# Patient Record
Sex: MALE | Race: WHITE | NOT HISPANIC OR LATINO
[De-identification: names, ages, dates, MRNs, and addresses within clinical notes are randomized per-mention and may not be internally consistent; named-entity substitution may affect disease eponyms.]

---

## 2017-03-27 PROBLEM — Z00.00 ENCOUNTER FOR PREVENTIVE HEALTH EXAMINATION: Noted: 2017-03-27

## 2017-03-28 ENCOUNTER — APPOINTMENT (OUTPATIENT)
Dept: CARDIOLOGY | Facility: CLINIC | Age: 47
End: 2017-03-28

## 2017-05-12 ENCOUNTER — APPOINTMENT (OUTPATIENT)
Dept: CARDIOLOGY | Facility: CLINIC | Age: 47
End: 2017-05-12

## 2017-05-23 ENCOUNTER — EMERGENCY (EMERGENCY)
Facility: HOSPITAL | Age: 47
LOS: 0 days | Discharge: NURSING, REHAB OR LONG-TERM CARE | End: 2017-05-23
Admitting: INTERNAL MEDICINE

## 2017-06-28 DIAGNOSIS — M62.81 MUSCLE WEAKNESS (GENERALIZED): ICD-10-CM

## 2017-06-28 DIAGNOSIS — Z79.82 LONG TERM (CURRENT) USE OF ASPIRIN: ICD-10-CM

## 2017-06-28 DIAGNOSIS — Z88.8 ALLERGY STATUS TO OTHER DRUGS, MEDICAMENTS AND BIOLOGICAL SUBSTANCES STATUS: ICD-10-CM

## 2017-06-28 DIAGNOSIS — I50.9 HEART FAILURE, UNSPECIFIED: ICD-10-CM

## 2017-06-28 DIAGNOSIS — M79.1 MYALGIA: ICD-10-CM

## 2017-06-28 DIAGNOSIS — R32 UNSPECIFIED URINARY INCONTINENCE: ICD-10-CM

## 2017-06-28 DIAGNOSIS — Z79.899 OTHER LONG TERM (CURRENT) DRUG THERAPY: ICD-10-CM

## 2017-06-28 DIAGNOSIS — J44.9 CHRONIC OBSTRUCTIVE PULMONARY DISEASE, UNSPECIFIED: ICD-10-CM

## 2017-06-28 DIAGNOSIS — F32.9 MAJOR DEPRESSIVE DISORDER, SINGLE EPISODE, UNSPECIFIED: ICD-10-CM

## 2017-07-08 ENCOUNTER — INPATIENT (INPATIENT)
Facility: HOSPITAL | Age: 47
LOS: 3 days | Discharge: NURSING, REHAB OR LONG-TERM CARE | End: 2017-07-12
Attending: INTERNAL MEDICINE | Admitting: INTERNAL MEDICINE

## 2017-07-08 DIAGNOSIS — F93.0 SEPARATION ANXIETY DISORDER OF CHILDHOOD: ICD-10-CM

## 2017-07-08 DIAGNOSIS — G40.909 EPILEPSY, UNSPECIFIED, NOT INTRACTABLE, WITHOUT STATUS EPILEPTICUS: ICD-10-CM

## 2017-07-08 DIAGNOSIS — F41.0 PANIC DISORDER [EPISODIC PAROXYSMAL ANXIETY]: ICD-10-CM

## 2017-07-08 DIAGNOSIS — R07.2 PRECORDIAL PAIN: ICD-10-CM

## 2017-07-08 DIAGNOSIS — I49.9 CARDIAC ARRHYTHMIA, UNSPECIFIED: ICD-10-CM

## 2017-07-08 DIAGNOSIS — I25.10 ATHEROSCLEROTIC HEART DISEASE OF NATIVE CORONARY ARTERY WITHOUT ANGINA PECTORIS: ICD-10-CM

## 2017-07-08 DIAGNOSIS — K21.9 GASTRO-ESOPHAGEAL REFLUX DISEASE WITHOUT ESOPHAGITIS: ICD-10-CM

## 2017-07-08 DIAGNOSIS — E78.5 HYPERLIPIDEMIA, UNSPECIFIED: ICD-10-CM

## 2017-07-14 DIAGNOSIS — G62.9 POLYNEUROPATHY, UNSPECIFIED: ICD-10-CM

## 2017-07-14 DIAGNOSIS — B20 HUMAN IMMUNODEFICIENCY VIRUS [HIV] DISEASE: ICD-10-CM

## 2017-07-14 DIAGNOSIS — R07.9 CHEST PAIN, UNSPECIFIED: ICD-10-CM

## 2017-07-14 DIAGNOSIS — I50.9 HEART FAILURE, UNSPECIFIED: ICD-10-CM

## 2017-07-14 DIAGNOSIS — I25.10 ATHEROSCLEROTIC HEART DISEASE OF NATIVE CORONARY ARTERY WITHOUT ANGINA PECTORIS: ICD-10-CM

## 2017-07-14 DIAGNOSIS — F17.200 NICOTINE DEPENDENCE, UNSPECIFIED, UNCOMPLICATED: ICD-10-CM

## 2017-07-14 DIAGNOSIS — Z79.01 LONG TERM (CURRENT) USE OF ANTICOAGULANTS: ICD-10-CM

## 2017-07-14 DIAGNOSIS — E78.5 HYPERLIPIDEMIA, UNSPECIFIED: ICD-10-CM

## 2017-07-14 DIAGNOSIS — G35 MULTIPLE SCLEROSIS: ICD-10-CM

## 2017-07-14 DIAGNOSIS — I11.0 HYPERTENSIVE HEART DISEASE WITH HEART FAILURE: ICD-10-CM

## 2017-07-14 DIAGNOSIS — F31.9 BIPOLAR DISORDER, UNSPECIFIED: ICD-10-CM

## 2017-07-14 DIAGNOSIS — Z85.47 PERSONAL HISTORY OF MALIGNANT NEOPLASM OF TESTIS: ICD-10-CM

## 2017-07-14 DIAGNOSIS — J44.9 CHRONIC OBSTRUCTIVE PULMONARY DISEASE, UNSPECIFIED: ICD-10-CM

## 2017-07-14 DIAGNOSIS — G40.909 EPILEPSY, UNSPECIFIED, NOT INTRACTABLE, WITHOUT STATUS EPILEPTICUS: ICD-10-CM

## 2017-07-14 DIAGNOSIS — I48.91 UNSPECIFIED ATRIAL FIBRILLATION: ICD-10-CM

## 2017-07-14 DIAGNOSIS — Z95.810 PRESENCE OF AUTOMATIC (IMPLANTABLE) CARDIAC DEFIBRILLATOR: ICD-10-CM

## 2017-07-14 DIAGNOSIS — I25.2 OLD MYOCARDIAL INFARCTION: ICD-10-CM

## 2017-07-14 DIAGNOSIS — I48.92 UNSPECIFIED ATRIAL FLUTTER: ICD-10-CM

## 2017-08-26 ENCOUNTER — EMERGENCY (EMERGENCY)
Facility: HOSPITAL | Age: 47
LOS: 0 days | Discharge: HOME | End: 2017-08-26
Admitting: INTERNAL MEDICINE
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
## 2017-08-26 DIAGNOSIS — Z88.8 ALLERGY STATUS TO OTHER DRUGS, MEDICAMENTS AND BIOLOGICAL SUBSTANCES: ICD-10-CM

## 2017-08-26 DIAGNOSIS — R07.2 PRECORDIAL PAIN: ICD-10-CM

## 2017-08-26 DIAGNOSIS — Z23 ENCOUNTER FOR IMMUNIZATION: ICD-10-CM

## 2017-08-26 DIAGNOSIS — I49.9 CARDIAC ARRHYTHMIA, UNSPECIFIED: ICD-10-CM

## 2017-08-26 DIAGNOSIS — I25.10 ATHEROSCLEROTIC HEART DISEASE OF NATIVE CORONARY ARTERY WITHOUT ANGINA PECTORIS: ICD-10-CM

## 2017-08-26 DIAGNOSIS — G40.909 EPILEPSY, UNSPECIFIED, NOT INTRACTABLE, WITHOUT STATUS EPILEPTICUS: ICD-10-CM

## 2017-08-26 DIAGNOSIS — E78.5 HYPERLIPIDEMIA, UNSPECIFIED: ICD-10-CM
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
## 2017-08-26 DIAGNOSIS — Z79.82 LONG TERM (CURRENT) USE OF ASPIRIN: ICD-10-CM

## 2017-08-26 DIAGNOSIS — I21.3 ST ELEVATION (STEMI) MYOCARDIAL INFARCTION OF UNSPECIFIED SITE: ICD-10-CM

## 2017-08-26 DIAGNOSIS — F41.0 PANIC DISORDER [EPISODIC PAROXYSMAL ANXIETY]: ICD-10-CM

## 2017-08-26 DIAGNOSIS — S91.331A PUNCTURE WOUND WITHOUT FOREIGN BODY, RIGHT FOOT, INITIAL ENCOUNTER: ICD-10-CM

## 2017-08-26 DIAGNOSIS — F93.0 SEPARATION ANXIETY DISORDER OF CHILDHOOD: ICD-10-CM

## 2017-08-26 DIAGNOSIS — F31.9 BIPOLAR DISORDER, UNSPECIFIED: ICD-10-CM

## 2017-08-26 DIAGNOSIS — Z79.01 LONG TERM (CURRENT) USE OF ANTICOAGULANTS: ICD-10-CM

## 2017-08-26 DIAGNOSIS — I50.9 HEART FAILURE, UNSPECIFIED: ICD-10-CM

## 2017-08-26 DIAGNOSIS — K21.9 GASTRO-ESOPHAGEAL REFLUX DISEASE WITHOUT ESOPHAGITIS: ICD-10-CM

## 2017-08-26 DIAGNOSIS — Z88.5 ALLERGY STATUS TO NARCOTIC AGENT: ICD-10-CM

## 2017-09-01 ENCOUNTER — INPATIENT (INPATIENT)
Facility: HOSPITAL | Age: 47
LOS: 4 days | Discharge: NURSING, REHAB OR LONG-TERM CARE | End: 2017-09-06
Attending: INTERNAL MEDICINE | Admitting: INTERNAL MEDICINE

## 2017-09-01 DIAGNOSIS — F41.0 PANIC DISORDER [EPISODIC PAROXYSMAL ANXIETY]: ICD-10-CM

## 2017-09-01 DIAGNOSIS — I49.9 CARDIAC ARRHYTHMIA, UNSPECIFIED: ICD-10-CM

## 2017-09-01 DIAGNOSIS — F93.0 SEPARATION ANXIETY DISORDER OF CHILDHOOD: ICD-10-CM
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
## 2017-09-01 DIAGNOSIS — E78.5 HYPERLIPIDEMIA, UNSPECIFIED: ICD-10-CM

## 2017-09-01 DIAGNOSIS — K21.9 GASTRO-ESOPHAGEAL REFLUX DISEASE WITHOUT ESOPHAGITIS: ICD-10-CM

## 2017-09-01 DIAGNOSIS — G40.909 EPILEPSY, UNSPECIFIED, NOT INTRACTABLE, WITHOUT STATUS EPILEPTICUS: ICD-10-CM

## 2017-09-01 DIAGNOSIS — I25.10 ATHEROSCLEROTIC HEART DISEASE OF NATIVE CORONARY ARTERY WITHOUT ANGINA PECTORIS: ICD-10-CM

## 2017-09-01 DIAGNOSIS — R07.2 PRECORDIAL PAIN: ICD-10-CM

## 2017-09-08 DIAGNOSIS — I50.22 CHRONIC SYSTOLIC (CONGESTIVE) HEART FAILURE: ICD-10-CM

## 2017-09-08 DIAGNOSIS — I11.0 HYPERTENSIVE HEART DISEASE WITH HEART FAILURE: ICD-10-CM

## 2017-09-08 DIAGNOSIS — Z92.21 PERSONAL HISTORY OF ANTINEOPLASTIC CHEMOTHERAPY: ICD-10-CM

## 2017-09-08 DIAGNOSIS — Z95.810 PRESENCE OF AUTOMATIC (IMPLANTABLE) CARDIAC DEFIBRILLATOR: ICD-10-CM

## 2017-09-08 DIAGNOSIS — T82.198A OTHER MECHANICAL COMPLICATION OF OTHER CARDIAC ELECTRONIC DEVICE, INITIAL ENCOUNTER: ICD-10-CM

## 2017-09-08 DIAGNOSIS — F41.9 ANXIETY DISORDER, UNSPECIFIED: ICD-10-CM

## 2017-09-08 DIAGNOSIS — F31.9 BIPOLAR DISORDER, UNSPECIFIED: ICD-10-CM

## 2017-09-08 DIAGNOSIS — E78.5 HYPERLIPIDEMIA, UNSPECIFIED: ICD-10-CM

## 2017-09-08 DIAGNOSIS — Z88.2 ALLERGY STATUS TO SULFONAMIDES: ICD-10-CM

## 2017-09-08 DIAGNOSIS — G40.909 EPILEPSY, UNSPECIFIED, NOT INTRACTABLE, WITHOUT STATUS EPILEPTICUS: ICD-10-CM

## 2017-09-08 DIAGNOSIS — Z91.120 PATIENT'S INTENTIONAL UNDERDOSING OF MEDICATION REGIMEN DUE TO FINANCIAL HARDSHIP: ICD-10-CM

## 2017-09-08 DIAGNOSIS — I47.0 RE-ENTRY VENTRICULAR ARRHYTHMIA: ICD-10-CM

## 2017-09-08 DIAGNOSIS — F17.200 NICOTINE DEPENDENCE, UNSPECIFIED, UNCOMPLICATED: ICD-10-CM

## 2017-09-08 DIAGNOSIS — G35 MULTIPLE SCLEROSIS: ICD-10-CM

## 2017-09-08 DIAGNOSIS — Z85.47 PERSONAL HISTORY OF MALIGNANT NEOPLASM OF TESTIS: ICD-10-CM

## 2017-09-08 DIAGNOSIS — Z79.82 LONG TERM (CURRENT) USE OF ASPIRIN: ICD-10-CM

## 2017-09-08 DIAGNOSIS — I48.92 UNSPECIFIED ATRIAL FLUTTER: ICD-10-CM

## 2017-09-08 DIAGNOSIS — I25.10 ATHEROSCLEROTIC HEART DISEASE OF NATIVE CORONARY ARTERY WITHOUT ANGINA PECTORIS: ICD-10-CM

## 2017-09-08 DIAGNOSIS — G62.9 POLYNEUROPATHY, UNSPECIFIED: ICD-10-CM

## 2017-09-08 DIAGNOSIS — J44.9 CHRONIC OBSTRUCTIVE PULMONARY DISEASE, UNSPECIFIED: ICD-10-CM

## 2017-09-08 DIAGNOSIS — Z88.8 ALLERGY STATUS TO OTHER DRUGS, MEDICAMENTS AND BIOLOGICAL SUBSTANCES: ICD-10-CM

## 2017-09-08 DIAGNOSIS — I48.91 UNSPECIFIED ATRIAL FIBRILLATION: ICD-10-CM

## 2017-09-08 DIAGNOSIS — I42.8 OTHER CARDIOMYOPATHIES: ICD-10-CM

## 2017-09-08 DIAGNOSIS — Z88.6 ALLERGY STATUS TO ANALGESIC AGENT: ICD-10-CM

## 2017-10-09 ENCOUNTER — APPOINTMENT (OUTPATIENT)
Dept: CARDIOLOGY | Facility: CLINIC | Age: 47
End: 2017-10-09

## 2017-11-13 ENCOUNTER — APPOINTMENT (OUTPATIENT)
Dept: CARDIOLOGY | Facility: CLINIC | Age: 47
End: 2017-11-13

## 2017-11-13 VITALS
DIASTOLIC BLOOD PRESSURE: 75 MMHG | HEIGHT: 66 IN | WEIGHT: 180 LBS | BODY MASS INDEX: 28.93 KG/M2 | SYSTOLIC BLOOD PRESSURE: 105 MMHG

## 2017-11-14 ENCOUNTER — OUTPATIENT (OUTPATIENT)
Dept: OUTPATIENT SERVICES | Facility: HOSPITAL | Age: 47
LOS: 1 days | Discharge: HOME | End: 2017-11-14

## 2017-11-14 DIAGNOSIS — R07.2 PRECORDIAL PAIN: ICD-10-CM

## 2017-11-14 DIAGNOSIS — K21.9 GASTRO-ESOPHAGEAL REFLUX DISEASE WITHOUT ESOPHAGITIS: ICD-10-CM

## 2017-11-14 DIAGNOSIS — I25.10 ATHEROSCLEROTIC HEART DISEASE OF NATIVE CORONARY ARTERY WITHOUT ANGINA PECTORIS: ICD-10-CM

## 2017-11-14 DIAGNOSIS — E78.5 HYPERLIPIDEMIA, UNSPECIFIED: ICD-10-CM

## 2017-11-14 DIAGNOSIS — I49.9 CARDIAC ARRHYTHMIA, UNSPECIFIED: ICD-10-CM

## 2017-11-14 DIAGNOSIS — G40.909 EPILEPSY, UNSPECIFIED, NOT INTRACTABLE, WITHOUT STATUS EPILEPTICUS: ICD-10-CM

## 2017-11-14 DIAGNOSIS — F93.0 SEPARATION ANXIETY DISORDER OF CHILDHOOD: ICD-10-CM

## 2017-11-14 DIAGNOSIS — F41.0 PANIC DISORDER [EPISODIC PAROXYSMAL ANXIETY]: ICD-10-CM

## 2017-11-16 ENCOUNTER — APPOINTMENT (OUTPATIENT)
Dept: CARDIOLOGY | Facility: CLINIC | Age: 47
End: 2017-11-16

## 2017-11-16 VITALS — SYSTOLIC BLOOD PRESSURE: 98 MMHG | DIASTOLIC BLOOD PRESSURE: 70 MMHG | BODY MASS INDEX: 29.05 KG/M2 | WEIGHT: 180 LBS

## 2017-11-16 DIAGNOSIS — R53.83 OTHER FATIGUE: ICD-10-CM

## 2017-11-16 DIAGNOSIS — Z86.79 PERSONAL HISTORY OF OTHER DISEASES OF THE CIRCULATORY SYSTEM: ICD-10-CM

## 2017-11-16 DIAGNOSIS — Z87.42 PERSONAL HISTORY OF OTHER DISEASES OF THE FEMALE GENITAL TRACT: ICD-10-CM

## 2017-11-16 DIAGNOSIS — F15.90 OTHER STIMULANT USE, UNSPECIFIED, UNCOMPLICATED: ICD-10-CM

## 2017-11-16 DIAGNOSIS — Z95.0 PRESENCE OF CARDIAC PACEMAKER: ICD-10-CM

## 2017-11-16 DIAGNOSIS — F50.82 AVOIDANT/RESTRICTIVE FOOD INTAKE DISORDER: ICD-10-CM

## 2017-11-16 DIAGNOSIS — Z95.810 PRESENCE OF AUTOMATIC (IMPLANTABLE) CARDIAC DEFIBRILLATOR: ICD-10-CM

## 2017-11-16 DIAGNOSIS — I42.8 OTHER CARDIOMYOPATHIES: ICD-10-CM

## 2017-11-16 DIAGNOSIS — Z87.09 PERSONAL HISTORY OF OTHER DISEASES OF THE RESPIRATORY SYSTEM: ICD-10-CM

## 2017-11-16 DIAGNOSIS — I25.2 OLD MYOCARDIAL INFARCTION: ICD-10-CM

## 2017-11-16 DIAGNOSIS — I47.1 SUPRAVENTRICULAR TACHYCARDIA: ICD-10-CM

## 2017-11-16 DIAGNOSIS — F17.200 NICOTINE DEPENDENCE, UNSPECIFIED, UNCOMPLICATED: ICD-10-CM

## 2017-11-21 DIAGNOSIS — K20.9 ESOPHAGITIS, UNSPECIFIED: ICD-10-CM

## 2017-11-21 DIAGNOSIS — E78.00 PURE HYPERCHOLESTEROLEMIA, UNSPECIFIED: ICD-10-CM

## 2017-11-21 DIAGNOSIS — K29.80 DUODENITIS WITHOUT BLEEDING: ICD-10-CM

## 2017-11-21 DIAGNOSIS — J44.9 CHRONIC OBSTRUCTIVE PULMONARY DISEASE, UNSPECIFIED: ICD-10-CM

## 2017-11-21 DIAGNOSIS — R13.10 DYSPHAGIA, UNSPECIFIED: ICD-10-CM

## 2017-11-21 DIAGNOSIS — K29.50 UNSPECIFIED CHRONIC GASTRITIS WITHOUT BLEEDING: ICD-10-CM

## 2017-11-21 DIAGNOSIS — I10 ESSENTIAL (PRIMARY) HYPERTENSION: ICD-10-CM

## 2017-11-27 ENCOUNTER — MOBILE ON CALL (OUTPATIENT)
Age: 47
End: 2017-11-27

## 2017-11-27 ENCOUNTER — INPATIENT (INPATIENT)
Facility: HOSPITAL | Age: 47
LOS: 1 days | Discharge: NURSING, REHAB OR LONG-TERM CARE | End: 2017-11-29
Attending: INTERNAL MEDICINE | Admitting: INTERNAL MEDICINE

## 2017-11-27 DIAGNOSIS — I25.10 ATHEROSCLEROTIC HEART DISEASE OF NATIVE CORONARY ARTERY WITHOUT ANGINA PECTORIS: ICD-10-CM

## 2017-11-27 DIAGNOSIS — K21.9 GASTRO-ESOPHAGEAL REFLUX DISEASE WITHOUT ESOPHAGITIS: ICD-10-CM

## 2017-11-27 DIAGNOSIS — G40.909 EPILEPSY, UNSPECIFIED, NOT INTRACTABLE, WITHOUT STATUS EPILEPTICUS: ICD-10-CM

## 2017-11-27 DIAGNOSIS — F41.0 PANIC DISORDER [EPISODIC PAROXYSMAL ANXIETY]: ICD-10-CM

## 2017-11-27 DIAGNOSIS — F93.0 SEPARATION ANXIETY DISORDER OF CHILDHOOD: ICD-10-CM

## 2017-11-27 DIAGNOSIS — I49.9 CARDIAC ARRHYTHMIA, UNSPECIFIED: ICD-10-CM

## 2017-11-27 DIAGNOSIS — E78.5 HYPERLIPIDEMIA, UNSPECIFIED: ICD-10-CM

## 2017-11-27 DIAGNOSIS — R07.2 PRECORDIAL PAIN: ICD-10-CM

## 2017-12-01 DIAGNOSIS — E83.52 HYPERCALCEMIA: ICD-10-CM

## 2017-12-01 DIAGNOSIS — F41.9 ANXIETY DISORDER, UNSPECIFIED: ICD-10-CM

## 2017-12-01 DIAGNOSIS — I13.0 HYPERTENSIVE HEART AND CHRONIC KIDNEY DISEASE WITH HEART FAILURE AND STAGE 1 THROUGH STAGE 4 CHRONIC KIDNEY DISEASE, OR UNSPECIFIED CHRONIC KIDNEY DISEASE: ICD-10-CM

## 2017-12-01 DIAGNOSIS — Z85.47 PERSONAL HISTORY OF MALIGNANT NEOPLASM OF TESTIS: ICD-10-CM

## 2017-12-01 DIAGNOSIS — Z95.810 PRESENCE OF AUTOMATIC (IMPLANTABLE) CARDIAC DEFIBRILLATOR: ICD-10-CM

## 2017-12-01 DIAGNOSIS — Z90.79 ACQUIRED ABSENCE OF OTHER GENITAL ORGAN(S): ICD-10-CM

## 2017-12-01 DIAGNOSIS — N18.4 CHRONIC KIDNEY DISEASE, STAGE 4 (SEVERE): ICD-10-CM

## 2017-12-01 DIAGNOSIS — R55 SYNCOPE AND COLLAPSE: ICD-10-CM

## 2017-12-01 DIAGNOSIS — Z98.890 OTHER SPECIFIED POSTPROCEDURAL STATES: ICD-10-CM

## 2017-12-01 DIAGNOSIS — I50.22 CHRONIC SYSTOLIC (CONGESTIVE) HEART FAILURE: ICD-10-CM

## 2017-12-01 DIAGNOSIS — G35 MULTIPLE SCLEROSIS: ICD-10-CM

## 2017-12-01 DIAGNOSIS — F31.9 BIPOLAR DISORDER, UNSPECIFIED: ICD-10-CM

## 2017-12-01 DIAGNOSIS — Z79.82 LONG TERM (CURRENT) USE OF ASPIRIN: ICD-10-CM

## 2017-12-01 DIAGNOSIS — F32.9 MAJOR DEPRESSIVE DISORDER, SINGLE EPISODE, UNSPECIFIED: ICD-10-CM

## 2017-12-01 DIAGNOSIS — I48.91 UNSPECIFIED ATRIAL FIBRILLATION: ICD-10-CM

## 2017-12-01 DIAGNOSIS — G40.909 EPILEPSY, UNSPECIFIED, NOT INTRACTABLE, WITHOUT STATUS EPILEPTICUS: ICD-10-CM

## 2017-12-04 DIAGNOSIS — I42.9 CARDIOMYOPATHY, UNSPECIFIED: ICD-10-CM

## 2017-12-04 DIAGNOSIS — I25.2 OLD MYOCARDIAL INFARCTION: ICD-10-CM

## 2017-12-04 DIAGNOSIS — E78.5 HYPERLIPIDEMIA, UNSPECIFIED: ICD-10-CM

## 2017-12-04 DIAGNOSIS — H46.9 UNSPECIFIED OPTIC NEURITIS: ICD-10-CM

## 2017-12-04 DIAGNOSIS — I25.10 ATHEROSCLEROTIC HEART DISEASE OF NATIVE CORONARY ARTERY WITHOUT ANGINA PECTORIS: ICD-10-CM

## 2017-12-04 DIAGNOSIS — J44.9 CHRONIC OBSTRUCTIVE PULMONARY DISEASE, UNSPECIFIED: ICD-10-CM

## 2018-01-05 ENCOUNTER — APPOINTMENT (OUTPATIENT)
Dept: CARDIOLOGY | Facility: CLINIC | Age: 48
End: 2018-01-05

## 2018-02-23 ENCOUNTER — INPATIENT (INPATIENT)
Facility: HOSPITAL | Age: 48
LOS: 4 days | Discharge: NURSING, REHAB OR LONG-TERM CARE | End: 2018-02-28
Attending: INTERNAL MEDICINE | Admitting: INTERNAL MEDICINE

## 2018-02-23 VITALS
HEART RATE: 60 BPM | DIASTOLIC BLOOD PRESSURE: 60 MMHG | OXYGEN SATURATION: 100 % | RESPIRATION RATE: 16 BRPM | TEMPERATURE: 98 F | SYSTOLIC BLOOD PRESSURE: 109 MMHG

## 2018-02-23 RX ORDER — SODIUM CHLORIDE 9 MG/ML
3 INJECTION INTRAMUSCULAR; INTRAVENOUS; SUBCUTANEOUS ONCE
Qty: 0 | Refills: 0 | Status: COMPLETED | OUTPATIENT
Start: 2018-02-23 | End: 2018-02-23

## 2018-02-23 RX ORDER — ASPIRIN/CALCIUM CARB/MAGNESIUM 324 MG
325 TABLET ORAL ONCE
Qty: 0 | Refills: 0 | Status: COMPLETED | OUTPATIENT
Start: 2018-02-23 | End: 2018-02-23

## 2018-02-23 RX ORDER — SODIUM CHLORIDE 9 MG/ML
1000 INJECTION INTRAMUSCULAR; INTRAVENOUS; SUBCUTANEOUS
Qty: 0 | Refills: 0 | Status: DISCONTINUED | OUTPATIENT
Start: 2018-02-23 | End: 2018-02-24

## 2018-02-24 DIAGNOSIS — R07.9 CHEST PAIN, UNSPECIFIED: ICD-10-CM

## 2018-02-24 DIAGNOSIS — Z45.02 ENCOUNTER FOR ADJUSTMENT AND MANAGEMENT OF AUTOMATIC IMPLANTABLE CARDIAC DEFIBRILLATOR: ICD-10-CM

## 2018-02-24 DIAGNOSIS — R00.2 PALPITATIONS: ICD-10-CM

## 2018-02-24 LAB
ALBUMIN SERPL ELPH-MCNC: 3.9 G/DL — SIGNIFICANT CHANGE UP (ref 3–5.5)
ALP SERPL-CCNC: 79 U/L — SIGNIFICANT CHANGE UP (ref 30–115)
ALT FLD-CCNC: 26 U/L — SIGNIFICANT CHANGE UP (ref 0–41)
ANION GAP SERPL CALC-SCNC: 11 MMOL/L — SIGNIFICANT CHANGE UP (ref 7–14)
AST SERPL-CCNC: 24 U/L — SIGNIFICANT CHANGE UP (ref 0–41)
BASOPHILS # BLD AUTO: 0.06 K/UL — SIGNIFICANT CHANGE UP (ref 0–0.2)
BASOPHILS NFR BLD AUTO: 1.1 % — HIGH (ref 0–1)
BILIRUB SERPL-MCNC: 0.7 MG/DL — SIGNIFICANT CHANGE UP (ref 0.2–1.2)
BUN SERPL-MCNC: 14 MG/DL — SIGNIFICANT CHANGE UP (ref 10–20)
CALCIUM SERPL-MCNC: 9.2 MG/DL — SIGNIFICANT CHANGE UP (ref 8.5–10.1)
CHLORIDE SERPL-SCNC: 103 MMOL/L — SIGNIFICANT CHANGE UP (ref 98–110)
CK MB BLD-MCNC: 1 % — SIGNIFICANT CHANGE UP (ref 0–4)
CK MB CFR SERPL CALC: 0.8 NG/ML — SIGNIFICANT CHANGE UP (ref 0.6–6.3)
CK MB CFR SERPL CALC: 1 NG/ML — SIGNIFICANT CHANGE UP (ref 0.6–6.3)
CK MB CFR SERPL CALC: 1.1 NG/ML — SIGNIFICANT CHANGE UP (ref 0.6–6.3)
CK SERPL-CCNC: 70 U/L — SIGNIFICANT CHANGE UP (ref 0–225)
CK SERPL-CCNC: 79 U/L — SIGNIFICANT CHANGE UP (ref 0–225)
CO2 SERPL-SCNC: 25 MMOL/L — SIGNIFICANT CHANGE UP (ref 17–32)
CREAT SERPL-MCNC: 1 MG/DL — SIGNIFICANT CHANGE UP (ref 0.7–1.5)
EOSINOPHIL # BLD AUTO: 0.1 K/UL — SIGNIFICANT CHANGE UP (ref 0–0.7)
EOSINOPHIL NFR BLD AUTO: 1.8 % — SIGNIFICANT CHANGE UP (ref 0–8)
GLUCOSE SERPL-MCNC: 90 MG/DL — SIGNIFICANT CHANGE UP (ref 70–110)
HCT VFR BLD CALC: 37.5 % — LOW (ref 42–52)
HGB BLD-MCNC: 12.4 G/DL — LOW (ref 14–18)
IMM GRANULOCYTES NFR BLD AUTO: 0.4 % — HIGH (ref 0.1–0.3)
LYMPHOCYTES # BLD AUTO: 1.82 K/UL — SIGNIFICANT CHANGE UP (ref 1.2–3.4)
LYMPHOCYTES # BLD AUTO: 32.6 % — SIGNIFICANT CHANGE UP (ref 20.5–51.1)
MCHC RBC-ENTMCNC: 29.8 PG — SIGNIFICANT CHANGE UP (ref 27–31)
MCHC RBC-ENTMCNC: 33.1 G/DL — SIGNIFICANT CHANGE UP (ref 32–37)
MCV RBC AUTO: 90.1 FL — SIGNIFICANT CHANGE UP (ref 80–94)
MONOCYTES # BLD AUTO: 0.43 K/UL — SIGNIFICANT CHANGE UP (ref 0.1–0.6)
MONOCYTES NFR BLD AUTO: 7.7 % — SIGNIFICANT CHANGE UP (ref 1.7–9.3)
NEUTROPHILS # BLD AUTO: 3.15 K/UL — SIGNIFICANT CHANGE UP (ref 1.4–6.5)
NEUTROPHILS NFR BLD AUTO: 56.4 % — SIGNIFICANT CHANGE UP (ref 42.2–75.2)
NRBC # BLD: 0 /100 WBCS — SIGNIFICANT CHANGE UP (ref 0–0)
PLATELET # BLD AUTO: 213 K/UL — SIGNIFICANT CHANGE UP (ref 130–400)
POTASSIUM SERPL-MCNC: 4 MMOL/L — SIGNIFICANT CHANGE UP (ref 3.5–5)
POTASSIUM SERPL-SCNC: 4 MMOL/L — SIGNIFICANT CHANGE UP (ref 3.5–5)
PROT SERPL-MCNC: 5.9 G/DL — LOW (ref 6–8)
RBC # BLD: 4.16 M/UL — LOW (ref 4.7–6.1)
RBC # FLD: 13.2 % — SIGNIFICANT CHANGE UP (ref 11.5–14.5)
SODIUM SERPL-SCNC: 139 MMOL/L — SIGNIFICANT CHANGE UP (ref 135–146)
TROPONIN I SERPL-MCNC: <0.02 NG/ML — SIGNIFICANT CHANGE UP (ref 0–0.05)
WBC # BLD: 5.58 K/UL — SIGNIFICANT CHANGE UP (ref 4.8–10.8)
WBC # FLD AUTO: 5.58 K/UL — SIGNIFICANT CHANGE UP (ref 4.8–10.8)

## 2018-02-24 RX ORDER — DOFETILIDE 0.25 MG/1
250 CAPSULE ORAL
Qty: 0 | Refills: 0 | Status: DISCONTINUED | OUTPATIENT
Start: 2018-02-24 | End: 2018-02-28

## 2018-02-24 RX ORDER — LAMOTRIGINE 25 MG/1
100 TABLET, ORALLY DISINTEGRATING ORAL
Qty: 0 | Refills: 0 | Status: DISCONTINUED | OUTPATIENT
Start: 2018-02-24 | End: 2018-02-28

## 2018-02-24 RX ORDER — IPRATROPIUM BROMIDE 0.2 MG/ML
1 SOLUTION, NON-ORAL INHALATION
Qty: 0 | Refills: 0 | Status: DISCONTINUED | OUTPATIENT
Start: 2018-02-24 | End: 2018-02-28

## 2018-02-24 RX ORDER — METOPROLOL TARTRATE 50 MG
25 TABLET ORAL
Qty: 0 | Refills: 0 | Status: DISCONTINUED | OUTPATIENT
Start: 2018-02-24 | End: 2018-02-28

## 2018-02-24 RX ORDER — APIXABAN 2.5 MG/1
5 TABLET, FILM COATED ORAL EVERY 12 HOURS
Qty: 0 | Refills: 0 | Status: DISCONTINUED | OUTPATIENT
Start: 2018-02-24 | End: 2018-02-28

## 2018-02-24 RX ORDER — QUETIAPINE FUMARATE 200 MG/1
25 TABLET, FILM COATED ORAL
Qty: 0 | Refills: 0 | Status: DISCONTINUED | OUTPATIENT
Start: 2018-02-24 | End: 2018-02-28

## 2018-02-24 RX ORDER — BACLOFEN 100 %
10 POWDER (GRAM) MISCELLANEOUS THREE TIMES A DAY
Qty: 0 | Refills: 0 | Status: DISCONTINUED | OUTPATIENT
Start: 2018-02-24 | End: 2018-02-27

## 2018-02-24 RX ORDER — ACETAMINOPHEN 500 MG
650 TABLET ORAL EVERY 6 HOURS
Qty: 0 | Refills: 0 | Status: DISCONTINUED | OUTPATIENT
Start: 2018-02-24 | End: 2018-02-28

## 2018-02-24 RX ORDER — NITROGLYCERIN 6.5 MG
0.4 CAPSULE, EXTENDED RELEASE ORAL
Qty: 0 | Refills: 0 | Status: DISCONTINUED | OUTPATIENT
Start: 2018-02-24 | End: 2018-02-28

## 2018-02-24 RX ORDER — DIPHENHYDRAMINE HCL 50 MG
25 CAPSULE ORAL EVERY 6 HOURS
Qty: 0 | Refills: 0 | Status: DISCONTINUED | OUTPATIENT
Start: 2018-02-24 | End: 2018-02-24

## 2018-02-24 RX ORDER — FUROSEMIDE 40 MG
40 TABLET ORAL EVERY OTHER DAY
Qty: 0 | Refills: 0 | Status: DISCONTINUED | OUTPATIENT
Start: 2018-02-24 | End: 2018-02-24

## 2018-02-24 RX ORDER — FUROSEMIDE 40 MG
40 TABLET ORAL EVERY OTHER DAY
Qty: 0 | Refills: 0 | Status: DISCONTINUED | OUTPATIENT
Start: 2018-02-24 | End: 2018-02-28

## 2018-02-24 RX ORDER — SENNA PLUS 8.6 MG/1
2 TABLET ORAL AT BEDTIME
Qty: 0 | Refills: 0 | Status: DISCONTINUED | OUTPATIENT
Start: 2018-02-24 | End: 2018-02-28

## 2018-02-24 RX ORDER — DOCUSATE SODIUM 100 MG
100 CAPSULE ORAL THREE TIMES A DAY
Qty: 0 | Refills: 0 | Status: DISCONTINUED | OUTPATIENT
Start: 2018-02-24 | End: 2018-02-28

## 2018-02-24 RX ORDER — MORPHINE SULFATE 50 MG/1
2 CAPSULE, EXTENDED RELEASE ORAL ONCE
Qty: 0 | Refills: 0 | Status: DISCONTINUED | OUTPATIENT
Start: 2018-02-24 | End: 2018-02-24

## 2018-02-24 RX ORDER — FUROSEMIDE 40 MG
20 TABLET ORAL EVERY OTHER DAY
Qty: 0 | Refills: 0 | Status: DISCONTINUED | OUTPATIENT
Start: 2018-02-24 | End: 2018-02-28

## 2018-02-24 RX ORDER — PANTOPRAZOLE SODIUM 20 MG/1
40 TABLET, DELAYED RELEASE ORAL
Qty: 0 | Refills: 0 | Status: DISCONTINUED | OUTPATIENT
Start: 2018-02-24 | End: 2018-02-28

## 2018-02-24 RX ORDER — OXYCODONE AND ACETAMINOPHEN 5; 325 MG/1; MG/1
1 TABLET ORAL EVERY 6 HOURS
Qty: 0 | Refills: 0 | Status: DISCONTINUED | OUTPATIENT
Start: 2018-02-24 | End: 2018-02-28

## 2018-02-24 RX ORDER — DRONABINOL 2.5 MG
5 CAPSULE ORAL DAILY
Qty: 0 | Refills: 0 | Status: DISCONTINUED | OUTPATIENT
Start: 2018-02-24 | End: 2018-02-28

## 2018-02-24 RX ORDER — ASPIRIN/CALCIUM CARB/MAGNESIUM 324 MG
81 TABLET ORAL DAILY
Qty: 0 | Refills: 0 | Status: DISCONTINUED | OUTPATIENT
Start: 2018-02-24 | End: 2018-02-28

## 2018-02-24 RX ORDER — GABAPENTIN 400 MG/1
400 CAPSULE ORAL THREE TIMES A DAY
Qty: 0 | Refills: 0 | Status: DISCONTINUED | OUTPATIENT
Start: 2018-02-24 | End: 2018-02-28

## 2018-02-24 RX ADMIN — Medication 40 MILLIGRAM(S): at 13:59

## 2018-02-24 RX ADMIN — OXYCODONE AND ACETAMINOPHEN 1 TABLET(S): 5; 325 TABLET ORAL at 12:30

## 2018-02-24 RX ADMIN — Medication 325 MILLIGRAM(S): at 00:08

## 2018-02-24 RX ADMIN — OXYCODONE AND ACETAMINOPHEN 1 TABLET(S): 5; 325 TABLET ORAL at 17:16

## 2018-02-24 RX ADMIN — Medication 10 MILLIGRAM(S): at 21:50

## 2018-02-24 RX ADMIN — MORPHINE SULFATE 2 MILLIGRAM(S): 50 CAPSULE, EXTENDED RELEASE ORAL at 06:42

## 2018-02-24 RX ADMIN — Medication 0.4 MILLIGRAM(S): at 05:30

## 2018-02-24 RX ADMIN — Medication 81 MILLIGRAM(S): at 11:27

## 2018-02-24 RX ADMIN — Medication 25 MILLIGRAM(S): at 06:39

## 2018-02-24 RX ADMIN — GABAPENTIN 400 MILLIGRAM(S): 400 CAPSULE ORAL at 14:34

## 2018-02-24 RX ADMIN — Medication 5 MILLIGRAM(S): at 13:11

## 2018-02-24 RX ADMIN — Medication 100 MILLIGRAM(S): at 14:34

## 2018-02-24 RX ADMIN — LAMOTRIGINE 100 MILLIGRAM(S): 25 TABLET, ORALLY DISINTEGRATING ORAL at 17:14

## 2018-02-24 RX ADMIN — MORPHINE SULFATE 2 MILLIGRAM(S): 50 CAPSULE, EXTENDED RELEASE ORAL at 07:32

## 2018-02-24 RX ADMIN — PANTOPRAZOLE SODIUM 40 MILLIGRAM(S): 20 TABLET, DELAYED RELEASE ORAL at 11:27

## 2018-02-24 RX ADMIN — APIXABAN 5 MILLIGRAM(S): 2.5 TABLET, FILM COATED ORAL at 17:14

## 2018-02-24 RX ADMIN — Medication 100 MILLIGRAM(S): at 21:50

## 2018-02-24 RX ADMIN — OXYCODONE AND ACETAMINOPHEN 1 TABLET(S): 5; 325 TABLET ORAL at 11:35

## 2018-02-24 RX ADMIN — OXYCODONE AND ACETAMINOPHEN 1 TABLET(S): 5; 325 TABLET ORAL at 18:27

## 2018-02-24 RX ADMIN — GABAPENTIN 400 MILLIGRAM(S): 400 CAPSULE ORAL at 21:51

## 2018-02-24 RX ADMIN — QUETIAPINE FUMARATE 25 MILLIGRAM(S): 200 TABLET, FILM COATED ORAL at 17:19

## 2018-02-24 RX ADMIN — Medication 25 MILLIGRAM(S): at 17:12

## 2018-02-24 RX ADMIN — SODIUM CHLORIDE 3 MILLILITER(S): 9 INJECTION INTRAMUSCULAR; INTRAVENOUS; SUBCUTANEOUS at 00:08

## 2018-02-24 RX ADMIN — DOFETILIDE 250 MICROGRAM(S): 0.25 CAPSULE ORAL at 17:13

## 2018-02-24 RX ADMIN — Medication 10 MILLIGRAM(S): at 14:34

## 2018-02-24 NOTE — H&P ADULT - ASSESSMENT
48 yo M with multiple sclerosis, CAD, HFpEF, Afib on eliquis, seizure disorder, HTN, DLD, COPD not on home O2, h/o vtach s/p AICD, remote h/o testicular cancer and h/o TIA presented from [facility] with chest pain and palpitations.     # Chest pain -  h/o CAD  r/u unstable angina   tele monitoring   EKG no acute changes  CE x2 negative  nitroglycerin PRN  fu cardiology recommendations  c.w ASA, BB.  add statins     # Palpitations   likely 2/2 afib.  ru recurrent vtach  AICD interrogation  EP consult for possible inpatient ablation as requested by Dr Mcmullen  c/w lopressor and eliquis     # COPD, stable  pt active smoker. nicotine patches. counseling provided  no active wheezing. c/w nebulizers     # MS/ seizure disorder  stable. c/w home meds     # GI/DVT ppx  protonix/ pt on eliquis     # Dispo:  full code   from NH  uses walker at baseline

## 2018-02-24 NOTE — CHART NOTE - NSCHARTNOTEFT_GEN_A_CORE
Notified by RN that patient complaining of chest pain. Patient seen and examined at bedside. Patient states chest pain started around 15 minutes ago originating in left side of chest radiating to left shoulder and left arm. Patient denies any modifying factors and describes it as heaviness rated as 7/10. Patient was given 2mg morphine, 0.4 mg SL nitroglycerin, put on 2L oxygen. 12 lead ekg and stat cardiac enzymes were drawn. Symptoms improved. Will follow-up, will sign out to day team.    Vital Signs Last 24 Hrs  T(C): 36.5 (23 Feb 2018 23:18), Max: 36.7 (23 Feb 2018 22:07)  T(F): 97.7 (23 Feb 2018 23:18), Max: 98.1 (23 Feb 2018 22:07)  HR: 60 (23 Feb 2018 23:18) (60 - 60)  BP: 103/57 (23 Feb 2018 23:18) (103/57 - 109/60)  BP(mean): --  RR: 18 (23 Feb 2018 23:18) (16 - 18)  SpO2: 100% (23 Feb 2018 23:18) (100% - 100%) Notified by RN that patient complaining of chest pain. Patient seen and examined at bedside. Patient states chest pain started around 15 minutes ago originating in left side of chest radiating to left shoulder and left arm. Patient denies any modifying factors and describes it as heaviness rated as 7/10. Patient was given 2mg morphine, 0.4 mg SL nitroglycerin, put on 2L oxygen. 25mg diphenhydramine was given prior to morphine as patient is allergic to morphine, states he gets an itchy rash, no anaphylactic-like symptoms. 12 lead ekg and stat cardiac enzymes were drawn. Symptoms improved. EKG was unchanged from prior. Will follow-up, will sign out to day team.    Vital Signs Last 24 Hrs  T(C): 36.5 (23 Feb 2018 23:18), Max: 36.7 (23 Feb 2018 22:07)  T(F): 97.7 (23 Feb 2018 23:18), Max: 98.1 (23 Feb 2018 22:07)  HR: 60 (23 Feb 2018 23:18) (60 - 60)  BP: 103/57 (23 Feb 2018 23:18) (103/57 - 109/60)  BP(mean): --  RR: 18 (23 Feb 2018 23:18) (16 - 18)  SpO2: 100% (23 Feb 2018 23:18) (100% - 100%)

## 2018-02-24 NOTE — CONSULT NOTE ADULT - SUBJECTIVE AND OBJECTIVE BOX
HPI:  48 yo M with multiple sclerosis, CAD, HFpEF, Afib on eliquis, seizure disorder, HTN, DLD, COPD not on home O2, h/o vtach s/p AICD, remote h/o testicular cancer and h/o TIA presented from [facility] with chest pain and palpitations  pt reports exertional midsternal chest pain, 7/10 in severity, radiating to bilateral shoulders and neck, a/w diaphoresis while walking on day of presentation. pain was relieved by rest. pt denies any nausea or syncope. + palpitations earlier yesterday. pt denies any AICD firing. pt reports persistent palpitations over past yr for which he was supposed to get ablation by Dr Santana but had difficulty scheduling the procedure. he also complains of GAVIRIA unchanged from baseline.   Again this morning pt complained of midsternal chest pain that was relieved by SL nitro.   pt also reports N, V, diarrhea and low grade fever 2 days PTP. these symptoms resolved completely in 24 hrs  he complains of genralized weakness and hand/ feet numbness unchanged from baseline and related to MS.  pt (2018 09:27)      Patient is a 47y old  Male who presents with a chief complaint of chest pain/ palpitations (2018 09:27)        PAST MEDICAL & SURGICAL HISTORY:  Syncope  Peripheral Neuropathy  S/P Implantation of Automatic  Personal History of Multiple S  Personal History of Mitral Nicki  Personal History of MI (Myocar  Personal History of Congestive  Personal History of Cardiomyop  Personal History of Atrial Fib  Personal History of Alcoholism  Clinical Depression  S/P Orchiectomy: L for testicular CA  SVT (Supraventricular Tachycar  GERD (Gastroesophageal Reflux  S/P Implantation of AICD    PREVIOUS DIAGNOSTIC TESTING:      ECHO   FINDINGS:     STRESS  FINDINGS:    CATHETERIZATION  FINDINGS:    ELECTROPHYSIOLOGY STUDY  FINDINGS:    CAROTID ULTRASOUND:  FINDINGS    VENOUS DUPLEX SCAN:  FINDINGS:    CHEST CT PULMONARY ANGIO with IV Contrast:  FINDINGS:  MEDICATIONS  (STANDING):  apixaban 5 milliGRAM(s) Oral every 12 hours  aspirin  chewable 81 milliGRAM(s) Oral daily  baclofen 10 milliGRAM(s) Oral three times a day  docusate sodium 100 milliGRAM(s) Oral three times a day  dofetilide 250 MICROGram(s) Oral two times a day  dronabinol 5 milliGRAM(s) Oral daily  furosemide    Tablet 40 milliGRAM(s) Oral every other day  furosemide    Tablet 20 milliGRAM(s) Oral every other day  gabapentin 400 milliGRAM(s) Oral three times a day  lamoTRIgine 100 milliGRAM(s) Oral two times a day  metoprolol     tartrate 25 milliGRAM(s) Oral two times a day  oxyCODONE    5 mG/acetaminophen 325 mG 1 Tablet(s) Oral every 6 hours  pantoprazole    Tablet 40 milliGRAM(s) Oral before breakfast  QUEtiapine 25 milliGRAM(s) Oral two times a day    MEDICATIONS  (PRN):  acetaminophen   Tablet 650 milliGRAM(s) Oral every 6 hours PRN mild pain  ipratropium 17 MICROgram(s) HFA Inhaler 1 Puff(s) Inhalation four times a day PRN SOB  nitroglycerin     SubLingual 0.4 milliGRAM(s) SubLingual every 5 minutes PRN Chest Pain  senna 2 Tablet(s) Oral at bedtime PRN Constipation   Home Medications:  acetaminophen 650 mg oral tablet: 650 milligram(s) orally 4 times a day, As Needed (:52)  aspirin 81 mg oral tablet, chewable: 1 tab(s) orally once a day (:52)  baclofen 10 mg oral tablet: 1 tab(s) orally 3 times a day (:52)  Eliquis 5 mg oral tablet: 1 tab(s) orally 2 times a day (:52)  gabapentin 400 mg oral capsule: 1 cap(s) orally 3 times a day (:52)  ipratropium 18 mcg/inh inhalation aerosol: 1 application inhaled 4 times a day, As Needed (:52)  lamoTRIgine 100 mg oral tablet: 1 tab(s) orally 2 times a day (:52)  Lasix 20 mg oral tablet: 1 tab(s) orally 3 times a week on monday, wednesday and friday (:52)  Lasix 40 mg oral tablet: 1 tab(s) orally 3 times a week on tuesday, thursday and saturday (:52)  Marinol 5 mg oral capsule: orally once a day (:52)  Metoprolol Tartrate 25 mg oral tablet: 1 tab(s) orally 2 times a day (2018 09:52)  Mi-Acid oral suspension: 30 milliliter(s) orally 4 times a day, As Needed (:52)  omeprazole 20 mg oral delayed release capsule: 1 cap(s) orally 2 times a day (:52)  Percocet 5/325 oral tablet: 1 tab(s) orally every 6 hours (:52)  QUEtiapine 25 mg oral tablet: 1 tab(s) orally 2 times a day (:52)  Tecfidera 240 mg oral delayed release capsule: 1 cap(s) orally once a day (2018 09:52)  Tikosyn 250 mcg oral capsule: 1 cap(s) orally 2 times a day (:52)      FAMILY HISTORY:  CAD    SOCIAL HISTORY:     CIGARETTES: smoker    ALCOHOL: denies    Vital Signs Last 24 Hrs  T(C): 35.7 (2018 13:46), Max: 36.7 (2018 22:07)  T(F): 96.2 (2018 13:46), Max: 98.1 (2018 22:07)  HR: 60 (2018 13:46) (60 - 60)  BP: 103/58 (2018 13:46) (103/57 - 109/66)  BP(mean): --  RR: 18 (2018 13:46) (16 - 18)  SpO2: 100% (2018 23:18) (100% - 100%)    INTERPRETATION OF TELEMETRY:  AV pacing    ECG:  < from: 12 Lead ECG (18 @ 05:30) >  Ventricular Rate 67 BPM    Atrial Rate 46 BPM    P-R Interval 280 ms    QRS Duration 148 ms    Q-T Interval 446 ms    QTC Calculation(Bezet) 471 ms    R Axis -2 degrees    T Axis 10 degrees    Diagnosis Line *** Poor data quality, interpretation may be adversely affected  Atrial-paced rhythm with prolonged AV conduction  Right bundle branch block  Abnormal ECG    Confirmed by Elysia Walters MD ([id number]) on 2018 9:28:34 AM    < end of copied text >      LABS:                        12.4   5.58  )-----------( 213      ( 2018 00:06 )             37.5     02-    139  |  103  |  14  ----------------------------<  90  4.0   |  25  |  1.0    Ca    9.2      2018 00:06    TPro  5.9<L>  /  Alb  3.9  /  TBili  0.7  /  DBili  x   /  AST  24  /  ALT  26  /  AlkPhos  79  02-24    CARDIAC MARKERS ( 2018 05:48 )  <0.02 ng/mL / x     / 70 U/L / x     / 0.8 ng/mL  CARDIAC MARKERS ( 2018 00:06 )  <0.02 ng/mL / x     / 79 U/L / x     / 1.1 ng/mL    LIVER FUNCTIONS - ( 2018 00:06 )  Alb: 3.9 g/dL / Pro: 5.9 g/dL / ALK PHOS: 79 U/L / ALT: 26 U/L / AST: 24 U/L / GGT: x           Daily Height in cm: 167.64 (2018 15:24)    Daily Weight in k.7 (2018 06:17)    [facility] AICD/PPM interrogation done 18

## 2018-02-24 NOTE — CONSULT NOTE ADULT - ASSESSMENT
$7 year old male h/o  cardiomyopathy   AICD    presents with chest pain  and palpitations        plan   serial ECG  ,Enzymes        EPS to check AICD

## 2018-02-24 NOTE — CONSULT NOTE ADULT - SUBJECTIVE AND OBJECTIVE BOX
Patient is a 47y old  Male who presents with a chief complaint of chest pain/ palpitations (24 Feb 2018 09:27)      HPI:  48 yo M with multiple sclerosis, CAD, HFpEF, Afib on eliquis, seizure disorder, HTN, DLD, COPD not on home O2, h/o vtach s/p AICD, remote h/o testicular cancer and h/o TIA presented from [facility] with chest pain and palpitations  pt reports exertional midsternal chest pain, 7/10 in severity, radiating to bilateral shoulders and neck, a/w diaphoresis while walking on day of presentation. pain was relieved by rest. pt denies any nausea or syncope. + palpitations earlier yesterday. pt denies any AICD firing. pt reports persistent palpitations over past yr for which he was supposed to get ablation by Dr Santana but had difficulty scheduling the procedure. he also complains of GAVIRIA unchanged from baseline.   Again this morning pt complained of midsternal chest pain that was relieved by SL nitro.   pt also reports N, V, diarrhea and low grade fever 2 days PTP. these symptoms resolved completely in 24 hrs  he complains of genralized weakness and hand/ feet numbness unchanged from baseline and related to MS.  pt (24 Feb 2018 09:27)      PAST MEDICAL & SURGICAL HISTORY:  Syncope  Peripheral Neuropathy  S/P Implantation of Automatic  Personal History of Multiple S  Personal History of Mitral Nicki  Personal History of MI (Myocar  Personal History of Congestive  Personal History of Cardiomyop  Personal History of Atrial Fib  Personal History of Alcoholism  Clinical Depression  S/P Orchiectomy: L for testicular CA  SVT (Supraventricular Tachycar  GERD (Gastroesophageal Reflux  S/P Implantation of AICD      PREVIOUS DIAGNOSTIC TESTING:      ECHO  FINDINGS:    STRESS  FINDINGS:    CATHETERIZATION  FINDINGS:    MEDICATIONS  (STANDING):  apixaban 5 milliGRAM(s) Oral every 12 hours  aspirin  chewable 81 milliGRAM(s) Oral daily  baclofen 10 milliGRAM(s) Oral three times a day  docusate sodium 100 milliGRAM(s) Oral three times a day  dofetilide 250 MICROGram(s) Oral two times a day  dronabinol 5 milliGRAM(s) Oral daily  furosemide    Tablet 40 milliGRAM(s) Oral every other day  furosemide    Tablet 20 milliGRAM(s) Oral every other day  gabapentin 400 milliGRAM(s) Oral three times a day  lamoTRIgine 100 milliGRAM(s) Oral two times a day  metoprolol     tartrate 25 milliGRAM(s) Oral two times a day  oxyCODONE    5 mG/acetaminophen 325 mG 1 Tablet(s) Oral every 6 hours  pantoprazole    Tablet 40 milliGRAM(s) Oral before breakfast  QUEtiapine 25 milliGRAM(s) Oral two times a day    MEDICATIONS  (PRN):  acetaminophen   Tablet 650 milliGRAM(s) Oral every 6 hours PRN mild pain  ipratropium 17 MICROgram(s) HFA Inhaler 1 Puff(s) Inhalation four times a day PRN SOB  nitroglycerin     SubLingual 0.4 milliGRAM(s) SubLingual every 5 minutes PRN Chest Pain  senna 2 Tablet(s) Oral at bedtime PRN Constipation      FAMILY HISTORY:  No pertinent family history in first degree relatives      SOCIAL HISTORY:  CIGARETTES:    ALCOHOL:    REVIEW OF SYSTEMS:  CONSTITUTIONAL: No fever, weight loss, or fatigue  NECK: No pain or stiffness  RESPIRATORY: No cough, wheezing, chills or hemoptysis; No shortness of breath  CARDIOVASCULAR: No chest pain, palpitations, dizziness, or leg swelling  GASTROINTESTINAL: No abdominal or epigastric pain. No nausea, vomiting, or hematemesis; No diarrhea or constipation. No melena or hematochezia.  GENITOURINARY: No dysuria, frequency, hematuria, or incontinence  NEUROLOGICAL: No headaches, memory loss, loss of strength, numbness, or tremors  SKIN: No itching, burning, rashes, or lesions   ENDOCRINE: No heat or cold intolerance; No hair loss  MUSCULOSKELETAL: No joint pain or swelling; No muscle, back, or extremity pain  HEME/LYMPH: No easy bruising, or bleeding gums          Vital Signs Last 24 Hrs  T(C): 35.7 (24 Feb 2018 13:46), Max: 36.7 (23 Feb 2018 22:07)  T(F): 96.2 (24 Feb 2018 13:46), Max: 98.1 (23 Feb 2018 22:07)  HR: 60 (24 Feb 2018 13:46) (60 - 60)  BP: 103/58 (24 Feb 2018 13:46) (103/57 - 109/66)  BP(mean): --  RR: 18 (24 Feb 2018 13:46) (16 - 18)  SpO2: 100% (23 Feb 2018 23:18) (100% - 100%)        PHYSICAL EXAM:  GENERAL: NAD, well-groomed, well-developed  HEAD:  Atraumatic, Normocephalic  NECK: Supple, No JVD, Normal thyroid  NERVOUS SYSTEM:  Alert & Oriented X3, Good concentration  CHEST/LUNG: Clear to percussion bilaterally; No rales, rhonchi, wheezing, or rubs  HEART: Regular rate and rhythm; No murmurs, rubs, or gallops  ABDOMEN: Soft, Nontender, Nondistended; Bowel sounds present  EXTREMITIES:  2+ Peripheral Pulses, No clubbing, cyanosis, or edema  SKIN: No rashes or lesions    INTERPRETATION OF TELEMETRY:    ECG:    I&O's Detail      LABS:                        12.4   5.58  )-----------( 213      ( 24 Feb 2018 00:06 )             37.5     02-24    139  |  103  |  14  ----------------------------<  90  4.0   |  25  |  1.0    Ca    9.2      24 Feb 2018 00:06    TPro  5.9<L>  /  Alb  3.9  /  TBili  0.7  /  DBili  x   /  AST  24  /  ALT  26  /  AlkPhos  79  02-24    CARDIAC MARKERS ( 24 Feb 2018 05:48 )  <0.02 ng/mL / x     / 70 U/L / x     / 0.8 ng/mL  CARDIAC MARKERS ( 24 Feb 2018 00:06 )  <0.02 ng/mL / x     / 79 U/L / x     / 1.1 ng/mL          I&O's Summary      RADIOLOGY & ADDITIONAL STUDIES:

## 2018-02-24 NOTE — H&P ADULT - PMH
Clinical Depression    Peripheral Neuropathy    Personal History of Alcoholism    Personal History of Atrial Fib    Personal History of Cardiomyop    Personal History of Congestive    Personal History of MI (Myocar    Personal History of Mitral Nicki    Personal History of Multiple S    S/P Implantation of Automatic    Syncope

## 2018-02-24 NOTE — PROCEDURE NOTE - SUPERVISORY STATEMENT
7 EVENTS OF ATRIAL TACHYCARDIA AT RATE /MIN LONGEST 27 SECONDS. LAST EPISODE RECORDED ON 1/27/2018. NO EPISODES RECORDED RECENTLY  NO VENTRICULAR EVENTS  REPROGRAMMED OFF MVP

## 2018-02-24 NOTE — ED PROVIDER NOTE - OBJECTIVE STATEMENT
46 yo m with pmh of cardiomyopathy, ppm, aicd, chf, afib, svt, vtach, mix2, MS, gerd, seizure d/o, presents with c/o central chest pain and b/l shoulder pain.  pt says started today with left shoulder pain and chest pain which resolved, then recurred this evening with right shoulder pain radiating to the right neck.  pain exacerbated by movement.  pt took his baby asa today and was given another 162 mg by ambulance.  +sob, but no n/v

## 2018-02-24 NOTE — CONSULT NOTE ADULT - PROBLEM SELECTOR RECOMMENDATION 2
PACE MAKER INTERROGATION  SHOWED 7 EPISODES OF ATRIAL T PACE MAKER INTERROGATION  SHOWED 7 EPISODES OF ATRIAL TACHYCARDIA AT RATE /MIN LAST EPISODE 1/27/20118 LONGEST EPISODE 27 SECSONDS  ABLATION PENDING

## 2018-02-24 NOTE — ED PROVIDER NOTE - PHYSICAL EXAMINATION
VITAL SIGNS: I have reviewed nursing notes and confirm.  CONSTITUTIONAL: Well-developed; well-nourished; in no acute distress.  SKIN: Skin exam is warm and dry, no acute rash.  HEAD: Normocephalic; atraumatic.  EYES: PERRL, EOM intact; conjunctiva and sclera clear.  ENT: No nasal discharge; airway clear. TMs clear.  NECK: Supple; non tender.  CARD: S1, S2 normal; no murmurs, gallops, or rubs. Regular rate and rhythm. AICD in place  RESP: No wheezes, no ronchi, no rales  ABD: Normal bowel sounds; soft; non-distended; non-tender;   EXT: Normal ROM. No clubbing, cyanosis or edema.  NEURO: Alert, oriented. Grossly unremarkable. No focal deficits.  PSYCH: Cooperative, appropriate.

## 2018-02-24 NOTE — H&P ADULT - NSHPLABSRESULTS_GEN_ALL_CORE
- CBC:                    12.4   5.58  )-----------( 213      ( 24 Feb 2018 00:06 )             37.5        - BMP:    139  |  103  |  14  ----------------------------<  90  4.0   |  25  |  1.0    Ca    9.2      TPro  5.9<L>  /  Alb  3.9  /  TBili  0.7  /  DBili  x   /  AST  24  /  ALT  26  /  AlkPhos  79     - CARDIAC MARKERS ( 24 Feb 2018 05:48 )  <0.02 ng/mL / x     / 70 U/L / x     / 0.8 ng/mL  CARDIAC MARKERS ( 24 Feb 2018 00:06 )  <0.02 ng/mL / x     / 79 U/L / x     / 1.1 ng/mL    - Xray Chest:  Clear lungs.    - 2d echo ( 2017): EF 55-65%, 2+ TR    - ECG:  Atrial-paced rhythm with prolonged AV conduction  Right bundle branch block

## 2018-02-24 NOTE — PROGRESS NOTE ADULT - SUBJECTIVE AND OBJECTIVE BOX
CUATE HORNE 46yo W Male BIBA from [facility] for c/o CP and palpitations, CP are retrosternal radiating to neck and shoulders 7/10 in intensity.  Pt has significant cardiac Hx inc CAD, cardiac aarrhythmia, inc afib and SVT, arrhythmogenic R ventricular dysplasia, sp AICD/PPM.  The pt is on AC with Eliquis.  Cardiac enzymes were negative and pt's AICD was interrogated:  7 event sof atrial tach to 190 lasting 27 sec, last event 1/27, no current events.  The pt also has a Hx of HTN, TIAs, Hyperlipidemia, MS, COPD with continued tobacco use, L orchiectomy for testicular ca, depression and anxiety.  P states he was to have out pt ablation but never had the chance to schedule the procedure.  The pt is admitted to [facility] for further w/up.    INTERVAL HPI/OVERNIGHT EVENTS: c/o gen fatigue    MEDICATIONS  (STANDING):  apixaban 5 milliGRAM(s) Oral every 12 hours  aspirin  chewable 81 milliGRAM(s) Oral daily  baclofen 10 milliGRAM(s) Oral three times a day  docusate sodium 100 milliGRAM(s) Oral three times a day  dofetilide 250 MICROGram(s) Oral two times a day  dronabinol 5 milliGRAM(s) Oral daily  furosemide    Tablet 40 milliGRAM(s) Oral every other day  furosemide    Tablet 20 milliGRAM(s) Oral every other day  gabapentin 400 milliGRAM(s) Oral three times a day  lamoTRIgine 100 milliGRAM(s) Oral two times a day  metoprolol     tartrate 25 milliGRAM(s) Oral two times a day  oxyCODONE    5 mG/acetaminophen 325 mG 1 Tablet(s) Oral every 6 hours  pantoprazole    Tablet 40 milliGRAM(s) Oral before breakfast  QUEtiapine 25 milliGRAM(s) Oral two times a day    MEDICATIONS  (PRN):  acetaminophen   Tablet 650 milliGRAM(s) Oral every 6 hours PRN mild pain  ipratropium 17 MICROgram(s) HFA Inhaler 1 Puff(s) Inhalation four times a day PRN SOB  nitroglycerin     SubLingual 0.4 milliGRAM(s) SubLingual every 5 minutes PRN Chest Pain  senna 2 Tablet(s) Oral at bedtime PRN Constipation      Allergies    dexmethylphenidate (Unknown)  Dilantin, compazine, neurontin, ridaline, phenegrin (Anaphylaxis)  dilantin, compazine, neurontin, ritalin, phenergan (Unknown)  Haldol (Unknown)  hydantoin derivatives (Other)  methylphenidate (Unknown)  Morphine Sulfate (Unknown)  phenytoin (Unknown)  prochlorperazine (Unknown)  thioxanthenes (Unknown)            	    Vital Signs Last 24 Hrs  T(C): 35.7   T(F): 96.2   HR: 60   BP: 103/58     RR: 18   SpO2: 100%     PHYSICAL EXAM:      Constitutional:  alert and oriented, thin and chronically ill looking but NAD    Eyes:  WNL    ENMT:  dry oral mucosa, dental defects    Neck:  supple no JVD or bruits    Respiratory:  good air entry BL, scattered rhonchi    Cardiovascular:  L anterior chest wall unit, S1S2 reg    Gastrointestinal: abd is soft and benign with good bowel sounds and no organomegaly    Extremities:  moves all limbs, no pedal edema    Skin:  no rash    Lymph Nodes: not enlarged      LABS:                        12.4   5.58  )-----------( 213      ( 24 Feb 2018 00:06 )             37.5     02-24    139  |  103  |  14  ----------------------------<  90  4.0   |  25  |  1.0    Ca    9.2      24 Feb 2018 00:06    TPro  5.9<L>  /  Alb  3.9  /  TBili  0.7  /  DBili  x   /  AST  24  /  ALT  26  /  AlkPhos  79  02-24          RADIOLOGY & ADDITIONAL TESTS:    AICD interrogation:  7 events of atrial tach 190/min, lasting 27 swec, last event recorded 1/27, no recent events

## 2018-02-24 NOTE — H&P ADULT - HISTORY OF PRESENT ILLNESS
46 yo M with multiple sclerosis, CAD, HFpEF, Afib on eliquis, seizure disorder, HTN, DLD, COPD not on home O2, h/o vtach s/p AICD, remote h/o testicular cancer and h/o TIA presented from [facility] with chest pain and palpitations  pt reports exertional midsternal chest pain, 7/10 in severity, radiating to bilateral shoulders and neck, a/w diaphoresis while walking on day of presentation. pain was relieved by rest. pt denies any nausea or syncope. + palpitations earlier yesterday. pt denies any AICD firing. pt reports persistent palpitations over past yr for which he was supposed to get ablation by Dr Santana but had difficulty scheduling the procedure. he also complains of GAVIRIA unchanged from baseline.   Again this morning pt complained of midsternal chest pain that was relieved by SL nitro.   pt also reports N, V, diarrhea and low grade fever 2 days PTP. these symptoms resolved completely in 24 hrs  he complains of genralized weakness and hand/ feet numbness unchanged from baseline and related to MS.  pt

## 2018-02-24 NOTE — H&P ADULT - PSH
GERD (Gastroesophageal Reflux    S/P Implantation of AICD    S/P Orchiectomy  L for testicular CA  SVT (Supraventricular Tachycar

## 2018-02-25 LAB
ANION GAP SERPL CALC-SCNC: 7 MMOL/L — SIGNIFICANT CHANGE UP (ref 7–14)
BASOPHILS # BLD AUTO: 0.07 K/UL — SIGNIFICANT CHANGE UP (ref 0–0.2)
BASOPHILS NFR BLD AUTO: 1.3 % — HIGH (ref 0–1)
BUN SERPL-MCNC: 15 MG/DL — SIGNIFICANT CHANGE UP (ref 10–20)
CALCIUM SERPL-MCNC: 9.3 MG/DL — SIGNIFICANT CHANGE UP (ref 8.5–10.1)
CHLORIDE SERPL-SCNC: 104 MMOL/L — SIGNIFICANT CHANGE UP (ref 98–110)
CHOLEST SERPL-MCNC: 214 MG/DL — HIGH (ref 100–200)
CO2 SERPL-SCNC: 28 MMOL/L — SIGNIFICANT CHANGE UP (ref 17–32)
CREAT SERPL-MCNC: 0.9 MG/DL — SIGNIFICANT CHANGE UP (ref 0.7–1.5)
EOSINOPHIL # BLD AUTO: 0.09 K/UL — SIGNIFICANT CHANGE UP (ref 0–0.7)
EOSINOPHIL NFR BLD AUTO: 1.7 % — SIGNIFICANT CHANGE UP (ref 0–8)
ESTIMATED AVERAGE GLUCOSE: 103 MG/DL — SIGNIFICANT CHANGE UP (ref 68–114)
GLUCOSE SERPL-MCNC: 88 MG/DL — SIGNIFICANT CHANGE UP (ref 70–110)
HBA1C BLD-MCNC: 5.2 % — SIGNIFICANT CHANGE UP (ref 4–5.6)
HCT VFR BLD CALC: 38.6 % — LOW (ref 42–52)
HDLC SERPL-MCNC: 33 MG/DL — LOW (ref 40–60)
HGB BLD-MCNC: 12.7 G/DL — LOW (ref 14–18)
IMM GRANULOCYTES NFR BLD AUTO: 0.4 % — HIGH (ref 0.1–0.3)
LIPID PNL WITH DIRECT LDL SERPL: 141 MG/DL — HIGH (ref 50–100)
LYMPHOCYTES # BLD AUTO: 1.86 K/UL — SIGNIFICANT CHANGE UP (ref 1.2–3.4)
LYMPHOCYTES # BLD AUTO: 35.8 % — SIGNIFICANT CHANGE UP (ref 20.5–51.1)
MAGNESIUM SERPL-MCNC: 2.1 MG/DL — SIGNIFICANT CHANGE UP (ref 1.8–2.4)
MCHC RBC-ENTMCNC: 29.7 PG — SIGNIFICANT CHANGE UP (ref 27–31)
MCHC RBC-ENTMCNC: 32.9 G/DL — SIGNIFICANT CHANGE UP (ref 32–37)
MCV RBC AUTO: 90.2 FL — SIGNIFICANT CHANGE UP (ref 80–94)
MONOCYTES # BLD AUTO: 0.42 K/UL — SIGNIFICANT CHANGE UP (ref 0.1–0.6)
MONOCYTES NFR BLD AUTO: 8.1 % — SIGNIFICANT CHANGE UP (ref 1.7–9.3)
NEUTROPHILS # BLD AUTO: 2.74 K/UL — SIGNIFICANT CHANGE UP (ref 1.4–6.5)
NEUTROPHILS NFR BLD AUTO: 52.7 % — SIGNIFICANT CHANGE UP (ref 42.2–75.2)
PLATELET # BLD AUTO: 203 K/UL — SIGNIFICANT CHANGE UP (ref 130–400)
POTASSIUM SERPL-MCNC: 3.8 MMOL/L — SIGNIFICANT CHANGE UP (ref 3.5–5)
POTASSIUM SERPL-SCNC: 3.8 MMOL/L — SIGNIFICANT CHANGE UP (ref 3.5–5)
RBC # BLD: 4.28 M/UL — LOW (ref 4.7–6.1)
RBC # FLD: 13.1 % — SIGNIFICANT CHANGE UP (ref 11.5–14.5)
SODIUM SERPL-SCNC: 139 MMOL/L — SIGNIFICANT CHANGE UP (ref 135–146)
TOTAL CHOLESTEROL/HDL RATIO MEASUREMENT: 6.5 RATIO — HIGH (ref 4–5.5)
TRIGL SERPL-MCNC: 291 MG/DL — HIGH (ref 40–150)
WBC # BLD: 5.2 K/UL — SIGNIFICANT CHANGE UP (ref 4.8–10.8)
WBC # FLD AUTO: 5.2 K/UL — SIGNIFICANT CHANGE UP (ref 4.8–10.8)

## 2018-02-25 RX ADMIN — Medication 10 MILLIGRAM(S): at 06:34

## 2018-02-25 RX ADMIN — OXYCODONE AND ACETAMINOPHEN 1 TABLET(S): 5; 325 TABLET ORAL at 17:51

## 2018-02-25 RX ADMIN — PANTOPRAZOLE SODIUM 40 MILLIGRAM(S): 20 TABLET, DELAYED RELEASE ORAL at 06:34

## 2018-02-25 RX ADMIN — GABAPENTIN 400 MILLIGRAM(S): 400 CAPSULE ORAL at 21:56

## 2018-02-25 RX ADMIN — APIXABAN 5 MILLIGRAM(S): 2.5 TABLET, FILM COATED ORAL at 06:34

## 2018-02-25 RX ADMIN — QUETIAPINE FUMARATE 25 MILLIGRAM(S): 200 TABLET, FILM COATED ORAL at 06:38

## 2018-02-25 RX ADMIN — OXYCODONE AND ACETAMINOPHEN 1 TABLET(S): 5; 325 TABLET ORAL at 06:39

## 2018-02-25 RX ADMIN — Medication 25 MILLIGRAM(S): at 17:43

## 2018-02-25 RX ADMIN — OXYCODONE AND ACETAMINOPHEN 1 TABLET(S): 5; 325 TABLET ORAL at 07:30

## 2018-02-25 RX ADMIN — OXYCODONE AND ACETAMINOPHEN 1 TABLET(S): 5; 325 TABLET ORAL at 00:28

## 2018-02-25 RX ADMIN — DOFETILIDE 250 MICROGRAM(S): 0.25 CAPSULE ORAL at 17:42

## 2018-02-25 RX ADMIN — Medication 10 MILLIGRAM(S): at 14:17

## 2018-02-25 RX ADMIN — Medication 100 MILLIGRAM(S): at 21:54

## 2018-02-25 RX ADMIN — Medication 10 MILLIGRAM(S): at 21:54

## 2018-02-25 RX ADMIN — OXYCODONE AND ACETAMINOPHEN 1 TABLET(S): 5; 325 TABLET ORAL at 01:00

## 2018-02-25 RX ADMIN — Medication 5 MILLIGRAM(S): at 12:11

## 2018-02-25 RX ADMIN — DOFETILIDE 250 MICROGRAM(S): 0.25 CAPSULE ORAL at 06:34

## 2018-02-25 RX ADMIN — LAMOTRIGINE 100 MILLIGRAM(S): 25 TABLET, ORALLY DISINTEGRATING ORAL at 17:43

## 2018-02-25 RX ADMIN — GABAPENTIN 400 MILLIGRAM(S): 400 CAPSULE ORAL at 14:17

## 2018-02-25 RX ADMIN — OXYCODONE AND ACETAMINOPHEN 1 TABLET(S): 5; 325 TABLET ORAL at 12:09

## 2018-02-25 RX ADMIN — Medication 25 MILLIGRAM(S): at 06:37

## 2018-02-25 RX ADMIN — GABAPENTIN 400 MILLIGRAM(S): 400 CAPSULE ORAL at 06:40

## 2018-02-25 RX ADMIN — OXYCODONE AND ACETAMINOPHEN 1 TABLET(S): 5; 325 TABLET ORAL at 18:52

## 2018-02-25 RX ADMIN — OXYCODONE AND ACETAMINOPHEN 1 TABLET(S): 5; 325 TABLET ORAL at 13:00

## 2018-02-25 RX ADMIN — QUETIAPINE FUMARATE 25 MILLIGRAM(S): 200 TABLET, FILM COATED ORAL at 17:43

## 2018-02-25 RX ADMIN — Medication 81 MILLIGRAM(S): at 12:12

## 2018-02-25 RX ADMIN — APIXABAN 5 MILLIGRAM(S): 2.5 TABLET, FILM COATED ORAL at 17:44

## 2018-02-25 RX ADMIN — LAMOTRIGINE 100 MILLIGRAM(S): 25 TABLET, ORALLY DISINTEGRATING ORAL at 06:37

## 2018-02-25 RX ADMIN — Medication 100 MILLIGRAM(S): at 06:36

## 2018-02-25 RX ADMIN — Medication 100 MILLIGRAM(S): at 14:17

## 2018-02-25 NOTE — PROGRESS NOTE ADULT - SUBJECTIVE AND OBJECTIVE BOX
CUATE HORNE 46yo W Male BIBA from [facility] for c/o CP and palpitations, CP are retrosternal radiating to neck and shoulders 7/10 in intensity.  Pt has significant cardiac Hx inc CAD, cardiac aarrhythmia, inc afib and SVT, arrhythmogenic R ventricular dysplasia, sp AICD/PPM.  The pt is on AC with Eliquis.  Cardiac enzymes were negative and pt's AICD was interrogated:  7 event sof atrial tach to 190 lasting 27 sec, last event 1/27, no current events.  The pt also has a Hx of HTN, TIAs, Hyperlipidemia, MS, COPD with continued tobacco use, L orchiectomy for testicular ca, depression and anxiety.  P states he was to have out pt ablation but never had the chance to schedule the procedure.  The pt is admitted to [facility] for observation and further w/up.    INTERVAL HPI/OVERNIGHT EVENTS: c/o gen fatigue    MEDICATIONS  (STANDING):  apixaban 5 milliGRAM(s) Oral every 12 hours  aspirin  chewable 81 milliGRAM(s) Oral daily  baclofen 10 milliGRAM(s) Oral three times a day  docusate sodium 100 milliGRAM(s) Oral three times a day  dofetilide 250 MICROGram(s) Oral two times a day  dronabinol 5 milliGRAM(s) Oral daily  furosemide    Tablet 40 milliGRAM(s) Oral every other day  furosemide    Tablet 20 milliGRAM(s) Oral every other day  gabapentin 400 milliGRAM(s) Oral three times a day  lamoTRIgine 100 milliGRAM(s) Oral two times a day  metoprolol     tartrate 25 milliGRAM(s) Oral two times a day  oxyCODONE    5 mG/acetaminophen 325 mG 1 Tablet(s) Oral every 6 hours  pantoprazole    Tablet 40 milliGRAM(s) Oral before breakfast  QUEtiapine 25 milliGRAM(s) Oral two times a day    MEDICATIONS  (PRN):  acetaminophen   Tablet 650 milliGRAM(s) Oral every 6 hours PRN mild pain  ipratropium 17 MICROgram(s) HFA Inhaler 1 Puff(s) Inhalation four times a day PRN SOB  nitroglycerin     SubLingual 0.4 milliGRAM(s) SubLingual every 5 minutes PRN Chest Pain  senna 2 Tablet(s) Oral at bedtime PRN Constipation      Allergies    dexmethylphenidate (Unknown)  Dilantin, compazine, neurontin, ridaline, phenegrin (Anaphylaxis)  dilantin, compazine, neurontin, ritalin, phenergan (Unknown)  Haldol (Unknown)  hydantoin derivatives (Other)  methylphenidate (Unknown)  Morphine Sulfate (Unknown)  phenytoin (Unknown)  prochlorperazine (Unknown)  thioxanthenes (Unknown)            	    Vital Signs Last 24 Hrs     T(F): 97.4   HR: 60   BP: 104/65     RR: 18   SpO2: 100%     PHYSICAL EXAM:      Constitutional:  alert and oriented, chronically ill looking but NAD    Eyes:  WNL    ENMT:  dry oral mucosa, dental defects    Neck:  supple no JVD or bruits    Respiratory:  good air entry BL, scattered rhonchi    Cardiovascular:  L anterior chest wall unit, S1S2 reg    Gastrointestinal: abd is soft and benign with good bowel sounds and no organomegaly    Extremities:  moves all limbs, no pedal edema    Skin:  no rash    Lymph Nodes: not enlarged      LABS: 2/25                        12.7  5.2  )-----------( 203                  38.6     02-24    139  |  104  |  145--88  3.8   |  28  |  0.9    Mg 2.1    Ca    9.2      24 Feb 2018 00:06    TPro  5.9<L>  /  Alb  3.9  /  TBili  0.7  /  DBili  x   /  AST  24  /  ALT  26  /  AlkPhos  79  02-24    , , , HDL 33      RADIOLOGY & ADDITIONAL TESTS:    AICD interrogation:  7 events of atrial tach 190/min, lasting 27 sec, last event recorded 1/27, no recent events

## 2018-02-26 RX ORDER — ATORVASTATIN CALCIUM 80 MG/1
40 TABLET, FILM COATED ORAL AT BEDTIME
Qty: 0 | Refills: 0 | Status: DISCONTINUED | OUTPATIENT
Start: 2018-02-26 | End: 2018-02-28

## 2018-02-26 RX ADMIN — Medication 20 MILLIGRAM(S): at 13:43

## 2018-02-26 RX ADMIN — OXYCODONE AND ACETAMINOPHEN 1 TABLET(S): 5; 325 TABLET ORAL at 00:41

## 2018-02-26 RX ADMIN — Medication 10 MILLIGRAM(S): at 13:44

## 2018-02-26 RX ADMIN — LAMOTRIGINE 100 MILLIGRAM(S): 25 TABLET, ORALLY DISINTEGRATING ORAL at 06:22

## 2018-02-26 RX ADMIN — ATORVASTATIN CALCIUM 40 MILLIGRAM(S): 80 TABLET, FILM COATED ORAL at 22:18

## 2018-02-26 RX ADMIN — OXYCODONE AND ACETAMINOPHEN 1 TABLET(S): 5; 325 TABLET ORAL at 00:01

## 2018-02-26 RX ADMIN — DOFETILIDE 250 MICROGRAM(S): 0.25 CAPSULE ORAL at 17:36

## 2018-02-26 RX ADMIN — Medication 100 MILLIGRAM(S): at 22:19

## 2018-02-26 RX ADMIN — GABAPENTIN 400 MILLIGRAM(S): 400 CAPSULE ORAL at 06:24

## 2018-02-26 RX ADMIN — OXYCODONE AND ACETAMINOPHEN 1 TABLET(S): 5; 325 TABLET ORAL at 23:09

## 2018-02-26 RX ADMIN — OXYCODONE AND ACETAMINOPHEN 1 TABLET(S): 5; 325 TABLET ORAL at 06:27

## 2018-02-26 RX ADMIN — APIXABAN 5 MILLIGRAM(S): 2.5 TABLET, FILM COATED ORAL at 17:36

## 2018-02-26 RX ADMIN — PANTOPRAZOLE SODIUM 40 MILLIGRAM(S): 20 TABLET, DELAYED RELEASE ORAL at 06:27

## 2018-02-26 RX ADMIN — Medication 10 MILLIGRAM(S): at 06:23

## 2018-02-26 RX ADMIN — DOFETILIDE 250 MICROGRAM(S): 0.25 CAPSULE ORAL at 06:24

## 2018-02-26 RX ADMIN — Medication 25 MILLIGRAM(S): at 17:38

## 2018-02-26 RX ADMIN — Medication 25 MILLIGRAM(S): at 06:22

## 2018-02-26 RX ADMIN — LAMOTRIGINE 100 MILLIGRAM(S): 25 TABLET, ORALLY DISINTEGRATING ORAL at 17:37

## 2018-02-26 RX ADMIN — Medication 81 MILLIGRAM(S): at 13:43

## 2018-02-26 RX ADMIN — Medication 100 MILLIGRAM(S): at 06:23

## 2018-02-26 RX ADMIN — QUETIAPINE FUMARATE 25 MILLIGRAM(S): 200 TABLET, FILM COATED ORAL at 17:40

## 2018-02-26 RX ADMIN — Medication 10 MILLIGRAM(S): at 22:18

## 2018-02-26 RX ADMIN — GABAPENTIN 400 MILLIGRAM(S): 400 CAPSULE ORAL at 22:19

## 2018-02-26 RX ADMIN — GABAPENTIN 400 MILLIGRAM(S): 400 CAPSULE ORAL at 13:44

## 2018-02-26 RX ADMIN — OXYCODONE AND ACETAMINOPHEN 1 TABLET(S): 5; 325 TABLET ORAL at 13:42

## 2018-02-26 RX ADMIN — Medication 100 MILLIGRAM(S): at 13:44

## 2018-02-26 RX ADMIN — OXYCODONE AND ACETAMINOPHEN 1 TABLET(S): 5; 325 TABLET ORAL at 23:20

## 2018-02-26 RX ADMIN — OXYCODONE AND ACETAMINOPHEN 1 TABLET(S): 5; 325 TABLET ORAL at 17:26

## 2018-02-26 RX ADMIN — Medication 5 MILLIGRAM(S): at 15:51

## 2018-02-26 RX ADMIN — OXYCODONE AND ACETAMINOPHEN 1 TABLET(S): 5; 325 TABLET ORAL at 17:36

## 2018-02-26 RX ADMIN — APIXABAN 5 MILLIGRAM(S): 2.5 TABLET, FILM COATED ORAL at 06:22

## 2018-02-26 RX ADMIN — OXYCODONE AND ACETAMINOPHEN 1 TABLET(S): 5; 325 TABLET ORAL at 18:21

## 2018-02-26 RX ADMIN — QUETIAPINE FUMARATE 25 MILLIGRAM(S): 200 TABLET, FILM COATED ORAL at 06:24

## 2018-02-26 NOTE — PROGRESS NOTE ADULT - SUBJECTIVE AND OBJECTIVE BOX
SUBJECTIVE:    Patient is a 47y old Male who presents with a chief complaint of chest pain/ palpitations (24 Feb 2018 09:27)    Currently admitted to medicine with the primary diagnosis of Chest pain     Today is hospital day 2d. This morning he is resting comfortably in bed and reports no new issues or overnight events.     PAST MEDICAL & SURGICAL HISTORY  Syncope  Peripheral Neuropathy  S/P Implantation of Automatic  Personal History of Multiple S  Personal History of Mitral Nicki  Personal History of MI (Myocar  Personal History of Congestive  Personal History of Cardiomyop  Personal History of Atrial Fib  Personal History of Alcoholism  Clinical Depression  S/P Orchiectomy: L for testicular CA  SVT (Supraventricular Tachycar  GERD (Gastroesophageal Reflux  S/P Implantation of AICD    SOCIAL HISTORY:  Negative for smoking/alcohol/drug use.     ALLERGIES:  dexmethylphenidate (Unknown)  Dilantin, compazine, neurontin, ridaline, phenegrin (Anaphylaxis)  dilantin, compazine, neurontin, ritalin, phenergan (Unknown)  Haldol (Unknown)  hydantoin derivatives (Other)  methylphenidate (Unknown)  Morphine Sulfate (Unknown)  phenytoin (Unknown)  prochlorperazine (Unknown)  thioxanthenes (Unknown)    MEDICATIONS:  STANDING MEDICATIONS  apixaban 5 milliGRAM(s) Oral every 12 hours  aspirin  chewable 81 milliGRAM(s) Oral daily  baclofen 10 milliGRAM(s) Oral three times a day  docusate sodium 100 milliGRAM(s) Oral three times a day  dofetilide 250 MICROGram(s) Oral two times a day  dronabinol 5 milliGRAM(s) Oral daily  furosemide    Tablet 40 milliGRAM(s) Oral every other day  furosemide    Tablet 20 milliGRAM(s) Oral every other day  gabapentin 400 milliGRAM(s) Oral three times a day  lamoTRIgine 100 milliGRAM(s) Oral two times a day  metoprolol     tartrate 25 milliGRAM(s) Oral two times a day  oxyCODONE    5 mG/acetaminophen 325 mG 1 Tablet(s) Oral every 6 hours  pantoprazole    Tablet 40 milliGRAM(s) Oral before breakfast  QUEtiapine 25 milliGRAM(s) Oral two times a day    PRN MEDICATIONS  acetaminophen   Tablet 650 milliGRAM(s) Oral every 6 hours PRN  ipratropium 17 MICROgram(s) HFA Inhaler 1 Puff(s) Inhalation four times a day PRN  nitroglycerin     SubLingual 0.4 milliGRAM(s) SubLingual every 5 minutes PRN  senna 2 Tablet(s) Oral at bedtime PRN    VITALS:   T(F): 98  HR: 60  BP: 107/68  RR: 18  SpO2: --    LABS:                        12.7   5.20  )-----------( 203      ( 25 Feb 2018 08:45 )             38.6     02-25    139  |  104  |  15  ----------------------------<  88  3.8   |  28  |  0.9    Ca    9.3      25 Feb 2018 08:45  Mg     2.1     02-25    RADIOLOGY:  	  [facility] Device  7 EVENTS OF ATRIAL TACHYCARDIA AT RATE /MIN LONGEST 27 SECONDS. LAST EPISODE RECORDED ON 1/27/2018. NO EPISODES RECORDED RECENTLY  NO VENTRICULAR EVENTS  REPROGRAMMED OFF MVP.    PHYSICAL EXAM:  GEN: No acute distress  HEENT: NC/AT, L eye exotropia  LUNGS: Clear to auscultation bilaterally, no MRG  HEART: S1/S2 present. RRR.   ABD: Soft, non-tender, non-distended. Bowel sounds present  EXT: No lower extremity edema  NEURO: AAOX3,

## 2018-02-26 NOTE — PROGRESS NOTE ADULT - SUBJECTIVE AND OBJECTIVE BOX
CUATE HORNE 46yo W Male BIBA from [facility] for c/o CP and palpitations, CP are retrosternal radiating to neck and shoulders 7/10 in intensity.  Pt has significant cardiac Hx inc CAD, cardiac aarrhythmia, inc afib and SVT, arrhythmogenic R ventricular dysplasia, sp AICD/PPM.  The pt is on AC with Eliquis.  Cardiac enzymes were negative and pt's AICD was interrogated:  7 event sof atrial tach to 190 lasting 27 sec, last event 1/27, no current events.  The pt also has a Hx of HTN, TIAs, Hyperlipidemia, MS, COPD with continued tobacco use, L orchiectomy for testicular ca, depression and anxiety.  P states he was to have out pt ablation sometime in Jan, but tjis never took place.  The pt is admitted to [facility] for observation and further w/up. We are awaiting decison from EPS/Dr Quiñones.    INTERVAL HPI/OVERNIGHT EVENTS: c/o gen fatigue and constipation, denies CP or palpitations    MEDICATIONS  (STANDING):  apixaban 5 milliGRAM(s) Oral every 12 hours  aspirin  chewable 81 milliGRAM(s) Oral daily  baclofen 10 milliGRAM(s) Oral three times a day  docusate sodium 100 milliGRAM(s) Oral three times a day  dofetilide 250 MICROGram(s) Oral two times a day  dronabinol 5 milliGRAM(s) Oral daily  furosemide    Tablet 40 milliGRAM(s) Oral every other day  furosemide    Tablet 20 milliGRAM(s) Oral every other day  gabapentin 400 milliGRAM(s) Oral three times a day  lamoTRIgine 100 milliGRAM(s) Oral two times a day  metoprolol     tartrate 25 milliGRAM(s) Oral two times a day  oxyCODONE    5 mG/acetaminophen 325 mG 1 Tablet(s) Oral every 6 hours  pantoprazole    Tablet 40 milliGRAM(s) Oral before breakfast  QUEtiapine 25 milliGRAM(s) Oral two times a day    MEDICATIONS  (PRN):  acetaminophen   Tablet 650 milliGRAM(s) Oral every 6 hours PRN mild pain  ipratropium 17 MICROgram(s) HFA Inhaler 1 Puff(s) Inhalation four times a day PRN SOB  nitroglycerin     SubLingual 0.4 milliGRAM(s) SubLingual every 5 minutes PRN Chest Pain  senna 2 Tablet(s) Oral at bedtime PRN Constipation      Allergies    dexmethylphenidate (Unknown)  Dilantin, compazine, neurontin, ridaline, phenegrin (Anaphylaxis)  dilantin, compazine, neurontin, ritalin, phenergan (Unknown)  Haldol (Unknown)  hydantoin derivatives (Other)  methylphenidate (Unknown)  Morphine Sulfate (Unknown)  phenytoin (Unknown)  prochlorperazine (Unknown)  thioxanthenes (Unknown)            	    Vital Signs Last 24 Hrs     T(F): 97  HR: 61   BP: 104/60     RR: 18   SpO2: 100%     PHYSICAL EXAM:      Constitutional:  alert and oriented, chronically ill looking but NAD    Eyes:  WNL    ENMT:  dry oral mucosa, dental defects    Neck:  supple no JVD or bruits    Respiratory:  good air entry BL, scattered rhonchi    Cardiovascular:  L anterior chest wall unit, S1S2 reg    Gastrointestinal: abd is soft and benign with good bowel sounds and no organomegaly    Extremities:  moves all limbs, no pedal edema    Skin:  no rash    Lymph Nodes: not enlarged      LABS: 2/25                        12.7  5.2  )-----------( 203                  38.6     02-24    139  |  104  |  145--88  3.8   |  28  |  0.9    Mg 2.1    Ca    9.2      24 Feb 2018 00:06    TPro  5.9<L>  /  Alb  3.9  /  TBili  0.7  /  DBili  x   /  AST  24  /  ALT  26  /  AlkPhos  79  02-24    , , , HDL 33      RADIOLOGY & ADDITIONAL TESTS:    AICD interrogation:  7 events of atrial tach 190/min, lasting 27 sec, last event recorded 1/27, no recent events

## 2018-02-27 RX ORDER — DOCUSATE SODIUM 100 MG
100 CAPSULE ORAL
Qty: 0 | Refills: 0 | Status: DISCONTINUED | OUTPATIENT
Start: 2018-02-27 | End: 2018-02-28

## 2018-02-27 RX ORDER — BACLOFEN 100 %
10 POWDER (GRAM) MISCELLANEOUS EVERY 6 HOURS
Qty: 0 | Refills: 0 | Status: DISCONTINUED | OUTPATIENT
Start: 2018-02-27 | End: 2018-02-28

## 2018-02-27 RX ORDER — SENNA PLUS 8.6 MG/1
2 TABLET ORAL AT BEDTIME
Qty: 0 | Refills: 0 | Status: DISCONTINUED | OUTPATIENT
Start: 2018-02-27 | End: 2018-02-28

## 2018-02-27 RX ORDER — DIMETHYL FUMARATE 240 MG/1
240 CAPSULE ORAL DAILY
Qty: 0 | Refills: 0 | Status: DISCONTINUED | OUTPATIENT
Start: 2018-02-27 | End: 2018-02-28

## 2018-02-27 RX ADMIN — Medication 100 MILLIGRAM(S): at 13:28

## 2018-02-27 RX ADMIN — OXYCODONE AND ACETAMINOPHEN 1 TABLET(S): 5; 325 TABLET ORAL at 12:30

## 2018-02-27 RX ADMIN — Medication 25 MILLIGRAM(S): at 18:45

## 2018-02-27 RX ADMIN — GABAPENTIN 400 MILLIGRAM(S): 400 CAPSULE ORAL at 13:28

## 2018-02-27 RX ADMIN — ATORVASTATIN CALCIUM 40 MILLIGRAM(S): 80 TABLET, FILM COATED ORAL at 22:03

## 2018-02-27 RX ADMIN — LAMOTRIGINE 100 MILLIGRAM(S): 25 TABLET, ORALLY DISINTEGRATING ORAL at 18:45

## 2018-02-27 RX ADMIN — Medication 81 MILLIGRAM(S): at 12:31

## 2018-02-27 RX ADMIN — GABAPENTIN 400 MILLIGRAM(S): 400 CAPSULE ORAL at 05:31

## 2018-02-27 RX ADMIN — Medication 100 MILLIGRAM(S): at 05:29

## 2018-02-27 RX ADMIN — Medication 25 MILLIGRAM(S): at 05:29

## 2018-02-27 RX ADMIN — APIXABAN 5 MILLIGRAM(S): 2.5 TABLET, FILM COATED ORAL at 05:30

## 2018-02-27 RX ADMIN — Medication 10 MILLIGRAM(S): at 18:44

## 2018-02-27 RX ADMIN — Medication 100 MILLIGRAM(S): at 18:45

## 2018-02-27 RX ADMIN — OXYCODONE AND ACETAMINOPHEN 1 TABLET(S): 5; 325 TABLET ORAL at 23:36

## 2018-02-27 RX ADMIN — OXYCODONE AND ACETAMINOPHEN 1 TABLET(S): 5; 325 TABLET ORAL at 06:06

## 2018-02-27 RX ADMIN — QUETIAPINE FUMARATE 25 MILLIGRAM(S): 200 TABLET, FILM COATED ORAL at 18:45

## 2018-02-27 RX ADMIN — OXYCODONE AND ACETAMINOPHEN 1 TABLET(S): 5; 325 TABLET ORAL at 05:27

## 2018-02-27 RX ADMIN — Medication 40 MILLIGRAM(S): at 12:31

## 2018-02-27 RX ADMIN — QUETIAPINE FUMARATE 25 MILLIGRAM(S): 200 TABLET, FILM COATED ORAL at 05:29

## 2018-02-27 RX ADMIN — DOFETILIDE 250 MICROGRAM(S): 0.25 CAPSULE ORAL at 18:44

## 2018-02-27 RX ADMIN — OXYCODONE AND ACETAMINOPHEN 1 TABLET(S): 5; 325 TABLET ORAL at 17:07

## 2018-02-27 RX ADMIN — SENNA PLUS 2 TABLET(S): 8.6 TABLET ORAL at 22:04

## 2018-02-27 RX ADMIN — LAMOTRIGINE 100 MILLIGRAM(S): 25 TABLET, ORALLY DISINTEGRATING ORAL at 05:31

## 2018-02-27 RX ADMIN — OXYCODONE AND ACETAMINOPHEN 1 TABLET(S): 5; 325 TABLET ORAL at 18:43

## 2018-02-27 RX ADMIN — Medication 100 MILLIGRAM(S): at 22:03

## 2018-02-27 RX ADMIN — Medication 5 MILLIGRAM(S): at 13:27

## 2018-02-27 RX ADMIN — PANTOPRAZOLE SODIUM 40 MILLIGRAM(S): 20 TABLET, DELAYED RELEASE ORAL at 05:27

## 2018-02-27 RX ADMIN — APIXABAN 5 MILLIGRAM(S): 2.5 TABLET, FILM COATED ORAL at 18:44

## 2018-02-27 RX ADMIN — DOFETILIDE 250 MICROGRAM(S): 0.25 CAPSULE ORAL at 05:31

## 2018-02-27 RX ADMIN — Medication 10 MILLIGRAM(S): at 05:30

## 2018-02-27 RX ADMIN — SENNA PLUS 2 TABLET(S): 8.6 TABLET ORAL at 18:47

## 2018-02-27 RX ADMIN — Medication 10 MILLIGRAM(S): at 13:28

## 2018-02-27 RX ADMIN — Medication 10 MILLIGRAM(S): at 23:36

## 2018-02-27 RX ADMIN — GABAPENTIN 400 MILLIGRAM(S): 400 CAPSULE ORAL at 22:03

## 2018-02-27 NOTE — PROGRESS NOTE ADULT - ASSESSMENT
48 yo M with multiple sclerosis, CAD, HFpEF, Afib on eliquis, seizure disorder, HTN, DLD, COPD not on home O2, h/o vtach s/p AICD, remote h/o testicular cancer and h/o TIA presented from [facility], [place], operated by [facility] with chest pain and palpitations.     # Chest pain -  h/o CAD  - R/o unstable angina   - C/p last night. No chest pain today  - EKG no acute changes  - CE x2 negative  - Nitroglycerin PRN  - c/w ASA, BB  - Added atorvastatin  - Cardiology on board     # Palpitations   - likely 2/2 Afib  - No events on tele  - AICD interrogated; 7 EVENTS OF ATRIAL TACHYCARDIA AT RATE /MIN LONGEST 27 SECONDS. NO VENTRICULAR EVENTS  REPROGRAMMED OFF MVP.  - EP following; f/u for possible ablation   - c/w lopressor and eliquis     # COPD, stable  - Pt is active smoker; Counseling provided  - No active wheezing. c/w nebulizers     # MS/ seizure disorder  - stable.  - c/w home meds     # GI/DVT ppx  - protonix/ pt on eliquis     # Dispo:  - full code   - from NH  - uses walker at baseline
recurrent CP of unclear etiology  Hx of HTN, CAD, a fib, SVT, TIA, arrhythmogenic R ventricular dysplasia, sp AICD/PPM, AC with Eliquis  Hyperlipidemia  COPD, continued tobacco use  MS, Sz Disorder  testicular ca, sp L orchiectomy  anxiety, depresssion  ex ETOH        telemetry  sp interrogation of AICD  cardio consult and EPS consult  cont all meds  monitor electrolytes inc Mg
recurrent CP of unclear etiology  Hx of HTN, CAD, a fib, SVT, TIA, arrhythmogenic R ventricular dysplasia, sp AICD/PPM, AC with Eliquis  Hyperlipidemia  COPD, continued tobacco use  MS, Sz Disorder  testicular ca, sp L orchiectomy  anxiety, depresssion  ex ETOH        telemetry  sp interrogation of AICD  cardio consult and EPS consult  cont all meds  monitor electrolytes inc Mg
recurrent CP of unclear etiology  Hx of HTN, CAD, a fib, SVT, TIA, arrhythmogenic R ventricular dysplasia, sp AICD/PPM, AC with Eliquis  Hyperlipidemia  COPD, continued tobacco use  MS, Sz Disorder  testicular ca, sp L orchiectomy  anxiety, depresssion  ex ETOH  constipation        telemetry  sp interrogation of AICD  cardio consult and EPS consult, awaiting  DR Quiñones's decision  cont all meds  monitor electrolytes inc Mg   order colace 100 mg q 8 and senekot 2 tabs q HS  encourage ambulation
recurrent CP of unclear etiology  Hx of HTN, CAD, a fib, SVT, TIA, arrhythmogenic R ventricular dysplasia, sp AICD/PPM, AC with Eliquis  Hyperlipidemia  COPD, continued tobacco use  MS, Sz Disorder  testicular ca, sp L orchiectomy  anxiety, depresssion  ex ETOH  constipation        telemetry for observation and further w/up  sp interrogation of AICD  cardio consult and EPS consult, awaiting  DR Quiñones's decision regarding ablation and the need to change the AICD wires  cont all meds  monitor electrolytes inc Mg  order colace 100 mg q 8 and senekot 2 tabs q HS  encourage ambulation
48 yo M with multiple sclerosis, CAD, HFpEF, Afib on eliquis, seizure disorder, HTN, DLD, COPD not on home O2, h/o vtach s/p AICD, remote h/o testicular cancer and h/o TIA presented from [facility] with chest pain and palpitations.     # Chest pain -  h/o CAD  - R/o unstable angina; CE x2 negative and prior EKG showed no acute changes  - No chest pain today  - Nitroglycerin PRN  - c/w ASA & BB  - On atorvastatin  - Cardiology on board     # Palpitations   - likely 2/2 Afib  - No events on tele  - AICD interrogated 2/25; 7 EVENTS OF ATRIAL TACHYCARDIA AT RATE /MIN LONGEST 27 SECONDS. NO VENTRICULAR EVENTS. REPROGRAMMED OFF MVP.  - EP following; f/u for possible ablation and questionable defective lead per patient.   - c/w lopressor and eliquis     # COPD, stable  - Pt is active smoker; Counseling provided  - No active wheezing. c/w nebulizers     # MS/ seizure disorder  - Symptoms worsening today per patient; has lat taken Tecfidera last Friday.   - Neurology consulted and recs appreciated; Doubt MS flare. Consider increasing Baclofen to 10mg q6h; if sysmptoms persist add Ampyra (4-Aminopyridine).  - Pharmacy unable to provide Tecfidera as  does not carry it. Will call [facility] to attempt to acquire medication  - c/w other home meds     # GI/DVT ppx  - protonix/ pt on eliquis     # Dispo:  - full code   - from [facility]  - uses walker at baseline

## 2018-02-27 NOTE — CONSULT NOTE ADULT - ASSESSMENT
Pt with longstanding history of MS, off Tecfidera for 5 days, now with muscle cramping to bl UE and RLE. Doubt MS flare up.   May increase baclofen to 10 mg q6  Pain control  Please restart Tecfidera, possibly obtain from pt residence   If symptoms persist, may consider adding Ampyra.   D/w primary team

## 2018-02-27 NOTE — PROGRESS NOTE ADULT - SUBJECTIVE AND OBJECTIVE BOX
SUBJECTIVE:    Patient is a 47y old Male who presents with a chief complaint of chest pain/ palpitations (24 Feb 2018 09:27)    Currently admitted to medicine with the primary diagnosis of Chest pain     Today is hospital day 3d. This morning he is resting comfortably in bed and reports no new issues or overnight events.     PAST MEDICAL & SURGICAL HISTORY  Syncope  Peripheral Neuropathy  S/P Implantation of Automatic  Personal History of Multiple S  Personal History of Mitral Nicki  Personal History of MI (Myocar  Personal History of Congestive  Personal History of Cardiomyop  Personal History of Atrial Fib  Personal History of Alcoholism  Clinical Depression  S/P Orchiectomy: L for testicular CA  SVT (Supraventricular Tachycar  GERD (Gastroesophageal Reflux  S/P Implantation of AICD    SOCIAL HISTORY:  Negative for smoking/alcohol/drug use.     ALLERGIES:  dexmethylphenidate (Unknown)  Dilantin, compazine, neurontin, ridaline, phenegrin (Anaphylaxis)  dilantin, compazine, neurontin, ritalin, phenergan (Unknown)  Haldol (Unknown)  hydantoin derivatives (Other)  methylphenidate (Unknown)  Morphine Sulfate (Unknown)  phenytoin (Unknown)  prochlorperazine (Unknown)  thioxanthenes (Unknown)    MEDICATIONS:  STANDING MEDICATIONS  apixaban 5 milliGRAM(s) Oral every 12 hours  aspirin  chewable 81 milliGRAM(s) Oral daily  atorvastatin 40 milliGRAM(s) Oral at bedtime  baclofen 10 milliGRAM(s) Oral three times a day  dimethyl fumarate DR Capsule 240 milliGRAM(s) Oral daily  docusate sodium 100 milliGRAM(s) Oral three times a day  dofetilide 250 MICROGram(s) Oral two times a day  dronabinol 5 milliGRAM(s) Oral daily  furosemide    Tablet 40 milliGRAM(s) Oral every other day  furosemide    Tablet 20 milliGRAM(s) Oral every other day  gabapentin 400 milliGRAM(s) Oral three times a day  lamoTRIgine 100 milliGRAM(s) Oral two times a day  metoprolol     tartrate 25 milliGRAM(s) Oral two times a day  oxyCODONE    5 mG/acetaminophen 325 mG 1 Tablet(s) Oral every 6 hours  pantoprazole    Tablet 40 milliGRAM(s) Oral before breakfast  QUEtiapine 25 milliGRAM(s) Oral two times a day    PRN MEDICATIONS  acetaminophen   Tablet 650 milliGRAM(s) Oral every 6 hours PRN  ipratropium 17 MICROgram(s) HFA Inhaler 1 Puff(s) Inhalation four times a day PRN  nitroglycerin     SubLingual 0.4 milliGRAM(s) SubLingual every 5 minutes PRN  senna 2 Tablet(s) Oral at bedtime PRN    VITALS:   T(F): 98.8  HR: 60  BP: 106/64  RR: 18  SpO2: 97%    LABS:      RADIOLOGY:      PHYSICAL EXAM:  GEN: No acute distress  HEENT: NC/AT  LUNGS: Clear to auscultation bilaterally   HEART: S1/S2 present. RRR.   ABD: Soft, non-tender, non-distended. Bowel sounds present  EXT: NC/NC/NE/2+PP/YARBROUGH  NEURO: AAOX3 SUBJECTIVE:    Patient is a 47y old Male who presents with a chief complaint of chest pain/ palpitations (24 Feb 2018 09:27)    Currently admitted to medicine with the primary diagnosis of Chest pain     Today is hospital day 3d. This morning he mentions he has worsening cramps to arms and legs, urinary incontinence, blurring of vision. Pt notes he has not had his Tecfidera in sometime because his Adult home would not deliver it. He mentions no chest pain or SOB.     PAST MEDICAL & SURGICAL HISTORY  Syncope  Peripheral Neuropathy  S/P Implantation of Automatic  Personal History of Multiple S  Personal History of Mitral Nicki  Personal History of MI (Myocar  Personal History of Congestive  Personal History of Cardiomyop  Personal History of Atrial Fib  Personal History of Alcoholism  Clinical Depression  S/P Orchiectomy: L for testicular CA  SVT (Supraventricular Tachycar  GERD (Gastroesophageal Reflux  S/P Implantation of AICD    SOCIAL HISTORY:  Negative for smoking/alcohol/drug use.     ALLERGIES:  dexmethylphenidate (Unknown)  Dilantin, compazine, neurontin, ridaline, phenegrin (Anaphylaxis)  dilantin, compazine, neurontin, ritalin, phenergan (Unknown)  Haldol (Unknown)  hydantoin derivatives (Other)  methylphenidate (Unknown)  Morphine Sulfate (Unknown)  phenytoin (Unknown)  prochlorperazine (Unknown)  thioxanthenes (Unknown)    MEDICATIONS:  STANDING MEDICATIONS  apixaban 5 milliGRAM(s) Oral every 12 hours  aspirin  chewable 81 milliGRAM(s) Oral daily  atorvastatin 40 milliGRAM(s) Oral at bedtime  baclofen 10 milliGRAM(s) Oral three times a day  dimethyl fumarate DR Capsule 240 milliGRAM(s) Oral daily  docusate sodium 100 milliGRAM(s) Oral three times a day  dofetilide 250 MICROGram(s) Oral two times a day  dronabinol 5 milliGRAM(s) Oral daily  furosemide    Tablet 40 milliGRAM(s) Oral every other day  furosemide    Tablet 20 milliGRAM(s) Oral every other day  gabapentin 400 milliGRAM(s) Oral three times a day  lamoTRIgine 100 milliGRAM(s) Oral two times a day  metoprolol     tartrate 25 milliGRAM(s) Oral two times a day  oxyCODONE    5 mG/acetaminophen 325 mG 1 Tablet(s) Oral every 6 hours  pantoprazole    Tablet 40 milliGRAM(s) Oral before breakfast  QUEtiapine 25 milliGRAM(s) Oral two times a day    PRN MEDICATIONS  acetaminophen   Tablet 650 milliGRAM(s) Oral every 6 hours PRN  ipratropium 17 MICROgram(s) HFA Inhaler 1 Puff(s) Inhalation four times a day PRN  nitroglycerin     SubLingual 0.4 milliGRAM(s) SubLingual every 5 minutes PRN  senna 2 Tablet(s) Oral at bedtime PRN    VITALS:   T(F): 98.8  HR: 60  BP: 106/64  RR: 18  SpO2: 97%    LABS:      RADIOLOGY:      PHYSICAL EXAM:  GEN: No acute distress  HEENT: NC/AT, L eye Exotropia   LUNGS: Clear to auscultation bilaterally   HEART: S1/S2 present. no S3S4 irregular    ABD: Soft, non-tender, non-distended. Bowel sounds present  EXT: No lower extremity edema  NEURO: AAOX3, no rigidity, no spasticity, MSE: 4/5 on RUE & RLE. 5/5 on LUE & LLE. Sensation intact b/l.

## 2018-02-27 NOTE — PROGRESS NOTE ADULT - SUBJECTIVE AND OBJECTIVE BOX
CUATE HORNE 46yo W Male BIBA from [facility] for c/o CP and palpitations, CP are retrosternal radiating to neck and shoulders 7/10 in intensity.  Pt has significant cardiac Hx inc CAD, cardiac aarrhythmia, inc afib and SVT, arrhythmogenic R ventricular dysplasia, sp AICD/PPM.  The pt is on AC with Eliquis.  Cardiac enzymes were negative and pt's AICD was interrogated:  7 event sof atrial tach to 190 lasting 27 sec, last event 1/27, no current events.  The pt also has a Hx of HTN, TIAs, Hyperlipidemia, MS, COPD with continued tobacco use, L orchiectomy for testicular ca, depression and anxiety.  P states he was to have out pt ablation sometime in Jan, but tjis never took place.  The pt is admitted to [facility] for observation and further w/up. We are awaiting decision from EPS/Dr Quiñones for possible ablation and the need to change AICD unit.    INTERVAL HPI/OVERNIGHT EVENTS:  no significant untoward events    MEDICATIONS  (STANDING):  apixaban 5 milliGRAM(s) Oral every 12 hours  aspirin  chewable 81 milliGRAM(s) Oral daily  baclofen 10 milliGRAM(s) Oral three times a day  docusate sodium 100 milliGRAM(s) Oral three times a day  dofetilide 250 MICROGram(s) Oral two times a day  dronabinol 5 milliGRAM(s) Oral daily  furosemide    Tablet 40 milliGRAM(s) Oral every other day  furosemide    Tablet 20 milliGRAM(s) Oral every other day  gabapentin 400 milliGRAM(s) Oral three times a day  lamoTRIgine 100 milliGRAM(s) Oral two times a day  metoprolol     tartrate 25 milliGRAM(s) Oral two times a day  oxyCODONE    5 mG/acetaminophen 325 mG 1 Tablet(s) Oral every 6 hours  pantoprazole    Tablet 40 milliGRAM(s) Oral before breakfast  QUEtiapine 25 milliGRAM(s) Oral two times a day    MEDICATIONS  (PRN):  acetaminophen   Tablet 650 milliGRAM(s) Oral every 6 hours PRN mild pain  ipratropium 17 MICROgram(s) HFA Inhaler 1 Puff(s) Inhalation four times a day PRN SOB  nitroglycerin     SubLingual 0.4 milliGRAM(s) SubLingual every 5 minutes PRN Chest Pain  senna 2 Tablet(s) Oral at bedtime PRN Constipation      Allergies    dexmethylphenidate (Unknown)  Dilantin, compazine, neurontin, ridaline, phenegrin (Anaphylaxis)  dilantin, compazine, neurontin, ritalin, phenergan (Unknown)  Haldol (Unknown)  hydantoin derivatives (Other)  methylphenidate (Unknown)  Morphine Sulfate (Unknown)  phenytoin (Unknown)  prochlorperazine (Unknown)  thioxanthenes (Unknown)            	    Vital Signs Last 24 Hrs     T(F): 97.3  HR: 61   BP: 112/67     RR: 18   SpO2: 97%     PHYSICAL EXAM:      Constitutional:  alert and oriented, resting comfortably in bed    Eyes:  WNL    ENMT:  dry oral mucosa, dental defects    Neck:  supple no JVD or bruits    Respiratory:  good air entry BL, scattered rhonchi    Cardiovascular:  L anterior chest wall unit, S1S2 reg    Gastrointestinal: abd is soft and benign with good bowel sounds and no organomegaly    Extremities:  moves all limbs, no pedal edema    Skin:  no rash    Lymph Nodes: not enlarged      LABS: 2/25                        12.7  5.2  )-----------( 203                  38.6     02-24    139  |  104  |  145--88  3.8   |  28  |  0.9    Mg 2.1    Ca    9.2      24 Feb 2018 00:06    TPro  5.9<L>  /  Alb  3.9  /  TBili  0.7  /  DBili  x   /  AST  24  /  ALT  26  /  AlkPhos  79  02-24    , , , HDL 33      RADIOLOGY & ADDITIONAL TESTS:    AICD interrogation:  7 events of atrial tach 190/min, lasting 27 sec, last event recorded 1/27, no recent events

## 2018-02-27 NOTE — CONSULT NOTE ADULT - SUBJECTIVE AND OBJECTIVE BOX
Neurology Consult    Patient is a 47y old  Male who presents with a chief complaint of chest pain/ palpitations (2018 09:27)      HPI:  48 yo M with multiple sclerosis, CAD, HFpEF, Afib on eliquis, seizure disorder, HTN, DLD, COPD not on home O2, h/o vtach s/p AICD, remote h/o testicular cancer and h/o TIA presented from [facility] with chest pain and palpitations, currently managed by the medical team.     Pt was diagnosed with MS in , currently receives his neurological care by Dr. Lawler at [facility]. Last flare up in 2017. Residual deficits include optic neuritis (bl), RUE/RLE weakness, walking with rolling walker and incontinence. Pt has been on Tecfidera since May 2017, with some GI symptoms.     Currently he hasn't been receiving his tecfidera since admission. And currently states he has general weakness as well as cramping in both arms and right leg. This happened in other instances where he didn't receive his tecfidera. No other new or focal symptoms  suggestive of flare up.       PAST MEDICAL & SURGICAL HISTORY:  Syncope  Peripheral Neuropathy  S/P Implantation of Automatic  Personal History of Multiple S  Personal History of Mitral Nicki  Personal History of MI (Myocar  Personal History of Congestive  Personal History of Cardiomyop  Personal History of Atrial Fib  Personal History of Alcoholism  Clinical Depression  S/P Orchiectomy: L for testicular CA  SVT (Supraventricular Tachycar  GERD (Gastroesophageal Reflux  S/P Implantation of AICD      FAMILY HISTORY:  No pertinent family history in first degree relatives      Social History: (-) x 3    Allergies    dexmethylphenidate (Unknown)  Dilantin, compazine, neurontin, ridaline, phenegrin (Anaphylaxis)  dilantin, compazine, neurontin, ritalin, phenergan (Unknown)  Haldol (Unknown)  hydantoin derivatives (Other)  methylphenidate (Unknown)  Morphine Sulfate (Unknown)  phenytoin (Unknown)  prochlorperazine (Unknown)  thioxanthenes (Unknown)    Intolerances        MEDICATIONS  (STANDING):  apixaban 5 milliGRAM(s) Oral every 12 hours  aspirin  chewable 81 milliGRAM(s) Oral daily  atorvastatin 40 milliGRAM(s) Oral at bedtime  baclofen 10 milliGRAM(s) Oral three times a day  docusate sodium 100 milliGRAM(s) Oral three times a day  dofetilide 250 MICROGram(s) Oral two times a day  dronabinol 5 milliGRAM(s) Oral daily  furosemide    Tablet 40 milliGRAM(s) Oral every other day  furosemide    Tablet 20 milliGRAM(s) Oral every other day  gabapentin 400 milliGRAM(s) Oral three times a day  lamoTRIgine 100 milliGRAM(s) Oral two times a day  metoprolol     tartrate 25 milliGRAM(s) Oral two times a day  oxyCODONE    5 mG/acetaminophen 325 mG 1 Tablet(s) Oral every 6 hours  pantoprazole    Tablet 40 milliGRAM(s) Oral before breakfast  QUEtiapine 25 milliGRAM(s) Oral two times a day    MEDICATIONS  (PRN):  acetaminophen   Tablet 650 milliGRAM(s) Oral every 6 hours PRN mild pain  ipratropium 17 MICROgram(s) HFA Inhaler 1 Puff(s) Inhalation four times a day PRN SOB  nitroglycerin     SubLingual 0.4 milliGRAM(s) SubLingual every 5 minutes PRN Chest Pain  senna 2 Tablet(s) Oral at bedtime PRN Constipation      Review of systems:    as above, otherwise neg    Vital Signs Last 24 Hrs  T(C): 37.1 (2018 14:07), Max: 37.1 (2018 14:07)  T(F): 98.8 (2018 14:07), Max: 98.8 (2018 14:07)  HR: 60 (2018 14:07) (60 - 61)  BP: 106/64 (2018 14:07) (102/62 - 112/67)  BP(mean): --  RR: 18 (2018 14:07) (18 - 18)  SpO2: 97% (2018 06:07) (97% - 97%)    Neurologic Examination:  General:  Appearance is consistent with chronologic age.  No abnormal facies.   General: The patient is oriented to person, place, time and date.  Recent and remote memory intact.  Fund of knowledge is intact and normal.  Language with normal repetition, comprehension and naming.  Nondysarthric.    Cranial nerves:  VFF.  EOMI w/o nystagmus, Left extropia .  PERRL, no APD.  .  Facial sensation is normal with normal bite.  No facial asymmetry.  Hearing grossly intact b/l.  Palate elevates midline.  Tongue midline.  Motor examination:   Normal tone, bulk and range of motion.  No tenderness, twitching, tremors or involuntary movements.  Formal Muscle Strength Testin/5 in LUE/LLE, 4/5 on RUE/RLE   Reflexes:   1+ b/l throughout     Sensory examination:   Intact to light touch and pinprick in all extremities, except decreased in RLE   Cerebellum:   FTN/HKS intact with normal REINALDO in all limbs.  No dysmetria or dysdiadokinesia.    Gait deferred     Labs:

## 2018-02-28 ENCOUNTER — TRANSCRIPTION ENCOUNTER (OUTPATIENT)
Age: 48
End: 2018-02-28

## 2018-02-28 VITALS — WEIGHT: 186.73 LBS

## 2018-02-28 RX ORDER — ATORVASTATIN CALCIUM 80 MG/1
1 TABLET, FILM COATED ORAL
Qty: 0 | Refills: 0 | DISCHARGE
Start: 2018-02-28

## 2018-02-28 RX ORDER — NITROGLYCERIN 6.5 MG
1 CAPSULE, EXTENDED RELEASE ORAL
Qty: 0 | Refills: 0 | COMMUNITY
Start: 2018-02-28

## 2018-02-28 RX ADMIN — Medication 20 MILLIGRAM(S): at 11:53

## 2018-02-28 RX ADMIN — APIXABAN 5 MILLIGRAM(S): 2.5 TABLET, FILM COATED ORAL at 06:06

## 2018-02-28 RX ADMIN — QUETIAPINE FUMARATE 25 MILLIGRAM(S): 200 TABLET, FILM COATED ORAL at 06:05

## 2018-02-28 RX ADMIN — OXYCODONE AND ACETAMINOPHEN 1 TABLET(S): 5; 325 TABLET ORAL at 06:04

## 2018-02-28 RX ADMIN — LAMOTRIGINE 100 MILLIGRAM(S): 25 TABLET, ORALLY DISINTEGRATING ORAL at 18:22

## 2018-02-28 RX ADMIN — OXYCODONE AND ACETAMINOPHEN 1 TABLET(S): 5; 325 TABLET ORAL at 18:22

## 2018-02-28 RX ADMIN — Medication 100 MILLIGRAM(S): at 13:01

## 2018-02-28 RX ADMIN — Medication 10 MILLIGRAM(S): at 06:06

## 2018-02-28 RX ADMIN — LAMOTRIGINE 100 MILLIGRAM(S): 25 TABLET, ORALLY DISINTEGRATING ORAL at 06:05

## 2018-02-28 RX ADMIN — Medication 25 MILLIGRAM(S): at 06:30

## 2018-02-28 RX ADMIN — GABAPENTIN 400 MILLIGRAM(S): 400 CAPSULE ORAL at 13:01

## 2018-02-28 RX ADMIN — Medication 10 MILLIGRAM(S): at 18:22

## 2018-02-28 RX ADMIN — DOFETILIDE 250 MICROGRAM(S): 0.25 CAPSULE ORAL at 18:22

## 2018-02-28 RX ADMIN — Medication 5 MILLIGRAM(S): at 13:01

## 2018-02-28 RX ADMIN — Medication 25 MILLIGRAM(S): at 18:22

## 2018-02-28 RX ADMIN — GABAPENTIN 400 MILLIGRAM(S): 400 CAPSULE ORAL at 06:25

## 2018-02-28 RX ADMIN — PANTOPRAZOLE SODIUM 40 MILLIGRAM(S): 20 TABLET, DELAYED RELEASE ORAL at 06:05

## 2018-02-28 RX ADMIN — OXYCODONE AND ACETAMINOPHEN 1 TABLET(S): 5; 325 TABLET ORAL at 00:05

## 2018-02-28 RX ADMIN — Medication 100 MILLIGRAM(S): at 06:06

## 2018-02-28 RX ADMIN — Medication 81 MILLIGRAM(S): at 11:53

## 2018-02-28 RX ADMIN — DOFETILIDE 250 MICROGRAM(S): 0.25 CAPSULE ORAL at 06:04

## 2018-02-28 RX ADMIN — QUETIAPINE FUMARATE 25 MILLIGRAM(S): 200 TABLET, FILM COATED ORAL at 18:23

## 2018-02-28 RX ADMIN — APIXABAN 5 MILLIGRAM(S): 2.5 TABLET, FILM COATED ORAL at 18:22

## 2018-02-28 RX ADMIN — OXYCODONE AND ACETAMINOPHEN 1 TABLET(S): 5; 325 TABLET ORAL at 06:37

## 2018-02-28 RX ADMIN — OXYCODONE AND ACETAMINOPHEN 1 TABLET(S): 5; 325 TABLET ORAL at 13:01

## 2018-02-28 RX ADMIN — Medication 10 MILLIGRAM(S): at 11:53

## 2018-02-28 RX ADMIN — OXYCODONE AND ACETAMINOPHEN 1 TABLET(S): 5; 325 TABLET ORAL at 11:53

## 2018-02-28 RX ADMIN — Medication 100 MILLIGRAM(S): at 18:22

## 2018-02-28 RX ADMIN — DIMETHYL FUMARATE 240 MILLIGRAM(S): 240 CAPSULE ORAL at 13:00

## 2018-02-28 NOTE — DISCHARGE NOTE ADULT - CARE PLAN
Principal Discharge DX:	Other chest pain  Goal:	Resolution  Assessment and plan of treatment:	EKG showed no acute changes. Cardiac work up showed no heart attack. If any recurrent chest pain please seek medical attention.  Secondary Diagnosis:	SVT (supraventricular tachycardia)  Assessment and plan of treatment:	AICD reprogrammed. Follow up with Dr. Quiñones for evaluation of AICD. If any shocks or acute changes please seek medical attention

## 2018-02-28 NOTE — DISCHARGE NOTE ADULT - CARE PROVIDER_API CALL
Alexi Quiñones; MICHAEL), Cardiac Electrophysiology; Cardiovascular Disease  [address] [place] [address]  Phone: [phone]  Fax: [phone]

## 2018-02-28 NOTE — DISCHARGE NOTE ADULT - PLAN OF CARE
Resolution EKG showed no acute changes. Cardiac work up showed no heart attack. If any recurrent chest pain please seek medical attention. AICD reprogrammed. Follow up with Dr. Quiñones for evaluation of AICD. If any shocks or acute changes please seek medical attention

## 2018-02-28 NOTE — DISCHARGE NOTE ADULT - HOSPITAL COURSE
48 yo M with multiple sclerosis, CAD, HFpEF, Afib on eliquis, seizure disorder, HTN, DLD, COPD not on home O2, h/o vtach s/p AICD, remote h/o testicular cancer and h/o TIA presented from [facility] [place], operated by [facility] with chest pain and palpitations. pt reported exertional midsternal chest pain, 7/10 in severity, radiating to bilateral shoulders and neck, a/w diaphoresis while walking on day of presentation. pain was relieved by rest. pt denies any nausea or syncope. + palpitations earlier yesterday. pt denied any AICD firing. pt reports persistent palpitations over past yr for which he was supposed to get ablation by Dr Santana but had difficulty scheduling the procedure due to lack of insurance. he also complained of GAVIRIA unchanged from baseline. Again this morning pt complained of midsternal chest pain that was relieved by SL nitro. pt also reports N, V, diarrhea and low grade fever 2 days PTP. These symptoms resolved completely in 24 hrs. He complains of generalized weakness and hand/ feet numbness unchanged from baseline and related to MS. Pt admitted to tele, no events on tele. One episode of chest pain which self resolved. Pt started on Atorvastatin for CAD. AICD interrogated showing 7 events of atrial tachycardia at 190bpm longest 27 sec. No ventricular events. Reprogrammed off MVP. Course complicated by cramping on R extremities, neurology consulted. No acute flare, pt had not taken tecfidera since admission as Telephone had not sent meds over, advised to increase baclofen for symptomatic relief and acquire Tecfidera. Telephone advised to bring medications and they did. Per EP pt could be discharged out pt and follow up o/p for evaluation. Pt stable otherwise and discharged to facility.

## 2018-02-28 NOTE — DISCHARGE NOTE ADULT - MEDICATION SUMMARY - MEDICATIONS TO TAKE
I will START or STAY ON the medications listed below when I get home from the hospital:    aspirin 81 mg oral tablet, chewable  -- 1 tab(s) by mouth once a day  -- Indication: For Coronary Artery Disease    acetaminophen 650 mg oral tablet  -- 650 milligram(s) by mouth 4 times a day, As Needed  -- Indication: For Pain    Percocet 5/325 oral tablet  -- 1 tab(s) by mouth every 6 hours  -- Indication: For Pain    Mi-Acid oral suspension  -- 30 milliliter(s) by mouth 4 times a day, As Needed  -- Indication: For GERD    nitroglycerin  -- 1 tab(s) under tongue 3 times a day, As Needed for chest pain  -- Indication: For Chest pain    Tikosyn 250 mcg oral capsule  -- 1 cap(s) by mouth 2 times a day  -- Indication: For Arrythmias    Eliquis 5 mg oral tablet  -- 1 tab(s) by mouth 2 times a day  -- Indication: For Anticoagulation    gabapentin 400 mg oral capsule  -- 1 cap(s) by mouth 3 times a day  -- Indication: For Muscle Spasm    lamoTRIgine 100 mg oral tablet  -- 1 tab(s) by mouth 2 times a day  -- Indication: For Muscle Spasm    Marinol 5 mg oral capsule  -- orally once a day  -- Indication: For Appetite    atorvastatin 40 mg oral tablet  -- 1 tab(s) by mouth once a day (at bedtime)  -- Indication: For Cardiovascular disease    QUEtiapine 25 mg oral tablet  -- 1 tab(s) by mouth 2 times a day  -- Indication: For Mood    Metoprolol Tartrate 25 mg oral tablet  -- 1 tab(s) by mouth 2 times a day  -- Indication: For Arrythmia    ipratropium 18 mcg/inh inhalation aerosol  -- 1 application inhaled 4 times a day, As Needed  -- Indication: For COPD    Lasix 20 mg oral tablet  -- 1 tab(s) by mouth 3 times a week on monday, wednesday and friday  -- Indication: For HFpEF    Lasix 40 mg oral tablet  -- 1 tab(s) by mouth 3 times a week on tuesday, thursday and saturday  -- Indication: For HFpEF    Tecfidera 240 mg oral delayed release capsule  -- 1 cap(s) by mouth once a day  -- Indication: For Multiple Sclerosis    baclofen 10 mg oral tablet  -- 1 tab(s) by mouth 3 times a day  -- Indication: For Muscle Spasm    omeprazole 20 mg oral delayed release capsule  -- 1 cap(s) by mouth 2 times a day  -- Indication: For GERD

## 2018-02-28 NOTE — DISCHARGE NOTE ADULT - PATIENT PORTAL LINK FT
You can access the [email or web address] Patient Portal, offered by [facility], by registering with the following website: [email or web address]

## 2018-03-02 DIAGNOSIS — F41.9 ANXIETY DISORDER, UNSPECIFIED: ICD-10-CM

## 2018-03-02 DIAGNOSIS — E78.5 HYPERLIPIDEMIA, UNSPECIFIED: ICD-10-CM

## 2018-03-02 DIAGNOSIS — R32 UNSPECIFIED URINARY INCONTINENCE: ICD-10-CM

## 2018-03-02 DIAGNOSIS — G35 MULTIPLE SCLEROSIS: ICD-10-CM

## 2018-03-02 DIAGNOSIS — F17.200 NICOTINE DEPENDENCE, UNSPECIFIED, UNCOMPLICATED: ICD-10-CM

## 2018-03-02 DIAGNOSIS — H46.9 UNSPECIFIED OPTIC NEURITIS: ICD-10-CM

## 2018-03-02 DIAGNOSIS — F12.10 CANNABIS ABUSE, UNCOMPLICATED: ICD-10-CM

## 2018-03-02 DIAGNOSIS — R25.2 CRAMP AND SPASM: ICD-10-CM

## 2018-03-02 DIAGNOSIS — F32.9 MAJOR DEPRESSIVE DISORDER, SINGLE EPISODE, UNSPECIFIED: ICD-10-CM

## 2018-03-02 DIAGNOSIS — R07.89 OTHER CHEST PAIN: ICD-10-CM

## 2018-03-02 DIAGNOSIS — I48.91 UNSPECIFIED ATRIAL FIBRILLATION: ICD-10-CM

## 2018-03-02 DIAGNOSIS — G62.9 POLYNEUROPATHY, UNSPECIFIED: ICD-10-CM

## 2018-03-02 DIAGNOSIS — J44.9 CHRONIC OBSTRUCTIVE PULMONARY DISEASE, UNSPECIFIED: ICD-10-CM

## 2018-03-02 DIAGNOSIS — F10.11 ALCOHOL ABUSE, IN REMISSION: ICD-10-CM

## 2018-03-02 DIAGNOSIS — Z85.47 PERSONAL HISTORY OF MALIGNANT NEOPLASM OF TESTIS: ICD-10-CM

## 2018-03-02 DIAGNOSIS — Z95.810 PRESENCE OF AUTOMATIC (IMPLANTABLE) CARDIAC DEFIBRILLATOR: ICD-10-CM

## 2018-03-02 DIAGNOSIS — Z86.73 PERSONAL HISTORY OF TRANSIENT ISCHEMIC ATTACK (TIA), AND CEREBRAL INFARCTION WITHOUT RESIDUAL DEFICITS: ICD-10-CM

## 2018-03-02 DIAGNOSIS — R07.9 CHEST PAIN, UNSPECIFIED: ICD-10-CM

## 2018-03-02 DIAGNOSIS — I50.32 CHRONIC DIASTOLIC (CONGESTIVE) HEART FAILURE: ICD-10-CM

## 2018-03-02 DIAGNOSIS — I11.0 HYPERTENSIVE HEART DISEASE WITH HEART FAILURE: ICD-10-CM

## 2018-03-02 DIAGNOSIS — I47.1 SUPRAVENTRICULAR TACHYCARDIA: ICD-10-CM

## 2018-03-02 DIAGNOSIS — Z90.79 ACQUIRED ABSENCE OF OTHER GENITAL ORGAN(S): ICD-10-CM

## 2018-03-02 DIAGNOSIS — H53.8 OTHER VISUAL DISTURBANCES: ICD-10-CM

## 2018-03-02 DIAGNOSIS — I25.10 ATHEROSCLEROTIC HEART DISEASE OF NATIVE CORONARY ARTERY WITHOUT ANGINA PECTORIS: ICD-10-CM

## 2018-03-02 DIAGNOSIS — Z79.01 LONG TERM (CURRENT) USE OF ANTICOAGULANTS: ICD-10-CM

## 2018-04-20 ENCOUNTER — INPATIENT (INPATIENT)
Facility: HOSPITAL | Age: 48
LOS: 4 days | Discharge: NURSING, REHAB OR LONG-TERM CARE | End: 2018-04-25
Attending: INTERNAL MEDICINE | Admitting: INTERNAL MEDICINE

## 2018-04-20 VITALS
DIASTOLIC BLOOD PRESSURE: 73 MMHG | TEMPERATURE: 99 F | RESPIRATION RATE: 18 BRPM | OXYGEN SATURATION: 97 % | SYSTOLIC BLOOD PRESSURE: 156 MMHG | HEART RATE: 64 BPM

## 2018-04-20 DIAGNOSIS — Z21 ASYMPTOMATIC HUMAN IMMUNODEFICIENCY VIRUS [HIV] INFECTION STATUS: ICD-10-CM

## 2018-04-20 DIAGNOSIS — Z85.47 PERSONAL HISTORY OF MALIGNANT NEOPLASM OF TESTIS: ICD-10-CM

## 2018-04-20 LAB
ALBUMIN SERPL ELPH-MCNC: 4.5 G/DL — SIGNIFICANT CHANGE UP (ref 3.5–5.2)
ALP SERPL-CCNC: 93 U/L — SIGNIFICANT CHANGE UP (ref 30–115)
ALT FLD-CCNC: 28 U/L — SIGNIFICANT CHANGE UP (ref 0–41)
ANION GAP SERPL CALC-SCNC: 14 MMOL/L — SIGNIFICANT CHANGE UP (ref 7–14)
AST SERPL-CCNC: 61 U/L — HIGH (ref 0–41)
BASE EXCESS BLDV CALC-SCNC: 6.8 MMOL/L — HIGH (ref -2–2)
BILIRUB SERPL-MCNC: 0.5 MG/DL — SIGNIFICANT CHANGE UP (ref 0.2–1.2)
BUN SERPL-MCNC: 22 MG/DL — HIGH (ref 10–20)
CA-I SERPL-SCNC: 1.25 MMOL/L — SIGNIFICANT CHANGE UP (ref 1.12–1.3)
CALCIUM SERPL-MCNC: 9.5 MG/DL — SIGNIFICANT CHANGE UP (ref 8.5–10.1)
CHLORIDE SERPL-SCNC: 96 MMOL/L — LOW (ref 98–110)
CK MB CFR SERPL CALC: 2 NG/ML — SIGNIFICANT CHANGE UP (ref 0.6–6.3)
CK SERPL-CCNC: 131 U/L — SIGNIFICANT CHANGE UP (ref 0–225)
CK SERPL-CCNC: 244 U/L — HIGH (ref 0–225)
CO2 SERPL-SCNC: 26 MMOL/L — SIGNIFICANT CHANGE UP (ref 17–32)
CREAT SERPL-MCNC: 1 MG/DL — SIGNIFICANT CHANGE UP (ref 0.7–1.5)
DIGOXIN SERPL-MCNC: <0.3 NG/ML — LOW (ref 0.8–2)
GAS PNL BLDV: 143 MMOL/L — SIGNIFICANT CHANGE UP (ref 136–145)
GAS PNL BLDV: SIGNIFICANT CHANGE UP
GLUCOSE SERPL-MCNC: 96 MG/DL — SIGNIFICANT CHANGE UP (ref 70–99)
HCO3 BLDV-SCNC: 33 MMOL/L — HIGH (ref 22–29)
HCT VFR BLD CALC: 40 % — LOW (ref 42–52)
HCT VFR BLDA CALC: 43.5 % — SIGNIFICANT CHANGE UP (ref 34–44)
HGB BLD CALC-MCNC: 14.2 G/DL — SIGNIFICANT CHANGE UP (ref 14–18)
HGB BLD-MCNC: 13.5 G/DL — LOW (ref 14–18)
LACTATE BLDV-MCNC: 1.3 MMOL/L — SIGNIFICANT CHANGE UP (ref 0.5–1.6)
MCHC RBC-ENTMCNC: 30.1 PG — SIGNIFICANT CHANGE UP (ref 27–31)
MCHC RBC-ENTMCNC: 33.8 G/DL — SIGNIFICANT CHANGE UP (ref 32–37)
MCV RBC AUTO: 89.1 FL — SIGNIFICANT CHANGE UP (ref 80–94)
NRBC # BLD: 0 /100 WBCS — SIGNIFICANT CHANGE UP (ref 0–0)
NT-PROBNP SERPL-SCNC: 11 PG/ML — SIGNIFICANT CHANGE UP (ref 0–300)
PCO2 BLDV: 52 MMHG — HIGH (ref 41–51)
PH BLDV: 7.41 — SIGNIFICANT CHANGE UP (ref 7.26–7.43)
PLATELET # BLD AUTO: 222 K/UL — SIGNIFICANT CHANGE UP (ref 130–400)
PO2 BLDV: 30 MMHG — SIGNIFICANT CHANGE UP (ref 20–40)
POTASSIUM BLDV-SCNC: 3.4 MMOL/L — SIGNIFICANT CHANGE UP (ref 3.3–5.6)
POTASSIUM SERPL-MCNC: 7.6 MMOL/L — CRITICAL HIGH (ref 3.5–5)
POTASSIUM SERPL-SCNC: 7.6 MMOL/L — CRITICAL HIGH (ref 3.5–5)
PROT SERPL-MCNC: 7.5 G/DL — SIGNIFICANT CHANGE UP (ref 6–8)
RBC # BLD: 4.49 M/UL — LOW (ref 4.7–6.1)
RBC # FLD: 13.1 % — SIGNIFICANT CHANGE UP (ref 11.5–14.5)
SAO2 % BLDV: 63 % — SIGNIFICANT CHANGE UP
SODIUM SERPL-SCNC: 136 MMOL/L — SIGNIFICANT CHANGE UP (ref 135–146)
TROPONIN T SERPL-MCNC: <0.01 NG/ML — SIGNIFICANT CHANGE UP
TROPONIN T SERPL-MCNC: <0.01 NG/ML — SIGNIFICANT CHANGE UP
WBC # BLD: 5.81 K/UL — SIGNIFICANT CHANGE UP (ref 4.8–10.8)
WBC # FLD AUTO: 5.81 K/UL — SIGNIFICANT CHANGE UP (ref 4.8–10.8)

## 2018-04-20 RX ORDER — BACLOFEN 100 %
10 POWDER (GRAM) MISCELLANEOUS THREE TIMES A DAY
Qty: 0 | Refills: 0 | Status: DISCONTINUED | OUTPATIENT
Start: 2018-04-20 | End: 2018-04-25

## 2018-04-20 RX ORDER — IPRATROPIUM BROMIDE 0.2 MG/ML
500 SOLUTION, NON-ORAL INHALATION ONCE
Qty: 0 | Refills: 0 | Status: COMPLETED | OUTPATIENT
Start: 2018-04-20 | End: 2018-04-20

## 2018-04-20 RX ORDER — IPRATROPIUM BROMIDE 0.2 MG/ML
500 SOLUTION, NON-ORAL INHALATION EVERY 6 HOURS
Qty: 0 | Refills: 0 | Status: DISCONTINUED | OUTPATIENT
Start: 2018-04-20 | End: 2018-04-25

## 2018-04-20 RX ORDER — MORPHINE SULFATE 50 MG/1
4 CAPSULE, EXTENDED RELEASE ORAL ONCE
Qty: 0 | Refills: 0 | Status: DISCONTINUED | OUTPATIENT
Start: 2018-04-20 | End: 2018-04-20

## 2018-04-20 RX ORDER — APIXABAN 2.5 MG/1
5 TABLET, FILM COATED ORAL EVERY 12 HOURS
Qty: 0 | Refills: 0 | Status: DISCONTINUED | OUTPATIENT
Start: 2018-04-20 | End: 2018-04-25

## 2018-04-20 RX ORDER — METOPROLOL TARTRATE 50 MG
25 TABLET ORAL
Qty: 0 | Refills: 0 | Status: DISCONTINUED | OUTPATIENT
Start: 2018-04-20 | End: 2018-04-25

## 2018-04-20 RX ORDER — ATORVASTATIN CALCIUM 80 MG/1
40 TABLET, FILM COATED ORAL AT BEDTIME
Qty: 0 | Refills: 0 | Status: DISCONTINUED | OUTPATIENT
Start: 2018-04-20 | End: 2018-04-25

## 2018-04-20 RX ORDER — DOFETILIDE 0.25 MG/1
250 CAPSULE ORAL
Qty: 0 | Refills: 0 | Status: DISCONTINUED | OUTPATIENT
Start: 2018-04-20 | End: 2018-04-25

## 2018-04-20 RX ORDER — DIPHENHYDRAMINE HCL 50 MG
25 CAPSULE ORAL ONCE
Qty: 0 | Refills: 0 | Status: COMPLETED | OUTPATIENT
Start: 2018-04-20 | End: 2018-04-20

## 2018-04-20 RX ORDER — FUROSEMIDE 40 MG
20 TABLET ORAL
Qty: 0 | Refills: 0 | Status: DISCONTINUED | OUTPATIENT
Start: 2018-04-20 | End: 2018-04-20

## 2018-04-20 RX ORDER — FUROSEMIDE 40 MG
20 TABLET ORAL
Qty: 0 | Refills: 0 | Status: DISCONTINUED | OUTPATIENT
Start: 2018-04-20 | End: 2018-04-25

## 2018-04-20 RX ORDER — FUROSEMIDE 40 MG
40 TABLET ORAL
Qty: 0 | Refills: 0 | Status: DISCONTINUED | OUTPATIENT
Start: 2018-04-20 | End: 2018-04-20

## 2018-04-20 RX ORDER — PANTOPRAZOLE SODIUM 20 MG/1
40 TABLET, DELAYED RELEASE ORAL
Qty: 0 | Refills: 0 | Status: DISCONTINUED | OUTPATIENT
Start: 2018-04-20 | End: 2018-04-25

## 2018-04-20 RX ORDER — QUETIAPINE FUMARATE 200 MG/1
25 TABLET, FILM COATED ORAL
Qty: 0 | Refills: 0 | Status: DISCONTINUED | OUTPATIENT
Start: 2018-04-20 | End: 2018-04-25

## 2018-04-20 RX ORDER — DRONABINOL 2.5 MG
5 CAPSULE ORAL DAILY
Qty: 0 | Refills: 0 | Status: DISCONTINUED | OUTPATIENT
Start: 2018-04-20 | End: 2018-04-25

## 2018-04-20 RX ORDER — ADENOSINE 3 MG/ML
60 INJECTION INTRAVENOUS ONCE
Qty: 0 | Refills: 0 | Status: DISCONTINUED | OUTPATIENT
Start: 2018-04-20 | End: 2018-04-25

## 2018-04-20 RX ORDER — ASPIRIN/CALCIUM CARB/MAGNESIUM 324 MG
81 TABLET ORAL DAILY
Qty: 0 | Refills: 0 | Status: DISCONTINUED | OUTPATIENT
Start: 2018-04-20 | End: 2018-04-25

## 2018-04-20 RX ORDER — GABAPENTIN 400 MG/1
400 CAPSULE ORAL THREE TIMES A DAY
Qty: 0 | Refills: 0 | Status: DISCONTINUED | OUTPATIENT
Start: 2018-04-20 | End: 2018-04-25

## 2018-04-20 RX ORDER — OXYCODONE AND ACETAMINOPHEN 5; 325 MG/1; MG/1
1 TABLET ORAL EVERY 6 HOURS
Qty: 0 | Refills: 0 | Status: DISCONTINUED | OUTPATIENT
Start: 2018-04-20 | End: 2018-04-25

## 2018-04-20 RX ORDER — ADENOSINE 3 MG/ML
60 INJECTION INTRAVENOUS ONCE
Qty: 0 | Refills: 0 | Status: DISCONTINUED | OUTPATIENT
Start: 2018-04-20 | End: 2018-04-20

## 2018-04-20 RX ORDER — FUROSEMIDE 40 MG
40 TABLET ORAL
Qty: 0 | Refills: 0 | Status: DISCONTINUED | OUTPATIENT
Start: 2018-04-20 | End: 2018-04-25

## 2018-04-20 RX ORDER — REGADENOSON 0.08 MG/ML
0.4 INJECTION, SOLUTION INTRAVENOUS ONCE
Qty: 0 | Refills: 0 | Status: DISCONTINUED | OUTPATIENT
Start: 2018-04-20 | End: 2018-04-25

## 2018-04-20 RX ORDER — NITROGLYCERIN 6.5 MG
0.4 CAPSULE, EXTENDED RELEASE ORAL
Qty: 0 | Refills: 0 | Status: DISCONTINUED | OUTPATIENT
Start: 2018-04-20 | End: 2018-04-25

## 2018-04-20 RX ORDER — LAMOTRIGINE 25 MG/1
100 TABLET, ORALLY DISINTEGRATING ORAL
Qty: 0 | Refills: 0 | Status: DISCONTINUED | OUTPATIENT
Start: 2018-04-20 | End: 2018-04-25

## 2018-04-20 RX ADMIN — Medication 500 MICROGRAM(S): at 10:53

## 2018-04-20 RX ADMIN — QUETIAPINE FUMARATE 25 MILLIGRAM(S): 200 TABLET, FILM COATED ORAL at 19:04

## 2018-04-20 RX ADMIN — Medication 10 MILLIGRAM(S): at 23:14

## 2018-04-20 RX ADMIN — OXYCODONE AND ACETAMINOPHEN 1 TABLET(S): 5; 325 TABLET ORAL at 19:05

## 2018-04-20 RX ADMIN — APIXABAN 5 MILLIGRAM(S): 2.5 TABLET, FILM COATED ORAL at 19:04

## 2018-04-20 RX ADMIN — Medication 25 MILLIGRAM(S): at 13:05

## 2018-04-20 RX ADMIN — MORPHINE SULFATE 4 MILLIGRAM(S): 50 CAPSULE, EXTENDED RELEASE ORAL at 13:06

## 2018-04-20 RX ADMIN — GABAPENTIN 400 MILLIGRAM(S): 400 CAPSULE ORAL at 23:13

## 2018-04-20 RX ADMIN — MORPHINE SULFATE 4 MILLIGRAM(S): 50 CAPSULE, EXTENDED RELEASE ORAL at 13:30

## 2018-04-20 RX ADMIN — OXYCODONE AND ACETAMINOPHEN 1 TABLET(S): 5; 325 TABLET ORAL at 23:13

## 2018-04-20 RX ADMIN — ATORVASTATIN CALCIUM 40 MILLIGRAM(S): 80 TABLET, FILM COATED ORAL at 23:13

## 2018-04-20 RX ADMIN — DOFETILIDE 250 MICROGRAM(S): 0.25 CAPSULE ORAL at 19:04

## 2018-04-20 RX ADMIN — LAMOTRIGINE 100 MILLIGRAM(S): 25 TABLET, ORALLY DISINTEGRATING ORAL at 19:04

## 2018-04-20 NOTE — H&P ADULT - ASSESSMENT
48 yo M with multiple sclerosis, CAD, HFpEF, Afib on eliquis,  seizure disorder, HTN, DLD, arrhythmogenic R ventricular dysplasia, COPD not on home O2, h/o vtach s/p AICD, remote h/o testicular cancer and h/o TIA  , recurrent chest pain, diaphoresis, palpitation, worsening SOB on exertion was referred from dr stevens office for stress test.        #recurrent chest pain, palpitation secondary to recurrent atrial tachycardia as shown in AICD interrogation in last admission  patient was referred to stress test before ablation  ER contacted dr stevens who recommended percentin stress test  f/u cardiology recommendations    #atrial fibrillation: on Eliquis metoprolol    #history of vtach: c/w Tikosyn    #HFpEF : continue with Lasix 40/ 20 every other day, no signs of overload, metoprolol    #CAD: c/w atorvastatin, aspirin, metoprolol    #Multiple sclerosis: continue with baclofen, Tecfidera    #copd : stable , inhalers prn    #seizure on Lamotrigine    #DVT prophylaxis /GERD: on Eliquis /protonix 48 yo M with multiple sclerosis, CAD, HFpEF, Afib on eliquis,  seizure disorder, HTN, DLD, arrhythmogenic R ventricular dysplasia, COPD not on home O2, h/o vtach s/p AICD, remote h/o testicular cancer and h/o TIA  , recurrent chest pain, diaphoresis, palpitation, worsening SOB on exertion was referred from dr stevens office for stress test.        #recurrent chest pain, palpitation secondary to recurrent atrial tachycardia as shown in AICD interrogation in last admission  patient was referred to stress test before ablation  ER contacted dr stevens who recommended persantine stress test  f/u cardiology recommendations    #atrial fibrillation: on Eliquis metoprolol    #history of vtach: c/w Tikosyn    #HFpEF : continue with Lasix 40/ 20 every other day, no signs of overload, metoprolol    #CAD: c/w atorvastatin, aspirin, metoprolol    #Multiple sclerosis: continue with baclofen, Tecfidera    #copd : stable , inhalers prn    #seizure on Lamotrigine    #DVT prophylaxis /GERD: on Eliquis /protonix

## 2018-04-20 NOTE — H&P ADULT - NSHPPHYSICALEXAM_GEN_ALL_CORE
T(C): 37.1 (04-20-18 @ 08:41), Max: 37.1 (04-20-18 @ 08:41)  HR: 64 (04-20-18 @ 08:41) (64 - 64)  BP: 156/73 (04-20-18 @ 08:41) (156/73 - 156/73)  RR: 18 (04-20-18 @ 08:41) (18 - 18)  SpO2: 97% (04-20-18 @ 08:41) (97% - 97%)    PHYSICAL EXAM:  GENERAL: NAD, well-developed  HEAD:  Atraumatic, Normocephalic  EYES: EOMI, PERRLA, conjunctiva and sclera clear  NECK: Supple, No JVD  CHEST/LUNG: rt basal crackles  HEART: Regular rate and rhythm; No murmurs, rubs, or gallops  ABDOMEN: Soft, Nontender, Nondistended; Bowel sounds present  EXTREMITIES:  2+ Peripheral Pulses, No clubbing, cyanosis, or edema  PSYCH: AAOx3  NEUROLOGY: non-focal  SKIN: No rashes or lesions

## 2018-04-20 NOTE — H&P ADULT - NSHPREVIEWOFSYSTEMS_GEN_ALL_CORE
REVIEW OF SYSTEMS:    CONSTITUTIONAL: No weakness, fevers or chills  EYES/ENT: No visual changes;  No vertigo or throat pain   NECK: No pain or stiffness  RESPIRATORY: No cough, wheezing, hemoptysis; worsening shortness of breath on exertion  CARDIOVASCULAR: recurrent chest pain, palpitation since november  GASTROINTESTINAL: No abdominal or epigastric pain. No nausea, vomiting, or hematemesis; No diarrhea or constipation. No melena or hematochezia.  GENITOURINARY: No dysuria, frequency or hematuria  NEUROLOGICAL: No numbness or weakness  SKIN: No itching, burning, rashes, or lesions   All other review of systems is negative unless indicated above.

## 2018-04-20 NOTE — H&P ADULT - HISTORY OF PRESENT ILLNESS
46 yo M with multiple sclerosis, CAD, HFpEF, Afib on eliquis,  seizure disorder, HTN, DLD, arrhythmogenic R ventricular dysplasia, COPD not on home O2, h/o vtach s/p AICD, remote h/o testicular cancer and h/o TIA was referred from dr stevens office for stress test .Patient mentioned that she is having recurrent attacks of palpitation, diaphoresis, and chest pain since last november, for which he was hospitalized this february, interrogation of AICD showed  7 events of atrial tachycardia at 190bpm longest 27 sec. No ventricular events. Reprogrammed off MVP. Patient was planned for ablation after getting stress test as out patient. Patient mentioned that his symptoms are being for frequent in the last moth, mainly his SOB on exertion,he was able to walk 7 blocks before being SOB in november , but now he can walk only 2 blocks, he links his chest pain to the palpitation episodes and shortness of breath.  and that he went today to get the stress test as outpatient but he was told that he is not on the schedule and was sent here. 48 yo M with multiple sclerosis, CAD, HFpEF, Afib on eliquis,  seizure disorder, HTN, DLD, arrhythmogenic R ventricular dysplasia, COPD not on home O2, h/o vtach s/p AICD, remote h/o testicular cancer and h/o TIA was referred from dr stevens office for stress test .Patient mentioned that she is having recurrent attacks of palpitation, diaphoresis, and chest pain since last november, for which he was hospitalized this february, interrogation of AICD showed  7 events of atrial tachycardia at 190bpm longest 27 sec. No ventricular events. Reprogrammed off MVP. Patient was planned for ablation after getting stress test as out patient. Patient mentioned that his symptoms are being for frequent in the last moth, mainly his SOB on exertion,he was able to walk 7 blocks before being SOB in november , but now he can walk only 2 blocks, he links his chest pain to the palpitation episodes and shortness of breath.  and that he went today to get the stress test as outpatient but he was told that he is not on the schedule and was sent here. pt denies any chest pain now .

## 2018-04-20 NOTE — H&P ADULT - NSHPLABSRESULTS_GEN_ALL_CORE
13.5   5.81  )-----------( 222      ( 20 Apr 2018 10:32 )             40.0   20 Apr 2018 10:32    136    |  96     |  22     ----------------------------<  96     7.6     |  26     |  1.0      Ca    9.5        20 Apr 2018 10:32    TPro  7.5    /  Alb  4.5    /  TBili  0.5    /  DBili  x      /  AST  61     /  ALT  28     /  AlkPhos  93     20 Apr 2018 10:32     Blood Gas Venous - Potassium: 3.4 mmoL/L  Troponin I, Serum: <0.02  Blood Gas Profile - Venous (04.20.18 @ 10:35)    pH, Venous: 7.41    pCO2, Venous: 52 mmHg    pO2, Venous: 30 mmHg    HCO3, Venous: 33 mmoL/L    Base Excess, Venous: 6.8 mmoL/L    Oxygen Saturation, Venous: 63 %  Serum Pro-Brain Natriuretic Peptide: 11 pg/mL (04.20.18 @ 10:32)  Comprehensive Metabolic Panel (04.20.18 @ 10:32)    Sodium, Serum: 136 mmol/L    Potassium, Serum: 7.6: Mod Hemolyzed. Interpret with caution  Critical value: mmol/L    Chloride, Serum: 96 mmol/L    Carbon Dioxide, Serum: 26: Hemolyzed. Interpret with caution mmol/L    Anion Gap, Serum: 14 mmol/L    Blood Urea Nitrogen, Serum: 22 mg/dL    Creatinine, Serum: 1.0 mg/dL    Glucose, Serum: 96 mg/dL    Calcium, Total Serum: 9.5 mg/dL    Protein Total, Serum: 7.5: Hemolyzed. Interpret with caution g/dL    Albumin, Serum: 4.5 g/dL    Bilirubin Total, Serum: 0.5 mg/dL    Alkaline Phosphatase, Serum: 93: Hemolyzed. Interpret with caution U/L    Aspartate Aminotransferase (AST/SGOT): 61: Hemolyzed. Interpret with caution U/L    Alanine Aminotransferase (ALT/SGPT): 28: Hemolyzed. Interpret with caution U/L

## 2018-04-20 NOTE — H&P ADULT - ATTENDING COMMENTS
Patient seen and examined independently. Agree with resident note. Case discussed with housestaff, nursing and patient/pt decision maker.   VITAL SIGNS (Last 24 hrs):  T(C): 35.6 (04-21-18 @ 13:46), Max: 36.8 (04-20-18 @ 22:20)  HR: 60 (04-21-18 @ 13:46) (60 - 60)  BP: 110/64 (04-21-18 @ 13:46) (91/56 - 114/58)  RR: 18 (04-21-18 @ 13:46) (18 - 18)  SpO2: 98% (04-21-18 @ 08:39) (97% - 98%)  Wt(kg): --  Daily Height in cm: 172.72 (20 Apr 2018 22:20)    Daily     I&O's Summary                        12.5   5.69  )-----------( 223      ( 21 Apr 2018 06:20 )             37.4   ekg-paced rhythm rate 60/min RBBB  o/e   aaox3  chest-b/l air entry  cvs-s1s2n  abd-soft, bs+  no edema  Assessment and plan  # chest pain and SOB- as per  ER  dr Deleon recommeded stress test.  # afib- on eliquis and metoprolol  # hx of V tach --s/p AICD and tikosyn  # CHF ef was normal in july2017 on po lasix Patient seen and examined independently. Agree with resident note. Case discussed with housestaff, nursing and patient/pt decision maker.   VITAL SIGNS (Last 24 hrs):  T(C): 35.6 (04-21-18 @ 13:46), Max: 36.8 (04-20-18 @ 22:20)  HR: 60 (04-21-18 @ 13:46) (60 - 60)  BP: 110/64 (04-21-18 @ 13:46) (91/56 - 114/58)  RR: 18 (04-21-18 @ 13:46) (18 - 18)  SpO2: 98% (04-21-18 @ 08:39) (97% - 98%)  Wt(kg): --  Daily Height in cm: 172.72 (20 Apr 2018 22:20)    Daily     I&O's Summary                        12.5   5.69  )-----------( 223      ( 21 Apr 2018 06:20 )             37.4   ekg-paced rhythm rate 60/min RBBB  o/e   aaox3  chest-b/l air entry  cvs-s1s2n  abd-soft, bs+  no edema  Assessment and plan  # chest pain and SOB- as per  ER  dr Deleon recommeded stress test.  # afib- on eliquis and metoprolol  # hx of V tach --s/p AICD and tikosyn  # CHF ef was normal in july2017 on po lasix  # hx of multiple sclerosis

## 2018-04-20 NOTE — ED PROVIDER NOTE - NS ED ROS FT
Eyes:  No eye pain No visual changes, or discharge.  ENMT:  No ear pain No hearing changes, discharge or infections. No neck pain or stiffness. No throat pain  Cardiac:  + chest pain, + SOB no edema.   Respiratory:  No cough or respiratory distress. No hemoptysis.   GI:  No nausea, vomiting, diarrhea, constipation or abdominal pain.  :  No dysuria, frequency or burning.  MS:  No myalgia, joint pain or back pain.  Neuro:  No headache, paresthesias or weakness.  No LOC.  Skin:  No skin rash.

## 2018-04-20 NOTE — ED PROVIDER NOTE - OBJECTIVE STATEMENT
47y M with SOB.  SOB, chest pain, palpitations at baseline for several months, was supposed to get pre-op nuclear stress today for ablation: went to appt, told it was cancelled and to come to ER b/c of chest pain.  Hx a fib, cad, chf, copd, defibrillator, v-tach, seen by Dr. Pope and Dr. Quiñones.  No abd pain, no fevers, no leg swelling.   Hx MS, optic neuritis, HIV with good cell count.  Dr. Roche is PMD.

## 2018-04-20 NOTE — ED PROVIDER NOTE - PROGRESS NOTE DETAILS
Dr. Pope wants pt to get persantine stress in OBS today, ok to discharge after if everything nml. 46 Y/O M HTN, DYSLIPIDEMIA, COPD, CAD, CARDIOMYOPATHY, AFIB (ON ELIQUIS), AICD, SEIZURE D/O, HIV (NOT ON HAART, UNKNOWN VL), + SMOKER SENT TO ED FOR NUCLEAR STRESS TEST. PT PRESENTED TO CARDIOLOGIST TODAY FOR STRESS TEST WHICH WAS CANCELLED AND THEN REFERRED TO THE ED TO HAVE TEST COMPLETED. PT WITH INTERMITTENT EPISODE OF CHEST PAIN, SOB, PALPITATIONS. STRESS TEST WAS BEING DONE IN PREPARATION FOR POSSIBLE ABLATION. NO CP CURRENTLY. + SOB AND GAVIRIA TODAY. NO NEAR SYNCOPE, SYNCOPE. NO LEG EDEMA OR PAIN. VITALS NOTED. ALERT OX3 NAD NECK SUPPLE. NO JVD. LUNGS CLEAR B/L. RRR S1S2. + AICD. ABD- SOFT FLAT NONTENDER. NO LEG EDEMA. NEURO EXAM NONFOCAL. Endorsed to Dr. Vivien KAYE Paging and admitting to hospitalist as 3 calls placed to Dr. Roche with no return.

## 2018-04-21 LAB
ALBUMIN SERPL ELPH-MCNC: 4 G/DL — SIGNIFICANT CHANGE UP (ref 3.5–5.2)
ALP SERPL-CCNC: 96 U/L — SIGNIFICANT CHANGE UP (ref 30–115)
ALT FLD-CCNC: 21 U/L — SIGNIFICANT CHANGE UP (ref 0–41)
ANION GAP SERPL CALC-SCNC: 12 MMOL/L — SIGNIFICANT CHANGE UP (ref 7–14)
AST SERPL-CCNC: 18 U/L — SIGNIFICANT CHANGE UP (ref 0–41)
BASOPHILS # BLD AUTO: 0.08 K/UL — SIGNIFICANT CHANGE UP (ref 0–0.2)
BASOPHILS NFR BLD AUTO: 1.4 % — HIGH (ref 0–1)
BILIRUB SERPL-MCNC: 0.4 MG/DL — SIGNIFICANT CHANGE UP (ref 0.2–1.2)
BUN SERPL-MCNC: 23 MG/DL — HIGH (ref 10–20)
CALCIUM SERPL-MCNC: 9.3 MG/DL — SIGNIFICANT CHANGE UP (ref 8.5–10.1)
CHLORIDE SERPL-SCNC: 101 MMOL/L — SIGNIFICANT CHANGE UP (ref 98–110)
CO2 SERPL-SCNC: 27 MMOL/L — SIGNIFICANT CHANGE UP (ref 17–32)
CREAT SERPL-MCNC: 1 MG/DL — SIGNIFICANT CHANGE UP (ref 0.7–1.5)
EOSINOPHIL # BLD AUTO: 0.12 K/UL — SIGNIFICANT CHANGE UP (ref 0–0.7)
EOSINOPHIL NFR BLD AUTO: 2.1 % — SIGNIFICANT CHANGE UP (ref 0–8)
GLUCOSE SERPL-MCNC: 108 MG/DL — HIGH (ref 70–99)
HCT VFR BLD CALC: 37.4 % — LOW (ref 42–52)
HGB BLD-MCNC: 12.5 G/DL — LOW (ref 14–18)
IMM GRANULOCYTES NFR BLD AUTO: 0.2 % — SIGNIFICANT CHANGE UP (ref 0.1–0.3)
LYMPHOCYTES # BLD AUTO: 2.3 K/UL — SIGNIFICANT CHANGE UP (ref 1.2–3.4)
LYMPHOCYTES # BLD AUTO: 40.4 % — SIGNIFICANT CHANGE UP (ref 20.5–51.1)
MCHC RBC-ENTMCNC: 30 PG — SIGNIFICANT CHANGE UP (ref 27–31)
MCHC RBC-ENTMCNC: 33.4 G/DL — SIGNIFICANT CHANGE UP (ref 32–37)
MCV RBC AUTO: 89.9 FL — SIGNIFICANT CHANGE UP (ref 80–94)
MONOCYTES # BLD AUTO: 0.47 K/UL — SIGNIFICANT CHANGE UP (ref 0.1–0.6)
MONOCYTES NFR BLD AUTO: 8.3 % — SIGNIFICANT CHANGE UP (ref 1.7–9.3)
NEUTROPHILS # BLD AUTO: 2.71 K/UL — SIGNIFICANT CHANGE UP (ref 1.4–6.5)
NEUTROPHILS NFR BLD AUTO: 47.6 % — SIGNIFICANT CHANGE UP (ref 42.2–75.2)
NRBC # BLD: 0 /100 WBCS — SIGNIFICANT CHANGE UP (ref 0–0)
PLATELET # BLD AUTO: 223 K/UL — SIGNIFICANT CHANGE UP (ref 130–400)
POTASSIUM SERPL-MCNC: 3.6 MMOL/L — SIGNIFICANT CHANGE UP (ref 3.5–5)
POTASSIUM SERPL-SCNC: 3.6 MMOL/L — SIGNIFICANT CHANGE UP (ref 3.5–5)
PROT SERPL-MCNC: 6.2 G/DL — SIGNIFICANT CHANGE UP (ref 6–8)
RBC # BLD: 4.16 M/UL — LOW (ref 4.7–6.1)
RBC # FLD: 13.1 % — SIGNIFICANT CHANGE UP (ref 11.5–14.5)
SODIUM SERPL-SCNC: 140 MMOL/L — SIGNIFICANT CHANGE UP (ref 135–146)
WBC # BLD: 5.69 K/UL — SIGNIFICANT CHANGE UP (ref 4.8–10.8)
WBC # FLD AUTO: 5.69 K/UL — SIGNIFICANT CHANGE UP (ref 4.8–10.8)

## 2018-04-21 RX ADMIN — Medication 25 MILLIGRAM(S): at 17:49

## 2018-04-21 RX ADMIN — Medication 40 MILLIGRAM(S): at 05:56

## 2018-04-21 RX ADMIN — Medication 25 MILLIGRAM(S): at 05:57

## 2018-04-21 RX ADMIN — Medication 10 MILLIGRAM(S): at 21:06

## 2018-04-21 RX ADMIN — OXYCODONE AND ACETAMINOPHEN 1 TABLET(S): 5; 325 TABLET ORAL at 17:49

## 2018-04-21 RX ADMIN — OXYCODONE AND ACETAMINOPHEN 1 TABLET(S): 5; 325 TABLET ORAL at 23:21

## 2018-04-21 RX ADMIN — Medication 10 MILLIGRAM(S): at 05:55

## 2018-04-21 RX ADMIN — Medication 5 MILLIGRAM(S): at 11:57

## 2018-04-21 RX ADMIN — APIXABAN 5 MILLIGRAM(S): 2.5 TABLET, FILM COATED ORAL at 05:55

## 2018-04-21 RX ADMIN — APIXABAN 5 MILLIGRAM(S): 2.5 TABLET, FILM COATED ORAL at 17:49

## 2018-04-21 RX ADMIN — OXYCODONE AND ACETAMINOPHEN 1 TABLET(S): 5; 325 TABLET ORAL at 23:55

## 2018-04-21 RX ADMIN — OXYCODONE AND ACETAMINOPHEN 1 TABLET(S): 5; 325 TABLET ORAL at 06:29

## 2018-04-21 RX ADMIN — PANTOPRAZOLE SODIUM 40 MILLIGRAM(S): 20 TABLET, DELAYED RELEASE ORAL at 05:56

## 2018-04-21 RX ADMIN — OXYCODONE AND ACETAMINOPHEN 1 TABLET(S): 5; 325 TABLET ORAL at 11:57

## 2018-04-21 RX ADMIN — OXYCODONE AND ACETAMINOPHEN 1 TABLET(S): 5; 325 TABLET ORAL at 16:46

## 2018-04-21 RX ADMIN — GABAPENTIN 400 MILLIGRAM(S): 400 CAPSULE ORAL at 06:19

## 2018-04-21 RX ADMIN — QUETIAPINE FUMARATE 25 MILLIGRAM(S): 200 TABLET, FILM COATED ORAL at 17:49

## 2018-04-21 RX ADMIN — DOFETILIDE 250 MICROGRAM(S): 0.25 CAPSULE ORAL at 17:50

## 2018-04-21 RX ADMIN — DOFETILIDE 250 MICROGRAM(S): 0.25 CAPSULE ORAL at 05:56

## 2018-04-21 RX ADMIN — ATORVASTATIN CALCIUM 40 MILLIGRAM(S): 80 TABLET, FILM COATED ORAL at 21:07

## 2018-04-21 RX ADMIN — GABAPENTIN 400 MILLIGRAM(S): 400 CAPSULE ORAL at 16:43

## 2018-04-21 RX ADMIN — QUETIAPINE FUMARATE 25 MILLIGRAM(S): 200 TABLET, FILM COATED ORAL at 05:56

## 2018-04-21 RX ADMIN — OXYCODONE AND ACETAMINOPHEN 1 TABLET(S): 5; 325 TABLET ORAL at 02:57

## 2018-04-21 RX ADMIN — LAMOTRIGINE 100 MILLIGRAM(S): 25 TABLET, ORALLY DISINTEGRATING ORAL at 05:56

## 2018-04-21 RX ADMIN — GABAPENTIN 400 MILLIGRAM(S): 400 CAPSULE ORAL at 21:07

## 2018-04-21 RX ADMIN — Medication 10 MILLIGRAM(S): at 16:43

## 2018-04-21 RX ADMIN — LAMOTRIGINE 100 MILLIGRAM(S): 25 TABLET, ORALLY DISINTEGRATING ORAL at 17:49

## 2018-04-21 RX ADMIN — Medication 81 MILLIGRAM(S): at 16:43

## 2018-04-21 RX ADMIN — OXYCODONE AND ACETAMINOPHEN 1 TABLET(S): 5; 325 TABLET ORAL at 05:55

## 2018-04-21 RX ADMIN — OXYCODONE AND ACETAMINOPHEN 1 TABLET(S): 5; 325 TABLET ORAL at 17:50

## 2018-04-21 NOTE — PROGRESS NOTE ADULT - ASSESSMENT
46 yo M with multiple sclerosis, CAD, HFpEF, Afib on eliquis,  seizure disorder, HTN, DLD, arrhythmogenic R ventricular dysplasia, COPD not on home O2, h/o vtach s/p AICD, remote h/o testicular cancer and h/o TIA  , recurrent chest pain, diaphoresis, palpitation, worsening SOB on exertion was referred from dr stevens office for stress test.    #recurrent chest pain, palpitation secondary to recurrent atrial tachycardia as shown in AICD interrogation in last admission  patient was referred to stress test before ablation  ER contacted dr stevens who recommended persantine stress test  Stress test done: Normal  Has to follow with cardiology for recommendations    #atrial fibrillation: on Eliquis and metoprolol  Currently rhythm paced at 60bpm    #history of vtach: c/w Tikosyn    #HFpEF : continue with Lasix 40/ 20 every other day, no signs of overload, metoprolol    #CAD: c/w atorvastatin, aspirin, metoprolol    #Multiple sclerosis: continue with baclofen, Tecfidera    #copd : stable , inhalers prn    #seizure on Lamotrigine    #DVT prophylaxis (Already on eliquis) /GERD: on Eliquis /protonix

## 2018-04-21 NOTE — PROGRESS NOTE ADULT - SUBJECTIVE AND OBJECTIVE BOX
SUBJECTIVE:    Patient is a 47y old Male who presents with a chief complaint of sent in for stress test from dr stevens office (20 Apr 2018 14:16)    Currently admitted to medicine with the primary diagnosis of Chest pain r/o cardiac ischemia     Today is hospital day 1d. This morning he is resting comfortably in bed and reports no new issues or overnight events.     PAST MEDICAL & SURGICAL HISTORY  Syncope  Peripheral Neuropathy  S/P Implantation of Automatic  Personal History of Multiple S  Personal History of Mitral Nicki  Personal History of MI (Myocar  Personal History of Congestive  Personal History of Cardiomyop  Personal History of Atrial Fib  Personal History of Alcoholism  Clinical Depression  S/P Orchiectomy: L for testicular CA  SVT (Supraventricular Tachycar  GERD (Gastroesophageal Reflux  S/P Implantation of AICD    SOCIAL HISTORY:  Negative for smoking/alcohol/drug use.     ALLERGIES:  dexmethylphenidate (Unknown)  Dilantin, compazine, neurontin, ridaline, phenegrin (Anaphylaxis)  dilantin, compazine, neurontin, ritalin, phenergan (Unknown)  Haldol (Unknown)  hydantoin derivatives (Other)  methylphenidate (Unknown)  Morphine Sulfate (Unknown)  phenytoin (Unknown)  prochlorperazine (Unknown)  thioxanthenes (Unknown)    MEDICATIONS:  STANDING MEDICATIONS  adenosine Injectable (ADENOSCAN) 60 milliGRAM(s) IV Bolus once  apixaban 5 milliGRAM(s) Oral every 12 hours  aspirin  chewable 81 milliGRAM(s) Oral daily  atorvastatin 40 milliGRAM(s) Oral at bedtime  baclofen 10 milliGRAM(s) Oral three times a day  dofetilide 250 MICROGram(s) Oral two times a day  dronabinol 5 milliGRAM(s) Oral daily  furosemide    Tablet 20 milliGRAM(s) Oral <User Schedule>  furosemide    Tablet 40 milliGRAM(s) Oral <User Schedule>  gabapentin 400 milliGRAM(s) Oral three times a day  lamoTRIgine 100 milliGRAM(s) Oral two times a day  metoprolol tartrate 25 milliGRAM(s) Oral two times a day  oxyCODONE    5 mG/acetaminophen 325 mG 1 Tablet(s) Oral every 6 hours  pantoprazole    Tablet 40 milliGRAM(s) Oral before breakfast  QUEtiapine 25 milliGRAM(s) Oral two times a day  regadenoson Injectable 0.4 milliGRAM(s) IV Push once    PRN MEDICATIONS  ipratropium    for Nebulization 500 MICROGram(s) Nebulizer every 6 hours PRN  nitroglycerin     SubLingual 0.4 milliGRAM(s) SubLingual every 5 minutes PRN    VITALS:   T(F): 98  HR: 60  BP: 107/54  RR: 18  SpO2: 98%    LABS:                        12.5   5.69  )-----------( 223      ( 21 Apr 2018 06:20 )             37.4     04-21    140  |  101  |  23<H>  ----------------------------<  108<H>  3.6   |  27  |  1.0    Ca    9.3      21 Apr 2018 06:30    TPro  6.2  /  Alb  4.0  /  TBili  0.4  /  DBili  x   /  AST  18  /  ALT  21  /  AlkPhos  96  04-21    CARDIAC MARKERS ( 20 Apr 2018 17:10 )  x     / <0.01 ng/mL / 131 U/L / x     / x      CARDIAC MARKERS ( 20 Apr 2018 10:32 )  x     / <0.01 ng/mL / 244 U/L / x     / 2.0 ng/mL      RADIOLOGY:  Nuclear stress test with Lexiscan: Normal    PHYSICAL EXAM:  GEN: No acute distress  LUNGS: Clear to auscultation bilaterally   HEART: S1/S2 present. RRR.   ABD: Soft, non-tender, non-distended. Bowel sounds present  EXT: NC/NC/NE/2+PP/YARBROUGH  NEURO: AAOX3

## 2018-04-22 LAB
ANION GAP SERPL CALC-SCNC: 12 MMOL/L — SIGNIFICANT CHANGE UP (ref 7–14)
BUN SERPL-MCNC: 25 MG/DL — HIGH (ref 10–20)
CALCIUM SERPL-MCNC: 8.7 MG/DL — SIGNIFICANT CHANGE UP (ref 8.5–10.1)
CHLORIDE SERPL-SCNC: 99 MMOL/L — SIGNIFICANT CHANGE UP (ref 98–110)
CO2 SERPL-SCNC: 27 MMOL/L — SIGNIFICANT CHANGE UP (ref 17–32)
CREAT SERPL-MCNC: 1.2 MG/DL — SIGNIFICANT CHANGE UP (ref 0.7–1.5)
GLUCOSE SERPL-MCNC: 95 MG/DL — SIGNIFICANT CHANGE UP (ref 70–99)
HCT VFR BLD CALC: 39.1 % — LOW (ref 42–52)
HGB BLD-MCNC: 12.9 G/DL — LOW (ref 14–18)
MCHC RBC-ENTMCNC: 29.8 PG — SIGNIFICANT CHANGE UP (ref 27–31)
MCHC RBC-ENTMCNC: 33 G/DL — SIGNIFICANT CHANGE UP (ref 32–37)
MCV RBC AUTO: 90.3 FL — SIGNIFICANT CHANGE UP (ref 80–94)
NRBC # BLD: 0 /100 WBCS — SIGNIFICANT CHANGE UP (ref 0–0)
PLATELET # BLD AUTO: 218 K/UL — SIGNIFICANT CHANGE UP (ref 130–400)
POTASSIUM SERPL-MCNC: 3.9 MMOL/L — SIGNIFICANT CHANGE UP (ref 3.5–5)
POTASSIUM SERPL-SCNC: 3.9 MMOL/L — SIGNIFICANT CHANGE UP (ref 3.5–5)
RBC # BLD: 4.33 M/UL — LOW (ref 4.7–6.1)
RBC # FLD: 12.9 % — SIGNIFICANT CHANGE UP (ref 11.5–14.5)
SODIUM SERPL-SCNC: 138 MMOL/L — SIGNIFICANT CHANGE UP (ref 135–146)
WBC # BLD: 5.63 K/UL — SIGNIFICANT CHANGE UP (ref 4.8–10.8)
WBC # FLD AUTO: 5.63 K/UL — SIGNIFICANT CHANGE UP (ref 4.8–10.8)

## 2018-04-22 RX ORDER — METHOCARBAMOL 500 MG/1
750 TABLET, FILM COATED ORAL THREE TIMES A DAY
Qty: 0 | Refills: 0 | Status: DISCONTINUED | OUTPATIENT
Start: 2018-04-22 | End: 2018-04-25

## 2018-04-22 RX ORDER — SENNA PLUS 8.6 MG/1
2 TABLET ORAL AT BEDTIME
Qty: 0 | Refills: 0 | Status: DISCONTINUED | OUTPATIENT
Start: 2018-04-22 | End: 2018-04-25

## 2018-04-22 RX ORDER — OXYCODONE AND ACETAMINOPHEN 5; 325 MG/1; MG/1
1 TABLET ORAL ONCE
Qty: 0 | Refills: 0 | Status: DISCONTINUED | OUTPATIENT
Start: 2018-04-22 | End: 2018-04-22

## 2018-04-22 RX ORDER — DOCUSATE SODIUM 100 MG
100 CAPSULE ORAL THREE TIMES A DAY
Qty: 0 | Refills: 0 | Status: DISCONTINUED | OUTPATIENT
Start: 2018-04-22 | End: 2018-04-25

## 2018-04-22 RX ADMIN — LAMOTRIGINE 100 MILLIGRAM(S): 25 TABLET, ORALLY DISINTEGRATING ORAL at 17:09

## 2018-04-22 RX ADMIN — Medication 100 MILLIGRAM(S): at 21:06

## 2018-04-22 RX ADMIN — METHOCARBAMOL 750 MILLIGRAM(S): 500 TABLET, FILM COATED ORAL at 13:09

## 2018-04-22 RX ADMIN — OXYCODONE AND ACETAMINOPHEN 1 TABLET(S): 5; 325 TABLET ORAL at 12:07

## 2018-04-22 RX ADMIN — Medication 10 MILLIGRAM(S): at 05:55

## 2018-04-22 RX ADMIN — METHOCARBAMOL 750 MILLIGRAM(S): 500 TABLET, FILM COATED ORAL at 21:06

## 2018-04-22 RX ADMIN — GABAPENTIN 400 MILLIGRAM(S): 400 CAPSULE ORAL at 21:06

## 2018-04-22 RX ADMIN — OXYCODONE AND ACETAMINOPHEN 1 TABLET(S): 5; 325 TABLET ORAL at 09:20

## 2018-04-22 RX ADMIN — GABAPENTIN 400 MILLIGRAM(S): 400 CAPSULE ORAL at 13:09

## 2018-04-22 RX ADMIN — OXYCODONE AND ACETAMINOPHEN 1 TABLET(S): 5; 325 TABLET ORAL at 23:10

## 2018-04-22 RX ADMIN — APIXABAN 5 MILLIGRAM(S): 2.5 TABLET, FILM COATED ORAL at 17:09

## 2018-04-22 RX ADMIN — ATORVASTATIN CALCIUM 40 MILLIGRAM(S): 80 TABLET, FILM COATED ORAL at 21:06

## 2018-04-22 RX ADMIN — OXYCODONE AND ACETAMINOPHEN 1 TABLET(S): 5; 325 TABLET ORAL at 05:55

## 2018-04-22 RX ADMIN — Medication 25 MILLIGRAM(S): at 05:55

## 2018-04-22 RX ADMIN — Medication 81 MILLIGRAM(S): at 11:27

## 2018-04-22 RX ADMIN — Medication 10 MILLIGRAM(S): at 21:06

## 2018-04-22 RX ADMIN — Medication 40 MILLIGRAM(S): at 05:55

## 2018-04-22 RX ADMIN — OXYCODONE AND ACETAMINOPHEN 1 TABLET(S): 5; 325 TABLET ORAL at 06:08

## 2018-04-22 RX ADMIN — GABAPENTIN 400 MILLIGRAM(S): 400 CAPSULE ORAL at 05:55

## 2018-04-22 RX ADMIN — OXYCODONE AND ACETAMINOPHEN 1 TABLET(S): 5; 325 TABLET ORAL at 18:13

## 2018-04-22 RX ADMIN — PANTOPRAZOLE SODIUM 40 MILLIGRAM(S): 20 TABLET, DELAYED RELEASE ORAL at 05:56

## 2018-04-22 RX ADMIN — Medication 25 MILLIGRAM(S): at 17:09

## 2018-04-22 RX ADMIN — APIXABAN 5 MILLIGRAM(S): 2.5 TABLET, FILM COATED ORAL at 05:55

## 2018-04-22 RX ADMIN — OXYCODONE AND ACETAMINOPHEN 1 TABLET(S): 5; 325 TABLET ORAL at 12:37

## 2018-04-22 RX ADMIN — DOFETILIDE 250 MICROGRAM(S): 0.25 CAPSULE ORAL at 17:09

## 2018-04-22 RX ADMIN — LAMOTRIGINE 100 MILLIGRAM(S): 25 TABLET, ORALLY DISINTEGRATING ORAL at 05:55

## 2018-04-22 RX ADMIN — OXYCODONE AND ACETAMINOPHEN 1 TABLET(S): 5; 325 TABLET ORAL at 17:08

## 2018-04-22 RX ADMIN — SENNA PLUS 2 TABLET(S): 8.6 TABLET ORAL at 21:05

## 2018-04-22 RX ADMIN — DOFETILIDE 250 MICROGRAM(S): 0.25 CAPSULE ORAL at 05:56

## 2018-04-22 RX ADMIN — Medication 10 MILLIGRAM(S): at 13:08

## 2018-04-22 RX ADMIN — QUETIAPINE FUMARATE 25 MILLIGRAM(S): 200 TABLET, FILM COATED ORAL at 05:55

## 2018-04-22 RX ADMIN — Medication 5 MILLIGRAM(S): at 12:01

## 2018-04-22 RX ADMIN — QUETIAPINE FUMARATE 25 MILLIGRAM(S): 200 TABLET, FILM COATED ORAL at 17:09

## 2018-04-22 NOTE — PROGRESS NOTE ADULT - SUBJECTIVE AND OBJECTIVE BOX
SUBJECTIVE:    Patient is a 47y old Male who presents with a chief complaint of sent in for stress test from dr stevens office (20 Apr 2018 14:16)    Currently admitted to medicine with the primary diagnosis of Chest pain     Today is hospital day 2d. This morning he is resting comfortably in bed and reports no new issues or overnight events.     PAST MEDICAL & SURGICAL HISTORY  Syncope  Peripheral Neuropathy  S/P Implantation of Automatic  Personal History of Multiple S  Personal History of Mitral Nicki  Personal History of MI (Myocar  Personal History of Congestive  Personal History of Cardiomyop  Personal History of Atrial Fib  Personal History of Alcoholism  Clinical Depression  S/P Orchiectomy: L for testicular CA  SVT (Supraventricular Tachycar  GERD (Gastroesophageal Reflux  S/P Implantation of AICD    SOCIAL HISTORY:  Negative for smoking/alcohol/drug use.     ALLERGIES:  dexmethylphenidate (Unknown)  Dilantin, compazine, neurontin, ridaline, phenegrin (Anaphylaxis)  dilantin, compazine, neurontin, ritalin, phenergan (Unknown)  Haldol (Unknown)  hydantoin derivatives (Other)  methylphenidate (Unknown)  Morphine Sulfate (Unknown)  phenytoin (Unknown)  prochlorperazine (Unknown)  thioxanthenes (Unknown)    MEDICATIONS:  STANDING MEDICATIONS  adenosine Injectable (ADENOSCAN) 60 milliGRAM(s) IV Bolus once  apixaban 5 milliGRAM(s) Oral every 12 hours  aspirin  chewable 81 milliGRAM(s) Oral daily  atorvastatin 40 milliGRAM(s) Oral at bedtime  baclofen 10 milliGRAM(s) Oral three times a day  dofetilide 250 MICROGram(s) Oral two times a day  dronabinol 5 milliGRAM(s) Oral daily  furosemide    Tablet 20 milliGRAM(s) Oral <User Schedule>  furosemide    Tablet 40 milliGRAM(s) Oral <User Schedule>  gabapentin 400 milliGRAM(s) Oral three times a day  lamoTRIgine 100 milliGRAM(s) Oral two times a day  metoprolol tartrate 25 milliGRAM(s) Oral two times a day  oxyCODONE    5 mG/acetaminophen 325 mG 1 Tablet(s) Oral every 6 hours  pantoprazole    Tablet 40 milliGRAM(s) Oral before breakfast  QUEtiapine 25 milliGRAM(s) Oral two times a day  regadenoson Injectable 0.4 milliGRAM(s) IV Push once    PRN MEDICATIONS  ipratropium    for Nebulization 500 MICROGram(s) Nebulizer every 6 hours PRN  nitroglycerin     SubLingual 0.4 milliGRAM(s) SubLingual every 5 minutes PRN  oxyCODONE    5 mG/acetaminophen 325 mG 1 Tablet(s) Oral once PRN    VITALS:   T(F): 96  HR: 60  BP: 103/55  RR: 20  SpO2: 96%    LABS:                        12.9   5.63  )-----------( 218      ( 22 Apr 2018 04:37 )             39.1     04-22    138  |  99  |  25<H>  ----------------------------<  95  3.9   |  27  |  1.2    Ca    8.7      22 Apr 2018 04:37    TPro  6.2  /  Alb  4.0  /  TBili  0.4  /  DBili  x   /  AST  18  /  ALT  21  /  AlkPhos  96  04-21              CARDIAC MARKERS ( 20 Apr 2018 17:10 )  x     / <0.01 ng/mL / 131 U/L / x     / x      CARDIAC MARKERS ( 20 Apr 2018 10:32 )  x     / <0.01 ng/mL / 244 U/L / x     / 2.0 ng/mL      RADIOLOGY:    PHYSICAL EXAM:  GEN: No acute distress  LUNGS: Clear to auscultation bilaterally   HEART: S1/S2 present. RRR.   ABD: Soft, non-tender, non-distended. Bowel sounds present  EXT: NC/NC/NE/2+PP/YARBROUGH  NEURO: AAOX3

## 2018-04-23 RX ADMIN — OXYCODONE AND ACETAMINOPHEN 1 TABLET(S): 5; 325 TABLET ORAL at 12:10

## 2018-04-23 RX ADMIN — ATORVASTATIN CALCIUM 40 MILLIGRAM(S): 80 TABLET, FILM COATED ORAL at 21:12

## 2018-04-23 RX ADMIN — Medication 10 MILLIGRAM(S): at 13:35

## 2018-04-23 RX ADMIN — Medication 100 MILLIGRAM(S): at 05:53

## 2018-04-23 RX ADMIN — Medication 10 MILLIGRAM(S): at 05:52

## 2018-04-23 RX ADMIN — OXYCODONE AND ACETAMINOPHEN 1 TABLET(S): 5; 325 TABLET ORAL at 07:04

## 2018-04-23 RX ADMIN — APIXABAN 5 MILLIGRAM(S): 2.5 TABLET, FILM COATED ORAL at 05:52

## 2018-04-23 RX ADMIN — APIXABAN 5 MILLIGRAM(S): 2.5 TABLET, FILM COATED ORAL at 17:42

## 2018-04-23 RX ADMIN — SENNA PLUS 2 TABLET(S): 8.6 TABLET ORAL at 21:12

## 2018-04-23 RX ADMIN — LAMOTRIGINE 100 MILLIGRAM(S): 25 TABLET, ORALLY DISINTEGRATING ORAL at 05:53

## 2018-04-23 RX ADMIN — METHOCARBAMOL 750 MILLIGRAM(S): 500 TABLET, FILM COATED ORAL at 13:35

## 2018-04-23 RX ADMIN — Medication 5 MILLIGRAM(S): at 11:37

## 2018-04-23 RX ADMIN — Medication 100 MILLIGRAM(S): at 21:12

## 2018-04-23 RX ADMIN — DOFETILIDE 250 MICROGRAM(S): 0.25 CAPSULE ORAL at 05:53

## 2018-04-23 RX ADMIN — METHOCARBAMOL 750 MILLIGRAM(S): 500 TABLET, FILM COATED ORAL at 05:54

## 2018-04-23 RX ADMIN — LAMOTRIGINE 100 MILLIGRAM(S): 25 TABLET, ORALLY DISINTEGRATING ORAL at 17:42

## 2018-04-23 RX ADMIN — METHOCARBAMOL 750 MILLIGRAM(S): 500 TABLET, FILM COATED ORAL at 21:13

## 2018-04-23 RX ADMIN — OXYCODONE AND ACETAMINOPHEN 1 TABLET(S): 5; 325 TABLET ORAL at 18:15

## 2018-04-23 RX ADMIN — OXYCODONE AND ACETAMINOPHEN 1 TABLET(S): 5; 325 TABLET ORAL at 17:42

## 2018-04-23 RX ADMIN — Medication 10 MILLIGRAM(S): at 21:12

## 2018-04-23 RX ADMIN — OXYCODONE AND ACETAMINOPHEN 1 TABLET(S): 5; 325 TABLET ORAL at 01:34

## 2018-04-23 RX ADMIN — Medication 20 MILLIGRAM(S): at 05:53

## 2018-04-23 RX ADMIN — Medication 25 MILLIGRAM(S): at 17:42

## 2018-04-23 RX ADMIN — Medication 100 MILLIGRAM(S): at 13:35

## 2018-04-23 RX ADMIN — QUETIAPINE FUMARATE 25 MILLIGRAM(S): 200 TABLET, FILM COATED ORAL at 17:42

## 2018-04-23 RX ADMIN — GABAPENTIN 400 MILLIGRAM(S): 400 CAPSULE ORAL at 05:53

## 2018-04-23 RX ADMIN — PANTOPRAZOLE SODIUM 40 MILLIGRAM(S): 20 TABLET, DELAYED RELEASE ORAL at 05:57

## 2018-04-23 RX ADMIN — QUETIAPINE FUMARATE 25 MILLIGRAM(S): 200 TABLET, FILM COATED ORAL at 05:52

## 2018-04-23 RX ADMIN — GABAPENTIN 400 MILLIGRAM(S): 400 CAPSULE ORAL at 13:36

## 2018-04-23 RX ADMIN — Medication 81 MILLIGRAM(S): at 11:37

## 2018-04-23 RX ADMIN — OXYCODONE AND ACETAMINOPHEN 1 TABLET(S): 5; 325 TABLET ORAL at 05:52

## 2018-04-23 RX ADMIN — OXYCODONE AND ACETAMINOPHEN 1 TABLET(S): 5; 325 TABLET ORAL at 11:37

## 2018-04-23 RX ADMIN — Medication 25 MILLIGRAM(S): at 05:52

## 2018-04-23 RX ADMIN — DOFETILIDE 250 MICROGRAM(S): 0.25 CAPSULE ORAL at 17:42

## 2018-04-23 RX ADMIN — GABAPENTIN 400 MILLIGRAM(S): 400 CAPSULE ORAL at 21:11

## 2018-04-23 NOTE — PROGRESS NOTE ADULT - ASSESSMENT
48 yo M with multiple sclerosis, CAD, HFpEF, Afib on eliquis,  seizure disorder, HTN, DLD, arrhythmogenic R ventricular dysplasia, COPD not on home O2, h/o vtach s/p AICD, remote h/o testicular cancer and h/o TIA  , recurrent chest pain, diaphoresis, palpitation, worsening SOB on exertion was referred from dr stevens office for stress test.    #recurrent chest pain, palpitation secondary to recurrent atrial tachycardia   - stress test neg;  - EP cs- aicd interrogation  - cardio f/u dr conti    #atrial fibrillation:   Eliquis and metoprolol    #history of vtach:   c/w Tikosyn    #HFpEF :   continue with Lasix 40/ 20 every other day,  c/w metoprolol    #CAD:   c/w atorvastatin, aspirin, metoprolol    #Multiple sclerosis:  - f/u neuro op   continue with baclofen, Tecfidera    #copd : stable , inhalers prn    #seizure   on Lamotrigine    #DVT prophylaxis    on eliquis   Dispo  home; 4/24  used wheel chair for ambulation

## 2018-04-23 NOTE — PROGRESS NOTE ADULT - SUBJECTIVE AND OBJECTIVE BOX
SUBJECTIVE:    Patient is a 47y old Male who presents with a chief complaint of sent in for stress test from dr stevens office (20 Apr 2018 14:16)    Currently admitted to medicine with the primary diagnosis of Chest pain     Today is hospital day 3d. This morning he is resting comfortably in bed and reports no new issues or overnight events.     PAST MEDICAL & SURGICAL HISTORY  Syncope  Peripheral Neuropathy  S/P Implantation of Automatic  Personal History of Multiple S  Personal History of Mitral Nicki  Personal History of MI (Myocar  Personal History of Congestive  Personal History of Cardiomyop  Personal History of Atrial Fib  Personal History of Alcoholism  Clinical Depression  S/P Orchiectomy: L for testicular CA  SVT (Supraventricular Tachycar  GERD (Gastroesophageal Reflux  S/P Implantation of AICD    SOCIAL HISTORY:  Negative for smoking/alcohol/drug use.     ALLERGIES:  dexmethylphenidate (Unknown)  Dilantin, compazine, neurontin, ridaline, phenegrin (Anaphylaxis)  dilantin, compazine, neurontin, ritalin, phenergan (Unknown)  Haldol (Unknown)  hydantoin derivatives (Other)  methylphenidate (Unknown)  Morphine Sulfate (Unknown)  phenytoin (Unknown)  prochlorperazine (Unknown)  thioxanthenes (Unknown)    MEDICATIONS:  STANDING MEDICATIONS  adenosine Injectable (ADENOSCAN) 60 milliGRAM(s) IV Bolus once  apixaban 5 milliGRAM(s) Oral every 12 hours  aspirin  chewable 81 milliGRAM(s) Oral daily  atorvastatin 40 milliGRAM(s) Oral at bedtime  baclofen 10 milliGRAM(s) Oral three times a day  docusate sodium 100 milliGRAM(s) Oral three times a day  dofetilide 250 MICROGram(s) Oral two times a day  dronabinol 5 milliGRAM(s) Oral daily  furosemide    Tablet 20 milliGRAM(s) Oral <User Schedule>  furosemide    Tablet 40 milliGRAM(s) Oral <User Schedule>  gabapentin 400 milliGRAM(s) Oral three times a day  lamoTRIgine 100 milliGRAM(s) Oral two times a day  methocarbamol 750 milliGRAM(s) Oral three times a day  metoprolol tartrate 25 milliGRAM(s) Oral two times a day  oxyCODONE    5 mG/acetaminophen 325 mG 1 Tablet(s) Oral every 6 hours  pantoprazole    Tablet 40 milliGRAM(s) Oral before breakfast  QUEtiapine 25 milliGRAM(s) Oral two times a day  regadenoson Injectable 0.4 milliGRAM(s) IV Push once  senna 2 Tablet(s) Oral at bedtime    PRN MEDICATIONS  ipratropium    for Nebulization 500 MICROGram(s) Nebulizer every 6 hours PRN  nitroglycerin     SubLingual 0.4 milliGRAM(s) SubLingual every 5 minutes PRN    VITALS:   T(F): 97.8  HR: 59  BP: 102/54  RR: 20  SpO2: 96%    LABS:                        12.9   5.63  )-----------( 218      ( 22 Apr 2018 04:37 )             39.1     04-22    138  |  99  |  25<H>  ----------------------------<  95  3.9   |  27  |  1.2    Ca    8.7      22 Apr 2018 04:37    RADIOLOGY:  < from: 12 Lead ECG (04.20.18 @ 09:52) >  Atrial-paced rhythm with prolonged AV conduction  Right bundle branch block  T wave abnormality, consider lateral ischemia    < end of copied text >    < from: NM Nuclear Stress Pharmacologic Multiple (04.21.18 @ 10:41) >  1. NORMAL LEXISCAN / REST MYOCARDIAL PERFUSION TOMOGRAPHY, WITH NO   EVIDENCE FOR ISCHEMIA DURING LEXISCAN INFUSION.   2. NORMAL RESTING LEFT VENTRICULAR WALL MOTION AND WALL THICKENING.  3. LEFT VENTRICULAR EJECTION FRACTION OF  61 % WHICH IS WITHIN RANGE OF   NORMAL.     < end of copied text >  < from: Xray Chest 1 View AP/PA (04.20.18 @ 10:48) >  No radiographic evidence of acute cardiopulmonary disease.    < end of copied text >      PHYSICAL EXAM:  GEN: No acute distress  LUNGS: Clear to auscultation bilaterally   HEART: S1/S2 present. RRR.   ABD: Soft, non-tender, non-distended. Bowel sounds present  EXT:no edema  NEURO: AAOX3

## 2018-04-23 NOTE — PROGRESS NOTE ADULT - ASSESSMENT
46 yo M with multiple sclerosis, CAD, HFpEF, Afib on eliquis,  seizure disorder, HTN, DLD, arrhythmogenic R ventricular dysplasia, COPD not on home O2, h/o vtach s/p AICD, remote h/o testicular cancer and h/o TIA  , recurrent chest pain, diaphoresis, palpitation, worsening SOB on exertion was referred from dr stevens office for stress test.    #recurrent chest pain, palpitation secondary to recurrent atrial tachycardia as shown in AICD interrogation in last admission  patient was referred to stress test before ablation  ER contacted dr stevens who recommended persantine stress test  Stress test done: < from: NM Nuclear Stress Pharmacologic Multiple (04.21.18 @ 10:41) >  NORMAL LEXISCAN / REST MYOCARDIAL PERFUSION TOMOGRAPHY, WITH NO   EVIDENCE FOR ISCHEMIA DURING LEXISCAN INFUSION.   2. NORMAL RESTING LEFT VENTRICULAR WALL MOTION AND WALL THICKENING.  3. LEFT VENTRICULAR EJECTION FRACTION OF  61 % WHICH IS WITHIN RANGE OF   NORMAl     c/w atorvastatin, aspirin, metoprolol    #atrial fibrillation: on Eliquis and metoprolol  Currently rhythm paced at 60bpm    #history of vtach: c/w Tikosyn    #HFpEF : continue with Lasix 40/ 20 every other day, no signs of overload, metoprolol      #Multiple sclerosis: continue with baclofen, Tecfidera    #seizure on Lamotrigine    #DVT prophylaxis (Already on eliquis) /GERD: on Eliquis /protonix    Disposition- pending cardiology eval, asking for pain meds- intent in AM

## 2018-04-23 NOTE — PROGRESS NOTE ADULT - SUBJECTIVE AND OBJECTIVE BOX
SUBJECTIVE:    Patient is a 47y old Male who presents with a chief complaint of sent in for stress test from dr stevens office (20 Apr 2018 14:16)    Currently admitted to medicine with the primary diagnosis of Chest pain     Today is hospital day 3d. This morning he is resting comfortably in bed and reports no new issues or overnight events.     PAST MEDICAL & SURGICAL HISTORY  Syncope  Peripheral Neuropathy  S/P Implantation of Automatic  Personal History of Multiple S  Personal History of Mitral Nicki  Personal History of MI (Myocar  Personal History of Congestive  Personal History of Cardiomyop  Personal History of Atrial Fib  Personal History of Alcoholism  Clinical Depression  S/P Orchiectomy: L for testicular CA  SVT (Supraventricular Tachycar  GERD (Gastroesophageal Reflux  S/P Implantation of AICD    SOCIAL HISTORY:  Negative for smoking/alcohol/drug use.     ALLERGIES:  dexmethylphenidate (Unknown)  Dilantin, compazine, neurontin, ridaline, phenegrin (Anaphylaxis)  dilantin, compazine, neurontin, ritalin, phenergan (Unknown)  Haldol (Unknown)  hydantoin derivatives (Other)  methylphenidate (Unknown)  Morphine Sulfate (Unknown)  phenytoin (Unknown)  prochlorperazine (Unknown)  thioxanthenes (Unknown)    MEDICATIONS:  STANDING MEDICATIONS  adenosine Injectable (ADENOSCAN) 60 milliGRAM(s) IV Bolus once  apixaban 5 milliGRAM(s) Oral every 12 hours  aspirin  chewable 81 milliGRAM(s) Oral daily  atorvastatin 40 milliGRAM(s) Oral at bedtime  baclofen 10 milliGRAM(s) Oral three times a day  docusate sodium 100 milliGRAM(s) Oral three times a day  dofetilide 250 MICROGram(s) Oral two times a day  dronabinol 5 milliGRAM(s) Oral daily  furosemide    Tablet 20 milliGRAM(s) Oral <User Schedule>  furosemide    Tablet 40 milliGRAM(s) Oral <User Schedule>  gabapentin 400 milliGRAM(s) Oral three times a day  lamoTRIgine 100 milliGRAM(s) Oral two times a day  methocarbamol 750 milliGRAM(s) Oral three times a day  metoprolol tartrate 25 milliGRAM(s) Oral two times a day  oxyCODONE    5 mG/acetaminophen 325 mG 1 Tablet(s) Oral every 6 hours  pantoprazole    Tablet 40 milliGRAM(s) Oral before breakfast  QUEtiapine 25 milliGRAM(s) Oral two times a day  regadenoson Injectable 0.4 milliGRAM(s) IV Push once  senna 2 Tablet(s) Oral at bedtime    PRN MEDICATIONS  ipratropium    for Nebulization 500 MICROGram(s) Nebulizer every 6 hours PRN  nitroglycerin     SubLingual 0.4 milliGRAM(s) SubLingual every 5 minutes PRN    VITALS:   T(F): 97.8  HR: 59  BP: 102/54  RR: 20  SpO2: 96%    LABS:                        12.9   5.63  )-----------( 218      ( 22 Apr 2018 04:37 )             39.1     04-22    138  |  99  |  25<H>  ----------------------------<  95  3.9   |  27  |  1.2    Ca    8.7      22 Apr 2018 04:37                    RADIOLOGY:    PHYSICAL EXAM:  GEN: No acute distress  LUNGS: Clear to auscultation bilaterally   HEART: S1/S2 present. RRR.   ABD: Soft, non-tender, non-distended. Bowel sounds present  EXT: NC/NC/NE/2+PP/YARBROUGH  NEURO: AAOX3

## 2018-04-24 ENCOUNTER — TRANSCRIPTION ENCOUNTER (OUTPATIENT)
Age: 48
End: 2018-04-24

## 2018-04-24 RX ADMIN — Medication 81 MILLIGRAM(S): at 12:13

## 2018-04-24 RX ADMIN — Medication 10 MILLIGRAM(S): at 06:11

## 2018-04-24 RX ADMIN — APIXABAN 5 MILLIGRAM(S): 2.5 TABLET, FILM COATED ORAL at 06:11

## 2018-04-24 RX ADMIN — Medication 100 MILLIGRAM(S): at 06:12

## 2018-04-24 RX ADMIN — GABAPENTIN 400 MILLIGRAM(S): 400 CAPSULE ORAL at 15:28

## 2018-04-24 RX ADMIN — OXYCODONE AND ACETAMINOPHEN 1 TABLET(S): 5; 325 TABLET ORAL at 00:03

## 2018-04-24 RX ADMIN — METHOCARBAMOL 750 MILLIGRAM(S): 500 TABLET, FILM COATED ORAL at 06:11

## 2018-04-24 RX ADMIN — SENNA PLUS 2 TABLET(S): 8.6 TABLET ORAL at 21:04

## 2018-04-24 RX ADMIN — OXYCODONE AND ACETAMINOPHEN 1 TABLET(S): 5; 325 TABLET ORAL at 15:24

## 2018-04-24 RX ADMIN — Medication 10 MILLIGRAM(S): at 15:25

## 2018-04-24 RX ADMIN — OXYCODONE AND ACETAMINOPHEN 1 TABLET(S): 5; 325 TABLET ORAL at 06:11

## 2018-04-24 RX ADMIN — Medication 40 MILLIGRAM(S): at 06:11

## 2018-04-24 RX ADMIN — DOFETILIDE 250 MICROGRAM(S): 0.25 CAPSULE ORAL at 18:12

## 2018-04-24 RX ADMIN — METHOCARBAMOL 750 MILLIGRAM(S): 500 TABLET, FILM COATED ORAL at 21:03

## 2018-04-24 RX ADMIN — METHOCARBAMOL 750 MILLIGRAM(S): 500 TABLET, FILM COATED ORAL at 15:29

## 2018-04-24 RX ADMIN — PANTOPRAZOLE SODIUM 40 MILLIGRAM(S): 20 TABLET, DELAYED RELEASE ORAL at 06:12

## 2018-04-24 RX ADMIN — Medication 10 MILLIGRAM(S): at 21:03

## 2018-04-24 RX ADMIN — GABAPENTIN 400 MILLIGRAM(S): 400 CAPSULE ORAL at 21:02

## 2018-04-24 RX ADMIN — APIXABAN 5 MILLIGRAM(S): 2.5 TABLET, FILM COATED ORAL at 18:02

## 2018-04-24 RX ADMIN — QUETIAPINE FUMARATE 25 MILLIGRAM(S): 200 TABLET, FILM COATED ORAL at 06:12

## 2018-04-24 RX ADMIN — DOFETILIDE 250 MICROGRAM(S): 0.25 CAPSULE ORAL at 06:11

## 2018-04-24 RX ADMIN — Medication 5 MILLIGRAM(S): at 12:13

## 2018-04-24 RX ADMIN — OXYCODONE AND ACETAMINOPHEN 1 TABLET(S): 5; 325 TABLET ORAL at 18:23

## 2018-04-24 RX ADMIN — Medication 100 MILLIGRAM(S): at 21:03

## 2018-04-24 RX ADMIN — QUETIAPINE FUMARATE 25 MILLIGRAM(S): 200 TABLET, FILM COATED ORAL at 18:04

## 2018-04-24 RX ADMIN — Medication 25 MILLIGRAM(S): at 18:03

## 2018-04-24 RX ADMIN — LAMOTRIGINE 100 MILLIGRAM(S): 25 TABLET, ORALLY DISINTEGRATING ORAL at 06:11

## 2018-04-24 RX ADMIN — OXYCODONE AND ACETAMINOPHEN 1 TABLET(S): 5; 325 TABLET ORAL at 18:04

## 2018-04-24 RX ADMIN — GABAPENTIN 400 MILLIGRAM(S): 400 CAPSULE ORAL at 06:12

## 2018-04-24 RX ADMIN — OXYCODONE AND ACETAMINOPHEN 1 TABLET(S): 5; 325 TABLET ORAL at 12:14

## 2018-04-24 RX ADMIN — OXYCODONE AND ACETAMINOPHEN 1 TABLET(S): 5; 325 TABLET ORAL at 06:55

## 2018-04-24 RX ADMIN — Medication 25 MILLIGRAM(S): at 06:11

## 2018-04-24 RX ADMIN — OXYCODONE AND ACETAMINOPHEN 1 TABLET(S): 5; 325 TABLET ORAL at 23:54

## 2018-04-24 RX ADMIN — ATORVASTATIN CALCIUM 40 MILLIGRAM(S): 80 TABLET, FILM COATED ORAL at 21:03

## 2018-04-24 RX ADMIN — LAMOTRIGINE 100 MILLIGRAM(S): 25 TABLET, ORALLY DISINTEGRATING ORAL at 18:03

## 2018-04-24 RX ADMIN — Medication 100 MILLIGRAM(S): at 15:25

## 2018-04-24 NOTE — DISCHARGE NOTE ADULT - PATIENT PORTAL LINK FT
You can access the [facility] Patient Portal, offered by [facility], by registering with the following website: [email or web address]

## 2018-04-24 NOTE — PROGRESS NOTE ADULT - ASSESSMENT
48 yo M with multiple sclerosis, CAD, HFpEF, Afib on eliquis,  seizure disorder, HTN, DLD, arrhythmogenic R ventricular dysplasia, COPD not on home O2, h/o vtach s/p AICD, remote h/o testicular cancer and h/o TIA  , recurrent chest pain, diaphoresis, palpitation, worsening SOB on exertion was referred from dr stevens office for stress test.    #recurrent chest pain, palpitation secondary to recurrent atrial tachycardia as shown in AICD interrogation in last admission  patient was referred to stress test before ablation  ER contacted dr stevens who recommended persantine stress test  Stress test done: < from: NM Nuclear Stress Pharmacologic Multiple (04.21.18 @ 10:41) >  NORMAL LEXISCAN / REST MYOCARDIAL PERFUSION TOMOGRAPHY, WITH NO   EVIDENCE FOR ISCHEMIA DURING LEXISCAN INFUSION.   2. NORMAL RESTING LEFT VENTRICULAR WALL MOTION AND WALL THICKENING.  3. LEFT VENTRICULAR EJECTION FRACTION OF  61 % WHICH IS WITHIN RANGE OF   NORMAl     c/w atorvastatin, aspirin, metoprolol    #atrial fibrillation: on Eliquis and metoprolol  Currently rhythm paced at 60bpm sp AICD interrogation    #history of vtach: c/w Tikosyn    #HFpEF : continue with Lasix 40/ 20 every other day, no signs of overload, metoprolol      #Multiple sclerosis: continue with baclofen, Tecfidera    #seizure on Lamotrigine    #DVT prophylaxis (Already on eliquis) /GERD: on Eliquis /protonix    Dc home today spent more than 30 mins

## 2018-04-24 NOTE — DISCHARGE NOTE ADULT - CARE PLAN
Principal Discharge DX:	Chest pain  Goal:	s/p stress test  Assessment and plan of treatment:	f/u with pmd & cardio;

## 2018-04-24 NOTE — PROVIDER CONTACT NOTE (OTHER) - SITUATION
Pt refused to sign intent for discharge for today 4/24. pt discharged today but is refusing discharge. MDs notified/aware. Pt is appealing discharge as per pt.
patient reports that he has history of constipation, reports he has not had bm since last wednesday

## 2018-04-24 NOTE — DISCHARGE NOTE ADULT - HOSPITAL COURSE
46 yo M with multiple sclerosis, CAD, HFpEF, Afib on eliquis,  seizure disorder, HTN, DLD, arrhythmogenic R ventricular dysplasia, COPD not on home O2, h/o vtach s/p AICD, remote h/o testicular cancer and h/o TIA  , recurrent chest pain, diaphoresis, palpitation, worsening SOB on exertion was referred from dr stevens office for stress test.;  stress test was done & was neg;  pt advised to f/u with pmd , cardio, EP

## 2018-04-24 NOTE — DISCHARGE NOTE ADULT - MEDICATION SUMMARY - MEDICATIONS TO TAKE
I will START or STAY ON the medications listed below when I get home from the hospital:    aspirin 81 mg oral tablet, chewable  -- 1 tab(s) by mouth once a day  -- Indication: For Cad    acetaminophen 650 mg oral tablet  -- 650 milligram(s) by mouth 4 times a day, As Needed  -- Indication: For pain    Percocet 5/325 oral tablet  -- 1 tab(s) by mouth every 6 hours  -- Indication: For pain    Mi-Acid oral suspension  -- 30 milliliter(s) by mouth 4 times a day, As Needed  -- Indication: For gerd    nitroglycerin  -- 1 tab(s) under tongue 3 times a day, As Needed for chest pain  -- Indication: For Chest pain    Tikosyn 250 mcg oral capsule  -- 1 cap(s) by mouth 2 times a day  -- Indication: For arrythmia    Eliquis 5 mg oral tablet  -- 1 tab(s) by mouth 2 times a day  -- Indication: For afib    gabapentin 400 mg oral capsule  -- 1 cap(s) by mouth 3 times a day  -- Indication: For pain    lamoTRIgine 100 mg oral tablet  -- 1 tab(s) by mouth 2 times a day  -- Indication: For seizure    Marinol 5 mg oral capsule  -- orally once a day  -- Indication: For home med    atorvastatin 40 mg oral tablet  -- 1 tab(s) by mouth once a day (at bedtime)  -- Indication: For dyslipidemia    QUEtiapine 25 mg oral tablet  -- 1 tab(s) by mouth 2 times a day  -- Indication: For anxiety    Metoprolol Tartrate 25 mg oral tablet  -- 1 tab(s) by mouth 2 times a day  -- Indication: For Cad    ipratropium 18 mcg/inh inhalation aerosol  -- 1 application inhaled 4 times a day, As Needed  -- Indication: For asthma    Lasix 20 mg oral tablet  -- 1 tab(s) by mouth 3 times a week on monday, wednesday and friday  -- Indication: For Cad    Lasix 40 mg oral tablet  -- 1 tab(s) by mouth 3 times a week on tuesday, thursday and saturday  -- Indication: For Chf    Tecfidera 240 mg oral delayed release capsule  -- 1 cap(s) by mouth once a day  -- Indication: For ms    baclofen 10 mg oral tablet  -- 1 tab(s) by mouth 3 times a day  -- Indication: For ms    omeprazole 20 mg oral delayed release capsule  -- 1 cap(s) by mouth 2 times a day  -- Indication: For gerd

## 2018-04-24 NOTE — DISCHARGE NOTE ADULT - CARE PROVIDER_API CALL
Jose Ayoub), Cardiovascular Disease; Interventional Cardiology  [address] [place] [address]  Phone: [phone]  Fax: [phone]    Darvin Roche), Internal Medicine  [address] [place] [address]  Phone: [phone]  Fax: [phone]

## 2018-04-25 VITALS
SYSTOLIC BLOOD PRESSURE: 111 MMHG | TEMPERATURE: 98 F | HEART RATE: 61 BPM | RESPIRATION RATE: 18 BRPM | DIASTOLIC BLOOD PRESSURE: 65 MMHG

## 2018-04-25 RX ORDER — OXYCODONE AND ACETAMINOPHEN 5; 325 MG/1; MG/1
1 TABLET ORAL EVERY 6 HOURS
Qty: 0 | Refills: 0 | Status: DISCONTINUED | OUTPATIENT
Start: 2018-04-25 | End: 2018-04-25

## 2018-04-25 RX ADMIN — Medication 20 MILLIGRAM(S): at 06:09

## 2018-04-25 RX ADMIN — Medication 10 MILLIGRAM(S): at 06:08

## 2018-04-25 RX ADMIN — OXYCODONE AND ACETAMINOPHEN 1 TABLET(S): 5; 325 TABLET ORAL at 12:12

## 2018-04-25 RX ADMIN — GABAPENTIN 400 MILLIGRAM(S): 400 CAPSULE ORAL at 06:09

## 2018-04-25 RX ADMIN — OXYCODONE AND ACETAMINOPHEN 1 TABLET(S): 5; 325 TABLET ORAL at 06:56

## 2018-04-25 RX ADMIN — METHOCARBAMOL 750 MILLIGRAM(S): 500 TABLET, FILM COATED ORAL at 06:08

## 2018-04-25 RX ADMIN — Medication 5 MILLIGRAM(S): at 13:04

## 2018-04-25 RX ADMIN — OXYCODONE AND ACETAMINOPHEN 1 TABLET(S): 5; 325 TABLET ORAL at 13:08

## 2018-04-25 RX ADMIN — DOFETILIDE 250 MICROGRAM(S): 0.25 CAPSULE ORAL at 06:08

## 2018-04-25 RX ADMIN — LAMOTRIGINE 100 MILLIGRAM(S): 25 TABLET, ORALLY DISINTEGRATING ORAL at 06:09

## 2018-04-25 RX ADMIN — Medication 100 MILLIGRAM(S): at 06:08

## 2018-04-25 RX ADMIN — OXYCODONE AND ACETAMINOPHEN 1 TABLET(S): 5; 325 TABLET ORAL at 06:11

## 2018-04-25 RX ADMIN — Medication 100 MILLIGRAM(S): at 13:05

## 2018-04-25 RX ADMIN — QUETIAPINE FUMARATE 25 MILLIGRAM(S): 200 TABLET, FILM COATED ORAL at 06:10

## 2018-04-25 RX ADMIN — METHOCARBAMOL 750 MILLIGRAM(S): 500 TABLET, FILM COATED ORAL at 13:06

## 2018-04-25 RX ADMIN — PANTOPRAZOLE SODIUM 40 MILLIGRAM(S): 20 TABLET, DELAYED RELEASE ORAL at 06:10

## 2018-04-25 RX ADMIN — Medication 10 MILLIGRAM(S): at 13:05

## 2018-04-25 RX ADMIN — GABAPENTIN 400 MILLIGRAM(S): 400 CAPSULE ORAL at 13:11

## 2018-04-25 RX ADMIN — Medication 81 MILLIGRAM(S): at 12:12

## 2018-04-25 RX ADMIN — APIXABAN 5 MILLIGRAM(S): 2.5 TABLET, FILM COATED ORAL at 06:08

## 2018-04-25 NOTE — CONSULT NOTE ADULT - ASSESSMENT
48 y/o male with phmx as indicated admitted with palpitations chest pain, sob and diaphoresis s/p negative Lexiscan LVEF 61%.  Ps schedules as out patient for A-Fib ablation    Chest Pain  Lexiscan - no ischemia LVEF 61%, normal LV wall motion and thickness    A-Fib  CHADSVASC = 2  - continue eliquis  - continue  Tikosyn and Metoprolol  - A-Fib ablation scheduled for May 22, 1018    Cardionmyopathy/ HF   ICD interrogation shows episodes of A-Fib  continue HR medica therapies

## 2018-04-25 NOTE — CONSULT NOTE ADULT - SUBJECTIVE AND OBJECTIVE BOX
HPI:  46 yo M with multiple sclerosis, CAD, HFpEF, Afib on eliquis,  seizure disorder, HTN, DLD, arrhythmogenic R ventricular dysplasia, COPD not on home O2, h/o vtach s/p AICD, remote h/o testicular cancer and h/o TIA was referred from dr stevens office for stress test .Patient mentioned that she is having recurrent attacks of palpitation, diaphoresis, and chest pain since last november, for which he was hospitalized this february, interrogation of AICD showed  7 events of atrial tachycardia at 190bpm longest 27 sec. No ventricular events. Reprogrammed off MVP. Patient was planned for ablation after getting stress test as out patient. Patient mentioned that his symptoms are being for frequent in the last moth, mainly his SOB on exertion,he was able to walk 7 blocks before being SOB in november , but now he can walk only 2 blocks, he links his chest pain to the palpitation episodes and shortness of breath.  and that he went today to get the stress test as outpatient but he was told that he is not on the schedule and was sent here. pt denies any chest pain now . (20 Apr 2018 14:16)    Patient is a 47y old  Male who presents with a chief complaint of sent in for stress test from dr stevens office (24 Apr 2018 11:37)    46 y/o male with pmhx as indicated sent in from Cardiologist's office Dr Ayoub for stress test due to pt complaining of palpitations, diaphoresis, SOB and substernal non radiating chest pain Lexiscan stress test negative for ischemia, LVEF 61%.    PAST MEDICAL & SURGICAL HISTORY:  Syncope  Peripheral Neuropathy  S/P Implantation of Automatic  Personal History of Multiple S  Personal History of Mitral Nicki  Personal History of MI (Myocar  Personal History of Congestive  Personal History of Cardiomyop  Personal History of Atrial Fib  Personal History of Alcoholism  Clinical Depression  S/P Orchiectomy: L for testicular CA  SVT (Supraventricular Tachycar  GERD (Gastroesophageal Reflux  S/P Implantation of AICD    DIAGNOSTIC TESTING:      ECHO:  Transthoracic Echocardiogram  Summary  Clinical question: CHEST PAIN  Left ventricle: Systolic function was normal. Ejection fraction was estimated  in the range of 55 % to 65 %. There were no regional wall motion  abnormalities.  Tricuspid valve: Regurgitation grade was 2+ on a scale of 0 to 4+.      < from: Transthoracic Echocardiogram w/ Doppler (01.22.10 @ 14:00) >  Patient name: CUATE HORNE  Study Date: 1/22/2010  Conclusions:  1. Low normal left ventricular function.  Normal LV  internal dimensions and wall thicknesses.  2. Right ventricular enlargement with mildly decreased RV  function.  A pacemaker wire is visualized in the right  ventricle.  3. Minimal mitral regurgitation.  4. Mild-moderate tricuspid regurgitation. Estimated  pulmonary artery systolic pressure equals 33 mm Hg,  assuming right atrial pressure equals 10  mm Hg.  *** Compared with echocardiogram of 4/27/2009, no  significant changes noted.  < end of copied text >    EXAM:  NM NUCLEAR STRESS MULTI PHARM        PROCEDURE DATE:  04/21/2018    Impression:   1. NORMAL LEXISCAN / REST MYOCARDIAL PERFUSION TOMOGRAPHY, WITH NO   EVIDENCE FOR ISCHEMIA DURING LEXISCAN INFUSION.   2. NORMAL RESTING LEFT VENTRICULAR WALL MOTION AND WALL THICKENING.  3. LEFT VENTRICULAR EJECTION FRACTION OF  61 % WHICH IS WITHIN RANGE OF   NORMAL.     MEDICATIONS  (STANDING):  adenosine Injectable (ADENOSCAN) 60 milliGRAM(s) IV Bolus once  apixaban 5 milliGRAM(s) Oral every 12 hours  aspirin  chewable 81 milliGRAM(s) Oral daily  atorvastatin 40 milliGRAM(s) Oral at bedtime  baclofen 10 milliGRAM(s) Oral three times a day  docusate sodium 100 milliGRAM(s) Oral three times a day  dofetilide 250 MICROGram(s) Oral two times a day  dronabinol 5 milliGRAM(s) Oral daily  furosemide    Tablet 20 milliGRAM(s) Oral <User Schedule>  furosemide    Tablet 40 milliGRAM(s) Oral <User Schedule>  gabapentin 400 milliGRAM(s) Oral three times a day  lamoTRIgine 100 milliGRAM(s) Oral two times a day  methocarbamol 750 milliGRAM(s) Oral three times a day  metoprolol tartrate 25 milliGRAM(s) Oral two times a day  pantoprazole    Tablet 40 milliGRAM(s) Oral before breakfast  QUEtiapine 25 milliGRAM(s) Oral two times a day  regadenoson Injectable 0.4 milliGRAM(s) IV Push once  senna 2 Tablet(s) Oral at bedtime    MEDICATIONS  (PRN):  ipratropium    for Nebulization 500 MICROGram(s) Nebulizer every 6 hours PRN Shortness of Breath  nitroglycerin     SubLingual 0.4 milliGRAM(s) SubLingual every 5 minutes PRN Chest Pain  oxyCODONE    5 mG/acetaminophen 325 mG 1 Tablet(s) Oral every 6 hours PRN Moderate Pain (4 - 6)   Home Medications:  acetaminophen 650 mg oral tablet: 650 milligram(s) orally 4 times a day, As Needed (24 Feb 2018 09:52)  aspirin 81 mg oral tablet, chewable: 1 tab(s) orally once a day (24 Feb 2018 09:52)  atorvastatin 40 mg oral tablet: 1 tab(s) orally once a day (at bedtime) (28 Feb 2018 16:45)  baclofen 10 mg oral tablet: 1 tab(s) orally 3 times a day (24 Feb 2018 09:52)  Eliquis 5 mg oral tablet: 1 tab(s) orally 2 times a day (24 Feb 2018 09:52)  gabapentin 400 mg oral capsule: 1 cap(s) orally 3 times a day (24 Feb 2018 09:52)  ipratropium 18 mcg/inh inhalation aerosol: 1 application inhaled 4 times a day, As Needed (24 Feb 2018 09:52)  lamoTRIgine 100 mg oral tablet: 1 tab(s) orally 2 times a day (24 Feb 2018 09:52)  Lasix 20 mg oral tablet: 1 tab(s) orally 3 times a week on monday, wednesday and friday (24 Feb 2018 09:52)  Lasix 40 mg oral tablet: 1 tab(s) orally 3 times a week on tuesday, thursday and saturday (24 Feb 2018 09:52)  Marinol 5 mg oral capsule: orally once a day (24 Feb 2018 09:52)  Metoprolol Tartrate 25 mg oral tablet: 1 tab(s) orally 2 times a day (24 Feb 2018 09:52)  Mi-Acid oral suspension: 30 milliliter(s) orally 4 times a day, As Needed (24 Feb 2018 09:52)  nitroglycerin: 1 tab(s) sublingual 3 times a day, As Needed for chest pain (28 Feb 2018 17:14)  omeprazole 20 mg oral delayed release capsule: 1 cap(s) orally 2 times a day (24 Feb 2018 09:52)  Percocet 5/325 oral tablet: 1 tab(s) orally every 6 hours (24 Feb 2018 09:52)  QUEtiapine 25 mg oral tablet: 1 tab(s) orally 2 times a day (24 Feb 2018 09:52)  Tecfidera 240 mg oral delayed release capsule: 1 cap(s) orally once a day (24 Feb 2018 09:52)  Tikosyn 250 mcg oral capsule: 1 cap(s) orally 2 times a day (24 Feb 2018 09:52)    FAMILY HISTORY:  No pertinent family history in first degree relatives    SOCIAL HISTORY:  CIGARETTES: denies  ALCOHOL: denies    Vital Signs Last 24 Hrs  T(C): 36.8 (25 Apr 2018 05:09), Max: 36.8 (25 Apr 2018 05:09)  T(F): 98.2 (25 Apr 2018 05:09), Max: 98.2 (25 Apr 2018 05:09)  HR: 60 (25 Apr 2018 05:09) (60 - 61)  BP: 99/54 (25 Apr 2018 05:09) (95/52 - 108/59)  RR: 18 (25 Apr 2018 05:09) (18 - 18)    INTERPRETATION OF TELEMETRY: no events     ECG: < from: 12 Lead ECG (04.20.18 @ 09:52) >  Ventricular Rate 60 BPM  Atrial Rate 62 BPM  P-R Interval 230 ms  QRS Duration 152 ms  Q-T Interval 458 ms  QTC Calculation(Bezet) 458 ms  R Axis 3 degrees  T Axis 42 degrees  Diagnosis Line Atrial-paced rhythm with prolonged AV conduction  Right bundle branch block  T wave abnormality, consider lateral ischemia  Abnormal ECG  < end of copied text >      LABS:  Complete Blood Count in AM (04.22.18 @ 04:37)    Nucleated RBC: 0 /100 WBCs    WBC Count: 5.63 K/uL    RBC Count: 4.33 M/uL    Hemoglobin: 12.9 g/dL    Hematocrit: 39.1 %    Mean Cell Volume: 90.3 fL    Mean Cell Hemoglobin: 29.8 pg    Mean Cell Hemoglobin Conc: 33.0 g/dL    Red Cell Distrib Width: 12.9 %    Platelet Count - Automated: 218 K/uL    Comprehensive Metabolic Panel in AM (04.21.18 @ 06:30)    Sodium, Serum: 140 mmol/L    Potassium, Serum: 3.6 mmol/L    Chloride, Serum: 101 mmol/L    Carbon Dioxide, Serum: 27 mmol/L    Anion Gap, Serum: 12 mmol/L    Blood Urea Nitrogen, Serum: 23 mg/dL    Creatinine, Serum: 1.0 mg/dL    Glucose, Serum: 108 mg/dL    Calcium, Total Serum: 9.3 mg/dL    Protein Total, Serum: 6.2 g/dL    Albumin, Serum: 4.0 g/dL    Bilirubin Total, Serum: 0.4 mg/dL    Alkaline Phosphatase, Serum: 96 U/L    Aspartate Aminotransferase (AST/SGOT): 18 U/L    Alanine Aminotransferase (ALT/SGPT): 21 U/L    eGFR if Non : 90: Interpretative comment  The units for eGFR are ml/min/1.73m2 (normalized body surface area). The  eGFR is calculated from a serum creatinine using the CKD-EPI equation.  Other variables required for calculation are race, age and sex. Among  patients with chronic kidney disease (CKD), the eGFR is useful in  determining the stage of disease according to KDOQI CKD classification.  All eGFR results are reported numerically with the following  interpretation.          GFR                    With                 Without     (ml/min/1.73 m2)    Kidney Damage       Kidney Damage        >= 90                    Stage 1                     Normal        60-89                    Stage 2                     Decreased GFR        30-59     Stage 3                     Stage 3        15-29                    Stage 4                     Stage 4        < 15                      Stage 5                     Stage 5  Each stage of CKD assumes that the associated GFR level has been in  effect for at least 3 months. Determination of stages one and two (with  eGFR > 59 ml/min/m2) requires estimation of kidney damage for at least 3  months as defined by structural or functional abnormalities.  Limitations: All estimates of GFR will be less accurate for patients at  extremes of muscle mass (including but not limited to frail elderly,  critically ill, or cancer patients), those with unusual diets, and those  with conditions associated with reduced secretion or extrarenal  elimination of creatinine. The eGFR equation is not recommended for use  in patients with unstable creatinine levels. mL/min/1.73M2    eGFR if African American: 105 mL/min/1.73M2    I&O's Detail  24 Apr 2018 07:01  -  25 Apr 2018 07:00  --------------------------------------------------------  IN:    Oral Fluid: 360 mL  Total IN: 360 mL  OUT:    Voided: 900 mL  Total OUT: 900 mL  Total NET: -540 mL

## 2018-05-09 ENCOUNTER — OUTPATIENT (OUTPATIENT)
Dept: OUTPATIENT SERVICES | Facility: HOSPITAL | Age: 48
LOS: 1 days | Discharge: HOME | End: 2018-05-09

## 2018-05-09 VITALS
OXYGEN SATURATION: 97 % | HEART RATE: 60 BPM | DIASTOLIC BLOOD PRESSURE: 57 MMHG | SYSTOLIC BLOOD PRESSURE: 100 MMHG | TEMPERATURE: 98 F | HEIGHT: 66 IN | RESPIRATION RATE: 15 BRPM | WEIGHT: 186.29 LBS

## 2018-05-09 DIAGNOSIS — R07.9 CHEST PAIN, UNSPECIFIED: ICD-10-CM

## 2018-05-09 DIAGNOSIS — I48.0 PAROXYSMAL ATRIAL FIBRILLATION: ICD-10-CM

## 2018-05-09 DIAGNOSIS — Z86.73 PERSONAL HISTORY OF TRANSIENT ISCHEMIC ATTACK (TIA), AND CEREBRAL INFARCTION WITHOUT RESIDUAL DEFICITS: ICD-10-CM

## 2018-05-09 DIAGNOSIS — Z01.818 ENCOUNTER FOR OTHER PREPROCEDURAL EXAMINATION: ICD-10-CM

## 2018-05-09 DIAGNOSIS — I25.10 ATHEROSCLEROTIC HEART DISEASE OF NATIVE CORONARY ARTERY WITHOUT ANGINA PECTORIS: ICD-10-CM

## 2018-05-09 DIAGNOSIS — I50.30 UNSPECIFIED DIASTOLIC (CONGESTIVE) HEART FAILURE: ICD-10-CM

## 2018-05-09 DIAGNOSIS — G35 MULTIPLE SCLEROSIS: ICD-10-CM

## 2018-05-09 DIAGNOSIS — G90.09 OTHER IDIOPATHIC PERIPHERAL AUTONOMIC NEUROPATHY: ICD-10-CM

## 2018-05-09 DIAGNOSIS — I47.1 SUPRAVENTRICULAR TACHYCARDIA: ICD-10-CM

## 2018-05-09 DIAGNOSIS — Z95.810 PRESENCE OF AUTOMATIC (IMPLANTABLE) CARDIAC DEFIBRILLATOR: ICD-10-CM

## 2018-05-09 DIAGNOSIS — J45.909 UNSPECIFIED ASTHMA, UNCOMPLICATED: ICD-10-CM

## 2018-05-09 DIAGNOSIS — I11.0 HYPERTENSIVE HEART DISEASE WITH HEART FAILURE: ICD-10-CM

## 2018-05-09 DIAGNOSIS — F31.9 BIPOLAR DISORDER, UNSPECIFIED: ICD-10-CM

## 2018-05-09 DIAGNOSIS — G47.30 SLEEP APNEA, UNSPECIFIED: ICD-10-CM

## 2018-05-09 DIAGNOSIS — I63.9 CEREBRAL INFARCTION, UNSPECIFIED: ICD-10-CM

## 2018-05-09 DIAGNOSIS — Z79.01 LONG TERM (CURRENT) USE OF ANTICOAGULANTS: ICD-10-CM

## 2018-05-09 DIAGNOSIS — F10.120 ALCOHOL ABUSE WITH INTOXICATION, UNCOMPLICATED: ICD-10-CM

## 2018-05-09 DIAGNOSIS — I48.91 UNSPECIFIED ATRIAL FIBRILLATION: ICD-10-CM

## 2018-05-09 DIAGNOSIS — G40.909 EPILEPSY, UNSPECIFIED, NOT INTRACTABLE, WITHOUT STATUS EPILEPTICUS: ICD-10-CM

## 2018-05-09 DIAGNOSIS — I10 ESSENTIAL (PRIMARY) HYPERTENSION: ICD-10-CM

## 2018-05-09 DIAGNOSIS — R06.02 SHORTNESS OF BREATH: ICD-10-CM

## 2018-05-09 DIAGNOSIS — J44.9 CHRONIC OBSTRUCTIVE PULMONARY DISEASE, UNSPECIFIED: ICD-10-CM

## 2018-05-09 DIAGNOSIS — Z98.890 OTHER SPECIFIED POSTPROCEDURAL STATES: Chronic | ICD-10-CM

## 2018-05-09 DIAGNOSIS — I49.9 CARDIAC ARRHYTHMIA, UNSPECIFIED: ICD-10-CM

## 2018-05-09 DIAGNOSIS — K21.9 GASTRO-ESOPHAGEAL REFLUX DISEASE WITHOUT ESOPHAGITIS: ICD-10-CM

## 2018-05-09 DIAGNOSIS — Z87.891 PERSONAL HISTORY OF NICOTINE DEPENDENCE: ICD-10-CM

## 2018-05-09 DIAGNOSIS — Z96.649 PRESENCE OF UNSPECIFIED ARTIFICIAL HIP JOINT: Chronic | ICD-10-CM

## 2018-05-09 DIAGNOSIS — E78.00 PURE HYPERCHOLESTEROLEMIA, UNSPECIFIED: ICD-10-CM

## 2018-05-09 LAB
APTT BLD: 34.7 SEC — SIGNIFICANT CHANGE UP (ref 27–39.2)
INR BLD: 1.25 RATIO — SIGNIFICANT CHANGE UP (ref 0.65–1.3)
PROTHROM AB SERPL-ACNC: 13.6 SEC — HIGH (ref 9.95–12.87)

## 2018-05-09 NOTE — H&P PST ADULT - FAMILY HISTORY
Mother  Still living? Unknown  Family history of early CAD, Age at diagnosis: Age Unknown  Family history of cervical cancer, Age at diagnosis: Age Unknown

## 2018-05-09 NOTE — H&P PST ADULT - NSANTHOSAYNRD_GEN_A_CORE
No. DOE screening performed.  STOP BANG Legend: 0-2 = LOW Risk; 3-4 = INTERMEDIATE Risk; 5-8 = HIGH Risk/denies

## 2018-05-09 NOTE — H&P PST ADULT - HISTORY OF PRESENT ILLNESS
47 year old male here for above procedure, patient long history of afib flutter  fos=1/2 fos + chest pain sob palp  denies recent uri or uti  PT DENEIS ANY RASHES, ABRASION, OR OPEN WOUNDS OR CUTS

## 2018-05-09 NOTE — H&P PST ADULT - PMH
Anginal pain    Atrial flutter    Bipolar disorder    CAD (coronary artery disease)    Clinical Depression    COPD (chronic obstructive pulmonary disease)    CVA (cerebral vascular accident)    GERD (gastroesophageal reflux disease)    HTN (hypertension)    Hypercholesteremia    Migraines    MS (multiple sclerosis)    Neuropathy    Peripheral Neuropathy    Personal History of Alcoholism    Personal History of Atrial Fib    Personal History of Cardiomyop    Personal History of Congestive    Personal History of MI (Myocar    Personal History of Mitral Nicki    Personal History of Multiple S    Pneumonia    S/P Implantation of Automatic    Schizo affective schizophrenia    Seasonal allergies    Syncope    TIA (transient ischemic attack)

## 2018-05-09 NOTE — H&P PST ADULT - PSH
GERD (Gastroesophageal Reflux    H/O hernia repair  right 1972,1991  History of hip replacement  x 6 times right hip (kept popping out)  S/P Implantation of AICD    S/P Orchiectomy  L for testicular CA  SVT (Supraventricular Tachycar

## 2018-06-01 ENCOUNTER — APPOINTMENT (OUTPATIENT)
Dept: CARDIOLOGY | Facility: CLINIC | Age: 48
End: 2018-06-01

## 2018-06-01 VITALS
DIASTOLIC BLOOD PRESSURE: 71 MMHG | HEART RATE: 61 BPM | WEIGHT: 188 LBS | SYSTOLIC BLOOD PRESSURE: 104 MMHG | OXYGEN SATURATION: 98 % | HEIGHT: 66 IN | BODY MASS INDEX: 30.22 KG/M2

## 2018-06-21 ENCOUNTER — OUTPATIENT (OUTPATIENT)
Dept: OUTPATIENT SERVICES | Facility: HOSPITAL | Age: 48
LOS: 1 days | Discharge: HOME | End: 2018-06-21

## 2018-06-21 VITALS
SYSTOLIC BLOOD PRESSURE: 109 MMHG | RESPIRATION RATE: 18 BRPM | HEIGHT: 66 IN | WEIGHT: 187.39 LBS | TEMPERATURE: 97 F | DIASTOLIC BLOOD PRESSURE: 60 MMHG | HEART RATE: 60 BPM | OXYGEN SATURATION: 96 %

## 2018-06-21 DIAGNOSIS — Z96.649 PRESENCE OF UNSPECIFIED ARTIFICIAL HIP JOINT: Chronic | ICD-10-CM

## 2018-06-21 DIAGNOSIS — I48.0 PAROXYSMAL ATRIAL FIBRILLATION: ICD-10-CM

## 2018-06-21 DIAGNOSIS — Z01.818 ENCOUNTER FOR OTHER PREPROCEDURAL EXAMINATION: ICD-10-CM

## 2018-06-21 DIAGNOSIS — Z98.890 OTHER SPECIFIED POSTPROCEDURAL STATES: Chronic | ICD-10-CM

## 2018-06-21 LAB
APTT BLD: 44.2 SEC — HIGH (ref 27–39.2)
BASOPHILS # BLD AUTO: 0.07 K/UL — SIGNIFICANT CHANGE UP (ref 0–0.2)
BASOPHILS NFR BLD AUTO: 1.1 % — HIGH (ref 0–1)
EOSINOPHIL # BLD AUTO: 0.12 K/UL — SIGNIFICANT CHANGE UP (ref 0–0.7)
EOSINOPHIL NFR BLD AUTO: 2 % — SIGNIFICANT CHANGE UP (ref 0–8)
HCT VFR BLD CALC: 37.3 % — LOW (ref 42–52)
HGB BLD-MCNC: 12.3 G/DL — LOW (ref 14–18)
IMM GRANULOCYTES NFR BLD AUTO: 0.2 % — SIGNIFICANT CHANGE UP (ref 0.1–0.3)
INR BLD: 1.39 RATIO — HIGH (ref 0.65–1.3)
LYMPHOCYTES # BLD AUTO: 2.01 K/UL — SIGNIFICANT CHANGE UP (ref 1.2–3.4)
LYMPHOCYTES # BLD AUTO: 32.8 % — SIGNIFICANT CHANGE UP (ref 20.5–51.1)
MCHC RBC-ENTMCNC: 30 PG — SIGNIFICANT CHANGE UP (ref 27–31)
MCHC RBC-ENTMCNC: 33 G/DL — SIGNIFICANT CHANGE UP (ref 32–37)
MCV RBC AUTO: 91 FL — SIGNIFICANT CHANGE UP (ref 80–94)
MONOCYTES # BLD AUTO: 0.56 K/UL — SIGNIFICANT CHANGE UP (ref 0.1–0.6)
MONOCYTES NFR BLD AUTO: 9.1 % — SIGNIFICANT CHANGE UP (ref 1.7–9.3)
NEUTROPHILS # BLD AUTO: 3.36 K/UL — SIGNIFICANT CHANGE UP (ref 1.4–6.5)
NEUTROPHILS NFR BLD AUTO: 54.8 % — SIGNIFICANT CHANGE UP (ref 42.2–75.2)
NRBC # BLD: 0 /100 WBCS — SIGNIFICANT CHANGE UP (ref 0–0)
PLATELET # BLD AUTO: 211 K/UL — SIGNIFICANT CHANGE UP (ref 130–400)
PROTHROM AB SERPL-ACNC: 15.1 SEC — HIGH (ref 9.95–12.87)
RBC # BLD: 4.1 M/UL — LOW (ref 4.7–6.1)
RBC # FLD: 13.2 % — SIGNIFICANT CHANGE UP (ref 11.5–14.5)
WBC # BLD: 6.13 K/UL — SIGNIFICANT CHANGE UP (ref 4.8–10.8)
WBC # FLD AUTO: 6.13 K/UL — SIGNIFICANT CHANGE UP (ref 4.8–10.8)

## 2018-06-21 NOTE — H&P PST ADULT - ADDITIONAL PE
UNKEMPT APPEARANCE  POOR DENTITION-TOOTH LOOSE/ BROKEN  PT FALLING ASLEEP WHILE IN PST UNKEMPT APPEARANCE  POOR DENTITION-LOWER TOOTH SLIGHTLY LOOSE-MANY BROKEN/DECAYED-REDDENED GUMS-HARD TO ASSESS  continuously somnolent in past today

## 2018-06-21 NOTE — H&P PST ADULT - REASON FOR ADMISSION
Pt denies cp palp uri cough dysuria . SOB WITH 1 FOS.  PT denies any open wounds, drainage or rashes. CXR ON CHART 5/26/18  SCHEDULED FOR EP STUDY/AFIB/ MAPPING- [facility] ECHO Pt denies cp palp uri cough dysuria . SOB WITH 1 FOS.  PT denies any open wounds, drainage or rashes. CXR ON CHART 5/26/18  SCHEDULED FOR EP STUDY/AFIB/ MAPPING- [facility] ECHO. pt was sent to dental clinic today for lower tooth loose.  pt was advised by tobin too hold eliquis and asa 1 day. Pt denies cp palp uri cough . SOB WITH 1 FOS. uses walker . hHX MS-RESIDES [facility]-DR DUMONT FOLLOWS.  NO OPEN WOUNDS OR RASHES PRESENT-PT DENIES. UNKEMPT APPEARANCE.,. CXR ON CHART 5/26/18   SCHEDULED FOR EP STUDY/AFIB/ MAPPING- ROCIO ECHO.  sent to dental clinic today for lower tooth loose.  pt was advised by tobin soliman hold eliquis and asa 1 day.  ALL INSTRUCTIOND REVIEWED AND WRITTEN ON DISCHARGE SHEET FOR [facility]. PT MASDE AWARE NO MEDS WITH APPLESAUCE DOP

## 2018-06-21 NOTE — H&P PST ADULT - PMH
Anginal pain    Atrial flutter    Bipolar disorder    BPH (benign prostatic hyperplasia)    CAD (coronary artery disease)    Clinical Depression    COPD (chronic obstructive pulmonary disease)    CVA (cerebral vascular accident)    GERD (gastroesophageal reflux disease)    HTN (hypertension)    HTN (hypertension)    Hypercholesteremia    Migraines    MS (multiple sclerosis)    Neuropathy    Peripheral Neuropathy    Personal History of Alcoholism    Personal History of Atrial Fib    Personal History of Cardiomyop    Personal History of Congestive    Personal History of MI (Myocar    Personal History of Mitral Nicki    Personal History of Multiple S    Pneumonia    S/P Implantation of Automatic    Schizo affective schizophrenia    Seasonal allergies    Seizures    Smoker    Syncope    TIA (transient ischemic attack)

## 2018-06-21 NOTE — CONSULT NOTE ADULT - SUBJECTIVE AND OBJECTIVE BOX
Patient is a 47y old  Male who presents with a chief complaint of Pt denies cp palp uri cough . SOB WITH 1 FOS. uses walker . hHX MS-RESIDES [facility]-DR DUMONT FOLLOWS.  NO OPEN WOUNDS OR RASHES PRESENT-PT DENIES. UNKEMPT APPEARANCE.,. CXR ON CHART 5/26/18   SCHEDULED FOR EP STUDY/AFIB/ MAPPING- ROCIO ECHO.  sent to dental clinic today for lower tooth loose.  pt was advised by tobin soliman hold eliquis and asa 1 day.  ALL INSTRUCTIOND REVIEWED AND WRITTEN ON DISCHARGE SHEET FOR [facility]. PT MASDE AWARE NO MEDS WITH APPLESAUCE DOP (21 Jun 2018 12:31)      HPI:      PAST MEDICAL & SURGICAL HISTORY:  Smoker  BPH (benign prostatic hyperplasia)  HTN (hypertension)  Seizures  Migraines  Pneumonia  MS (multiple sclerosis)  CAD (coronary artery disease)  Bipolar disorder  Anginal pain  Schizo affective schizophrenia  GERD (gastroesophageal reflux disease)  Seasonal allergies  HTN (hypertension)  Hypercholesteremia  COPD (chronic obstructive pulmonary disease)  Neuropathy  Atrial flutter  CVA (cerebral vascular accident)  TIA (transient ischemic attack)  Syncope  Peripheral Neuropathy  S/P Implantation of Automatic  Personal History of Multiple S  Personal History of Mitral Nicki  Personal History of MI (Myocar  Personal History of Congestive  Personal History of Cardiomyop  Personal History of Atrial Fib  Personal History of Alcoholism  Clinical Depression  H/O hernia repair: right 1972,1991  History of hip replacement: x 6 times right hip (kept popping out)  S/P Orchiectomy: L for testicular CA  SVT (Supraventricular Tachycar  GERD (Gastroesophageal Reflux  S/P Implantation of AICD    ( N  ) heart valve replacement  ( N  ) joint replacement  (  N ) pregnancy    MEDICATIONS  (STANDING): Patient states he is unsure of his list of medications.    MEDICATIONS  (PRN):      REVIEW OF SYSTEMS      General:	See medical history above    Skin/Breast: WNL  	  Ophthalmologic: WNL  	  ENMT:	WNL    Respiratory and Thorax: COPD  	  Cardiovascular:	See medical history above    Gastrointestinal:	GERD    Genitourinary:	BPH    Musculoskeletal:	See medical history above    Neurological:	Epilepsy	    Psychiatric: See medical history	    Hematology/Lymphatics:	 See medical history    Endocrine:	See medical history    Allergic/Immunologic:	    Allergies    dexmethylphenidate (Unknown)  Dilantin, compazine, neurontin, ridaline, phenegrin (Anaphylaxis)  dilantin, compazine, neurontin, ritalin, phenergan (Unknown)  Haldol (Unknown)  hydantoin derivatives (Other)  methylphenidate (Unknown)  Morphine Sulfate (Unknown)  phenytoin (Unknown)  prochlorperazine (Unknown)  thioxanthenes (Unknown)    Intolerances        FAMILY HISTORY:  Family history of cervical cancer (Mother)  Family history of early CAD (Mother)      *SOCIAL HISTORY: (   ) Tobacco; (   ) ETOH    Vital Signs Last 24 Hrs  T(C): 36.1 (21 Jun 2018 12:31), Max: 36.1 (21 Jun 2018 12:31)  T(F): 97 (21 Jun 2018 12:31), Max: 97 (21 Jun 2018 12:31)  HR: 60 (21 Jun 2018 12:31) (60 - 60)  BP: 109/60 (21 Jun 2018 12:31) (109/60 - 109/60)  BP(mean): 76 (21 Jun 2018 12:31) (76 - 76)  RR: 18 (21 Jun 2018 12:31) (18 - 18)  SpO2: 96% (21 Jun 2018 12:31) (96% - 96%)    LABS:                  Last Dental Visit: <<Many years ago  >>    EOE:  TMJ (  N ) clicks                     ( N  ) pops                     (  N ) crepitus             Mandible <<FROM>>             Facial bones and MOM <<grossly intact>>             (  N ) trismus             (  N ) lymphadenopathy             (  N ) swelling             (  N ) asymmetry             (  N ) palpation             ( N  ) dyspnea             ( N  ) dysphagia             (  N ) loss of consciousness    IOE:  <<permanent>> dentition:            <<multiple carious teeth>>             <<multiple missing teeth>>                     Mobility << N >>                     Caries << Y >>                hard/soft palate:  ( N  ) palatal torus, <<No pathology noted>>            tongue/FOM <<No pathology noted>>            labial/buccal mucosa <<No pathology noted>>           (  N ) percussion           (  N ) palpation           (  N ) swelling            (  N ) abscess           (  N ) sinus tract    *DENTAL RADIOGRAPHS: 7 periapical radiographs taken    RADIOLOGY & ADDITIONAL STUDIES:    *ASSESSMENT: Informed patient that he has multiple grossly decayed teeth, all needing treatment. Several teeth requiring extractions, and some teeth with oeprative needs and/or root canal therapy.     *PLAN: As per PAST nurse practitioner, the anesthesiologist needs loose teeth extracted prior to surgery. Upon clinical examination, no teeth are mobile. Patient understands that multiple teeth are grossly decayed and require treatment.      PROCEDURE: Provided patient with 2,000mg of amoxicillin as premedication after scaling his lower anterior teeth.     RECOMMENDATIONS:  1) <<Patient understands that he has multiple grossly decayed teeth that require treatment.  Provided patient with prescription for amoxicillin 500mg 1 every 8 hours for 7 days.>>  2) Dental F/U with outpatient dentist for comprehensive dental care.   3) If any difficulty swallowing/breathing, fever occur, return to ER.     Resident Name, Calvin Burton DDS Patient is a 47y old  Male who presents with a chief complaint of Pt denies cp palp uri cough . SOB WITH 1 FOS. uses walker . hHX MS-RESIDES [facility], [place]-DR DUMONT FOLLOWS.  NO OPEN WOUNDS OR RASHES PRESENT-PT DENIES. UNKEMPT APPEARANCE.,. CXR ON CHART 5/26/18   SCHEDULED FOR EP STUDY/AFIB/ MAPPING- ROCIO ECHO.  sent to dental clinic today for lower tooth loose.  pt was advised by tobin soliman hold eliquis and asa 1 day.  ALL INSTRUCTIOND REVIEWED AND WRITTEN ON DISCHARGE SHEET FOR [facility] [place]. PT MASDE AWARE NO MEDS WITH APPLESAUCE DOP (21 Jun 2018 12:31)      HPI:      PAST MEDICAL & SURGICAL HISTORY:  Smoker  BPH (benign prostatic hyperplasia)  HTN (hypertension)  Seizures  Migraines  Pneumonia  MS (multiple sclerosis)  CAD (coronary artery disease)  Bipolar disorder  Anginal pain  Schizo affective schizophrenia  GERD (gastroesophageal reflux disease)  Seasonal allergies  HTN (hypertension)  Hypercholesteremia  COPD (chronic obstructive pulmonary disease)  Neuropathy  Atrial flutter  CVA (cerebral vascular accident)  TIA (transient ischemic attack)  Syncope  Peripheral Neuropathy  S/P Implantation of Automatic  Personal History of Multiple S  Personal History of Mitral Nicki  Personal History of MI (Myocar  Personal History of Congestive  Personal History of Cardiomyop  Personal History of Atrial Fib  Personal History of Alcoholism  Clinical Depression  H/O hernia repair: right 1972,1991  History of hip replacement: x 6 times right hip (kept popping out)  S/P Orchiectomy: L for testicular CA  SVT (Supraventricular Tachycar  GERD (Gastroesophageal Reflux  S/P Implantation of AICD    ( N  ) heart valve replacement  ( N  ) joint replacement  (  N ) pregnancy    MEDICATIONS  (STANDING): Patient states he is unsure of his list of medications.    MEDICATIONS  (PRN):      REVIEW OF SYSTEMS      General:	See medical history above    Skin/Breast: WNL  	  Ophthalmologic: WNL  	  ENMT:	WNL    Respiratory and Thorax: COPD  	  Cardiovascular:	See medical history above    Gastrointestinal:	GERD    Genitourinary:	BPH    Musculoskeletal:	See medical history above    Neurological:	Epilepsy	    Psychiatric: See medical history	    Hematology/Lymphatics:	 See medical history    Endocrine:	See medical history    Allergic/Immunologic:	    Allergies    dexmethylphenidate (Unknown)  Dilantin, compazine, neurontin, ridaline, phenegrin (Anaphylaxis)  dilantin, compazine, neurontin, ritalin, phenergan (Unknown)  Haldol (Unknown)  hydantoin derivatives (Other)  methylphenidate (Unknown)  Morphine Sulfate (Unknown)  phenytoin (Unknown)  prochlorperazine (Unknown)  thioxanthenes (Unknown)    Intolerances        FAMILY HISTORY:  Family history of cervical cancer (Mother)  Family history of early CAD (Mother)      *SOCIAL HISTORY: (   ) Tobacco; (   ) ETOH    Vital Signs Last 24 Hrs  T(C): 36.1 (21 Jun 2018 12:31), Max: 36.1 (21 Jun 2018 12:31)  T(F): 97 (21 Jun 2018 12:31), Max: 97 (21 Jun 2018 12:31)  HR: 60 (21 Jun 2018 12:31) (60 - 60)  BP: 109/60 (21 Jun 2018 12:31) (109/60 - 109/60)  BP(mean): 76 (21 Jun 2018 12:31) (76 - 76)  RR: 18 (21 Jun 2018 12:31) (18 - 18)  SpO2: 96% (21 Jun 2018 12:31) (96% - 96%)    LABS:                  Last Dental Visit: <<Many years ago  >>    EOE:  TMJ (  N ) clicks                     ( N  ) pops                     (  N ) crepitus             Mandible <<FROM>>             Facial bones and MOM <<grossly intact>>             (  N ) trismus             (  N ) lymphadenopathy             (  N ) swelling             (  N ) asymmetry             (  N ) palpation             ( N  ) dyspnea             ( N  ) dysphagia             (  N ) loss of consciousness    IOE:  <<permanent>> dentition:            <<multiple carious teeth>>             <<multiple missing teeth>>                     Mobility << N >>                     Caries << Y >>                hard/soft palate:  ( N  ) palatal torus, <<No pathology noted>>            tongue/FOM <<No pathology noted>>            labial/buccal mucosa <<No pathology noted>>           (  N ) percussion           (  N ) palpation           (  N ) swelling            (  N ) abscess           (  N ) sinus tract    *DENTAL RADIOGRAPHS: 7 periapical radiographs taken    RADIOLOGY & ADDITIONAL STUDIES:    *ASSESSMENT: Informed patient that he has multiple grossly decayed teeth, all needing treatment. Several teeth requiring extractions, and some teeth with operative needs and/or root canal therapy.     *PLAN: As per PAST nurse practitioner, the anesthesiologist needs loose teeth extracted prior to surgery. Upon clinical examination, no teeth are mobile. Patient understands that multiple teeth are grossly decayed and require treatment.      PROCEDURE: Provided patient with 2,000mg of amoxicillin as premedication after scaling his lower anterior teeth.     RECOMMENDATIONS:  1) <<Patient understands that he has multiple grossly decayed teeth that require treatment.  Provided patient with prescription for amoxicillin 500mg 1 every 8 hours for 7 days.>>  2) Dental F/U with outpatient dentist for comprehensive dental care.   3) If any difficulty swallowing/breathing, fever occur, return to ER.     Resident Name, Calvin Burton DDS

## 2018-06-22 LAB
ALBUMIN SERPL ELPH-MCNC: 4.6 G/DL — SIGNIFICANT CHANGE UP (ref 3.5–5.2)
ALP SERPL-CCNC: 132 U/L — HIGH (ref 30–115)
ALT FLD-CCNC: 24 U/L — SIGNIFICANT CHANGE UP (ref 0–41)
ANION GAP SERPL CALC-SCNC: 22 MMOL/L — HIGH (ref 7–14)
AST SERPL-CCNC: 24 U/L — SIGNIFICANT CHANGE UP (ref 0–41)
BILIRUB SERPL-MCNC: 0.5 MG/DL — SIGNIFICANT CHANGE UP (ref 0.2–1.2)
BUN SERPL-MCNC: 22 MG/DL — HIGH (ref 10–20)
CALCIUM SERPL-MCNC: 9.4 MG/DL — SIGNIFICANT CHANGE UP (ref 8.5–10.1)
CHLORIDE SERPL-SCNC: 97 MMOL/L — LOW (ref 98–110)
CO2 SERPL-SCNC: 23 MMOL/L — SIGNIFICANT CHANGE UP (ref 17–32)
CREAT SERPL-MCNC: 1 MG/DL — SIGNIFICANT CHANGE UP (ref 0.7–1.5)
GLUCOSE SERPL-MCNC: 84 MG/DL — SIGNIFICANT CHANGE UP (ref 70–99)
POTASSIUM SERPL-MCNC: 3.5 MMOL/L — SIGNIFICANT CHANGE UP (ref 3.5–5)
POTASSIUM SERPL-SCNC: 3.5 MMOL/L — SIGNIFICANT CHANGE UP (ref 3.5–5)
PROT SERPL-MCNC: 6.7 G/DL — SIGNIFICANT CHANGE UP (ref 6–8)
SODIUM SERPL-SCNC: 142 MMOL/L — SIGNIFICANT CHANGE UP (ref 135–146)

## 2018-07-02 ENCOUNTER — INPATIENT (INPATIENT)
Facility: HOSPITAL | Age: 48
LOS: 0 days | Discharge: NURSING, REHAB OR LONG-TERM CARE | End: 2018-07-03
Attending: INTERNAL MEDICINE | Admitting: INTERNAL MEDICINE

## 2018-07-02 VITALS
WEIGHT: 186.07 LBS | SYSTOLIC BLOOD PRESSURE: 117 MMHG | HEIGHT: 66 IN | OXYGEN SATURATION: 94 % | DIASTOLIC BLOOD PRESSURE: 62 MMHG | RESPIRATION RATE: 81 BRPM | HEART RATE: 60 BPM | TEMPERATURE: 99 F

## 2018-07-02 DIAGNOSIS — I48.0 PAROXYSMAL ATRIAL FIBRILLATION: ICD-10-CM

## 2018-07-02 DIAGNOSIS — Z96.649 PRESENCE OF UNSPECIFIED ARTIFICIAL HIP JOINT: Chronic | ICD-10-CM

## 2018-07-02 DIAGNOSIS — Z98.890 OTHER SPECIFIED POSTPROCEDURAL STATES: Chronic | ICD-10-CM

## 2018-07-02 RX ORDER — METOPROLOL TARTRATE 50 MG
25 TABLET ORAL
Qty: 0 | Refills: 0 | Status: DISCONTINUED | OUTPATIENT
Start: 2018-07-02 | End: 2018-07-03

## 2018-07-02 RX ORDER — ATORVASTATIN CALCIUM 80 MG/1
40 TABLET, FILM COATED ORAL AT BEDTIME
Qty: 0 | Refills: 0 | Status: DISCONTINUED | OUTPATIENT
Start: 2018-07-02 | End: 2018-07-03

## 2018-07-02 RX ORDER — QUETIAPINE FUMARATE 200 MG/1
25 TABLET, FILM COATED ORAL AT BEDTIME
Qty: 0 | Refills: 0 | Status: COMPLETED | OUTPATIENT
Start: 2018-07-02 | End: 2018-07-02

## 2018-07-02 RX ORDER — MEPERIDINE HYDROCHLORIDE 50 MG/ML
25 INJECTION INTRAMUSCULAR; INTRAVENOUS; SUBCUTANEOUS
Qty: 0 | Refills: 0 | Status: DISCONTINUED | OUTPATIENT
Start: 2018-07-02 | End: 2018-07-03

## 2018-07-02 RX ORDER — FUROSEMIDE 40 MG
40 TABLET ORAL DAILY
Qty: 0 | Refills: 0 | Status: DISCONTINUED | OUTPATIENT
Start: 2018-07-02 | End: 2018-07-03

## 2018-07-02 RX ORDER — QUETIAPINE FUMARATE 200 MG/1
25 TABLET, FILM COATED ORAL
Qty: 0 | Refills: 0 | Status: DISCONTINUED | OUTPATIENT
Start: 2018-07-02 | End: 2018-07-03

## 2018-07-02 RX ORDER — DOFETILIDE 0.25 MG/1
250 CAPSULE ORAL
Qty: 0 | Refills: 0 | Status: DISCONTINUED | OUTPATIENT
Start: 2018-07-02 | End: 2018-07-03

## 2018-07-02 RX ORDER — GABAPENTIN 400 MG/1
400 CAPSULE ORAL THREE TIMES A DAY
Qty: 0 | Refills: 0 | Status: DISCONTINUED | OUTPATIENT
Start: 2018-07-02 | End: 2018-07-03

## 2018-07-02 RX ORDER — ASPIRIN/CALCIUM CARB/MAGNESIUM 324 MG
81 TABLET ORAL DAILY
Qty: 0 | Refills: 0 | Status: DISCONTINUED | OUTPATIENT
Start: 2018-07-02 | End: 2018-07-03

## 2018-07-02 RX ORDER — LAMOTRIGINE 25 MG/1
100 TABLET, ORALLY DISINTEGRATING ORAL
Qty: 0 | Refills: 0 | Status: DISCONTINUED | OUTPATIENT
Start: 2018-07-02 | End: 2018-07-03

## 2018-07-02 RX ORDER — PANTOPRAZOLE SODIUM 20 MG/1
40 TABLET, DELAYED RELEASE ORAL
Qty: 0 | Refills: 0 | Status: DISCONTINUED | OUTPATIENT
Start: 2018-07-02 | End: 2018-07-03

## 2018-07-02 RX ORDER — OXYCODONE AND ACETAMINOPHEN 5; 325 MG/1; MG/1
1 TABLET ORAL EVERY 6 HOURS
Qty: 0 | Refills: 0 | Status: DISCONTINUED | OUTPATIENT
Start: 2018-07-02 | End: 2018-07-03

## 2018-07-02 RX ORDER — NITROGLYCERIN 6.5 MG
0.4 CAPSULE, EXTENDED RELEASE ORAL
Qty: 0 | Refills: 0 | Status: DISCONTINUED | OUTPATIENT
Start: 2018-07-02 | End: 2018-07-03

## 2018-07-02 RX ORDER — BACLOFEN 100 %
10 POWDER (GRAM) MISCELLANEOUS THREE TIMES A DAY
Qty: 0 | Refills: 0 | Status: DISCONTINUED | OUTPATIENT
Start: 2018-07-02 | End: 2018-07-03

## 2018-07-02 RX ORDER — SODIUM CHLORIDE 9 MG/ML
1000 INJECTION INTRAMUSCULAR; INTRAVENOUS; SUBCUTANEOUS
Qty: 0 | Refills: 0 | Status: DISCONTINUED | OUTPATIENT
Start: 2018-07-02 | End: 2018-07-03

## 2018-07-02 RX ADMIN — GABAPENTIN 400 MILLIGRAM(S): 400 CAPSULE ORAL at 21:58

## 2018-07-02 RX ADMIN — QUETIAPINE FUMARATE 25 MILLIGRAM(S): 200 TABLET, FILM COATED ORAL at 22:18

## 2018-07-02 RX ADMIN — OXYCODONE AND ACETAMINOPHEN 1 TABLET(S): 5; 325 TABLET ORAL at 23:45

## 2018-07-02 RX ADMIN — Medication 10 MILLIGRAM(S): at 21:57

## 2018-07-02 RX ADMIN — QUETIAPINE FUMARATE 25 MILLIGRAM(S): 200 TABLET, FILM COATED ORAL at 18:02

## 2018-07-02 RX ADMIN — ATORVASTATIN CALCIUM 40 MILLIGRAM(S): 80 TABLET, FILM COATED ORAL at 21:57

## 2018-07-02 RX ADMIN — DOFETILIDE 250 MICROGRAM(S): 0.25 CAPSULE ORAL at 18:03

## 2018-07-02 RX ADMIN — OXYCODONE AND ACETAMINOPHEN 1 TABLET(S): 5; 325 TABLET ORAL at 18:31

## 2018-07-02 RX ADMIN — MEPERIDINE HYDROCHLORIDE 25 MILLIGRAM(S): 50 INJECTION INTRAMUSCULAR; INTRAVENOUS; SUBCUTANEOUS at 14:20

## 2018-07-02 RX ADMIN — Medication 25 MILLIGRAM(S): at 18:02

## 2018-07-02 RX ADMIN — OXYCODONE AND ACETAMINOPHEN 1 TABLET(S): 5; 325 TABLET ORAL at 20:13

## 2018-07-02 NOTE — CHART NOTE - NSCHARTNOTEFT_GEN_A_CORE
Cardiac Electrophysiology Procedure Note      PROCEDURE:  Electrophysiological Study  Administration of Medicines  Intracardiac Ultrasound  Right and left atrial catheterization with dual transeptal approach  CryoAblation of pulmonary veins to treat Atrial Fibrillation  CS Cannulation  Computerized 3D mapping  Fluoroscopy      INDICATION: Symptomatic PAF failed AAM      OPERATORS:    Attending	 Alexi Quiñones MD    MATERIALS    Sheath	Insertion Site	Catheter	Location  12 F	RFV	Flex Cath	LA and RA  7 Fr	RFV	Daig Decapolar CS	Coronary Sinus  11 Fr	LFV	ICE	RA      ABLATION CATHETERS    Diameter	Size	Type	System  12 F	28 mm	Arctic Front Cryoballoon	Cryo      BASELINE FINDINGS    Rhythm	CL / Rate	KY	QRS	QT	AH	HV  SR	800	A paced	130	410	A Paced	55      DESCRIPTION OF PROCEDURE    The patient was brought to the Procedure Room in a nonsedated and fasting state. Informed, written consent was obtained prior to the procedure. Anesthesia maintain comfort and analgesia throughout the procedure. Blood pressure, oxygenation and level of comfort were stable throughout. The right and left groin and right neck regions were cleaned and prepped with the applications of Chloraprep. Patient was then covered from head to toe with sterile drapes in the usual manner.     The left right inguinal areas and arterial pulse were defined; 30 cc of lidocaine solution were infiltrated into the skin overlying the area.     Next, using needle and guidewire, the right femoral vein was accessed once using modified Seldinger technique. All access was obtain using ultrasound guidance. The guidewires were followed up the IVC, right of the spine, to confirm venous access. One introducer sheath was placed into the femoral vein. The dilators and guidewires were removed. Then, using needle and guidewire, the left femoral vein was accessed Twice using modified Seldinger technique. The guidewires were followed up the IVC, right of the spine, to confirm venous access. Two introducer sheaths were placed into the femoral vein. The dilators and guidewires were removed. Esophageal temperature was monitored using esoahpgageal temperature probe. If Temperature decreased below 25C ablation was terminated.     Catheters were placed in the coronary sinus, at the high right atrial position, over the septal tricuspid valve area to obtain and confirm a proximal His signal, and at the RV apex. Diastolic thresholds were found to be adequate.   	  Baseline parameters were obtained and intervals were measured at these sites. Pacing at the HRA was performed to evaluate, AVN function and possible arrhythmia induction. Pacing performed at the RV apex to evaluate retrograde AVN function and evaluate for possible accessory pathways by evaluating retrograde atrial activation pattern and VH to A relationship.    A 10F AcuNav ICE ultrasound catheter was placed through the 11F sheath and positioned into the right atrium to allow visualization of the intra-atrial septum. One SL-1 sheaths were exchanged for the three 8F introducer sheath previously inserted in the right femoral vein. Single transeptal puncture was performed with BRK1 needle and one [place]. Pr[place] 8 F degree SL-1 sheaths via the right femoral vein under direct visualization with intracardiac echocardiography and  fluoroscopy guidance. In both instances, a J wire was followed to the left subclavian vein and the sheath and dilator combo inserted to that level. The wire was exchanged for the BRK1 needle and pulled back under fluoroscopic visualization until the interatrial septum was reached. On all occasions, the sheath needle combo was placed over the septum and into the left atrium with needle advancement. Left atrial location was confirmed for each transeptal puncture with pressure monitoring, micro-bubble confirmation on ICE, after withdrawal of bright red blood from the catheter and with advancement of a guide wire into the left pulmonary vein. Left atrial pressure was measured at 12 mmHg. The SL-1 sheaths were exchanged over a J wire located in the left pulmonary vein for the Medtronic FlexCath adjustable catheter. Intravenous heparin was given prior to performing transeptal puncture and ACTs followed during the rest of the procedure to ensure an ACT greater than 300 secs.     Pulmonary veins diameter was assessed using a combination of ICE. A Lasso catheter was placed in the left atria and sequential mapping and ablation performed in the pulmonary veins using ICE and fluoroscopy guidance. KARTHIK 3D mapping was utilized to assess the position of the lasso catheter and visualized the CT scan of the LA to assess PV branches. Esophageal temperature was measure using a esophageal temperature probe and if temperature reached below 25 degrees, freezing was immediately terminated.    The achieve was inserted in to the one of the branches of each PV. The cryoballon (28 mm) was inflated and advanced to the os of each PV. Dye was injected and pressure was recorded to confirm adequate occlusion. Each PV was isolated. During the ablation, the patient did not experience symptoms. Each freeze was no longer than 3 minutes. If the slope of temperature descent was too steep or the temperature was < - 50C the ablation was terminated. All PV potentials were eradicated. Exit block was documented by pacing from the each pole of the lasso catheter and Entrance block was evident when pacing form the distal CS. Refer  to table bellow for details of each freeze.     During the cryoablation of the right superior and inferior veins, CS catheter was inserted into the superior vena cava and the right phrenic nerve was paced at 1000ms cycle length. The movement of the diaphragm was manually monitored during the entire freeze and if the strength of contraction decreased the cryo was terminated.     PV	TTI	Number of freezes	Temperature (-C)	Isolation  LSPV	15	3	                 232,37,33	Yes  LIPV		1	                 37	Yes  RSPV		3	                 46,45,51	Yes  RIPV		1	                 351	Yes    With the. Ventricular pacing showed VA conduction at <300 ms CL. Atrial pacing showed AVN WBCL was 290 msec. The PV velocity by ICE Doppler was 0.4 m/sec for the LSPV and 0.5 m/sec for the LIPV at the end of the procedure. A limited transthorasic echocardiogram was performed and no evidence of significant pericardial effusion was seen.    The catheters were removed and the sheaths pulled after a figure-8 stitch was placed. A dry, sterile dressing was placed over this. The patient was returned to a hospital room in stable condition. Gauze and needle count were performed and found to be consistent at the end of the procedure      COMPLICATIONS:    The patient tolerated the procedure well. There were no immediate complications. No evidence of pulmonary vein stenosis.      CONCLUSIONS:    Successful Cryoablation of the left superior, right superior, left inferior and right inferior pulmonary veins with ablation of PV potentials and entrance / exit block as endpoints.               _______________________________  Alexi Quiñones MD  Cardiac Electrophysiology

## 2018-07-02 NOTE — PROGRESS NOTE ADULT - SUBJECTIVE AND OBJECTIVE BOX
Electrophysiology Brief Post-Op Note      I have personally seen and examined the patient.  I agree with the history and physical which I have reviewed and noted any changes below.  07-02-18 @ 13:55    PRE-OP DIAGNOSIS: PAF    POST-OP DIAGNOSIS: PAF    PROCEDURE: AF ablation    Physician: Ishmael  Assistant: None    ANESTHESIA TYPE:  [ X ]General Anesthesia  [  ] Sedation  [  ] Local/Regional    ESTIMATED BLOOD LOSS:       mL    CONDITION  [  ] Critical  [  ] Serious  [  ]Fair  [X]Good      SPECIMENS REMOVED (IF APPLICABLE):  none    IMPLANTS (IF APPLICABLE)  none    FINDINGS  PLAN OF CARE  -	Start Eliquis 5 mg q12 tonight at 10 pm  -	Start protonix 40 mg daily tonight  -	Bed rest till am  -	Admit to telemetry

## 2018-07-02 NOTE — CHART NOTE - NSCHARTNOTEFT_GEN_A_CORE
PACU ANESTHESIA ADMISSION NOTE      Procedure:   Post op diagnosis:      ____  Intubated  TV:______       Rate: ______      FiO2: ______    _x___  Patent Airway    _x___  Full return of protective reflexes    _x___  Full recovery from anesthesia / back to baseline status    Vitals:  T(C): 37.2 (07-02-18 @ 10:14), Max: 37.2 (07-02-18 @ 10:14)  HR: 60 (07-02-18 @ 10:14) (60 - 60)  BP: 117/62 (07-02-18 @ 10:14) (117/62 - 117/62)  RR: 81 (07-02-18 @ 10:14) (81 - 81)  SpO2: 94% (07-02-18 @ 10:14) (94% - 94%)    Mental Status:  _x___ Awake   _____ Alert   _____ Drowsy   _____ Sedated    Nausea/Vomiting:  _x___  NO       ______Yes,   See Post - Op Orders         Pain Scale (0-10):  __0___    Treatment: _x___ None    ____ See Post - Op/PCA Orders    Post - Operative Fluids:   __x__ Oral   ____ See Post - Op Orders    Plan: Discharge:   _x___Home       _____Floor     _____Critical Care    _____  Other:_________________    Comments:  No anesthesia issues or complications noted.  Discharge when criteria met. PACU ANESTHESIA ADMISSION NOTE  PACU ANESTHESIA ADMISSION NOTE      Procedure:   Post op diagnosis:      ____  Intubated  TV:______       Rate: ______      FiO2: ______    _x___  Patent Airway    _x___  Full return of protective reflexes    _x___  Full recovery from anesthesia / back to baseline status    Vitals:  T(C): 37.2 (07-02-18 @ 10:14), Max: 37.2 (07-02-18 @ 10:14)  HR: 60 (07-02-18 @ 10:14) (60 - 60)  BP: 117/62 (07-02-18 @ 10:14) (117/62 - 117/62)  RR: 81 (07-02-18 @ 10:14) (81 - 81)  SpO2: 94% (07-02-18 @ 10:14) (94% - 94%)    Mental Status:  _x___ Awake   _____ Alert   _____ Drowsy   _____ Sedated    Nausea/Vomiting:  _x___  NO       ______Yes,   See Post - Op Orders         Pain Scale (0-10):  __0___    Treatment: _x___ None    ____ See Post - Op/PCA Orders    Post - Operative Fluids:   __x__ Oral   ____ See Post - Op Orders    Plan: Discharge:   _x___Home       _____Floor     _____Critical Care    ____x_  Other:_________________    Comments:  No anesthesia issues or complications noted.  Discharge when criteria met.  Procedure:   Post op diagnosis:      ____  Intubated  TV:______       Rate: ______      FiO2: ______    _x___  Patent Airway    _x___  Full return of protective reflexes    _x___  Full recovery from anesthesia / back to baseline status    Vitals:  T(C): 37.2 (07-02-18 @ 10:14), Max: 37.2 (07-02-18 @ 10:14)  HR: 60 (07-02-18 @ 10:14) (60 - 60)  BP: 117/62 (07-02-18 @ 10:14) (117/62 - 117/62)  RR: 81 (07-02-18 @ 10:14) (81 - 81)  SpO2: 94% (07-02-18 @ 10:14) (94% - 94%)    Mental Status:  _x___ Awake   _____ Alert   _____ Drowsy   _____ Sedated    Nausea/Vomiting:  _x___  NO       ______Yes,   See Post - Op Orders         Pain Scale (0-10):  __0___    Treatmen__ None    x____ See Post - Op/PCA Orders    Post - Operative Fluids:   __x__ Oral   ____x See Post - Op Orders    Plan: Discharge:   ___Home       _____Floor     _____Critical Care    ____x_  Other:_________________    Comments:  No anesthesia issues or complications noted.  Discharge when criteria met.

## 2018-07-03 ENCOUNTER — TRANSCRIPTION ENCOUNTER (OUTPATIENT)
Age: 48
End: 2018-07-03

## 2018-07-03 VITALS — WEIGHT: 193.79 LBS

## 2018-07-03 RX ORDER — APIXABAN 2.5 MG/1
5 TABLET, FILM COATED ORAL EVERY 12 HOURS
Qty: 0 | Refills: 0 | Status: DISCONTINUED | OUTPATIENT
Start: 2018-07-03 | End: 2018-07-03

## 2018-07-03 RX ORDER — OXYCODONE AND ACETAMINOPHEN 5; 325 MG/1; MG/1
1 TABLET ORAL ONCE
Qty: 0 | Refills: 0 | Status: DISCONTINUED | OUTPATIENT
Start: 2018-07-03 | End: 2018-07-03

## 2018-07-03 RX ORDER — ACETAMINOPHEN 500 MG
650 TABLET ORAL ONCE
Qty: 0 | Refills: 0 | Status: DISCONTINUED | OUTPATIENT
Start: 2018-07-03 | End: 2018-07-03

## 2018-07-03 RX ADMIN — Medication 10 MILLIGRAM(S): at 05:53

## 2018-07-03 RX ADMIN — Medication 10 MILLIGRAM(S): at 14:13

## 2018-07-03 RX ADMIN — QUETIAPINE FUMARATE 25 MILLIGRAM(S): 200 TABLET, FILM COATED ORAL at 17:46

## 2018-07-03 RX ADMIN — LAMOTRIGINE 100 MILLIGRAM(S): 25 TABLET, ORALLY DISINTEGRATING ORAL at 17:46

## 2018-07-03 RX ADMIN — GABAPENTIN 400 MILLIGRAM(S): 400 CAPSULE ORAL at 05:57

## 2018-07-03 RX ADMIN — Medication 81 MILLIGRAM(S): at 12:35

## 2018-07-03 RX ADMIN — OXYCODONE AND ACETAMINOPHEN 1 TABLET(S): 5; 325 TABLET ORAL at 04:13

## 2018-07-03 RX ADMIN — Medication 40 MILLIGRAM(S): at 05:53

## 2018-07-03 RX ADMIN — APIXABAN 5 MILLIGRAM(S): 2.5 TABLET, FILM COATED ORAL at 17:46

## 2018-07-03 RX ADMIN — APIXABAN 5 MILLIGRAM(S): 2.5 TABLET, FILM COATED ORAL at 05:55

## 2018-07-03 RX ADMIN — DOFETILIDE 250 MICROGRAM(S): 0.25 CAPSULE ORAL at 05:53

## 2018-07-03 RX ADMIN — OXYCODONE AND ACETAMINOPHEN 1 TABLET(S): 5; 325 TABLET ORAL at 05:56

## 2018-07-03 RX ADMIN — OXYCODONE AND ACETAMINOPHEN 1 TABLET(S): 5; 325 TABLET ORAL at 02:31

## 2018-07-03 RX ADMIN — OXYCODONE AND ACETAMINOPHEN 1 TABLET(S): 5; 325 TABLET ORAL at 14:09

## 2018-07-03 RX ADMIN — OXYCODONE AND ACETAMINOPHEN 1 TABLET(S): 5; 325 TABLET ORAL at 17:46

## 2018-07-03 RX ADMIN — DOFETILIDE 250 MICROGRAM(S): 0.25 CAPSULE ORAL at 17:46

## 2018-07-03 RX ADMIN — Medication 25 MILLIGRAM(S): at 17:46

## 2018-07-03 RX ADMIN — OXYCODONE AND ACETAMINOPHEN 1 TABLET(S): 5; 325 TABLET ORAL at 12:35

## 2018-07-03 RX ADMIN — QUETIAPINE FUMARATE 25 MILLIGRAM(S): 200 TABLET, FILM COATED ORAL at 05:56

## 2018-07-03 RX ADMIN — PANTOPRAZOLE SODIUM 40 MILLIGRAM(S): 20 TABLET, DELAYED RELEASE ORAL at 05:57

## 2018-07-03 RX ADMIN — Medication 25 MILLIGRAM(S): at 05:53

## 2018-07-03 RX ADMIN — OXYCODONE AND ACETAMINOPHEN 1 TABLET(S): 5; 325 TABLET ORAL at 05:57

## 2018-07-03 RX ADMIN — OXYCODONE AND ACETAMINOPHEN 1 TABLET(S): 5; 325 TABLET ORAL at 02:27

## 2018-07-03 NOTE — DISCHARGE NOTE ADULT - PATIENT PORTAL LINK FT
You can access the [facility] Patient Portal, offered by [facility], by registering with the following website: [email or web address]

## 2018-07-03 NOTE — DISCHARGE NOTE ADULT - ADDITIONAL INSTRUCTIONS
No strenuous exercises for the next two days;  no hot showers or baths for the next two days; may take warm showers, no swimming for the next 10 days

## 2018-07-03 NOTE — DISCHARGE NOTE ADULT - HOSPITAL COURSE
Patient admitted for elective A Fib Ablation, procedure went well no complications; patient now in NSR and is being discharged back to his nursing home.

## 2018-07-03 NOTE — DISCHARGE NOTE ADULT - MEDICATION SUMMARY - MEDICATIONS TO TAKE
I will START or STAY ON the medications listed below when I get home from the hospital:    aspirin 81 mg oral tablet, chewable  -- 1 tab(s) by mouth once a day  -- Indication: For Pain    acetaminophen 650 mg oral tablet  -- 650 milligram(s) by mouth 4 times a day, As Needed  -- Indication: For Pain    Percocet 5/325 oral tablet  -- 1 tab(s) by mouth every 6 hours  -- Indication: For Pain    Mi-Acid oral suspension  -- 30 milliliter(s) by mouth 4 times a day, As Needed  -- Indication: For gerd    nitroglycerin  -- 1 tab(s) under tongue 3 times a day, As Needed for chest pain  -- Indication: For chest pain    Tikosyn 250 mcg oral capsule  -- 1 cap(s) by mouth 2 times a day  -- Indication: For Paroxysmal atrial fibrillation    Eliquis 5 mg oral tablet  -- 1 tab(s) by mouth 2 times a day  -- Indication: For Paroxysmal atrial fibrillation    gabapentin 400 mg oral capsule  -- 1 cap(s) by mouth 3 times a day  -- Indication: For anticonvulsant    lamoTRIgine 100 mg oral tablet  -- 1 tab(s) by mouth 2 times a day  -- Indication: For anticonvulsant    Marinol 5 mg oral capsule  -- orally once a day  -- Indication: For antiemetic    atorvastatin 40 mg oral tablet  -- 1 tab(s) by mouth once a day (at bedtime)  -- Indication: For cholesterol    QUEtiapine 25 mg oral tablet  -- 1 tab(s) by mouth 2 times a day  -- Indication: For antipsychotic    Metoprolol Tartrate 25 mg oral tablet  -- 1 tab(s) by mouth 2 times a day  -- Indication: For Paroxysmal atrial fibrillation    ipratropium 18 mcg/inh inhalation aerosol  -- 1 application inhaled 4 times a day, As Needed  -- Indication: For bronchodilator    Lasix 20 mg oral tablet  -- 1 tab(s) by mouth 3 times a week on monday, wednesday and friday  -- Indication: For diuretic    Lasix 40 mg oral tablet  -- 1 tab(s) by mouth 3 times a week on tuesday, thursday and saturday  -- Indication: For diuretic    Tecfidera 240 mg oral delayed release capsule  -- 1 cap(s) by mouth once a day  -- Indication: For Immunosuppressant    baclofen 10 mg oral tablet  -- 1 tab(s) by mouth 3 times a day  -- Indication: For muscle relaxant    omeprazole 20 mg oral delayed release capsule  -- 1 cap(s) by mouth 2 times a day  -- Indication: For gerd

## 2018-07-03 NOTE — PROGRESS NOTE ADULT - SUBJECTIVE AND OBJECTIVE BOX
INTERVAL HPI/OVERNIGHT EVENTS:    Patient s/p A-Fib Ablation.   No events over night  Patient in NSR    MEDICATIONS  (STANDING):  apixaban 5 milliGRAM(s) Oral every 12 hours  aspirin  chewable 81 milliGRAM(s) Oral daily  atorvastatin 40 milliGRAM(s) Oral at bedtime  baclofen 10 milliGRAM(s) Oral three times a day  dofetilide 250 MICROGram(s) Oral two times a day  furosemide    Tablet 40 milliGRAM(s) Oral daily  gabapentin 400 milliGRAM(s) Oral three times a day  lamoTRIgine 100 milliGRAM(s) Oral two times a day  metoprolol tartrate 25 milliGRAM(s) Oral two times a day  oxyCODONE    5 mG/acetaminophen 325 mG 1 Tablet(s) Oral every 6 hours  pantoprazole    Tablet 40 milliGRAM(s) Oral before breakfast  QUEtiapine Oral Tab/Cap - Peds 25 milliGRAM(s) Oral two times a day    MEDICATIONS  (PRN):  meperidine     Injectable 25 milliGRAM(s) IV Push every 10 minutes PRN Moderate Pain (4 - 6)  nitroglycerin     SubLingual 0.4 milliGRAM(s) SubLingual every 5 minutes PRN Chest Pain    Allergies  dexmethylphenidate (Unknown)  Dilantin (Rash)  dilantin, compazine, neurontin, ritalin, phenergan (Unknown)  Haldol (Unknown)  hydantoin derivatives (Other)  methylphenidate (Unknown)  Morphine Sulfate (Unknown)  phenytoin (Unknown)  prochlorperazine (Unknown)  promethazine (Dystonic RXN)  rash/hives (Anaphylaxis)  thioxanthenes (Unknown)    Vital Signs Last 24 Hrs  T(C): 36.1 (03 Jul 2018 13:37), Max: 36.7 (02 Jul 2018 18:21)  T(F): 97 (03 Jul 2018 13:37), Max: 98 (02 Jul 2018 18:21)  HR: 60 (03 Jul 2018 13:37) (60 - 60)  BP: 90/55 (03 Jul 2018 13:37) (90/55 - 119/66)  RR: 18 (03 Jul 2018 13:37) (18 - 19)    HEENT HERLINDA EOMI  Lung CTAB  Heart RRR normal S1 S2  abd +BS soft  groins No hematoma, no bleeding; stiches removed  Ext no C/C/E    A/P  Patient s/p A-Fib Ablation  Patient is doing well  - continue Eliquis   - continue home medications   - post ablation instructions discussed with patient all questions answered  - follow up in office in 3-4 weeks

## 2018-07-03 NOTE — DISCHARGE NOTE ADULT - CARE PROVIDER_API CALL
Alexi Quiñones; MICHAEL), Cardiac Electrophysiology; Cardiovascular Disease  [address] [place] [address]  Phone: [phone]  Fax: [phone]

## 2018-07-03 NOTE — DISCHARGE NOTE ADULT - CARE PLAN
Principal Discharge DX:	Paroxysmal atrial fibrillation  Goal:	Resolution of symptoms  Assessment and plan of treatment:	Take medications as prescribed  Keep all follow up appointments

## 2018-07-11 DIAGNOSIS — Z88.8 ALLERGY STATUS TO OTHER DRUGS, MEDICAMENTS AND BIOLOGICAL SUBSTANCES: ICD-10-CM

## 2018-07-11 DIAGNOSIS — N40.0 BENIGN PROSTATIC HYPERPLASIA WITHOUT LOWER URINARY TRACT SYMPTOMS: ICD-10-CM

## 2018-07-11 DIAGNOSIS — Z86.73 PERSONAL HISTORY OF TRANSIENT ISCHEMIC ATTACK (TIA), AND CEREBRAL INFARCTION WITHOUT RESIDUAL DEFICITS: ICD-10-CM

## 2018-07-11 DIAGNOSIS — E78.00 PURE HYPERCHOLESTEROLEMIA, UNSPECIFIED: ICD-10-CM

## 2018-07-11 DIAGNOSIS — K21.9 GASTRO-ESOPHAGEAL REFLUX DISEASE WITHOUT ESOPHAGITIS: ICD-10-CM

## 2018-07-11 DIAGNOSIS — J44.9 CHRONIC OBSTRUCTIVE PULMONARY DISEASE, UNSPECIFIED: ICD-10-CM

## 2018-07-11 DIAGNOSIS — F10.11 ALCOHOL ABUSE, IN REMISSION: ICD-10-CM

## 2018-07-11 DIAGNOSIS — G35 MULTIPLE SCLEROSIS: ICD-10-CM

## 2018-07-11 DIAGNOSIS — G62.9 POLYNEUROPATHY, UNSPECIFIED: ICD-10-CM

## 2018-07-11 DIAGNOSIS — I48.91 UNSPECIFIED ATRIAL FIBRILLATION: ICD-10-CM

## 2018-07-11 DIAGNOSIS — I42.9 CARDIOMYOPATHY, UNSPECIFIED: ICD-10-CM

## 2018-07-11 DIAGNOSIS — Z96.649 PRESENCE OF UNSPECIFIED ARTIFICIAL HIP JOINT: ICD-10-CM

## 2018-07-11 DIAGNOSIS — F17.200 NICOTINE DEPENDENCE, UNSPECIFIED, UNCOMPLICATED: ICD-10-CM

## 2018-07-11 DIAGNOSIS — F25.9 SCHIZOAFFECTIVE DISORDER, UNSPECIFIED: ICD-10-CM

## 2018-07-11 DIAGNOSIS — Z95.810 PRESENCE OF AUTOMATIC (IMPLANTABLE) CARDIAC DEFIBRILLATOR: ICD-10-CM

## 2018-07-11 DIAGNOSIS — I48.92 UNSPECIFIED ATRIAL FLUTTER: ICD-10-CM

## 2018-07-11 DIAGNOSIS — I25.10 ATHEROSCLEROTIC HEART DISEASE OF NATIVE CORONARY ARTERY WITHOUT ANGINA PECTORIS: ICD-10-CM

## 2018-08-02 ENCOUNTER — INPATIENT (INPATIENT)
Facility: HOSPITAL | Age: 48
LOS: 3 days | Discharge: NURSING, REHAB OR LONG-TERM CARE | End: 2018-08-06
Attending: INTERNAL MEDICINE | Admitting: INTERNAL MEDICINE

## 2018-08-02 VITALS
TEMPERATURE: 98 F | HEIGHT: 66 IN | HEART RATE: 65 BPM | OXYGEN SATURATION: 98 % | WEIGHT: 179.9 LBS | SYSTOLIC BLOOD PRESSURE: 99 MMHG | RESPIRATION RATE: 18 BRPM | DIASTOLIC BLOOD PRESSURE: 55 MMHG

## 2018-08-02 DIAGNOSIS — Z98.890 OTHER SPECIFIED POSTPROCEDURAL STATES: Chronic | ICD-10-CM

## 2018-08-02 DIAGNOSIS — Z95.0 PRESENCE OF CARDIAC PACEMAKER: Chronic | ICD-10-CM

## 2018-08-02 DIAGNOSIS — Z96.649 PRESENCE OF UNSPECIFIED ARTIFICIAL HIP JOINT: Chronic | ICD-10-CM

## 2018-08-02 PROBLEM — J18.9 PNEUMONIA, UNSPECIFIED ORGANISM: Chronic | Status: ACTIVE | Noted: 2018-05-09

## 2018-08-02 PROBLEM — R56.9 UNSPECIFIED CONVULSIONS: Chronic | Status: ACTIVE | Noted: 2018-06-21

## 2018-08-02 PROBLEM — E78.00 PURE HYPERCHOLESTEROLEMIA, UNSPECIFIED: Chronic | Status: ACTIVE | Noted: 2018-05-09

## 2018-08-02 PROBLEM — F31.9 BIPOLAR DISORDER, UNSPECIFIED: Chronic | Status: ACTIVE | Noted: 2018-05-09

## 2018-08-02 PROBLEM — N40.0 BENIGN PROSTATIC HYPERPLASIA WITHOUT LOWER URINARY TRACT SYMPTOMS: Chronic | Status: ACTIVE | Noted: 2018-06-21

## 2018-08-02 PROBLEM — J44.9 CHRONIC OBSTRUCTIVE PULMONARY DISEASE, UNSPECIFIED: Chronic | Status: ACTIVE | Noted: 2018-05-09

## 2018-08-02 PROBLEM — I20.9 ANGINA PECTORIS, UNSPECIFIED: Chronic | Status: ACTIVE | Noted: 2018-05-09

## 2018-08-02 PROBLEM — I63.9 CEREBRAL INFARCTION, UNSPECIFIED: Chronic | Status: ACTIVE | Noted: 2018-05-09

## 2018-08-02 PROBLEM — F25.0 SCHIZOAFFECTIVE DISORDER, BIPOLAR TYPE: Chronic | Status: ACTIVE | Noted: 2018-05-09

## 2018-08-02 PROBLEM — I25.10 ATHEROSCLEROTIC HEART DISEASE OF NATIVE CORONARY ARTERY WITHOUT ANGINA PECTORIS: Chronic | Status: ACTIVE | Noted: 2018-05-09

## 2018-08-02 PROBLEM — J30.2 OTHER SEASONAL ALLERGIC RHINITIS: Chronic | Status: ACTIVE | Noted: 2018-05-09

## 2018-08-02 PROBLEM — G43.909 MIGRAINE, UNSPECIFIED, NOT INTRACTABLE, WITHOUT STATUS MIGRAINOSUS: Chronic | Status: ACTIVE | Noted: 2018-05-09

## 2018-08-02 PROBLEM — I10 ESSENTIAL (PRIMARY) HYPERTENSION: Chronic | Status: ACTIVE | Noted: 2018-06-21

## 2018-08-02 PROBLEM — K21.9 GASTRO-ESOPHAGEAL REFLUX DISEASE WITHOUT ESOPHAGITIS: Chronic | Status: ACTIVE | Noted: 2018-05-09

## 2018-08-02 PROBLEM — G45.9 TRANSIENT CEREBRAL ISCHEMIC ATTACK, UNSPECIFIED: Chronic | Status: ACTIVE | Noted: 2018-05-09

## 2018-08-02 PROBLEM — G35 MULTIPLE SCLEROSIS: Chronic | Status: ACTIVE | Noted: 2018-05-09

## 2018-08-02 LAB
ALBUMIN SERPL ELPH-MCNC: 4.4 G/DL — SIGNIFICANT CHANGE UP (ref 3.5–5.2)
ALP SERPL-CCNC: 126 U/L — HIGH (ref 30–115)
ALT FLD-CCNC: 22 U/L — SIGNIFICANT CHANGE UP (ref 0–41)
ANION GAP SERPL CALC-SCNC: 14 MMOL/L — SIGNIFICANT CHANGE UP (ref 7–14)
APTT BLD: 46 SEC — HIGH (ref 27–39.2)
AST SERPL-CCNC: 20 U/L — SIGNIFICANT CHANGE UP (ref 0–41)
BASOPHILS # BLD AUTO: 0.05 K/UL — SIGNIFICANT CHANGE UP (ref 0–0.2)
BASOPHILS NFR BLD AUTO: 0.8 % — SIGNIFICANT CHANGE UP (ref 0–1)
BILIRUB SERPL-MCNC: 0.3 MG/DL — SIGNIFICANT CHANGE UP (ref 0.2–1.2)
BUN SERPL-MCNC: 17 MG/DL — SIGNIFICANT CHANGE UP (ref 10–20)
CALCIUM SERPL-MCNC: 9.1 MG/DL — SIGNIFICANT CHANGE UP (ref 8.5–10.1)
CHLORIDE SERPL-SCNC: 103 MMOL/L — SIGNIFICANT CHANGE UP (ref 98–110)
CK MB CFR SERPL CALC: 1.3 NG/ML — SIGNIFICANT CHANGE UP (ref 0.6–6.3)
CK SERPL-CCNC: 108 U/L — SIGNIFICANT CHANGE UP (ref 0–225)
CO2 SERPL-SCNC: 26 MMOL/L — SIGNIFICANT CHANGE UP (ref 17–32)
CREAT SERPL-MCNC: 1.1 MG/DL — SIGNIFICANT CHANGE UP (ref 0.7–1.5)
EOSINOPHIL # BLD AUTO: 0.17 K/UL — SIGNIFICANT CHANGE UP (ref 0–0.7)
EOSINOPHIL NFR BLD AUTO: 2.8 % — SIGNIFICANT CHANGE UP (ref 0–8)
GLUCOSE SERPL-MCNC: 93 MG/DL — SIGNIFICANT CHANGE UP (ref 70–99)
HCT VFR BLD CALC: 35.9 % — LOW (ref 42–52)
HGB BLD-MCNC: 12 G/DL — LOW (ref 14–18)
IMM GRANULOCYTES NFR BLD AUTO: 0.3 % — SIGNIFICANT CHANGE UP (ref 0.1–0.3)
INR BLD: 1.43 RATIO — HIGH (ref 0.65–1.3)
LYMPHOCYTES # BLD AUTO: 2.42 K/UL — SIGNIFICANT CHANGE UP (ref 1.2–3.4)
LYMPHOCYTES # BLD AUTO: 39.7 % — SIGNIFICANT CHANGE UP (ref 20.5–51.1)
MCHC RBC-ENTMCNC: 30.3 PG — SIGNIFICANT CHANGE UP (ref 27–31)
MCHC RBC-ENTMCNC: 33.4 G/DL — SIGNIFICANT CHANGE UP (ref 32–37)
MCV RBC AUTO: 90.7 FL — SIGNIFICANT CHANGE UP (ref 80–94)
MONOCYTES # BLD AUTO: 0.43 K/UL — SIGNIFICANT CHANGE UP (ref 0.1–0.6)
MONOCYTES NFR BLD AUTO: 7.1 % — SIGNIFICANT CHANGE UP (ref 1.7–9.3)
NEUTROPHILS # BLD AUTO: 3 K/UL — SIGNIFICANT CHANGE UP (ref 1.4–6.5)
NEUTROPHILS NFR BLD AUTO: 49.3 % — SIGNIFICANT CHANGE UP (ref 42.2–75.2)
NRBC # BLD: 0 /100 WBCS — SIGNIFICANT CHANGE UP (ref 0–0)
NT-PROBNP SERPL-SCNC: 17 PG/ML — SIGNIFICANT CHANGE UP (ref 0–300)
PLATELET # BLD AUTO: 218 K/UL — SIGNIFICANT CHANGE UP (ref 130–400)
POTASSIUM SERPL-MCNC: 4.1 MMOL/L — SIGNIFICANT CHANGE UP (ref 3.5–5)
POTASSIUM SERPL-SCNC: 4.1 MMOL/L — SIGNIFICANT CHANGE UP (ref 3.5–5)
PROT SERPL-MCNC: 6.5 G/DL — SIGNIFICANT CHANGE UP (ref 6–8)
PROTHROM AB SERPL-ACNC: 15.4 SEC — HIGH (ref 9.95–12.87)
RBC # BLD: 3.96 M/UL — LOW (ref 4.7–6.1)
RBC # FLD: 13.2 % — SIGNIFICANT CHANGE UP (ref 11.5–14.5)
SODIUM SERPL-SCNC: 143 MMOL/L — SIGNIFICANT CHANGE UP (ref 135–146)
TROPONIN T SERPL-MCNC: <0.01 NG/ML — SIGNIFICANT CHANGE UP
TROPONIN T SERPL-MCNC: <0.01 NG/ML — SIGNIFICANT CHANGE UP
WBC # BLD: 6.09 K/UL — SIGNIFICANT CHANGE UP (ref 4.8–10.8)
WBC # FLD AUTO: 6.09 K/UL — SIGNIFICANT CHANGE UP (ref 4.8–10.8)

## 2018-08-02 RX ORDER — PANTOPRAZOLE SODIUM 20 MG/1
40 TABLET, DELAYED RELEASE ORAL
Qty: 0 | Refills: 0 | Status: DISCONTINUED | OUTPATIENT
Start: 2018-08-02 | End: 2018-08-06

## 2018-08-02 RX ORDER — ASPIRIN/CALCIUM CARB/MAGNESIUM 324 MG
81 TABLET ORAL DAILY
Qty: 0 | Refills: 0 | Status: DISCONTINUED | OUTPATIENT
Start: 2018-08-02 | End: 2018-08-06

## 2018-08-02 RX ORDER — GABAPENTIN 400 MG/1
400 CAPSULE ORAL THREE TIMES A DAY
Qty: 0 | Refills: 0 | Status: DISCONTINUED | OUTPATIENT
Start: 2018-08-02 | End: 2018-08-06

## 2018-08-02 RX ORDER — LAMOTRIGINE 25 MG/1
100 TABLET, ORALLY DISINTEGRATING ORAL
Qty: 0 | Refills: 0 | Status: DISCONTINUED | OUTPATIENT
Start: 2018-08-02 | End: 2018-08-06

## 2018-08-02 RX ORDER — IPRATROPIUM BROMIDE 0.2 MG/ML
500 SOLUTION, NON-ORAL INHALATION EVERY 6 HOURS
Qty: 0 | Refills: 0 | Status: DISCONTINUED | OUTPATIENT
Start: 2018-08-02 | End: 2018-08-06

## 2018-08-02 RX ORDER — OXYCODONE AND ACETAMINOPHEN 5; 325 MG/1; MG/1
1 TABLET ORAL EVERY 6 HOURS
Qty: 0 | Refills: 0 | Status: DISCONTINUED | OUTPATIENT
Start: 2018-08-02 | End: 2018-08-06

## 2018-08-02 RX ORDER — APIXABAN 2.5 MG/1
5 TABLET, FILM COATED ORAL EVERY 12 HOURS
Qty: 0 | Refills: 0 | Status: DISCONTINUED | OUTPATIENT
Start: 2018-08-02 | End: 2018-08-06

## 2018-08-02 RX ORDER — OXYBUTYNIN CHLORIDE 5 MG
5 TABLET ORAL DAILY
Qty: 0 | Refills: 0 | Status: DISCONTINUED | OUTPATIENT
Start: 2018-08-02 | End: 2018-08-06

## 2018-08-02 RX ORDER — ATORVASTATIN CALCIUM 80 MG/1
40 TABLET, FILM COATED ORAL AT BEDTIME
Qty: 0 | Refills: 0 | Status: DISCONTINUED | OUTPATIENT
Start: 2018-08-02 | End: 2018-08-06

## 2018-08-02 RX ORDER — NITROGLYCERIN 6.5 MG
0.4 CAPSULE, EXTENDED RELEASE ORAL ONCE
Qty: 0 | Refills: 0 | Status: COMPLETED | OUTPATIENT
Start: 2018-08-02 | End: 2018-08-02

## 2018-08-02 RX ORDER — METOPROLOL TARTRATE 50 MG
25 TABLET ORAL
Qty: 0 | Refills: 0 | Status: DISCONTINUED | OUTPATIENT
Start: 2018-08-02 | End: 2018-08-06

## 2018-08-02 RX ORDER — BACLOFEN 100 %
10 POWDER (GRAM) MISCELLANEOUS THREE TIMES A DAY
Qty: 0 | Refills: 0 | Status: DISCONTINUED | OUTPATIENT
Start: 2018-08-02 | End: 2018-08-06

## 2018-08-02 RX ORDER — NICOTINE POLACRILEX 2 MG
2 GUM BUCCAL
Qty: 0 | Refills: 0 | Status: DISCONTINUED | OUTPATIENT
Start: 2018-08-02 | End: 2018-08-06

## 2018-08-02 RX ORDER — FUROSEMIDE 40 MG
40 TABLET ORAL
Qty: 0 | Refills: 0 | Status: DISCONTINUED | OUTPATIENT
Start: 2018-08-02 | End: 2018-08-06

## 2018-08-02 RX ORDER — QUETIAPINE FUMARATE 200 MG/1
25 TABLET, FILM COATED ORAL DAILY
Qty: 0 | Refills: 0 | Status: DISCONTINUED | OUTPATIENT
Start: 2018-08-02 | End: 2018-08-03

## 2018-08-02 RX ORDER — DOFETILIDE 0.25 MG/1
250 CAPSULE ORAL
Qty: 0 | Refills: 0 | Status: DISCONTINUED | OUTPATIENT
Start: 2018-08-02 | End: 2018-08-06

## 2018-08-02 RX ORDER — QUETIAPINE FUMARATE 200 MG/1
1 TABLET, FILM COATED ORAL
Qty: 0 | Refills: 0 | COMMUNITY

## 2018-08-02 RX ORDER — NITROGLYCERIN 6.5 MG
0.4 CAPSULE, EXTENDED RELEASE ORAL THREE TIMES A DAY
Qty: 0 | Refills: 0 | Status: DISCONTINUED | OUTPATIENT
Start: 2018-08-02 | End: 2018-08-06

## 2018-08-02 RX ORDER — FUROSEMIDE 40 MG
40 TABLET ORAL DAILY
Qty: 0 | Refills: 0 | Status: DISCONTINUED | OUTPATIENT
Start: 2018-08-02 | End: 2018-08-02

## 2018-08-02 RX ORDER — FUROSEMIDE 40 MG
20 TABLET ORAL DAILY
Qty: 0 | Refills: 0 | Status: DISCONTINUED | OUTPATIENT
Start: 2018-08-02 | End: 2018-08-02

## 2018-08-02 RX ORDER — FUROSEMIDE 40 MG
20 TABLET ORAL
Qty: 0 | Refills: 0 | Status: DISCONTINUED | OUTPATIENT
Start: 2018-08-02 | End: 2018-08-06

## 2018-08-02 RX ORDER — DRONABINOL 2.5 MG
5 CAPSULE ORAL DAILY
Qty: 0 | Refills: 0 | Status: DISCONTINUED | OUTPATIENT
Start: 2018-08-02 | End: 2018-08-06

## 2018-08-02 RX ORDER — TAMSULOSIN HYDROCHLORIDE 0.4 MG/1
0.4 CAPSULE ORAL AT BEDTIME
Qty: 0 | Refills: 0 | Status: DISCONTINUED | OUTPATIENT
Start: 2018-08-02 | End: 2018-08-06

## 2018-08-02 RX ADMIN — OXYCODONE AND ACETAMINOPHEN 1 TABLET(S): 5; 325 TABLET ORAL at 12:17

## 2018-08-02 RX ADMIN — QUETIAPINE FUMARATE 25 MILLIGRAM(S): 200 TABLET, FILM COATED ORAL at 14:47

## 2018-08-02 RX ADMIN — Medication 0.4 MILLIGRAM(S): at 06:53

## 2018-08-02 RX ADMIN — TAMSULOSIN HYDROCHLORIDE 0.4 MILLIGRAM(S): 0.4 CAPSULE ORAL at 21:56

## 2018-08-02 RX ADMIN — APIXABAN 5 MILLIGRAM(S): 2.5 TABLET, FILM COATED ORAL at 17:05

## 2018-08-02 RX ADMIN — Medication 2 MILLIGRAM(S): at 20:58

## 2018-08-02 RX ADMIN — OXYCODONE AND ACETAMINOPHEN 1 TABLET(S): 5; 325 TABLET ORAL at 13:43

## 2018-08-02 RX ADMIN — Medication 81 MILLIGRAM(S): at 12:17

## 2018-08-02 RX ADMIN — DOFETILIDE 250 MICROGRAM(S): 0.25 CAPSULE ORAL at 17:05

## 2018-08-02 RX ADMIN — Medication 25 MILLIGRAM(S): at 17:05

## 2018-08-02 RX ADMIN — GABAPENTIN 400 MILLIGRAM(S): 400 CAPSULE ORAL at 14:13

## 2018-08-02 RX ADMIN — OXYCODONE AND ACETAMINOPHEN 1 TABLET(S): 5; 325 TABLET ORAL at 18:36

## 2018-08-02 RX ADMIN — OXYCODONE AND ACETAMINOPHEN 1 TABLET(S): 5; 325 TABLET ORAL at 17:04

## 2018-08-02 RX ADMIN — GABAPENTIN 400 MILLIGRAM(S): 400 CAPSULE ORAL at 22:20

## 2018-08-02 RX ADMIN — Medication 2 MILLIGRAM(S): at 21:56

## 2018-08-02 RX ADMIN — Medication 2 MILLIGRAM(S): at 14:14

## 2018-08-02 RX ADMIN — Medication 5 MILLIGRAM(S): at 14:48

## 2018-08-02 RX ADMIN — LAMOTRIGINE 100 MILLIGRAM(S): 25 TABLET, ORALLY DISINTEGRATING ORAL at 14:15

## 2018-08-02 RX ADMIN — Medication 2 MILLIGRAM(S): at 17:07

## 2018-08-02 RX ADMIN — Medication 10 MILLIGRAM(S): at 14:12

## 2018-08-02 RX ADMIN — Medication 10 MILLIGRAM(S): at 21:55

## 2018-08-02 RX ADMIN — ATORVASTATIN CALCIUM 40 MILLIGRAM(S): 80 TABLET, FILM COATED ORAL at 21:55

## 2018-08-02 RX ADMIN — Medication 40 MILLIGRAM(S): at 14:47

## 2018-08-02 RX ADMIN — PANTOPRAZOLE SODIUM 40 MILLIGRAM(S): 20 TABLET, DELAYED RELEASE ORAL at 14:47

## 2018-08-02 NOTE — H&P ADULT - NSHPOUTPATIENTPROVIDERS_GEN_ALL_CORE
Dr. Ayoub (cardioologist)  Dr. Santana (EP) Dr. Ayoub (cardiologist)  Dr. Santana (EP) Dr. Ayoub (cardiologist)  Dr. Santana (EP)  shelton Brantley (PCP)

## 2018-08-02 NOTE — ED PROVIDER NOTE - OBJECTIVE STATEMENT
48yo M hx HTN DM AFib on Eliquis sp ablation by Dr. Quiñones, MS seizure do SVT AICD PTSD optic neuropathy CHF cardiomyopathy COPD 2L PRN comfort pw substernal chest pain x1.5hrs- was on the computer when it developed suddenly, squeezing, radiating to L neck and LUE- since onset, has been intermittent, slowly improving, no shortness of breath, no headache, vision changes, f/c/n/v/d, abdominal pain, numbness/weakness, back pain- follows w Dr. Ayoub, per pt, last stress was nuclear stress a few mos ago that was non diagnostic. Received [id number] x4 already and 2x sublingual nitro w minimal relief

## 2018-08-02 NOTE — ED ADULT NURSE REASSESSMENT NOTE - NS ED NURSE REASSESS COMMENT FT1
pt resting comfortably, no distress noted, A&Ox3, denies pain or discomfort. Pt remains on continuous cardiac monitoring, VS obtained.

## 2018-08-02 NOTE — H&P ADULT - ASSESSMENT
46 yo male with PMHx of HTN, DM (diet controlled), Afib s/p ablation and on eliquis, COPD, MS, CHF, Cardiomyopathy, PTSD, CAD, Bipolar disease w/ depression, BPH, GERD, TIA, HLD, h/o SVT, s/p AICD implantation, presents from [facility] this morning with complaint of chest pain, radiating to the neck and left arm, increased with exertion and relieved by a combination of nitrostat, oxygen and rest, likely cardiac in origin. 1st set of cardiac enzymes negative. Pt had a non diagnostic stress test (04/21/2018).    #Chest pain, typical anginal origin  -Pt currently asymptomatic  -Pt follows with Dr. Ayoub outpatient.  -Continue aspirin 81mg daily  -Continue nitrostat SL 0.4mg 3 times a day PRN   -Trend cardiac enzymes  -Repeat 12 lead EKG  -Continuous cardiac monitoring  -Cardiology was consulted because of chest pain, kindly follow the recommendations    #HTN  -Pt. currently normotensive, no additional pharmacological measures required  -Continue metoprolol   -Continue lasix  -DASH diet    #Atrial fibrillation s/p ablation   -Continue Eliquis   -Control rate control with metoprolol  -Continue dofetilide    #COPD  -Continue ipratropium 10mcg inhalation  -Continue 2L O2 PRN    #DM  -Diet controlled. Pt reports high glucose was due to steroid therapy for MS  -Check HbA1c  -Check fingerstick glucose AC/HS  -Hold insulin for now. Start lantus insulin if blood glucose is consistenly high (>180mg/dl)  -Carb consistent (no evening snack) diet    #MS  -Continue baclofen for spasticity  -Continue percocet q6hr PRN for pain  -Continue gabapentin  -Continue dimethyl fumarate    #GERD  -Protonix 40mg once a day (in place of omeprazole 20mg twice a day and Mi-Acis suspension 4 times a day)    #BPH  -Continue tamsulosin     #Bipolar disease; PTSD  -Continue Quetiapine    #Seizures  -Continue lamotrigine    #History of SVT  -s/p AICD implantation  -Continue dofetilide    #CHF  -Continue Lasix 20mg (monday, wednesday, friday)  -Continue lasix 40mg (Tues, thursday, sat)  -Continue metoprolol    #HLD  -Continue atorvastatin    #CAD  -Continue aspirin 81mg  -Continue atrovastatin    #Miscellaneous  -DVT ppx (on eliquis)  -GI ppx (on protonix)  -Diet: DASH carb consistent diet  -Activity: ambulate as tolerated  -Full code  -Dispo new [facility] 46 yo male with PMHx of HTN, DM (diet controlled), Afib s/p ablation and on eliquis, COPD, MS, CHF, Cardiomyopathy, PTSD, CAD, Bipolar disease w/ depression, BPH, GERD, TIA, HLD, h/o SVT, s/p AICD implantation, presents from [facility] this morning with complaint of chest pain, radiating to the neck and left arm, increased with exertion and relieved by a combination of nitrostat, oxygen and rest, likely cardiac in origin. 1st set of cardiac enzymes negative. Pt had a non diagnostic stress test (04/21/2018).    #Chest pain, typical anginal origin  -Pt currently asymptomatic  -Pt follows with Dr. Ayoub outpatient.  -Admit to tele for continuous monitoring  -Continue aspirin 81mg daily  -Continue nitrostat SL 0.4mg 3 times a day PRN   -Trend cardiac enzymes  -Repeat 12 lead EKG  -Order TTE   -Cardiology was consulted because of chest pain, kindly follow the recommendations    #HTN  -Pt. currently normotensive, no additional pharmacological measures required  -Continue metoprolol   -Continue lasix  -DASH diet    #Atrial fibrillation s/p ablation   -Continue Eliquis   -Control rate control with metoprolol  -Continue dofetilide    #COPD  -Continue ipratropium 18mcg inhalation q6hrly PRN  -Continue 2L O2 PRN    #DM  -Diet controlled. Pt reports high glucose was due to steroid therapy for MS  -Check HbA1c  -Check fingerstick glucose AC/HS  -Hold insulin for now. Start lantus insulin if blood glucose is consistenly high (>180mg/dl)  -Carb consistent (no evening snack) diet    #MS  -Continue baclofen for spasticity  -Continue percocet q6hr PRN for pain  -Continue gabapentin  -Pt. takes dimethyl fumarate/ticfidera for MS relapses. Kindly place a non-formulary medication order as the medication is not available.     #GERD  -Protonix 40mg once a day (in place of omeprazole 20mg twice a day and Mi-Acis suspension 4 times a day)    #BPH  -Continue tamsulosin     #Bipolar disease; PTSD  -Continue Quetiapine    #Seizures  -Continue lamotrigine    #History of SVT  -s/p AICD implantation  -Continue dofetilide    #CHF  -Continue Lasix 20mg (monday, wednesday, friday)  -Continue lasix 40mg (Tues, thursday, sat)  -Continue metoprolol    #HLD  -Continue atorvastatin    #CAD  -Continue aspirin 81mg  -Continue atrovastatin    #Miscellaneous  -DVT ppx (on eliquis)  -GI ppx (on protonix)  -Diet: DASH carb consistent diet  -Activity: ambulate as tolerated  -Full code  -Dispo tele

## 2018-08-02 NOTE — H&P ADULT - PMH
Anginal pain    Atrial fibrillation  s/p ablation, on eliquis  Bipolar disorder    BPH (benign prostatic hyperplasia)    CAD (coronary artery disease)    Cardiomyopathy    CHF (congestive heart failure)    Clinical Depression    COPD (chronic obstructive pulmonary disease)    CVA (cerebral vascular accident)    Diabetes mellitus  diet controlled  GERD (gastroesophageal reflux disease)    HTN (hypertension)    Hypercholesteremia    Migraines    MS (multiple sclerosis)    Peripheral Neuropathy    Pneumonia    PTSD (post-traumatic stress disorder)    Schizo affective schizophrenia    Seasonal allergies    Seizures    Supraventricular arrhythmia  prior history  Syncope    TIA (transient ischemic attack)

## 2018-08-02 NOTE — ED PROVIDER NOTE - PROGRESS NOTE DETAILS
sign out accepted from Dr. Hui.  Pt with multiple cardiac risk factors including cardiomyopathy, afib presents with chest pain.  no abd pain, no nausea, no vomiting, no back pain, no headache.  no other complaints.  no Leg swelling.  lungs clear.  abd soft, nontender.    pt already got aspirin and nitro.  will admit to tele.

## 2018-08-02 NOTE — H&P ADULT - HISTORY OF PRESENT ILLNESS
46 yo male with PMHx of HTN, DM (diet controlled), Afib s/p ablation and on eliquis, COPD, MS, CHF, Cardiomyopathy, PTSD, CAD, Bipolar disease w/ depression, BPH, GERD, TIA, HLD, h/o SVT, s/p AICD implantation, presents from [facility] this morning with complaint of chest pain. Pt started having pain this morning 46 yo male with PMHx of HTN, DM (diet controlled), Afib s/p ablation and on eliquis, COPD, MS, CHF, Cardiomyopathy, PTSD, CAD, Bipolar disease w/ depression, BPH, GERD, TIA, HLD, h/o SVT, s/p AICD implantation, presents from [facility] this morning with complaint of chest pain. Pt started having pain this morning while he was working on the computer, thought it was due to GERD, took Mylanta, but without any relief, experienced increase in pain while moving and going to the bathroom, so took 2 Nitrostat tabs, which provided a little relief. The pain was located in the mid chest, lasted 2-3 hours, on and off during this time period, about 7 in intensity at rest and increased to about 8/10 during exertion and felt like a twisting and squeezing kind of sensation. Pain radiated to the neck and the left arm and was relieved by a combination of Nitrostat, oxygen and rest. The pain was associated with some shortness of breath, palpitations and flushing of face. No history of chest trauma, dizziness, LOC, fever, chills, cough, recent h/o sore throat or recent travel. Patient has had previous episodes of similar pain which got better with Nitrostat. Patient reports increased shortness of breath with exertion, he could walk long distances before getting sob but in the past 5-6 months, he can walk only about 10-15 ft before getting short of breath (pt attributes it to a combination of COPD and MS, and pulmonologist prescribed him 2L O2 PRN). Pt had a stress test a couple of months ago which was non-diagnostic as per pt. 1st set of cardiac enzymes was negative.   Pt. has right side UE and LE extremity weakness at the baseline due to MS. No new onset numbness, tingling. Pt reports decreased vision (optic neuritis), increased pain and spasticity in the legs, shoulders and back bilaterally due to MS and wants to follow with a neurologist outpatient.

## 2018-08-02 NOTE — ED PROVIDER NOTE - ATTENDING CONTRIBUTION TO CARE
47y M from [facility] h/o CM, CHF, AF & SVT s/p ablation 7/2/18 by Dr. Quiñones on elliquis, s/p PPM/AICD, MS, seizure d/o, copd on home O2 2lnc prn, optic neuropathy p/w CP x 3. Mid-sternal, intermitt, radiating up to neck & LUE. Rec'd asa 325 & SL NTG x 2 by ems w/some relief. Denies diaphoresis, sob, nv, abd pain, edema. Cardio Dr. Ayoub, states he had nuclear stress test in Apr 2018 - was reported as non-diagnostic. PMD Dr. Roche. PE: middle-aged m nad, ncat, neck supple no jvd, rrr nl s1s2 no mrg, ctab no wrr, abd soft ntnd, ext no cce dpi. a/p: CP, extensive cardiac hx, recent non-diagnostic nuclear stress test appx 3 mos ago - ekg, cxr, labs, Cardio c/s for Obs v. admission, reassess

## 2018-08-02 NOTE — H&P ADULT - ATTENDING COMMENTS
pt seen and examined independently, with chest pain, I have read and agree with above exam and poa,    · Assessment    48 yo male with PMHx of HTN, DM (diet controlled), Afib s/p ablation and on eliquis, COPD, MS, CHF, Cardiomyopathy, PTSD, CAD, Bipolar disease w/ depression, BPH, GERD, TIA, HLD, h/o SVT, s/p AICD implantation, presents from [facility] this morning with complaint of chest pain, radiating to the neck and left arm, increased with exertion and relieved by a combination of nitrostat, oxygen and rest, likely cardiac in origin. 1st set of cardiac enzymes negative. Pt had a non diagnostic stress test (04/21/2018).    #Chest pain, typical anginal origin  -Pt currently asymptomatic  -Pt follows with Dr. Ayoub outpatient.  -Admit to tele for continuous monitoring  -Continue aspirin 81mg daily  -Continue nitrostat SL 0.4mg 3 times a day PRN   -Trend cardiac enzymes  -Repeat 12 lead EKG  -Order TTE   -Cardiology was consulted because of chest pain, kindly follow the recommendations    #HTN  -Pt. currently normotensive, no additional pharmacological measures required  -Continue metoprolol   -Continue lasix  -DASH diet    #Atrial fibrillation s/p ablation   -Continue Eliquis   -Control rate control with metoprolol  -Continue dofetilide    #COPD  -Continue ipratropium 18mcg inhalation q6hrly PRN  -Continue 2L O2 PRN    #DM  -Diet controlled. Pt reports high glucose was due to steroid therapy for MS  -Check HbA1c  -Check fingerstick glucose AC/HS  -Hold insulin for now. Start lantus insulin if blood glucose is consistenly high (>180mg/dl)  -Carb consistent (no evening snack) diet    #MS  -Continue baclofen for spasticity  -Continue percocet q6hr PRN for pain  -Continue gabapentin  -Pt. takes dimethyl fumarate/ticfidera for MS relapses. Kindly place a non-formulary medication order as the medication is not available.     #GERD  -Protonix 40mg once a day (in place of omeprazole 20mg twice a day and Mi-Acis suspension 4 times a day)    #BPH  -Continue tamsulosin     #Bipolar disease; PTSD  -Continue Quetiapine    #Seizures  -Continue lamotrigine    #History of SVT  -s/p AICD implantation  -Continue dofetilide    #CHF  -Continue Lasix 20mg (monday, wednesday, friday)  -Continue lasix 40mg (Tues, thursday, sat)  -Continue metoprolol    #HLD  -Continue atorvastatin    #CAD  -Continue aspirin 81mg  -Continue atrovastatin    #Miscellaneous  -DVT ppx (on eliquis)  -GI ppx (on protonix)  -Diet: DASH carb consistent diet  -Activity: ambulate as tolerated  -Full code  -Dispo tele

## 2018-08-02 NOTE — CONSULT NOTE ADULT - ASSESSMENT
48 y/o male with extensive PMHx as below including Afib on Eliquis s/p ablation and Cardiomyopathy s/p AICD p/w chest pain.      #Chest pain  typical anginal pain that is relieved by sublingual nitro  patient currently has no pain  negative first set of CE  EKG shows no acute ischemic changes     Recommendations  trend CE  serial EKG     #Afib   c/w Eliquis and metoprolol      #Cardiomyopathy 48 y/o male with extensive PMHx as below including Afib on Eliquis s/p ablation and Cardiomyopathy s/p AICD p/w chest pain.      #Chest pain  anginal like pain that is relieved by sublingual nitro  patient currently has no pain  negative first set of CE  EKG shows no acute ischemic changes     Recommendations  trend CE  serial EKG   CT coronary for ischemia     #Afib   c/w Eliquis and metoprolol      #hx of tachy induced Cardiomyopathy   c/w Lasix, BB, statin and aspirin

## 2018-08-02 NOTE — ED ADULT NURSE NOTE - NSIMPLEMENTINTERV_GEN_ALL_ED
Implemented All Fall with Harm Risk Interventions:  Accident to call system. Call bell, personal items and telephone within reach. Instruct patient to call for assistance. Room bathroom lighting operational. Non-slip footwear when patient is off stretcher. Physically safe environment: no spills, clutter or unnecessary equipment. Stretcher in lowest position, wheels locked, appropriate side rails in place. Provide visual cue, wrist band, yellow gown, etc. Monitor gait and stability. Monitor for mental status changes and reorient to person, place, and time. Review medications for side effects contributing to fall risk. Reinforce activity limits and safety measures with patient and family. Provide visual clues: red socks.

## 2018-08-02 NOTE — ED PROVIDER NOTE - NS ED ROS FT
Constitutional:  See HPI.   Eyes:  No visual changes, eye pain or discharge.  ENMT:  No hearing changes, pain, discharge or infections. No neck pain or stiffness.  Cardiac:  No SOB or edema.  Respiratory:  No cough or respiratory distress. No hemoptysis.  GI:  No nausea, vomiting, diarrhea, abdominal pain.  :  No dysuria, frequency, hematuria  MS:  No joint pain or back pain.  Neuro:  No LOC. No headache or weakness.    Skin:  No skin rash.

## 2018-08-02 NOTE — ED ADULT TRIAGE NOTE - CHIEF COMPLAINT QUOTE
sydnee [facility] from [facility] [place] c.o. midsternal chest pain radiating to his neck

## 2018-08-02 NOTE — H&P ADULT - PSH
Cardiac pacemaker recipient    H/O hernia repair  right 1972,1991  H/O prior ablation treatment  for Afib  History of hip replacement    S/P Implantation of AICD    S/P Orchiectomy  L for testicular CA

## 2018-08-02 NOTE — ED PROVIDER NOTE - PHYSICAL EXAMINATION
Well appearing NAD non toxic. NCAT EOMI conjunctiva nml. No nasal discharge. MMM. Neck supple, non tender, full ROM. RRR no MRG +S1S2. chest pain non reproducible on exam CTA b/l. Abd s NT ND +BS. Ext WWP x4, moving all extremities, no edema. 2+ equal pulses throughout. Cooperative, appropriate. CN2-12 grossly intact no sensory or motor deficits throughout

## 2018-08-02 NOTE — H&P ADULT - NSHPLABSRESULTS_GEN_ALL_CORE
Vital Signs Last 24 Hrs  T(C): 36.1 (02 Aug 2018 11:30), Max: 36.7 (02 Aug 2018 04:41)  T(F): 96.9 (02 Aug 2018 11:30), Max: 98 (02 Aug 2018 04:41)  HR: 60 (02 Aug 2018 11:30) (60 - 65)  BP: 109/65 (02 Aug 2018 11:30) (99/55 - 109/65)  BP(mean): --  RR: 18 (02 Aug 2018 11:30) (18 - 18)  SpO2: 97% (02 Aug 2018 07:33) (97% - 98%)                          12.0   6.09  )-----------( 218      ( 02 Aug 2018 05:23 )             35.9     08-02    143  |  103  |  17  ----------------------------<  93  4.1   |  26  |  1.1    Ca    9.1      02 Aug 2018 05:23    TPro  6.5  /  Alb  4.4  /  TBili  0.3  /  DBili  x   /  AST  20  /  ALT  22  /  AlkPhos  126<H>  08-02    CARDIAC MARKERS ( 02 Aug 2018 05:23 )  x     / <0.01 ng/mL / 108 U/L / x     / 1.3 ng/mL    < from: Xray Chest 1 View AP/PA (08.02.18 @ 06:33) >    Impression:      No radiographic evidence of acute pulmonary disease.      < end of copied text >    < from: NM Nuclear Stress Pharmacologic Multiple (04.21.18 @ 10:41) >    Impression:   1. NORMAL LEXISCAN / REST MYOCARDIAL PERFUSION TOMOGRAPHY, WITH NO   EVIDENCE FOR ISCHEMIA DURING LEXISCAN INFUSION.   2. NORMAL RESTING LEFT VENTRICULAR WALL MOTION AND WALL THICKENING.  3. LEFT VENTRICULAR EJECTION FRACTION OF  61 % WHICH IS WITHIN RANGE OF   NORMAL.     < end of copied text >

## 2018-08-02 NOTE — ED PROVIDER NOTE - NS ED ATTENDING STATEMENT MOD
I have personally seen and examined this patient.  I have fully participated in the care of this patient. I have reviewed all pertinent clinical information, including history, physical exam, plan and the Resident’s note and agree except as noted. 2-3x/week

## 2018-08-02 NOTE — H&P ADULT - NSHPPHYSICALEXAM_GEN_ALL_CORE
PHYSICAL EXAM:      Constitutional: Alert, awake, no acute distress.     Eyes: PERRL, EOMI, pt cannot converge left eye (optic neuritis due to MS)    ENMT: NCAT    Neck: supple    Respiratory: clear to auscultation bilaterally    Cardiovascular: s1, s2 normal. no murmur, rub    Gastrointestinal: soft, nontender abdomen    Extremities: no pedal edema. no visible skin changes.    Neurological: AAox3, muscle strength 3/5 in right LE and UE; 5/5 in left LE and UE. sensation to light touch intact.

## 2018-08-02 NOTE — ED PROVIDER NOTE - MEDICAL DECISION MAKING DETAILS
sign out accepted from Dr. Godwin.  pt with multiple cardiac risk factors presenting with chest pain.  pt received aspirin and nitro prior to me taking over for Dr. Godwin.  Troponin negative.  pt admitted to tele.  I spoke to cardiologist Dr. Ayoub who agreed with plan.  pt agreeable to admission

## 2018-08-02 NOTE — CONSULT NOTE ADULT - SUBJECTIVE AND OBJECTIVE BOX
HPI:  46 y/o male with extensive PMHx as below including Afib on Eliquis Cardiomyopathy s/p AICD p/w chest pain. Chest pain started this morning at rest, restrosternal, squeezing, 7/10. patient started to ambulate and it got worse. patient took mylanta first cause he thought it was GERD but it didn't improve. patient took sublingual nitro and it got better. patient tried to reach Dr. conti but he couldn't so he came to the ED.   Patient states he has SOB on mininal exertion for a couple of weeks. He feels palpitations sometime. He denies fever, cough, n/v/d, bowel and urinary symptoms. patient recently had Afib ablation by Dr. Quiñones in June 2018. patient had stress test in April which was normal.     ROS:  All other systems reviewed and are negative    PAST MEDICAL & SURGICAL HISTORY  Smoker  BPH (benign prostatic hyperplasia)  HTN (hypertension)  Seizures  Migraines  Pneumonia  MS (multiple sclerosis)  CAD (coronary artery disease)  Bipolar disorder  Anginal pain  Schizo affective schizophrenia  GERD (gastroesophageal reflux disease)  Seasonal allergies  HTN (hypertension)  Hypercholesteremia  COPD (chronic obstructive pulmonary disease)  Neuropathy  Atrial flutter  CVA (cerebral vascular accident)  TIA (transient ischemic attack)  Syncope  Peripheral Neuropathy  S/P Implantation of Automatic  Personal History of Multiple S  Personal History of Mitral Nicki  Personal History of MI (Myocar  Personal History of Congestive  Personal History of Cardiomyop  Personal History of Atrial Fib  Personal History of Alcoholism  Clinical Depression  H/O hernia repair: right 1972,1991  History of hip replacement: x 6 times right hip (kept popping out)  S/P Orchiectomy: L for testicular CA  SVT (Supraventricular Tachycar  GERD (Gastroesophageal Reflux  S/P Implantation of AICD      FAMILY HISTORY:  FAMILY HISTORY:  Family history of cervical cancer (Mother)  Family history of early CAD (Mother)      SOCIAL HISTORY:  patient smokes half pack a day     ALLERGIES:  dexmethylphenidate (Unknown)  Dilantin (Rash)  dilantin, compazine, neurontin, ritalin, phenergan (Unknown)  Haldol (Unknown)  hydantoin derivatives (Other)  methylphenidate (Unknown)  Morphine Sulfate (Unknown)  phenytoin (Unknown)  prochlorperazine (Unknown)  promethazine (Dystonic RXN)  rash/hives (Anaphylaxis)  thioxanthenes (Unknown)      MEDICATIONS:  MEDICATIONS  (STANDING):    MEDICATIONS  (PRN):      HOME MEDICATIONS:  Home Medications:  acetaminophen 650 mg oral tablet: 650 milligram(s) orally 4 times a day, As Needed (09 May 2018 13:04)  aspirin 81 mg oral tablet, chewable: 1 tab(s) orally once a day (09 May 2018 13:04)  atorvastatin 40 mg oral tablet: 1 tab(s) orally once a day (at bedtime) (09 May 2018 13:04)  baclofen 10 mg oral tablet: 1 tab(s) orally 3 times a day (09 May 2018 13:04)  Eliquis 5 mg oral tablet: 1 tab(s) orally 2 times a day (09 May 2018 13:04)  gabapentin 400 mg oral capsule: 1 cap(s) orally 3 times a day (09 May 2018 13:04)  ipratropium 18 mcg/inh inhalation aerosol: 1 application inhaled 4 times a day, As Needed (09 May 2018 13:04)  lamoTRIgine 100 mg oral tablet: 1 tab(s) orally 2 times a day (09 May 2018 13:04)  Lasix 20 mg oral tablet: 1 tab(s) orally 3 times a week on monday, wednesday and friday (09 May 2018 13:04)  Lasix 40 mg oral tablet: 1 tab(s) orally 3 times a week on tuesday, thursday and saturday (09 May 2018 13:04)  Marinol 5 mg oral capsule: orally once a day (09 May 2018 13:04)  Metoprolol Tartrate 25 mg oral tablet: 1 tab(s) orally 2 times a day (09 May 2018 13:04)  Mi-Acid oral suspension: 30 milliliter(s) orally 4 times a day, As Needed (09 May 2018 13:04)  nitroglycerin: 1 tab(s) sublingual 3 times a day, As Needed for chest pain (09 May 2018 13:04)  omeprazole 20 mg oral delayed release capsule: 1 cap(s) orally 2 times a day (09 May 2018 13:04)  Percocet 5/325 oral tablet: 1 tab(s) orally every 6 hours (09 May 2018 13:04)  QUEtiapine 25 mg oral tablet: 1 tab(s) orally 2 times a day (09 May 2018 13:04)  Tecfidera 240 mg oral delayed release capsule: 1 cap(s) orally once a day (09 May 2018 13:04)  Tikosyn 250 mcg oral capsule: 1 cap(s) orally 2 times a day (09 May 2018 13:04)      VITALS:   T(F): 96.9 (08-02 @ 11:30), Max: 98 (08-02 @ 04:41)  HR: 60 (08-02 @ 11:30) (60 - 65)  BP: 109/65 (08-02 @ 11:30) (99/55 - 109/65)  BP(mean): --  RR: 18 (08-02 @ 11:30) (18 - 18)  SpO2: 97% (08-02 @ 07:33) (97% - 98%)    I&O's Summary      PHYSICAL EXAM:  GEN: Alert and oriented X 3, Well nourished, No acute distress  NECK: Supple, no JVD  LUNGS: Clear to auscultation bilaterally  CARDIOVASCULAR: S1/S2 regular , no murmus or rubs  ABD: Soft, non-tender  EXT: No Lower extremity edema/cyanosis  NEURO: Non focal  SKIN: Intact    LABS:                        12.0   6.09  )-----------( 218      ( 02 Aug 2018 05:23 )             35.9     08-02    143  |  103  |  17  ----------------------------<  93  4.1   |  26  |  1.1    Ca    9.1      02 Aug 2018 05:23    TPro  6.5  /  Alb  4.4  /  TBili  0.3  /  DBili  x   /  AST  20  /  ALT  22  /  AlkPhos  126<H>  08-02    PT/INR - ( 02 Aug 2018 05:23 )   PT: 15.40 sec;   INR: 1.43 ratio         PTT - ( 02 Aug 2018 05:23 )  PTT:46.0 sec  Creatine Kinase, Serum: 108 U/L (08-02-18 @ 05:23)  Troponin T, Serum: <0.01 ng/mL (08-02-18 @ 05:23)    CARDIAC MARKERS ( 02 Aug 2018 05:23 )  x     / <0.01 ng/mL / 108 U/L / x     / 1.3 ng/mL        Serum Pro-Brain Natriuretic Peptide: 17 pg/mL (08-02-18 @ 05:23)            RADIOLOGY:  -CXR:  < from: Xray Chest 1 View AP/PA (08.02.18 @ 06:33) >    Impression:      No radiographic evidence of acute pulmonary disease.          -TTE:    < from: Transthoracic Echocardiogram w/ Doppler (01.22.10 @ 14:00) >  Conclusions:  1. Low normal left ventricular function.  Normal LV  internal dimensions and wall thicknesses.  2. Right ventricular enlargement with mildly decreased RV  function.  A pacemaker wire is visualized in the right  ventricle.  3. Minimal mitral regurgitation.  4. Mild-moderate tricuspid regurgitation. Estimated  pulmonary artery systolic pressure equals 33 mm Hg,  assuming right atrial pressure equals 10  mm Hg.    < end of copied text >      -STRESS TEST:  < from: NM Nuclear Stress Pharmacologic Multiple (04.21.18 @ 10:41) >  Impression:   1. NORMAL LEXISCAN / REST MYOCARDIAL PERFUSION TOMOGRAPHY, WITH NO   EVIDENCE FOR ISCHEMIA DURING LEXISCAN INFUSION.   2. NORMAL RESTING LEFT VENTRICULAR WALL MOTION AND WALL THICKENING.  3. LEFT VENTRICULAR EJECTION FRACTION OF  61 % WHICH IS WITHIN RANGE OF   NORMAL.     < end of copied text >        ECG:    < from: 12 Lead ECG (08.02.18 @ 04:48) >  Ventricular Rate 60 BPM    Atrial Rate 59 BPM    P-R Interval 232 ms    QRS Duration 156 ms    Q-T Interval 442 ms    QTC Calculation(Bezet) 442 ms    P Axis -43 degrees    R Axis 4 degrees    T Axis 7 degrees    Diagnosis Line Atrial-paced rhythm with prolonged AV conduction  Right bundle branch block  T wave abnormality, consider lateral ischemia  Abnormal ECG

## 2018-08-03 ENCOUNTER — TRANSCRIPTION ENCOUNTER (OUTPATIENT)
Age: 48
End: 2018-08-03

## 2018-08-03 PROBLEM — F17.200 NICOTINE DEPENDENCE, UNSPECIFIED, UNCOMPLICATED: Chronic | Status: INACTIVE | Noted: 2018-06-21 | Resolved: 2018-08-02

## 2018-08-03 PROBLEM — I10 ESSENTIAL (PRIMARY) HYPERTENSION: Chronic | Status: INACTIVE | Noted: 2018-05-09 | Resolved: 2018-08-02

## 2018-08-03 PROBLEM — G62.9 POLYNEUROPATHY, UNSPECIFIED: Chronic | Status: INACTIVE | Noted: 2018-05-09 | Resolved: 2018-08-02

## 2018-08-03 PROBLEM — I48.92 UNSPECIFIED ATRIAL FLUTTER: Chronic | Status: INACTIVE | Noted: 2018-05-09 | Resolved: 2018-08-02

## 2018-08-03 LAB
ANION GAP SERPL CALC-SCNC: 14 MMOL/L — SIGNIFICANT CHANGE UP (ref 7–14)
BUN SERPL-MCNC: 19 MG/DL — SIGNIFICANT CHANGE UP (ref 10–20)
CALCIUM SERPL-MCNC: 9.2 MG/DL — SIGNIFICANT CHANGE UP (ref 8.5–10.1)
CHLORIDE SERPL-SCNC: 102 MMOL/L — SIGNIFICANT CHANGE UP (ref 98–110)
CO2 SERPL-SCNC: 26 MMOL/L — SIGNIFICANT CHANGE UP (ref 17–32)
CREAT SERPL-MCNC: 1 MG/DL — SIGNIFICANT CHANGE UP (ref 0.7–1.5)
GLUCOSE SERPL-MCNC: 95 MG/DL — SIGNIFICANT CHANGE UP (ref 70–99)
HCT VFR BLD CALC: 40 % — LOW (ref 42–52)
HGB BLD-MCNC: 13.2 G/DL — LOW (ref 14–18)
MCHC RBC-ENTMCNC: 29.9 PG — SIGNIFICANT CHANGE UP (ref 27–31)
MCHC RBC-ENTMCNC: 33 G/DL — SIGNIFICANT CHANGE UP (ref 32–37)
MCV RBC AUTO: 90.5 FL — SIGNIFICANT CHANGE UP (ref 80–94)
NRBC # BLD: 0 /100 WBCS — SIGNIFICANT CHANGE UP (ref 0–0)
PLATELET # BLD AUTO: 211 K/UL — SIGNIFICANT CHANGE UP (ref 130–400)
POTASSIUM SERPL-MCNC: 4.6 MMOL/L — SIGNIFICANT CHANGE UP (ref 3.5–5)
POTASSIUM SERPL-SCNC: 4.6 MMOL/L — SIGNIFICANT CHANGE UP (ref 3.5–5)
RBC # BLD: 4.42 M/UL — LOW (ref 4.7–6.1)
RBC # FLD: 13 % — SIGNIFICANT CHANGE UP (ref 11.5–14.5)
SODIUM SERPL-SCNC: 142 MMOL/L — SIGNIFICANT CHANGE UP (ref 135–146)
WBC # BLD: 5.77 K/UL — SIGNIFICANT CHANGE UP (ref 4.8–10.8)
WBC # FLD AUTO: 5.77 K/UL — SIGNIFICANT CHANGE UP (ref 4.8–10.8)

## 2018-08-03 RX ORDER — QUETIAPINE FUMARATE 200 MG/1
50 TABLET, FILM COATED ORAL AT BEDTIME
Qty: 0 | Refills: 0 | Status: DISCONTINUED | OUTPATIENT
Start: 2018-08-03 | End: 2018-08-06

## 2018-08-03 RX ORDER — QUETIAPINE FUMARATE 200 MG/1
25 TABLET, FILM COATED ORAL DAILY
Qty: 0 | Refills: 0 | Status: DISCONTINUED | OUTPATIENT
Start: 2018-08-03 | End: 2018-08-06

## 2018-08-03 RX ADMIN — Medication 2 MILLIGRAM(S): at 19:49

## 2018-08-03 RX ADMIN — DOFETILIDE 250 MICROGRAM(S): 0.25 CAPSULE ORAL at 18:32

## 2018-08-03 RX ADMIN — OXYCODONE AND ACETAMINOPHEN 1 TABLET(S): 5; 325 TABLET ORAL at 19:43

## 2018-08-03 RX ADMIN — ATORVASTATIN CALCIUM 40 MILLIGRAM(S): 80 TABLET, FILM COATED ORAL at 23:59

## 2018-08-03 RX ADMIN — Medication 5 MILLIGRAM(S): at 12:56

## 2018-08-03 RX ADMIN — Medication 81 MILLIGRAM(S): at 13:01

## 2018-08-03 RX ADMIN — DOFETILIDE 250 MICROGRAM(S): 0.25 CAPSULE ORAL at 05:46

## 2018-08-03 RX ADMIN — Medication 2 MILLIGRAM(S): at 15:14

## 2018-08-03 RX ADMIN — LAMOTRIGINE 100 MILLIGRAM(S): 25 TABLET, ORALLY DISINTEGRATING ORAL at 05:41

## 2018-08-03 RX ADMIN — OXYCODONE AND ACETAMINOPHEN 1 TABLET(S): 5; 325 TABLET ORAL at 13:00

## 2018-08-03 RX ADMIN — GABAPENTIN 400 MILLIGRAM(S): 400 CAPSULE ORAL at 05:42

## 2018-08-03 RX ADMIN — OXYCODONE AND ACETAMINOPHEN 1 TABLET(S): 5; 325 TABLET ORAL at 16:18

## 2018-08-03 RX ADMIN — LAMOTRIGINE 100 MILLIGRAM(S): 25 TABLET, ORALLY DISINTEGRATING ORAL at 18:32

## 2018-08-03 RX ADMIN — Medication 10 MILLIGRAM(S): at 23:59

## 2018-08-03 RX ADMIN — Medication 25 MILLIGRAM(S): at 18:32

## 2018-08-03 RX ADMIN — QUETIAPINE FUMARATE 25 MILLIGRAM(S): 200 TABLET, FILM COATED ORAL at 12:56

## 2018-08-03 RX ADMIN — OXYCODONE AND ACETAMINOPHEN 1 TABLET(S): 5; 325 TABLET ORAL at 20:17

## 2018-08-03 RX ADMIN — Medication 10 MILLIGRAM(S): at 05:41

## 2018-08-03 RX ADMIN — Medication 2 MILLIGRAM(S): at 18:36

## 2018-08-03 RX ADMIN — Medication 10 MILLIGRAM(S): at 15:13

## 2018-08-03 RX ADMIN — Medication 20 MILLIGRAM(S): at 12:55

## 2018-08-03 RX ADMIN — Medication 5 MILLIGRAM(S): at 15:56

## 2018-08-03 RX ADMIN — Medication 2 MILLIGRAM(S): at 05:46

## 2018-08-03 RX ADMIN — TAMSULOSIN HYDROCHLORIDE 0.4 MILLIGRAM(S): 0.4 CAPSULE ORAL at 23:59

## 2018-08-03 RX ADMIN — Medication 2 MILLIGRAM(S): at 12:58

## 2018-08-03 RX ADMIN — APIXABAN 5 MILLIGRAM(S): 2.5 TABLET, FILM COATED ORAL at 05:41

## 2018-08-03 RX ADMIN — Medication 2 MILLIGRAM(S): at 23:59

## 2018-08-03 RX ADMIN — GABAPENTIN 400 MILLIGRAM(S): 400 CAPSULE ORAL at 15:19

## 2018-08-03 RX ADMIN — APIXABAN 5 MILLIGRAM(S): 2.5 TABLET, FILM COATED ORAL at 18:32

## 2018-08-03 NOTE — PROGRESS NOTE ADULT - ASSESSMENT
46 yo male with PMHx of HTN, DM (diet controlled), Afib s/p ablation and on eliquis, COPD, MS, CHF, Cardiomyopathy, PTSD, CAD, Bipolar disease w/ depression, BPH, GERD, TIA, HLD, h/o SVT, s/p AICD implantation, presents from [facility] this morning with complaint of chest pain, radiating to the neck and left arm, increased with exertion and relieved by a combination of nitrostat, oxygen and rest, likely cardiac in origin. 1st set of cardiac enzymes negative. Pt had a non diagnostic stress test (04/21/2018).    #Chest pain, typical anginal origin  - Patient currently asymptomatic- EKG showed no new changes and troponin were within normal range  - Cardiology on board- recommended cardiac CT - ordered today   - ECHO pending   - Admit to [facility] for continuous monitoring  - Continue aspirin 81mg daily  - Continue nitrostat SL 0.4mg 3 times a day PRN       #HTN  -Pt. currently normotensive, no additional pharmacological measures required  -Continue metoprolol   -Continue lasix  -DASH diet    #Atrial fibrillation s/p ablation   -Continue Eliquis   -Control rate control with metoprolol  -Continue dofetilide    #COPD  -Continue ipratropium 18mcg inhalation q6hrly PRN  -Continue 2L O2 PRN    #DM  -Diet controlled. Pt reports high glucose was due to steroid therapy for MS  -Check HbA1c  -Check fingerstick glucose AC/HS  -Hold insulin for now. Start lantus insulin if blood glucose is consistenly high (>180mg/dl)  -Carb consistent (no evening snack) diet    #MS  -Continue baclofen for spasticity  -Continue percocet q6hr PRN for pain  -Continue gabapentin  -Pt. takes dimethyl fumarate/ticfidera for MS relapses. Kindly place a non-formulary medication order as the medication is not available.     #GERD  -Protonix 40mg once a day (in place of omeprazole 20mg twice a day and Mi-Acis suspension 4 times a day)    #BPH  -Continue tamsulosin     #Bipolar disease; PTSD  -Continue Quetiapine    #Seizures  -Continue lamotrigine    #History of SVT  -s/p AICD implantation  -Continue dofetilide    #CHF  -Continue Lasix 20mg (monday, wednesday, friday)  -Continue lasix 40mg (Tues, thursday, sat)  -Continue metoprolol    #HLD  -Continue atorvastatin    #CAD  -Continue aspirin 81mg  -Continue atrovastatin    #Miscellaneous  -DVT ppx (on eliquis)  -GI ppx (on protonix)  -Diet: DASH carb consistent diet  -Activity: ambulate as tolerated  -Full code  -Dispo tele

## 2018-08-03 NOTE — DISCHARGE NOTE ADULT - PATIENT PORTAL LINK FT
You can access the [facility] Patient Portal, offered by [facility], by registering with the following website: [email or web address]

## 2018-08-03 NOTE — DISCHARGE NOTE ADULT - CARE PROVIDER_API CALL
Jose Ayoub), Cardiovascular Disease; Interventional Cardiology  [address] [place] [address]  Phone: [phone]  Fax: [phone]

## 2018-08-03 NOTE — DISCHARGE NOTE ADULT - CARE PLAN
Principal Discharge DX:	Anginal pain  Goal:	Prevention of recurrence  Assessment and plan of treatment:	Take your medications as indicated and follow up with your cardiologist

## 2018-08-03 NOTE — DISCHARGE NOTE ADULT - MEDICATION SUMMARY - MEDICATIONS TO TAKE
I will START or STAY ON the medications listed below when I get home from the hospital:    aspirin 81 mg oral tablet, chewable  -- 1 tab(s) by mouth once a day  -- Indication: For Anginal pain    acetaminophen 650 mg oral tablet  -- 650 milligram(s) by mouth 4 times a day, As Needed  -- Indication: For Pain    Percocet 5/325 oral tablet  -- 1 tab(s) by mouth every 6 hours, As Needed  -- Indication: For Pain    Mi-Acid oral suspension  -- 30 milliliter(s) by mouth 4 times a day, As Needed  -- Indication: For GERD    tamsulosin 0.4 mg oral capsule  -- 1 cap(s) by mouth once a day  -- Indication: For BPH    Nitrostat 0.4 mg sublingual tablet  -- sublingual 3 times a day, As Needed; may repeat after 5 min until relief  -- Indication: For Anginal pain    Tikosyn 250 mcg oral capsule  -- 1 cap(s) by mouth 2 times a day  -- Indication: For Arrhythmia    Eliquis 5 mg oral tablet  -- 1 tab(s) by mouth 2 times a day  -- Indication: For Atrial fibrillation    gabapentin 400 mg oral capsule  -- 1 cap(s) by mouth 3 times a day  -- Indication: For MS    lamoTRIgine 100 mg oral tablet  -- 1 tab(s) by mouth 2 times a day  -- Indication: For MS    Marinol 5 mg oral capsule  -- orally once a day  -- Indication: For Nausea    atorvastatin 40 mg oral tablet  -- 1 tab(s) by mouth once a day (at bedtime)  -- Indication: For Dyslipidemia    QUEtiapine 25 mg oral tablet  -- 1 tab(s) by mouth once a day  -- Indication: For MS    Metoprolol Tartrate 25 mg oral tablet  -- 1 tab(s) by mouth 2 times a day  -- Indication: For Arrhythmia    ipratropium 18 mcg/inh inhalation aerosol  -- 1 application inhaled 4 times a day, As Needed  -- Indication: For Asthma    Lasix 20 mg oral tablet  -- 1 tab(s) by mouth 3 times a week on monday, wednesday and friday  -- Indication: For CHF (congestive heart failure)    Lasix 40 mg oral tablet  -- 1 tab(s) by mouth 3 times a week on tuesday, thursday and saturday  -- Indication: For CHF (congestive heart failure)    Tecfidera 240 mg oral delayed release capsule  -- 1 cap(s) by mouth once a day  -- Indication: For MS    baclofen 10 mg oral tablet  -- 1 tab(s) by mouth 3 times a day  -- Indication: For MS    omeprazole 20 mg oral delayed release capsule  -- 1 cap(s) by mouth 2 times a day  -- Indication: For GERD    nicotine 2 mg oral transmucosal gum  -- 2 gum oral transmucosal every 2 hours for 6 weeks as needed (07/24/2018)  -- Indication: For Smoking cessation    oxybutynin 5 mg/24 hours oral tablet, extended release  -- 1 tab(s) by mouth once a day  -- Indication: For MS I will START or STAY ON the medications listed below when I get home from the hospital:    aspirin 81 mg oral tablet, chewable  -- 1 tab(s) by mouth once a day  -- Indication: For Anginal pain    acetaminophen 650 mg oral tablet  -- 650 milligram(s) by mouth 4 times a day, As Needed  -- Indication: For Pain    Percocet 5/325 oral tablet  -- 1 tab(s) by mouth every 6 hours, As Needed  -- Indication: For Pain    Mi-Acid oral suspension  -- 30 milliliter(s) by mouth 4 times a day, As Needed  -- Indication: For GERD    tamsulosin 0.4 mg oral capsule  -- 1 cap(s) by mouth once a day  -- Indication: For BPH    Nitrostat 0.4 mg sublingual tablet  -- sublingual 3 times a day, As Needed; may repeat after 5 min until relief  -- Indication: For Anginal pain    Tikosyn 250 mcg oral capsule  -- 1 cap(s) by mouth 2 times a day  -- Indication: For Arrhythmia    Eliquis 5 mg oral tablet  -- 1 tab(s) by mouth 2 times a day  -- Indication: For Atrial fibrillation    gabapentin 400 mg oral capsule  -- 1 cap(s) by mouth 3 times a day  -- Indication: For MS    lamoTRIgine 100 mg oral tablet  -- 1 tab(s) by mouth 2 times a day  -- Indication: For MS    Marinol 5 mg oral capsule  -- orally once a day  -- Indication: For nausea    atorvastatin 40 mg oral tablet  -- 1 tab(s) by mouth once a day (at bedtime)  -- Indication: For Dyslipidemia    QUEtiapine 25 mg oral tablet  -- 1 tab(s) by mouth once a day  -- Indication: For MS    Metoprolol Tartrate 25 mg oral tablet  -- 1 tab(s) by mouth 2 times a day  -- Indication: For Atrial fibrillation    ipratropium 18 mcg/inh inhalation aerosol  -- 1 application inhaled 4 times a day, As Needed  -- Indication: For Asthma    Lasix 20 mg oral tablet  -- 1 tab(s) by mouth 3 times a week on monday, wednesday and friday  -- Indication: For CHF (congestive heart failure)    Lasix 40 mg oral tablet  -- 1 tab(s) by mouth 3 times a week on tuesday, thursday and saturday  -- Indication: For CHF (congestive heart failure)    Tecfidera 240 mg oral delayed release capsule  -- 1 cap(s) by mouth once a day  -- Indication: For MS    baclofen 10 mg oral tablet  -- 1 tab(s) by mouth 3 times a day  -- Indication: For MS    omeprazole 20 mg oral delayed release capsule  -- 1 cap(s) by mouth 2 times a day  -- Indication: For GERD    nicotine 2 mg oral transmucosal gum  -- 2 gum oral transmucosal every 2 hours for 6 weeks as needed (07/24/2018)  -- Indication: For Smoking cessation    oxybutynin 5 mg/24 hours oral tablet, extended release  -- 1 tab(s) by mouth once a day  -- Indication: For MS

## 2018-08-03 NOTE — DISCHARGE NOTE ADULT - HOSPITAL COURSE
48 yo male with PMHx of HTN, DM (diet controlled), Afib s/p ablation and on eliquis, COPD, MS, CHF, Cardiomyopathy, PTSD, CAD, Bipolar disease w/ depression, BPH, GERD, TIA, HLD, h/o SVT, s/p AICD implantation, presents from [facility] this morning with complaint of chest pain. Pt started having pain this morning while he was working on the computer, thought it was due to GERD, took Mylanta, but without any relief, experienced increase in pain while moving and going to the bathroom, so took 2 Nitrostat tabs, which provided a little relief. The pain was located in the mid chest, lasted 2-3 hours, on and off during this time period, about 7 in intensity at rest and increased to about 8/10 during exertion and felt like a twisting and squeezing kind of sensation. Pain radiated to the neck and the left arm and was relieved by a combination of Nitrostat, oxygen and rest. The pain was associated with some shortness of breath, palpitations and flushing of face. No history of chest trauma, dizziness, LOC, fever, chills, cough, recent h/o sore throat or recent travel. Patient has had previous episodes of similar pain which got better with Nitrostat. Patient reports increased shortness of breath with exertion, he could walk long distances before getting sob but in the past 5-6 months, he can walk only about 10-15 ft before getting short of breath (pt attributes it to a combination of COPD and MS, and pulmonologist prescribed him 2L O2 PRN). Pt had a stress test a couple of months ago which was non-diagnostic as per pt. 1st set of cardiac enzymes was negative.   Pt. has right side UE and LE extremity weakness at the baseline due to MS. No new onset numbness, tingling. Pt reports decreased vision (optic neuritis), increased pain and spasticity in the legs, shoulders and back bilaterally due to MS and wants to follow with a neurologist outpatient.     Hospital Course  EKG was done which was negative for new changes. Cardiac enzymes were within normal range. Patient was seen by cardiology who recommended Cardiac CT. 48 yo male with PMHx of HTN, DM (diet controlled), Afib s/p ablation and on eliquis, COPD, MS, CHF, Cardiomyopathy, PTSD, CAD, Bipolar disease w/ depression, BPH, GERD, TIA, HLD, h/o SVT, s/p AICD implantation, presents from [facility] this morning with complaint of chest pain. Pt started having pain this morning while he was working on the computer, thought it was due to GERD, took Mylanta, but without any relief, experienced increase in pain while moving and going to the bathroom, so took 2 Nitrostat tabs, which provided a little relief. The pain was located in the mid chest, lasted 2-3 hours, on and off during this time period, about 7 in intensity at rest and increased to about 8/10 during exertion and felt like a twisting and squeezing kind of sensation. Pain radiated to the neck and the left arm and was relieved by a combination of Nitrostat, oxygen and rest. The pain was associated with some shortness of breath, palpitations and flushing of face. No history of chest trauma, dizziness, LOC, fever, chills, cough, recent h/o sore throat or recent travel. Patient has had previous episodes of similar pain which got better with Nitrostat. Patient reports increased shortness of breath with exertion, he could walk long distances before getting sob but in the past 5-6 months, he can walk only about 10-15 ft before getting short of breath (pt attributes it to a combination of COPD and MS, and pulmonologist prescribed him 2L O2 PRN). Pt had a stress test a couple of months ago which was non-diagnostic as per pt. 1st set of cardiac enzymes was negative.   Pt. has right side UE and LE extremity weakness at the baseline due to MS. No new onset numbness, tingling. Pt reports decreased vision (optic neuritis), increased pain and spasticity in the legs, shoulders and back bilaterally due to MS and wants to follow with a neurologist outpatient.   EKG was done which was negative for new changes. Cardiac enzymes were within normal range. Patient was seen by cardiology who recommended Cardiac CT. CT heart showed coronary calcium scores of 0 in L main, LAD, L circumflex, R coronary, Total calcium score was 0. ORTIZ percentile rank was 0. Pulmonary artery was dilated to 3.4, Mid RCA assessment was limited due to breathing. CAD-RAD: 0. Per cardiology, patient is stable for discharge, given low risk. Throughout patient's stay, his chest pain resolved.

## 2018-08-04 LAB
ANION GAP SERPL CALC-SCNC: 16 MMOL/L — HIGH (ref 7–14)
BASOPHILS # BLD AUTO: 0.08 K/UL — SIGNIFICANT CHANGE UP (ref 0–0.2)
BASOPHILS NFR BLD AUTO: 1.2 % — HIGH (ref 0–1)
BUN SERPL-MCNC: 26 MG/DL — HIGH (ref 10–20)
CALCIUM SERPL-MCNC: 9.5 MG/DL — SIGNIFICANT CHANGE UP (ref 8.5–10.1)
CHLORIDE SERPL-SCNC: 101 MMOL/L — SIGNIFICANT CHANGE UP (ref 98–110)
CO2 SERPL-SCNC: 25 MMOL/L — SIGNIFICANT CHANGE UP (ref 17–32)
CREAT SERPL-MCNC: 1.1 MG/DL — SIGNIFICANT CHANGE UP (ref 0.7–1.5)
EOSINOPHIL # BLD AUTO: 0.18 K/UL — SIGNIFICANT CHANGE UP (ref 0–0.7)
EOSINOPHIL NFR BLD AUTO: 2.6 % — SIGNIFICANT CHANGE UP (ref 0–8)
GLUCOSE SERPL-MCNC: 111 MG/DL — HIGH (ref 70–99)
HCT VFR BLD CALC: 41.8 % — LOW (ref 42–52)
HGB BLD-MCNC: 13.8 G/DL — LOW (ref 14–18)
IMM GRANULOCYTES NFR BLD AUTO: 0.4 % — HIGH (ref 0.1–0.3)
LYMPHOCYTES # BLD AUTO: 2.22 K/UL — SIGNIFICANT CHANGE UP (ref 1.2–3.4)
LYMPHOCYTES # BLD AUTO: 32.6 % — SIGNIFICANT CHANGE UP (ref 20.5–51.1)
MAGNESIUM SERPL-MCNC: 1.9 MG/DL — SIGNIFICANT CHANGE UP (ref 1.8–2.4)
MCHC RBC-ENTMCNC: 29.8 PG — SIGNIFICANT CHANGE UP (ref 27–31)
MCHC RBC-ENTMCNC: 33 G/DL — SIGNIFICANT CHANGE UP (ref 32–37)
MCV RBC AUTO: 90.3 FL — SIGNIFICANT CHANGE UP (ref 80–94)
MONOCYTES # BLD AUTO: 0.59 K/UL — SIGNIFICANT CHANGE UP (ref 0.1–0.6)
MONOCYTES NFR BLD AUTO: 8.7 % — SIGNIFICANT CHANGE UP (ref 1.7–9.3)
NEUTROPHILS # BLD AUTO: 3.72 K/UL — SIGNIFICANT CHANGE UP (ref 1.4–6.5)
NEUTROPHILS NFR BLD AUTO: 54.5 % — SIGNIFICANT CHANGE UP (ref 42.2–75.2)
NRBC # BLD: 0 /100 WBCS — SIGNIFICANT CHANGE UP (ref 0–0)
PLATELET # BLD AUTO: 224 K/UL — SIGNIFICANT CHANGE UP (ref 130–400)
POTASSIUM SERPL-MCNC: 4 MMOL/L — SIGNIFICANT CHANGE UP (ref 3.5–5)
POTASSIUM SERPL-SCNC: 4 MMOL/L — SIGNIFICANT CHANGE UP (ref 3.5–5)
RBC # BLD: 4.63 M/UL — LOW (ref 4.7–6.1)
RBC # FLD: 13.1 % — SIGNIFICANT CHANGE UP (ref 11.5–14.5)
SODIUM SERPL-SCNC: 142 MMOL/L — SIGNIFICANT CHANGE UP (ref 135–146)
WBC # BLD: 6.82 K/UL — SIGNIFICANT CHANGE UP (ref 4.8–10.8)
WBC # FLD AUTO: 6.82 K/UL — SIGNIFICANT CHANGE UP (ref 4.8–10.8)

## 2018-08-04 RX ADMIN — LAMOTRIGINE 100 MILLIGRAM(S): 25 TABLET, ORALLY DISINTEGRATING ORAL at 18:06

## 2018-08-04 RX ADMIN — GABAPENTIN 400 MILLIGRAM(S): 400 CAPSULE ORAL at 06:22

## 2018-08-04 RX ADMIN — OXYCODONE AND ACETAMINOPHEN 1 TABLET(S): 5; 325 TABLET ORAL at 18:10

## 2018-08-04 RX ADMIN — Medication 2 MILLIGRAM(S): at 06:23

## 2018-08-04 RX ADMIN — Medication 5 MILLIGRAM(S): at 11:27

## 2018-08-04 RX ADMIN — Medication 10 MILLIGRAM(S): at 14:32

## 2018-08-04 RX ADMIN — TAMSULOSIN HYDROCHLORIDE 0.4 MILLIGRAM(S): 0.4 CAPSULE ORAL at 21:15

## 2018-08-04 RX ADMIN — Medication 10 MILLIGRAM(S): at 21:15

## 2018-08-04 RX ADMIN — QUETIAPINE FUMARATE 50 MILLIGRAM(S): 200 TABLET, FILM COATED ORAL at 21:15

## 2018-08-04 RX ADMIN — QUETIAPINE FUMARATE 25 MILLIGRAM(S): 200 TABLET, FILM COATED ORAL at 11:27

## 2018-08-04 RX ADMIN — GABAPENTIN 400 MILLIGRAM(S): 400 CAPSULE ORAL at 00:44

## 2018-08-04 RX ADMIN — Medication 2 MILLIGRAM(S): at 08:48

## 2018-08-04 RX ADMIN — APIXABAN 5 MILLIGRAM(S): 2.5 TABLET, FILM COATED ORAL at 06:20

## 2018-08-04 RX ADMIN — LAMOTRIGINE 100 MILLIGRAM(S): 25 TABLET, ORALLY DISINTEGRATING ORAL at 06:20

## 2018-08-04 RX ADMIN — ATORVASTATIN CALCIUM 40 MILLIGRAM(S): 80 TABLET, FILM COATED ORAL at 21:15

## 2018-08-04 RX ADMIN — Medication 25 MILLIGRAM(S): at 06:22

## 2018-08-04 RX ADMIN — GABAPENTIN 400 MILLIGRAM(S): 400 CAPSULE ORAL at 21:16

## 2018-08-04 RX ADMIN — DOFETILIDE 250 MICROGRAM(S): 0.25 CAPSULE ORAL at 19:41

## 2018-08-04 RX ADMIN — PANTOPRAZOLE SODIUM 40 MILLIGRAM(S): 20 TABLET, DELAYED RELEASE ORAL at 06:21

## 2018-08-04 RX ADMIN — Medication 2 MILLIGRAM(S): at 03:26

## 2018-08-04 RX ADMIN — GABAPENTIN 400 MILLIGRAM(S): 400 CAPSULE ORAL at 16:01

## 2018-08-04 RX ADMIN — OXYCODONE AND ACETAMINOPHEN 1 TABLET(S): 5; 325 TABLET ORAL at 02:18

## 2018-08-04 RX ADMIN — Medication 81 MILLIGRAM(S): at 11:25

## 2018-08-04 RX ADMIN — Medication 10 MILLIGRAM(S): at 06:20

## 2018-08-04 RX ADMIN — DOFETILIDE 250 MICROGRAM(S): 0.25 CAPSULE ORAL at 06:20

## 2018-08-04 RX ADMIN — Medication 40 MILLIGRAM(S): at 08:47

## 2018-08-04 RX ADMIN — Medication 2 MILLIGRAM(S): at 17:13

## 2018-08-04 RX ADMIN — Medication 5 MILLIGRAM(S): at 11:25

## 2018-08-04 RX ADMIN — Medication 2 MILLIGRAM(S): at 14:22

## 2018-08-04 RX ADMIN — APIXABAN 5 MILLIGRAM(S): 2.5 TABLET, FILM COATED ORAL at 19:42

## 2018-08-04 RX ADMIN — Medication 2 MILLIGRAM(S): at 10:39

## 2018-08-04 NOTE — PROGRESS NOTE ADULT - ASSESSMENT
46 yo male with PMHx of HTN, DM (diet controlled), Afib s/p ablation and on eliquis, COPD, MS, CHF, Cardiomyopathy, PTSD, CAD, Bipolar disease w/ depression, BPH, GERD, TIA, HLD, h/o SVT, s/p AICD implantation, presents from [facility] this morning with complaint of chest pain, radiating to the neck and left arm, increased with exertion and relieved by a combination of nitrostat, oxygen and rest, likely cardiac in origin. 1st set of cardiac enzymes negative. Pt had a non diagnostic stress test (04/21/2018).    #Chest pain, typical anginal origin  - Patient currently asymptomatic- EKG showed no new changes and troponin were within normal range  - Cardiology on board- recommended cardiac CT - ordered today   - ECHO pending   - Admit to [facility] for continuous monitoring  - Continue aspirin 81mg daily  - Continue nitrostat SL 0.4mg 3 times a day PRN       #HTN  -Pt. currently normotensive, no additional pharmacological measures required  -Continue metoprolol   -Continue lasix  -DASH diet    #Atrial fibrillation s/p ablation   -Continue Eliquis   -Control rate control with metoprolol  -Continue dofetilide    #COPD  -Continue ipratropium 18mcg inhalation q6hrly PRN  -Continue 2L O2 PRN    #DM  -Diet controlled. Pt reports high glucose was due to steroid therapy for MS  -Check HbA1c  -Check fingerstick glucose AC/HS  -Hold insulin for now. Start lantus insulin if blood glucose is consistenly high (>180mg/dl)  -Carb consistent (no evening snack) diet    #MS  -Continue baclofen for spasticity  -Continue percocet q6hr PRN for pain  -Continue gabapentin  -Pt. takes dimethyl fumarate/ticfidera for MS relapses. Kindly place a non-formulary medication order as the medication is not available.     #GERD  -Protonix 40mg once a day (in place of omeprazole 20mg twice a day and Mi-Acis suspension 4 times a day)    #BPH  -Continue tamsulosin     #Bipolar disease; PTSD  -Continue Quetiapine    #Seizures  -Continue lamotrigine    #History of SVT  -s/p AICD implantation  -Continue dofetilide    #CHF  -Continue Lasix 20mg (monday, wednesday, friday)  -Continue lasix 40mg (Tues, thursday, sat)  -Continue metoprolol    #HLD  -Continue atorvastatin    #CAD  -Continue aspirin 81mg  -Continue atrovastatin    #Miscellaneous  -DVT ppx (on eliquis)  -GI ppx (on protonix)  -Diet: DASH carb consistent diet  -Activity: ambulate as tolerated  -Full code  -Dispo tele

## 2018-08-05 LAB
ANION GAP SERPL CALC-SCNC: 13 MMOL/L — SIGNIFICANT CHANGE UP (ref 7–14)
BASOPHILS # BLD AUTO: 0.07 K/UL — SIGNIFICANT CHANGE UP (ref 0–0.2)
BASOPHILS NFR BLD AUTO: 0.9 % — SIGNIFICANT CHANGE UP (ref 0–1)
BUN SERPL-MCNC: 24 MG/DL — HIGH (ref 10–20)
CALCIUM SERPL-MCNC: 9.8 MG/DL — SIGNIFICANT CHANGE UP (ref 8.5–10.1)
CHLORIDE SERPL-SCNC: 104 MMOL/L — SIGNIFICANT CHANGE UP (ref 98–110)
CO2 SERPL-SCNC: 26 MMOL/L — SIGNIFICANT CHANGE UP (ref 17–32)
CREAT SERPL-MCNC: 1 MG/DL — SIGNIFICANT CHANGE UP (ref 0.7–1.5)
EOSINOPHIL # BLD AUTO: 0.17 K/UL — SIGNIFICANT CHANGE UP (ref 0–0.7)
EOSINOPHIL NFR BLD AUTO: 2.1 % — SIGNIFICANT CHANGE UP (ref 0–8)
GLUCOSE SERPL-MCNC: 112 MG/DL — HIGH (ref 70–99)
HCT VFR BLD CALC: 42 % — SIGNIFICANT CHANGE UP (ref 42–52)
HGB BLD-MCNC: 14 G/DL — SIGNIFICANT CHANGE UP (ref 14–18)
IMM GRANULOCYTES NFR BLD AUTO: 0.6 % — HIGH (ref 0.1–0.3)
LYMPHOCYTES # BLD AUTO: 2.14 K/UL — SIGNIFICANT CHANGE UP (ref 1.2–3.4)
LYMPHOCYTES # BLD AUTO: 26 % — SIGNIFICANT CHANGE UP (ref 20.5–51.1)
MAGNESIUM SERPL-MCNC: 2 MG/DL — SIGNIFICANT CHANGE UP (ref 1.8–2.4)
MCHC RBC-ENTMCNC: 29.7 PG — SIGNIFICANT CHANGE UP (ref 27–31)
MCHC RBC-ENTMCNC: 33.3 G/DL — SIGNIFICANT CHANGE UP (ref 32–37)
MCV RBC AUTO: 89.2 FL — SIGNIFICANT CHANGE UP (ref 80–94)
MONOCYTES # BLD AUTO: 0.6 K/UL — SIGNIFICANT CHANGE UP (ref 0.1–0.6)
MONOCYTES NFR BLD AUTO: 7.3 % — SIGNIFICANT CHANGE UP (ref 1.7–9.3)
NEUTROPHILS # BLD AUTO: 5.19 K/UL — SIGNIFICANT CHANGE UP (ref 1.4–6.5)
NEUTROPHILS NFR BLD AUTO: 63.1 % — SIGNIFICANT CHANGE UP (ref 42.2–75.2)
NRBC # BLD: 0 /100 WBCS — SIGNIFICANT CHANGE UP (ref 0–0)
PLATELET # BLD AUTO: 213 K/UL — SIGNIFICANT CHANGE UP (ref 130–400)
POTASSIUM SERPL-MCNC: 4.8 MMOL/L — SIGNIFICANT CHANGE UP (ref 3.5–5)
POTASSIUM SERPL-SCNC: 4.8 MMOL/L — SIGNIFICANT CHANGE UP (ref 3.5–5)
RBC # BLD: 4.71 M/UL — SIGNIFICANT CHANGE UP (ref 4.7–6.1)
RBC # FLD: 12.7 % — SIGNIFICANT CHANGE UP (ref 11.5–14.5)
SODIUM SERPL-SCNC: 143 MMOL/L — SIGNIFICANT CHANGE UP (ref 135–146)
WBC # BLD: 8.22 K/UL — SIGNIFICANT CHANGE UP (ref 4.8–10.8)
WBC # FLD AUTO: 8.22 K/UL — SIGNIFICANT CHANGE UP (ref 4.8–10.8)

## 2018-08-05 RX ADMIN — GABAPENTIN 400 MILLIGRAM(S): 400 CAPSULE ORAL at 06:10

## 2018-08-05 RX ADMIN — Medication 81 MILLIGRAM(S): at 11:56

## 2018-08-05 RX ADMIN — LAMOTRIGINE 100 MILLIGRAM(S): 25 TABLET, ORALLY DISINTEGRATING ORAL at 17:30

## 2018-08-05 RX ADMIN — Medication 2 MILLIGRAM(S): at 14:15

## 2018-08-05 RX ADMIN — Medication 2 MILLIGRAM(S): at 21:31

## 2018-08-05 RX ADMIN — DOFETILIDE 250 MICROGRAM(S): 0.25 CAPSULE ORAL at 06:09

## 2018-08-05 RX ADMIN — PANTOPRAZOLE SODIUM 40 MILLIGRAM(S): 20 TABLET, DELAYED RELEASE ORAL at 06:10

## 2018-08-05 RX ADMIN — Medication 5 MILLIGRAM(S): at 11:56

## 2018-08-05 RX ADMIN — Medication 10 MILLIGRAM(S): at 14:37

## 2018-08-05 RX ADMIN — DOFETILIDE 250 MICROGRAM(S): 0.25 CAPSULE ORAL at 17:30

## 2018-08-05 RX ADMIN — Medication 10 MILLIGRAM(S): at 06:08

## 2018-08-05 RX ADMIN — OXYCODONE AND ACETAMINOPHEN 1 TABLET(S): 5; 325 TABLET ORAL at 06:08

## 2018-08-05 RX ADMIN — APIXABAN 5 MILLIGRAM(S): 2.5 TABLET, FILM COATED ORAL at 17:30

## 2018-08-05 RX ADMIN — GABAPENTIN 400 MILLIGRAM(S): 400 CAPSULE ORAL at 14:38

## 2018-08-05 RX ADMIN — Medication 2 MILLIGRAM(S): at 11:54

## 2018-08-05 RX ADMIN — Medication 5 MILLIGRAM(S): at 12:12

## 2018-08-05 RX ADMIN — APIXABAN 5 MILLIGRAM(S): 2.5 TABLET, FILM COATED ORAL at 06:08

## 2018-08-05 RX ADMIN — Medication 10 MILLIGRAM(S): at 21:30

## 2018-08-05 RX ADMIN — Medication 2 MILLIGRAM(S): at 17:29

## 2018-08-05 RX ADMIN — Medication 25 MILLIGRAM(S): at 17:30

## 2018-08-05 RX ADMIN — OXYCODONE AND ACETAMINOPHEN 1 TABLET(S): 5; 325 TABLET ORAL at 21:30

## 2018-08-05 RX ADMIN — QUETIAPINE FUMARATE 25 MILLIGRAM(S): 200 TABLET, FILM COATED ORAL at 11:55

## 2018-08-05 RX ADMIN — Medication 2 MILLIGRAM(S): at 06:10

## 2018-08-05 RX ADMIN — LAMOTRIGINE 100 MILLIGRAM(S): 25 TABLET, ORALLY DISINTEGRATING ORAL at 06:08

## 2018-08-05 RX ADMIN — OXYCODONE AND ACETAMINOPHEN 1 TABLET(S): 5; 325 TABLET ORAL at 12:19

## 2018-08-05 RX ADMIN — GABAPENTIN 400 MILLIGRAM(S): 400 CAPSULE ORAL at 21:31

## 2018-08-05 RX ADMIN — TAMSULOSIN HYDROCHLORIDE 0.4 MILLIGRAM(S): 0.4 CAPSULE ORAL at 21:30

## 2018-08-05 RX ADMIN — QUETIAPINE FUMARATE 50 MILLIGRAM(S): 200 TABLET, FILM COATED ORAL at 21:30

## 2018-08-05 RX ADMIN — Medication 2 MILLIGRAM(S): at 18:44

## 2018-08-05 RX ADMIN — ATORVASTATIN CALCIUM 40 MILLIGRAM(S): 80 TABLET, FILM COATED ORAL at 21:30

## 2018-08-05 RX ADMIN — OXYCODONE AND ACETAMINOPHEN 1 TABLET(S): 5; 325 TABLET ORAL at 22:00

## 2018-08-05 RX ADMIN — Medication 25 MILLIGRAM(S): at 06:08

## 2018-08-06 VITALS
RESPIRATION RATE: 18 BRPM | DIASTOLIC BLOOD PRESSURE: 50 MMHG | SYSTOLIC BLOOD PRESSURE: 96 MMHG | TEMPERATURE: 97 F | HEART RATE: 60 BPM

## 2018-08-06 LAB
ANION GAP SERPL CALC-SCNC: 15 MMOL/L — HIGH (ref 7–14)
BASOPHILS # BLD AUTO: 0.09 K/UL — SIGNIFICANT CHANGE UP (ref 0–0.2)
BASOPHILS NFR BLD AUTO: 1.4 % — HIGH (ref 0–1)
BUN SERPL-MCNC: 27 MG/DL — HIGH (ref 10–20)
CALCIUM SERPL-MCNC: 9.8 MG/DL — SIGNIFICANT CHANGE UP (ref 8.5–10.1)
CHLORIDE SERPL-SCNC: 97 MMOL/L — LOW (ref 98–110)
CO2 SERPL-SCNC: 25 MMOL/L — SIGNIFICANT CHANGE UP (ref 17–32)
CREAT SERPL-MCNC: 1 MG/DL — SIGNIFICANT CHANGE UP (ref 0.7–1.5)
EOSINOPHIL # BLD AUTO: 0.16 K/UL — SIGNIFICANT CHANGE UP (ref 0–0.7)
EOSINOPHIL NFR BLD AUTO: 2.5 % — SIGNIFICANT CHANGE UP (ref 0–8)
GLUCOSE SERPL-MCNC: 98 MG/DL — SIGNIFICANT CHANGE UP (ref 70–99)
HCT VFR BLD CALC: 39.5 % — LOW (ref 42–52)
HGB BLD-MCNC: 13.1 G/DL — LOW (ref 14–18)
IMM GRANULOCYTES NFR BLD AUTO: 0.5 % — HIGH (ref 0.1–0.3)
LYMPHOCYTES # BLD AUTO: 2.19 K/UL — SIGNIFICANT CHANGE UP (ref 1.2–3.4)
LYMPHOCYTES # BLD AUTO: 33.8 % — SIGNIFICANT CHANGE UP (ref 20.5–51.1)
MAGNESIUM SERPL-MCNC: 1.9 MG/DL — SIGNIFICANT CHANGE UP (ref 1.8–2.4)
MCHC RBC-ENTMCNC: 29.9 PG — SIGNIFICANT CHANGE UP (ref 27–31)
MCHC RBC-ENTMCNC: 33.2 G/DL — SIGNIFICANT CHANGE UP (ref 32–37)
MCV RBC AUTO: 90.2 FL — SIGNIFICANT CHANGE UP (ref 80–94)
MONOCYTES # BLD AUTO: 0.54 K/UL — SIGNIFICANT CHANGE UP (ref 0.1–0.6)
MONOCYTES NFR BLD AUTO: 8.3 % — SIGNIFICANT CHANGE UP (ref 1.7–9.3)
NEUTROPHILS # BLD AUTO: 3.47 K/UL — SIGNIFICANT CHANGE UP (ref 1.4–6.5)
NEUTROPHILS NFR BLD AUTO: 53.5 % — SIGNIFICANT CHANGE UP (ref 42.2–75.2)
NRBC # BLD: 0 /100 WBCS — SIGNIFICANT CHANGE UP (ref 0–0)
PLATELET # BLD AUTO: 198 K/UL — SIGNIFICANT CHANGE UP (ref 130–400)
POTASSIUM SERPL-MCNC: 4.1 MMOL/L — SIGNIFICANT CHANGE UP (ref 3.5–5)
POTASSIUM SERPL-SCNC: 4.1 MMOL/L — SIGNIFICANT CHANGE UP (ref 3.5–5)
RBC # BLD: 4.38 M/UL — LOW (ref 4.7–6.1)
RBC # FLD: 12.8 % — SIGNIFICANT CHANGE UP (ref 11.5–14.5)
SODIUM SERPL-SCNC: 137 MMOL/L — SIGNIFICANT CHANGE UP (ref 135–146)
WBC # BLD: 6.48 K/UL — SIGNIFICANT CHANGE UP (ref 4.8–10.8)
WBC # FLD AUTO: 6.48 K/UL — SIGNIFICANT CHANGE UP (ref 4.8–10.8)

## 2018-08-06 RX ORDER — DOCUSATE SODIUM 100 MG
100 CAPSULE ORAL
Qty: 0 | Refills: 0 | Status: DISCONTINUED | OUTPATIENT
Start: 2018-08-06 | End: 2018-08-06

## 2018-08-06 RX ORDER — SENNA PLUS 8.6 MG/1
1 TABLET ORAL AT BEDTIME
Qty: 0 | Refills: 0 | Status: DISCONTINUED | OUTPATIENT
Start: 2018-08-06 | End: 2018-08-06

## 2018-08-06 RX ADMIN — PANTOPRAZOLE SODIUM 40 MILLIGRAM(S): 20 TABLET, DELAYED RELEASE ORAL at 06:07

## 2018-08-06 RX ADMIN — Medication 2 MILLIGRAM(S): at 10:51

## 2018-08-06 RX ADMIN — Medication 2 MILLIGRAM(S): at 06:08

## 2018-08-06 RX ADMIN — Medication 2 MILLIGRAM(S): at 07:55

## 2018-08-06 RX ADMIN — Medication 10 MILLIGRAM(S): at 14:04

## 2018-08-06 RX ADMIN — OXYCODONE AND ACETAMINOPHEN 1 TABLET(S): 5; 325 TABLET ORAL at 07:55

## 2018-08-06 RX ADMIN — Medication 10 MILLIGRAM(S): at 06:07

## 2018-08-06 RX ADMIN — APIXABAN 5 MILLIGRAM(S): 2.5 TABLET, FILM COATED ORAL at 06:07

## 2018-08-06 RX ADMIN — Medication 2 MILLIGRAM(S): at 14:02

## 2018-08-06 RX ADMIN — GABAPENTIN 400 MILLIGRAM(S): 400 CAPSULE ORAL at 15:07

## 2018-08-06 RX ADMIN — Medication 20 MILLIGRAM(S): at 09:17

## 2018-08-06 RX ADMIN — QUETIAPINE FUMARATE 25 MILLIGRAM(S): 200 TABLET, FILM COATED ORAL at 11:47

## 2018-08-06 RX ADMIN — Medication 2 MILLIGRAM(S): at 11:47

## 2018-08-06 RX ADMIN — DOFETILIDE 250 MICROGRAM(S): 0.25 CAPSULE ORAL at 06:07

## 2018-08-06 RX ADMIN — Medication 5 MILLIGRAM(S): at 11:56

## 2018-08-06 RX ADMIN — LAMOTRIGINE 100 MILLIGRAM(S): 25 TABLET, ORALLY DISINTEGRATING ORAL at 06:07

## 2018-08-06 RX ADMIN — Medication 5 MILLIGRAM(S): at 11:47

## 2018-08-06 RX ADMIN — Medication 25 MILLIGRAM(S): at 06:07

## 2018-08-06 RX ADMIN — OXYCODONE AND ACETAMINOPHEN 1 TABLET(S): 5; 325 TABLET ORAL at 15:59

## 2018-08-06 RX ADMIN — GABAPENTIN 400 MILLIGRAM(S): 400 CAPSULE ORAL at 06:07

## 2018-08-06 RX ADMIN — Medication 81 MILLIGRAM(S): at 11:46

## 2018-08-06 NOTE — PROGRESS NOTE ADULT - SUBJECTIVE AND OBJECTIVE BOX
Patient is a 47y old  Male who presents with a chief complaint of Chest pain (02 Aug 2018 11:45)      HPI:  48 yo male with PMHx of HTN, DM (diet controlled), Afib s/p ablation and on eliquis, COPD, MS, CHF, Cardiomyopathy, PTSD, CAD, Bipolar disease w/ depression, BPH, GERD, TIA, HLD, h/o SVT, s/p AICD implantation, presents from [facility] this morning with complaint of chest pain. Pt started having pain this morning while he was working on the computer, thought it was due to GERD, took Mylanta, but without any relief, experienced increase in pain while moving and going to the bathroom, so took 2 Nitrostat tabs, which provided a little relief. The pain was located in the mid chest, lasted 2-3 hours, on and off during this time period, about 7 in intensity at rest and increased to about 8/10 during exertion and felt like a twisting and squeezing kind of sensation. Pain radiated to the neck and the left arm and was relieved by a combination of Nitrostat, oxygen and rest. The pain was associated with some shortness of breath, palpitations and flushing of face. No history of chest trauma, dizziness, LOC, fever, chills, cough, recent h/o sore throat or recent travel. Patient has had previous episodes of similar pain which got better with Nitrostat. Patient reports increased shortness of breath with exertion, he could walk long distances before getting sob but in the past 5-6 months, he can walk only about 10-15 ft before getting short of breath (pt attributes it to a combination of COPD and MS, and pulmonologist prescribed him 2L O2 PRN). Pt had a stress test a couple of months ago which was non-diagnostic as per pt. 1st set of cardiac enzymes was negative.   Pt. has right side UE and LE extremity weakness at the baseline due to MS. No new onset numbness, tingling. Pt reports decreased vision (optic neuritis), increased pain and spasticity in the legs, shoulders and back bilaterally due to MS and wants to follow with a neurologist outpatient. (02 Aug 2018 11:45)      PAST MEDICAL & SURGICAL HISTORY:  PTSD (post-traumatic stress disorder)  Supraventricular arrhythmia: prior history  Cardiomyopathy  CHF (congestive heart failure)  Atrial fibrillation: s/p ablation, on eliquis  Diabetes mellitus: diet controlled  BPH (benign prostatic hyperplasia)  HTN (hypertension)  Seizures  Migraines  Pneumonia  MS (multiple sclerosis)  CAD (coronary artery disease)  Bipolar disorder  Anginal pain  Schizo affective schizophrenia  GERD (gastroesophageal reflux disease)  Seasonal allergies  Hypercholesteremia  COPD (chronic obstructive pulmonary disease)  CVA (cerebral vascular accident)  TIA (transient ischemic attack)  Syncope  Peripheral Neuropathy  Clinical Depression  H/O prior ablation treatment: for Afib  Cardiac pacemaker recipient  H/O hernia repair: right 1972,1991  History of hip replacement  S/P Orchiectomy: L for testicular CA  S/P Implantation of AICD      Home Medications:  acetaminophen 650 mg oral tablet: 650 milligram(s) orally 4 times a day, As Needed (02 Aug 2018 11:55)  aspirin 81 mg oral tablet, chewable: 1 tab(s) orally once a day (02 Aug 2018 11:55)  atorvastatin 40 mg oral tablet: 1 tab(s) orally once a day (at bedtime) (02 Aug 2018 11:55)  baclofen 10 mg oral tablet: 1 tab(s) orally 3 times a day (02 Aug 2018 11:55)  Eliquis 5 mg oral tablet: 1 tab(s) orally 2 times a day (02 Aug 2018 11:55)  gabapentin 400 mg oral capsule: 1 cap(s) orally 3 times a day (02 Aug 2018 11:55)  ipratropium 18 mcg/inh inhalation aerosol: 1 application inhaled 4 times a day, As Needed (02 Aug 2018 11:55)  lamoTRIgine 100 mg oral tablet: 1 tab(s) orally 2 times a day (02 Aug 2018 11:55)  Lasix 20 mg oral tablet: 1 tab(s) orally 3 times a week on monday, wednesday and friday (02 Aug 2018 11:55)  Lasix 40 mg oral tablet: 1 tab(s) orally 3 times a week on tuesday, thursday and saturday (02 Aug 2018 11:55)  Marinol 5 mg oral capsule: orally once a day (02 Aug 2018 11:55)  Metoprolol Tartrate 25 mg oral tablet: 1 tab(s) orally 2 times a day (02 Aug 2018 11:55)  Mi-Acid oral suspension: 30 milliliter(s) orally 4 times a day, As Needed (02 Aug 2018 11:55)  nicotine 2 mg oral transmucosal gum: 2 gum oral transmucosal every 2 hours for 6 weeks as needed (07/24/2018) (02 Aug 2018 11:55)  Nitrostat 0.4 mg sublingual tablet: sublingual 3 times a day, As Needed; may repeat after 5 min until relief (02 Aug 2018 11:55)  omeprazole 20 mg oral delayed release capsule: 1 cap(s) orally 2 times a day (02 Aug 2018 11:55)  oxybutynin 5 mg/24 hours oral tablet, extended release: 1 tab(s) orally once a day (02 Aug 2018 11:55)  Percocet 5/325 oral tablet: 1 tab(s) orally every 6 hours (02 Aug 2018 11:55)  QUEtiapine 25 mg oral tablet: 1 tab(s) orally once a day (02 Aug 2018 11:55)  tamsulosin 0.4 mg oral capsule: 1 cap(s) orally once a day (02 Aug 2018 11:55)  Tecfidera 240 mg oral delayed release capsule: 1 cap(s) orally once a day (02 Aug 2018 11:55)  Tikosyn 250 mcg oral capsule: 1 cap(s) orally 2 times a day (02 Aug 2018 11:55)        ALLERGIES   dexmethylphenidate (Unknown)  Dilantin (Rash)  dilantin, compazine, neurontin, ritalin, phenergan (Unknown)  Haldol (Unknown)  hydantoin derivatives (Other)  methylphenidate (Unknown)  Morphine Sulfate (Unknown)  phenytoin (Unknown)  prochlorperazine (Unknown)  promethazine (Dystonic RXN)  rash/hives (Anaphylaxis)  thioxanthenes (Unknown)      FAMILY HISTORY:  Family history of cervical cancer (Mother)  Family history of early CAD (Mother)    Hospital Medications   apixaban 5 milliGRAM(s) Oral every 12 hours  aspirin  chewable 81 milliGRAM(s) Oral daily  atorvastatin 40 milliGRAM(s) Oral at bedtime  baclofen 10 milliGRAM(s) Oral three times a day  dofetilide 250 MICROGram(s) Oral two times a day  dronabinol 5 milliGRAM(s) Oral daily  furosemide    Tablet 20 milliGRAM(s) Oral <User Schedule>  furosemide    Tablet 40 milliGRAM(s) Oral <User Schedule>  gabapentin 400 milliGRAM(s) Oral three times a day  ipratropium    for Nebulization 500 MICROGram(s) Nebulizer every 6 hours PRN  lamoTRIgine 100 milliGRAM(s) Oral two times a day  metoprolol tartrate 25 milliGRAM(s) Oral two times a day  nicotine  Polacrilex Gum 2 milliGRAM(s) Oral every 2 hours  nitroglycerin     SubLingual 0.4 milliGRAM(s) SubLingual three times a day PRN  oxybutynin 5 milliGRAM(s) Oral daily  oxyCODONE    5 mG/acetaminophen 325 mG 1 Tablet(s) Oral every 6 hours PRN  pantoprazole    Tablet 40 milliGRAM(s) Oral before breakfast  QUEtiapine 25 milliGRAM(s) Oral daily  tamsulosin 0.4 milliGRAM(s) Oral at bedtime      Vital Signs Last 24 Hrs  T(C): 36.6 (03 Aug 2018 07:48), Max: 36.6 (03 Aug 2018 07:48)  T(F): 97.8 (03 Aug 2018 07:48), Max: 97.8 (03 Aug 2018 07:48)  HR: 60 (03 Aug 2018 07:48) (60 - 60)  BP: 121/71 (03 Aug 2018 07:48) (83/50 - 121/71)  BP(mean): --  RR: 18 (03 Aug 2018 07:48) (18 - 20)  SpO2: 98% (03 Aug 2018 07:48) (96% - 98%)    I&O's Summary                            13.2   5.77  )-----------( 211      ( 03 Aug 2018 05:38 )             40.0       08-03    142  |  102  |  19  ----------------------------<  95  4.6   |  26  |  1.0    Ca    9.2      03 Aug 2018 05:38    TPro  6.5  /  Alb  4.4  /  TBili  0.3  /  DBili  x   /  AST  20  /  ALT  22  /  AlkPhos  126<H>  08-02      CARDIAC MARKERS ( 02 Aug 2018 16:58 )  x     / <0.01 ng/mL / x     / x     / x      CARDIAC MARKERS ( 02 Aug 2018 05:23 )  x     / <0.01 ng/mL / 108 U/L / x     / 1.3 ng/mL
CUATE HORNE  47y  Male    Patient is a 47y old  Male who presents with a chief complaint of Chest pain (03 Aug 2018 15:43)    ALLERGIES:  dexmethylphenidate (Unknown)  Dilantin (Rash)  dilantin, compazine, neurontin, ritalin, phenergan (Unknown)  Haldol (Unknown)  hydantoin derivatives (Other)  methylphenidate (Unknown)  Morphine Sulfate (Unknown)  phenytoin (Unknown)  prochlorperazine (Unknown)  promethazine (Dystonic RXN)  rash/hives (Anaphylaxis)  thioxanthenes (Unknown)      INTERVAL HPI/OVERNIGHT EVENTS:    VITALS:  T(F): 97.9 (08-04-18 @ 07:31), Max: 98.2 (08-03-18 @ 20:05)  HR: 59 (08-04-18 @ 07:31) (59 - 60)  BP: 85/50 (08-04-18 @ 07:31) (85/50 - 117/67)  RR: 20 (08-04-18 @ 07:31) (18 - 20)  SpO2: 99% (08-04-18 @ 07:31) (96% - 99%)    LABS:  08-03    142  |  102  |  19  ----------------------------<  95  4.6   |  26  |  1.0    Ca    9.2      03 Aug 2018 05:38      MICROBIOLOGY:    MEDICATION:  apixaban 5 milliGRAM(s) Oral every 12 hours  aspirin  chewable 81 milliGRAM(s) Oral daily  atorvastatin 40 milliGRAM(s) Oral at bedtime  baclofen 10 milliGRAM(s) Oral three times a day  dofetilide 250 MICROGram(s) Oral two times a day  dronabinol 5 milliGRAM(s) Oral daily  furosemide    Tablet 20 milliGRAM(s) Oral <User Schedule>  furosemide    Tablet 40 milliGRAM(s) Oral <User Schedule>  gabapentin 400 milliGRAM(s) Oral three times a day  ipratropium    for Nebulization 500 MICROGram(s) Nebulizer every 6 hours PRN  lamoTRIgine 100 milliGRAM(s) Oral two times a day  metoprolol tartrate 25 milliGRAM(s) Oral two times a day  nicotine  Polacrilex Gum 2 milliGRAM(s) Oral every 2 hours  nitroglycerin     SubLingual 0.4 milliGRAM(s) SubLingual three times a day PRN  oxybutynin 5 milliGRAM(s) Oral daily  oxyCODONE    5 mG/acetaminophen 325 mG 1 Tablet(s) Oral every 6 hours PRN  pantoprazole    Tablet 40 milliGRAM(s) Oral before breakfast  QUEtiapine 50 milliGRAM(s) Oral at bedtime  QUEtiapine 25 milliGRAM(s) Oral daily  tamsulosin 0.4 milliGRAM(s) Oral at bedtime    RADIOLOGY & ADDITIONAL TESTS:
CUATE HORNE  47y  Male    Patient is a 47y old  Male who presents with a chief complaint of Chest pain (03 Aug 2018 15:43)    ALLERGIES:  dexmethylphenidate (Unknown)  Dilantin (Rash)  dilantin, compazine, neurontin, ritalin, phenergan (Unknown)  Haldol (Unknown)  hydantoin derivatives (Other)  methylphenidate (Unknown)  Morphine Sulfate (Unknown)  phenytoin (Unknown)  prochlorperazine (Unknown)  promethazine (Dystonic RXN)  rash/hives (Anaphylaxis)  thioxanthenes (Unknown)      INTERVAL HPI/OVERNIGHT EVENTS:    VITALS:  T(F): 98 (08-06-18 @ 07:21), Max: 98.5 (08-06-18 @ 00:09)  HR: 60 (08-06-18 @ 07:21) (60 - 60)  BP: 95/54 (08-06-18 @ 07:21) (95/54 - 104/61)  RR: 20 (08-06-18 @ 07:21) (20 - 20)  SpO2: 97% (08-05-18 @ 17:23) (97% - 97%)    LABS:  08-06    137  |  97<L>  |  27<H>  ----------------------------<  98  4.1   |  25  |  1.0    Ca    9.8      06 Aug 2018 10:03  Mg     1.9     08-06      MICROBIOLOGY:    MEDICATION:  apixaban 5 milliGRAM(s) Oral every 12 hours  aspirin  chewable 81 milliGRAM(s) Oral daily  atorvastatin 40 milliGRAM(s) Oral at bedtime  baclofen 10 milliGRAM(s) Oral three times a day  docusate sodium 100 milliGRAM(s) Oral two times a day PRN  dofetilide 250 MICROGram(s) Oral two times a day  dronabinol 5 milliGRAM(s) Oral daily  furosemide    Tablet 20 milliGRAM(s) Oral <User Schedule>  furosemide    Tablet 40 milliGRAM(s) Oral <User Schedule>  gabapentin 400 milliGRAM(s) Oral three times a day  ipratropium    for Nebulization 500 MICROGram(s) Nebulizer every 6 hours PRN  lamoTRIgine 100 milliGRAM(s) Oral two times a day  metoprolol tartrate 25 milliGRAM(s) Oral two times a day  nicotine  Polacrilex Gum 2 milliGRAM(s) Oral every 2 hours  nitroglycerin     SubLingual 0.4 milliGRAM(s) SubLingual three times a day PRN  oxybutynin 5 milliGRAM(s) Oral daily  oxyCODONE    5 mG/acetaminophen 325 mG 1 Tablet(s) Oral every 6 hours PRN  pantoprazole    Tablet 40 milliGRAM(s) Oral before breakfast  QUEtiapine 50 milliGRAM(s) Oral at bedtime  QUEtiapine 25 milliGRAM(s) Oral daily  senna 1 Tablet(s) Oral at bedtime  tamsulosin 0.4 milliGRAM(s) Oral at bedtime    RADIOLOGY & ADDITIONAL TESTS:
CUATE HORNE  47y  Male    Patient is a 47y old  Male who presents with a chief complaint of Chest pain (03 Aug 2018 15:43)    ALLERGIES:  dexmethylphenidate (Unknown)  Dilantin (Rash)  dilantin, compazine, neurontin, ritalin, phenergan (Unknown)  Haldol (Unknown)  hydantoin derivatives (Other)  methylphenidate (Unknown)  Morphine Sulfate (Unknown)  phenytoin (Unknown)  prochlorperazine (Unknown)  promethazine (Dystonic RXN)  rash/hives (Anaphylaxis)  thioxanthenes (Unknown)      INTERVAL HPI/OVERNIGHT EVENTS:    VITALS:  T(F): 98.6 (08-05-18 @ 06:13), Max: 98.6 (08-05-18 @ 06:13)  HR: 60 (08-05-18 @ 06:13) (60 - 60)  BP: 111/65 (08-05-18 @ 06:13) (92/54 - 111/65)  RR: 20 (08-05-18 @ 06:13) (20 - 20)  SpO2: --    LABS:  08-04    142  |  101  |  26<H>  ----------------------------<  111<H>  4.0   |  25  |  1.1    Ca    9.5      04 Aug 2018 12:19  Mg     1.9     08-04      MICROBIOLOGY:    MEDICATION:  apixaban 5 milliGRAM(s) Oral every 12 hours  aspirin  chewable 81 milliGRAM(s) Oral daily  atorvastatin 40 milliGRAM(s) Oral at bedtime  baclofen 10 milliGRAM(s) Oral three times a day  dofetilide 250 MICROGram(s) Oral two times a day  dronabinol 5 milliGRAM(s) Oral daily  furosemide    Tablet 20 milliGRAM(s) Oral <User Schedule>  furosemide    Tablet 40 milliGRAM(s) Oral <User Schedule>  gabapentin 400 milliGRAM(s) Oral three times a day  ipratropium    for Nebulization 500 MICROGram(s) Nebulizer every 6 hours PRN  lamoTRIgine 100 milliGRAM(s) Oral two times a day  metoprolol tartrate 25 milliGRAM(s) Oral two times a day  nicotine  Polacrilex Gum 2 milliGRAM(s) Oral every 2 hours  nitroglycerin     SubLingual 0.4 milliGRAM(s) SubLingual three times a day PRN  oxybutynin 5 milliGRAM(s) Oral daily  oxyCODONE    5 mG/acetaminophen 325 mG 1 Tablet(s) Oral every 6 hours PRN  pantoprazole    Tablet 40 milliGRAM(s) Oral before breakfast  QUEtiapine 50 milliGRAM(s) Oral at bedtime  QUEtiapine 25 milliGRAM(s) Oral daily  tamsulosin 0.4 milliGRAM(s) Oral at bedtime    RADIOLOGY & ADDITIONAL TESTS:

## 2018-08-06 NOTE — PROGRESS NOTE ADULT - ASSESSMENT
SUBJECTIVE:    Patient is a 47y old Male who presents with a chief complaint of Chest pain (03 Aug 2018 15:43)    Currently admitted to medicine with the primary diagnosis of Anginal pain     Today is hospital day 4d. This morning he is resting comfortably in bed and reports no new issues or overnight events.     PAST MEDICAL & SURGICAL HISTORY  PTSD (post-traumatic stress disorder)  Supraventricular arrhythmia: prior history  Cardiomyopathy  CHF (congestive heart failure)  Atrial fibrillation: s/p ablation, on eliquis  Diabetes mellitus: diet controlled  BPH (benign prostatic hyperplasia)  HTN (hypertension)  Seizures  Migraines  Pneumonia  MS (multiple sclerosis)  CAD (coronary artery disease)  Bipolar disorder  Anginal pain  Schizo affective schizophrenia  GERD (gastroesophageal reflux disease)  Seasonal allergies  Hypercholesteremia  COPD (chronic obstructive pulmonary disease)  CVA (cerebral vascular accident)  TIA (transient ischemic attack)  Syncope  Peripheral Neuropathy  Clinical Depression  H/O prior ablation treatment: for Afib  Cardiac pacemaker recipient  H/O hernia repair: right 1972,1991  History of hip replacement  S/P Orchiectomy: L for testicular CA  S/P Implantation of AICD    SOCIAL HISTORY:  Negative for smoking/alcohol/drug use.     ALLERGIES:  dexmethylphenidate (Unknown)  Dilantin (Rash)  dilantin, compazine, neurontin, ritalin, phenergan (Unknown)  Haldol (Unknown)  hydantoin derivatives (Other)  methylphenidate (Unknown)  Morphine Sulfate (Unknown)  phenytoin (Unknown)  prochlorperazine (Unknown)  promethazine (Dystonic RXN)  rash/hives (Anaphylaxis)  thioxanthenes (Unknown)    MEDICATIONS:  STANDING MEDICATIONS  apixaban 5 milliGRAM(s) Oral every 12 hours  aspirin  chewable 81 milliGRAM(s) Oral daily  atorvastatin 40 milliGRAM(s) Oral at bedtime  baclofen 10 milliGRAM(s) Oral three times a day  dofetilide 250 MICROGram(s) Oral two times a day  dronabinol 5 milliGRAM(s) Oral daily  furosemide    Tablet 20 milliGRAM(s) Oral <User Schedule>  furosemide    Tablet 40 milliGRAM(s) Oral <User Schedule>  gabapentin 400 milliGRAM(s) Oral three times a day  lamoTRIgine 100 milliGRAM(s) Oral two times a day  metoprolol tartrate 25 milliGRAM(s) Oral two times a day  nicotine  Polacrilex Gum 2 milliGRAM(s) Oral every 2 hours  oxybutynin 5 milliGRAM(s) Oral daily  pantoprazole    Tablet 40 milliGRAM(s) Oral before breakfast  QUEtiapine 50 milliGRAM(s) Oral at bedtime  QUEtiapine 25 milliGRAM(s) Oral daily  senna 1 Tablet(s) Oral at bedtime  tamsulosin 0.4 milliGRAM(s) Oral at bedtime    PRN MEDICATIONS  docusate sodium 100 milliGRAM(s) Oral two times a day PRN  ipratropium    for Nebulization 500 MICROGram(s) Nebulizer every 6 hours PRN  nitroglycerin     SubLingual 0.4 milliGRAM(s) SubLingual three times a day PRN  oxyCODONE    5 mG/acetaminophen 325 mG 1 Tablet(s) Oral every 6 hours PRN    VITALS:   T(F): 97.4  HR: 60  BP: 96/50  RR: 18  SpO2: --    LABS:                        13.1   6.48  )-----------( 198      ( 06 Aug 2018 10:03 )             39.5     08-06    137  |  97<L>  |  27<H>  ----------------------------<  98  4.1   |  25  |  1.0    Ca    9.8      06 Aug 2018 10:03  Mg     1.9     08-06                    RADIOLOGY:    PHYSICAL EXAM:  GEN: No acute distress, sitting   LUNGS: Clear to auscultation bilaterally   HEART: Regular  ABD: Soft, non-tender, non-distended.  EXT: NC/NC/NE/2+PP/YARBROUGH/Skin Intact.   NEURO: AAOX3    46 yo male with PMHx of HTN, DM (diet controlled), Afib s/p ablation and on eliquis, COPD, MS, CHF, Cardiomyopathy, PTSD, CAD, Bipolar disease w/ depression, BPH, GERD, TIA, HLD, h/o SVT, s/p AICD implantation, presents from [facility] this morning with complaint of chest pain, radiating to the neck and left arm, increased with exertion and relieved by a combination of nitrostat, oxygen and rest, likely cardiac in origin. 1st set of cardiac enzymes negative. Pt had a non diagnostic stress test (04/21/2018).    #Chest pain, typical anginal origin  - Patient currently asymptomatic- EKG showed no new changes and troponin were within normal range  - Cardiology on board- recommended cardiac CT - ordered today   - ECHO pending   - Admit to tele for continuous monitoring  - Continue aspirin 81mg daily  - Continue nitrostat SL 0.4mg 3 times a day PRN   -f/u CT agio    #HTN  -Pt. currently normotensive, no additional pharmacological measures required  -Continue metoprolol   -Continue lasix  -DASH diet    #Atrial fibrillation s/p ablation   -Continue Eliquis   -Control rate control with metoprolol  -Continue dofetilide    #COPD  -Continue ipratropium 18mcg inhalation q6hrly PRN  -Continue 2L O2 PRN    #DM  -Diet controlled. Pt reports high glucose was due to steroid therapy for MS  -Check HbA1c  -Check fingerstick glucose AC/HS  -Hold insulin for now. Start lantus insulin if blood glucose is consistenly high (>180mg/dl)  -Carb consistent (no evening snack) diet    #MS  -Continue baclofen for spasticity  -Continue percocet q6hr PRN for pain  -Continue gabapentin  -Pt. takes dimethyl fumarate/ticfidera for MS relapses. Kindly place a non-formulary medication order as the medication is not available.     #GERD  -Protonix 40mg once a day (in place of omeprazole 20mg twice a day and Mi-Acis suspension 4 times a day)    #BPH  -Continue tamsulosin     #Bipolar disease; PTSD  -Continue Quetiapine    #Seizures  -Continue lamotrigine    #History of SVT  -s/p AICD implantation  -Continue dofetilide    #CHF  -Continue Lasix 20mg (monday, wednesday, friday)  -Continue lasix 40mg (Tues, thursday, sat)  -Continue metoprolol    #HLD  -Continue atorvastatin    #CAD  -Continue aspirin 81mg  -Continue atrovastatin    #Miscellaneous  -DVT ppx (on eliquis)  -GI ppx (on protonix)  -Diet: DASH carb consistent diet  -Activity: ambulate as tolerated  -Full code  -Dispo tele SUBJECTIVE:    Patient is a 47y old Male who presents with a chief complaint of Chest pain (03 Aug 2018 15:43)  Currently admitted to medicine with the primary diagnosis of Anginal pain   Today is hospital day 4d. This morning he is resting comfortably in bed and reports no new issues or overnight events.     PAST MEDICAL & SURGICAL HISTORY  PTSD (post-traumatic stress disorder)  Supraventricular arrhythmia: prior history  Cardiomyopathy  CHF (congestive heart failure)  Atrial fibrillation: s/p ablation, on eliquis  Diabetes mellitus: diet controlled  BPH (benign prostatic hyperplasia)  HTN (hypertension)  Seizures  Migraines  Pneumonia  MS (multiple sclerosis)  CAD (coronary artery disease)  Bipolar disorder  Anginal pain  Schizo affective schizophrenia  GERD (gastroesophageal reflux disease)  Seasonal allergies  Hypercholesteremia  COPD (chronic obstructive pulmonary disease)  CVA (cerebral vascular accident)  TIA (transient ischemic attack)  Syncope  Peripheral Neuropathy  Clinical Depression  H/O prior ablation treatment: for Afib  Cardiac pacemaker recipient  H/O hernia repair: right 1972,1991  History of hip replacement  S/P Orchiectomy: L for testicular CA  S/P Implantation of AICD    SOCIAL HISTORY:  Negative for smoking/alcohol/drug use.     ALLERGIES:  dexmethylphenidate (Unknown)  Dilantin (Rash)  dilantin, compazine, neurontin, ritalin, phenergan (Unknown)  Haldol (Unknown)  hydantoin derivatives (Other)  methylphenidate (Unknown)  Morphine Sulfate (Unknown)  phenytoin (Unknown)  prochlorperazine (Unknown)  promethazine (Dystonic RXN)  rash/hives (Anaphylaxis)  thioxanthenes (Unknown)    MEDICATIONS:  STANDING MEDICATIONS  apixaban 5 milliGRAM(s) Oral every 12 hours  aspirin  chewable 81 milliGRAM(s) Oral daily  atorvastatin 40 milliGRAM(s) Oral at bedtime  baclofen 10 milliGRAM(s) Oral three times a day  dofetilide 250 MICROGram(s) Oral two times a day  dronabinol 5 milliGRAM(s) Oral daily  furosemide    Tablet 20 milliGRAM(s) Oral <User Schedule>  furosemide    Tablet 40 milliGRAM(s) Oral <User Schedule>  gabapentin 400 milliGRAM(s) Oral three times a day  lamoTRIgine 100 milliGRAM(s) Oral two times a day  metoprolol tartrate 25 milliGRAM(s) Oral two times a day  nicotine  Polacrilex Gum 2 milliGRAM(s) Oral every 2 hours  oxybutynin 5 milliGRAM(s) Oral daily  pantoprazole    Tablet 40 milliGRAM(s) Oral before breakfast  QUEtiapine 50 milliGRAM(s) Oral at bedtime  QUEtiapine 25 milliGRAM(s) Oral daily  senna 1 Tablet(s) Oral at bedtime  tamsulosin 0.4 milliGRAM(s) Oral at bedtime    PRN MEDICATIONS  docusate sodium 100 milliGRAM(s) Oral two times a day PRN  ipratropium    for Nebulization 500 MICROGram(s) Nebulizer every 6 hours PRN  nitroglycerin     SubLingual 0.4 milliGRAM(s) SubLingual three times a day PRN  oxyCODONE    5 mG/acetaminophen 325 mG 1 Tablet(s) Oral every 6 hours PRN    VITALS:   T(F): 97.4  HR: 60  BP: 96/50  RR: 18  SpO2: --    LABS:                        13.1   6.48  )-----------( 198      ( 06 Aug 2018 10:03 )             39.5     08-06    137  |  97<L>  |  27<H>  ----------------------------<  98  4.1   |  25  |  1.0    Ca    9.8      06 Aug 2018 10:03  Mg     1.9     08-06                    RADIOLOGY:  < from: Transthoracic Echocardiogram (08.03.18 @ 12:00) >  Summary:   1. Left ventricular ejection fraction, by visual estimation, is 55 to   60%.   2. Normal left ventricular size and wall thicknesses, with normal   systolic function.   3. Mild tricuspid regurgitation.    < end of copied text >    PHYSICAL EXAM:  GEN: No acute distress, sitting   LUNGS: Clear to auscultation bilaterally   HEART: Regular  ABD: Soft, non-tender, non-distended.  EXT: DP 2+ bilaterally, no edema  NEURO: AAOX3    48 yo male with PMHx of HTN, DM (diet controlled), Afib s/p ablation and on eliquis, COPD, MS, CHF, Cardiomyopathy, PTSD, CAD, Bipolar disease w/ depression, BPH, GERD, TIA, HLD, h/o SVT, s/p AICD implantation, presents from [facility] this morning with complaint of chest pain, radiating to the neck and left arm, increased with exertion and relieved by a combination of nitrostat, oxygen and rest, likely cardiac in origin. 1st set of cardiac enzymes negative. Pt had a non diagnostic stress test (04/21/2018).    #Chest pain, typical anginal origin  -DC today  - Patient currently asymptomatic- EKG showed no new changes and troponin were within normal range  - cardiac CT negative  - ECHO wnl  - Admit to tele for continuous monitoring  - Continue aspirin 81mg daily  - Continue nitrostat SL 0.4mg 3 times a day PRN   -f/u CT agio    #HTN  -Pt. currently normotensive, no additional pharmacological measures required  -Continue metoprolol   -Continue lasix  -DASH diet    #Atrial fibrillation s/p ablation   -Continue Eliquis   -Control rate control with metoprolol  -Continue dofetilide    #COPD  -Continue ipratropium 18mcg inhalation q6hrly PRN  -Continue 2L O2 PRN    #DM  -Diet controlled. Pt reports high glucose was due to steroid therapy for MS  -Check HbA1c  -Check fingerstick glucose AC/HS  -Hold insulin for now. Start lantus insulin if blood glucose is consistenly high (>180mg/dl)  -Carb consistent (no evening snack) diet    #MS  -Continue baclofen for spasticity  -Continue percocet q6hr PRN for pain  -Continue gabapentin  -Pt. takes dimethyl fumarate/ticfidera for MS relapses. Kindly place a non-formulary medication order as the medication is not available.     #GERD  -Protonix 40mg once a day (in place of omeprazole 20mg twice a day and Mi-Acis suspension 4 times a day)    #BPH  -Continue tamsulosin     #Bipolar disease; PTSD  -Continue Quetiapine    #Seizures  -Continue lamotrigine    #History of SVT  -s/p AICD implantation  -Continue dofetilide    #CHF  -Continue Lasix 20mg (monday, wednesday, friday)  -Continue lasix 40mg (Tues, thursday, sat)  -Continue metoprolol    #HLD  -Continue atorvastatin    #CAD  -Continue aspirin 81mg  -Continue atrovastatin    #Miscellaneous  -DVT ppx (on eliquis)  -GI ppx (on protonix)  -Diet: DASH carb consistent diet  -Activity: ambulate as tolerated  -Full code  -Dispo tele

## 2018-08-13 ENCOUNTER — EMERGENCY (EMERGENCY)
Facility: HOSPITAL | Age: 48
LOS: 0 days | Discharge: NURSING, REHAB OR LONG-TERM CARE | End: 2018-08-13
Attending: EMERGENCY MEDICINE | Admitting: EMERGENCY MEDICINE

## 2018-08-13 VITALS
WEIGHT: 179.9 LBS | HEIGHT: 66 IN | HEART RATE: 60 BPM | DIASTOLIC BLOOD PRESSURE: 59 MMHG | TEMPERATURE: 98 F | SYSTOLIC BLOOD PRESSURE: 105 MMHG | RESPIRATION RATE: 18 BRPM | OXYGEN SATURATION: 98 %

## 2018-08-13 VITALS
DIASTOLIC BLOOD PRESSURE: 56 MMHG | SYSTOLIC BLOOD PRESSURE: 98 MMHG | TEMPERATURE: 97 F | HEART RATE: 60 BPM | RESPIRATION RATE: 18 BRPM | OXYGEN SATURATION: 97 %

## 2018-08-13 DIAGNOSIS — R07.9 CHEST PAIN, UNSPECIFIED: ICD-10-CM

## 2018-08-13 DIAGNOSIS — E78.5 HYPERLIPIDEMIA, UNSPECIFIED: ICD-10-CM

## 2018-08-13 DIAGNOSIS — G89.29 OTHER CHRONIC PAIN: ICD-10-CM

## 2018-08-13 DIAGNOSIS — K21.9 GASTRO-ESOPHAGEAL REFLUX DISEASE WITHOUT ESOPHAGITIS: ICD-10-CM

## 2018-08-13 DIAGNOSIS — I25.10 ATHEROSCLEROTIC HEART DISEASE OF NATIVE CORONARY ARTERY WITHOUT ANGINA PECTORIS: ICD-10-CM

## 2018-08-13 DIAGNOSIS — Z98.890 OTHER SPECIFIED POSTPROCEDURAL STATES: Chronic | ICD-10-CM

## 2018-08-13 DIAGNOSIS — Z87.01 PERSONAL HISTORY OF PNEUMONIA (RECURRENT): ICD-10-CM

## 2018-08-13 DIAGNOSIS — H46.9 UNSPECIFIED OPTIC NEURITIS: ICD-10-CM

## 2018-08-13 DIAGNOSIS — F31.9 BIPOLAR DISORDER, UNSPECIFIED: ICD-10-CM

## 2018-08-13 DIAGNOSIS — N40.0 BENIGN PROSTATIC HYPERPLASIA WITHOUT LOWER URINARY TRACT SYMPTOMS: ICD-10-CM

## 2018-08-13 DIAGNOSIS — M25.559 PAIN IN UNSPECIFIED HIP: ICD-10-CM

## 2018-08-13 DIAGNOSIS — I50.9 HEART FAILURE, UNSPECIFIED: ICD-10-CM

## 2018-08-13 DIAGNOSIS — Z79.01 LONG TERM (CURRENT) USE OF ANTICOAGULANTS: ICD-10-CM

## 2018-08-13 DIAGNOSIS — I48.91 UNSPECIFIED ATRIAL FIBRILLATION: ICD-10-CM

## 2018-08-13 DIAGNOSIS — Z82.49 FAMILY HISTORY OF ISCHEMIC HEART DISEASE AND OTHER DISEASES OF THE CIRCULATORY SYSTEM: ICD-10-CM

## 2018-08-13 DIAGNOSIS — F17.200 NICOTINE DEPENDENCE, UNSPECIFIED, UNCOMPLICATED: ICD-10-CM

## 2018-08-13 DIAGNOSIS — Z79.82 LONG TERM (CURRENT) USE OF ASPIRIN: ICD-10-CM

## 2018-08-13 DIAGNOSIS — Z88.8 ALLERGY STATUS TO OTHER DRUGS, MEDICAMENTS AND BIOLOGICAL SUBSTANCES: ICD-10-CM

## 2018-08-13 DIAGNOSIS — J44.9 CHRONIC OBSTRUCTIVE PULMONARY DISEASE, UNSPECIFIED: ICD-10-CM

## 2018-08-13 DIAGNOSIS — I11.0 HYPERTENSIVE HEART DISEASE WITH HEART FAILURE: ICD-10-CM

## 2018-08-13 DIAGNOSIS — F43.10 POST-TRAUMATIC STRESS DISORDER, UNSPECIFIED: ICD-10-CM

## 2018-08-13 DIAGNOSIS — F25.9 SCHIZOAFFECTIVE DISORDER, UNSPECIFIED: ICD-10-CM

## 2018-08-13 DIAGNOSIS — E78.00 PURE HYPERCHOLESTEROLEMIA, UNSPECIFIED: ICD-10-CM

## 2018-08-13 DIAGNOSIS — Z86.73 PERSONAL HISTORY OF TRANSIENT ISCHEMIC ATTACK (TIA), AND CEREBRAL INFARCTION WITHOUT RESIDUAL DEFICITS: ICD-10-CM

## 2018-08-13 DIAGNOSIS — Z85.47 PERSONAL HISTORY OF MALIGNANT NEOPLASM OF TESTIS: ICD-10-CM

## 2018-08-13 DIAGNOSIS — G35 MULTIPLE SCLEROSIS: ICD-10-CM

## 2018-08-13 DIAGNOSIS — G40.909 EPILEPSY, UNSPECIFIED, NOT INTRACTABLE, WITHOUT STATUS EPILEPTICUS: ICD-10-CM

## 2018-08-13 DIAGNOSIS — I42.8 OTHER CARDIOMYOPATHIES: ICD-10-CM

## 2018-08-13 DIAGNOSIS — E11.9 TYPE 2 DIABETES MELLITUS WITHOUT COMPLICATIONS: ICD-10-CM

## 2018-08-13 DIAGNOSIS — Z95.1 PRESENCE OF AORTOCORONARY BYPASS GRAFT: ICD-10-CM

## 2018-08-13 DIAGNOSIS — M25.551 PAIN IN RIGHT HIP: ICD-10-CM

## 2018-08-13 DIAGNOSIS — Z95.810 PRESENCE OF AUTOMATIC (IMPLANTABLE) CARDIAC DEFIBRILLATOR: ICD-10-CM

## 2018-08-13 DIAGNOSIS — Z95.0 PRESENCE OF CARDIAC PACEMAKER: Chronic | ICD-10-CM

## 2018-08-13 DIAGNOSIS — E11.40 TYPE 2 DIABETES MELLITUS WITH DIABETIC NEUROPATHY, UNSPECIFIED: ICD-10-CM

## 2018-08-13 DIAGNOSIS — I25.118 ATHEROSCLEROTIC HEART DISEASE OF NATIVE CORONARY ARTERY WITH OTHER FORMS OF ANGINA PECTORIS: ICD-10-CM

## 2018-08-13 DIAGNOSIS — Z90.79 ACQUIRED ABSENCE OF OTHER GENITAL ORGAN(S): ICD-10-CM

## 2018-08-13 DIAGNOSIS — I25.2 OLD MYOCARDIAL INFARCTION: ICD-10-CM

## 2018-08-13 DIAGNOSIS — Z96.641 PRESENCE OF RIGHT ARTIFICIAL HIP JOINT: ICD-10-CM

## 2018-08-13 DIAGNOSIS — Z96.649 PRESENCE OF UNSPECIFIED ARTIFICIAL HIP JOINT: Chronic | ICD-10-CM

## 2018-08-13 PROBLEM — I49.9 CARDIAC ARRHYTHMIA, UNSPECIFIED: Chronic | Status: ACTIVE | Noted: 2018-08-02

## 2018-08-13 PROBLEM — I42.9 CARDIOMYOPATHY, UNSPECIFIED: Chronic | Status: ACTIVE | Noted: 2018-08-02

## 2018-08-13 RX ORDER — OXYCODONE AND ACETAMINOPHEN 5; 325 MG/1; MG/1
1 TABLET ORAL EVERY 4 HOURS
Qty: 0 | Refills: 0 | Status: DISCONTINUED | OUTPATIENT
Start: 2018-08-13 | End: 2018-08-13

## 2018-08-13 RX ADMIN — OXYCODONE AND ACETAMINOPHEN 1 TABLET(S): 5; 325 TABLET ORAL at 05:53

## 2018-08-13 NOTE — ED ADULT NURSE NOTE - NSIMPLEMENTINTERV_GEN_ALL_ED
Implemented All Fall Risk Interventions:  [place] to call system. Call bell, personal items and telephone within reach. Instruct patient to call for assistance. Room bathroom lighting operational. Non-slip footwear when patient is off stretcher. Physically safe environment: no spills, clutter or unnecessary equipment. Stretcher in lowest position, wheels locked, appropriate side rails in place. Provide visual cue, wrist band, yellow gown, etc. Monitor gait and stability. Monitor for mental status changes and reorient to person, place, and time. Review medications for side effects contributing to fall risk. Reinforce activity limits and safety measures with patient and family.

## 2018-08-13 NOTE — ED ADULT TRIAGE NOTE - CHIEF COMPLAINT QUOTE
pt states" the cup of his right hip replacement came out and he was walking today and his right leg just turned outwards and no im in pain"

## 2018-08-13 NOTE — ED PROVIDER NOTE - PHYSICAL EXAMINATION
Constitutional: Well developed, well nourished. NAD  Head: Normocephalic, atraumatic.  Eyes: PERRL, EOMI.  ENT: No nasal discharge. Mucous membranes dry.  Neck: Supple. Painless ROM.  Cardiovascular:  Regular rate and rhythm.   Pulmonary:  Lungs clear to auscultation bilaterally. No wheezing, rales, or rhonchi.  Abdominal: Soft. Nondistended. No rebound, guarding, rigidity.  Extremities. Pelvis stable; mild right hip tenderness on palpation; ROM intact - flex/ext/IR/ER with mild pain; knee/ankle nontender;  Skin: No rashes, cyanosis.  Neuro: AAOx3. No focal neurological deficits.  Psych: Normal mood. Normal affect.

## 2018-08-13 NOTE — ED ADULT NURSE NOTE - OBJECTIVE STATEMENT
Pt c/o R leg pain. Pain is intermittent and ortho MD sent pt in. Pt was walking and "rotated outward".
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
## 2018-08-14 ENCOUNTER — APPOINTMENT (OUTPATIENT)
Dept: CARDIOLOGY | Facility: CLINIC | Age: 48
End: 2018-08-14

## 2018-08-23 NOTE — DISCHARGE NOTE ADULT - NS AS DC FOLLOWUP STROKE INST
Progress Note - Vascular Surgery   Nash Guzman [de-identified] y o  female MRN: [id number]  Unit/Bed#: -01 Encounter: [id number]    Assessment:  [de-identified] F with PAD, dry gangrene on b/l 1st toes, L 2nd toe, R 3rd toe with L foot edema and erythema    - Angiogram 8/21 - tibial runoff; DP size decreased from 9/17, luminal defect seen in L CFA  - Wound care vs amputation discussion w/ son who is interested in pursuing a 2nd opinion regarding BKA at this time  - Patient with contrast allergy    Plan:  - Per primary team note, to d/c home and seek 2nd opinion regarding BKA  - Continue Diet as tolerated  - Holding Xarelto until decision to proceed w/ Amp or not  - Pain control  - Appreciate Cards recs  - Appreciate wound care  - ID rec abx through 8/23    Subjective/Objective   Chief Complaint:     Subjective: No acute events overnight  Patient complaining of L foot pain this AM  States she just took her pain medication a few minutes ago  No other complaints this AM  Denies f/c/n/v/cp/sob  Objective:     Blood pressure 146/66, pulse (!) 51, temperature (!) 97 2 °F (36 2 °C), temperature source Oral, resp  rate 18, height 5' 4" (1 626 m), weight 73 1 kg (161 lb 1 6 oz), SpO2 95 %, not currently breastfeeding  ,Body mass index is 27 65 kg/m²  Intake/Output Summary (Last 24 hours) at 08/23/18 0532  Last data filed at 08/22/18 1413   Gross per 24 hour   Intake              240 ml   Output                0 ml   Net              240 ml       Invasive Devices     Peripheral Intravenous Line            Peripheral IV 08/23/18 Left Forearm less than 1 day                Physical Exam: General: AAOx3  Respiratory: BS b/l  Abdomen: Soft, NT, no rebound/guarding  Heart: RRR, S1s2  Ext: LLE with bandage over foot and toes  Left great toe with dry gangrene ulcer on medial plantar surface  Cellulitis improving  Edema no longer present  Lab, Imaging and other studies:    8/21 IR Angiogram LLE - Impression:      1   Severe multivessel disease in the left calf, not amenable to endovascular intervention  Patent left superficial femoral and popliteal arteries with diffuse nonflow limiting disease        2  Focal filling defect in the left common femoral artery possibly related to prior access and closure      Plan: No intervention at this time, return to wound care  Follow-up imaging of the left common femoral artery  I have personally reviewed pertinent lab results    ,   Lab Results   Component Value Date    GLUCOSE 182 (H) 08/22/2018    CALCIUM 8 5 08/22/2018     (L) 08/22/2018    K 3 7 08/22/2018    CO2 29 08/22/2018    CL 98 (L) 08/22/2018    BUN 27 (H) 08/22/2018    CREATININE 0 99 08/22/2018     Lab Results   Component Value Date    WBC 10 93 (H) 08/21/2018    HGB 9 9 (L) 08/21/2018    HCT 31 9 (L) 08/21/2018    MCV 76 (L) 08/21/2018     08/21/2018       VTE Pharmacologic Prophylaxis: Reason for no pharmacologic prophylaxis Xarelto held  VTE Mechanical Prophylaxis: sequential compression device Smoking Cessation

## 2018-08-29 ENCOUNTER — EMERGENCY (EMERGENCY)
Facility: HOSPITAL | Age: 48
LOS: 0 days | Discharge: NURSING, REHAB OR LONG-TERM CARE | End: 2018-08-29
Attending: EMERGENCY MEDICINE | Admitting: EMERGENCY MEDICINE

## 2018-08-29 VITALS
HEART RATE: 60 BPM | TEMPERATURE: 98 F | SYSTOLIC BLOOD PRESSURE: 106 MMHG | OXYGEN SATURATION: 97 % | RESPIRATION RATE: 19 BRPM | DIASTOLIC BLOOD PRESSURE: 60 MMHG

## 2018-08-29 DIAGNOSIS — E78.00 PURE HYPERCHOLESTEROLEMIA, UNSPECIFIED: ICD-10-CM
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
## 2018-08-29 DIAGNOSIS — Z98.890 OTHER SPECIFIED POSTPROCEDURAL STATES: Chronic | ICD-10-CM

## 2018-08-29 DIAGNOSIS — S00.83XA CONTUSION OF OTHER PART OF HEAD, INITIAL ENCOUNTER: ICD-10-CM

## 2018-08-29 DIAGNOSIS — Z96.649 PRESENCE OF UNSPECIFIED ARTIFICIAL HIP JOINT: Chronic | ICD-10-CM

## 2018-08-29 DIAGNOSIS — R51 HEADACHE: ICD-10-CM

## 2018-08-29 DIAGNOSIS — M54.2 CERVICALGIA: ICD-10-CM

## 2018-08-29 DIAGNOSIS — E11.9 TYPE 2 DIABETES MELLITUS WITHOUT COMPLICATIONS: ICD-10-CM
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
## 2018-08-29 DIAGNOSIS — I11.0 HYPERTENSIVE HEART DISEASE WITH HEART FAILURE: ICD-10-CM

## 2018-08-29 DIAGNOSIS — Z95.0 PRESENCE OF CARDIAC PACEMAKER: Chronic | ICD-10-CM

## 2018-08-29 DIAGNOSIS — I25.10 ATHEROSCLEROTIC HEART DISEASE OF NATIVE CORONARY ARTERY WITHOUT ANGINA PECTORIS: ICD-10-CM

## 2018-08-29 DIAGNOSIS — R10.31 RIGHT LOWER QUADRANT PAIN: ICD-10-CM
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
## 2018-08-29 DIAGNOSIS — I50.9 HEART FAILURE, UNSPECIFIED: ICD-10-CM

## 2018-08-29 LAB
ALBUMIN SERPL ELPH-MCNC: 4.5 G/DL — SIGNIFICANT CHANGE UP (ref 3.5–5.2)
ALP SERPL-CCNC: 135 U/L — HIGH (ref 30–115)
ALT FLD-CCNC: 23 U/L — SIGNIFICANT CHANGE UP (ref 0–41)
ANION GAP SERPL CALC-SCNC: 16 MMOL/L — HIGH (ref 7–14)
AST SERPL-CCNC: 21 U/L — SIGNIFICANT CHANGE UP (ref 0–41)
BILIRUB SERPL-MCNC: 0.5 MG/DL — SIGNIFICANT CHANGE UP (ref 0.2–1.2)
BUN SERPL-MCNC: 20 MG/DL — SIGNIFICANT CHANGE UP (ref 10–20)
CALCIUM SERPL-MCNC: 9.7 MG/DL — SIGNIFICANT CHANGE UP (ref 8.5–10.1)
CHLORIDE SERPL-SCNC: 102 MMOL/L — SIGNIFICANT CHANGE UP (ref 98–110)
CO2 SERPL-SCNC: 25 MMOL/L — SIGNIFICANT CHANGE UP (ref 17–32)
CREAT SERPL-MCNC: 1 MG/DL — SIGNIFICANT CHANGE UP (ref 0.7–1.5)
GLUCOSE SERPL-MCNC: 96 MG/DL — SIGNIFICANT CHANGE UP (ref 70–99)
HCT VFR BLD CALC: 39.6 % — LOW (ref 42–52)
HGB BLD-MCNC: 13.3 G/DL — LOW (ref 14–18)
INR BLD: 1.84 RATIO — HIGH (ref 0.65–1.3)
MCHC RBC-ENTMCNC: 30.1 PG — SIGNIFICANT CHANGE UP (ref 27–31)
MCHC RBC-ENTMCNC: 33.6 G/DL — SIGNIFICANT CHANGE UP (ref 32–37)
MCV RBC AUTO: 89.6 FL — SIGNIFICANT CHANGE UP (ref 80–94)
NRBC # BLD: 0 /100 WBCS — SIGNIFICANT CHANGE UP (ref 0–0)
PLATELET # BLD AUTO: 241 K/UL — SIGNIFICANT CHANGE UP (ref 130–400)
POTASSIUM SERPL-MCNC: 4.2 MMOL/L — SIGNIFICANT CHANGE UP (ref 3.5–5)
POTASSIUM SERPL-SCNC: 4.2 MMOL/L — SIGNIFICANT CHANGE UP (ref 3.5–5)
PROT SERPL-MCNC: 6.9 G/DL — SIGNIFICANT CHANGE UP (ref 6–8)
PROTHROM AB SERPL-ACNC: 19.8 SEC — HIGH (ref 9.95–12.87)
RBC # BLD: 4.42 M/UL — LOW (ref 4.7–6.1)
RBC # FLD: 12.7 % — SIGNIFICANT CHANGE UP (ref 11.5–14.5)
SODIUM SERPL-SCNC: 143 MMOL/L — SIGNIFICANT CHANGE UP (ref 135–146)
WBC # BLD: 8.98 K/UL — SIGNIFICANT CHANGE UP (ref 4.8–10.8)
WBC # FLD AUTO: 8.98 K/UL — SIGNIFICANT CHANGE UP (ref 4.8–10.8)

## 2018-08-29 NOTE — ED PROVIDER NOTE - NS ED ROS FT
Review of Systems:  	•	CONSTITUTIONAL - no fever, no diaphoresis, no chills  	•	SKIN - no rash  	•	EYES - no eye pain, no blurry vision  	•	ENT - no change in hearing, no sore throat, no ear pain or tinnitus  	•	RESPIRATORY - no shortness of breath, no cough  	•	CARDIAC - no chest pain, no palpitations  	•	GI - no abd pain, no nausea, no vomiting, no diarrhea, no constipation  	•	GENITO-URINARY - no discharge, no dysuria; no hematuria, no increased urinary frequency  	•	MUSCULOSKELETAL -+ neck pain   	•	NEUROLOGIC - + HA  	•	PSYCH - no anxiety, non suicidal, non homicidal, no hallucination, no depression

## 2018-08-29 NOTE — ED PROVIDER NOTE - PHYSICAL EXAMINATION
CONST: Well appearing in NAD  EYES: PERRL, EOMI, Sclera and conjunctiva clear.   ENT: No nasal discharge. TM's clear B/L without drainage. Oropharynx normal appearing, no erythema or exudates. Uvula midline.  NECK: Non-tender, normal ROM  CARD: Normal S1 S2; Normal rate and rhythm  RESP: Equal BS B/L, No wheezes, rhonchi or rales. No distress  GI: Soft, non-tender, non-distended.  MS: Normal ROM in all extremities. No midline spinal tenderness. pulses 2 +   SKIN: + bruise to left side of face   NEURO: A&Ox3, No focal deficits. Strength 5/5 with no sensory deficits. Steady gait
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
## 2018-08-29 NOTE — ED ADULT NURSE NOTE - FINAL NURSING ELECTRONIC SIGNATURE
PLEASE CLICK THE EDIT BUTTON, ENTER YOUR RESPONSE TO THE QUERY AND SIGN THE NOTE.       Dr Martinez,    I am unable to locate a History and Physical or Update for this patient in the patient's Epic encounter.    To effectively reflect your patient's severity of illness and per JCAHO policy and Medical Executive guidelines, a History and Physical needs to be completed on all patients within 24 hours of admission or prior to any surgical or invasive procedures.      If there is an existing document that was completed prior to admission, it needs to be updated, signed, dated and timed.   For your convenience:                       In Smart chart tips: 1.  Click on \"PRE-OP CONSULT NAVIGATOR\"                                     2. Select \"H&P INTERVAL NOTES\"                                     3. Locate the document you are going to use                                     4. After reviewing, click on \"ADD INTERVAL\"                                     5. Type in \".no change \"  It is important you double click on the dot phrase to auto fill the required verbage                                     6. If changes are needed, type in \".changes\" double click on the dot phrase to fill in verbage  Questions can be answered by Deirdre Ennis phone: [phone], [facility] [phone]                                                           Thank you,  Sherly Longo RN, CCDS    Clinical     Pager [phone]    Please respond here:     29-Aug-2018 15:33
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
## 2018-08-29 NOTE — CHART NOTE - NSCHARTNOTEFT_GEN_A_CORE
EXAM: CT CHEST IC   EXAM: CT ABDOMEN AND PELVIS IC   IMPRESSION:     No CT evidence of acute traumatic injury     New nodule in the trachea, felt to likely represent nodular secretions. On   an outpatient basis a repeat noncontrast chest CT is recommended.     Soft tissue density in left inguinal canal measuring 1.7 x 1.3 cm. Further   evaluation with scrotal ultrasound on outpatient basis is recommended.     Unremarkable appendix.         EXAM: CT BRAIN   PROCEDURE DATE: 08/29/2018  IMPRESSION:   Unremarkable noncontrast head CT. No significant changes from the prior exam.      EXAM: CT CERVICAL SPINE   PROCEDURE DATE: 08/29/2018  Impression:   No evidence of acute cervical spine injury.          No acute traumatic injury, normal appendix.  Please reconsult trauma PRN EXAM: CT CHEST IC   EXAM: CT ABDOMEN AND PELVIS IC   IMPRESSION:     No CT evidence of acute traumatic injury     New nodule in the trachea, felt to likely represent nodular secretions. On   an outpatient basis a repeat noncontrast chest CT is recommended.     Soft tissue density in left inguinal canal measuring 1.7 x 1.3 cm. Further   evaluation with scrotal ultrasound on outpatient basis is recommended.     Unremarkable appendix.         EXAM: CT BRAIN   PROCEDURE DATE: 08/29/2018  IMPRESSION:   Unremarkable noncontrast head CT. No significant changes from the prior exam.      EXAM: CT CERVICAL SPINE   PROCEDURE DATE: 08/29/2018  Impression:   No evidence of acute cervical spine injury.           No acute traumatic injury, normal appendix.  Please reconsult trauma PRN
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
## 2018-08-29 NOTE — ED ADULT NURSE NOTE - NSIMPLEMENTINTERV_GEN_ALL_ED
Implemented All Universal Safety Interventions:  [place] to call system. Call bell, personal items and telephone within reach. Instruct patient to call for assistance. Room bathroom lighting operational. Non-slip footwear when patient is off stretcher. Physically safe environment: no spills, clutter or unnecessary equipment. Stretcher in lowest position, wheels locked, appropriate side rails in place.
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
## 2018-09-10 ENCOUNTER — OUTPATIENT (OUTPATIENT)
Dept: OUTPATIENT SERVICES | Facility: HOSPITAL | Age: 48
LOS: 1 days | Discharge: HOME | End: 2018-09-10

## 2018-09-10 DIAGNOSIS — Z96.649 PRESENCE OF UNSPECIFIED ARTIFICIAL HIP JOINT: Chronic | ICD-10-CM

## 2018-09-10 DIAGNOSIS — R50.9 FEVER, UNSPECIFIED: ICD-10-CM

## 2018-09-10 DIAGNOSIS — Z98.890 OTHER SPECIFIED POSTPROCEDURAL STATES: Chronic | ICD-10-CM

## 2018-09-10 DIAGNOSIS — Z95.0 PRESENCE OF CARDIAC PACEMAKER: Chronic | ICD-10-CM

## 2018-09-28 ENCOUNTER — APPOINTMENT (OUTPATIENT)
Dept: CARDIOLOGY | Facility: CLINIC | Age: 48
End: 2018-09-28

## 2018-10-22 ENCOUNTER — OUTPATIENT (OUTPATIENT)
Dept: OUTPATIENT SERVICES | Facility: HOSPITAL | Age: 48
LOS: 1 days | Discharge: HOME | End: 2018-10-22

## 2018-10-22 DIAGNOSIS — R05 COUGH: ICD-10-CM

## 2018-10-22 DIAGNOSIS — Z95.0 PRESENCE OF CARDIAC PACEMAKER: Chronic | ICD-10-CM

## 2018-10-22 DIAGNOSIS — Z98.890 OTHER SPECIFIED POSTPROCEDURAL STATES: Chronic | ICD-10-CM

## 2018-10-22 DIAGNOSIS — Z96.649 PRESENCE OF UNSPECIFIED ARTIFICIAL HIP JOINT: Chronic | ICD-10-CM

## 2018-10-30 NOTE — ED ADULT TRIAGE NOTE - AS O2 DELIVERY
HPI:   Yessica Redman is a 45year old female who presents for a complete physical exam. Symptoms: IUD mirena. Mom and sister with hyperlipidemia   Had bone densitiy years ago  No FH AMI   Symptoms: IUD placed 3/2015 no periods.  Due 2020 for repeat Romy Disp: 30 tablet Rfl: 2   nystatin-triamcinolone 941101-1.1 UNIT/GM-% External Cream Apply thin film twice daily for 10-14 days Disp: 30 g Rfl: 0   Cholecalciferol (VITAMIN D) 1000 UNITS Oral Cap Take 1 capsule by mouth daily.  Disp:  Rfl:    Ferrous Sulfate stool  : denies urinary problems, vaginal discharge or discomfort,  periods regular   MUSCULOSKELETAL: denies joint pain or stiffness  NEURO: denies headaches, tingling or dizziness  PSYCHE: denies depression or anxiety  HEMATOLOGIC: denies bleeding abno screening  Laboratory examination ordered as part of a routine general medical examination  Screening for malignant neoplasm of cervix    Orders Placed This Encounter      mmm cbc      mmm (T3) Total [E]      mmm cmp      mmm lipid      mmm TSH and Free T4 Future  - LIPID PANEL; Future  - TSH+FREE T4; Future    8. Screening for malignant neoplasm of cervix  - THINPREP PAP SMEAR B; Future  - HPV HIGH RISK , THIN PREP COLLECTION;  Future  - SPECIMEN HANDLING,DR OFF->LAB  - THINPREP PAP SMEAR B  - HPV HIGH RISK room air

## 2019-01-29 ENCOUNTER — OUTPATIENT (OUTPATIENT)
Dept: OUTPATIENT SERVICES | Facility: HOSPITAL | Age: 49
LOS: 1 days | Discharge: HOME | End: 2019-01-29

## 2019-01-29 DIAGNOSIS — R04.2 HEMOPTYSIS: ICD-10-CM

## 2019-01-29 DIAGNOSIS — Z96.649 PRESENCE OF UNSPECIFIED ARTIFICIAL HIP JOINT: Chronic | ICD-10-CM

## 2019-01-29 DIAGNOSIS — I48.3 TYPICAL ATRIAL FLUTTER: ICD-10-CM

## 2019-01-29 DIAGNOSIS — J43.8 OTHER EMPHYSEMA: ICD-10-CM

## 2019-01-29 DIAGNOSIS — Z95.0 PRESENCE OF CARDIAC PACEMAKER: Chronic | ICD-10-CM

## 2019-01-29 DIAGNOSIS — Z98.890 OTHER SPECIFIED POSTPROCEDURAL STATES: Chronic | ICD-10-CM

## 2019-02-06 NOTE — PATIENT PROFILE ADULT. - CAREGIVER
Health Maintenance Due   Topic Date Due   â¢ DTaP/Tdap/Td Vaccine (1 - Tdap) 01/11/1952   â¢ Shingles Vaccine (1 of 2) 01/11/1985   â¢ Pneumococcal Vaccine 65+ Low/Medium Risk (1 of 2 - PCV13) 01/11/2000   â¢ Influenza Vaccine (1) 09/01/2018   â¢ Medicare Wellness 65+  01/31/2019   â¢ Depression Screening  01/31/2019       Patient is due for topics as listed above but is not proceeding with Immunization(s) Influenza at this time. Health Maintenance Due   Topic Date Due   â¢ DTaP/Tdap/Td Vaccine (1 - Tdap) 01/11/1952   â¢ Shingles Vaccine (1 of 2) 01/11/1985   â¢ Pneumococcal Vaccine 65+ Low/Medium Risk (1 of 2 - PCV13) 01/11/2000   â¢ Influenza Vaccine (1) 09/01/2018   â¢ Medicare Wellness 65+  01/31/2019   â¢ Depression Screening  01/31/2019       Patient is due for topics as listed above but is not proceeding with Immunization(s) Pneumococcal at this time. Unaddressed Risk Adjusted [address] Categories and Diagnoses  [address] 108 - Vascular Disease   Unaddressed Dx: Intermittent Claudication (Cms/Hcc)   - Fibrosis of Lung and Other Chronic Lung Disorders   Unaddressed Dx:Pulmonary Asbestosis (Cms/Hcc)          Unaddressed Risk Adjusted [address] Categories and Diagnoses   - Vascular Disease   Unaddressed Dx: Intermittent Claudication (Cms/Hcc)   - Fibrosis of Lung and Other Chronic Lung Disorders   Unaddressed Dx:Pulmonary Asbestosis (Cms/Hcc) No

## 2019-02-21 ENCOUNTER — INPATIENT (INPATIENT)
Facility: HOSPITAL | Age: 49
LOS: 3 days | Discharge: NURSING, REHAB OR LONG-TERM CARE | End: 2019-02-25
Attending: INTERNAL MEDICINE | Admitting: INTERNAL MEDICINE

## 2019-02-21 VITALS
RESPIRATION RATE: 18 BRPM | DIASTOLIC BLOOD PRESSURE: 72 MMHG | OXYGEN SATURATION: 98 % | SYSTOLIC BLOOD PRESSURE: 120 MMHG | HEART RATE: 68 BPM | TEMPERATURE: 97 F

## 2019-02-21 DIAGNOSIS — Z98.890 OTHER SPECIFIED POSTPROCEDURAL STATES: Chronic | ICD-10-CM

## 2019-02-21 DIAGNOSIS — Z96.649 PRESENCE OF UNSPECIFIED ARTIFICIAL HIP JOINT: Chronic | ICD-10-CM

## 2019-02-21 DIAGNOSIS — Z95.0 PRESENCE OF CARDIAC PACEMAKER: Chronic | ICD-10-CM

## 2019-02-21 LAB
ANION GAP SERPL CALC-SCNC: 16 MMOL/L — HIGH (ref 7–14)
APTT BLD: 43.4 SEC — HIGH (ref 27–39.2)
BASE EXCESS BLDV CALC-SCNC: 6.2 MMOL/L — HIGH (ref -2–2)
BASOPHILS # BLD AUTO: 0.07 K/UL — SIGNIFICANT CHANGE UP (ref 0–0.2)
BASOPHILS NFR BLD AUTO: 1.1 % — HIGH (ref 0–1)
BUN SERPL-MCNC: 21 MG/DL — HIGH (ref 10–20)
CA-I SERPL-SCNC: 1.25 MMOL/L — SIGNIFICANT CHANGE UP (ref 1.12–1.3)
CALCIUM SERPL-MCNC: 9.5 MG/DL — SIGNIFICANT CHANGE UP (ref 8.5–10.1)
CHLORIDE SERPL-SCNC: 97 MMOL/L — LOW (ref 98–110)
CO2 SERPL-SCNC: 28 MMOL/L — SIGNIFICANT CHANGE UP (ref 17–32)
CREAT SERPL-MCNC: 1 MG/DL — SIGNIFICANT CHANGE UP (ref 0.7–1.5)
EOSINOPHIL # BLD AUTO: 0.14 K/UL — SIGNIFICANT CHANGE UP (ref 0–0.7)
EOSINOPHIL NFR BLD AUTO: 2.3 % — SIGNIFICANT CHANGE UP (ref 0–8)
FLU A RESULT: NEGATIVE — SIGNIFICANT CHANGE UP
FLU A RESULT: NEGATIVE — SIGNIFICANT CHANGE UP
FLUAV AG NPH QL: NEGATIVE — SIGNIFICANT CHANGE UP
FLUBV AG NPH QL: NEGATIVE — SIGNIFICANT CHANGE UP
GAS PNL BLDV: 140 MMOL/L — SIGNIFICANT CHANGE UP (ref 136–145)
GAS PNL BLDV: SIGNIFICANT CHANGE UP
GLUCOSE SERPL-MCNC: 96 MG/DL — SIGNIFICANT CHANGE UP (ref 70–99)
HCO3 BLDV-SCNC: 32 MMOL/L — HIGH (ref 22–29)
HCT VFR BLD CALC: 37.7 % — LOW (ref 42–52)
HCT VFR BLDA CALC: 42.5 % — SIGNIFICANT CHANGE UP (ref 34–44)
HGB BLD CALC-MCNC: 13.9 G/DL — LOW (ref 14–18)
HGB BLD-MCNC: 12.8 G/DL — LOW (ref 14–18)
IMM GRANULOCYTES NFR BLD AUTO: 0.5 % — HIGH (ref 0.1–0.3)
INR BLD: 1.59 RATIO — HIGH (ref 0.65–1.3)
LACTATE BLDV-MCNC: 0.7 MMOL/L — SIGNIFICANT CHANGE UP (ref 0.5–1.6)
LACTATE SERPL-SCNC: 0.7 MMOL/L — SIGNIFICANT CHANGE UP (ref 0.5–2.2)
LYMPHOCYTES # BLD AUTO: 2.19 K/UL — SIGNIFICANT CHANGE UP (ref 1.2–3.4)
LYMPHOCYTES # BLD AUTO: 35.6 % — SIGNIFICANT CHANGE UP (ref 20.5–51.1)
MCHC RBC-ENTMCNC: 30.2 PG — SIGNIFICANT CHANGE UP (ref 27–31)
MCHC RBC-ENTMCNC: 34 G/DL — SIGNIFICANT CHANGE UP (ref 32–37)
MCV RBC AUTO: 88.9 FL — SIGNIFICANT CHANGE UP (ref 80–94)
MONOCYTES # BLD AUTO: 0.51 K/UL — SIGNIFICANT CHANGE UP (ref 0.1–0.6)
MONOCYTES NFR BLD AUTO: 8.3 % — SIGNIFICANT CHANGE UP (ref 1.7–9.3)
NEUTROPHILS # BLD AUTO: 3.22 K/UL — SIGNIFICANT CHANGE UP (ref 1.4–6.5)
NEUTROPHILS NFR BLD AUTO: 52.2 % — SIGNIFICANT CHANGE UP (ref 42.2–75.2)
NRBC # BLD: 0 /100 WBCS — SIGNIFICANT CHANGE UP (ref 0–0)
NT-PROBNP SERPL-SCNC: 5 PG/ML — SIGNIFICANT CHANGE UP (ref 0–300)
PCO2 BLDV: 52 MMHG — HIGH (ref 41–51)
PH BLDV: 7.4 — SIGNIFICANT CHANGE UP (ref 7.26–7.43)
PLATELET # BLD AUTO: 210 K/UL — SIGNIFICANT CHANGE UP (ref 130–400)
PO2 BLDV: 36 MMHG — SIGNIFICANT CHANGE UP (ref 20–40)
POTASSIUM BLDV-SCNC: 3.6 MMOL/L — SIGNIFICANT CHANGE UP (ref 3.3–5.6)
POTASSIUM SERPL-MCNC: 3.7 MMOL/L — SIGNIFICANT CHANGE UP (ref 3.5–5)
POTASSIUM SERPL-SCNC: 3.7 MMOL/L — SIGNIFICANT CHANGE UP (ref 3.5–5)
PROTHROM AB SERPL-ACNC: 18.2 SEC — HIGH (ref 9.95–12.87)
RBC # BLD: 4.24 M/UL — LOW (ref 4.7–6.1)
RBC # FLD: 13.2 % — SIGNIFICANT CHANGE UP (ref 11.5–14.5)
RSV RESULT: NEGATIVE — SIGNIFICANT CHANGE UP
RSV RNA RESP QL NAA+PROBE: NEGATIVE — SIGNIFICANT CHANGE UP
SAO2 % BLDV: 68 % — SIGNIFICANT CHANGE UP
SODIUM SERPL-SCNC: 141 MMOL/L — SIGNIFICANT CHANGE UP (ref 135–146)
TROPONIN T SERPL-MCNC: <0.01 NG/ML — SIGNIFICANT CHANGE UP
WBC # BLD: 6.16 K/UL — SIGNIFICANT CHANGE UP (ref 4.8–10.8)
WBC # FLD AUTO: 6.16 K/UL — SIGNIFICANT CHANGE UP (ref 4.8–10.8)

## 2019-02-21 RX ORDER — FUROSEMIDE 40 MG
20 TABLET ORAL
Qty: 0 | Refills: 0 | Status: DISCONTINUED | OUTPATIENT
Start: 2019-02-21 | End: 2019-02-25

## 2019-02-21 RX ORDER — QUETIAPINE FUMARATE 200 MG/1
50 TABLET, FILM COATED ORAL AT BEDTIME
Qty: 0 | Refills: 0 | Status: DISCONTINUED | OUTPATIENT
Start: 2019-02-21 | End: 2019-02-25

## 2019-02-21 RX ORDER — VANCOMYCIN HCL 1 G
1000 VIAL (EA) INTRAVENOUS ONCE
Qty: 0 | Refills: 0 | Status: COMPLETED | OUTPATIENT
Start: 2019-02-21 | End: 2019-02-21

## 2019-02-21 RX ORDER — PANTOPRAZOLE SODIUM 20 MG/1
40 TABLET, DELAYED RELEASE ORAL
Qty: 0 | Refills: 0 | Status: DISCONTINUED | OUTPATIENT
Start: 2019-02-21 | End: 2019-02-25

## 2019-02-21 RX ORDER — POLYETHYLENE GLYCOL 3350 17 G/17G
17 POWDER, FOR SOLUTION ORAL DAILY
Qty: 0 | Refills: 0 | Status: DISCONTINUED | OUTPATIENT
Start: 2019-02-21 | End: 2019-02-25

## 2019-02-21 RX ORDER — HYDROMORPHONE HYDROCHLORIDE 2 MG/ML
2 INJECTION INTRAMUSCULAR; INTRAVENOUS; SUBCUTANEOUS EVERY 4 HOURS
Qty: 0 | Refills: 0 | Status: DISCONTINUED | OUTPATIENT
Start: 2019-02-21 | End: 2019-02-25

## 2019-02-21 RX ORDER — APIXABAN 2.5 MG/1
5 TABLET, FILM COATED ORAL EVERY 12 HOURS
Qty: 0 | Refills: 0 | Status: DISCONTINUED | OUTPATIENT
Start: 2019-02-21 | End: 2019-02-25

## 2019-02-21 RX ORDER — OXYBUTYNIN CHLORIDE 5 MG
5 TABLET ORAL DAILY
Qty: 0 | Refills: 0 | Status: DISCONTINUED | OUTPATIENT
Start: 2019-02-21 | End: 2019-02-25

## 2019-02-21 RX ORDER — QUETIAPINE FUMARATE 200 MG/1
25 TABLET, FILM COATED ORAL DAILY
Qty: 0 | Refills: 0 | Status: DISCONTINUED | OUTPATIENT
Start: 2019-02-21 | End: 2019-02-25

## 2019-02-21 RX ORDER — NITROGLYCERIN 6.5 MG
0.4 CAPSULE, EXTENDED RELEASE ORAL
Qty: 0 | Refills: 0 | Status: DISCONTINUED | OUTPATIENT
Start: 2019-02-21 | End: 2019-02-25

## 2019-02-21 RX ORDER — GABAPENTIN 400 MG/1
400 CAPSULE ORAL THREE TIMES A DAY
Qty: 0 | Refills: 0 | Status: DISCONTINUED | OUTPATIENT
Start: 2019-02-21 | End: 2019-02-25

## 2019-02-21 RX ORDER — ATORVASTATIN CALCIUM 80 MG/1
40 TABLET, FILM COATED ORAL AT BEDTIME
Qty: 0 | Refills: 0 | Status: DISCONTINUED | OUTPATIENT
Start: 2019-02-21 | End: 2019-02-25

## 2019-02-21 RX ORDER — NICOTINE POLACRILEX 2 MG
2 GUM BUCCAL
Qty: 0 | Refills: 0 | COMMUNITY

## 2019-02-21 RX ORDER — HYDROMORPHONE HYDROCHLORIDE 2 MG/ML
2 INJECTION INTRAMUSCULAR; INTRAVENOUS; SUBCUTANEOUS ONCE
Qty: 0 | Refills: 0 | Status: DISCONTINUED | OUTPATIENT
Start: 2019-02-21 | End: 2019-02-21

## 2019-02-21 RX ORDER — ASPIRIN/CALCIUM CARB/MAGNESIUM 324 MG
81 TABLET ORAL DAILY
Qty: 0 | Refills: 0 | Status: DISCONTINUED | OUTPATIENT
Start: 2019-02-21 | End: 2019-02-25

## 2019-02-21 RX ORDER — NITROGLYCERIN 6.5 MG
0.4 CAPSULE, EXTENDED RELEASE ORAL ONCE
Qty: 0 | Refills: 0 | Status: COMPLETED | OUTPATIENT
Start: 2019-02-21 | End: 2019-02-21

## 2019-02-21 RX ORDER — FUROSEMIDE 40 MG
40 TABLET ORAL
Qty: 0 | Refills: 0 | Status: DISCONTINUED | OUTPATIENT
Start: 2019-02-21 | End: 2019-02-25

## 2019-02-21 RX ORDER — DRONABINOL 2.5 MG
0 CAPSULE ORAL
Qty: 0 | Refills: 0 | COMMUNITY

## 2019-02-21 RX ORDER — CEFEPIME 1 G/1
1000 INJECTION, POWDER, FOR SOLUTION INTRAMUSCULAR; INTRAVENOUS ONCE
Qty: 0 | Refills: 0 | Status: COMPLETED | OUTPATIENT
Start: 2019-02-21 | End: 2019-02-21

## 2019-02-21 RX ORDER — DOFETILIDE 0.25 MG/1
250 CAPSULE ORAL
Qty: 0 | Refills: 0 | Status: DISCONTINUED | OUTPATIENT
Start: 2019-02-21 | End: 2019-02-25

## 2019-02-21 RX ORDER — TIOTROPIUM BROMIDE 18 UG/1
1 CAPSULE ORAL; RESPIRATORY (INHALATION) DAILY
Qty: 0 | Refills: 0 | Status: DISCONTINUED | OUTPATIENT
Start: 2019-02-21 | End: 2019-02-25

## 2019-02-21 RX ORDER — METOPROLOL TARTRATE 50 MG
25 TABLET ORAL
Qty: 0 | Refills: 0 | Status: DISCONTINUED | OUTPATIENT
Start: 2019-02-21 | End: 2019-02-25

## 2019-02-21 RX ORDER — IPRATROPIUM BROMIDE 0.2 MG/ML
1 SOLUTION, NON-ORAL INHALATION
Qty: 0 | Refills: 0 | COMMUNITY

## 2019-02-21 RX ORDER — TAMSULOSIN HYDROCHLORIDE 0.4 MG/1
0.4 CAPSULE ORAL AT BEDTIME
Qty: 0 | Refills: 0 | Status: DISCONTINUED | OUTPATIENT
Start: 2019-02-21 | End: 2019-02-25

## 2019-02-21 RX ORDER — CHLORHEXIDINE GLUCONATE 213 G/1000ML
1 SOLUTION TOPICAL
Qty: 0 | Refills: 0 | Status: DISCONTINUED | OUTPATIENT
Start: 2019-02-21 | End: 2019-02-25

## 2019-02-21 RX ORDER — LAMOTRIGINE 25 MG/1
100 TABLET, ORALLY DISINTEGRATING ORAL
Qty: 0 | Refills: 0 | Status: DISCONTINUED | OUTPATIENT
Start: 2019-02-21 | End: 2019-02-25

## 2019-02-21 RX ORDER — BUDESONIDE AND FORMOTEROL FUMARATE DIHYDRATE 160; 4.5 UG/1; UG/1
2 AEROSOL RESPIRATORY (INHALATION)
Qty: 0 | Refills: 0 | Status: DISCONTINUED | OUTPATIENT
Start: 2019-02-21 | End: 2019-02-25

## 2019-02-21 RX ORDER — BACLOFEN 100 %
10 POWDER (GRAM) MISCELLANEOUS THREE TIMES A DAY
Qty: 0 | Refills: 0 | Status: DISCONTINUED | OUTPATIENT
Start: 2019-02-21 | End: 2019-02-25

## 2019-02-21 RX ORDER — ACETAMINOPHEN 500 MG
650 TABLET ORAL EVERY 6 HOURS
Qty: 0 | Refills: 0 | Status: DISCONTINUED | OUTPATIENT
Start: 2019-02-21 | End: 2019-02-25

## 2019-02-21 RX ADMIN — Medication 1000 MILLIGRAM(S): at 21:01

## 2019-02-21 RX ADMIN — Medication 0.4 MILLIGRAM(S): at 21:39

## 2019-02-21 RX ADMIN — CEFEPIME 100 MILLIGRAM(S): 1 INJECTION, POWDER, FOR SOLUTION INTRAMUSCULAR; INTRAVENOUS at 16:42

## 2019-02-21 RX ADMIN — HYDROMORPHONE HYDROCHLORIDE 2 MILLIGRAM(S): 2 INJECTION INTRAMUSCULAR; INTRAVENOUS; SUBCUTANEOUS at 23:55

## 2019-02-21 RX ADMIN — HYDROMORPHONE HYDROCHLORIDE 2 MILLIGRAM(S): 2 INJECTION INTRAMUSCULAR; INTRAVENOUS; SUBCUTANEOUS at 21:01

## 2019-02-21 RX ADMIN — HYDROMORPHONE HYDROCHLORIDE 2 MILLIGRAM(S): 2 INJECTION INTRAMUSCULAR; INTRAVENOUS; SUBCUTANEOUS at 16:57

## 2019-02-21 RX ADMIN — Medication 250 MILLIGRAM(S): at 17:20

## 2019-02-21 NOTE — ED PROVIDER NOTE - CLINICAL SUMMARY MEDICAL DECISION MAKING FREE TEXT BOX
Pt with 3 weeks of cough and palpitations intermittently, seen by EP for interrogation in ED, reported "events" but needed to be read by cardiologist, CXR suspicious for pna so started on abx, but read neg after and ct also read neg.  will admit to tele for further eval of palpitations
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
## 2019-02-21 NOTE — H&P ADULT - NSHPSOCIALHISTORY_GEN_ALL_CORE
Active smoker ~0.5 PPD x ~30 years  Denies alcohol or illicit drug use History of smoking - quit 6 months ago. 0.5 PPD x ~30 years before that  Medical marijuana use  Denies alcohol use  Retired  Long term resident at [facility]  Has walker and wheelchair, though reports ability to ambulate short distances without assistance

## 2019-02-21 NOTE — ED PROVIDER NOTE - PHYSICAL EXAMINATION
VITAL SIGNS: I have reviewed nursing notes and confirm.  CONSTITUTIONAL: Well-developed; well-nourished; in no acute distress.  SKIN: Skin exam is warm and dry, no acute rash.  HEAD: Normocephalic; atraumatic.  EYES: PERRL, EOM intact; conjunctiva and sclera clear.  ENT: No nasal discharge; airway clear. TMs clear. dry mm  NECK: Supple; non tender.  CARD: S1, S2 normal; no murmurs, gallops, or rubs. Regular rate and rhythm.  RESP: mild rales right base  ABD: Normal bowel sounds; soft; non-distended; non-tender;  EXT: Normal ROM. No clubbing, cyanosis or edema.  NEURO: aox3,   PSYCH: Cooperative, anxious

## 2019-02-21 NOTE — H&P ADULT - NSHPPHYSICALEXAM_GEN_ALL_CORE
GEN: NAD, comfortable  CARDIO: RRR, no m/r/g  RESP: CTAB, no w/r/r  ABD: soft, NT/ND, +BS  EXT: no edema, pp b/l  NEURO: AAOx3, grossly normal GEN: NAD, comfortable  CARDIO: RRR, no m/r/g  RESP: CTAB, no w/r/r  ABD/GI: soft, NT/ND, +BS  EXT: no edema, pp b/l  NEURO: AAOx3, grossly normal

## 2019-02-21 NOTE — H&P ADULT - NSHPLABSRESULTS_GEN_ALL_CORE
12.8   6.16  )-----------( 210      ( 21 Feb 2019 12:54 )             37.7     02-21    141  |  97<L>  |  21<H>  ----------------------------<  96  3.7   |  28  |  1.0    Ca    9.5      21 Feb 2019 12:54    PT/INR - ( 21 Feb 2019 12:54 )   PT: 18.20 sec;   INR: 1.59 ratio       PTT - ( 21 Feb 2019 12:54 )  PTT:43.4 sec    Lactate Trend  02-21 @ 12:54 Lactate:0.7     CARDIAC MARKERS ( 21 Feb 2019 12:54 )  x     / <0.01 ng/mL / x     / x     / x          < from: CT Chest w/ IV Cont (02.21.19 @ 18:59) >  IMPRESSION:    Right lower lobe atelectasis.    < end of copied text >

## 2019-02-21 NOTE — ED ADULT NURSE NOTE - OBJECTIVE STATEMENT
Patient sent in from [facility] [place] for chest pain and shortness of breath since this morning.

## 2019-02-21 NOTE — H&P ADULT - ATTENDING COMMENTS
Patient seen and examined independently. Resident's H & P reviewed. Agree with the findings and plan of care except,    A 51 years old male presented with on and off hemoptysis for the last one month. Give H/O sob for the last 10 days. Pt also states that since yesterday he has been having on & off cp, and palpitation.    EKG: Pacemaker rhythm @ 60/min. (Interpreted by me)  CE x 2 negative  CT chest with IV contrast: RLL atelectasis.     ASSESSMENT:    1. Hemoptysis  2. CP/Palpitations  3. COPD  4. MS / Bipolar disorder / SZ disorder   5. A. Fib on Eliquis  6. CAD / HTN / DL    PLAN:    . Cont tele  . Pulmonary eval  . Cardiology and EP eval  . Device interrogation  . Pt had negative nuclear stress test on 4/20/18  . Cont his home meds.

## 2019-02-21 NOTE — H&P ADULT - HISTORY OF PRESENT ILLNESS
47 yo M with PMH of MS, schizophrenia, seizure disorder, CAD, A-fib s/p ablation on Eliquis, Hx of Vtach s/p AICD, HFpEF, HTN, DM, DLD, COPD (no O2), BPH, GERD, TIA from [facility] presented to ED complaining of palpitations, SOB, and cough of 3 weeks duration. Patient saw Pulm at NH and had CXR done that was normal. Was told he need CT chest but never had it yet. When he had similar symptoms in past was given Nitro + O2 and symptoms resolved. Today, they did not. NH instructed patient go to ED.    In ED, patient's was device was interrogated and noted to have "events". Still needed to be interpreted by Cardiologist. 47 yo M with extensive PMH including MS, bipolar, seizure disorder, CAD, A-fib s/p ablation on Eliquis, Hx of Vtach s/p AICD, HFpEF, HTN, DLD, COPD (no O2), BPH, GERD, TIA from [facility] presented to ED complaining of chest pain, palpitations, SOB, and cough of 3 weeks duration. When he had similar symptoms in past was given Nitro + O2 and symptoms resolved. Today, they did not. NH instructed patient go to ED. Reports his cough is productive of blood tinged sputum. Reports multiple episodes in the past. States he has stopped smoking cigarettes though does use medical marijuana. Denies any fevers, chills, abdominal pain, NVCD.    In ED, patient's was device was interrogated and noted to have "events". Still needed to be interpreted by Cardiologist.

## 2019-02-21 NOTE — ED PROVIDER NOTE - NS ED ROS FT
Review of Systems:  	•	CONSTITUTIONAL - no fever, no diaphoresis, no weight change  	•	SKIN - no rash  	•	HEMATOLOGIC - no bleeding, no bruising  	•	EYES - no eye pain, no blurred vision  	•	ENT - no change in hearing, no pain  	•	RESPIRATORY - + shortness of breath, + cough  	•	CARDIAC - no chest pain, + palpitations  	•	GI - no abd pain, no nausea, no vomiting, no diarrhea, no constipation, no bleeding  	•	 - no dysuria, no hematuria, no flank pain, no urinary retention  	•	MUSCULOSKELETAL - no joint paint, no swelling, no redness  	•	NEUROLOGIC - no weakness, no headache, no paresthesias, no dizziness  All other systems negative, unless specified in HPI

## 2019-02-21 NOTE — ED PROVIDER NOTE - OBJECTIVE STATEMENT
47 yo m with pmh of ms, schizophrenia, cad, chf, svt, vtach, afib, dm, on eliquis, presents with c/o palpitations, sob, and cough.  pt reports having sx for about 3 weeks.  pt says was seen by pulm at the NH and had outpt cxr that was normal.  pt was told that he would need a CT, but has not scheduled yet.  pt says usually when he has the symptoms, he takes nitro and given O2 and feels better, but today the symptoms did not resolve.  NH told him to go to the ED.  pt denies abd pain, no leg swelling.

## 2019-02-21 NOTE — ED ADULT NURSE NOTE - NSIMPLEMENTINTERV_GEN_ALL_ED
Implemented All Fall Risk Interventions:  [place] to call system. Call bell, personal items and telephone within reach. Instruct patient to call for assistance. Room bathroom lighting operational. Non-slip footwear when patient is off stretcher. Physically safe environment: no spills, clutter or unnecessary equipment. Stretcher in lowest position, wheels locked, appropriate side rails in place. Provide visual cue, wrist band, yellow gown, etc. Monitor gait and stability. Monitor for mental status changes and reorient to person, place, and time. Review medications for side effects contributing to fall risk. Reinforce activity limits and safety measures with patient and family.

## 2019-02-22 ENCOUNTER — APPOINTMENT (OUTPATIENT)
Dept: OTOLARYNGOLOGY | Facility: CLINIC | Age: 49
End: 2019-02-22

## 2019-02-22 LAB
ALBUMIN SERPL ELPH-MCNC: 4.4 G/DL — SIGNIFICANT CHANGE UP (ref 3.5–5.2)
ALP SERPL-CCNC: 147 U/L — HIGH (ref 30–115)
ALT FLD-CCNC: 20 U/L — SIGNIFICANT CHANGE UP (ref 0–41)
ANION GAP SERPL CALC-SCNC: 13 MMOL/L — SIGNIFICANT CHANGE UP (ref 7–14)
AST SERPL-CCNC: 18 U/L — SIGNIFICANT CHANGE UP (ref 0–41)
BASOPHILS # BLD AUTO: 0.1 K/UL — SIGNIFICANT CHANGE UP (ref 0–0.2)
BASOPHILS NFR BLD AUTO: 1.4 % — HIGH (ref 0–1)
BILIRUB SERPL-MCNC: 0.3 MG/DL — SIGNIFICANT CHANGE UP (ref 0.2–1.2)
BUN SERPL-MCNC: 23 MG/DL — HIGH (ref 10–20)
CALCIUM SERPL-MCNC: 10 MG/DL — SIGNIFICANT CHANGE UP (ref 8.5–10.1)
CHLORIDE SERPL-SCNC: 102 MMOL/L — SIGNIFICANT CHANGE UP (ref 98–110)
CK MB CFR SERPL CALC: 1.3 NG/ML — SIGNIFICANT CHANGE UP (ref 0.6–6.3)
CK SERPL-CCNC: 82 U/L — SIGNIFICANT CHANGE UP (ref 0–225)
CO2 SERPL-SCNC: 26 MMOL/L — SIGNIFICANT CHANGE UP (ref 17–32)
CREAT SERPL-MCNC: 0.9 MG/DL — SIGNIFICANT CHANGE UP (ref 0.7–1.5)
EOSINOPHIL # BLD AUTO: 0.14 K/UL — SIGNIFICANT CHANGE UP (ref 0–0.7)
EOSINOPHIL NFR BLD AUTO: 2 % — SIGNIFICANT CHANGE UP (ref 0–8)
GLUCOSE SERPL-MCNC: 90 MG/DL — SIGNIFICANT CHANGE UP (ref 70–99)
HCT VFR BLD CALC: 39.1 % — LOW (ref 42–52)
HGB BLD-MCNC: 13 G/DL — LOW (ref 14–18)
IMM GRANULOCYTES NFR BLD AUTO: 0.3 % — SIGNIFICANT CHANGE UP (ref 0.1–0.3)
LYMPHOCYTES # BLD AUTO: 2.03 K/UL — SIGNIFICANT CHANGE UP (ref 1.2–3.4)
LYMPHOCYTES # BLD AUTO: 28.5 % — SIGNIFICANT CHANGE UP (ref 20.5–51.1)
MAGNESIUM SERPL-MCNC: 2 MG/DL — SIGNIFICANT CHANGE UP (ref 1.8–2.4)
MCHC RBC-ENTMCNC: 29.5 PG — SIGNIFICANT CHANGE UP (ref 27–31)
MCHC RBC-ENTMCNC: 33.2 G/DL — SIGNIFICANT CHANGE UP (ref 32–37)
MCV RBC AUTO: 88.7 FL — SIGNIFICANT CHANGE UP (ref 80–94)
MONOCYTES # BLD AUTO: 0.59 K/UL — SIGNIFICANT CHANGE UP (ref 0.1–0.6)
MONOCYTES NFR BLD AUTO: 8.3 % — SIGNIFICANT CHANGE UP (ref 1.7–9.3)
NEUTROPHILS # BLD AUTO: 4.24 K/UL — SIGNIFICANT CHANGE UP (ref 1.4–6.5)
NEUTROPHILS NFR BLD AUTO: 59.5 % — SIGNIFICANT CHANGE UP (ref 42.2–75.2)
NRBC # BLD: 0 /100 WBCS — SIGNIFICANT CHANGE UP (ref 0–0)
PHOSPHATE SERPL-MCNC: 4.6 MG/DL — SIGNIFICANT CHANGE UP (ref 2.1–4.9)
PLATELET # BLD AUTO: 230 K/UL — SIGNIFICANT CHANGE UP (ref 130–400)
POTASSIUM SERPL-MCNC: 4.2 MMOL/L — SIGNIFICANT CHANGE UP (ref 3.5–5)
POTASSIUM SERPL-SCNC: 4.2 MMOL/L — SIGNIFICANT CHANGE UP (ref 3.5–5)
PROT SERPL-MCNC: 6.7 G/DL — SIGNIFICANT CHANGE UP (ref 6–8)
RBC # BLD: 4.41 M/UL — LOW (ref 4.7–6.1)
RBC # FLD: 13 % — SIGNIFICANT CHANGE UP (ref 11.5–14.5)
SODIUM SERPL-SCNC: 141 MMOL/L — SIGNIFICANT CHANGE UP (ref 135–146)
TROPONIN T SERPL-MCNC: <0.01 NG/ML — SIGNIFICANT CHANGE UP
WBC # BLD: 7.12 K/UL — SIGNIFICANT CHANGE UP (ref 4.8–10.8)
WBC # FLD AUTO: 7.12 K/UL — SIGNIFICANT CHANGE UP (ref 4.8–10.8)

## 2019-02-22 RX ORDER — GABAPENTIN 400 MG/1
400 CAPSULE ORAL ONCE
Qty: 0 | Refills: 0 | Status: COMPLETED | OUTPATIENT
Start: 2019-02-22 | End: 2019-02-22

## 2019-02-22 RX ADMIN — DOFETILIDE 250 MICROGRAM(S): 0.25 CAPSULE ORAL at 17:47

## 2019-02-22 RX ADMIN — GABAPENTIN 400 MILLIGRAM(S): 400 CAPSULE ORAL at 22:26

## 2019-02-22 RX ADMIN — LAMOTRIGINE 100 MILLIGRAM(S): 25 TABLET, ORALLY DISINTEGRATING ORAL at 17:47

## 2019-02-22 RX ADMIN — DOFETILIDE 250 MICROGRAM(S): 0.25 CAPSULE ORAL at 05:53

## 2019-02-22 RX ADMIN — CHLORHEXIDINE GLUCONATE 1 APPLICATION(S): 213 SOLUTION TOPICAL at 05:54

## 2019-02-22 RX ADMIN — LAMOTRIGINE 100 MILLIGRAM(S): 25 TABLET, ORALLY DISINTEGRATING ORAL at 12:32

## 2019-02-22 RX ADMIN — Medication 10 MILLIGRAM(S): at 05:53

## 2019-02-22 RX ADMIN — APIXABAN 5 MILLIGRAM(S): 2.5 TABLET, FILM COATED ORAL at 05:53

## 2019-02-22 RX ADMIN — PANTOPRAZOLE SODIUM 40 MILLIGRAM(S): 20 TABLET, DELAYED RELEASE ORAL at 05:53

## 2019-02-22 RX ADMIN — HYDROMORPHONE HYDROCHLORIDE 2 MILLIGRAM(S): 2 INJECTION INTRAMUSCULAR; INTRAVENOUS; SUBCUTANEOUS at 04:12

## 2019-02-22 RX ADMIN — HYDROMORPHONE HYDROCHLORIDE 2 MILLIGRAM(S): 2 INJECTION INTRAMUSCULAR; INTRAVENOUS; SUBCUTANEOUS at 09:47

## 2019-02-22 RX ADMIN — GABAPENTIN 400 MILLIGRAM(S): 400 CAPSULE ORAL at 13:21

## 2019-02-22 RX ADMIN — Medication 10 MILLIGRAM(S): at 22:26

## 2019-02-22 RX ADMIN — Medication 10 MILLIGRAM(S): at 13:21

## 2019-02-22 RX ADMIN — QUETIAPINE FUMARATE 50 MILLIGRAM(S): 200 TABLET, FILM COATED ORAL at 22:26

## 2019-02-22 RX ADMIN — TAMSULOSIN HYDROCHLORIDE 0.4 MILLIGRAM(S): 0.4 CAPSULE ORAL at 22:26

## 2019-02-22 RX ADMIN — ATORVASTATIN CALCIUM 40 MILLIGRAM(S): 80 TABLET, FILM COATED ORAL at 22:26

## 2019-02-22 RX ADMIN — GABAPENTIN 400 MILLIGRAM(S): 400 CAPSULE ORAL at 02:53

## 2019-02-22 RX ADMIN — GABAPENTIN 400 MILLIGRAM(S): 400 CAPSULE ORAL at 05:53

## 2019-02-22 RX ADMIN — Medication 25 MILLIGRAM(S): at 05:54

## 2019-02-22 RX ADMIN — BUDESONIDE AND FORMOTEROL FUMARATE DIHYDRATE 2 PUFF(S): 160; 4.5 AEROSOL RESPIRATORY (INHALATION) at 20:01

## 2019-02-22 RX ADMIN — APIXABAN 5 MILLIGRAM(S): 2.5 TABLET, FILM COATED ORAL at 17:47

## 2019-02-22 RX ADMIN — Medication 25 MILLIGRAM(S): at 17:47

## 2019-02-22 RX ADMIN — QUETIAPINE FUMARATE 25 MILLIGRAM(S): 200 TABLET, FILM COATED ORAL at 12:32

## 2019-02-22 RX ADMIN — Medication 5 MILLIGRAM(S): at 14:16

## 2019-02-22 RX ADMIN — Medication 81 MILLIGRAM(S): at 12:32

## 2019-02-22 RX ADMIN — HYDROMORPHONE HYDROCHLORIDE 2 MILLIGRAM(S): 2 INJECTION INTRAMUSCULAR; INTRAVENOUS; SUBCUTANEOUS at 22:25

## 2019-02-22 NOTE — CONSULT NOTE ADULT - ASSESSMENT
Impression:  COPD on home O2  Bloody tinged sputum/Mild epistaxis Impression:  Right lower lobe atelectasis  COPD on home O2  Bloody tinged sputum/Mild epistaxis   SOB on exertion  HO Arrhythmia s/p AICD    Recommendations:  Nebs PRN; LABA/ICS ( symbicort)   Humidified O2 to keep So2 > 88%  Incentive spirometry  Cup at bedside to quantify hemoptysis  Optimize cardiac therapy   ENT eval to R/O upper tract bleeding  Cardio eval   Following cardio workup will discuss with patient risks and benefits from bronchoscopy to evaluate RLL atelectasis;   If worsening hemoptysis consider holding AC  DVT Px

## 2019-02-22 NOTE — PROGRESS NOTE ADULT - ASSESSMENT
49 yo M with extensive PMH including MS, bipolar, seizure disorder, CAD, A-fib s/p ablation on Eliquis, Hx of Vtach s/p AICD, HFpEF, HTN, DLD, COPD (no O2), BPH, GERD, TIA from [facility] presented to ED complaining of chest pain, palpitations, SOB, and cough of 3 weeks duration. In ED, patient's was device was interrogated and noted to have "events". Still needed to be interpreted by Cardiologist.    #) Chest pain / palpitations possibly 2/2 "events" on AICD interrogation  - EPS evaluated device.  - c/w tele monitoring  - trend CE -x2  - pain control PRN    #) Hemoptysis  - unclear etiology  - CT chest = R lobe atelectasis  - hemodynamically stable, Hg ~ baseline  - Epistaxis probably     #) COPD - takes Breo + Incruse at home, will use Symbicort + Spiriva while admitted    #) MS / Bipolar / Seizure disorder / CAD / A-fib / HTN / DLD / BPH / GERD - c/w home meds    DVT ppx: on Eliquis  Diet: DASH  Activity: OOBTC  Code status: DNR/DNI - form in [facility] papers, filled out MOLST form, placed in chart  Dispo: [facility]

## 2019-02-22 NOTE — CONSULT NOTE ADULT - SUBJECTIVE AND OBJECTIVE BOX
Patient is a 48y old  Male who presents with a chief complaint of palpitations (21 Feb 2019 21:31)      HPI:  47 yo M with extensive PMH including MS, bipolar, seizure disorder, CAD, A-fib s/p ablation on Eliquis, Hx of Vtach s/p AICD, HFpEF, HTN, DLD, COPD (no O2), BPH, GERD, TIA from [facility] presented to ED complaining of chest pain, palpitations, SOB, and cough of 3 weeks duration. When he had similar symptoms in past was given Nitro + O2 and symptoms resolved. Today, they did not. NH instructed patient go to ED. Reports his cough is productive of blood tinged sputum. Reports multiple episodes in the past. States he has stopped smoking cigarettes though does use medical marijuana. Denies any fevers, chills, abdominal pain, NVCD.    In ED, patient's was device was interrogated and noted to have "events". Still needed to be interpreted by Cardiologist. (21 Feb 2019 21:31)  Patient notes being on Breo at home; Patient notes bloody tinged sputum over the past few months for which he was being evaluated by his pulmonologist. He notes bloody tinged sputum with episodes spread through out the day associated with bloody mucus when he blows his nose. No fever or chills.       PAST MEDICAL & SURGICAL HISTORY:  PTSD (post-traumatic stress disorder)  Supraventricular arrhythmia: prior history  Cardiomyopathy  CHF (congestive heart failure)  Atrial fibrillation: s/p ablation, on eliquis  Diabetes mellitus: diet controlled  BPH (benign prostatic hyperplasia)  HTN (hypertension)  Seizures  Migraines  Pneumonia  MS (multiple sclerosis)  CAD (coronary artery disease)  Bipolar disorder  Anginal pain  Schizo affective schizophrenia  GERD (gastroesophageal reflux disease)  Seasonal allergies  Hypercholesteremia  COPD (chronic obstructive pulmonary disease)  CVA (cerebral vascular accident)  TIA (transient ischemic attack)  Syncope  Peripheral Neuropathy  Clinical Depression  H/O prior ablation treatment: for Afib  Cardiac pacemaker recipient  H/O hernia repair: right 1972,1991  History of hip replacement  S/P Orchiectomy: L for testicular CA  S/P Implantation of AICD      SOCIAL HX:   Exsmoker     FAMILY HISTORY:  Family history of cervical cancer (Mother)  Family history of early CAD (Mother)      Allergies    dexmethylphenidate (Unknown)  Dilantin (Rash)  dilantin, compazine, neurontin, ritalin, phenergan (Unknown)  Haldol (Unknown)  hydantoin derivatives (Other)  methylphenidate (Unknown)  Morphine Sulfate (Unknown)  phenytoin (Unknown)  prochlorperazine (Unknown)  promethazine (Dystonic RXN)  rash/hives (Anaphylaxis)  thioxanthenes (Unknown)    Intolerances      PHYSICAL EXAM  Vital Signs Last 24 Hrs  T(C): 36.7 (22 Feb 2019 13:23), Max: 36.7 (22 Feb 2019 13:23)  T(F): 98.1 (22 Feb 2019 13:23), Max: 98.1 (22 Feb 2019 13:23)  HR: 59 (22 Feb 2019 13:23) (59 - 67)  BP: 99/54 (22 Feb 2019 13:23) (99/54 - 131/65)  RR: 18 (22 Feb 2019 13:23) (18 - 20)  SpO2: 98% (22 Feb 2019 01:06) (98% - 98%)  I&O's Summary      General: Comfortable in bed  HEENT:  On NC  Lymph node: No palpable LN             Lungs: Mild bilateral wheezing  Cardiovascular: RRR, S1S2  Abdomen: BS+ve; soft non tender  Extremities: Trace ankle edema   Skin:  No evident Rash  Neurological:  AAOx3; No focal deficit      LABS:                          13.0   7.12  )-----------( 230      ( 22 Feb 2019 06:43 )             39.1    02-22    141  |  102  |  23<H>  ----------------------------<  90  4.2   |  26  |  0.9    Ca    10.0      22 Feb 2019 06:43  Phos  4.6     02-22  Mg     2.0     02-22    TPro  6.7  /  Alb  4.4  /  TBili  0.3  /  DBili  x   /  AST  18  /  ALT  20  /  AlkPhos  147<H>  02-22    PT/INR - ( 21 Feb 2019 12:54 )   PT: 18.20 sec;   INR: 1.59 ratio      PTT - ( 21 Feb 2019 12:54 )  PTT:43.4 sec                                           CARDIAC MARKERS ( 22 Feb 2019 06:43 )  x     / <0.01 ng/mL / 82 U/L / x     / 1.3 ng/mL  CARDIAC MARKERS ( 21 Feb 2019 12:54 )  x     / <0.01 ng/mL / x     / x     / x        LIVER FUNCTIONS - ( 22 Feb 2019 06:43 )  Alb: 4.4 g/dL / Pro: 6.7 g/dL / ALK PHOS: 147 U/L / ALT: 20 U/L / AST: 18 U/L / GGT: x                                                Lactate (02-21-19 @ 13:57): 0.7  Lactate (02-21-19 @ 12:54): 0.7      MEDICATIONS  (STANDING):  apixaban 5 milliGRAM(s) Oral every 12 hours  aspirin  chewable 81 milliGRAM(s) Oral daily  atorvastatin 40 milliGRAM(s) Oral at bedtime  baclofen 10 milliGRAM(s) Oral three times a day  buDESOnide  80 MICROgram(s)/formoterol 4.5 MICROgram(s) Inhaler 2 Puff(s) Inhalation two times a day  chlorhexidine 4% Liquid 1 Application(s) Topical <User Schedule>  dofetilide 250 MICROGram(s) Oral two times a day  furosemide    Tablet 40 milliGRAM(s) Oral <User Schedule>  furosemide    Tablet 20 milliGRAM(s) Oral <User Schedule>  gabapentin 400 milliGRAM(s) Oral three times a day  lamoTRIgine 100 milliGRAM(s) Oral two times a day  metoprolol tartrate 25 milliGRAM(s) Oral two times a day  oxybutynin 5 milliGRAM(s) Oral daily  pantoprazole    Tablet 40 milliGRAM(s) Oral before breakfast  QUEtiapine 25 milliGRAM(s) Oral daily  QUEtiapine 50 milliGRAM(s) Oral at bedtime  tamsulosin 0.4 milliGRAM(s) Oral at bedtime  tiotropium 18 MICROgram(s) Capsule 1 Capsule(s) Inhalation daily    MEDICATIONS  (PRN):  acetaminophen   Tablet .. 650 milliGRAM(s) Oral every 6 hours PRN Mild Pain (1 - 3)  HYDROmorphone   Tablet 2 milliGRAM(s) Oral every 4 hours PRN Severe Pain (7 - 10)  nitroglycerin     SubLingual 0.4 milliGRAM(s) SubLingual every 5 minutes PRN Chest Pain  polyethylene glycol 3350 17 Gram(s) Oral daily PRN Constipation    Radiology:   < from: CT Chest w/ IV Cont (02.21.19 @ 18:59) >  IMPRESSION:    Right lower lobe atelectasis.    < end of copied text >

## 2019-02-23 LAB
ANION GAP SERPL CALC-SCNC: 13 MMOL/L — SIGNIFICANT CHANGE UP (ref 7–14)
BUN SERPL-MCNC: 24 MG/DL — HIGH (ref 10–20)
CALCIUM SERPL-MCNC: 9.6 MG/DL — SIGNIFICANT CHANGE UP (ref 8.5–10.1)
CHLORIDE SERPL-SCNC: 105 MMOL/L — SIGNIFICANT CHANGE UP (ref 98–110)
CO2 SERPL-SCNC: 25 MMOL/L — SIGNIFICANT CHANGE UP (ref 17–32)
CREAT SERPL-MCNC: 0.9 MG/DL — SIGNIFICANT CHANGE UP (ref 0.7–1.5)
GLUCOSE SERPL-MCNC: 84 MG/DL — SIGNIFICANT CHANGE UP (ref 70–99)
HCT VFR BLD CALC: 40.3 % — LOW (ref 42–52)
HGB BLD-MCNC: 13.2 G/DL — LOW (ref 14–18)
MCHC RBC-ENTMCNC: 29.5 PG — SIGNIFICANT CHANGE UP (ref 27–31)
MCHC RBC-ENTMCNC: 32.8 G/DL — SIGNIFICANT CHANGE UP (ref 32–37)
MCV RBC AUTO: 90.2 FL — SIGNIFICANT CHANGE UP (ref 80–94)
NRBC # BLD: 0 /100 WBCS — SIGNIFICANT CHANGE UP (ref 0–0)
PLATELET # BLD AUTO: 233 K/UL — SIGNIFICANT CHANGE UP (ref 130–400)
POTASSIUM SERPL-MCNC: 4.4 MMOL/L — SIGNIFICANT CHANGE UP (ref 3.5–5)
POTASSIUM SERPL-SCNC: 4.4 MMOL/L — SIGNIFICANT CHANGE UP (ref 3.5–5)
RBC # BLD: 4.47 M/UL — LOW (ref 4.7–6.1)
RBC # FLD: 13.2 % — SIGNIFICANT CHANGE UP (ref 11.5–14.5)
SODIUM SERPL-SCNC: 143 MMOL/L — SIGNIFICANT CHANGE UP (ref 135–146)
WBC # BLD: 6.65 K/UL — SIGNIFICANT CHANGE UP (ref 4.8–10.8)
WBC # FLD AUTO: 6.65 K/UL — SIGNIFICANT CHANGE UP (ref 4.8–10.8)

## 2019-02-23 RX ADMIN — LAMOTRIGINE 100 MILLIGRAM(S): 25 TABLET, ORALLY DISINTEGRATING ORAL at 18:10

## 2019-02-23 RX ADMIN — Medication 10 MILLIGRAM(S): at 14:00

## 2019-02-23 RX ADMIN — Medication 81 MILLIGRAM(S): at 11:36

## 2019-02-23 RX ADMIN — QUETIAPINE FUMARATE 25 MILLIGRAM(S): 200 TABLET, FILM COATED ORAL at 11:36

## 2019-02-23 RX ADMIN — APIXABAN 5 MILLIGRAM(S): 2.5 TABLET, FILM COATED ORAL at 18:10

## 2019-02-23 RX ADMIN — Medication 25 MILLIGRAM(S): at 06:20

## 2019-02-23 RX ADMIN — HYDROMORPHONE HYDROCHLORIDE 2 MILLIGRAM(S): 2 INJECTION INTRAMUSCULAR; INTRAVENOUS; SUBCUTANEOUS at 10:43

## 2019-02-23 RX ADMIN — GABAPENTIN 400 MILLIGRAM(S): 400 CAPSULE ORAL at 06:18

## 2019-02-23 RX ADMIN — TIOTROPIUM BROMIDE 1 CAPSULE(S): 18 CAPSULE ORAL; RESPIRATORY (INHALATION) at 12:38

## 2019-02-23 RX ADMIN — DOFETILIDE 250 MICROGRAM(S): 0.25 CAPSULE ORAL at 18:10

## 2019-02-23 RX ADMIN — APIXABAN 5 MILLIGRAM(S): 2.5 TABLET, FILM COATED ORAL at 06:17

## 2019-02-23 RX ADMIN — PANTOPRAZOLE SODIUM 40 MILLIGRAM(S): 20 TABLET, DELAYED RELEASE ORAL at 06:20

## 2019-02-23 RX ADMIN — Medication 10 MILLIGRAM(S): at 21:55

## 2019-02-23 RX ADMIN — Medication 10 MILLIGRAM(S): at 06:17

## 2019-02-23 RX ADMIN — ATORVASTATIN CALCIUM 40 MILLIGRAM(S): 80 TABLET, FILM COATED ORAL at 21:55

## 2019-02-23 RX ADMIN — Medication 40 MILLIGRAM(S): at 06:16

## 2019-02-23 RX ADMIN — DOFETILIDE 250 MICROGRAM(S): 0.25 CAPSULE ORAL at 06:20

## 2019-02-23 RX ADMIN — LAMOTRIGINE 100 MILLIGRAM(S): 25 TABLET, ORALLY DISINTEGRATING ORAL at 06:19

## 2019-02-23 RX ADMIN — Medication 25 MILLIGRAM(S): at 18:11

## 2019-02-23 RX ADMIN — Medication 5 MILLIGRAM(S): at 11:36

## 2019-02-23 RX ADMIN — GABAPENTIN 400 MILLIGRAM(S): 400 CAPSULE ORAL at 21:54

## 2019-02-23 RX ADMIN — BUDESONIDE AND FORMOTEROL FUMARATE DIHYDRATE 2 PUFF(S): 160; 4.5 AEROSOL RESPIRATORY (INHALATION) at 21:54

## 2019-02-23 RX ADMIN — BUDESONIDE AND FORMOTEROL FUMARATE DIHYDRATE 2 PUFF(S): 160; 4.5 AEROSOL RESPIRATORY (INHALATION) at 07:42

## 2019-02-23 RX ADMIN — HYDROMORPHONE HYDROCHLORIDE 2 MILLIGRAM(S): 2 INJECTION INTRAMUSCULAR; INTRAVENOUS; SUBCUTANEOUS at 06:14

## 2019-02-23 RX ADMIN — CHLORHEXIDINE GLUCONATE 1 APPLICATION(S): 213 SOLUTION TOPICAL at 06:16

## 2019-02-23 RX ADMIN — HYDROMORPHONE HYDROCHLORIDE 2 MILLIGRAM(S): 2 INJECTION INTRAMUSCULAR; INTRAVENOUS; SUBCUTANEOUS at 12:00

## 2019-02-23 RX ADMIN — TAMSULOSIN HYDROCHLORIDE 0.4 MILLIGRAM(S): 0.4 CAPSULE ORAL at 21:54

## 2019-02-23 RX ADMIN — GABAPENTIN 400 MILLIGRAM(S): 400 CAPSULE ORAL at 13:22

## 2019-02-23 RX ADMIN — QUETIAPINE FUMARATE 50 MILLIGRAM(S): 200 TABLET, FILM COATED ORAL at 21:54

## 2019-02-23 NOTE — CONSULT NOTE ADULT - SUBJECTIVE AND OBJECTIVE BOX
Patient is a 48y old  Male who presents with a chief complaint of palpitations (23 Feb 2019 10:37)      HPI: chest pain, initially no response to NTG then in ED responded (cardiac cath, coronary cta and nuclear stress test all normal), sob, palpitation, he was complaining of sudden onset of sob, hypoxia, drop in pulse ox, even during the visit and interview, which took long minutes few times he had tachypnea and obviously was excited and nervous and once even cried talking about the demise of his wife     49 yo M with extensive PMH including MS, bipolar, seizure disorder, A-fib s/p ablation on Eliquis, Hx of ARVD s/p AICD, HTN, DLD, COPD (no O2), BPH, GERD, TIA from [facility] presented to ED complaining of chest pain, palpitations, SOB, and cough of 3 weeks duration. When he had similar symptoms in past was given Nitro + O2 and symptoms resolved. Today, they did not. NH instructed patient go to ED. Reports his cough is productive of blood tinged sputum. Reports multiple episodes in the past. States he has stopped smoking cigarettes though does use medical marijuana. Denies any fevers, chills, abdominal pain, NVCD.    In ED, patient's was device was interrogated and noted to have "events". Still needed to be interpreted by Cardiologist. (21 Feb 2019 21:31)      PAST MEDICAL & SURGICAL HISTORY:  PTSD (post-traumatic stress disorder)  Supraventricular arrhythmia: prior history  Cardiomyopathy  CHF (congestive heart failure)  Atrial fibrillation: s/p ablation, on eliquis  Diabetes mellitus: diet controlled  BPH (benign prostatic hyperplasia)  HTN (hypertension)  Seizures  Migraines  Pneumonia  MS (multiple sclerosis)  CAD (coronary artery disease)  Bipolar disorder  Anginal pain  Schizo affective schizophrenia  GERD (gastroesophageal reflux disease)  Seasonal allergies  Hypercholesteremia  COPD (chronic obstructive pulmonary disease)  CVA (cerebral vascular accident)  TIA (transient ischemic attack)  Syncope  Peripheral Neuropathy  Clinical Depression  H/O prior ablation treatment: for Afib  Cardiac pacemaker recipient  H/O hernia repair: right 1972,1991  History of hip replacement  S/P Orchiectomy: L for testicular CA  S/P Implantation of AICD      PREVIOUS DIAGNOSTIC TESTING:      ECHO  FINDINGS: < from: Transthoracic Echocardiogram (08.03.18 @ 12:00) >   1. Left ventricular ejection fraction, by visual estimation, is 55 to   60%.   2. Normal left ventricular size and wall thicknesses, with normal   systolic function.   3. Mild tricuspid regurgitation.    < end of copied text >      STRESS TEST  FINDINGS: < from: NM Nuclear Stress Pharmacologic Multiple (04.21.18 @ 10:41) >  Impression:   1. NORMAL LEXISCAN / REST MYOCARDIAL PERFUSION TOMOGRAPHY, WITH NO   EVIDENCE FOR ISCHEMIA DURING LEXISCAN INFUSION.   2. NORMAL RESTING LEFT VENTRICULAR WALL MOTION AND WALL THICKENING.  3. LEFT VENTRICULAR EJECTION FRACTION OF  61 % WHICH IS WITHIN RANGE OF   NORMAL.     < end of copied text >   August 6 2018 CCTA normal coronary arteries and calcium score 0, dilated RV    CATHETERIZATION  FINDINGS: as per patient in 2014 in different hospital, no obstructive CAD    MEDICATIONS  (STANDING):  apixaban 5 milliGRAM(s) Oral every 12 hours  aspirin  chewable 81 milliGRAM(s) Oral daily  atorvastatin 40 milliGRAM(s) Oral at bedtime  baclofen 10 milliGRAM(s) Oral three times a day  buDESOnide  80 MICROgram(s)/formoterol 4.5 MICROgram(s) Inhaler 2 Puff(s) Inhalation two times a day  chlorhexidine 4% Liquid 1 Application(s) Topical <User Schedule>  dofetilide 250 MICROGram(s) Oral two times a day  furosemide    Tablet 40 milliGRAM(s) Oral <User Schedule>  furosemide    Tablet 20 milliGRAM(s) Oral <User Schedule>  gabapentin 400 milliGRAM(s) Oral three times a day  lamoTRIgine 100 milliGRAM(s) Oral two times a day  metoprolol tartrate 25 milliGRAM(s) Oral two times a day  oxybutynin 5 milliGRAM(s) Oral daily  pantoprazole    Tablet 40 milliGRAM(s) Oral before breakfast  QUEtiapine 25 milliGRAM(s) Oral daily  QUEtiapine 50 milliGRAM(s) Oral at bedtime  tamsulosin 0.4 milliGRAM(s) Oral at bedtime  tiotropium 18 MICROgram(s) Capsule 1 Capsule(s) Inhalation daily    MEDICATIONS  (PRN):  acetaminophen   Tablet .. 650 milliGRAM(s) Oral every 6 hours PRN Mild Pain (1 - 3)  HYDROmorphone   Tablet 2 milliGRAM(s) Oral every 4 hours PRN Severe Pain (7 - 10)  nitroglycerin     SubLingual 0.4 milliGRAM(s) SubLingual every 5 minutes PRN Chest Pain  polyethylene glycol 3350 17 Gram(s) Oral daily PRN Constipation      FAMILY HISTORY:  Family history of cervical cancer (Mother)  Family history of early CAD (Mother)      SOCIAL HISTORY:  CIGARETTES: no  ALCOHOL: no  DRUGS: no                      REVIEW OF SYSTEMS:  CONSTITUTIONAL: No distress, Looks stable  NECK: No pain or stiffness   RESPIRATORY: No cough, no wheezing, but shortness of breath  CARDIOVASCULAR: occasional chest pain, SOB, palpitations, no leg swelling  GASTROINTESTINAL: No abdominal or epigastric pain. No nausea, vomiting, or hematemesis;  No melena.  NEUROLOGICAL: No dizziness, headaches, memory loss, but loss of strength  SKIN: No itching, burning, rashes, or lesions   ENDOCRINE: No heat or cold intolerance  MUSCULOSKELETAL: No joint pain, No  swelling;   PSYCHIATRIC: depression, anxiety, bipolar, PTSD  ALLERGY: No hives, itching, rash          Vital Signs Last 24 Hrs  T(C): 35.9 (23 Feb 2019 05:29), Max: 36.7 (22 Feb 2019 13:23)  T(F): 96.6 (23 Feb 2019 05:29), Max: 98.1 (22 Feb 2019 13:23)  HR: 60 (23 Feb 2019 05:29) (59 - 62)  BP: 106/63 (23 Feb 2019 05:29) (99/54 - 106/63)  BP(mean): --  RR: 18 (23 Feb 2019 05:29) (18 - 18)  SpO2: 98% (23 Feb 2019 08:05) (98% - 98%)                      PHYSICAL EXAM:  GENERAL: No distress, well developed  HEAD:  Atraumatic, Normocephalic  NECK: Supple, No JVD, No Bruit of either carotid arteries  NERVOUS SYSTEM:  Alert, Awake, Oriented to time, place, person; Normal memory and speech; Normal motor Strength 5/5 B/L upper and lower extremities  CHEST/LUNG: Normal air entry to lung base bilaterally; No wheeze, right lung base crackle, no wet rales, no rhonchi  HEART: Regular heart beat, S1, A2, P2, No S3, No S4, No gallop, No murmur  ABDOMEN: Soft, Non tender, Non distended; Bowel sounds present  EXTREMITIES:  2+ Peripheral Pulses, No clubbing, No edema  SKIN: No rashes or lesions    TELEMETRY: NSR    ECG: < from: 12 Lead ECG (02.21.19 @ 21:27) >  Atrial-paced rhythm with prolonged AV conduction  Right bundle branch block  Minimal voltage criteria for LVH, may be normal variant  T wave abnormality, consider lateral ischemia    < end of copied text >      I&O's Detail    23 Feb 2019 07:01  -  23 Feb 2019 12:43  --------------------------------------------------------  IN:  Total IN: 0 mL    OUT:    Voided: 700 mL  Total OUT: 700 mL    Total NET: -700 mL          LABS:                        13.2   6.65  )-----------( 233      ( 23 Feb 2019 07:41 )             40.3     02-23    143  |  105  |  24<H>  ----------------------------<  84  4.4   |  25  |  0.9    Ca    9.6      23 Feb 2019 07:41  Phos  4.6     02-22  Mg     2.0     02-22    TPro  6.7  /  Alb  4.4  /  TBili  0.3  /  DBili  x   /  AST  18  /  ALT  20  /  AlkPhos  147<H>  02-22    CARDIAC MARKERS ( 22 Feb 2019 06:43 )  x     / <0.01 ng/mL / 82 U/L / x     / 1.3 ng/mL  CARDIAC MARKERS ( 21 Feb 2019 12:54 )  x     / <0.01 ng/mL / x     / x     / x          PT/INR - ( 21 Feb 2019 12:54 )   PT: 18.20 sec;   INR: 1.59 ratio         PTT - ( 21 Feb 2019 12:54 )  PTT:43.4 sec    I&O's Summary    23 Feb 2019 07:01  -  23 Feb 2019 12:43  --------------------------------------------------------  IN: 0 mL / OUT: 700 mL / NET: -700 mL        RADIOLOGY & ADDITIONAL STUDIES: < from: Xray Chest 2 Views PA/Lat (02.21.19 @ 13:10) >  Noradiographic evidence of acute cardiopulmonary disease.    < end of copied text >    < from: CT Chest w/ IV Cont (02.21.19 @ 18:59) >  Heart: Trace pericardial fluid, nonspecific finding. Normal cardiac   silhouette. The right ventricle is prominent.      < end of copied text >  < from: CT Chest w/ IV Cont (02.21.19 @ 18:59) >  Lungs, Pleura, and Airways: The lungs are clear without focal   consolidations. No endobronchial lesions are noted.No pleural effusions   or pneumothorax.    Right lower lobe atelectasis. Calcified granuloma in right lower lobe,   and benign.    < end of copied text >

## 2019-02-23 NOTE — CONSULT NOTE ADULT - ASSESSMENT
chest pain, non cardiac, regardless of possible response to NTG, his coronary w/u has been unremarkable  Palpitation, possible PAT, SVT, p.Afib, PM interrogation showed few brief episodes of SVT, managed by APT, no ventricular arrhythmia, PM parameters are well normal  ARVD, post AICD, stable, no sign or symptom of right heart failure or ventricular arrhythmia  SOB: no pulmonary congestion, mass, consolidation, just a small atelectasis was found?, normal EF, no ischemia, no valvular heart disease, but anxiety, stress (patient states having PTSD) SOB is due to anxiety and panic plus deconditioning plus COPD plus MS and possible muscular weakness and possible occasions of possible SVT?    No evidence of decompensated CHF by today's visit or by chest x-ray or by chest CT scan all done in this admission  Atelectasis, he stated having hemoptysis, but no evidence, he was asked by pulmonary to collect his sputum  Pharmacologic nuclear stress test 4.2018, normal with normal EF, no ischemia  Coronary CTA on 8.6.2018 Normal coronary arteries, calcium score 0, dilated RV  echo: in 2018: normal EF, normal valves,    No further cardiac work up, current therapy, 24 h observation and then he can be d/c in cardiac point

## 2019-02-23 NOTE — PROGRESS NOTE ADULT - ASSESSMENT
47 yo M with extensive PMH including MS, bipolar, seizure disorder, CAD, A-fib s/p ablation on Eliquis, Hx of Vtach s/p AICD, HFpEF, HTN, DLD, COPD (no O2), BPH, GERD, TIA from [facility] presented to ED complaining of chest pain, palpitations, SOB, and cough of 3 weeks duration. In ED, patient's was device was interrogated and noted to have "events". Still needed to be interpreted by Cardiologist.    #) Chest pain / palpitations possibly 2/2 "events" on AICD interrogation  - EPS evaluated device.  - c/w tele monitoring  - trend CE -x2  - pain control PRN    #) Hemoptysis  - unclear etiology  - CT chest = R lobe atelectasis  - hemodynamically stable, Hg ~ baseline  - Nebs PRN; LABA/ICS ( symbicort)   Humidified O2 to keep So2 > 88%  Incentive spirometry  Cup at bedside to quantify hemoptysis  Optimize cardiac therapy   ENT eval to R/O upper tract bleeding  Cardio eval   Following cardio workup will discuss with patient risks and benefits from bronchoscopy to evaluate RLL atelectasis;   If worsening hemoptysis consider holding AC  DVT Px    #) COPD - takes Breo + Incruse at home, will use Symbicort + Spiriva while admitted    #) MS / Bipolar / Seizure disorder / CAD / A-fib / HTN / DLD / BPH / GERD - c/w home meds    DVT ppx: on Eliquis  Diet: DASH  Activity: OOBTC  Code status: DNR/DNI - form in [facility] papers, filled out MOLST form, placed in chart  Dispo: [facility]

## 2019-02-23 NOTE — PROGRESS NOTE ADULT - ASSESSMENT
49 yo M with extensive PMH including MS, bipolar, seizure disorder, CAD, A-fib s/p ablation on Eliquis, Hx of Vtach s/p AICD, HFpEF, HTN, DLD, COPD (no O2), BPH, GERD, TIA from [facility] presented to ED complaining of chest pain, palpitations, SOB, and cough of 3 weeks duration.      1. Hemoptysis  2. CP/Palpitations  3. COPD  4. MS / Bipolar disorder / SZ disorder   5. A. Fib on Eliquis  6. CAD / HTN / DL  7. RLL atelectasis         PLAN:    ·	D/C tele  ·	Care D/W cardiology at length  ·	Care D/W the EP. Out pt F/U  ·	Device interrogation noted  ·	Cont current medical treatment  ·	Pulm eval noted. Possibly need bronchoscopy  ·	Humidified O2  ·	Pt had negative nuclear stress test on 4/20/18

## 2019-02-24 LAB
ANION GAP SERPL CALC-SCNC: 15 MMOL/L — HIGH (ref 7–14)
BUN SERPL-MCNC: 20 MG/DL — SIGNIFICANT CHANGE UP (ref 10–20)
CALCIUM SERPL-MCNC: 9.1 MG/DL — SIGNIFICANT CHANGE UP (ref 8.5–10.1)
CHLORIDE SERPL-SCNC: 103 MMOL/L — SIGNIFICANT CHANGE UP (ref 98–110)
CO2 SERPL-SCNC: 24 MMOL/L — SIGNIFICANT CHANGE UP (ref 17–32)
CREAT SERPL-MCNC: 1.1 MG/DL — SIGNIFICANT CHANGE UP (ref 0.7–1.5)
GLUCOSE SERPL-MCNC: 94 MG/DL — SIGNIFICANT CHANGE UP (ref 70–99)
HCT VFR BLD CALC: 38.2 % — LOW (ref 42–52)
HGB BLD-MCNC: 12.5 G/DL — LOW (ref 14–18)
MCHC RBC-ENTMCNC: 29.2 PG — SIGNIFICANT CHANGE UP (ref 27–31)
MCHC RBC-ENTMCNC: 32.7 G/DL — SIGNIFICANT CHANGE UP (ref 32–37)
MCV RBC AUTO: 89.3 FL — SIGNIFICANT CHANGE UP (ref 80–94)
NRBC # BLD: 0 /100 WBCS — SIGNIFICANT CHANGE UP (ref 0–0)
PLATELET # BLD AUTO: 228 K/UL — SIGNIFICANT CHANGE UP (ref 130–400)
POTASSIUM SERPL-MCNC: 4.3 MMOL/L — SIGNIFICANT CHANGE UP (ref 3.5–5)
POTASSIUM SERPL-SCNC: 4.3 MMOL/L — SIGNIFICANT CHANGE UP (ref 3.5–5)
RBC # BLD: 4.28 M/UL — LOW (ref 4.7–6.1)
RBC # FLD: 13.1 % — SIGNIFICANT CHANGE UP (ref 11.5–14.5)
SODIUM SERPL-SCNC: 142 MMOL/L — SIGNIFICANT CHANGE UP (ref 135–146)
WBC # BLD: 5.89 K/UL — SIGNIFICANT CHANGE UP (ref 4.8–10.8)
WBC # FLD AUTO: 5.89 K/UL — SIGNIFICANT CHANGE UP (ref 4.8–10.8)

## 2019-02-24 RX ADMIN — PANTOPRAZOLE SODIUM 40 MILLIGRAM(S): 20 TABLET, DELAYED RELEASE ORAL at 06:12

## 2019-02-24 RX ADMIN — TAMSULOSIN HYDROCHLORIDE 0.4 MILLIGRAM(S): 0.4 CAPSULE ORAL at 22:34

## 2019-02-24 RX ADMIN — ATORVASTATIN CALCIUM 40 MILLIGRAM(S): 80 TABLET, FILM COATED ORAL at 22:34

## 2019-02-24 RX ADMIN — HYDROMORPHONE HYDROCHLORIDE 2 MILLIGRAM(S): 2 INJECTION INTRAMUSCULAR; INTRAVENOUS; SUBCUTANEOUS at 05:42

## 2019-02-24 RX ADMIN — APIXABAN 5 MILLIGRAM(S): 2.5 TABLET, FILM COATED ORAL at 05:38

## 2019-02-24 RX ADMIN — DOFETILIDE 250 MICROGRAM(S): 0.25 CAPSULE ORAL at 05:38

## 2019-02-24 RX ADMIN — HYDROMORPHONE HYDROCHLORIDE 2 MILLIGRAM(S): 2 INJECTION INTRAMUSCULAR; INTRAVENOUS; SUBCUTANEOUS at 11:23

## 2019-02-24 RX ADMIN — Medication 81 MILLIGRAM(S): at 11:24

## 2019-02-24 RX ADMIN — Medication 10 MILLIGRAM(S): at 22:34

## 2019-02-24 RX ADMIN — Medication 10 MILLIGRAM(S): at 13:02

## 2019-02-24 RX ADMIN — DOFETILIDE 250 MICROGRAM(S): 0.25 CAPSULE ORAL at 17:19

## 2019-02-24 RX ADMIN — APIXABAN 5 MILLIGRAM(S): 2.5 TABLET, FILM COATED ORAL at 17:19

## 2019-02-24 RX ADMIN — BUDESONIDE AND FORMOTEROL FUMARATE DIHYDRATE 2 PUFF(S): 160; 4.5 AEROSOL RESPIRATORY (INHALATION) at 08:29

## 2019-02-24 RX ADMIN — Medication 10 MILLIGRAM(S): at 05:38

## 2019-02-24 RX ADMIN — HYDROMORPHONE HYDROCHLORIDE 2 MILLIGRAM(S): 2 INJECTION INTRAMUSCULAR; INTRAVENOUS; SUBCUTANEOUS at 09:44

## 2019-02-24 RX ADMIN — QUETIAPINE FUMARATE 25 MILLIGRAM(S): 200 TABLET, FILM COATED ORAL at 11:24

## 2019-02-24 RX ADMIN — QUETIAPINE FUMARATE 50 MILLIGRAM(S): 200 TABLET, FILM COATED ORAL at 22:34

## 2019-02-24 RX ADMIN — Medication 5 MILLIGRAM(S): at 11:24

## 2019-02-24 RX ADMIN — Medication 25 MILLIGRAM(S): at 17:19

## 2019-02-24 RX ADMIN — LAMOTRIGINE 100 MILLIGRAM(S): 25 TABLET, ORALLY DISINTEGRATING ORAL at 17:19

## 2019-02-24 RX ADMIN — HYDROMORPHONE HYDROCHLORIDE 2 MILLIGRAM(S): 2 INJECTION INTRAMUSCULAR; INTRAVENOUS; SUBCUTANEOUS at 17:52

## 2019-02-24 RX ADMIN — GABAPENTIN 400 MILLIGRAM(S): 400 CAPSULE ORAL at 13:02

## 2019-02-24 RX ADMIN — GABAPENTIN 400 MILLIGRAM(S): 400 CAPSULE ORAL at 22:34

## 2019-02-24 RX ADMIN — TIOTROPIUM BROMIDE 1 CAPSULE(S): 18 CAPSULE ORAL; RESPIRATORY (INHALATION) at 15:02

## 2019-02-24 RX ADMIN — GABAPENTIN 400 MILLIGRAM(S): 400 CAPSULE ORAL at 05:38

## 2019-02-24 RX ADMIN — Medication 25 MILLIGRAM(S): at 05:38

## 2019-02-24 RX ADMIN — LAMOTRIGINE 100 MILLIGRAM(S): 25 TABLET, ORALLY DISINTEGRATING ORAL at 05:38

## 2019-02-24 NOTE — PROGRESS NOTE ADULT - ASSESSMENT
49 yo M with extensive PMH including MS, bipolar, seizure disorder, CAD, A-fib s/p ablation on Eliquis, Hx of Vtach s/p AICD, HFpEF, HTN, DLD, COPD (no O2), BPH, GERD, TIA from [facility] [place], operated by [facility] presented to ED complaining of chest pain, palpitations, SOB, and cough of 3 weeks duration.      1. Hemoptysis  2. CP/Palpitations  3. COPD  4. MS / Bipolar disorder / SZ disorder   5. A. Fib on Eliquis  6. CAD / HTN / DL  7. RLL atelectasis         PLAN:    ·	D/C tele  ·	Care D/W cardiology at length. No further cardiac W/U  ·	Care D/W the EP. Out pt F/U  ·	Device interrogation noted  ·	Cont current medical treatment  ·	Pulm F/U to decide if pt needs bronchoscopy  ·	Humidified O2  ·	Pt had negative nuclear stress test on 4/20/18 and negative CCTA last year  ·	Anticipate D/C in AM

## 2019-02-25 ENCOUNTER — TRANSCRIPTION ENCOUNTER (OUTPATIENT)
Age: 49
End: 2019-02-25

## 2019-02-25 VITALS
HEART RATE: 60 BPM | SYSTOLIC BLOOD PRESSURE: 104 MMHG | RESPIRATION RATE: 17 BRPM | TEMPERATURE: 96 F | DIASTOLIC BLOOD PRESSURE: 57 MMHG

## 2019-02-25 RX ORDER — UMECLIDINIUM 62.5 UG/1
1 AEROSOL, POWDER ORAL
Qty: 0 | Refills: 0 | COMMUNITY

## 2019-02-25 RX ORDER — BUDESONIDE AND FORMOTEROL FUMARATE DIHYDRATE 160; 4.5 UG/1; UG/1
2 AEROSOL RESPIRATORY (INHALATION)
Qty: 0 | Refills: 0 | Status: DISCONTINUED | OUTPATIENT
Start: 2019-02-25 | End: 2019-02-25

## 2019-02-25 RX ORDER — FLUTICASONE FUROATE AND VILANTEROL TRIFENATATE 100; 25 UG/1; UG/1
1 POWDER RESPIRATORY (INHALATION)
Qty: 0 | Refills: 0 | COMMUNITY

## 2019-02-25 RX ORDER — BUDESONIDE AND FORMOTEROL FUMARATE DIHYDRATE 160; 4.5 UG/1; UG/1
2 AEROSOL RESPIRATORY (INHALATION)
Qty: 0 | Refills: 0 | DISCHARGE
Start: 2019-02-25

## 2019-02-25 RX ORDER — FLUTICASONE PROPIONATE 220 MCG
1 AEROSOL WITH ADAPTER (GRAM) INHALATION
Qty: 0 | Refills: 0 | COMMUNITY

## 2019-02-25 RX ADMIN — DOFETILIDE 250 MICROGRAM(S): 0.25 CAPSULE ORAL at 05:32

## 2019-02-25 RX ADMIN — HYDROMORPHONE HYDROCHLORIDE 2 MILLIGRAM(S): 2 INJECTION INTRAMUSCULAR; INTRAVENOUS; SUBCUTANEOUS at 05:32

## 2019-02-25 RX ADMIN — APIXABAN 5 MILLIGRAM(S): 2.5 TABLET, FILM COATED ORAL at 05:32

## 2019-02-25 RX ADMIN — HYDROMORPHONE HYDROCHLORIDE 2 MILLIGRAM(S): 2 INJECTION INTRAMUSCULAR; INTRAVENOUS; SUBCUTANEOUS at 14:39

## 2019-02-25 RX ADMIN — Medication 25 MILLIGRAM(S): at 05:32

## 2019-02-25 RX ADMIN — Medication 5 MILLIGRAM(S): at 12:05

## 2019-02-25 RX ADMIN — Medication 81 MILLIGRAM(S): at 12:05

## 2019-02-25 RX ADMIN — QUETIAPINE FUMARATE 25 MILLIGRAM(S): 200 TABLET, FILM COATED ORAL at 12:05

## 2019-02-25 RX ADMIN — LAMOTRIGINE 100 MILLIGRAM(S): 25 TABLET, ORALLY DISINTEGRATING ORAL at 05:32

## 2019-02-25 RX ADMIN — TIOTROPIUM BROMIDE 1 CAPSULE(S): 18 CAPSULE ORAL; RESPIRATORY (INHALATION) at 08:29

## 2019-02-25 RX ADMIN — GABAPENTIN 400 MILLIGRAM(S): 400 CAPSULE ORAL at 05:32

## 2019-02-25 RX ADMIN — GABAPENTIN 400 MILLIGRAM(S): 400 CAPSULE ORAL at 14:40

## 2019-02-25 RX ADMIN — Medication 20 MILLIGRAM(S): at 05:32

## 2019-02-25 RX ADMIN — Medication 10 MILLIGRAM(S): at 05:32

## 2019-02-25 RX ADMIN — PANTOPRAZOLE SODIUM 40 MILLIGRAM(S): 20 TABLET, DELAYED RELEASE ORAL at 05:35

## 2019-02-25 RX ADMIN — HYDROMORPHONE HYDROCHLORIDE 2 MILLIGRAM(S): 2 INJECTION INTRAMUSCULAR; INTRAVENOUS; SUBCUTANEOUS at 10:35

## 2019-02-25 RX ADMIN — Medication 10 MILLIGRAM(S): at 14:39

## 2019-02-25 NOTE — DISCHARGE NOTE ADULT - CARE PROVIDER_API CALL
Alexi Quiñones; MICHAEL)  Cardiac Electrophysiology  [address] [address]  Phone: [phone]  Fax: [phone]  Follow Up Time:     Jose Ayoub)  Cardiovascular Disease; Interventional Cardiology  [address] [place] [address] [address]  [place] [address]  Phone: [phone]  Fax: [phone]  Follow Up Time:

## 2019-02-25 NOTE — DISCHARGE NOTE ADULT - SECONDARY DIAGNOSIS.
COPD (chronic obstructive pulmonary disease) CAD (coronary artery disease) MS (multiple sclerosis) Diabetes mellitus

## 2019-02-25 NOTE — PROGRESS NOTE ADULT - ASSESSMENT
49 yo M with extensive PMH including MS, bipolar, seizure disorder, CAD, A-fib s/p ablation on Eliquis, Hx of Vtach s/p AICD, HFpEF, HTN, DLD, COPD (no O2), BPH, GERD, TIA from [facility] presented to ED complaining of chest pain, palpitations, SOB, and cough of 3 weeks duration.      1. Hemoptysis-resolved  2. CP/Palpitations  3. COPD  4. MS / Bipolar disorder / SZ disorder   5. A. Fib on Eliquis  6. CAD / HTN / DL  7. RLL atelectasis         PLAN:    ·	No more hemoptysis. Care D/W the pulmonary. Recommended out pt F/U with his own pulmonary  ·	Care D/W cardiology at length. No further cardiac W/U  ·	Care D/W the EP. Out pt F/U  ·	Device interrogation noted  ·	Cont current medical treatment  ·	Humidified O2  ·	Pt had negative nuclear stress test on 4/20/18 and negative CCTA last year  ·	D/C back no NH

## 2019-02-25 NOTE — PROGRESS NOTE ADULT - SUBJECTIVE AND OBJECTIVE BOX
CUATE HORNE 48y Male  MRN#: [id number]   CODE STATUS:____DNR/DNI____      SUBJECTIVE  Patient is a 48y old Male who presents with a chief complaint of palpitations (25 Feb 2019 10:45)  Currently admitted to medicine with the primary diagnosis of Palpitations    Today is hospital day 4d, and this morning he is feeling well but still has 'palpitations; and reports no overnight events.   Wants symbicort when returning home. Needs to f/u with Dr Hernández   To return to [facility] upon dispo. Off tele.      OBJECTIVE  PAST MEDICAL & SURGICAL HISTORY  PTSD (post-traumatic stress disorder)  Supraventricular arrhythmia: prior history  Cardiomyopathy  CHF (congestive heart failure)  Atrial fibrillation: s/p ablation, on eliquis  Diabetes mellitus: diet controlled  BPH (benign prostatic hyperplasia)  HTN (hypertension)  Seizures  Migraines  Pneumonia  MS (multiple sclerosis)  CAD (coronary artery disease)  Bipolar disorder  Anginal pain  Schizo affective schizophrenia  GERD (gastroesophageal reflux disease)  Seasonal allergies  Hypercholesteremia  COPD (chronic obstructive pulmonary disease)  CVA (cerebral vascular accident)  TIA (transient ischemic attack)  Syncope  Peripheral Neuropathy  Clinical Depression  H/O prior ablation treatment: for Afib  Cardiac pacemaker recipient  H/O hernia repair: right 1972,1991  History of hip replacement  S/P Orchiectomy: L for testicular CA  S/P Implantation of AICD    ALLERGIES:  dexmethylphenidate (Unknown)  Dilantin (Rash)  dilantin, compazine, neurontin, ritalin, phenergan (Unknown)  Haldol (Unknown)  hydantoin derivatives (Other)  methylphenidate (Unknown)  Morphine Sulfate (Unknown)  phenytoin (Unknown)  prochlorperazine (Unknown)  promethazine (Dystonic RXN)  rash/hives (Anaphylaxis)  thioxanthenes (Unknown)    MEDICATIONS:  STANDING MEDICATIONS  apixaban 5 milliGRAM(s) Oral every 12 hours  aspirin  chewable 81 milliGRAM(s) Oral daily  atorvastatin 40 milliGRAM(s) Oral at bedtime  baclofen 10 milliGRAM(s) Oral three times a day  buDESOnide 160 MICROgram(s)/formoterol 4.5 MICROgram(s) Inhaler 2 Puff(s) Inhalation two times a day  chlorhexidine 4% Liquid 1 Application(s) Topical <User Schedule>  dofetilide 250 MICROGram(s) Oral two times a day  furosemide    Tablet 40 milliGRAM(s) Oral <User Schedule>  furosemide    Tablet 20 milliGRAM(s) Oral <User Schedule>  gabapentin 400 milliGRAM(s) Oral three times a day  lamoTRIgine 100 milliGRAM(s) Oral two times a day  metoprolol tartrate 25 milliGRAM(s) Oral two times a day  oxybutynin 5 milliGRAM(s) Oral daily  pantoprazole    Tablet 40 milliGRAM(s) Oral before breakfast  QUEtiapine 25 milliGRAM(s) Oral daily  QUEtiapine 50 milliGRAM(s) Oral at bedtime  tamsulosin 0.4 milliGRAM(s) Oral at bedtime  tiotropium 18 MICROgram(s) Capsule 1 Capsule(s) Inhalation daily    PRN MEDICATIONS  acetaminophen   Tablet .. 650 milliGRAM(s) Oral every 6 hours PRN  HYDROmorphone   Tablet 2 milliGRAM(s) Oral every 4 hours PRN  nitroglycerin     SubLingual 0.4 milliGRAM(s) SubLingual every 5 minutes PRN  polyethylene glycol 3350 17 Gram(s) Oral daily PRN      VITAL SIGNS: Last 24 Hours  T(C): 36.1 (25 Feb 2019 05:54), Max: 36.1 (24 Feb 2019 13:00)  T(F): 96.9 (25 Feb 2019 05:54), Max: 96.9 (24 Feb 2019 13:00)  HR: 60 (25 Feb 2019 05:54) (60 - 60)  BP: 103/63 (25 Feb 2019 05:54) (100/52 - 103/63)  BP(mean): --  RR: 18 (25 Feb 2019 05:54) (18 - 20)  SpO2: --    LABS:                        12.5   5.89  )-----------( 228      ( 24 Feb 2019 08:26 )             38.2     02-24    142  |  103  |  20  ----------------------------<  94  4.3   |  24  |  1.1    Ca    9.1      24 Feb 2019 08:26                    RADIOLOGY:      PHYSICAL EXAM:    GENERAL: NAD, well-developed comfortably in bed eating breakfast, somewhat unusual affect, AAOx3  HEENT:  Atraumatic, Normocephalic. EOMI, PERRLA, conjunctiva clear and sclera white  PULMONARY: Clear to auscultation bilaterally; No wheeze  CARDIOVASCULAR: irregular rhythm, rate controlled; No murmurs (off tele now)  GASTROINTESTINAL: Soft, Nontender, Nondistended; Bowel sounds present  EXT: No edema, warm-well perfused  NEUROLOGY: non-focal  SKIN: No rashes ,       ADMISSION SUMMARY  Patient is a 48y old Male who presents with a chief complaint of palpitations (25 Feb 2019 10:45)  Currently admitted to medicine with the primary diagnosis of Palpitations  47 yo M with extensive PMH including MS, bipolar, seizure disorder, CAD, A-fib s/p ablation on Eliquis, Hx of Vtach s/p AICD, HFpEF, HTN, DLD, COPD (no O2), BPH, GERD, TIA from [facility] presented to ED complaining of chest pain, palpitations, SOB, and cough of 3 weeks duration. When he had similar symptoms in past was given Nitro + O2 and symptoms resolved. Today, they did not. NH instructed patient go to ED. Reports his cough is productive of blood tinged sputum. Reports multiple episodes in the past. States he has stopped smoking cigarettes though does use medical marijuana. Denies any fevers, chills, abdominal pain, NVCD.    In ED, patient's was device was interrogated and noted to have "events". Cardiology assessed pt who had stable cardiac assessment - normal CTA 8/2018, stress test in 04/2018 w/ normal EF no ischemia. No signs of active cardiac disease, could be palpitations from known other issues. PM interrogation showed brief episodes of SVT w/o ventricular arrythmia as per their note. CE negative, no further inpt workup at this pt. Of note pt is to transition to symbicort for COPD (pt didn't lik ebreo/incruse).     ASSESSMENT & PLAN  47 yo M with extensive PMH including MS, bipolar, seizure disorder, CAD, A-fib s/p ablation on Eliquis, Hx of Vtach s/p AICD, HFpEF, HTN, DLD, COPD (no O2), BPH, GERD, TIA from [facility] presented to ED complaining of chest pain, palpitations, SOB, and cough of 3 weeks duration. In ED, patient's was device was interrogated and noted to have "events". Still needed to be interpreted by Cardiologist.    #) Chest pain / palpitations possibly 2/2 "events" on AICD interrogation  - EPS evaluated device, some events noted  - change to lopressor BID 25 (vs toprol)  - no other cardiac work-up inpt, f/u with Ishmael and Tamburrino out-pt    #) Hemoptysis, intermittent, unclear etiology // former smoker quit 5mo ago  - CT chest = R lobe atelectasis  - c/w symbicort & spiriva  - no further episodes while here, off O2  - Pulm (Edgar) dispo with symbicort, out-pt bronchosopy for R atelectasis    #) COPD   - takes Breo + Incruse at home, switch to Symbicort on dispo    #) MS / Bipolar / Seizure disorder / CAD / A-fib / HTN / DLD / BPH / GERD - c/w home meds    #MISC  - DVT ppx: on Eliquis  - Diet: DASH  - Activity: OOBTC  - Code status: DNR/DNI - form in [facility] papers, filled out MOLST form, placed in chart  - Dispo: [facility]    (x) Discussion with patient and/or family regarding goals of care  (x) Discussed Case and Plan with Medical Attending, Name: Macy      # Planned Disposition: ________
1SUBJECTIVE:    Patient is a 48y old Male who presents with a chief complaint of palpitations (22 Feb 2019 16:03)    Currently admitted to medicine with the primary diagnosis of Palpitations     Today is hospital day 2d. This morning he is resting comfortably in bed and reports no new issues or overnight events.     PAST MEDICAL & SURGICAL HISTORY  PTSD (post-traumatic stress disorder)  Supraventricular arrhythmia: prior history  Cardiomyopathy  CHF (congestive heart failure)  Atrial fibrillation: s/p ablation, on eliquis  Diabetes mellitus: diet controlled  BPH (benign prostatic hyperplasia)  HTN (hypertension)  Seizures  Migraines  Pneumonia  MS (multiple sclerosis)  CAD (coronary artery disease)  Bipolar disorder  Anginal pain  Schizo affective schizophrenia  GERD (gastroesophageal reflux disease)  Seasonal allergies  Hypercholesteremia  COPD (chronic obstructive pulmonary disease)  CVA (cerebral vascular accident)  TIA (transient ischemic attack)  Syncope  Peripheral Neuropathy  Clinical Depression  H/O prior ablation treatment: for Afib  Cardiac pacemaker recipient  H/O hernia repair: right 1972,1991  History of hip replacement  S/P Orchiectomy: L for testicular CA  S/P Implantation of AICD    SOCIAL HISTORY:  Negative for smoking/alcohol/drug use.     ALLERGIES:  dexmethylphenidate (Unknown)  Dilantin (Rash)  dilantin, compazine, neurontin, ritalin, phenergan (Unknown)  Haldol (Unknown)  hydantoin derivatives (Other)  methylphenidate (Unknown)  Morphine Sulfate (Unknown)  phenytoin (Unknown)  prochlorperazine (Unknown)  promethazine (Dystonic RXN)  rash/hives (Anaphylaxis)  thioxanthenes (Unknown)    MEDICATIONS:  STANDING MEDICATIONS  apixaban 5 milliGRAM(s) Oral every 12 hours  aspirin  chewable 81 milliGRAM(s) Oral daily  atorvastatin 40 milliGRAM(s) Oral at bedtime  baclofen 10 milliGRAM(s) Oral three times a day  buDESOnide  80 MICROgram(s)/formoterol 4.5 MICROgram(s) Inhaler 2 Puff(s) Inhalation two times a day  chlorhexidine 4% Liquid 1 Application(s) Topical <User Schedule>  dofetilide 250 MICROGram(s) Oral two times a day  furosemide    Tablet 40 milliGRAM(s) Oral <User Schedule>  furosemide    Tablet 20 milliGRAM(s) Oral <User Schedule>  gabapentin 400 milliGRAM(s) Oral three times a day  lamoTRIgine 100 milliGRAM(s) Oral two times a day  metoprolol tartrate 25 milliGRAM(s) Oral two times a day  oxybutynin 5 milliGRAM(s) Oral daily  pantoprazole    Tablet 40 milliGRAM(s) Oral before breakfast  QUEtiapine 25 milliGRAM(s) Oral daily  QUEtiapine 50 milliGRAM(s) Oral at bedtime  tamsulosin 0.4 milliGRAM(s) Oral at bedtime  tiotropium 18 MICROgram(s) Capsule 1 Capsule(s) Inhalation daily    PRN MEDICATIONS  acetaminophen   Tablet .. 650 milliGRAM(s) Oral every 6 hours PRN  HYDROmorphone   Tablet 2 milliGRAM(s) Oral every 4 hours PRN  nitroglycerin     SubLingual 0.4 milliGRAM(s) SubLingual every 5 minutes PRN  polyethylene glycol 3350 17 Gram(s) Oral daily PRN    VITALS:   T(F): 96.6  HR: 60  BP: 106/63  RR: 18  SpO2: 98%    PHYSICAL EXAM:  GEN: No acute distress  LUNGS: Clear to auscultation bilaterally   HEART: S1/S2 present.   ABD: Soft, non-tender, non-distended.   NEURO: AAOX3    LABS:                        13.0   7.12  )-----------( 230      ( 22 Feb 2019 06:43 )             39.1     02-22    141  |  102  |  23<H>  ----------------------------<  90  4.2   |  26  |  0.9    Ca    10.0      22 Feb 2019 06:43  Phos  4.6     02-22  Mg     2.0     02-22    TPro  6.7  /  Alb  4.4  /  TBili  0.3  /  DBili  x   /  AST  18  /  ALT  20  /  AlkPhos  147<H>  02-22    PT/INR - ( 21 Feb 2019 12:54 )   PT: 18.20 sec;   INR: 1.59 ratio         PTT - ( 21 Feb 2019 12:54 )  PTT:43.4 sec            CARDIAC MARKERS ( 22 Feb 2019 06:43 )  x     / <0.01 ng/mL / 82 U/L / x     / 1.3 ng/mL  CARDIAC MARKERS ( 21 Feb 2019 12:54 )  x     / <0.01 ng/mL / x     / x     / x            RADIOLOGY:
CUATE HORNE  48y Male    CHIEF COMPLAINT:    Patient is a 48y old  Male who presents with a chief complaint of palpitations (2019 08:31)      INTERVAL HPI/OVERNIGHT EVENTS:    Patient seen and examined. C/O cough with blood tinged sputum. No cp. No sob.    ROS: All other systems are negative.    Vital Signs:    T(F): 96.6 (19 @ 05:29), Max: 98.1 (19 @ 13:23)  HR: 60 (19 @ 05:29) (59 - 62)  BP: 106/63 (19 @ 05:29) (99/54 - 106/63)  RR: 18 (19 @ 05:29) (18 - 18)  SpO2: 98% (19 @ 08:05) (98% - 98%)  I&O's Summary    Daily     Daily Weight in k.5 (2019 05:29)  CAPILLARY BLOOD GLUCOSE          PHYSICAL EXAM:    GENERAL:  NAD  SKIN: No rashes or lesions  HENT: Atrumatic. Normocephalic. PERRL. Moist membranes.  NECK: Supple, No JVD. No lymphadenopathy.  PULMONARY: +ve rales in the Rt. base.  CVS: Normal S1, S2. Rate and Rhythm are IRIR. No murmurs.  ABDOMEN/GI: Soft, Nontender, Nondistended; BS present  EXTREMITIES: Peripheral pulses intact. No edema B/L LE.  NEUROLOGIC:  No motor or sensory deficit.  PSYCH: Alert & oriented x 3    Consultant(s) Notes Reviewed:  [x ] YES  [ ] NO  Care Discussed with Consultants/Other Providers [ x] YES  [ ] NO    EKG reviewed  Telemetry reviewed    LABS:                        13.2   6.65  )-----------( 233      ( 2019 07:41 )             40.3     -    143  |  105  |  24<H>  ----------------------------<  84  4.4   |  25  |  0.9    Ca    9.6      2019 07:41  Phos  4.6     02-  Mg     2.0     02-    TPro  6.7  /  Alb  4.4  /  TBili  0.3  /  DBili  x   /  AST  18  /  ALT  20  /  AlkPhos  147<H>      PT/INR - ( 2019 12:54 )   PT: 18.20 sec;   INR: 1.59 ratio         PTT - ( 2019 12:54 )  PTT:43.4 sec  Serum Pro-Brain Natriuretic Peptide: 5 pg/mL (19 @ 12:54)    Trop <0.01, CKMB 1.3, CK 82, 19 @ 06:43  Trop <0.01, CKMB --, CK --, 19 @ 12:54        RADIOLOGY & ADDITIONAL TESTS:    < from: CT Chest w/ IV Cont (19 @ 18:59) >  IMPRESSION:    Right lower lobe atelectasis.      < end of copied text >    Imaging or report Personally Reviewed:  [ ] YES  [ ] NO    Medications:  Standing  apixaban 5 milliGRAM(s) Oral every 12 hours  aspirin  chewable 81 milliGRAM(s) Oral daily  atorvastatin 40 milliGRAM(s) Oral at bedtime  baclofen 10 milliGRAM(s) Oral three times a day  buDESOnide  80 MICROgram(s)/formoterol 4.5 MICROgram(s) Inhaler 2 Puff(s) Inhalation two times a day  chlorhexidine 4% Liquid 1 Application(s) Topical <User Schedule>  dofetilide 250 MICROGram(s) Oral two times a day  furosemide    Tablet 40 milliGRAM(s) Oral <User Schedule>  furosemide    Tablet 20 milliGRAM(s) Oral <User Schedule>  gabapentin 400 milliGRAM(s) Oral three times a day  lamoTRIgine 100 milliGRAM(s) Oral two times a day  metoprolol tartrate 25 milliGRAM(s) Oral two times a day  oxybutynin 5 milliGRAM(s) Oral daily  pantoprazole    Tablet 40 milliGRAM(s) Oral before breakfast  QUEtiapine 25 milliGRAM(s) Oral daily  QUEtiapine 50 milliGRAM(s) Oral at bedtime  tamsulosin 0.4 milliGRAM(s) Oral at bedtime  tiotropium 18 MICROgram(s) Capsule 1 Capsule(s) Inhalation daily    PRN Meds  acetaminophen   Tablet .. 650 milliGRAM(s) Oral every 6 hours PRN  HYDROmorphone   Tablet 2 milliGRAM(s) Oral every 4 hours PRN  nitroglycerin     SubLingual 0.4 milliGRAM(s) SubLingual every 5 minutes PRN  polyethylene glycol 3350 17 Gram(s) Oral daily PRN      Case discussed with resident    Care discussed with pt/family
CUATE HORNE  48y Male    CHIEF COMPLAINT:    Patient is a 48y old  Male who presents with a chief complaint of palpitations (2019 12:42)      INTERVAL HPI/OVERNIGHT EVENTS:    Patient seen and examined. No more hemoptysis. C/O sob but saturating well on O2. No more cp    ROS: All other systems are negative.    Vital Signs:    T(F): 97.3 (19 @ 05:48), Max: 97.6 (19 @ 13:00)  HR: 60 (19 @ 05:48) (60 - 60)  BP: 112/66 (19 @ 05:48) (110/58 - 114/55)  RR: 20 (19 @ 05:48) (17 - 20)  SpO2: --  I&O's Summary    2019 07:01  -  2019 07:00  --------------------------------------------------------  IN: 0 mL / OUT: 1500 mL / NET: -1500 mL      Daily     Daily Weight in k.5 (2019 05:48)  CAPILLARY BLOOD GLUCOSE          PHYSICAL EXAM:    GENERAL:  NAD  SKIN: No rashes or lesions  HENT: Atrumatic. Normocephalic. PERRL. Moist membranes.  NECK: Supple, No JVD. No lymphadenopathy.  PULMONARY: BS are decreased B/L. No wheezing. No rales  CVS: Normal S1, S2. Rate and Rhythm are regular. No murmurs.  ABDOMEN/GI: Soft, Nontender, Nondistended; BS present  EXTREMITIES: Peripheral pulses intact. No edema B/L LE.  NEUROLOGIC:  No motor or sensory deficit.  PSYCH: Alert & oriented x 3    Consultant(s) Notes Reviewed:  [x ] YES  [ ] NO  Care Discussed with Consultants/Other Providers [ x] YES  [ ] NO    EKG reviewed  Telemetry reviewed    LABS:                        12.5   5.89  )-----------( 228      ( 2019 08:26 )             38.2         142  |  103  |  20  ----------------------------<  94  4.3   |  24  |  1.1    Ca    9.1      2019 08:26        Serum Pro-Brain Natriuretic Peptide: 5 pg/mL (19 @ 12:54)    Trop <0.01, CKMB 1.3, CK 82, 19 @ 06:43  Trop <0.01, CKMB --, CK --, 19 @ 12:54        RADIOLOGY & ADDITIONAL TESTS:      Imaging or report Personally Reviewed:  [ ] YES  [ ] NO    Medications:  Standing  apixaban 5 milliGRAM(s) Oral every 12 hours  aspirin  chewable 81 milliGRAM(s) Oral daily  atorvastatin 40 milliGRAM(s) Oral at bedtime  baclofen 10 milliGRAM(s) Oral three times a day  buDESOnide  80 MICROgram(s)/formoterol 4.5 MICROgram(s) Inhaler 2 Puff(s) Inhalation two times a day  chlorhexidine 4% Liquid 1 Application(s) Topical <User Schedule>  dofetilide 250 MICROGram(s) Oral two times a day  furosemide    Tablet 40 milliGRAM(s) Oral <User Schedule>  furosemide    Tablet 20 milliGRAM(s) Oral <User Schedule>  gabapentin 400 milliGRAM(s) Oral three times a day  lamoTRIgine 100 milliGRAM(s) Oral two times a day  metoprolol tartrate 25 milliGRAM(s) Oral two times a day  oxybutynin 5 milliGRAM(s) Oral daily  pantoprazole    Tablet 40 milliGRAM(s) Oral before breakfast  QUEtiapine 25 milliGRAM(s) Oral daily  QUEtiapine 50 milliGRAM(s) Oral at bedtime  tamsulosin 0.4 milliGRAM(s) Oral at bedtime  tiotropium 18 MICROgram(s) Capsule 1 Capsule(s) Inhalation daily    PRN Meds  acetaminophen   Tablet .. 650 milliGRAM(s) Oral every 6 hours PRN  HYDROmorphone   Tablet 2 milliGRAM(s) Oral every 4 hours PRN  nitroglycerin     SubLingual 0.4 milliGRAM(s) SubLingual every 5 minutes PRN  polyethylene glycol 3350 17 Gram(s) Oral daily PRN      Case discussed with resident    Care discussed with pt/family
CUATE HORNE  48y Male    CHIEF COMPLAINT:    Patient is a 48y old  Male who presents with a chief complaint of palpitations (24 Feb 2019 10:48)      INTERVAL HPI/OVERNIGHT EVENTS:    Patient seen and examined. No new complaint. No more hemoptysis. No cp    ROS: All other systems are negative.    Vital Signs:    T(F): 96.9 (02-25-19 @ 05:54), Max: 96.9 (02-24-19 @ 13:00)  HR: 60 (02-25-19 @ 05:54) (60 - 60)  BP: 103/63 (02-25-19 @ 05:54) (100/52 - 103/63)  RR: 18 (02-25-19 @ 05:54) (18 - 20)  SpO2: --  I&O's Summary    Daily     Daily   CAPILLARY BLOOD GLUCOSE          PHYSICAL EXAM:    GENERAL:  NAD  SKIN: No rashes or lesions  HENT: Atrumatic. Normocephalic. PERRL. Moist membranes.  NECK: Supple, No JVD. No lymphadenopathy.  PULMONARY: CTA B/L. No wheezing. No rales  CVS: Normal S1, S2. Rate and Rythm are regular. No murmurs.  ABDOMEN/GI: Soft, Nontender, Nondistended; BS present  EXTREMITIES: Peripheral pulses intact. No edema B/L LE.  NEUROLOGIC:  No motor or sensory deficit.  PSYCH: Alert & oriented x 3    Consultant(s) Notes Reviewed:  [x ] YES  [ ] NO  Care Discussed with Consultants/Other Providers [ x] YES  [ ] NO    EKG reviewed  Telemetry reviewed    LABS:                        12.5   5.89  )-----------( 228      ( 24 Feb 2019 08:26 )             38.2     02-24    142  |  103  |  20  ----------------------------<  94  4.3   |  24  |  1.1    Ca    9.1      24 Feb 2019 08:26        Serum Pro-Brain Natriuretic Peptide: 5 pg/mL (02-21-19 @ 12:54)          RADIOLOGY & ADDITIONAL TESTS:      Imaging or report Personally Reviewed:  [ ] YES  [ ] NO    Medications:  Standing  apixaban 5 milliGRAM(s) Oral every 12 hours  aspirin  chewable 81 milliGRAM(s) Oral daily  atorvastatin 40 milliGRAM(s) Oral at bedtime  baclofen 10 milliGRAM(s) Oral three times a day  buDESOnide 160 MICROgram(s)/formoterol 4.5 MICROgram(s) Inhaler 2 Puff(s) Inhalation two times a day  chlorhexidine 4% Liquid 1 Application(s) Topical <User Schedule>  dofetilide 250 MICROGram(s) Oral two times a day  furosemide    Tablet 40 milliGRAM(s) Oral <User Schedule>  furosemide    Tablet 20 milliGRAM(s) Oral <User Schedule>  gabapentin 400 milliGRAM(s) Oral three times a day  lamoTRIgine 100 milliGRAM(s) Oral two times a day  metoprolol tartrate 25 milliGRAM(s) Oral two times a day  oxybutynin 5 milliGRAM(s) Oral daily  pantoprazole    Tablet 40 milliGRAM(s) Oral before breakfast  QUEtiapine 25 milliGRAM(s) Oral daily  QUEtiapine 50 milliGRAM(s) Oral at bedtime  tamsulosin 0.4 milliGRAM(s) Oral at bedtime  tiotropium 18 MICROgram(s) Capsule 1 Capsule(s) Inhalation daily    PRN Meds  acetaminophen   Tablet .. 650 milliGRAM(s) Oral every 6 hours PRN  HYDROmorphone   Tablet 2 milliGRAM(s) Oral every 4 hours PRN  nitroglycerin     SubLingual 0.4 milliGRAM(s) SubLingual every 5 minutes PRN  polyethylene glycol 3350 17 Gram(s) Oral daily PRN      Case discussed with resident    Care discussed with pt/family
SUBJECTIVE:    Patient is a 48y old Male who presents with a chief complaint of palpitations (22 Feb 2019 15:23)    Currently admitted to medicine with the primary diagnosis of Palpitations     Today is hospital day 1d. This morning he is resting comfortably in bed and reports no new issues or overnight events.     PAST MEDICAL & SURGICAL HISTORY  PTSD (post-traumatic stress disorder)  Supraventricular arrhythmia: prior history  Cardiomyopathy  CHF (congestive heart failure)  Atrial fibrillation: s/p ablation, on eliquis  Diabetes mellitus: diet controlled  BPH (benign prostatic hyperplasia)  HTN (hypertension)  Seizures  Migraines  Pneumonia  MS (multiple sclerosis)  CAD (coronary artery disease)  Bipolar disorder  Anginal pain  Schizo affective schizophrenia  GERD (gastroesophageal reflux disease)  Seasonal allergies  Hypercholesteremia  COPD (chronic obstructive pulmonary disease)  CVA (cerebral vascular accident)  TIA (transient ischemic attack)  Syncope  Peripheral Neuropathy  Clinical Depression  H/O prior ablation treatment: for Afib  Cardiac pacemaker recipient  H/O hernia repair: right 1972,1991  History of hip replacement  S/P Orchiectomy: L for testicular CA  S/P Implantation of AICD    SOCIAL HISTORY:  Negative for smoking/alcohol/drug use.     ALLERGIES:  dexmethylphenidate (Unknown)  Dilantin (Rash)  dilantin, compazine, neurontin, ritalin, phenergan (Unknown)  Haldol (Unknown)  hydantoin derivatives (Other)  methylphenidate (Unknown)  Morphine Sulfate (Unknown)  phenytoin (Unknown)  prochlorperazine (Unknown)  promethazine (Dystonic RXN)  rash/hives (Anaphylaxis)  thioxanthenes (Unknown)    MEDICATIONS:  STANDING MEDICATIONS  apixaban 5 milliGRAM(s) Oral every 12 hours  aspirin  chewable 81 milliGRAM(s) Oral daily  atorvastatin 40 milliGRAM(s) Oral at bedtime  baclofen 10 milliGRAM(s) Oral three times a day  buDESOnide  80 MICROgram(s)/formoterol 4.5 MICROgram(s) Inhaler 2 Puff(s) Inhalation two times a day  chlorhexidine 4% Liquid 1 Application(s) Topical <User Schedule>  dofetilide 250 MICROGram(s) Oral two times a day  furosemide    Tablet 40 milliGRAM(s) Oral <User Schedule>  furosemide    Tablet 20 milliGRAM(s) Oral <User Schedule>  gabapentin 400 milliGRAM(s) Oral three times a day  lamoTRIgine 100 milliGRAM(s) Oral two times a day  metoprolol tartrate 25 milliGRAM(s) Oral two times a day  oxybutynin 5 milliGRAM(s) Oral daily  pantoprazole    Tablet 40 milliGRAM(s) Oral before breakfast  QUEtiapine 25 milliGRAM(s) Oral daily  QUEtiapine 50 milliGRAM(s) Oral at bedtime  tamsulosin 0.4 milliGRAM(s) Oral at bedtime  tiotropium 18 MICROgram(s) Capsule 1 Capsule(s) Inhalation daily    PRN MEDICATIONS  acetaminophen   Tablet .. 650 milliGRAM(s) Oral every 6 hours PRN  HYDROmorphone   Tablet 2 milliGRAM(s) Oral every 4 hours PRN  nitroglycerin     SubLingual 0.4 milliGRAM(s) SubLingual every 5 minutes PRN  polyethylene glycol 3350 17 Gram(s) Oral daily PRN    VITALS:   T(F): 98.1  HR: 59  BP: 99/54  RR: 18  SpO2: 98%    PHYSICAL EXAM:  GEN: No acute distress  LUNGS: Clear to auscultation bilaterally   HEART: S1/S2 present.    ABD: Soft, non-tender, non-distended.    NEURO: AAOX2      LABS:                        13.0   7.12  )-----------( 230      ( 22 Feb 2019 06:43 )             39.1     02-22    141  |  102  |  23<H>  ----------------------------<  90  4.2   |  26  |  0.9    Ca    10.0      22 Feb 2019 06:43  Phos  4.6     02-22  Mg     2.0     02-22    TPro  6.7  /  Alb  4.4  /  TBili  0.3  /  DBili  x   /  AST  18  /  ALT  20  /  AlkPhos  147<H>  02-22    PT/INR - ( 21 Feb 2019 12:54 )   PT: 18.20 sec;   INR: 1.59 ratio         PTT - ( 21 Feb 2019 12:54 )  PTT:43.4 sec        Creatine Kinase, Serum: 82 U/L (02-22-19 @ 06:43)  Troponin T, Serum: <0.01 ng/mL (02-22-19 @ 06:43)      CARDIAC MARKERS ( 22 Feb 2019 06:43 )  x     / <0.01 ng/mL / 82 U/L / x     / 1.3 ng/mL  CARDIAC MARKERS ( 21 Feb 2019 12:54 )  x     / <0.01 ng/mL / x     / x     / x            RADIOLOGY:

## 2019-02-25 NOTE — DISCHARGE NOTE ADULT - CARE PLAN
Principal Discharge DX:	Chronic atrial fibrillation  Goal:	Stable medically management  Assessment and plan of treatment:	You have a history of arrhythmia with an AICD in place taking dofetilide.   - You should switch to metoprolol 25 mg twice a day  - Events were noted on AICD but cardiologist notes negative full workup done within last 12 months  - no further work-up at this time  - Follow up with Dr. Quiñones and Dr. Ayoub upon discharge within 1 week  Secondary Diagnosis:	COPD (chronic obstructive pulmonary disease)  Goal:	Stable, medical management  Assessment and plan of treatment:	Please see Dr. Hernández your pulmonologist upon discharge.   - Use Symbicort now, do not continue with Breo-ellipta or Incruse at same time  - continue to no smoke cigarettes  Secondary Diagnosis:	CAD (coronary artery disease)  Goal:	Stable  Assessment and plan of treatment:	Follow up with Dr. Ayoub upon discharge. Take all other medications as prescribed.  Secondary Diagnosis:	MS (multiple sclerosis)  Goal:	Stable  Assessment and plan of treatment:	Continue to take all medications as prescribed. Please see your neurologist or whoever monitors your multiple sclerosis upon discharge.  Secondary Diagnosis:	Diabetes mellitus  Goal:	Stable  Assessment and plan of treatment:	You have hx of hypertension, high cholesterol, bipolar, seizures and BPH.   Continue all medications for above conditions as prescribed, see your primary care provider upon discharge. Principal Discharge DX:	Chronic atrial fibrillation  Goal:	Stable medically management  Assessment and plan of treatment:	You have a history of arrhythmia with an AICD in place taking dofetilide.   - You should switch to metoprolol 25 mg twice a day  - Events were noted on AICD but cardiologist notes negative full workup done within last 12 months  - no further work-up at this time  - Follow up with Dr. Quiñones and Dr. Ayoub upon discharge within 1 week  Secondary Diagnosis:	COPD (chronic obstructive pulmonary disease)  Goal:	Stable, medical management  Assessment and plan of treatment:	Please see Dr. Hernández your pulmonologist upon discharge.   - Use Symbicort now, do not continue with Breo-ellipta or Incruse at same time  - continue to no smoke cigarettes  Secondary Diagnosis:	CAD (coronary artery disease)  Goal:	Stable  Assessment and plan of treatment:	Follow up with Dr. Ayoub upon discharge. Take all other medications as prescribed.  You also have a history of heart failure, please discuss starting a ACE inhibitor in the future. Your blood pressure was on the lower side while here.  Secondary Diagnosis:	MS (multiple sclerosis)  Goal:	Stable  Assessment and plan of treatment:	Continue to take all medications as prescribed. Please see your neurologist or whoever monitors your multiple sclerosis upon discharge.  Secondary Diagnosis:	Diabetes mellitus  Goal:	Stable  Assessment and plan of treatment:	You have hx of hypertension, high cholesterol, bipolar, seizures and BPH.   Continue all medications for above conditions as prescribed, see your primary care provider upon discharge.

## 2019-02-25 NOTE — DISCHARGE NOTE ADULT - PATIENT PORTAL LINK FT
You can access the [facility] Patient Portal, offered by [facility], by registering with the following website: [email or web address]

## 2019-02-25 NOTE — DISCHARGE NOTE ADULT - PLAN OF CARE
Stable medically management You have a history of arrhythmia with an AICD in place taking dofetilide.   - You should switch to metoprolol 25 mg twice a day  - Events were noted on AICD but cardiologist notes negative full workup done within last 12 months  - no further work-up at this time  - Follow up with Dr. Quiñones and Dr. Ayoub upon discharge within 1 week Stable, medical management Please see Dr. Hernández your pulmonologist upon discharge.   - Use Symbicort now, do not continue with Breo-ellipta or Incruse at same time  - continue to no smoke cigarettes Stable Follow up with Dr. Ayoub upon discharge. Take all other medications as prescribed. Continue to take all medications as prescribed. Please see your neurologist or whoever monitors your multiple sclerosis upon discharge. You have hx of hypertension, high cholesterol, bipolar, seizures and BPH.   Continue all medications for above conditions as prescribed, see your primary care provider upon discharge. Follow up with Dr. Ayoub upon discharge. Take all other medications as prescribed.  You also have a history of heart failure, please discuss starting a ACE inhibitor in the future. Your blood pressure was on the lower side while here.

## 2019-02-25 NOTE — DISCHARGE NOTE ADULT - MEDICATION SUMMARY - MEDICATIONS TO TAKE
I will START or STAY ON the medications listed below when I get home from the hospital:    aspirin 81 mg oral tablet, chewable  -- 1 tab(s) by mouth once a day  -- Indication: For CAD    acetaminophen 650 mg oral tablet  -- 650 milligram(s) by mouth 4 times a day, As Needed  -- Indication: For Pain control    HYDROmorphone 2 mg oral tablet  -- 1 tab(s) by mouth every 4 hours, As Needed  -- Indication: For Pain control    Mi-Acid oral suspension  -- 30 milliliter(s) by mouth 4 times a day, As Needed  -- Indication: For GERd    tamsulosin 0.4 mg oral capsule  -- 1 cap(s) by mouth once a day  -- Indication: For Urinary retention    Nitrostat 0.4 mg sublingual tablet  -- sublingual 3 times a day, As Needed; may repeat after 5 min until relief  -- Indication: For Chest pain PRN    Tikosyn 250 mcg oral capsule  -- 1 cap(s) by mouth 2 times a day  -- Indication: For anti-arrhythmia     Eliquis 5 mg oral tablet  -- 1 tab(s) by mouth 2 times a day  -- Indication: For Afib    gabapentin 400 mg oral capsule  -- 1 cap(s) by mouth 3 times a day  -- Indication: For bipolar/neuropathic pain    lamoTRIgine 100 mg oral tablet  -- 1 tab(s) by mouth 2 times a day  -- Indication: For seizure disorder    atorvastatin 40 mg oral tablet  -- 1 tab(s) by mouth once a day (at bedtime)  -- Indication: For HLD    QUEtiapine 25 mg oral tablet  -- 1 tab(s) by mouth once a day  -- Indication: For bipolar    SEROquel 50 mg oral tablet  -- 1 tab(s) by mouth once a day (at bedtime)  -- Indication: For bipolar    Metoprolol Tartrate 25 mg oral tablet  -- 1 tab(s) by mouth 2 times a day  -- Indication: For CAD    budesonide-formoterol 160 mcg-4.5 mcg/inh inhalation aerosol  -- 2 puff(s) inhaled 2 times a day  -- Indication: For CoPD    Cepacol Extra Strength Menthol 10 mg mucous membrane lozenge  -- 1 anderson(s) mucous membrane every 6 hours, As Needed  -- Indication: For throat care    Lasix 20 mg oral tablet  -- 1 tab(s) by mouth 3 times a week on monday, wednesday and friday  -- Indication: For CHF    Lasix 40 mg oral tablet  -- 1 tab(s) by mouth 3 times a week on tuesday, thursday and saturday  -- Indication: For CHF    Tecfidera 240 mg oral delayed release capsule  -- 1 cap(s) by mouth once a day  -- Indication: For MS    polyethylene glycol 3350 oral powder for reconstitution  -- 1 dose(s) by mouth once a day, As Needed  -- Indication: For Constipation    baclofen 10 mg oral tablet  -- 1 tab(s) by mouth 3 times a day  -- Indication: For muscle pain    omeprazole 20 mg oral delayed release capsule  -- 1 cap(s) by mouth 2 times a day  -- Indication: For GERD    oxybutynin 5 mg/24 hours oral tablet, extended release  -- 1 tab(s) by mouth once a day  -- Indication: For bladder spasm

## 2019-02-25 NOTE — DISCHARGE NOTE ADULT - MEDICATION SUMMARY - MEDICATIONS TO STOP TAKING
I will STOP taking the medications listed below when I get home from the hospital:    ipratropium 18 mcg/inh inhalation aerosol  -- 1 application inhaled 4 times a day, As Needed    Incruse Ellipta 62.5 mcg/inh inhalation powder  -- 1 puff(s) inhaled once a day    fluticasone 50 mcg/inh inhalation powder  -- 1 puff(s) inhaled 2 times a day, As Needed    Breo Ellipta 100 mcg-25 mcg/inh inhalation powder  -- 1 puff(s) inhaled once a day

## 2019-02-25 NOTE — DISCHARGE NOTE ADULT - HOSPITAL COURSE
47 yo M with extensive PMH including MS, bipolar, seizure disorder, CAD, A-fib s/p ablation on Eliquis, Hx of Vtach s/p AICD, HFpEF, HTN, DLD, COPD (no O2), BPH, GERD, TIA from [facility] presented to ED complaining of chest pain, palpitations, SOB, and cough of 3 weeks duration. When he had similar symptoms in past was given Nitro + O2 and symptoms resolved. Today, they did not. NH instructed patient go to ED. Reports his cough is productive of blood tinged sputum. Reports multiple episodes in the past. States he has stopped smoking cigarettes though does use medical marijuana. Denies any fevers, chills, abdominal pain, NVCD.    In ED, patient's was device was interrogated and noted to have "events". Cardiology assessed pt who had stable cardiac assessment - normal CTA 8/2018, stress test in 04/2018 w/ normal EF no ischemia. No signs of active cardiac disease, could be palpitations from known other issues. PM interrogation showed brief episodes of SVT w/o ventricular arrythmia as per their note. CE negative, no further inpt workup at this pt. Of note pt is to transition to symbicort for COPD (pt didn't like breo/incruse).

## 2019-03-01 DIAGNOSIS — Z79.01 LONG TERM (CURRENT) USE OF ANTICOAGULANTS: ICD-10-CM

## 2019-03-01 DIAGNOSIS — I11.0 HYPERTENSIVE HEART DISEASE WITH HEART FAILURE: ICD-10-CM

## 2019-03-01 DIAGNOSIS — R00.2 PALPITATIONS: ICD-10-CM

## 2019-03-01 DIAGNOSIS — R06.82 TACHYPNEA, NOT ELSEWHERE CLASSIFIED: ICD-10-CM

## 2019-03-01 DIAGNOSIS — I42.9 CARDIOMYOPATHY, UNSPECIFIED: ICD-10-CM

## 2019-03-01 DIAGNOSIS — F20.9 SCHIZOPHRENIA, UNSPECIFIED: ICD-10-CM

## 2019-03-01 DIAGNOSIS — Z79.84 LONG TERM (CURRENT) USE OF ORAL HYPOGLYCEMIC DRUGS: ICD-10-CM

## 2019-03-01 DIAGNOSIS — N40.0 BENIGN PROSTATIC HYPERPLASIA WITHOUT LOWER URINARY TRACT SYMPTOMS: ICD-10-CM

## 2019-03-01 DIAGNOSIS — K21.9 GASTRO-ESOPHAGEAL REFLUX DISEASE WITHOUT ESOPHAGITIS: ICD-10-CM

## 2019-03-01 DIAGNOSIS — F31.9 BIPOLAR DISORDER, UNSPECIFIED: ICD-10-CM

## 2019-03-01 DIAGNOSIS — E11.40 TYPE 2 DIABETES MELLITUS WITH DIABETIC NEUROPATHY, UNSPECIFIED: ICD-10-CM

## 2019-03-01 DIAGNOSIS — Z86.73 PERSONAL HISTORY OF TRANSIENT ISCHEMIC ATTACK (TIA), AND CEREBRAL INFARCTION WITHOUT RESIDUAL DEFICITS: ICD-10-CM

## 2019-03-01 DIAGNOSIS — Z87.891 PERSONAL HISTORY OF NICOTINE DEPENDENCE: ICD-10-CM

## 2019-03-01 DIAGNOSIS — I25.10 ATHEROSCLEROTIC HEART DISEASE OF NATIVE CORONARY ARTERY WITHOUT ANGINA PECTORIS: ICD-10-CM

## 2019-03-01 DIAGNOSIS — I48.2 CHRONIC ATRIAL FIBRILLATION: ICD-10-CM

## 2019-03-01 DIAGNOSIS — G40.909 EPILEPSY, UNSPECIFIED, NOT INTRACTABLE, WITHOUT STATUS EPILEPTICUS: ICD-10-CM

## 2019-03-01 DIAGNOSIS — G35 MULTIPLE SCLEROSIS: ICD-10-CM

## 2019-03-01 DIAGNOSIS — J44.9 CHRONIC OBSTRUCTIVE PULMONARY DISEASE, UNSPECIFIED: ICD-10-CM

## 2019-03-01 DIAGNOSIS — J98.11 ATELECTASIS: ICD-10-CM

## 2019-03-01 DIAGNOSIS — I50.32 CHRONIC DIASTOLIC (CONGESTIVE) HEART FAILURE: ICD-10-CM

## 2019-03-01 DIAGNOSIS — F12.90 CANNABIS USE, UNSPECIFIED, UNCOMPLICATED: ICD-10-CM

## 2019-03-01 DIAGNOSIS — I47.1 SUPRAVENTRICULAR TACHYCARDIA: ICD-10-CM

## 2019-03-01 DIAGNOSIS — Z66 DO NOT RESUSCITATE: ICD-10-CM

## 2019-03-01 DIAGNOSIS — E78.00 PURE HYPERCHOLESTEROLEMIA, UNSPECIFIED: ICD-10-CM

## 2019-03-01 DIAGNOSIS — R04.2 HEMOPTYSIS: ICD-10-CM

## 2019-03-06 ENCOUNTER — OUTPATIENT (OUTPATIENT)
Dept: OUTPATIENT SERVICES | Facility: HOSPITAL | Age: 49
LOS: 1 days | Discharge: HOME | End: 2019-03-06

## 2019-03-06 DIAGNOSIS — Z98.890 OTHER SPECIFIED POSTPROCEDURAL STATES: Chronic | ICD-10-CM

## 2019-03-06 DIAGNOSIS — I48.2 CHRONIC ATRIAL FIBRILLATION: ICD-10-CM

## 2019-03-06 DIAGNOSIS — Z95.0 PRESENCE OF CARDIAC PACEMAKER: Chronic | ICD-10-CM

## 2019-03-06 DIAGNOSIS — Z96.649 PRESENCE OF UNSPECIFIED ARTIFICIAL HIP JOINT: Chronic | ICD-10-CM

## 2019-04-17 ENCOUNTER — OUTPATIENT (OUTPATIENT)
Dept: OUTPATIENT SERVICES | Facility: HOSPITAL | Age: 49
LOS: 1 days | Discharge: HOME | End: 2019-04-17

## 2019-04-17 DIAGNOSIS — Z95.0 PRESENCE OF CARDIAC PACEMAKER: Chronic | ICD-10-CM

## 2019-04-17 DIAGNOSIS — Z96.649 PRESENCE OF UNSPECIFIED ARTIFICIAL HIP JOINT: Chronic | ICD-10-CM

## 2019-04-17 DIAGNOSIS — E16.4 INCREASED SECRETION OF GASTRIN: ICD-10-CM

## 2019-04-17 DIAGNOSIS — Z98.890 OTHER SPECIFIED POSTPROCEDURAL STATES: Chronic | ICD-10-CM

## 2019-05-24 NOTE — ED ADULT NURSE NOTE - PAIN: BODY LOCATION
chest, midsternal bed mobility training/cognitive, visual perceptual/fine motor coordination training/ADL retraining/IADL retraining/balance training/motor coordination training/parent/caregiver training.../neuromuscular re-education/transfer training/ROM/strengthening

## 2019-06-05 ENCOUNTER — OUTPATIENT (OUTPATIENT)
Dept: OUTPATIENT SERVICES | Facility: HOSPITAL | Age: 49
LOS: 1 days | Discharge: HOME | End: 2019-06-05

## 2019-06-05 DIAGNOSIS — Z98.890 OTHER SPECIFIED POSTPROCEDURAL STATES: Chronic | ICD-10-CM

## 2019-06-05 DIAGNOSIS — Z41.8 ENCOUNTER FOR OTHER PROCEDURES FOR PURPOSES OTHER THAN REMEDYING HEALTH STATE: ICD-10-CM

## 2019-06-05 DIAGNOSIS — Z95.0 PRESENCE OF CARDIAC PACEMAKER: Chronic | ICD-10-CM

## 2019-06-05 DIAGNOSIS — R50.9 FEVER, UNSPECIFIED: ICD-10-CM

## 2019-06-05 DIAGNOSIS — B97.4 RESPIRATORY SYNCYTIAL VIRUS AS THE CAUSE OF DISEASES CLASSIFIED ELSEWHERE: ICD-10-CM

## 2019-06-05 DIAGNOSIS — Z96.649 PRESENCE OF UNSPECIFIED ARTIFICIAL HIP JOINT: Chronic | ICD-10-CM

## 2019-06-07 ENCOUNTER — OUTPATIENT (OUTPATIENT)
Dept: OUTPATIENT SERVICES | Facility: HOSPITAL | Age: 49
LOS: 1 days | Discharge: HOME | End: 2019-06-07

## 2019-06-07 DIAGNOSIS — B97.4 RESPIRATORY SYNCYTIAL VIRUS AS THE CAUSE OF DISEASES CLASSIFIED ELSEWHERE: ICD-10-CM

## 2019-06-07 DIAGNOSIS — Z98.890 OTHER SPECIFIED POSTPROCEDURAL STATES: Chronic | ICD-10-CM

## 2019-06-07 DIAGNOSIS — Z96.649 PRESENCE OF UNSPECIFIED ARTIFICIAL HIP JOINT: Chronic | ICD-10-CM

## 2019-06-07 DIAGNOSIS — Z41.8 ENCOUNTER FOR OTHER PROCEDURES FOR PURPOSES OTHER THAN REMEDYING HEALTH STATE: ICD-10-CM

## 2019-06-07 DIAGNOSIS — A48.1 LEGIONNAIRES' DISEASE: ICD-10-CM

## 2019-06-07 DIAGNOSIS — Z95.0 PRESENCE OF CARDIAC PACEMAKER: Chronic | ICD-10-CM

## 2019-06-19 ENCOUNTER — OUTPATIENT (OUTPATIENT)
Dept: OUTPATIENT SERVICES | Facility: HOSPITAL | Age: 49
LOS: 1 days | Discharge: HOME | End: 2019-06-19

## 2019-06-19 DIAGNOSIS — Z98.890 OTHER SPECIFIED POSTPROCEDURAL STATES: Chronic | ICD-10-CM

## 2019-06-19 DIAGNOSIS — Z95.0 PRESENCE OF CARDIAC PACEMAKER: Chronic | ICD-10-CM

## 2019-06-19 DIAGNOSIS — Z96.649 PRESENCE OF UNSPECIFIED ARTIFICIAL HIP JOINT: Chronic | ICD-10-CM

## 2019-07-16 ENCOUNTER — OUTPATIENT (OUTPATIENT)
Dept: OUTPATIENT SERVICES | Facility: HOSPITAL | Age: 49
LOS: 1 days | Discharge: HOME | End: 2019-07-16

## 2019-07-16 DIAGNOSIS — Z96.649 PRESENCE OF UNSPECIFIED ARTIFICIAL HIP JOINT: Chronic | ICD-10-CM

## 2019-07-16 DIAGNOSIS — J43.9 EMPHYSEMA, UNSPECIFIED: ICD-10-CM

## 2019-07-16 DIAGNOSIS — Z95.0 PRESENCE OF CARDIAC PACEMAKER: Chronic | ICD-10-CM

## 2019-07-16 DIAGNOSIS — Z98.890 OTHER SPECIFIED POSTPROCEDURAL STATES: Chronic | ICD-10-CM

## 2019-08-08 ENCOUNTER — OUTPATIENT (OUTPATIENT)
Dept: OUTPATIENT SERVICES | Facility: HOSPITAL | Age: 49
LOS: 1 days | Discharge: HOME | End: 2019-08-08

## 2019-08-08 DIAGNOSIS — Z98.890 OTHER SPECIFIED POSTPROCEDURAL STATES: Chronic | ICD-10-CM

## 2019-08-08 DIAGNOSIS — I48.2 CHRONIC ATRIAL FIBRILLATION: ICD-10-CM

## 2019-08-08 DIAGNOSIS — Z96.649 PRESENCE OF UNSPECIFIED ARTIFICIAL HIP JOINT: Chronic | ICD-10-CM

## 2019-08-08 DIAGNOSIS — Z95.0 PRESENCE OF CARDIAC PACEMAKER: Chronic | ICD-10-CM

## 2019-08-12 ENCOUNTER — INPATIENT (INPATIENT)
Facility: HOSPITAL | Age: 49
LOS: 6 days | Discharge: NURSING, REHAB OR LONG-TERM CARE | End: 2019-08-19
Attending: INTERNAL MEDICINE | Admitting: INTERNAL MEDICINE
Payer: MEDICAID

## 2019-08-12 DIAGNOSIS — Z95.0 PRESENCE OF CARDIAC PACEMAKER: Chronic | ICD-10-CM

## 2019-08-12 DIAGNOSIS — Z96.649 PRESENCE OF UNSPECIFIED ARTIFICIAL HIP JOINT: Chronic | ICD-10-CM

## 2019-08-12 DIAGNOSIS — Z98.890 OTHER SPECIFIED POSTPROCEDURAL STATES: Chronic | ICD-10-CM

## 2019-08-12 PROCEDURE — 99285 EMERGENCY DEPT VISIT HI MDM: CPT

## 2019-08-13 VITALS
RESPIRATION RATE: 20 BRPM | HEART RATE: 63 BPM | DIASTOLIC BLOOD PRESSURE: 67 MMHG | SYSTOLIC BLOOD PRESSURE: 115 MMHG | TEMPERATURE: 98 F | OXYGEN SATURATION: 95 %

## 2019-08-13 LAB
ALBUMIN SERPL ELPH-MCNC: 4.3 G/DL — SIGNIFICANT CHANGE UP (ref 3.5–5.2)
ALP SERPL-CCNC: 143 U/L — HIGH (ref 30–115)
ALT FLD-CCNC: 19 U/L — SIGNIFICANT CHANGE UP (ref 0–41)
ANION GAP SERPL CALC-SCNC: 10 MMOL/L — SIGNIFICANT CHANGE UP (ref 7–14)
AST SERPL-CCNC: 17 U/L — SIGNIFICANT CHANGE UP (ref 0–41)
BASOPHILS # BLD AUTO: 0.09 K/UL — SIGNIFICANT CHANGE UP (ref 0–0.2)
BASOPHILS NFR BLD AUTO: 1.2 % — HIGH (ref 0–1)
BILIRUB SERPL-MCNC: 0.3 MG/DL — SIGNIFICANT CHANGE UP (ref 0.2–1.2)
BUN SERPL-MCNC: 21 MG/DL — HIGH (ref 10–20)
CALCIUM SERPL-MCNC: 9.4 MG/DL — SIGNIFICANT CHANGE UP (ref 8.5–10.1)
CHLORIDE SERPL-SCNC: 106 MMOL/L — SIGNIFICANT CHANGE UP (ref 98–110)
CO2 SERPL-SCNC: 27 MMOL/L — SIGNIFICANT CHANGE UP (ref 17–32)
CREAT SERPL-MCNC: 1.1 MG/DL — SIGNIFICANT CHANGE UP (ref 0.7–1.5)
EOSINOPHIL # BLD AUTO: 0.12 K/UL — SIGNIFICANT CHANGE UP (ref 0–0.7)
EOSINOPHIL NFR BLD AUTO: 1.7 % — SIGNIFICANT CHANGE UP (ref 0–8)
GLUCOSE SERPL-MCNC: 90 MG/DL — SIGNIFICANT CHANGE UP (ref 70–99)
HCT VFR BLD CALC: 36.2 % — LOW (ref 42–52)
HGB BLD-MCNC: 11.9 G/DL — LOW (ref 14–18)
IMM GRANULOCYTES NFR BLD AUTO: 0.4 % — HIGH (ref 0.1–0.3)
LYMPHOCYTES # BLD AUTO: 2.47 K/UL — SIGNIFICANT CHANGE UP (ref 1.2–3.4)
LYMPHOCYTES # BLD AUTO: 34.1 % — SIGNIFICANT CHANGE UP (ref 20.5–51.1)
MAGNESIUM SERPL-MCNC: 2.1 MG/DL — SIGNIFICANT CHANGE UP (ref 1.8–2.4)
MCHC RBC-ENTMCNC: 29.2 PG — SIGNIFICANT CHANGE UP (ref 27–31)
MCHC RBC-ENTMCNC: 32.9 G/DL — SIGNIFICANT CHANGE UP (ref 32–37)
MCV RBC AUTO: 88.9 FL — SIGNIFICANT CHANGE UP (ref 80–94)
MONOCYTES # BLD AUTO: 0.56 K/UL — SIGNIFICANT CHANGE UP (ref 0.1–0.6)
MONOCYTES NFR BLD AUTO: 7.7 % — SIGNIFICANT CHANGE UP (ref 1.7–9.3)
NEUTROPHILS # BLD AUTO: 3.98 K/UL — SIGNIFICANT CHANGE UP (ref 1.4–6.5)
NEUTROPHILS NFR BLD AUTO: 54.9 % — SIGNIFICANT CHANGE UP (ref 42.2–75.2)
NRBC # BLD: 0 /100 WBCS — SIGNIFICANT CHANGE UP (ref 0–0)
NT-PROBNP SERPL-SCNC: 36 PG/ML — SIGNIFICANT CHANGE UP (ref 0–300)
PLATELET # BLD AUTO: 235 K/UL — SIGNIFICANT CHANGE UP (ref 130–400)
POTASSIUM SERPL-MCNC: 4 MMOL/L — SIGNIFICANT CHANGE UP (ref 3.5–5)
POTASSIUM SERPL-SCNC: 4 MMOL/L — SIGNIFICANT CHANGE UP (ref 3.5–5)
PROT SERPL-MCNC: 6.5 G/DL — SIGNIFICANT CHANGE UP (ref 6–8)
RBC # BLD: 4.07 M/UL — LOW (ref 4.7–6.1)
RBC # FLD: 13.7 % — SIGNIFICANT CHANGE UP (ref 11.5–14.5)
SODIUM SERPL-SCNC: 143 MMOL/L — SIGNIFICANT CHANGE UP (ref 135–146)
TROPONIN T SERPL-MCNC: <0.01 NG/ML — SIGNIFICANT CHANGE UP
WBC # BLD: 7.25 K/UL — SIGNIFICANT CHANGE UP (ref 4.8–10.8)
WBC # FLD AUTO: 7.25 K/UL — SIGNIFICANT CHANGE UP (ref 4.8–10.8)

## 2019-08-13 PROCEDURE — 93010 ELECTROCARDIOGRAM REPORT: CPT

## 2019-08-13 PROCEDURE — 71045 X-RAY EXAM CHEST 1 VIEW: CPT | Mod: 26

## 2019-08-13 PROCEDURE — 93289 INTERROG DEVICE EVAL HEART: CPT | Mod: 26

## 2019-08-13 RX ORDER — BACLOFEN 100 %
10 POWDER (GRAM) MISCELLANEOUS
Refills: 0 | Status: DISCONTINUED | OUTPATIENT
Start: 2019-08-13 | End: 2019-08-19

## 2019-08-13 RX ORDER — APIXABAN 2.5 MG/1
5 TABLET, FILM COATED ORAL EVERY 12 HOURS
Refills: 0 | Status: DISCONTINUED | OUTPATIENT
Start: 2019-08-13 | End: 2019-08-19

## 2019-08-13 RX ORDER — BACLOFEN 100 %
1 POWDER (GRAM) MISCELLANEOUS
Qty: 0 | Refills: 0 | DISCHARGE

## 2019-08-13 RX ORDER — FLUTICASONE PROPIONATE 50 MCG
2 SPRAY, SUSPENSION NASAL DAILY
Refills: 0 | Status: DISCONTINUED | OUTPATIENT
Start: 2019-08-13 | End: 2019-08-19

## 2019-08-13 RX ORDER — BENZOCAINE 10 %
1 GEL (GRAM) MUCOUS MEMBRANE DAILY
Refills: 0 | Status: DISCONTINUED | OUTPATIENT
Start: 2019-08-13 | End: 2019-08-14

## 2019-08-13 RX ORDER — BUDESONIDE AND FORMOTEROL FUMARATE DIHYDRATE 160; 4.5 UG/1; UG/1
2 AEROSOL RESPIRATORY (INHALATION)
Refills: 0 | Status: DISCONTINUED | OUTPATIENT
Start: 2019-08-13 | End: 2019-08-19

## 2019-08-13 RX ORDER — GABAPENTIN 400 MG/1
400 CAPSULE ORAL THREE TIMES A DAY
Refills: 0 | Status: DISCONTINUED | OUTPATIENT
Start: 2019-08-13 | End: 2019-08-19

## 2019-08-13 RX ORDER — QUETIAPINE FUMARATE 200 MG/1
50 TABLET, FILM COATED ORAL AT BEDTIME
Refills: 0 | Status: DISCONTINUED | OUTPATIENT
Start: 2019-08-13 | End: 2019-08-19

## 2019-08-13 RX ORDER — LAMOTRIGINE 25 MG/1
100 TABLET, ORALLY DISINTEGRATING ORAL
Refills: 0 | Status: DISCONTINUED | OUTPATIENT
Start: 2019-08-13 | End: 2019-08-19

## 2019-08-13 RX ORDER — DOFETILIDE 0.25 MG/1
250 CAPSULE ORAL
Refills: 0 | Status: DISCONTINUED | OUTPATIENT
Start: 2019-08-13 | End: 2019-08-19

## 2019-08-13 RX ORDER — POLYETHYLENE GLYCOL 3350 17 G/17G
17 POWDER, FOR SOLUTION ORAL DAILY
Refills: 0 | Status: DISCONTINUED | OUTPATIENT
Start: 2019-08-13 | End: 2019-08-15

## 2019-08-13 RX ORDER — CHLORHEXIDINE GLUCONATE 213 G/1000ML
1 SOLUTION TOPICAL
Refills: 0 | Status: DISCONTINUED | OUTPATIENT
Start: 2019-08-13 | End: 2019-08-19

## 2019-08-13 RX ORDER — QUETIAPINE FUMARATE 200 MG/1
25 TABLET, FILM COATED ORAL DAILY
Refills: 0 | Status: DISCONTINUED | OUTPATIENT
Start: 2019-08-13 | End: 2019-08-19

## 2019-08-13 RX ORDER — LORATADINE 10 MG/1
10 TABLET ORAL DAILY
Refills: 0 | Status: DISCONTINUED | OUTPATIENT
Start: 2019-08-13 | End: 2019-08-19

## 2019-08-13 RX ORDER — KETOCONAZOLE 20 MG/G
1 AEROSOL, FOAM TOPICAL DAILY
Refills: 0 | Status: DISCONTINUED | OUTPATIENT
Start: 2019-08-13 | End: 2019-08-19

## 2019-08-13 RX ORDER — HYDROMORPHONE HYDROCHLORIDE 2 MG/ML
2 INJECTION INTRAMUSCULAR; INTRAVENOUS; SUBCUTANEOUS EVERY 4 HOURS
Refills: 0 | Status: DISCONTINUED | OUTPATIENT
Start: 2019-08-13 | End: 2019-08-16

## 2019-08-13 RX ORDER — PANTOPRAZOLE SODIUM 20 MG/1
40 TABLET, DELAYED RELEASE ORAL
Refills: 0 | Status: DISCONTINUED | OUTPATIENT
Start: 2019-08-13 | End: 2019-08-19

## 2019-08-13 RX ORDER — TAMSULOSIN HYDROCHLORIDE 0.4 MG/1
0.4 CAPSULE ORAL AT BEDTIME
Refills: 0 | Status: DISCONTINUED | OUTPATIENT
Start: 2019-08-13 | End: 2019-08-19

## 2019-08-13 RX ORDER — FUROSEMIDE 40 MG
20 TABLET ORAL
Refills: 0 | Status: DISCONTINUED | OUTPATIENT
Start: 2019-08-13 | End: 2019-08-17

## 2019-08-13 RX ORDER — ATORVASTATIN CALCIUM 80 MG/1
40 TABLET, FILM COATED ORAL AT BEDTIME
Refills: 0 | Status: DISCONTINUED | OUTPATIENT
Start: 2019-08-13 | End: 2019-08-19

## 2019-08-13 RX ORDER — METOPROLOL TARTRATE 50 MG
25 TABLET ORAL
Refills: 0 | Status: DISCONTINUED | OUTPATIENT
Start: 2019-08-13 | End: 2019-08-17

## 2019-08-13 RX ORDER — FUROSEMIDE 40 MG
40 TABLET ORAL
Refills: 0 | Status: DISCONTINUED | OUTPATIENT
Start: 2019-08-13 | End: 2019-08-17

## 2019-08-13 RX ORDER — ASPIRIN/CALCIUM CARB/MAGNESIUM 324 MG
81 TABLET ORAL DAILY
Refills: 0 | Status: DISCONTINUED | OUTPATIENT
Start: 2019-08-13 | End: 2019-08-18

## 2019-08-13 RX ORDER — OXYBUTYNIN CHLORIDE 5 MG
1 TABLET ORAL
Qty: 0 | Refills: 0 | DISCHARGE

## 2019-08-13 RX ADMIN — APIXABAN 5 MILLIGRAM(S): 2.5 TABLET, FILM COATED ORAL at 12:44

## 2019-08-13 RX ADMIN — Medication 10 MILLIGRAM(S): at 18:08

## 2019-08-13 RX ADMIN — Medication 50 MILLIGRAM(S): at 05:47

## 2019-08-13 RX ADMIN — QUETIAPINE FUMARATE 50 MILLIGRAM(S): 200 TABLET, FILM COATED ORAL at 22:19

## 2019-08-13 RX ADMIN — Medication 81 MILLIGRAM(S): at 12:44

## 2019-08-13 RX ADMIN — LAMOTRIGINE 100 MILLIGRAM(S): 25 TABLET, ORALLY DISINTEGRATING ORAL at 18:08

## 2019-08-13 RX ADMIN — DOFETILIDE 250 MICROGRAM(S): 0.25 CAPSULE ORAL at 18:08

## 2019-08-13 RX ADMIN — Medication 2 SPRAY(S): at 12:44

## 2019-08-13 RX ADMIN — ATORVASTATIN CALCIUM 40 MILLIGRAM(S): 80 TABLET, FILM COATED ORAL at 22:18

## 2019-08-13 RX ADMIN — Medication 25 MILLIGRAM(S): at 18:08

## 2019-08-13 RX ADMIN — HYDROMORPHONE HYDROCHLORIDE 2 MILLIGRAM(S): 2 INJECTION INTRAMUSCULAR; INTRAVENOUS; SUBCUTANEOUS at 12:43

## 2019-08-13 RX ADMIN — HYDROMORPHONE HYDROCHLORIDE 2 MILLIGRAM(S): 2 INJECTION INTRAMUSCULAR; INTRAVENOUS; SUBCUTANEOUS at 19:37

## 2019-08-13 RX ADMIN — TAMSULOSIN HYDROCHLORIDE 0.4 MILLIGRAM(S): 0.4 CAPSULE ORAL at 22:19

## 2019-08-13 RX ADMIN — GABAPENTIN 400 MILLIGRAM(S): 400 CAPSULE ORAL at 12:47

## 2019-08-13 RX ADMIN — GABAPENTIN 400 MILLIGRAM(S): 400 CAPSULE ORAL at 22:19

## 2019-08-13 RX ADMIN — QUETIAPINE FUMARATE 25 MILLIGRAM(S): 200 TABLET, FILM COATED ORAL at 12:45

## 2019-08-13 RX ADMIN — Medication 10 MILLIGRAM(S): at 12:44

## 2019-08-13 RX ADMIN — LORATADINE 10 MILLIGRAM(S): 10 TABLET ORAL at 12:45

## 2019-08-13 RX ADMIN — Medication 40 MILLIGRAM(S): at 12:44

## 2019-08-13 NOTE — H&P ADULT - NSHPPHYSICALEXAM_GEN_ALL_CORE
Constitutional: AAOx4, slight distress from generalized muscle pain  Cardiovascular: NS1, S2, slight rub appreciated over sternum  Pulmonary: clear to auscultation bilaterally; no accessory muscle use, no tachypnea  Gastrointestinal: soft, slightly distended, nontender  Neurological: full motor strength in B/L upper extremities; slight decreased strength in right lower extremity; non-dysarthric  Extremities: scar appreciated over right wrist

## 2019-08-13 NOTE — H&P ADULT - ASSESSMENT
The patient is a 47 y/o M w/ a PMH of v-tach s/p AICD, CHF, A fib/flutter, HTN, COPD (on O2 PRN), DLD, multiple sclerosis w/ multiple admissions for flares, seizure disorder (on lamotrigine), PTSD, depression, ? stroke/TIA, esophageal stricture, chronic sore throat/sinusitis, GERD, hemopneumothorax (2006), pericardial effusion (2006), cardiomyopathy, MI (2006, 2008), IBS, presenting for shortness of breath and generalized muscle cramping w/ blurry vision, admitted for multiple sclerosis flare.    # Multiple sclerosis flare, s/p Solumedrol 1 g IV x 1 8/13/19  CXR on admission showed low lung volumes but was otherwise unremarkable, WBC 7.25  C/w baclofen, dofetilide, gabapentin  Patient takes Tecfidera 240 mg PO qD but is apparently non-formulary; please contact [facility]  F/u w/ Neurology  Pain control w/ Tylenol, Dilaudid PRN  Ambulate w/ assistance (patient uses walker at baseline)    # HTN  C/w metoprolol tartrate 25 mg PO BID  C/w Lasix qD (20 mg M/W/F, 40 mg T/R/S)  Vitals per routine    # H/o v-tach, CHF s/p AICD  C/w metoprolol tartrate 25 mg PO BID  C/w Lasix qD (20 mg M/W/F, 40 mg T/R/S)    # H/o MI 2006, 2008  C/w atorvastatin 40 mg PO qHS  C/w aspirin 81 mg PO qD    # H/o seizure disorder  Patient states he is in the process of being weaned down on his seizure medications  C/w lamotrigine 100 mg PO BID    # COPD on home O2  C/w NC; wean as tolerated  C/w Symbicort high dose 2 puffs BID  Vitals per routine, pulse ox q4h    # Chronic sore throat, sinusitis  C/w Cepacol, Claritin, Flonase    # DLD  C/w atorvastatin 40 mg PO qHS    # GERD  C/w pantoprazole 40 mg PO qAM (patient takes omeprazole at home)    # H/o esophageal stricture  Patient reports was supposed to get xray to evaluate; was cutting up regular food w/o difficulty  Start mechanical soft dysphagia 2 w/ thins    # DVT ppx- C/w Eliquis 5 mg PO BID  # GI ppx- c/w pantoprazole 40 mg PO qAM (on omeprazole at home)  # Activity- ambulate w/ assistance  # Diet- mechanical soft dysphagia 2 w/ thins; reassess as indicated  # Code status  # Dispo- patient from [facility]; functional at baseline w/ walker The patient is a 47 y/o M w/ a PMH of v-tach s/p AICD, CHF, A fib/flutter, HTN, COPD (on O2 PRN), DLD, multiple sclerosis w/ multiple admissions for flares, seizure disorder (on lamotrigine), PTSD, depression, ? stroke/TIA, esophageal stricture, chronic sore throat/sinusitis, GERD, hemopneumothorax (2006), pericardial effusion (2006), cardiomyopathy, MI (2006, 2008), IBS, presenting for shortness of breath and generalized muscle cramping w/ blurry vision, admitted for multiple sclerosis flare.    # Possible Multiple sclerosis flare,   Feels better s/p Solumedrol 1 g IV x 1 8/13/19  Has defibrillator , NOT MRI compatible   as per patient.   CXR on admission showed low lung volumes but was otherwise unremarkable, WBC 7.25  C/w baclofen, dofetilide, gabapentin  Patient takes Tecfidera 240 mg PO qD but is apparently non-formulary; please contact [facility]  F/u w/ Neurology  Pain control w/ Tylenol, Dilaudid PRN  Ambulate w/ assistance (patient uses walker at baseline)    # HTN  C/w metoprolol tartrate 25 mg PO BID  C/w Lasix qD (20 mg M/W/F, 40 mg T/R/S)  Vitals per routine    # H/o v-tach, CHF s/p AICD  C/w metoprolol tartrate 25 mg PO BID  C/w Lasix qD (20 mg M/W/F, 40 mg T/R/S)    # H/o MI 2006, 2008  C/w atorvastatin 40 mg PO qHS  C/w aspirin 81 mg PO qD    # H/o seizure disorder  Patient states he is in the process of being weaned down on his seizure medications  C/w lamotrigine 100 mg PO BID    # COPD on home O2  C/w NC; wean as tolerated  C/w Symbicort high dose 2 puffs BID  Vitals per routine, pulse ox q4h    # Chronic sore throat, sinusitis  C/w Cepacol, Claritin, Flonase    # DLD  C/w atorvastatin 40 mg PO qHS    # GERD  C/w pantoprazole 40 mg PO qAM (patient takes omeprazole at home)    # H/o esophageal stricture  Patient reports was supposed to get xray to evaluate; was cutting up regular food w/o difficulty  Start mechanical soft dysphagia 2 w/ thins    # DVT ppx- C/w Eliquis 5 mg PO BID  # GI ppx- c/w pantoprazole 40 mg PO qAM (on omeprazole at home)  # Activity- ambulate w/ assistance  # Diet- mechanical soft dysphagia 2 w/ thins; reassess as indicated  # Code status  # Dispo- patient from [facility]; functional at baseline w/ walker    #Progress Note Handoff  Pending (specify):  Clinical improvement from MS ? flare  Family discussion: n/a, d/w the patient.   Disposition: SNF_

## 2019-08-13 NOTE — ED PROVIDER NOTE - ATTENDING CONTRIBUTION TO CARE
I personally evaluated the patient. I reviewed the Resident’s or Physician Assistant’s note (as assigned above), and agree with the findings and plan except as documented in my note.    49 y/o M with PMH SVT, cardiomyopathy, CHF, afib s/p ablation on eliquis and asa, DM, HTN, HLD, schizophrenia, bipolar d/o, CAD, COPD not on home O2, Vtach s/p AICD (2008), former smoker presents from [facility] w generalized weakness. Patient thinks his MS is 'acting up'. Denies any fevers, chills, n/v. No focal weakness.     Plan- labs, neuro consult, IV solumedrol, admit

## 2019-08-13 NOTE — ED PROVIDER NOTE - PROGRESS NOTE DETAILS
YURIY handy: late entry-- called respiratory on initial eval of pt, NIFs measured at -40, -25 and -20. paged michelle x 3 with no answer. will admit to hospialist. paged hospitalist, discussed with MAR Discussed with Dr. Mcmullen she relates that this pt is NOT a pts of [facility] and has never been seen in [facility] office. admission placed to hospitalist team. Discussed with Dr. Mcmullen she relates that this pt is NOT a pts of [facility] and has never been seen in [facility] office despite pt saying he is a pt of [facility]. admission placed to hospitalist team.

## 2019-08-13 NOTE — H&P ADULT - NSHPLABSRESULTS_GEN_ALL_CORE
Complete Blood Count + Automated Diff (08.13.19 @ 04:30)    WBC Count: 7.25 K/uL    RBC Count: 4.07 M/uL    Hemoglobin: 11.9 g/dL    Hematocrit: 36.2 %    Mean Cell Volume: 88.9 fL    Mean Cell Hemoglobin: 29.2 pg    Mean Cell Hemoglobin Conc: 32.9 g/dL    Red Cell Distrib Width: 13.7 %    Platelet Count - Automated: 235 K/uL    Auto Neutrophil #: 3.98 K/uL    Auto Lymphocyte #: 2.47 K/uL    Auto Monocyte #: 0.56 K/uL    Auto Eosinophil #: 0.12 K/uL    Auto Basophil #: 0.09 K/uL    Auto Neutrophil %: 54.9: Differential percentages must be correlated with absolute numbers for  clinical significance. %    Auto Lymphocyte %: 34.1 %    Auto Monocyte %: 7.7 %    Auto Eosinophil %: 1.7 %    Auto Basophil %: 1.2 %    Auto Immature Granulocyte %: 0.4 %    Nucleated RBC: 0 /100 WBCs    --------------    Comprehensive Metabolic Panel (08.13.19 @ 04:30)    Sodium, Serum: 143 mmol/L    Potassium, Serum: 4.0 mmol/L    Chloride, Serum: 106 mmol/L    Carbon Dioxide, Serum: 27 mmol/L    Anion Gap, Serum: 10 mmol/L    Blood Urea Nitrogen, Serum: 21 mg/dL    Creatinine, Serum: 1.1 mg/dL    Glucose, Serum: 90 mg/dL    Calcium, Total Serum: 9.4 mg/dL    Protein Total, Serum: 6.5 g/dL    Albumin, Serum: 4.3 g/dL    Bilirubin Total, Serum: 0.3 mg/dL    Alkaline Phosphatase, Serum: 143 U/L    Aspartate Aminotransferase (AST/SGOT): 17 U/L    Alanine Aminotransferase (ALT/SGPT): 19 U/L    eGFR if Non : 80: Interpretative comment  The units for eGFR are mL/min/1.73M2 (normalized body surface area). The  eGFR is calculated from a serum creatinine using the CKD-EPI equation.  Other variables required for calculation are race, age and sex. Among  patients with chronic kidney disease (CKD), the eGFR is useful in  determining the stage of disease according to KDOQI CKD classification.  All eGFR results are reported numerically with the following  interpretation.          GFR                    With                 Without     (ml/min/1.73 m2)    Kidney Damage       Kidney Damage        >= 90                    Stage 1                     Normal        60-89                    Stage 2                     Decreased GFR        30-59     Stage 3                     Stage 3        15-29                    Stage 4                     Stage 4        < 15                      Stage 5                     Stage 5  Each stage of CKD assumes that the associated GFR level has been in  effect for at least 3 months. Determination of stages one and two (with  eGFR > 59 ml/min/m2) requires estimation of kidney damage for at least 3  months as defined by structural or functional abnormalities.  Limitations: All estimates of GFR will be less accurate for patients at  extremes of muscle mass (including but not limited to frail elderly,  critically ill, or cancer patients), those with unusual diets, and those  with conditions associated with reduced secretion or extrarenal  elimination of creatinine. The eGFR equation is not recommended for use  in patients with unstable creatinine levels. mL/min/1.73M2    eGFR if African American: 93 mL/min/1.73M2    ------    < from: Xray Chest 1 View-PORTABLE IMMEDIATE (08.13.19 @ 05:52) >    Impression:    Low lung volumes limiting assessment of lung bases    Stable ICD  No radiographic evidence of acute cardiopulmonary disease.

## 2019-08-13 NOTE — ED ADULT NURSE NOTE - OBJECTIVE STATEMENT
patient states that he has been having palpitations with MS exacerbation "my legs and back have been cramping up, I have been dropping things, and blurred vision" denies nausea, vomiting, diarrhea. states he is from [facility] and walks well with the walker. states he has been SOB today

## 2019-08-13 NOTE — ED ADULT NURSE NOTE - NSIMPLEMENTINTERV_GEN_ALL_ED
Implemented All Fall with Harm Risk Interventions:  [place] to call system. Call bell, personal items and telephone within reach. Instruct patient to call for assistance. Room bathroom lighting operational. Non-slip footwear when patient is off stretcher. Physically safe environment: no spills, clutter or unnecessary equipment. Stretcher in lowest position, wheels locked, appropriate side rails in place. Provide visual cue, wrist band, yellow gown, etc. Monitor gait and stability. Monitor for mental status changes and reorient to person, place, and time. Review medications for side effects contributing to fall risk. Reinforce activity limits and safety measures with patient and family. Provide visual clues: red socks.

## 2019-08-13 NOTE — H&P ADULT - HISTORY OF PRESENT ILLNESS
The patient is a 47 y/o M w/ a PMH of v-tach s/p AICD, CHF, A fib/flutter, HTN, COPD (on O2 PRN), DLD, multiple sclerosis w/ multiple admissions for flares, seizure disorder (on lamotrigine), PTSD, depression, ? stroke/TIA, esophageal stricture, chronic sore throat/sinusitis, GERD, hemopneumothorax (2006), pericardial effusion (2006), cardiomyopathy, MI (2006, 2008), IBS, presenting for shortness of breath and generalized muscle cramping w/ blurry vision, admitted for multiple sclerosis flare. The patient states that about 2 months ago he had an MS flare for which he received PO steroids; he then periodically would have cramps in his legs, back, arms, and abdomen. About 2.5 weeks ago, the patient reports he gradually became short of breath and was having visual disturbances w/ central lack of vision, "like an eclipse;" the cramps had not resolved so he presented to the ED. In the ED he received Solumedrol 1 g IV x 1; last night he had an episode of palpitations which resolved. He also reports occasional bowel incontinence. At this time he denies chest pain but reports slight shortness of breath. He lives at [facility] but wants to transfer to a different facility; he reports that his GI (Dr. Perez) had requested an xray to evaluate dysphagia and that the nursing home would not administer the study. He is currently  and has 2 children, a son and daughter, both of whom he reports are healthy.

## 2019-08-13 NOTE — H&P ADULT - NSHPSOCIALHISTORY_GEN_ALL_CORE
Patient denies any alcohol use since 2008; reports he quit smoking cigarettes a year ago; denies any other drug use including cocaine and heroin.

## 2019-08-13 NOTE — ED PROVIDER NOTE - CLINICAL SUMMARY MEDICAL DECISION MAKING FREE TEXT BOX
Pt admitted for MS exacerbation in stable condition. I have full discussed the medical management and delivery of care with the patient. Patient confirms understanding and has been given detailed return precautions. Patient instructed to return to the ED should symptoms persist or worsen. Patient is well appearing upon discharge.

## 2019-08-13 NOTE — ED PROVIDER NOTE - OBJECTIVE STATEMENT
49 y/o M with PMH SVT, cardiomyopathy, CHF, afib s/p ablation on eliquis and asa, DM, HTN, HLD, schizophrenia, bipolar d/o, CAD, COPD not on home O2, Vtach s/p AICD (2008), former smoker presents from [facility] for cc " my MS is acting up" x 2 wks. He describes this ia global weakness, cramping spasms in all extremities, palpitations, SOB x 2 wks. +1 episode of blurred vision 2 nights ago. denies CP.   no cough/congestion/runny nose/fever/n/v/d/ab pain/back pain/current SOB or palpitations/recent illness/palliating or provoking factors. 47 y/o M with PMH SVT, cardiomyopathy, CHF, afib s/p ablation on eliquis and asa, DM, HTN, HLD, schizophrenia, bipolar d/o, CAD, COPD not on home O2, Vtach s/p AICD (2008), former smoker presents from [facility] for cc " my MS is acting up" x 2 wks. He describes this ia global weakness, cramping spasms in all extremities, palpitations, SOB x 2 wks. +1 episode of blurred vision 2 nights ago. denies CP.   no cough/congestion/runny nose/fever/n/v/d/ab pain/back pain/current SOB or palpitations/recent illness/palliating or provoking factors.    YUMIKO conti  neuro aimed  PCP michelle (see later progress note about michelle/rossy denying this)

## 2019-08-13 NOTE — H&P ADULT - NSICDXPASTMEDICALHX_GEN_ALL_CORE_FT
PAST MEDICAL HISTORY:  Anginal pain     Atrial fibrillation s/p ablation, on eliquis    Bipolar disorder     BPH (benign prostatic hyperplasia)     CAD (coronary artery disease)     Cardiomyopathy     CHF (congestive heart failure)     Clinical Depression     COPD (chronic obstructive pulmonary disease)     CVA (cerebral vascular accident)     Diabetes mellitus diet controlled    GERD (gastroesophageal reflux disease)     HTN (hypertension)     Hypercholesteremia     Migraines     MS (multiple sclerosis)     Peripheral Neuropathy     Pneumonia     PTSD (post-traumatic stress disorder)     Schizo affective schizophrenia     Seasonal allergies     Seizures     Supraventricular arrhythmia prior history    Syncope     TIA (transient ischemic attack)

## 2019-08-13 NOTE — ED PROVIDER NOTE - NS ED ROS FT
Review of Systems    Constitutional: (-) fever   Eyes/ENT: (-) vision changes  Cardiovascular: (-) chest pain, (-) syncope (+) palpitations  Respiratory: (-) cough, (+) shortness of breath  Gastrointestinal: (-) vomiting, (-) diarrhea  (-) abdominal pain  Genitourinary:  (-) dysuria   Musculoskeletal: (-) neck pain, (-) back pain, (-) leg pain/swelling  Integumentary: (-) rash, (-) edema  Neurological: (-) headache  Hematologic: (-) easy bruising   Allergic/Immunologic: (-) pruritus

## 2019-08-13 NOTE — H&P ADULT - NSICDXFAMILYHX_GEN_ALL_CORE_FT
FAMILY HISTORY:  Family history of cardiomyopathy, Mother  Family history of colon cancer in mother    Mother  Still living? Unknown  Family history of cervical cancer, Age at diagnosis: Age Unknown  Family history of early CAD, Age at diagnosis: Age Unknown

## 2019-08-14 LAB
ANION GAP SERPL CALC-SCNC: 14 MMOL/L — SIGNIFICANT CHANGE UP (ref 7–14)
BASOPHILS # BLD AUTO: 0.01 K/UL — SIGNIFICANT CHANGE UP (ref 0–0.2)
BASOPHILS NFR BLD AUTO: 0.1 % — SIGNIFICANT CHANGE UP (ref 0–1)
BUN SERPL-MCNC: 19 MG/DL — SIGNIFICANT CHANGE UP (ref 10–20)
CALCIUM SERPL-MCNC: 9.8 MG/DL — SIGNIFICANT CHANGE UP (ref 8.5–10.1)
CHLORIDE SERPL-SCNC: 104 MMOL/L — SIGNIFICANT CHANGE UP (ref 98–110)
CO2 SERPL-SCNC: 26 MMOL/L — SIGNIFICANT CHANGE UP (ref 17–32)
CREAT SERPL-MCNC: 1 MG/DL — SIGNIFICANT CHANGE UP (ref 0.7–1.5)
EOSINOPHIL # BLD AUTO: 0 K/UL — SIGNIFICANT CHANGE UP (ref 0–0.7)
EOSINOPHIL NFR BLD AUTO: 0 % — SIGNIFICANT CHANGE UP (ref 0–8)
GLUCOSE BLDC GLUCOMTR-MCNC: 102 MG/DL — HIGH (ref 70–99)
GLUCOSE BLDC GLUCOMTR-MCNC: 116 MG/DL — HIGH (ref 70–99)
GLUCOSE SERPL-MCNC: 178 MG/DL — HIGH (ref 70–99)
HCT VFR BLD CALC: 37.8 % — LOW (ref 42–52)
HGB BLD-MCNC: 12.4 G/DL — LOW (ref 14–18)
HIV 1+2 AB+HIV1 P24 AG SERPL QL IA: SIGNIFICANT CHANGE UP
IMM GRANULOCYTES NFR BLD AUTO: 0.7 % — HIGH (ref 0.1–0.3)
INR BLD: 1.31 RATIO — HIGH (ref 0.65–1.3)
LYMPHOCYTES # BLD AUTO: 1.04 K/UL — LOW (ref 1.2–3.4)
LYMPHOCYTES # BLD AUTO: 8.2 % — LOW (ref 20.5–51.1)
MAGNESIUM SERPL-MCNC: 2 MG/DL — SIGNIFICANT CHANGE UP (ref 1.8–2.4)
MCHC RBC-ENTMCNC: 29.2 PG — SIGNIFICANT CHANGE UP (ref 27–31)
MCHC RBC-ENTMCNC: 32.8 G/DL — SIGNIFICANT CHANGE UP (ref 32–37)
MCV RBC AUTO: 89.2 FL — SIGNIFICANT CHANGE UP (ref 80–94)
MONOCYTES # BLD AUTO: 0.74 K/UL — HIGH (ref 0.1–0.6)
MONOCYTES NFR BLD AUTO: 5.9 % — SIGNIFICANT CHANGE UP (ref 1.7–9.3)
NEUTROPHILS # BLD AUTO: 10.74 K/UL — HIGH (ref 1.4–6.5)
NEUTROPHILS NFR BLD AUTO: 85.1 % — HIGH (ref 42.2–75.2)
NRBC # BLD: 0 /100 WBCS — SIGNIFICANT CHANGE UP (ref 0–0)
PLATELET # BLD AUTO: 251 K/UL — SIGNIFICANT CHANGE UP (ref 130–400)
POTASSIUM SERPL-MCNC: 4 MMOL/L — SIGNIFICANT CHANGE UP (ref 3.5–5)
POTASSIUM SERPL-SCNC: 4 MMOL/L — SIGNIFICANT CHANGE UP (ref 3.5–5)
PROTHROM AB SERPL-ACNC: 15 SEC — HIGH (ref 9.95–12.87)
RBC # BLD: 4.24 M/UL — LOW (ref 4.7–6.1)
RBC # FLD: 13.6 % — SIGNIFICANT CHANGE UP (ref 11.5–14.5)
SODIUM SERPL-SCNC: 144 MMOL/L — SIGNIFICANT CHANGE UP (ref 135–146)
WBC # BLD: 12.62 K/UL — HIGH (ref 4.8–10.8)
WBC # FLD AUTO: 12.62 K/UL — HIGH (ref 4.8–10.8)

## 2019-08-14 PROCEDURE — 99222 1ST HOSP IP/OBS MODERATE 55: CPT

## 2019-08-14 PROCEDURE — 99223 1ST HOSP IP/OBS HIGH 75: CPT

## 2019-08-14 RX ADMIN — APIXABAN 5 MILLIGRAM(S): 2.5 TABLET, FILM COATED ORAL at 17:04

## 2019-08-14 RX ADMIN — HYDROMORPHONE HYDROCHLORIDE 2 MILLIGRAM(S): 2 INJECTION INTRAMUSCULAR; INTRAVENOUS; SUBCUTANEOUS at 22:05

## 2019-08-14 RX ADMIN — Medication 81 MILLIGRAM(S): at 14:09

## 2019-08-14 RX ADMIN — GABAPENTIN 400 MILLIGRAM(S): 400 CAPSULE ORAL at 05:37

## 2019-08-14 RX ADMIN — Medication 10 MILLIGRAM(S): at 17:04

## 2019-08-14 RX ADMIN — ATORVASTATIN CALCIUM 40 MILLIGRAM(S): 80 TABLET, FILM COATED ORAL at 21:55

## 2019-08-14 RX ADMIN — Medication 10 MILLIGRAM(S): at 14:04

## 2019-08-14 RX ADMIN — Medication 25 MILLIGRAM(S): at 05:37

## 2019-08-14 RX ADMIN — HYDROMORPHONE HYDROCHLORIDE 2 MILLIGRAM(S): 2 INJECTION INTRAMUSCULAR; INTRAVENOUS; SUBCUTANEOUS at 14:01

## 2019-08-14 RX ADMIN — HYDROMORPHONE HYDROCHLORIDE 2 MILLIGRAM(S): 2 INJECTION INTRAMUSCULAR; INTRAVENOUS; SUBCUTANEOUS at 08:05

## 2019-08-14 RX ADMIN — LAMOTRIGINE 100 MILLIGRAM(S): 25 TABLET, ORALLY DISINTEGRATING ORAL at 05:37

## 2019-08-14 RX ADMIN — DOFETILIDE 250 MICROGRAM(S): 0.25 CAPSULE ORAL at 22:04

## 2019-08-14 RX ADMIN — GABAPENTIN 400 MILLIGRAM(S): 400 CAPSULE ORAL at 21:55

## 2019-08-14 RX ADMIN — HYDROMORPHONE HYDROCHLORIDE 2 MILLIGRAM(S): 2 INJECTION INTRAMUSCULAR; INTRAVENOUS; SUBCUTANEOUS at 23:38

## 2019-08-14 RX ADMIN — PANTOPRAZOLE SODIUM 40 MILLIGRAM(S): 20 TABLET, DELAYED RELEASE ORAL at 05:38

## 2019-08-14 RX ADMIN — DOFETILIDE 250 MICROGRAM(S): 0.25 CAPSULE ORAL at 09:32

## 2019-08-14 RX ADMIN — QUETIAPINE FUMARATE 25 MILLIGRAM(S): 200 TABLET, FILM COATED ORAL at 14:02

## 2019-08-14 RX ADMIN — APIXABAN 5 MILLIGRAM(S): 2.5 TABLET, FILM COATED ORAL at 05:33

## 2019-08-14 RX ADMIN — Medication 25 MILLIGRAM(S): at 17:04

## 2019-08-14 RX ADMIN — Medication 10 MILLIGRAM(S): at 00:08

## 2019-08-14 RX ADMIN — Medication 10 MILLIGRAM(S): at 23:37

## 2019-08-14 RX ADMIN — QUETIAPINE FUMARATE 50 MILLIGRAM(S): 200 TABLET, FILM COATED ORAL at 21:55

## 2019-08-14 RX ADMIN — BUDESONIDE AND FORMOTEROL FUMARATE DIHYDRATE 2 PUFF(S): 160; 4.5 AEROSOL RESPIRATORY (INHALATION) at 21:54

## 2019-08-14 RX ADMIN — TAMSULOSIN HYDROCHLORIDE 0.4 MILLIGRAM(S): 0.4 CAPSULE ORAL at 21:55

## 2019-08-14 RX ADMIN — LAMOTRIGINE 100 MILLIGRAM(S): 25 TABLET, ORALLY DISINTEGRATING ORAL at 17:04

## 2019-08-14 RX ADMIN — Medication 10 MILLIGRAM(S): at 05:33

## 2019-08-14 RX ADMIN — LORATADINE 10 MILLIGRAM(S): 10 TABLET ORAL at 14:03

## 2019-08-14 RX ADMIN — GABAPENTIN 400 MILLIGRAM(S): 400 CAPSULE ORAL at 14:05

## 2019-08-14 RX ADMIN — Medication 20 MILLIGRAM(S): at 14:02

## 2019-08-14 NOTE — PROGRESS NOTE ADULT - ASSESSMENT
CUATE HORNE   48y   Male    MRN#: [id number]         SUBJECTIVE  Patient is a 48y old Male who presents with a chief complaint of multiple sclerosis flare (14 Aug 2019 14:50)  Currently admitted to medicine with the primary diagnosis of Multiple sclerosis exacerbation    Today is hospital day 1d, and this morning the patient is complaining of diffuse aches and generalized cramping, as well as palpitations, he states that his blurry vision has resolved     Present Today:           Blum Catheter : Not present           Central Line : Not present           IV Fluids : Not present           Drains : Not present       OBJECTIVE  ------------------------------------    PAST MEDICAL & SURGICAL HISTORY  PTSD (post-traumatic stress disorder)  Supraventricular arrhythmia: prior history  Cardiomyopathy  CHF (congestive heart failure)  Atrial fibrillation: s/p ablation, on eliquis  Diabetes mellitus: diet controlled  BPH (benign prostatic hyperplasia)  HTN (hypertension)  Seizures  Migraines  Pneumonia  MS (multiple sclerosis)  CAD (coronary artery disease)  Bipolar disorder  Anginal pain  Schizo affective schizophrenia  GERD (gastroesophageal reflux disease)  Seasonal allergies  Hypercholesteremia  COPD (chronic obstructive pulmonary disease)  CVA (cerebral vascular accident)  TIA (transient ischemic attack)  Syncope  Peripheral Neuropathy  Clinical Depression  H/O prior ablation treatment: for Afib  Cardiac pacemaker recipient  H/O hernia repair: right 1972,1991  History of hip replacement  S/P Orchiectomy: L for testicular CA  S/P Implantation of AICD      ALLERGIES:  dexmethylphenidate (Unknown)  Dilantin (Rash)  dilantin, compazine, neurontin, ritalin, phenergan (Unknown)  Haldol (Unknown)  hydantoin derivatives (Other)  methylphenidate (Unknown)  Morphine Sulfate (Unknown)  phenytoin (Unknown)  prochlorperazine (Unknown)  promethazine (Dystonic RXN)  rash/hives (Anaphylaxis)  thioxanthenes (Unknown)      MEDICATIONS:  STANDING MEDICATIONS  apixaban 5 milliGRAM(s) Oral every 12 hours  aspirin  chewable 81 milliGRAM(s) Oral daily  atorvastatin 40 milliGRAM(s) Oral at bedtime  baclofen 10 milliGRAM(s) Oral four times a day  buDESOnide 160 MICROgram(s)/formoterol 4.5 MICROgram(s) Inhaler 2 Puff(s) Inhalation two times a day  chlorhexidine 4% Liquid 1 Application(s) Topical <User Schedule>  dofetilide 250 MICROGram(s) Oral two times a day  fluticasone propionate (50 MICROgram(s)/actuation) Nasal Spray - Peds 2 Spray(s) Alternating Nostrils daily  furosemide    Tablet 20 milliGRAM(s) Oral <User Schedule>  furosemide    Tablet 40 milliGRAM(s) Oral <User Schedule>  gabapentin 400 milliGRAM(s) Oral three times a day  ketoconazole 2% Shampoo 1 Application(s) Topical daily  lamoTRIgine 100 milliGRAM(s) Oral two times a day  loratadine 10 milliGRAM(s) Oral daily  metoprolol tartrate 25 milliGRAM(s) Oral two times a day  pantoprazole    Tablet 40 milliGRAM(s) Oral before breakfast  QUEtiapine 25 milliGRAM(s) Oral daily  QUEtiapine 50 milliGRAM(s) Oral at bedtime  tamsulosin 0.4 milliGRAM(s) Oral at bedtime    PRN MEDICATIONS  HYDROmorphone   Tablet 2 milliGRAM(s) Oral every 4 hours PRN  polyethylene glycol 3350 17 Gram(s) Oral daily PRN        LABS:                        12.4   12.62 )-----------( 251      ( 14 Aug 2019 05:37 )             37.8     08-14    144  |  104  |  19  ----------------------------<  178<H>  4.0   |  26  |  1.0    Ca    9.8      14 Aug 2019 05:37  Mg     2.0     08-14    TPro  6.5  /  Alb  4.3  /  TBili  0.3  /  DBili  x   /  AST  17  /  ALT  19  /  AlkPhos  143<H>  08-13    PT/INR - ( 14 Aug 2019 05:37 )   PT: 15.00 sec;   INR: 1.31 ratio                   CARDIAC MARKERS ( 13 Aug 2019 04:30 )  x     / <0.01 ng/mL / x     / x     / x            RADIOLOGY:    < from: Xray Chest 1 View-PORTABLE IMMEDIATE (08.13.19 @ 05:52) >  Low lung volumes limiting assessment of lung bases  Stable ICD  No radiographic evidence of acute cardiopulmonary disease.  < end of copied text >    PHYSICAL EXAM:  VITAL SIGNS: Last 24 Hours  T(C): 36.2 (14 Aug 2019 15:45), Max: 36.3 (13 Aug 2019 17:03)  T(F): 97.2 (14 Aug 2019 15:45), Max: 97.4 (14 Aug 2019 10:32)  HR: 60 (14 Aug 2019 15:45) (60 - 60)  BP: 104/58 (14 Aug 2019 15:45) (95/52 - 115/67)  BP(mean): 77 (14 Aug 2019 15:45) (77 - 77)  RR: 18 (14 Aug 2019 15:45) (16 - 18)  SpO2: 96% (14 Aug 2019 15:45) (94% - 96%)    GENERAL: No distress   HEENT:  Atraumatic, Normocephalic. EOMI, PERRLA, conjunctiva and sclera clear, No JVD  PULMONARY: Clear to auscultation bilaterally; No wheeze  CARDIOVASCULAR: Regular rate and rhythm; No murmurs, rubs, or gallops  GASTROINTESTINAL: Soft, Nontender, Nondistended; Bowel sounds present  MUSCULOSKELETAL:  2+ Peripheral Pulses, No clubbing, cyanosis, or edema  NEUROLOGY: non-focal  SKIN: No rashes or lesions      ASSESSMENT & PLAN      # DVT prophylaxis :  # GI prophylaxis :  # Activity :  # Diet :  # Code :  # Disposition : CUATE HORNE   48y   Male    MRN#: [id number]         SUBJECTIVE  Patient is a 48y old Male who presents with a chief complaint of multiple sclerosis flare (14 Aug 2019 14:50)  Currently admitted to medicine with the primary diagnosis of Multiple sclerosis exacerbation    Today is hospital day 1d, and this morning the patient is complaining of diffuse aches and generalized cramping, as well as palpitations, he states that his blurry vision has resolved     Present Today:           Blum Catheter : Not present           Central Line : Not present           IV Fluids : Not present           Drains : Not present       OBJECTIVE  ------------------------------------    PAST MEDICAL & SURGICAL HISTORY  PTSD (post-traumatic stress disorder)  Supraventricular arrhythmia: prior history  Cardiomyopathy  CHF (congestive heart failure)  Atrial fibrillation: s/p ablation, on eliquis  Diabetes mellitus: diet controlled  BPH (benign prostatic hyperplasia)  HTN (hypertension)  Seizures  Migraines  Pneumonia  MS (multiple sclerosis)  CAD (coronary artery disease)  Bipolar disorder  Anginal pain  Schizo affective schizophrenia  GERD (gastroesophageal reflux disease)  Seasonal allergies  Hypercholesteremia  COPD (chronic obstructive pulmonary disease)  CVA (cerebral vascular accident)  TIA (transient ischemic attack)  Syncope  Peripheral Neuropathy  Clinical Depression  H/O prior ablation treatment: for Afib  Cardiac pacemaker recipient  H/O hernia repair: right 1972,1991  History of hip replacement  S/P Orchiectomy: L for testicular CA  S/P Implantation of AICD      ALLERGIES:  dexmethylphenidate (Unknown)  Dilantin (Rash)  dilantin, compazine, neurontin, ritalin, phenergan (Unknown)  Haldol (Unknown)  hydantoin derivatives (Other)  methylphenidate (Unknown)  Morphine Sulfate (Unknown)  phenytoin (Unknown)  prochlorperazine (Unknown)  promethazine (Dystonic RXN)  rash/hives (Anaphylaxis)  thioxanthenes (Unknown)      MEDICATIONS:  STANDING MEDICATIONS  apixaban 5 milliGRAM(s) Oral every 12 hours  aspirin  chewable 81 milliGRAM(s) Oral daily  atorvastatin 40 milliGRAM(s) Oral at bedtime  baclofen 10 milliGRAM(s) Oral four times a day  buDESOnide 160 MICROgram(s)/formoterol 4.5 MICROgram(s) Inhaler 2 Puff(s) Inhalation two times a day  chlorhexidine 4% Liquid 1 Application(s) Topical <User Schedule>  dofetilide 250 MICROGram(s) Oral two times a day  fluticasone propionate (50 MICROgram(s)/actuation) Nasal Spray - Peds 2 Spray(s) Alternating Nostrils daily  furosemide    Tablet 20 milliGRAM(s) Oral <User Schedule>  furosemide    Tablet 40 milliGRAM(s) Oral <User Schedule>  gabapentin 400 milliGRAM(s) Oral three times a day  ketoconazole 2% Shampoo 1 Application(s) Topical daily  lamoTRIgine 100 milliGRAM(s) Oral two times a day  loratadine 10 milliGRAM(s) Oral daily  metoprolol tartrate 25 milliGRAM(s) Oral two times a day  pantoprazole    Tablet 40 milliGRAM(s) Oral before breakfast  QUEtiapine 25 milliGRAM(s) Oral daily  QUEtiapine 50 milliGRAM(s) Oral at bedtime  tamsulosin 0.4 milliGRAM(s) Oral at bedtime    PRN MEDICATIONS  HYDROmorphone   Tablet 2 milliGRAM(s) Oral every 4 hours PRN  polyethylene glycol 3350 17 Gram(s) Oral daily PRN        LABS:                        12.4   12.62 )-----------( 251      ( 14 Aug 2019 05:37 )             37.8     08-14    144  |  104  |  19  ----------------------------<  178<H>  4.0   |  26  |  1.0    Ca    9.8      14 Aug 2019 05:37  Mg     2.0     08-14    TPro  6.5  /  Alb  4.3  /  TBili  0.3  /  DBili  x   /  AST  17  /  ALT  19  /  AlkPhos  143<H>  08-13    PT/INR - ( 14 Aug 2019 05:37 )   PT: 15.00 sec;   INR: 1.31 ratio                   CARDIAC MARKERS ( 13 Aug 2019 04:30 )  x     / <0.01 ng/mL / x     / x     / x            RADIOLOGY:    < from: Xray Chest 1 View-PORTABLE IMMEDIATE (08.13.19 @ 05:52) >  Low lung volumes limiting assessment of lung bases  Stable ICD  No radiographic evidence of acute cardiopulmonary disease.  < end of copied text >    PHYSICAL EXAM:  VITAL SIGNS: Last 24 Hours  T(C): 36.2 (14 Aug 2019 15:45), Max: 36.3 (13 Aug 2019 17:03)  T(F): 97.2 (14 Aug 2019 15:45), Max: 97.4 (14 Aug 2019 10:32)  HR: 60 (14 Aug 2019 15:45) (60 - 60)  BP: 104/58 (14 Aug 2019 15:45) (95/52 - 115/67)  BP(mean): 77 (14 Aug 2019 15:45) (77 - 77)  RR: 18 (14 Aug 2019 15:45) (16 - 18)  SpO2: 96% (14 Aug 2019 15:45) (94% - 96%)    GENERAL: No distress alert   PULMONARY: Clear to auscultation bilaterally; No wheeze crackles or rhonchi, on room air no distress   CARDIOVASCULAR: Regular rate and rhythm  GASTROINTESTINAL: Soft, Nontender, Nondistended; Bowel sounds present  MUSCULOSKELETAL:  + Peripheral Pulses, No edema  NEUROLOGY: strabismus with deviation of the left pupil to the left, EOMI, CN V, VIII and VII intact, mild RUE weakness       ASSESSMENT & PLAN    The patient is a 47 y/o M w/ a PMH of v-tach s/p AICD, CHF, A fib/flutter, HTN, COPD (on O2 PRN), DLD, multiple sclerosis w/ multiple admissions for flares, seizure disorder (on lamotrigine), PTSD, depression, ? stroke/TIA, esophageal stricture, chronic sore throat/sinusitis, GERD, hemopneumothorax (2006), pericardial effusion (2006), cardiomyopathy, MI (2006, 2008), IBS, presenting for shortness of breath and generalized muscle cramping w/ blurry vision, admitted for suspected multiple sclerosis flare.     # Multiple sclerosis flare, s/p Solumedrol 1 g IV x 1 8/13/19  - CXR on admission showed low lung volumes but was otherwise unremarkable, WBC 7.25  - C/w baclofen, dofetilide, gabapentin - Patient states that the current Baclofen dose is not helping, outpatient doctor stated that next step would be baclofen pump vs Botox injections   - Patient takes Tecfidera 240 mg PO qD but is non formulary   - Neurology recommendations pending   - S/P Solumedrol 1g IV on admission  - per patient AICD is not MRI compatible, cannot order MRI to assess for new plaques     # HTN  - metoprolol tartrate 25 mg PO BID    # H/o v-tach, CHF s/p AICD  - metoprolol tartrate 25 mg PO BID  - Lasix qD (20 mg M/W/F, 40 mg T/R/S)  - EPS for AICD interrogation     # H/o MI 2006, 2008  - atorvastatin 40 mg PO qHS and aspirin 81 mg PO qD    # H/o seizure disorder  Patient states he is in the process of being weaned down on his seizure medications  - Lamotrigine 100 mg PO BID    # COPD on home O2  - O2 PRN to keep SpO2 > 92 %  - Symbicort high dose 2 puffs BID    # Chronic sore throat, sinusitis  - Cepacol, Claritin, Flonase    # DLD  - atorvastatin 40 mg PO qHS    # GERD  - pantoprazole 40 mg PO qAM (patient takes omeprazole at home)    # H/o esophageal stricture  Patient reports was supposed to get xray to evaluate; was cutting up regular food w/o difficulty  on mechanical soft dysphagia 2 w/ thins    # DVT prophylaxis : Eliquis   # GI prophylaxis : Protonix   # Activity : ambulate with assistance  # Diet : mechanical soft dysphagia 2 w/ thins; reassess as indicated  # Disposition :  patient from [facility]; functional at baseline w/ walker

## 2019-08-14 NOTE — CONSULT NOTE ADULT - SUBJECTIVE AND OBJECTIVE BOX
History of Present Illness:   The patient is a 49 y/o M w/ a PMH of v-tach s/p AICD, CHF, A fib/flutter,     HTN, COPD (on O2 PRN), DLD, multiple sclerosis w/ multiple admissions for     flares, seizure disorder (on lamotrigine), PTSD, depression, ? stroke/TIA,     esophageal stricture, chronic sore throat/sinusitis, GERD,     hemopneumothorax (2006), pericardial effusion (2006), cardiomyopathy, MI     (2006, 2008), IBS, presenting for shortness of breath and generalized     muscle cramping w/ blurry vision, admitted for multiple sclerosis flare.     The patient states that about 2 months ago he had an MS flare for which he     received PO steroids; he then periodically would have cramps in his legs,     back, arms, and abdomen. About 2.5 weeks ago, the patient reports he     gradually became short of breath and was having visual disturbances w/     central lack of vision, "like an eclipse;" the cramps had not resolved so     he presented to the ED. In the ED he received Solumedrol 1 g IV x 1; last     night he had an episode of palpitations which resolved. He also reports     occasional bowel incontinence. At this time he denies chest pain but     reports slight shortness of breath. He lives at [facility] but     wants to transfer to a different facility; he reports that his GI (Dr. Perez) had requested an xray to evaluate dysphagia and that the nursing     home would not administer the study. He is currently  and has 2     children, a son and daughter, both of whom he reports are healthy.    Subjective:  Pt reports that he came here on Monday night for shortness of breath,     generalized muscle cramping/spasms and blurry vision. He says he has the     worst spasms in his back that go down his legs. He says that his     neurologist at [facility] had diagnosed him with M.S. in about 2005 or 2006,     without imaging evidence, as he cannot get MRI because of     pacemaker/defibrillator. Pt complains of terrible spasms in both of his     legs, feet, and hands that then go to his arms. When he stands and walks,     his legs jerk. He admits to spasticity and weakness in both of his legs,     but more so on the right. Pt says that his neurologist told him that his     right side was affected more by the M.S. but that this time his left leg     seems to be spasming more.     Pt has been seeing Dr. Kramer at the nursing home. He says 2.5 weeks ago he     had some blurry vision and breathing difficulty and that he has been     trying to see neurolgist Dr. Kramer for the past 3 weeks. He reports that     Dr. Kramer said, that since he is already on high dose of baclofen (10 mg     orally  4 times/day), the next step would be Botox or baclofen pump. Pt is     dissatisfied with nursing home, as he reports not getting his medications     correctly and not being set up with doctor's appointments, and wants to go     to a different nursing home that would take care of him better.Pt admits     to pain in upper back, neck, shoulders, legs. Pt also admits to worsening     unsteadiness and feeling off balance. He says he fell a couple times     recently and almost fall in hallway of hospital.    Pt denies double vision/blurry vision at the moment,but says he was having     blurry vision and that he had two episodes of central lack of vision in     his left eye on Saturday and Sunday that lasted for less than 45 minutes.    Pt admits to fecal and urinary incontinence, especially when he falls     asleep.     Pt thinks that his trouble breathing is due to his MS and not due to his     emphysema as he is taking his medications for COPD. Pt admits to     SOB/heaviness in chest at rest occasionally wehn talking and that this SOB     worsens when walking.       He has been dropping things on and off, but in the past week, he says he     started having trouble opening things. Pt reports worsening memory     problems, and occasionally getting "tongue-tied"- trouble getting words     out.Pt admits to stable mood, despite some occasional irritability and     denies that he has hx of bipolar disorder, as he said it was due to the     MS.     Pt says that 2.5 mos ago, he took oral steroids at nursing home, but that     his last true MS flare was in January, for which he took IV steroids for 5     days.    Pt says his last seizure was in 2015  and he has been tapering lamictal.   Pt says his sister has been diagnosed with M.S. as well and is on IvIG. Pt     also admits to being diagnosed with "hypersensitive parasympathetic vagus     nerve", which is why they put pacemaker in for him.     Pt admits to numbness in feet, for which he takes gabapentin.       Objective   Vitals   Vital Signs Last 24 Hrs  T(C): 36.3 (14 Aug 2019 10:32), Max: 36.3 (13 Aug 2019 17:03)  T(F): 97.4 (14 Aug 2019 10:32), Max: 97.4 (14 Aug 2019 10:32)  HR: 60 (14 Aug 2019 10:32) (60 - 60)  BP: 95/52 (14 Aug 2019 10:32) (95/52 - 115/67)  RR: 16 (14 Aug 2019 10:32) (16 - 18)  SpO2: 95% (14 Aug 2019 10:32) (94% - 95%)  Lab   Magnesium, Serum: 2.0 mg/dL (08.14.19 @ 05:37)  INR: 1.31: NO ANTICOAGULANT,NORMAL            0.65 -  1.30  Complete Blood Count + Automated Diff (08.14.19 @ 05:37)    WBC Count: 12.62 K/uL    RBC Count: 4.24 M/uL    Hemoglobin: 12.4 g/dL    Hematocrit: 37.8 %    Mean Cell Volume: 89.2 fL    Mean Cell Hemoglobin: 29.2 pg    Mean Cell Hemoglobin Conc: 32.8 g/dL    Red Cell Distrib Width: 13.6 %    Platelet Count - Automated: 251 K/uL  Basic Metabolic Panel (08.14.19 @ 05:37)    Sodium, Serum: 144 mmol/L    Potassium, Serum: 4.0 mmol/L    Chloride, Serum: 104 mmol/L    Carbon Dioxide, Serum: 26 mmol/L    Anion Gap, Serum: 14 mmol/L    Blood Urea Nitrogen, Serum: 19 mg/dL    Creatinine, Serum: 1.0 mg/dL    Glucose, Serum: 178 mg/dL    Calcium, Total Serum: 9.8 mg/dL    Radiological   CT Head No Cont (08.29.18 @ 12:59) >    IMPRESSION:     Unremarkable noncontrast head CT. No significant changes from the prior   exam.    Physical Exam   Physical exam:   Mental status:AAOx3;normal affect; normal concentration, attention, and     fund of knowledge. No dysarthria  Pupils equal and reactive to light bilaterally.   Left eye abducted at rest.   Horizontal nystagmus present bilaterally, but more so to the left.   R superior temporal visual field limited.  Decreased sensation in V1, V2, and V3 branches on the right side of face     in comparison to the left side.   Decreased sensation to light touch on right side of body as compared to     the left side, especially in feet.   Palate elevation symmetric. Uvula midline. tongue midline and normal     movements.  Shoulder shrug weakness.   Decreased cervical spine ROM in flexion, extension, and rotation     bilateally; pain with movement of neck.   Decreased  strength on right side. FUll  strength on left side.   4/5 upper extremity muscle strength on right. 5/5 upper extremity muscle     strength on left.   3/5 lower extremity strength in RLE.   4/5 lower extremity strength in LLE.   More weak in dorsiflexion than flexion.   No pronator drift.   2+ reflexes in upper extremity on left side; 1+ reflex in upper extremity     on right side.   3+ patellar reflex on left; 2+ patellar reflex on right.     Heel to shin impaired on right side more than left side.   Past pointing present on finger to nose on both sides.   Pt able to do rapid alternating movements. History of Present Illness:   The patient is a 47 y/o M w/ a PMH of v-tach s/p AICD, CHF, A fib/flutter,     HTN, COPD (on O2 PRN), DLD, multiple sclerosis w/ multiple admissions for     flares, seizure disorder (on lamotrigine), PTSD, depression, ? stroke/TIA,     esophageal stricture, chronic sore throat/sinusitis, GERD,     hemopneumothorax (2006), pericardial effusion (2006), cardiomyopathy, MI     (2006, 2008), IBS, presenting for shortness of breath and generalized     muscle cramping w/ blurry vision, admitted for multiple sclerosis flare.     The patient states that about 2 months ago he had an MS flare for which he     received PO steroids; he then periodically would have cramps in his legs,     back, arms, and abdomen. About 2.5 weeks ago, the patient reports he     gradually became short of breath and was having visual disturbances w/     central lack of vision, "like an eclipse;" the cramps had not resolved so     he presented to the ED. In the ED he received Solumedrol 1 g IV x 1; last     night he had an episode of palpitations which resolved. He also reports     occasional bowel incontinence. At this time he denies chest pain but     reports slight shortness of breath. He lives at [facility] but     wants to transfer to a different facility; he reports that his GI (Dr. Perez) had requested an xray to evaluate dysphagia and that the nursing     home would not administer the study. He is currently  and has 2     children, a son and daughter, both of whom he reports are healthy.    Subjective:  Pt reports that he came here on Monday night for shortness of breath,     generalized muscle cramping/spasms and blurry vision. He says he has the     worst spasms in his back that go down his legs. He says that his     neurologist at [facility] had diagnosed him with M.S. in about 2005 or 2006,     without imaging evidence, as he cannot get MRI because of     pacemaker/defibrillator. Pt complains of terrible spasms in both of his     legs, feet, and hands that then go to his arms. When he stands and walks,     his legs jerk. He admits to spasticity and weakness in both of his legs,     but more so on the right. Pt says that his neurologist told him that his     right side was affected more by the M.S. but that this time his left leg     seems to be spasming more.     Pt has been seeing Dr. Kramer at the nursing home. He says 2.5 weeks ago he     had some blurry vision and breathing difficulty and that he has been     trying to see neurolgist Dr. Kramer for the past 3 weeks. He reports that     Dr. Kramer said, that since he is already on high dose of baclofen (10 mg     orally  4 times/day), the next step would be Botox or baclofen pump. Pt is     dissatisfied with nursing home, as he reports not getting his medications     correctly and not being set up with doctor's appointments, and wants to go     to a different nursing home that would take care of him better.Pt admits     to pain in upper back, neck, shoulders, legs. Pt also admits to worsening     unsteadiness and feeling off balance. He says he fell a couple times     recently and almost fall in hallway of hospital.    Pt denies double vision/blurry vision at the moment,but says he was having     blurry vision and that he had two episodes of central lack of vision in     his left eye on Saturday and Sunday that lasted for less than 45 minutes.    Pt admits to fecal and urinary incontinence, especially when he falls     asleep.     Pt thinks that his trouble breathing is due to his MS and not due to his     emphysema as he is taking his medications for COPD. Pt admits to     SOB/heaviness in chest at rest occasionally wehn talking and that this SOB     worsens when walking.       He has been dropping things on and off, but in the past week, he says he     started having trouble opening things. Pt reports worsening memory     problems, and occasionally getting "tongue-tied"- trouble getting words     out.Pt admits to stable mood, despite some occasional irritability and     denies that he has hx of bipolar disorder, as he said it was due to the     MS.     Pt says that 2.5 mos ago, he took oral steroids at nursing home, but that     his last true MS flare was in January, for which he took IV steroids for 5     days.    Pt says his last seizure was in 2015  and he has been tapering lamictal.   Pt says his sister has been diagnosed with M.S. as well and is on IvIG. Pt     also admits to being diagnosed with "hypersensitive parasympathetic vagus     nerve", which is why they put pacemaker in for him.     Pt admits to numbness in feet, for which he takes gabapentin.       Objective   Vitals   Vital Signs Last 24 Hrs  T(C): 36.3 (14 Aug 2019 10:32), Max: 36.3 (13 Aug 2019 17:03)  T(F): 97.4 (14 Aug 2019 10:32), Max: 97.4 (14 Aug 2019 10:32)  HR: 60 (14 Aug 2019 10:32) (60 - 60)  BP: 95/52 (14 Aug 2019 10:32) (95/52 - 115/67)  RR: 16 (14 Aug 2019 10:32) (16 - 18)  SpO2: 95% (14 Aug 2019 10:32) (94% - 95%)  Lab   Magnesium, Serum: 2.0 mg/dL (08.14.19 @ 05:37)  INR: 1.31: NO ANTICOAGULANT,NORMAL            0.65 -  1.30  Complete Blood Count + Automated Diff (08.14.19 @ 05:37)    WBC Count: 12.62 K/uL    RBC Count: 4.24 M/uL    Hemoglobin: 12.4 g/dL    Hematocrit: 37.8 %    Mean Cell Volume: 89.2 fL    Mean Cell Hemoglobin: 29.2 pg    Mean Cell Hemoglobin Conc: 32.8 g/dL    Red Cell Distrib Width: 13.6 %    Platelet Count - Automated: 251 K/uL  Basic Metabolic Panel (08.14.19 @ 05:37)    Sodium, Serum: 144 mmol/L    Potassium, Serum: 4.0 mmol/L    Chloride, Serum: 104 mmol/L    Carbon Dioxide, Serum: 26 mmol/L    Anion Gap, Serum: 14 mmol/L    Blood Urea Nitrogen, Serum: 19 mg/dL    Creatinine, Serum: 1.0 mg/dL    Glucose, Serum: 178 mg/dL    Calcium, Total Serum: 9.8 mg/dL    Radiological   CT Head No Cont (08.29.18 @ 12:59) >    IMPRESSION:     Unremarkable noncontrast head CT. No significant changes from the prior   exam.    Physical Exam   Physical exam:   Mental status:AAOx3;normal affect; normal concentration, attention, and     fund of knowledge. No dysarthria  Pupils equal and reactive to light bilaterally.   Left eye abducted at rest.   YARELIS L>R  R superior temporal visual field limited.  Decreased sensation in V1, V2, and V3 branches on the right side of face     in comparison to the left side.   Decreased sensation to light touch on right side of body as compared to     the left side, especially in feet.   Palate elevation symmetric. Uvula midline. tongue midline and normal     movements.  Shoulder shrug weakness.   Decreased cervical spine ROM in flexion, extension, and rotation     bilateally; pain with movement of neck.   Decreased  strength on right side. FUll  strength on left side.   4/5 upper extremity muscle strength on right. 5/5 upper extremity muscle     strength on left.   3/5 lower extremity strength in RLE.   4/5 lower extremity strength in LLE.   More weak in dorsiflexion than flexion.   No pronator drift.   2+ reflexes in upper extremity on left side; 1+ reflex in upper extremity     on right side.   3+ patellar reflex on left; 2+ patellar reflex on right.     Heel to shin impaired on right side more than left side.   Past pointing present on finger to nose on both sides.   Pt able to do rapid alternating movements.

## 2019-08-14 NOTE — PATIENT PROFILE ADULT - .
Was able to talk to Nichole.  She is doing well.   Will be going to rehab and will see us when she is discharged.   14-Aug-2019 16:35:58

## 2019-08-14 NOTE — CONSULT NOTE ADULT - ASSESSMENT
The patient is a 49 y/o M w/ a PMH of v-tach s/p AICD, CHF, A fib/flutter,     HTN, COPD (on O2 PRN), DLD, multiple sclerosis w/ multiple admissions for     flares, seizure disorder (on lamotrigine), PTSD, depression, ? stroke/TIA,     esophageal stricture, chronic sore throat/sinusitis, GERD,     hemopneumothorax (2006), pericardial effusion (2006), cardiomyopathy, MI     (2006, 2008), IBS, presenting for shortness of breath and generalized     muscle cramping w/ blurry vision, admitted for presumed multiple sclerosis flare.       Recommendations The patient is a 49 y/o M w/ a PMH of v-tach s/p AICD, CHF, A fib/flutter,     HTN, COPD (on O2 PRN), DLD, multiple sclerosis w/ multiple admissions for     flares, seizure disorder (on lamotrigine), PTSD, depression, ? stroke/TIA,     esophageal stricture, chronic sore throat/sinusitis, GERD,     hemopneumothorax (2006), pericardial effusion (2006), cardiomyopathy, MI     (2006, 2008), IBS, presenting for shortness of breath and generalized     muscle cramping w/ blurry vision, admitted for presumed multiple sclerosis flare.       Recommendations  - Restart nonformulary tecfidera at home dose   - Continue high dose steroids   - obtain vitamin D level, replete as needed

## 2019-08-15 DIAGNOSIS — G35 MULTIPLE SCLEROSIS: ICD-10-CM

## 2019-08-15 LAB
24R-OH-CALCIDIOL SERPL-MCNC: 16 NG/ML — LOW (ref 30–80)
ALBUMIN SERPL ELPH-MCNC: 4.5 G/DL — SIGNIFICANT CHANGE UP (ref 3.5–5.2)
ALP SERPL-CCNC: 148 U/L — HIGH (ref 30–115)
ALT FLD-CCNC: 17 U/L — SIGNIFICANT CHANGE UP (ref 0–41)
ANION GAP SERPL CALC-SCNC: 14 MMOL/L — SIGNIFICANT CHANGE UP (ref 7–14)
AST SERPL-CCNC: 12 U/L — SIGNIFICANT CHANGE UP (ref 0–41)
BASOPHILS # BLD AUTO: 0.02 K/UL — SIGNIFICANT CHANGE UP (ref 0–0.2)
BASOPHILS NFR BLD AUTO: 0.3 % — SIGNIFICANT CHANGE UP (ref 0–1)
BILIRUB SERPL-MCNC: 0.2 MG/DL — SIGNIFICANT CHANGE UP (ref 0.2–1.2)
BUN SERPL-MCNC: 21 MG/DL — HIGH (ref 10–20)
CALCIUM SERPL-MCNC: 9.6 MG/DL — SIGNIFICANT CHANGE UP (ref 8.5–10.1)
CHLORIDE SERPL-SCNC: 102 MMOL/L — SIGNIFICANT CHANGE UP (ref 98–110)
CO2 SERPL-SCNC: 26 MMOL/L — SIGNIFICANT CHANGE UP (ref 17–32)
CREAT SERPL-MCNC: 1.1 MG/DL — SIGNIFICANT CHANGE UP (ref 0.7–1.5)
EOSINOPHIL # BLD AUTO: 0 K/UL — SIGNIFICANT CHANGE UP (ref 0–0.7)
EOSINOPHIL NFR BLD AUTO: 0 % — SIGNIFICANT CHANGE UP (ref 0–8)
GLUCOSE BLDC GLUCOMTR-MCNC: 131 MG/DL — HIGH (ref 70–99)
GLUCOSE BLDC GLUCOMTR-MCNC: 135 MG/DL — HIGH (ref 70–99)
GLUCOSE BLDC GLUCOMTR-MCNC: 201 MG/DL — HIGH (ref 70–99)
GLUCOSE BLDC GLUCOMTR-MCNC: 202 MG/DL — HIGH (ref 70–99)
GLUCOSE SERPL-MCNC: 128 MG/DL — HIGH (ref 70–99)
HCT VFR BLD CALC: 38.2 % — LOW (ref 42–52)
HGB BLD-MCNC: 12.7 G/DL — LOW (ref 14–18)
IMM GRANULOCYTES NFR BLD AUTO: 0.7 % — HIGH (ref 0.1–0.3)
LYMPHOCYTES # BLD AUTO: 0.92 K/UL — LOW (ref 1.2–3.4)
LYMPHOCYTES # BLD AUTO: 12.5 % — LOW (ref 20.5–51.1)
MAGNESIUM SERPL-MCNC: 2 MG/DL — SIGNIFICANT CHANGE UP (ref 1.8–2.4)
MCHC RBC-ENTMCNC: 29.8 PG — SIGNIFICANT CHANGE UP (ref 27–31)
MCHC RBC-ENTMCNC: 33.2 G/DL — SIGNIFICANT CHANGE UP (ref 32–37)
MCV RBC AUTO: 89.7 FL — SIGNIFICANT CHANGE UP (ref 80–94)
MONOCYTES # BLD AUTO: 0.1 K/UL — SIGNIFICANT CHANGE UP (ref 0.1–0.6)
MONOCYTES NFR BLD AUTO: 1.4 % — LOW (ref 1.7–9.3)
NEUTROPHILS # BLD AUTO: 6.26 K/UL — SIGNIFICANT CHANGE UP (ref 1.4–6.5)
NEUTROPHILS NFR BLD AUTO: 85.1 % — HIGH (ref 42.2–75.2)
NRBC # BLD: 0 /100 WBCS — SIGNIFICANT CHANGE UP (ref 0–0)
PLATELET # BLD AUTO: 240 K/UL — SIGNIFICANT CHANGE UP (ref 130–400)
POTASSIUM SERPL-MCNC: 4.3 MMOL/L — SIGNIFICANT CHANGE UP (ref 3.5–5)
POTASSIUM SERPL-SCNC: 4.3 MMOL/L — SIGNIFICANT CHANGE UP (ref 3.5–5)
PROT SERPL-MCNC: 6.8 G/DL — SIGNIFICANT CHANGE UP (ref 6–8)
RBC # BLD: 4.26 M/UL — LOW (ref 4.7–6.1)
RBC # FLD: 13.9 % — SIGNIFICANT CHANGE UP (ref 11.5–14.5)
SODIUM SERPL-SCNC: 142 MMOL/L — SIGNIFICANT CHANGE UP (ref 135–146)
VIT D25+D1,25 OH+D1,25 PNL SERPL-MCNC: 55.9 PG/ML — SIGNIFICANT CHANGE UP (ref 19.9–79.3)
WBC # BLD: 7.35 K/UL — SIGNIFICANT CHANGE UP (ref 4.8–10.8)
WBC # FLD AUTO: 7.35 K/UL — SIGNIFICANT CHANGE UP (ref 4.8–10.8)

## 2019-08-15 PROCEDURE — 99232 SBSQ HOSP IP/OBS MODERATE 35: CPT

## 2019-08-15 RX ORDER — TIOTROPIUM BROMIDE 18 UG/1
1 CAPSULE ORAL; RESPIRATORY (INHALATION) DAILY
Refills: 0 | Status: DISCONTINUED | OUTPATIENT
Start: 2019-08-15 | End: 2019-08-19

## 2019-08-15 RX ORDER — POLYETHYLENE GLYCOL 3350 17 G/17G
17 POWDER, FOR SOLUTION ORAL EVERY 12 HOURS
Refills: 0 | Status: DISCONTINUED | OUTPATIENT
Start: 2019-08-15 | End: 2019-08-19

## 2019-08-15 RX ADMIN — Medication 81 MILLIGRAM(S): at 13:44

## 2019-08-15 RX ADMIN — DOFETILIDE 250 MICROGRAM(S): 0.25 CAPSULE ORAL at 09:16

## 2019-08-15 RX ADMIN — APIXABAN 5 MILLIGRAM(S): 2.5 TABLET, FILM COATED ORAL at 06:27

## 2019-08-15 RX ADMIN — GABAPENTIN 400 MILLIGRAM(S): 400 CAPSULE ORAL at 21:18

## 2019-08-15 RX ADMIN — Medication 25 MILLIGRAM(S): at 17:33

## 2019-08-15 RX ADMIN — Medication 40 MILLIGRAM(S): at 13:44

## 2019-08-15 RX ADMIN — LAMOTRIGINE 100 MILLIGRAM(S): 25 TABLET, ORALLY DISINTEGRATING ORAL at 17:33

## 2019-08-15 RX ADMIN — HYDROMORPHONE HYDROCHLORIDE 2 MILLIGRAM(S): 2 INJECTION INTRAMUSCULAR; INTRAVENOUS; SUBCUTANEOUS at 21:27

## 2019-08-15 RX ADMIN — KETOCONAZOLE 1 APPLICATION(S): 20 AEROSOL, FOAM TOPICAL at 23:43

## 2019-08-15 RX ADMIN — HYDROMORPHONE HYDROCHLORIDE 2 MILLIGRAM(S): 2 INJECTION INTRAMUSCULAR; INTRAVENOUS; SUBCUTANEOUS at 13:43

## 2019-08-15 RX ADMIN — HYDROMORPHONE HYDROCHLORIDE 2 MILLIGRAM(S): 2 INJECTION INTRAMUSCULAR; INTRAVENOUS; SUBCUTANEOUS at 14:57

## 2019-08-15 RX ADMIN — HYDROMORPHONE HYDROCHLORIDE 2 MILLIGRAM(S): 2 INJECTION INTRAMUSCULAR; INTRAVENOUS; SUBCUTANEOUS at 22:00

## 2019-08-15 RX ADMIN — GABAPENTIN 400 MILLIGRAM(S): 400 CAPSULE ORAL at 06:27

## 2019-08-15 RX ADMIN — GABAPENTIN 400 MILLIGRAM(S): 400 CAPSULE ORAL at 13:45

## 2019-08-15 RX ADMIN — QUETIAPINE FUMARATE 50 MILLIGRAM(S): 200 TABLET, FILM COATED ORAL at 21:18

## 2019-08-15 RX ADMIN — LORATADINE 10 MILLIGRAM(S): 10 TABLET ORAL at 13:44

## 2019-08-15 RX ADMIN — CHLORHEXIDINE GLUCONATE 1 APPLICATION(S): 213 SOLUTION TOPICAL at 06:26

## 2019-08-15 RX ADMIN — LAMOTRIGINE 100 MILLIGRAM(S): 25 TABLET, ORALLY DISINTEGRATING ORAL at 06:27

## 2019-08-15 RX ADMIN — POLYETHYLENE GLYCOL 3350 17 GRAM(S): 17 POWDER, FOR SOLUTION ORAL at 17:33

## 2019-08-15 RX ADMIN — DOFETILIDE 250 MICROGRAM(S): 0.25 CAPSULE ORAL at 21:18

## 2019-08-15 RX ADMIN — Medication 10 MILLIGRAM(S): at 13:44

## 2019-08-15 RX ADMIN — HYDROMORPHONE HYDROCHLORIDE 2 MILLIGRAM(S): 2 INJECTION INTRAMUSCULAR; INTRAVENOUS; SUBCUTANEOUS at 06:49

## 2019-08-15 RX ADMIN — APIXABAN 5 MILLIGRAM(S): 2.5 TABLET, FILM COATED ORAL at 17:33

## 2019-08-15 RX ADMIN — QUETIAPINE FUMARATE 25 MILLIGRAM(S): 200 TABLET, FILM COATED ORAL at 13:44

## 2019-08-15 RX ADMIN — BUDESONIDE AND FORMOTEROL FUMARATE DIHYDRATE 2 PUFF(S): 160; 4.5 AEROSOL RESPIRATORY (INHALATION) at 21:22

## 2019-08-15 RX ADMIN — Medication 10 MILLIGRAM(S): at 23:44

## 2019-08-15 RX ADMIN — Medication 25 MILLIGRAM(S): at 06:27

## 2019-08-15 RX ADMIN — PANTOPRAZOLE SODIUM 40 MILLIGRAM(S): 20 TABLET, DELAYED RELEASE ORAL at 06:27

## 2019-08-15 RX ADMIN — Medication 10 MILLIGRAM(S): at 17:33

## 2019-08-15 RX ADMIN — BUDESONIDE AND FORMOTEROL FUMARATE DIHYDRATE 2 PUFF(S): 160; 4.5 AEROSOL RESPIRATORY (INHALATION) at 09:15

## 2019-08-15 RX ADMIN — HYDROMORPHONE HYDROCHLORIDE 2 MILLIGRAM(S): 2 INJECTION INTRAMUSCULAR; INTRAVENOUS; SUBCUTANEOUS at 07:11

## 2019-08-15 RX ADMIN — Medication 50 MILLIGRAM(S): at 00:05

## 2019-08-15 RX ADMIN — Medication 10 MILLIGRAM(S): at 06:27

## 2019-08-15 RX ADMIN — TAMSULOSIN HYDROCHLORIDE 0.4 MILLIGRAM(S): 0.4 CAPSULE ORAL at 21:18

## 2019-08-15 RX ADMIN — Medication 2 SPRAY(S): at 17:33

## 2019-08-15 RX ADMIN — ATORVASTATIN CALCIUM 40 MILLIGRAM(S): 80 TABLET, FILM COATED ORAL at 21:18

## 2019-08-15 NOTE — PROGRESS NOTE ADULT - SUBJECTIVE AND OBJECTIVE BOX
Known to me from [facility]  Exac MS    Ambulated with RW At [facility]    Seen By PT- Can Return To [facility] Once Cleared medically

## 2019-08-15 NOTE — PHYSICAL THERAPY INITIAL EVALUATION ADULT - ADDITIONAL COMMENTS
Pt lives in [facility]. Has a rollator.  Elevator building.  Reports PTA was starting to have trouble ambulating.
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
## 2019-08-15 NOTE — CONSULT NOTE ADULT - SUBJECTIVE AND OBJECTIVE BOX
Chief Complaint:       The patient is a 47 y/o M PMH of v-tach s/p AICD, CHF, A fib/flutter, HTN, COPD (on O2 PRN), DLD, multiple sclerosis w/ multiple admissions. Patient states he has MS and gets execrations on averae of 3x/year. Patient came to hospital as per his request because he did not see the neurologist at the Nursing home. Complains of pain to neck, shoulders for the past 2 months. States to be on Dilaudid, Baclofen.         Allergies    dexmethylphenidate (Unknown)  Dilantin (Rash)  dilantin, compazine, neurontin, ritalin, phenergan (Unknown)  Haldol (Unknown)  hydantoin derivatives (Other)  methylphenidate (Unknown)  Morphine Sulfate (Unknown)  phenytoin (Unknown)  prochlorperazine (Unknown)  promethazine (Dystonic RXN)  rash/hives (Anaphylaxis)  thioxanthenes (Unknown)    Intolerances        PAST MEDICAL & SURGICAL HISTORY:  PTSD (post-traumatic stress disorder)  Supraventricular arrhythmia: prior history  Cardiomyopathy  CHF (congestive heart failure)  Atrial fibrillation: s/p ablation, on eliquis  Diabetes mellitus: diet controlled  BPH (benign prostatic hyperplasia)  HTN (hypertension)  Seizures  Migraines  Pneumonia  MS (multiple sclerosis)  CAD (coronary artery disease)  Bipolar disorder  Anginal pain  Schizo affective schizophrenia  GERD (gastroesophageal reflux disease)  Seasonal allergies  Hypercholesteremia  COPD (chronic obstructive pulmonary disease)  CVA (cerebral vascular accident)  TIA (transient ischemic attack)  Syncope  Peripheral Neuropathy  Clinical Depression  H/O prior ablation treatment: for Afib  Cardiac pacemaker recipient  H/O hernia repair: right 1972,1991  History of hip replacement  S/P Orchiectomy: L for testicular CA  S/P Implantation of AICD        SOCIAL HISTORY:  Denies Smoking, Alcohol, or Drug Use    dexmethylphenidate (Unknown)  Dilantin (Rash)  dilantin, compazine, neurontin, ritalin, phenergan (Unknown)  Haldol (Unknown)  hydantoin derivatives (Other)  methylphenidate (Unknown)  Morphine Sulfate (Unknown)  phenytoin (Unknown)  prochlorperazine (Unknown)  promethazine (Dystonic RXN)  rash/hives (Anaphylaxis)  thioxanthenes (Unknown)      PAIN MEDICATIONS:  baclofen 10 milliGRAM(s) Oral four times a day  gabapentin 400 milliGRAM(s) Oral three times a day  HYDROmorphone   Tablet 2 milliGRAM(s) Oral every 4 hours PRN  lamoTRIgine 100 milliGRAM(s) Oral two times a day  QUEtiapine 25 milliGRAM(s) Oral daily  QUEtiapine 50 milliGRAM(s) Oral at bedtime    Heme:  apixaban 5 milliGRAM(s) Oral every 12 hours  aspirin  chewable 81 milliGRAM(s) Oral daily    Antibiotics:    Cardiovascular:  dofetilide 250 MICROGram(s) Oral two times a day  furosemide    Tablet 20 milliGRAM(s) Oral <User Schedule>  furosemide    Tablet 40 milliGRAM(s) Oral <User Schedule>  metoprolol tartrate 25 milliGRAM(s) Oral two times a day  tamsulosin 0.4 milliGRAM(s) Oral at bedtime    GI:  pantoprazole    Tablet 40 milliGRAM(s) Oral before breakfast  polyethylene glycol 3350 17 Gram(s) Oral every 12 hours    Endocrine:  atorvastatin 40 milliGRAM(s) Oral at bedtime    All Other Medications:  chlorhexidine 4% Liquid 1 Application(s) Topical <User Schedule>  fluticasone propionate (50 MICROgram(s)/actuation) Nasal Spray - Peds 2 Spray(s) Alternating Nostrils daily  ketoconazole 2% Shampoo 1 Application(s) Topical daily      Vital Signs Last 24 Hrs  T(C): 36.6 (15 Aug 2019 13:46), Max: 36.7 (15 Aug 2019 04:30)  T(F): 97.9 (15 Aug 2019 13:46), Max: 98.1 (15 Aug 2019 04:30)  HR: 60 (15 Aug 2019 13:46) (60 - 60)  BP: 125/59 (15 Aug 2019 13:46) (98/53 - 125/59)  BP(mean): --  RR: 20 (15 Aug 2019 13:46) (18 - 20)  SpO2: --        LABS:                          12.7   7.35  )-----------( 240      ( 15 Aug 2019 08:06 )             38.2     08-15    142  |  102  |  21<H>  ----------------------------<  128<H>  4.3   |  26  |  1.1    Ca    9.6      15 Aug 2019 08:06  Mg     2.0     08-15    TPro  6.8  /  Alb  4.5  /  TBili  0.2  /  DBili  x   /  AST  12  /  ALT  17  /  AlkPhos  148<H>  08-15    PT/INR - ( 14 Aug 2019 05:37 )   PT: 15.00 sec;   INR: 1.31 ratio               RADIOLOGY:  EXAM:  XR CHEST PORTABLE IMMED 1V            PROCEDURE DATE:  08/13/2019            INTERPRETATION:  Clinical History / Reason for exam: Shortness of breath    Comparison : Chest radiograph 2/21/2019.    Technique/Positioning: AP portable Findings:    Support devices: Left chest wall ICD in stable position..    Cardiac/mediastinum/hilum: Cardiac magnification.    Lung parenchyma/Pleura: Low lung volumes with no pneumothorax.    Skeleton/soft tissues: Unchanged.    Impression:    Low lung volumes limiting assessment of lung bases    Stable ICD  No radiographic evidence of acute cardiopulmonary disease.                      NERISSA VALDOVINOS M.D., ATTENDING RADIOLOGIST  This document has been electronically signed. Aug 13 2019 11:00AM        Drug Screen:        [ ]  [place]  Reviewed on 8/15/19, 4:16P  Patient Name:	Brendon Miguel	YOB: 1970  Address:	[address] [place] [id number]	Sex:	Male  Rx Written	Rx Dispensed	Drug	Quantity	Days Supply	Prescriber Name  08/05/2019	08/05/2019	hydromorphone 2 mg tablet	30	5	Darvin Roche MD  07/22/2019	07/22/2019	hydromorphone 2 mg tablet	30	5	Darvin Roche MD  07/15/2019	07/15/2019	hydromorphone 2 mg tablet	30	5	Darvin Roche MD  Patient Name:	Brendon Miguel	YOB: 1970  Address:	[address] [place] [id number]	Sex:	Male  Rx Written	Rx Dispensed	Drug	Quantity	Days Supply	Prescriber Name  06/27/2019	06/27/2019	hydromorphone 2 mg tablet	42	7	Darvin Roche MD  06/13/2019	06/13/2019	hydromorphone 2 mg tablet	42	7	Darvin Roche MD  06/03/2019	06/03/2019	hydromorphone 2 mg tablet	30	5	Darvin Roche MD  05/28/2019	05/28/2019	hydromorphone 2 mg tablet	30	5	Roche, Jayson MD  05/15/2019	05/15/2019	hydromorphone 2 mg tablet	30	5	Darvin Roche MD  05/06/2019	05/06/2019	hydromorphone 2 mg tablet	30	5	RocheDarvin aguira MD  04/24/2019	04/24/2019	hydromorphone 2 mg tablet	30	5	Darvin Roche MD  02/19/2019	02/19/2019	hydromorphone 2 mg tablet	180	30	Darvin Roche MD  01/23/2019	01/23/2019	hydromorphone 2 mg tablet	84	14	Darvin Roche MD  11/19/2018	11/19/2018	hydromorphone 2 mg tablet	180	30	Darvin Roche MD  09/28/2018	09/28/2018	hydromorphone 2 mg tablet	180	30	Darvin Roche MD  09/24/2018	09/24/2018	oxycodone-acetaminophen 5-325 mg tablet	60	15	Darvin Roche MD  08/23/2018	08/24/2018	oxycodone-acetaminophen 5-325 mg tablet	120	30	Araseli Yancey NP  08/21/2018	08/21/2018	dronabinol 5 mg capsule	30	30	Darvin Roche MD

## 2019-08-15 NOTE — PROGRESS NOTE ADULT - SUBJECTIVE AND OBJECTIVE BOX
Patient Information:  CUATE HORNE / 48y / Male / MRN#:[id number]    Hospital Day: 2d    HPI:  The patient is a 49 y/o M w/ a PMH of Multiple Sclerosis, V-tach s/p AICD, CHF, A fib/flutter (on Eliquis), HTN, COPD (on O2 PRN), DLD, hx of seizures (on Lamotrigine), PTSD, depression, ? stroke/TIA, esophageal stricture, chronic sore throat/sinusitis, GERD, hemopneumothorax (2006), pericardial effusion (2006), cardiomyopathy, MI (2006, 2008), IBS, presenting for shortness of breath and generalized muscle cramping and spasms assoc w/ blurry vision, admitted for multiple sclerosis flare. The patient states that about 2 months ago he had an MS flare for which he received PO steroids, at [facility] where he resides, with improvement of sxs. He then periodically would have cramps in his legs, back, arms, and abdomen. About 2.5 weeks ago, the patient reports he gradually became short of breath and was having visual disturbances w/ central lack of vision, "like an eclipse" in the L eye and intermittent blurry vision in bilateral eyes. He also endorses intermittent loss of bowel/bladder incontinence. At baseline, pt walks with a walker and is occasionally unsteady. He reports a hx of falling in the [facility] facility.  He reports that he has a hx of dysphagia and occasionally feels that food is stuck in his throat. He eats a chopped diet at [facility]. He states that his GI (Dr. Perez) had requested an xray to evaluate dysphagia and that the nursing home would not administer the study. He is currently  and has 2 children, a son and daughter, both of whom he reports are healthy.  In the ED, pt received Solumedrol 1 G IV x1.    Interval History:  Patient seen and examined at bedside. No acute events overnight. Pt received a second dose of Solumedrol 1 G IV overnight. He reports that his vision is currently normal and he denies blurry or lack of vision. He currently does not experience muscle cramping or spasms, but states that it recurs when the pain medication wears off. He states that a doctor (does not recall name) has previously suggested initiating a Baclofen pump or Botox for better pain control and requests to see pain management. He states that he walked to the bathroom last night with his walker and feels more unsteady than baseline, he requests to see PT as he wishes to get up out of bed and walk around more. He states he had gone to the bathroom last night and had a bowel movement but also had an episode of urinary incontinence last night.    Past Medical History:  PTSD (post-traumatic stress disorder)  Supraventricular arrhythmia  Cardiomyopathy  CHF (congestive heart failure)  Atrial fibrillation  Diabetes mellitus  Smoker  BPH (benign prostatic hyperplasia)  HTN (hypertension)  Seizures  Bipolar 1 disorder  Migraines  Pneumonia  MS (multiple sclerosis)  CAD (coronary artery disease)  Bipolar disorder  Anginal pain  Schizo affective schizophrenia  GERD (gastroesophageal reflux disease)  Seasonal allergies  HTN (hypertension)  Hypercholesteremia  COPD (chronic obstructive pulmonary disease)  Neuropathy  Atrial flutter  CVA (cerebral vascular accident)  TIA (transient ischemic attack)  Syncope  Chronic Hypotension  Hypotension  Peripheral Neuropathy  Human Immunodeficiency Virus (  S/P Implantation of Automatic  Personal History of Multiple S  Personal History of Mitral Nicki  Personal History of MI (Myocar  Personal History of Hypertensi  Personal History of Congestive  Personal History of Cardiomyop  Personal History of Atrial Fib  Personal History of Alcoholism  Clinical Depression    Past Surgical History:  H/O prior ablation treatment  Cardiac pacemaker recipient  H/O hernia repair  History of hip replacement  S/P Orchiectomy  SVT (Supraventricular Tachycar  GERD (Gastroesophageal Reflux  S/P Implantation of AICD    Allergies:  dexmethylphenidate (Unknown)  Dilantin (Rash)  dilantin, compazine, neurontin, ritalin, phenergan (Unknown)  Haldol (Unknown)  hydantoin derivatives (Other)  methylphenidate (Unknown)  Morphine Sulfate (Unknown)  phenytoin (Unknown)  prochlorperazine (Unknown)  promethazine (Dystonic RXN)  rash/hives (Anaphylaxis)  thioxanthenes (Unknown)    Medications:  PRN:  HYDROmorphone   Tablet 2 milliGRAM(s) Oral every 4 hours PRN Severe Pain (7 - 10)  polyethylene glycol 3350 17 Gram(s) Oral daily PRN Constipation    Standing:  apixaban 5 milliGRAM(s) Oral every 12 hours  aspirin  chewable 81 milliGRAM(s) Oral daily  atorvastatin 40 milliGRAM(s) Oral at bedtime  baclofen 10 milliGRAM(s) Oral four times a day  buDESOnide 160 MICROgram(s)/formoterol 4.5 MICROgram(s) Inhaler 2 Puff(s) Inhalation two times a day  chlorhexidine 4% Liquid 1 Application(s) Topical <User Schedule>  dofetilide 250 MICROGram(s) Oral two times a day  fluticasone propionate (50 MICROgram(s)/actuation) Nasal Spray - Peds 2 Spray(s) Alternating Nostrils daily  furosemide    Tablet 20 milliGRAM(s) Oral <User Schedule>  furosemide    Tablet 40 milliGRAM(s) Oral <User Schedule>  gabapentin 400 milliGRAM(s) Oral three times a day  ketoconazole 2% Shampoo 1 Application(s) Topical daily  lamoTRIgine 100 milliGRAM(s) Oral two times a day  loratadine 10 milliGRAM(s) Oral daily  metoprolol tartrate 25 milliGRAM(s) Oral two times a day  pantoprazole    Tablet 40 milliGRAM(s) Oral before breakfast  QUEtiapine 25 milliGRAM(s) Oral daily  QUEtiapine 50 milliGRAM(s) Oral at bedtime  tamsulosin 0.4 milliGRAM(s) Oral at bedtime    Home:  acetaminophen 650 mg oral tablet: 650 milligram(s) orally 4 times a day, As Needed  aspirin 81 mg oral tablet, chewable: 1 tab(s) orally once a day  atorvastatin 40 mg oral tablet: 1 tab(s) orally once a day (at bedtime)  baclofen 10 mg oral tablet: 1 tab(s) orally 4 times a day  budesonide-formoterol 160 mcg-4.5 mcg/inh inhalation aerosol: 2 puff(s) inhaled 2 times a day  Cepacol Extra Strength Menthol 10 mg mucous membrane lozenge: 1 anderson(s) mucous membrane every 6 hours, As Needed  Claritin 10 mg oral tablet: 1 tab(s) orally once a day  Eliquis 5 mg oral tablet: 1 tab(s) orally 2 times a day  Flonase 50 mcg/inh nasal spray: 2 spray(s) nasal once a day  gabapentin 400 mg oral capsule: 1 cap(s) orally 3 times a day  HYDROmorphone 2 mg oral tablet: 1 tab(s) orally every 4 hours, As Needed  ketoconazole 2% topical shampoo: Apply topically to affected area once a day (in the evening)  lamoTRIgine 100 mg oral tablet: 1 tab(s) orally 2 times a day  Lasix 20 mg oral tablet: 1 tab(s) orally 3 times a week on monday, wednesday and friday  Lasix 40 mg oral tablet: 1 tab(s) orally 3 times a week on tuesday, thursday and saturday  Metoprolol Tartrate 25 mg oral tablet: 1 tab(s) orally 2 times a day  Mi-Acid oral suspension: 30 milliliter(s) orally 4 times a day, As Needed  Nitrostat 0.4 mg sublingual tablet: sublingual 3 times a day, As Needed; may repeat after 5 min until relief  omeprazole 20 mg oral delayed release capsule: 1 cap(s) orally 2 times a day  polyethylene glycol 3350 oral powder for reconstitution: 1 dose(s) orally once a day, As Needed  prazosin 2 mg oral capsule: 1 cap(s) orally once a day (at bedtime)  QUEtiapine 25 mg oral tablet: 1 tab(s) orally once a day  SEROquel 50 mg oral tablet: 1 tab(s) orally once a day (at bedtime)  tamsulosin 0.4 mg oral capsule: 1 cap(s) orally once a day  Tecfidera 240 mg oral delayed release capsule: 1 cap(s) orally once a day  Tikosyn 250 mcg oral capsule: 1 cap(s) orally 2 times a day    Vitals:  T(C): 36.7, Max: 36.7 (08-15-19 @ 04:30)  T(F): 98.1, Max: 98.1 (08-15-19 @ 04:30)  HR: 60 (60 - 60)  BP: 99/58 (95/52 - 104/58)  RR: 20 (16 - 20)  SpO2: 96% (95% - 96%)    Physical Exam:  General: Awake, Alert. Not in acute distress. Resting comfortably in bed. On RA.  HEENT: Head NC/AT. YARELIS more severe in the L eye appreciated.  Heart: Regular rate and rhythm. AICD in L upper chest.  Lungs: Clear to auscultation bilaterally. No wheezes, rales or rhonchi.  Abdomen: Soft, nontender, nondistended, + BS.  Extremities: No edema in upper or lower extremities.  Neuro: AAOx3. 4/5 strength in bilateral upper extremities. 5/5 strength in L lower extremity, 3/5 strength in R lower extremity.    Labs:  CBC (08-15 @ 08:06)                        Hgb: 12.7<L>  WBC: 7.35  )---------------------( Plts: 240                              Hct: 38.2<L>    Chem (08-15 @ 08:06)  Na: 142  |     Cl: 102     |  BUN: 21<H>  -----------------------------------------< Gluc: 128<H>    K: 4.3   |  HCO3: 26  |  Cr: 1.1    Ca 9.6 (08-15 @ 08:06)  Mg 2.0 (08-15 @ 08:06)    LFTs (08-15 @ 08:06)  TPro 6.8  /  Alb 4.5  TBili 0.2  /  DBili x   AST 12  /  ALT 17  /  AlkPhos 148<H>        PT/INR (08-14 @ 05:37)  PT: 15.00<H>; INR: 1.31<H>  PTT: x         Radiology:    < from: Xray Chest 1 View-PORTABLE IMMEDIATE (08.13.19 @ 05:52) >  Impression:    Low lung volumes limiting assessment of lung bases    Stable ICD  No radiographic evidence of acute cardiopulmonary disease.

## 2019-08-15 NOTE — PROGRESS NOTE ADULT - ASSESSMENT
The patient is a 49 y/o M w/ a PMH of v-tach s/p AICD, CHF, A fib/flutter, HTN, COPD (on O2 PRN), DLD, multiple sclerosis w/ multiple admissions for flares, seizure disorder (on lamotrigine), PTSD, depression, ? stroke/TIA, esophageal stricture, chronic sore throat/sinusitis, GERD, hemopneumothorax (2006), pericardial effusion (2006), cardiomyopathy, MI (2006, 2008), IBS, presenting for shortness of breath and generalized muscle cramping w/ blurry vision, admitted for multiple sclerosis flare.    # Acute Multiple Sclerosis flare, w/ lt side optic neuritis w/ resolving central vision loss and improving headache  improving s/p Solumedrol 1 g IV - to get 5 doses (8/13, 8/15 and then 8/16-17&18)  Has defibrillator, NOT MRI compatible as per patient.   Patient takes Tecfidera 240 mg PO qD but is apparently non-formulary here; f/u w/ neuro    # Painful leg spasms  C/w baclofen, gabapentin  Pain control w/ Tylenol, Dilaudid po PRN  bowel regimen  Ambulate w/ assistance (patient uses walker at baseline)  Pain Mngmt w/ Dr Reece    # HTN  C/w metoprolol tartrate 25 mg PO BID  C/w Lasix qD (20 mg M/W/F, 40 mg T/Th/Sa)    # H/o v-tach, chr sys CHF 9stable, s/p AICD/PPM; CAD w/ MI (2006 & 2008); AFib  C/w metoprolol tartrate 25 mg PO BID  C/w Lasix qD (20 mg M/W/F, 40 mg T/R/S)  C/w aspirin 81 mg PO qD - cardio should consider d/cing this, since he is on a/c and CAD is stable  on dofetilide and eliquis  EP eval - Dr Quiñones to eval PPM/AICD fxn    # DLD  C/w atorvastatin 40 mg PO qHS    # H/o seizure disorder  Patient states he is in the process of being weaned down on his seizure medications  C/w lamotrigine 100 mg PO BID  f/u w/ neuro    # COPD on home O2 - uses on/off  C/w NC as needed  CXR fine; no need for pulm eval (d/w pt)  C/w Symbicort 2 puffs BID  should also be on spiriva 1 puff po q24    # Chronic sore throat, sinusitis  C/w Cepacol, Claritin, Flonase    # GERD  C/w pantoprazole 40 mg PO qAM (patient takes omeprazole at home)    # H/o esophageal stricture  Start mechanical soft dysphagia 2 w/ thins    # DVT ppx- C/w Eliquis 5 mg PO BID    # GI ppx- c/w pantoprazole 40 mg PO qAM (on omeprazole at home)    # Activity- ambulate w/ assistance; rehab eval    # Code status: see MOLST form - pt would accept all treatments, but on a trial basis; his brother Zana is his HCP (1 of his sisters is the tiny HCP)    # Dispo- complete solumedrol; f/u w/ neuro, pain and EPS; [facility]

## 2019-08-15 NOTE — PHYSICAL THERAPY INITIAL EVALUATION ADULT - PERTINENT HX OF CURRENT PROBLEM, REHAB EVAL
The patient is a 49 y/o M w/ a PMH of Multiple Sclerosis, V-tach s/p AICD, CHF, A fib/flutter (on Eliquis), HTN, COPD (on O2 PRN), DLD, hx of seizures (on Lamotrigine), PTSD, depression, ? stroke/TIA, esophageal stricture, chronic sore throat/sinusitis, GERD, hemopneumothorax (2006), pericardial effusion (2006), cardiomyopathy, MI (2006, 2008), IBS, presenting for shortness of breath and generalized muscle cramping and spasms assoc w/ blurry vision, admitted for multiple sclerosis flare.

## 2019-08-15 NOTE — CONSULT NOTE ADULT - ASSESSMENT
REVIEW OF SYSTEMS    General:	NAD   Skin/Breast:	Neg  Ophthalmologic:	Neg  ENMT: Neg	  Respiratory and Thorax: Neg	  Cardiovascular:	Neg  Gastrointestinal:	Neg  Genitourinary:	Neg  Musculoskeletal:	Neg  Neurological:	Neg  Psychiatric:	Neg  Hematology/Lymphatics:	Neg  Endocrine:	Neg        PHYSICAL EXAM:    GENERAL: NAD, well-groomed, well-developed  HEAD:  Atraumatic, Normocephalic  EYES: EOMI, PERRLA, conjunctiva and sclera clear  ENMT: No tonsillar erythema, exudates, or enlargement; Moist mucous membranes, Good dentition, No lesions  NECK: Supple, No JVD, Normal thyroid  NERVOUS SYSTEM:  Alert & Oriented X3, Good concentration; Motor Strength 5/5 B/L upper and lower extremities  CHEST/LUNG: Clear to percussion bilaterally; No rales, rhonchi, wheezing, or rubs  HEART: Regular rate and rhythm; No murmurs, rubs, or gallops  ABDOMEN: Soft, Nontender, Nondistended; Bowel sounds present  EXTREMITIES: No clubbing, cyanosis, or edema  LYMPH: No lymphadenopathy noted  SKIN: No rashes or lesions      Patient with movement and sensation to all ext's. Patient states his pain/neuro recommended a baclofen pump for the cramps to his legs. Pain is 5-6/10 now. Discussed pain regimen with patient and patient will try new regimen.

## 2019-08-15 NOTE — PROGRESS NOTE ADULT - ASSESSMENT
The patient is a 49 y/o M w/ a PMH of Multiple Sclerosis, V-tach s/p AICD, CHF, A fib/flutter (on Eliquis), HTN, COPD (on O2 PRN), DLD, hx of seizures (on Lamotrigine), PTSD, depression, ? stroke/TIA, esophageal stricture, chronic sore throat/sinusitis, GERD, hemopneumothorax (2006), pericardial effusion (2006), cardiomyopathy, MI (2006, 2008), IBS, presenting for shortness of breath and generalized muscle cramping and spasms assoc w/ blurry vision, admitted for multiple sclerosis flare.    #Muscle cramps/spasms, blurry vision, SOB likely due to MS flare  - CXR on admission showed low lung volumes but was otherwise unremarkable, WBC 7.25  - s/p Solumedrol 1 G x2  - C/w Baclofen, Dofetilide, Gabapentin - pt states pain is not adequately controlled, pain management consult pending  - Per Neuro - c/w Solumedrol 1 G q24 x 5 days, then taper to PO steroids  - Pt takes Tecfidera 240 mg PO qd but is nonformulary, will call [facility] for medication  - Per pt AICD is not MRI compatible, cannot order MRI to assess for new plaques     #Inadequate care at [facility]  - Pt states he does not receive his medications appropriately and does not see his Neurologist when requested  - Would like to be transferred to another facility  - Social Work consult pending  # HTN  - Metoprolol tartrate 25 mg PO BID    # H/o v-tach, CHF s/p AICD  - Metoprolol tartrate 25 mg PO BID  - Lasix qD (20 mg M/W/F, 40 mg T/R/S)  - EPS consulted for AICD interrogation     # H/o MI 2006, 2008  - Atorvastatin 40 mg PO qHS and Aspirin 81 mg PO qD    # H/o seizure disorder  - Lamotrigine 100 mg PO BID  - Pt states he is in the process of being weaned down on his seizure medications    # COPD on home O2  - O2 PRN to keep SpO2 > 92 %  - Symbicort high dose 2 puffs BID    # Chronic sore throat, sinusitis  - Cepacol, Claritin, Flonase    # DLD  - Atorvastatin 40 mg PO qHS    # GERD  - Pantoprazole 40 mg PO qAM (patient takes omeprazole at home)    # H/o esophageal stricture  - Pt states Dr. Preez suggested evaluation w/ Barium swallow; was cutting up regular food w/o difficulty  - On mechanical soft dysphagia 2 w/ thins    # DVT prophylaxis: Eliquis   # GI prophylaxis: Protonix   # Activity: ambulate with assistance  # Diet: mechanical soft dysphagia 2 w/ thins; reassess as indicated  # Disposition:  patient from [facility]; functional at baseline w/ walker

## 2019-08-15 NOTE — CONSULT NOTE ADULT - PROBLEM SELECTOR RECOMMENDATION 9
Con't baclofen 10 milliGRAM(s) Oral four times a day  Con't gabapentin 400 milliGRAM(s) Oral three times a day  DC HYDROmorphone   Tablet 2 milliGRAM(s) Oral every 4 hours PRN  Start MSIR 15mg PO Q4hrs PRN   Start Acetaminophen 650mg PO Q6hrs Darrick

## 2019-08-16 LAB
ALBUMIN SERPL ELPH-MCNC: 4.3 G/DL — SIGNIFICANT CHANGE UP (ref 3.5–5.2)
ALP SERPL-CCNC: 135 U/L — HIGH (ref 30–115)
ALT FLD-CCNC: 15 U/L — SIGNIFICANT CHANGE UP (ref 0–41)
ANION GAP SERPL CALC-SCNC: 13 MMOL/L — SIGNIFICANT CHANGE UP (ref 7–14)
AST SERPL-CCNC: 10 U/L — SIGNIFICANT CHANGE UP (ref 0–41)
BASOPHILS # BLD AUTO: 0.01 K/UL — SIGNIFICANT CHANGE UP (ref 0–0.2)
BASOPHILS NFR BLD AUTO: 0.1 % — SIGNIFICANT CHANGE UP (ref 0–1)
BILIRUB SERPL-MCNC: 0.2 MG/DL — SIGNIFICANT CHANGE UP (ref 0.2–1.2)
BUN SERPL-MCNC: 27 MG/DL — HIGH (ref 10–20)
CALCIUM SERPL-MCNC: 9.4 MG/DL — SIGNIFICANT CHANGE UP (ref 8.5–10.1)
CHLORIDE SERPL-SCNC: 104 MMOL/L — SIGNIFICANT CHANGE UP (ref 98–110)
CO2 SERPL-SCNC: 26 MMOL/L — SIGNIFICANT CHANGE UP (ref 17–32)
CREAT SERPL-MCNC: 1 MG/DL — SIGNIFICANT CHANGE UP (ref 0.7–1.5)
EOSINOPHIL # BLD AUTO: 0.02 K/UL — SIGNIFICANT CHANGE UP (ref 0–0.7)
EOSINOPHIL NFR BLD AUTO: 0.2 % — SIGNIFICANT CHANGE UP (ref 0–8)
GLUCOSE BLDC GLUCOMTR-MCNC: 113 MG/DL — HIGH (ref 70–99)
GLUCOSE BLDC GLUCOMTR-MCNC: 150 MG/DL — HIGH (ref 70–99)
GLUCOSE BLDC GLUCOMTR-MCNC: 176 MG/DL — HIGH (ref 70–99)
GLUCOSE BLDC GLUCOMTR-MCNC: 87 MG/DL — SIGNIFICANT CHANGE UP (ref 70–99)
GLUCOSE SERPL-MCNC: 100 MG/DL — HIGH (ref 70–99)
HCT VFR BLD CALC: 39.4 % — LOW (ref 42–52)
HGB BLD-MCNC: 13 G/DL — LOW (ref 14–18)
IMM GRANULOCYTES NFR BLD AUTO: 0.7 % — HIGH (ref 0.1–0.3)
LYMPHOCYTES # BLD AUTO: 2.09 K/UL — SIGNIFICANT CHANGE UP (ref 1.2–3.4)
LYMPHOCYTES # BLD AUTO: 20.3 % — LOW (ref 20.5–51.1)
MAGNESIUM SERPL-MCNC: 2.2 MG/DL — SIGNIFICANT CHANGE UP (ref 1.8–2.4)
MCHC RBC-ENTMCNC: 29.6 PG — SIGNIFICANT CHANGE UP (ref 27–31)
MCHC RBC-ENTMCNC: 33 G/DL — SIGNIFICANT CHANGE UP (ref 32–37)
MCV RBC AUTO: 89.7 FL — SIGNIFICANT CHANGE UP (ref 80–94)
MONOCYTES # BLD AUTO: 0.5 K/UL — SIGNIFICANT CHANGE UP (ref 0.1–0.6)
MONOCYTES NFR BLD AUTO: 4.9 % — SIGNIFICANT CHANGE UP (ref 1.7–9.3)
NEUTROPHILS # BLD AUTO: 7.61 K/UL — HIGH (ref 1.4–6.5)
NEUTROPHILS NFR BLD AUTO: 73.8 % — SIGNIFICANT CHANGE UP (ref 42.2–75.2)
NRBC # BLD: 0 /100 WBCS — SIGNIFICANT CHANGE UP (ref 0–0)
PLATELET # BLD AUTO: 247 K/UL — SIGNIFICANT CHANGE UP (ref 130–400)
POTASSIUM SERPL-MCNC: 3.9 MMOL/L — SIGNIFICANT CHANGE UP (ref 3.5–5)
POTASSIUM SERPL-SCNC: 3.9 MMOL/L — SIGNIFICANT CHANGE UP (ref 3.5–5)
PROT SERPL-MCNC: 6.4 G/DL — SIGNIFICANT CHANGE UP (ref 6–8)
RBC # BLD: 4.39 M/UL — LOW (ref 4.7–6.1)
RBC # FLD: 13.5 % — SIGNIFICANT CHANGE UP (ref 11.5–14.5)
SODIUM SERPL-SCNC: 143 MMOL/L — SIGNIFICANT CHANGE UP (ref 135–146)
WBC # BLD: 10.3 K/UL — SIGNIFICANT CHANGE UP (ref 4.8–10.8)
WBC # FLD AUTO: 10.3 K/UL — SIGNIFICANT CHANGE UP (ref 4.8–10.8)

## 2019-08-16 PROCEDURE — 99232 SBSQ HOSP IP/OBS MODERATE 35: CPT

## 2019-08-16 RX ORDER — ACETAMINOPHEN 500 MG
650 TABLET ORAL EVERY 6 HOURS
Refills: 0 | Status: DISCONTINUED | OUTPATIENT
Start: 2019-08-16 | End: 2019-08-19

## 2019-08-16 RX ORDER — CHOLECALCIFEROL (VITAMIN D3) 125 MCG
2000 CAPSULE ORAL DAILY
Refills: 0 | Status: DISCONTINUED | OUTPATIENT
Start: 2019-08-16 | End: 2019-08-19

## 2019-08-16 RX ORDER — ACETAMINOPHEN 500 MG
650 TABLET ORAL EVERY 6 HOURS
Refills: 0 | Status: DISCONTINUED | OUTPATIENT
Start: 2019-08-16 | End: 2019-08-16

## 2019-08-16 RX ORDER — MORPHINE SULFATE 50 MG/1
15 CAPSULE, EXTENDED RELEASE ORAL EVERY 4 HOURS
Refills: 0 | Status: DISCONTINUED | OUTPATIENT
Start: 2019-08-16 | End: 2019-08-19

## 2019-08-16 RX ADMIN — LORATADINE 10 MILLIGRAM(S): 10 TABLET ORAL at 12:52

## 2019-08-16 RX ADMIN — BUDESONIDE AND FORMOTEROL FUMARATE DIHYDRATE 2 PUFF(S): 160; 4.5 AEROSOL RESPIRATORY (INHALATION) at 08:47

## 2019-08-16 RX ADMIN — POLYETHYLENE GLYCOL 3350 17 GRAM(S): 17 POWDER, FOR SOLUTION ORAL at 05:49

## 2019-08-16 RX ADMIN — Medication 2000 UNIT(S): at 17:58

## 2019-08-16 RX ADMIN — LAMOTRIGINE 100 MILLIGRAM(S): 25 TABLET, ORALLY DISINTEGRATING ORAL at 16:14

## 2019-08-16 RX ADMIN — MORPHINE SULFATE 15 MILLIGRAM(S): 50 CAPSULE, EXTENDED RELEASE ORAL at 22:15

## 2019-08-16 RX ADMIN — Medication 20 MILLIGRAM(S): at 12:52

## 2019-08-16 RX ADMIN — QUETIAPINE FUMARATE 25 MILLIGRAM(S): 200 TABLET, FILM COATED ORAL at 12:53

## 2019-08-16 RX ADMIN — PANTOPRAZOLE SODIUM 40 MILLIGRAM(S): 20 TABLET, DELAYED RELEASE ORAL at 05:50

## 2019-08-16 RX ADMIN — Medication 650 MILLIGRAM(S): at 23:50

## 2019-08-16 RX ADMIN — Medication 10 MILLIGRAM(S): at 16:14

## 2019-08-16 RX ADMIN — POLYETHYLENE GLYCOL 3350 17 GRAM(S): 17 POWDER, FOR SOLUTION ORAL at 16:15

## 2019-08-16 RX ADMIN — LAMOTRIGINE 100 MILLIGRAM(S): 25 TABLET, ORALLY DISINTEGRATING ORAL at 05:50

## 2019-08-16 RX ADMIN — APIXABAN 5 MILLIGRAM(S): 2.5 TABLET, FILM COATED ORAL at 16:14

## 2019-08-16 RX ADMIN — Medication 50 MILLIGRAM(S): at 12:00

## 2019-08-16 RX ADMIN — Medication 10 MILLIGRAM(S): at 12:52

## 2019-08-16 RX ADMIN — GABAPENTIN 400 MILLIGRAM(S): 400 CAPSULE ORAL at 05:50

## 2019-08-16 RX ADMIN — Medication 25 MILLIGRAM(S): at 16:15

## 2019-08-16 RX ADMIN — Medication 650 MILLIGRAM(S): at 16:57

## 2019-08-16 RX ADMIN — DOFETILIDE 250 MICROGRAM(S): 0.25 CAPSULE ORAL at 08:46

## 2019-08-16 RX ADMIN — MORPHINE SULFATE 15 MILLIGRAM(S): 50 CAPSULE, EXTENDED RELEASE ORAL at 16:13

## 2019-08-16 RX ADMIN — MORPHINE SULFATE 15 MILLIGRAM(S): 50 CAPSULE, EXTENDED RELEASE ORAL at 08:47

## 2019-08-16 RX ADMIN — Medication 10 MILLIGRAM(S): at 23:51

## 2019-08-16 RX ADMIN — Medication 10 MILLIGRAM(S): at 05:50

## 2019-08-16 RX ADMIN — Medication 25 MILLIGRAM(S): at 05:50

## 2019-08-16 RX ADMIN — TAMSULOSIN HYDROCHLORIDE 0.4 MILLIGRAM(S): 0.4 CAPSULE ORAL at 21:32

## 2019-08-16 RX ADMIN — APIXABAN 5 MILLIGRAM(S): 2.5 TABLET, FILM COATED ORAL at 05:50

## 2019-08-16 RX ADMIN — Medication 2 SPRAY(S): at 12:52

## 2019-08-16 RX ADMIN — MORPHINE SULFATE 15 MILLIGRAM(S): 50 CAPSULE, EXTENDED RELEASE ORAL at 16:57

## 2019-08-16 RX ADMIN — Medication 81 MILLIGRAM(S): at 12:52

## 2019-08-16 RX ADMIN — BUDESONIDE AND FORMOTEROL FUMARATE DIHYDRATE 2 PUFF(S): 160; 4.5 AEROSOL RESPIRATORY (INHALATION) at 20:44

## 2019-08-16 RX ADMIN — ATORVASTATIN CALCIUM 40 MILLIGRAM(S): 80 TABLET, FILM COATED ORAL at 21:31

## 2019-08-16 RX ADMIN — MORPHINE SULFATE 15 MILLIGRAM(S): 50 CAPSULE, EXTENDED RELEASE ORAL at 21:29

## 2019-08-16 RX ADMIN — MORPHINE SULFATE 15 MILLIGRAM(S): 50 CAPSULE, EXTENDED RELEASE ORAL at 09:57

## 2019-08-16 RX ADMIN — GABAPENTIN 400 MILLIGRAM(S): 400 CAPSULE ORAL at 12:53

## 2019-08-16 RX ADMIN — QUETIAPINE FUMARATE 50 MILLIGRAM(S): 200 TABLET, FILM COATED ORAL at 21:31

## 2019-08-16 RX ADMIN — Medication 650 MILLIGRAM(S): at 16:19

## 2019-08-16 RX ADMIN — GABAPENTIN 400 MILLIGRAM(S): 400 CAPSULE ORAL at 21:31

## 2019-08-16 RX ADMIN — DOFETILIDE 250 MICROGRAM(S): 0.25 CAPSULE ORAL at 20:44

## 2019-08-16 NOTE — PROGRESS NOTE ADULT - SUBJECTIVE AND OBJECTIVE BOX
, CUATE  48y  Male  ***My note supersedes ALL resident notes that I sign.  My corrections for their notes are in my note.***    I can be reached directly on [employer] 9323. My office number is [phone]. My personal cell number is [phone].    INTERVAL EVENTS: Here for f/u of MS washburn. Pt is stable. No complaints except for on/off leg spasms. Pt can walk w/ walker and asst. Vision is better and stable. No CP or SOB.    T(F): 96.8 (08-16-19 @ 05:05), Max: 98.1 (08-15-19 @ 21:28)  HR: 60 (08-16-19 @ 05:05) (60 - 62)  BP: 114/64 (08-16-19 @ 05:05) (114/64 - 125/59)  RR: 20 (08-16-19 @ 05:05) (20 - 20)  SpO2: 96% (08-16-19 @ 00:33) (96% - 96%)    Gen: very pleasant; NAD; does not seem in pain   HEENT: + NC O2; PERRL; EOMI; scl white; cornea clr, nl; conj are nl; nose clr; mouth clr  Neck: no nodes; no JVD  Chest: lt PPM/AICD  lungs: clr, no wheeze, no crackles  Hrt: s1 s2 rrr no murmur  Abd soft NT/ND   - no chapin  Ext: no edema, no c/c  Neuro: CN intact; moves all 4 ext    LABS:                        13.0    (    89.7   10.30 )-----------( ---------      247      ( 16 Aug 2019 08:40 )             39.4    (    13.5     143   (   104   (   100      08-16-19 @ 08:40  ----------------------               3.9   (   26   (   27                             -----                        1.0  Ca  9.4   Mg  2.2    P   --     LFT  6.4  (  0.2  (  10       08-16-19 @ 08:40  -------------------------  4.3  (  135  (  15    CAPILLARY BLOOD GLUCOSE  POCT Blood Glucose.: 113 (08-16-19 @ 11:04)  POCT Blood Glucose.: 87 (08-16-19 @ 07:41)  POCT Blood Glucose.: 131 (08-15-19 @ 21:11)  POCT Blood Glucose.: 135 (08-15-19 @ 16:55)  POCT Blood Glucose.: 202 (08-15-19 @ 11:33)  POCT Blood Glucose.: 201 (08-15-19 @ 09:33)  POCT Blood Glucose.: 116 (08-14-19 @ 21:50)  POCT Blood Glucose.: 102 (08-14-19 @ 17:03)    RADIOLOGY & ADDITIONAL TESTS:      MEDICATIONS:    acetaminophen   Tablet .. 650 milliGRAM(s) Oral every 6 hours PRN  apixaban 5 milliGRAM(s) Oral every 12 hours  aspirin  chewable 81 milliGRAM(s) Oral daily  atorvastatin 40 milliGRAM(s) Oral at bedtime  baclofen 10 milliGRAM(s) Oral four times a day  buDESOnide 160 MICROgram(s)/formoterol 4.5 MICROgram(s) Inhaler 2 Puff(s) Inhalation two times a day  chlorhexidine 4% Liquid 1 Application(s) Topical <User Schedule>  dofetilide 250 MICROGram(s) Oral two times a day  fluticasone propionate (50 MICROgram(s)/actuation) Nasal Spray - Peds 2 Spray(s) Alternating Nostrils daily  furosemide    Tablet 20 milliGRAM(s) Oral <User Schedule>  furosemide    Tablet 40 milliGRAM(s) Oral <User Schedule>  gabapentin 400 milliGRAM(s) Oral three times a day  ketoconazole 2% Shampoo 1 Application(s) Topical daily  lamoTRIgine 100 milliGRAM(s) Oral two times a day  loratadine 10 milliGRAM(s) Oral daily  methylPREDNISolone sodium succinate IVPB 1000 milliGRAM(s) IV Intermittent daily  metoprolol tartrate 25 milliGRAM(s) Oral two times a day  morphine  IR 15 milliGRAM(s) Oral every 4 hours PRN  pantoprazole    Tablet 40 milliGRAM(s) Oral before breakfast  polyethylene glycol 3350 17 Gram(s) Oral every 12 hours  QUEtiapine 25 milliGRAM(s) Oral daily  QUEtiapine 50 milliGRAM(s) Oral at bedtime  tamsulosin 0.4 milliGRAM(s) Oral at bedtime  tiotropium 18 MICROgram(s) Capsule 1 Capsule(s) Inhalation daily

## 2019-08-16 NOTE — PROGRESS NOTE ADULT - ASSESSMENT
The patient is a 49 y/o M w/ a PMH of Multiple Sclerosis, V-tach s/p AICD, CHF, A fib/flutter (on Eliquis), HTN, COPD (on O2 PRN), DLD, hx of seizures (on Lamotrigine), PTSD, depression, ? stroke/TIA, esophageal stricture, chronic sore throat/sinusitis, GERD, hemopneumothorax (2006), pericardial effusion (2006), cardiomyopathy, MI (2006, 2008), IBS, presenting for shortness of breath and generalized muscle cramping and spasms assoc w/ blurry vision, admitted for multiple sclerosis flare.    #Muscle cramps/spasms, blurry vision, SOB likely due to MS flare  - CXR on admission showed low lung volumes but was otherwise unremarkable, WBC 7.25  - s/p Solumedrol 1 G x2, to receive 3rd dose today, 2 more remain afterwards (8/17, 818)  - Per Pain Management - c/w Gabapentin, initiate Tylenol 650 mg q6 and Morphine 15 mg q4, d/c Hydromorphone  - Per Neuro - c/w Solumedrol 1 G q24 x 5 days, then taper to PO steroids  - Pt takes Tecfidera 240 mg PO qd but is nonformulary, will not be receiving it in the hospital  - Per pt AICD is not MRI compatible, cannot order MRI to assess for new plaques     #Inadequate [facility] at [facility]  - Pt states he does not receive his medications appropriately and does not see his Neurologist when requested  - Would like to be transferred to another facility  - Social Work consult pending    # HTN  - Metoprolol tartrate 25 mg PO BID    # H/o v-tach, CHF s/p AICD  - Metoprolol tartrate 25 mg PO BID  - Lasix qD (20 mg M/W/F, 40 mg T/R/S)  - EPS consulted for AICD interrogation - report pending    # H/o MI 2006, 2008  - Atorvastatin 40 mg PO qHS and Aspirin 81 mg PO qD    # H/o seizure disorder  - Lamotrigine 100 mg PO BID  - Pt states he is in the process of being weaned down on his seizure medications    # COPD on home O2  - O2 PRN to keep SpO2 > 92 %  - Symbicort high dose 2 puffs BID, Spiriva qd    # Chronic sore throat, sinusitis  - Claritin, Flonase    # DLD  - Atorvastatin 40 mg PO qHS    # GERD  - Pantoprazole 40 mg PO qAM (patient takes omeprazole at home)    # H/o esophageal stricture  - Pt states Dr. Perez suggested evaluation w/ Barium swallow; was cutting up regular food w/o difficulty  - On mechanical soft dysphagia 2 w/ thins    # DVT prophylaxis: Eliquis   # GI prophylaxis: Protonix 40 mg PO qd  # Activity: ambulate with assistance  # Diet: mechanical soft dysphagia 2 w/ thins  # Disposition:  patient from [facility], [place], operated by [facility]; functional at baseline w/ walker

## 2019-08-16 NOTE — PROGRESS NOTE ADULT - ASSESSMENT
The patient is a 49 y/o M w/ a PMH of v-tach s/p AICD, CHF, A fib/flutter, HTN, COPD (on O2 PRN), DLD, multiple sclerosis w/ multiple admissions for flares, seizure disorder (on lamotrigine), PTSD, depression, ? stroke/TIA, esophageal stricture, chronic sore throat/sinusitis, GERD, hemopneumothorax (2006), pericardial effusion (2006), cardiomyopathy, MI (2006, 2008), IBS, presenting for shortness of breath and generalized muscle cramping w/ blurry vision, admitted for multiple sclerosis flare.    # Acute Multiple Sclerosis flare, w/ lt side optic neuritis w/ resolved central vision loss and improved headache  improving s/p Solumedrol 1 g IV - to get 5 doses (8/13, 8/15 and then 8/16-17 & 18)  Has defibrillator, NOT MRI compatible as per patient.   Patient takes Tecfidera 240 mg PO qD, but is apparently non-formulary here; f/u w/ neuro    # Painful leg spasms  Pain Mngmt w/ Dr Reece - NP's note reviewed  C/w baclofen, gabapentin  make Tylenol 650mg po q6 RTC  d/c Dilaudid and use MS IR 15mg po q4 PRN pain  bowel regimen  Ambulate w/ assistance (patient uses walker at baseline)    # HTN - well controlled  C/w metoprolol tartrate 25 mg PO BID  C/w Lasix qD (20 mg M/W/F, 40 mg T/Th/Sa)    # H/o v-tach, chr sys CHF (stable, s/p AICD/PPM; CAD w/ MI (2006 & 2008); AFib  C/w metoprolol tartrate 25 mg PO BID  C/w Lasix qD (20 mg M/W/F, 40 mg T/R/S)  C/w aspirin 81 mg PO qD - cardio should consider d/cing this, since he is on a/c and CAD is stable  on dofetilide and eliquis  EP eval - Dr Quiñones to eval PPM/AICD fxn (in progress)    # DLD  C/w atorvastatin 40 mg PO qHS    # H/o seizure disorder  Patient states he is in the process of being weaned down on his seizure medications  C/w lamotrigine 100 mg PO BID  f/u w/ neuro    # COPD on home O2 - uses on/off  C/w NC O2 as needed  CXR fine; no need for pulm eval (d/w pt)  Symbicort 2 puffs BID  spiriva 1 puff po q24    # Chronic sore throat, sinusitis  C/w Cepacol, Claritin, Flonase    # GERD  C/w pantoprazole 40 mg PO qAM (patient takes omeprazole at home)    # H/o esophageal stricture  Start mechanical soft dysphagia 2 w/ thins    # DVT ppx- C/w Eliquis 5 mg PO BID    # GI ppx- c/w pantoprazole 40 mg PO qAM (on omeprazole at home)    # Activity- ambulate w/ assistance; rehab eval    # Code status: see MOLST form - pt would accept all treatments, but on a trial basis; his brother Zana is his HCP (1 of his sisters is the tiny HCP)    # Dispo- complete solumedrol by SUN; f/u w/ neuro, pain and EPS; eventually back to [facility] (prob Mon) - f/u w/ SW (? alternate placement?)

## 2019-08-16 NOTE — PHARMACOTHERAPY INTERVENTION NOTE - COMMENTS
I spoke to md, he is aware of allergy. He said the patient received before , he is ok, and he will monitor the patient.

## 2019-08-16 NOTE — PROGRESS NOTE ADULT - ASSESSMENT
Assessment: The patient is a 49 y/o M w/ a PMH of v-tach s/p AICD, CHF, A fib/flutter,   HTN, COPD (on O2 PRN), DLD, multiple sclerosis w/ multiple admissions for   flares, seizure disorder (on lamotrigine), PTSD, depression, ? stroke/TIA,   esophageal stricture, chronic sore throat/sinusitis, GERD,   hemopneumothorax (2006), pericardial effusion (2006), cardiomyopathy, MI   (2006, 2008), IBS, presenting for shortness of breath and generalized   muscle cramping w/ blurry vision, admitted for presumed multiple sclerosis flare. Pt has had some improvement in symptoms s/p 2 doses of solumedrol.  Recommendations:   -	Continue IV solumedrol 1 gram /day with GI prophylaxis 8/16/-8/18 for total of 5 doses.    This can be followed with short oral prednisone taper.   -	After discharge, resume Tecfidera 240 mg QD for the first week and then can start 240 mg BID or 120mg BID thereafter  -	Vitamin D 25 hydroxy level of 16 (VIt D deficiency)-> Replete  -	Outpatient neurologic f/u in 4-6 weeks Assessment: The patient is a 47 y/o M w/ a PMH of v-tach s/p AICD, CHF, A fib/flutter,   HTN, COPD (on O2 PRN), DLD, multiple sclerosis w/ multiple admissions for   flares, seizure disorder (on lamotrigine), PTSD, depression, ? stroke/TIA,   esophageal stricture, chronic sore throat/sinusitis, GERD,   hemopneumothorax (2006), pericardial effusion (2006), cardiomyopathy, MI   (2006, 2008), IBS, presenting for shortness of breath and generalized   muscle cramping w/ blurry vision, admitted for presumed multiple sclerosis flare. Pt has had some improvement in symptoms s/p 2 doses of solumedrol.  Recommendations:   -	Continue IV solumedrol 1 gram /day with GI prophylaxis 8/16/-8/18 for total of 5 doses.    This can be followed with short oral prednisone taper.   -	After discharge, resume Tecfidera 240 mg QD for the first week and then can start 240 mg BID   -	Vitamin D 25 hydroxy level of 16 (VIt D deficiency)-> Replete  -	Outpatient neurologic f/u in 4-6 weeks

## 2019-08-16 NOTE — PROGRESS NOTE ADULT - SUBJECTIVE AND OBJECTIVE BOX
History of Present Illness: The patient is a 49 y/o M w/ a PMH of v-tach s/p AICD, CHF, A fib/flutter, HTN, COPD (on O2 PRN), DLD, multiple sclerosis w/ multiple admissions for flares, seizure disorder (on lamotrigine), PTSD, depression, ? stroke/TIA, esophageal stricture, chronic sore throat/sinusitis, GERD, hemopneumothorax (2006), pericardial effusion (2006), cardiomyopathy, MI (2006, 2008), IBS, presenting for shortness of breath and generalized muscle cramping w/ blurry vision, admitted for multiple sclerosis flare.   Subjective: Pt reports improvement in his symptoms; he admits to only spasms in his lower back this morning but is presently feeling well. Pt reports that he feels his breathing has improved today. He is now off oxygen. He denies blurry vision now but admits to some blurry vision yesterday morning. He admits to his oxygen saturation dropping yesterday, cramping, and spasms when he walked with PT yesterday. Pt admits his pain is well controlled upon switch from dilaudid to morphine recommended by pain management yesterday and will be evaluated for baclofen pump. Pt admits to urinary incontinence overnight.   Objective   Vitals  Vital Signs Last 24 Hrs  T(C): 36.4 (16 Aug 2019 13:53), Max: 36.7 (15 Aug 2019 21:28)  T(F): 97.5 (16 Aug 2019 13:53), Max: 98.1 (15 Aug 2019 21:28)  HR: 60 (16 Aug 2019 13:53) (60 - 62)  BP: 106/63 (16 Aug 2019 13:53) (106/63 - 115/58)  BP(mean): --  RR: 18 (16 Aug 2019 13:53) (18 - 20)  SpO2: 96% (16 Aug 2019 00:33) (96% - 96%)  Labs   Vitamin D, 25-Hydroxy: 16                                 Rad   No new imaging     PE   Physical Exam:  Neurological exam:  Mental status- alert, oriented x3  YARELIS improved  5/5 UE bilaterally   5/5 LLE  4/5 RLE  Twitches/spasms in feet R>L; Spasticity R>L slightly improved   Slight improvement on finger to nose.

## 2019-08-16 NOTE — PROGRESS NOTE ADULT - SUBJECTIVE AND OBJECTIVE BOX
Patient Information:  CUATE HORNE / 48y / Male / MRN#:[id number]    Hospital Day: 3d    HPI:  The patient is a 49 y/o M w/ a PMH of Multiple Sclerosis, V-tach s/p AICD, CHF, A fib/flutter (on Eliquis), HTN, COPD (on O2 PRN), DLD, hx of seizures (on Lamotrigine), PTSD, depression, ? stroke/TIA, esophageal stricture, chronic sore throat/sinusitis, GERD, hemopneumothorax (2006), pericardial effusion (2006), cardiomyopathy, MI (2006, 2008), IBS, presenting for shortness of breath and generalized muscle cramping and spasms assoc w/ blurry vision, admitted for multiple sclerosis flare. The patient states that about 2 months ago he had an MS flare for which he received PO steroids, at [facility] where he resides, with improvement of sxs. He then periodically would have cramps in his legs, back, arms, and abdomen. About 2.5 weeks ago, the patient reports he gradually became short of breath and was having visual disturbances w/ central lack of vision, "like an eclipse" in the L eye and intermittent blurry vision in bilateral eyes. He also endorses intermittent loss of bowel/bladder incontinence. At baseline, pt walks with a walker and is occasionally unsteady. He reports a hx of falling in the [facility] facility.  He reports that he has a hx of dysphagia and occasionally feels that food is stuck in his throat. He eats a chopped diet at [facility]. He states that his GI (Dr. Perez) had requested an xray to evaluate dysphagia and that the nursing home would not administer the study. He is currently  and has 2 children, a son and daughter, both of whom he reports are healthy.  In the ED, pt received Solumedrol 1 G IV x1.    Interval History:  Patient seen and examined at bedside. Pt states he is feeling his muscle cramps/spasms returning in his back because he did not receive his 3rd dose of IV Solumedrol overnight. Pt is to receive his 3rd dose this morning. He denies blurry vision or lack of vision. He states that he walked with his walker to the bathroom yesterday evening but also had loss of bowel/bladder continence overnight.    Past Medical History:  PTSD (post-traumatic stress disorder)  Supraventricular arrhythmia  Cardiomyopathy  CHF (congestive heart failure)  Atrial fibrillation  Diabetes mellitus  Smoker  BPH (benign prostatic hyperplasia)  HTN (hypertension)  Seizures  Bipolar 1 disorder  Migraines  Pneumonia  MS (multiple sclerosis)  CAD (coronary artery disease)  Bipolar disorder  Anginal pain  Schizo affective schizophrenia  GERD (gastroesophageal reflux disease)  Seasonal allergies  HTN (hypertension)  Hypercholesteremia  COPD (chronic obstructive pulmonary disease)  Neuropathy  Atrial flutter  CVA (cerebral vascular accident)  TIA (transient ischemic attack)  Syncope  Chronic Hypotension  Hypotension  Peripheral Neuropathy  Human Immunodeficiency Virus (  S/P Implantation of Automatic  Personal History of Multiple S  Personal History of Mitral Nicki  Personal History of MI (Myocar  Personal History of Hypertensi  Personal History of Congestive  Personal History of Cardiomyop  Personal History of Atrial Fib  Personal History of Alcoholism  Clinical Depression    Past Surgical History:  H/O prior ablation treatment  Cardiac pacemaker recipient  H/O hernia repair  History of hip replacement  S/P Orchiectomy  SVT (Supraventricular Tachycar  GERD (Gastroesophageal Reflux  S/P Implantation of AICD    Allergies:  dexmethylphenidate (Unknown)  Dilantin (Rash)  dilantin, compazine, neurontin, ritalin, phenergan (Unknown)  Haldol (Unknown)  hydantoin derivatives (Other)  methylphenidate (Unknown)  Morphine Sulfate (Unknown)  phenytoin (Unknown)  prochlorperazine (Unknown)  promethazine (Dystonic RXN)  rash/hives (Anaphylaxis)  thioxanthenes (Unknown)    Medications:  PRN:  acetaminophen   Tablet .. 650 milliGRAM(s) Oral every 6 hours PRN Mild Pain (1 - 3)  morphine  IR 15 milliGRAM(s) Oral every 4 hours PRN Severe Pain (7 - 10)    Standing:  apixaban 5 milliGRAM(s) Oral every 12 hours  aspirin  chewable 81 milliGRAM(s) Oral daily  atorvastatin 40 milliGRAM(s) Oral at bedtime  baclofen 10 milliGRAM(s) Oral four times a day  buDESOnide 160 MICROgram(s)/formoterol 4.5 MICROgram(s) Inhaler 2 Puff(s) Inhalation two times a day  chlorhexidine 4% Liquid 1 Application(s) Topical <User Schedule>  dofetilide 250 MICROGram(s) Oral two times a day  fluticasone propionate (50 MICROgram(s)/actuation) Nasal Spray - Peds 2 Spray(s) Alternating Nostrils daily  furosemide    Tablet 20 milliGRAM(s) Oral <User Schedule>  furosemide    Tablet 40 milliGRAM(s) Oral <User Schedule>  gabapentin 400 milliGRAM(s) Oral three times a day  ketoconazole 2% Shampoo 1 Application(s) Topical daily  lamoTRIgine 100 milliGRAM(s) Oral two times a day  loratadine 10 milliGRAM(s) Oral daily  methylPREDNISolone sodium succinate IVPB 1000 milliGRAM(s) IV Intermittent daily  metoprolol tartrate 25 milliGRAM(s) Oral two times a day  pantoprazole    Tablet 40 milliGRAM(s) Oral before breakfast  polyethylene glycol 3350 17 Gram(s) Oral every 12 hours  QUEtiapine 25 milliGRAM(s) Oral daily  QUEtiapine 50 milliGRAM(s) Oral at bedtime  tamsulosin 0.4 milliGRAM(s) Oral at bedtime  tiotropium 18 MICROgram(s) Capsule 1 Capsule(s) Inhalation daily    Home:  acetaminophen 650 mg oral tablet: 650 milligram(s) orally 4 times a day, As Needed  aspirin 81 mg oral tablet, chewable: 1 tab(s) orally once a day  atorvastatin 40 mg oral tablet: 1 tab(s) orally once a day (at bedtime)  baclofen 10 mg oral tablet: 1 tab(s) orally 4 times a day  budesonide-formoterol 160 mcg-4.5 mcg/inh inhalation aerosol: 2 puff(s) inhaled 2 times a day  Cepacol Extra Strength Menthol 10 mg mucous membrane lozenge: 1 anderson(s) mucous membrane every 6 hours, As Needed  Claritin 10 mg oral tablet: 1 tab(s) orally once a day  Eliquis 5 mg oral tablet: 1 tab(s) orally 2 times a day  Flonase 50 mcg/inh nasal spray: 2 spray(s) nasal once a day  gabapentin 400 mg oral capsule: 1 cap(s) orally 3 times a day  HYDROmorphone 2 mg oral tablet: 1 tab(s) orally every 4 hours, As Needed  ketoconazole 2% topical shampoo: Apply topically to affected area once a day (in the evening)  lamoTRIgine 100 mg oral tablet: 1 tab(s) orally 2 times a day  Lasix 20 mg oral tablet: 1 tab(s) orally 3 times a week on monday, wednesday and friday  Lasix 40 mg oral tablet: 1 tab(s) orally 3 times a week on tuesday, thursday and saturday  Metoprolol Tartrate 25 mg oral tablet: 1 tab(s) orally 2 times a day  Mi-Acid oral suspension: 30 milliliter(s) orally 4 times a day, As Needed  Nitrostat 0.4 mg sublingual tablet: sublingual 3 times a day, As Needed; may repeat after 5 min until relief  omeprazole 20 mg oral delayed release capsule: 1 cap(s) orally 2 times a day  polyethylene glycol 3350 oral powder for reconstitution: 1 dose(s) orally once a day, As Needed  prazosin 2 mg oral capsule: 1 cap(s) orally once a day (at bedtime)  QUEtiapine 25 mg oral tablet: 1 tab(s) orally once a day  SEROquel 50 mg oral tablet: 1 tab(s) orally once a day (at bedtime)  tamsulosin 0.4 mg oral capsule: 1 cap(s) orally once a day  Tecfidera 240 mg oral delayed release capsule: 1 cap(s) orally once a day  Tikosyn 250 mcg oral capsule: 1 cap(s) orally 2 times a day    Vitals:  T(C): 36, Max: 36.7 (08-15-19 @ 21:28)  T(F): 96.8, Max: 98.1 (08-15-19 @ 21:28)  HR: 60 (60 - 62)  BP: 114/64 (114/64 - 125/59)  RR: 20 (20 - 20)  SpO2: 96% (96% - 96%)    Physical Exam:  General: Awake, Alert. Not in acute distress. Sitting up in bed, eating breakfast. On RA.  HEENT: Head NC/AT. YARELIS more severe in the L eye appreciated.  Heart: Regular rate and rhythm. AICD in L upper chest.  Lungs: Clear to auscultation bilaterally. No wheezes, rales or rhonchi.  Abdomen: Soft, nontender, nondistended, + BS.  Extremities: No edema in upper or lower extremities.  Neuro: AAOx3.     Labs:  CBC (08-16 @ 08:40)                        Hgb: 13.0<L>  WBC: 10.30 )---------------------( Plts: 247                              Hct: 39.4<L>    Chem (08-16 @ 08:40)  Na: 143  |     Cl: 104     |  BUN: 27<H>  -----------------------------------------< Gluc: 100<H>    K: 3.9   |  HCO3: 26  |  Cr: 1.0    Ca 9.4 (08-16 @ 08:40)  Mg 2.2 (08-16 @ 08:40)    LFTs (08-16 @ 08:40)  TPro 6.4  /  Alb 4.3  TBili 0.2  /  DBili x   AST 10  /  ALT 15  /  AlkPhos 135<H>

## 2019-08-17 LAB
GLUCOSE BLDC GLUCOMTR-MCNC: 123 MG/DL — HIGH (ref 70–99)
GLUCOSE BLDC GLUCOMTR-MCNC: 135 MG/DL — HIGH (ref 70–99)
GLUCOSE BLDC GLUCOMTR-MCNC: 159 MG/DL — HIGH (ref 70–99)
GLUCOSE BLDC GLUCOMTR-MCNC: 161 MG/DL — HIGH (ref 70–99)

## 2019-08-17 PROCEDURE — 99233 SBSQ HOSP IP/OBS HIGH 50: CPT

## 2019-08-17 PROCEDURE — 99222 1ST HOSP IP/OBS MODERATE 55: CPT

## 2019-08-17 RX ORDER — FUROSEMIDE 40 MG
20 TABLET ORAL DAILY
Refills: 0 | Status: DISCONTINUED | OUTPATIENT
Start: 2019-08-17 | End: 2019-08-19

## 2019-08-17 RX ORDER — METOPROLOL TARTRATE 50 MG
25 TABLET ORAL EVERY 8 HOURS
Refills: 0 | Status: DISCONTINUED | OUTPATIENT
Start: 2019-08-17 | End: 2019-08-19

## 2019-08-17 RX ADMIN — APIXABAN 5 MILLIGRAM(S): 2.5 TABLET, FILM COATED ORAL at 17:25

## 2019-08-17 RX ADMIN — QUETIAPINE FUMARATE 25 MILLIGRAM(S): 200 TABLET, FILM COATED ORAL at 12:10

## 2019-08-17 RX ADMIN — Medication 25 MILLIGRAM(S): at 16:55

## 2019-08-17 RX ADMIN — Medication 81 MILLIGRAM(S): at 12:09

## 2019-08-17 RX ADMIN — GABAPENTIN 400 MILLIGRAM(S): 400 CAPSULE ORAL at 13:23

## 2019-08-17 RX ADMIN — MORPHINE SULFATE 15 MILLIGRAM(S): 50 CAPSULE, EXTENDED RELEASE ORAL at 12:04

## 2019-08-17 RX ADMIN — Medication 650 MILLIGRAM(S): at 06:09

## 2019-08-17 RX ADMIN — Medication 40 MILLIGRAM(S): at 12:09

## 2019-08-17 RX ADMIN — KETOCONAZOLE 1 APPLICATION(S): 20 AEROSOL, FOAM TOPICAL at 13:24

## 2019-08-17 RX ADMIN — Medication 10 MILLIGRAM(S): at 12:09

## 2019-08-17 RX ADMIN — Medication 10 MILLIGRAM(S): at 17:24

## 2019-08-17 RX ADMIN — Medication 25 MILLIGRAM(S): at 05:44

## 2019-08-17 RX ADMIN — MORPHINE SULFATE 15 MILLIGRAM(S): 50 CAPSULE, EXTENDED RELEASE ORAL at 13:24

## 2019-08-17 RX ADMIN — Medication 650 MILLIGRAM(S): at 05:44

## 2019-08-17 RX ADMIN — LAMOTRIGINE 100 MILLIGRAM(S): 25 TABLET, ORALLY DISINTEGRATING ORAL at 17:24

## 2019-08-17 RX ADMIN — Medication 2000 UNIT(S): at 12:09

## 2019-08-17 RX ADMIN — LORATADINE 10 MILLIGRAM(S): 10 TABLET ORAL at 12:10

## 2019-08-17 RX ADMIN — Medication 25 MILLIGRAM(S): at 22:10

## 2019-08-17 RX ADMIN — Medication 650 MILLIGRAM(S): at 13:22

## 2019-08-17 RX ADMIN — APIXABAN 5 MILLIGRAM(S): 2.5 TABLET, FILM COATED ORAL at 05:44

## 2019-08-17 RX ADMIN — POLYETHYLENE GLYCOL 3350 17 GRAM(S): 17 POWDER, FOR SOLUTION ORAL at 17:25

## 2019-08-17 RX ADMIN — POLYETHYLENE GLYCOL 3350 17 GRAM(S): 17 POWDER, FOR SOLUTION ORAL at 05:43

## 2019-08-17 RX ADMIN — BUDESONIDE AND FORMOTEROL FUMARATE DIHYDRATE 2 PUFF(S): 160; 4.5 AEROSOL RESPIRATORY (INHALATION) at 20:48

## 2019-08-17 RX ADMIN — MORPHINE SULFATE 15 MILLIGRAM(S): 50 CAPSULE, EXTENDED RELEASE ORAL at 17:23

## 2019-08-17 RX ADMIN — Medication 650 MILLIGRAM(S): at 17:30

## 2019-08-17 RX ADMIN — DOFETILIDE 250 MICROGRAM(S): 0.25 CAPSULE ORAL at 20:47

## 2019-08-17 RX ADMIN — Medication 650 MILLIGRAM(S): at 12:08

## 2019-08-17 RX ADMIN — Medication 2 SPRAY(S): at 12:03

## 2019-08-17 RX ADMIN — Medication 50 MILLIGRAM(S): at 05:44

## 2019-08-17 RX ADMIN — GABAPENTIN 400 MILLIGRAM(S): 400 CAPSULE ORAL at 05:44

## 2019-08-17 RX ADMIN — GABAPENTIN 400 MILLIGRAM(S): 400 CAPSULE ORAL at 22:10

## 2019-08-17 RX ADMIN — LAMOTRIGINE 100 MILLIGRAM(S): 25 TABLET, ORALLY DISINTEGRATING ORAL at 05:44

## 2019-08-17 RX ADMIN — DOFETILIDE 250 MICROGRAM(S): 0.25 CAPSULE ORAL at 08:44

## 2019-08-17 RX ADMIN — Medication 10 MILLIGRAM(S): at 05:44

## 2019-08-17 RX ADMIN — TAMSULOSIN HYDROCHLORIDE 0.4 MILLIGRAM(S): 0.4 CAPSULE ORAL at 22:09

## 2019-08-17 RX ADMIN — MORPHINE SULFATE 15 MILLIGRAM(S): 50 CAPSULE, EXTENDED RELEASE ORAL at 18:34

## 2019-08-17 RX ADMIN — Medication 650 MILLIGRAM(S): at 17:24

## 2019-08-17 RX ADMIN — Medication 650 MILLIGRAM(S): at 00:42

## 2019-08-17 RX ADMIN — PANTOPRAZOLE SODIUM 40 MILLIGRAM(S): 20 TABLET, DELAYED RELEASE ORAL at 08:44

## 2019-08-17 RX ADMIN — ATORVASTATIN CALCIUM 40 MILLIGRAM(S): 80 TABLET, FILM COATED ORAL at 22:10

## 2019-08-17 RX ADMIN — QUETIAPINE FUMARATE 50 MILLIGRAM(S): 200 TABLET, FILM COATED ORAL at 22:10

## 2019-08-17 NOTE — PROGRESS NOTE ADULT - ASSESSMENT
The patient is a 47 y/o M w/ a PMH of v-tach s/p AICD, CHF, A fib/flutter, HTN, COPD (on O2 PRN), DLD, multiple sclerosis w/ multiple admissions for flares, seizure disorder (on lamotrigine), PTSD, depression, ? stroke/TIA, esophageal stricture, chronic sore throat/sinusitis, GERD, hemopneumothorax (2006), pericardial effusion (2006), cardiomyopathy, MI (2006, 2008), IBS, presenting for shortness of breath and generalized muscle cramping w/ blurry vision, admitted for multiple sclerosis flare.    # Acute Multiple Sclerosis flare, w/ lt side optic neuritis w/ resolved central vision loss and improved headache  improving s/p Solumedrol 1 g IV - to get 5 doses (8/13, 8/15 and then 8/16-17 & 18), followed by short taper: Pred 60mg q24 x2 days, then 40mg q24 x2, then 20mg q24 x2 and stop  Has defibrillator, NOT MRI compatible as per patient.   Patient takes Tecfidera 240 mg PO qD, but is apparently non-formulary here; neuro: resume as outpt    # Painful leg spasms - improving  Pain Mngmt w/ Dr Reece - NP's note reviewed  C/w baclofen, gabapentin  make Tylenol 650mg po q6 RTC  d/c Dilaudid and use MS IR 15mg po q4 PRN pain - using about 2-3x/day  bowel regimen  Ambulate w/ assistance (patient uses walker at baseline)    # H/o v-tach, chr sys CHF (stable, s/p AICD/PPM; CAD w/ MI (2006 & 2008); AFib  EP did inter: mostly SVT and AFib, some short NSVT  EP: cont BB and a/c; PPM battery has 3 more months - so this will be handled as outpt  Cardio: incr BB; decr lasix; check Echo (ordered); no invasive cardiac w/u planned  incr metoprolol tartrate 25 mg PO q8 and eventually to 50 bid (if BP tolerates)  make Lasix 20mg po q24   c/w aspirin 81 mg PO qD - cardio will decide on cont asa or not (d/w Dr Mitchell)  on dofetilide - cardio might also stop this, too - f/u w/ Dr Stephen blackmon    # HTN - well controlled  incr metoprolol tartrate 25 mg PO q8  lasix 20mg q24    # DLD  C/w atorvastatin 40 mg PO qHS    # H/o seizure disorder  Patient states he is in the process of being weaned down on his seizure medications  C/w lamotrigine 100 mg PO BID  f/u w/ neuro    # COPD on home O2 - uses on/off  C/w NC O2 as needed  CXR fine; no need for pulm eval (d/w pt)  Symbicort 2 puffs BID  spiriva 1 puff po q24    # Chronic sore throat, sinusitis  C/w Cepacol, Claritin, Flonase    # GERD  C/w pantoprazole 40 mg PO qAM (patient takes omeprazole at home)    # H/o esophageal stricture  Start mechanical soft dysphagia 2 w/ thins    # DVT ppx- C/w Eliquis 5 mg PO BID    # GI ppx- c/w pantoprazole 40 mg PO qAM (on omeprazole at home)    # Activity- ambulate w/ assistance; rehab eval    # Code status: see MOLST form - pt would accept all treatments, but on a trial basis; his brother Zana is his HCP (1 of his sisters is the alternate HCP)    # Dispo- complete solumedrol by [facility]; eventually back to [facility] (prob Mon) - f/u w/ SW (? alternate placement?) The patient is a 47 y/o M w/ a PMH of v-tach s/p AICD, CHF, A fib/flutter, HTN, COPD (on O2 PRN), DLD, multiple sclerosis w/ multiple admissions for flares, seizure disorder (on lamotrigine), PTSD, depression, ? stroke/TIA, esophageal stricture, chronic sore throat/sinusitis, GERD, hemopneumothorax (2006), pericardial effusion (2006), cardiomyopathy, MI (2006, 2008), IBS, presenting for shortness of breath and generalized muscle cramping w/ blurry vision, admitted for multiple sclerosis flare.    # Acute Multiple Sclerosis flare, w/ lt side optic neuritis w/ resolved central vision loss and improved headache  improving s/p Solumedrol 1 g IV - to get 5 doses (8/13, 8/15 and then 8/16-17 & 18), followed by short taper: Pred 60mg q24 x2 days, then 40mg q24 x2, then 20mg q24 x2 and stop  Has defibrillator, NOT MRI compatible as per patient.   Patient takes Tecfidera 240 mg PO qD, but is apparently non-formulary here; neuro: resume as outpt    # Painful leg spasms - improving  Pain Mngmt w/ Dr Reece - NP's note reviewed  C/w baclofen, gabapentin  make Tylenol 650mg po q6 RTC  d/c Dilaudid and use MS IR 15mg po q4 PRN pain - using about 2-3x/day  bowel regimen  Ambulate w/ assistance (patient uses walker at baseline)    # H/o v-tach, chr sys CHF (stable, s/p AICD/PPM; CAD w/ MI (2006 & 2008); AFib  EP did inter: mostly SVT and AFib, some short NSVT  EP: cont BB and a/c; PPM battery has 3 more months - so this will be handled as outpt  Cardio: incr BB; decr lasix; check Echo (ordered); no invasive cardiac w/u planned  incr metoprolol tartrate 25 mg PO q8 and eventually to 50 bid (if BP tolerates)  make Lasix 20mg po q24   c/w aspirin 81 mg PO qD - cardio will decide on cont asa or not (d/w Dr Mitchell)  on dofetilide - cardio might also stop this, too - f/u w/ Dr Goavami  cont eliquis    # Vit D Deficiency  give Vit D 2000 IU q24  recheck level in 2 mo    # HTN - well controlled  incr metoprolol tartrate 25 mg PO q8  lasix 20mg q24    # DLD  C/w atorvastatin 40 mg PO qHS    # H/o seizure disorder  Patient states he is in the process of being weaned down on his seizure medications  C/w lamotrigine 100 mg PO BID  f/u w/ neuro    # COPD on home O2 - uses on/off  C/w NC O2 as needed  CXR fine; no need for pulm eval (d/w pt)  Symbicort 2 puffs BID  spiriva 1 puff po q24    # Chronic sore throat, sinusitis  C/w Cepacol, Claritin, Flonase    # GERD  C/w pantoprazole 40 mg PO qAM (patient takes omeprazole at home)    # H/o esophageal stricture  Start mechanical soft dysphagia 2 w/ thins    # DVT ppx- C/w Eliquis 5 mg PO BID    # GI ppx- c/w pantoprazole 40 mg PO qAM (on omeprazole at home)    # Activity- ambulate w/ assistance; rehab eval    # Code status: see MOLST form - pt would accept all treatments, but on a trial basis; his brother Zana is his HCP (1 of his sisters is the alternate HCP)    # Dispo- complete solumedrol by [facility]; eventually back to [facility] (prob Mon) - f/u w/ SW (? alternate placement?)

## 2019-08-17 NOTE — CONSULT NOTE ADULT - ASSESSMENT
No clinical or paraclinical evidence of cardiomyopathy or ACS or CAD at this point  Euvolemic  SVT as fib/flutter. Post ablation, mainly in NSR. For NSVT already is on Tykosin  PM dependent on Atrial pacing, new interrogation and changes of the set up    in cardiac point decrease Lasix to 20 mg daily (normal EF)  increase Metoprolol to 25 mg TID, possibly 50 BID in the future  EP consult for the non ischemic, non cardiomyopathic supra ventricular and ventricular arrhythmias, for any possible further action, apparently AICD battery is at the end of life and he is asking to change his AICD to [employer]   repeat the echo to make sure EF is normal  no invasive cardiac w/u

## 2019-08-17 NOTE — CONSULT NOTE ADULT - ASSESSMENT
Impression: Impression:  AICD for ARVD   MS flare    Recommendation  Interrogation of AICD done  Neurology follow up for further management of MS Impression:  AICD placed in past for ARVD   MS flare    Recommendation  Interrogation of AICD done  Neurology follow up for further management of MS Impression:  AICD sp Interrogation. Multiple episodes of SVT and Afib were recorded. Sensor for P wave detection decreased by Dr Cassidy.   MS flare    Recommendation  Interrogation of AICD done  Continue with b blocker and Anticoagulation  Neurology follow up for further management of MS Impression:  AICD sp Interrogation. Multiple episodes of SVT and Afib were recorded. Sensor for P wave detection decreased by Dr Cassidy.   MS flare    Recommendation  Interrogation of AICD done  Continue with b blocker and Anticoagulation  Neurology follow up for further management of MS  3 more months left for the battery change for AICD. Patient to follow dr. Flowers for that.

## 2019-08-17 NOTE — CONSULT NOTE ADULT - ATTENDING COMMENTS
Patient examined this afternoon and has ocular findings, chronic YARELIS L > R, bilateral ataxia.  His spasticity did not seem severe.  Mani is experiencing MS exacerbation.  Agree with 5 days IV solumedrol 1 gram /day with GI prophylaxis.    This can be followed with short oral prednisone taper.  He should resume 240 mg /day of Tecfidera but okay for 120 mg bid which may be better tolerated.  Outpatient neurologic f/u in 4-6 weeks.  Please ensure patient does not have VIt D deficiency.
device interrogation shows multiple epsiodes of sustained SVT and PAF in July 2019  No evidence of sustained VT  Recommend continue medical therapy  continue Eliquis  ICD has 3.5 months to reach [place]  recommend outpatient follow-up with Dr. Quiñones in 2 months in [facility]

## 2019-08-17 NOTE — CONSULT NOTE ADULT - CONSULT REASON
AICD interrogation
Suspected MS Flare
arrhythmia, sob, cardiomyopathy
adv MS bad muscle spasms, requensting eval for baclofen pump

## 2019-08-17 NOTE — CONSULT NOTE ADULT - SUBJECTIVE AND OBJECTIVE BOX
Patient is a 48y old  Male who presents with a chief complaint of multiple sclerosis flare (16 Aug 2019 14:05)      HPI:  The patient is a 47 y/o M w/ a PMH of v-tach s/p AICD, CHF, A fib/flutter, HTN, COPD (on O2 PRN), DLD, multiple sclerosis w/ multiple admissions for flares, seizure disorder (on lamotrigine), PTSD, depression, ? stroke/TIA, esophageal stricture, chronic sore throat/sinusitis, GERD, hemopneumothorax (2006), pericardial effusion (2006), cardiomyopathy, MI (2006, 2008), IBS, presenting for shortness of breath and generalized muscle cramping w/ blurry vision, admitted for multiple sclerosis flare. The patient states that about 2 months ago he had an MS flare for which he received PO steroids; he then periodically would have cramps in his legs, back, arms, and abdomen. About 2.5 weeks ago, the patient reports he gradually became short of breath and was having visual disturbances w/ central lack of vision, "like an eclipse;" the cramps had not resolved so he presented to the ED. In the ED he received Solumedrol 1 g IV x 1; last night he had an episode of palpitations which resolved. He also reports occasional bowel incontinence. At this time he denies chest pain but reports slight shortness of breath. He lives at [facility] but wants to transfer to a different facility; he reports that his GI (Dr. Perez) had requested an xray to evaluate dysphagia and that the nursing home would not administer the study. He is currently  and has 2 children, a son and daughter, both of whom he reports are healthy. (13 Aug 2019 11:22)      PAST MEDICAL & SURGICAL HISTORY:  PTSD (post-traumatic stress disorder)  Supraventricular arrhythmia: prior history  Cardiomyopathy  CHF (congestive heart failure)  Atrial fibrillation: s/p ablation, on eliquis  Diabetes mellitus: diet controlled  BPH (benign prostatic hyperplasia)  HTN (hypertension)  Seizures  Migraines  Pneumonia  MS (multiple sclerosis)  CAD (coronary artery disease)  Bipolar disorder  Anginal pain  Schizo affective schizophrenia  GERD (gastroesophageal reflux disease)  Seasonal allergies  Hypercholesteremia  COPD (chronic obstructive pulmonary disease)  CVA (cerebral vascular accident)  TIA (transient ischemic attack)  Syncope  Peripheral Neuropathy  Clinical Depression  H/O prior ablation treatment: for Afib  Cardiac pacemaker recipient  H/O hernia repair: right 1972,1991  History of hip replacement  S/P Orchiectomy: L for testicular CA  S/P Implantation of AICD    FAMILY HISTORY:  Family history of colon cancer in mother  Family history of cardiomyopathy: Mother  Family history of cervical cancer (Mother)  Family history of early CAD (Mother)  :  No known cardiovacular family hisotry     ROS:  See HPI     Allergies    dexmethylphenidate (Unknown)  Dilantin (Rash)  dilantin, compazine, neurontin, ritalin, phenergan (Unknown)  Haldol (Unknown)  hydantoin derivatives (Other)  methylphenidate (Unknown)  Morphine Sulfate (Unknown)  phenytoin (Unknown)  prochlorperazine (Unknown)  promethazine (Dystonic RXN)  rash/hives (Anaphylaxis)  thioxanthenes (Unknown)        PHYSICAL EXAM    ICU Vital Signs Last 24 Hrs  T(C): 36.2 (17 Aug 2019 05:30), Max: 36.4 (16 Aug 2019 13:53)  T(F): 97.1 (17 Aug 2019 05:30), Max: 97.5 (16 Aug 2019 13:53)  HR: 60 (17 Aug 2019 05:30) (60 - 70)  BP: 121/76 (17 Aug 2019 05:30) (102/56 - 121/76)  RR: 18 (16 Aug 2019 21:15) (18 - 18)  SpO2: 93% (17 Aug 2019 09:46) (93% - 93%)      General: In NAD   HEENT:  HERLINDA              Lymphatic system: No cervical LN   Lungs: Bilateral BS  Cardiovascular: Regular  Gastrointestinal: Soft, Positive BS  Musculoskeletal: No clubbing.  Moves all extremities.  Full range of motion   Skin: Warm.  Intact  Neurological: No motor or sensory deficit       08-16-19 @ 07:01  -  08-17-19 @ 07:00  --------------------------------------------------------  IN:  Total IN: 0 mL    OUT:    Voided: 2450 mL  Total OUT: 2450 mL    Total NET: -2450 mL          LABS:                          13.0   10.30 )-----------( 247      ( 16 Aug 2019 08:40 )             39.4                                               08-16    143  |  104  |  27<H>  ----------------------------<  100<H>  3.9   |  26  |  1.0    Ca    9.4      16 Aug 2019 08:40  Mg     2.2     08-16    TPro  6.4  /  Alb  4.3  /  TBili  0.2  /  DBili  x   /  AST  10  /  ALT  15  /  AlkPhos  135<H>  08-16                                                                                           LIVER FUNCTIONS - ( 16 Aug 2019 08:40 )  Alb: 4.3 g/dL / Pro: 6.4 g/dL / ALK PHOS: 135 U/L / ALT: 15 U/L / AST: 10 U/L / GGT: x                                                                                                                                       X-Rays                                                                                     ECHO    MEDICATIONS  (STANDING):  acetaminophen   Tablet .. 650 milliGRAM(s) Oral every 6 hours  apixaban 5 milliGRAM(s) Oral every 12 hours  aspirin  chewable 81 milliGRAM(s) Oral daily  atorvastatin 40 milliGRAM(s) Oral at bedtime  baclofen 10 milliGRAM(s) Oral four times a day  buDESOnide 160 MICROgram(s)/formoterol 4.5 MICROgram(s) Inhaler 2 Puff(s) Inhalation two times a day  chlorhexidine 4% Liquid 1 Application(s) Topical <User Schedule>  cholecalciferol 2000 Unit(s) Oral daily  dofetilide 250 MICROGram(s) Oral two times a day  fluticasone propionate (50 MICROgram(s)/actuation) Nasal Spray - Peds 2 Spray(s) Alternating Nostrils daily  furosemide    Tablet 20 milliGRAM(s) Oral <User Schedule>  furosemide    Tablet 40 milliGRAM(s) Oral <User Schedule>  gabapentin 400 milliGRAM(s) Oral three times a day  ketoconazole 2% Shampoo 1 Application(s) Topical daily  lamoTRIgine 100 milliGRAM(s) Oral two times a day  loratadine 10 milliGRAM(s) Oral daily  methylPREDNISolone sodium succinate IVPB 1000 milliGRAM(s) IV Intermittent daily  metoprolol tartrate 25 milliGRAM(s) Oral two times a day  pantoprazole    Tablet 40 milliGRAM(s) Oral before breakfast  polyethylene glycol 3350 17 Gram(s) Oral every 12 hours  QUEtiapine 25 milliGRAM(s) Oral daily  QUEtiapine 50 milliGRAM(s) Oral at bedtime  tamsulosin 0.4 milliGRAM(s) Oral at bedtime  tiotropium 18 MICROgram(s) Capsule 1 Capsule(s) Inhalation daily    MEDICATIONS  (PRN):  morphine  IR 15 milliGRAM(s) Oral every 4 hours PRN Severe Pain (7 - 10) Patient is a 48y old  Male who presents with a chief complaint of multiple sclerosis flare (16 Aug 2019 14:05)      HPI:  The patient is a 47 y/o M w/ a PMH of v-tach s/p AICD, CHF, A fib/flutter, HTN, COPD (on O2 PRN), DLD, multiple sclerosis w/ multiple admissions for flares, seizure disorder (on lamotrigine), PTSD, depression, ? stroke/TIA, esophageal stricture, chronic sore throat/sinusitis, GERD, hemopneumothorax (2006), pericardial effusion (2006), cardiomyopathy, MI (2006, 2008), IBS, presenting for shortness of breath and generalized muscle cramping w/ blurry vision, admitted for multiple sclerosis flare. The patient states that about 2 months ago he had an MS flare for which he received PO steroids; he then periodically would have cramps in his legs, back, arms, and abdomen. About 2.5 weeks ago, the patient reports he gradually became short of breath and was having visual disturbances w/ central lack of vision, "like an eclipse;" the cramps had not resolved so he presented to the ED. In the ED he received Solumedrol 1 g IV x 1; last night he had an episode of palpitations which resolved. He also reports occasional bowel incontinence. At this time he denies chest pain but reports slight shortness of breath. He lives at [facility] but wants to transfer to a different facility; he reports that his GI (Dr. Perez) had requested an xray to evaluate dysphagia and that the nursing home would not administer the study. He is currently  and has 2 children, a son and daughter, both of whom he reports are healthy. (13 Aug 2019 11:22)      PAST MEDICAL & SURGICAL HISTORY:  PTSD (post-traumatic stress disorder)  Supraventricular arrhythmia: prior history  Cardiomyopathy  CHF (congestive heart failure)  Atrial fibrillation: s/p ablation, on eliquis  Diabetes mellitus: diet controlled  BPH (benign prostatic hyperplasia)  HTN (hypertension)  Seizures  Migraines  Pneumonia  MS (multiple sclerosis)  CAD (coronary artery disease)  Bipolar disorder  Anginal pain  Schizo affective schizophrenia  GERD (gastroesophageal reflux disease)  Seasonal allergies  Hypercholesteremia  COPD (chronic obstructive pulmonary disease)  CVA (cerebral vascular accident)  TIA (transient ischemic attack)  Syncope  Peripheral Neuropathy  Clinical Depression  H/O prior ablation treatment: for Afib  Cardiac pacemaker recipient  H/O hernia repair: right 1972,1991  History of hip replacement  S/P Orchiectomy: L for testicular CA  S/P Implantation of AICD    FAMILY HISTORY:  Family history of colon cancer in mother  Family history of cardiomyopathy: Mother  Family history of cervical cancer (Mother)  Family history of early CAD (Mother)  :  No known cardiovacular family hisotry     ROS:  See HPI     Allergies    dexmethylphenidate (Unknown)  Dilantin (Rash)  dilantin, compazine, neurontin, ritalin, phenergan (Unknown)  Haldol (Unknown)  hydantoin derivatives (Other)  methylphenidate (Unknown)  Morphine Sulfate (Unknown)  phenytoin (Unknown)  prochlorperazine (Unknown)  promethazine (Dystonic RXN)  rash/hives (Anaphylaxis)  thioxanthenes (Unknown)     Social History:  Social History (marital status, living situation, occupation, tobacco use, alcohol and drug use, and sexual history): Patient denies any alcohol use since 2008; reports he quit smoking cigarettes a year ago; denies any other drug use including cocaine and heroin.	     Tobacco Screening:  · Core Measure Site	No	  · Has the patient used tobacco in the past 30 days?	No	      PHYSICAL EXAM    ICU Vital Signs Last 24 Hrs  T(C): 36.2 (17 Aug 2019 05:30), Max: 36.4 (16 Aug 2019 13:53)  T(F): 97.1 (17 Aug 2019 05:30), Max: 97.5 (16 Aug 2019 13:53)  HR: 60 (17 Aug 2019 05:30) (60 - 70)  BP: 121/76 (17 Aug 2019 05:30) (102/56 - 121/76)  RR: 18 (16 Aug 2019 21:15) (18 - 18)  SpO2: 93% (17 Aug 2019 09:46) (93% - 93%)      General: In NAD   HEENT:  HERLINDA              Lymphatic system: No cervical LN   Lungs: Bilateral BS  Cardiovascular: Regular  Gastrointestinal: Soft, Positive BS  Musculoskeletal: No clubbing.  Moves all extremities.  Full range of motion   Skin: Warm.  Intact  Neurological: No motor or sensory deficit       08-16-19 @ 07:01  -  08-17-19 @ 07:00  --------------------------------------------------------  IN:  Total IN: 0 mL    OUT:    Voided: 2450 mL  Total OUT: 2450 mL    Total NET: -2450 mL          LABS:                          13.0   10.30 )-----------( 247      ( 16 Aug 2019 08:40 )             39.4                                               08-16    143  |  104  |  27<H>  ----------------------------<  100<H>  3.9   |  26  |  1.0    Ca    9.4      16 Aug 2019 08:40  Mg     2.2     08-16    TPro  6.4  /  Alb  4.3  /  TBili  0.2  /  DBili  x   /  AST  10  /  ALT  15  /  AlkPhos  135<H>  08-16                                                                                           LIVER FUNCTIONS - ( 16 Aug 2019 08:40 )  Alb: 4.3 g/dL / Pro: 6.4 g/dL / ALK PHOS: 135 U/L / ALT: 15 U/L / AST: 10 U/L / GGT: x                                                                                                                                       X-Rays                                                                                     ECHO    MEDICATIONS  (STANDING):  acetaminophen   Tablet .. 650 milliGRAM(s) Oral every 6 hours  apixaban 5 milliGRAM(s) Oral every 12 hours  aspirin  chewable 81 milliGRAM(s) Oral daily  atorvastatin 40 milliGRAM(s) Oral at bedtime  baclofen 10 milliGRAM(s) Oral four times a day  buDESOnide 160 MICROgram(s)/formoterol 4.5 MICROgram(s) Inhaler 2 Puff(s) Inhalation two times a day  chlorhexidine 4% Liquid 1 Application(s) Topical <User Schedule>  cholecalciferol 2000 Unit(s) Oral daily  dofetilide 250 MICROGram(s) Oral two times a day  fluticasone propionate (50 MICROgram(s)/actuation) Nasal Spray - Peds 2 Spray(s) Alternating Nostrils daily  furosemide    Tablet 20 milliGRAM(s) Oral <User Schedule>  furosemide    Tablet 40 milliGRAM(s) Oral <User Schedule>  gabapentin 400 milliGRAM(s) Oral three times a day  ketoconazole 2% Shampoo 1 Application(s) Topical daily  lamoTRIgine 100 milliGRAM(s) Oral two times a day  loratadine 10 milliGRAM(s) Oral daily  methylPREDNISolone sodium succinate IVPB 1000 milliGRAM(s) IV Intermittent daily  metoprolol tartrate 25 milliGRAM(s) Oral two times a day  pantoprazole    Tablet 40 milliGRAM(s) Oral before breakfast  polyethylene glycol 3350 17 Gram(s) Oral every 12 hours  QUEtiapine 25 milliGRAM(s) Oral daily  QUEtiapine 50 milliGRAM(s) Oral at bedtime  tamsulosin 0.4 milliGRAM(s) Oral at bedtime  tiotropium 18 MICROgram(s) Capsule 1 Capsule(s) Inhalation daily    MEDICATIONS  (PRN):  morphine  IR 15 milliGRAM(s) Oral every 4 hours PRN Severe Pain (7 - 10)

## 2019-08-17 NOTE — PROGRESS NOTE ADULT - SUBJECTIVE AND OBJECTIVE BOX
, CUATE  48y  Male  ***My note supersedes ALL resident notes that I sign.  My corrections for their notes are in my note.***    I can be reached directly on [employer]. My office number is [phone]. My personal cell number is [phone].    INTERVAL EVENTS: Here for f/u of Ms flare. Pt actually doing better. Spasms are improving (when present, spasms are mostly low back, thighs and neck into shoulders). Vision is back to baseline. Seen by cardio and EP. Pt working w/ SW re LT placement.     T(F): 97.1 (08-17-19 @ 05:30), Max: 97.5 (08-16-19 @ 13:53)  HR: 60 (08-17-19 @ 05:30) (60 - 70)  BP: 121/76 (08-17-19 @ 05:30) (102/56 - 121/76)  RR: 18 (08-16-19 @ 21:15) (18 - 18)  SpO2: 93% (08-17-19 @ 09:46) (93% - 93%)    Gen: very pleasant; NAD; does not seem in pain   HEENT: + NC O2; PERRL; EOMI; scl white; cornea clr, nl; conj are nl; nose clr; mouth clr  Neck: no nodes; no JVD  Chest: lt PPM/AICD  lungs: clr, no wheeze, no crackles  Hrt: s1 s2 rrr no murmur  Abd soft NT/ND   - no chapin  Ext: no edema, no c/c  Neuro: CN intact; moves all 4 ext    LABS:                        13.0    (    89.7   10.30 )-----------( ---------      247      ( 16 Aug 2019 08:40 )             39.4    (    13.5     Vitamin D, 25-Hydroxy (08.15.19 @ 08:06)    Vitamin D, 25-Hydroxy: 16: 30 - 80 ng/mL                      RADIOLOGY & ADDITIONAL TESTS:      MEDICATIONS:    acetaminophen   Tablet .. 650 milliGRAM(s) Oral every 6 hours  apixaban 5 milliGRAM(s) Oral every 12 hours  aspirin  chewable 81 milliGRAM(s) Oral daily  atorvastatin 40 milliGRAM(s) Oral at bedtime  baclofen 10 milliGRAM(s) Oral four times a day  buDESOnide 160 MICROgram(s)/formoterol 4.5 MICROgram(s) Inhaler 2 Puff(s) Inhalation two times a day  chlorhexidine 4% Liquid 1 Application(s) Topical <User Schedule>  cholecalciferol 2000 Unit(s) Oral daily  dofetilide 250 MICROGram(s) Oral two times a day  fluticasone propionate (50 MICROgram(s)/actuation) Nasal Spray - Peds 2 Spray(s) Alternating Nostrils daily  furosemide    Tablet 20 milliGRAM(s) Oral daily  gabapentin 400 milliGRAM(s) Oral three times a day  ketoconazole 2% Shampoo 1 Application(s) Topical daily  lamoTRIgine 100 milliGRAM(s) Oral two times a day  loratadine 10 milliGRAM(s) Oral daily  methylPREDNISolone sodium succinate IVPB 1000 milliGRAM(s) IV Intermittent daily  metoprolol tartrate 25 milliGRAM(s) Oral every 8 hours  morphine  IR 15 milliGRAM(s) Oral every 4 hours PRN  pantoprazole    Tablet 40 milliGRAM(s) Oral before breakfast  polyethylene glycol 3350 17 Gram(s) Oral every 12 hours  QUEtiapine 25 milliGRAM(s) Oral daily  QUEtiapine 50 milliGRAM(s) Oral at bedtime  tamsulosin 0.4 milliGRAM(s) Oral at bedtime  tiotropium 18 MICROgram(s) Capsule 1 Capsule(s) Inhalation daily

## 2019-08-17 NOTE — CONSULT NOTE ADULT - SUBJECTIVE AND OBJECTIVE BOX
Patient is a 48y old  Male who presents with a chief complaint of multiple sclerosis flare (17 Aug 2019 11:14)      HPI:   Patient with MS, NH resident, has not seen his cardiologist for a year, has not had AICD interrogation for a while (not clear to me why has AICD), admitted for the flare up of MS, but complaining of some SOB, palpitation, interrogation of AICD apparently found Afib, Aflutter NSVT, brief episodes, patient already takes Tykosin and Metoprolol, his latest EF was normal at 55-60%, normal stress test and apparently non ischemic cardiac Cath all within the last 2 years.      The patient is a 49 y/o M w/ a PMH of v-tach s/p AICD, CHF, A fib/flutter, HTN, COPD (on O2 PRN), DLD, multiple sclerosis w/ multiple admissions for flares, seizure disorder (on lamotrigine), PTSD, depression, ? CVA / TIA, esophageal stricture, chronic sore throat/sinusitis, GERD, hemopneumothorax (2006), pericardial effusion (2006), cardiomyopathy, MI (2006, 2008), IBS, presenting for shortness of breath and generalized muscle cramping w/ blurry vision, admitted for multiple sclerosis flare. The patient states that about 2 months ago he had an MS flare for which he received PO steroids; he then periodically would have cramps in his legs, back, arms, and abdomen. About 2.5 weeks ago, the patient reports he gradually became short of breath and was having visual disturbances w/ central lack of vision, "like an eclipse;" the cramps had not resolved so he presented to the ED. In the ED he received Solumedrol 1 g IV x 1; last night he had an episode of palpitations which resolved. He also reports occasional bowel incontinence. At this time he denies chest pain but reports slight shortness of breath. He [facility] at [facility] but wants to transfer to a different facility; he reports that his GI (Dr. Perez) had requested an x-ray to evaluate dysphagia and that the nursing home would not administer the study. He is currently  and has 2 children, a son and daughter, both of whom he reports are healthy. (13 Aug 2019 11:22)      PAST MEDICAL & SURGICAL HISTORY:  PTSD (post-traumatic stress disorder)  Supraventricular arrhythmia: prior history  Cardiomyopathy  CHF (congestive heart failure)  Atrial fibrillation: s/p ablation, on Eliquis  Diabetes mellitus: diet controlled  BPH (benign prostatic hyperplasia)  HTN (hypertension)  Seizures  Migraines  Pneumonia  MS (multiple sclerosis)  CAD (coronary artery disease)  Bipolar disorder  Anginal pain  Schizo affective schizophrenia  GERD (gastroesophageal reflux disease)  Seasonal allergies  Hypercholesteremia  COPD (chronic obstructive pulmonary disease)  CVA (cerebral vascular accident)  TIA (transient ischemic attack)  Syncope  Peripheral Neuropathy  Clinical Depression  H/O prior ablation treatment: for Afib  Cardiac pacemaker recipient  H/O hernia repair: right 1972,1991  History of hip replacement  S/P Orchiectomy: L for testicular CA  S/P Implantation of AICD      PREVIOUS DIAGNOSTIC TESTING:      ECHO  FINDINGS: < from: Transthoracic Echocardiogram (08.03.18 @ 12:00) >   1. Left ventricular ejection fraction, by visual estimation, is 55 to   60%.   2. Normal left ventricular size and wall thicknesses, with normal   systolic function.   3. Mild tricuspid regurgitation.    < end of copied text >      STRESS TEST  FINDINGS:    < from: NM Nuclear Stress Pharmacologic Multiple (04.21.18 @ 10:41) >  1. NORMAL LEXISCAN / REST MYOCARDIAL PERFUSION TOMOGRAPHY, WITH NO   EVIDENCE FOR ISCHEMIA DURING LEXISCAN INFUSION.   2. NORMAL RESTING LEFT VENTRICULAR WALL MOTION AND WALL THICKENING.  3. LEFT VENTRICULAR EJECTION FRACTION OF  61 % WHICH IS WITHIN RANGE OF   NORMAL.     < end of copied text >    CT SCAN:      < from: CT Chest w/ IV Cont (02.21.19 @ 18:59) >  Mediastinum: The aorta is normal in caliber. No significant anterior   mediastinal, axillary, supraclavicular, hilar, or subcarinal   lymphadenopathy is seen. The visualized thyroid gland is unremarkable.   The main pulmonary artery measures 3.4 cm, dilated.    Heart: Trace pericardial fluid, nonspecific finding. Normal cardiac   silhouette. The right ventricle is prominent.    < end of copied text >      CATHETERIZATION   FINDINGS: as per patient was done in the [facility] in 2017, no CAD, he was told minor plaque was detected.    MEDICATIONS  (STANDING):  acetaminophen   Tablet .. 650 milliGRAM(s) Oral every 6 hours  apixaban 5 milliGRAM(s) Oral every 12 hours  aspirin  chewable 81 milliGRAM(s) Oral daily  atorvastatin 40 milliGRAM(s) Oral at bedtime  baclofen 10 milliGRAM(s) Oral four times a day  buDESOnide 160 MICROgram(s)/formoterol 4.5 MICROgram(s) Inhaler 2 Puff(s) Inhalation two times a day  chlorhexidine 4% Liquid 1 Application(s) Topical <User Schedule>  cholecalciferol 2000 Unit(s) Oral daily  dofetilide 250 MICROGram(s) Oral two times a day  fluticasone propionate (50 MICROgram(s)/actuation) Nasal Spray - Peds 2 Spray(s) Alternating Nostrils daily  furosemide    Tablet 20 milliGRAM(s) Oral <User Schedule>  furosemide    Tablet 40 milliGRAM(s) Oral <User Schedule>  gabapentin 400 milliGRAM(s) Oral three times a day  ketoconazole 2% Shampoo 1 Application(s) Topical daily  lamoTRIgine 100 milliGRAM(s) Oral two times a day  loratadine 10 milliGRAM(s) Oral daily  methylPREDNISolone sodium succinate IVPB 1000 milliGRAM(s) IV Intermittent daily  metoprolol tartrate 25 milliGRAM(s) Oral two times a day  pantoprazole    Tablet 40 milliGRAM(s) Oral before breakfast  polyethylene glycol 3350 17 Gram(s) Oral every 12 hours  QUEtiapine 25 milliGRAM(s) Oral daily  QUEtiapine 50 milliGRAM(s) Oral at bedtime  tamsulosin 0.4 milliGRAM(s) Oral at bedtime  tiotropium 18 MICROgram(s) Capsule 1 Capsule(s) Inhalation daily    MEDICATIONS  (PRN):  morphine  IR 15 milliGRAM(s) Oral every 4 hours PRN Severe Pain (7 - 10)      FAMILY HISTORY:  Family history of colon cancer in mother  Family history of cardiomyopathy: Mother  Family history of cervical cancer (Mother)  Family history of early CAD (Mother)      SOCIAL HISTORY:  CIGARETTES: no  ALCOHOL: no  DRUGS: no                      REVIEW OF SYSTEMS:  CONSTITUTIONAL: No distress, Looks stable, in supine position, no dyspnea  NECK: No pain  RESPIRATORY: No cough, wheezing, states having shortness of breath  CARDIOVASCULAR: No chest pain, but palpitations, no leg swelling  GASTROINTESTINAL: No abdominal or epigastric pain. No nausea, vomiting, or hematemesis;  No melena.  NEUROLOGICAL: Occasional dizziness, no memory loss, loss of strength  SKIN: No itching, burning, rashes, or lesions   ENDOCRINE: No heat or cold intolerance  PSYCHIATRIC: depression, anxiety        Vital Signs Last 24 Hrs  T(C): 36.2 (17 Aug 2019 05:30), Max: 36.4 (16 Aug 2019 13:53)  T(F): 97.1 (17 Aug 2019 05:30), Max: 97.5 (16 Aug 2019 13:53)  HR: 60 (17 Aug 2019 05:30) (60 - 70)  BP: 121/76 (17 Aug 2019 05:30) (102/56 - 121/76)  BP(mean): --  RR: 18 (16 Aug 2019 21:15) (18 - 18)  SpO2: 93% (17 Aug 2019 09:46) (93% - 93%)                      PHYSICAL EXAM:  GENERAL: No distress, stable, looking the same as prior visits  HEAD:  Atraumatic, Normocephalic  NECK: Supple, No JVD, No Bruit  NERVOUS SYSTEM:  Alert, Awake, Oriented to time, place, person; Normal memory and speech; Normal motor Strength 5/5 B/L upper and lower extremities  CHEST/LUNG: Normal air entry to lung base bilaterally; No wheeze, crackle, rales, rhonchi  HEART: Regular heart beat, S1, A2, P2, No S3, No gallop, No murmur  ABDOMEN: Soft, Non tender, Non distended; Bowel sounds present  EXTREMITIES:  2+ Peripheral Pulses, No clubbing, No edema  SKIN: No rashes or lesions    ECG: < from: 12 Lead ECG (08.13.19 @ 04:47) >   Atrial-paced rhythm with prolonged AV conduction  Right bundle branch block    < end of copied text >      I&O's Detail    16 Aug 2019 07:01  -  17 Aug 2019 07:00  --------------------------------------------------------  IN:  Total IN: 0 mL    OUT:    Voided: 2450 mL  Total OUT: 2450 mL    Total NET: -2450 mL          LABS:                        13.0   10.30 )-----------( 247      ( 16 Aug 2019 08:40 )             39.4     08-16    143  |  104  |  27<H>  ----------------------------<  100<H>  3.9   |  26  |  1.0    Ca    9.4      16 Aug 2019 08:40  Mg     2.2     08-16    TPro  6.4  /  Alb  4.3  /  TBili  0.2  /  DBili  x   /  AST  10  /  ALT  15  /  AlkPhos  135<H>  08-16            I&O's Summary    16 Aug 2019 07:01  -  17 Aug 2019 07:00  --------------------------------------------------------  IN: 0 mL / OUT: 2450 mL / NET: -2450 mL        RADIOLOGY & ADDITIONAL STUDIES: < from: Xray Chest 1 View-PORTABLE IMMEDIATE (08.13.19 @ 05:52) >  No radiographic evidence of acute cardiopulmonary disease.    < end of copied text >

## 2019-08-18 LAB
ALBUMIN SERPL ELPH-MCNC: 4.3 G/DL — SIGNIFICANT CHANGE UP (ref 3.5–5.2)
ALP SERPL-CCNC: 124 U/L — HIGH (ref 30–115)
ALT FLD-CCNC: 14 U/L — SIGNIFICANT CHANGE UP (ref 0–41)
ANION GAP SERPL CALC-SCNC: 12 MMOL/L — SIGNIFICANT CHANGE UP (ref 7–14)
AST SERPL-CCNC: 12 U/L — SIGNIFICANT CHANGE UP (ref 0–41)
BASOPHILS # BLD AUTO: 0.01 K/UL — SIGNIFICANT CHANGE UP (ref 0–0.2)
BASOPHILS NFR BLD AUTO: 0.1 % — SIGNIFICANT CHANGE UP (ref 0–1)
BILIRUB SERPL-MCNC: 0.3 MG/DL — SIGNIFICANT CHANGE UP (ref 0.2–1.2)
BUN SERPL-MCNC: 35 MG/DL — HIGH (ref 10–20)
CALCIUM SERPL-MCNC: 9.4 MG/DL — SIGNIFICANT CHANGE UP (ref 8.5–10.1)
CHLORIDE SERPL-SCNC: 98 MMOL/L — SIGNIFICANT CHANGE UP (ref 98–110)
CO2 SERPL-SCNC: 27 MMOL/L — SIGNIFICANT CHANGE UP (ref 17–32)
CREAT SERPL-MCNC: 1 MG/DL — SIGNIFICANT CHANGE UP (ref 0.7–1.5)
EOSINOPHIL # BLD AUTO: 0 K/UL — SIGNIFICANT CHANGE UP (ref 0–0.7)
EOSINOPHIL NFR BLD AUTO: 0 % — SIGNIFICANT CHANGE UP (ref 0–8)
GLUCOSE BLDC GLUCOMTR-MCNC: 101 MG/DL — HIGH (ref 70–99)
GLUCOSE BLDC GLUCOMTR-MCNC: 144 MG/DL — HIGH (ref 70–99)
GLUCOSE BLDC GLUCOMTR-MCNC: 149 MG/DL — HIGH (ref 70–99)
GLUCOSE BLDC GLUCOMTR-MCNC: 159 MG/DL — HIGH (ref 70–99)
GLUCOSE SERPL-MCNC: 122 MG/DL — HIGH (ref 70–99)
HCT VFR BLD CALC: 39.6 % — LOW (ref 42–52)
HGB BLD-MCNC: 13 G/DL — LOW (ref 14–18)
IMM GRANULOCYTES NFR BLD AUTO: 1.4 % — HIGH (ref 0.1–0.3)
LYMPHOCYTES # BLD AUTO: 1.46 K/UL — SIGNIFICANT CHANGE UP (ref 1.2–3.4)
LYMPHOCYTES # BLD AUTO: 11.6 % — LOW (ref 20.5–51.1)
MAGNESIUM SERPL-MCNC: 2.6 MG/DL — HIGH (ref 1.8–2.4)
MCHC RBC-ENTMCNC: 29.2 PG — SIGNIFICANT CHANGE UP (ref 27–31)
MCHC RBC-ENTMCNC: 32.8 G/DL — SIGNIFICANT CHANGE UP (ref 32–37)
MCV RBC AUTO: 89 FL — SIGNIFICANT CHANGE UP (ref 80–94)
MONOCYTES # BLD AUTO: 0.73 K/UL — HIGH (ref 0.1–0.6)
MONOCYTES NFR BLD AUTO: 5.8 % — SIGNIFICANT CHANGE UP (ref 1.7–9.3)
NEUTROPHILS # BLD AUTO: 10.26 K/UL — HIGH (ref 1.4–6.5)
NEUTROPHILS NFR BLD AUTO: 81.1 % — HIGH (ref 42.2–75.2)
NRBC # BLD: 0 /100 WBCS — SIGNIFICANT CHANGE UP (ref 0–0)
PLATELET # BLD AUTO: 289 K/UL — SIGNIFICANT CHANGE UP (ref 130–400)
POTASSIUM SERPL-MCNC: 4.7 MMOL/L — SIGNIFICANT CHANGE UP (ref 3.5–5)
POTASSIUM SERPL-SCNC: 4.7 MMOL/L — SIGNIFICANT CHANGE UP (ref 3.5–5)
PROT SERPL-MCNC: 6.6 G/DL — SIGNIFICANT CHANGE UP (ref 6–8)
RBC # BLD: 4.45 M/UL — LOW (ref 4.7–6.1)
RBC # FLD: 13.4 % — SIGNIFICANT CHANGE UP (ref 11.5–14.5)
SODIUM SERPL-SCNC: 137 MMOL/L — SIGNIFICANT CHANGE UP (ref 135–146)
WBC # BLD: 12.64 K/UL — HIGH (ref 4.8–10.8)
WBC # FLD AUTO: 12.64 K/UL — HIGH (ref 4.8–10.8)

## 2019-08-18 PROCEDURE — 99233 SBSQ HOSP IP/OBS HIGH 50: CPT

## 2019-08-18 PROCEDURE — 93306 TTE W/DOPPLER COMPLETE: CPT | Mod: 26

## 2019-08-18 RX ADMIN — MORPHINE SULFATE 15 MILLIGRAM(S): 50 CAPSULE, EXTENDED RELEASE ORAL at 23:01

## 2019-08-18 RX ADMIN — Medication 10 MILLIGRAM(S): at 05:48

## 2019-08-18 RX ADMIN — Medication 10 MILLIGRAM(S): at 00:38

## 2019-08-18 RX ADMIN — LORATADINE 10 MILLIGRAM(S): 10 TABLET ORAL at 11:47

## 2019-08-18 RX ADMIN — Medication 650 MILLIGRAM(S): at 11:46

## 2019-08-18 RX ADMIN — POLYETHYLENE GLYCOL 3350 17 GRAM(S): 17 POWDER, FOR SOLUTION ORAL at 17:25

## 2019-08-18 RX ADMIN — DOFETILIDE 250 MICROGRAM(S): 0.25 CAPSULE ORAL at 21:02

## 2019-08-18 RX ADMIN — MORPHINE SULFATE 15 MILLIGRAM(S): 50 CAPSULE, EXTENDED RELEASE ORAL at 00:40

## 2019-08-18 RX ADMIN — Medication 25 MILLIGRAM(S): at 05:48

## 2019-08-18 RX ADMIN — APIXABAN 5 MILLIGRAM(S): 2.5 TABLET, FILM COATED ORAL at 17:25

## 2019-08-18 RX ADMIN — LAMOTRIGINE 100 MILLIGRAM(S): 25 TABLET, ORALLY DISINTEGRATING ORAL at 17:24

## 2019-08-18 RX ADMIN — Medication 20 MILLIGRAM(S): at 05:48

## 2019-08-18 RX ADMIN — KETOCONAZOLE 1 APPLICATION(S): 20 AEROSOL, FOAM TOPICAL at 11:47

## 2019-08-18 RX ADMIN — MORPHINE SULFATE 15 MILLIGRAM(S): 50 CAPSULE, EXTENDED RELEASE ORAL at 17:33

## 2019-08-18 RX ADMIN — MORPHINE SULFATE 15 MILLIGRAM(S): 50 CAPSULE, EXTENDED RELEASE ORAL at 11:43

## 2019-08-18 RX ADMIN — APIXABAN 5 MILLIGRAM(S): 2.5 TABLET, FILM COATED ORAL at 05:48

## 2019-08-18 RX ADMIN — QUETIAPINE FUMARATE 25 MILLIGRAM(S): 200 TABLET, FILM COATED ORAL at 11:45

## 2019-08-18 RX ADMIN — MORPHINE SULFATE 15 MILLIGRAM(S): 50 CAPSULE, EXTENDED RELEASE ORAL at 04:24

## 2019-08-18 RX ADMIN — MORPHINE SULFATE 15 MILLIGRAM(S): 50 CAPSULE, EXTENDED RELEASE ORAL at 23:35

## 2019-08-18 RX ADMIN — Medication 2000 UNIT(S): at 11:46

## 2019-08-18 RX ADMIN — ATORVASTATIN CALCIUM 40 MILLIGRAM(S): 80 TABLET, FILM COATED ORAL at 21:01

## 2019-08-18 RX ADMIN — Medication 650 MILLIGRAM(S): at 23:02

## 2019-08-18 RX ADMIN — GABAPENTIN 400 MILLIGRAM(S): 400 CAPSULE ORAL at 15:30

## 2019-08-18 RX ADMIN — Medication 650 MILLIGRAM(S): at 17:24

## 2019-08-18 RX ADMIN — Medication 50 MILLIGRAM(S): at 05:53

## 2019-08-18 RX ADMIN — Medication 10 MILLIGRAM(S): at 11:46

## 2019-08-18 RX ADMIN — Medication 650 MILLIGRAM(S): at 12:30

## 2019-08-18 RX ADMIN — Medication 650 MILLIGRAM(S): at 18:11

## 2019-08-18 RX ADMIN — Medication 10 MILLIGRAM(S): at 17:24

## 2019-08-18 RX ADMIN — PANTOPRAZOLE SODIUM 40 MILLIGRAM(S): 20 TABLET, DELAYED RELEASE ORAL at 08:32

## 2019-08-18 RX ADMIN — Medication 81 MILLIGRAM(S): at 11:46

## 2019-08-18 RX ADMIN — Medication 25 MILLIGRAM(S): at 21:01

## 2019-08-18 RX ADMIN — BUDESONIDE AND FORMOTEROL FUMARATE DIHYDRATE 2 PUFF(S): 160; 4.5 AEROSOL RESPIRATORY (INHALATION) at 21:02

## 2019-08-18 RX ADMIN — BUDESONIDE AND FORMOTEROL FUMARATE DIHYDRATE 2 PUFF(S): 160; 4.5 AEROSOL RESPIRATORY (INHALATION) at 08:32

## 2019-08-18 RX ADMIN — Medication 650 MILLIGRAM(S): at 05:48

## 2019-08-18 RX ADMIN — DOFETILIDE 250 MICROGRAM(S): 0.25 CAPSULE ORAL at 08:32

## 2019-08-18 RX ADMIN — TAMSULOSIN HYDROCHLORIDE 0.4 MILLIGRAM(S): 0.4 CAPSULE ORAL at 21:02

## 2019-08-18 RX ADMIN — Medication 10 MILLIGRAM(S): at 23:01

## 2019-08-18 RX ADMIN — Medication 650 MILLIGRAM(S): at 00:38

## 2019-08-18 RX ADMIN — QUETIAPINE FUMARATE 50 MILLIGRAM(S): 200 TABLET, FILM COATED ORAL at 21:01

## 2019-08-18 RX ADMIN — Medication 2 SPRAY(S): at 11:44

## 2019-08-18 RX ADMIN — MORPHINE SULFATE 15 MILLIGRAM(S): 50 CAPSULE, EXTENDED RELEASE ORAL at 06:10

## 2019-08-18 RX ADMIN — POLYETHYLENE GLYCOL 3350 17 GRAM(S): 17 POWDER, FOR SOLUTION ORAL at 05:53

## 2019-08-18 RX ADMIN — GABAPENTIN 400 MILLIGRAM(S): 400 CAPSULE ORAL at 21:02

## 2019-08-18 RX ADMIN — MORPHINE SULFATE 15 MILLIGRAM(S): 50 CAPSULE, EXTENDED RELEASE ORAL at 12:30

## 2019-08-18 RX ADMIN — MORPHINE SULFATE 15 MILLIGRAM(S): 50 CAPSULE, EXTENDED RELEASE ORAL at 04:54

## 2019-08-18 RX ADMIN — LAMOTRIGINE 100 MILLIGRAM(S): 25 TABLET, ORALLY DISINTEGRATING ORAL at 05:48

## 2019-08-18 RX ADMIN — GABAPENTIN 400 MILLIGRAM(S): 400 CAPSULE ORAL at 05:48

## 2019-08-18 NOTE — PROGRESS NOTE ADULT - ASSESSMENT
The patient is a 49 y/o M w/ a PMH of v-tach s/p AICD, CHF, A fib/flutter, HTN, COPD (on O2 PRN), DLD, multiple sclerosis w/ multiple admissions for flares, seizure disorder (on lamotrigine), PTSD, depression, ? stroke/TIA, esophageal stricture, chronic sore throat/sinusitis, GERD, hemopneumothorax (2006), pericardial effusion (2006), cardiomyopathy, MI (2006, 2008), IBS, presenting for shortness of breath and generalized muscle cramping w/ blurry vision, admitted for multiple sclerosis flare.    # Acute Multiple Sclerosis flare, w/ lt side optic neuritis w/ resolved central vision loss and improved headache  improving s/p Solumedrol 1 g IV - x 5 doses, tomorrow start short taper: Pred 60mg q24 x2 days, then 40mg q24 x2, then 20mg q24 x2 and stop  Has defibrillator, NOT MRI compatible as per patient.   Patient takes Tecfidera 240 mg PO qD, but is apparently non-formulary here; neuro: resume as outpt back at SNF    # Painful leg spasms - improving  Pain Mngmt w/ Dr Reece - NP's note reviewed  C/w baclofen, gabapentin  make Tylenol 650mg po q6 RTC  use MS IR 15mg po q4 PRN pain - using about 2-3x/day  bowel regimen  Ambulate w/ assistance (patient uses walker at baseline)    # H/o v-tach, chr sys CHF (stable, s/p AICD/PPM; CAD w/ MI (2006 & 2008); AFib  EP did inter: mostly SVT and AFib, some short NSVT  EP: cont BB and a/c; PPM battery has 3 more months - so this will be handled as outpt  Cardio: incr BB; decr lasix; check Echo (ordered); no invasive cardiac w/u planned  incr metoprolol tartrate 25 mg PO q8 and eventually to 50 bid (if BP tolerates)  make Lasix 20mg po q24   c/w aspirin 81 mg PO qD - cardio will decide on cont asa or not    on dofetilide - cardio might also stop this  cont eliqugato  f/u w/ Dr Haley after echo    # Vit D Deficiency  give Vit D 2000 IU q24  recheck level in 2 mo    # HTN - well controlled  incr metoprolol tartrate 25 mg PO q8  lasix 20mg q24    # DLD  C/w atorvastatin 40 mg PO qHS    # H/o seizure disorder  Patient states he is in the process of being weaned down on his seizure medications  C/w lamotrigine 100 mg PO BID  f/u w/ neuro    # COPD on home O2 - uses on/off  C/w NC O2 as needed  CXR fine; no need for pulm eval (d/w pt)  Symbicort 2 puffs BID  spiriva 1 puff po q24    # Chronic sore throat, sinusitis  C/w Cepacol, Claritin, Flonase    # GERD  C/w pantoprazole 40 mg PO qAM (patient takes omeprazole at home)    # h/o esophageal stricture  Start mechanical soft dysphagia 2 w/ thins    # DVT ppx- c/w Eliquis 5 mg PO BID    # GI ppx- c/w pantoprazole 40 mg PO qAM (on omeprazole at home)    # Activity- ambulate w/ assistance; rehab eval    # Code status: see MOLST form - pt would accept all treatments, but on a trial basis; his brother Zana is his HCP (1 of his sisters is the alternate HCP)    # Dispo- back to [facility] (prob Mon) - f/u w/ SW (? alternate placement?) - anticipate d/c bobbi The patient is a 47 y/o M w/ a PMH of v-tach s/p AICD, CHF, A fib/flutter, HTN, COPD (on O2 PRN), DLD, multiple sclerosis w/ multiple admissions for flares, seizure disorder (on lamotrigine), PTSD, depression, ? stroke/TIA, esophageal stricture, chronic sore throat/sinusitis, GERD, hemopneumothorax (2006), pericardial effusion (2006), cardiomyopathy, MI (2006, 2008), IBS, presenting for shortness of breath and generalized muscle cramping w/ blurry vision, admitted for multiple sclerosis flare.    # Acute Multiple Sclerosis flare, w/ lt side optic neuritis w/ resolved central vision loss and improved headache  improving s/p Solumedrol 1 g IV - x 5 doses, tomorrow start short taper: Pred 60mg q24 x2 days, then 40mg q24 x2, then 20mg q24 x2 and stop  Has defibrillator, NOT MRI compatible as per patient.   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)/PPM; NSTEMI based on enz (2002 & 2003); AFib  there has NEVER been CAD on caths or CTAs done in past - most recent look was 8/2018  EP did inter: mostly SVT and AFib, some short NSVT  EP: cont BB and a/c; PPM battery has 3 more months - so this will be handled as outpt  Cardio: incr BB; decr lasix; check Echo (ordered)  incr metoprolol tartrate 25 mg PO q8 and eventually to 50 bid (if BP tolerates)  make Lasix 20mg po q24   d/w cardio - d/c asa  cont dofetilide - per cardio, for AFib  cont eliquis  f/u w/ Dr Haley after echo    # Vit D Deficiency  give Vit D 2000 IU q24  recheck level in 2 mo    # HTN - well controlled  incr metoprolol tartrate 25 mg PO q8  lasix 20mg q24    # DLD  C/w atorvastatin 40 mg PO qHS    # H/o seizure disorder  Patient states he is in the process of being weaned down on his seizure medications  C/w lamotrigine 100 mg PO BID  f/u w/ neuro    # COPD on home O2 - uses on/off  C/w NC O2 as needed  CXR fine; no need for pulm eval (d/w pt)  Symbicort 2 puffs BID  spiriva 1 puff po q24    # Chronic sore throat, sinusitis  C/w Cepacol, Claritin, Flonase    # GERD  C/w pantoprazole 40 mg PO qAM (patient takes omeprazole at home)    # h/o esophageal stricture  Start mechanical soft dysphagia 2 w/ thins    # DVT ppx- c/w Eliquis 5 mg PO BID    # GI ppx- c/w pantoprazole 40 mg PO qAM (on omeprazole at home)    # Activity- ambulate w/ assistance; rehab eval    # Code status: see MOLST form - pt would accept all treatments, but on a trial basis; his brother Zana is his HCP (1 of his sisters is the alternate HCP)    # Dispo- back to [facility] (prob Mon) - f/u w/ SW (? alternate placement?) - anticipate d/c bobbi

## 2019-08-18 NOTE — PROGRESS NOTE ADULT - ASSESSMENT
The patient is a 47 y/o M w/ a PMH of Multiple Sclerosis, V-tach s/p AICD, CHF, A fib/flutter (on Eliquis), HTN, COPD (on O2 PRN), DLD, hx of seizures (on Lamotrigine), PTSD, depression, ? stroke/TIA, esophageal stricture, chronic sore throat/sinusitis, GERD, hemopneumothorax (2006), pericardial effusion (2006), cardiomyopathy, MI (2006, 2008), IBS, presenting for shortness of breath and generalized muscle cramping and spasms assoc w/ blurry vision, admitted for multiple sclerosis flare.    #Muscle cramps/spasms, blurry vision, SOB likely due to MS flare  - CXR on admission showed low lung volumes but was otherwise unremarkable, WBC 12.64  -  Solumedrol 1 G (dose #5), tomorrow start short taper: Pred 60mg q24 x2 days, then 40mg q24 x2, then 20mg q24 x2 and stop   - Per Pain Management Dr Reece - NP's note reviewed; C/w baclofen, gabapentin, make Tylenol 650mg po q6 RTC, use MS IR 15mg po q4 PRN pain - using about 2-3x/day  - Pt takes Tecfidera 240 mg PO qd but is nonformulary, will not be receiving it in the hospital  - Per pt AICD is not MRI compatible, cannot order MRI to assess for new plaques     # HTN  - Lasix 20 qd, Metoprolol 25 q8    # H/o v-tach, CHF s/p AICD  -EP did inter: mostly SVT and AFib, some short NSVT  -Per EP, continue BB and AC  -PPM battery has 3 more months, will follow up as outpatient for battery change  -Per cardio, continue BB and dec Lasix; d/c ASA  -Continue dofetilide    # H/o MI 2006, 2008  - Atorvastatin 40 mg PO qHS    # H/o seizure disorder  - Lamotrigine 100 mg PO BID  - Pt states he is in the process of being weaned down on his seizure medications    # COPD on home O2  - O2 PRN to keep SpO2 > 92 %  - Symbicort high dose 2 puffs BID, Spiriva qd    # Chronic sore throat, sinusitis  - Claritin, Flonase    # DLD  - Atorvastatin 40 mg PO qHS    # GERD  - Pantoprazole 40 mg PO qAM (patient takes omeprazole at home)    # H/o esophageal stricture  - Pt states Dr. Perez suggested evaluation w/ Barium swallow; was cutting up regular food w/o difficulty  - On mechanical soft dysphagia 2 w/ thins    # DVT prophylaxis: Eliquis   # GI prophylaxis: Protonix 40 mg PO qd  # Activity: ambulate with assistance  # Diet: mechanical soft dysphagia 2 w/ thins  # Disposition:  patient from [facility]; functional at baseline w/ walker

## 2019-08-18 NOTE — PROGRESS NOTE ADULT - SUBJECTIVE AND OBJECTIVE BOX
, CUATE  48y  Male  ***My note supersedes ALL resident notes that I sign.  My corrections for their notes are in my note.***    I can be reached directly on [id number]. My office number is [phone]. My personal cell number is [phone].    INTERVAL EVENTS: Here for f/u of MS flare. PT doing well. No complaints.    T(F): 96.9 (08-18-19 @ 05:26), Max: 97.2 (08-17-19 @ 13:35)  HR: 60 (08-18-19 @ 05:26) (60 - 61)  BP: 105/55 (08-18-19 @ 05:26) (105/55 - 121/75)  RR: 18 (08-17-19 @ 20:30) (18 - 18)  SpO2: 96% (08-18-19 @ 08:28) (93% - 96%)    Gen: NAD; does not seem in pain   HEENT: + NC O2; PERRL; EOMI; scl white; cornea clr, nl; conj are nl; nose clr; mouth clr  Neck: no nodes; no JVD  Chest: lt PPM/AICD  lungs: clr, no wheeze, no crackles  Hrt: s1 s2 rrr no murmur  Abd soft NT/ND   - no chapin  Ext: no edema, no c/c  Neuro: CN intact; moves all 4 ext    LABS:                        13.0    (    89.0   12.64 )-----------( ---------      289      ( 18 Aug 2019 06:48 )             39.6    (    13.4     WBC Count: 12.64 K/uL (08-18-19 @ 06:48) - on steroids  WBC Count: 10.30 K/uL (08-16-19 @ 08:40)  WBC Count: 7.35 K/uL (08-15-19 @ 08:06)  WBC Count: 12.62 K/uL (08-14-19 @ 05:37)    137   (   98   (   122      08-18-19 @ 06:48  ----------------------               4.7   (   27   (   35                             -----                        1.0  Ca  9.4   Mg  2.6    P   --     LFT  6.6  (  0.3  (  12       08-18-19 @ 06:48  -------------------------  4.3  (  124  (  14    RADIOLOGY & ADDITIONAL TESTS:      MEDICATIONS:    acetaminophen   Tablet .. 650 milliGRAM(s) Oral every 6 hours  apixaban 5 milliGRAM(s) Oral every 12 hours  aspirin  chewable 81 milliGRAM(s) Oral daily  atorvastatin 40 milliGRAM(s) Oral at bedtime  baclofen 10 milliGRAM(s) Oral four times a day  buDESOnide 160 MICROgram(s)/formoterol 4.5 MICROgram(s) Inhaler 2 Puff(s) Inhalation two times a day  chlorhexidine 4% Liquid 1 Application(s) Topical <User Schedule>  cholecalciferol 2000 Unit(s) Oral daily  dofetilide 250 MICROGram(s) Oral two times a day  fluticasone propionate (50 MICROgram(s)/actuation) Nasal Spray - Peds 2 Spray(s) Alternating Nostrils daily  furosemide    Tablet 20 milliGRAM(s) Oral daily  gabapentin 400 milliGRAM(s) Oral three times a day  ketoconazole 2% Shampoo 1 Application(s) Topical daily  lamoTRIgine 100 milliGRAM(s) Oral two times a day  loratadine 10 milliGRAM(s) Oral daily  metoprolol tartrate 25 milliGRAM(s) Oral every 8 hours  morphine  IR 15 milliGRAM(s) Oral every 4 hours PRN  pantoprazole    Tablet 40 milliGRAM(s) Oral before breakfast  polyethylene glycol 3350 17 Gram(s) Oral every 12 hours  QUEtiapine 25 milliGRAM(s) Oral daily  QUEtiapine 50 milliGRAM(s) Oral at bedtime  tamsulosin 0.4 milliGRAM(s) Oral at bedtime  tiotropium 18 MICROgram(s) Capsule 1 Capsule(s) Inhalation daily

## 2019-08-18 NOTE — PROGRESS NOTE ADULT - SUBJECTIVE AND OBJECTIVE BOX
Subjective:  no new event since yesterday  no distress, no orthopnea, no evidence or complaint of volume over load or chest discomfort, mainly was admitted for the progression of the MS  I reviewed his out patient chart and prior tests and records: hx of ARVD, s/p AICD for that, Paroxysmal A.fib with tachycardia, just detected in [facility] and prior to that PPM interrogation, takes Metoprolol and Tykosin, initially ASA was given but later on switched to DOAC, 2 times Hx of elevated cardiac enzymes, suggested MI, but prior cardiac cath and Coronary CTA (on August 2018) resulted normal calcium score 0 Agatston and normal coronaries, in multiple echoes (latest in 2018) EF reported 50-55%, ECG for years chronic A pacing with the RBBB pattern.  At one point he was advised A.fib ablation and even was prepared for it.  One of the (.11 responders, PTSD, Depression and anxiety was written in his record,   ,     MEDICATIONS  (STANDING):  acetaminophen   Tablet .. 650 milliGRAM(s) Oral every 6 hours  apixaban 5 milliGRAM(s) Oral every 12 hours  atorvastatin 40 milliGRAM(s) Oral at bedtime  baclofen 10 milliGRAM(s) Oral four times a day  buDESOnide 160 MICROgram(s)/formoterol 4.5 MICROgram(s) Inhaler 2 Puff(s) Inhalation two times a day  chlorhexidine 4% Liquid 1 Application(s) Topical <User Schedule>  cholecalciferol 2000 Unit(s) Oral daily  dofetilide 250 MICROGram(s) Oral two times a day  fluticasone propionate (50 MICROgram(s)/actuation) Nasal Spray - Peds 2 Spray(s) Alternating Nostrils daily  furosemide    Tablet 20 milliGRAM(s) Oral daily  gabapentin 400 milliGRAM(s) Oral three times a day  ketoconazole 2% Shampoo 1 Application(s) Topical daily  lamoTRIgine 100 milliGRAM(s) Oral two times a day  loratadine 10 milliGRAM(s) Oral daily  metoprolol tartrate 25 milliGRAM(s) Oral every 8 hours  pantoprazole    Tablet 40 milliGRAM(s) Oral before breakfast  polyethylene glycol 3350 17 Gram(s) Oral every 12 hours  QUEtiapine 25 milliGRAM(s) Oral daily  QUEtiapine 50 milliGRAM(s) Oral at bedtime  tamsulosin 0.4 milliGRAM(s) Oral at bedtime  tiotropium 18 MICROgram(s) Capsule 1 Capsule(s) Inhalation daily    MEDICATIONS  (PRN):  morphine  IR 15 milliGRAM(s) Oral every 4 hours PRN Severe Pain (7 - 10)            Vital Signs Last 24 Hrs  T(C): 36.1 (18 Aug 2019 05:26), Max: 36.2 (17 Aug 2019 13:35)  T(F): 96.9 (18 Aug 2019 05:26), Max: 97.2 (17 Aug 2019 13:35)  HR: 60 (18 Aug 2019 05:26) (60 - 61)  BP: 105/55 (18 Aug 2019 05:26) (105/55 - 121/75)  BP(mean): --  RR: 18 (17 Aug 2019 20:30) (18 - 18)  SpO2: 96% (18 Aug 2019 08:28) (96% - 96%)             REVIEW OF SYSTEMS:  CONSTITUTIONAL: no fever, no chills  CARDIOLOGY: no chest pain, occasional SOB, occasional palpitation, no syncope  RESPIRATORY: no wheeze, no orthopnea, no PND   NEUROLOGICAL: occasional dizziness- headache,no  focal deficits to report.  GI: no abdominal pain, no dyspepsia, no nausea, no vomiting, no diarrhea.    HEENT: no congestion, no nasal bleeding  SKIN: no ecchymosis, no petechia             PHYSICAL EXAM:  · CONSTITUTIONAL: Looks stable, in no respiratory distress  . NECK: Supple, no JVD   · RESPIRATORY: Normal air entry to lung base, no wheeze, no crackle  · CARDIOVASCULAR: Normal S1, A2, P2, no murmur,   · EXTREMITIES: No cyanosis, no clubbing, no edema  · VASCULAR: Pulses are regular, equal, bilateral in upper and lower extremities  	    ECG: A pacing, V sensing, RBBB    TTE: awaiting new echo, EF 5055% by record and hx    LABS:                        13.0   12.64 )-----------( 289      ( 18 Aug 2019 06:48 )             39.6     08-18    137  |  98  |  35<H>  ----------------------------<  122<H>  4.7   |  27  |  1.0    Ca    9.4      18 Aug 2019 06:48  Mg     2.6     08-18    TPro  6.6  /  Alb  4.3  /  TBili  0.3  /  DBili  x   /  AST  12  /  ALT  14  /  AlkPhos  124<H>  08-18            I&O's Summary    17 Aug 2019 07:01  -  18 Aug 2019 07:00  --------------------------------------------------------  IN: 0 mL / OUT: 2525 mL / NET: -2525 mL      BNP  RADIOLOGY & ADDITIONAL STUDIES:    IMPRESSION AND PLAN:

## 2019-08-18 NOTE — PROGRESS NOTE ADULT - SUBJECTIVE AND OBJECTIVE BOX
CUATE HORNE  [facility]      Patient is a 48y old  Male who presents with a chief complaint of multiple sclerosis flare (18 Aug 2019 12:44)      HPI:  The patient is a 49 y/o M w/ a PMH of v-tach s/p AICD, CHF, A fib/flutter, HTN, COPD (on O2 PRN), DLD, multiple sclerosis w/ multiple admissions for flares, seizure disorder (on lamotrigine), PTSD, depression, ? stroke/TIA, esophageal stricture, chronic sore throat/sinusitis, GERD, hemopneumothorax (2006), pericardial effusion (2006), cardiomyopathy, MI (2006, 2008), IBS, presenting for shortness of breath and generalized muscle cramping w/ blurry vision, admitted for multiple sclerosis flare. The patient states that about 2 months ago he had an MS flare for which he received PO steroids; he then periodically would have cramps in his legs, back, arms, and abdomen. About 2.5 weeks ago, the patient reports he gradually became short of breath and was having visual disturbances w/ central lack of vision, "like an eclipse;" the cramps had not resolved so he presented to the ED. In the ED he received Solumedrol 1 g IV x 1; last night he had an episode of palpitations which resolved. He also reports occasional bowel incontinence. At this time he denies chest pain but reports slight shortness of breath. He lives at [facility] but wants to transfer to a different facility; he reports that his GI (Dr. Perez) had requested an xray to evaluate dysphagia and that the nursing home would not administer the study. He is currently  and has 2 children, a son and daughter, both of whom he reports are healthy. (13 Aug 2019 11:22)    Interval Events:  Patient seen and examined at bedside. Says he is feeling much better today. He currently has no complaints. Patient had EPS and cardiology consults over last 24 hours after multiple runs of SVT and Afib on interrogation. Denies any palpitations/CP/sob/lightheadedness/double vision.     -PMHx: PTSD (post-traumatic stress disorder) [Active]  Supraventricular arrhythmia [Active]  Cardiomyopathy [Active]  CHF (congestive heart failure) [Active]  Atrial fibrillation [Active]  Diabetes mellitus [Active]  Smoker [Inactive]  BPH (benign prostatic hyperplasia) [Active]  HTN (hypertension) [Active]  Seizures [Active]  Bipolar 1 disorder [Inactive]  Migraines [Active]  Pneumonia [Active]  MS (multiple sclerosis) [Active]  CAD (coronary artery disease) [Active]  Bipolar disorder [Active]  Anginal pain [Active]  Schizo affective schizophrenia [Active]  GERD (gastroesophageal reflux disease) [Active]  Seasonal allergies [Active]  HTN (hypertension) [Inactive]  Hypercholesteremia [Active]  COPD (chronic obstructive pulmonary disease) [Active]  Neuropathy [Inactive]  Atrial flutter [Inactive]  CVA (cerebral vascular accident) [Active]  TIA (transient ischemic attack) [Active]  Syncope [Active]  Chronic Hypotension [Inactive]  Hypotension [Inactive]  Peripheral Neuropathy [Active]  Human Immunodeficiency Virus ( [Inactive]  S/P Implantation of Automatic [Inactive]  Personal History of Multiple S [Inactive]  Personal History of Mitral Nicki [Inactive]  Personal History of MI (Myocar [Inactive]  Personal History of Hypertensi [Inactive]  Personal History of Congestive [Inactive]  Personal History of Cardiomyop [Inactive]  Personal History of Atrial Fib [Inactive]  Personal History of Alcoholism [Inactive]  Clinical Depression [Active]    -PSHx:H/O prior ablation treatment  Cardiac pacemaker recipient  H/O hernia repair  History of hip replacement  S/P Orchiectomy  SVT (Supraventricular Tachycar  GERD (Gastroesophageal Reflux  S/P Implantation of AICD    -FAMILY HISTORY:  Family history of colon cancer in mother  Family history of cardiomyopathy: Mother  Family history of cervical cancer (Mother)  Family history of early CAD (Mother)      REVIEW OF SYSTEMS:  CONSTITUTIONAL: No fever, weight loss, or fatigue  RESPIRATORY: No cough, wheezing, chills or hemoptysis; No shortness of breath  CARDIOVASCULAR: No chest pain, palpitations, dizziness, or leg swelling  GASTROINTESTINAL: No abdominal or epigastric pain. No nausea, vomiting, or hematemesis; No diarrhea or constipation. No melena or hematochezia.  NEUROLOGICAL: No headaches  LYMPH NODES: No enlarged glands  MUSCULOSKELETAL: Weakness in lower extremities much improved      T(C): , Max: 36.2 (08-17-19 @ 13:35)  HR: 60 (08-18-19 @ 05:26)  BP: 105/55 (08-18-19 @ 05:26)  RR: 18 (08-17-19 @ 20:30)  SpO2: 96% (08-18-19 @ 08:28)  CAPILLARY BLOOD GLUCOSE      POCT Blood Glucose.: 159 mg/dL (18 Aug 2019 11:16)  POCT Blood Glucose.: 101 mg/dL (18 Aug 2019 08:14)  POCT Blood Glucose.: 159 mg/dL (17 Aug 2019 20:57)  POCT Blood Glucose.: 161 mg/dL (17 Aug 2019 16:41)      PHYSICAL EXAM:  General: Awake, Alert. Not in acute distress. Sitting up in bed, eating breakfast. On RA.  HEENT: Head NC/AT. YARELIS more severe in the L eye appreciated.  Heart: Regular rate and rhythm. AICD in L upper chest.  Lungs: Clear to auscultation bilaterally. No wheezes, rales or rhonchi.  Abdomen: Soft, nontender, nondistended, + BS.  Extremities: No edema in upper or lower extremities.  Neuro: AAOx3.       LABS:          13.0  12.64  )-------(289          39.6  N=81.1  L=11.6  MCV=89.0    137|98|35<H>  ------------------<122<H>  4.7|27|1.0  eGFR:90  Ca:9.4            Microbiology:      RADIOLOGY & ADDITIONAL TESTS:        Medications:  acetaminophen   Tablet .. 650 milliGRAM(s) Oral every 6 hours  apixaban 5 milliGRAM(s) Oral every 12 hours  atorvastatin 40 milliGRAM(s) Oral at bedtime  baclofen 10 milliGRAM(s) Oral four times a day  buDESOnide 160 MICROgram(s)/formoterol 4.5 MICROgram(s) Inhaler 2 Puff(s) Inhalation two times a day  chlorhexidine 4% Liquid 1 Application(s) Topical <User Schedule>  cholecalciferol 2000 Unit(s) Oral daily  dofetilide 250 MICROGram(s) Oral two times a day  fluticasone propionate (50 MICROgram(s)/actuation) Nasal Spray - Peds 2 Spray(s) Alternating Nostrils daily  furosemide    Tablet 20 milliGRAM(s) Oral daily  gabapentin 400 milliGRAM(s) Oral three times a day  ketoconazole 2% Shampoo 1 Application(s) Topical daily  lamoTRIgine 100 milliGRAM(s) Oral two times a day  loratadine 10 milliGRAM(s) Oral daily  metoprolol tartrate 25 milliGRAM(s) Oral every 8 hours  morphine  IR 15 milliGRAM(s) Oral every 4 hours PRN  pantoprazole    Tablet 40 milliGRAM(s) Oral before breakfast  polyethylene glycol 3350 17 Gram(s) Oral every 12 hours  QUEtiapine 25 milliGRAM(s) Oral daily  QUEtiapine 50 milliGRAM(s) Oral at bedtime  tamsulosin 0.4 milliGRAM(s) Oral at bedtime  tiotropium 18 MICROgram(s) Capsule 1 Capsule(s) Inhalation daily

## 2019-08-18 NOTE — PROGRESS NOTE ADULT - ASSESSMENT
ARVD, no VT, VF, on AICD since 2006, patient states he was told the battery is at the end of life, needs interrogation  P.A.fib, in regular heart beat, atrial pacing  No evidence of CAD, on 8.6.2018 CCTA reported normal with 0 calcium score  hyperlipidemia: on statins, generally needs out patient Lipid pane to make sure Lipitor dose is not too high    D/C aspirin, no indication  AICD interrogation for the battery age  continue with the Metoprolol TID and Tikosyn, QTc duration is proper   echo to asses current LVEF and assess the RV size and function  Keep the Lasix in the lowest dose, 20 mg daily, patient gets leg edema and dyspnea by holding it.

## 2019-08-19 ENCOUNTER — TRANSCRIPTION ENCOUNTER (OUTPATIENT)
Age: 49
End: 2019-08-19

## 2019-08-19 VITALS
TEMPERATURE: 98 F | DIASTOLIC BLOOD PRESSURE: 56 MMHG | SYSTOLIC BLOOD PRESSURE: 110 MMHG | RESPIRATION RATE: 20 BRPM | HEART RATE: 60 BPM

## 2019-08-19 DIAGNOSIS — G35 MULTIPLE SCLEROSIS: ICD-10-CM

## 2019-08-19 LAB
GLUCOSE BLDC GLUCOMTR-MCNC: 102 MG/DL — HIGH (ref 70–99)
GLUCOSE BLDC GLUCOMTR-MCNC: 115 MG/DL — HIGH (ref 70–99)
GLUCOSE BLDC GLUCOMTR-MCNC: 93 MG/DL — SIGNIFICANT CHANGE UP (ref 70–99)

## 2019-08-19 PROCEDURE — 99239 HOSP IP/OBS DSCHRG MGMT >30: CPT

## 2019-08-19 RX ORDER — METOPROLOL TARTRATE 50 MG
1 TABLET ORAL
Qty: 0 | Refills: 0 | DISCHARGE
Start: 2019-08-19

## 2019-08-19 RX ORDER — ASPIRIN/CALCIUM CARB/MAGNESIUM 324 MG
1 TABLET ORAL
Qty: 0 | Refills: 0 | DISCHARGE

## 2019-08-19 RX ORDER — PRAZOSIN HCL 2 MG
1 CAPSULE ORAL
Qty: 0 | Refills: 0 | DISCHARGE

## 2019-08-19 RX ORDER — TAMSULOSIN HYDROCHLORIDE 0.4 MG/1
1 CAPSULE ORAL
Qty: 0 | Refills: 0 | DISCHARGE

## 2019-08-19 RX ORDER — HYDROMORPHONE HYDROCHLORIDE 2 MG/ML
1 INJECTION INTRAMUSCULAR; INTRAVENOUS; SUBCUTANEOUS
Qty: 0 | Refills: 0 | DISCHARGE

## 2019-08-19 RX ORDER — MORPHINE SULFATE 50 MG/1
1 CAPSULE, EXTENDED RELEASE ORAL
Qty: 0 | Refills: 0 | DISCHARGE
Start: 2019-08-19

## 2019-08-19 RX ORDER — POLYETHYLENE GLYCOL 3350 17 G/17G
17 POWDER, FOR SOLUTION ORAL
Qty: 0 | Refills: 0 | DISCHARGE
Start: 2019-08-19

## 2019-08-19 RX ORDER — FUROSEMIDE 40 MG
1 TABLET ORAL
Qty: 0 | Refills: 0 | DISCHARGE
Start: 2019-08-19

## 2019-08-19 RX ORDER — METOPROLOL TARTRATE 50 MG
0.5 TABLET ORAL
Qty: 0 | Refills: 0 | DISCHARGE
Start: 2019-08-19

## 2019-08-19 RX ORDER — TAMSULOSIN HYDROCHLORIDE 0.4 MG/1
1 CAPSULE ORAL
Qty: 0 | Refills: 0 | DISCHARGE
Start: 2019-08-19

## 2019-08-19 RX ORDER — TIOTROPIUM BROMIDE 18 UG/1
1 CAPSULE ORAL; RESPIRATORY (INHALATION)
Qty: 0 | Refills: 0 | DISCHARGE
Start: 2019-08-19

## 2019-08-19 RX ORDER — FUROSEMIDE 40 MG
1 TABLET ORAL
Qty: 0 | Refills: 0 | DISCHARGE

## 2019-08-19 RX ORDER — POLYETHYLENE GLYCOL 3350 17 G/17G
1 POWDER, FOR SOLUTION ORAL
Qty: 0 | Refills: 0 | DISCHARGE

## 2019-08-19 RX ORDER — METOPROLOL TARTRATE 50 MG
1 TABLET ORAL
Qty: 0 | Refills: 0 | DISCHARGE

## 2019-08-19 RX ORDER — CHOLECALCIFEROL (VITAMIN D3) 125 MCG
2000 CAPSULE ORAL
Qty: 0 | Refills: 0 | DISCHARGE
Start: 2019-08-19

## 2019-08-19 RX ADMIN — PANTOPRAZOLE SODIUM 40 MILLIGRAM(S): 20 TABLET, DELAYED RELEASE ORAL at 06:05

## 2019-08-19 RX ADMIN — APIXABAN 5 MILLIGRAM(S): 2.5 TABLET, FILM COATED ORAL at 06:04

## 2019-08-19 RX ADMIN — Medication 10 MILLIGRAM(S): at 11:42

## 2019-08-19 RX ADMIN — MORPHINE SULFATE 15 MILLIGRAM(S): 50 CAPSULE, EXTENDED RELEASE ORAL at 11:38

## 2019-08-19 RX ADMIN — Medication 650 MILLIGRAM(S): at 12:10

## 2019-08-19 RX ADMIN — Medication 10 MILLIGRAM(S): at 17:13

## 2019-08-19 RX ADMIN — MORPHINE SULFATE 15 MILLIGRAM(S): 50 CAPSULE, EXTENDED RELEASE ORAL at 06:35

## 2019-08-19 RX ADMIN — Medication 20 MILLIGRAM(S): at 06:04

## 2019-08-19 RX ADMIN — LAMOTRIGINE 100 MILLIGRAM(S): 25 TABLET, ORALLY DISINTEGRATING ORAL at 17:14

## 2019-08-19 RX ADMIN — Medication 650 MILLIGRAM(S): at 11:39

## 2019-08-19 RX ADMIN — Medication 25 MILLIGRAM(S): at 06:04

## 2019-08-19 RX ADMIN — Medication 60 MILLIGRAM(S): at 06:04

## 2019-08-19 RX ADMIN — KETOCONAZOLE 1 APPLICATION(S): 20 AEROSOL, FOAM TOPICAL at 11:41

## 2019-08-19 RX ADMIN — Medication 650 MILLIGRAM(S): at 17:12

## 2019-08-19 RX ADMIN — LAMOTRIGINE 100 MILLIGRAM(S): 25 TABLET, ORALLY DISINTEGRATING ORAL at 06:04

## 2019-08-19 RX ADMIN — APIXABAN 5 MILLIGRAM(S): 2.5 TABLET, FILM COATED ORAL at 17:12

## 2019-08-19 RX ADMIN — Medication 650 MILLIGRAM(S): at 06:04

## 2019-08-19 RX ADMIN — GABAPENTIN 400 MILLIGRAM(S): 400 CAPSULE ORAL at 17:13

## 2019-08-19 RX ADMIN — POLYETHYLENE GLYCOL 3350 17 GRAM(S): 17 POWDER, FOR SOLUTION ORAL at 06:05

## 2019-08-19 RX ADMIN — MORPHINE SULFATE 15 MILLIGRAM(S): 50 CAPSULE, EXTENDED RELEASE ORAL at 06:03

## 2019-08-19 RX ADMIN — BUDESONIDE AND FORMOTEROL FUMARATE DIHYDRATE 2 PUFF(S): 160; 4.5 AEROSOL RESPIRATORY (INHALATION) at 08:45

## 2019-08-19 RX ADMIN — LORATADINE 10 MILLIGRAM(S): 10 TABLET ORAL at 11:40

## 2019-08-19 RX ADMIN — Medication 10 MILLIGRAM(S): at 11:39

## 2019-08-19 RX ADMIN — POLYETHYLENE GLYCOL 3350 17 GRAM(S): 17 POWDER, FOR SOLUTION ORAL at 17:14

## 2019-08-19 RX ADMIN — QUETIAPINE FUMARATE 25 MILLIGRAM(S): 200 TABLET, FILM COATED ORAL at 11:40

## 2019-08-19 RX ADMIN — DOFETILIDE 250 MICROGRAM(S): 0.25 CAPSULE ORAL at 08:44

## 2019-08-19 RX ADMIN — Medication 2 SPRAY(S): at 11:40

## 2019-08-19 RX ADMIN — Medication 2000 UNIT(S): at 11:41

## 2019-08-19 RX ADMIN — Medication 650 MILLIGRAM(S): at 17:42

## 2019-08-19 RX ADMIN — MORPHINE SULFATE 15 MILLIGRAM(S): 50 CAPSULE, EXTENDED RELEASE ORAL at 12:08

## 2019-08-19 RX ADMIN — Medication 25 MILLIGRAM(S): at 17:13

## 2019-08-19 RX ADMIN — Medication 10 MILLIGRAM(S): at 06:04

## 2019-08-19 RX ADMIN — GABAPENTIN 400 MILLIGRAM(S): 400 CAPSULE ORAL at 06:05

## 2019-08-19 NOTE — PROGRESS NOTE ADULT - SUBJECTIVE AND OBJECTIVE BOX
Patient lying in bed supine. Patient states his back pain is 0.5/10 now and the current regimen is relieving his pain to his back and legs. Patient's pain is controlled at this time. Will sign off. Recall for any uncontrolled pain.

## 2019-08-19 NOTE — PROGRESS NOTE ADULT - SUBJECTIVE AND OBJECTIVE BOX
, CUATE  48y  Male  ***My note supersedes ALL resident notes that I sign.  My corrections for their notes are in my note.***    I can be reached directly on [employer]. My office number is [phone]. My personal cell number is [phone].    INTERVAL EVENTS: Here for f/u of MS flare. Pt doing well and ready for d/c. He has agreed to go back to [facility] provided meds are given when they are supposed too and he can be taken to his necessary doctor f/u appts. he had no complaints.    T(F): 97.8 (08-19-19 @ 14:50), Max: 97.8 (08-18-19 @ 22:41)  HR: 60 (08-19-19 @ 14:50) (60 - 62)  BP: 110/56 (08-19-19 @ 14:50) (105/59 - 113/66)  RR: 20 (08-19-19 @ 14:50) (18 - 20)  SpO2: --    Gen: NAD; does not seem in pain   HEENT: + NC O2; PERRL; EOMI; scl white; cornea clr, nl; conj are nl; nose clr; mouth clr  Neck: no nodes; no JVD  Chest: lt PPM/AICD  lungs: clr, no wheeze, no crackles  Hrt: s1 s2 rrr no murmur  Abd soft NT/ND   - no chapin  Ext: no edema, no c/c  Neuro: CN intact; moves all 4 ext    LABS:                        13.0    (    89.0   12.64 )-----------( ---------      289      ( 18 Aug 2019 06:48 )             39.6    (    13.4     on steroids    RADIOLOGY & ADDITIONAL TESTS:  < from: Transthoracic Echocardiogram (08.18.19 @ 13:59) >  Summary:   1. Normal global left ventricular systolic function.   2. LV Ejection Fraction by Vargas's Method with a biplane EF of 69 %.   3. Normal left ventricular internal cavity size.   4. Normal right atrial size.   5. There is no evidence of pericardial effusion.   6. Mild thickening and calcification of the anterior and posterior   mitral valve leaflets.   7. No evidence of mitral valve regurgitation.    < end of copied text >      MEDICATIONS:    acetaminophen   Tablet .. 650 milliGRAM(s) Oral every 6 hours  apixaban 5 milliGRAM(s) Oral every 12 hours  atorvastatin 40 milliGRAM(s) Oral at bedtime  baclofen 10 milliGRAM(s) Oral four times a day  buDESOnide 160 MICROgram(s)/formoterol 4.5 MICROgram(s) Inhaler 2 Puff(s) Inhalation two times a day  chlorhexidine 4% Liquid 1 Application(s) Topical <User Schedule>  cholecalciferol 2000 Unit(s) Oral daily  dofetilide 250 MICROGram(s) Oral two times a day  fluticasone propionate (50 MICROgram(s)/actuation) Nasal Spray - Peds 2 Spray(s) Alternating Nostrils daily  furosemide    Tablet 20 milliGRAM(s) Oral daily  gabapentin 400 milliGRAM(s) Oral three times a day  ketoconazole 2% Shampoo 1 Application(s) Topical daily  lamoTRIgine 100 milliGRAM(s) Oral two times a day  loratadine 10 milliGRAM(s) Oral daily  metoprolol tartrate 25 milliGRAM(s) Oral every 8 hours  morphine  IR 15 milliGRAM(s) Oral every 4 hours PRN  pantoprazole    Tablet 40 milliGRAM(s) Oral before breakfast  polyethylene glycol 3350 17 Gram(s) Oral every 12 hours  predniSONE   Tablet 60 milliGRAM(s) Oral daily  QUEtiapine 25 milliGRAM(s) Oral daily  QUEtiapine 50 milliGRAM(s) Oral at bedtime  tamsulosin 0.4 milliGRAM(s) Oral at bedtime  tiotropium 18 MICROgram(s) Capsule 1 Capsule(s) Inhalation daily

## 2019-08-19 NOTE — DISCHARGE NOTE PROVIDER - NSDCCPCAREPLAN_GEN_ALL_CORE_FT
PRINCIPAL DISCHARGE DIAGNOSIS  Diagnosis: Multiple sclerosis exacerbation  Assessment and Plan of Treatment: You had an MS exacerbation. You were started on IV solumedrol for 5 days, and will now be tapered down with oral Prednisone. You will take 60mg of Prednisone for 1 day, then 40mg for 2 days, then 20mg for 2 days, and then stop. You also need to follow up with your neurologist within 4-6 weeks for follow up of management of multiple sclerosis. We also need you to restart your Tecfidera that you take at the nursing home, 240mg by mouth once daily.      SECONDARY DISCHARGE DIAGNOSES  Diagnosis: Vitamin D deficiency  Assessment and Plan of Treatment: You were found to have a deficiency of vitamin D. We placed you on vitamin D supplement that you will take, 2000 units by mouth daily. Please follow up with your primary medical doctor to re-assess vitamin D levels as an outpatient.    Diagnosis: Encounter for interrogation of cardiac pacemaker  Assessment and Plan of Treatment: You had an interrogation of your cardiac pacemaker. There were multiple events of supraventricular tachycardia and paroxysmal atrial fibrillation that were noticed on the interrogation. Your Lopressor was increased to 25mg by mouth every 8 hours, and you will continue your dofetilide and eliquis. The interrogation also showed that there are 3.5 months left of battery on the device, so you need to follow up with Dr. Quiñones in 2 months in order to have the battery changed.

## 2019-08-19 NOTE — CHART NOTE - NSCHARTNOTEFT_GEN_A_CORE
<<<RESIDENT DISCHARGE NOTE>>>     CUATE HORNE  MRN-[id number]    VITAL SIGNS:  T(F): 97.8 (08-19-19 @ 14:50), Max: 97.8 (08-18-19 @ 22:41)  HR: 60 (08-19-19 @ 14:50)  BP: 110/56 (08-19-19 @ 14:50)  SpO2: --    PHYSICAL EXAM:  General: Awake, Alert. Not in acute distress. Sitting up in bed, eating breakfast. On RA.  HEENT: Head NC/AT. YARELIS more severe in the L eye appreciated.  Heart: Regular rate and rhythm. AICD in L upper chest.  Lungs: Clear to auscultation bilaterally. No wheezes, rales or rhonchi.  Abdomen: Soft, nontender, nondistended, + BS.  Extremities: No edema in upper or lower extremities.  Neuro: AAOx3.     TEST RESULTS:                        13.0   12.64 )-----------( 289      ( 18 Aug 2019 06:48 )             39.6       08-18    137  |  98  |  35<H>  ----------------------------<  122<H>  4.7   |  27  |  1.0    Ca    9.4      18 Aug 2019 06:48  Mg     2.6     08-18    TPro  6.6  /  Alb  4.3  /  TBili  0.3  /  DBili  x   /  AST  12  /  ALT  14  /  AlkPhos  124<H>  08-18      FINAL DISCHARGE INTERVIEW:  Resident(s) Present: (Name:Dr. Cunningham_), RN Present: (Name:  ___________)    DISCHARGE MEDICATION RECONCILIATION  reviewed with Attending (Name:_Dr. Nicolas__)    DISPOSITION:   [  ] Home,    [  ] Home with Visiting Nursing Services,   [   x ]  SNF/ NH,    [   ] Acute Rehab (4A),   [   ] Other (Specify:_________)

## 2019-08-19 NOTE — DISCHARGE NOTE NURSING/CASE MANAGEMENT/SOCIAL WORK - NSDCDPATPORTLINK_GEN_ALL_CORE
You can access the [facility] Portal, offered by [facility], by registering with the following website: [email or web address]

## 2019-08-19 NOTE — DISCHARGE NOTE PROVIDER - HOSPITAL COURSE
The patient is a 47 y/o M w/ a PMH of v-tach s/p AICD, CHF, A fib/flutter, HTN, COPD (on O2 PRN), DLD, multiple sclerosis w/ multiple admissions for flares, seizure disorder (on lamotrigine), PTSD, depression, ? stroke/TIA, esophageal stricture, chronic sore throat/sinusitis, GERD, hemopneumothorax (2006), pericardial effusion (2006), cardiomyopathy, MI (2006, 2008), IBS, presenting for shortness of breath and generalized muscle cramping w/ blurry vision, admitted for multiple sclerosis flare. The patient states that about 2 months ago he had an MS flare for which he received PO steroids; he then periodically would have cramps in his legs, back, arms, and abdomen. About 2.5 weeks prior to presentation, patient reports he gradually became short of breath and was having visual disturbances w/ central lack of vision, "like an eclipse;" the cramps had not resolved so he presented to the ED. In the ED he received Solumedrol 1 g IV x 1; he also c/o an episode of palpitations which resolved.    Patient treated with IV solumedrol for 5 days, and MS flare subsided. Will taper oral Prednisone for 5 more days. EPS consulted for pacemaker interrogation, patient found to have multiple episodes of SVT and PAF over past few months. Medications optimized, and patient needs to return to see Dr. Quiñones in 2 months for battery change. Patient will also follow up with neurologist The patient is a 47 y/o M w/ a PMH of v-tach s/p AICD, CHF, A fib/flutter, HTN, COPD (on O2 PRN), DLD, multiple sclerosis w/ multiple admissions for flares, seizure disorder (on lamotrigine), PTSD, depression, ? stroke/TIA, esophageal stricture, chronic sore throat/sinusitis, GERD, hemopneumothorax (2006), pericardial effusion (2006), cardiomyopathy, MI (2006, 2008), IBS, presenting for shortness of breath and generalized muscle cramping w/ blurry vision, admitted for multiple sclerosis flare. The patient states that about 2 months ago he had an MS flare for which he received PO steroids; he then periodically would have cramps in his legs, back, arms, and abdomen. About 2.5 weeks prior to presentation, patient reports he gradually became short of breath and was having visual disturbances w/ central lack of vision, "like an eclipse;" the cramps had not resolved so he presented to the ED. In the ED he received Solumedrol 1 g IV x 1; he also c/o an episode of palpitations which resolved.    Patient treated with IV solumedrol for 5 days, and MS flare subsided. Will taper oral Prednisone for 5 more days. EPS consulted for pacemaker interrogation, patient found to have multiple episodes of SVT and PAF over past few months. Medications optimized, and patient needs to return to see Dr. Quiñones in 2 months for battery change. Patient will also follow up with neurologist in 4-6 weeks for f/u of MS.

## 2019-08-19 NOTE — DISCHARGE NOTE PROVIDER - PROVIDER TOKENS
PROVIDER:[TOKEN:[[id number]],FOLLOWUP:[2 weeks]],PROVIDER:[TOKEN:[[id number]],FOLLOWUP:[1 month]],PROVIDER:[TOKEN:[[id number]]]

## 2019-08-19 NOTE — PROGRESS NOTE ADULT - ASSESSMENT
The patient is a 47 y/o M w/ a PMH of v-tach s/p AICD, CHF, A fib/flutter, HTN, COPD (on O2 PRN), DLD, multiple sclerosis w/ multiple admissions for flares, seizure disorder (on lamotrigine), PTSD, depression, ? stroke/TIA, esophageal stricture, chronic sore throat/sinusitis, GERD, hemopneumothorax (2006), pericardial effusion (2006), cardiomyopathy, MI (2006, 2008), IBS, presenting for shortness of breath and generalized muscle cramping w/ blurry vision, admitted for multiple sclerosis flare.    # Acute Multiple Sclerosis flare, w/ lt side optic neuritis w/ resolved central vision loss and improved headache  improving s/p Solumedrol 1 g IV - x 5 doses, finish up short taper: Pred 60mg q24 x2 days, then 40mg q24 x2, then 20mg q24 x2 and stop  Has defibrillator, NOT MRI compatible as per patient.   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)/PPM; NSTEMI based on enz (2002 & 2003); AFib  there has NEVER been CAD on caths or CTAs done in past - most recent look was 8/2018  EP did inter: mostly SVT and AFib, some short NSVT  EP: cont BB and a/c; PPM battery has 3 more months - so this will be handled as outpt  Cardio: incr BB; decr lasix;   metoprolol tartrate 25 mg PO q8 and eventually to 50 bid (if BP tolerates)  make Lasix 20mg po q24   d/w cardio - d/c asa  cont dofetilide - per cardio, for AFib  cont eliquis  Echo nl  f/u w/ Dr Ayoub as outpt  f/u w/ Dr Quiñones as outpt    # Vit D Deficiency  give Vit D 2000 IU q24  recheck level in 2 mo    # HTN - well controlled  metoprolol tartrate 25 mg PO q8  lasix 20mg q24    # DLD  C/w atorvastatin 40 mg PO qHS    # H/o seizure disorder  Patient states he is in the process of being weaned down on his seizure medications  C/w lamotrigine 100 mg PO BID  f/u w/ neuro    # COPD on home O2 - uses on/off  C/w NC O2 as needed  CXR fine; no need for pulm eval (d/w pt)  Symbicort 2 puffs BID  spiriva 1 puff po q24    # Chronic sore throat, sinusitis  C/w Cepacol, Claritin, Flonase    # GERD  C/w pantoprazole 40 mg PO qAM (patient takes omeprazole at home)    # h/o esophageal stricture  Start mechanical soft dysphagia 2 w/ thins    # DVT ppx- c/w Eliquis 5 mg PO BID    # GI ppx- c/w pantoprazole 40 mg PO qAM (on omeprazole at home)    # Activity- ambulate w/ assistance; rehab eval    # Code status: see MOLST form - pt would accept all treatments, but on a trial basis; his brother Zana is his HCP (1 of his sisters is the alternate HCP)    # [facility]

## 2019-08-19 NOTE — DISCHARGE NOTE PROVIDER - CARE PROVIDER_API CALL
Darvin Roche)  Internal Medicine  [address] [place] [address], [address]  [place] [address]  Phone: [phone]  Fax: [phone]  Follow Up Time: 2 weeks    Goyo Brody)  Neurology  [address] [place] [address], [address]  [place] [address]  Phone: [phone]  Fax: [phone]  Follow Up Time: 1 month    Alexi Quiñones; MICHAEL)  Cardiac Electrophysiology  [address] [place] [address], [place] [address] [place] [address]  Phone: [phone]  Fax: [phone]  Follow Up Time:

## 2019-08-19 NOTE — PROGRESS NOTE ADULT - PROBLEM SELECTOR PLAN 1
acetaminophen   Tablet .. 650 milliGRAM(s) Oral every 6 hours  baclofen 10 milliGRAM(s) Oral four times a day  gabapentin 400 milliGRAM(s) Oral three times a day  morphine  IR 15 milliGRAM(s) Oral every 4 hours PRN

## 2019-08-19 NOTE — DISCHARGE NOTE PROVIDER - NSDCFUADDINST_GEN_ALL_CORE_FT
1) Follow up with your PMD, Dr. Roche, routinely, and re-assess vitamin D levels.  2) Follow up with Dr. Quiñones in 2 months for pacemaker battery change.  3) Follow up with Dr. Brody (neurologist) for your MS. 1) Follow up with your PMD, Dr. Roche, routinely, and re-assess vitamin D levels, as well as a lipid panel to assess if Lipitor dosage is not too high.  2) Follow up with Dr. Quiñones in 2 months for pacemaker battery change.  3) Follow up with Dr. Brody (neurologist) for your MS.

## 2019-08-19 NOTE — DISCHARGE NOTE PROVIDER - CARE PROVIDERS DIRECT ADDRESSES
[email or web address]

## 2019-08-19 NOTE — PROGRESS NOTE ADULT - ASSESSMENT
The patient is a 49 y/o M w/ a PMH of Multiple Sclerosis, V-tach s/p AICD, CHF, A fib/flutter (on Eliquis), HTN, COPD (on O2 PRN), DLD, hx of seizures (on Lamotrigine), PTSD, depression, ? stroke/TIA, esophageal stricture, chronic sore throat/sinusitis, GERD, hemopneumothorax (2006), pericardial effusion (2006), cardiomyopathy, MI (2006, 2008), IBS, presenting for shortness of breath and generalized muscle cramping and spasms assoc w/ blurry vision, admitted for multiple sclerosis flare.    #Muscle cramps/spasms, blurry vision, SOB likely due to MS flare  - CXR on admission showed low lung volumes but was otherwise unremarkable, WBC 7.25  - S/p IV Solumedrol 1 G x5; today 8/19 will begin Prednisone 60 mg x 2 days, 40 mg x 2 days, 20 mg x 2 days then stop  - Per Pain Management - c/w Gabapentin, initiate Tylenol 650 mg q6 and Morphine 15 mg q4, d/c Hydromorphone  - Per Neuro - c/w Solumedrol 1 G q24 x 5 days, then taper to PO steroids  - Pt takes Tecfidera 240 mg PO qd but is nonformulary, will not be receiving it in the hospital  - Per pt AICD is not MRI compatible, cannot order MRI to assess for new plaques     #Painful muscle spasms in legs and lower back  - Reports improvement  - Pain Management Recs - c/w Baclofen, Gabapentin  - Add Tylenol 650 mg PO q6 and MS IR 15 mg PO q4 PRN  - Bowel regimen - on Miralax 17 G q12    # HTN  - Per Cardio - Metoprolol tartrate 25 mg PO q8, Lasix 20 mg PO q24  - D/C Aspirin     # H/o v-tach, CHF s/p AICD  - Metoprolol tartrate 25 mg PO q8  - Echo - EF 69%  - EPS - findings include episodes of SVT and AFib  - Per EPS - c/w beta blocker and anticoagulation, PPM battery has 3 more months; f/u w/ Dr. Quiñones in 2 wks  - Per Cardio - c/w Dofetilide and Eliquis    # H/o MI 2006, 2008  - Atorvastatin 40 mg PO qHS   - Aspirin d/c'd    # H/o seizure disorder  - Lamotrigine 100 mg PO BID  - Pt states he is in the process of being weaned down on his seizure medications  - To f/u w/ Neuro     # COPD on home O2  - O2 PRN to keep SpO2 > 92 %  - Symbicort high dose 2 puffs BID, Spiriva qd    # Chronic sore throat, sinusitis  - Claritin, Flonase, Cepacol    # DLD  - Atorvastatin 40 mg PO qHS    # GERD  - Pantoprazole 40 mg PO qAM (patient takes omeprazole at home)    # H/o esophageal stricture  - Pt states Dr. Perez suggested evaluation w/ Barium swallow; was cutting up regular food w/o difficulty  - On mechanical soft dysphagia 2 w/ thins    #Vit D Deficiency  - Vit D 2000 IU q24  - Recheck levels in 2 months    # DVT prophylaxis: Eliquis 5 mg PO BID  # GI prophylaxis: Protonix 40 mg PO qd  # Activity: ambulate with rolling walker  # Diet: mechanical soft dysphagia 2 w/ thins  #Code status: pt would accept all treatments on a trial basis; brother Zana is healthcare proxy  # Disposition: patient from [facility]; functional at baseline w/ walker; plan is d/c today 8/19, pt currently refuses to return to facility

## 2019-08-19 NOTE — PROGRESS NOTE ADULT - SUBJECTIVE AND OBJECTIVE BOX
Patient Information:  CUATE HORNE / 48y / Male / MRN#:[id number]    Hospital Day: 6d    HPI:  The patient is a 47 y/o M w/ a PMH of Multiple Sclerosis, V-tach s/p AICD, CHF, A fib/flutter (on Eliquis), HTN, COPD (on O2 PRN), DLD, hx of seizures (on Lamotrigine), PTSD, depression, ? stroke/TIA, esophageal stricture, chronic sore throat/sinusitis, GERD, hemopneumothorax (2006), pericardial effusion (2006), cardiomyopathy, MI (2006, 2008), IBS, presenting for shortness of breath and generalized muscle cramping and spasms assoc w/ blurry vision, admitted for multiple sclerosis flare. The patient states that about 2 months ago he had an MS flare for which he received PO steroids, at [facility] where he resides, with improvement of sxs. He then periodically would have cramps in his legs, back, arms, and abdomen. About 2.5 weeks ago, the patient reports he gradually became short of breath and was having visual disturbances w/ central lack of vision, "like an eclipse" in the L eye and intermittent blurry vision in bilateral eyes. He also endorses intermittent loss of bowel/bladder incontinence. At baseline, pt walks with a walker and is occasionally unsteady. He reports a hx of falling in the [facility] facility.  He reports that he has a hx of dysphagia and occasionally feels that food is stuck in his throat. He eats a chopped diet at [facility]. He states that his GI (Dr. Perez) had requested an xray to evaluate dysphagia and that the nursing home would not administer the study. He is currently  and has 2 children, a son and daughter, both of whom he reports are healthy.  In the ED, pt received Solumedrol 1 G IV x1.    Interval History:  Patient seen and examined at bedside. Pt completed 5th and final dose of IV Solumedrol 8/18. Pt is to be initiated on PO Prednisone today, 8/19. This morning, pt states he is feeling well and his pain is well controlled. He denies any issues with hiss vision. He reports minor loss of urinary continence overnight. He is walking with his rolling walker without any issues. He is to be discharged today and states that he will appeal the discharge. He refuses to return to [facility] due to the poor care he receives there.     Past Medical History:  PTSD (post-traumatic stress disorder)  Supraventricular arrhythmia  Cardiomyopathy  CHF (congestive heart failure)  Atrial fibrillation  Diabetes mellitus  Smoker  BPH (benign prostatic hyperplasia)  HTN (hypertension)  Seizures  Bipolar 1 disorder  Migraines  Pneumonia  MS (multiple sclerosis)  CAD (coronary artery disease)  Bipolar disorder  Anginal pain  Schizo affective schizophrenia  GERD (gastroesophageal reflux disease)  Seasonal allergies  HTN (hypertension)  Hypercholesteremia  COPD (chronic obstructive pulmonary disease)  Neuropathy  Atrial flutter  CVA (cerebral vascular accident)  TIA (transient ischemic attack)  Syncope  Chronic Hypotension  Hypotension  Peripheral Neuropathy  Human Immunodeficiency Virus (  S/P Implantation of Automatic  Personal History of Multiple S  Personal History of Mitral Nicki  Personal History of MI (Myocar  Personal History of Hypertensi  Personal History of Congestive  Personal History of Cardiomyop  Personal History of Atrial Fib  Personal History of Alcoholism  Clinical Depression    Past Surgical History:  H/O prior ablation treatment  Cardiac pacemaker recipient  H/O hernia repair  History of hip replacement  S/P Orchiectomy  SVT (Supraventricular Tachycar  GERD (Gastroesophageal Reflux  S/P Implantation of AICD    Allergies:  dexmethylphenidate (Unknown)  Dilantin (Rash)  dilantin, compazine, neurontin, ritalin, phenergan (Unknown)  Haldol (Unknown)  hydantoin derivatives (Other)  methylphenidate (Unknown)  Morphine Sulfate (Unknown)  phenytoin (Unknown)  prochlorperazine (Unknown)  promethazine (Dystonic RXN)  rash/hives (Anaphylaxis)  thioxanthenes (Unknown)    Medications:  PRN:  morphine  IR 15 milliGRAM(s) Oral every 4 hours PRN Severe Pain (7 - 10)    Standing:  acetaminophen   Tablet .. 650 milliGRAM(s) Oral every 6 hours  apixaban 5 milliGRAM(s) Oral every 12 hours  atorvastatin 40 milliGRAM(s) Oral at bedtime  baclofen 10 milliGRAM(s) Oral four times a day  buDESOnide 160 MICROgram(s)/formoterol 4.5 MICROgram(s) Inhaler 2 Puff(s) Inhalation two times a day  chlorhexidine 4% Liquid 1 Application(s) Topical <User Schedule>  cholecalciferol 2000 Unit(s) Oral daily  dofetilide 250 MICROGram(s) Oral two times a day  fluticasone propionate (50 MICROgram(s)/actuation) Nasal Spray - Peds 2 Spray(s) Alternating Nostrils daily  furosemide    Tablet 20 milliGRAM(s) Oral daily  gabapentin 400 milliGRAM(s) Oral three times a day  ketoconazole 2% Shampoo 1 Application(s) Topical daily  lamoTRIgine 100 milliGRAM(s) Oral two times a day  loratadine 10 milliGRAM(s) Oral daily  metoprolol tartrate 25 milliGRAM(s) Oral every 8 hours  pantoprazole    Tablet 40 milliGRAM(s) Oral before breakfast  polyethylene glycol 3350 17 Gram(s) Oral every 12 hours  predniSONE   Tablet 60 milliGRAM(s) Oral daily  QUEtiapine 25 milliGRAM(s) Oral daily  QUEtiapine 50 milliGRAM(s) Oral at bedtime  tamsulosin 0.4 milliGRAM(s) Oral at bedtime  tiotropium 18 MICROgram(s) Capsule 1 Capsule(s) Inhalation daily    Home:  acetaminophen 650 mg oral tablet: 650 milligram(s) orally 4 times a day, As Needed  aspirin 81 mg oral tablet, chewable: 1 tab(s) orally once a day  atorvastatin 40 mg oral tablet: 1 tab(s) orally once a day (at bedtime)  baclofen 10 mg oral tablet: 1 tab(s) orally 4 times a day  budesonide-formoterol 160 mcg-4.5 mcg/inh inhalation aerosol: 2 puff(s) inhaled 2 times a day  Cepacol Extra Strength Menthol 10 mg mucous membrane lozenge: 1 anderson(s) mucous membrane every 6 hours, As Needed  Claritin 10 mg oral tablet: 1 tab(s) orally once a day  Eliquis 5 mg oral tablet: 1 tab(s) orally 2 times a day  Flonase 50 mcg/inh nasal spray: 2 spray(s) nasal once a day  gabapentin 400 mg oral capsule: 1 cap(s) orally 3 times a day  HYDROmorphone 2 mg oral tablet: 1 tab(s) orally every 4 hours, As Needed  ketoconazole 2% topical shampoo: Apply topically to affected area once a day (in the evening)  lamoTRIgine 100 mg oral tablet: 1 tab(s) orally 2 times a day  Lasix 20 mg oral tablet: 1 tab(s) orally 3 times a week on monday, wednesday and friday  Lasix 40 mg oral tablet: 1 tab(s) orally 3 times a week on tuesday, thursday and saturday  Metoprolol Tartrate 25 mg oral tablet: 1 tab(s) orally 2 times a day  Mi-Acid oral suspension: 30 milliliter(s) orally 4 times a day, As Needed  Nitrostat 0.4 mg sublingual tablet: sublingual 3 times a day, As Needed; may repeat after 5 min until relief  omeprazole 20 mg oral delayed release capsule: 1 cap(s) orally 2 times a day  polyethylene glycol 3350 oral powder for reconstitution: 1 dose(s) orally once a day, As Needed  prazosin 2 mg oral capsule: 1 cap(s) orally once a day (at bedtime)  QUEtiapine 25 mg oral tablet: 1 tab(s) orally once a day  SEROquel 50 mg oral tablet: 1 tab(s) orally once a day (at bedtime)  tamsulosin 0.4 mg oral capsule: 1 cap(s) orally once a day  Tecfidera 240 mg oral delayed release capsule: 1 cap(s) orally once a day  Tikosyn 250 mcg oral capsule: 1 cap(s) orally 2 times a day    Vitals:  T(C): 36.4, Max: 36.6 (08-18-19 @ 22:41)  T(F): 97.5, Max: 97.8 (08-18-19 @ 22:41)  HR: 62 (60 - 62)  BP: 113/66 (96/51 - 113/66)  RR: 18 (18 - 18)  SpO2: --    Physical Exam:  General: Awake, Alert. Not in acute distress.  HEENT: Head NC/AT.  Heart: RRR; S1/S2; No murmurs.  Lungs: Clear to auscultation bilaterally.  Abdomen: Soft, nontender, nondistended.  Extremities: No edema in upper or lower extremities.  Neuro: AAOx3, NFD.    Labs:  CBC (08-18 @ 06:48)                        Hgb: 13.0<L>  WBC: 12.64<H> )---------------------( Plts: 289                              Hct: 39.6<L>    Chem (08-18 @ 06:48)  Na: 137  |     Cl: 98     |  BUN: 35<H>  -----------------------------------------< Gluc: 122<H>    K: 4.7   |  HCO3: 27  |  Cr: 1.0    Ca 9.4 (08-18 @ 06:48)  Mg 2.6<H> (08-18 @ 06:48)    LFTs (08-18 @ 06:48)  TPro 6.6  /  Alb 4.3  TBili 0.3  /  DBili x   AST 12  /  ALT 14  /  AlkPhos 124<H> Patient Information:  CUATE HORNE / 48y / Male / MRN#:[id number]    Hospital Day: 6d    HPI:  The patient is a 49 y/o M w/ a PMH of Multiple Sclerosis, V-tach s/p AICD, CHF, A fib/flutter (on Eliquis), HTN, COPD (on O2 PRN), DLD, hx of seizures (on Lamotrigine), PTSD, depression, ? stroke/TIA, esophageal stricture, chronic sore throat/sinusitis, GERD, hemopneumothorax (2006), pericardial effusion (2006), cardiomyopathy, MI (2006, 2008), IBS, presenting for shortness of breath and generalized muscle cramping and spasms assoc w/ blurry vision, admitted for multiple sclerosis flare. The patient states that about 2 months ago he had an MS flare for which he received PO steroids, at [facility] where he resides, with improvement of sxs. He then periodically would have cramps in his legs, back, arms, and abdomen. About 2.5 weeks ago, the patient reports he gradually became short of breath and was having visual disturbances w/ central lack of vision, "like an eclipse" in the L eye and intermittent blurry vision in bilateral eyes. He also endorses intermittent loss of bowel/bladder incontinence. At baseline, pt walks with a walker and is occasionally unsteady. He reports a hx of falling in the [facility] facility.  He reports that he has a hx of dysphagia and occasionally feels that food is stuck in his throat. He eats a chopped diet at [facility]. He states that his GI (Dr. Perez) had requested an xray to evaluate dysphagia and that the nursing home would not administer the study. He is currently  and has 2 children, a son and daughter, both of whom he reports are healthy.  In the ED, pt received Solumedrol 1 G IV x1.    Interval History:  Patient seen and examined at bedside. Pt completed 5th and final dose of IV Solumedrol 8/18. Pt is to be initiated on PO Prednisone today, 8/19. This morning, pt states he is feeling well and his pain is well controlled. He denies any issues with hiss vision. He reports minor loss of urinary continence overnight. He is walking with his rolling walker without any issues. He is to be discharged today and states that he will appeal the discharge. He refuses to return to [facility] due to the poor care he receives there.     Past Medical History:  PTSD (post-traumatic stress disorder)  Supraventricular arrhythmia  Cardiomyopathy  CHF (congestive heart failure)  Atrial fibrillation  Diabetes mellitus  Smoker  BPH (benign prostatic hyperplasia)  HTN (hypertension)  Seizures  Bipolar 1 disorder  Migraines  Pneumonia  MS (multiple sclerosis)  CAD (coronary artery disease)  Bipolar disorder  Anginal pain  Schizo affective schizophrenia  GERD (gastroesophageal reflux disease)  Seasonal allergies  HTN (hypertension)  Hypercholesteremia  COPD (chronic obstructive pulmonary disease)  Neuropathy  Atrial flutter  CVA (cerebral vascular accident)  TIA (transient ischemic attack)  Syncope  Chronic Hypotension  Hypotension  Peripheral Neuropathy  Human Immunodeficiency Virus (  S/P Implantation of Automatic  Personal History of Multiple S  Personal History of Mitral Nicki  Personal History of MI (Myocar  Personal History of Hypertensi  Personal History of Congestive  Personal History of Cardiomyop  Personal History of Atrial Fib  Personal History of Alcoholism  Clinical Depression    Past Surgical History:  H/O prior ablation treatment  Cardiac pacemaker recipient  H/O hernia repair  History of hip replacement  S/P Orchiectomy  SVT (Supraventricular Tachycar  GERD (Gastroesophageal Reflux  S/P Implantation of AICD    Allergies:  dexmethylphenidate (Unknown)  Dilantin (Rash)  dilantin, compazine, neurontin, ritalin, phenergan (Unknown)  Haldol (Unknown)  hydantoin derivatives (Other)  methylphenidate (Unknown)  Morphine Sulfate (Unknown)  phenytoin (Unknown)  prochlorperazine (Unknown)  promethazine (Dystonic RXN)  rash/hives (Anaphylaxis)  thioxanthenes (Unknown)    Medications:  PRN:  morphine  IR 15 milliGRAM(s) Oral every 4 hours PRN Severe Pain (7 - 10)    Standing:  acetaminophen   Tablet .. 650 milliGRAM(s) Oral every 6 hours  apixaban 5 milliGRAM(s) Oral every 12 hours  atorvastatin 40 milliGRAM(s) Oral at bedtime  baclofen 10 milliGRAM(s) Oral four times a day  buDESOnide 160 MICROgram(s)/formoterol 4.5 MICROgram(s) Inhaler 2 Puff(s) Inhalation two times a day  chlorhexidine 4% Liquid 1 Application(s) Topical <User Schedule>  cholecalciferol 2000 Unit(s) Oral daily  dofetilide 250 MICROGram(s) Oral two times a day  fluticasone propionate (50 MICROgram(s)/actuation) Nasal Spray - Peds 2 Spray(s) Alternating Nostrils daily  furosemide    Tablet 20 milliGRAM(s) Oral daily  gabapentin 400 milliGRAM(s) Oral three times a day  ketoconazole 2% Shampoo 1 Application(s) Topical daily  lamoTRIgine 100 milliGRAM(s) Oral two times a day  loratadine 10 milliGRAM(s) Oral daily  metoprolol tartrate 25 milliGRAM(s) Oral every 8 hours  pantoprazole    Tablet 40 milliGRAM(s) Oral before breakfast  polyethylene glycol 3350 17 Gram(s) Oral every 12 hours  predniSONE   Tablet 60 milliGRAM(s) Oral daily  QUEtiapine 25 milliGRAM(s) Oral daily  QUEtiapine 50 milliGRAM(s) Oral at bedtime  tamsulosin 0.4 milliGRAM(s) Oral at bedtime  tiotropium 18 MICROgram(s) Capsule 1 Capsule(s) Inhalation daily    Home:  acetaminophen 650 mg oral tablet: 650 milligram(s) orally 4 times a day, As Needed  aspirin 81 mg oral tablet, chewable: 1 tab(s) orally once a day  atorvastatin 40 mg oral tablet: 1 tab(s) orally once a day (at bedtime)  baclofen 10 mg oral tablet: 1 tab(s) orally 4 times a day  budesonide-formoterol 160 mcg-4.5 mcg/inh inhalation aerosol: 2 puff(s) inhaled 2 times a day  Cepacol Extra Strength Menthol 10 mg mucous membrane lozenge: 1 anderson(s) mucous membrane every 6 hours, As Needed  Claritin 10 mg oral tablet: 1 tab(s) orally once a day  Eliquis 5 mg oral tablet: 1 tab(s) orally 2 times a day  Flonase 50 mcg/inh nasal spray: 2 spray(s) nasal once a day  gabapentin 400 mg oral capsule: 1 cap(s) orally 3 times a day  HYDROmorphone 2 mg oral tablet: 1 tab(s) orally every 4 hours, As Needed  ketoconazole 2% topical shampoo: Apply topically to affected area once a day (in the evening)  lamoTRIgine 100 mg oral tablet: 1 tab(s) orally 2 times a day  Lasix 20 mg oral tablet: 1 tab(s) orally 3 times a week on monday, wednesday and friday  Lasix 40 mg oral tablet: 1 tab(s) orally 3 times a week on tuesday, thursday and saturday  Metoprolol Tartrate 25 mg oral tablet: 1 tab(s) orally 2 times a day  Mi-Acid oral suspension: 30 milliliter(s) orally 4 times a day, As Needed  Nitrostat 0.4 mg sublingual tablet: sublingual 3 times a day, As Needed; may repeat after 5 min until relief  omeprazole 20 mg oral delayed release capsule: 1 cap(s) orally 2 times a day  polyethylene glycol 3350 oral powder for reconstitution: 1 dose(s) orally once a day, As Needed  prazosin 2 mg oral capsule: 1 cap(s) orally once a day (at bedtime)  QUEtiapine 25 mg oral tablet: 1 tab(s) orally once a day  SEROquel 50 mg oral tablet: 1 tab(s) orally once a day (at bedtime)  tamsulosin 0.4 mg oral capsule: 1 cap(s) orally once a day  Tecfidera 240 mg oral delayed release capsule: 1 cap(s) orally once a day  Tikosyn 250 mcg oral capsule: 1 cap(s) orally 2 times a day    Vitals:  T(C): 36.4, Max: 36.6 (08-18-19 @ 22:41)  T(F): 97.5, Max: 97.8 (08-18-19 @ 22:41)  HR: 62 (60 - 62)  BP: 113/66 (96/51 - 113/66)  RR: 18 (18 - 18)  SpO2: --    Physical Exam:  General: Awake, Alert. Not in acute distress. Sitting up in bed, eating breakfast. On RA.  HEENT: Head NC/AT. YARELIS more severe in the L eye appreciated.  Heart: Regular rate and rhythm. AICD in L upper chest.  Lungs: Clear to auscultation bilaterally. No wheezes, rales or rhonchi.  Abdomen: Soft, nontender, nondistended, + BS.  Extremities: No edema in upper or lower extremities.  Neuro: AAOx3.     Labs:  CBC (08-18 @ 06:48)                        Hgb: 13.0<L>  WBC: 12.64<H> )---------------------( Plts: 289                              Hct: 39.6<L>    Chem (08-18 @ 06:48)  Na: 137  |     Cl: 98     |  BUN: 35<H>  -----------------------------------------< Gluc: 122<H>    K: 4.7   |  HCO3: 27  |  Cr: 1.0    Ca 9.4 (08-18 @ 06:48)  Mg 2.6<H> (08-18 @ 06:48)    LFTs (08-18 @ 06:48)  TPro 6.6  /  Alb 4.3  TBili 0.3  /  DBili x   AST 12  /  ALT 14  /  AlkPhos 124<H>

## 2019-08-19 NOTE — PROGRESS NOTE ADULT - PROVIDER SPECIALTY LIST ADULT
Cardiology
Internal Medicine
Neurology
Pain Medicine
Physiatry
Internal Medicine
Internal Medicine

## 2019-08-23 DIAGNOSIS — Z79.01 LONG TERM (CURRENT) USE OF ANTICOAGULANTS: ICD-10-CM

## 2019-08-23 DIAGNOSIS — I47.1 SUPRAVENTRICULAR TACHYCARDIA: ICD-10-CM

## 2019-08-23 DIAGNOSIS — G43.909 MIGRAINE, UNSPECIFIED, NOT INTRACTABLE, WITHOUT STATUS MIGRAINOSUS: ICD-10-CM

## 2019-08-23 DIAGNOSIS — Z79.82 LONG TERM (CURRENT) USE OF ASPIRIN: ICD-10-CM

## 2019-08-23 DIAGNOSIS — I42.9 CARDIOMYOPATHY, UNSPECIFIED: ICD-10-CM

## 2019-08-23 DIAGNOSIS — B02.23 POSTHERPETIC POLYNEUROPATHY: ICD-10-CM

## 2019-08-23 DIAGNOSIS — I95.89 OTHER HYPOTENSION: ICD-10-CM

## 2019-08-23 DIAGNOSIS — E11.42 TYPE 2 DIABETES MELLITUS WITH DIABETIC POLYNEUROPATHY: ICD-10-CM

## 2019-08-23 DIAGNOSIS — G40.909 EPILEPSY, UNSPECIFIED, NOT INTRACTABLE, WITHOUT STATUS EPILEPTICUS: ICD-10-CM

## 2019-08-23 DIAGNOSIS — I11.0 HYPERTENSIVE HEART DISEASE WITH HEART FAILURE: ICD-10-CM

## 2019-08-23 DIAGNOSIS — Z87.01 PERSONAL HISTORY OF PNEUMONIA (RECURRENT): ICD-10-CM

## 2019-08-23 DIAGNOSIS — J44.9 CHRONIC OBSTRUCTIVE PULMONARY DISEASE, UNSPECIFIED: ICD-10-CM

## 2019-08-23 DIAGNOSIS — I50.9 HEART FAILURE, UNSPECIFIED: ICD-10-CM

## 2019-08-23 DIAGNOSIS — Z86.73 PERSONAL HISTORY OF TRANSIENT ISCHEMIC ATTACK (TIA), AND CEREBRAL INFARCTION WITHOUT RESIDUAL DEFICITS: ICD-10-CM

## 2019-08-23 DIAGNOSIS — E55.9 VITAMIN D DEFICIENCY, UNSPECIFIED: ICD-10-CM

## 2019-08-23 DIAGNOSIS — Z96.641 PRESENCE OF RIGHT ARTIFICIAL HIP JOINT: ICD-10-CM

## 2019-08-23 DIAGNOSIS — I25.119 ATHEROSCLEROTIC HEART DISEASE OF NATIVE CORONARY ARTERY WITH UNSPECIFIED ANGINA PECTORIS: ICD-10-CM

## 2019-08-23 DIAGNOSIS — K21.9 GASTRO-ESOPHAGEAL REFLUX DISEASE WITHOUT ESOPHAGITIS: ICD-10-CM

## 2019-08-23 DIAGNOSIS — G35 MULTIPLE SCLEROSIS: ICD-10-CM

## 2019-08-23 DIAGNOSIS — Z88.5 ALLERGY STATUS TO NARCOTIC AGENT: ICD-10-CM

## 2019-08-23 DIAGNOSIS — N40.0 BENIGN PROSTATIC HYPERPLASIA WITHOUT LOWER URINARY TRACT SYMPTOMS: ICD-10-CM

## 2019-08-23 DIAGNOSIS — F25.9 SCHIZOAFFECTIVE DISORDER, UNSPECIFIED: ICD-10-CM

## 2019-08-23 DIAGNOSIS — E78.00 PURE HYPERCHOLESTEROLEMIA, UNSPECIFIED: ICD-10-CM

## 2019-08-23 DIAGNOSIS — I48.92 UNSPECIFIED ATRIAL FLUTTER: ICD-10-CM

## 2019-08-23 DIAGNOSIS — I48.0 PAROXYSMAL ATRIAL FIBRILLATION: ICD-10-CM

## 2019-08-23 DIAGNOSIS — R13.19 OTHER DYSPHAGIA: ICD-10-CM

## 2019-08-23 DIAGNOSIS — Z88.8 ALLERGY STATUS TO OTHER DRUGS, MEDICAMENTS AND BIOLOGICAL SUBSTANCES STATUS: ICD-10-CM

## 2019-08-23 DIAGNOSIS — F43.10 POST-TRAUMATIC STRESS DISORDER, UNSPECIFIED: ICD-10-CM

## 2019-08-23 DIAGNOSIS — K22.2 ESOPHAGEAL OBSTRUCTION: ICD-10-CM

## 2019-08-23 DIAGNOSIS — F31.31 BIPOLAR DISORDER, CURRENT EPISODE DEPRESSED, MILD: ICD-10-CM

## 2019-08-29 ENCOUNTER — INPATIENT (INPATIENT)
Facility: HOSPITAL | Age: 49
LOS: 12 days | Discharge: NURSING, REHAB OR LONG-TERM CARE | End: 2019-09-11
Attending: INTERNAL MEDICINE | Admitting: INTERNAL MEDICINE
Payer: MEDICAID

## 2019-08-29 VITALS
SYSTOLIC BLOOD PRESSURE: 119 MMHG | TEMPERATURE: 97 F | RESPIRATION RATE: 18 BRPM | WEIGHT: 205.03 LBS | DIASTOLIC BLOOD PRESSURE: 66 MMHG | HEART RATE: 60 BPM | OXYGEN SATURATION: 97 %

## 2019-08-29 DIAGNOSIS — Z96.649 PRESENCE OF UNSPECIFIED ARTIFICIAL HIP JOINT: Chronic | ICD-10-CM

## 2019-08-29 DIAGNOSIS — Z98.890 OTHER SPECIFIED POSTPROCEDURAL STATES: Chronic | ICD-10-CM

## 2019-08-29 DIAGNOSIS — Z95.0 PRESENCE OF CARDIAC PACEMAKER: Chronic | ICD-10-CM

## 2019-08-29 LAB
ALBUMIN SERPL ELPH-MCNC: 4.2 G/DL — SIGNIFICANT CHANGE UP (ref 3.5–5.2)
ALP SERPL-CCNC: 137 U/L — HIGH (ref 30–115)
ALT FLD-CCNC: 39 U/L — SIGNIFICANT CHANGE UP (ref 0–41)
ANION GAP SERPL CALC-SCNC: 10 MMOL/L — SIGNIFICANT CHANGE UP (ref 7–14)
ANION GAP SERPL CALC-SCNC: 14 MMOL/L — SIGNIFICANT CHANGE UP (ref 7–14)
APTT BLD: 41.7 SEC — HIGH (ref 27–39.2)
AST SERPL-CCNC: 20 U/L — SIGNIFICANT CHANGE UP (ref 0–41)
BILIRUB SERPL-MCNC: 0.3 MG/DL — SIGNIFICANT CHANGE UP (ref 0.2–1.2)
BLD GP AB SCN SERPL QL: SIGNIFICANT CHANGE UP
BUN SERPL-MCNC: 19 MG/DL — SIGNIFICANT CHANGE UP (ref 10–20)
BUN SERPL-MCNC: 19 MG/DL — SIGNIFICANT CHANGE UP (ref 10–20)
CALCIUM SERPL-MCNC: 9.1 MG/DL — SIGNIFICANT CHANGE UP (ref 8.5–10.1)
CALCIUM SERPL-MCNC: 9.4 MG/DL — SIGNIFICANT CHANGE UP (ref 8.5–10.1)
CHLORIDE SERPL-SCNC: 103 MMOL/L — SIGNIFICANT CHANGE UP (ref 98–110)
CHLORIDE SERPL-SCNC: 97 MMOL/L — LOW (ref 98–110)
CO2 SERPL-SCNC: 28 MMOL/L — SIGNIFICANT CHANGE UP (ref 17–32)
CO2 SERPL-SCNC: 28 MMOL/L — SIGNIFICANT CHANGE UP (ref 17–32)
CREAT SERPL-MCNC: 1 MG/DL — SIGNIFICANT CHANGE UP (ref 0.7–1.5)
CREAT SERPL-MCNC: 1.3 MG/DL — SIGNIFICANT CHANGE UP (ref 0.7–1.5)
GLUCOSE BLDC GLUCOMTR-MCNC: 84 MG/DL — SIGNIFICANT CHANGE UP (ref 70–99)
GLUCOSE BLDC GLUCOMTR-MCNC: 99 MG/DL — SIGNIFICANT CHANGE UP (ref 70–99)
GLUCOSE SERPL-MCNC: 117 MG/DL — HIGH (ref 70–99)
GLUCOSE SERPL-MCNC: 122 MG/DL — HIGH (ref 70–99)
HCT VFR BLD CALC: 37.7 % — LOW (ref 42–52)
HGB BLD-MCNC: 12.4 G/DL — LOW (ref 14–18)
INR BLD: 1.11 RATIO — SIGNIFICANT CHANGE UP (ref 0.65–1.3)
MAGNESIUM SERPL-MCNC: 1.9 MG/DL — SIGNIFICANT CHANGE UP (ref 1.8–2.4)
MAGNESIUM SERPL-MCNC: 2.3 MG/DL — SIGNIFICANT CHANGE UP (ref 1.8–2.4)
MCHC RBC-ENTMCNC: 29.7 PG — SIGNIFICANT CHANGE UP (ref 27–31)
MCHC RBC-ENTMCNC: 32.9 G/DL — SIGNIFICANT CHANGE UP (ref 32–37)
MCV RBC AUTO: 90.2 FL — SIGNIFICANT CHANGE UP (ref 80–94)
NRBC # BLD: 0 /100 WBCS — SIGNIFICANT CHANGE UP (ref 0–0)
PLATELET # BLD AUTO: 241 K/UL — SIGNIFICANT CHANGE UP (ref 130–400)
POTASSIUM SERPL-MCNC: 3.3 MMOL/L — LOW (ref 3.5–5)
POTASSIUM SERPL-MCNC: 3.9 MMOL/L — SIGNIFICANT CHANGE UP (ref 3.5–5)
POTASSIUM SERPL-SCNC: 3.3 MMOL/L — LOW (ref 3.5–5)
POTASSIUM SERPL-SCNC: 3.9 MMOL/L — SIGNIFICANT CHANGE UP (ref 3.5–5)
PROT SERPL-MCNC: 6.5 G/DL — SIGNIFICANT CHANGE UP (ref 6–8)
PROTHROM AB SERPL-ACNC: 12.7 SEC — SIGNIFICANT CHANGE UP (ref 9.95–12.87)
RBC # BLD: 4.18 M/UL — LOW (ref 4.7–6.1)
RBC # FLD: 14 % — SIGNIFICANT CHANGE UP (ref 11.5–14.5)
SODIUM SERPL-SCNC: 139 MMOL/L — SIGNIFICANT CHANGE UP (ref 135–146)
SODIUM SERPL-SCNC: 141 MMOL/L — SIGNIFICANT CHANGE UP (ref 135–146)
TROPONIN T SERPL-MCNC: <0.01 NG/ML — SIGNIFICANT CHANGE UP
WBC # BLD: 7.16 K/UL — SIGNIFICANT CHANGE UP (ref 4.8–10.8)
WBC # FLD AUTO: 7.16 K/UL — SIGNIFICANT CHANGE UP (ref 4.8–10.8)

## 2019-08-29 PROCEDURE — 71045 X-RAY EXAM CHEST 1 VIEW: CPT | Mod: 26

## 2019-08-29 PROCEDURE — 93010 ELECTROCARDIOGRAM REPORT: CPT

## 2019-08-29 PROCEDURE — 76000 FLUOROSCOPY <1 HR PHYS/QHP: CPT | Mod: 26

## 2019-08-29 PROCEDURE — 99223 1ST HOSP IP/OBS HIGH 75: CPT | Mod: 57

## 2019-08-29 PROCEDURE — 99285 EMERGENCY DEPT VISIT HI MDM: CPT

## 2019-08-29 RX ORDER — FLUTICASONE PROPIONATE 50 MCG
2 SPRAY, SUSPENSION NASAL DAILY
Refills: 0 | Status: DISCONTINUED | OUTPATIENT
Start: 2019-08-29 | End: 2019-09-05

## 2019-08-29 RX ORDER — LAMOTRIGINE 25 MG/1
100 TABLET, ORALLY DISINTEGRATING ORAL
Refills: 0 | Status: DISCONTINUED | OUTPATIENT
Start: 2019-08-29 | End: 2019-09-05

## 2019-08-29 RX ORDER — QUETIAPINE FUMARATE 200 MG/1
25 TABLET, FILM COATED ORAL DAILY
Refills: 0 | Status: DISCONTINUED | OUTPATIENT
Start: 2019-08-29 | End: 2019-09-05

## 2019-08-29 RX ORDER — POTASSIUM CHLORIDE 20 MEQ
40 PACKET (EA) ORAL ONCE
Refills: 0 | Status: COMPLETED | OUTPATIENT
Start: 2019-08-29 | End: 2019-08-29

## 2019-08-29 RX ORDER — ATORVASTATIN CALCIUM 80 MG/1
40 TABLET, FILM COATED ORAL AT BEDTIME
Refills: 0 | Status: DISCONTINUED | OUTPATIENT
Start: 2019-08-29 | End: 2019-09-05

## 2019-08-29 RX ORDER — FUROSEMIDE 40 MG
20 TABLET ORAL DAILY
Refills: 0 | Status: DISCONTINUED | OUTPATIENT
Start: 2019-08-29 | End: 2019-09-05

## 2019-08-29 RX ORDER — BACLOFEN 100 %
10 POWDER (GRAM) MISCELLANEOUS
Refills: 0 | Status: DISCONTINUED | OUTPATIENT
Start: 2019-08-29 | End: 2019-09-05

## 2019-08-29 RX ORDER — MORPHINE SULFATE 50 MG/1
30 CAPSULE, EXTENDED RELEASE ORAL EVERY 12 HOURS
Refills: 0 | Status: DISCONTINUED | OUTPATIENT
Start: 2019-08-29 | End: 2019-09-05

## 2019-08-29 RX ORDER — ACETAMINOPHEN 500 MG
650 TABLET ORAL EVERY 6 HOURS
Refills: 0 | Status: DISCONTINUED | OUTPATIENT
Start: 2019-08-29 | End: 2019-09-04

## 2019-08-29 RX ORDER — MORPHINE SULFATE 50 MG/1
15 CAPSULE, EXTENDED RELEASE ORAL EVERY 4 HOURS
Refills: 0 | Status: DISCONTINUED | OUTPATIENT
Start: 2019-08-29 | End: 2019-08-29

## 2019-08-29 RX ORDER — DOFETILIDE 0.25 MG/1
250 CAPSULE ORAL
Refills: 0 | Status: DISCONTINUED | OUTPATIENT
Start: 2019-08-29 | End: 2019-09-05

## 2019-08-29 RX ORDER — GABAPENTIN 400 MG/1
400 CAPSULE ORAL THREE TIMES A DAY
Refills: 0 | Status: DISCONTINUED | OUTPATIENT
Start: 2019-08-29 | End: 2019-09-05

## 2019-08-29 RX ORDER — MAGNESIUM SULFATE 500 MG/ML
2 VIAL (ML) INJECTION ONCE
Refills: 0 | Status: COMPLETED | OUTPATIENT
Start: 2019-08-29 | End: 2019-08-29

## 2019-08-29 RX ORDER — POLYETHYLENE GLYCOL 3350 17 G/17G
17 POWDER, FOR SOLUTION ORAL EVERY 12 HOURS
Refills: 0 | Status: DISCONTINUED | OUTPATIENT
Start: 2019-08-29 | End: 2019-09-05

## 2019-08-29 RX ORDER — METOPROLOL TARTRATE 50 MG
25 TABLET ORAL EVERY 8 HOURS
Refills: 0 | Status: DISCONTINUED | OUTPATIENT
Start: 2019-08-29 | End: 2019-09-05

## 2019-08-29 RX ORDER — APIXABAN 2.5 MG/1
5 TABLET, FILM COATED ORAL EVERY 12 HOURS
Refills: 0 | Status: DISCONTINUED | OUTPATIENT
Start: 2019-08-29 | End: 2019-08-31

## 2019-08-29 RX ORDER — BUDESONIDE AND FORMOTEROL FUMARATE DIHYDRATE 160; 4.5 UG/1; UG/1
2 AEROSOL RESPIRATORY (INHALATION)
Refills: 0 | Status: DISCONTINUED | OUTPATIENT
Start: 2019-08-29 | End: 2019-09-05

## 2019-08-29 RX ORDER — CHOLECALCIFEROL (VITAMIN D3) 125 MCG
2000 CAPSULE ORAL DAILY
Refills: 0 | Status: DISCONTINUED | OUTPATIENT
Start: 2019-08-29 | End: 2019-09-05

## 2019-08-29 RX ORDER — TIOTROPIUM BROMIDE 18 UG/1
1 CAPSULE ORAL; RESPIRATORY (INHALATION) DAILY
Refills: 0 | Status: DISCONTINUED | OUTPATIENT
Start: 2019-08-29 | End: 2019-09-05

## 2019-08-29 RX ORDER — MORPHINE SULFATE 50 MG/1
6 CAPSULE, EXTENDED RELEASE ORAL ONCE
Refills: 0 | Status: DISCONTINUED | OUTPATIENT
Start: 2019-08-29 | End: 2019-08-29

## 2019-08-29 RX ORDER — LORATADINE 10 MG/1
10 TABLET ORAL DAILY
Refills: 0 | Status: DISCONTINUED | OUTPATIENT
Start: 2019-08-29 | End: 2019-09-05

## 2019-08-29 RX ORDER — MORPHINE SULFATE 50 MG/1
30 CAPSULE, EXTENDED RELEASE ORAL
Refills: 0 | Status: DISCONTINUED | OUTPATIENT
Start: 2019-08-29 | End: 2019-08-29

## 2019-08-29 RX ORDER — QUETIAPINE FUMARATE 200 MG/1
50 TABLET, FILM COATED ORAL AT BEDTIME
Refills: 0 | Status: DISCONTINUED | OUTPATIENT
Start: 2019-08-29 | End: 2019-09-05

## 2019-08-29 RX ORDER — PANTOPRAZOLE SODIUM 20 MG/1
40 TABLET, DELAYED RELEASE ORAL
Refills: 0 | Status: DISCONTINUED | OUTPATIENT
Start: 2019-08-29 | End: 2019-09-05

## 2019-08-29 RX ORDER — TAMSULOSIN HYDROCHLORIDE 0.4 MG/1
0.4 CAPSULE ORAL AT BEDTIME
Refills: 0 | Status: DISCONTINUED | OUTPATIENT
Start: 2019-08-29 | End: 2019-09-05

## 2019-08-29 RX ORDER — DIPHENHYDRAMINE HCL 50 MG
25 CAPSULE ORAL ONCE
Refills: 0 | Status: COMPLETED | OUTPATIENT
Start: 2019-08-29 | End: 2019-08-29

## 2019-08-29 RX ORDER — NITROGLYCERIN 6.5 MG
0.4 CAPSULE, EXTENDED RELEASE ORAL
Refills: 0 | Status: DISCONTINUED | OUTPATIENT
Start: 2019-08-29 | End: 2019-09-05

## 2019-08-29 RX ADMIN — Medication 10 MILLIGRAM(S): at 12:25

## 2019-08-29 RX ADMIN — MORPHINE SULFATE 6 MILLIGRAM(S): 50 CAPSULE, EXTENDED RELEASE ORAL at 05:56

## 2019-08-29 RX ADMIN — QUETIAPINE FUMARATE 50 MILLIGRAM(S): 200 TABLET, FILM COATED ORAL at 22:14

## 2019-08-29 RX ADMIN — LORATADINE 10 MILLIGRAM(S): 10 TABLET ORAL at 12:25

## 2019-08-29 RX ADMIN — Medication 2000 UNIT(S): at 12:26

## 2019-08-29 RX ADMIN — Medication 2 SPRAY(S): at 12:26

## 2019-08-29 RX ADMIN — Medication 50 GRAM(S): at 04:02

## 2019-08-29 RX ADMIN — TAMSULOSIN HYDROCHLORIDE 0.4 MILLIGRAM(S): 0.4 CAPSULE ORAL at 22:14

## 2019-08-29 RX ADMIN — MORPHINE SULFATE 30 MILLIGRAM(S): 50 CAPSULE, EXTENDED RELEASE ORAL at 18:07

## 2019-08-29 RX ADMIN — QUETIAPINE FUMARATE 25 MILLIGRAM(S): 200 TABLET, FILM COATED ORAL at 12:25

## 2019-08-29 RX ADMIN — GABAPENTIN 400 MILLIGRAM(S): 400 CAPSULE ORAL at 22:14

## 2019-08-29 RX ADMIN — Medication 650 MILLIGRAM(S): at 18:47

## 2019-08-29 RX ADMIN — Medication 25 MILLIGRAM(S): at 22:14

## 2019-08-29 RX ADMIN — APIXABAN 5 MILLIGRAM(S): 2.5 TABLET, FILM COATED ORAL at 20:59

## 2019-08-29 RX ADMIN — DOFETILIDE 250 MICROGRAM(S): 0.25 CAPSULE ORAL at 18:47

## 2019-08-29 RX ADMIN — Medication 10 MILLIGRAM(S): at 23:26

## 2019-08-29 RX ADMIN — BUDESONIDE AND FORMOTEROL FUMARATE DIHYDRATE 2 PUFF(S): 160; 4.5 AEROSOL RESPIRATORY (INHALATION) at 22:13

## 2019-08-29 RX ADMIN — Medication 650 MILLIGRAM(S): at 23:28

## 2019-08-29 RX ADMIN — Medication 40 MILLIEQUIVALENT(S): at 04:02

## 2019-08-29 RX ADMIN — POLYETHYLENE GLYCOL 3350 17 GRAM(S): 17 POWDER, FOR SOLUTION ORAL at 18:48

## 2019-08-29 RX ADMIN — Medication 10 MILLIGRAM(S): at 18:48

## 2019-08-29 RX ADMIN — Medication 25 MILLIGRAM(S): at 05:24

## 2019-08-29 RX ADMIN — GABAPENTIN 400 MILLIGRAM(S): 400 CAPSULE ORAL at 15:09

## 2019-08-29 RX ADMIN — LAMOTRIGINE 100 MILLIGRAM(S): 25 TABLET, ORALLY DISINTEGRATING ORAL at 18:47

## 2019-08-29 RX ADMIN — Medication 650 MILLIGRAM(S): at 12:25

## 2019-08-29 RX ADMIN — ATORVASTATIN CALCIUM 40 MILLIGRAM(S): 80 TABLET, FILM COATED ORAL at 22:14

## 2019-08-29 NOTE — CHART NOTE - NSCHARTNOTEFT_GEN_A_CORE
Cardiac Electrophysiology Procedure Report    Date of procedure	8/29/19  EP Attending	Didi Vance MD  Procedure	Fluoroscopy of RV Lead    Indication: Riata Lead  47 yo M with history of VT s/p ICD for secondary prevention who presents for device alarming found to have device at [facility]. He has a Riata lead with CXR suggestive of externalization and is now referred for fluoroscopy of the lead.     Procedure  The patient was brought to the procedure room in a nonsedated state. Informed, written consent was obtained prior to the procedure. The patient was placed on the table and fluoroscopy was performed of the RV lead to assess for externalization.     Fluoroscopy images were obtained in several different views including ABAD, VALENCIA and AP. Fluoroscopy revealed RV ICD lead externalization.    The patient was returned to a hospital room in stable condition.     COMPLICATIONS:  The patient tolerated the procedure well. There were no immediate complications.    CONCLUSIONS:  Fluoroscopy of RV lead    FLUOROSCOPY: 1min 24 sec, 35 mGy      _______________________________  Didi Vance MD  Cardiac Electrophysiology

## 2019-08-29 NOTE — ED ADULT TRIAGE NOTE - CHIEF COMPLAINT QUOTE
sydnee coronel from [facility] for defribilator going off once. Pt denies any pain or discomfort at this time.

## 2019-08-29 NOTE — ED PROVIDER NOTE - PHYSICAL EXAMINATION
CONSTITUTIONAL: Well-developed; well-nourished; in no acute distress.   SKIN: warm, dry  HEAD: Normocephalic; atraumatic.  EYES: PERRL, EOMI, normal sclera and conjunctiva   ENT: No nasal discharge; airway clear.  NECK: Supple; non tender.  CARD: Regular rate, irregular rhythm , no murmurs, gallops, or rubs.   RESP: No inc WOB   ABD: soft ntnd  EXT: Normal ROM.    NEURO: Alert, oriented, grossly unremarkable  PSYCH: Cooperative, appropriate.

## 2019-08-29 NOTE — ED PROVIDER NOTE - OBJECTIVE STATEMENT
The patient is a 47 y/o M w/ a PMH of v-tach s/p AICD, CHF, A fib/flutter, HTN, COPD (on O2 PRN), DLD, multiple sclerosis w/ multiple admissions for flares, seizure disorder (on lamotrigine), pericardial effusion (2006), cardiomyopathy, MI (2006, 2008) and a complicated past medical history presenting with 1 day history of CP and firing of his AICD. The patient is a 49 y/o M w/ a PMH of v-tach s/p AICD, CHF, A fib/flutter, HTN, COPD (on O2 PRN), DLD, multiple sclerosis w/ multiple admissions for flares, seizure disorder (on lamotrigine), pericardial effusion (2006), cardiomyopathy, MI (2006, 2008) and a complicated past medical history presenting with 1 day history of CP since last night and firing of his AICD. Pt reported episode of CP since last nigh, Intermittent palpation, No SOB. No nausea, vomiting , diarrhea.   Of note recently told AICD has only 3 % battery and he would only have a few weeks left.  AICD has been buzzing recently and today he received a shock.  He  was noted to have intermittent PVCs on The patient is a 47 y/o M w/ a PMH of v-tach s/p AICD, CHF, A fib/flutter, HTN, COPD (on O2 PRN), DLD, cardiomyopathy, MI (2006, 2008) with additional PMH listed in chart  presenting w/ CP since last night and firing of his AICD today. Associated with intermittent palpations, No SOB. No N/V . Recently told his AICD has only 3 % battery .  AICD has been buzzing recently and today he received a shock.  He  was noted to have intermittent PVCs on EKG on bedside monitoring . Followed by Dr Reyes

## 2019-08-29 NOTE — H&P ADULT - NSICDXPASTSURGICALHX_GEN_ALL_CORE_FT
PAST SURGICAL HISTORY:  Cardiac pacemaker recipient     H/O hernia repair right 1972,1991    H/O prior ablation treatment for Afib    History of hip replacement     S/P Implantation of AICD     S/P Orchiectomy L for testicular CA

## 2019-08-29 NOTE — CONSULT NOTE ADULT - ATTENDING COMMENTS
Covering for Dr. Quiñones    49 yo M with history of VT s/p DC ICD for secondary prevention, AFib on Eliquis, HTN, COPD, DL, multiple sclerosis presenting with alarm and buzzing from his ICD. Patient states he initially had a PPM and was upgraded to ICD in 2005. We have been consulted for alarming device.     Recommend  - Device interrogation performed and shows short AFib. RV lead working appropriately with no noise noted.   - Continue anticoagulation for AFib  - Fluoroscopy today to evaluate RV lead to see if Riata lead (implanted in 2005) has externalized. Patient may require generator change vs. extraction as CXR is suggestive of externalization, pt is young, and he has ICD for secondary prevention.

## 2019-08-29 NOTE — ED ADULT NURSE NOTE - OBJECTIVE STATEMENT
pt is a 49 yo male biba from [facility] for defribilator going off once. denies SOB, nauea, vomiting, diarrhea, chest pain, diaphoresis, headache, dizziness, loc, cough, fever, trauma numbness, tingling.

## 2019-08-29 NOTE — PATIENT PROFILE ADULT - NSPRESCRUSEDDRG_GEN_A_NUR
CHIEF COMPLAINT  Follow-up for back pain. Mr. Goodwin states his back pain is unchanged.    Subjective   Chevy Goodwin is a 78 y.o. male  who presents to the office for follow-up.He has a history of chronic back pain. REports this is unchanged. He also has a history of chronic right shoulder pain s/p surgery 1-10-19.    Complains of pain in his low back and right shoulder. Today his pain is 7/10VAs. Reports his pain is worse today because he tilled his garden. He reports his low back pain has been relatively stable since last office visit. However, he admits there have been more times he has been having to take 5/day and not 4/day. His last refill was for #135. Has 24 pills left today in office.  Continues with Hydrocodone 10/325 4-5/day(more recently averaging 5/day). Denies any side effects from the regimen. The regimen helps decrease his pain by 60%. ADL's by self. Denies any bowel or bladder incontinence. Was able to garden yesterday.     Having issues with his pharmacy at [facility] not reporting to his refill to [facility]. He did bring his bottle today. He gets his medication filled for 2 months in a row at [facility] pharmacy. He then goes every third month to a retail pharmacy.     Is pending evaluation with nephrology.   He completed a Bilateral SI joint injection   on  2-1-18 performed by Dr. Brambila for management of back pain. Patient reports 25% relief from the procedure for 8 hours. He completed a Thoracic Epidural   on  12/5/17 performed by Dr. Brambila for management of back pain. Patient reports no relief from the procedure.   Back Pain   This is a chronic (history of lumbar fusion with Dr. Yuan at [facility], was done in 2005) problem. The current episode started more than 1 year ago. The problem occurs constantly. The problem has been waxing and waning (unchanged since last office visit) since onset. The pain is present in the lumbar spine and thoracic spine. The quality of the pain is described  as aching and burning. The pain radiates to the right foot and left foot. The pain is at a severity of 7/10. The pain is moderate. The pain is worse during the day. The symptoms are aggravated by bending and twisting (walking, any physical activity). Stiffness is present in the morning. Pertinent negatives include no bladder incontinence, bowel incontinence, chest pain, dysuria, fever, headaches, numbness, tingling or weakness. Risk factors include lack of exercise. He has tried NSAIDs (previous TESI.  Hydrocodone) for the symptoms. The treatment provided moderate relief.   Pain   This is a chronic problem. The current episode started more than 1 year ago. Progression since onset: RIGHT SHOULDER--- improved--surgery 1-10-19. Associated symptoms include arthralgias, fatigue and joint swelling (bilateral ankles). Pertinent negatives include no chest pain, chills, congestion, coughing, fever, headaches, nausea, numbness, vomiting or weakness.      PEG Assessment   What number best describes your pain on average in the past week?7  What number best describes how, during the past week, pain has interfered with your enjoyment of life?6  What number best describes how, during the past week, pain has interfered with your general activity?  6    The following portions of the patient's history were reviewed and updated as appropriate: allergies, current medications, past family history, past medical history, past social history, past surgical history and problem list.    Review of Systems   Constitutional: Positive for fatigue. Negative for chills and fever.   HENT: Negative for congestion.    Eyes: Negative for visual disturbance.   Respiratory: Negative for cough, shortness of breath and wheezing.    Cardiovascular: Negative.  Negative for chest pain.   Gastrointestinal: Negative for bowel incontinence, constipation, diarrhea, nausea and vomiting.   Genitourinary: Negative for bladder incontinence, difficulty urinating and  "dysuria.   Musculoskeletal: Positive for arthralgias, back pain and joint swelling (bilateral ankles).   Neurological: Negative for tingling, weakness, numbness and headaches.   Psychiatric/Behavioral: Positive for sleep disturbance. Negative for suicidal ideas. The patient is not nervous/anxious.        Vitals:    04/29/19 1115   BP: 156/69   Pulse: 55   Resp: 18   Temp: 98.8 °F (37.1 °C)   SpO2: 93%   Weight: 96.1 kg (211 lb 12.8 oz)   Height: 172.5 cm (67.91\")   PainSc:   7   PainLoc: Back     Objective   Physical Exam   Constitutional: He is oriented to person, place, and time. He appears well-developed and well-nourished. He is cooperative.   HENT:   Head: Normocephalic and atraumatic.   Nose: Nose normal.   Eyes: Conjunctivae and lids are normal.   Neck: Trachea normal. Neck supple.   Cardiovascular: Normal rate.   Pulmonary/Chest: Effort normal.   Musculoskeletal:        Right shoulder: He exhibits tenderness.        Thoracic back: He exhibits tenderness and pain.        Lumbar back: He exhibits tenderness and pain.   Minimal lumbar facet tenderness     Neurological: He is alert and oriented to person, place, and time. Gait (ambulates with cane) abnormal.   Reflex Scores:       Patellar reflexes are 0 on the right side and 0 on the left side.  Skin: Skin is intact.   Psychiatric: He has a normal mood and affect. His speech is normal and behavior is normal. Cognition and memory are normal.   Nursing note and vitals reviewed.      Assessment/Plan   Chevy was seen today for back pain.    Diagnoses and all orders for this visit:    Other chronic pain    Degeneration of intervertebral disc of lumbar region    Thoracic disc herniation T9-T10    Chronic right shoulder pain    Postlaminectomy syndrome of lumbar region    Long term (current) use of opiate analgesic    Other orders  -     HYDROcodone-acetaminophen (NORCO)  MG per tablet; Take 1 tablet by mouth Every 4 (Four) Hours As Needed for Severe Pain . " NTE-5/day      --- The urine drug screen confirmation from 12-13-18 has been reviewed and the result is APPROPRIATE based on patient history and MARIOLA report  --- Refill Hydrocodone. Patient appears stable with current regimen. No adverse effects. Regarding continuation of opioids, there is no evidence of aberrant behavior or any red flags.  The patient continues with appropriate response to opioid therapy. ADL's remain intact by self.   --- Follow-up 1 month or sooner if needed.     MARIOLA REPORT    As part of the patient's treatment plan, I am prescribing controlled substances. The patient has been made aware of appropriate use of such medications, including potential risk of somnolence, limited ability to drive and/or work safely, and the potential for dependence or overdose. It has also bee made clear that these medications are for use by this patient only, without concomitant use of alcohol or other substances unless prescribed.     Patient has completed prescribing agreement detailing terms of continued prescribing of controlled substances, including monitoring MARIOLA reports, urine drug screening, and pill counts if necessary. The patient is aware that inappropriate use will results in cessation of prescribing such medications.    MARIOLA report has been reviewed and scanned into the patient's chart.    As the clinician, I personally reviewed the MARIOLA from 4-25-19 while the patient was in the office today.    History and physical exam exhibit continued safe and appropriate use of controlled substances.      EMR Dragon/Transcription disclaimer:   Much of this encounter note is an electronic transcription/translation of spoken language to printed text. The electronic translation of spoken language may permit erroneous, or at times, nonsensical words or phrases to be inadvertently transcribed; Although I have reviewed the note for such errors, some may still exist.          No

## 2019-08-29 NOTE — H&P ADULT - NSHPLABSRESULTS_GEN_ALL_CORE
Labs:  08-29    139  |  97<L>  |  19  ----------------------------<  122<H>  3.3<L>   |  28  |  1.3    Ca    9.4      29 Aug 2019 02:00  Mg     1.9     08-29    TPro  6.5  /  Alb  4.2  /  TBili  0.3  /  DBili  x   /  AST  20  /  ALT  39  /  AlkPhos  137<H>  08-29                          12.4   7.16  )-----------( 241      ( 29 Aug 2019 02:00 )             37.7         LIVER FUNCTIONS - ( 29 Aug 2019 02:00 )  Alb: 4.2 g/dL / Pro: 6.5 g/dL / ALK PHOS: 137 U/L / ALT: 39 U/L / AST: 20 U/L / GGT: x           CARDIAC MARKERS ( 29 Aug 2019 02:00 )  x     / <0.01 ng/mL / x     / x     / x          POCT Blood Glucose.: 132 mg/dL (29 Aug 2019 02:11)    EKG:     Diagnosis Line Atrial-paced rhythm  Right bundle branch block  Minimal voltage criteria for LVH, may be normal variant  Abnormal ECG    Confirmed by Austin Cheng ([id number]) on 8/29/2019 6:07:21 AM    2-D ECHO (TTE) COMPLETE        PROCEDURE DATE:  08/18/2019      INTERPRETATION:  REPORT:    TRANSTHORACIC ECHOCARDIOGRAM REPORT    Summary:   1. Normal global left ventricular systolic function.   2. LV Ejection Fraction by Vargas's Method with a biplane EF of 69 %.   3. Normal left ventricular internal cavity size.   4. Normal right atrial size.   5. There is no evidence of pericardial effusion.   6. Mild thickening and calcification of the anterior and posterior   mitral valve leaflets.   7. No evidence of mitral valve regurgitation.

## 2019-08-29 NOTE — H&P ADULT - HISTORY OF PRESENT ILLNESS
47 y/o M with PMHx of v-tach s/p AICD, CHF, A fib/flutter, HTN, COPD (on O2 PRN), DLD, multiple sclerosis w/ multiple admissions for flares, seizure disorder (on lamotrigine), PTSD, depression, ?stroke/TIA, esophageal stricture, chronic sore throat/sinusitis, GERD, hemopneumothorax (2006), pericardial effusion (2006), cardiomyopathy, MI (2006, 2008), IBS, presenting with chest pain, palpitations and AICD firing. He had recent admission to [facility] on 08/13/19with discharge on 08/19/19 for multiple sclerosis flare treated with IV solumedrol for 5 days and discharged on oral Prednisone for 5 more days. During his admission EP interrogated his pacemaker and multiple episodes of SVT and PAF were noted over past few months. His metoprolol was increased to 25 mg TID, and to return to see Dr. Quiñones in 2 months for battery change. Since his discharge 47 y/o M with PMHx of v-tach s/p AICD, CHF, A fib/flutter, HTN, COPD (on O2 PRN), DLD, multiple sclerosis w/ multiple admissions for flares, seizure disorder (on lamotrigine), PTSD, depression, ?stroke/TIA, esophageal stricture, chronic sore throat/sinusitis, GERD, hemopneumothorax (2006), pericardial effusion (2006), cardiomyopathy, MI (2006, 2008), IBS, presenting with chest pain, palpitations and AICD firing. He had recent admission to [facility] on 08/13/19with discharge on 08/19/19 for multiple sclerosis flare treated with IV solumedrol for 5 days and discharged on oral Prednisone for 5 more days. During his admission EP interrogated his pacemaker and multiple episodes of SVT and PAF were noted over past few months. His metoprolol was increased to 25 mg TID, and to return to see Dr. Quiñones in 2 months for battery change. He states that two days ago he felt mid sternal chest pain, 8/10, that radiated to the right side of his neck and face that resolved with sublingual nitroglycerin.  The following day he is "80% sure" he felt his pacemaker fire so he informed the staff at [facility] and was brought to the ED.   He also states that his pacemaker has been "buzzing and vibrating," and that he was told to come to the ED if he felt that.  The buzzing  is not  currently present on exam. He has no other complaints today.     In the ED BP: 119/66 HR: 60 RR: 18 T: 97.4

## 2019-08-29 NOTE — H&P ADULT - ASSESSMENT
47 y/o M w/ a PMH of v-tach s/p AICD, CHF, A fib/flutter, HTN, COPD (on O2 PRN), DLD, multiple sclerosis w/ multiple admissions for flares, seizure disorder (on lamotrigine), PTSD, depression, ? stroke/TIA, esophageal stricture, chronic sore throat/sinusitis, GERD, hemopneumothorax (2006), pericardial effusion (2006), cardiomyopathy, MI (2006, 2008), IBS, presenting post AICD firing.,     #) ARVD (Arrhythmogenic right ventricular dysplasia) s/p AICD, possible AICD dysfunction  - now with possible pacemaker firing, buzzing sensation in chest   - Keep telemetry, pacing pads on chest   - H/o v-tach (no VT, VF, on AICD since 2006)  - C/w metoprolol tartrate 25 mg PO TID, Tikosyn 250 mcg BID   - EP consulted, awaiting interrogation     #) Anginal symptoms, without prior evidence of CAD, ?H/o MI 2006, 2008  - With episode of anginal symptoms, pressure like, radiating to jaw, resolved with nitro  - More likely related to pacemaker firing   - Troponin negative, no acute EKG changes, follow up troponin, if elevated consult cardiology  - Per cardiology on 08/18, No evidence of CAD, on 8/6/2018 CCTA reported normal with 0 calcium score  - C/w metoprolol tartrate 25 mg PO TID  - C/w atorvastatin 40 mg PO qHS  - C/w aspirin 81 mg PO qD    #) Hypokalemia  -replete prn  -f/u BMP and Mg    #)  HTN  -BP x 24 hours: BP:  (99/62 - 119/66)  -C/w metoprolol tartrate 25 mg PO TID  -C/w Lasix 20 mg qD, patient refusing, but per documentation takes this medication, previous cardiology note that it is indicated as patient gets leg edema and dyspnea by holding it.    #) Multiple sclerosis   -with recent flare, discharged on 08/19/19 with prednisone for 5 days (completed)  -Patient takes Tecfidera 240 mg PO qD but is apparently non-formulary; please contact [facility]  -Pain control with acetaminophen   Tablet .. 650 milliGRAM(s) Oral every 6 hoursm baclofen 10 milliGRAM(s) Oral four times a day, gabapentin 400 milliGRAM(s) Oral three times a day, morphine  IR 15 milliGRAM(s) Oral every 4 hours PRN per pain management on last admission.  Allergy in chart is local rash to IV opioids, may give PO. Also got IV morphine in hospital without reaction,   -Ambulate w/ assistance (patient uses walker at baseline)    # H/o seizure disorder  -Patient states he is in the process of being weaned down on his seizure medications  C/w lamotrigine 100 mg PO BID    #) COPD on PRN home O2  -Currently on RA  -C/w Symbicort high dose 2 puffs BID  -Vitals per routine, pulse ox q4h    # Chronic pharyngitis , sinusitis  -C/w Cepacol, Claritin, Flonase    # DLD  C/w atorvastatin 40 mg PO qHS    # GERD  C/w pantoprazole 40 mg PO qAM (patient takes omeprazole at home)    # H/o esophageal stricture   -cont mechanical soft dysphagia 2 w/ thins    DVT ppx- C/w Eliquis 5 mg PO BID    GI ppx- c/w pantoprazole 40 mg PO qAM (on omeprazole at home)    Activity- ambulate w/ assistance    Diet- mechanical soft dysphagia 2 w/ thins    Code status: Full    Dispo- patient from [facility]; functional at baseline w/ walker 47 y/o M w/ a PMH of v-tach s/p AICD, CHF, A fib/flutter, HTN, COPD (on O2 PRN), DLD, multiple sclerosis w/ multiple admissions for flares, seizure disorder (on lamotrigine), PTSD, depression, ? stroke/TIA, esophageal stricture, chronic sore throat/sinusitis, GERD, hemopneumothorax (2006), pericardial effusion (2006), cardiomyopathy, MI (2006, 2008), IBS, presenting post AICD firing.,     #) ARVD (Arrhythmogenic right ventricular dysplasia) s/p AICD, possible AICD dysfunction  - now with possible pacemaker firing, buzzing sensation in chest   - Keep telemetry, pacing pads on chest   - H/o v-tach (no VT, VF, on AICD since 2006)  - C/w metoprolol tartrate 25 mg PO TID, Tikosyn 250 mcg BID   - EP consulted, awaiting interrogation     #) Anginal symptoms, without prior evidence of CAD, ?H/o MI 2006, 2008  - With episode of anginal symptoms, pressure like, radiating to jaw, resolved with nitro  - More likely related to pacemaker firing   - Troponin negative, no acute EKG changes, follow up troponin, if elevated consult cardiology  - Per cardiology on 08/18, No evidence of CAD, on 8/6/2018 CCTA reported normal with 0 calcium score  - C/w metoprolol tartrate 25 mg PO TID  - C/w atorvastatin 40 mg PO qHS  - C/w aspirin 81 mg PO qD    #) Hypokalemia  -replete prn  -f/u BMP and Mg    #)  HTN  -BP x 24 hours: BP:  (99/62 - 119/66)  -C/w metoprolol tartrate 25 mg PO TID  -C/w Lasix 20 mg qD, patient refusing, but per documentation takes this medication, previous cardiology note that it is indicated as patient gets leg edema and dyspnea by holding it.    #) Multiple sclerosis   -with recent flare, discharged on 08/19/19 with prednisone for 5 days (completed)  -Patient takes Tecfidera 240 mg PO qD but is apparently non-formulary; please contact [facility] [place]  -Pain control with acetaminophen   Tablet .. 650 milliGRAM(s) Oral every 6 hoursm baclofen 10 milliGRAM(s) Oral four times a day, gabapentin 400 milliGRAM(s) Oral three times a day, morphine  IR 15 milliGRAM(s) Oral every 4 hours PRN per pain management on last admission.  Allergy in chart is local rash to IV opioids, may give PO. Also got IV morphine in hospital without reaction,   -Ambulate w/ assistance (patient uses walker at baseline)    # H/o seizure disorder  C/w lamotrigine 100 mg PO BID    #) COPD on PRN home O2  -Currently on RA  -C/w Symbicort high dose 2 puffs BID  -Vitals per routine, pulse ox q4h    # Chronic pharyngitis , sinusitis  -C/w Cepacol, Claritin, Flonase    # DLD  C/w atorvastatin 40 mg PO qHS    # GERD  C/w pantoprazole 40 mg PO qAM (patient takes omeprazole at home)    # H/o esophageal stricture   -cont mechanical soft dysphagia 2 w/ thins    DVT ppx- C/w Eliquis 5 mg PO BID    GI ppx- c/w pantoprazole 40 mg PO qAM (on omeprazole at home)    Activity- ambulate w/ assistance    Diet- mechanical soft dysphagia 2 w/ thins    Code status: Full    Dispo- patient from [facility]; functional at baseline w/ walker 47 y/o M w/ a PMH of v-tach s/p AICD, CHF, A fib/flutter, HTN, COPD (on O2 PRN), DLD, multiple sclerosis w/ multiple admissions for flares, seizure disorder (on lamotrigine), PTSD, depression, ? stroke/TIA, esophageal stricture, chronic sore throat/sinusitis, GERD, hemopneumothorax (2006), pericardial effusion (2006), cardiomyopathy, MI (2006, 2008), IBS, presenting post AICD firing.,     #) ARVD (Arrhythmogenic right ventricular dysplasia) s/p AICD, possible AICD dysfunction  - now with possible pacemaker firing, buzzing sensation in chest   - Keep telemetry, pacing pads on chest   - H/o v-tach (no VT, VF, on AICD since 2006)  - C/w metoprolol tartrate 25 mg PO TID, Tikosyn 250 mcg BID   - EP consulted  - CT surgery eval with Dr Pastrana for battery exchange/lead replacement    #) Anginal symptoms, without prior evidence of CAD, ?H/o MI 2006, 2008  - With episode of anginal symptoms, pressure like, radiating to jaw, resolved with nitro  - More likely related to pacemaker firing   - Troponin negative, no acute EKG changes, follow up troponin, if elevated consult cardiology  - Per cardiology on 08/18, No evidence of CAD, on 8/6/2018 CCTA reported normal with 0 calcium score  - C/w metoprolol tartrate 25 mg PO TID  - C/w atorvastatin 40 mg PO qHS  - C/w aspirin 81 mg PO qD    #) Hypokalemia  -replete prn  -f/u BMP and Mg    #)  HTN  -BP x 24 hours: BP:  (99/62 - 119/66)  -C/w metoprolol tartrate 25 mg PO TID  -C/w Lasix 20 mg qD, patient refusing, but per documentation takes this medication, previous cardiology note that it is indicated as patient gets leg edema and dyspnea by holding it.    #) Multiple sclerosis   -with recent flare, discharged on 08/19/19 with prednisone for 5 days (completed)  -Patient takes Tecfidera 240 mg PO qD but is apparently non-formulary; please contact [facility]  -Pain control with acetaminophen   Tablet .. 650 milliGRAM(s) Oral every 6 hoursm baclofen 10 milliGRAM(s) Oral four times a day, gabapentin 400 milliGRAM(s) Oral three times a day, morphine  IR 15 milliGRAM(s) Oral every 4 hours PRN per pain management on last admission.  Allergy in chart is local rash to IV opioids, may give PO. Also got IV morphine in hospital without reaction,   -Ambulate w/ assistance (patient uses walker at baseline)    # H/o seizure disorder  C/w lamotrigine 100 mg PO BID    #) COPD on PRN home O2  -Currently on RA  -C/w Symbicort high dose 2 puffs BID  -Vitals per routine, pulse ox q4h    # Chronic pharyngitis , sinusitis  -C/w Cepacol, Claritin, Flonase    # DLD  C/w atorvastatin 40 mg PO qHS    # GERD  C/w pantoprazole 40 mg PO qAM (patient takes omeprazole at home)    # H/o esophageal stricture   -cont mechanical soft dysphagia 2 w/ thins    DVT ppx- C/w Eliquis 5 mg PO BID    GI ppx- c/w pantoprazole 40 mg PO qAM (on omeprazole at home)    Activity- ambulate w/ assistance    Diet- mechanical soft dysphagia 2 w/ thins    Code status: Full    Dispo- patient from [facility]; functional at baseline w/ walker

## 2019-08-29 NOTE — H&P ADULT - ATTENDING COMMENTS
Patient is seen and examined independently at bedside. I agree with the resident's note, history and plan as above. Corrections made where appropriate    All labs, radiologic studies, vitals, orders and medications list reviewed.     #Progress Note Handoff  Pending (specify):   CT surgery eval  Family discussion: Plan of care discussed with patient, aware and agreeable   Disposition:  [facility] when medically stable

## 2019-08-29 NOTE — ED PROVIDER NOTE - ATTENDING CONTRIBUTION TO CARE
I personally evaluated the patient. I reviewed the Resident’s or Physician Assistant’s note (as assigned above), and agree with the findings and plan except as documented in my note.    47 y/o M w/ a PMH of v-tach s/p AICD, CHF, A fib/flutter, HTN, COPD (on O2 PRN), DLD, cardiomyopathy, MI (2006, 2008) with additional PMH listed in chart  presenting w/ CP since last night and firing of his AICD once today. No fevers, chills. No cough. No back pain. No events in the ED. Patient denies any ongoing symptoms. Plan- ECG, cardiac monitor, labs, admit

## 2019-08-29 NOTE — CONSULT NOTE ADULT - ASSESSMENT
48y Male, with VTach s/p AICD, AF/AFL on Tikosyn, Metoprolol and Eliquis,  AICD at [facility]  known [employer] lead with possibility of failure. 48y Male, with VTach s/p AICD, AF/AFL on Tikosyn, Metoprolol and Eliquis,  AICD at [facility]  known Riata lead with possibility of failure.    admit to tele  will do fluroscopy to evaluate lead for externalization   if intact will proceed with generator change  is lead requires explantation will c/w CTS  Maintain electrolytes K>4.0 Mg >2.0    D/w Dr Vance 48y Male, with VTach s/p DC AICD, AF/AFL on Tikosyn, Metoprolol and Eliquis,  AICD at [facility]  known [employer] lead with possibility of failure.    admit to tele  will do fluroscopy to evaluate lead for externalization   if intact will proceed with generator change  is lead requires explantation will c/w CTS  Maintain electrolytes K>4.0 Mg >2.0    D/w Dr Vance

## 2019-08-29 NOTE — H&P ADULT - NSHPPHYSICALEXAM_GEN_ALL_CORE
GENERAL: NAD, well-developed, AAOx3  HEENT:  Atraumatic, Normocephalic. EOMI, PERRLA, conjunctiva clear, No JVD  PULMONARY: Clear to auscultation bilaterally; No wheeze  CARDIOVASCULAR: Regular rate and rhythm; No murmurs, rubs, or gallops  GASTROINTESTINAL: Soft, Nontender, Nondistended; Bowel sounds present  MUSCULOSKELETAL:  2+ Peripheral Pulses, No clubbing, cyanosis, or edema  NEUROLOGY:  4/5 strength to RLE (baseline)  SKIN: AICD present in left chest wall ICU Vital Signs Last 24 Hrs  T(C): 36.3 (30 Aug 2019 12:40), Max: 36.8 (29 Aug 2019 17:28)  T(F): 97.4 (30 Aug 2019 12:40), Max: 98.2 (29 Aug 2019 17:28)  HR: 60 (30 Aug 2019 12:40) (60 - 64)  BP: 113/64 (30 Aug 2019 12:40) (105/59 - 117/58)  RR: 18 (30 Aug 2019 12:40) (18 - 19)  SpO2: 98% (29 Aug 2019 22:00) (98% - 99%)    PHYSICAL EXAM:  GENERAL: NAD, speaks in full sentences  HEAD:  Atraumatic, Normocephalic  EYES: conjunctiva and sclera clear  NECK: Supple, No JVD  CHEST/LUNG: Clear to auscultation bilaterally; No wheeze; No crackles  HEART: Regular rate and rhythm; No murmurs;   ABDOMEN: Soft, Nontender, Nondistended; Bowel sounds present; No guarding  EXTREMITIES:  2+ Peripheral Pulses, No cyanosis or edema  PSYCH: AAOx3  NEUROLOGY: LE weakness at baseline  SKIN: No rashes or lesions

## 2019-08-29 NOTE — H&P ADULT - NSHPREVIEWOFSYSTEMS_GEN_ALL_CORE
CONSTITUTIONAL: No weakness, fevers or chills  RESPIRATORY: No cough, wheezing, hemoptysis; No shortness of breath  CARDIOVASCULAR: +'ve chest pain , palpitations  GASTROINTESTINAL: No abdominal or epigastric pain. No nausea, vomiting, or hematemesis; No diarrhea or constipation. No melena or hematochezia.  GENITOURINARY: No dysuria, frequency or hematuria  NEUROLOGICAL: No numbness or weakness  SKIN: No itching, rashes CONSTITUTIONAL: No weakness, fevers or chills  RESPIRATORY: No cough, wheezing, hemoptysis; No shortness of breath  CARDIOVASCULAR: No chest pain , palpitations  GASTROINTESTINAL: No abdominal or epigastric pain. No nausea, vomiting, or hematemesis; No diarrhea or constipation. No melena or hematochezia.  GENITOURINARY: No dysuria, frequency or hematuria  NEUROLOGICAL: No numbness or weakness  SKIN: No itching, rashes

## 2019-08-29 NOTE — H&P ADULT - NSHPSOCIALHISTORY_GEN_ALL_CORE
Social History (marital status, living situation, occupation, tobacco use, alcohol and drug use, and sexual history): Patient denies any alcohol use since 2008; reports he quit smoking cigarettes a year ago; denies any other drug use including cocaine and heroin. Social History : Patient denies any alcohol use since 2008; reports he quit smoking cigarettes a year ago; denies any other drug use including cocaine and heroin.

## 2019-08-29 NOTE — ED ADULT NURSE NOTE - NSIMPLEMENTINTERV_GEN_ALL_ED
Implemented All Universal Safety Interventions:  [place] to call system. Call bell, personal items and telephone within reach. Instruct patient to call for assistance. Room bathroom lighting operational. Non-slip footwear when patient is off stretcher. Physically safe environment: no spills, clutter or unnecessary equipment. Stretcher in lowest position, wheels locked, appropriate side rails in place.

## 2019-08-29 NOTE — PROGRESS NOTE ADULT - SUBJECTIVE AND OBJECTIVE BOX
Electrophysiology Brief Post-Op Note    I have personally seen and examined the patient.  I agree with the history and physical which I have reviewed and noted any changes below.      PRE-OP DIAGNOSIS: Encounter for ICD lead breakdown    POST-OP DIAGNOSIS: Riata Lead Externalization    PROCEDURE: Fluoroscopy of Riata Lead    Physician: Didi Vance MD  Assistant: None    ESTIMATED BLOOD LOSS:     0  mL    ANESTHESIA TYPE:  [  ] General Anesthesia  [ ] Sedation  [ ] Local/Regional    CONDITION  [  ] Critical  [  ] Serious  [  ] Fair  [ ] Good      SPECIMENS REMOVED (IF APPLICABLE): None    IMPLANTS (IF APPLICABLE): None      PLAN OF CARE  - Obtain CXR PA/Lateral  - Will discuss with patient next plan of action (generator change vs extraction of RV ICD and pace/sense lead, which was abandoned)    Didi Vance MD   Electrophysiology Attending

## 2019-08-29 NOTE — ED ADULT NURSE NOTE - CHIEF COMPLAINT QUOTE
sydnee coronel from [facility] for defribilator going off once. Pt denies any pain or discomfort at this time.

## 2019-08-29 NOTE — ED PROVIDER NOTE - CLINICAL SUMMARY MEDICAL DECISION MAKING FREE TEXT BOX
Will admit the patient for further management and care. MAR will consult Dr. Quiñones and cardiology for further eval.

## 2019-08-29 NOTE — ED PROVIDER NOTE - NS ED ROS FT
CONSTITUTIONAL - No fever,  No weight change  SKIN - No rash  HEMATOLOGIC - No abnormal bleeding or bruising  EYES - No eye pain, No blurred vision  ENT -  No sore throat, No neck pain,  No ear pain  RESPIRATORY - No SOB, No cough  CARDIAC -see HPI  GI - No abdominal pain, No nausea, No vomiting, No diarrhea  ENDO - No heat/cold intolerance  MUSCULOSKELETAL - No joint paint, No swelling, No back pain  NEUROLOGIC - No numbness, No focal weakness, No HA, No dizziness  All other systems negative, unless specified in HPI

## 2019-08-29 NOTE — CONSULT NOTE ADULT - SUBJECTIVE AND OBJECTIVE BOX
Patient is a 48y old  Male who presents with a chief complaint of Chest pain, AICD firing (29 Aug 2019 10:00)        HPI:  49 y/o M with PMHx of v-tach s/p AICD, CHF, A fib/flutter, HTN, COPD (on O2 PRN), DLD, multiple sclerosis w/ multiple admissions for flares, seizure disorder (on lamotrigine), PTSD, depression, ?stroke/TIA, esophageal stricture, chronic sore throat/sinusitis, GERD, hemopneumothorax (2006), pericardial effusion (2006), cardiomyopathy, MI (2006, 2008), IBS, presenting with chest pain, palpitations and AICD firing. He had recent admission to [facility] on 08/13/19with discharge on 08/19/19 for multiple sclerosis flare treated with IV solumedrol for 5 days and discharged on oral Prednisone for 5 more days. During his admission EP interrogated his pacemaker and multiple episodes of SVT and PAF were noted over past few months. His metoprolol was increased to 25 mg TID, and to return to see Dr. Quiñones in 2 months for battery change. He states that two days ago he felt mid sternal chest pain, 8/10, that radiated to the right side of his neck and face that resolved with sublingual nitroglycerin.  The following day he is "80% sure" he felt his pacemaker fire so he informed the staff at [facility] and was brought to the ED.   He also states that his pacemaker has been "buzzing and vibrating," and that he was told to come to the ED if he felt that.  The buzzing  is not  currently present on exam. He has no other complaints today.     In the ED BP: 119/66 HR: 60 RR: 18 T: 97.4 (29 Aug 2019 10:00)      Electrophysiology:  48y Male    REVIEW OF SYSTEMS    [ ] A ten-point review of systems was otherwise negative except as noted.  [ ] Due to altered mental status/intubation, subjective information were not able to be obtained from the patient. History was obtained, to the extent possible, from review of the chart and collateral sources of information.      PAST MEDICAL & SURGICAL HISTORY:  PTSD (post-traumatic stress disorder)  Supraventricular arrhythmia: prior history  Cardiomyopathy  CHF (congestive heart failure)  Atrial fibrillation: s/p ablation, on eliquis  Diabetes mellitus: diet controlled  BPH (benign prostatic hyperplasia)  HTN (hypertension)  Seizures  Migraines  Pneumonia  MS (multiple sclerosis)  CAD (coronary artery disease)  Bipolar disorder  Anginal pain  Schizo affective schizophrenia  GERD (gastroesophageal reflux disease)  Seasonal allergies  Hypercholesteremia  COPD (chronic obstructive pulmonary disease)  CVA (cerebral vascular accident)  TIA (transient ischemic attack)  Syncope  Peripheral Neuropathy  Clinical Depression  H/O prior ablation treatment: for Afib  Cardiac pacemaker recipient  H/O hernia repair: right 1972,1991  History of hip replacement  S/P Orchiectomy: L for testicular CA  S/P Implantation of AICD      Home Medications:  acetaminophen 650 mg oral tablet: 650 milligram(s) orally 4 times a day, As Needed (02 Aug 2018 11:55)  atorvastatin 40 mg oral tablet: 1 tab(s) orally once a day (at bedtime) (02 Aug 2018 11:55)  baclofen 10 mg oral tablet: 1 tab(s) orally 4 times a day (13 Aug 2019 11:07)  budesonide-formoterol 160 mcg-4.5 mcg/inh inhalation aerosol: 2 puff(s) inhaled 2 times a day (25 Feb 2019 11:41)  Cepacol Extra Strength Menthol 10 mg mucous membrane lozenge: 1 anderson(s) mucous membrane every 6 hours, As Needed (21 Feb 2019 22:06)  cholecalciferol oral tablet: 2000 unit(s) orally once a day (19 Aug 2019 17:19)  Claritin 10 mg oral tablet: 1 tab(s) orally once a day (13 Aug 2019 11:11)  Eliquis 5 mg oral tablet: 1 tab(s) orally 2 times a day (02 Aug 2018 11:55)  Flonase 50 mcg/inh nasal spray: 2 spray(s) nasal once a day (13 Aug 2019 11:09)  furosemide 20 mg oral tablet: 1 tab(s) orally once a day (19 Aug 2019 17:19)  gabapentin 400 mg oral capsule: 1 cap(s) orally 3 times a day (02 Aug 2018 11:55)  ketoconazole 2% topical shampoo: Apply topically to affected area once a day (in the evening) (13 Aug 2019 11:11)  lamoTRIgine 100 mg oral tablet: 1 tab(s) orally 2 times a day (02 Aug 2018 11:55)  metoprolol tartrate 25 mg oral tablet: 1 tab(s) orally every 8 hours (19 Aug 2019 17:19)  Mi-Acid oral suspension: 30 milliliter(s) orally 4 times a day, As Needed (02 Aug 2018 11:55)  morphine 15 mg oral tablet: 1 tab(s) orally every 4 hours, As needed, Severe Pain (7 - 10) (19 Aug 2019 17:09)  Nitrostat 0.4 mg sublingual tablet: sublingual 3 times a day, As Needed; may repeat after 5 min until relief (02 Aug 2018 11:55)  omeprazole 20 mg oral delayed release capsule: 1 cap(s) orally 2 times a day (02 Aug 2018 11:55)  polyethylene glycol 3350 oral powder for reconstitution: 17 gram(s) orally every 12 hours (19 Aug 2019 17:19)  QUEtiapine 25 mg oral tablet: 1 tab(s) orally once a day (02 Aug 2018 11:55)  SEROquel 50 mg oral tablet: 1 tab(s) orally once a day (at bedtime) (21 Feb 2019 22:09)  tamsulosin 0.4 mg oral capsule: 1 cap(s) orally once a day (at bedtime) (19 Aug 2019 17:19)  Tecfidera 240 mg oral delayed release capsule: 1 cap(s) orally once a day (02 Aug 2018 11:55)  Tikosyn 250 mcg oral capsule: 1 cap(s) orally 2 times a day (02 Aug 2018 11:55)  tiotropium 18 mcg inhalation capsule: 1 cap(s) inhaled once a day (19 Aug 2019 17:19)      Allergies:    dexmethylphenidate (Unknown)  Dilantin (Rash)  dilantin, compazine, neurontin, ritalin, phenergan (Unknown)  Haldol (Unknown)  hydantoin derivatives (Other)  methylphenidate (Unknown)  Morphine Sulfate (Unknown)  phenytoin (Unknown)  prochlorperazine (Unknown)  promethazine (Dystonic RXN)  rash/hives (Anaphylaxis)  thioxanthenes (Unknown)      PREVIOUS DIAGNOSTIC TESTING:      ECHO  FINDINGS:    STRESS  FINDINGS:    CATHETERIZATION  FINDINGS:    ELECTROPHYSIOLOGY STUDY  FINDINGS:    CAROTID ULTRASOUND:  FINDINGS    VENOUS DUPLEX SCAN:  FINDINGS:    CHEST CT PULMONARY ANGIO with IV Contrast:  FINDINGS:    FAMILY HISTORY:  Family history of colon cancer in mother  Family history of cardiomyopathy: Mother  Family history of cervical cancer (Mother)  Family history of early CAD (Mother)      SOCIAL HISTORY:    CIGARETTES:    ALCOHOL:      MEDICATIONS  (STANDING):  acetaminophen   Tablet .. 650 milliGRAM(s) Oral every 6 hours  apixaban 5 milliGRAM(s) Oral every 12 hours  atorvastatin 40 milliGRAM(s) Oral at bedtime  baclofen 10 milliGRAM(s) Oral four times a day  buDESOnide 160 MICROgram(s)/formoterol 4.5 MICROgram(s) Inhaler - Peds 2 Puff(s) Inhalation two times a day  cholecalciferol 2000 Unit(s) Oral daily  dofetilide 250 MICROGram(s) Oral two times a day  fluticasone propionate (50 MICROgram(s)/actuation) Nasal Spray - Peds 2 Spray(s) Alternating Nostrils daily  furosemide    Tablet 20 milliGRAM(s) Oral daily  gabapentin 400 milliGRAM(s) Oral three times a day  lamoTRIgine 100 milliGRAM(s) Oral two times a day  loratadine 10 milliGRAM(s) Oral daily  metoprolol tartrate 25 milliGRAM(s) Oral every 8 hours  morphine ER Tablet 30 milliGRAM(s) Oral two times a day  pantoprazole    Tablet 40 milliGRAM(s) Oral before breakfast  polyethylene glycol 3350 17 Gram(s) Oral every 12 hours  QUEtiapine 25 milliGRAM(s) Oral daily  QUEtiapine 50 milliGRAM(s) Oral at bedtime  tamsulosin Oral Tab/Cap - Peds 0.4 milliGRAM(s) Oral at bedtime  tiotropium 18 MICROgram(s) Capsule 1 Capsule(s) Inhalation daily    MEDICATIONS  (PRN):  nitroglycerin     SubLingual 0.4 milliGRAM(s) SubLingual every 5 minutes PRN Chest Pain      Vital Signs Last 24 Hrs  T(C): 36.1 (29 Aug 2019 07:23), Max: 37.2 (29 Aug 2019 02:14)  T(F): 96.9 (29 Aug 2019 07:23), Max: 98.9 (29 Aug 2019 02:14)  HR: 60 (29 Aug 2019 09:52) (60 - 60)  BP: 115/56 (29 Aug 2019 09:52) (99/62 - 119/66)  BP(mean): --  RR: 17 (29 Aug 2019 07:23) (17 - 18)  SpO2: 97% (29 Aug 2019 02:39) (97% - 97%)    PHYSICAL EXAM:    GENERAL: In no apparent distress, well nourished, and hydrated.  HEAD:  Atraumatic, Normocephalic  EYES: EOMI, PERRLA, conjunctiva and sclera clear  NECK: Supple and normal thyroid.  No JVD or carotid bruit.  Carotid pulse is 2+ bilaterally.  HEART: Regular rate and rhythm; No murmurs, rubs, or gallops.  PULMONARY: Clear to auscultation and perfusion.  No rales, wheezing, or rhonchi bilaterally.  ABDOMEN: Soft, Nontender, Nondistended; Bowel sounds present  EXTREMITIES:  2+ Peripheral Pulses, No clubbing, cyanosis, or edema  NEUROLOGICAL: Grossly nonfocal      INTERPRETATION OF TELEMETRY:    ECG:    I&O's Detail      LABS:                        12.4   7.16  )-----------( 241      ( 29 Aug 2019 02:00 )             37.7     08-29    141  |  103  |  19  ----------------------------<  117<H>  3.9   |  28  |  1.0    Ca    9.1      29 Aug 2019 11:33  Mg     2.3     08-29    TPro  6.5  /  Alb  4.2  /  TBili  0.3  /  DBili  x   /  AST  20  /  ALT  39  /  AlkPhos  137<H>  08-29    CARDIAC MARKERS ( 29 Aug 2019 02:00 )  x     / <0.01 ng/mL / x     / x     / x          PT/INR - ( 29 Aug 2019 11:33 )   PT: 12.70 sec;   INR: 1.11 ratio         PTT - ( 29 Aug 2019 11:33 )  PTT:41.7 sec    BNP      I&O's Detail    Daily     Daily     RADIOLOGY & ADDITIONAL STUDIES: Patient is a 48y old  Male who presents with a chief complaint of Chest pain, AICD firing (29 Aug 2019 10:00)        HPI:  47 y/o M with PMHx of v-tach s/p AICD, CHF, A fib/flutter, HTN, COPD (on O2 PRN), DLD, multiple sclerosis w/ multiple admissions for flares, seizure disorder (on lamotrigine), PTSD, depression, ?stroke/TIA, esophageal stricture, chronic sore throat/sinusitis, GERD, hemopneumothorax (2006), pericardial effusion (2006), cardiomyopathy, MI (2006, 2008), IBS, presenting with chest pain, palpitations and AICD firing. He had recent admission to [facility] on 08/13/19with discharge on 08/19/19 for multiple sclerosis flare treated with IV solumedrol for 5 days and discharged on oral Prednisone for 5 more days. During his admission EP interrogated his pacemaker and multiple episodes of SVT and PAF were noted over past few months. His metoprolol was increased to 25 mg TID, and to return to see Dr. Quiñones in 2 months for battery change. He states that two days ago he felt mid sternal chest pain, 8/10, that radiated to the right side of his neck and face that resolved with sublingual nitroglycerin.  The following day he is "80% sure" he felt his pacemaker fire so he informed the staff at [facility] and was brought to the ED.   He also states that his pacemaker has been "buzzing and vibrating," and that he was told to come to the ED if he felt that.  The buzzing  is not  currently present on exam. He has no other complaints today.     In the ED BP: 119/66 HR: 60 RR: 18 T: 97.4 (29 Aug 2019 10:00)      Electrophysiology:  48y Male, with VTach s/p AICD, AF/AFL on Tikosyn, Metoprolol and Eliquis, HTN, COPD, multiple sclerosis, multiple admissions, most recently discharged 8/19. During that admission AICD was interrogated, revealed battery is 3-4mons until WYATT. Patient noted his device alarming since yesterday and was presented to ED.  In addition, patient has Riata lead, that is a subject of recall due to possibility of failure.  Patient denies palpitations, chest discomfort dyspnea.  Device was interrogated, working properly, battery reached WYATT, AP >99%, PPM dependent, no ventricular arrhythmias, several short episodes of AT/AF.      REVIEW OF SYSTEMS    [x] A ten-point review of systems was otherwise negative except as noted.      PAST MEDICAL & SURGICAL HISTORY:  PTSD (post-traumatic stress disorder)  Supraventricular arrhythmia: prior history  Cardiomyopathy  CHF (congestive heart failure)  Atrial fibrillation: s/p ablation, on eliquis  Diabetes mellitus: diet controlled  BPH (benign prostatic hyperplasia)  HTN (hypertension)  Seizures  Migraines  Pneumonia  MS (multiple sclerosis)  CAD (coronary artery disease)  Bipolar disorder  Anginal pain  Schizo affective schizophrenia  GERD (gastroesophageal reflux disease)  Seasonal allergies  Hypercholesteremia  COPD (chronic obstructive pulmonary disease)  CVA (cerebral vascular accident)  TIA (transient ischemic attack)  Syncope  Peripheral Neuropathy  Clinical Depression  H/O prior ablation treatment: for Afib  Cardiac pacemaker recipient  H/O hernia repair: right 1972,1991  History of hip replacement  S/P Orchiectomy: L for testicular CA  S/P Implantation of AICD      Home Medications:  acetaminophen 650 mg oral tablet: 650 milligram(s) orally 4 times a day, As Needed (02 Aug 2018 11:55)  atorvastatin 40 mg oral tablet: 1 tab(s) orally once a day (at bedtime) (02 Aug 2018 11:55)  baclofen 10 mg oral tablet: 1 tab(s) orally 4 times a day (13 Aug 2019 11:07)  budesonide-formoterol 160 mcg-4.5 mcg/inh inhalation aerosol: 2 puff(s) inhaled 2 times a day (25 Feb 2019 11:41)  Cepacol Extra Strength Menthol 10 mg mucous membrane lozenge: 1 anderson(s) mucous membrane every 6 hours, As Needed (21 Feb 2019 22:06)  cholecalciferol oral tablet: 2000 unit(s) orally once a day (19 Aug 2019 17:19)  Claritin 10 mg oral tablet: 1 tab(s) orally once a day (13 Aug 2019 11:11)  Eliquis 5 mg oral tablet: 1 tab(s) orally 2 times a day (02 Aug 2018 11:55)  Flonase 50 mcg/inh nasal spray: 2 spray(s) nasal once a day (13 Aug 2019 11:09)  furosemide 20 mg oral tablet: 1 tab(s) orally once a day (19 Aug 2019 17:19)  gabapentin 400 mg oral capsule: 1 cap(s) orally 3 times a day (02 Aug 2018 11:55)  ketoconazole 2% topical shampoo: Apply topically to affected area once a day (in the evening) (13 Aug 2019 11:11)  lamoTRIgine 100 mg oral tablet: 1 tab(s) orally 2 times a day (02 Aug 2018 11:55)  metoprolol tartrate 25 mg oral tablet: 1 tab(s) orally every 8 hours (19 Aug 2019 17:19)  Mi-Acid oral suspension: 30 milliliter(s) orally 4 times a day, As Needed (02 Aug 2018 11:55)  morphine 15 mg oral tablet: 1 tab(s) orally every 4 hours, As needed, Severe Pain (7 - 10) (19 Aug 2019 17:09)  Nitrostat 0.4 mg sublingual tablet: sublingual 3 times a day, As Needed; may repeat after 5 min until relief (02 Aug 2018 11:55)  omeprazole 20 mg oral delayed release capsule: 1 cap(s) orally 2 times a day (02 Aug 2018 11:55)  polyethylene glycol 3350 oral powder for reconstitution: 17 gram(s) orally every 12 hours (19 Aug 2019 17:19)  QUEtiapine 25 mg oral tablet: 1 tab(s) orally once a day (02 Aug 2018 11:55)  SEROquel 50 mg oral tablet: 1 tab(s) orally once a day (at bedtime) (21 Feb 2019 22:09)  tamsulosin 0.4 mg oral capsule: 1 cap(s) orally once a day (at bedtime) (19 Aug 2019 17:19)  Tecfidera 240 mg oral delayed release capsule: 1 cap(s) orally once a day (02 Aug 2018 11:55)  Tikosyn 250 mcg oral capsule: 1 cap(s) orally 2 times a day (02 Aug 2018 11:55)  tiotropium 18 mcg inhalation capsule: 1 cap(s) inhaled once a day (19 Aug 2019 17:19)      Allergies:    dexmethylphenidate (Unknown)  Dilantin (Rash)  dilantin, compazine, neurontin, ritalin, phenergan (Unknown)  Haldol (Unknown)  hydantoin derivatives (Other)  methylphenidate (Unknown)  Morphine Sulfate (Unknown)  phenytoin (Unknown)  prochlorperazine (Unknown)  promethazine (Dystonic RXN)  rash/hives (Anaphylaxis)  thioxanthenes (Unknown)      PREVIOUS DIAGNOSTIC TESTING:      ECHO  FINDINGS:  < from: Transthoracic Echocardiogram (08.18.19 @ 13:59) >  Summary:   1. Normal global left ventricular systolic function.   2. LV Ejection Fraction by Vargas's Method with a biplane EF of 69 %.   3. Normal left ventricular internal cavity size.   4. Normal right atrial size.   5. There is no evidence of pericardial effusion.   6. Mild thickening and calcification of the anterior and posterior   mitral valve leaflets.   7. No evidence of mitral valve regurgitation.    < end of copied text >    STRESS  FINDINGS:  < from: NM Nuclear Stress Pharmacologic Multiple (04.21.18 @ 10:41) >  Impression:   1. NORMAL LEXISCAN / REST MYOCARDIAL PERFUSION TOMOGRAPHY, WITH NO   EVIDENCE FOR ISCHEMIA DURING LEXISCAN INFUSION.   2. NORMAL RESTING LEFT VENTRICULAR WALL MOTION AND WALL THICKENING.  3. LEFT VENTRICULAR EJECTION FRACTION OF  61 % WHICH IS WITHIN RANGE OF   NORMAL.     < end of copied text >    CATHETERIZATION  FINDINGS:    ELECTROPHYSIOLOGY STUDY  FINDINGS:    CAROTID ULTRASOUND:  FINDINGS    VENOUS DUPLEX SCAN:  FINDINGS:    CHEST CT PULMONARY ANGIO with IV Contrast:  FINDINGS:    FAMILY HISTORY:  Family history of colon cancer in mother  Family history of cardiomyopathy: Mother  Family history of cervical cancer (Mother)  Family history of early CAD (Mother)      SOCIAL HISTORY: d/c tobacco 1yr ago, no ETOH/IVDU      MEDICATIONS  (STANDING):  acetaminophen   Tablet .. 650 milliGRAM(s) Oral every 6 hours  apixaban 5 milliGRAM(s) Oral every 12 hours  atorvastatin 40 milliGRAM(s) Oral at bedtime  baclofen 10 milliGRAM(s) Oral four times a day  buDESOnide 160 MICROgram(s)/formoterol 4.5 MICROgram(s) Inhaler - Peds 2 Puff(s) Inhalation two times a day  cholecalciferol 2000 Unit(s) Oral daily  dofetilide 250 MICROGram(s) Oral two times a day  fluticasone propionate (50 MICROgram(s)/actuation) Nasal Spray - Peds 2 Spray(s) Alternating Nostrils daily  furosemide    Tablet 20 milliGRAM(s) Oral daily  gabapentin 400 milliGRAM(s) Oral three times a day  lamoTRIgine 100 milliGRAM(s) Oral two times a day  loratadine 10 milliGRAM(s) Oral daily  metoprolol tartrate 25 milliGRAM(s) Oral every 8 hours  morphine ER Tablet 30 milliGRAM(s) Oral two times a day  pantoprazole    Tablet 40 milliGRAM(s) Oral before breakfast  polyethylene glycol 3350 17 Gram(s) Oral every 12 hours  QUEtiapine 25 milliGRAM(s) Oral daily  QUEtiapine 50 milliGRAM(s) Oral at bedtime  tamsulosin Oral Tab/Cap - Peds 0.4 milliGRAM(s) Oral at bedtime  tiotropium 18 MICROgram(s) Capsule 1 Capsule(s) Inhalation daily    MEDICATIONS  (PRN):  nitroglycerin     SubLingual 0.4 milliGRAM(s) SubLingual every 5 minutes PRN Chest Pain      Vital Signs Last 24 Hrs  T(C): 36.1 (29 Aug 2019 07:23), Max: 37.2 (29 Aug 2019 02:14)  T(F): 96.9 (29 Aug 2019 07:23), Max: 98.9 (29 Aug 2019 02:14)  HR: 60 (29 Aug 2019 09:52) (60 - 60)  BP: 115/56 (29 Aug 2019 09:52) (99/62 - 119/66)  BP(mean): --  RR: 17 (29 Aug 2019 07:23) (17 - 18)  SpO2: 97% (29 Aug 2019 02:39) (97% - 97%)    PHYSICAL EXAM:    GENERAL: In no apparent distress, well nourished, and hydrated.  HEAD:  Atraumatic, Normocephalic  EYES: EOMI, PERRLA, conjunctiva and sclera clear  NECK: Supple and normal thyroid.  No JVD or carotid bruit.  Carotid pulse is 2+ bilaterally.  HEART: Regular rate and rhythm; No murmurs, rubs, or gallops.  PULMONARY: Clear to auscultation and perfusion.  No rales, wheezing, or rhonchi bilaterally.  ABDOMEN: Soft, Nontender, Nondistended; Bowel sounds present  EXTREMITIES:  2+ Peripheral Pulses, No clubbing, cyanosis, or edema  NEUROLOGICAL: Grossly nonfocal      INTERPRETATION OF TELEMETRY:    ECG:  < from: 12 Lead ECG (08.29.19 @ 02:35) >  Ventricular Rate 60 BPM    Atrial Rate 33 BPM    P-R Interval 188 ms    QRS Duration 152 ms    Q-T Interval 448 ms    QTC Calculation(Bezet) 448 ms    R Axis -12 degrees    T Axis -20 degrees    Diagnosis Line Atrial-paced rhythm  Right bundle branch block  Minimal voltage criteria for LVH, may be normal variant  Abnormal ECG    < end of copied text >      I&O's Detail      LABS:                        12.4   7.16  )-----------( 241      ( 29 Aug 2019 02:00 )             37.7     08-29    141  |  103  |  19  ----------------------------<  117<H>  3.9   |  28  |  1.0    Ca    9.1      29 Aug 2019 11:33  Mg     2.3     08-29    TPro  6.5  /  Alb  4.2  /  TBili  0.3  /  DBili  x   /  AST  20  /  ALT  39  /  AlkPhos  137<H>  08-29    CARDIAC MARKERS ( 29 Aug 2019 02:00 )  x     / <0.01 ng/mL / x     / x     / x          PT/INR - ( 29 Aug 2019 11:33 )   PT: 12.70 sec;   INR: 1.11 ratio         PTT - ( 29 Aug 2019 11:33 )  PTT:41.7 sec    BNP      I&O's Detail    Daily     Daily     RADIOLOGY & ADDITIONAL STUDIES:  < from: Xray Chest 1 View-PORTABLE IMMEDIATE (08.29.19 @ 02:56) >  INTERPRETATION:  Clinical History / Reason for exam: Cardiac dysrhythmia.    Comparison : Chest radiograph August 13, 2019.    Technique/Positioning: Frontal portable.    Findings:    Support devices: Left-sided dual-chamber ICD    Cardiac/mediastinum/hilum: Unremarkable.    Lung parenchyma/Pleura: Within normal limits.    Skeleton/soft tissues: Unremarkable.    Impression:      No radiographic evidence of acutecardiopulmonary disease.    < end of copied text > Patient is a 48y old  Male who presents with a chief complaint of Chest pain, AICD alarming (29 Aug 2019 10:00)        HPI:  49 y/o M with PMHx of v-tach s/p AICD, CHF, A fib/flutter, HTN, COPD (on O2 PRN), DLD, multiple sclerosis w/ multiple admissions for flares, seizure disorder (on lamotrigine), PTSD, depression, ?stroke/TIA, esophageal stricture, chronic sore throat/sinusitis, GERD, hemopneumothorax (2006), pericardial effusion (2006), cardiomyopathy, MI (2006, 2008), IBS, presenting with chest pain, palpitations and AICD firing. He had recent admission to [facility] on 08/13/19with discharge on 08/19/19 for multiple sclerosis flare treated with IV solumedrol for 5 days and discharged on oral Prednisone for 5 more days. During his admission EP interrogated his pacemaker and multiple episodes of SVT and PAF were noted over past few months. His metoprolol was increased to 25 mg TID, and to return to see Dr. Quiñones in 2 months for battery change. He states that two days ago he felt mid sternal chest pain, 8/10, that radiated to the right side of his neck and face that resolved with sublingual nitroglycerin.  The following day he is "80% sure" he felt his pacemaker alarm/buzz so he informed the staff at [facility] and was brought to the ED.   He also states that his pacemaker has been "buzzing and vibrating," and that he was told to come to the ED if he felt that.  The buzzing  is not  currently present on exam. He has no other complaints today.     In the ED BP: 119/66 HR: 60 RR: 18 T: 97.4 (29 Aug 2019 10:00)      Electrophysiology:  48y Male, with VTach s/p AICD, AF/AFL on Tikosyn, Metoprolol and Eliquis, HTN, COPD, multiple sclerosis, multiple admissions, most recently discharged 8/19. During that admission AICD was interrogated, revealed battery is 3-4mons until WYATT. Patient noted his device alarming since yesterday and was presented to ED.  In addition, patient has Riata lead, that is a subject of recall due to possibility of failure.  Patient denies palpitations, chest discomfort dyspnea.  Device was interrogated, working properly, battery reached WYATT, AP >99%, PPM dependent, no ventricular arrhythmias, several short episodes of AT/AF.      REVIEW OF SYSTEMS  [x] A ten-point review of systems was otherwise negative except as noted.      PAST MEDICAL & SURGICAL HISTORY:  PTSD (post-traumatic stress disorder)  Supraventricular arrhythmia: prior history  Cardiomyopathy  CHF (congestive heart failure)  Atrial fibrillation: s/p ablation, on eliquis  Diabetes mellitus: diet controlled  BPH (benign prostatic hyperplasia)  HTN (hypertension)  Seizures  Migraines  Pneumonia  MS (multiple sclerosis)  CAD (coronary artery disease)  Bipolar disorder  Anginal pain  Schizo affective schizophrenia  GERD (gastroesophageal reflux disease)  Seasonal allergies  Hypercholesteremia  COPD (chronic obstructive pulmonary disease)  CVA (cerebral vascular accident)  TIA (transient ischemic attack)  Syncope  Peripheral Neuropathy  Clinical Depression  H/O prior ablation treatment: for Afib  Cardiac pacemaker recipient  H/O hernia repair: right 1972,1991  History of hip replacement  S/P Orchiectomy: L for testicular CA  S/P Implantation of AICD      Home Medications:  acetaminophen 650 mg oral tablet: 650 milligram(s) orally 4 times a day, As Needed (02 Aug 2018 11:55)  atorvastatin 40 mg oral tablet: 1 tab(s) orally once a day (at bedtime) (02 Aug 2018 11:55)  baclofen 10 mg oral tablet: 1 tab(s) orally 4 times a day (13 Aug 2019 11:07)  budesonide-formoterol 160 mcg-4.5 mcg/inh inhalation aerosol: 2 puff(s) inhaled 2 times a day (25 Feb 2019 11:41)  Cepacol Extra Strength Menthol 10 mg mucous membrane lozenge: 1 anderson(s) mucous membrane every 6 hours, As Needed (21 Feb 2019 22:06)  cholecalciferol oral tablet: 2000 unit(s) orally once a day (19 Aug 2019 17:19)  Claritin 10 mg oral tablet: 1 tab(s) orally once a day (13 Aug 2019 11:11)  Eliquis 5 mg oral tablet: 1 tab(s) orally 2 times a day (02 Aug 2018 11:55)  Flonase 50 mcg/inh nasal spray: 2 spray(s) nasal once a day (13 Aug 2019 11:09)  furosemide 20 mg oral tablet: 1 tab(s) orally once a day (19 Aug 2019 17:19)  gabapentin 400 mg oral capsule: 1 cap(s) orally 3 times a day (02 Aug 2018 11:55)  ketoconazole 2% topical shampoo: Apply topically to affected area once a day (in the evening) (13 Aug 2019 11:11)  lamoTRIgine 100 mg oral tablet: 1 tab(s) orally 2 times a day (02 Aug 2018 11:55)  metoprolol tartrate 25 mg oral tablet: 1 tab(s) orally every 8 hours (19 Aug 2019 17:19)  Mi-Acid oral suspension: 30 milliliter(s) orally 4 times a day, As Needed (02 Aug 2018 11:55)  morphine 15 mg oral tablet: 1 tab(s) orally every 4 hours, As needed, Severe Pain (7 - 10) (19 Aug 2019 17:09)  Nitrostat 0.4 mg sublingual tablet: sublingual 3 times a day, As Needed; may repeat after 5 min until relief (02 Aug 2018 11:55)  omeprazole 20 mg oral delayed release capsule: 1 cap(s) orally 2 times a day (02 Aug 2018 11:55)  polyethylene glycol 3350 oral powder for reconstitution: 17 gram(s) orally every 12 hours (19 Aug 2019 17:19)  QUEtiapine 25 mg oral tablet: 1 tab(s) orally once a day (02 Aug 2018 11:55)  SEROquel 50 mg oral tablet: 1 tab(s) orally once a day (at bedtime) (21 Feb 2019 22:09)  tamsulosin 0.4 mg oral capsule: 1 cap(s) orally once a day (at bedtime) (19 Aug 2019 17:19)  Tecfidera 240 mg oral delayed release capsule: 1 cap(s) orally once a day (02 Aug 2018 11:55)  Tikosyn 250 mcg oral capsule: 1 cap(s) orally 2 times a day (02 Aug 2018 11:55)  tiotropium 18 mcg inhalation capsule: 1 cap(s) inhaled once a day (19 Aug 2019 17:19)      Allergies:  dexmethylphenidate (Unknown)  Dilantin (Rash)  dilantin, compazine, neurontin, ritalin, phenergan (Unknown)  Haldol (Unknown)  hydantoin derivatives (Other)  methylphenidate (Unknown)  Morphine Sulfate (Unknown)  phenytoin (Unknown)  prochlorperazine (Unknown)  promethazine (Dystonic RXN)  rash/hives (Anaphylaxis)  thioxanthenes (Unknown)      PREVIOUS DIAGNOSTIC TESTING:      ECHO FINDINGS:  < from: Transthoracic Echocardiogram (08.18.19 @ 13:59) >  Summary:   1. Normal global left ventricular systolic function.   2. LV Ejection Fraction by Vargas's Method with a biplane EF of 69 %.   3. Normal left ventricular internal cavity size.   4. Normal right atrial size.   5. There is no evidence of pericardial effusion.   6. Mild thickening and calcification of the anterior and posterior mitral valve leaflets.   7. No evidence of mitral valve regurgitation.    < end of copied text >    STRESS FINDINGS:  < from: NM Nuclear Stress Pharmacologic Multiple (04.21.18 @ 10:41) >  Impression:   1. NORMAL LEXISCAN / REST MYOCARDIAL PERFUSION TOMOGRAPHY, WITH NO EVIDENCE FOR ISCHEMIA DURING LEXISCAN INFUSION.   2. NORMAL RESTING LEFT VENTRICULAR WALL MOTION AND WALL THICKENING.  3. LEFT VENTRICULAR EJECTION FRACTION OF  61 % WHICH IS WITHIN RANGE OF   NORMAL.     < end of copied text >    FAMILY HISTORY:  Family history of colon cancer in mother  Family history of cardiomyopathy: Mother  Family history of cervical cancer (Mother)  Family history of early CAD (Mother)      SOCIAL HISTORY: d/c tobacco 1yr ago, no ETOH/IVDU      MEDICATIONS  (STANDING):  acetaminophen   Tablet .. 650 milliGRAM(s) Oral every 6 hours  apixaban 5 milliGRAM(s) Oral every 12 hours  atorvastatin 40 milliGRAM(s) Oral at bedtime  baclofen 10 milliGRAM(s) Oral four times a day  buDESOnide 160 MICROgram(s)/formoterol 4.5 MICROgram(s) Inhaler - Peds 2 Puff(s) Inhalation two times a day  cholecalciferol 2000 Unit(s) Oral daily  dofetilide 250 MICROGram(s) Oral two times a day  fluticasone propionate (50 MICROgram(s)/actuation) Nasal Spray - Peds 2 Spray(s) Alternating Nostrils daily  furosemide    Tablet 20 milliGRAM(s) Oral daily  gabapentin 400 milliGRAM(s) Oral three times a day  lamoTRIgine 100 milliGRAM(s) Oral two times a day  loratadine 10 milliGRAM(s) Oral daily  metoprolol tartrate 25 milliGRAM(s) Oral every 8 hours  morphine ER Tablet 30 milliGRAM(s) Oral two times a day  pantoprazole    Tablet 40 milliGRAM(s) Oral before breakfast  polyethylene glycol 3350 17 Gram(s) Oral every 12 hours  QUEtiapine 25 milliGRAM(s) Oral daily  QUEtiapine 50 milliGRAM(s) Oral at bedtime  tamsulosin Oral Tab/Cap - Peds 0.4 milliGRAM(s) Oral at bedtime  tiotropium 18 MICROgram(s) Capsule 1 Capsule(s) Inhalation daily    MEDICATIONS  (PRN):  nitroglycerin     SubLingual 0.4 milliGRAM(s) SubLingual every 5 minutes PRN Chest Pain      Vital Signs Last 24 Hrs  T(C): 36.1 (29 Aug 2019 07:23), Max: 37.2 (29 Aug 2019 02:14)  T(F): 96.9 (29 Aug 2019 07:23), Max: 98.9 (29 Aug 2019 02:14)  HR: 60 (29 Aug 2019 09:52) (60 - 60)  BP: 115/56 (29 Aug 2019 09:52) (99/62 - 119/66)  BP(mean): --  RR: 17 (29 Aug 2019 07:23) (17 - 18)  SpO2: 97% (29 Aug 2019 02:39) (97% - 97%)    PHYSICAL EXAM:  GENERAL: In no apparent distress, well nourished, and hydrated.  HEAD:  Atraumatic, Normocephalic  EYES: EOMI, PERRLA, conjunctiva and sclera clear  NECK: Supple and normal thyroid.    HEART: Regular rate and rhythm  PULMONARY: Clear to auscultation and perfusion.  No rales, wheezing, or rhonchi bilaterally.  ABDOMEN: Soft, Nontender, Nondistended; Bowel sounds present  EXTREMITIES:  2+ Peripheral Pulses, No clubbing, cyanosis, or edema  NEUROLOGICAL: Grossly nonfocal      INTERPRETATION OF TELEMETRY:  ECG:  < from: 12 Lead ECG (08.29.19 @ 02:35) >  Ventricular Rate 60 BPM    Atrial Rate 33 BPM    P-R Interval 188 ms    QRS Duration 152 ms    Q-T Interval 448 ms    QTC Calculation(Bezet) 448 ms    R Axis -12 degrees    T Axis -20 degrees    Diagnosis Line Atrial-paced rhythm  Right bundle branch block  Minimal voltage criteria for LVH, may be normal variant  Abnormal ECG    < end of copied text >      I&O's Detail      LABS:                        12.4   7.16  )-----------( 241      ( 29 Aug 2019 02:00 )             37.7     08-29    141  |  103  |  19  ----------------------------<  117<H>  3.9   |  28  |  1.0    Ca    9.1      29 Aug 2019 11:33  Mg     2.3     08-29    TPro  6.5  /  Alb  4.2  /  TBili  0.3  /  DBili  x   /  AST  20  /  ALT  39  /  AlkPhos  137<H>  08-29    CARDIAC MARKERS ( 29 Aug 2019 02:00 )  x     / <0.01 ng/mL / x     / x     / x          PT/INR - ( 29 Aug 2019 11:33 )   PT: 12.70 sec;   INR: 1.11 ratio         PTT - ( 29 Aug 2019 11:33 )  PTT:41.7 sec    BNP      I&O's Detail    Daily     Daily     RADIOLOGY & ADDITIONAL STUDIES:  < from: Xray Chest 1 View-PORTABLE IMMEDIATE (08.29.19 @ 02:56) >  INTERPRETATION:  Clinical History / Reason for exam: Cardiac dysrhythmia.    Comparison : Chest radiograph August 13, 2019.    Technique/Positioning: Frontal portable.    Findings:  Support devices: Left-sided dual-chamber ICD    Cardiac/mediastinum/hilum: Unremarkable.    Lung parenchyma/Pleura: Within normal limits.    Skeleton/soft tissues: Unremarkable.    Impression:    No radiographic evidence of acute cardiopulmonary disease.    < end of copied text >

## 2019-08-30 LAB
ALBUMIN SERPL ELPH-MCNC: 3.8 G/DL — SIGNIFICANT CHANGE UP (ref 3.5–5.2)
ALP SERPL-CCNC: 120 U/L — HIGH (ref 30–115)
ALT FLD-CCNC: 31 U/L — SIGNIFICANT CHANGE UP (ref 0–41)
ANION GAP SERPL CALC-SCNC: 12 MMOL/L — SIGNIFICANT CHANGE UP (ref 7–14)
AST SERPL-CCNC: 18 U/L — SIGNIFICANT CHANGE UP (ref 0–41)
BASOPHILS # BLD AUTO: 0.05 K/UL — SIGNIFICANT CHANGE UP (ref 0–0.2)
BASOPHILS NFR BLD AUTO: 0.9 % — SIGNIFICANT CHANGE UP (ref 0–1)
BILIRUB SERPL-MCNC: 0.3 MG/DL — SIGNIFICANT CHANGE UP (ref 0.2–1.2)
BUN SERPL-MCNC: 21 MG/DL — HIGH (ref 10–20)
CALCIUM SERPL-MCNC: 9.4 MG/DL — SIGNIFICANT CHANGE UP (ref 8.5–10.1)
CHLORIDE SERPL-SCNC: 104 MMOL/L — SIGNIFICANT CHANGE UP (ref 98–110)
CHOLEST SERPL-MCNC: 142 MG/DL — SIGNIFICANT CHANGE UP (ref 100–200)
CO2 SERPL-SCNC: 28 MMOL/L — SIGNIFICANT CHANGE UP (ref 17–32)
CREAT SERPL-MCNC: 0.9 MG/DL — SIGNIFICANT CHANGE UP (ref 0.7–1.5)
EOSINOPHIL # BLD AUTO: 0.11 K/UL — SIGNIFICANT CHANGE UP (ref 0–0.7)
EOSINOPHIL NFR BLD AUTO: 1.9 % — SIGNIFICANT CHANGE UP (ref 0–8)
ESTIMATED AVERAGE GLUCOSE: 108 MG/DL — SIGNIFICANT CHANGE UP (ref 68–114)
GLUCOSE BLDC GLUCOMTR-MCNC: 112 MG/DL — HIGH (ref 70–99)
GLUCOSE BLDC GLUCOMTR-MCNC: 138 MG/DL — HIGH (ref 70–99)
GLUCOSE BLDC GLUCOMTR-MCNC: 177 MG/DL — HIGH (ref 70–99)
GLUCOSE BLDC GLUCOMTR-MCNC: 91 MG/DL — SIGNIFICANT CHANGE UP (ref 70–99)
GLUCOSE SERPL-MCNC: 95 MG/DL — SIGNIFICANT CHANGE UP (ref 70–99)
HBA1C BLD-MCNC: 5.4 % — SIGNIFICANT CHANGE UP (ref 4–5.6)
HCT VFR BLD CALC: 35.4 % — LOW (ref 42–52)
HDLC SERPL-MCNC: 39 MG/DL — LOW
HGB BLD-MCNC: 11.4 G/DL — LOW (ref 14–18)
IMM GRANULOCYTES NFR BLD AUTO: 0.5 % — HIGH (ref 0.1–0.3)
INR BLD: 1.19 RATIO — SIGNIFICANT CHANGE UP (ref 0.65–1.3)
LIPID PNL WITH DIRECT LDL SERPL: 81 MG/DL — SIGNIFICANT CHANGE UP (ref 4–129)
LYMPHOCYTES # BLD AUTO: 1.45 K/UL — SIGNIFICANT CHANGE UP (ref 1.2–3.4)
LYMPHOCYTES # BLD AUTO: 25.6 % — SIGNIFICANT CHANGE UP (ref 20.5–51.1)
MAGNESIUM SERPL-MCNC: 2.1 MG/DL — SIGNIFICANT CHANGE UP (ref 1.8–2.4)
MCHC RBC-ENTMCNC: 29.7 PG — SIGNIFICANT CHANGE UP (ref 27–31)
MCHC RBC-ENTMCNC: 32.2 G/DL — SIGNIFICANT CHANGE UP (ref 32–37)
MCV RBC AUTO: 92.2 FL — SIGNIFICANT CHANGE UP (ref 80–94)
MONOCYTES # BLD AUTO: 0.58 K/UL — SIGNIFICANT CHANGE UP (ref 0.1–0.6)
MONOCYTES NFR BLD AUTO: 10.2 % — HIGH (ref 1.7–9.3)
NEUTROPHILS # BLD AUTO: 3.45 K/UL — SIGNIFICANT CHANGE UP (ref 1.4–6.5)
NEUTROPHILS NFR BLD AUTO: 60.9 % — SIGNIFICANT CHANGE UP (ref 42.2–75.2)
NRBC # BLD: 0 /100 WBCS — SIGNIFICANT CHANGE UP (ref 0–0)
PHOSPHATE SERPL-MCNC: 4.1 MG/DL — SIGNIFICANT CHANGE UP (ref 2.1–4.9)
PLATELET # BLD AUTO: 204 K/UL — SIGNIFICANT CHANGE UP (ref 130–400)
POTASSIUM SERPL-MCNC: 4.5 MMOL/L — SIGNIFICANT CHANGE UP (ref 3.5–5)
POTASSIUM SERPL-SCNC: 4.5 MMOL/L — SIGNIFICANT CHANGE UP (ref 3.5–5)
PROT SERPL-MCNC: 5.8 G/DL — LOW (ref 6–8)
PROTHROM AB SERPL-ACNC: 13.7 SEC — HIGH (ref 9.95–12.87)
RBC # BLD: 3.84 M/UL — LOW (ref 4.7–6.1)
RBC # FLD: 14.5 % — SIGNIFICANT CHANGE UP (ref 11.5–14.5)
SODIUM SERPL-SCNC: 144 MMOL/L — SIGNIFICANT CHANGE UP (ref 135–146)
TOTAL CHOLESTEROL/HDL RATIO MEASUREMENT: 3.6 RATIO — LOW (ref 4–5.5)
TRIGL SERPL-MCNC: 208 MG/DL — HIGH (ref 10–149)
TROPONIN T SERPL-MCNC: <0.01 NG/ML — SIGNIFICANT CHANGE UP
WBC # BLD: 5.67 K/UL — SIGNIFICANT CHANGE UP (ref 4.8–10.8)
WBC # FLD AUTO: 5.67 K/UL — SIGNIFICANT CHANGE UP (ref 4.8–10.8)

## 2019-08-30 PROCEDURE — ZZZZZ: CPT

## 2019-08-30 PROCEDURE — 93289 INTERROG DEVICE EVAL HEART: CPT | Mod: 26

## 2019-08-30 PROCEDURE — 99223 1ST HOSP IP/OBS HIGH 75: CPT

## 2019-08-30 PROCEDURE — 99233 SBSQ HOSP IP/OBS HIGH 50: CPT

## 2019-08-30 PROCEDURE — 99253 IP/OBS CNSLTJ NEW/EST LOW 45: CPT

## 2019-08-30 RX ADMIN — Medication 650 MILLIGRAM(S): at 02:07

## 2019-08-30 RX ADMIN — PANTOPRAZOLE SODIUM 40 MILLIGRAM(S): 20 TABLET, DELAYED RELEASE ORAL at 10:43

## 2019-08-30 RX ADMIN — Medication 10 MILLIGRAM(S): at 11:01

## 2019-08-30 RX ADMIN — LORATADINE 10 MILLIGRAM(S): 10 TABLET ORAL at 11:00

## 2019-08-30 RX ADMIN — TAMSULOSIN HYDROCHLORIDE 0.4 MILLIGRAM(S): 0.4 CAPSULE ORAL at 21:21

## 2019-08-30 RX ADMIN — Medication 650 MILLIGRAM(S): at 11:01

## 2019-08-30 RX ADMIN — GABAPENTIN 400 MILLIGRAM(S): 400 CAPSULE ORAL at 05:39

## 2019-08-30 RX ADMIN — GABAPENTIN 400 MILLIGRAM(S): 400 CAPSULE ORAL at 21:21

## 2019-08-30 RX ADMIN — APIXABAN 5 MILLIGRAM(S): 2.5 TABLET, FILM COATED ORAL at 17:05

## 2019-08-30 RX ADMIN — Medication 10 MILLIGRAM(S): at 05:39

## 2019-08-30 RX ADMIN — DOFETILIDE 250 MICROGRAM(S): 0.25 CAPSULE ORAL at 05:39

## 2019-08-30 RX ADMIN — QUETIAPINE FUMARATE 50 MILLIGRAM(S): 200 TABLET, FILM COATED ORAL at 21:21

## 2019-08-30 RX ADMIN — LAMOTRIGINE 100 MILLIGRAM(S): 25 TABLET, ORALLY DISINTEGRATING ORAL at 05:39

## 2019-08-30 RX ADMIN — Medication 20 MILLIGRAM(S): at 05:39

## 2019-08-30 RX ADMIN — Medication 25 MILLIGRAM(S): at 13:35

## 2019-08-30 RX ADMIN — POLYETHYLENE GLYCOL 3350 17 GRAM(S): 17 POWDER, FOR SOLUTION ORAL at 17:07

## 2019-08-30 RX ADMIN — MORPHINE SULFATE 30 MILLIGRAM(S): 50 CAPSULE, EXTENDED RELEASE ORAL at 17:05

## 2019-08-30 RX ADMIN — Medication 25 MILLIGRAM(S): at 21:21

## 2019-08-30 RX ADMIN — Medication 25 MILLIGRAM(S): at 05:39

## 2019-08-30 RX ADMIN — MORPHINE SULFATE 30 MILLIGRAM(S): 50 CAPSULE, EXTENDED RELEASE ORAL at 05:40

## 2019-08-30 RX ADMIN — BUDESONIDE AND FORMOTEROL FUMARATE DIHYDRATE 2 PUFF(S): 160; 4.5 AEROSOL RESPIRATORY (INHALATION) at 21:21

## 2019-08-30 RX ADMIN — Medication 2 SPRAY(S): at 11:45

## 2019-08-30 RX ADMIN — Medication 650 MILLIGRAM(S): at 00:05

## 2019-08-30 RX ADMIN — Medication 10 MILLIGRAM(S): at 17:06

## 2019-08-30 RX ADMIN — Medication 650 MILLIGRAM(S): at 05:39

## 2019-08-30 RX ADMIN — Medication 2000 UNIT(S): at 11:00

## 2019-08-30 RX ADMIN — GABAPENTIN 400 MILLIGRAM(S): 400 CAPSULE ORAL at 13:35

## 2019-08-30 RX ADMIN — Medication 650 MILLIGRAM(S): at 07:07

## 2019-08-30 RX ADMIN — Medication 650 MILLIGRAM(S): at 11:30

## 2019-08-30 RX ADMIN — Medication 650 MILLIGRAM(S): at 23:25

## 2019-08-30 RX ADMIN — ATORVASTATIN CALCIUM 40 MILLIGRAM(S): 80 TABLET, FILM COATED ORAL at 21:21

## 2019-08-30 RX ADMIN — QUETIAPINE FUMARATE 25 MILLIGRAM(S): 200 TABLET, FILM COATED ORAL at 11:00

## 2019-08-30 RX ADMIN — Medication 10 MILLIGRAM(S): at 23:25

## 2019-08-30 RX ADMIN — LAMOTRIGINE 100 MILLIGRAM(S): 25 TABLET, ORALLY DISINTEGRATING ORAL at 17:07

## 2019-08-30 RX ADMIN — Medication 650 MILLIGRAM(S): at 17:06

## 2019-08-30 RX ADMIN — DOFETILIDE 250 MICROGRAM(S): 0.25 CAPSULE ORAL at 17:05

## 2019-08-30 RX ADMIN — BUDESONIDE AND FORMOTEROL FUMARATE DIHYDRATE 2 PUFF(S): 160; 4.5 AEROSOL RESPIRATORY (INHALATION) at 07:36

## 2019-08-30 RX ADMIN — APIXABAN 5 MILLIGRAM(S): 2.5 TABLET, FILM COATED ORAL at 05:39

## 2019-08-30 NOTE — PROGRESS NOTE ADULT - SUBJECTIVE AND OBJECTIVE BOX
Hospital Day:  1d    Subjective:    Pt was interviewed and examined at the bedside in the AM. No acute events overnight.     Denies headaches, changes in vision, chest pains, SOB, n/v/d, abd pain, constipation, changes in urination, pain on urination, weakness, fatigue, joint pain or muscle pain.       Past Medical Hx:   PTSD (post-traumatic stress disorder)  Supraventricular arrhythmia  Cardiomyopathy  CHF (congestive heart failure)  Atrial fibrillation  Diabetes mellitus  Smoker  BPH (benign prostatic hyperplasia)  HTN (hypertension)  Seizures  Bipolar 1 disorder  Migraines  Pneumonia  MS (multiple sclerosis)  CAD (coronary artery disease)  Bipolar disorder  Anginal pain  Schizo affective schizophrenia  GERD (gastroesophageal reflux disease)  Seasonal allergies  HTN (hypertension)  Hypercholesteremia  COPD (chronic obstructive pulmonary disease)  Neuropathy  Atrial flutter  CVA (cerebral vascular accident)  TIA (transient ischemic attack)  Syncope  Chronic Hypotension  Hypotension  Peripheral Neuropathy  Human Immunodeficiency Virus (  S/P Implantation of Automatic  Personal History of Multiple S  Personal History of Mitral Nicki  Personal History of MI (Myocar  Personal History of Hypertensi  Personal History of Congestive  Personal History of Cardiomyop  Personal History of Atrial Fib  Personal History of Alcoholism  Clinical Depression    Past Sx:  H/O prior ablation treatment  Cardiac pacemaker recipient  H/O hernia repair  History of hip replacement  S/P Orchiectomy  SVT (Supraventricular Tachycar  GERD (Gastroesophageal Reflux  S/P Implantation of AICD    Allergies:  dexmethylphenidate (Unknown)  Dilantin (Rash)  dilantin, compazine, neurontin, ritalin, phenergan (Unknown)  Haldol (Unknown)  hydantoin derivatives (Other)  methylphenidate (Unknown)  Morphine Sulfate (Unknown)  phenytoin (Unknown)  prochlorperazine (Unknown)  promethazine (Dystonic RXN)  rash/hives (Anaphylaxis)  thioxanthenes (Unknown)    Current Meds:   Standng Meds:  acetaminophen   Tablet .. 650 milliGRAM(s) Oral every 6 hours  apixaban 5 milliGRAM(s) Oral every 12 hours  atorvastatin 40 milliGRAM(s) Oral at bedtime  baclofen 10 milliGRAM(s) Oral four times a day  buDESOnide 160 MICROgram(s)/formoterol 4.5 MICROgram(s) Inhaler - Peds 2 Puff(s) Inhalation two times a day  cholecalciferol 2000 Unit(s) Oral daily  dofetilide 250 MICROGram(s) Oral two times a day  fluticasone propionate (50 MICROgram(s)/actuation) Nasal Spray - Peds 2 Spray(s) Alternating Nostrils daily  furosemide    Tablet 20 milliGRAM(s) Oral daily  gabapentin 400 milliGRAM(s) Oral three times a day  lamoTRIgine 100 milliGRAM(s) Oral two times a day  loratadine 10 milliGRAM(s) Oral daily  metoprolol tartrate 25 milliGRAM(s) Oral every 8 hours  morphine ER Tablet 30 milliGRAM(s) Oral every 12 hours  pantoprazole    Tablet 40 milliGRAM(s) Oral before breakfast  polyethylene glycol 3350 17 Gram(s) Oral every 12 hours  QUEtiapine 25 milliGRAM(s) Oral daily  QUEtiapine 50 milliGRAM(s) Oral at bedtime  tamsulosin Oral Tab/Cap - Peds 0.4 milliGRAM(s) Oral at bedtime  tiotropium 18 MICROgram(s) Capsule 1 Capsule(s) Inhalation daily    PRN Meds:  nitroglycerin     SubLingual 0.4 milliGRAM(s) SubLingual every 5 minutes PRN Chest Pain    HOME MEDICATIONS:  acetaminophen 650 mg oral tablet: 650 milligram(s) orally 4 times a day, As Needed  atorvastatin 40 mg oral tablet: 1 tab(s) orally once a day (at bedtime)  baclofen 10 mg oral tablet: 1 tab(s) orally 4 times a day  budesonide-formoterol 160 mcg-4.5 mcg/inh inhalation aerosol: 2 puff(s) inhaled 2 times a day  Cepacol Extra Strength Menthol 10 mg mucous membrane lozenge: 1 anderson(s) mucous membrane every 6 hours, As Needed  cholecalciferol oral tablet: 2000 unit(s) orally once a day  Claritin 10 mg oral tablet: 1 tab(s) orally once a day  Eliquis 5 mg oral tablet: 1 tab(s) orally 2 times a day  Flonase 50 mcg/inh nasal spray: 2 spray(s) nasal once a day  furosemide 20 mg oral tablet: 1 tab(s) orally once a day  gabapentin 400 mg oral capsule: 1 cap(s) orally 3 times a day  ketoconazole 2% topical shampoo: Apply topically to affected area once a day (in the evening)  lamoTRIgine 100 mg oral tablet: 1 tab(s) orally 2 times a day  metoprolol tartrate 25 mg oral tablet: 1 tab(s) orally every 8 hours  Mi-Acid oral suspension: 30 milliliter(s) orally 4 times a day, As Needed  morphine 15 mg oral tablet: 1 tab(s) orally every 4 hours, As needed, Severe Pain (7 - 10)  Nitrostat 0.4 mg sublingual tablet: sublingual 3 times a day, As Needed; may repeat after 5 min until relief  omeprazole 20 mg oral delayed release capsule: 1 cap(s) orally 2 times a day  polyethylene glycol 3350 oral powder for reconstitution: 17 gram(s) orally every 12 hours  QUEtiapine 25 mg oral tablet: 1 tab(s) orally once a day  SEROquel 50 mg oral tablet: 1 tab(s) orally once a day (at bedtime)  tamsulosin 0.4 mg oral capsule: 1 cap(s) orally once a day (at bedtime)  Tecfidera 240 mg oral delayed release capsule: 1 cap(s) orally once a day  Tikosyn 250 mcg oral capsule: 1 cap(s) orally 2 times a day  tiotropium 18 mcg inhalation capsule: 1 cap(s) inhaled once a day      Vital Signs:   T(F): 97.9 (08-30-19 @ 05:56), Max: 98.5 (08-29-19 @ 16:15)  HR: 64 (08-30-19 @ 05:56) (60 - 64)  BP: 105/59 (08-30-19 @ 05:56) (105/59 - 117/58)  RR: 18 (08-30-19 @ 05:56) (18 - 19)  SpO2: 98% (08-29-19 @ 22:00) (98% - 99%)        Physical Exam:   GENERAL: NAD  HEENT: NCAT  CHEST/LUNG: CTAB, AICD in chest  HEART: Regular rate and rhythm; s1 s2 appreciated, No murmurs, rubs, or gallops  ABDOMEN: Soft, Nontender, Nondistended; Bowel sounds present  EXTREMITIES: No LE edema b/l   NERVOUS SYSTEM:  Alert & Oriented X3 4/5 strength in RLE(baseline) no other focal deficits        Labs:                         11.4   5.67  )-----------( 204      ( 30 Aug 2019 07:40 )             35.4     Neutophil% 60.9, Lymphocyte% 25.6, Monocyte% 10.2, Bands% 0.5 08-30-19 @ 07:40    30 Aug 2019 07:40    144    |  104    |  21     ----------------------------<  95     4.5     |  28     |  0.9      Ca    9.4        30 Aug 2019 07:40  Phos  4.1       30 Aug 2019 07:40  Mg     2.1       30 Aug 2019 07:40    TPro  5.8    /  Alb  3.8    /  TBili  0.3    /  DBili  x      /  AST  18     /  ALT  31     /  AlkPhos  120    30 Aug 2019 07:40       pTT    --             ----< 1.19 INR  (08-30-19 @ 07:40)    13.70        PT  Chol 142, LDL 81, HDL 39, VLDL --,  08-30-19 @ 07:40      Hemoglobin A1C, Whole Blood: 5.4 % (08-30-19 @ 07:40)      Troponin <0.01, CKMB --, CK -- 08-29-19 @ 02:00

## 2019-08-30 NOTE — PROGRESS NOTE ADULT - ASSESSMENT
49 y/o M w/ a PMH of v-tach s/p AICD, CHF, A fib/flutter, HTN, COPD (on O2 PRN), DLD, multiple sclerosis w/ multiple admissions for flares, seizure disorder (on lamotrigine), PTSD, depression, ? stroke/TIA, esophageal stricture, chronic sore throat/sinusitis, GERD, hemopneumothorax (2006), pericardial effusion (2006), cardiomyopathy, MI (2006, 2008), IBS, presenting post AICD firing.,     #) ARVD (Arrhythmogenic right ventricular dysplasia) s/p AICD, possible AICD dysfunction  - now with possible pacemaker firing, buzzing sensation in chest   - Keep telemetry, pacing pads on chest   - H/o v-tach (no VT, VF, on AICD since 2006)  - C/w metoprolol tartrate 25 mg PO TID, Tikosyn 250 mcg BID   - EP following   - Underwent fluoro to assess RV lead placement possibly dislodged   - generator change vs extraction of RV ICD and pace/sense lead    #) Anginal symptoms, without prior evidence of CAD, ?H/o MI 2006, 2008  - With episode of anginal symptoms, pressure like, radiating to jaw, resolved with nitro  - More likely related to pacemaker firing   - Troponin negative, no acute EKG changes  - Per cardiology on 08/18, No evidence of CAD, on 8/6/2018 [facility] reported normal with 0 calcium score  - C/w metoprolol tartrate 25 mg PO TID  - C/w atorvastatin 40 mg PO qHS  - C/w aspirin 81 mg PO qD    #)  HTN  -C/w metoprolol tartrate 25 mg PO TID  -C/w Lasix 20 mg     #) Multiple sclerosis   -with recent flare, discharged on 08/19/19 with prednisone for 5 days (completed)  -Patient takes Tecfidera 240 mg PO qD but is apparently non-formulary; please contact [facility]  -Pain control with acetaminophen and baclofen and gabapentin and morphine  IR 15 as per pain management on last admission.  Allergy in chart is local rash to IV opioids, may give PO. Also got IV morphine in hospital without reaction,   -Ambulate w/ assistance (patient uses walker at baseline)    # H/o seizure disorder  C/w lamotrigine 100 mg PO BID    #) COPD on PRN home O2  -Currently on RA  -C/w Symbicort high dose 2 puffs BID  -Vitals per routine, pulse ox q4h    # Chronic pharyngitis , sinusitis  -C/w Cepacol, Claritin, Flonase    # DLD  C/w atorvastatin 40 mg PO qHS    # GERD  C/w pantoprazole 40 mg PO qAM (patient takes omeprazole at home)    # H/o esophageal stricture   -cont mechanical soft dysphagia 2 w/ thins    DVT ppx- C/w Eliquis 5 mg PO BID    GI ppx- c/w pantoprazole 40 mg PO qAM (on omeprazole at home)    Activity- ambulate w/ assistance    Diet- mechanical soft dysphagia 2 w/ thins    Code status: Full    Dispo- patient from [facility]; functional at baseline w/ walker

## 2019-08-30 NOTE — PROGRESS NOTE ADULT - SUBJECTIVE AND OBJECTIVE BOX
Surgeon: /Barbi/ Victoriano    Consult requesting by: Dr. Vance    HISTORY OF PRESENT ILLNESS:  47 y/o M with PMHx of v-tach s/p AICD, CHF, A fib/flutter, HTN, COPD (on O2 PRN), DLD, multiple sclerosis w/ multiple admissions for flares, seizure disorder (on lamotrigine), PTSD, depression, ?stroke/TIA, esophageal stricture, chronic sore throat/sinusitis, GERD, hemopneumothorax (2006), pericardial effusion (2006), cardiomyopathy, MI (2006, 2008), IBS, presenting with chest pain, palpitations and AICD firing. He had recent admission to [facility] on 08/13/19with discharge on 08/19/19 for multiple sclerosis flare treated with IV solumedrol for 5 days and discharged on oral Prednisone for 5 more days. During his admission EP interrogated his pacemaker and multiple episodes of SVT and PAF were noted over past few months. His metoprolol was increased to 25 mg TID, and to return to see Dr. Quiñones in 2 months for battery change. He states that two days ago he felt mid sternal chest pain, 8/10, that radiated to the right side of his neck and face that resolved with sublingual nitroglycerin.  The following day he is "80% sure" he felt his pacemaker fire so he informed the staff at [facility] and was brought to the ED.   He also states that his pacemaker has been "buzzing and vibrating," and that he was told to come to the ED if he felt that.  The buzzing  is not  currently present on exam. He has no other complaints today. EP evaluated pt and found abandoned ppm lead in RV externalized. CTS called for lead extraction and revision as well as battery change      PAST MEDICAL & SURGICAL HISTORY:  PTSD (post-traumatic stress disorder)  Supraventricular arrhythmia: prior history  Cardiomyopathy  CHF (congestive heart failure)  Atrial fibrillation: s/p ablation, on eliquis  Diabetes mellitus: diet controlled  BPH (benign prostatic hyperplasia)  HTN (hypertension)  Seizures  Migraines  Pneumonia  MS (multiple sclerosis)  CAD (coronary artery disease)  Bipolar disorder  Anginal pain  Schizo affective schizophrenia  GERD (gastroesophageal reflux disease)  Seasonal allergies  Hypercholesteremia  COPD (chronic obstructive pulmonary disease)  CVA (cerebral vascular accident)  TIA (transient ischemic attack)  Syncope  Peripheral Neuropathy  Clinical Depression  H/O prior ablation treatment: for Afib  Cardiac pacemaker recipient  H/O hernia repair: right 1972,1991  History of hip replacement  S/P Orchiectomy: L for testicular CA  S/P Implantation of AICD      MEDICATIONS  (STANDING):  acetaminophen   Tablet .. 650 milliGRAM(s) Oral every 6 hours  apixaban 5 milliGRAM(s) Oral every 12 hours  atorvastatin 40 milliGRAM(s) Oral at bedtime  baclofen 10 milliGRAM(s) Oral four times a day  buDESOnide 160 MICROgram(s)/formoterol 4.5 MICROgram(s) Inhaler - Peds 2 Puff(s) Inhalation two times a day  cholecalciferol 2000 Unit(s) Oral daily  dofetilide 250 MICROGram(s) Oral two times a day  fluticasone propionate (50 MICROgram(s)/actuation) Nasal Spray - Peds 2 Spray(s) Alternating Nostrils daily  furosemide    Tablet 20 milliGRAM(s) Oral daily  gabapentin 400 milliGRAM(s) Oral three times a day  lamoTRIgine 100 milliGRAM(s) Oral two times a day  loratadine 10 milliGRAM(s) Oral daily  metoprolol tartrate 25 milliGRAM(s) Oral every 8 hours  morphine ER Tablet 30 milliGRAM(s) Oral every 12 hours  pantoprazole    Tablet 40 milliGRAM(s) Oral before breakfast  polyethylene glycol 3350 17 Gram(s) Oral every 12 hours  QUEtiapine 25 milliGRAM(s) Oral daily  QUEtiapine 50 milliGRAM(s) Oral at bedtime  tamsulosin Oral Tab/Cap - Peds 0.4 milliGRAM(s) Oral at bedtime  tiotropium 18 MICROgram(s) Capsule 1 Capsule(s) Inhalation daily    MEDICATIONS  (PRN):  nitroglycerin     SubLingual 0.4 milliGRAM(s) SubLingual every 5 minutes PRN Chest Pain    Antiplatelet therapy:                           Last dose/amt:    Allergies    dexmethylphenidate (Unknown)  Dilantin (Rash)  dilantin, compazine, neurontin, ritalin, phenergan (Unknown)  Haldol (Unknown)  hydantoin derivatives (Other)  methylphenidate (Unknown)  Morphine Sulfate (Unknown)  phenytoin (Unknown)  prochlorperazine (Unknown)  promethazine (Dystonic RXN)  rash/hives (Anaphylaxis)  thioxanthenes (Unknown)    Intolerances        SOCIAL HISTORY:  Smoker: [ x] Yes  [ ] No        PACK YEARS:                         WHEN QUIT? 1 year ago  ETOH use: [ ] Yes  [ x] No              FREQUENCY / QUANTITY:  Ilicit Drug use:  [ ] Yes  [x ] No  Occupation:  Lives with:  FAMILY HISTORY:  Family history of colon cancer in mother  Family history of cardiomyopathy: Mother  Family history of cervical cancer (Mother)  Family history of early CAD (Mother)      Review of Systems  CONSTITUTIONAL:  Fevers[ ] chills[ ] sweats[ ] fatigue[ ] weight loss[ ] weight gain [ ]                                     NEGATIVE [X ]   NEURO:  parathesias[ ] seizures [ ]  syncope [ ]  confusion [ ]                                                                                NEGATIVE[ X]   EYES: glasses[ ]  blurry vision[ ]  discharge[ ] pain[ ] glaucoma [ ]                                                                          NEGATIVE[X ]   ENMT:  difficulty hearing [ ]  vertigo[ ]  dysphagia[ ] epistaxis[ ] recent dental work [ ]                                    NEGATIVE[ X]   CV:  chest pain[ ] palpitations[ ] GAVIRIA [ ] diaphoresis [ ]                                                                                           NEGATIVE[ X]   RESPIRATORY:  wheezing[ ] SOB[ ] cough [ ] sputum[ ] hemoptysis[ ]                                                                  NEGATIVE[ ]   GI:  nausea[ ]  vommiting [ ]  diarrhea[ ] constipation [ ] melena [ ]                                                                      NEGATIVE[ X]   : hematuria[ ]  dysuria[ ] urgency[ ] incontinence[ ]                                                                                            NEGATIVE[ X]   MUSKULOSKELETAL:  arthritis[ ]  joint swelling [ ] muscle weakness [ ] Hx vein stripping [ ]                             NEGATIVE[X ]   SKIN/BREAST:  rash[ ] itching [ ]  hair loss[ ] masses[ ]                                                                                              NEGATIVE[ X]   PSYCH:  dementia [ ] depresion [ ] anxiety[ ]                                                                                                               NEGATIVE[X ]   HEME/LYMPH:  bruises easily[ ] enlarged lymph nodes[ ] tender lymph nodes[ ]                                               NEGATIVE[ X]   ENDOCRINE:  cold intolerance[ ] heat intolerance[ ] polydipsia[ ]                                                                          NEGATIVE[ X]     PHYSICAL EXAM  Vital Signs Last 24 Hrs  T(C): 36.3 (30 Aug 2019 12:40), Max: 36.9 (29 Aug 2019 16:15)  T(F): 97.4 (30 Aug 2019 12:40), Max: 98.5 (29 Aug 2019 16:15)  HR: 60 (30 Aug 2019 12:40) (60 - 64)  BP: 113/64 (30 Aug 2019 12:40) (105/59 - 117/58)  RR: 18 (30 Aug 2019 12:40) (18 - 19)  SpO2: 98% (29 Aug 2019 22:00) (98% - 99%)  Right arm bp: Left arm bp;    CONSTITUTIONAL:                                                                          WNL[x ]   Neuro: WNL[x ] Normal exam oriented to person/place & time with no focal motor or sensory  deficits. Other                     Eyes: WNL[ x]   Normal exam of conjunctiva & lids, pupils equally reactive. Other     ENT: WNL[x ]    Normal exam of nasal/oral mucosa with absence of cyanosis. Other  Neck: WNL[ x]  Normal exam of jugular veins, trachea & thyroid. Other  Chest: WNL[x ] Normal lung exam with good air movement absence of wheezes, rales, or rhonchi: Other                                                                                CV:  Auscultation: normal [ x] S3[ ] S4[ ] Irregular [ ] Rub[ ] Clicks[ ]    Murmurs none:[ x]systolic [ ]  diastolic [ ] holosystolic [ ]  Carotids: No Bruits[ x] Other                 Abdominal Aorta: normal [ ] nonpalpable[ ]Other                                                                                      GI:           WNL[ x] Normal exam of abdomen, liver & spleen with no noted masses or tenderness. Other                                                                                                        Extremities: WNL[ x] Normal no evidence of cyanosis or deformity Edema: none[ ]trace[ ]1+[ ]2+[ ]3+[ ]4+[ ]  Lower Extremity Pulses: Right[ ] Left[ ]Varicosities[ ]  SKIN :WNL[ x] Normal exam to inspection & palation. Other:                                                          LABS:                        11.4   5.67  )-----------( 204      ( 30 Aug 2019 07:40 )             35.4     08-30    144  |  104  |  21<H>  ----------------------------<  95  4.5   |  28  |  0.9    Ca    9.4      30 Aug 2019 07:40  Phos  4.1     08-30  Mg     2.1     08-30    TPro  5.8<L>  /  Alb  3.8  /  TBili  0.3  /  DBili  x   /  AST  18  /  ALT  31  /  AlkPhos  120<H>  08-30    PT/INR - ( 30 Aug 2019 07:40 )   PT: 13.70 sec;   INR: 1.19 ratio         PTT - ( 29 Aug 2019 11:33 )  PTT:41.7 sec    CARDIAC MARKERS ( 29 Aug 2019 02:00 )  x     / <0.01 ng/mL / x     / x     / x          TTE / ROCIO: < from: Transthoracic Echocardiogram (08.18.19 @ 13:59) >  Summary:   1. Normal global left ventricular systolic function.   2. LV Ejection Fraction by Vargas's Method with a biplane EF of 69 %.   3. Normal left ventricular internal cavity size.   4. Normal right atrial size.   5. There is no evidence of pericardial effusion.   6. Mild thickening and calcification of the anterior and posterior   mitral valve leaflets.   7. No evidence of mitral valve regurgitation.    PHYSICIAN INTERPRETATION:  Left Ventricle: The left ventricular internal cavity size is normal. Left   ventricular wall thickness is normal. Global LV systolic function was   normal.  Right Ventricle: The right ventricular size is normal. RV systolic   function is normal.  Left Atrium: Normal left atrial size.  Right Atrium: Normal right atrial size.  Pericardium: There is no evidence of pericardial effusion.  Mitral Valve: Mild thickening and calcification of the anterior and   posterior mitral valve leaflets. No evidence of mitral valve   regurgitation is seen.  Tricuspid Valve: The tricuspid valve is normal in structure. No tricuspid   regurgitation is visualized.  Aortic Valve: The aortic valve is trileaflet. No evidence of aortic   stenosis. No evidence of aortic valve regurgitation is seen.  Pulmonic Valve: The pulmonic valve is thickened with good excursion.   Trace pulmonic valve regurgitation.  Aorta: The aortic root is normal in size and structure.       < end of copied text >          Recommendation: (Procedures/Evaluations)  CT HEAD Nonn-Contrast:[  ]  CT Chest with /without contrast [ ]  Carotid Duplex :[ ]  MARELY/PVR: [ ]  PFT : Simple PFT [ ]  Full [ ]  Renal Consult [ ]  Pulmonary Consult: [ ]   Vascular Consult [ ]    Dental Consult [ ]   Hem-Onc Consult [ ]   GI Consult [ ]   Other Consultations :      Impression:    CAD [ ]  Valvular  disease [ ]   Aortic Disease [ ]   KADE: Yes[ ] No [ ]   CKD Stage I [ ] , Stage II [ ] , Stage III [ ], Stage IV [ ]   Anemia: Yes [ ], No [ ]  Diabetes :Yes [ ], No [ ]  Acute MI: Yes [ ], No [ ]   Heart Failure: Yes [ ] , No [ ] HFpEF [ ], HFrEF [ ]        Assessment/ Plan:  44 year-old male with PMH as listed above, including v tach s/p AICD found to have abandoned ppm lead externalization, pre-op for lead excision, lead revision and battery change next week. AICD is [place]  -will discuss with Dr. Pastrana  _pre-op for next week.

## 2019-08-30 NOTE — PROGRESS NOTE ADULT - ASSESSMENT
48y Male, with VTach s/p DC AICD, AF/AFL on Tikosyn, Metoprolol and Eliquis,  AICD at [facility]  known Riata RV lead , fluoroscopy shows externalization of the lead    con't to tele  CTS Dr Pastrana consult for lead revision and gen change  Maintain electrolytes K>4.0 Mg >2.0    D/w Dr Vance 48y Male, with VTach s/p DC AICD, AF/AFL on Tikosyn, Metoprolol and Eliquis,  AICD at [facility]  known Riata RV lead , fluoroscopy shows externalization of the lead    con't to tele  CTS Dr Pastrana consult for lead revision and gen change  Maintain electrolytes K>4.0 Mg >2.0  Discussed case with patient's friend/health care proxy, Zana    D/sirena Vance

## 2019-08-30 NOTE — PROGRESS NOTE ADULT - SUBJECTIVE AND OBJECTIVE BOX
INTERVAL HPI/OVERNIGHT EVENTS:  no event on tele  pt is astmptomatic    MEDICATIONS  (STANDING):  acetaminophen   Tablet .. 650 milliGRAM(s) Oral every 6 hours  apixaban 5 milliGRAM(s) Oral every 12 hours  atorvastatin 40 milliGRAM(s) Oral at bedtime  baclofen 10 milliGRAM(s) Oral four times a day  buDESOnide 160 MICROgram(s)/formoterol 4.5 MICROgram(s) Inhaler - Peds 2 Puff(s) Inhalation two times a day  cholecalciferol 2000 Unit(s) Oral daily  dofetilide 250 MICROGram(s) Oral two times a day  fluticasone propionate (50 MICROgram(s)/actuation) Nasal Spray - Peds 2 Spray(s) Alternating Nostrils daily  furosemide    Tablet 20 milliGRAM(s) Oral daily  gabapentin 400 milliGRAM(s) Oral three times a day  lamoTRIgine 100 milliGRAM(s) Oral two times a day  loratadine 10 milliGRAM(s) Oral daily  metoprolol tartrate 25 milliGRAM(s) Oral every 8 hours  morphine ER Tablet 30 milliGRAM(s) Oral every 12 hours  pantoprazole    Tablet 40 milliGRAM(s) Oral before breakfast  polyethylene glycol 3350 17 Gram(s) Oral every 12 hours  QUEtiapine 25 milliGRAM(s) Oral daily  QUEtiapine 50 milliGRAM(s) Oral at bedtime  tamsulosin Oral Tab/Cap - Peds 0.4 milliGRAM(s) Oral at bedtime  tiotropium 18 MICROgram(s) Capsule 1 Capsule(s) Inhalation daily    MEDICATIONS  (PRN):  nitroglycerin     SubLingual 0.4 milliGRAM(s) SubLingual every 5 minutes PRN Chest Pain      Allergies    dexmethylphenidate (Unknown)  Dilantin (Rash)  dilantin, compazine, neurontin, ritalin, phenergan (Unknown)  Haldol (Unknown)  hydantoin derivatives (Other)  methylphenidate (Unknown)  Morphine Sulfate (Unknown)  phenytoin (Unknown)  prochlorperazine (Unknown)  promethazine (Dystonic RXN)  rash/hives (Anaphylaxis)  thioxanthenes (Unknown)    Intolerances        REVIEW OF SYSTEMS    [x] A ten-point review of systems was otherwise negative except as noted.        Vital Signs Last 24 Hrs  T(C): 36.3 (30 Aug 2019 12:40), Max: 36.9 (29 Aug 2019 16:15)  T(F): 97.4 (30 Aug 2019 12:40), Max: 98.5 (29 Aug 2019 16:15)  HR: 60 (30 Aug 2019 12:40) (60 - 64)  BP: 113/64 (30 Aug 2019 12:40) (105/59 - 117/58)  BP(mean): --  RR: 18 (30 Aug 2019 12:40) (18 - 19)  SpO2: 98% (29 Aug 2019 22:00) (98% - 99%)    08-30-19 @ 07:01  -  08-30-19 @ 15:49  --------------------------------------------------------  IN: 525 mL / OUT: 0 mL / NET: 525 mL        PHYSICAL EXAM:    GENERAL: In no apparent distress, well nourished, and hydrated.  HEART: Regular rate and rhythm; No murmur; NO rubs, or gallops.  PULMONARY: Clear to auscultation and percussion.  Normal expansion/effort. No rales, wheezing, or rhonchi bilaterally.  ABDOMEN: Soft, Nontender, Nondistended; Bowel sounds present  EXTREMITIES:  Extremities warm, pink, well-perfused, 2+ Peripheral Pulses, No clubbing, cyanosis, or edema  NEUROLOGICAL: alert & oriented x 3, no focal deficits, PERRLA, EOMI    LABS:                        11.4   5.67  )-----------( 204      ( 30 Aug 2019 07:40 )             35.4     08-30    144  |  104  |  21<H>  ----------------------------<  95  4.5   |  28  |  0.9    Ca    9.4      30 Aug 2019 07:40  Phos  4.1     08-30  Mg     2.1     08-30    TPro  5.8<L>  /  Alb  3.8  /  TBili  0.3  /  DBili  x   /  AST  18  /  ALT  31  /  AlkPhos  120<H>  08-30    PT/INR - ( 30 Aug 2019 07:40 )   PT: 13.70 sec;   INR: 1.19 ratio         PTT - ( 29 Aug 2019 11:33 )  PTT:41.7 sec      RADIOLOGY & ADDITIONAL TESTS: INTERVAL HPI/OVERNIGHT EVENTS:  no events on tele  pt is astmptomatic    MEDICATIONS  (STANDING):  acetaminophen   Tablet .. 650 milliGRAM(s) Oral every 6 hours  apixaban 5 milliGRAM(s) Oral every 12 hours  atorvastatin 40 milliGRAM(s) Oral at bedtime  baclofen 10 milliGRAM(s) Oral four times a day  buDESOnide 160 MICROgram(s)/formoterol 4.5 MICROgram(s) Inhaler - Peds 2 Puff(s) Inhalation two times a day  cholecalciferol 2000 Unit(s) Oral daily  dofetilide 250 MICROGram(s) Oral two times a day  fluticasone propionate (50 MICROgram(s)/actuation) Nasal Spray - Peds 2 Spray(s) Alternating Nostrils daily  furosemide    Tablet 20 milliGRAM(s) Oral daily  gabapentin 400 milliGRAM(s) Oral three times a day  lamoTRIgine 100 milliGRAM(s) Oral two times a day  loratadine 10 milliGRAM(s) Oral daily  metoprolol tartrate 25 milliGRAM(s) Oral every 8 hours  morphine ER Tablet 30 milliGRAM(s) Oral every 12 hours  pantoprazole    Tablet 40 milliGRAM(s) Oral before breakfast  polyethylene glycol 3350 17 Gram(s) Oral every 12 hours  QUEtiapine 25 milliGRAM(s) Oral daily  QUEtiapine 50 milliGRAM(s) Oral at bedtime  tamsulosin Oral Tab/Cap - Peds 0.4 milliGRAM(s) Oral at bedtime  tiotropium 18 MICROgram(s) Capsule 1 Capsule(s) Inhalation daily    MEDICATIONS  (PRN):  nitroglycerin     SubLingual 0.4 milliGRAM(s) SubLingual every 5 minutes PRN Chest Pain      Allergies    dexmethylphenidate (Unknown)  Dilantin (Rash)  dilantin, compazine, neurontin, ritalin, phenergan (Unknown)  Haldol (Unknown)  hydantoin derivatives (Other)  methylphenidate (Unknown)  Morphine Sulfate (Unknown)  phenytoin (Unknown)  prochlorperazine (Unknown)  promethazine (Dystonic RXN)  rash/hives (Anaphylaxis)  thioxanthenes (Unknown)    Intolerances        REVIEW OF SYSTEMS    [x] A ten-point review of systems was otherwise negative except as noted.        Vital Signs Last 24 Hrs  T(C): 36.3 (30 Aug 2019 12:40), Max: 36.9 (29 Aug 2019 16:15)  T(F): 97.4 (30 Aug 2019 12:40), Max: 98.5 (29 Aug 2019 16:15)  HR: 60 (30 Aug 2019 12:40) (60 - 64)  BP: 113/64 (30 Aug 2019 12:40) (105/59 - 117/58)  BP(mean): --  RR: 18 (30 Aug 2019 12:40) (18 - 19)  SpO2: 98% (29 Aug 2019 22:00) (98% - 99%)    08-30-19 @ 07:01  -  08-30-19 @ 15:49  --------------------------------------------------------  IN: 525 mL / OUT: 0 mL / NET: 525 mL        PHYSICAL EXAM:  GENERAL: In no apparent distress, well nourished, and hydrated.  HEART: Regular rate and rhythm; No murmur; NO rubs, or gallops.  PULMONARY: Clear to auscultation and percussion.  Normal expansion/effort. No rales, wheezing, or rhonchi bilaterally.  ABDOMEN: Soft, Nontender, Nondistended; Bowel sounds present  EXTREMITIES:  Extremities warm, pink, well-perfused, 2+ Peripheral Pulses, No clubbing, cyanosis, or edema  NEUROLOGICAL: alert & oriented x 3, no focal deficits, PERRLA, EOMI    LABS:                        11.4   5.67  )-----------( 204      ( 30 Aug 2019 07:40 )             35.4     08-30    144  |  104  |  21<H>  ----------------------------<  95  4.5   |  28  |  0.9    Ca    9.4      30 Aug 2019 07:40  Phos  4.1     08-30  Mg     2.1     08-30    TPro  5.8<L>  /  Alb  3.8  /  TBili  0.3  /  DBili  x   /  AST  18  /  ALT  31  /  AlkPhos  120<H>  08-30    PT/INR - ( 30 Aug 2019 07:40 )   PT: 13.70 sec;   INR: 1.19 ratio         PTT - ( 29 Aug 2019 11:33 )  PTT:41.7 sec      RADIOLOGY & ADDITIONAL TESTS:  < from: Xray Chest 1 View-PORTABLE IMMEDIATE (08.29.19 @ 02:56) >  Impression:      No radiographic evidence of acutecardiopulmonary disease.    < end of copied text >

## 2019-08-31 LAB
ALBUMIN SERPL ELPH-MCNC: 4.2 G/DL — SIGNIFICANT CHANGE UP (ref 3.5–5.2)
ALP SERPL-CCNC: 133 U/L — HIGH (ref 30–115)
ALT FLD-CCNC: 32 U/L — SIGNIFICANT CHANGE UP (ref 0–41)
ANION GAP SERPL CALC-SCNC: 13 MMOL/L — SIGNIFICANT CHANGE UP (ref 7–14)
APTT BLD: 44 SEC — HIGH (ref 27–39.2)
AST SERPL-CCNC: 18 U/L — SIGNIFICANT CHANGE UP (ref 0–41)
BILIRUB SERPL-MCNC: 0.5 MG/DL — SIGNIFICANT CHANGE UP (ref 0.2–1.2)
BUN SERPL-MCNC: 20 MG/DL — SIGNIFICANT CHANGE UP (ref 10–20)
CALCIUM SERPL-MCNC: 9.2 MG/DL — SIGNIFICANT CHANGE UP (ref 8.5–10.1)
CHLORIDE SERPL-SCNC: 101 MMOL/L — SIGNIFICANT CHANGE UP (ref 98–110)
CO2 SERPL-SCNC: 28 MMOL/L — SIGNIFICANT CHANGE UP (ref 17–32)
CREAT SERPL-MCNC: 1 MG/DL — SIGNIFICANT CHANGE UP (ref 0.7–1.5)
GLUCOSE BLDC GLUCOMTR-MCNC: 122 MG/DL — HIGH (ref 70–99)
GLUCOSE BLDC GLUCOMTR-MCNC: 127 MG/DL — HIGH (ref 70–99)
GLUCOSE BLDC GLUCOMTR-MCNC: 89 MG/DL — SIGNIFICANT CHANGE UP (ref 70–99)
GLUCOSE SERPL-MCNC: 155 MG/DL — HIGH (ref 70–99)
HCT VFR BLD CALC: 38.3 % — LOW (ref 42–52)
HGB BLD-MCNC: 12 G/DL — LOW (ref 14–18)
MAGNESIUM SERPL-MCNC: 1.8 MG/DL — SIGNIFICANT CHANGE UP (ref 1.8–2.4)
MCHC RBC-ENTMCNC: 28.8 PG — SIGNIFICANT CHANGE UP (ref 27–31)
MCHC RBC-ENTMCNC: 31.3 G/DL — LOW (ref 32–37)
MCV RBC AUTO: 92.1 FL — SIGNIFICANT CHANGE UP (ref 80–94)
NRBC # BLD: 0 /100 WBCS — SIGNIFICANT CHANGE UP (ref 0–0)
PLATELET # BLD AUTO: 199 K/UL — SIGNIFICANT CHANGE UP (ref 130–400)
POTASSIUM SERPL-MCNC: 4 MMOL/L — SIGNIFICANT CHANGE UP (ref 3.5–5)
POTASSIUM SERPL-SCNC: 4 MMOL/L — SIGNIFICANT CHANGE UP (ref 3.5–5)
PROT SERPL-MCNC: 6.3 G/DL — SIGNIFICANT CHANGE UP (ref 6–8)
RBC # BLD: 4.16 M/UL — LOW (ref 4.7–6.1)
RBC # FLD: 14.3 % — SIGNIFICANT CHANGE UP (ref 11.5–14.5)
SODIUM SERPL-SCNC: 142 MMOL/L — SIGNIFICANT CHANGE UP (ref 135–146)
WBC # BLD: 5.43 K/UL — SIGNIFICANT CHANGE UP (ref 4.8–10.8)
WBC # FLD AUTO: 5.43 K/UL — SIGNIFICANT CHANGE UP (ref 4.8–10.8)

## 2019-08-31 PROCEDURE — 99232 SBSQ HOSP IP/OBS MODERATE 35: CPT

## 2019-08-31 RX ORDER — IPRATROPIUM BROMIDE 0.2 MG/ML
500 SOLUTION, NON-ORAL INHALATION ONCE
Refills: 0 | Status: COMPLETED | OUTPATIENT
Start: 2019-08-31 | End: 2019-08-31

## 2019-08-31 RX ORDER — ENOXAPARIN SODIUM 100 MG/ML
90 INJECTION SUBCUTANEOUS EVERY 12 HOURS
Refills: 0 | Status: DISCONTINUED | OUTPATIENT
Start: 2019-08-31 | End: 2019-09-04

## 2019-08-31 RX ADMIN — LAMOTRIGINE 100 MILLIGRAM(S): 25 TABLET, ORALLY DISINTEGRATING ORAL at 17:05

## 2019-08-31 RX ADMIN — PANTOPRAZOLE SODIUM 40 MILLIGRAM(S): 20 TABLET, DELAYED RELEASE ORAL at 09:14

## 2019-08-31 RX ADMIN — GABAPENTIN 400 MILLIGRAM(S): 400 CAPSULE ORAL at 21:32

## 2019-08-31 RX ADMIN — Medication 650 MILLIGRAM(S): at 23:01

## 2019-08-31 RX ADMIN — Medication 650 MILLIGRAM(S): at 17:05

## 2019-08-31 RX ADMIN — Medication 10 MILLIGRAM(S): at 23:01

## 2019-08-31 RX ADMIN — Medication 650 MILLIGRAM(S): at 05:33

## 2019-08-31 RX ADMIN — MORPHINE SULFATE 30 MILLIGRAM(S): 50 CAPSULE, EXTENDED RELEASE ORAL at 17:03

## 2019-08-31 RX ADMIN — TAMSULOSIN HYDROCHLORIDE 0.4 MILLIGRAM(S): 0.4 CAPSULE ORAL at 21:32

## 2019-08-31 RX ADMIN — Medication 500 MICROGRAM(S): at 14:48

## 2019-08-31 RX ADMIN — ENOXAPARIN SODIUM 90 MILLIGRAM(S): 100 INJECTION SUBCUTANEOUS at 17:04

## 2019-08-31 RX ADMIN — ATORVASTATIN CALCIUM 40 MILLIGRAM(S): 80 TABLET, FILM COATED ORAL at 21:32

## 2019-08-31 RX ADMIN — Medication 25 MILLIGRAM(S): at 21:32

## 2019-08-31 RX ADMIN — MORPHINE SULFATE 30 MILLIGRAM(S): 50 CAPSULE, EXTENDED RELEASE ORAL at 19:19

## 2019-08-31 RX ADMIN — LORATADINE 10 MILLIGRAM(S): 10 TABLET ORAL at 11:28

## 2019-08-31 RX ADMIN — TIOTROPIUM BROMIDE 1 CAPSULE(S): 18 CAPSULE ORAL; RESPIRATORY (INHALATION) at 10:21

## 2019-08-31 RX ADMIN — Medication 650 MILLIGRAM(S): at 05:30

## 2019-08-31 RX ADMIN — QUETIAPINE FUMARATE 25 MILLIGRAM(S): 200 TABLET, FILM COATED ORAL at 11:28

## 2019-08-31 RX ADMIN — MORPHINE SULFATE 30 MILLIGRAM(S): 50 CAPSULE, EXTENDED RELEASE ORAL at 05:33

## 2019-08-31 RX ADMIN — Medication 25 MILLIGRAM(S): at 13:26

## 2019-08-31 RX ADMIN — BUDESONIDE AND FORMOTEROL FUMARATE DIHYDRATE 2 PUFF(S): 160; 4.5 AEROSOL RESPIRATORY (INHALATION) at 21:33

## 2019-08-31 RX ADMIN — DOFETILIDE 250 MICROGRAM(S): 0.25 CAPSULE ORAL at 17:05

## 2019-08-31 RX ADMIN — POLYETHYLENE GLYCOL 3350 17 GRAM(S): 17 POWDER, FOR SOLUTION ORAL at 17:05

## 2019-08-31 RX ADMIN — Medication 650 MILLIGRAM(S): at 19:18

## 2019-08-31 RX ADMIN — Medication 2 SPRAY(S): at 11:27

## 2019-08-31 RX ADMIN — BUDESONIDE AND FORMOTEROL FUMARATE DIHYDRATE 2 PUFF(S): 160; 4.5 AEROSOL RESPIRATORY (INHALATION) at 09:14

## 2019-08-31 RX ADMIN — GABAPENTIN 400 MILLIGRAM(S): 400 CAPSULE ORAL at 13:26

## 2019-08-31 RX ADMIN — LAMOTRIGINE 100 MILLIGRAM(S): 25 TABLET, ORALLY DISINTEGRATING ORAL at 05:30

## 2019-08-31 RX ADMIN — Medication 10 MILLIGRAM(S): at 05:30

## 2019-08-31 RX ADMIN — APIXABAN 5 MILLIGRAM(S): 2.5 TABLET, FILM COATED ORAL at 05:30

## 2019-08-31 RX ADMIN — Medication 650 MILLIGRAM(S): at 11:28

## 2019-08-31 RX ADMIN — GABAPENTIN 400 MILLIGRAM(S): 400 CAPSULE ORAL at 05:30

## 2019-08-31 RX ADMIN — QUETIAPINE FUMARATE 50 MILLIGRAM(S): 200 TABLET, FILM COATED ORAL at 21:32

## 2019-08-31 RX ADMIN — Medication 25 MILLIGRAM(S): at 05:30

## 2019-08-31 RX ADMIN — Medication 10 MILLIGRAM(S): at 17:06

## 2019-08-31 RX ADMIN — Medication 2000 UNIT(S): at 11:28

## 2019-08-31 RX ADMIN — DOFETILIDE 250 MICROGRAM(S): 0.25 CAPSULE ORAL at 05:30

## 2019-08-31 RX ADMIN — Medication 20 MILLIGRAM(S): at 05:30

## 2019-08-31 RX ADMIN — Medication 10 MILLIGRAM(S): at 11:28

## 2019-08-31 NOTE — PROGRESS NOTE ADULT - SUBJECTIVE AND OBJECTIVE BOX
Hospital Day:  2d    Subjective:    Pt was interviewed and examined at the bedside in the AM. No acute events overnight.       Denies headaches, changes in vision, chest pains, SOB, n/v/d, abd pain, constipation, changes in urination, pain on urination, weakness, fatigue, joint pain or muscle pain.       Past Medical Hx:   PTSD (post-traumatic stress disorder)  Supraventricular arrhythmia  Cardiomyopathy  CHF (congestive heart failure)  Atrial fibrillation  Diabetes mellitus  Smoker  BPH (benign prostatic hyperplasia)  HTN (hypertension)  Seizures  Bipolar 1 disorder  Migraines  Pneumonia  MS (multiple sclerosis)  CAD (coronary artery disease)  Bipolar disorder  Anginal pain  Schizo affective schizophrenia  GERD (gastroesophageal reflux disease)  Seasonal allergies  HTN (hypertension)  Hypercholesteremia  COPD (chronic obstructive pulmonary disease)  Neuropathy  Atrial flutter  CVA (cerebral vascular accident)  TIA (transient ischemic attack)  Syncope  Chronic Hypotension  Hypotension  Peripheral Neuropathy  Human Immunodeficiency Virus (  S/P Implantation of Automatic  Personal History of Multiple S  Personal History of Mitral Nicki  Personal History of MI (Myocar  Personal History of Hypertensi  Personal History of Congestive  Personal History of Cardiomyop  Personal History of Atrial Fib  Personal History of Alcoholism  Clinical Depression    Past Sx:  H/O prior ablation treatment  Cardiac pacemaker recipient  H/O hernia repair  History of hip replacement  S/P Orchiectomy  SVT (Supraventricular Tachycar  GERD (Gastroesophageal Reflux  S/P Implantation of AICD    Allergies:  dexmethylphenidate (Unknown)  Dilantin (Rash)  dilantin, compazine, neurontin, ritalin, phenergan (Unknown)  Haldol (Unknown)  hydantoin derivatives (Other)  methylphenidate (Unknown)  Morphine Sulfate (Unknown)  phenytoin (Unknown)  prochlorperazine (Unknown)  promethazine (Dystonic RXN)  rash/hives (Anaphylaxis)  thioxanthenes (Unknown)    Current Meds:   Standng Meds:  acetaminophen   Tablet .. 650 milliGRAM(s) Oral every 6 hours  atorvastatin 40 milliGRAM(s) Oral at bedtime  baclofen 10 milliGRAM(s) Oral four times a day  buDESOnide 160 MICROgram(s)/formoterol 4.5 MICROgram(s) Inhaler - Peds 2 Puff(s) Inhalation two times a day  cholecalciferol 2000 Unit(s) Oral daily  dofetilide 250 MICROGram(s) Oral two times a day  enoxaparin Injectable 90 milliGRAM(s) SubCutaneous every 12 hours  fluticasone propionate (50 MICROgram(s)/actuation) Nasal Spray - Peds 2 Spray(s) Alternating Nostrils daily  furosemide    Tablet 20 milliGRAM(s) Oral daily  gabapentin 400 milliGRAM(s) Oral three times a day  lamoTRIgine 100 milliGRAM(s) Oral two times a day  loratadine 10 milliGRAM(s) Oral daily  metoprolol tartrate 25 milliGRAM(s) Oral every 8 hours  morphine ER Tablet 30 milliGRAM(s) Oral every 12 hours  pantoprazole    Tablet 40 milliGRAM(s) Oral before breakfast  polyethylene glycol 3350 17 Gram(s) Oral every 12 hours  QUEtiapine 25 milliGRAM(s) Oral daily  QUEtiapine 50 milliGRAM(s) Oral at bedtime  tamsulosin Oral Tab/Cap - Peds 0.4 milliGRAM(s) Oral at bedtime  tiotropium 18 MICROgram(s) Capsule 1 Capsule(s) Inhalation daily    PRN Meds:  nitroglycerin     SubLingual 0.4 milliGRAM(s) SubLingual every 5 minutes PRN Chest Pain    HOME MEDICATIONS:  acetaminophen 650 mg oral tablet: 650 milligram(s) orally 4 times a day, As Needed  atorvastatin 40 mg oral tablet: 1 tab(s) orally once a day (at bedtime)  baclofen 10 mg oral tablet: 1 tab(s) orally 4 times a day  budesonide-formoterol 160 mcg-4.5 mcg/inh inhalation aerosol: 2 puff(s) inhaled 2 times a day  Cepacol Extra Strength Menthol 10 mg mucous membrane lozenge: 1 anderson(s) mucous membrane every 6 hours, As Needed  cholecalciferol oral tablet: 2000 unit(s) orally once a day  Claritin 10 mg oral tablet: 1 tab(s) orally once a day  Eliquis 5 mg oral tablet: 1 tab(s) orally 2 times a day  Flonase 50 mcg/inh nasal spray: 2 spray(s) nasal once a day  furosemide 20 mg oral tablet: 1 tab(s) orally once a day  gabapentin 400 mg oral capsule: 1 cap(s) orally 3 times a day  ketoconazole 2% topical shampoo: Apply topically to affected area once a day (in the evening)  lamoTRIgine 100 mg oral tablet: 1 tab(s) orally 2 times a day  metoprolol tartrate 25 mg oral tablet: 1 tab(s) orally every 8 hours  Mi-Acid oral suspension: 30 milliliter(s) orally 4 times a day, As Needed  morphine 15 mg oral tablet: 1 tab(s) orally every 4 hours, As needed, Severe Pain (7 - 10)  Nitrostat 0.4 mg sublingual tablet: sublingual 3 times a day, As Needed; may repeat after 5 min until relief  omeprazole 20 mg oral delayed release capsule: 1 cap(s) orally 2 times a day  polyethylene glycol 3350 oral powder for reconstitution: 17 gram(s) orally every 12 hours  QUEtiapine 25 mg oral tablet: 1 tab(s) orally once a day  SEROquel 50 mg oral tablet: 1 tab(s) orally once a day (at bedtime)  tamsulosin 0.4 mg oral capsule: 1 cap(s) orally once a day (at bedtime)  Tecfidera 240 mg oral delayed release capsule: 1 cap(s) orally once a day  Tikosyn 250 mcg oral capsule: 1 cap(s) orally 2 times a day  tiotropium 18 mcg inhalation capsule: 1 cap(s) inhaled once a day      Vital Signs:   T(F): 98.3 (08-31-19 @ 05:42), Max: 98.3 (08-31-19 @ 05:42)  HR: 60 (08-31-19 @ 05:42) (60 - 60)  BP: 126/60 (08-31-19 @ 05:42) (113/64 - 127/66)  RR: 18 (08-31-19 @ 05:42) (18 - 18)  SpO2: 97% (08-31-19 @ 05:42) (97% - 97%)      08-30-19 @ 07:01  -  08-31-19 @ 07:00  --------------------------------------------------------  IN: 525 mL / OUT: 0 mL / NET: 525 mL        Physical Exam:   GENERAL: NAD  HEENT: NCAT  CHEST/LUNG: CTAB, AICD in chest  HEART: Regular rate and rhythm; s1 s2 appreciated, No murmurs, rubs, or gallops  ABDOMEN: Soft, Nontender, Nondistended; Bowel sounds present  EXTREMITIES: No LE edema b/l   NERVOUS SYSTEM:  Alert & Oriented X3 4/5 strength in RLE(baseline) no other focal deficits      Labs:                         12.0   5.43  )-----------( 199      ( 31 Aug 2019 08:45 )             38.3       30 Aug 2019 07:40    144    |  104    |  21     ----------------------------<  95     4.5     |  28     |  0.9      Ca    9.4        30 Aug 2019 07:40  Phos  4.1       30 Aug 2019 07:40  Mg     2.1       30 Aug 2019 07:40    TPro  5.8    /  Alb  3.8    /  TBili  0.3    /  DBili  x      /  AST  18     /  ALT  31     /  AlkPhos  120    30 Aug 2019 07:40       pTT    44.0             ----< -- INR  (08-31-19 @ 08:45)    --        PT      Hemoglobin A1C, Whole Blood: 5.4 % (08-30-19 @ 07:40)      Troponin <0.01, CKMB --, CK -- 08-30-19 @ 17:36  Troponin <0.01, CKMB --, CK -- 08-29-19 @ 02:00                Radiology:

## 2019-08-31 NOTE — PROGRESS NOTE ADULT - SUBJECTIVE AND OBJECTIVE BOX
CUATE HORNE  48y Male    CHIEF COMPLAINT:    Patient is a 48y old  Male who presents with a chief complaint of Chest pain, AICD firing (31 Aug 2019 10:38)      INTERVAL HPI/OVERNIGHT EVENTS:    Patient seen and examined. No acute events overnight. Ambulating around unit, no active complaints     ROS: All other systems are negative.    Vital Signs:    T(F): 98.3 (08-31-19 @ 05:42), Max: 98.3 (08-31-19 @ 05:42)  HR: 60 (08-31-19 @ 05:42) (60 - 60)  BP: 126/60 (08-31-19 @ 05:42) (113/64 - 127/66)  RR: 18 (08-31-19 @ 05:42) (18 - 18)  SpO2: 97% (08-31-19 @ 05:42) (97% - 97%)    30 Aug 2019 07:01  -  31 Aug 2019 07:00  --------------------------------------------------------  IN: 525 mL / OUT: 0 mL / NET: 525 mL    POCT Blood Glucose.: 122 mg/dL (31 Aug 2019 11:14)  POCT Blood Glucose.: 89 mg/dL (31 Aug 2019 07:38)  POCT Blood Glucose.: 177 mg/dL (30 Aug 2019 22:22)  POCT Blood Glucose.: 138 mg/dL (30 Aug 2019 17:00)  POCT Blood Glucose.: 112 mg/dL (30 Aug 2019 11:19)      PHYSICAL EXAM:    GENERAL:  NAD  SKIN: No rashes or lesions  HEENT: Atraumatic. Normocephalic.  NECK: Supple, No JVD.   PULMONARY: CTA B/L. No wheezing.   CVS: Normal S1, S2. Rate and Rhythm are regular.   ABDOMEN/GI: Soft, Nontender, Nondistended; BS present  MSK:  No edema B/L LE. No clubbing or cyanosis  NEUROLOGIC: LE weakness but ambulating around unit  PSYCH: Alert & oriented x 3, normal affect    Consultant(s) Notes Reviewed:  [x ] YES  [ ] NO  Care Discussed with Consultants/Other Providers [ x] YES  [ ] NO    LABS:                        12.0   5.43  )-----------( 199      ( 31 Aug 2019 08:45 )             38.3     08-31    142  |  101  |  20  ----------------------------<  155<H>  4.0   |  28  |  1.0    Ca    9.2      31 Aug 2019 08:45  Phos  4.1     08-30  Mg     1.8     08-31    TPro  6.3  /  Alb  4.2  /  TBili  0.5  /  DBili  x   /  AST  18  /  ALT  32  /  AlkPhos  133<H>  08-31    PT/INR - ( 30 Aug 2019 07:40 )   PT: 13.70 sec;   INR: 1.19 ratio    PTT - ( 31 Aug 2019 08:45 )  PTT:44.0 sec    Trop <0.01, CKMB --, CK --, 08-30-19 @ 17:36  Trop <0.01, CKMB --, CK --, 08-29-19 @ 02:00  RADIOLOGY & ADDITIONAL TESTS:  Imaging or report Personally Reviewed:  [x] YES  [ ] NO  EKG reviewed: [x] YES  [ ] NO    Medications:  Standing  acetaminophen   Tablet .. 650 milliGRAM(s) Oral every 6 hours  atorvastatin 40 milliGRAM(s) Oral at bedtime  baclofen 10 milliGRAM(s) Oral four times a day  buDESOnide 160 MICROgram(s)/formoterol 4.5 MICROgram(s) Inhaler - Peds 2 Puff(s) Inhalation two times a day  cholecalciferol 2000 Unit(s) Oral daily  dofetilide 250 MICROGram(s) Oral two times a day  enoxaparin Injectable 90 milliGRAM(s) SubCutaneous every 12 hours  fluticasone propionate (50 MICROgram(s)/actuation) Nasal Spray - Peds 2 Spray(s) Alternating Nostrils daily  furosemide    Tablet 20 milliGRAM(s) Oral daily  gabapentin 400 milliGRAM(s) Oral three times a day  lamoTRIgine 100 milliGRAM(s) Oral two times a day  loratadine 10 milliGRAM(s) Oral daily  metoprolol tartrate 25 milliGRAM(s) Oral every 8 hours  morphine ER Tablet 30 milliGRAM(s) Oral every 12 hours  pantoprazole    Tablet 40 milliGRAM(s) Oral before breakfast  polyethylene glycol 3350 17 Gram(s) Oral every 12 hours  QUEtiapine 25 milliGRAM(s) Oral daily  QUEtiapine 50 milliGRAM(s) Oral at bedtime  tamsulosin Oral Tab/Cap - Peds 0.4 milliGRAM(s) Oral at bedtime  tiotropium 18 MICROgram(s) Capsule 1 Capsule(s) Inhalation daily    PRN Meds  nitroglycerin     SubLingual 0.4 milliGRAM(s) SubLingual every 5 minutes PRN      Chelo Jimenez MD  s. [id number]

## 2019-08-31 NOTE — PROGRESS NOTE ADULT - ASSESSMENT
47 y/o M w/ a PMH of v-tach s/p AICD, CHF, A fib/flutter, HTN, COPD (on O2 PRN), DLD, multiple sclerosis w/ multiple admissions for flares, seizure disorder (on lamotrigine), PTSD, depression, ? stroke/TIA, esophageal stricture, chronic sore throat/sinusitis, GERD, hemopneumothorax (2006), pericardial effusion (2006), cardiomyopathy, MI (2006, 2008), IBS, presenting post AICD firing.,     #) ARVD (Arrhythmogenic right ventricular dysplasia) s/p AICD, possible AICD dysfunction  - now with possible pacemaker firing, buzzing sensation in chest   - Keep telemetry, pacing pads on chest   - H/o v-tach (no VT, VF, on AICD since 2006)  - C/w metoprolol tartrate 25 mg PO TID, Tikosyn 250 mcg BID   - EP following   - Underwent fluoro to assess RV lead placement possibly dislodged   - CTsx for RV lead extraction and revision and battery change  - switched to full dose lovenox     #) Anginal symptoms, without prior evidence of CAD, ?H/o MI 2006, 2008  - With episode of anginal symptoms, pressure like, radiating to jaw, resolved with nitro  - More likely related to pacemaker firing   - Troponin negative, no acute EKG changes  - Per cardiology on 08/18, No evidence of CAD, on 8/6/2018 CCTA reported normal with 0 calcium score  - C/w metoprolol tartrate 25 mg PO TID  - C/w atorvastatin 40 mg PO qHS  - C/w aspirin 81 mg PO qD    #)  HTN  -C/w metoprolol tartrate 25 mg PO TID  -C/w Lasix 20 mg     #) Multiple sclerosis   -with recent flare, discharged on 08/19/19 with prednisone for 5 days (completed)  -Patient takes Tecfidera 240 mg PO qD but is apparently non-formulary; please contact [place]  -Pain control with acetaminophen and baclofen and gabapentin and morphine  IR 15 as per pain management on last admission.  Allergy in chart is local rash to IV opioids, may give PO. Also got IV morphine in hospital without reaction,   -Ambulate w/ assistance (patient uses walker at baseline)    # H/o seizure disorder  C/w lamotrigine 100 mg PO BID    #) COPD on PRN home O2  -Currently on RA  -C/w Symbicort high dose 2 puffs BID  -Vitals per routine, pulse ox q4h    # Chronic pharyngitis , sinusitis  -C/w Cepacol, Claritin, Flonase    # DLD  C/w atorvastatin 40 mg PO qHS    # GERD  C/w pantoprazole 40 mg PO qAM (patient takes omeprazole at home)    # H/o esophageal stricture   -cont mechanical soft dysphagia 2 w/ thins    DVT ppx- full dose lovenox    GI ppx- c/w pantoprazole 40 mg PO qAM (on omeprazole at home)    Activity- as tolerated     Diet- mechanical soft dysphagia 2 w/ thins    Code status: Full    Dispo- patient from [facility]; functional at baseline w/ walker

## 2019-08-31 NOTE — PROGRESS NOTE ADULT - ASSESSMENT
47 y/o M w/ a PMH of v-tach s/p AICD, CHF, A fib/flutter, HTN, COPD (on O2 PRN), DLD, multiple sclerosis w/ multiple admissions for flares, seizure disorder (on lamotrigine), PTSD, depression, ? stroke/TIA, esophageal stricture, chronic sore throat/sinusitis, GERD, hemopneumothorax (2006), pericardial effusion (2006), cardiomyopathy, MI (2006, 2008), IBS, presenting post AICD firing.,     #) VT s/p ICD, now with externalized lead  - Keep on telemetry  - CTsx eval for RV lead extraction and revision and battery change  - Off eliquis, now on lovenox, to be held morning of procedure  - patient requesting to have procedure late next week when his surrogate arrives from [facility]    #)  HTN  -C/w metoprolol tartrate 25 mg PO TID and C/w Lasix 20 mg     #) Multiple sclerosis   s/p recent hospitalization for flare, finished course of steroids  -Patient takes Tecfidera 240 mg PO qD but is apparently non-formulary  -Pain control with acetaminophen and baclofen and gabapentin and morphine  IR 15 as per pain management on last admission    # H/o seizure disorder  C/w lamotrigine 100 mg PO BID    #) COPD on PRN home O2  -Currently on RA, no wheezing  -C/w Symbicort    #) H/o esophageal stricture   -cont mechanical soft dysphagia 2 w/ thins    #Progress Note Handoff  Pending (specify):   CT surgery for ICD extraction/lead revision  Family discussion: Plan of care discussed with patient, aware and agreeable   [facility]

## 2019-09-01 LAB
ALBUMIN SERPL ELPH-MCNC: 3.8 G/DL — SIGNIFICANT CHANGE UP (ref 3.5–5.2)
ALP SERPL-CCNC: 129 U/L — HIGH (ref 30–115)
ALT FLD-CCNC: 32 U/L — SIGNIFICANT CHANGE UP (ref 0–41)
ANION GAP SERPL CALC-SCNC: 11 MMOL/L — SIGNIFICANT CHANGE UP (ref 7–14)
AST SERPL-CCNC: 20 U/L — SIGNIFICANT CHANGE UP (ref 0–41)
BILIRUB SERPL-MCNC: 0.3 MG/DL — SIGNIFICANT CHANGE UP (ref 0.2–1.2)
BUN SERPL-MCNC: 22 MG/DL — HIGH (ref 10–20)
CALCIUM SERPL-MCNC: 9.4 MG/DL — SIGNIFICANT CHANGE UP (ref 8.5–10.1)
CHLORIDE SERPL-SCNC: 102 MMOL/L — SIGNIFICANT CHANGE UP (ref 98–110)
CO2 SERPL-SCNC: 30 MMOL/L — SIGNIFICANT CHANGE UP (ref 17–32)
CREAT SERPL-MCNC: 1 MG/DL — SIGNIFICANT CHANGE UP (ref 0.7–1.5)
GLUCOSE SERPL-MCNC: 104 MG/DL — HIGH (ref 70–99)
HCT VFR BLD CALC: 36.2 % — LOW (ref 42–52)
HGB BLD-MCNC: 11.7 G/DL — LOW (ref 14–18)
MAGNESIUM SERPL-MCNC: 2.1 MG/DL — SIGNIFICANT CHANGE UP (ref 1.8–2.4)
MCHC RBC-ENTMCNC: 29.8 PG — SIGNIFICANT CHANGE UP (ref 27–31)
MCHC RBC-ENTMCNC: 32.3 G/DL — SIGNIFICANT CHANGE UP (ref 32–37)
MCV RBC AUTO: 92.1 FL — SIGNIFICANT CHANGE UP (ref 80–94)
NRBC # BLD: 0 /100 WBCS — SIGNIFICANT CHANGE UP (ref 0–0)
PHOSPHATE SERPL-MCNC: 4.6 MG/DL — SIGNIFICANT CHANGE UP (ref 2.1–4.9)
PLATELET # BLD AUTO: 184 K/UL — SIGNIFICANT CHANGE UP (ref 130–400)
POTASSIUM SERPL-MCNC: 4.5 MMOL/L — SIGNIFICANT CHANGE UP (ref 3.5–5)
POTASSIUM SERPL-SCNC: 4.5 MMOL/L — SIGNIFICANT CHANGE UP (ref 3.5–5)
PROT SERPL-MCNC: 6 G/DL — SIGNIFICANT CHANGE UP (ref 6–8)
RBC # BLD: 3.93 M/UL — LOW (ref 4.7–6.1)
RBC # FLD: 14 % — SIGNIFICANT CHANGE UP (ref 11.5–14.5)
SODIUM SERPL-SCNC: 143 MMOL/L — SIGNIFICANT CHANGE UP (ref 135–146)
WBC # BLD: 4.92 K/UL — SIGNIFICANT CHANGE UP (ref 4.8–10.8)
WBC # FLD AUTO: 4.92 K/UL — SIGNIFICANT CHANGE UP (ref 4.8–10.8)

## 2019-09-01 PROCEDURE — 99232 SBSQ HOSP IP/OBS MODERATE 35: CPT

## 2019-09-01 RX ADMIN — Medication 10 MILLIGRAM(S): at 11:16

## 2019-09-01 RX ADMIN — TAMSULOSIN HYDROCHLORIDE 0.4 MILLIGRAM(S): 0.4 CAPSULE ORAL at 21:30

## 2019-09-01 RX ADMIN — Medication 10 MILLIGRAM(S): at 17:24

## 2019-09-01 RX ADMIN — Medication 25 MILLIGRAM(S): at 05:48

## 2019-09-01 RX ADMIN — MORPHINE SULFATE 30 MILLIGRAM(S): 50 CAPSULE, EXTENDED RELEASE ORAL at 06:40

## 2019-09-01 RX ADMIN — Medication 10 MILLIGRAM(S): at 23:04

## 2019-09-01 RX ADMIN — MORPHINE SULFATE 30 MILLIGRAM(S): 50 CAPSULE, EXTENDED RELEASE ORAL at 05:48

## 2019-09-01 RX ADMIN — Medication 2000 UNIT(S): at 11:16

## 2019-09-01 RX ADMIN — Medication 20 MILLIGRAM(S): at 05:48

## 2019-09-01 RX ADMIN — Medication 650 MILLIGRAM(S): at 05:48

## 2019-09-01 RX ADMIN — BUDESONIDE AND FORMOTEROL FUMARATE DIHYDRATE 2 PUFF(S): 160; 4.5 AEROSOL RESPIRATORY (INHALATION) at 07:50

## 2019-09-01 RX ADMIN — LAMOTRIGINE 100 MILLIGRAM(S): 25 TABLET, ORALLY DISINTEGRATING ORAL at 05:48

## 2019-09-01 RX ADMIN — GABAPENTIN 400 MILLIGRAM(S): 400 CAPSULE ORAL at 05:48

## 2019-09-01 RX ADMIN — Medication 650 MILLIGRAM(S): at 17:24

## 2019-09-01 RX ADMIN — ENOXAPARIN SODIUM 90 MILLIGRAM(S): 100 INJECTION SUBCUTANEOUS at 05:48

## 2019-09-01 RX ADMIN — ATORVASTATIN CALCIUM 40 MILLIGRAM(S): 80 TABLET, FILM COATED ORAL at 21:30

## 2019-09-01 RX ADMIN — GABAPENTIN 400 MILLIGRAM(S): 400 CAPSULE ORAL at 21:30

## 2019-09-01 RX ADMIN — Medication 650 MILLIGRAM(S): at 23:03

## 2019-09-01 RX ADMIN — BUDESONIDE AND FORMOTEROL FUMARATE DIHYDRATE 2 PUFF(S): 160; 4.5 AEROSOL RESPIRATORY (INHALATION) at 21:30

## 2019-09-01 RX ADMIN — Medication 25 MILLIGRAM(S): at 13:12

## 2019-09-01 RX ADMIN — Medication 650 MILLIGRAM(S): at 10:25

## 2019-09-01 RX ADMIN — MORPHINE SULFATE 30 MILLIGRAM(S): 50 CAPSULE, EXTENDED RELEASE ORAL at 17:24

## 2019-09-01 RX ADMIN — QUETIAPINE FUMARATE 25 MILLIGRAM(S): 200 TABLET, FILM COATED ORAL at 11:16

## 2019-09-01 RX ADMIN — PANTOPRAZOLE SODIUM 40 MILLIGRAM(S): 20 TABLET, DELAYED RELEASE ORAL at 06:24

## 2019-09-01 RX ADMIN — Medication 2 SPRAY(S): at 11:17

## 2019-09-01 RX ADMIN — MORPHINE SULFATE 30 MILLIGRAM(S): 50 CAPSULE, EXTENDED RELEASE ORAL at 18:00

## 2019-09-01 RX ADMIN — Medication 10 MILLIGRAM(S): at 05:48

## 2019-09-01 RX ADMIN — Medication 650 MILLIGRAM(S): at 23:55

## 2019-09-01 RX ADMIN — Medication 650 MILLIGRAM(S): at 11:16

## 2019-09-01 RX ADMIN — POLYETHYLENE GLYCOL 3350 17 GRAM(S): 17 POWDER, FOR SOLUTION ORAL at 17:27

## 2019-09-01 RX ADMIN — GABAPENTIN 400 MILLIGRAM(S): 400 CAPSULE ORAL at 13:12

## 2019-09-01 RX ADMIN — TIOTROPIUM BROMIDE 1 CAPSULE(S): 18 CAPSULE ORAL; RESPIRATORY (INHALATION) at 07:50

## 2019-09-01 RX ADMIN — Medication 25 MILLIGRAM(S): at 21:30

## 2019-09-01 RX ADMIN — DOFETILIDE 250 MICROGRAM(S): 0.25 CAPSULE ORAL at 17:24

## 2019-09-01 RX ADMIN — QUETIAPINE FUMARATE 50 MILLIGRAM(S): 200 TABLET, FILM COATED ORAL at 21:30

## 2019-09-01 RX ADMIN — DOFETILIDE 250 MICROGRAM(S): 0.25 CAPSULE ORAL at 05:48

## 2019-09-01 RX ADMIN — ENOXAPARIN SODIUM 90 MILLIGRAM(S): 100 INJECTION SUBCUTANEOUS at 17:25

## 2019-09-01 RX ADMIN — Medication 650 MILLIGRAM(S): at 18:00

## 2019-09-01 RX ADMIN — LAMOTRIGINE 100 MILLIGRAM(S): 25 TABLET, ORALLY DISINTEGRATING ORAL at 17:24

## 2019-09-01 RX ADMIN — LORATADINE 10 MILLIGRAM(S): 10 TABLET ORAL at 11:16

## 2019-09-01 RX ADMIN — Medication 650 MILLIGRAM(S): at 06:40

## 2019-09-01 NOTE — PROGRESS NOTE ADULT - SUBJECTIVE AND OBJECTIVE BOX
CUATE HORNE  48y Male    CHIEF COMPLAINT:    Patient is a 48y old  Male who presents with a chief complaint of Chest pain, AICD firing (31 Aug 2019 11:15)      INTERVAL HPI/OVERNIGHT EVENTS:    Patient seen and examined. No acute events overnight. Ambulating around unit, no active complaints    ROS: All other systems are negative.    Vital Signs:    T(F): 98.1 (19 @ 12:10), Max: 98.1 (19 @ 12:10)  HR: 60 (19 @ 12:10) (59 - 60)  BP: 103/55 (19 @ 12:10) (103/55 - 112/61)  RR: 20 (19 @ 12:10) (18 - 20)  31 Aug 2019 07:  -  01 Sep 2019 07:00  --------------------------------------------------------  IN: 0 mL / OUT: 1000 mL / NET: -1000 mL    01 Sep 2019 07:01  -  01 Sep 2019 14:00  --------------------------------------------------------  IN: 625 mL / OUT: 650 mL / NET: -25 mL    Daily Weight in k.2 (01 Sep 2019 05:31)    POCT Blood Glucose.: 127 mg/dL (31 Aug 2019 16:35)      PHYSICAL EXAM:    GENERAL:  NAD  SKIN: No rashes or lesions  HEENT: Atraumatic. Normocephalic.  NECK: Supple, No JVD.  PULMONARY: CTA B/L. No wheezing.   CVS: Normal S1, S2. Rate and Rhythm are regular.  ABDOMEN/GI: Soft, Nontender, Nondistended; BS present  MSK:  No edema B/L LE. No clubbing or cyanosis  NEUROLOGIC:  Chronic LE weakness  PSYCH: Alert & oriented x 3, normal affect    Consultant(s) Notes Reviewed:  [x ] YES  [ ] NO  Care Discussed with Consultants/Other Providers [ x] YES  [ ] NO    LABS:                        11.7   4.92  )-----------( 184      ( 01 Sep 2019 06:23 )             36.2    143  |  102  |  22<H>  ----------------------------<  104<H>  4.5   |  30  |  1.0    Ca    9.4      01 Sep 2019 06:23  Phos  4.6       Mg     2.1         TPro  6.0  /  Alb  3.8  /  TBili  0.3  /  DBili  x   /  AST  20  /  ALT  32  /  AlkPhos  129<H>      PTT - ( 31 Aug 2019 08:45 )  PTT:44.0 sec    Trop <0.01, CKMB --, CK --, 19 @ 17:36  RADIOLOGY & ADDITIONAL TESTS:    Imaging or report Personally Reviewed:  [x] YES  [ ] NO  EKG reviewed: [x] YES  [ ] NO    Medications:  Standing  acetaminophen   Tablet .. 650 milliGRAM(s) Oral every 6 hours  atorvastatin 40 milliGRAM(s) Oral at bedtime  baclofen 10 milliGRAM(s) Oral four times a day  buDESOnide 160 MICROgram(s)/formoterol 4.5 MICROgram(s) Inhaler - Peds 2 Puff(s) Inhalation two times a day  cholecalciferol 2000 Unit(s) Oral daily  dofetilide 250 MICROGram(s) Oral two times a day  enoxaparin Injectable 90 milliGRAM(s) SubCutaneous every 12 hours  fluticasone propionate (50 MICROgram(s)/actuation) Nasal Spray - Peds 2 Spray(s) Alternating Nostrils daily  furosemide    Tablet 20 milliGRAM(s) Oral daily  gabapentin 400 milliGRAM(s) Oral three times a day  lamoTRIgine 100 milliGRAM(s) Oral two times a day  loratadine 10 milliGRAM(s) Oral daily  metoprolol tartrate 25 milliGRAM(s) Oral every 8 hours  morphine ER Tablet 30 milliGRAM(s) Oral every 12 hours  pantoprazole    Tablet 40 milliGRAM(s) Oral before breakfast  polyethylene glycol 3350 17 Gram(s) Oral every 12 hours  QUEtiapine 25 milliGRAM(s) Oral daily  QUEtiapine 50 milliGRAM(s) Oral at bedtime  tamsulosin Oral Tab/Cap - Peds 0.4 milliGRAM(s) Oral at bedtime  tiotropium 18 MICROgram(s) Capsule 1 Capsule(s) Inhalation daily    PRN Meds  nitroglycerin     SubLingual 0.4 milliGRAM(s) SubLingual every 5 minutes PRN      Chelo Jimenez MD  s. [id number]

## 2019-09-01 NOTE — PROGRESS NOTE ADULT - ASSESSMENT
49 y/o M w/ a PMH of v-tach s/p AICD, CHF, A fib/flutter, HTN, COPD (on O2 PRN), DLD, multiple sclerosis w/ multiple admissions for flares, seizure disorder (on lamotrigine), PTSD, depression, ? stroke/TIA, esophageal stricture, chronic sore throat/sinusitis, GERD, hemopneumothorax (2006), pericardial effusion (2006), cardiomyopathy, MI (2006, 2008), IBS, presenting post AICD firing.,     #) VT s/p ICD, now with externalized lead  - Keep on telemetry - no events  - CTsx eval for RV lead extraction and revision and battery change  - Off eliquis, now on lovenox, to be held morning of procedure  - patient requesting to have procedure late next week when his surrogate arrives from [facility]    #)  HTN  -C/w metoprolol tartrate 25 mg PO TID and C/w Lasix 20 mg     #) Multiple sclerosis   s/p recent hospitalization for flare, finished course of steroids  -Patient takes Tecfidera 240 mg PO qD but is apparently non-formulary  -Pain control with acetaminophen and baclofen and gabapentin and morphine  IR 15 as per pain management on last admission    # H/o seizure disorder  C/w lamotrigine 100 mg PO BID    #) COPD on PRN home O2  -Currently on RA, no wheezing  -C/w Symbicort    #) H/o esophageal stricture   -cont mechanical soft dysphagia 2 w/ thins    #Progress Note Handoff  Pending (specify):   CT surgery for ICD extraction/lead revision  Family discussion: Plan of care discussed with patient, aware and agreeable   [facility]

## 2019-09-02 PROCEDURE — 99232 SBSQ HOSP IP/OBS MODERATE 35: CPT

## 2019-09-02 RX ADMIN — TAMSULOSIN HYDROCHLORIDE 0.4 MILLIGRAM(S): 0.4 CAPSULE ORAL at 21:25

## 2019-09-02 RX ADMIN — LAMOTRIGINE 100 MILLIGRAM(S): 25 TABLET, ORALLY DISINTEGRATING ORAL at 05:32

## 2019-09-02 RX ADMIN — Medication 25 MILLIGRAM(S): at 13:02

## 2019-09-02 RX ADMIN — Medication 10 MILLIGRAM(S): at 05:32

## 2019-09-02 RX ADMIN — Medication 25 MILLIGRAM(S): at 05:32

## 2019-09-02 RX ADMIN — MORPHINE SULFATE 30 MILLIGRAM(S): 50 CAPSULE, EXTENDED RELEASE ORAL at 17:40

## 2019-09-02 RX ADMIN — DOFETILIDE 250 MICROGRAM(S): 0.25 CAPSULE ORAL at 05:32

## 2019-09-02 RX ADMIN — BUDESONIDE AND FORMOTEROL FUMARATE DIHYDRATE 2 PUFF(S): 160; 4.5 AEROSOL RESPIRATORY (INHALATION) at 21:25

## 2019-09-02 RX ADMIN — MORPHINE SULFATE 30 MILLIGRAM(S): 50 CAPSULE, EXTENDED RELEASE ORAL at 17:19

## 2019-09-02 RX ADMIN — Medication 25 MILLIGRAM(S): at 21:25

## 2019-09-02 RX ADMIN — Medication 10 MILLIGRAM(S): at 17:11

## 2019-09-02 RX ADMIN — MORPHINE SULFATE 30 MILLIGRAM(S): 50 CAPSULE, EXTENDED RELEASE ORAL at 06:35

## 2019-09-02 RX ADMIN — GABAPENTIN 400 MILLIGRAM(S): 400 CAPSULE ORAL at 05:33

## 2019-09-02 RX ADMIN — Medication 2000 UNIT(S): at 11:13

## 2019-09-02 RX ADMIN — Medication 650 MILLIGRAM(S): at 11:13

## 2019-09-02 RX ADMIN — TIOTROPIUM BROMIDE 1 CAPSULE(S): 18 CAPSULE ORAL; RESPIRATORY (INHALATION) at 07:56

## 2019-09-02 RX ADMIN — LORATADINE 10 MILLIGRAM(S): 10 TABLET ORAL at 11:13

## 2019-09-02 RX ADMIN — Medication 650 MILLIGRAM(S): at 17:50

## 2019-09-02 RX ADMIN — PANTOPRAZOLE SODIUM 40 MILLIGRAM(S): 20 TABLET, DELAYED RELEASE ORAL at 06:35

## 2019-09-02 RX ADMIN — ENOXAPARIN SODIUM 90 MILLIGRAM(S): 100 INJECTION SUBCUTANEOUS at 17:11

## 2019-09-02 RX ADMIN — Medication 20 MILLIGRAM(S): at 06:34

## 2019-09-02 RX ADMIN — Medication 650 MILLIGRAM(S): at 23:38

## 2019-09-02 RX ADMIN — Medication 2 SPRAY(S): at 11:11

## 2019-09-02 RX ADMIN — POLYETHYLENE GLYCOL 3350 17 GRAM(S): 17 POWDER, FOR SOLUTION ORAL at 17:12

## 2019-09-02 RX ADMIN — Medication 650 MILLIGRAM(S): at 17:18

## 2019-09-02 RX ADMIN — QUETIAPINE FUMARATE 25 MILLIGRAM(S): 200 TABLET, FILM COATED ORAL at 11:13

## 2019-09-02 RX ADMIN — ENOXAPARIN SODIUM 90 MILLIGRAM(S): 100 INJECTION SUBCUTANEOUS at 05:32

## 2019-09-02 RX ADMIN — Medication 650 MILLIGRAM(S): at 06:35

## 2019-09-02 RX ADMIN — BUDESONIDE AND FORMOTEROL FUMARATE DIHYDRATE 2 PUFF(S): 160; 4.5 AEROSOL RESPIRATORY (INHALATION) at 08:02

## 2019-09-02 RX ADMIN — GABAPENTIN 400 MILLIGRAM(S): 400 CAPSULE ORAL at 21:26

## 2019-09-02 RX ADMIN — Medication 10 MILLIGRAM(S): at 23:38

## 2019-09-02 RX ADMIN — DOFETILIDE 250 MICROGRAM(S): 0.25 CAPSULE ORAL at 17:11

## 2019-09-02 RX ADMIN — Medication 10 MILLIGRAM(S): at 11:13

## 2019-09-02 RX ADMIN — Medication 650 MILLIGRAM(S): at 11:42

## 2019-09-02 RX ADMIN — LAMOTRIGINE 100 MILLIGRAM(S): 25 TABLET, ORALLY DISINTEGRATING ORAL at 17:11

## 2019-09-02 RX ADMIN — Medication 650 MILLIGRAM(S): at 05:32

## 2019-09-02 RX ADMIN — ATORVASTATIN CALCIUM 40 MILLIGRAM(S): 80 TABLET, FILM COATED ORAL at 21:25

## 2019-09-02 RX ADMIN — MORPHINE SULFATE 30 MILLIGRAM(S): 50 CAPSULE, EXTENDED RELEASE ORAL at 05:32

## 2019-09-02 RX ADMIN — GABAPENTIN 400 MILLIGRAM(S): 400 CAPSULE ORAL at 13:02

## 2019-09-02 RX ADMIN — QUETIAPINE FUMARATE 50 MILLIGRAM(S): 200 TABLET, FILM COATED ORAL at 21:25

## 2019-09-02 NOTE — PROGRESS NOTE ADULT - SUBJECTIVE AND OBJECTIVE BOX
CUATE HORNE  48y Male    CHIEF COMPLAINT:    Patient is a 48y old  Male who presents with a chief complaint of Chest pain, AICD firing (02 Sep 2019 09:11)      INTERVAL HPI/OVERNIGHT EVENTS:    Patient seen and examined. No acute events overnight. No active complaints    ROS: All other systems are negative.    Vital Signs:    T(F): 98.3 (09-02-19 @ 12:16), Max: 98.3 (09-01-19 @ 21:13)  HR: 61 (09-02-19 @ 12:16) (60 - 61)  BP: 108/64 (09-02-19 @ 12:16) (101/64 - 108/64)  RR: 20 (09-02-19 @ 12:16) (16 - 20)  SpO2: 99% (09-01-19 @ 22:11) (99% - 99%)    01 Sep 2019 07:01  -  02 Sep 2019 07:00  --------------------------------------------------------  IN: 625 mL / OUT: 1575 mL / NET: -950 mL    PHYSICAL EXAM:    GENERAL:  NAD  SKIN: No rashes or lesions  HEENT: Atraumatic. Normocephalic.   NECK: Supple, No JVD.    PULMONARY: CTA B/L. No wheezing.    CVS: Normal S1, S2. Rate and Rhythm are regular.   ABDOMEN/GI: Soft, Nontender, Nondistended; BS present  MSK:  No edema B/L LE. No clubbing or cyanosis  NEUROLOGIC: stable mild LE weakness  PSYCH: Alert & oriented x 3, normal affect    LABS:                        11.7   4.92  )-----------( 184      ( 01 Sep 2019 06:23 )             36.2     09-01    143  |  102  |  22<H>  ----------------------------<  104<H>  4.5   |  30  |  1.0    Ca    9.4      01 Sep 2019 06:23  Phos  4.6     09-01  Mg     2.1     09-01    TPro  6.0  /  Alb  3.8  /  TBili  0.3  /  DBili  x   /  AST  20  /  ALT  32  /  AlkPhos  129<H>  09-01  Trop <0.01, CKMB --, CK --, 08-30-19 @ 17:36  RADIOLOGY & ADDITIONAL TESTS:    Medications:  Standing  acetaminophen   Tablet .. 650 milliGRAM(s) Oral every 6 hours  atorvastatin 40 milliGRAM(s) Oral at bedtime  baclofen 10 milliGRAM(s) Oral four times a day  buDESOnide 160 MICROgram(s)/formoterol 4.5 MICROgram(s) Inhaler - Peds 2 Puff(s) Inhalation two times a day  cholecalciferol 2000 Unit(s) Oral daily  dofetilide 250 MICROGram(s) Oral two times a day  enoxaparin Injectable 90 milliGRAM(s) SubCutaneous every 12 hours  fluticasone propionate (50 MICROgram(s)/actuation) Nasal Spray - Peds 2 Spray(s) Alternating Nostrils daily  furosemide    Tablet 20 milliGRAM(s) Oral daily  gabapentin 400 milliGRAM(s) Oral three times a day  lamoTRIgine 100 milliGRAM(s) Oral two times a day  loratadine 10 milliGRAM(s) Oral daily  metoprolol tartrate 25 milliGRAM(s) Oral every 8 hours  morphine ER Tablet 30 milliGRAM(s) Oral every 12 hours  pantoprazole    Tablet 40 milliGRAM(s) Oral before breakfast  polyethylene glycol 3350 17 Gram(s) Oral every 12 hours  QUEtiapine 25 milliGRAM(s) Oral daily  QUEtiapine 50 milliGRAM(s) Oral at bedtime  tamsulosin Oral Tab/Cap - Peds 0.4 milliGRAM(s) Oral at bedtime  tiotropium 18 MICROgram(s) Capsule 1 Capsule(s) Inhalation daily    PRN Meds  nitroglycerin     SubLingual 0.4 milliGRAM(s) SubLingual every 5 minutes PRN      Chelo Jimenez MD  s. [id number]

## 2019-09-02 NOTE — PROGRESS NOTE ADULT - ASSESSMENT
49 y/o M w/ a PMH of v-tach s/p AICD, CHF, A fib/flutter, HTN, COPD (on O2 PRN), DLD, multiple sclerosis w/ multiple admissions for flares, seizure disorder (on lamotrigine), PTSD, depression, ? stroke/TIA, esophageal stricture, chronic sore throat/sinusitis, GERD, hemopneumothorax (2006), pericardial effusion (2006), cardiomyopathy, MI (2006, 2008), IBS, presenting post AICD firing.     #) ARVD (Arrhythmogenic right ventricular dysplasia) s/p AICD, possible AICD dysfunction  - now with possible pacemaker firing, buzzing sensation in chest   - Keep telemetry, pacing pads on chest   - H/o v-tach (no VT, VF, on AICD since 2006)  - C/w metoprolol tartrate 25 mg PO TID, Tikosyn 250 mcg BID   - EP following   - Underwent fluoro to assess RV lead placement possibly dislodged   - CTsx for RV lead extraction and revision and battery change  - switched to full dose lovenox     #) Anginal symptoms, without prior evidence of CAD, ?H/o MI 2006, 2008  - With episode of anginal symptoms, pressure like, radiating to jaw, resolved with nitro  - More likely related to pacemaker firing   - Troponin negative, no acute EKG changes  - Per cardiology on 08/18, No evidence of CAD, on 8/6/2018 CCTA reported normal with 0 calcium score  - C/w metoprolol tartrate 25 mg PO TID  - C/w atorvastatin 40 mg PO qHS  - C/w aspirin 81 mg PO qD    #)  HTN  -C/w metoprolol tartrate 25 mg PO TID  -C/w Lasix 20 mg     #) Multiple sclerosis   -with recent flare, discharged on 08/19/19 with prednisone for 5 days (completed)  -Patient takes Tecfidera 240 mg PO qD but is apparently non-formulary; please contact [place]  -Pain control with acetaminophen and baclofen and gabapentin and morphine  IR 15 as per pain management on last admission.  Allergy in chart is local rash to IV opioids, may give PO. Also got IV morphine in hospital without reaction,   -Ambulate w/ assistance (patient uses walker at baseline)    # H/o seizure disorder  C/w lamotrigine 100 mg PO BID    #) COPD on PRN home O2  -Currently on RA  -C/w Symbicort high dose 2 puffs BID  -Vitals per routine, pulse ox q4h    # Chronic pharyngitis , sinusitis  -C/w Cepacol, Claritin, Flonase    # DLD  C/w atorvastatin 40 mg PO qHS    # GERD  C/w pantoprazole 40 mg PO qAM (patient takes omeprazole at home)    # H/o esophageal stricture   -cont mechanical soft dysphagia 2 w/ thins    DVT ppx- full dose lovenox  GI ppx- c/w pantoprazole 40 mg PO qAM (on omeprazole at home)  Activity- as tolerated   Diet- mechanical soft dysphagia 2 w/ thins  Code status: Full  Dispo- patient from [facility]; functional at baseline w/ walker

## 2019-09-02 NOTE — PROGRESS NOTE ADULT - ASSESSMENT
47 y/o M w/ a PMH of v-tach s/p AICD, CHF, A fib/flutter, HTN, COPD (on O2 PRN), DLD, multiple sclerosis w/ multiple admissions for flares, seizure disorder (on lamotrigine), PTSD, depression, ? stroke/TIA, esophageal stricture, chronic sore throat/sinusitis, GERD, hemopneumothorax (2006), pericardial effusion (2006), cardiomyopathy, MI (2006, 2008), IBS, presenting post AICD firing.,     #) VT s/p ICD, now with externalized lead  - Keep on telemetry - no events  - CTsx eval for RV lead extraction and revision and battery change  - Off eliquis, now on lovenox, to be held morning of procedure  - patient requesting to have procedure late next week when his surrogate arrives from [facility]    #)  HTN  -C/w metoprolol tartrate 25 mg PO TID and C/w Lasix 20 mg     #) Multiple sclerosis   s/p recent hospitalization for flare, finished course of steroids  -Patient takes Tecfidera 240 mg PO qD but is apparently non-formulary  -Pain control with acetaminophen and baclofen and gabapentin and morphine  IR 15 as per pain management on last admission    # H/o seizure disorder  C/w lamotrigine 100 mg PO BID    #) COPD on PRN home O2  -Currently on RA, no wheezing  -C/w Symbicort    #) H/o esophageal stricture   -cont mechanical soft dysphagia 2 w/ thins    #Progress Note Handoff  Pending (specify):   CT surgery for ICD extraction/lead revision  Family discussion: Plan of care discussed with patient, aware and agreeable   [facility]

## 2019-09-02 NOTE — PROGRESS NOTE ADULT - SUBJECTIVE AND OBJECTIVE BOX
Hospital Day:  4d    Subjective:    Pt was interviewed and examined at the bedside in the AM. No acute events overnight. Pt states he has some muscle spasms which is chronic and related to his MS.     Denies headaches, changes in vision, chest pains, SOB, n/v/d, abd pain, constipation, changes in urination, pain on urination, weakness, fatigue, joint pain or muscle pain.       Past Medical Hx:   PTSD (post-traumatic stress disorder)  Supraventricular arrhythmia  Cardiomyopathy  CHF (congestive heart failure)  Atrial fibrillation  Diabetes mellitus  Smoker  BPH (benign prostatic hyperplasia)  HTN (hypertension)  Seizures  Bipolar 1 disorder  Migraines  Pneumonia  MS (multiple sclerosis)  CAD (coronary artery disease)  Bipolar disorder  Anginal pain  Schizo affective schizophrenia  GERD (gastroesophageal reflux disease)  Seasonal allergies  HTN (hypertension)  Hypercholesteremia  COPD (chronic obstructive pulmonary disease)  Neuropathy  Atrial flutter  CVA (cerebral vascular accident)  TIA (transient ischemic attack)  Syncope  Chronic Hypotension  Hypotension  Peripheral Neuropathy  Human Immunodeficiency Virus (  S/P Implantation of Automatic  Personal History of Multiple S  Personal History of Mitral Nicki  Personal History of MI (Myocar  Personal History of Hypertensi  Personal History of Congestive  Personal History of Cardiomyop  Personal History of Atrial Fib  Personal History of Alcoholism  Clinical Depression    Past Sx:  H/O prior ablation treatment  Cardiac pacemaker recipient  H/O hernia repair  History of hip replacement  S/P Orchiectomy  SVT (Supraventricular Tachycar  GERD (Gastroesophageal Reflux  S/P Implantation of AICD    Allergies:  dexmethylphenidate (Unknown)  Dilantin (Rash)  dilantin, compazine, neurontin, ritalin, phenergan (Unknown)  Haldol (Unknown)  hydantoin derivatives (Other)  methylphenidate (Unknown)  Morphine Sulfate (Unknown)  phenytoin (Unknown)  prochlorperazine (Unknown)  promethazine (Dystonic RXN)  rash/hives (Anaphylaxis)  thioxanthenes (Unknown)    Current Meds:   Standng Meds:  acetaminophen   Tablet .. 650 milliGRAM(s) Oral every 6 hours  atorvastatin 40 milliGRAM(s) Oral at bedtime  baclofen 10 milliGRAM(s) Oral four times a day  buDESOnide 160 MICROgram(s)/formoterol 4.5 MICROgram(s) Inhaler - Peds 2 Puff(s) Inhalation two times a day  cholecalciferol 2000 Unit(s) Oral daily  dofetilide 250 MICROGram(s) Oral two times a day  enoxaparin Injectable 90 milliGRAM(s) SubCutaneous every 12 hours  fluticasone propionate (50 MICROgram(s)/actuation) Nasal Spray - Peds 2 Spray(s) Alternating Nostrils daily  furosemide    Tablet 20 milliGRAM(s) Oral daily  gabapentin 400 milliGRAM(s) Oral three times a day  lamoTRIgine 100 milliGRAM(s) Oral two times a day  loratadine 10 milliGRAM(s) Oral daily  metoprolol tartrate 25 milliGRAM(s) Oral every 8 hours  morphine ER Tablet 30 milliGRAM(s) Oral every 12 hours  pantoprazole    Tablet 40 milliGRAM(s) Oral before breakfast  polyethylene glycol 3350 17 Gram(s) Oral every 12 hours  QUEtiapine 25 milliGRAM(s) Oral daily  QUEtiapine 50 milliGRAM(s) Oral at bedtime  tamsulosin Oral Tab/Cap - Peds 0.4 milliGRAM(s) Oral at bedtime  tiotropium 18 MICROgram(s) Capsule 1 Capsule(s) Inhalation daily    PRN Meds:  nitroglycerin     SubLingual 0.4 milliGRAM(s) SubLingual every 5 minutes PRN Chest Pain    HOME MEDICATIONS:  acetaminophen 650 mg oral tablet: 650 milligram(s) orally 4 times a day, As Needed  atorvastatin 40 mg oral tablet: 1 tab(s) orally once a day (at bedtime)  baclofen 10 mg oral tablet: 1 tab(s) orally 4 times a day  budesonide-formoterol 160 mcg-4.5 mcg/inh inhalation aerosol: 2 puff(s) inhaled 2 times a day  Cepacol Extra Strength Menthol 10 mg mucous membrane lozenge: 1 anderson(s) mucous membrane every 6 hours, As Needed  cholecalciferol oral tablet: 2000 unit(s) orally once a day  Claritin 10 mg oral tablet: 1 tab(s) orally once a day  Eliquis 5 mg oral tablet: 1 tab(s) orally 2 times a day  Flonase 50 mcg/inh nasal spray: 2 spray(s) nasal once a day  furosemide 20 mg oral tablet: 1 tab(s) orally once a day  gabapentin 400 mg oral capsule: 1 cap(s) orally 3 times a day  ketoconazole 2% topical shampoo: Apply topically to affected area once a day (in the evening)  lamoTRIgine 100 mg oral tablet: 1 tab(s) orally 2 times a day  metoprolol tartrate 25 mg oral tablet: 1 tab(s) orally every 8 hours  Mi-Acid oral suspension: 30 milliliter(s) orally 4 times a day, As Needed  morphine 15 mg oral tablet: 1 tab(s) orally every 4 hours, As needed, Severe Pain (7 - 10)  Nitrostat 0.4 mg sublingual tablet: sublingual 3 times a day, As Needed; may repeat after 5 min until relief  omeprazole 20 mg oral delayed release capsule: 1 cap(s) orally 2 times a day  polyethylene glycol 3350 oral powder for reconstitution: 17 gram(s) orally every 12 hours  QUEtiapine 25 mg oral tablet: 1 tab(s) orally once a day  SEROquel 50 mg oral tablet: 1 tab(s) orally once a day (at bedtime)  tamsulosin 0.4 mg oral capsule: 1 cap(s) orally once a day (at bedtime)  Tecfidera 240 mg oral delayed release capsule: 1 cap(s) orally once a day  Tikosyn 250 mcg oral capsule: 1 cap(s) orally 2 times a day  tiotropium 18 mcg inhalation capsule: 1 cap(s) inhaled once a day      Vital Signs:   T(F): 97.9 (09-02-19 @ 06:09), Max: 98.3 (09-01-19 @ 21:13)  HR: 60 (09-02-19 @ 06:09) (60 - 60)  BP: 101/64 (09-02-19 @ 06:09) (101/64 - 108/57)  RR: 18 (09-02-19 @ 06:09) (16 - 20)  SpO2: 99% (09-01-19 @ 22:11) (99% - 99%)      09-01-19 @ 07:01  -  09-02-19 @ 07:00  --------------------------------------------------------  IN: 625 mL / OUT: 1575 mL / NET: -950 mL        Physical Exam:   GENERAL: NAD  HEENT: NCAT  CHEST/LUNG: CTAB, AICD in chest  HEART: Regular rate and rhythm; s1 s2 appreciated, No murmurs, rubs, or gallops  ABDOMEN: Soft, Nontender, Nondistended; Bowel sounds present  EXTREMITIES: No LE edema b/l   NERVOUS SYSTEM:  Alert & Oriented X3 chronic weakness in LLE      Labs:                         11.7   4.92  )-----------( 184      ( 01 Sep 2019 06:23 )             36.2       01 Sep 2019 06:23    143    |  102    |  22     ----------------------------<  104    4.5     |  30     |  1.0      Ca    9.4        01 Sep 2019 06:23  Phos  4.6       01 Sep 2019 06:23  Mg     2.1       01 Sep 2019 06:23    TPro  6.0    /  Alb  3.8    /  TBili  0.3    /  DBili  x      /  AST  20     /  ALT  32     /  AlkPhos  129    01 Sep 2019 06:23          Hemoglobin A1C, Whole Blood: 5.4 % (08-30-19 @ 07:40)      Troponin <0.01, CKMB --, CK -- 08-30-19 @ 17:36

## 2019-09-03 PROCEDURE — 99232 SBSQ HOSP IP/OBS MODERATE 35: CPT

## 2019-09-03 RX ORDER — IPRATROPIUM/ALBUTEROL SULFATE 18-103MCG
3 AEROSOL WITH ADAPTER (GRAM) INHALATION EVERY 6 HOURS
Refills: 0 | Status: DISCONTINUED | OUTPATIENT
Start: 2019-09-03 | End: 2019-09-05

## 2019-09-03 RX ADMIN — GABAPENTIN 400 MILLIGRAM(S): 400 CAPSULE ORAL at 06:33

## 2019-09-03 RX ADMIN — Medication 650 MILLIGRAM(S): at 23:37

## 2019-09-03 RX ADMIN — TAMSULOSIN HYDROCHLORIDE 0.4 MILLIGRAM(S): 0.4 CAPSULE ORAL at 21:31

## 2019-09-03 RX ADMIN — Medication 650 MILLIGRAM(S): at 07:00

## 2019-09-03 RX ADMIN — Medication 20 MILLIGRAM(S): at 06:33

## 2019-09-03 RX ADMIN — MORPHINE SULFATE 30 MILLIGRAM(S): 50 CAPSULE, EXTENDED RELEASE ORAL at 17:08

## 2019-09-03 RX ADMIN — LAMOTRIGINE 100 MILLIGRAM(S): 25 TABLET, ORALLY DISINTEGRATING ORAL at 17:09

## 2019-09-03 RX ADMIN — TIOTROPIUM BROMIDE 1 CAPSULE(S): 18 CAPSULE ORAL; RESPIRATORY (INHALATION) at 07:35

## 2019-09-03 RX ADMIN — DOFETILIDE 250 MICROGRAM(S): 0.25 CAPSULE ORAL at 06:33

## 2019-09-03 RX ADMIN — POLYETHYLENE GLYCOL 3350 17 GRAM(S): 17 POWDER, FOR SOLUTION ORAL at 17:09

## 2019-09-03 RX ADMIN — Medication 10 MILLIGRAM(S): at 23:37

## 2019-09-03 RX ADMIN — BUDESONIDE AND FORMOTEROL FUMARATE DIHYDRATE 2 PUFF(S): 160; 4.5 AEROSOL RESPIRATORY (INHALATION) at 21:30

## 2019-09-03 RX ADMIN — BUDESONIDE AND FORMOTEROL FUMARATE DIHYDRATE 2 PUFF(S): 160; 4.5 AEROSOL RESPIRATORY (INHALATION) at 07:35

## 2019-09-03 RX ADMIN — Medication 650 MILLIGRAM(S): at 11:22

## 2019-09-03 RX ADMIN — Medication 650 MILLIGRAM(S): at 17:28

## 2019-09-03 RX ADMIN — MORPHINE SULFATE 30 MILLIGRAM(S): 50 CAPSULE, EXTENDED RELEASE ORAL at 17:28

## 2019-09-03 RX ADMIN — GABAPENTIN 400 MILLIGRAM(S): 400 CAPSULE ORAL at 13:11

## 2019-09-03 RX ADMIN — Medication 10 MILLIGRAM(S): at 06:33

## 2019-09-03 RX ADMIN — DOFETILIDE 250 MICROGRAM(S): 0.25 CAPSULE ORAL at 17:09

## 2019-09-03 RX ADMIN — LORATADINE 10 MILLIGRAM(S): 10 TABLET ORAL at 11:22

## 2019-09-03 RX ADMIN — Medication 650 MILLIGRAM(S): at 12:00

## 2019-09-03 RX ADMIN — POLYETHYLENE GLYCOL 3350 17 GRAM(S): 17 POWDER, FOR SOLUTION ORAL at 06:35

## 2019-09-03 RX ADMIN — Medication 25 MILLIGRAM(S): at 06:33

## 2019-09-03 RX ADMIN — Medication 10 MILLIGRAM(S): at 17:09

## 2019-09-03 RX ADMIN — PANTOPRAZOLE SODIUM 40 MILLIGRAM(S): 20 TABLET, DELAYED RELEASE ORAL at 06:35

## 2019-09-03 RX ADMIN — ATORVASTATIN CALCIUM 40 MILLIGRAM(S): 80 TABLET, FILM COATED ORAL at 21:31

## 2019-09-03 RX ADMIN — Medication 650 MILLIGRAM(S): at 17:09

## 2019-09-03 RX ADMIN — QUETIAPINE FUMARATE 50 MILLIGRAM(S): 200 TABLET, FILM COATED ORAL at 21:31

## 2019-09-03 RX ADMIN — MORPHINE SULFATE 30 MILLIGRAM(S): 50 CAPSULE, EXTENDED RELEASE ORAL at 06:32

## 2019-09-03 RX ADMIN — ENOXAPARIN SODIUM 90 MILLIGRAM(S): 100 INJECTION SUBCUTANEOUS at 17:10

## 2019-09-03 RX ADMIN — Medication 650 MILLIGRAM(S): at 06:33

## 2019-09-03 RX ADMIN — Medication 3 MILLILITER(S): at 17:21

## 2019-09-03 RX ADMIN — Medication 2000 UNIT(S): at 11:22

## 2019-09-03 RX ADMIN — Medication 10 MILLIGRAM(S): at 11:22

## 2019-09-03 RX ADMIN — ENOXAPARIN SODIUM 90 MILLIGRAM(S): 100 INJECTION SUBCUTANEOUS at 06:34

## 2019-09-03 RX ADMIN — LAMOTRIGINE 100 MILLIGRAM(S): 25 TABLET, ORALLY DISINTEGRATING ORAL at 06:33

## 2019-09-03 RX ADMIN — GABAPENTIN 400 MILLIGRAM(S): 400 CAPSULE ORAL at 21:31

## 2019-09-03 RX ADMIN — Medication 25 MILLIGRAM(S): at 21:31

## 2019-09-03 RX ADMIN — Medication 25 MILLIGRAM(S): at 13:11

## 2019-09-03 RX ADMIN — MORPHINE SULFATE 30 MILLIGRAM(S): 50 CAPSULE, EXTENDED RELEASE ORAL at 07:00

## 2019-09-03 RX ADMIN — Medication 2 SPRAY(S): at 12:54

## 2019-09-03 RX ADMIN — QUETIAPINE FUMARATE 25 MILLIGRAM(S): 200 TABLET, FILM COATED ORAL at 11:22

## 2019-09-03 NOTE — PROGRESS NOTE ADULT - ASSESSMENT
47 y/o M w/ a PMH of v-tach s/p AICD, CHF, A fib/flutter, HTN, COPD (on O2 PRN), DLD, multiple sclerosis w/ multiple admissions for flares, seizure disorder (on lamotrigine), PTSD, depression, ? stroke/TIA, esophageal stricture, chronic sore throat/sinusitis, GERD, hemopneumothorax (2006), pericardial effusion (2006), cardiomyopathy, MI (2006, 2008), IBS, presenting post AICD firing.     #) ARVD (Arrhythmogenic right ventricular dysplasia) s/p AICD, possible AICD dysfunction  - now with possible pacemaker firing, buzzing sensation in chest   - Keep telemetry, pacing pads on chest   - H/o v-tach (no VT, VF, on AICD since 2006)  - C/w metoprolol tartrate 25 mg PO TID, Tikosyn 250 mcg BID   - EP following   - Underwent fluoro to assess RV lead placement possibly dislodged   - CTsx for RV lead extraction and revision and battery change  - switched to full dose lovenox from [employer]     #) Anginal symptoms, without prior evidence of CAD, ?H/o MI 2006, 2008  - With episode of anginal symptoms, pressure like, radiating to jaw, resolved with nitro  - More likely related to pacemaker firing   - Troponin negative, no acute EKG changes  - Per cardiology on 08/18, No evidence of CAD, on 8/6/2018 CCTA reported normal with 0 calcium score  - C/w metoprolol tartrate 25 mg PO TID  - C/w atorvastatin 40 mg PO qHS  - C/w aspirin 81 mg PO qD    #)  HTN  -C/w metoprolol tartrate 25 mg PO TID  -C/w Lasix 20 mg     #) Multiple sclerosis   -with recent flare, discharged on 08/19/19 with prednisone for 5 days (completed)  -Patient takes Tecfidera 240 mg PO qD but is apparently non-formulary; please contact [place]  -Pain control with acetaminophen and baclofen and gabapentin and morphine  IR 15 as per pain management on last admission.  Allergy in chart is local rash to IV opioids, may give PO. Also got IV morphine in hospital without reaction,   -Ambulate w/ assistance (patient uses walker at baseline)    # H/o seizure disorder  C/w lamotrigine 100 mg PO BID    #) COPD on PRN home O2  -Currently on RA  -C/w Symbicort high dose 2 puffs BID  -Vitals per routine, pulse ox q4h    # Chronic pharyngitis , sinusitis  -C/w Cepacol, Claritin, Flonase    # DLD  C/w atorvastatin 40 mg PO qHS    # GERD  C/w pantoprazole 40 mg PO qAM (patient takes omeprazole at home)    # H/o esophageal stricture   -cont mechanical soft dysphagia 2 w/ thins    DVT ppx- full dose lovenox  GI ppx- c/w pantoprazole 40 mg PO qAM (on omeprazole at home)  Activity- as tolerated   Diet- mechanical soft dysphagia 2 w/ thins  Code status: Full  Dispo- patient from [facility]; functional at baseline w/ walker 49 y/o M w/ a PMH of v-tach s/p AICD, CHF, A fib/flutter, HTN, COPD (on O2 PRN), DLD, multiple sclerosis w/ multiple admissions for flares, seizure disorder (on lamotrigine), PTSD, depression, ? stroke/TIA, esophageal stricture, chronic sore throat/sinusitis, GERD, hemopneumothorax (2006), pericardial effusion (2006), cardiomyopathy, MI (2006, 2008), IBS, presenting post AICD firing.     #) ARVD (Arrhythmogenic right ventricular dysplasia) s/p AICD, possible AICD dysfunction  - now with possible pacemaker firing, buzzing sensation in chest   - Keep telemetry, pacing pads on chest   - H/o v-tach (no VT, VF, on AICD since 2006)  - C/w metoprolol tartrate 25 mg PO TID, Tikosyn 250 mcg BID   - EP following   - Underwent fluoro to assess RV lead placement possibly dislodged   - CTsx for RV lead extraction and revision and battery change  - switched to full dose lovenox from [employer]   - hold lovenox on day of procedure   - restart elaquis post procedure once ok by CTsx    #) Anginal symptoms, without prior evidence of CAD, ?H/o MI 2006, 2008  - With episode of anginal symptoms, pressure like, radiating to jaw, resolved with nitro  - More likely related to pacemaker firing   - Troponin negative, no acute EKG changes  - Per cardiology on 08/18, No evidence of CAD, on 8/6/2018 CCTA reported normal with 0 calcium score  - C/w metoprolol tartrate 25 mg PO TID  - C/w atorvastatin 40 mg PO qHS  - C/w aspirin 81 mg PO qD    #)  HTN  -C/w metoprolol tartrate 25 mg PO TID  -C/w Lasix 20 mg     #) Multiple sclerosis   -with recent flare, discharged on 08/19/19 with prednisone for 5 days (completed)  -Patient takes Tecfidera 240 mg PO qD but is apparently non-formulary; please contact [place]  -Pain control with acetaminophen and baclofen and gabapentin and morphine  IR 15 as per pain management on last admission.  Allergy in chart is local rash to IV opioids, may give PO. Also got IV morphine in hospital without reaction,   -Ambulate w/ assistance (patient uses walker at baseline)    # H/o seizure disorder  C/w lamotrigine 100 mg PO BID    #) COPD on PRN home O2  -Currently on RA  -C/w Symbicort high dose 2 puffs BID  -Vitals per routine, pulse ox q4h    # Chronic pharyngitis , sinusitis  -C/w Cepacol, Claritin, Flonase    # DLD  C/w atorvastatin 40 mg PO qHS    # GERD  C/w pantoprazole 40 mg PO qAM (patient takes omeprazole at home)    # H/o esophageal stricture   -cont mechanical soft dysphagia 2 w/ thins    DVT ppx- full dose lovenox  GI ppx- c/w pantoprazole 40 mg PO qAM (on omeprazole at home)  Activity- as tolerated   Diet- mechanical soft dysphagia 2 w/ thins  Code status: Full  Dispo- patient from [facility]; functional at baseline w/ walker

## 2019-09-03 NOTE — PROGRESS NOTE ADULT - SUBJECTIVE AND OBJECTIVE BOX
Hospital Day:  5d    Subjective:    Pt was interviewed and examined at the bedside in the AM. No acute events overnight.       Denies headaches, changes in vision, chest pains, SOB, n/v/d, abd pain, constipation, changes in urination, pain on urination, weakness, fatigue, joint pain or muscle pain.       Past Medical Hx:   PTSD (post-traumatic stress disorder)  Supraventricular arrhythmia  Cardiomyopathy  CHF (congestive heart failure)  Atrial fibrillation  Diabetes mellitus  Smoker  BPH (benign prostatic hyperplasia)  HTN (hypertension)  Seizures  Bipolar 1 disorder  Migraines  Pneumonia  MS (multiple sclerosis)  CAD (coronary artery disease)  Bipolar disorder  Anginal pain  Schizo affective schizophrenia  GERD (gastroesophageal reflux disease)  Seasonal allergies  HTN (hypertension)  Hypercholesteremia  COPD (chronic obstructive pulmonary disease)  Neuropathy  Atrial flutter  CVA (cerebral vascular accident)  TIA (transient ischemic attack)  Syncope  Chronic Hypotension  Hypotension  Peripheral Neuropathy  Human Immunodeficiency Virus (  S/P Implantation of Automatic  Personal History of Multiple S  Personal History of Mitral Nicki  Personal History of MI (Myocar  Personal History of Hypertensi  Personal History of Congestive  Personal History of Cardiomyop  Personal History of Atrial Fib  Personal History of Alcoholism  Clinical Depression    Past Sx:  H/O prior ablation treatment  Cardiac pacemaker recipient  H/O hernia repair  History of hip replacement  S/P Orchiectomy  SVT (Supraventricular Tachycar  GERD (Gastroesophageal Reflux  S/P Implantation of AICD    Allergies:  dexmethylphenidate (Unknown)  Dilantin (Rash)  dilantin, compazine, neurontin, ritalin, phenergan (Unknown)  Haldol (Unknown)  hydantoin derivatives (Other)  methylphenidate (Unknown)  Morphine Sulfate (Unknown)  phenytoin (Unknown)  prochlorperazine (Unknown)  promethazine (Dystonic RXN)  rash/hives (Anaphylaxis)  thioxanthenes (Unknown)    Current Meds:   Standng Meds:  acetaminophen   Tablet .. 650 milliGRAM(s) Oral every 6 hours  atorvastatin 40 milliGRAM(s) Oral at bedtime  baclofen 10 milliGRAM(s) Oral four times a day  buDESOnide 160 MICROgram(s)/formoterol 4.5 MICROgram(s) Inhaler - Peds 2 Puff(s) Inhalation two times a day  cholecalciferol 2000 Unit(s) Oral daily  dofetilide 250 MICROGram(s) Oral two times a day  enoxaparin Injectable 90 milliGRAM(s) SubCutaneous every 12 hours  fluticasone propionate (50 MICROgram(s)/actuation) Nasal Spray - Peds 2 Spray(s) Alternating Nostrils daily  furosemide    Tablet 20 milliGRAM(s) Oral daily  gabapentin 400 milliGRAM(s) Oral three times a day  lamoTRIgine 100 milliGRAM(s) Oral two times a day  loratadine 10 milliGRAM(s) Oral daily  metoprolol tartrate 25 milliGRAM(s) Oral every 8 hours  morphine ER Tablet 30 milliGRAM(s) Oral every 12 hours  pantoprazole    Tablet 40 milliGRAM(s) Oral before breakfast  polyethylene glycol 3350 17 Gram(s) Oral every 12 hours  QUEtiapine 25 milliGRAM(s) Oral daily  QUEtiapine 50 milliGRAM(s) Oral at bedtime  tamsulosin Oral Tab/Cap - Peds 0.4 milliGRAM(s) Oral at bedtime  tiotropium 18 MICROgram(s) Capsule 1 Capsule(s) Inhalation daily    PRN Meds:  nitroglycerin     SubLingual 0.4 milliGRAM(s) SubLingual every 5 minutes PRN Chest Pain    HOME MEDICATIONS:  acetaminophen 650 mg oral tablet: 650 milligram(s) orally 4 times a day, As Needed  atorvastatin 40 mg oral tablet: 1 tab(s) orally once a day (at bedtime)  baclofen 10 mg oral tablet: 1 tab(s) orally 4 times a day  budesonide-formoterol 160 mcg-4.5 mcg/inh inhalation aerosol: 2 puff(s) inhaled 2 times a day  Cepacol Extra Strength Menthol 10 mg mucous membrane lozenge: 1 anderson(s) mucous membrane every 6 hours, As Needed  cholecalciferol oral tablet: 2000 unit(s) orally once a day  Claritin 10 mg oral tablet: 1 tab(s) orally once a day  Eliquis 5 mg oral tablet: 1 tab(s) orally 2 times a day  Flonase 50 mcg/inh nasal spray: 2 spray(s) nasal once a day  furosemide 20 mg oral tablet: 1 tab(s) orally once a day  gabapentin 400 mg oral capsule: 1 cap(s) orally 3 times a day  ketoconazole 2% topical shampoo: Apply topically to affected area once a day (in the evening)  lamoTRIgine 100 mg oral tablet: 1 tab(s) orally 2 times a day  metoprolol tartrate 25 mg oral tablet: 1 tab(s) orally every 8 hours  Mi-Acid oral suspension: 30 milliliter(s) orally 4 times a day, As Needed  morphine 15 mg oral tablet: 1 tab(s) orally every 4 hours, As needed, Severe Pain (7 - 10)  Nitrostat 0.4 mg sublingual tablet: sublingual 3 times a day, As Needed; may repeat after 5 min until relief  omeprazole 20 mg oral delayed release capsule: 1 cap(s) orally 2 times a day  polyethylene glycol 3350 oral powder for reconstitution: 17 gram(s) orally every 12 hours  QUEtiapine 25 mg oral tablet: 1 tab(s) orally once a day  SEROquel 50 mg oral tablet: 1 tab(s) orally once a day (at bedtime)  tamsulosin 0.4 mg oral capsule: 1 cap(s) orally once a day (at bedtime)  Tecfidera 240 mg oral delayed release capsule: 1 cap(s) orally once a day  Tikosyn 250 mcg oral capsule: 1 cap(s) orally 2 times a day  tiotropium 18 mcg inhalation capsule: 1 cap(s) inhaled once a day      Vital Signs:   T(F): 97.5 (09-03-19 @ 06:15), Max: 98.3 (09-02-19 @ 12:16)  HR: 60 (09-03-19 @ 06:15) (60 - 61)  BP: 109/61 (09-03-19 @ 06:15) (104/52 - 109/61)  RR: 20 (09-03-19 @ 06:15) (18 - 20)  SpO2: 96% (09-03-19 @ 06:15) (96% - 96%)        Physical Exam:   GENERAL: NAD  HEENT: NCAT  CHEST/LUNG: CTAB, AICD in chest  HEART: Regular rate and rhythm; s1 s2 appreciated, No murmurs, rubs, or gallops  ABDOMEN: Soft, Nontender, Nondistended; Bowel sounds present  EXTREMITIES: No LE edema b/l   NERVOUS SYSTEM:  Alert & Oriented X3 chronic weakness in LLE      Labs:                   Hemoglobin A1C, Whole Blood: 5.4 % (08-30-19 @ 07:40)      Troponin <0.01, CKMB --, CK -- 08-30-19 @ 17:36                Radiology: Hospital Day:  5d    Subjective:    Pt was interviewed and examined at the bedside in the AM. No acute events overnight. Pt refusing labs.       Denies headaches, changes in vision, chest pains, SOB, n/v/d, abd pain, constipation, changes in urination, pain on urination, weakness, fatigue, joint pain or muscle pain.       Past Medical Hx:   PTSD (post-traumatic stress disorder)  Supraventricular arrhythmia  Cardiomyopathy  CHF (congestive heart failure)  Atrial fibrillation  Diabetes mellitus  Smoker  BPH (benign prostatic hyperplasia)  HTN (hypertension)  Seizures  Bipolar 1 disorder  Migraines  Pneumonia  MS (multiple sclerosis)  CAD (coronary artery disease)  Bipolar disorder  Anginal pain  Schizo affective schizophrenia  GERD (gastroesophageal reflux disease)  Seasonal allergies  HTN (hypertension)  Hypercholesteremia  COPD (chronic obstructive pulmonary disease)  Neuropathy  Atrial flutter  CVA (cerebral vascular accident)  TIA (transient ischemic attack)  Syncope  Chronic Hypotension  Hypotension  Peripheral Neuropathy  Human Immunodeficiency Virus (  S/P Implantation of Automatic  Personal History of Multiple S  Personal History of Mitral Nicki  Personal History of MI (Myocar  Personal History of Hypertensi  Personal History of Congestive  Personal History of Cardiomyop  Personal History of Atrial Fib  Personal History of Alcoholism  Clinical Depression    Past Sx:  H/O prior ablation treatment  Cardiac pacemaker recipient  H/O hernia repair  History of hip replacement  S/P Orchiectomy  SVT (Supraventricular Tachycar  GERD (Gastroesophageal Reflux  S/P Implantation of AICD    Allergies:  dexmethylphenidate (Unknown)  Dilantin (Rash)  dilantin, compazine, neurontin, ritalin, phenergan (Unknown)  Haldol (Unknown)  hydantoin derivatives (Other)  methylphenidate (Unknown)  Morphine Sulfate (Unknown)  phenytoin (Unknown)  prochlorperazine (Unknown)  promethazine (Dystonic RXN)  rash/hives (Anaphylaxis)  thioxanthenes (Unknown)    Current Meds:   Standng Meds:  acetaminophen   Tablet .. 650 milliGRAM(s) Oral every 6 hours  atorvastatin 40 milliGRAM(s) Oral at bedtime  baclofen 10 milliGRAM(s) Oral four times a day  buDESOnide 160 MICROgram(s)/formoterol 4.5 MICROgram(s) Inhaler - Peds 2 Puff(s) Inhalation two times a day  cholecalciferol 2000 Unit(s) Oral daily  dofetilide 250 MICROGram(s) Oral two times a day  enoxaparin Injectable 90 milliGRAM(s) SubCutaneous every 12 hours  fluticasone propionate (50 MICROgram(s)/actuation) Nasal Spray - Peds 2 Spray(s) Alternating Nostrils daily  furosemide    Tablet 20 milliGRAM(s) Oral daily  gabapentin 400 milliGRAM(s) Oral three times a day  lamoTRIgine 100 milliGRAM(s) Oral two times a day  loratadine 10 milliGRAM(s) Oral daily  metoprolol tartrate 25 milliGRAM(s) Oral every 8 hours  morphine ER Tablet 30 milliGRAM(s) Oral every 12 hours  pantoprazole    Tablet 40 milliGRAM(s) Oral before breakfast  polyethylene glycol 3350 17 Gram(s) Oral every 12 hours  QUEtiapine 25 milliGRAM(s) Oral daily  QUEtiapine 50 milliGRAM(s) Oral at bedtime  tamsulosin Oral Tab/Cap - Peds 0.4 milliGRAM(s) Oral at bedtime  tiotropium 18 MICROgram(s) Capsule 1 Capsule(s) Inhalation daily    PRN Meds:  nitroglycerin     SubLingual 0.4 milliGRAM(s) SubLingual every 5 minutes PRN Chest Pain    HOME MEDICATIONS:  acetaminophen 650 mg oral tablet: 650 milligram(s) orally 4 times a day, As Needed  atorvastatin 40 mg oral tablet: 1 tab(s) orally once a day (at bedtime)  baclofen 10 mg oral tablet: 1 tab(s) orally 4 times a day  budesonide-formoterol 160 mcg-4.5 mcg/inh inhalation aerosol: 2 puff(s) inhaled 2 times a day  Cepacol Extra Strength Menthol 10 mg mucous membrane lozenge: 1 anderson(s) mucous membrane every 6 hours, As Needed  cholecalciferol oral tablet: 2000 unit(s) orally once a day  Claritin 10 mg oral tablet: 1 tab(s) orally once a day  Eliquis 5 mg oral tablet: 1 tab(s) orally 2 times a day  Flonase 50 mcg/inh nasal spray: 2 spray(s) nasal once a day  furosemide 20 mg oral tablet: 1 tab(s) orally once a day  gabapentin 400 mg oral capsule: 1 cap(s) orally 3 times a day  ketoconazole 2% topical shampoo: Apply topically to affected area once a day (in the evening)  lamoTRIgine 100 mg oral tablet: 1 tab(s) orally 2 times a day  metoprolol tartrate 25 mg oral tablet: 1 tab(s) orally every 8 hours  Mi-Acid oral suspension: 30 milliliter(s) orally 4 times a day, As Needed  morphine 15 mg oral tablet: 1 tab(s) orally every 4 hours, As needed, Severe Pain (7 - 10)  Nitrostat 0.4 mg sublingual tablet: sublingual 3 times a day, As Needed; may repeat after 5 min until relief  omeprazole 20 mg oral delayed release capsule: 1 cap(s) orally 2 times a day  polyethylene glycol 3350 oral powder for reconstitution: 17 gram(s) orally every 12 hours  QUEtiapine 25 mg oral tablet: 1 tab(s) orally once a day  SEROquel 50 mg oral tablet: 1 tab(s) orally once a day (at bedtime)  tamsulosin 0.4 mg oral capsule: 1 cap(s) orally once a day (at bedtime)  Tecfidera 240 mg oral delayed release capsule: 1 cap(s) orally once a day  Tikosyn 250 mcg oral capsule: 1 cap(s) orally 2 times a day  tiotropium 18 mcg inhalation capsule: 1 cap(s) inhaled once a day      Vital Signs:   T(F): 97.5 (09-03-19 @ 06:15), Max: 98.3 (09-02-19 @ 12:16)  HR: 60 (09-03-19 @ 06:15) (60 - 61)  BP: 109/61 (09-03-19 @ 06:15) (104/52 - 109/61)  RR: 20 (09-03-19 @ 06:15) (18 - 20)  SpO2: 96% (09-03-19 @ 06:15) (96% - 96%)        Physical Exam:   GENERAL: NAD  HEENT: NCAT  CHEST/LUNG: CTAB, AICD in chest  HEART: Regular rate and rhythm; s1 s2 appreciated, No murmurs, rubs, or gallops  ABDOMEN: Soft, Nontender, Nondistended; Bowel sounds present  EXTREMITIES: No LE edema b/l   NERVOUS SYSTEM:  Alert & Oriented X3 chronic weakness in LLE      Labs:                   Hemoglobin A1C, Whole Blood: 5.4 % (08-30-19 @ 07:40)      Troponin <0.01, CKMB --, CK -- 08-30-19 @ 17:36                Radiology:

## 2019-09-03 NOTE — PROGRESS NOTE ADULT - ASSESSMENT
49 y/o M w/ a PMH of v-tach s/p AICD, CHF, A fib/flutter, HTN, COPD (on O2 PRN), DLD, multiple sclerosis w/ multiple admissions for flares, seizure disorder (on lamotrigine), PTSD, depression, ? stroke/TIA, esophageal stricture, chronic sore throat/sinusitis, GERD, hemopneumothorax (2006), pericardial effusion (2006), cardiomyopathy, MI (2006, 2008), IBS, presenting post AICD firing.,     #) VT s/p ICD, now with externalized lead  - Keep on telemetry - no events  - CTsx eval for RV lead extraction and revision and battery change  - Off eliquis, now on lovenox, to be held morning of procedure  - resume eliquis post procedure once ok with CT sx  - patient requesting to have procedure late next week when his surrogate arrives from [facility]    #)  HTN  -C/w metoprolol tartrate 25 mg PO TID and C/w Lasix 20 mg     #) Multiple sclerosis   s/p recent hospitalization for flare, finished course of steroids  -Patient takes Tecfidera 240 mg PO qD but is apparently non-formulary  -Pain control with acetaminophen and baclofen and gabapentin and morphine  IR 15 as per pain management on last admission    # H/o seizure disorder  C/w lamotrigine 100 mg PO BID    #) COPD on PRN home O2  -Currently on RA, no wheezing  -C/w Symbicort    #) H/o esophageal stricture   -cont mechanical soft dysphagia 2 w/ thins    #Progress Note Handoff  Pending (specify):   CT surgery for ICD extraction/lead revision  Family discussion: Plan of care discussed with patient, aware and agreeable   Disposition:  [facility]

## 2019-09-03 NOTE — PROGRESS NOTE ADULT - SUBJECTIVE AND OBJECTIVE BOX
CUATE HORNE  48y Male    CHIEF COMPLAINT:    Patient is a 48y old  Male who presents with a chief complaint of Chest pain, AICD firing (03 Sep 2019 09:16)      INTERVAL HPI/OVERNIGHT EVENTS:    Patient seen and examined. No acute events overnight. No active complaints, ambulating around unit    ROS: All other systems are negative.    Vital Signs:    T(F): 97.5 (09-03-19 @ 06:15), Max: 98.1 (09-02-19 @ 21:49)  HR: 60 (09-03-19 @ 06:15) (60 - 60)  BP: 109/61 (09-03-19 @ 06:15) (104/52 - 109/61)  RR: 20 (09-03-19 @ 06:15) (18 - 20)  SpO2: 96% (09-03-19 @ 06:15) (96% - 96%)    PHYSICAL EXAM:    GENERAL:  NAD  SKIN: No rashes or lesions  HEENT: Atraumatic. Normocephalic.    NECK: Supple, No JVD.    PULMONARY: CTA B/L. No wheezing.    CVS: Normal S1, S2. Rate and Rhythm are regular.    ABDOMEN/GI: Soft, Nontender, Nondistended; BS present  MSK:  No edema B/L LE. no clubbing or cyanosis  NEUROLOGIC: stable mild LE weakness, gait intact  PSYCH: Alert & oriented x 3, normal affect    Consultant(s) Notes Reviewed:  [x ] YES  [ ] NO  Care Discussed with Consultants/Other Providers [ x] YES  [ ] NO    LABS:  RADIOLOGY & ADDITIONAL TESTS:  Imaging or report Personally Reviewed:  [x] YES  [ ] NO  EKG reviewed: [x] YES  [ ] NO    Medications:  Standing  acetaminophen   Tablet .. 650 milliGRAM(s) Oral every 6 hours  atorvastatin 40 milliGRAM(s) Oral at bedtime  baclofen 10 milliGRAM(s) Oral four times a day  buDESOnide 160 MICROgram(s)/formoterol 4.5 MICROgram(s) Inhaler - Peds 2 Puff(s) Inhalation two times a day  cholecalciferol 2000 Unit(s) Oral daily  dofetilide 250 MICROGram(s) Oral two times a day  enoxaparin Injectable 90 milliGRAM(s) SubCutaneous every 12 hours  fluticasone propionate (50 MICROgram(s)/actuation) Nasal Spray - Peds 2 Spray(s) Alternating Nostrils daily  furosemide    Tablet 20 milliGRAM(s) Oral daily  gabapentin 400 milliGRAM(s) Oral three times a day  lamoTRIgine 100 milliGRAM(s) Oral two times a day  loratadine 10 milliGRAM(s) Oral daily  metoprolol tartrate 25 milliGRAM(s) Oral every 8 hours  morphine ER Tablet 30 milliGRAM(s) Oral every 12 hours  pantoprazole    Tablet 40 milliGRAM(s) Oral before breakfast  polyethylene glycol 3350 17 Gram(s) Oral every 12 hours  QUEtiapine 25 milliGRAM(s) Oral daily  QUEtiapine 50 milliGRAM(s) Oral at bedtime  tamsulosin Oral Tab/Cap - Peds 0.4 milliGRAM(s) Oral at bedtime  tiotropium 18 MICROgram(s) Capsule 1 Capsule(s) Inhalation daily    PRN Meds  nitroglycerin     SubLingual 0.4 milliGRAM(s) SubLingual every 5 minutes PRN      Chelo Jimenez MD  s. [phone]

## 2019-09-04 LAB
ALBUMIN SERPL ELPH-MCNC: 4 G/DL — SIGNIFICANT CHANGE UP (ref 3.5–5.2)
ALP SERPL-CCNC: 152 U/L — HIGH (ref 30–115)
ALT FLD-CCNC: 101 U/L — HIGH (ref 0–41)
ANION GAP SERPL CALC-SCNC: 10 MMOL/L — SIGNIFICANT CHANGE UP (ref 7–14)
APPEARANCE UR: ABNORMAL
AST SERPL-CCNC: 73 U/L — HIGH (ref 0–41)
BACTERIA # UR AUTO: ABNORMAL
BASOPHILS # BLD AUTO: 0.04 K/UL — SIGNIFICANT CHANGE UP (ref 0–0.2)
BASOPHILS NFR BLD AUTO: 0.6 % — SIGNIFICANT CHANGE UP (ref 0–1)
BILIRUB SERPL-MCNC: 0.6 MG/DL — SIGNIFICANT CHANGE UP (ref 0.2–1.2)
BILIRUB UR-MCNC: NEGATIVE — SIGNIFICANT CHANGE UP
BUN SERPL-MCNC: 21 MG/DL — HIGH (ref 10–20)
CALCIUM SERPL-MCNC: 9 MG/DL — SIGNIFICANT CHANGE UP (ref 8.5–10.1)
CHLORIDE SERPL-SCNC: 104 MMOL/L — SIGNIFICANT CHANGE UP (ref 98–110)
CO2 SERPL-SCNC: 29 MMOL/L — SIGNIFICANT CHANGE UP (ref 17–32)
COD CRY URNS QL: ABNORMAL
COLOR SPEC: YELLOW — SIGNIFICANT CHANGE UP
CREAT SERPL-MCNC: 0.9 MG/DL — SIGNIFICANT CHANGE UP (ref 0.7–1.5)
DIFF PNL FLD: ABNORMAL
EOSINOPHIL # BLD AUTO: 0.06 K/UL — SIGNIFICANT CHANGE UP (ref 0–0.7)
EOSINOPHIL NFR BLD AUTO: 0.8 % — SIGNIFICANT CHANGE UP (ref 0–8)
EPI CELLS # UR: 1 /HPF — SIGNIFICANT CHANGE UP (ref 0–5)
GLUCOSE SERPL-MCNC: 98 MG/DL — SIGNIFICANT CHANGE UP (ref 70–99)
GLUCOSE UR QL: NEGATIVE — SIGNIFICANT CHANGE UP
HCT VFR BLD CALC: 34.4 % — LOW (ref 42–52)
HCT VFR BLD CALC: 34.7 % — LOW (ref 42–52)
HGB BLD-MCNC: 10.9 G/DL — LOW (ref 14–18)
HGB BLD-MCNC: 11.2 G/DL — LOW (ref 14–18)
HYALINE CASTS # UR AUTO: 35 /LPF — HIGH (ref 0–7)
IMM GRANULOCYTES NFR BLD AUTO: 0.4 % — HIGH (ref 0.1–0.3)
KETONES UR-MCNC: NEGATIVE — SIGNIFICANT CHANGE UP
LEUKOCYTE ESTERASE UR-ACNC: ABNORMAL
LYMPHOCYTES # BLD AUTO: 1.38 K/UL — SIGNIFICANT CHANGE UP (ref 1.2–3.4)
LYMPHOCYTES # BLD AUTO: 19.5 % — LOW (ref 20.5–51.1)
MAGNESIUM SERPL-MCNC: 1.9 MG/DL — SIGNIFICANT CHANGE UP (ref 1.8–2.4)
MCHC RBC-ENTMCNC: 29.7 PG — SIGNIFICANT CHANGE UP (ref 27–31)
MCHC RBC-ENTMCNC: 29.9 PG — SIGNIFICANT CHANGE UP (ref 27–31)
MCHC RBC-ENTMCNC: 31.7 G/DL — LOW (ref 32–37)
MCHC RBC-ENTMCNC: 32.3 G/DL — SIGNIFICANT CHANGE UP (ref 32–37)
MCV RBC AUTO: 92.5 FL — SIGNIFICANT CHANGE UP (ref 80–94)
MCV RBC AUTO: 93.7 FL — SIGNIFICANT CHANGE UP (ref 80–94)
MONOCYTES # BLD AUTO: 0.56 K/UL — SIGNIFICANT CHANGE UP (ref 0.1–0.6)
MONOCYTES NFR BLD AUTO: 7.9 % — SIGNIFICANT CHANGE UP (ref 1.7–9.3)
NEUTROPHILS # BLD AUTO: 5.01 K/UL — SIGNIFICANT CHANGE UP (ref 1.4–6.5)
NEUTROPHILS NFR BLD AUTO: 70.8 % — SIGNIFICANT CHANGE UP (ref 42.2–75.2)
NITRITE UR-MCNC: NEGATIVE — SIGNIFICANT CHANGE UP
NRBC # BLD: 0 /100 WBCS — SIGNIFICANT CHANGE UP (ref 0–0)
NRBC # BLD: 0 /100 WBCS — SIGNIFICANT CHANGE UP (ref 0–0)
PH UR: 5.5 — SIGNIFICANT CHANGE UP (ref 5–8)
PLATELET # BLD AUTO: 145 K/UL — SIGNIFICANT CHANGE UP (ref 130–400)
PLATELET # BLD AUTO: 145 K/UL — SIGNIFICANT CHANGE UP (ref 130–400)
POTASSIUM SERPL-MCNC: 4.1 MMOL/L — SIGNIFICANT CHANGE UP (ref 3.5–5)
POTASSIUM SERPL-SCNC: 4.1 MMOL/L — SIGNIFICANT CHANGE UP (ref 3.5–5)
PROT SERPL-MCNC: 5.9 G/DL — LOW (ref 6–8)
PROT UR-MCNC: SIGNIFICANT CHANGE UP
RBC # BLD: 3.67 M/UL — LOW (ref 4.7–6.1)
RBC # BLD: 3.75 M/UL — LOW (ref 4.7–6.1)
RBC # FLD: 14.6 % — HIGH (ref 11.5–14.5)
RBC # FLD: 14.7 % — HIGH (ref 11.5–14.5)
RBC CASTS # UR COMP ASSIST: 3 /HPF — SIGNIFICANT CHANGE UP (ref 0–4)
SODIUM SERPL-SCNC: 143 MMOL/L — SIGNIFICANT CHANGE UP (ref 135–146)
SP GR SPEC: 1.02 — SIGNIFICANT CHANGE UP (ref 1.01–1.02)
UROBILINOGEN FLD QL: ABNORMAL
WBC # BLD: 6.28 K/UL — SIGNIFICANT CHANGE UP (ref 4.8–10.8)
WBC # BLD: 7.08 K/UL — SIGNIFICANT CHANGE UP (ref 4.8–10.8)
WBC # FLD AUTO: 6.28 K/UL — SIGNIFICANT CHANGE UP (ref 4.8–10.8)
WBC # FLD AUTO: 7.08 K/UL — SIGNIFICANT CHANGE UP (ref 4.8–10.8)
WBC UR QL: 116 /HPF — HIGH (ref 0–5)

## 2019-09-04 PROCEDURE — 99232 SBSQ HOSP IP/OBS MODERATE 35: CPT

## 2019-09-04 PROCEDURE — 99254 IP/OBS CNSLTJ NEW/EST MOD 60: CPT

## 2019-09-04 PROCEDURE — 71045 X-RAY EXAM CHEST 1 VIEW: CPT | Mod: 26

## 2019-09-04 PROCEDURE — 99231 SBSQ HOSP IP/OBS SF/LOW 25: CPT | Mod: 57

## 2019-09-04 RX ORDER — SODIUM CHLORIDE 9 MG/ML
1000 INJECTION INTRAMUSCULAR; INTRAVENOUS; SUBCUTANEOUS
Refills: 0 | Status: DISCONTINUED | OUTPATIENT
Start: 2019-09-04 | End: 2019-09-05

## 2019-09-04 RX ORDER — ENOXAPARIN SODIUM 100 MG/ML
90 INJECTION SUBCUTANEOUS ONCE
Refills: 0 | Status: COMPLETED | OUTPATIENT
Start: 2019-09-04 | End: 2019-09-04

## 2019-09-04 RX ORDER — CLONAZEPAM 1 MG
0.5 TABLET ORAL AT BEDTIME
Refills: 0 | Status: DISCONTINUED | OUTPATIENT
Start: 2019-09-04 | End: 2019-09-05

## 2019-09-04 RX ORDER — ONDANSETRON 8 MG/1
4 TABLET, FILM COATED ORAL
Refills: 0 | Status: DISCONTINUED | OUTPATIENT
Start: 2019-09-04 | End: 2019-09-05

## 2019-09-04 RX ADMIN — BUDESONIDE AND FORMOTEROL FUMARATE DIHYDRATE 2 PUFF(S): 160; 4.5 AEROSOL RESPIRATORY (INHALATION) at 10:27

## 2019-09-04 RX ADMIN — ONDANSETRON 4 MILLIGRAM(S): 8 TABLET, FILM COATED ORAL at 07:52

## 2019-09-04 RX ADMIN — MORPHINE SULFATE 30 MILLIGRAM(S): 50 CAPSULE, EXTENDED RELEASE ORAL at 17:17

## 2019-09-04 RX ADMIN — Medication 650 MILLIGRAM(S): at 07:22

## 2019-09-04 RX ADMIN — Medication 2000 UNIT(S): at 11:36

## 2019-09-04 RX ADMIN — QUETIAPINE FUMARATE 50 MILLIGRAM(S): 200 TABLET, FILM COATED ORAL at 21:17

## 2019-09-04 RX ADMIN — ENOXAPARIN SODIUM 90 MILLIGRAM(S): 100 INJECTION SUBCUTANEOUS at 05:22

## 2019-09-04 RX ADMIN — TAMSULOSIN HYDROCHLORIDE 0.4 MILLIGRAM(S): 0.4 CAPSULE ORAL at 21:17

## 2019-09-04 RX ADMIN — Medication 10 MILLIGRAM(S): at 11:36

## 2019-09-04 RX ADMIN — Medication 25 MILLIGRAM(S): at 16:03

## 2019-09-04 RX ADMIN — Medication 10 MILLIGRAM(S): at 17:17

## 2019-09-04 RX ADMIN — DOFETILIDE 250 MICROGRAM(S): 0.25 CAPSULE ORAL at 17:17

## 2019-09-04 RX ADMIN — ENOXAPARIN SODIUM 90 MILLIGRAM(S): 100 INJECTION SUBCUTANEOUS at 17:16

## 2019-09-04 RX ADMIN — LORATADINE 10 MILLIGRAM(S): 10 TABLET ORAL at 11:36

## 2019-09-04 RX ADMIN — Medication 10 MILLIGRAM(S): at 05:23

## 2019-09-04 RX ADMIN — Medication 3 MILLILITER(S): at 09:36

## 2019-09-04 RX ADMIN — BUDESONIDE AND FORMOTEROL FUMARATE DIHYDRATE 2 PUFF(S): 160; 4.5 AEROSOL RESPIRATORY (INHALATION) at 21:00

## 2019-09-04 RX ADMIN — Medication 650 MILLIGRAM(S): at 05:21

## 2019-09-04 RX ADMIN — POLYETHYLENE GLYCOL 3350 17 GRAM(S): 17 POWDER, FOR SOLUTION ORAL at 05:23

## 2019-09-04 RX ADMIN — GABAPENTIN 400 MILLIGRAM(S): 400 CAPSULE ORAL at 21:17

## 2019-09-04 RX ADMIN — TIOTROPIUM BROMIDE 1 CAPSULE(S): 18 CAPSULE ORAL; RESPIRATORY (INHALATION) at 09:33

## 2019-09-04 RX ADMIN — SODIUM CHLORIDE 75 MILLILITER(S): 9 INJECTION INTRAMUSCULAR; INTRAVENOUS; SUBCUTANEOUS at 16:17

## 2019-09-04 RX ADMIN — ATORVASTATIN CALCIUM 40 MILLIGRAM(S): 80 TABLET, FILM COATED ORAL at 21:17

## 2019-09-04 RX ADMIN — LAMOTRIGINE 100 MILLIGRAM(S): 25 TABLET, ORALLY DISINTEGRATING ORAL at 05:23

## 2019-09-04 RX ADMIN — Medication 3 MILLILITER(S): at 13:20

## 2019-09-04 RX ADMIN — GABAPENTIN 400 MILLIGRAM(S): 400 CAPSULE ORAL at 16:02

## 2019-09-04 RX ADMIN — Medication 2 SPRAY(S): at 11:36

## 2019-09-04 RX ADMIN — QUETIAPINE FUMARATE 25 MILLIGRAM(S): 200 TABLET, FILM COATED ORAL at 11:36

## 2019-09-04 RX ADMIN — Medication 3 MILLILITER(S): at 19:25

## 2019-09-04 RX ADMIN — LAMOTRIGINE 100 MILLIGRAM(S): 25 TABLET, ORALLY DISINTEGRATING ORAL at 17:17

## 2019-09-04 RX ADMIN — Medication 650 MILLIGRAM(S): at 05:23

## 2019-09-04 RX ADMIN — MORPHINE SULFATE 30 MILLIGRAM(S): 50 CAPSULE, EXTENDED RELEASE ORAL at 05:23

## 2019-09-04 RX ADMIN — Medication 20 MILLIGRAM(S): at 05:23

## 2019-09-04 RX ADMIN — PANTOPRAZOLE SODIUM 40 MILLIGRAM(S): 20 TABLET, DELAYED RELEASE ORAL at 07:52

## 2019-09-04 RX ADMIN — Medication 25 MILLIGRAM(S): at 05:23

## 2019-09-04 RX ADMIN — DOFETILIDE 250 MICROGRAM(S): 0.25 CAPSULE ORAL at 05:23

## 2019-09-04 RX ADMIN — GABAPENTIN 400 MILLIGRAM(S): 400 CAPSULE ORAL at 05:23

## 2019-09-04 RX ADMIN — POLYETHYLENE GLYCOL 3350 17 GRAM(S): 17 POWDER, FOR SOLUTION ORAL at 17:18

## 2019-09-04 RX ADMIN — Medication 25 MILLIGRAM(S): at 21:17

## 2019-09-04 NOTE — CONSULT NOTE ADULT - ASSESSMENT
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  Suspect toxic/metabolic cause for asterixis.  Has isolated transaminitis currently r/o hepatic injury.      Plan   - Continue with Baclofen 10mg four times daily for now  - Repeat LFTs with NH4 level  - 1mg Klonopin qhs before bedtime  - Pt has no contraindications for surgery from neurological standpoint for AICD battery replacement  - PT evaluation   - if hyperammonemia found, recommend rifaximin (pt had reported dystonic reaction to lactulose in the past)  - f/u with Dr. Brody once discharged

## 2019-09-04 NOTE — PROGRESS NOTE ADULT - SUBJECTIVE AND OBJECTIVE BOX
CTS Attending    Patient interviewed and examined  Device interrogations, fluoroscopy and CxR reviewed  Patient also reported at least two episodes of staph aurueus bacteremia in the past (2010 and 2016)  He now has ICD alarm indicating recommended replacement time has arrived.  Fluoroscopy and xRay show separation of distal ICD lead conductors proximal to the distal coil, indicating exteriorization of conductors through the insulation.  EP team (Rajiv and Ishmael) recommended and referred patient for battery change and lead replacement, which may require laser lead extraction.  The presence of an abandoned RV lead makes the indication for laser extraction even stronger, particularly in this relatively young patient with prior VT and now dependent on atrial pacing.  In the risk/benefit analysis, and considering the long-term consequences of retained, abandoned and defective leads, it seems reasonable to offer lead replacement and laser extraction, even at aomewhat elevated level of risk.  In light of available mechanical and laser technology and the use of a BRIDGE balloon catheter, a relatively safe procedure can be planned, with all available cardiac surgery backup systems in place.     This was discussed with the patient and HCP, and patient agreed to proceed with ICD battery change with lead replacmeent, laser lead extraction, possible sternotomy or thoracotomy.  The particular risk of bleeding, transfusion and potential need for thoracotomy was emphasized.    Surgery will be tentatively scheduled for tomorrow, pending availability of the operating room and laser system.

## 2019-09-04 NOTE — PRE-ANESTHESIA EVALUATION ADULT - NSANTHPMHFT_GEN_ALL_CORE
49yo man with multiple comorbidities as noted above including episode of VTach requiring AICD has had s. aureus bacteremia, now presents with alarming battery and found to have an abandoned RV lead.

## 2019-09-04 NOTE — PROGRESS NOTE ADULT - SUBJECTIVE AND OBJECTIVE BOX
Hospital Day:  6d    Subjective:    Pt was interviewed and examined at the bedside in the AM. No acute events overnight.   Pt endorses some nausea in the morning relieved by medication.   Denies any other complaints.   Pt is walking around and getting out of bed.       Past Medical Hx:   PTSD (post-traumatic stress disorder)  Supraventricular arrhythmia  Cardiomyopathy  CHF (congestive heart failure)  Atrial fibrillation  Diabetes mellitus  Smoker  BPH (benign prostatic hyperplasia)  HTN (hypertension)  Seizures  Bipolar 1 disorder  Migraines  Pneumonia  MS (multiple sclerosis)  CAD (coronary artery disease)  Bipolar disorder  Anginal pain  Schizo affective schizophrenia  GERD (gastroesophageal reflux disease)  Seasonal allergies  HTN (hypertension)  Hypercholesteremia  COPD (chronic obstructive pulmonary disease)  Neuropathy  Atrial flutter  CVA (cerebral vascular accident)  TIA (transient ischemic attack)  Syncope  Chronic Hypotension  Hypotension  Peripheral Neuropathy  Human Immunodeficiency Virus (  S/P Implantation of Automatic  Personal History of Multiple S  Personal History of Mitral Nicik  Personal History of MI (Myocar  Personal History of Hypertensi  Personal History of Congestive  Personal History of Cardiomyop  Personal History of Atrial Fib  Personal History of Alcoholism  Clinical Depression    Past Sx:  H/O prior ablation treatment  Cardiac pacemaker recipient  H/O hernia repair  History of hip replacement  S/P Orchiectomy  SVT (Supraventricular Tachycar  GERD (Gastroesophageal Reflux  S/P Implantation of AICD    Allergies:  dexmethylphenidate (Unknown)  Dilantin (Rash)  dilantin, compazine, neurontin, ritalin, phenergan (Unknown)  Haldol (Unknown)  hydantoin derivatives (Other)  methylphenidate (Unknown)  Morphine Sulfate (Unknown)  phenytoin (Unknown)  prochlorperazine (Unknown)  promethazine (Dystonic RXN)  rash/hives (Anaphylaxis)  thioxanthenes (Unknown)    Current Meds:   Standng Meds:  ALBUTerol/ipratropium for Nebulization. 3 milliLiter(s) Nebulizer every 6 hours  atorvastatin 40 milliGRAM(s) Oral at bedtime  baclofen 10 milliGRAM(s) Oral four times a day  buDESOnide 160 MICROgram(s)/formoterol 4.5 MICROgram(s) Inhaler - Peds 2 Puff(s) Inhalation two times a day  cholecalciferol 2000 Unit(s) Oral daily  dofetilide 250 MICROGram(s) Oral two times a day  enoxaparin Injectable 90 milliGRAM(s) SubCutaneous every 12 hours  fluticasone propionate (50 MICROgram(s)/actuation) Nasal Spray - Peds 2 Spray(s) Alternating Nostrils daily  furosemide    Tablet 20 milliGRAM(s) Oral daily  gabapentin 400 milliGRAM(s) Oral three times a day  lamoTRIgine 100 milliGRAM(s) Oral two times a day  loratadine 10 milliGRAM(s) Oral daily  metoprolol tartrate 25 milliGRAM(s) Oral every 8 hours  morphine ER Tablet 30 milliGRAM(s) Oral every 12 hours  pantoprazole    Tablet 40 milliGRAM(s) Oral before breakfast  polyethylene glycol 3350 17 Gram(s) Oral every 12 hours  QUEtiapine 25 milliGRAM(s) Oral daily  QUEtiapine 50 milliGRAM(s) Oral at bedtime  tamsulosin Oral Tab/Cap - Peds 0.4 milliGRAM(s) Oral at bedtime  tiotropium 18 MICROgram(s) Capsule 1 Capsule(s) Inhalation daily    PRN Meds:  nitroglycerin     SubLingual 0.4 milliGRAM(s) SubLingual every 5 minutes PRN Chest Pain  ondansetron    Tablet 4 milliGRAM(s) Oral two times a day PRN Nausea and/or Vomiting    HOME MEDICATIONS:  acetaminophen 650 mg oral tablet: 650 milligram(s) orally 4 times a day, As Needed  atorvastatin 40 mg oral tablet: 1 tab(s) orally once a day (at bedtime)  baclofen 10 mg oral tablet: 1 tab(s) orally 4 times a day  budesonide-formoterol 160 mcg-4.5 mcg/inh inhalation aerosol: 2 puff(s) inhaled 2 times a day  Cepacol Extra Strength Menthol 10 mg mucous membrane lozenge: 1 anderson(s) mucous membrane every 6 hours, As Needed  cholecalciferol oral tablet: 2000 unit(s) orally once a day  Claritin 10 mg oral tablet: 1 tab(s) orally once a day  Eliquis 5 mg oral tablet: 1 tab(s) orally 2 times a day  Flonase 50 mcg/inh nasal spray: 2 spray(s) nasal once a day  furosemide 20 mg oral tablet: 1 tab(s) orally once a day  gabapentin 400 mg oral capsule: 1 cap(s) orally 3 times a day  ketoconazole 2% topical shampoo: Apply topically to affected area once a day (in the evening)  lamoTRIgine 100 mg oral tablet: 1 tab(s) orally 2 times a day  metoprolol tartrate 25 mg oral tablet: 1 tab(s) orally every 8 hours  Mi-Acid oral suspension: 30 milliliter(s) orally 4 times a day, As Needed  morphine 15 mg oral tablet: 1 tab(s) orally every 4 hours, As needed, Severe Pain (7 - 10)  Nitrostat 0.4 mg sublingual tablet: sublingual 3 times a day, As Needed; may repeat after 5 min until relief  omeprazole 20 mg oral delayed release capsule: 1 cap(s) orally 2 times a day  polyethylene glycol 3350 oral powder for reconstitution: 17 gram(s) orally every 12 hours  QUEtiapine 25 mg oral tablet: 1 tab(s) orally once a day  SEROquel 50 mg oral tablet: 1 tab(s) orally once a day (at bedtime)  tamsulosin 0.4 mg oral capsule: 1 cap(s) orally once a day (at bedtime)  Tecfidera 240 mg oral delayed release capsule: 1 cap(s) orally once a day  Tikosyn 250 mcg oral capsule: 1 cap(s) orally 2 times a day  tiotropium 18 mcg inhalation capsule: 1 cap(s) inhaled once a day      Vital Signs:   T(F): 100.2 (09-04-19 @ 07:53), Max: 100.2 (09-04-19 @ 07:53)  HR: 60 (09-04-19 @ 05:40) (60 - 60)  BP: 114/54 (09-04-19 @ 05:40) (106/58 - 114/54)  RR: 18 (09-04-19 @ 05:40) (18 - 19)  SpO2: --        Physical Exam:   GENERAL: NAD  HEENT: NCAT  CHEST/LUNG: CTAB, AICD in chest  HEART: Regular rate and rhythm; s1 s2 appreciated, No murmurs, rubs, or gallops  ABDOMEN: Soft, Nontender, Nondistended; Bowel sounds present  EXTREMITIES: No LE edema b/l   NERVOUS SYSTEM:  Alert & Oriented X3 chronic weakness in LLE          Labs:                         10.9   6.28  )-----------( 145      ( 04 Sep 2019 07:12 )             34.4       04 Sep 2019 07:12    143    |  104    |  21     ----------------------------<  98     4.1     |  29     |  0.9      Ca    9.0        04 Sep 2019 07:12  Mg     1.9       04 Sep 2019 07:12    TPro  5.9    /  Alb  4.0    /  TBili  0.6    /  DBili  x      /  AST  73     /  ALT  101    /  AlkPhos  152    04 Sep 2019 07:12          Hemoglobin A1C, Whole Blood: 5.4 % (08-30-19 @ 07:40)                    Radiology: Hospital Day:  6d    Subjective:    Pt was interviewed and examined at the bedside in the AM. No acute events overnight.   Pt endorses some nausea in the morning relieved by medication.   Denies any other complaints.   Pt is walking around and getting out of bed.   no cp, abd pain, fever  sob, no cough, orthopnea, pnd      Past Medical Hx:   PTSD (post-traumatic stress disorder)  Supraventricular arrhythmia  Cardiomyopathy  CHF (congestive heart failure)  Atrial fibrillation  Diabetes mellitus  Smoker  BPH (benign prostatic hyperplasia)  HTN (hypertension)  Seizures  Bipolar 1 disorder  Migraines  Pneumonia  MS (multiple sclerosis)  CAD (coronary artery disease)  Bipolar disorder  Anginal pain  Schizo affective schizophrenia  GERD (gastroesophageal reflux disease)  Seasonal allergies  HTN (hypertension)  Hypercholesteremia  COPD (chronic obstructive pulmonary disease)  Neuropathy  Atrial flutter  CVA (cerebral vascular accident)  TIA (transient ischemic attack)  Syncope  Chronic Hypotension  Hypotension  Peripheral Neuropathy  Human Immunodeficiency Virus (  S/P Implantation of Automatic  Personal History of Multiple S  Personal History of Mitral Nicki  Personal History of MI (Myocar  Personal History of Hypertensi  Personal History of Congestive  Personal History of Cardiomyop  Personal History of Atrial Fib  Personal History of Alcoholism  Clinical Depression    Past Sx:  H/O prior ablation treatment  Cardiac pacemaker recipient  H/O hernia repair  History of hip replacement  S/P Orchiectomy  SVT (Supraventricular Tachycar  GERD (Gastroesophageal Reflux  S/P Implantation of AICD    Allergies:  dexmethylphenidate (Unknown)  Dilantin (Rash)  dilantin, compazine, neurontin, ritalin, phenergan (Unknown)  Haldol (Unknown)  hydantoin derivatives (Other)  methylphenidate (Unknown)  Morphine Sulfate (Unknown)  phenytoin (Unknown)  prochlorperazine (Unknown)  promethazine (Dystonic RXN)  rash/hives (Anaphylaxis)  thioxanthenes (Unknown)    Current Meds:   Standng Meds:  ALBUTerol/ipratropium for Nebulization. 3 milliLiter(s) Nebulizer every 6 hours  atorvastatin 40 milliGRAM(s) Oral at bedtime  baclofen 10 milliGRAM(s) Oral four times a day  buDESOnide 160 MICROgram(s)/formoterol 4.5 MICROgram(s) Inhaler - Peds 2 Puff(s) Inhalation two times a day  cholecalciferol 2000 Unit(s) Oral daily  dofetilide 250 MICROGram(s) Oral two times a day  enoxaparin Injectable 90 milliGRAM(s) SubCutaneous every 12 hours  fluticasone propionate (50 MICROgram(s)/actuation) Nasal Spray - Peds 2 Spray(s) Alternating Nostrils daily  furosemide    Tablet 20 milliGRAM(s) Oral daily  gabapentin 400 milliGRAM(s) Oral three times a day  lamoTRIgine 100 milliGRAM(s) Oral two times a day  loratadine 10 milliGRAM(s) Oral daily  metoprolol tartrate 25 milliGRAM(s) Oral every 8 hours  morphine ER Tablet 30 milliGRAM(s) Oral every 12 hours  pantoprazole    Tablet 40 milliGRAM(s) Oral before breakfast  polyethylene glycol 3350 17 Gram(s) Oral every 12 hours  QUEtiapine 25 milliGRAM(s) Oral daily  QUEtiapine 50 milliGRAM(s) Oral at bedtime  tamsulosin Oral Tab/Cap - Peds 0.4 milliGRAM(s) Oral at bedtime  tiotropium 18 MICROgram(s) Capsule 1 Capsule(s) Inhalation daily    PRN Meds:  nitroglycerin     SubLingual 0.4 milliGRAM(s) SubLingual every 5 minutes PRN Chest Pain  ondansetron    Tablet 4 milliGRAM(s) Oral two times a day PRN Nausea and/or Vomiting    HOME MEDICATIONS:  acetaminophen 650 mg oral tablet: 650 milligram(s) orally 4 times a day, As Needed  atorvastatin 40 mg oral tablet: 1 tab(s) orally once a day (at bedtime)  baclofen 10 mg oral tablet: 1 tab(s) orally 4 times a day  budesonide-formoterol 160 mcg-4.5 mcg/inh inhalation aerosol: 2 puff(s) inhaled 2 times a day  Cepacol Extra Strength Menthol 10 mg mucous membrane lozenge: 1 anderson(s) mucous membrane every 6 hours, As Needed  cholecalciferol oral tablet: 2000 unit(s) orally once a day  Claritin 10 mg oral tablet: 1 tab(s) orally once a day  Eliquis 5 mg oral tablet: 1 tab(s) orally 2 times a day  Flonase 50 mcg/inh nasal spray: 2 spray(s) nasal once a day  furosemide 20 mg oral tablet: 1 tab(s) orally once a day  gabapentin 400 mg oral capsule: 1 cap(s) orally 3 times a day  ketoconazole 2% topical shampoo: Apply topically to affected area once a day (in the evening)  lamoTRIgine 100 mg oral tablet: 1 tab(s) orally 2 times a day  metoprolol tartrate 25 mg oral tablet: 1 tab(s) orally every 8 hours  Mi-Acid oral suspension: 30 milliliter(s) orally 4 times a day, As Needed  morphine 15 mg oral tablet: 1 tab(s) orally every 4 hours, As needed, Severe Pain (7 - 10)  Nitrostat 0.4 mg sublingual tablet: sublingual 3 times a day, As Needed; may repeat after 5 min until relief  omeprazole 20 mg oral delayed release capsule: 1 cap(s) orally 2 times a day  polyethylene glycol 3350 oral powder for reconstitution: 17 gram(s) orally every 12 hours  QUEtiapine 25 mg oral tablet: 1 tab(s) orally once a day  SEROquel 50 mg oral tablet: 1 tab(s) orally once a day (at bedtime)  tamsulosin 0.4 mg oral capsule: 1 cap(s) orally once a day (at bedtime)  Tecfidera 240 mg oral delayed release capsule: 1 cap(s) orally once a day  Tikosyn 250 mcg oral capsule: 1 cap(s) orally 2 times a day  tiotropium 18 mcg inhalation capsule: 1 cap(s) inhaled once a day      Vital Signs:   T(F): 100.2 (09-04-19 @ 07:53), Max: 100.2 (09-04-19 @ 07:53)  HR: 60 (09-04-19 @ 05:40) (60 - 60)  BP: 114/54 (09-04-19 @ 05:40) (106/58 - 114/54)  RR: 18 (09-04-19 @ 05:40) (18 - 19)  SpO2: --        Physical Exam:   GENERAL: NAD  HEENT: NCAT  CHEST/LUNG: CTAB, AICD in chest  HEART: Regular rate and rhythm; s1 s2 appreciated, No murmurs, rubs, or gallops  ABDOMEN: Soft, Nontender, Nondistended; Bowel sounds present  EXTREMITIES: No LE edema b/l   NERVOUS SYSTEM:  Alert & Oriented X3 chronic weakness in LLE          Labs:                         10.9   6.28  )-----------( 145      ( 04 Sep 2019 07:12 )             34.4       04 Sep 2019 07:12    143    |  104    |  21     ----------------------------<  98     4.1     |  29     |  0.9      Ca    9.0        04 Sep 2019 07:12  Mg     1.9       04 Sep 2019 07:12    TPro  5.9    /  Alb  4.0    /  TBili  0.6    /  DBili  x      /  AST  73     /  ALT  101    /  AlkPhos  152    04 Sep 2019 07:12          Hemoglobin A1C, Whole Blood: 5.4 % (08-30-19 @ 07:40)                    Radiology:

## 2019-09-04 NOTE — CONSULT NOTE ADULT - SUBJECTIVE AND OBJECTIVE BOX
Neurology Consult - [facility] KAMERON MADDENU 009    HPI:  47 y/o M who uses walker at baseline with PMHx of relapsing remitting multiple sclerosis (dx in 2015, on Tecfidera 250mg) w/ multiple admissions for flares, seizure disorder (on lamotrigine 100mg bid),  v-tach s/p AICD, CHF, A fib/flutter, HTN, COPD (on O2 PRN at home), PTSD, depression, ?stroke/TIA, DLD, esophageal stricture, chronic sore throat/sinusitis, GERD, hemopneumothorax (2006), pericardial effusion (2006), cardiomyopathy, MI (2006, 2008), IBS, presenting to the ED on 8/29 with chest pain, palpitations and AICD firing. He had a recent admission to [facility] on 08/13/19 with discharge on 08/19/19 for multiple sclerosis flare treated with IV solumedrol for 5 days in patient and discharged on oral Prednisone for 5 more days. During his admission EP interrogated his pacemaker and multiple episodes of SVT and PAF were noted over past few months. His metoprolol was increased to 25 mg TID, and to return to see Dr. Quiñones in 2 months for battery change due to low battery in pacemaker. He states that two days ago he felt mid sternal chest pain, 8/10, that radiated to the right side of his neck and face that resolved with sublingual nitroglycerin.      Patient was seen at bedside this morning. Neurology was consulted because patient is c/o diffuse neck, LE, and UE muscle spasm. Pt states he had these spasms before due to his MS. Pt is currently on max dose Baclofen 10mg four times daily. Pt states he usually followed neurology at the nursing home is currently residing, but is now following Dr. Brody. He endorses that his neurologists have been talking about placing him on a baclofen pump due to spasm recurrence. Pt is also c/o mild headache, dizziness, nausea, and non-focal numbness on the R side of the body. Denies shock like sensation, visual changes, eye pain, chest pain, hearing loss, vomiting, confusion.       PAST MEDICAL & SURGICAL HISTORY:  PTSD (post-traumatic stress disorder)  Supraventricular arrhythmia: prior history  Cardiomyopathy  CHF (congestive heart failure)  Atrial fibrillation: s/p ablation, on eliquis  Diabetes mellitus: diet controlled  BPH (benign prostatic hyperplasia)  HTN (hypertension)  Seizures  Migraines  Pneumonia  MS (multiple sclerosis)  CAD (coronary artery disease)  Bipolar disorder  Anginal pain  Schizo affective schizophrenia  GERD (gastroesophageal reflux disease)  Seasonal allergies  Hypercholesteremia  COPD (chronic obstructive pulmonary disease)  CVA (cerebral vascular accident)  TIA (transient ischemic attack)  Syncope  Peripheral Neuropathy  Clinical Depression  H/O prior ablation treatment: for Afib  Cardiac pacemaker recipient  H/O hernia repair: right 1972,1991  History of hip replacement  S/P Orchiectomy: L for testicular CA  S/P Implantation of AICD      FAMILY HISTORY:  Family history of colon cancer in mother  Family history of cardiomyopathy: Mother  Family history of cervical cancer (Mother)  Family history of early CAD (Mother)      Social History: (-) x 3  Denies recent alcohol (last drink in 2005), tobacco and drug use    Allergies  dexmethylphenidate (Unknown)  Dilantin (Rash)  dilantin, compazine, neurontin, ritalin, phenergan (Unknown)  Haldol (Unknown)  hydantoin derivatives (Other)  methylphenidate (Unknown)  Morphine Sulfate (Unknown)  phenytoin (Unknown)  prochlorperazine (Unknown)  promethazine (Dystonic RXN)  rash/hives (Anaphylaxis)  thioxanthenes (Unknown)    Intolerances        MEDICATIONS  (STANDING):  ALBUTerol/ipratropium for Nebulization. 3 milliLiter(s) Nebulizer every 6 hours  atorvastatin 40 milliGRAM(s) Oral at bedtime  baclofen 10 milliGRAM(s) Oral four times a day  buDESOnide 160 MICROgram(s)/formoterol 4.5 MICROgram(s) Inhaler - Peds 2 Puff(s) Inhalation two times a day  cholecalciferol 2000 Unit(s) Oral daily  dofetilide 250 MICROGram(s) Oral two times a day  enoxaparin Injectable 90 milliGRAM(s) SubCutaneous every 12 hours  fluticasone propionate (50 MICROgram(s)/actuation) Nasal Spray - Peds 2 Spray(s) Alternating Nostrils daily  furosemide    Tablet 20 milliGRAM(s) Oral daily  gabapentin 400 milliGRAM(s) Oral three times a day  lamoTRIgine 100 milliGRAM(s) Oral two times a day  loratadine 10 milliGRAM(s) Oral daily  metoprolol tartrate 25 milliGRAM(s) Oral every 8 hours  morphine ER Tablet 30 milliGRAM(s) Oral every 12 hours  pantoprazole    Tablet 40 milliGRAM(s) Oral before breakfast  polyethylene glycol 3350 17 Gram(s) Oral every 12 hours  QUEtiapine 25 milliGRAM(s) Oral daily  QUEtiapine 50 milliGRAM(s) Oral at bedtime  tamsulosin Oral Tab/Cap - Peds 0.4 milliGRAM(s) Oral at bedtime  tiotropium 18 MICROgram(s) Capsule 1 Capsule(s) Inhalation daily    MEDICATIONS  (PRN):  nitroglycerin     SubLingual 0.4 milliGRAM(s) SubLingual every 5 minutes PRN Chest Pain  ondansetron    Tablet 4 milliGRAM(s) Oral two times a day PRN Nausea and/or Vomiting      Review of systems:    Constitutional: as per HPI  Eyes: No eye pain or discharge  ENMT:  No difficulty hearing; No sinus or throat pain  Neck: No pain or stiffness  Respiratory: No cough, wheezing, chills or hemoptysis  Cardiovascular: No chest pain, palpitations, shortness of breath, dyspnea on exertion  Gastrointestinal: nausea  Genitourinary: No dysuria, frequency, hematuria or incontinence  Neurological: As per HPI  Skin: No rashes or lesions   Endocrine: No heat or cold intolerance; No hair loss  Musculoskeletal: No joint pain or swelling  Psychiatric: c/o anxiety at night  Heme/Lymph: No easy bruising or bleeding gums    Vital Signs Last 24 Hrs  T(C): 37.9 (04 Sep 2019 07:53), Max: 37.9 (04 Sep 2019 07:53)  T(F): 100.2 (04 Sep 2019 07:53), Max: 100.2 (04 Sep 2019 07:53)  HR: 60 (04 Sep 2019 05:40) (60 - 60)  BP: 114/54 (04 Sep 2019 05:40) (106/58 - 114/54)  RR: 18 (04 Sep 2019 05:40) (18 - 19)      Examination:  General:  Appearance is consistent with chronologic age.  No abnormal facies.  Gross skin survey within normal limits. On O2 mask.     Cognitive/Language:  The patient is oriented to person, place, time and date.  Recent and remote memory intact.  Fund of knowledge is intact and normal.  Language with normal repetition, comprehension and naming.  Nondysarthric. Able to follow simple and complex commands. Praxis intact.   Eyes: intact VA, VFF. EOMI w/o nystagmus, skew or reported double vision.  lateral eye gaze of L eye present. PERRL b/l. Loss of accommodation in L eye.  No ptosis/weakness of eyelid closure.    Face:  Facial sensation normal V1 - 3 on the L side, Diminished sensation in V1-V3 on the R side, no facial asymmetry.    Ears/Nose/Throat:  Hearing grossly intact b/l.  Palate elevates midline and symmetrically.  Tongue and uvula midline. Normal ROM of tongue  Motor examination:   Normal tone, bulk and range of motion.  Diffuse muscle spasms (myoclonus) present on examination. No tenderness  Formal Muscle Strength Testing: (MRC grade R/L) 4/5 shoulder shrug and head turning, UE 5-/5; 3/5 hip flexion in the RLE, 5/5 hip extension in RLE, 5/5 knee flexion on the R side, 5/5 knee extension on the  RLE, 5/5 in the LLE .  No observable pronator drift.  Reflexes:  R side: 2+, biceps, triceps, brachioradialis, 1+ patella and 0 Achilles. L side 3+ biceps, triceps, 2+ brachioradialis, 1+ patellar, 0 Achilles.  Plantar response downgoing b/l.  Jaw jerk, Daniel, clonus absent.  Sensory examination:   Intact to light touch, diminished with pinprick and vibration in the proximal UE and LE (R>L), proprioception intact with toe positioning  Cerebellum:   FTN/HKS intact with normal REINALDO in all limbs.  No dysmetria or dysdiadokinesia.  Gait deferred.   Respiratory:  no audible wheezing or inspiratory stridor.  no use of accessory muscles.   Cardiac: pulse palpable, no audible bruits  Abdomen: supple, no guarding, no TTP    Labs:   CBC Full  -  ( 04 Sep 2019 07:12 )  WBC Count : 6.28 K/uL  RBC Count : 3.67 M/uL  Hemoglobin : 10.9 g/dL  Hematocrit : 34.4 %  Platelet Count - Automated : 145 K/uL  Mean Cell Volume : 93.7 fL  Mean Cell Hemoglobin : 29.7 pg  Mean Cell Hemoglobin Concentration : 31.7 g/dL  Auto Neutrophil # : x  Auto Lymphocyte # : x  Auto Monocyte # : x  Auto Eosinophil # : x  Auto Basophil # : x  Auto Neutrophil % : x  Auto Lymphocyte % : x  Auto Monocyte % : x  Auto Eosinophil % : x  Auto Basophil % : x    09-04    143  |  104  |  21<H>  ----------------------------<  98  4.1   |  29  |  0.9    Ca    9.0      04 Sep 2019 07:12  Mg     1.9     09-04    TPro  5.9<L>  /  Alb  4.0  /  TBili  0.6  /  DBili  x   /  AST  73<H>  /  ALT  101<H>  /  AlkPhos  152<H>  09-04    LIVER FUNCTIONS - ( 04 Sep 2019 07:12 )  Alb: 4.0 g/dL / Pro: 5.9 g/dL / ALK PHOS: 152 U/L / ALT: 101 U/L / AST: 73 U/L / GGT: x       [facility]     [facility] 10:34  ‘  Assessment:   Patient is a 47 y/o male who uses a walker at baseline with PMHx of relapsing remitting multiple sclerosis (dx in 2015, on Tecfidera 250mg) w/ multiple admissions for flares (last admission 8/13-8/19), seizure disorder (on lamotrigine 100mg bid), who is c/o diffuse myoclonus s/p RRMS. Pt is currently on Baclofen 10mg four times daily. Pt found to have elevated LFTs on lab 9/4.    Plan   Spasms vs Asterixis   -h/o  muscle spasms from MS; currently not exhibiting excessive motor tone   -Continue with Baclofen 10mg four times daily  -Pt has elevated LFTS - , , AST 73. Repeat LFTs with NH4 level  -1mg Klonopin qhs before bedtime  -Pt has no contraindications for surgery from neurological standpoint.  -PT evaluation Neurology Consult - [facility] KAMERON MADDENU 009    HPI:  49 y/o M who uses walker at baseline with PMHx of relapsing remitting multiple sclerosis (dx in 2015, on Tecfidera 250mg) w/ multiple admissions for flares, seizure disorder (on lamotrigine 100mg bid),  v-tach s/p AICD, CHF, A fib/flutter, HTN, COPD (on O2 PRN at home), PTSD, depression, ?stroke/TIA, DLD, esophageal stricture, chronic sore throat/sinusitis, GERD, hemopneumothorax (2006), pericardial effusion (2006), cardiomyopathy, MI (2006, 2008), IBS, presenting to the ED on 8/29 with chest pain, palpitations and AICD firing. He had a recent admission to [facility] on 08/13/19 with discharge on 08/19/19 for multiple sclerosis flare treated with IV solumedrol for 5 days in patient and discharged on oral Prednisone for 5 more days. During his admission EP interrogated his pacemaker and multiple episodes of SVT and PAF were noted over past few months. His metoprolol was increased to 25 mg TID, and to return to see Dr. Quiñones in 2 months for battery change due to low battery in pacemaker. He states that two days ago he felt mid sternal chest pain, 8/10, that radiated to the right side of his neck and face that resolved with sublingual nitroglycerin.      Patient was seen at bedside this morning. Neurology was consulted because patient is c/o diffuse neck, LE, and UE muscle spasm. Pt states he had these spasms before due to his MS. Pt is currently on max dose Baclofen 10mg four times daily. Pt states he usually followed neurology at the nursing home is currently residing, but is now following Dr. Brody w/ last visit 3 weeks ago.  He endorses that his neurologists have been talking about placing him on a baclofen pump due to spasm recurrence. Pt is also c/o mild headache, dizziness, nausea, and non-focal numbness on the R side of the body. Denies shock like sensation, visual changes, eye pain, chest pain, hearing loss, vomiting, confusion.     PAST MEDICAL & SURGICAL HISTORY:  PTSD (post-traumatic stress disorder)  Supraventricular arrhythmia: prior history  Cardiomyopathy  CHF (congestive heart failure)  Atrial fibrillation: s/p ablation, on eliquis  Diabetes mellitus: diet controlled  BPH (benign prostatic hyperplasia)  HTN (hypertension)  Seizures  Migraines  Pneumonia  MS (multiple sclerosis)  CAD (coronary artery disease)  Bipolar disorder  Anginal pain  Schizo affective schizophrenia  GERD (gastroesophageal reflux disease)  Seasonal allergies  Hypercholesteremia  COPD (chronic obstructive pulmonary disease)  CVA (cerebral vascular accident)  TIA (transient ischemic attack)  Syncope  Peripheral Neuropathy  Clinical Depression  H/O prior ablation treatment: for Afib  Cardiac pacemaker recipient  H/O hernia repair: right 1972,1991  History of hip replacement  S/P Orchiectomy: L for testicular CA  S/P Implantation of AICD      FAMILY HISTORY:  Family history of colon cancer in mother  Family history of cardiomyopathy: Mother  Family history of cervical cancer (Mother)  Family history of early CAD (Mother)      Social History: (-) x 3  Denies recent alcohol (last drink in 2005), tobacco and drug use    Allergies  dexmethylphenidate (Unknown)  Dilantin (Rash)  dilantin, compazine, neurontin, ritalin, phenergan (Unknown)  Haldol (Unknown)  hydantoin derivatives (Other)  methylphenidate (Unknown)  Morphine Sulfate (Unknown)  phenytoin (Unknown)  prochlorperazine (Unknown)  promethazine (Dystonic RXN)  rash/hives (Anaphylaxis)  thioxanthenes (Unknown)  Intolerances    MEDICATIONS  (STANDING):  ALBUTerol/ipratropium for Nebulization. 3 milliLiter(s) Nebulizer every 6 hours  atorvastatin 40 milliGRAM(s) Oral at bedtime  baclofen 10 milliGRAM(s) Oral four times a day  buDESOnide 160 MICROgram(s)/formoterol 4.5 MICROgram(s) Inhaler - Peds 2 Puff(s) Inhalation two times a day  cholecalciferol 2000 Unit(s) Oral daily  dofetilide 250 MICROGram(s) Oral two times a day  enoxaparin Injectable 90 milliGRAM(s) SubCutaneous every 12 hours  fluticasone propionate (50 MICROgram(s)/actuation) Nasal Spray - Peds 2 Spray(s) Alternating Nostrils daily  furosemide    Tablet 20 milliGRAM(s) Oral daily  gabapentin 400 milliGRAM(s) Oral three times a day  lamoTRIgine 100 milliGRAM(s) Oral two times a day  loratadine 10 milliGRAM(s) Oral daily  metoprolol tartrate 25 milliGRAM(s) Oral every 8 hours  morphine ER Tablet 30 milliGRAM(s) Oral every 12 hours  pantoprazole    Tablet 40 milliGRAM(s) Oral before breakfast  polyethylene glycol 3350 17 Gram(s) Oral every 12 hours  QUEtiapine 25 milliGRAM(s) Oral daily  QUEtiapine 50 milliGRAM(s) Oral at bedtime  tamsulosin Oral Tab/Cap - Peds 0.4 milliGRAM(s) Oral at bedtime  tiotropium 18 MICROgram(s) Capsule 1 Capsule(s) Inhalation daily    MEDICATIONS  (PRN):  nitroglycerin     SubLingual 0.4 milliGRAM(s) SubLingual every 5 minutes PRN Chest Pain  ondansetron    Tablet 4 milliGRAM(s) Oral two times a day PRN Nausea and/or Vomiting    Review of systems:    Constitutional: as per HPI  Eyes: No eye pain or discharge  ENMT:  No difficulty hearing; No sinus or throat pain  Neck: No pain or stiffness  Respiratory: No cough, wheezing, chills or hemoptysis  Cardiovascular: No chest pain, palpitations, shortness of breath, dyspnea on exertion  Gastrointestinal: nausea  Genitourinary: No dysuria, frequency, hematuria or incontinence  Neurological: As per HPI  Skin: No rashes or lesions   Endocrine: No heat or cold intolerance; No hair loss  Musculoskeletal: No joint pain or swelling  Psychiatric: c/o anxiety at night  Heme/Lymph: No easy bruising or bleeding gums    Vital Signs Last 24 Hrs  T(C): 37.9 (04 Sep 2019 07:53), Max: 37.9 (04 Sep 2019 07:53)  T(F): 100.2 (04 Sep 2019 07:53), Max: 100.2 (04 Sep 2019 07:53)  HR: 60 (04 Sep 2019 05:40) (60 - 60)  BP: 114/54 (04 Sep 2019 05:40) (106/58 - 114/54)  RR: 18 (04 Sep 2019 05:40) (18 - 19)    Examination:  General:  Appearance is consistent with chronologic age.  No abnormal facies.  Gross skin survey within normal limits. On O2 mask.     Cognitive/Language:  The patient is oriented to person, place, time and date.  Recent and remote memory intact.  Fund of knowledge is intact and normal.  Language with normal repetition, comprehension and naming.  Nondysarthric. Able to follow simple and complex commands. Praxis intact.   Eyes: intact VA, VFF. EOMI w/o nystagmus, skew or reported double vision.  ? chronic L YARELIS.  PERRL b/l. Loss of accommodation in L eye.  No ptosis/weakness of eyelid closure.    Face:  Facial sensation normal V1 - 3 on the L side, Diminished sensation in V1-V3 on the R side, no facial asymmetry.    Ears/Nose/Throat:  Hearing grossly intact b/l.  Palate elevates midline and symmetrically.  Tongue and uvula midline. Normal ROM of tongue  Motor examination:   Normal tone, bulk and range of motion.  Diffuse muscle spasms (myoclonus) present on examination. No tenderness  Formal Muscle Strength Testing: (MRC grade R/L) 5-/5 shoulder shrug and head turning, UE 5-/5; 4/5 hip flexion in the RLE, 5/5 hip extension in RLE, 5/5 knee flexion on the R side, 5/5 knee extension on the  RLE, 5/5 in the LLE .  No observable pronator drift.  tone normal all 4 extremities without noticeable rigidity or spasticity; (+) asterixis b/l UE > LE mild to moderate  Reflexes:  R side: 2+, biceps, triceps, brachioradialis, 1+ patella and 0 Achilles. L side 3+ biceps, triceps, 2+ brachioradialis, 1+ patellar, 0 Achilles.  Plantar response downgoing b/l.  Jaw jerk, Daniel, clonus absent.  Sensory examination:   Intact to light touch, diminished with pinprick and vibration in the proximal UE and LE (R>L), proprioception intact with toe positioning  Cerebellum:   FTN/HKS intact with normal REINALDO in all limbs.  No dysmetria or dysdiadokinesia.  Gait narrow based slightly antalgic independently ambulating.    Respiratory:  no audible wheezing or inspiratory stridor.  no use of accessory muscles.   Cardiac: pulse palpable, no audible bruits  Abdomen: supple, no guarding, no TTP    Labs:   CBC Full  -  ( 04 Sep 2019 07:12 )  WBC Count : 6.28 K/uL  RBC Count : 3.67 M/uL  Hemoglobin : 10.9 g/dL  Hematocrit : 34.4 %  Platelet Count - Automated : 145 K/uL  Mean Cell Volume : 93.7 fL  Mean Cell Hemoglobin : 29.7 pg  Mean Cell Hemoglobin Concentration : 31.7 g/dL    09-04    143  |  104  |  21<H>  ----------------------------<  98  4.1   |  29  |  0.9    Ca    9.0      04 Sep 2019 07:12  Mg     1.9     09-04    TPro  5.9<L>  /  Alb  4.0  /  TBili  0.6  /  DBili  x   /  AST  73<H>  /  ALT  101<H>  /  AlkPhos  152<H>  09-04    LIVER FUNCTIONS - ( 04 Sep 2019 07:12 )  Alb: 4.0 g/dL / Pro: 5.9 g/dL / ALK PHOS: 152 U/L / ALT: 101 U/L / AST: 73 U/L / GGT: x         09-04-19 @ 10:34  ‘

## 2019-09-04 NOTE — PROGRESS NOTE ADULT - ASSESSMENT
47 y/o M w/ a PMH of v-tach s/p AICD, CHF, A fib/flutter, HTN, COPD (on O2 PRN), DLD, multiple sclerosis w/ multiple admissions for flares, seizure disorder (on lamotrigine), PTSD, depression, ? stroke/TIA, esophageal stricture, chronic sore throat/sinusitis, GERD, hemopneumothorax (2006), pericardial effusion (2006), cardiomyopathy, MI (2006, 2008), IBS, presenting post AICD firing.     #) ARVD (Arrhythmogenic right ventricular dysplasia) s/p AICD, possible AICD dysfunction  - now with possible pacemaker firing, buzzing sensation in chest   - Keep telemetry, pacing pads on chest   - H/o v-tach (no VT, VF, on AICD since 2006)  - C/w metoprolol tartrate 25 mg PO TID, Tikosyn 250 mcg BID   - EP following   - Underwent fluoro to assess RV lead placement possibly dislodged   - CT sx for RV lead extraction and revision and battery change  - switched to full dose Lovenox from Eliquis   - hold Lovenox on day of procedure   - restart Eliquis post procedure once ok by CT sx  - CT sx to see pt today    #) Anginal symptoms, without prior evidence of CAD, ?H/o MI 2006, 2008  - With episode of anginal symptoms, pressure like, radiating to jaw, resolved with nitro  - More likely related to pacemaker firing   - Troponin negative, no acute EKG changes  - Per cardiology on 08/18, No evidence of CAD, on 8/6/2018 CCTA reported normal with 0 calcium score  - C/w metoprolol tartrate 25 mg PO TID  - C/w atorvastatin 40 mg PO qHS  - C/w aspirin 81 mg PO qD    #)  HTN  -C/w metoprolol tartrate 25 mg PO TID  -C/w Lasix 20 mg     #) Multiple sclerosis   -with recent flare, discharged on 08/19/19 with prednisone for 5 days (completed)  -Patient takes Tecfidera 240 mg PO qD but is apparently non-formulary; please contact [place]  -Pain control with acetaminophen and baclofen and gabapentin and morphine  IR 15 as per pain management on last admission.  Allergy in chart is local rash to IV opioids, may give PO. Also got IV morphine in hospital without reaction,   -Ambulate w/ assistance (patient uses walker at baseline)  - Pt states he is starting to feel another flare  - Neurology was consulted    # H/o seizure disorder  C/w lamotrigine 100 mg PO BID    #) COPD on PRN home O2  -Currently on RA  -C/w Symbicort high dose 2 puffs BID  -Vitals per routine, pulse ox q4h    # Chronic pharyngitis , sinusitis  -C/w Cepacol, Claritin, Flonase    # DLD  C/w atorvastatin 40 mg PO qHS    # GERD  C/w pantoprazole 40 mg PO qAM (patient takes omeprazole at home)    # H/o esophageal stricture   -cont mechanical soft dysphagia 2 w/ thins    DVT ppx- full dose lovenox  GI ppx- c/w pantoprazole 40 mg PO qAM (on omeprazole at home)  Activity- as tolerated   Diet- mechanical soft dysphagia 2 w/ thins  Code status: Full  Dispo- patient from [facility]; functional at baseline w/ walker 47 y/o M w/ a PMH of v-tach s/p AICD, CHF, A fib/flutter, HTN, COPD (on O2 PRN), DLD, multiple sclerosis w/ multiple admissions for flares, seizure disorder (on lamotrigine), PTSD, depression, ? stroke/TIA, esophageal stricture, chronic sore throat/sinusitis, GERD, hemopneumothorax (2006), pericardial effusion (2006), cardiomyopathy, MI (2006, 2008), IBS, presenting post AICD firing.     Elevated LFTs  - Elevated LFTs today  - No new recent hepatotoxic medication   - check RUQ US     #) ARVD (Arrhythmogenic right ventricular dysplasia) s/p AICD, possible AICD dysfunction  - now with possible pacemaker firing, buzzing sensation in chest   - Keep telemetry, pacing pads on chest   - H/o v-tach (no VT, VF, on AICD since 2006)  - C/w metoprolol tartrate 25 mg PO TID, Tikosyn 250 mcg BID   - EP following   - Underwent fluoro to assess RV lead placement possibly dislodged   - CT sx for RV lead extraction and revision and battery change  - switched to full dose Lovenox from Eliquis   - hold Lovenox on day of procedure   - restart Eliquis post procedure once ok by CT sx  - CT sx to see pt today    #) Anginal symptoms, without prior evidence of CAD, ?H/o MI 2006, 2008  - With episode of anginal symptoms, pressure like, radiating to jaw, resolved with nitro  - More likely related to pacemaker firing   - Troponin negative, no acute EKG changes  - Per cardiology on 08/18, No evidence of CAD, on 8/6/2018 CCTA reported normal with 0 calcium score  - C/w metoprolol tartrate 25 mg PO TID  - C/w atorvastatin 40 mg PO qHS  - C/w aspirin 81 mg PO qD    #)  HTN  -C/w metoprolol tartrate 25 mg PO TID  -C/w Lasix 20 mg     #) Multiple sclerosis   -with recent flare, discharged on 08/19/19 with prednisone for 5 days (completed)  -Patient takes Tecfidera 240 mg PO qD but is apparently non-formulary; please contact [facility]  -Pain control with acetaminophen and baclofen and gabapentin and morphine  IR 15 as per pain management on last admission.  Allergy in chart is local rash to IV opioids, may give PO. Also got IV morphine in hospital without reaction,   -Ambulate w/ assistance (patient uses walker at baseline)  - Pt states he is starting to feel another flare  - Neurology was consulted    # H/o seizure disorder  C/w lamotrigine 100 mg PO BID    #) COPD on PRN home O2  -Currently on RA  -C/w Symbicort high dose 2 puffs BID  -Vitals per routine, pulse ox q4h    # Chronic pharyngitis , sinusitis  -C/w Cepacol, Claritin, Flonase    # DLD  C/w atorvastatin 40 mg PO qHS    # GERD  C/w pantoprazole 40 mg PO qAM (patient takes omeprazole at home)    # H/o esophageal stricture   -cont mechanical soft dysphagia 2 w/ thins    DVT ppx- full dose lovenox  GI ppx- c/w pantoprazole 40 mg PO qAM (on omeprazole at home)  Activity- as tolerated   Diet- mechanical soft dysphagia 2 w/ thins  Code status: Full  Dispo- patient from [facility]; functional at baseline w/ walker

## 2019-09-05 LAB
ALBUMIN SERPL ELPH-MCNC: 3.7 G/DL — SIGNIFICANT CHANGE UP (ref 3.5–5.2)
ALBUMIN SERPL ELPH-MCNC: 4.3 G/DL — SIGNIFICANT CHANGE UP (ref 3.5–5.2)
ALP SERPL-CCNC: 151 U/L — HIGH (ref 30–115)
ALP SERPL-CCNC: 165 U/L — HIGH (ref 30–115)
ALT FLD-CCNC: 141 U/L — HIGH (ref 0–41)
ALT FLD-CCNC: 141 U/L — HIGH (ref 0–41)
AMMONIA BLD-MCNC: 40 UMOL/L — SIGNIFICANT CHANGE UP (ref 11–55)
ANION GAP SERPL CALC-SCNC: 13 MMOL/L — SIGNIFICANT CHANGE UP (ref 7–14)
ANION GAP SERPL CALC-SCNC: 15 MMOL/L — HIGH (ref 7–14)
APTT BLD: 41.6 SEC — HIGH (ref 27–39.2)
AST SERPL-CCNC: 92 U/L — HIGH (ref 0–41)
AST SERPL-CCNC: 98 U/L — HIGH (ref 0–41)
BASOPHILS # BLD AUTO: 0.03 K/UL — SIGNIFICANT CHANGE UP (ref 0–0.2)
BASOPHILS NFR BLD AUTO: 0.6 % — SIGNIFICANT CHANGE UP (ref 0–1)
BILIRUB SERPL-MCNC: 0.8 MG/DL — SIGNIFICANT CHANGE UP (ref 0.2–1.2)
BILIRUB SERPL-MCNC: 1 MG/DL — SIGNIFICANT CHANGE UP (ref 0.2–1.2)
BLD GP AB SCN SERPL QL: SIGNIFICANT CHANGE UP
BUN SERPL-MCNC: 19 MG/DL — SIGNIFICANT CHANGE UP (ref 10–20)
BUN SERPL-MCNC: 21 MG/DL — HIGH (ref 10–20)
CALCIUM SERPL-MCNC: 8.3 MG/DL — LOW (ref 8.5–10.1)
CALCIUM SERPL-MCNC: 9.1 MG/DL — SIGNIFICANT CHANGE UP (ref 8.5–10.1)
CHLORIDE SERPL-SCNC: 100 MMOL/L — SIGNIFICANT CHANGE UP (ref 98–110)
CHLORIDE SERPL-SCNC: 99 MMOL/L — SIGNIFICANT CHANGE UP (ref 98–110)
CO2 SERPL-SCNC: 25 MMOL/L — SIGNIFICANT CHANGE UP (ref 17–32)
CO2 SERPL-SCNC: 26 MMOL/L — SIGNIFICANT CHANGE UP (ref 17–32)
CREAT SERPL-MCNC: 1.1 MG/DL — SIGNIFICANT CHANGE UP (ref 0.7–1.5)
CREAT SERPL-MCNC: 1.2 MG/DL — SIGNIFICANT CHANGE UP (ref 0.7–1.5)
EOSINOPHIL # BLD AUTO: 0.03 K/UL — SIGNIFICANT CHANGE UP (ref 0–0.7)
EOSINOPHIL NFR BLD AUTO: 0.6 % — SIGNIFICANT CHANGE UP (ref 0–8)
GAS PNL BLDA: SIGNIFICANT CHANGE UP
GLUCOSE BLDC GLUCOMTR-MCNC: 207 MG/DL — HIGH (ref 70–99)
GLUCOSE SERPL-MCNC: 162 MG/DL — HIGH (ref 70–99)
GLUCOSE SERPL-MCNC: 97 MG/DL — SIGNIFICANT CHANGE UP (ref 70–99)
HCT VFR BLD CALC: 32.8 % — LOW (ref 42–52)
HCT VFR BLD CALC: 35.7 % — LOW (ref 42–52)
HGB BLD-MCNC: 10.5 G/DL — LOW (ref 14–18)
HGB BLD-MCNC: 11.3 G/DL — LOW (ref 14–18)
IMM GRANULOCYTES NFR BLD AUTO: 0.6 % — HIGH (ref 0.1–0.3)
INR BLD: 1.08 RATIO — SIGNIFICANT CHANGE UP (ref 0.65–1.3)
LYMPHOCYTES # BLD AUTO: 0.81 K/UL — LOW (ref 1.2–3.4)
LYMPHOCYTES # BLD AUTO: 15.1 % — LOW (ref 20.5–51.1)
MAGNESIUM SERPL-MCNC: 2 MG/DL — SIGNIFICANT CHANGE UP (ref 1.8–2.4)
MCHC RBC-ENTMCNC: 29.4 PG — SIGNIFICANT CHANGE UP (ref 27–31)
MCHC RBC-ENTMCNC: 29.7 PG — SIGNIFICANT CHANGE UP (ref 27–31)
MCHC RBC-ENTMCNC: 31.7 G/DL — LOW (ref 32–37)
MCHC RBC-ENTMCNC: 32 G/DL — SIGNIFICANT CHANGE UP (ref 32–37)
MCV RBC AUTO: 92.7 FL — SIGNIFICANT CHANGE UP (ref 80–94)
MCV RBC AUTO: 93 FL — SIGNIFICANT CHANGE UP (ref 80–94)
MONOCYTES # BLD AUTO: 0.17 K/UL — SIGNIFICANT CHANGE UP (ref 0.1–0.6)
MONOCYTES NFR BLD AUTO: 3.2 % — SIGNIFICANT CHANGE UP (ref 1.7–9.3)
NEUTROPHILS # BLD AUTO: 4.3 K/UL — SIGNIFICANT CHANGE UP (ref 1.4–6.5)
NEUTROPHILS NFR BLD AUTO: 79.9 % — HIGH (ref 42.2–75.2)
NRBC # BLD: 0 /100 WBCS — SIGNIFICANT CHANGE UP (ref 0–0)
NRBC # BLD: 0 /100 WBCS — SIGNIFICANT CHANGE UP (ref 0–0)
PHOSPHATE SERPL-MCNC: 3.6 MG/DL — SIGNIFICANT CHANGE UP (ref 2.1–4.9)
PLATELET # BLD AUTO: 134 K/UL — SIGNIFICANT CHANGE UP (ref 130–400)
PLATELET # BLD AUTO: 165 K/UL — SIGNIFICANT CHANGE UP (ref 130–400)
POTASSIUM SERPL-MCNC: 3.7 MMOL/L — SIGNIFICANT CHANGE UP (ref 3.5–5)
POTASSIUM SERPL-MCNC: 4 MMOL/L — SIGNIFICANT CHANGE UP (ref 3.5–5)
POTASSIUM SERPL-SCNC: 3.7 MMOL/L — SIGNIFICANT CHANGE UP (ref 3.5–5)
POTASSIUM SERPL-SCNC: 4 MMOL/L — SIGNIFICANT CHANGE UP (ref 3.5–5)
PROT SERPL-MCNC: 6 G/DL — SIGNIFICANT CHANGE UP (ref 6–8)
PROT SERPL-MCNC: 6.5 G/DL — SIGNIFICANT CHANGE UP (ref 6–8)
PROTHROM AB SERPL-ACNC: 12.4 SEC — SIGNIFICANT CHANGE UP (ref 9.95–12.87)
RBC # BLD: 3.54 M/UL — LOW (ref 4.7–6.1)
RBC # BLD: 3.84 M/UL — LOW (ref 4.7–6.1)
RBC # FLD: 14.6 % — HIGH (ref 11.5–14.5)
RBC # FLD: 14.7 % — HIGH (ref 11.5–14.5)
SODIUM SERPL-SCNC: 139 MMOL/L — SIGNIFICANT CHANGE UP (ref 135–146)
SODIUM SERPL-SCNC: 139 MMOL/L — SIGNIFICANT CHANGE UP (ref 135–146)
WBC # BLD: 4.51 K/UL — LOW (ref 4.8–10.8)
WBC # BLD: 5.37 K/UL — SIGNIFICANT CHANGE UP (ref 4.8–10.8)
WBC # FLD AUTO: 4.51 K/UL — LOW (ref 4.8–10.8)
WBC # FLD AUTO: 5.37 K/UL — SIGNIFICANT CHANGE UP (ref 4.8–10.8)

## 2019-09-05 PROCEDURE — 71045 X-RAY EXAM CHEST 1 VIEW: CPT | Mod: 26,77

## 2019-09-05 PROCEDURE — 71045 X-RAY EXAM CHEST 1 VIEW: CPT | Mod: 26

## 2019-09-05 PROCEDURE — 33244 REMOVE ELCTRD TRANSVENOUSLY: CPT

## 2019-09-05 PROCEDURE — 33249 INSJ/RPLCMT DEFIB W/LEAD(S): CPT

## 2019-09-05 RX ORDER — FUROSEMIDE 40 MG
20 TABLET ORAL DAILY
Refills: 0 | Status: DISCONTINUED | OUTPATIENT
Start: 2019-09-05 | End: 2019-09-11

## 2019-09-05 RX ORDER — CEPHALEXIN 500 MG
500 CAPSULE ORAL EVERY 12 HOURS
Refills: 0 | Status: DISCONTINUED | OUTPATIENT
Start: 2019-09-06 | End: 2019-09-07

## 2019-09-05 RX ORDER — SODIUM CHLORIDE 9 MG/ML
1000 INJECTION INTRAMUSCULAR; INTRAVENOUS; SUBCUTANEOUS
Refills: 0 | Status: DISCONTINUED | OUTPATIENT
Start: 2019-09-05 | End: 2019-09-05

## 2019-09-05 RX ORDER — FUROSEMIDE 40 MG
20 TABLET ORAL DAILY
Refills: 0 | Status: DISCONTINUED | OUTPATIENT
Start: 2019-09-05 | End: 2019-09-05

## 2019-09-05 RX ORDER — BENZOCAINE AND MENTHOL 5; 1 G/100ML; G/100ML
1 LIQUID ORAL THREE TIMES A DAY
Refills: 0 | Status: DISCONTINUED | OUTPATIENT
Start: 2019-09-05 | End: 2019-09-11

## 2019-09-05 RX ORDER — METOPROLOL TARTRATE 50 MG
25 TABLET ORAL EVERY 8 HOURS
Refills: 0 | Status: DISCONTINUED | OUTPATIENT
Start: 2019-09-05 | End: 2019-09-06

## 2019-09-05 RX ORDER — GLUCAGON INJECTION, SOLUTION 0.5 MG/.1ML
1 INJECTION, SOLUTION SUBCUTANEOUS ONCE
Refills: 0 | Status: DISCONTINUED | OUTPATIENT
Start: 2019-09-05 | End: 2019-09-11

## 2019-09-05 RX ORDER — ONDANSETRON 8 MG/1
4 TABLET, FILM COATED ORAL
Refills: 0 | Status: DISCONTINUED | OUTPATIENT
Start: 2019-09-05 | End: 2019-09-05

## 2019-09-05 RX ORDER — LAMOTRIGINE 25 MG/1
100 TABLET, ORALLY DISINTEGRATING ORAL
Refills: 0 | Status: DISCONTINUED | OUTPATIENT
Start: 2019-09-05 | End: 2019-09-05

## 2019-09-05 RX ORDER — QUETIAPINE FUMARATE 200 MG/1
25 TABLET, FILM COATED ORAL DAILY
Refills: 0 | Status: DISCONTINUED | OUTPATIENT
Start: 2019-09-05 | End: 2019-09-11

## 2019-09-05 RX ORDER — SODIUM CHLORIDE 9 MG/ML
1000 INJECTION INTRAMUSCULAR; INTRAVENOUS; SUBCUTANEOUS
Refills: 0 | Status: DISCONTINUED | OUTPATIENT
Start: 2019-09-05 | End: 2019-09-11

## 2019-09-05 RX ORDER — DEXTROSE 50 % IN WATER 50 %
12.5 SYRINGE (ML) INTRAVENOUS ONCE
Refills: 0 | Status: DISCONTINUED | OUTPATIENT
Start: 2019-09-05 | End: 2019-09-09

## 2019-09-05 RX ORDER — CLONAZEPAM 1 MG
0.5 TABLET ORAL AT BEDTIME
Refills: 0 | Status: DISCONTINUED | OUTPATIENT
Start: 2019-09-05 | End: 2019-09-06

## 2019-09-05 RX ORDER — QUETIAPINE FUMARATE 200 MG/1
50 TABLET, FILM COATED ORAL AT BEDTIME
Refills: 0 | Status: DISCONTINUED | OUTPATIENT
Start: 2019-09-05 | End: 2019-09-11

## 2019-09-05 RX ORDER — BUDESONIDE AND FORMOTEROL FUMARATE DIHYDRATE 160; 4.5 UG/1; UG/1
2 AEROSOL RESPIRATORY (INHALATION)
Refills: 0 | Status: DISCONTINUED | OUTPATIENT
Start: 2019-09-05 | End: 2019-09-05

## 2019-09-05 RX ORDER — POLYETHYLENE GLYCOL 3350 17 G/17G
17 POWDER, FOR SOLUTION ORAL EVERY 12 HOURS
Refills: 0 | Status: DISCONTINUED | OUTPATIENT
Start: 2019-09-05 | End: 2019-09-05

## 2019-09-05 RX ORDER — LORATADINE 10 MG/1
10 TABLET ORAL DAILY
Refills: 0 | Status: DISCONTINUED | OUTPATIENT
Start: 2019-09-05 | End: 2019-09-11

## 2019-09-05 RX ORDER — LAMOTRIGINE 25 MG/1
100 TABLET, ORALLY DISINTEGRATING ORAL
Refills: 0 | Status: DISCONTINUED | OUTPATIENT
Start: 2019-09-05 | End: 2019-09-11

## 2019-09-05 RX ORDER — BUDESONIDE AND FORMOTEROL FUMARATE DIHYDRATE 160; 4.5 UG/1; UG/1
2 AEROSOL RESPIRATORY (INHALATION)
Refills: 0 | Status: DISCONTINUED | OUTPATIENT
Start: 2019-09-05 | End: 2019-09-11

## 2019-09-05 RX ORDER — BACLOFEN 100 %
10 POWDER (GRAM) MISCELLANEOUS
Refills: 0 | Status: DISCONTINUED | OUTPATIENT
Start: 2019-09-05 | End: 2019-09-05

## 2019-09-05 RX ORDER — FENTANYL CITRATE 50 UG/ML
25 INJECTION INTRAVENOUS ONCE
Refills: 0 | Status: DISCONTINUED | OUTPATIENT
Start: 2019-09-05 | End: 2019-09-05

## 2019-09-05 RX ORDER — SODIUM CHLORIDE 9 MG/ML
1000 INJECTION, SOLUTION INTRAVENOUS
Refills: 0 | Status: DISCONTINUED | OUTPATIENT
Start: 2019-09-05 | End: 2019-09-09

## 2019-09-05 RX ORDER — CHOLECALCIFEROL (VITAMIN D3) 125 MCG
2000 CAPSULE ORAL DAILY
Refills: 0 | Status: DISCONTINUED | OUTPATIENT
Start: 2019-09-05 | End: 2019-09-11

## 2019-09-05 RX ORDER — ONDANSETRON 8 MG/1
4 TABLET, FILM COATED ORAL
Refills: 0 | Status: DISCONTINUED | OUTPATIENT
Start: 2019-09-05 | End: 2019-09-11

## 2019-09-05 RX ORDER — DEXTROSE 50 % IN WATER 50 %
25 SYRINGE (ML) INTRAVENOUS ONCE
Refills: 0 | Status: DISCONTINUED | OUTPATIENT
Start: 2019-09-05 | End: 2019-09-09

## 2019-09-05 RX ORDER — TAMSULOSIN HYDROCHLORIDE 0.4 MG/1
0.4 CAPSULE ORAL AT BEDTIME
Refills: 0 | Status: DISCONTINUED | OUTPATIENT
Start: 2019-09-05 | End: 2019-09-11

## 2019-09-05 RX ORDER — DOFETILIDE 0.25 MG/1
250 CAPSULE ORAL
Refills: 0 | Status: DISCONTINUED | OUTPATIENT
Start: 2019-09-05 | End: 2019-09-05

## 2019-09-05 RX ORDER — FLUTICASONE PROPIONATE 50 MCG
2 SPRAY, SUSPENSION NASAL DAILY
Refills: 0 | Status: DISCONTINUED | OUTPATIENT
Start: 2019-09-05 | End: 2019-09-11

## 2019-09-05 RX ORDER — IPRATROPIUM/ALBUTEROL SULFATE 18-103MCG
3 AEROSOL WITH ADAPTER (GRAM) INHALATION EVERY 6 HOURS
Refills: 0 | Status: DISCONTINUED | OUTPATIENT
Start: 2019-09-05 | End: 2019-09-06

## 2019-09-05 RX ORDER — QUETIAPINE FUMARATE 200 MG/1
50 TABLET, FILM COATED ORAL AT BEDTIME
Refills: 0 | Status: DISCONTINUED | OUTPATIENT
Start: 2019-09-05 | End: 2019-09-05

## 2019-09-05 RX ORDER — METOPROLOL TARTRATE 50 MG
25 TABLET ORAL EVERY 8 HOURS
Refills: 0 | Status: DISCONTINUED | OUTPATIENT
Start: 2019-09-05 | End: 2019-09-05

## 2019-09-05 RX ORDER — CLONAZEPAM 1 MG
0.5 TABLET ORAL AT BEDTIME
Refills: 0 | Status: DISCONTINUED | OUTPATIENT
Start: 2019-09-05 | End: 2019-09-11

## 2019-09-05 RX ORDER — CEFAZOLIN SODIUM 1 G
1000 VIAL (EA) INJECTION EVERY 8 HOURS
Refills: 0 | Status: DISCONTINUED | OUTPATIENT
Start: 2019-09-05 | End: 2019-09-05

## 2019-09-05 RX ORDER — NITROGLYCERIN 6.5 MG
0.4 CAPSULE, EXTENDED RELEASE ORAL
Refills: 0 | Status: DISCONTINUED | OUTPATIENT
Start: 2019-09-05 | End: 2019-09-11

## 2019-09-05 RX ORDER — TIOTROPIUM BROMIDE 18 UG/1
1 CAPSULE ORAL; RESPIRATORY (INHALATION) DAILY
Refills: 0 | Status: DISCONTINUED | OUTPATIENT
Start: 2019-09-05 | End: 2019-09-11

## 2019-09-05 RX ORDER — CEFAZOLIN SODIUM 1 G
1000 VIAL (EA) INJECTION EVERY 8 HOURS
Refills: 0 | Status: COMPLETED | OUTPATIENT
Start: 2019-09-05 | End: 2019-09-06

## 2019-09-05 RX ORDER — PANTOPRAZOLE SODIUM 20 MG/1
40 TABLET, DELAYED RELEASE ORAL
Refills: 0 | Status: DISCONTINUED | OUTPATIENT
Start: 2019-09-05 | End: 2019-09-11

## 2019-09-05 RX ORDER — CHOLECALCIFEROL (VITAMIN D3) 125 MCG
2000 CAPSULE ORAL DAILY
Refills: 0 | Status: DISCONTINUED | OUTPATIENT
Start: 2019-09-05 | End: 2019-09-05

## 2019-09-05 RX ORDER — BACLOFEN 100 %
10 POWDER (GRAM) MISCELLANEOUS
Refills: 0 | Status: DISCONTINUED | OUTPATIENT
Start: 2019-09-05 | End: 2019-09-11

## 2019-09-05 RX ORDER — TIOTROPIUM BROMIDE 18 UG/1
1 CAPSULE ORAL; RESPIRATORY (INHALATION) DAILY
Refills: 0 | Status: DISCONTINUED | OUTPATIENT
Start: 2019-09-05 | End: 2019-09-05

## 2019-09-05 RX ORDER — DEXTROSE 50 % IN WATER 50 %
15 SYRINGE (ML) INTRAVENOUS ONCE
Refills: 0 | Status: DISCONTINUED | OUTPATIENT
Start: 2019-09-05 | End: 2019-09-09

## 2019-09-05 RX ORDER — DOFETILIDE 0.25 MG/1
250 CAPSULE ORAL
Refills: 0 | Status: DISCONTINUED | OUTPATIENT
Start: 2019-09-05 | End: 2019-09-11

## 2019-09-05 RX ORDER — LORATADINE 10 MG/1
10 TABLET ORAL DAILY
Refills: 0 | Status: DISCONTINUED | OUTPATIENT
Start: 2019-09-05 | End: 2019-09-05

## 2019-09-05 RX ORDER — TAMSULOSIN HYDROCHLORIDE 0.4 MG/1
0.4 CAPSULE ORAL AT BEDTIME
Refills: 0 | Status: DISCONTINUED | OUTPATIENT
Start: 2019-09-05 | End: 2019-09-05

## 2019-09-05 RX ORDER — POLYETHYLENE GLYCOL 3350 17 G/17G
17 POWDER, FOR SOLUTION ORAL EVERY 12 HOURS
Refills: 0 | Status: DISCONTINUED | OUTPATIENT
Start: 2019-09-05 | End: 2019-09-11

## 2019-09-05 RX ORDER — NITROGLYCERIN 6.5 MG
0.4 CAPSULE, EXTENDED RELEASE ORAL
Refills: 0 | Status: DISCONTINUED | OUTPATIENT
Start: 2019-09-05 | End: 2019-09-05

## 2019-09-05 RX ORDER — CLONAZEPAM 1 MG
0.5 TABLET ORAL AT BEDTIME
Refills: 0 | Status: DISCONTINUED | OUTPATIENT
Start: 2019-09-05 | End: 2019-09-05

## 2019-09-05 RX ORDER — ATORVASTATIN CALCIUM 80 MG/1
40 TABLET, FILM COATED ORAL AT BEDTIME
Refills: 0 | Status: DISCONTINUED | OUTPATIENT
Start: 2019-09-05 | End: 2019-09-11

## 2019-09-05 RX ORDER — QUETIAPINE FUMARATE 200 MG/1
25 TABLET, FILM COATED ORAL DAILY
Refills: 0 | Status: DISCONTINUED | OUTPATIENT
Start: 2019-09-05 | End: 2019-09-05

## 2019-09-05 RX ORDER — GABAPENTIN 400 MG/1
400 CAPSULE ORAL THREE TIMES A DAY
Refills: 0 | Status: DISCONTINUED | OUTPATIENT
Start: 2019-09-05 | End: 2019-09-11

## 2019-09-05 RX ORDER — INSULIN LISPRO 100/ML
VIAL (ML) SUBCUTANEOUS
Refills: 0 | Status: DISCONTINUED | OUTPATIENT
Start: 2019-09-05 | End: 2019-09-09

## 2019-09-05 RX ORDER — FLUTICASONE PROPIONATE 50 MCG
2 SPRAY, SUSPENSION NASAL DAILY
Refills: 0 | Status: DISCONTINUED | OUTPATIENT
Start: 2019-09-05 | End: 2019-09-05

## 2019-09-05 RX ORDER — GABAPENTIN 400 MG/1
400 CAPSULE ORAL THREE TIMES A DAY
Refills: 0 | Status: DISCONTINUED | OUTPATIENT
Start: 2019-09-05 | End: 2019-09-05

## 2019-09-05 RX ORDER — IPRATROPIUM/ALBUTEROL SULFATE 18-103MCG
3 AEROSOL WITH ADAPTER (GRAM) INHALATION EVERY 6 HOURS
Refills: 0 | Status: DISCONTINUED | OUTPATIENT
Start: 2019-09-05 | End: 2019-09-05

## 2019-09-05 RX ORDER — PANTOPRAZOLE SODIUM 20 MG/1
40 TABLET, DELAYED RELEASE ORAL
Refills: 0 | Status: DISCONTINUED | OUTPATIENT
Start: 2019-09-05 | End: 2019-09-05

## 2019-09-05 RX ORDER — CHLORHEXIDINE GLUCONATE 213 G/1000ML
5 SOLUTION TOPICAL EVERY 4 HOURS
Refills: 0 | Status: DISCONTINUED | OUTPATIENT
Start: 2019-09-05 | End: 2019-09-05

## 2019-09-05 RX ORDER — ATORVASTATIN CALCIUM 80 MG/1
40 TABLET, FILM COATED ORAL AT BEDTIME
Refills: 0 | Status: DISCONTINUED | OUTPATIENT
Start: 2019-09-05 | End: 2019-09-05

## 2019-09-05 RX ORDER — ONDANSETRON 8 MG/1
4 TABLET, FILM COATED ORAL
Refills: 0 | Status: DISCONTINUED | OUTPATIENT
Start: 2019-09-05 | End: 2019-09-06

## 2019-09-05 RX ADMIN — MORPHINE SULFATE 30 MILLIGRAM(S): 50 CAPSULE, EXTENDED RELEASE ORAL at 05:55

## 2019-09-05 RX ADMIN — Medication 25 MILLIGRAM(S): at 06:07

## 2019-09-05 RX ADMIN — QUETIAPINE FUMARATE 50 MILLIGRAM(S): 200 TABLET, FILM COATED ORAL at 22:42

## 2019-09-05 RX ADMIN — Medication 10 MILLIGRAM(S): at 05:49

## 2019-09-05 RX ADMIN — FENTANYL CITRATE 25 MICROGRAM(S): 50 INJECTION INTRAVENOUS at 20:35

## 2019-09-05 RX ADMIN — DOFETILIDE 250 MICROGRAM(S): 0.25 CAPSULE ORAL at 17:01

## 2019-09-05 RX ADMIN — BUDESONIDE AND FORMOTEROL FUMARATE DIHYDRATE 2 PUFF(S): 160; 4.5 AEROSOL RESPIRATORY (INHALATION) at 20:00

## 2019-09-05 RX ADMIN — TAMSULOSIN HYDROCHLORIDE 0.4 MILLIGRAM(S): 0.4 CAPSULE ORAL at 22:41

## 2019-09-05 RX ADMIN — POLYETHYLENE GLYCOL 3350 17 GRAM(S): 17 POWDER, FOR SOLUTION ORAL at 17:00

## 2019-09-05 RX ADMIN — DOFETILIDE 250 MICROGRAM(S): 0.25 CAPSULE ORAL at 05:50

## 2019-09-05 RX ADMIN — Medication 100 MILLIGRAM(S): at 17:00

## 2019-09-05 RX ADMIN — QUETIAPINE FUMARATE 25 MILLIGRAM(S): 200 TABLET, FILM COATED ORAL at 17:01

## 2019-09-05 RX ADMIN — Medication 10 MILLIGRAM(S): at 23:00

## 2019-09-05 RX ADMIN — GABAPENTIN 400 MILLIGRAM(S): 400 CAPSULE ORAL at 22:42

## 2019-09-05 RX ADMIN — MORPHINE SULFATE 30 MILLIGRAM(S): 50 CAPSULE, EXTENDED RELEASE ORAL at 07:30

## 2019-09-05 RX ADMIN — FENTANYL CITRATE 25 MICROGRAM(S): 50 INJECTION INTRAVENOUS at 14:00

## 2019-09-05 RX ADMIN — FENTANYL CITRATE 25 MICROGRAM(S): 50 INJECTION INTRAVENOUS at 12:25

## 2019-09-05 RX ADMIN — Medication 10 MILLIGRAM(S): at 17:01

## 2019-09-05 RX ADMIN — LAMOTRIGINE 100 MILLIGRAM(S): 25 TABLET, ORALLY DISINTEGRATING ORAL at 05:49

## 2019-09-05 RX ADMIN — TIOTROPIUM BROMIDE 1 CAPSULE(S): 18 CAPSULE ORAL; RESPIRATORY (INHALATION) at 17:20

## 2019-09-05 RX ADMIN — ATORVASTATIN CALCIUM 40 MILLIGRAM(S): 80 TABLET, FILM COATED ORAL at 22:41

## 2019-09-05 RX ADMIN — Medication 10 MILLIGRAM(S): at 00:36

## 2019-09-05 RX ADMIN — FENTANYL CITRATE 25 MICROGRAM(S): 50 INJECTION INTRAVENOUS at 20:20

## 2019-09-05 RX ADMIN — LAMOTRIGINE 100 MILLIGRAM(S): 25 TABLET, ORALLY DISINTEGRATING ORAL at 17:01

## 2019-09-05 RX ADMIN — Medication 100 MILLIGRAM(S): at 23:00

## 2019-09-05 RX ADMIN — Medication 20 MILLIGRAM(S): at 05:49

## 2019-09-05 RX ADMIN — GABAPENTIN 400 MILLIGRAM(S): 400 CAPSULE ORAL at 06:08

## 2019-09-05 NOTE — BRIEF OPERATIVE NOTE - NSICDXBRIEFPROCEDURE_GEN_ALL_CORE_FT
PROCEDURES:  Replacement of dual chamber implantable cardioverter defibrillator 05-Sep-2019 12:21:42  Jose Pastrana  Replacement of transvenous electrode lead for pacing implantable cardioverter-defibrillator (ICD) 05-Sep-2019 12:21:26  Jose Pastrana  Complex removal of electrode lead of implantable cardioverter-defibrillator (ICD) using laser 05-Sep-2019 12:20:53  Jose Pastrana

## 2019-09-05 NOTE — BRIEF OPERATIVE NOTE - NSICDXBRIEFPOSTOP_GEN_ALL_CORE_FT
POST-OP DIAGNOSIS:  Breakage of implantable cardioverter-defibrillator (ICD) lead insulation 05-Sep-2019 12:23:23  Jose Pastrana  ICD (implantable cardioverter-defibrillator) battery depletion 05-Sep-2019 12:23:15  Jose Pastrana

## 2019-09-05 NOTE — BRIEF OPERATIVE NOTE - OPERATION/FINDINGS
SUCCESSFUL REMOVAL OF DEBRILLATOR LEAD AND ATRIAL LEAD WITH LASER LEAD EXTRACTION.  OLD RIGHT VENTRICULAR PACING LEAD WAS USED FOR TEMPORARY PACING, AND WAS RETAINED, ISOLATED AND ABANDONED IN THE POCKET

## 2019-09-05 NOTE — BRIEF OPERATIVE NOTE - NSICDXBRIEFPREOP_GEN_ALL_CORE_FT
PRE-OP DIAGNOSIS:  ICD (implantable cardioverter-defibrillator) battery depletion 05-Sep-2019 12:23:01  Jose Pastrana  Breakage of implantable cardioverter-defibrillator (ICD) lead insulation 05-Sep-2019 12:22:41  Jose Pastrana

## 2019-09-05 NOTE — PACU DISCHARGE NOTE - COMMENTS
Pt s/p uncomplicated AICD battery change with removal of externalized wire lead.   Condition stable.  VS: BP: 136/56; HR: 60; RR: 16; O2Sat: 98; T: 36

## 2019-09-05 NOTE — PROGRESS NOTE ADULT - SUBJECTIVE AND OBJECTIVE BOX
Hospital Day:  7d    Subjective:    Pt was interviewed and examined at the bedside in the AM. No acute events overnight. Pt endorses some swelling in his right hand. Pt received fluids in his right hand overnight.       Denies headaches, changes in vision, chest pains, SOB, n/v/d, abd pain, constipation, changes in urination, pain on urination, weakness, fatigue, joint pain or muscle pain.       Past Medical Hx:   PTSD (post-traumatic stress disorder)  Supraventricular arrhythmia  Cardiomyopathy  CHF (congestive heart failure)  Atrial fibrillation  Diabetes mellitus  Smoker  BPH (benign prostatic hyperplasia)  HTN (hypertension)  Seizures  Bipolar 1 disorder  Migraines  Pneumonia  MS (multiple sclerosis)  CAD (coronary artery disease)  Bipolar disorder  Anginal pain  Schizo affective schizophrenia  GERD (gastroesophageal reflux disease)  Seasonal allergies  HTN (hypertension)  Hypercholesteremia  COPD (chronic obstructive pulmonary disease)  Neuropathy  Atrial flutter  CVA (cerebral vascular accident)  TIA (transient ischemic attack)  Syncope  Chronic Hypotension  Hypotension  Peripheral Neuropathy  Human Immunodeficiency Virus (  S/P Implantation of Automatic  Personal History of Multiple S  Personal History of Mitral Nicki  Personal History of MI (Myocar  Personal History of Hypertensi  Personal History of Congestive  Personal History of Cardiomyop  Personal History of Atrial Fib  Personal History of Alcoholism  Clinical Depression    Past Sx:  H/O prior ablation treatment  Cardiac pacemaker recipient  H/O hernia repair  History of hip replacement  S/P Orchiectomy  SVT (Supraventricular Tachycar  GERD (Gastroesophageal Reflux  S/P Implantation of AICD    Allergies:  dexmethylphenidate (Unknown)  Dilantin (Rash)  dilantin, compazine, neurontin, ritalin, phenergan (Unknown)  Haldol (Unknown)  hydantoin derivatives (Other)  methylphenidate (Unknown)  Morphine Sulfate (Unknown)  phenytoin (Unknown)  prochlorperazine (Unknown)  promethazine (Dystonic RXN)  rash/hives (Anaphylaxis)  thioxanthenes (Unknown)    Current Meds:   Standng Meds:  ALBUTerol/ipratropium for Nebulization. 3 milliLiter(s) Nebulizer every 6 hours  atorvastatin 40 milliGRAM(s) Oral at bedtime  baclofen 10 milliGRAM(s) Oral four times a day  buDESOnide 160 MICROgram(s)/formoterol 4.5 MICROgram(s) Inhaler - Peds 2 Puff(s) Inhalation two times a day  cholecalciferol 2000 Unit(s) Oral daily  dofetilide 250 MICROGram(s) Oral two times a day  fluticasone propionate (50 MICROgram(s)/actuation) Nasal Spray - Peds 2 Spray(s) Alternating Nostrils daily  furosemide    Tablet 20 milliGRAM(s) Oral daily  gabapentin 400 milliGRAM(s) Oral three times a day  lamoTRIgine 100 milliGRAM(s) Oral two times a day  loratadine 10 milliGRAM(s) Oral daily  metoprolol tartrate 25 milliGRAM(s) Oral every 8 hours  morphine ER Tablet 30 milliGRAM(s) Oral every 12 hours  pantoprazole    Tablet 40 milliGRAM(s) Oral before breakfast  polyethylene glycol 3350 17 Gram(s) Oral every 12 hours  QUEtiapine 25 milliGRAM(s) Oral daily  QUEtiapine 50 milliGRAM(s) Oral at bedtime  sodium chloride 0.9%. 1000 milliLiter(s) (75 mL/Hr) IV Continuous <Continuous>  tamsulosin Oral Tab/Cap - Peds 0.4 milliGRAM(s) Oral at bedtime  tiotropium 18 MICROgram(s) Capsule 1 Capsule(s) Inhalation daily    PRN Meds:  clonazePAM  Tablet 0.5 milliGRAM(s) Oral at bedtime PRN agitation  nitroglycerin     SubLingual 0.4 milliGRAM(s) SubLingual every 5 minutes PRN Chest Pain  ondansetron    Tablet 4 milliGRAM(s) Oral two times a day PRN Nausea and/or Vomiting    HOME MEDICATIONS:  acetaminophen 650 mg oral tablet: 650 milligram(s) orally 4 times a day, As Needed  atorvastatin 40 mg oral tablet: 1 tab(s) orally once a day (at bedtime)  baclofen 10 mg oral tablet: 1 tab(s) orally 4 times a day  budesonide-formoterol 160 mcg-4.5 mcg/inh inhalation aerosol: 2 puff(s) inhaled 2 times a day  Cepacol Extra Strength Menthol 10 mg mucous membrane lozenge: 1 anderson(s) mucous membrane every 6 hours, As Needed  cholecalciferol oral tablet: 2000 unit(s) orally once a day  Claritin 10 mg oral tablet: 1 tab(s) orally once a day  Eliquis 5 mg oral tablet: 1 tab(s) orally 2 times a day  Flonase 50 mcg/inh nasal spray: 2 spray(s) nasal once a day  furosemide 20 mg oral tablet: 1 tab(s) orally once a day  gabapentin 400 mg oral capsule: 1 cap(s) orally 3 times a day  ketoconazole 2% topical shampoo: Apply topically to affected area once a day (in the evening)  lamoTRIgine 100 mg oral tablet: 1 tab(s) orally 2 times a day  metoprolol tartrate 25 mg oral tablet: 1 tab(s) orally every 8 hours  Mi-Acid oral suspension: 30 milliliter(s) orally 4 times a day, As Needed  morphine 15 mg oral tablet: 1 tab(s) orally every 4 hours, As needed, Severe Pain (7 - 10)  Nitrostat 0.4 mg sublingual tablet: sublingual 3 times a day, As Needed; may repeat after 5 min until relief  omeprazole 20 mg oral delayed release capsule: 1 cap(s) orally 2 times a day  polyethylene glycol 3350 oral powder for reconstitution: 17 gram(s) orally every 12 hours  QUEtiapine 25 mg oral tablet: 1 tab(s) orally once a day  SEROquel 50 mg oral tablet: 1 tab(s) orally once a day (at bedtime)  tamsulosin 0.4 mg oral capsule: 1 cap(s) orally once a day (at bedtime)  Tecfidera 240 mg oral delayed release capsule: 1 cap(s) orally once a day  Tikosyn 250 mcg oral capsule: 1 cap(s) orally 2 times a day  tiotropium 18 mcg inhalation capsule: 1 cap(s) inhaled once a day      Vital Signs:   T(F): 97.7 (19 @ 07:07), Max: 99 (19 @ 21:35)  HR: 59 (19 @ 07:07) (59 - 66)  BP: 114/57 (19 @ 07:07) (110/64 - 115/56)  RR: 20 (19 @ 07:07) (20 - 20)  SpO2: --      19 @ 07:01  -  19 @ 07:00  --------------------------------------------------------  IN: 1020 mL / OUT: 800 mL / NET: 220 mL        Physical Exam:   GENERAL: NAD  HEENT: NCAT  CHEST/LUNG: CTAB, AICD in chest  HEART: Regular rate and rhythm; s1 s2 appreciated, No murmurs, rubs, or gallops  ABDOMEN: Soft, Nontender, Nondistended; Bowel sounds present  EXTREMITIES: No LE edema b/l, swelling in R UE   NERVOUS SYSTEM:  Alert & Oriented X3 chronic weakness in LLE          Labs:                         11.3   4.51  )-----------( 165      ( 05 Sep 2019 07:38 )             35.7     Neutophil% 70.8, Lymphocyte% 19.5, Monocyte% 7.9, Bands% 0.4 19 @ 18:00    04 Sep 2019 07:12    143    |  104    |  21     ----------------------------<  98     4.1     |  29     |  0.9      Ca    9.0        04 Sep 2019 07:12  Mg     1.9       04 Sep 2019 07:12    TPro  5.9    /  Alb  4.0    /  TBili  0.6    /  DBili  x      /  AST  73     /  ALT  101    /  AlkPhos  152    04 Sep 2019 07:12          Hemoglobin A1C, Whole Blood: 5.4 % (19 @ 07:40)            Urinalysis Basic - ( 04 Sep 2019 18:15 )    Color: Yellow / Appearance: Slightly Turbid / S.019 / pH: x  Gluc: x / Ketone: Negative  / Bili: Negative / Urobili: 3 mg/dL   Blood: x / Protein: Trace / Nitrite: Negative   Leuk Esterase: Large / RBC: 3 /HPF /  /HPF   Sq Epi: x / Non Sq Epi: 1 /HPF / Bacteria: Few

## 2019-09-05 NOTE — PROGRESS NOTE ADULT - ASSESSMENT
47 y/o M w/ a PMH of v-tach s/p AICD, CHF, A fib/flutter, HTN, COPD (on O2 PRN), DLD, multiple sclerosis w/ multiple admissions for flares, seizure disorder (on lamotrigine), PTSD, depression, ? stroke/TIA, esophageal stricture, chronic sore throat/sinusitis, GERD, hemopneumothorax (2006), pericardial effusion (2006), cardiomyopathy, MI (2006, 2008), IBS, presenting post AICD firing.     Elevated LFTs  - Elevated LFTs today  - No new recent hepatotoxic medication   - f/u Ammonia levels     #) ARVD (Arrhythmogenic right ventricular dysplasia) s/p AICD, possible AICD dysfunction  - now with possible pacemaker firing, buzzing sensation in chest   - Keep telemetry, pacing pads on chest   - H/o v-tach (no VT, VF, on AICD since 2006)  - C/w metoprolol tartrate 25 mg PO TID, Tikosyn 250 mcg BID   - EP following   - Underwent fluoro to assess RV lead placement possibly dislodged   - CT sx for RV lead extraction and revision and battery change  - switched to full dose Lovenox from Eliquis   - hold Lovenox on day of procedure   - restart Eliquis post procedure once ok by CT sx  - CT sx doing procedure today, f/u post op care    #) Anginal symptoms, without prior evidence of CAD, ?H/o MI 2006, 2008  - With episode of anginal symptoms, pressure like, radiating to jaw, resolved with nitro  - More likely related to pacemaker firing   - Troponin negative, no acute EKG changes  - Per cardiology on 08/18, No evidence of CAD, on 8/6/2018 CCTA reported normal with 0 calcium score  - C/w metoprolol tartrate 25 mg PO TID  - C/w atorvastatin 40 mg PO qHS  - C/w aspirin 81 mg PO qD    #)  HTN  -C/w metoprolol tartrate 25 mg PO TID  -C/w Lasix 20 mg     #) Multiple sclerosis   -with recent flare, discharged on 08/19/19 with prednisone for 5 days (completed)  -Patient takes Tecfidera 240 mg PO qD but is apparently non-formulary; please contact [facility]  -Pain control with acetaminophen and baclofen and gabapentin and morphine  IR 15 as per pain management on last admission.  Allergy in chart is local rash to IV opioids, may give PO. Also got IV morphine in hospital without reaction,   -Ambulate w/ assistance (patient uses walker at baseline)  - Pt states he is starting to feel another flare  - Neurology was consulted    # H/o seizure disorder  C/w lamotrigine 100 mg PO BID    #) COPD on PRN home O2  -Currently on RA  -C/w Symbicort high dose 2 puffs BID  -Vitals per routine, pulse ox q4h    # Chronic pharyngitis , sinusitis  -C/w Cepacol, Claritin, Flonase    # DLD  C/w atorvastatin 40 mg PO qHS    # GERD  C/w pantoprazole 40 mg PO qAM (patient takes omeprazole at home)    # H/o esophageal stricture   -cont mechanical soft dysphagia 2 w/ thins    DVT ppx- full dose lovenox  GI ppx- c/w pantoprazole 40 mg PO qAM (on omeprazole at home)  Activity- as tolerated   Diet- mechanical soft dysphagia 2 w/ thins  Code status: Full  Dispo- patient from [facility]; functional at baseline w/ walker 47 y/o M w/ a PMH of v-tach s/p AICD, CHF, A fib/flutter, HTN, COPD (on O2 PRN), DLD, multiple sclerosis w/ multiple admissions for flares, seizure disorder (on lamotrigine), PTSD, depression, ? stroke/TIA, esophageal stricture, chronic sore throat/sinusitis, GERD, hemopneumothorax (2006), pericardial effusion (2006), cardiomyopathy, MI (2006, 2008), IBS, presenting post AICD firing.     Elevated LFTs  - Elevated LFTs today  - No new recent hepatotoxic medication   - f/u Ammonia levels     #) ARVD (Arrhythmogenic right ventricular dysplasia) s/p AICD, possible AICD dysfunction  - now with possible pacemaker firing, buzzing sensation in chest   - Keep telemetry, pacing pads on chest   - H/o v-tach (no VT, VF, on AICD since 2006)  - C/w metoprolol tartrate 25 mg PO TID, Tikosyn 250 mcg BID   - EP following   - Underwent fluoro to assess RV lead placement possibly dislodged   - CT sx for RV lead extraction and revision and battery change  - switched to full dose Lovenox from Eliquis   - hold Lovenox on day of procedure   - restart Eliquis post procedure once ok by CT sx  - CT sx doing procedure today, f/u post op care    #) Anginal symptoms, without prior evidence of CAD, ?H/o MI 2006, 2008  - With episode of anginal symptoms, pressure like, radiating to jaw, resolved with nitro  - More likely related to pacemaker firing   - Troponin negative, no acute EKG changes  - Per cardiology on 08/18, No evidence of CAD, on 8/6/2018 CCTA reported normal with 0 calcium score  - C/w metoprolol tartrate 25 mg PO TID  - C/w atorvastatin 40 mg PO qHS  - C/w aspirin 81 mg PO qD    #)  HTN  -C/w metoprolol tartrate 25 mg PO TID  -C/w Lasix 20 mg     #) Multiple sclerosis   -with recent flare, discharged on 08/19/19 with prednisone for 5 days (completed)  -Patient takes Tecfidera 240 mg PO qD but is apparently non-formulary; please contact [facility]  -Pain control with acetaminophen and baclofen and gabapentin and morphine  IR 15 as per pain management on last admission.  Allergy in chart is local rash to IV opioids, may give PO. Also got IV morphine in hospital without reaction,   -Ambulate w/ assistance (patient uses walker at baseline)  - Pt states he is starting to feel another flare  - Neurology seen patient     # H/o seizure disorder  C/w lamotrigine 100 mg PO BID    #) COPD on PRN home O2  -Currently on RA  -C/w Symbicort high dose 2 puffs BID  -Vitals per routine, pulse ox q4h    # Chronic pharyngitis , sinusitis  -C/w Cepacol, Claritin, Flonase    # DLD  C/w atorvastatin 40 mg PO qHS    # GERD  C/w pantoprazole 40 mg PO qAM (patient takes omeprazole at home)    # H/o esophageal stricture   -cont mechanical soft dysphagia 2 w/ thins    DVT ppx- full dose lovenox  GI ppx- c/w pantoprazole 40 mg PO qAM (on omeprazole at home)  Activity- as tolerated   Diet- mechanical soft dysphagia 2 w/ thins  Code status: Full  Dispo- patient from [facility]; functional at baseline w/ walker

## 2019-09-06 LAB
ALBUMIN SERPL ELPH-MCNC: 3.4 G/DL — LOW (ref 3.5–5.2)
ALP SERPL-CCNC: 130 U/L — HIGH (ref 30–115)
ALT FLD-CCNC: 116 U/L — HIGH (ref 0–41)
ANION GAP SERPL CALC-SCNC: 12 MMOL/L — SIGNIFICANT CHANGE UP (ref 7–14)
AST SERPL-CCNC: 75 U/L — HIGH (ref 0–41)
BASE EXCESS BLDA CALC-SCNC: 6.3 MMOL/L — HIGH (ref -2–2)
BILIRUB SERPL-MCNC: 0.3 MG/DL — SIGNIFICANT CHANGE UP (ref 0.2–1.2)
BUN SERPL-MCNC: 21 MG/DL — HIGH (ref 10–20)
CALCIUM SERPL-MCNC: 8.5 MG/DL — SIGNIFICANT CHANGE UP (ref 8.5–10.1)
CHLORIDE SERPL-SCNC: 98 MMOL/L — SIGNIFICANT CHANGE UP (ref 98–110)
CO2 SERPL-SCNC: 27 MMOL/L — SIGNIFICANT CHANGE UP (ref 17–32)
CREAT SERPL-MCNC: 1 MG/DL — SIGNIFICANT CHANGE UP (ref 0.7–1.5)
CULTURE RESULTS: SIGNIFICANT CHANGE UP
GLUCOSE BLDC GLUCOMTR-MCNC: 116 MG/DL — HIGH (ref 70–99)
GLUCOSE BLDC GLUCOMTR-MCNC: 117 MG/DL — HIGH (ref 70–99)
GLUCOSE BLDC GLUCOMTR-MCNC: 118 MG/DL — HIGH (ref 70–99)
GLUCOSE BLDC GLUCOMTR-MCNC: 125 MG/DL — HIGH (ref 70–99)
GLUCOSE SERPL-MCNC: 152 MG/DL — HIGH (ref 70–99)
GRAM STN FLD: SIGNIFICANT CHANGE UP
HCO3 BLDA-SCNC: 31 MMOL/L — HIGH (ref 23–27)
HCT VFR BLD CALC: 28.8 % — LOW (ref 42–52)
HGB BLD-MCNC: 9.3 G/DL — LOW (ref 14–18)
MAGNESIUM SERPL-MCNC: 2.1 MG/DL — SIGNIFICANT CHANGE UP (ref 1.8–2.4)
MCHC RBC-ENTMCNC: 29.6 PG — SIGNIFICANT CHANGE UP (ref 27–31)
MCHC RBC-ENTMCNC: 32.3 G/DL — SIGNIFICANT CHANGE UP (ref 32–37)
MCV RBC AUTO: 91.7 FL — SIGNIFICANT CHANGE UP (ref 80–94)
NIGHT BLUE STAIN TISS: SIGNIFICANT CHANGE UP
NRBC # BLD: 0 /100 WBCS — SIGNIFICANT CHANGE UP (ref 0–0)
PCO2 BLDA: 43 MMHG — HIGH (ref 38–42)
PH BLDA: 7.46 — HIGH (ref 7.38–7.42)
PLATELET # BLD AUTO: 158 K/UL — SIGNIFICANT CHANGE UP (ref 130–400)
PO2 BLDA: 80 MMHG — SIGNIFICANT CHANGE UP (ref 78–95)
POTASSIUM SERPL-MCNC: 3.7 MMOL/L — SIGNIFICANT CHANGE UP (ref 3.5–5)
POTASSIUM SERPL-SCNC: 3.7 MMOL/L — SIGNIFICANT CHANGE UP (ref 3.5–5)
PROT SERPL-MCNC: 5.5 G/DL — LOW (ref 6–8)
RBC # BLD: 3.14 M/UL — LOW (ref 4.7–6.1)
RBC # FLD: 14.2 % — SIGNIFICANT CHANGE UP (ref 11.5–14.5)
SAO2 % BLDA: 97 % — SIGNIFICANT CHANGE UP (ref 94–98)
SODIUM SERPL-SCNC: 137 MMOL/L — SIGNIFICANT CHANGE UP (ref 135–146)
SPECIMEN SOURCE: SIGNIFICANT CHANGE UP
WBC # BLD: 6.45 K/UL — SIGNIFICANT CHANGE UP (ref 4.8–10.8)
WBC # FLD AUTO: 6.45 K/UL — SIGNIFICANT CHANGE UP (ref 4.8–10.8)

## 2019-09-06 PROCEDURE — 71045 X-RAY EXAM CHEST 1 VIEW: CPT | Mod: 26

## 2019-09-06 PROCEDURE — 10180 I&D COMPLEX PO WOUND INFCTJ: CPT | Mod: 79

## 2019-09-06 PROCEDURE — 93010 ELECTROCARDIOGRAM REPORT: CPT

## 2019-09-06 PROCEDURE — 99222 1ST HOSP IP/OBS MODERATE 55: CPT

## 2019-09-06 PROCEDURE — 93284 PRGRMG EVAL IMPLANTABLE DFB: CPT | Mod: 26

## 2019-09-06 PROCEDURE — 99024 POSTOP FOLLOW-UP VISIT: CPT

## 2019-09-06 RX ORDER — METOPROLOL TARTRATE 50 MG
12.5 TABLET ORAL THREE TIMES A DAY
Refills: 0 | Status: DISCONTINUED | OUTPATIENT
Start: 2019-09-06 | End: 2019-09-11

## 2019-09-06 RX ORDER — MORPHINE SULFATE 50 MG/1
30 CAPSULE, EXTENDED RELEASE ORAL EVERY 12 HOURS
Refills: 0 | Status: DISCONTINUED | OUTPATIENT
Start: 2019-09-06 | End: 2019-09-09

## 2019-09-06 RX ORDER — FENTANYL CITRATE 50 UG/ML
25 INJECTION INTRAVENOUS ONCE
Refills: 0 | Status: DISCONTINUED | OUTPATIENT
Start: 2019-09-06 | End: 2019-09-06

## 2019-09-06 RX ORDER — FENTANYL CITRATE 50 UG/ML
25 INJECTION INTRAVENOUS EVERY 4 HOURS
Refills: 0 | Status: DISCONTINUED | OUTPATIENT
Start: 2019-09-06 | End: 2019-09-06

## 2019-09-06 RX ORDER — ALBUTEROL 90 UG/1
2.5 AEROSOL, METERED ORAL EVERY 6 HOURS
Refills: 0 | Status: DISCONTINUED | OUTPATIENT
Start: 2019-09-06 | End: 2019-09-08

## 2019-09-06 RX ORDER — POTASSIUM CHLORIDE 20 MEQ
20 PACKET (EA) ORAL ONCE
Refills: 0 | Status: COMPLETED | OUTPATIENT
Start: 2019-09-06 | End: 2019-09-06

## 2019-09-06 RX ADMIN — QUETIAPINE FUMARATE 25 MILLIGRAM(S): 200 TABLET, FILM COATED ORAL at 11:04

## 2019-09-06 RX ADMIN — GABAPENTIN 400 MILLIGRAM(S): 400 CAPSULE ORAL at 22:26

## 2019-09-06 RX ADMIN — ALBUTEROL 2.5 MILLIGRAM(S): 90 AEROSOL, METERED ORAL at 13:51

## 2019-09-06 RX ADMIN — Medication 100 MILLIGRAM(S): at 06:29

## 2019-09-06 RX ADMIN — LAMOTRIGINE 100 MILLIGRAM(S): 25 TABLET, ORALLY DISINTEGRATING ORAL at 20:27

## 2019-09-06 RX ADMIN — FENTANYL CITRATE 25 MICROGRAM(S): 50 INJECTION INTRAVENOUS at 20:45

## 2019-09-06 RX ADMIN — BUDESONIDE AND FORMOTEROL FUMARATE DIHYDRATE 2 PUFF(S): 160; 4.5 AEROSOL RESPIRATORY (INHALATION) at 20:28

## 2019-09-06 RX ADMIN — Medication 3 MILLILITER(S): at 08:15

## 2019-09-06 RX ADMIN — TAMSULOSIN HYDROCHLORIDE 0.4 MILLIGRAM(S): 0.4 CAPSULE ORAL at 22:25

## 2019-09-06 RX ADMIN — FENTANYL CITRATE 25 MICROGRAM(S): 50 INJECTION INTRAVENOUS at 15:02

## 2019-09-06 RX ADMIN — FENTANYL CITRATE 25 MICROGRAM(S): 50 INJECTION INTRAVENOUS at 20:30

## 2019-09-06 RX ADMIN — Medication 500 MILLIGRAM(S): at 20:27

## 2019-09-06 RX ADMIN — FENTANYL CITRATE 25 MICROGRAM(S): 50 INJECTION INTRAVENOUS at 06:00

## 2019-09-06 RX ADMIN — FENTANYL CITRATE 25 MICROGRAM(S): 50 INJECTION INTRAVENOUS at 10:37

## 2019-09-06 RX ADMIN — QUETIAPINE FUMARATE 50 MILLIGRAM(S): 200 TABLET, FILM COATED ORAL at 22:25

## 2019-09-06 RX ADMIN — BUDESONIDE AND FORMOTEROL FUMARATE DIHYDRATE 2 PUFF(S): 160; 4.5 AEROSOL RESPIRATORY (INHALATION) at 09:00

## 2019-09-06 RX ADMIN — Medication 2 SPRAY(S): at 11:03

## 2019-09-06 RX ADMIN — Medication 10 MILLIGRAM(S): at 11:04

## 2019-09-06 RX ADMIN — TIOTROPIUM BROMIDE 1 CAPSULE(S): 18 CAPSULE ORAL; RESPIRATORY (INHALATION) at 08:15

## 2019-09-06 RX ADMIN — Medication 10 MILLIGRAM(S): at 23:26

## 2019-09-06 RX ADMIN — LAMOTRIGINE 100 MILLIGRAM(S): 25 TABLET, ORALLY DISINTEGRATING ORAL at 06:27

## 2019-09-06 RX ADMIN — Medication 20 MILLIEQUIVALENT(S): at 06:29

## 2019-09-06 RX ADMIN — DOFETILIDE 250 MICROGRAM(S): 0.25 CAPSULE ORAL at 06:27

## 2019-09-06 RX ADMIN — GABAPENTIN 400 MILLIGRAM(S): 400 CAPSULE ORAL at 06:27

## 2019-09-06 RX ADMIN — POLYETHYLENE GLYCOL 3350 17 GRAM(S): 17 POWDER, FOR SOLUTION ORAL at 06:28

## 2019-09-06 RX ADMIN — FENTANYL CITRATE 25 MICROGRAM(S): 50 INJECTION INTRAVENOUS at 10:52

## 2019-09-06 RX ADMIN — FENTANYL CITRATE 25 MICROGRAM(S): 50 INJECTION INTRAVENOUS at 06:15

## 2019-09-06 RX ADMIN — Medication 2000 UNIT(S): at 11:03

## 2019-09-06 RX ADMIN — Medication 25 MILLIGRAM(S): at 06:26

## 2019-09-06 RX ADMIN — DOFETILIDE 250 MICROGRAM(S): 0.25 CAPSULE ORAL at 20:28

## 2019-09-06 RX ADMIN — ATORVASTATIN CALCIUM 40 MILLIGRAM(S): 80 TABLET, FILM COATED ORAL at 22:25

## 2019-09-06 RX ADMIN — Medication 20 MILLIGRAM(S): at 06:26

## 2019-09-06 RX ADMIN — FENTANYL CITRATE 25 MICROGRAM(S): 50 INJECTION INTRAVENOUS at 14:47

## 2019-09-06 RX ADMIN — Medication 10 MILLIGRAM(S): at 06:26

## 2019-09-06 RX ADMIN — LORATADINE 10 MILLIGRAM(S): 10 TABLET ORAL at 11:04

## 2019-09-06 RX ADMIN — PANTOPRAZOLE SODIUM 40 MILLIGRAM(S): 20 TABLET, DELAYED RELEASE ORAL at 06:28

## 2019-09-06 NOTE — PRE-ANESTHESIA EVALUATION ADULT - NS MD HP INPLANTS MED DEV
Automatic Implantable Cardioverter Defibrillator
[Follow-Up: _____] : a [unfilled] follow-up visit
Automatic Implantable Cardioverter Defibrillator

## 2019-09-06 NOTE — PHYSICAL THERAPY INITIAL EVALUATION ADULT - SPECIFY REASON(S)
Chart reviewed. D/w team during rounds. PT on hold today pending return to OR for possible re-exploration of chest hematoma per d/w Dr. Ortega. PT to follow as appropriate.

## 2019-09-06 NOTE — BRIEF OPERATIVE NOTE - NSICDXBRIEFPREOP_GEN_ALL_CORE_FT
PRE-OP DIAGNOSIS:  Hematoma of implantable cardioverter-defibrillator (ICD) pocket 06-Sep-2019 18:48:35  Jose Pastrana  ICD (implantable cardioverter-defibrillator) battery depletion 05-Sep-2019 12:23:01  Jose Pastrana  Breakage of implantable cardioverter-defibrillator (ICD) lead insulation 05-Sep-2019 12:22:41  Jose Pastrana

## 2019-09-06 NOTE — DIETITIAN INITIAL EVALUATION ADULT. - FACTORS AFF FOOD INTAKE
no symptoms noted, last BM 9/3 no symptoms noted, denies chew/swallow difficulty on current diet order,  last BM 9/3

## 2019-09-06 NOTE — ANESTHESIA FOLLOW-UP NOTE - NSEVALATIONFT_GEN_ALL_CORE
Pt s/p uncomplicated removal of externalized wire lead and AICD battery change, POD#1. Complains of incisional pain which remits with current pain management. There's concern for hematoma, requiring operative evacuation. Otherwise no complaints. A-paced.

## 2019-09-06 NOTE — BRIEF OPERATIVE NOTE - NSICDXBRIEFPOSTOP_GEN_ALL_CORE_FT
POST-OP DIAGNOSIS:  Hematoma of implantable cardioverter-defibrillator (ICD) pocket 06-Sep-2019 18:48:49  Jose Pastrana  Breakage of implantable cardioverter-defibrillator (ICD) lead insulation 05-Sep-2019 12:23:23  Jose Pastrana  ICD (implantable cardioverter-defibrillator) battery depletion 05-Sep-2019 12:23:15  Jose Pastrana

## 2019-09-06 NOTE — PRE-ANESTHESIA EVALUATION ADULT - NSANTHOSAYNRD_GEN_A_CORE
No. DOE screening performed.  STOP BANG Legend: 0-2 = LOW Risk; 3-4 = INTERMEDIATE Risk; 5-8 = HIGH Risk
No. DOE screening performed.  STOP BANG Legend: 0-2 = LOW Risk; 3-4 = INTERMEDIATE Risk; 5-8 = HIGH Risk

## 2019-09-06 NOTE — PRE-ANESTHESIA EVALUATION ADULT - NSANTHPEFT_GEN_ALL_CORE
Gen: Pleasant male lying in bed in NAD  Resp: Speaks in full sentences with facemask, unlabored; good air entry; diminished bases  Chest: Indurated incision, TTP  Card: RRR (a-paced); [id number]  GI: Obese; NT/ND
A&OX3 NAD  S1,S2   CTA

## 2019-09-06 NOTE — PROGRESS NOTE ADULT - ASSESSMENT
CTS ATTENDING    Patient developed a hematoma of the ICD pocket, causing pain and swelling of the area.  I believe patient will be well-reved with evacuation of the hematoma today, and will be give additional antibiotics  Case discusssed with Dr. Quiñones and the CTU team.    Procedure, risks, benefits and alternatives were explained and patient agreed to proceed by givin his informed consent.

## 2019-09-06 NOTE — PROGRESS NOTE ADULT - SUBJECTIVE AND OBJECTIVE BOX
OPERATIVE PROCEDURE(s):    laser lead extraction          POD # 1    SURGEON(s): janel    SUBJECTIVE ASSESSMENT: pt is complaining of pain overlying aicd site that repsponds to pain meds and developed a hematoma at the site    Vital Signs Last 24 Hrs  T(C): 36.8 (06 Sep 2019 11:36), Max: 36.8 (06 Sep 2019 07:45)  T(F): 98.2 (06 Sep 2019 11:36), Max: 98.2 (06 Sep 2019 07:45)  HR: 60 (06 Sep 2019 11:36) (59 - 60)  BP: 105/67 (06 Sep 2019 11:36) (94/54 - 111/59)  BP(mean): 76 (06 Sep 2019 07:45) (60 - 80)  RR: 18 (06 Sep 2019 11:36) (7 - 29)  SpO2: 99% (06 Sep 2019 11:36) (77% - 100%)  19 @ 07:01  -  19 @ 07:00  --------------------------------------------------------  IN: 1410 mL / OUT: 700 mL / NET: 710 mL    19 @ 07:01  -  19 @ 12:08  --------------------------------------------------------  IN: 0 mL / OUT: 800 mL / NET: -800 mL        Physical Exam  General: alert and oriented x 3  Chest: equal bs bl  CVS: s1s2  Abd: pos bs soft nt  GI/ :  Ext: no edema  aicd incision- moderate tense hematoma overlying device that is tender to touch    Central Venous Catheter: Yes[x ]  No[ ] , If Yes indication:   going to the OR today        Day #    Blum Catheter: Yes  [ ] , No [ x : If yes indication:                         Day #    NGT: Yes [ ] No [ x ]     If Yes Placement:                                          Day #    Post-Op Beta-Blockers: Yes [ ], No[x ], If No, then contraindication:      LABS:                        9.3    6.45  )-----------( 158      ( 06 Sep 2019 04:00 )             28.8     COUMADIN:   [ ] YES [x ] NO    PT/INR - ( 05 Sep 2019 13:20 )   PT: 12.40 sec;   INR: 1.08 ratio         PTT - ( 05 Sep 2019 13:20 )  PTT:41.6 sec      137  |  98  |  21<H>  ----------------------------<  152<H>  3.7   |  27  |  1.0    Ca    8.5      06 Sep 2019 04:00  Phos  3.6       Mg     2.1         TPro  5.5<L>  /  Alb  3.4<L>  /  TBili  0.3  /  DBili  x   /  AST  75<H>  /  ALT  116<H>  /  AlkPhos  130<H>      Urinalysis Basic - ( 04 Sep 2019 18:15 )    Color: Yellow / Appearance: Slightly Turbid / S.019 / pH: x  Gluc: x / Ketone: Negative  / Bili: Negative / Urobili: 3 mg/dL   Blood: x / Protein: Trace / Nitrite: Negative   Leuk Esterase: Large / RBC: 3 /HPF /  /HPF   Sq Epi: x / Non Sq Epi: 1 /HPF / Bacteria: Few        MEDICATIONS  (STANDING):  ALBUTerol    0.083% 2.5 milliGRAM(s) Nebulizer every 6 hours  atorvastatin 40 milliGRAM(s) Oral at bedtime  baclofen 10 milliGRAM(s) Oral four times a day  buDESOnide 160 MICROgram(s)/formoterol 4.5 MICROgram(s) Inhaler - Peds 2 Puff(s) Inhalation two times a day  cephalexin 500 milliGRAM(s) Oral every 12 hours  cholecalciferol 2000 Unit(s) Oral daily  dextrose 5%. 1000 milliLiter(s) (50 mL/Hr) IV Continuous <Continuous>  dextrose 50% Injectable 12.5 Gram(s) IV Push once  dextrose 50% Injectable 25 Gram(s) IV Push once  dextrose 50% Injectable 25 Gram(s) IV Push once  dofetilide 250 MICROGram(s) Oral two times a day  fluticasone propionate (50 MICROgram(s)/actuation) Nasal Spray - Peds 2 Spray(s) Alternating Nostrils daily  furosemide    Tablet 20 milliGRAM(s) Oral daily  gabapentin 400 milliGRAM(s) Oral three times a day  insulin lispro (HumaLOG) corrective regimen sliding scale   SubCutaneous three times a day before meals  lamoTRIgine 100 milliGRAM(s) Oral two times a day  loratadine 10 milliGRAM(s) Oral daily  metoprolol tartrate 25 milliGRAM(s) Oral every 8 hours  pantoprazole    Tablet 40 milliGRAM(s) Oral before breakfast  polyethylene glycol 3350 17 Gram(s) Oral every 12 hours  QUEtiapine 25 milliGRAM(s) Oral daily  QUEtiapine 50 milliGRAM(s) Oral at bedtime  sodium chloride 0.9%. 1000 milliLiter(s) (20 mL/Hr) IV Continuous <Continuous>  tamsulosin Oral Tab/Cap - Peds 0.4 milliGRAM(s) Oral at bedtime  tiotropium 18 MICROgram(s) Capsule 1 Capsule(s) Inhalation daily    MEDICATIONS  (PRN):  benzocaine 15 mG/menthol 3.6 mG Lozenge 1 Lozenge Oral three times a day PRN Sore Throat  clonazePAM  Tablet 0.5 milliGRAM(s) Oral at bedtime PRN agitation  dextrose 40% Gel 15 Gram(s) Oral once PRN Blood Glucose LESS THAN 70 milliGRAM(s)/deciliter  fentaNYL    Injectable 25 MICROGram(s) IV Push every 4 hours PRN Moderate Pain (4 - 6)  glucagon  Injectable 1 milliGRAM(s) IntraMuscular once PRN Glucose LESS THAN 70 milligrams/deciliter  morphine ER Tablet 30 milliGRAM(s) Oral every 12 hours PRN moderate pain  nitroglycerin     SubLingual 0.4 milliGRAM(s) SubLingual every 5 minutes PRN Chest Pain  ondansetron    Tablet 4 milliGRAM(s) Oral two times a day PRN Nausea and/or Vomiting      Allergies    dexmethylphenidate (Unknown)  Dilantin (Rash)  dilantin, compazine, neurontin, ritalin, phenergan (Unknown)  Haldol (Unknown)  hydantoin derivatives (Other)  methylphenidate (Unknown)  Morphine Sulfate (Unknown)  phenytoin (Unknown)  prochlorperazine (Unknown)  promethazine (Dystonic RXN)  rash/hives (Anaphylaxis)  thioxanthenes (Unknown)    Intolerances        Ambulation/Activity Status: pt refuses to ambulate       RADIOLOGY & ADDITIONAL TESTS:  Diet, NPO:   NPO for Procedure/Test     NPO Start Date: 06-Sep-2019,   NPO Start Time: 07:50  Except Medications (19 @ 07:47)  EXAM:  XR CHEST PORTABLE IMMED 1V            PROCEDURE DATE:  2019        INTERPRETATION:  Clinical History / Reason for exam: Status post AICD    Comparison : Chest radiograph 2019.    Technique/Positioning: Single frontal view of the chest.    Findings:    Support devices: Stable left AICD and right IJ catheter.    Cardiac/mediastinum/hilum: Stable cardiomegaly.    Lung parenchyma/Pleura: There are low lung volumes with questionable   retrocardiac opacity. No pneumothorax is seen.    Skeleton/soft tissues: Unremarkable.    Impression:      Low lung volumes with questionable retrocardiac opacity.    Assessment/Plan: Patient is a 49 yo male with extensive medical history including MS and cardiomyopathy now s/p AICD laser lead extraction with battery change POD # 1 who now has developed a post op hematoma  cont present tx as per CT surgeon  NPO for evacuation of hematoma  cont present medical psych therapy  cont to hold off on any blood thinners  cont empiric abx  pain managment      Social Service Disposition:

## 2019-09-06 NOTE — PROGRESS NOTE ADULT - SUBJECTIVE AND OBJECTIVE BOX
INTERVAL HPI/OVERNIGHT EVENTS:  pt is s/p OR laser RV lead extraction and RA/RV lead placement, generator change  This morning with large pocket hematoma  AV paced on tele  Device interrogation not done at this time    MEDICATIONS  (STANDING):  ALBUTerol    0.083% 2.5 milliGRAM(s) Nebulizer every 6 hours  atorvastatin 40 milliGRAM(s) Oral at bedtime  baclofen 10 milliGRAM(s) Oral four times a day  buDESOnide 160 MICROgram(s)/formoterol 4.5 MICROgram(s) Inhaler - Peds 2 Puff(s) Inhalation two times a day  cephalexin 500 milliGRAM(s) Oral every 12 hours  cholecalciferol 2000 Unit(s) Oral daily  dextrose 5%. 1000 milliLiter(s) (50 mL/Hr) IV Continuous <Continuous>  dextrose 50% Injectable 12.5 Gram(s) IV Push once  dextrose 50% Injectable 25 Gram(s) IV Push once  dextrose 50% Injectable 25 Gram(s) IV Push once  dofetilide 250 MICROGram(s) Oral two times a day  fluticasone propionate (50 MICROgram(s)/actuation) Nasal Spray - Peds 2 Spray(s) Alternating Nostrils daily  furosemide    Tablet 20 milliGRAM(s) Oral daily  gabapentin 400 milliGRAM(s) Oral three times a day  insulin lispro (HumaLOG) corrective regimen sliding scale   SubCutaneous three times a day before meals  lamoTRIgine 100 milliGRAM(s) Oral two times a day  loratadine 10 milliGRAM(s) Oral daily  metoprolol tartrate 25 milliGRAM(s) Oral every 8 hours  pantoprazole    Tablet 40 milliGRAM(s) Oral before breakfast  polyethylene glycol 3350 17 Gram(s) Oral every 12 hours  QUEtiapine 25 milliGRAM(s) Oral daily  QUEtiapine 50 milliGRAM(s) Oral at bedtime  sodium chloride 0.9%. 1000 milliLiter(s) (20 mL/Hr) IV Continuous <Continuous>  tamsulosin Oral Tab/Cap - Peds 0.4 milliGRAM(s) Oral at bedtime  tiotropium 18 MICROgram(s) Capsule 1 Capsule(s) Inhalation daily    MEDICATIONS  (PRN):  benzocaine 15 mG/menthol 3.6 mG Lozenge 1 Lozenge Oral three times a day PRN Sore Throat  clonazePAM  Tablet 0.5 milliGRAM(s) Oral at bedtime PRN agitation  dextrose 40% Gel 15 Gram(s) Oral once PRN Blood Glucose LESS THAN 70 milliGRAM(s)/deciliter  fentaNYL    Injectable 25 MICROGram(s) IV Push every 4 hours PRN Moderate Pain (4 - 6)  glucagon  Injectable 1 milliGRAM(s) IntraMuscular once PRN Glucose LESS THAN 70 milligrams/deciliter  nitroglycerin     SubLingual 0.4 milliGRAM(s) SubLingual every 5 minutes PRN Chest Pain  ondansetron    Tablet 4 milliGRAM(s) Oral two times a day PRN Nausea and/or Vomiting      Allergies    dexmethylphenidate (Unknown)  Dilantin (Rash)  dilantin, compazine, neurontin, ritalin, phenergan (Unknown)  Haldol (Unknown)  hydantoin derivatives (Other)  methylphenidate (Unknown)  Morphine Sulfate (Unknown)  phenytoin (Unknown)  prochlorperazine (Unknown)  promethazine (Dystonic RXN)  rash/hives (Anaphylaxis)  thioxanthenes (Unknown)    Intolerances        REVIEW OF SYSTEMS    [x] A ten-point review of systems was otherwise negative except as noted.      Vital Signs Last 24 Hrs  T(C): 36.8 (06 Sep 2019 07:45), Max: 36.8 (06 Sep 2019 07:45)  T(F): 98.2 (06 Sep 2019 07:45), Max: 98.2 (06 Sep 2019 07:45)  HR: 59 (06 Sep 2019 09:00) (59 - 60)  BP: 105/54 (06 Sep 2019 07:45) (94/54 - 111/59)  BP(mean): 76 (06 Sep 2019 07:45) (60 - 80)  RR: 21 (06 Sep 2019 09:00) (7 - 29)  SpO2: 99% (06 Sep 2019 09:00) (77% - 100%)    19 @ 07:  -  19 @ 07:00  --------------------------------------------------------  IN: 1410 mL / OUT: 700 mL / NET: 710 mL    19 @ 07:01  -  09-06-19 @ 10:09  --------------------------------------------------------  IN: 0 mL / OUT: 800 mL / NET: -800 mL        PHYSICAL EXAM:    GENERAL: In no apparent distress, well nourished, and hydrated.  HEART: Regular rate and rhythm; No murmur; NO rubs, or gallops.  LCW large hematoma, tender to palpitation  PULMONARY: Clear to auscultation and percussion.  Normal expansion/effort. No rales, wheezing, or rhonchi bilaterally.  ABDOMEN: Soft, Nontender, Nondistended; Bowel sounds present  EXTREMITIES:  Extremities warm, pink, well-perfused, 2+ Peripheral Pulses, No clubbing, cyanosis, or edema  NEUROLOGICAL: alert & oriented x 3, no focal deficits, PERRLA, EOMI    LABS:                        9.3    6.45  )-----------( 158      ( 06 Sep 2019 04:00 )             28.8         137  |  98  |  21<H>  ----------------------------<  152<H>  3.7   |  27  |  1.0    Ca    8.5      06 Sep 2019 04:00  Phos  3.6       Mg     2.1         TPro  5.5<L>  /  Alb  3.4<L>  /  TBili  0.3  /  DBili  x   /  AST  75<H>  /  ALT  116<H>  /  AlkPhos  130<H>      PT/INR - ( 05 Sep 2019 13:20 )   PT: 12.40 sec;   INR: 1.08 ratio         PTT - ( 05 Sep 2019 13:20 )  PTT:41.6 sec  Urinalysis Basic - ( 04 Sep 2019 18:15 )    Color: Yellow / Appearance: Slightly Turbid / S.019 / pH: x  Gluc: x / Ketone: Negative  / Bili: Negative / Urobili: 3 mg/dL   Blood: x / Protein: Trace / Nitrite: Negative   Leuk Esterase: Large / RBC: 3 /HPF /  /HPF   Sq Epi: x / Non Sq Epi: 1 /HPF / Bacteria: Few        RADIOLOGY & ADDITIONAL TESTS:

## 2019-09-06 NOTE — DIETITIAN INITIAL EVALUATION ADULT. - ENERGY NEEDS
calorie 1545-1854kcal (25-30kcal/kg IBW) for obesity   protein 62-74g (1-1.2g/kg IBW) as above  fluid per CTU team

## 2019-09-06 NOTE — PRE-OP CHECKLIST - AS BP NONINV METHOD
03/05/18 1537   Quick Adds   Type of Visit Initial Occupational Therapy Evaluation   Living Environment   Lives With spouse   Living Arrangements condominium   Home Accessibility no concerns  (has elevator)   Transportation Available family or friend will provide   Living Environment Comment Pt with baseline dementia; some information gathered from chart to increase accuracy   Self-Care   Dominant Hand right   Usual Activity Tolerance good   Current Activity Tolerance fair   Regular Exercise yes   Activity/Exercise Type walking;strength training   Exercise Amount/Frequency 3-5 times/wk   Equipment Currently Used at Home grab bar;raised toilet  (owns cane but does not use)   Activity/Exercise/Self-Care Comment Pt reports going to gym in her building 3x/week to see  - reports doing strengthening exercises and walking   Functional Level Prior   Ambulation 0-->independent   Transferring 0-->independent   Toileting 1-->assistive equipment   Bathing 1-->assistive equipment   Dressing 0-->independent   Eating 0-->independent   Communication 0-->understands/communicates without difficulty   Swallowing 0-->swallows foods/liquids without difficulty   Cognition 1 - attention or memory deficits   Fall history within last six months no   Which of the above functional risks had a recent onset or change? ambulation;transferring;toileting;bathing;dressing;cognition   Prior Functional Level Comment Pt was (I) with toileting, bathing, dressing, and light meal prep; pt's spouse assists with medication, finances, and driving; they have some meals provided at cafeteria in their building and also have someone who comes in to clean the condo   General Information   Onset of Illness/Injury or Date of Surgery - Date 03/03/18   Referring Physician Angella Patrick MD   Additional Occupational Profile Info/Pertinent History of Current Problem Pt is a 89 year old female with an intra-abdominal mass of unknown etiology 
  Precautions/Limitations fall precautions   Weight-Bearing Status - LUE full weight-bearing   Weight-Bearing Status - RUE full weight-bearing   Weight-Bearing Status - LLE full weight-bearing   Weight-Bearing Status - RLE full weight-bearing   Cognitive Status Examination   Orientation person;place   Level of Consciousness alert;confused   Able to Follow Commands success, 1-step commands  (75% of the time with repetition)   Personal Safety (Cognitive) at risk behaviors demonstrated;moderate impairment;decreased insight to deficits   Memory impaired   Attention Distractible during evaluation;Sustained attention impaired   Organization/Problem Solving Problem solving impaired   Executive Function Working memory impaired, decreased storage of information for performing tasks;Self awareness/monitoring impaired   Cognitive Comment Pt with hx of baseline dementia; pt initially unable to come up with date but after writer provided breakdown she was able to state correct year and month   Visual Perception   Visual Perception Wears glasses  (for reading; no new concerns)   Sensory Examination   Sensory Quick Adds No deficits were identified   Pain Assessment   Patient Currently in Pain No   Range of Motion (ROM)   ROM Comment BUEs WFL   Strength   Strength Comments Not formally tested however pt appears very frail and deconditioned   Mobility   Bed Mobility Bed mobility skill: Supine to sit;Bed mobility skill: Sit to supine;Bed mobility skill: Scooting/Bridging;Bed mobility skill: Rolling/Turning   Bed Mobility Skill: Rolling/Turning   Level of [place] - Bed Mobility Skill Rolling Turning moderate assist (50% patients effort)   Physical Assist/Nonphysical Assist verbal cues;1 person assist   Bed Mobility Skill: Scooting/Bridging   Level of [place]: Scooting/Bridging moderate assist (50% patients effort)   Physical Assist/Nonphysical Assist: Scooting/Bridging verbal cues;1 person assist   Bed Mobility Skill: Sit to 
Supine   Level of [place]: Sit/Supine minimum assist (75% patients effort)   Physical Assist/Nonphysical Assist: Sit/Supine verbal cues;1 person assist   Bed Mobility Skill: Supine to Sit   Level of [place]: Supine/Sit moderate assist (50% patients effort)   Physical Assist/Nonphysical Assist: Supine/Sit verbal cues;1 person assist   Transfer Skill: Sit to Stand   Level of [place]: Sit/Stand minimum assist (75% patients effort)   Physical Assist/Nonphysical Assist: Sit/Stand verbal cues;1 person assist   Transfer Skill: Toilet Transfer   Level of [place]: Toilet minimum assist (75% patients effort)   Physical Assist/Nonphysical Assist: Toilet verbal cues;1 person assist   Lower Body Dressing   Level of [place]: Dress Lower Body moderate assist (50% patients effort)   Physical Assist/Nonphysical Assist: Dress Lower Body verbal cues;1 person assist   Toileting   Level of [place]: Toilet moderate assist (50% patients effort)   Physical Assist/Nonphysical Assist: Toilet verbal cues;1 person assist   Activities of Daily Living Analysis   Impairments Contributing to Impaired Activities of Daily Living balance impaired;cognition impaired;strength decreased   General Therapy Interventions   Planned Therapy Interventions ADL retraining;bed mobility training;cognition;ROM;strengthening;stretching;transfer training;home program guidelines;progressive activity/exercise   Clinical Impression   Criteria for Skilled Therapeutic Interventions Met yes, treatment indicated   OT Diagnosis decreased ADL (I)   Influenced by the following impairments weakness, impaired balance, cognitive deficits   Assessment of Occupational Performance 3-5 Performance Deficits   Identified Performance Deficits functional transfers, toileting, bathing, dressing   Clinical Decision Making (Complexity) Low complexity   Therapy Frequency other (see comments)  (6x/week)   Predicted Duration of Therapy Intervention (days/wks) 7 
"days   Anticipated Equipment Needs at Discharge shower chair   Anticipated Discharge Disposition Home with Home Therapy;Home with Outpatient Therapy;Transitional Care Facility   Risks and Benefits of Treatment have been explained. Yes   Patient, Family & other staff in agreement with plan of care Yes   [facility] TM \"6 Clicks\"   2016, Trustees of [facility], under license to [employer].  All rights reserved.   6 Clicks Short Forms Daily Activity Inpatient Short Form   [facility]  \"6 Clicks\" Daily Activity Inpatient Short Form   1. Putting on and taking off regular lower body clothing? 2 - A Lot   2. Bathing (including washing, rinsing, drying)? 2 - A Lot   3. Toileting, which includes using toilet, bedpan or urinal? 2 - A Lot   4. Putting on and taking off regular upper body clothing? 2 - A Lot   5. Taking care of personal grooming such as brushing teeth? 3 - A Little   6. Eating meals? 3 - A Little   Daily Activity Raw Score (Score out of 24.Lower scores equate to lower levels of function) 14   Total Evaluation Time   Total Evaluation Time (Minutes) 7     "
electronic
electronic

## 2019-09-06 NOTE — PACU DISCHARGE NOTE - COMMENTS
Pt s/p evacuation of pocket hematoma, left chest. uncomplicated; patient condition stable.  a-paced and hemodynamically stable.   VS: BP: 98/50; HR: 60; RR: 17; O2Sat 99%

## 2019-09-06 NOTE — BRIEF OPERATIVE NOTE - NSICDXBRIEFPROCEDURE_GEN_ALL_CORE_FT
PROCEDURES:  Drainage of skin pocket for implantable cardioverter-defibrillator (ICD) 06-Sep-2019 18:49:12  Jose Pastrana  Replacement of dual chamber implantable cardioverter defibrillator 05-Sep-2019 12:21:42  Jose Pastrana  Replacement of transvenous electrode lead for pacing implantable cardioverter-defibrillator (ICD) 05-Sep-2019 12:21:26  Jose Pastrana  Complex removal of electrode lead of implantable cardioverter-defibrillator (ICD) using laser 05-Sep-2019 12:20:53  Jose Pastrana

## 2019-09-06 NOTE — DIETITIAN INITIAL EVALUATION ADULT. - DIET TYPE
Per [facility] doc pt. on low Na, low fat, low cholesterol green healthy diet with chopped texture. Vit D supplement. no mention of nutritional shakes use. 201.8lbs NH doc weight. When medically feasible provide dysphagia 3 mech soft thin liq, DASH/TLC, consistent carb NS diet order Pt. reports [facility] soft diet PTP, good appetite. Pt. endorses weight gain lately as he's been eating a lot. Per [facility] doc pt. on low Na, low fat, low cholesterol green healthy diet with chopped texture. Vit D supplement. no mention of nutritional shakes use. 201.8lbs [facility] doc weight. provide dysphagia 3 mech soft thin liq, DASH/TLC diet order

## 2019-09-07 LAB
ALBUMIN SERPL ELPH-MCNC: 3.6 G/DL — SIGNIFICANT CHANGE UP (ref 3.5–5.2)
ALP SERPL-CCNC: 145 U/L — HIGH (ref 30–115)
ALT FLD-CCNC: 63 U/L — HIGH (ref 0–41)
ANION GAP SERPL CALC-SCNC: 9 MMOL/L — SIGNIFICANT CHANGE UP (ref 7–14)
AST SERPL-CCNC: 61 U/L — HIGH (ref 0–41)
BILIRUB SERPL-MCNC: 0.3 MG/DL — SIGNIFICANT CHANGE UP (ref 0.2–1.2)
BUN SERPL-MCNC: 20 MG/DL — SIGNIFICANT CHANGE UP (ref 10–20)
CALCIUM SERPL-MCNC: 9 MG/DL — SIGNIFICANT CHANGE UP (ref 8.5–10.1)
CHLORIDE SERPL-SCNC: 102 MMOL/L — SIGNIFICANT CHANGE UP (ref 98–110)
CO2 SERPL-SCNC: 29 MMOL/L — SIGNIFICANT CHANGE UP (ref 17–32)
CREAT SERPL-MCNC: 0.9 MG/DL — SIGNIFICANT CHANGE UP (ref 0.7–1.5)
GLUCOSE BLDC GLUCOMTR-MCNC: 117 MG/DL — HIGH (ref 70–99)
GLUCOSE BLDC GLUCOMTR-MCNC: 152 MG/DL — HIGH (ref 70–99)
GLUCOSE BLDC GLUCOMTR-MCNC: 97 MG/DL — SIGNIFICANT CHANGE UP (ref 70–99)
GLUCOSE BLDC GLUCOMTR-MCNC: 98 MG/DL — SIGNIFICANT CHANGE UP (ref 70–99)
GLUCOSE SERPL-MCNC: 99 MG/DL — SIGNIFICANT CHANGE UP (ref 70–99)
HCT VFR BLD CALC: 29.5 % — LOW (ref 42–52)
HGB BLD-MCNC: 9.4 G/DL — LOW (ref 14–18)
MAGNESIUM SERPL-MCNC: 2.2 MG/DL — SIGNIFICANT CHANGE UP (ref 1.8–2.4)
MCHC RBC-ENTMCNC: 29.8 PG — SIGNIFICANT CHANGE UP (ref 27–31)
MCHC RBC-ENTMCNC: 31.9 G/DL — LOW (ref 32–37)
MCV RBC AUTO: 93.7 FL — SIGNIFICANT CHANGE UP (ref 80–94)
NRBC # BLD: 0 /100 WBCS — SIGNIFICANT CHANGE UP (ref 0–0)
PHOSPHATE SERPL-MCNC: 3.3 MG/DL — SIGNIFICANT CHANGE UP (ref 2.1–4.9)
PLATELET # BLD AUTO: 184 K/UL — SIGNIFICANT CHANGE UP (ref 130–400)
POTASSIUM SERPL-MCNC: 4.6 MMOL/L — SIGNIFICANT CHANGE UP (ref 3.5–5)
POTASSIUM SERPL-SCNC: 4.6 MMOL/L — SIGNIFICANT CHANGE UP (ref 3.5–5)
PROT SERPL-MCNC: 5.8 G/DL — LOW (ref 6–8)
RBC # BLD: 3.15 M/UL — LOW (ref 4.7–6.1)
RBC # FLD: 15 % — HIGH (ref 11.5–14.5)
SODIUM SERPL-SCNC: 140 MMOL/L — SIGNIFICANT CHANGE UP (ref 135–146)
WBC # BLD: 6.57 K/UL — SIGNIFICANT CHANGE UP (ref 4.8–10.8)
WBC # FLD AUTO: 6.57 K/UL — SIGNIFICANT CHANGE UP (ref 4.8–10.8)

## 2019-09-07 PROCEDURE — 71045 X-RAY EXAM CHEST 1 VIEW: CPT | Mod: 26

## 2019-09-07 PROCEDURE — 99233 SBSQ HOSP IP/OBS HIGH 50: CPT

## 2019-09-07 PROCEDURE — 99232 SBSQ HOSP IP/OBS MODERATE 35: CPT

## 2019-09-07 PROCEDURE — 93010 ELECTROCARDIOGRAM REPORT: CPT

## 2019-09-07 RX ORDER — ASPIRIN/CALCIUM CARB/MAGNESIUM 324 MG
81 TABLET ORAL DAILY
Refills: 0 | Status: DISCONTINUED | OUTPATIENT
Start: 2019-09-07 | End: 2019-09-11

## 2019-09-07 RX ORDER — VANCOMYCIN HCL 1 G
1000 VIAL (EA) INTRAVENOUS ONCE
Refills: 0 | Status: COMPLETED | OUTPATIENT
Start: 2019-09-07 | End: 2019-09-07

## 2019-09-07 RX ORDER — VANCOMYCIN HCL 1 G
VIAL (EA) INTRAVENOUS
Refills: 0 | Status: DISCONTINUED | OUTPATIENT
Start: 2019-09-07 | End: 2019-09-11

## 2019-09-07 RX ORDER — VANCOMYCIN HCL 1 G
1000 VIAL (EA) INTRAVENOUS EVERY 12 HOURS
Refills: 0 | Status: DISCONTINUED | OUTPATIENT
Start: 2019-09-07 | End: 2019-09-11

## 2019-09-07 RX ORDER — FENTANYL CITRATE 50 UG/ML
25 INJECTION INTRAVENOUS EVERY 4 HOURS
Refills: 0 | Status: DISCONTINUED | OUTPATIENT
Start: 2019-09-07 | End: 2019-09-07

## 2019-09-07 RX ORDER — OXYCODONE HYDROCHLORIDE 5 MG/1
5 TABLET ORAL EVERY 4 HOURS
Refills: 0 | Status: DISCONTINUED | OUTPATIENT
Start: 2019-09-07 | End: 2019-09-09

## 2019-09-07 RX ADMIN — DOFETILIDE 250 MICROGRAM(S): 0.25 CAPSULE ORAL at 13:34

## 2019-09-07 RX ADMIN — LORATADINE 10 MILLIGRAM(S): 10 TABLET ORAL at 13:37

## 2019-09-07 RX ADMIN — FENTANYL CITRATE 25 MICROGRAM(S): 50 INJECTION INTRAVENOUS at 14:00

## 2019-09-07 RX ADMIN — BUDESONIDE AND FORMOTEROL FUMARATE DIHYDRATE 2 PUFF(S): 160; 4.5 AEROSOL RESPIRATORY (INHALATION) at 21:45

## 2019-09-07 RX ADMIN — MORPHINE SULFATE 30 MILLIGRAM(S): 50 CAPSULE, EXTENDED RELEASE ORAL at 18:49

## 2019-09-07 RX ADMIN — ALBUTEROL 2.5 MILLIGRAM(S): 90 AEROSOL, METERED ORAL at 06:22

## 2019-09-07 RX ADMIN — OXYCODONE HYDROCHLORIDE 5 MILLIGRAM(S): 5 TABLET ORAL at 22:15

## 2019-09-07 RX ADMIN — GABAPENTIN 400 MILLIGRAM(S): 400 CAPSULE ORAL at 21:45

## 2019-09-07 RX ADMIN — Medication 10 MILLIGRAM(S): at 13:36

## 2019-09-07 RX ADMIN — FENTANYL CITRATE 25 MICROGRAM(S): 50 INJECTION INTRAVENOUS at 04:20

## 2019-09-07 RX ADMIN — Medication 12.5 MILLIGRAM(S): at 13:36

## 2019-09-07 RX ADMIN — FENTANYL CITRATE 25 MICROGRAM(S): 50 INJECTION INTRAVENOUS at 08:24

## 2019-09-07 RX ADMIN — QUETIAPINE FUMARATE 25 MILLIGRAM(S): 200 TABLET, FILM COATED ORAL at 13:35

## 2019-09-07 RX ADMIN — FENTANYL CITRATE 25 MICROGRAM(S): 50 INJECTION INTRAVENOUS at 00:30

## 2019-09-07 RX ADMIN — Medication 12.5 MILLIGRAM(S): at 06:34

## 2019-09-07 RX ADMIN — Medication 10 MILLIGRAM(S): at 17:17

## 2019-09-07 RX ADMIN — Medication 500 MILLIGRAM(S): at 06:34

## 2019-09-07 RX ADMIN — Medication 250 MILLIGRAM(S): at 13:41

## 2019-09-07 RX ADMIN — MORPHINE SULFATE 30 MILLIGRAM(S): 50 CAPSULE, EXTENDED RELEASE ORAL at 01:30

## 2019-09-07 RX ADMIN — Medication 10 MILLIGRAM(S): at 23:27

## 2019-09-07 RX ADMIN — Medication 20 MILLIGRAM(S): at 06:35

## 2019-09-07 RX ADMIN — Medication 2: at 12:00

## 2019-09-07 RX ADMIN — TAMSULOSIN HYDROCHLORIDE 0.4 MILLIGRAM(S): 0.4 CAPSULE ORAL at 21:45

## 2019-09-07 RX ADMIN — Medication 2 SPRAY(S): at 13:37

## 2019-09-07 RX ADMIN — MORPHINE SULFATE 30 MILLIGRAM(S): 50 CAPSULE, EXTENDED RELEASE ORAL at 18:09

## 2019-09-07 RX ADMIN — FENTANYL CITRATE 25 MICROGRAM(S): 50 INJECTION INTRAVENOUS at 00:15

## 2019-09-07 RX ADMIN — ATORVASTATIN CALCIUM 40 MILLIGRAM(S): 80 TABLET, FILM COATED ORAL at 21:47

## 2019-09-07 RX ADMIN — GABAPENTIN 400 MILLIGRAM(S): 400 CAPSULE ORAL at 13:36

## 2019-09-07 RX ADMIN — OXYCODONE HYDROCHLORIDE 5 MILLIGRAM(S): 5 TABLET ORAL at 21:43

## 2019-09-07 RX ADMIN — Medication 81 MILLIGRAM(S): at 18:10

## 2019-09-07 RX ADMIN — MORPHINE SULFATE 30 MILLIGRAM(S): 50 CAPSULE, EXTENDED RELEASE ORAL at 00:59

## 2019-09-07 RX ADMIN — ALBUTEROL 2.5 MILLIGRAM(S): 90 AEROSOL, METERED ORAL at 14:41

## 2019-09-07 RX ADMIN — Medication 250 MILLIGRAM(S): at 18:10

## 2019-09-07 RX ADMIN — ALBUTEROL 2.5 MILLIGRAM(S): 90 AEROSOL, METERED ORAL at 20:53

## 2019-09-07 RX ADMIN — LAMOTRIGINE 100 MILLIGRAM(S): 25 TABLET, ORALLY DISINTEGRATING ORAL at 06:35

## 2019-09-07 RX ADMIN — FENTANYL CITRATE 25 MICROGRAM(S): 50 INJECTION INTRAVENOUS at 08:45

## 2019-09-07 RX ADMIN — Medication 10 MILLIGRAM(S): at 06:34

## 2019-09-07 RX ADMIN — Medication 2000 UNIT(S): at 13:37

## 2019-09-07 RX ADMIN — LAMOTRIGINE 100 MILLIGRAM(S): 25 TABLET, ORALLY DISINTEGRATING ORAL at 17:16

## 2019-09-07 RX ADMIN — DOFETILIDE 250 MICROGRAM(S): 0.25 CAPSULE ORAL at 17:17

## 2019-09-07 RX ADMIN — GABAPENTIN 400 MILLIGRAM(S): 400 CAPSULE ORAL at 06:33

## 2019-09-07 RX ADMIN — Medication 0.5 MILLIGRAM(S): at 02:43

## 2019-09-07 RX ADMIN — QUETIAPINE FUMARATE 50 MILLIGRAM(S): 200 TABLET, FILM COATED ORAL at 21:46

## 2019-09-07 RX ADMIN — FENTANYL CITRATE 25 MICROGRAM(S): 50 INJECTION INTRAVENOUS at 13:34

## 2019-09-07 RX ADMIN — PANTOPRAZOLE SODIUM 40 MILLIGRAM(S): 20 TABLET, DELAYED RELEASE ORAL at 06:36

## 2019-09-07 NOTE — PROGRESS NOTE ADULT - SUBJECTIVE AND OBJECTIVE BOX
OPERATIVE PROCEDURE(s):                POD #   2 lead extraction and AICD battery change, #1 evacuation of hematoma                    48yMale  SURGEON(s): MICHELLE Pastrana  SUBJECTIVE ASSESSMENT: has pain at site  Vital Signs Last 24 Hrs  T(F): 100 (07 Sep 2019 05:00), Max: 100 (07 Sep 2019 05:00)  HR: 60 (07 Sep 2019 05:00) (59 - 60)  BP: 104/58 (07 Sep 2019 05:00) (81/49 - 118/58)  BP(mean): 75 (07 Sep 2019 05:00) (61 - 82)  ABP: 116/57 (06 Sep 2019 20:00) (88/47 - 124/61)  ABP(mean): 79 (06 Sep 2019 20:00)  RR: 19 (07 Sep 2019 05:00) (10 - 29)  SpO2: 94% (07 Sep 2019 05:00) (93% - 100%)  CVP(mm Hg): --  CVP(cm H2O): --  CO: --  CI: --  PA: --  SVR: --    I&O's Detail    06 Sep 2019 07:01  -  07 Sep 2019 07:00  --------------------------------------------------------  IN:    Oral Fluid: 240 mL  Total IN: 240 mL    OUT:    Voided: 2050 mL  Total OUT: 2050 mL        Net:   I&O's Detail    05 Sep 2019 07:  -  06 Sep 2019 07:00  --------------------------------------------------------  Total NET: 710 mL      06 Sep 2019 07:01  -  07 Sep 2019 07:00  --------------------------------------------------------  Total NET: -1810 mL        CAPILLARY BLOOD GLUCOSE      POCT Blood Glucose.: 98 mg/dL (07 Sep 2019 07:11)  POCT Blood Glucose.: 118 mg/dL (06 Sep 2019 20:55)  POCT Blood Glucose.: 117 mg/dL (06 Sep 2019 15:52)  POCT Blood Glucose.: 125 mg/dL (06 Sep 2019 10:52)    Physical Exam:  General: NAD; A&Ox3  Cardiac: S1/S2, RRR, no murmur, no rubs  Lungs: unlabored respirations, CTA b/l, no wheeze, no rales, no crackles  Abdomen: Soft/NT/ND; positive bowel sounds x 4  Sternum: Intact, no click, incision healing well with no drainage  Incisions: Incisions clean/dry/intact  Extremities: No edema b/l lower extremities; good capillary refill; no cyanosis; palpable 1+ pedal pulses b/l    Central Venous Catheter: Yes[]  No[] , If Yes indication:           Day #  Blum Catheter: Yes  [] , No  [] , If yes indication:                      Day #  NGT: Yes [] No [] ,    If Yes Placement:                                     Day #  EPICARDIAL WIRES:  [] YES [] NO                                              Day #  BOWEL MOVEMENT:  [] YES [] NO, If No, Timing since last BM:      Day #  CHEST TUBE(Left/Right):  [] YES [] NO, If yes -  AIR LEAKS:  [] YES [] NO        LABS:                        9.4<L>  6.57  )-----------( 184      ( 07 Sep 2019 05:00 )             29.5<L>                        9.3<L>  6.45  )-----------( 158      ( 06 Sep 2019 04:00 )             28.8<L>    09-07    140  |  102  |  20  ----------------------------<  99  4.6   |  29  |  0.9      137  |  98  |  21<H>  ----------------------------<  152<H>  3.7   |  27  |  1.0    Ca    9.0      07 Sep 2019 05:00  Phos  3.3       Mg     2.2         TPro  5.8<L> [6.0 - 8.0]  /  Alb  3.6 [3.5 - 5.2]  /  TBili  0.3 [0.2 - 1.2]  /  DBili  x   /  AST  61<H> [0 - 41]  /  ALT  63<H> [0 - 41]  /  AlkPhos  145<H> [30 - 115]  09-07    PT/INR - ( 05 Sep 2019 13:20 )   PT: ;   INR: 1.08 ratio         PTT - ( 05 Sep 2019 13:20 )  PTT:41.6 sec  Urinalysis Basic - ( 04 Sep 2019 18:15 )    Color: Yellow / Appearance: Slightly Turbid / S.019 / pH: x  Gluc: x / Ketone: Negative  / Bili: Negative / Urobili: 3 mg/dL   Blood: x / Protein: Trace / Nitrite: Negative   Leuk Esterase: Large / RBC: 3 /HPF /  /HPF   Sq Epi: x / Non Sq Epi: 1 /HPF / Bacteria: Few      ABG - ( 06 Sep 2019 03:54 )  pH: 7.46  /  pCO2: 43    /  pO2: 80    / HCO3: 31    / Base Excess: 6.3   /  SaO2: 97    /  LA: 1.0              RADIOLOGY & ADDITIONAL TESTS:  CXR:  EKG:  MEDICATIONS  (STANDING):  ALBUTerol    0.083% 2.5 milliGRAM(s) Nebulizer every 6 hours  atorvastatin 40 milliGRAM(s) Oral at bedtime  baclofen 10 milliGRAM(s) Oral four times a day  buDESOnide 160 MICROgram(s)/formoterol 4.5 MICROgram(s) Inhaler - Peds 2 Puff(s) Inhalation two times a day  cephalexin 500 milliGRAM(s) Oral every 12 hours  cholecalciferol 2000 Unit(s) Oral daily  dextrose 5%. 1000 milliLiter(s) (50 mL/Hr) IV Continuous <Continuous>  dextrose 50% Injectable 12.5 Gram(s) IV Push once  dextrose 50% Injectable 25 Gram(s) IV Push once  dextrose 50% Injectable 25 Gram(s) IV Push once  dofetilide 250 MICROGram(s) Oral two times a day  fluticasone propionate (50 MICROgram(s)/actuation) Nasal Spray - Peds 2 Spray(s) Alternating Nostrils daily  furosemide    Tablet 20 milliGRAM(s) Oral daily  gabapentin 400 milliGRAM(s) Oral three times a day  insulin lispro (HumaLOG) corrective regimen sliding scale   SubCutaneous three times a day before meals  lamoTRIgine 100 milliGRAM(s) Oral two times a day  loratadine 10 milliGRAM(s) Oral daily  metoprolol tartrate 12.5 milliGRAM(s) Oral three times a day  pantoprazole    Tablet 40 milliGRAM(s) Oral before breakfast  polyethylene glycol 3350 17 Gram(s) Oral every 12 hours  QUEtiapine 25 milliGRAM(s) Oral daily  QUEtiapine 50 milliGRAM(s) Oral at bedtime  sodium chloride 0.9%. 1000 milliLiter(s) (20 mL/Hr) IV Continuous <Continuous>  tamsulosin Oral Tab/Cap - Peds 0.4 milliGRAM(s) Oral at bedtime  tiotropium 18 MICROgram(s) Capsule 1 Capsule(s) Inhalation daily    MEDICATIONS  (PRN):  benzocaine 15 mG/menthol 3.6 mG Lozenge 1 Lozenge Oral three times a day PRN Sore Throat  clonazePAM  Tablet 0.5 milliGRAM(s) Oral at bedtime PRN agitation  dextrose 40% Gel 15 Gram(s) Oral once PRN Blood Glucose LESS THAN 70 milliGRAM(s)/deciliter  fentaNYL    Injectable 25 MICROGram(s) IV Push every 4 hours PRN Moderate Pain (4 - 6)  glucagon  Injectable 1 milliGRAM(s) IntraMuscular once PRN Glucose LESS THAN 70 milligrams/deciliter  morphine ER Tablet 30 milliGRAM(s) Oral every 12 hours PRN moderate pain  nitroglycerin     SubLingual 0.4 milliGRAM(s) SubLingual every 5 minutes PRN Chest Pain  ondansetron    Tablet 4 milliGRAM(s) Oral two times a day PRN Nausea and/or Vomiting    HEPARIN:  [] YES [] NO  Dose: XX UNITS/HR UNITS Q8H  LOVENOX:[] YES [] NO  Dose: XX mg Q24H  COUMADIN: []  YES [] NO  Dose: XX mg  Q24H  SCD's: YES b/l  GI Prophylaxis: Protonix [], Pepcid [], None [], (Contra-indication:.....)    Post-Op Beta-Blockers: Yes [], No[], If No, then contraindication:  Post-Op Aspirin: Yes [],  No [], If No, then contraindication:  Post-Op Statin: Yes [], No[], If No, then contraindication:  Allergies    dexmethylphenidate (Unknown)  Dilantin (Rash)  dilantin, compazine, neurontin, ritalin, phenergan (Unknown)  Haldol (Unknown)  hydantoin derivatives (Other)  methylphenidate (Unknown)  Morphine Sulfate (Unknown)  phenytoin (Unknown)  prochlorperazine (Unknown)  promethazine (Dystonic RXN)  rash/hives (Anaphylaxis)  thioxanthenes (Unknown)    Intolerances      Ambulation/Activity Status:    Assessment/Plan:  48y Male status-post .....  - Case and plan discussed with CTU Intensivist and CT Surgeon - Dr. Munoz/Victoriano/Barbi   - Continue CTU supportive care    - Continue DVT/GI prophylaxis  - Incentive Spirometry 10 times an hour  - Continue to advance physical activity as tolerated and continue PT/OT as directed  1. CAD: Continue ASA, statin, BB  2. HTN:   3. A. Fib:   4. COPD/Hypoxia:   5. DM/Glucose Control:     Social Service Disposition: OPERATIVE PROCEDURE(s):                POD #   2 lead extraction and AICD battery change, #1 evacuation of hematoma                    48yMale  SURGEON(s): MICHELLE Pastrana  SUBJECTIVE ASSESSMENT: has pain at site, does not want to go back to facility where he came from  Vital Signs Last 24 Hrs  T(F): 100 (07 Sep 2019 05:00), Max: 100 (07 Sep 2019 05:00)  HR: 60 (07 Sep 2019 05:00) (59 - 60)  BP: 104/58 (07 Sep 2019 05:00) (81/49 - 118/58)  BP(mean): 75 (07 Sep 2019 05:00) (61 - 82)  ABP: 116/57 (06 Sep 2019 20:00) (88/47 - 124/61)  ABP(mean): 79 (06 Sep 2019 20:00)  RR: 19 (07 Sep 2019 05:00) (10 - 29)  SpO2: 94% (07 Sep 2019 05:00) (93% - 100%)    I&O's Detail    06 Sep 2019 07:  -  07 Sep 2019 07:00  --------------------------------------------------------  IN:    Oral Fluid: 240 mL  Total IN: 240 mL    OUT:    Voided: 2050 mL  Total OUT: 2050 mL      Net:   I&O's Detail    05 Sep 2019 07:01  -  06 Sep 2019 07:00  --------------------------------------------------------  Total NET: 710 mL    06 Sep 2019 07:  -  07 Sep 2019 07:00  --------------------------------------------------------  Total NET: -1810 mL        CAPILLARY BLOOD GLUCOSE      POCT Blood Glucose.: 98 mg/dL (07 Sep 2019 07:11)  POCT Blood Glucose.: 118 mg/dL (06 Sep 2019 20:55)  POCT Blood Glucose.: 117 mg/dL (06 Sep 2019 15:52)  POCT Blood Glucose.: 125 mg/dL (06 Sep 2019 10:52)    Physical Exam:  General: NAD; A&Ox3  Lungs: unlabored respirations, decreased bs at bases, less swelling left chest wound, tender, no significant hematoma  Incisions: Incisions dressing intacr      LABS:                        9.4<L>  6.57  )-----------( 184      ( 07 Sep 2019 05:00 )             29.5<L>                        9.3<L>  6.45  )-----------( 158      ( 06 Sep 2019 04:00 )             28.8<L>        140  |  102  |  20  ----------------------------<  99  4.6   |  29  |  0.9      137  |  98  |  21<H>  ----------------------------<  152<H>  3.7   |  27  |  1.0    Ca    9.0      07 Sep 2019 05:00  Phos  3.3     -  Mg     2.2     -    TPro  5.8<L> [6.0 - 8.0]  /  Alb  3.6 [3.5 - 5.2]  /  TBili  0.3 [0.2 - 1.2]  /  DBili  x   /  AST  61<H> [0 - 41]  /  ALT  63<H> [0 - 41]  /  AlkPhos  145<H> [30 - 115]      PT/INR - ( 05 Sep 2019 13:20 )   PT: ;   INR: 1.08 ratio       PTT - ( 05 Sep 2019 13:20 )  PTT:41.6 sec  Urinalysis Basic - ( 04 Sep 2019 18:15 )    Color: Yellow / Appearance: Slightly Turbid / S.019 / pH: x  Gluc: x / Ketone: Negative  / Bili: Negative / Urobili: 3 mg/dL   Blood: x / Protein: Trace / Nitrite: Negative   Leuk Esterase: Large / RBC: 3 /HPF /  /HPF   Sq Epi: x / Non Sq Epi: 1 /HPF / Bacteria: Few      ABG - ( 06 Sep 2019 03:54 )  pH: 7.46  /  pCO2: 43    /  pO2: 80    / HCO3: 31    / Base Excess: 6.3   /  SaO2: 97    /  LA: 1.0        RADIOLOGY & ADDITIONAL TESTS:  CXR: < from: Xray Chest 1 View- PORTABLE-Routine (19 @ 04:29) >  Impression:      No radiographic evidence of acute cardiopulmonary disease.    < end of copied text >    EKG: < from: 12 Lead ECG (19 @ 07:28) >  Ventricular Rate 60 BPM    Atrial Rate 98 BPM    P-R Interval 168 ms    QRS Duration 134 ms    Q-T Interval 414 ms    QTC Calculation(Bezet) 414 ms    P Axis 43 degrees    R Axis -9 degrees    T Axis 14 degrees    Diagnosis Line Atrial-paced rhythm  Right bundle branch block  Inferior infarct , age undetermined  Abnormal ECG    < end of copied text >    MEDICATIONS  (STANDING):  ALBUTerol    0.083% 2.5 milliGRAM(s) Nebulizer every 6 hours  atorvastatin 40 milliGRAM(s) Oral at bedtime  baclofen 10 milliGRAM(s) Oral four times a day  buDESOnide 160 MICROgram(s)/formoterol 4.5 MICROgram(s) Inhaler - Peds 2 Puff(s) Inhalation two times a day  cephalexin 500 milliGRAM(s) Oral every 12 hours  cholecalciferol 2000 Unit(s) Oral daily  dextrose 5%. 1000 milliLiter(s) (50 mL/Hr) IV Continuous <Continuous>  dextrose 50% Injectable 12.5 Gram(s) IV Push once  dextrose 50% Injectable 25 Gram(s) IV Push once  dextrose 50% Injectable 25 Gram(s) IV Push once  dofetilide 250 MICROGram(s) Oral two times a day  fluticasone propionate (50 MICROgram(s)/actuation) Nasal Spray - Peds 2 Spray(s) Alternating Nostrils daily  furosemide    Tablet 20 milliGRAM(s) Oral daily  gabapentin 400 milliGRAM(s) Oral three times a day  insulin lispro (HumaLOG) corrective regimen sliding scale   SubCutaneous three times a day before meals  lamoTRIgine 100 milliGRAM(s) Oral two times a day  loratadine 10 milliGRAM(s) Oral daily  metoprolol tartrate 12.5 milliGRAM(s) Oral three times a day  pantoprazole    Tablet 40 milliGRAM(s) Oral before breakfast  polyethylene glycol 3350 17 Gram(s) Oral every 12 hours  QUEtiapine 25 milliGRAM(s) Oral daily  QUEtiapine 50 milliGRAM(s) Oral at bedtime  sodium chloride 0.9%. 1000 milliLiter(s) (20 mL/Hr) IV Continuous <Continuous>  tamsulosin Oral Tab/Cap - Peds 0.4 milliGRAM(s) Oral at bedtime  tiotropium 18 MICROgram(s) Capsule 1 Capsule(s) Inhalation daily    MEDICATIONS  (PRN):  benzocaine 15 mG/menthol 3.6 mG Lozenge 1 Lozenge Oral three times a day PRN Sore Throat  clonazePAM  Tablet 0.5 milliGRAM(s) Oral at bedtime PRN agitation  dextrose 40% Gel 15 Gram(s) Oral once PRN Blood Glucose LESS THAN 70 milliGRAM(s)/deciliter  fentaNYL    Injectable 25 MICROGram(s) IV Push every 4 hours PRN Moderate Pain (4 - 6)  glucagon  Injectable 1 milliGRAM(s) IntraMuscular once PRN Glucose LESS THAN 70 milligrams/deciliter  morphine ER Tablet 30 milliGRAM(s) Oral every 12 hours PRN moderate pain  nitroglycerin     SubLingual 0.4 milliGRAM(s) SubLingual every 5 minutes PRN Chest Pain  ondansetron    Tablet 4 milliGRAM(s) Oral two times a day PRN Nausea and/or Vomiting    No anticoagulation for 72 hours PO due to bleed  SCD's: YES b/l  GI Prophylaxis: Protonix [],     Post-Op Beta-Blockers: Yes [],   Post-Op Aspirin: Yes [],  will start  Post-Op Statin: Yes [],     dexmethylphenidate (Unknown)  Dilantin (Rash)  dilantin, compazine, neurontin, ritalin, phenergan (Unknown)  Haldol (Unknown)  hydantoin derivatives (Other)  methylphenidate (Unknown)  Morphine Sulfate (Unknown)  phenytoin (Unknown)  prochlorperazine (Unknown)  promethazine (Dystonic RXN)  rash/hives (Anaphylaxis)  thioxanthenes (Unknown)    Intolerances      Ambulation/Activity Status:    Assessment/Plan:  48y Male status-post AICd lead extraction and battery change, with PO evacuation of hematoma  - Case and plan discussed with CTU Intensivist and CT Surgeon - Dr. Munoz/Victoriano/Barbi   - Continue CTU supportive care    - Continue DVT/GI prophylaxis  - Incentive Spirometry 10 times an hour  - Continue to advance physical activity as tolerated and continue PT/OT as directed  - anticipate d/c tomorrow    Social Service Disposition:  social work to see because patient does not want to go back [facility]

## 2019-09-07 NOTE — PROGRESS NOTE ADULT - SUBJECTIVE AND OBJECTIVE BOX
CTU Attending Progress Daily Note     07 Sep 2019 09:59  Admited 08-29-19, Hospital Day 9d  POD# -     HPI:  49 y/o M with PMHx of v-tach s/p AICD, CHF, A fib/flutter, HTN, COPD (on O2 PRN), DLD, multiple sclerosis w/ multiple admissions for flares, seizure disorder (on lamotrigine), PTSD, depression, ?stroke/TIA, esophageal stricture, chronic sore throat/sinusitis, GERD, hemopneumothorax (2006), pericardial effusion (2006), cardiomyopathy, MI (2006, 2008), IBS, presenting with chest pain, palpitations and AICD firing. He had recent admission to [facility] on 08/13/19with discharge on 08/19/19 for multiple sclerosis flare treated with IV solumedrol for 5 days and discharged on oral Prednisone for 5 more days. During his admission EP interrogated his pacemaker and multiple episodes of SVT and PAF were noted over past few months. His metoprolol was increased to 25 mg TID, and to return to see Dr. Quiñones in 2 months for battery change. He states that two days ago he felt mid sternal chest pain, 8/10, that radiated to the right side of his neck and face that resolved with sublingual nitroglycerin.  The following day he is "80% sure" he felt his pacemaker fire so he informed the staff at [facility] and was brought to the ED.   He also states that his pacemaker has been "buzzing and vibrating," and that he was told to come to the ED if he felt that.  The buzzing  is not  currently present on exam. He has no other complaints today.     In the ED BP: 119/66 HR: 60 RR: 18 T: 97.4 (29 Aug 2019 10:00)    Home Medications:  acetaminophen 650 mg oral tablet: 650 milligram(s) orally 4 times a day, As Needed (02 Aug 2018 11:55)  atorvastatin 40 mg oral tablet: 1 tab(s) orally once a day (at bedtime) (02 Aug 2018 11:55)  baclofen 10 mg oral tablet: 1 tab(s) orally 4 times a day (13 Aug 2019 11:07)  budesonide-formoterol 160 mcg-4.5 mcg/inh inhalation aerosol: 2 puff(s) inhaled 2 times a day (25 Feb 2019 11:41)  Cepacol Extra Strength Menthol 10 mg mucous membrane lozenge: 1 anderson(s) mucous membrane every 6 hours, As Needed (21 Feb 2019 22:06)  cholecalciferol oral tablet: 2000 unit(s) orally once a day (19 Aug 2019 17:19)  Claritin 10 mg oral tablet: 1 tab(s) orally once a day (13 Aug 2019 11:11)  Eliquis 5 mg oral tablet: 1 tab(s) orally 2 times a day (02 Aug 2018 11:55)  Flonase 50 mcg/inh nasal spray: 2 spray(s) nasal once a day (13 Aug 2019 11:09)  furosemide 20 mg oral tablet: 1 tab(s) orally once a day (19 Aug 2019 17:19)  gabapentin 400 mg oral capsule: 1 cap(s) orally 3 times a day (02 Aug 2018 11:55)  ketoconazole 2% topical shampoo: Apply topically to affected area once a day (in the evening) (13 Aug 2019 11:11)  lamoTRIgine 100 mg oral tablet: 1 tab(s) orally 2 times a day (02 Aug 2018 11:55)  metoprolol tartrate 25 mg oral tablet: 1 tab(s) orally every 8 hours (19 Aug 2019 17:19)  Mi-Acid oral suspension: 30 milliliter(s) orally 4 times a day, As Needed (02 Aug 2018 11:55)  morphine 15 mg oral tablet: 1 tab(s) orally every 4 hours, As needed, Severe Pain (7 - 10) (19 Aug 2019 17:09)  Nitrostat 0.4 mg sublingual tablet: sublingual 3 times a day, As Needed; may repeat after 5 min until relief (02 Aug 2018 11:55)  omeprazole 20 mg oral delayed release capsule: 1 cap(s) orally 2 times a day (02 Aug 2018 11:55)  polyethylene glycol 3350 oral powder for reconstitution: 17 gram(s) orally every 12 hours (19 Aug 2019 17:19)  QUEtiapine 25 mg oral tablet: 1 tab(s) orally once a day (02 Aug 2018 11:55)  SEROquel 50 mg oral tablet: 1 tab(s) orally once a day (at bedtime) (21 Feb 2019 22:09)  tamsulosin 0.4 mg oral capsule: 1 cap(s) orally once a day (at bedtime) (19 Aug 2019 17:19)  Tecfidera 240 mg oral delayed release capsule: 1 cap(s) orally once a day (02 Aug 2018 11:55)  Tikosyn 250 mcg oral capsule: 1 cap(s) orally 2 times a day (02 Aug 2018 11:55)  tiotropium 18 mcg inhalation capsule: 1 cap(s) inhaled once a day (19 Aug 2019 17:19)    FAMILY HISTORY:  Family history of colon cancer in mother  Family history of cardiomyopathy: Mother  Family history of cervical cancer (Mother)  Family history of early CAD (Mother)    PAST MEDICAL & SURGICAL HISTORY:  PTSD (post-traumatic stress disorder)  Supraventricular arrhythmia: prior history  Cardiomyopathy  CHF (congestive heart failure)  Atrial fibrillation: s/p ablation, on eliquis  Diabetes mellitus: diet controlled  BPH (benign prostatic hyperplasia)  HTN (hypertension)  Seizures  Migraines  Pneumonia  MS (multiple sclerosis)  CAD (coronary artery disease)  Bipolar disorder  Anginal pain  Schizo affective schizophrenia  GERD (gastroesophageal reflux disease)  Seasonal allergies  Hypercholesteremia  COPD (chronic obstructive pulmonary disease)  CVA (cerebral vascular accident)  TIA (transient ischemic attack)  Syncope  Peripheral Neuropathy  Clinical Depression  H/O prior ablation treatment: for Afib  Cardiac pacemaker recipient  H/O hernia repair: right 1972,1991  History of hip replacement  S/P Orchiectomy: L for testicular CA  S/P Implantation of AICD    Interval event for past 24 hr:  CUATE HORNE  48y had no event.   Current Complains:  CUATE HORNE has no new complains  Allergies    dexmethylphenidate (Unknown)  Dilantin (Rash)  dilantin, compazine, neurontin, ritalin, phenergan (Unknown)  Haldol (Unknown)  hydantoin derivatives (Other)  methylphenidate (Unknown)  Morphine Sulfate (Unknown)  phenytoin (Unknown)  prochlorperazine (Unknown)  promethazine (Dystonic RXN)  rash/hives (Anaphylaxis)  thioxanthenes (Unknown)    Intolerances      OBJECTIVE:  Vitals last 24 hrs  T(C): 37.8 (09-07-19 @ 05:00), Max: 37.8 (09-07-19 @ 05:00)  T(F): 100 (09-07-19 @ 05:00), Max: 100 (09-07-19 @ 05:00)  HR: 60 (09-07-19 @ 07:00) (59 - 60)  BP: 123/61 (09-07-19 @ 07:00) (81/49 - 123/61)  ABP: 116/57 (09-06-19 @ 20:00) (88/47 - 124/52)  ABP(mean): 79 (09-06-19 @ 20:00) (63 - 79)  RR: 19 (09-07-19 @ 07:00) (10 - 29)  SpO2: 96% (09-07-19 @ 07:00) (93% - 100%)  CVP(mm Hg): --  CI: --  PA: --  PA(direct): --  PCWP: --  SVR: --  PVR: --    09-06-19 @ 07:01  -  09-07-19 @ 07:00  --------------------------------------------------------  IN:    Oral Fluid: 240 mL  Total IN: 240 mL    OUT:    Voided: 2300 mL  Total OUT: 2300 mL    Total NET: -2060 mL          CAPILLARY BLOOD GLUCOSE      POCT Blood Glucose.: 98 mg/dL (07 Sep 2019 07:11)  POCT Blood Glucose.: 118 mg/dL (06 Sep 2019 20:55)  POCT Blood Glucose.: 117 mg/dL (06 Sep 2019 15:52)  POCT Blood Glucose.: 125 mg/dL (06 Sep 2019 10:52)    LABS:  ABG - ( 06 Sep 2019 03:54 )  pH, Arterial: 7.46  pH, Blood: x     /  pCO2: 43    /  pO2: 80    / HCO3: 31    / Base Excess: 6.3   /  SaO2: 97                                      9.4    6.57  )-----------( 184      ( 07 Sep 2019 05:00 )             29.5     Hemoglobin: 9.4 g/dL (09-07 @ 05:00)  Hemoglobin: 9.3 g/dL (09-06 @ 04:00)  Hemoglobin: 10.5 g/dL (09-05 @ 13:20)  Hemoglobin: 11.3 g/dL (09-05 @ 07:38)  Hemoglobin: 11.2 g/dL (09-04 @ 18:00)    09-07    140  |  102  |  20  ----------------------------<  99  4.6   |  29  |  0.9    Ca    9.0      07 Sep 2019 05:00  Phos  3.3     09-07  Mg     2.2     09-07    TPro  5.8<L>  /  Alb  3.6  /  TBili  0.3  /  DBili  x   /  AST  61<H>  /  ALT  63<H>  /  AlkPhos  145<H>  09-07    Creatinine, Serum: 0.9 mg/dL (09-07 @ 05:00)  Creatinine, Serum: 1.0 mg/dL (09-06 @ 04:00)  Creatinine, Serum: 1.1 mg/dL (09-05 @ 13:20)  Creatinine, Serum: 1.2 mg/dL (09-05 @ 07:38)  Creatinine, Serum: 0.9 mg/dL (09-04 @ 07:12)    PT/INR - ( 05 Sep 2019 13:20 )   PT: 12.40 sec;   INR: 1.08 ratio         PTT - ( 05 Sep 2019 13:20 )  PTT:41.6 sec      Culture - Fungal, Tissue (collected 05 Sep 2019 12:50)  Source: .Tissue None  Preliminary Report (06 Sep 2019 07:26):    Testing in progress    Culture - Tissue with Gram Stain (collected 05 Sep 2019 12:50)  Source: .Tissue None  Gram Stain (06 Sep 2019 01:55):    Rare polymorphonuclear leukocytes seen per low power field    No organisms seen per oil power field  Preliminary Report (06 Sep 2019 21:34):    No growth    Culture - Fungal, Other (collected 05 Sep 2019 10:15)  Source: .Other None  Preliminary Report (06 Sep 2019 07:26):    Testing in progress    Culture - Acid Fast - Other w/Smear (collected 05 Sep 2019 10:15)  Source: .Other None    Culture - Surgical Swab (collected 05 Sep 2019 10:15)  Source: .Surgical Swab None  Preliminary Report (06 Sep 2019 21:10):    No growth    Culture - Urine (collected 04 Sep 2019 18:15)  Source: .Urine Clean Catch (Midstream)  Final Report (06 Sep 2019 12:13):    >100,000 CFU/ml Coag Negative Staphylococcus "Susceptibilities not    performed"    Culture - Blood (collected 04 Sep 2019 18:00)  Source: .Blood None  Preliminary Report (06 Sep 2019 02:01):    No growth to date.    Culture - Blood (collected 04 Sep 2019 18:00)  Source: .Blood None  Preliminary Report (06 Sep 2019 02:01):    No growth to date.      HOSPITAL MEDICATIONS:  MEDICATIONS  (STANDING):  ALBUTerol    0.083% 2.5 milliGRAM(s) Nebulizer every 6 hours  atorvastatin 40 milliGRAM(s) Oral at bedtime  baclofen 10 milliGRAM(s) Oral four times a day  buDESOnide 160 MICROgram(s)/formoterol 4.5 MICROgram(s) Inhaler - Peds 2 Puff(s) Inhalation two times a day  cephalexin 500 milliGRAM(s) Oral every 12 hours  cholecalciferol 2000 Unit(s) Oral daily  dextrose 5%. 1000 milliLiter(s) (50 mL/Hr) IV Continuous <Continuous>  dextrose 50% Injectable 12.5 Gram(s) IV Push once  dextrose 50% Injectable 25 Gram(s) IV Push once  dextrose 50% Injectable 25 Gram(s) IV Push once  dofetilide 250 MICROGram(s) Oral two times a day  fluticasone propionate (50 MICROgram(s)/actuation) Nasal Spray - Peds 2 Spray(s) Alternating Nostrils daily  furosemide    Tablet 20 milliGRAM(s) Oral daily  gabapentin 400 milliGRAM(s) Oral three times a day  insulin lispro (HumaLOG) corrective regimen sliding scale   SubCutaneous three times a day before meals  lamoTRIgine 100 milliGRAM(s) Oral two times a day  loratadine 10 milliGRAM(s) Oral daily  metoprolol tartrate 12.5 milliGRAM(s) Oral three times a day  pantoprazole    Tablet 40 milliGRAM(s) Oral before breakfast  polyethylene glycol 3350 17 Gram(s) Oral every 12 hours  QUEtiapine 25 milliGRAM(s) Oral daily  QUEtiapine 50 milliGRAM(s) Oral at bedtime  sodium chloride 0.9%. 1000 milliLiter(s) (20 mL/Hr) IV Continuous <Continuous>  tamsulosin Oral Tab/Cap - Peds 0.4 milliGRAM(s) Oral at bedtime  tiotropium 18 MICROgram(s) Capsule 1 Capsule(s) Inhalation daily    MEDICATIONS  (PRN):  benzocaine 15 mG/menthol 3.6 mG Lozenge 1 Lozenge Oral three times a day PRN Sore Throat  clonazePAM  Tablet 0.5 milliGRAM(s) Oral at bedtime PRN agitation  dextrose 40% Gel 15 Gram(s) Oral once PRN Blood Glucose LESS THAN 70 milliGRAM(s)/deciliter  fentaNYL    Injectable 25 MICROGram(s) IV Push every 4 hours PRN Moderate Pain (4 - 6)  glucagon  Injectable 1 milliGRAM(s) IntraMuscular once PRN Glucose LESS THAN 70 milligrams/deciliter  morphine ER Tablet 30 milliGRAM(s) Oral every 12 hours PRN moderate pain  nitroglycerin     SubLingual 0.4 milliGRAM(s) SubLingual every 5 minutes PRN Chest Pain  ondansetron    Tablet 4 milliGRAM(s) Oral two times a day PRN Nausea and/or Vomiting      REVIEW OF SYSTEMS:  CONSTITUTIONAL: [X] all negative; [ ] weakness, [ ] fevers, [ ] chills  EYES/ENT: [X] all negative; [ ] visual changes, [ ] vertigo, [ ] throat pain, [ ] eye pain  NECK: [X] all negative; [ ] pain, [ ] stiffness  RESPIRATORY: [ ] all negative, [ ] cough, [ ] wheezing, [ ] hemoptysis, [ ] shortness of breath  CARDIOVASCULAR: [ ] all negative; [ ] chest pain, [ ] palpitations, [ ] orthopnea  GASTROINTESTINAL: [X] all negative; [ ]abdominal pain, [ ] nausea, [ ] vomiting, [ ] hematemesis, [ ] diarrhea, [ ] constipation, [ ] melena, [ ] hematochezia.  GENITOURINARY: [X] all negative; [ ] dysuria, [ ] frequency, [ ] hematuria  NEUROLOGICAL: [X] all negative; [ ] numbness, [ ] weakness, [ ] paresthesias  MUSCULOSKELETAL: [X] all negative, [ ] joint pain, [ ] joint swelling, [ ] joint redness, [ ] bone pain  SKIN: [X] all negative; [ ] itching, [ ] burning, [ ] rashes, [ ] lesions   All other review of systems is negative unless indicated above.    [  ] Unable to assess ROS because     PHYSICAL EXAM:          CONSTITUTIONAL: Well-developed; well-nourished; in no acute distress.   	SKIN: warm, dry, no rashes or lesions  	HEENT: Atraumatic. Normocephalic. PERRL. Moist membranes, no conj injection, sclera clear  	NECK: Supple; non tender.  No JVD. No lymphadenopathy.  	CARD: Normal S1, S2. Rate and Rhythm are regular. No murmurs.  	RESP: Good air entry bilaterally, no wheezes, no rales no rhonchi.  	ABD: Soft, not tender, not distended, no CVA ttp no rebound no guarding, bowel sounds present  	EXT: Normal ROM.  No clubbing, no cyanosis, no pedal edema, no calf pain b/l, Peripheral pulses intact.  	LYMPH: No acute cervical adenopathy.  	NEURO: Alert, awake, motor 5/5 R, 5/5 L, sensation intact bilat, CN 2-12 intact,          PSYCH: Cooperative, appropriate. Alert & oriented x 3    RADIOLOGY:  X Reviewed and interpreted by me  CxR from 09-07-19 shows mild congestion, no pneumothorax, no effusion, no cardiomegaly,   ETT above yas in good position, NGT in place, TLC in place, S-G Catheter in place, Chest Tubes in place,     ECG:  X Reviewed and interpreted by me CTU Attending Progress Daily Note     07 Sep 2019 09:59  Admited 08-29-19, Hospital Day 9d  POD# - 2    HPI:  47 y/o M with PMHx of v-tach s/p AICD, CHF, A fib/flutter, HTN, COPD (on O2 PRN), DLD, multiple sclerosis w/ multiple admissions for flares, seizure disorder (on lamotrigine), PTSD, depression, ?stroke/TIA, esophageal stricture, chronic sore throat/sinusitis, GERD, hemopneumothorax (2006), pericardial effusion (2006), cardiomyopathy, MI (2006, 2008), IBS, presenting with chest pain, palpitations and AICD firing. He had recent admission to [facility] on 08/13/19with discharge on 08/19/19 for multiple sclerosis flare treated with IV solumedrol for 5 days and discharged on oral Prednisone for 5 more days. During his admission EP interrogated his pacemaker and multiple episodes of SVT and PAF were noted over past few months. His metoprolol was increased to 25 mg TID, and to return to see Dr. Quiñones in 2 months for battery change. He states that two days ago he felt mid sternal chest pain, 8/10, that radiated to the right side of his neck and face that resolved with sublingual nitroglycerin.  The following day he is "80% sure" he felt his pacemaker fire so he informed the staff at [facility] and was brought to the ED.   He also states that his pacemaker has been "buzzing and vibrating," and that he was told to come to the ED if he felt that.  The buzzing  is not  currently present on exam. He has no other complaints today.     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  Home Medications:  acetaminophen 650 mg oral tablet: 650 milligram(s) orally 4 times a day, As Needed (02 Aug 2018 11:55)  atorvastatin 40 mg oral tablet: 1 tab(s) orally once a day (at bedtime) (02 Aug 2018 11:55)  baclofen 10 mg oral tablet: 1 tab(s) orally 4 times a day (13 Aug 2019 11:07)  budesonide-formoterol 160 mcg-4.5 mcg/inh inhalation aerosol: 2 puff(s) inhaled 2 times a day (25 Feb 2019 11:41)  Cepacol Extra Strength Menthol 10 mg mucous membrane lozenge: 1 anderson(s) mucous membrane every 6 hours, As Needed (21 Feb 2019 22:06)  cholecalciferol oral tablet: 2000 unit(s) orally once a day (19 Aug 2019 17:19)  Claritin 10 mg oral tablet: 1 tab(s) orally once a day (13 Aug 2019 11:11)  Eliquis 5 mg oral tablet: 1 tab(s) orally 2 times a day (02 Aug 2018 11:55)  Flonase 50 mcg/inh nasal spray: 2 spray(s) nasal once a day (13 Aug 2019 11:09)  furosemide 20 mg oral tablet: 1 tab(s) orally once a day (19 Aug 2019 17:19)  gabapentin 400 mg oral capsule: 1 cap(s) orally 3 times a day (02 Aug 2018 11:55)  ketoconazole 2% topical shampoo: Apply topically to affected area once a day (in the evening) (13 Aug 2019 11:11)  lamoTRIgine 100 mg oral tablet: 1 tab(s) orally 2 times a day (02 Aug 2018 11:55)  metoprolol tartrate 25 mg oral tablet: 1 tab(s) orally every 8 hours (19 Aug 2019 17:19)  Mi-Acid oral suspension: 30 milliliter(s) orally 4 times a day, As Needed (02 Aug 2018 11:55)  morphine 15 mg oral tablet: 1 tab(s) orally every 4 hours, As needed, Severe Pain (7 - 10) (19 Aug 2019 17:09)  Nitrostat 0.4 mg sublingual tablet: sublingual 3 times a day, As Needed; may repeat after 5 min until relief (02 Aug 2018 11:55)  omeprazole 20 mg oral delayed release capsule: 1 cap(s) orally 2 times a day (02 Aug 2018 11:55)  polyethylene glycol 3350 oral powder for reconstitution: 17 gram(s) orally every 12 hours (19 Aug 2019 17:19)  QUEtiapine 25 mg oral tablet: 1 tab(s) orally once a day (02 Aug 2018 11:55)  SEROquel 50 mg oral tablet: 1 tab(s) orally once a day (at bedtime) (21 Feb 2019 22:09)  tamsulosin 0.4 mg oral capsule: 1 cap(s) orally once a day (at bedtime) (19 Aug 2019 17:19)  Tecfidera 240 mg oral delayed release capsule: 1 cap(s) orally once a day (02 Aug 2018 11:55)  Tikosyn 250 mcg oral capsule: 1 cap(s) orally 2 times a day (02 Aug 2018 11:55)  tiotropium 18 mcg inhalation capsule: 1 cap(s) inhaled once a day (19 Aug 2019 17:19)    FAMILY HISTORY:  Family history of colon cancer in mother  Family history of cardiomyopathy: Mother  Family history of cervical cancer (Mother)  Family history of early CAD (Mother)    PAST MEDICAL & SURGICAL HISTORY:  PTSD (post-traumatic stress disorder)  Supraventricular arrhythmia: prior history  Cardiomyopathy  CHF (congestive heart failure)  Atrial fibrillation: s/p ablation, on eliquis  Diabetes mellitus: diet controlled  BPH (benign prostatic hyperplasia)  HTN (hypertension)  Seizures  Migraines  Pneumonia  MS (multiple sclerosis)  CAD (coronary artery disease)  Bipolar disorder  Anginal pain  Schizo affective schizophrenia  GERD (gastroesophageal reflux disease)  Seasonal allergies  Hypercholesteremia  COPD (chronic obstructive pulmonary disease)  CVA (cerebral vascular accident)  TIA (transient ischemic attack)  Syncope  Peripheral Neuropathy  Clinical Depression  H/O prior ablation treatment: for Afib  Cardiac pacemaker recipient  H/O hernia repair: right 1972,1991  History of hip replacement  S/P Orchiectomy: L for testicular CA  S/P Implantation of AICD    Interval event for past 24 hr:  CUATE HORNE  48y had no event.   Current Complains:  CUATE HORNE has no new complains  Allergies    dexmethylphenidate (Unknown)  Dilantin (Rash)  dilantin, compazine, neurontin, ritalin, phenergan (Unknown)  Haldol (Unknown)  hydantoin derivatives (Other)  methylphenidate (Unknown)  Morphine Sulfate (Unknown) - ONLY redness by IV site ?  phenytoin (Unknown)  prochlorperazine (Unknown)  promethazine (Dystonic RXN)  rash/hives (Anaphylaxis)  thioxanthenes (Unknown)    Intolerances      OBJECTIVE:  Vitals last 24 hrs  T(C): 37.8 (09-07-19 @ 05:00), Max: 37.8 (09-07-19 @ 05:00)  T(F): 100 (09-07-19 @ 05:00), Max: 100 (09-07-19 @ 05:00)  HR: 60 (09-07-19 @ 07:00) (59 - 60)  BP: 123/61 (09-07-19 @ 07:00) (81/49 - 123/61)  ABP: 116/57 (09-06-19 @ 20:00) (88/47 - 124/52)  ABP(mean): 79 (09-06-19 @ 20:00) (63 - 79)  RR: 19 (09-07-19 @ 07:00) (10 - 29)  SpO2: 96% (09-07-19 @ 07:00) (93% - 100%)      09-06-19 @ 07:01  -  09-07-19 @ 07:00  --------------------------------------------------------  IN:    Oral Fluid: 240 mL  Total IN: 240 mL    OUT:    Voided: 2300 mL  Total OUT: 2300 mL    Total NET: -2060 mL          CAPILLARY BLOOD GLUCOSE      POCT Blood Glucose.: 98 mg/dL (07 Sep 2019 07:11)  POCT Blood Glucose.: 118 mg/dL (06 Sep 2019 20:55)  POCT Blood Glucose.: 117 mg/dL (06 Sep 2019 15:52)  POCT Blood Glucose.: 125 mg/dL (06 Sep 2019 10:52)    LABS:  ABG - ( 06 Sep 2019 03:54 )  pH, Arterial: 7.46  pH, Blood: x     /  pCO2: 43    /  pO2: 80    / HCO3: 31    / Base Excess: 6.3   /  SaO2: 97                            9.4    6.57  )-----------( 184      ( 07 Sep 2019 05:00 )             29.5     Hemoglobin: 9.4 g/dL (09-07 @ 05:00)  Hemoglobin: 9.3 g/dL (09-06 @ 04:00)  Hemoglobin: 10.5 g/dL (09-05 @ 13:20)  Hemoglobin: 11.3 g/dL (09-05 @ 07:38)  Hemoglobin: 11.2 g/dL (09-04 @ 18:00)    09-07    140  |  102  |  20  ----------------------------<  99  4.6   |  29  |  0.9    Ca    9.0      07 Sep 2019 05:00  Phos  3.3     09-07  Mg     2.2     09-07    TPro  5.8<L>  /  Alb  3.6  /  TBili  0.3  /  DBili  x   /  AST  61<H>  /  ALT  63<H>  /  AlkPhos  145<H>  09-07    Creatinine, Serum: 0.9 mg/dL (09-07 @ 05:00)  Creatinine, Serum: 1.0 mg/dL (09-06 @ 04:00)  Creatinine, Serum: 1.1 mg/dL (09-05 @ 13:20)  Creatinine, Serum: 1.2 mg/dL (09-05 @ 07:38)  Creatinine, Serum: 0.9 mg/dL (09-04 @ 07:12)    PT/INR - ( 05 Sep 2019 13:20 )   PT: 12.40 sec;   INR: 1.08 ratio     PTT - ( 05 Sep 2019 13:20 )  PTT:41.6 sec      Culture - Fungal, Tissue (collected 05 Sep 2019 12:50)  Source: .Tissue None  Preliminary Report (06 Sep 2019 07:26):    Testing in progress    Culture - Tissue with Gram Stain (collected 05 Sep 2019 12:50)  Source: .Tissue None  Gram Stain (06 Sep 2019 01:55):    Rare polymorphonuclear leukocytes seen per low power field    No organisms seen per oil power field  Preliminary Report (06 Sep 2019 21:34):    No growth    Culture - Fungal, Other (collected 05 Sep 2019 10:15)  Source: .Other None  Preliminary Report (06 Sep 2019 07:26):    Testing in progress    Culture - Acid Fast - Other w/Smear (collected 05 Sep 2019 10:15)  Source: .Other None    Culture - Surgical Swab (collected 05 Sep 2019 10:15)  Source: .Surgical Swab None  Preliminary Report (06 Sep 2019 21:10):    No growth    Culture - Urine (collected 04 Sep 2019 18:15)  Source: .Urine Clean Catch (Midstream)  Final Report (06 Sep 2019 12:13):    >100,000 CFU/ml Coag Negative Staphylococcus "Susceptibilities not    performed"    Culture - Blood (collected 04 Sep 2019 18:00)  Source: .Blood None  Preliminary Report (06 Sep 2019 02:01):    No growth to date.    Culture - Blood (collected 04 Sep 2019 18:00)  Source: .Blood None  Preliminary Report (06 Sep 2019 02:01):    No growth to date.      HOSPITAL MEDICATIONS:  MEDICATIONS  (STANDING):  ALBUTerol    0.083% 2.5 milliGRAM(s) Nebulizer every 6 hours  atorvastatin 40 milliGRAM(s) Oral at bedtime  baclofen 10 milliGRAM(s) Oral four times a day  buDESOnide 160 MICROgram(s)/formoterol 4.5 MICROgram(s) Inhaler - Peds 2 Puff(s) Inhalation two times a day  cephalexin 500 milliGRAM(s) Oral every 12 hours  cholecalciferol 2000 Unit(s) Oral daily  dextrose 5%. 1000 milliLiter(s) (50 mL/Hr) IV Continuous <Continuous>  dextrose 50% Injectable 12.5 Gram(s) IV Push once  dextrose 50% Injectable 25 Gram(s) IV Push once  dextrose 50% Injectable 25 Gram(s) IV Push once  dofetilide 250 MICROGram(s) Oral two times a day  fluticasone propionate (50 MICROgram(s)/actuation) Nasal Spray - Peds 2 Spray(s) Alternating Nostrils daily  furosemide    Tablet 20 milliGRAM(s) Oral daily  gabapentin 400 milliGRAM(s) Oral three times a day  insulin lispro (HumaLOG) corrective regimen sliding scale   SubCutaneous three times a day before meals  lamoTRIgine 100 milliGRAM(s) Oral two times a day  loratadine 10 milliGRAM(s) Oral daily  metoprolol tartrate 12.5 milliGRAM(s) Oral three times a day  pantoprazole    Tablet 40 milliGRAM(s) Oral before breakfast  polyethylene glycol 3350 17 Gram(s) Oral every 12 hours  QUEtiapine 25 milliGRAM(s) Oral daily  QUEtiapine 50 milliGRAM(s) Oral at bedtime  sodium chloride 0.9%. 1000 milliLiter(s) (20 mL/Hr) IV Continuous <Continuous>  tamsulosin Oral Tab/Cap - Peds 0.4 milliGRAM(s) Oral at bedtime  tiotropium 18 MICROgram(s) Capsule 1 Capsule(s) Inhalation daily    MEDICATIONS  (PRN):  benzocaine 15 mG/menthol 3.6 mG Lozenge 1 Lozenge Oral three times a day PRN Sore Throat  clonazePAM  Tablet 0.5 milliGRAM(s) Oral at bedtime PRN agitation  dextrose 40% Gel 15 Gram(s) Oral once PRN Blood Glucose LESS THAN 70 milliGRAM(s)/deciliter  fentaNYL    Injectable 25 MICROGram(s) IV Push every 4 hours PRN Moderate Pain (4 - 6)  glucagon  Injectable 1 milliGRAM(s) IntraMuscular once PRN Glucose LESS THAN 70 milligrams/deciliter  morphine ER Tablet 30 milliGRAM(s) Oral every 12 hours PRN moderate pain  nitroglycerin     SubLingual 0.4 milliGRAM(s) SubLingual every 5 minutes PRN Chest Pain  ondansetron    Tablet 4 milliGRAM(s) Oral two times a day PRN Nausea and/or Vomiting      REVIEW OF SYSTEMS:  CONSTITUTIONAL: [X] all negative; [ ] weakness, [ ] fevers, [ ] chills  EYES/ENT: [X] all negative; [ ] visual changes, [ ] vertigo, [ ] throat pain, [ ] eye pain  NECK: [X] all negative; [ ] pain, [ ] stiffness  RESPIRATORY: [x ] all negative, [ ] cough, [ ] wheezing, [ ] hemoptysis, [ ] shortness of breath, X chest wall pain - left  CARDIOVASCULAR: [ ] all negative; [ ] chest pain, [ ] palpitations, [ ] orthopnea  GASTROINTESTINAL: [X] all negative; [ ]abdominal pain, [ ] nausea, [ ] vomiting, [ ] hematemesis, [ ] diarrhea, [ ] constipation, [ ] melena, [ ] hematochezia.  GENITOURINARY: [X] all negative; [ ] dysuria, [ ] frequency, [ ] hematuria  NEUROLOGICAL: [X] all negative; [ ] numbness, [ ] weakness, [ ] paresthesias  MUSCULOSKELETAL: [X] all negative, [ ] joint pain, [ ] joint swelling, [ ] joint redness, [ ] bone pain  SKIN: [X] all negative; [ ] itching, [ ] burning, [ ] rashes, [ ] lesions   All other review of systems is negative unless indicated above.    [  ] Unable to assess ROS because     PHYSICAL EXAM:          CONSTITUTIONAL: Well-developed; well-nourished; in no acute distress.   	SKIN: warm, dry, no rashes or lesions  	HEENT: Atraumatic. Normocephalic. PERRL. Moist membranes, no conj injection, sclera clear  	NECK: Supple; non tender.  No JVD. No lymphadenopathy.  	CARD: Normal S1, S2. Rate and Rhythm are regular. No murmurs.  	RESP: Good air entry bilaterally, no wheezes, no rales no rhonchi. AICD pocket - tender to touch  	ABD: Soft, not tender, not distended, no CVA ttp no rebound no guarding, bowel sounds present  	EXT: Normal ROM.  No clubbing, no cyanosis, no pedal edema, no calf pain b/l, Peripheral pulses intact.  	LYMPH: No acute cervical adenopathy.  	NEURO: Alert, awake, motor 5/5 R, 5/5 L, sensation intact bilat, CN 2-12 intact,          PSYCH: Cooperative, appropriate. Alert & oriented x 3    RADIOLOGY:  X Reviewed and interpreted by me  CxR from 09-07-19 shows mild congestion, no pneumothorax, no effusion, no cardiomegaly,   ETT above yas in good position, NGT in place, TLC in place, S-G Catheter in place, Chest Tubes in place,     ECG:  X Reviewed and interpreted by eleanor YOONpaced - QTc - 414

## 2019-09-07 NOTE — PROGRESS NOTE ADULT - ASSESSMENT
PROBLEMS:      PLAN  Neuro: move all 4 extremities. no sensory or motor deficits  Pain management.   baclofen 10 milliGRAM(s) Oral four times a day  clonazePAM  Tablet 0.5 milliGRAM(s) Oral at bedtime PRN  fentaNYL    Injectable 25 MICROGram(s) IV Push every 4 hours PRN  gabapentin 400 milliGRAM(s) Oral three times a day  lamoTRIgine 100 milliGRAM(s) Oral two times a day  morphine ER Tablet 30 milliGRAM(s) Oral every 12 hours PRN  ondansetron    Tablet 4 milliGRAM(s) Oral two times a day PRN  QUEtiapine 25 milliGRAM(s) Oral daily  QUEtiapine 50 milliGRAM(s) Oral at bedtime    Pulm: Wean off supplemental oxygen as able. Daily CXR. Encourage coughing, deep breathing and use of incentive spirometry.   ALBUTerol    0.083% 2.5 milliGRAM(s) Nebulizer every 6 hours  buDESOnide 160 MICROgram(s)/formoterol 4.5 MICROgram(s) Inhaler - Peds 2 Puff(s) Inhalation two times a day  loratadine 10 milliGRAM(s) Oral daily  tiotropium 18 MICROgram(s) Capsule 1 Capsule(s) Inhalation daily    Cardio: Monitor telemetry/alarms. Continue supportive care   dofetilide 250 MICROGram(s) Oral two times a day  furosemide    Tablet 20 milliGRAM(s) Oral daily  metoprolol tartrate 12.5 milliGRAM(s) Oral three times a day  nitroglycerin     SubLingual 0.4 milliGRAM(s) SubLingual every 5 minutes PRN  tamsulosin Oral Tab/Cap - Peds 0.4 milliGRAM(s) Oral at bedtime    GI: Continue stool softeners.    pantoprazole    Tablet 40 milliGRAM(s) Oral before breakfast  polyethylene glycol 3350 17 Gram(s) Oral every 12 hours    Nutrition: Continue present diet  Endocrine and glucose control:   atorvastatin 40 milliGRAM(s) Oral at bedtime  dextrose 40% Gel 15 Gram(s) Oral once PRN  dextrose 50% Injectable 12.5 Gram(s) IV Push once  dextrose 50% Injectable 25 Gram(s) IV Push once  dextrose 50% Injectable 25 Gram(s) IV Push once  glucagon  Injectable 1 milliGRAM(s) IntraMuscular once PRN  insulin lispro (HumaLOG) corrective regimen sliding scale   SubCutaneous three times a day before meals    Renal: monitor urine output, supplement electrolytes as needed,     Vasc: Heparin SC and/or SCDs for DVT prophylaxis    ID: Stable, no fever , no chills. Off antibiotics.  cephalexin 500 milliGRAM(s) Oral every 12 hours    Tubes: Monitor Chest tube output  Therapy: OOB/ambulate  Disposition: start planing discharge home or placement    Pertinent clinical, laboratory, radiographic, hemodynamic, echocardiographic, respiratory data, microbiologic data and chart were reviewed and analyzed frequently throughout the course of the day and night. GI and DVT prophylaxis, glycemic control, head of bed elevation and skin care issues were addressed.  Patient seen, examined and plan discussed with CT Surgery / CTICU team during rounds. PROBLEMS:  1.	AICD replacement  2.	MS  3.	bipolar  4.	hx of A.Fib SVT, cardiomyopathy    PLAN  Neuro: move all 4 extremities. no sensory or motor deficits  Pain management.   baclofen 10 milliGRAM(s) Oral four times a day  clonazePAM  Tablet 0.5 milliGRAM(s) Oral at bedtime PRN  fentaNYL    Injectable 25 MICROGram(s) IV Push every 4 hours PRN  gabapentin 400 milliGRAM(s) Oral three times a day  lamoTRIgine 100 milliGRAM(s) Oral two times a day  morphine ER Tablet 30 milliGRAM(s) Oral every 12 hours PRN  ondansetron    Tablet 4 milliGRAM(s) Oral two times a day PRN  QUEtiapine 25 milliGRAM(s) Oral daily  QUEtiapine 50 milliGRAM(s) Oral at bedtime    Pulm: Wean off supplemental oxygen as able. Daily CXR. Encourage coughing, deep breathing and use of incentive spirometry.   ALBUTerol    0.083% 2.5 milliGRAM(s) Nebulizer every 6 hours  buDESOnide 160 MICROgram(s)/formoterol 4.5 MICROgram(s) Inhaler - Peds 2 Puff(s) Inhalation two times a day  loratadine 10 milliGRAM(s) Oral daily  tiotropium 18 MICROgram(s) Capsule 1 Capsule(s) Inhalation daily    Cardio: Monitor telemetry/alarms. Continue supportive care   dofetilide 250 MICROGram(s) Oral two times a day  furosemide    Tablet 20 milliGRAM(s) Oral daily  metoprolol tartrate 12.5 milliGRAM(s) Oral three times a day  nitroglycerin     SubLingual 0.4 milliGRAM(s) SubLingual every 5 minutes PRN  tamsulosin Oral Tab/Cap - Peds 0.4 milliGRAM(s) Oral at bedtime    GI: Continue stool softeners.    pantoprazole    Tablet 40 milliGRAM(s) Oral before breakfast  polyethylene glycol 3350 17 Gram(s) Oral every 12 hours    Nutrition: Continue present diet  Endocrine and glucose control:   atorvastatin 40 milliGRAM(s) Oral at bedtime  dextrose 40% Gel 15 Gram(s) Oral once PRN  dextrose 50% Injectable 12.5 Gram(s) IV Push once  dextrose 50% Injectable 25 Gram(s) IV Push once  dextrose 50% Injectable 25 Gram(s) IV Push once  glucagon  Injectable 1 milliGRAM(s) IntraMuscular once PRN  insulin lispro (HumaLOG) corrective regimen sliding scale   SubCutaneous three times a day before meals    Renal: monitor urine output, supplement electrolytes as needed,     Vasc: Heparin SC and/or SCDs for DVT prophylaxis    ID: Stable, no fever , no chills. Off antibiotics.  cephalexin 500 milliGRAM(s) Oral every 12 hours    Tubes: Monitor Chest tube output  Therapy: OOB/ambulate  Disposition: finalizing planing discharge home or placement    Pertinent clinical, laboratory, radiographic, hemodynamic, echocardiographic, respiratory data, microbiologic data and chart were reviewed and analyzed frequently throughout the course of the day and night. GI and DVT prophylaxis, glycemic control, head of bed elevation and skin care issues were addressed.  Patient seen, examined and plan discussed with CT Surgery / CTICU team during rounds.

## 2019-09-07 NOTE — PROGRESS NOTE ADULT - SUBJECTIVE AND OBJECTIVE BOX
INTERVAL HPI/OVERNIGHT EVENTS:    Patietn s/p laser lead extraction, pocket evacuation and re-implant of ICD  No event over night. Pt without complains    MEDICATIONS  (STANDING):  ALBUTerol    0.083% 2.5 milliGRAM(s) Nebulizer every 6 hours  atorvastatin 40 milliGRAM(s) Oral at bedtime  baclofen 10 milliGRAM(s) Oral four times a day  buDESOnide 160 MICROgram(s)/formoterol 4.5 MICROgram(s) Inhaler - Peds 2 Puff(s) Inhalation two times a day  cephalexin 500 milliGRAM(s) Oral every 12 hours  cholecalciferol 2000 Unit(s) Oral daily  dextrose 5%. 1000 milliLiter(s) (50 mL/Hr) IV Continuous <Continuous>  dextrose 50% Injectable 12.5 Gram(s) IV Push once  dextrose 50% Injectable 25 Gram(s) IV Push once  dextrose 50% Injectable 25 Gram(s) IV Push once  dofetilide 250 MICROGram(s) Oral two times a day  fluticasone propionate (50 MICROgram(s)/actuation) Nasal Spray - Peds 2 Spray(s) Alternating Nostrils daily  furosemide    Tablet 20 milliGRAM(s) Oral daily  gabapentin 400 milliGRAM(s) Oral three times a day  insulin lispro (HumaLOG) corrective regimen sliding scale   SubCutaneous three times a day before meals  lamoTRIgine 100 milliGRAM(s) Oral two times a day  loratadine 10 milliGRAM(s) Oral daily  metoprolol tartrate 12.5 milliGRAM(s) Oral three times a day  pantoprazole    Tablet 40 milliGRAM(s) Oral before breakfast  polyethylene glycol 3350 17 Gram(s) Oral every 12 hours  QUEtiapine 25 milliGRAM(s) Oral daily  QUEtiapine 50 milliGRAM(s) Oral at bedtime  sodium chloride 0.9%. 1000 milliLiter(s) (20 mL/Hr) IV Continuous <Continuous>  tamsulosin Oral Tab/Cap - Peds 0.4 milliGRAM(s) Oral at bedtime  tiotropium 18 MICROgram(s) Capsule 1 Capsule(s) Inhalation daily    MEDICATIONS  (PRN):  benzocaine 15 mG/menthol 3.6 mG Lozenge 1 Lozenge Oral three times a day PRN Sore Throat  clonazePAM  Tablet 0.5 milliGRAM(s) Oral at bedtime PRN agitation  dextrose 40% Gel 15 Gram(s) Oral once PRN Blood Glucose LESS THAN 70 milliGRAM(s)/deciliter  fentaNYL    Injectable 25 MICROGram(s) IV Push every 4 hours PRN Moderate Pain (4 - 6)  glucagon  Injectable 1 milliGRAM(s) IntraMuscular once PRN Glucose LESS THAN 70 milligrams/deciliter  morphine ER Tablet 30 milliGRAM(s) Oral every 12 hours PRN moderate pain  nitroglycerin     SubLingual 0.4 milliGRAM(s) SubLingual every 5 minutes PRN Chest Pain  ondansetron    Tablet 4 milliGRAM(s) Oral two times a day PRN Nausea and/or Vomiting      Allergies    dexmethylphenidate (Unknown)  Dilantin (Rash)  dilantin, compazine, neurontin, ritalin, phenergan (Unknown)  Haldol (Unknown)  hydantoin derivatives (Other)  methylphenidate (Unknown)  Morphine Sulfate (Unknown)  phenytoin (Unknown)  prochlorperazine (Unknown)  promethazine (Dystonic RXN)  rash/hives (Anaphylaxis)  thioxanthenes (Unknown)    Intolerances          Vital Signs Last 24 Hrs  T(C): 37.8 (07 Sep 2019 05:00), Max: 37.8 (07 Sep 2019 05:00)  T(F): 100 (07 Sep 2019 05:00), Max: 100 (07 Sep 2019 05:00)  HR: 60 (07 Sep 2019 07:00) (59 - 60)  BP: 123/61 (07 Sep 2019 07:00) (81/49 - 123/61)  BP(mean): 85 (07 Sep 2019 07:00) (61 - 88)  RR: 19 (07 Sep 2019 07:00) (10 - 29)  SpO2: 96% (07 Sep 2019 07:00) (93% - 100%)    Wound healing well; No hematoma; no bleeding      RADIOLOGY & ADDITIONAL TESTS:      A/P   Patietn s/p ICD/ PPM/ BiV- ICD implant    - Device interrogation showed normal function  - please cont vanco today and tomorow  - Keflex 500 mg PO q12 h x 5 days once discarge  - FU in 3-4 weeks with me    AF - cont tikosyn  - hold AC till patient return to the office  given significant hematoma

## 2019-09-08 LAB
ALBUMIN SERPL ELPH-MCNC: 3.3 G/DL — LOW (ref 3.5–5.2)
ALP SERPL-CCNC: 141 U/L — HIGH (ref 30–115)
ALT FLD-CCNC: 56 U/L — HIGH (ref 0–41)
ANION GAP SERPL CALC-SCNC: 11 MMOL/L — SIGNIFICANT CHANGE UP (ref 7–14)
AST SERPL-CCNC: 30 U/L — SIGNIFICANT CHANGE UP (ref 0–41)
BILIRUB SERPL-MCNC: 0.4 MG/DL — SIGNIFICANT CHANGE UP (ref 0.2–1.2)
BUN SERPL-MCNC: 21 MG/DL — HIGH (ref 10–20)
CALCIUM SERPL-MCNC: 8.7 MG/DL — SIGNIFICANT CHANGE UP (ref 8.5–10.1)
CHLORIDE SERPL-SCNC: 99 MMOL/L — SIGNIFICANT CHANGE UP (ref 98–110)
CO2 SERPL-SCNC: 27 MMOL/L — SIGNIFICANT CHANGE UP (ref 17–32)
CREAT SERPL-MCNC: 1 MG/DL — SIGNIFICANT CHANGE UP (ref 0.7–1.5)
GLUCOSE BLDC GLUCOMTR-MCNC: 113 MG/DL — HIGH (ref 70–99)
GLUCOSE BLDC GLUCOMTR-MCNC: 114 MG/DL — HIGH (ref 70–99)
GLUCOSE BLDC GLUCOMTR-MCNC: 129 MG/DL — HIGH (ref 70–99)
GLUCOSE BLDC GLUCOMTR-MCNC: 141 MG/DL — HIGH (ref 70–99)
GLUCOSE SERPL-MCNC: 100 MG/DL — HIGH (ref 70–99)
HCT VFR BLD CALC: 25.7 % — LOW (ref 42–52)
HGB BLD-MCNC: 8.1 G/DL — LOW (ref 14–18)
MAGNESIUM SERPL-MCNC: 2 MG/DL — SIGNIFICANT CHANGE UP (ref 1.8–2.4)
MCHC RBC-ENTMCNC: 29.8 PG — SIGNIFICANT CHANGE UP (ref 27–31)
MCHC RBC-ENTMCNC: 31.5 G/DL — LOW (ref 32–37)
MCV RBC AUTO: 94.5 FL — HIGH (ref 80–94)
NRBC # BLD: 0 /100 WBCS — SIGNIFICANT CHANGE UP (ref 0–0)
PLATELET # BLD AUTO: 184 K/UL — SIGNIFICANT CHANGE UP (ref 130–400)
POTASSIUM SERPL-MCNC: 4.3 MMOL/L — SIGNIFICANT CHANGE UP (ref 3.5–5)
POTASSIUM SERPL-SCNC: 4.3 MMOL/L — SIGNIFICANT CHANGE UP (ref 3.5–5)
PROT SERPL-MCNC: 5.3 G/DL — LOW (ref 6–8)
RBC # BLD: 2.72 M/UL — LOW (ref 4.7–6.1)
RBC # FLD: 15.1 % — HIGH (ref 11.5–14.5)
SODIUM SERPL-SCNC: 137 MMOL/L — SIGNIFICANT CHANGE UP (ref 135–146)
WBC # BLD: 5.51 K/UL — SIGNIFICANT CHANGE UP (ref 4.8–10.8)
WBC # FLD AUTO: 5.51 K/UL — SIGNIFICANT CHANGE UP (ref 4.8–10.8)

## 2019-09-08 PROCEDURE — 99232 SBSQ HOSP IP/OBS MODERATE 35: CPT

## 2019-09-08 PROCEDURE — 71045 X-RAY EXAM CHEST 1 VIEW: CPT | Mod: 26

## 2019-09-08 RX ADMIN — BUDESONIDE AND FORMOTEROL FUMARATE DIHYDRATE 2 PUFF(S): 160; 4.5 AEROSOL RESPIRATORY (INHALATION) at 21:09

## 2019-09-08 RX ADMIN — Medication 20 MILLIGRAM(S): at 06:30

## 2019-09-08 RX ADMIN — OXYCODONE HYDROCHLORIDE 5 MILLIGRAM(S): 5 TABLET ORAL at 14:15

## 2019-09-08 RX ADMIN — OXYCODONE HYDROCHLORIDE 5 MILLIGRAM(S): 5 TABLET ORAL at 09:33

## 2019-09-08 RX ADMIN — ATORVASTATIN CALCIUM 40 MILLIGRAM(S): 80 TABLET, FILM COATED ORAL at 21:26

## 2019-09-08 RX ADMIN — TIOTROPIUM BROMIDE 1 CAPSULE(S): 18 CAPSULE ORAL; RESPIRATORY (INHALATION) at 08:37

## 2019-09-08 RX ADMIN — MORPHINE SULFATE 30 MILLIGRAM(S): 50 CAPSULE, EXTENDED RELEASE ORAL at 11:55

## 2019-09-08 RX ADMIN — OXYCODONE HYDROCHLORIDE 5 MILLIGRAM(S): 5 TABLET ORAL at 03:40

## 2019-09-08 RX ADMIN — MORPHINE SULFATE 30 MILLIGRAM(S): 50 CAPSULE, EXTENDED RELEASE ORAL at 12:35

## 2019-09-08 RX ADMIN — Medication 10 MILLIGRAM(S): at 11:48

## 2019-09-08 RX ADMIN — Medication 250 MILLIGRAM(S): at 17:46

## 2019-09-08 RX ADMIN — POLYETHYLENE GLYCOL 3350 17 GRAM(S): 17 POWDER, FOR SOLUTION ORAL at 06:30

## 2019-09-08 RX ADMIN — GABAPENTIN 400 MILLIGRAM(S): 400 CAPSULE ORAL at 13:35

## 2019-09-08 RX ADMIN — Medication 2000 UNIT(S): at 11:48

## 2019-09-08 RX ADMIN — Medication 10 MILLIGRAM(S): at 23:12

## 2019-09-08 RX ADMIN — Medication 10 MILLIGRAM(S): at 17:46

## 2019-09-08 RX ADMIN — GABAPENTIN 400 MILLIGRAM(S): 400 CAPSULE ORAL at 21:26

## 2019-09-08 RX ADMIN — OXYCODONE HYDROCHLORIDE 5 MILLIGRAM(S): 5 TABLET ORAL at 04:15

## 2019-09-08 RX ADMIN — Medication 81 MILLIGRAM(S): at 11:54

## 2019-09-08 RX ADMIN — GABAPENTIN 400 MILLIGRAM(S): 400 CAPSULE ORAL at 06:28

## 2019-09-08 RX ADMIN — DOFETILIDE 250 MICROGRAM(S): 0.25 CAPSULE ORAL at 06:29

## 2019-09-08 RX ADMIN — Medication 12.5 MILLIGRAM(S): at 13:35

## 2019-09-08 RX ADMIN — OXYCODONE HYDROCHLORIDE 5 MILLIGRAM(S): 5 TABLET ORAL at 22:00

## 2019-09-08 RX ADMIN — Medication 12.5 MILLIGRAM(S): at 21:26

## 2019-09-08 RX ADMIN — QUETIAPINE FUMARATE 50 MILLIGRAM(S): 200 TABLET, FILM COATED ORAL at 21:26

## 2019-09-08 RX ADMIN — LORATADINE 10 MILLIGRAM(S): 10 TABLET ORAL at 11:48

## 2019-09-08 RX ADMIN — DOFETILIDE 250 MICROGRAM(S): 0.25 CAPSULE ORAL at 17:46

## 2019-09-08 RX ADMIN — LAMOTRIGINE 100 MILLIGRAM(S): 25 TABLET, ORALLY DISINTEGRATING ORAL at 06:30

## 2019-09-08 RX ADMIN — QUETIAPINE FUMARATE 25 MILLIGRAM(S): 200 TABLET, FILM COATED ORAL at 11:49

## 2019-09-08 RX ADMIN — TAMSULOSIN HYDROCHLORIDE 0.4 MILLIGRAM(S): 0.4 CAPSULE ORAL at 21:26

## 2019-09-08 RX ADMIN — Medication 10 MILLIGRAM(S): at 06:28

## 2019-09-08 RX ADMIN — OXYCODONE HYDROCHLORIDE 5 MILLIGRAM(S): 5 TABLET ORAL at 13:39

## 2019-09-08 RX ADMIN — Medication 250 MILLIGRAM(S): at 06:30

## 2019-09-08 RX ADMIN — Medication 2 SPRAY(S): at 11:49

## 2019-09-08 RX ADMIN — OXYCODONE HYDROCHLORIDE 5 MILLIGRAM(S): 5 TABLET ORAL at 17:54

## 2019-09-08 RX ADMIN — PANTOPRAZOLE SODIUM 40 MILLIGRAM(S): 20 TABLET, DELAYED RELEASE ORAL at 06:31

## 2019-09-08 RX ADMIN — OXYCODONE HYDROCHLORIDE 5 MILLIGRAM(S): 5 TABLET ORAL at 17:30

## 2019-09-08 RX ADMIN — OXYCODONE HYDROCHLORIDE 5 MILLIGRAM(S): 5 TABLET ORAL at 09:03

## 2019-09-08 RX ADMIN — Medication 12.5 MILLIGRAM(S): at 06:30

## 2019-09-08 RX ADMIN — LAMOTRIGINE 100 MILLIGRAM(S): 25 TABLET, ORALLY DISINTEGRATING ORAL at 17:45

## 2019-09-08 RX ADMIN — OXYCODONE HYDROCHLORIDE 5 MILLIGRAM(S): 5 TABLET ORAL at 21:29

## 2019-09-08 NOTE — PROGRESS NOTE ADULT - SUBJECTIVE AND OBJECTIVE BOX
INTERVAL HPI/OVERNIGHT EVENTS:    Patietn s/p ICD implant aand laser lead extraction  No event over night. Pt without complains    MEDICATIONS  (STANDING):  ALBUTerol    0.083% 2.5 milliGRAM(s) Nebulizer every 6 hours  aspirin enteric coated 81 milliGRAM(s) Oral daily  atorvastatin 40 milliGRAM(s) Oral at bedtime  baclofen 10 milliGRAM(s) Oral four times a day  buDESOnide 160 MICROgram(s)/formoterol 4.5 MICROgram(s) Inhaler - Peds 2 Puff(s) Inhalation two times a day  cholecalciferol 2000 Unit(s) Oral daily  dextrose 5%. 1000 milliLiter(s) (50 mL/Hr) IV Continuous <Continuous>  dextrose 50% Injectable 12.5 Gram(s) IV Push once  dextrose 50% Injectable 25 Gram(s) IV Push once  dextrose 50% Injectable 25 Gram(s) IV Push once  dofetilide 250 MICROGram(s) Oral two times a day  fluticasone propionate (50 MICROgram(s)/actuation) Nasal Spray - Peds 2 Spray(s) Alternating Nostrils daily  furosemide    Tablet 20 milliGRAM(s) Oral daily  gabapentin 400 milliGRAM(s) Oral three times a day  insulin lispro (HumaLOG) corrective regimen sliding scale   SubCutaneous three times a day before meals  lamoTRIgine 100 milliGRAM(s) Oral two times a day  loratadine 10 milliGRAM(s) Oral daily  metoprolol tartrate 12.5 milliGRAM(s) Oral three times a day  pantoprazole    Tablet 40 milliGRAM(s) Oral before breakfast  polyethylene glycol 3350 17 Gram(s) Oral every 12 hours  QUEtiapine 25 milliGRAM(s) Oral daily  QUEtiapine 50 milliGRAM(s) Oral at bedtime  sodium chloride 0.9%. 1000 milliLiter(s) (20 mL/Hr) IV Continuous <Continuous>  tamsulosin Oral Tab/Cap - Peds 0.4 milliGRAM(s) Oral at bedtime  tiotropium 18 MICROgram(s) Capsule 1 Capsule(s) Inhalation daily  vancomycin  IVPB      vancomycin  IVPB 1000 milliGRAM(s) IV Intermittent every 12 hours    MEDICATIONS  (PRN):  benzocaine 15 mG/menthol 3.6 mG Lozenge 1 Lozenge Oral three times a day PRN Sore Throat  clonazePAM  Tablet 0.5 milliGRAM(s) Oral at bedtime PRN agitation  dextrose 40% Gel 15 Gram(s) Oral once PRN Blood Glucose LESS THAN 70 milliGRAM(s)/deciliter  glucagon  Injectable 1 milliGRAM(s) IntraMuscular once PRN Glucose LESS THAN 70 milligrams/deciliter  morphine ER Tablet 30 milliGRAM(s) Oral every 12 hours PRN moderate pain  nitroglycerin     SubLingual 0.4 milliGRAM(s) SubLingual every 5 minutes PRN Chest Pain  ondansetron    Tablet 4 milliGRAM(s) Oral two times a day PRN Nausea and/or Vomiting  oxyCODONE    IR 5 milliGRAM(s) Oral every 4 hours PRN Severe Pain (7 - 10)      Allergies    dexmethylphenidate (Unknown)  Dilantin (Rash)  dilantin, compazine, neurontin, ritalin, phenergan (Unknown)  Haldol (Unknown)  hydantoin derivatives (Other)  methylphenidate (Unknown)  Morphine Sulfate (Unknown)  phenytoin (Unknown)  prochlorperazine (Unknown)  promethazine (Dystonic RXN)  rash/hives (Anaphylaxis)  thioxanthenes (Unknown)    Intolerances          Vital Signs Last 24 Hrs  T(C): 37.1 (08 Sep 2019 08:00), Max: 37.6 (07 Sep 2019 22:00)  T(F): 98.7 (08 Sep 2019 08:00), Max: 99.7 (07 Sep 2019 22:00)  HR: 60 (08 Sep 2019 08:00) (59 - 68)  BP: 105/56 (08 Sep 2019 08:00) (93/50 - 113/56)  BP(mean): 75 (08 Sep 2019 08:00) (66 - 76)  RR: 22 (08 Sep 2019 08:00) (10 - 36)  SpO2: 95% (08 Sep 2019 08:00) (91% - 100%)    Wound healing well; No hematoma; no bleeding      RADIOLOGY & ADDITIONAL TESTS:      A/P   Patient s/p ICD/ PPM/ BiV- ICD implant      - Keflex 500 mg PO q12 h x 5 days  -- FU in 3-4 weeks with me  - restart tikosyn  - restart AC after a week

## 2019-09-08 NOTE — PHYSICAL THERAPY INITIAL EVALUATION ADULT - PERTINENT HX OF CURRENT PROBLEM, REHAB EVAL
admitted to [facility] d/t Chest pain + AICD firing. h/o affective schizophrenia.

## 2019-09-08 NOTE — PHYSICAL THERAPY INITIAL EVALUATION ADULT - CRITERIA FOR SKILLED THERAPEUTIC INTERVENTIONS
therapy frequency/anticipated discharge recommendation/rehab potential/anticipated equipment needs at discharge/impairments found/functional limitations in following categories

## 2019-09-08 NOTE — PROGRESS NOTE ADULT - SUBJECTIVE AND OBJECTIVE BOX
OPERATIVE PROCEDURE(s):                POD #                       48yMale  SURGEON(s): MICHELLE Pastrana  SUBJECTIVE ASSESSMENT:   Vital Signs Last 24 Hrs  T(F): 98.1 (08 Sep 2019 04:00), Max: 99.7 (07 Sep 2019 22:00)  HR: 60 (08 Sep 2019 06:00) (59 - 68)  BP: 101/58 (08 Sep 2019 06:00) (93/50 - 113/56)  BP(mean): 75 (08 Sep 2019 06:00) (66 - 77)  ABP: --  ABP(mean): --  RR: 21 (08 Sep 2019 06:00) (10 - 36)  SpO2: 96% (08 Sep 2019 06:00) (91% - 100%)  CVP(mm Hg): --  CVP(cm H2O): --  CO: --  CI: --  PA: --  SVR: --    I&O's Detail    07 Sep 2019 07:01  -  08 Sep 2019 07:00  --------------------------------------------------------  IN:    Oral Fluid: 120 mL  Total IN: 120 mL    OUT:  Total OUT: 0 mL        Net:   I&O's Detail    06 Sep 2019 07:01  -  07 Sep 2019 07:00  --------------------------------------------------------  Total NET: -2060 mL      07 Sep 2019 07:01  -  08 Sep 2019 07:00  --------------------------------------------------------  Total NET: 120 mL        CAPILLARY BLOOD GLUCOSE      POCT Blood Glucose.: 129 mg/dL (08 Sep 2019 06:50)  POCT Blood Glucose.: 117 mg/dL (07 Sep 2019 21:51)  POCT Blood Glucose.: 97 mg/dL (07 Sep 2019 18:46)  POCT Blood Glucose.: 152 mg/dL (07 Sep 2019 11:50)  POCT Blood Glucose.: 98 mg/dL (07 Sep 2019 07:11)    Physical Exam:  General: NAD; A&Ox3  Cardiac: S1/S2, RRR, no murmur, no rubs  Lungs: unlabored respirations, CTA b/l, no wheeze, no rales, no crackles  Abdomen: Soft/NT/ND; positive bowel sounds x 4  Sternum: Intact, no click, incision healing well with no drainage  Incisions: Incisions clean/dry/intact  Extremities: No edema b/l lower extremities; good capillary refill; no cyanosis; palpable 1+ pedal pulses b/l    Central Venous Catheter: Yes[]  No[] , If Yes indication:           Day #  Blum Catheter: Yes  [] , No  [] , If yes indication:                      Day #  NGT: Yes [] No [] ,    If Yes Placement:                                     Day #  EPICARDIAL WIRES:  [] YES [] NO                                              Day #  BOWEL MOVEMENT:  [] YES [] NO, If No, Timing since last BM:      Day #  CHEST TUBE(Left/Right):  [] YES [] NO, If yes -  AIR LEAKS:  [] YES [] NO        LABS:                        8.1<L>  5.51  )-----------( 184      ( 08 Sep 2019 04:00 )             25.7<L>                        9.4<L>  6.57  )-----------( 184      ( 07 Sep 2019 05:00 )             29.5<L>    09-08    137  |  99  |  21<H>  ----------------------------<  100<H>  4.3   |  27  |  1.0  09-07    140  |  102  |  20  ----------------------------<  99  4.6   |  29  |  0.9    Ca    8.7      08 Sep 2019 04:00  Phos  3.3     09-07  Mg     2.0     09-08    TPro  5.3<L> [6.0 - 8.0]  /  Alb  3.3<L> [3.5 - 5.2]  /  TBili  0.4 [0.2 - 1.2]  /  DBili  x   /  AST  30 [0 - 41]  /  ALT  56<H> [0 - 41]  /  AlkPhos  141<H> [30 - 115]  09-08          RADIOLOGY & ADDITIONAL TESTS:  CXR:  EKG:  MEDICATIONS  (STANDING):  ALBUTerol    0.083% 2.5 milliGRAM(s) Nebulizer every 6 hours  aspirin enteric coated 81 milliGRAM(s) Oral daily  atorvastatin 40 milliGRAM(s) Oral at bedtime  baclofen 10 milliGRAM(s) Oral four times a day  buDESOnide 160 MICROgram(s)/formoterol 4.5 MICROgram(s) Inhaler - Peds 2 Puff(s) Inhalation two times a day  cholecalciferol 2000 Unit(s) Oral daily  dextrose 5%. 1000 milliLiter(s) (50 mL/Hr) IV Continuous <Continuous>  dextrose 50% Injectable 12.5 Gram(s) IV Push once  dextrose 50% Injectable 25 Gram(s) IV Push once  dextrose 50% Injectable 25 Gram(s) IV Push once  dofetilide 250 MICROGram(s) Oral two times a day  fluticasone propionate (50 MICROgram(s)/actuation) Nasal Spray - Peds 2 Spray(s) Alternating Nostrils daily  furosemide    Tablet 20 milliGRAM(s) Oral daily  gabapentin 400 milliGRAM(s) Oral three times a day  insulin lispro (HumaLOG) corrective regimen sliding scale   SubCutaneous three times a day before meals  lamoTRIgine 100 milliGRAM(s) Oral two times a day  loratadine 10 milliGRAM(s) Oral daily  metoprolol tartrate 12.5 milliGRAM(s) Oral three times a day  pantoprazole    Tablet 40 milliGRAM(s) Oral before breakfast  polyethylene glycol 3350 17 Gram(s) Oral every 12 hours  QUEtiapine 25 milliGRAM(s) Oral daily  QUEtiapine 50 milliGRAM(s) Oral at bedtime  sodium chloride 0.9%. 1000 milliLiter(s) (20 mL/Hr) IV Continuous <Continuous>  tamsulosin Oral Tab/Cap - Peds 0.4 milliGRAM(s) Oral at bedtime  tiotropium 18 MICROgram(s) Capsule 1 Capsule(s) Inhalation daily  vancomycin  IVPB      vancomycin  IVPB 1000 milliGRAM(s) IV Intermittent every 12 hours    MEDICATIONS  (PRN):  benzocaine 15 mG/menthol 3.6 mG Lozenge 1 Lozenge Oral three times a day PRN Sore Throat  clonazePAM  Tablet 0.5 milliGRAM(s) Oral at bedtime PRN agitation  dextrose 40% Gel 15 Gram(s) Oral once PRN Blood Glucose LESS THAN 70 milliGRAM(s)/deciliter  glucagon  Injectable 1 milliGRAM(s) IntraMuscular once PRN Glucose LESS THAN 70 milligrams/deciliter  morphine ER Tablet 30 milliGRAM(s) Oral every 12 hours PRN moderate pain  nitroglycerin     SubLingual 0.4 milliGRAM(s) SubLingual every 5 minutes PRN Chest Pain  ondansetron    Tablet 4 milliGRAM(s) Oral two times a day PRN Nausea and/or Vomiting  oxyCODONE    IR 5 milliGRAM(s) Oral every 4 hours PRN Severe Pain (7 - 10)    HEPARIN:  [] YES [] NO  Dose: XX UNITS/HR UNITS Q8H  LOVENOX:[] YES [] NO  Dose: XX mg Q24H  COUMADIN: []  YES [] NO  Dose: XX mg  Q24H  SCD's: YES b/l  GI Prophylaxis: Protonix [], Pepcid [], None [], (Contra-indication:.....)    Post-Op Beta-Blockers: Yes [], No[], If No, then contraindication:  Post-Op Aspirin: Yes [],  No [], If No, then contraindication:  Post-Op Statin: Yes [], No[], If No, then contraindication:  Allergies    dexmethylphenidate (Unknown)  Dilantin (Rash)  dilantin, compazine, neurontin, ritalin, phenergan (Unknown)  Haldol (Unknown)  hydantoin derivatives (Other)  methylphenidate (Unknown)  Morphine Sulfate (Unknown)  phenytoin (Unknown)  prochlorperazine (Unknown)  promethazine (Dystonic RXN)  rash/hives (Anaphylaxis)  thioxanthenes (Unknown)    Intolerances      Ambulation/Activity Status:    Assessment/Plan:  48y Male status-post .....  - Case and plan discussed with CTU Intensivist and CT Surgeon - Dr. Munoz/Victoriano/Barbi   - Continue CTU supportive care    - Continue DVT/GI prophylaxis  - Incentive Spirometry 10 times an hour  - Continue to advance physical activity as tolerated and continue PT/OT as directed  1. CAD: Continue ASA, statin, BB  2. HTN:   3. A. Fib:   4. COPD/Hypoxia:   5. DM/Glucose Control:     Social Service Disposition: OPERATIVE PROCEDURE(s):                POD #  2 AICD battery change and  lead revision  POD # 1 evacuation of hematoma                   48yMale  SURGEON(s): MICHELLE Pastrana  SUBJECTIVE ASSESSMENT: doing better. from surgical stand point patient can be transferred back to medicine    HPI:  48y Male, with VTach s/p AICD, AF/AFL on Tikosyn, Metoprolol and Eliquis, HTN, COPD, multiple sclerosis, multiple admissions, most recently discharged 8/19. During that admission AICD was interrogated, revealed battery is 3-4mons until WYATT. Patient noted his device alarming since day prior to admission and presented to ED. In addition, patient has Riata lead, that is a subject of recall due to possibility of failure. Patient denies palpitations, chest discomfort dyspnea. Device was interrogated, working properly, battery reached WYATT, AP >99%, PPM dependent, no ventricular arrhythmias, several short episodes of AT/AF.    PAST MEDICAL & SURGICAL HISTORY:  PTSD (post-traumatic stress disorder)  Supraventricular arrhythmia: prior history  Cardiomyopathy  CHF (congestive heart failure)  Atrial fibrillation: s/p ablation, on eliquis  Diabetes mellitus: diet controlled  BPH (benign prostatic hyperplasia)  HTN (hypertension)  Seizures  Migraines  Pneumonia  MS (multiple sclerosis)  CAD (coronary artery disease)  Bipolar disorder  Anginal pain  Schizo affective schizophrenia  GERD (gastroesophageal reflux disease)  Seasonal allergies  Hypercholesteremia  COPD (chronic obstructive pulmonary disease)  CVA (cerebral vascular accident)  TIA (transient ischemic attack)  Syncope  Peripheral Neuropathy  Clinical Depression  H/O prior ablation treatment: for Afib  Cardiac pacemaker recipient  H/O hernia repair: right 1972,1991  History of hip replacement  S/P Orchiectomy: L for testicular CA  S/P Implantation of AICD      Vital Signs Last 24 Hrs  T(F): 98.1 (08 Sep 2019 04:00), Max: 99.7 (07 Sep 2019 22:00)  HR: 60 (08 Sep 2019 06:00) (59 - 68)  BP: 101/58 (08 Sep 2019 06:00) (93/50 - 113/56)  BP(mean): 75 (08 Sep 2019 06:00) (66 - 77)  RR: 21 (08 Sep 2019 06:00) (10 - 36)  SpO2: 96% (08 Sep 2019 06:00) (91% - 100%)      Net:   I&O's Detail    06 Sep 2019 07:01  -  07 Sep 2019 07:00  --------------------------------------------------------  Total NET: -2060 mL      07 Sep 2019 07:01  -  08 Sep 2019 07:00  --------------------------------------------------------  Total NET: 120 mL    CAPILLARY BLOOD GLUCOSE    POCT Blood Glucose.: 129 mg/dL (08 Sep 2019 06:50)  POCT Blood Glucose.: 117 mg/dL (07 Sep 2019 21:51)  POCT Blood Glucose.: 97 mg/dL (07 Sep 2019 18:46)  POCT Blood Glucose.: 152 mg/dL (07 Sep 2019 11:50)  POCT Blood Glucose.: 98 mg/dL (07 Sep 2019 07:11)    Physical Exam:  General: NAD; A&Ox3  Incisions: Incisions tegaderm in place and no hematoma, area still mildly tender    LABS:                        8.1<L>  5.51  )-----------( 184      ( 08 Sep 2019 04:00 )             25.7<L>                        9.4<L>  6.57  )-----------( 184      ( 07 Sep 2019 05:00 )             29.5<L>    09-08    137  |  99  |  21<H>  ----------------------------<  100<H>  4.3   |  27  |  1.0  09-07    140  |  102  |  20  ----------------------------<  99  4.6   |  29  |  0.9    Ca    8.7      08 Sep 2019 04:00  Phos  3.3     09-07  Mg     2.0     09-08    TPro  5.3<L> [6.0 - 8.0]  /  Alb  3.3<L> [3.5 - 5.2]  /  TBili  0.4 [0.2 - 1.2]  /  DBili  x   /  AST  30 [0 - 41]  /  ALT  56<H> [0 - 41]  /  AlkPhos  141<H> [30 - 115]  09-08    RADIOLOGY & ADDITIONAL TESTS:  CXR: < from: Xray Chest 1 View- PORTABLE-Routine (09.08.19 @ 03:58) >  mpression:    No radiographic evidence of acute cardiopulmonary disease.    < end of copied text >    MEDICATIONS  (STANDING):  aspirin enteric coated 81 milliGRAM(s) Oral daily  atorvastatin 40 milliGRAM(s) Oral at bedtime  baclofen 10 milliGRAM(s) Oral four times a day  buDESOnide 160 MICROgram(s)/formoterol 4.5 MICROgram(s) Inhaler - Peds 2 Puff(s) Inhalation two times a day  cholecalciferol 2000 Unit(s) Oral daily  dextrose 5%. 1000 milliLiter(s) (50 mL/Hr) IV Continuous <Continuous>  dextrose 50% Injectable 12.5 Gram(s) IV Push once  dextrose 50% Injectable 25 Gram(s) IV Push once  dextrose 50% Injectable 25 Gram(s) IV Push once  dofetilide 250 MICROGram(s) Oral two times a day  fluticasone propionate (50 MICROgram(s)/actuation) Nasal Spray - Peds 2 Spray(s) Alternating Nostrils daily  furosemide    Tablet 20 milliGRAM(s) Oral daily  gabapentin 400 milliGRAM(s) Oral three times a day  insulin lispro (HumaLOG) corrective regimen sliding scale   SubCutaneous three times a day before meals  lamoTRIgine 100 milliGRAM(s) Oral two times a day  loratadine 10 milliGRAM(s) Oral daily  metoprolol tartrate 12.5 milliGRAM(s) Oral three times a day  pantoprazole    Tablet 40 milliGRAM(s) Oral before breakfast  polyethylene glycol 3350 17 Gram(s) Oral every 12 hours  QUEtiapine 25 milliGRAM(s) Oral daily  QUEtiapine 50 milliGRAM(s) Oral at bedtime  sodium chloride 0.9%. 1000 milliLiter(s) (20 mL/Hr) IV Continuous <Continuous>  tamsulosin Oral Tab/Cap - Peds 0.4 milliGRAM(s) Oral at bedtime  tiotropium 18 MICROgram(s) Capsule 1 Capsule(s) Inhalation daily  vancomycin  IVPB      vancomycin  IVPB 1000 milliGRAM(s) IV Intermittent every 12 hours    MEDICATIONS  (PRN):  benzocaine 15 mG/menthol 3.6 mG Lozenge 1 Lozenge Oral three times a day PRN Sore Throat  clonazePAM  Tablet 0.5 milliGRAM(s) Oral at bedtime PRN agitation  dextrose 40% Gel 15 Gram(s) Oral once PRN Blood Glucose LESS THAN 70 milliGRAM(s)/deciliter  glucagon  Injectable 1 milliGRAM(s) IntraMuscular once PRN Glucose LESS THAN 70 milligrams/deciliter  morphine ER Tablet 30 milliGRAM(s) Oral every 12 hours PRN moderate pain  nitroglycerin     SubLingual 0.4 milliGRAM(s) SubLingual every 5 minutes PRN Chest Pain  ondansetron    Tablet 4 milliGRAM(s) Oral two times a day PRN Nausea and/or Vomiting  oxyCODONE    IR 5 milliGRAM(s) Oral every 4 hours PRN Severe Pain (7 - 10)    Allergies    dexmethylphenidate (Unknown)  Dilantin (Rash)  dilantin, compazine, neurontin, ritalin, phenergan (Unknown)  Haldol (Unknown)  hydantoin derivatives (Other)  methylphenidate (Unknown)  Morphine Sulfate (Unknown)  phenytoin (Unknown)  prochlorperazine (Unknown)  promethazine (Dystonic RXN)  rash/hives (Anaphylaxis)  thioxanthenes (Unknown)    Intolerances      Ambulation/Activity Status: ambulate with assistance    Assessment/Plan:  48y Male status-post AICd lead extraction and battery change, with PO evacuation of hematoma  - Case and plan discussed with CTU Intensivist and CT Surgeon - Dr. Munoz  - Incentive Spirometry 10 times an hour  - Continue to advance physical activity as tolerated and continue PT/OT as directed  - NO ANTICOAGULATION FOR 6 MORE DAYS  - Patient surgically stable, can be discharged back to medicine. Dr. Gerard aware and accepts patient.  - f/u with Dr. Quiñones in 3-4 weeks, and Dr. Pastrana 1 week after discharge  - AICD has been checked and working correctly    Social Service Disposition:  social work to see because patient does not want to go back to [facility]

## 2019-09-09 DIAGNOSIS — G35 MULTIPLE SCLEROSIS: ICD-10-CM

## 2019-09-09 LAB
ALBUMIN SERPL ELPH-MCNC: 3.5 G/DL — SIGNIFICANT CHANGE UP (ref 3.5–5.2)
ALP SERPL-CCNC: 156 U/L — HIGH (ref 30–115)
ALT FLD-CCNC: 44 U/L — HIGH (ref 0–41)
ANION GAP SERPL CALC-SCNC: 12 MMOL/L — SIGNIFICANT CHANGE UP (ref 7–14)
AST SERPL-CCNC: 24 U/L — SIGNIFICANT CHANGE UP (ref 0–41)
BILIRUB SERPL-MCNC: 0.4 MG/DL — SIGNIFICANT CHANGE UP (ref 0.2–1.2)
BUN SERPL-MCNC: 20 MG/DL — SIGNIFICANT CHANGE UP (ref 10–20)
CALCIUM SERPL-MCNC: 9.5 MG/DL — SIGNIFICANT CHANGE UP (ref 8.5–10.1)
CHLORIDE SERPL-SCNC: 99 MMOL/L — SIGNIFICANT CHANGE UP (ref 98–110)
CO2 SERPL-SCNC: 28 MMOL/L — SIGNIFICANT CHANGE UP (ref 17–32)
CREAT SERPL-MCNC: 1 MG/DL — SIGNIFICANT CHANGE UP (ref 0.7–1.5)
GLUCOSE SERPL-MCNC: 110 MG/DL — HIGH (ref 70–99)
HCT VFR BLD CALC: 28.4 % — LOW (ref 42–52)
HGB BLD-MCNC: 9 G/DL — LOW (ref 14–18)
MAGNESIUM SERPL-MCNC: 2.1 MG/DL — SIGNIFICANT CHANGE UP (ref 1.8–2.4)
MCHC RBC-ENTMCNC: 29.8 PG — SIGNIFICANT CHANGE UP (ref 27–31)
MCHC RBC-ENTMCNC: 31.7 G/DL — LOW (ref 32–37)
MCV RBC AUTO: 94 FL — SIGNIFICANT CHANGE UP (ref 80–94)
NRBC # BLD: 0 /100 WBCS — SIGNIFICANT CHANGE UP (ref 0–0)
PHOSPHATE SERPL-MCNC: 5 MG/DL — HIGH (ref 2.1–4.9)
PLATELET # BLD AUTO: 209 K/UL — SIGNIFICANT CHANGE UP (ref 130–400)
POTASSIUM SERPL-MCNC: 4.3 MMOL/L — SIGNIFICANT CHANGE UP (ref 3.5–5)
POTASSIUM SERPL-SCNC: 4.3 MMOL/L — SIGNIFICANT CHANGE UP (ref 3.5–5)
PROT SERPL-MCNC: 5.7 G/DL — LOW (ref 6–8)
RBC # BLD: 3.02 M/UL — LOW (ref 4.7–6.1)
RBC # FLD: 14.8 % — HIGH (ref 11.5–14.5)
SODIUM SERPL-SCNC: 139 MMOL/L — SIGNIFICANT CHANGE UP (ref 135–146)
VANCOMYCIN TROUGH SERPL-MCNC: 10.3 UG/ML — HIGH (ref 5–10)
WBC # BLD: 4.82 K/UL — SIGNIFICANT CHANGE UP (ref 4.8–10.8)
WBC # FLD AUTO: 4.82 K/UL — SIGNIFICANT CHANGE UP (ref 4.8–10.8)

## 2019-09-09 PROCEDURE — 99232 SBSQ HOSP IP/OBS MODERATE 35: CPT

## 2019-09-09 RX ORDER — ACETAMINOPHEN 500 MG
650 TABLET ORAL EVERY 6 HOURS
Refills: 0 | Status: DISCONTINUED | OUTPATIENT
Start: 2019-09-09 | End: 2019-09-11

## 2019-09-09 RX ORDER — MORPHINE SULFATE 50 MG/1
30 CAPSULE, EXTENDED RELEASE ORAL EVERY 6 HOURS
Refills: 0 | Status: DISCONTINUED | OUTPATIENT
Start: 2019-09-09 | End: 2019-09-11

## 2019-09-09 RX ORDER — MORPHINE SULFATE 50 MG/1
15 CAPSULE, EXTENDED RELEASE ORAL EVERY 6 HOURS
Refills: 0 | Status: DISCONTINUED | OUTPATIENT
Start: 2019-09-09 | End: 2019-09-09

## 2019-09-09 RX ADMIN — MORPHINE SULFATE 30 MILLIGRAM(S): 50 CAPSULE, EXTENDED RELEASE ORAL at 01:32

## 2019-09-09 RX ADMIN — Medication 81 MILLIGRAM(S): at 13:42

## 2019-09-09 RX ADMIN — QUETIAPINE FUMARATE 25 MILLIGRAM(S): 200 TABLET, FILM COATED ORAL at 13:43

## 2019-09-09 RX ADMIN — Medication 250 MILLIGRAM(S): at 21:14

## 2019-09-09 RX ADMIN — GABAPENTIN 400 MILLIGRAM(S): 400 CAPSULE ORAL at 13:43

## 2019-09-09 RX ADMIN — POLYETHYLENE GLYCOL 3350 17 GRAM(S): 17 POWDER, FOR SOLUTION ORAL at 05:48

## 2019-09-09 RX ADMIN — ATORVASTATIN CALCIUM 40 MILLIGRAM(S): 80 TABLET, FILM COATED ORAL at 22:16

## 2019-09-09 RX ADMIN — MORPHINE SULFATE 30 MILLIGRAM(S): 50 CAPSULE, EXTENDED RELEASE ORAL at 02:00

## 2019-09-09 RX ADMIN — Medication 2000 UNIT(S): at 13:42

## 2019-09-09 RX ADMIN — Medication 10 MILLIGRAM(S): at 17:33

## 2019-09-09 RX ADMIN — OXYCODONE HYDROCHLORIDE 5 MILLIGRAM(S): 5 TABLET ORAL at 17:04

## 2019-09-09 RX ADMIN — TAMSULOSIN HYDROCHLORIDE 0.4 MILLIGRAM(S): 0.4 CAPSULE ORAL at 22:16

## 2019-09-09 RX ADMIN — TIOTROPIUM BROMIDE 1 CAPSULE(S): 18 CAPSULE ORAL; RESPIRATORY (INHALATION) at 08:07

## 2019-09-09 RX ADMIN — DOFETILIDE 250 MICROGRAM(S): 0.25 CAPSULE ORAL at 05:49

## 2019-09-09 RX ADMIN — Medication 0.5 MILLIGRAM(S): at 22:15

## 2019-09-09 RX ADMIN — Medication 12.5 MILLIGRAM(S): at 22:16

## 2019-09-09 RX ADMIN — Medication 12.5 MILLIGRAM(S): at 05:50

## 2019-09-09 RX ADMIN — LAMOTRIGINE 100 MILLIGRAM(S): 25 TABLET, ORALLY DISINTEGRATING ORAL at 17:33

## 2019-09-09 RX ADMIN — Medication 250 MILLIGRAM(S): at 05:21

## 2019-09-09 RX ADMIN — GABAPENTIN 400 MILLIGRAM(S): 400 CAPSULE ORAL at 22:16

## 2019-09-09 RX ADMIN — BUDESONIDE AND FORMOTEROL FUMARATE DIHYDRATE 2 PUFF(S): 160; 4.5 AEROSOL RESPIRATORY (INHALATION) at 20:52

## 2019-09-09 RX ADMIN — OXYCODONE HYDROCHLORIDE 5 MILLIGRAM(S): 5 TABLET ORAL at 16:34

## 2019-09-09 RX ADMIN — Medication 2 SPRAY(S): at 13:43

## 2019-09-09 RX ADMIN — MORPHINE SULFATE 30 MILLIGRAM(S): 50 CAPSULE, EXTENDED RELEASE ORAL at 20:50

## 2019-09-09 RX ADMIN — MORPHINE SULFATE 15 MILLIGRAM(S): 50 CAPSULE, EXTENDED RELEASE ORAL at 14:58

## 2019-09-09 RX ADMIN — Medication 20 MILLIGRAM(S): at 05:50

## 2019-09-09 RX ADMIN — OXYCODONE HYDROCHLORIDE 5 MILLIGRAM(S): 5 TABLET ORAL at 05:46

## 2019-09-09 RX ADMIN — DOFETILIDE 250 MICROGRAM(S): 0.25 CAPSULE ORAL at 17:34

## 2019-09-09 RX ADMIN — Medication 12.5 MILLIGRAM(S): at 13:43

## 2019-09-09 RX ADMIN — LORATADINE 10 MILLIGRAM(S): 10 TABLET ORAL at 13:43

## 2019-09-09 RX ADMIN — GABAPENTIN 400 MILLIGRAM(S): 400 CAPSULE ORAL at 05:49

## 2019-09-09 RX ADMIN — MORPHINE SULFATE 15 MILLIGRAM(S): 50 CAPSULE, EXTENDED RELEASE ORAL at 14:28

## 2019-09-09 RX ADMIN — Medication 10 MILLIGRAM(S): at 05:50

## 2019-09-09 RX ADMIN — PANTOPRAZOLE SODIUM 40 MILLIGRAM(S): 20 TABLET, DELAYED RELEASE ORAL at 06:19

## 2019-09-09 RX ADMIN — QUETIAPINE FUMARATE 50 MILLIGRAM(S): 200 TABLET, FILM COATED ORAL at 22:16

## 2019-09-09 RX ADMIN — LAMOTRIGINE 100 MILLIGRAM(S): 25 TABLET, ORALLY DISINTEGRATING ORAL at 05:50

## 2019-09-09 RX ADMIN — Medication 10 MILLIGRAM(S): at 13:42

## 2019-09-09 NOTE — CONSULT NOTE ADULT - PROBLEM SELECTOR RECOMMENDATION 9
Con't baclofen 10 milliGRAM(s) Oral four times a day  Con't clonazePAM  Tablet 0.5 milliGRAM(s) Oral at bedtime PRN  Con't gabapentin 400 milliGRAM(s) Oral three times a day  DC Morphine  IR 15 milliGRAM(s) Oral every 6 hours PRN  Start Morphine IR 30mg PO Q6hrs PRN   DC oxyCODONE    IR 5 milliGRAM(s) Oral every 4 hours PRN

## 2019-09-09 NOTE — PROGRESS NOTE ADULT - ASSESSMENT
49 y/o M w/ a PMH of v-tach s/p AICD, CHF, A fib/flutter, HTN, COPD (on O2 PRN), DLD, multiple sclerosis w/ multiple admissions for flares, seizure disorder (on lamotrigine), PTSD, depression, ? stroke/TIA, esophageal stricture, chronic sore throat/sinusitis, GERD, hemopneumothorax (2006), pericardial effusion (2006), cardiomyopathy, MI (2006, 2008), IBS, presenting post AICD firing.     #) ARVD (Arrhythmogenic right ventricular dysplasia) s/p AICD, possible AICD dysfunction  - now with possible pacemaker firing, buzzing sensation in chest   - Keep telemetry, pacing pads on chest   - H/o v-tach (no VT, VF, on AICD since 2006)  - C/w metoprolol tartrate 12.5 mg PO TID, Tikosyn 250 mcg BID   - EP following   - Underwent fluoro to assess RV lead placement possibly dislodged   - S/p RV lead extraction and revision and battery change by CT sx  - No AC for 5 more days as per CT sx; f/u CT sx Dr Pastrana within 1w; AICD checked  - F/u w/ Dr Quiñones within 3-4w; c/w vancomycin today then tomorrow start Keflex 500mg BID x5d on discharge    #) Anginal symptoms, without prior evidence of CAD, ?H/o MI 2006, 2008  - With episode of anginal symptoms, pressure like, radiating to jaw, resolved with nitro  - More likely related to pacemaker firing   - Troponin negative, no acute EKG changes  - Per cardiology on 08/18, No evidence of CAD, on 8/6/2018 CCTA reported normal with 0 calcium score  - C/w metoprolol tartrate 12.5 mg PO TID  - C/w atorvastatin 40 mg PO qHS  - C/w aspirin 81 mg PO qD    #)  HTN  -C/w metoprolol tartrate 12.5 mg PO TID  -C/w Lasix 20 mg     #) Multiple sclerosis   -with recent flare, discharged on 08/19/19 with prednisone for 5 days (completed)  -Patient takes Tecfidera 240 mg PO qD but is apparently non-formulary; please contact [facility]  -Pain control with acetaminophen and baclofen and gabapentin and morphine  IR 15 as per pain management on last admission.  Allergy in chart is local rash to IV opioids, may give PO. Also got IV morphine in hospital without reaction,   -Ambulate w/ assistance (patient uses walker at baseline)  - Neuro: f/u with Dr. Brody on discharge; c/w Klonopin     # H/o seizure disorder  C/w lamotrigine 100 mg PO BID    #) COPD on PRN home O2  -Currently on RA  -C/w Symbicort high dose 2 puffs BID  -Vitals per routine, pulse ox q4h    # Chronic pharyngitis , sinusitis  -C/w Cepacol, Claritin, Flonase    # DLD  C/w atorvastatin 40 mg PO qHS    # GERD  C/w pantoprazole 40 mg PO qAM (patient takes omeprazole at home)    # H/o esophageal stricture   -cont mechanical soft dysphagia 2 w/ thins    DVT ppx- full dose lovenox  GI ppx- c/w pantoprazole 40 mg PO qAM (on omeprazole at home)  Activity- as tolerated   Diet- mechanical soft dysphagia 2 w/ thins  Code status: Full  Dispo- patient from [facility]; functional at baseline w/ walker 49 y/o M w/ a PMH of v-tach s/p AICD, CHF, A fib/flutter, HTN, COPD (on O2 PRN), DLD, multiple sclerosis w/ multiple admissions for flares, seizure disorder (on lamotrigine), PTSD, depression, ? stroke/TIA, esophageal stricture, chronic sore throat/sinusitis, GERD, hemopneumothorax (2006), pericardial effusion (2006), cardiomyopathy, MI (2006, 2008), IBS, presenting post AICD firing.     #) ARVD (Arrhythmogenic right ventricular dysplasia) s/p AICD, possible AICD dysfunction  - now with possible pacemaker firing, buzzing sensation in chest   - Keep telemetry, pacing pads on chest   - H/o v-tach (no VT, VF, on AICD since 2006)  - C/w metoprolol tartrate 12.5 mg PO TID, Tikosyn 250 mcg BID   - EP following   - Underwent fluoro to assess RV lead placement possibly dislodged   - S/p RV lead extraction and revision and battery change by CT sx  - No AC for 5 more days as per CT sx; f/u CT sx Dr Pastrana within 1w; AICD checked  - F/u w/ Dr Quiñones within 3-4w; c/w vancomycin today (f/u vanc trough 4pm) then tomorrow start Keflex 500mg BID x5d on discharge    #) Anginal symptoms, without prior evidence of CAD, ?H/o MI 2006, 2008  - With episode of anginal symptoms, pressure like, radiating to jaw, resolved with nitro  - More likely related to pacemaker firing   - Troponin negative, no acute EKG changes  - Per cardiology on 08/18, No evidence of CAD, on 8/6/2018 CCTA reported normal with 0 calcium score  - C/w metoprolol tartrate 12.5 mg PO TID  - C/w atorvastatin 40 mg PO qHS  - C/w aspirin 81 mg PO qD    #)  HTN  -C/w metoprolol tartrate 12.5 mg PO TID  -C/w Lasix 20 mg     #) Multiple sclerosis   -with recent flare, discharged on 08/19/19 with prednisone for 5 days (completed)  -Patient takes Tecfidera 240 mg PO qD but is apparently non-formulary; please contact [facility]  -Pain control with acetaminophen and baclofen and gabapentin and morphine  IR 15 as per pain management on last admission.  Allergy in chart is local rash to IV opioids, may give PO. Also got IV morphine in hospital without reaction,   -Ambulate w/ assistance (patient uses walker at baseline)  - Neuro: f/u with Dr. Brody on discharge; c/w Klonopin; c/w Baclofen 10mg q6h    # H/o seizure disorder  C/w lamotrigine 100 mg PO BID    #) COPD on PRN home O2  -Currently on RA  -C/w Symbicort high dose 2 puffs BID  -Vitals per routine, pulse ox q4h    # Chronic pharyngitis , sinusitis  -C/w Cepacol, Claritin, Flonase    # DLD  C/w atorvastatin 40 mg PO qHS    # GERD  C/w pantoprazole 40 mg PO qAM (patient takes omeprazole at home)    # H/o esophageal stricture   -cont mechanical soft dysphagia 2 w/ thins    DVT ppx- full dose lovenox  GI ppx- c/w pantoprazole 40 mg PO qAM (on omeprazole at home)  Activity- as tolerated   Diet- mechanical soft dysphagia 2 w/ thins  Code status: Full  Dispo- patient from [facility]; functional at baseline w/ walker 47 y/o M w/ a PMH of v-tach s/p AICD, CHF, A fib/flutter, HTN, COPD (on O2 PRN), DLD, multiple sclerosis w/ multiple admissions for flares, seizure disorder (on lamotrigine), PTSD, depression, ? stroke/TIA, esophageal stricture, chronic sore throat/sinusitis, GERD, hemopneumothorax (2006), pericardial effusion (2006), cardiomyopathy, MI (2006, 2008), IBS, presenting post AICD firing.     #) ARVD (Arrhythmogenic right ventricular dysplasia) s/p AICD, possible AICD dysfunction  - now with possible pacemaker firing, buzzing sensation in chest   - Keep telemetry, pacing pads on chest   - H/o v-tach (no VT, VF, on AICD since 2006)  - C/w metoprolol tartrate 12.5 mg PO TID, Tikosyn 250 mcg BID   - EP following   - Underwent fluoro to assess RV lead placement possibly dislodged   - S/p RV lead extraction and revision and battery change by CT sx  - No AC for 5 more days as per CT sx; f/u CT sx Dr Pastrana within 1w; AICD checked  - F/u w/ Dr Quiñones within 3-4w; c/w vancomycin today (f/u vanc trough 4pm) then tomorrow start Keflex 500mg BID x5d on discharge    #) Anginal symptoms, without prior evidence of CAD, ?H/o MI 2006, 2008  - With episode of anginal symptoms, pressure like, radiating to jaw, resolved with nitro  - More likely related to pacemaker firing   - Troponin negative, no acute EKG changes  - Per cardiology on 08/18, No evidence of CAD, on 8/6/2018 CCTA reported normal with 0 calcium score  - C/w metoprolol tartrate 12.5 mg PO TID  - C/w atorvastatin 40 mg PO qHS  - C/w aspirin 81 mg PO qD    #)  HTN  -C/w metoprolol tartrate 12.5 mg PO TID  -C/w Lasix 20 mg     #) Multiple sclerosis   -with recent flare, discharged on 08/19/19 with prednisone for 5 days (completed)  -Patient takes Tecfidera 240 mg PO qD but is apparently non-formulary; please contact [facility]  -Pain control with acetaminophen and baclofen and gabapentin and morphine  IR 15 as per pain management on last admission.  Allergy in chart is local rash to IV opioids, may give PO. Also got IV morphine in hospital without reaction,   -Ambulate w/ assistance (patient uses walker at baseline)  - Neuro: f/u with Dr. Brody on discharge; c/w Klonopin; c/w Baclofen 10mg q6h  - F/u pain management consult    # H/o seizure disorder  C/w lamotrigine 100 mg PO BID    #) COPD on PRN home O2  -Currently on RA  -C/w Symbicort high dose 2 puffs BID  -Vitals per routine, pulse ox q4h    # Chronic pharyngitis , sinusitis  -C/w Cepacol, Claritin, Flonase    # DLD  C/w atorvastatin 40 mg PO qHS    # GERD  C/w pantoprazole 40 mg PO qAM (patient takes omeprazole at home)    # H/o esophageal stricture   -cont mechanical soft dysphagia 2 w/ thins    DVT ppx- full dose lovenox  GI ppx- c/w pantoprazole 40 mg PO qAM (on omeprazole at home)  Activity- as tolerated   Diet- mechanical soft dysphagia 2 w/ thins  Code status: Full  Dispo- patient from [facility] [place], operated by [facility]; functional at baseline w/ walker 49 y/o M w/ a PMH of v-tach s/p AICD, CHF, A fib/flutter, HTN, COPD (on O2 PRN), DLD, multiple sclerosis w/ multiple admissions for flares, seizure disorder (on lamotrigine), PTSD, depression, ? stroke/TIA, esophageal stricture, chronic sore throat/sinusitis, GERD, hemopneumothorax (2006), pericardial effusion (2006), cardiomyopathy, MI (2006, 2008), IBS, presenting post AICD firing.     #) ARVD (Arrhythmogenic right ventricular dysplasia) s/p AICD, possible AICD dysfunction  - now with possible pacemaker firing, buzzing sensation in chest   - Keep telemetry, pacing pads on chest   - H/o v-tach (no VT, VF, on AICD since 2006)  - C/w metoprolol tartrate 12.5 mg PO TID, Tikosyn 250 mcg BID   - EP following   - Underwent fluoro to assess RV lead placement possibly dislodged   - S/p RV lead extraction and revision and battery change by CT sx  - No AC for 5 more days as per CT sx; f/u CT sx Dr Psatrana within 1w; AICD checked  - F/u w/ Dr Quiñones within 3-4w; c/w vancomycin today (f/u vanc trough 4pm) then tomorrow start Keflex 500mg BID x5d on discharge    #) Anginal symptoms, without prior evidence of CAD, ?H/o MI 2006, 2008  - With episode of anginal symptoms, pressure like, radiating to jaw, resolved with nitro  - More likely related to pacemaker firing   - Troponin negative, no acute EKG changes  - Per cardiology on 08/18, No evidence of CAD, on 8/6/2018 CCTA reported normal with 0 calcium score  - C/w metoprolol tartrate 12.5 mg PO TID  - C/w atorvastatin 40 mg PO qHS  - C/w aspirin 81 mg PO qD    #)  HTN  -C/w metoprolol tartrate 12.5 mg PO TID  -C/w Lasix 20 mg     #) Multiple sclerosis   -with recent flare, discharged on 08/19/19 with prednisone for 5 days (completed)  -Patient takes Tecfidera 240 mg PO qD but is apparently non-formulary; please contact [facility]  -Pain control with acetaminophen and baclofen and gabapentin and morphine  IR 15 as per pain management on last admission.  Allergy in chart is local rash to IV opioids, may give PO. Also got IV morphine in hospital without reaction,   -Ambulate w/ assistance (patient uses walker at baseline)  - Neuro: f/u with Dr. Brody on discharge; c/w Klonopin; c/w Baclofen 10mg q6h  - F/u pain management consult    # H/o seizure disorder  C/w lamotrigine 100 mg PO BID    #) COPD on PRN home O2  -Currently on RA  -C/w Symbicort high dose 2 puffs BID  -Vitals per routine, pulse ox q4h    # Chronic pharyngitis , sinusitis  -C/w Cepacol, Claritin, Flonase    # DLD  C/w atorvastatin 40 mg PO qHS    # GERD  C/w pantoprazole 40 mg PO qAM (patient takes omeprazole at home)    # H/o esophageal stricture   -cont mechanical soft dysphagia 2 w/ thins    DVT ppx- full dose lovenox  GI ppx- c/w pantoprazole 40 mg PO qAM (on omeprazole at home)  Activity- as tolerated   Diet- mechanical soft dysphagia 2 w/ thins  Code status: Full  Dispo- patient from [facility]; functional at baseline w/ walker    Patient seen and examined independently and care D/W the intern. PGY1 progress note reviewed. Agree with the findings and plan of care. D/C planning.

## 2019-09-09 NOTE — PROGRESS NOTE ADULT - SUBJECTIVE AND OBJECTIVE BOX
LTC AT [facility]  Hx MS  Ambulates independently when seen in CEU- Can use Walker    No need For Acute Or ST Rehab- Soc service To arrange Long term placement As PTA

## 2019-09-09 NOTE — CHART NOTE - NSCHARTNOTEFT_GEN_A_CORE
Spoke with Pain management. Stopped Oxycodone and changed Morphine from 15 to 30 q6 PRN. Added Acetaminophen 650 q6 standing as per pain management. Pain management will see the patient tomorrow

## 2019-09-09 NOTE — PROGRESS NOTE ADULT - SUBJECTIVE AND OBJECTIVE BOX
SUBJECTIVE:    Patient is a 48y old Male who presents with a chief complaint of Chest pain, AICD firing (08 Sep 2019 09:45)    Currently admitted to medicine with the primary diagnosis of Chest pain     Today is hospital day 11d. This morning he is resting comfortably in bed and reports no new issues or overnight events. Patient denies fevers, chills, SOB, and chest pain. States his hematoma is resolving. Denies other complaints. Patient only complains that he does not want to go back to [facility].    PAST MEDICAL & SURGICAL HISTORY  PTSD (post-traumatic stress disorder)  Supraventricular arrhythmia: prior history  Cardiomyopathy  CHF (congestive heart failure)  Atrial fibrillation: s/p ablation, on eliquis  Diabetes mellitus: diet controlled  BPH (benign prostatic hyperplasia)  HTN (hypertension)  Seizures  Migraines  Pneumonia  MS (multiple sclerosis)  CAD (coronary artery disease)  Bipolar disorder  Anginal pain  Schizo affective schizophrenia  GERD (gastroesophageal reflux disease)  Seasonal allergies  Hypercholesteremia  COPD (chronic obstructive pulmonary disease)  CVA (cerebral vascular accident)  TIA (transient ischemic attack)  Syncope  Peripheral Neuropathy  Clinical Depression  H/O prior ablation treatment: for Afib  Cardiac pacemaker recipient  H/O hernia repair: right 1972,1991  History of hip replacement  S/P Orchiectomy: L for testicular CA  S/P Implantation of AICD      ALLERGIES:  dexmethylphenidate (Unknown)  Dilantin (Rash)  dilantin, compazine, neurontin, ritalin, phenergan (Unknown)  Haldol (Unknown)  hydantoin derivatives (Other)  methylphenidate (Unknown)  phenytoin (Unknown)  prochlorperazine (Unknown)  promethazine (Dystonic RXN)  rash/hives (Anaphylaxis)  thioxanthenes (Unknown)    MEDICATIONS:  STANDING MEDICATIONS  aspirin enteric coated 81 milliGRAM(s) Oral daily  atorvastatin 40 milliGRAM(s) Oral at bedtime  baclofen 10 milliGRAM(s) Oral four times a day  buDESOnide 160 MICROgram(s)/formoterol 4.5 MICROgram(s) Inhaler - Peds 2 Puff(s) Inhalation two times a day  cholecalciferol 2000 Unit(s) Oral daily  dofetilide 250 MICROGram(s) Oral two times a day  fluticasone propionate (50 MICROgram(s)/actuation) Nasal Spray - Peds 2 Spray(s) Alternating Nostrils daily  furosemide    Tablet 20 milliGRAM(s) Oral daily  gabapentin 400 milliGRAM(s) Oral three times a day  lamoTRIgine 100 milliGRAM(s) Oral two times a day  loratadine 10 milliGRAM(s) Oral daily  metoprolol tartrate 12.5 milliGRAM(s) Oral three times a day  pantoprazole    Tablet 40 milliGRAM(s) Oral before breakfast  polyethylene glycol 3350 17 Gram(s) Oral every 12 hours  QUEtiapine 25 milliGRAM(s) Oral daily  QUEtiapine 50 milliGRAM(s) Oral at bedtime  sodium chloride 0.9%. 1000 milliLiter(s) IV Continuous <Continuous>  tamsulosin Oral Tab/Cap - Peds 0.4 milliGRAM(s) Oral at bedtime  tiotropium 18 MICROgram(s) Capsule 1 Capsule(s) Inhalation daily  vancomycin  IVPB      vancomycin  IVPB 1000 milliGRAM(s) IV Intermittent every 12 hours    PRN MEDICATIONS  benzocaine 15 mG/menthol 3.6 mG Lozenge 1 Lozenge Oral three times a day PRN  clonazePAM  Tablet 0.5 milliGRAM(s) Oral at bedtime PRN  glucagon  Injectable 1 milliGRAM(s) IntraMuscular once PRN  morphine  IR 15 milliGRAM(s) Oral every 6 hours PRN  nitroglycerin     SubLingual 0.4 milliGRAM(s) SubLingual every 5 minutes PRN  ondansetron    Tablet 4 milliGRAM(s) Oral two times a day PRN  oxyCODONE    IR 5 milliGRAM(s) Oral every 4 hours PRN    VITALS:   T(F): 98.5  HR: 60  BP: 115/56  RR: 18  SpO2: 97%    LABS:                        9.0    4.82  )-----------( 209      ( 09 Sep 2019 05:48 )             28.4     09-09    139  |  99  |  20  ----------------------------<  110<H>  4.3   |  28  |  1.0    Ca    9.5      09 Sep 2019 05:48  Phos  5.0     09-09  Mg     2.1     09-09    TPro  5.7<L>  /  Alb  3.5  /  TBili  0.4  /  DBili  x   /  AST  24  /  ALT  44<H>  /  AlkPhos  156<H>  09-09      PHYSICAL EXAM:  GEN: No acute distress. Lying comfortably on the bed.  PULM/CHEST: Clear to auscultation bilaterally, no rales, rhonchi or wheezes; hematoma on LUQ of chest resolving  CVS: Regular rate and rhythm, S1-S2, no murmurs  ABD: Soft, non-tender, non-distended, normoactive BS  EXT: No edema  NEURO: AAOx3

## 2019-09-09 NOTE — CONSULT NOTE ADULT - SUBJECTIVE AND OBJECTIVE BOX
9/9/19, 5:27P  Unable to assess patient at this time. Discussed with resident. Recommendations made below.

## 2019-09-09 NOTE — CHART NOTE - NSCHARTNOTEFT_GEN_A_CORE
Addendum to 9/8/19 progress note related events of patient and transfer to medicine  Antibiotics discussed with Dr. Quiñones: current recommendation is 3 days of IV vancomycin secondary to re-operation for evacuation of post-op hematoma after AICD lead change and battery replacement. At end of Vancomycin course patient requires Keflex 500 mg Q12H for more 5 days.

## 2019-09-10 LAB
ALBUMIN SERPL ELPH-MCNC: 3.7 G/DL — SIGNIFICANT CHANGE UP (ref 3.5–5.2)
ALP SERPL-CCNC: 176 U/L — HIGH (ref 30–115)
ALT FLD-CCNC: 35 U/L — SIGNIFICANT CHANGE UP (ref 0–41)
ANION GAP SERPL CALC-SCNC: 17 MMOL/L — HIGH (ref 7–14)
AST SERPL-CCNC: 22 U/L — SIGNIFICANT CHANGE UP (ref 0–41)
BILIRUB SERPL-MCNC: 0.6 MG/DL — SIGNIFICANT CHANGE UP (ref 0.2–1.2)
BUN SERPL-MCNC: 25 MG/DL — HIGH (ref 10–20)
CALCIUM SERPL-MCNC: 9.6 MG/DL — SIGNIFICANT CHANGE UP (ref 8.5–10.1)
CHLORIDE SERPL-SCNC: 97 MMOL/L — LOW (ref 98–110)
CO2 SERPL-SCNC: 25 MMOL/L — SIGNIFICANT CHANGE UP (ref 17–32)
CREAT SERPL-MCNC: 1.2 MG/DL — SIGNIFICANT CHANGE UP (ref 0.7–1.5)
CULTURE RESULTS: NO GROWTH — SIGNIFICANT CHANGE UP
CULTURE RESULTS: NO GROWTH — SIGNIFICANT CHANGE UP
CULTURE RESULTS: SIGNIFICANT CHANGE UP
CULTURE RESULTS: SIGNIFICANT CHANGE UP
GLUCOSE SERPL-MCNC: 117 MG/DL — HIGH (ref 70–99)
HCT VFR BLD CALC: 29.1 % — LOW (ref 42–52)
HGB BLD-MCNC: 9.2 G/DL — LOW (ref 14–18)
MAGNESIUM SERPL-MCNC: 2 MG/DL — SIGNIFICANT CHANGE UP (ref 1.8–2.4)
MCHC RBC-ENTMCNC: 29.3 PG — SIGNIFICANT CHANGE UP (ref 27–31)
MCHC RBC-ENTMCNC: 31.6 G/DL — LOW (ref 32–37)
MCV RBC AUTO: 92.7 FL — SIGNIFICANT CHANGE UP (ref 80–94)
NRBC # BLD: 0 /100 WBCS — SIGNIFICANT CHANGE UP (ref 0–0)
PLATELET # BLD AUTO: 233 K/UL — SIGNIFICANT CHANGE UP (ref 130–400)
POTASSIUM SERPL-MCNC: 4.1 MMOL/L — SIGNIFICANT CHANGE UP (ref 3.5–5)
POTASSIUM SERPL-SCNC: 4.1 MMOL/L — SIGNIFICANT CHANGE UP (ref 3.5–5)
PROT SERPL-MCNC: 6 G/DL — SIGNIFICANT CHANGE UP (ref 6–8)
RBC # BLD: 3.14 M/UL — LOW (ref 4.7–6.1)
RBC # FLD: 14.6 % — HIGH (ref 11.5–14.5)
SODIUM SERPL-SCNC: 139 MMOL/L — SIGNIFICANT CHANGE UP (ref 135–146)
SPECIMEN SOURCE: SIGNIFICANT CHANGE UP
WBC # BLD: 5.36 K/UL — SIGNIFICANT CHANGE UP (ref 4.8–10.8)
WBC # FLD AUTO: 5.36 K/UL — SIGNIFICANT CHANGE UP (ref 4.8–10.8)

## 2019-09-10 PROCEDURE — 99232 SBSQ HOSP IP/OBS MODERATE 35: CPT

## 2019-09-10 RX ADMIN — Medication 10 MILLIGRAM(S): at 12:15

## 2019-09-10 RX ADMIN — Medication 650 MILLIGRAM(S): at 06:19

## 2019-09-10 RX ADMIN — Medication 12.5 MILLIGRAM(S): at 06:19

## 2019-09-10 RX ADMIN — LORATADINE 10 MILLIGRAM(S): 10 TABLET ORAL at 12:16

## 2019-09-10 RX ADMIN — TAMSULOSIN HYDROCHLORIDE 0.4 MILLIGRAM(S): 0.4 CAPSULE ORAL at 22:30

## 2019-09-10 RX ADMIN — TIOTROPIUM BROMIDE 1 CAPSULE(S): 18 CAPSULE ORAL; RESPIRATORY (INHALATION) at 08:07

## 2019-09-10 RX ADMIN — Medication 250 MILLIGRAM(S): at 06:20

## 2019-09-10 RX ADMIN — Medication 2 SPRAY(S): at 12:15

## 2019-09-10 RX ADMIN — Medication 10 MILLIGRAM(S): at 06:20

## 2019-09-10 RX ADMIN — Medication 81 MILLIGRAM(S): at 12:16

## 2019-09-10 RX ADMIN — Medication 12.5 MILLIGRAM(S): at 14:17

## 2019-09-10 RX ADMIN — Medication 250 MILLIGRAM(S): at 18:03

## 2019-09-10 RX ADMIN — Medication 20 MILLIGRAM(S): at 06:19

## 2019-09-10 RX ADMIN — BUDESONIDE AND FORMOTEROL FUMARATE DIHYDRATE 2 PUFF(S): 160; 4.5 AEROSOL RESPIRATORY (INHALATION) at 22:37

## 2019-09-10 RX ADMIN — QUETIAPINE FUMARATE 25 MILLIGRAM(S): 200 TABLET, FILM COATED ORAL at 12:15

## 2019-09-10 RX ADMIN — LAMOTRIGINE 100 MILLIGRAM(S): 25 TABLET, ORALLY DISINTEGRATING ORAL at 06:19

## 2019-09-10 RX ADMIN — MORPHINE SULFATE 30 MILLIGRAM(S): 50 CAPSULE, EXTENDED RELEASE ORAL at 12:12

## 2019-09-10 RX ADMIN — LAMOTRIGINE 100 MILLIGRAM(S): 25 TABLET, ORALLY DISINTEGRATING ORAL at 17:34

## 2019-09-10 RX ADMIN — MORPHINE SULFATE 30 MILLIGRAM(S): 50 CAPSULE, EXTENDED RELEASE ORAL at 12:57

## 2019-09-10 RX ADMIN — GABAPENTIN 400 MILLIGRAM(S): 400 CAPSULE ORAL at 14:17

## 2019-09-10 RX ADMIN — Medication 10 MILLIGRAM(S): at 17:41

## 2019-09-10 RX ADMIN — Medication 2000 UNIT(S): at 12:16

## 2019-09-10 RX ADMIN — QUETIAPINE FUMARATE 50 MILLIGRAM(S): 200 TABLET, FILM COATED ORAL at 22:30

## 2019-09-10 RX ADMIN — Medication 650 MILLIGRAM(S): at 22:31

## 2019-09-10 RX ADMIN — PANTOPRAZOLE SODIUM 40 MILLIGRAM(S): 20 TABLET, DELAYED RELEASE ORAL at 06:19

## 2019-09-10 RX ADMIN — POLYETHYLENE GLYCOL 3350 17 GRAM(S): 17 POWDER, FOR SOLUTION ORAL at 06:19

## 2019-09-10 RX ADMIN — DOFETILIDE 250 MICROGRAM(S): 0.25 CAPSULE ORAL at 17:41

## 2019-09-10 RX ADMIN — Medication 10 MILLIGRAM(S): at 22:31

## 2019-09-10 RX ADMIN — Medication 12.5 MILLIGRAM(S): at 22:30

## 2019-09-10 RX ADMIN — ATORVASTATIN CALCIUM 40 MILLIGRAM(S): 80 TABLET, FILM COATED ORAL at 22:30

## 2019-09-10 RX ADMIN — GABAPENTIN 400 MILLIGRAM(S): 400 CAPSULE ORAL at 06:19

## 2019-09-10 RX ADMIN — DOFETILIDE 250 MICROGRAM(S): 0.25 CAPSULE ORAL at 06:19

## 2019-09-10 RX ADMIN — MORPHINE SULFATE 30 MILLIGRAM(S): 50 CAPSULE, EXTENDED RELEASE ORAL at 18:17

## 2019-09-10 RX ADMIN — GABAPENTIN 400 MILLIGRAM(S): 400 CAPSULE ORAL at 22:31

## 2019-09-10 NOTE — PROGRESS NOTE ADULT - ASSESSMENT
47 y/o M w/ a PMH of v-tach s/p AICD, CHF, A fib/flutter, HTN, COPD (on O2 PRN), DLD, multiple sclerosis w/ multiple admissions for flares, seizure disorder (on lamotrigine), PTSD, depression, ? stroke/TIA, esophageal stricture, chronic sore throat/sinusitis, GERD, hemopneumothorax (2006), pericardial effusion (2006), cardiomyopathy, MI (2006, 2008), IBS, presenting post AICD firing.     #) ARVD (Arrhythmogenic right ventricular dysplasia) s/p AICD, possible AICD dysfunction  - now with possible pacemaker firing, buzzing sensation in chest   - Keep telemetry, pacing pads on chest   - H/o v-tach (no VT, VF, on AICD since 2006)  - C/w metoprolol tartrate 12.5 mg PO TID, Tikosyn 250 mcg BID   - EP following   - Underwent fluoro to assess RV lead placement possibly dislodged   - S/p RV lead extraction and revision and battery change by CT sx  - No AC for 5 more days as per CT sx; f/u CT sx Dr Pastrana within 1w; AICD checked  - Vanco trough 10.3  - F/u w/ Dr Quiñones within 3-4w;  - Keflex 500mg BID x5d on discharge    #) Anginal symptoms, without prior evidence of CAD, ?H/o MI 2006, 2008  - With episode of anginal symptoms, pressure like, radiating to jaw, resolved with nitro  - More likely related to pacemaker firing   - Troponin negative, no acute EKG changes  - Per cardiology on 08/18, No evidence of CAD, on 8/6/2018 CCTA reported normal with 0 calcium score  - C/w metoprolol tartrate 12.5 mg PO TID  - C/w atorvastatin 40 mg PO qHS  - C/w aspirin 81 mg PO qD    #)  HTN  -C/w metoprolol tartrate 12.5 mg PO TID  -C/w Lasix 20 mg     #) Multiple sclerosis   -with recent flare, discharged on 08/19/19 with prednisone for 5 days (completed)  -Patient takes Tecfidera 240 mg PO qD but is apparently non-formulary; please contact [facility]  -Pain control with acetaminophen and baclofen and gabapentin and morphine  IR 15 as per pain management on last admission.  Allergy in chart is local rash to IV opioids, may give PO. Also got IV morphine in hospital without reaction,   -Ambulate w/ assistance (patient uses walker at baseline)  - Neuro: f/u with Dr. Brody on discharge; c/w Klonopin; c/w Baclofen 10mg q6h  - F/u pain management consult    # H/o seizure disorder  C/w lamotrigine 100 mg PO BID    #) COPD on PRN home O2  -Currently on RA  -C/w Symbicort high dose 2 puffs BID  -Vitals per routine, pulse ox q4h    # Chronic pharyngitis , sinusitis  -C/w Cepacol, Claritin, Flonase    # DLD  C/w atorvastatin 40 mg PO qHS    # GERD  C/w pantoprazole 40 mg PO qAM (patient takes omeprazole at home)    # H/o esophageal stricture   -cont mechanical soft dysphagia 2 w/ thins    DVT ppx- full dose lovenox  GI ppx- c/w pantoprazole 40 mg PO qAM (on omeprazole at home)  Activity- as tolerated   Diet- mechanical soft dysphagia 2 w/ thins  Code status: Full  Dispo- patient from [facility]; functional at baseline w/ walker    Patient seen and examined independently and care D/W the intern. PGY1 progress note reviewed. Agree with the findings and plan of care. D/C planning. 47 y/o M w/ a PMH of v-tach s/p AICD, CHF, A fib/flutter, HTN, COPD (on O2 PRN), DLD, multiple sclerosis w/ multiple admissions for flares, seizure disorder (on lamotrigine), PTSD, depression, ? stroke/TIA, esophageal stricture, chronic sore throat/sinusitis, GERD, hemopneumothorax (2006), pericardial effusion (2006), cardiomyopathy, MI (2006, 2008), IBS, presenting post AICD firing.     #) ARVD (Arrhythmogenic right ventricular dysplasia) s/p AICD, possible AICD dysfunction  - now with possible pacemaker firing, buzzing sensation in chest   - Keep telemetry, pacing pads on chest   - H/o v-tach (no VT, VF, on AICD since 2006)  - C/w metoprolol tartrate 12.5 mg PO TID, Tikosyn 250 mcg BID   - EP following   - Underwent fluoro to assess RV lead placement possibly dislodged   - S/p RV lead extraction and revision and battery change by CT sx  - No AC for 5 more days as per CT sx; f/u CT sx Dr Pastrana within 1w; AICD checked  - Vanco trough 10.3  - F/u w/ Dr Quiñones within 3-4w;  - 3 days of IV vancomycin secondary to re-operation for evacuation of post-op hematoma after AICD lead change and battery replacement. At end of Vancomycin course patient requires Keflex 500 mg Q12H for more 5 days.    #) Anginal symptoms, without prior evidence of CAD, ?H/o MI 2006, 2008  - With episode of anginal symptoms, pressure like, radiating to jaw, resolved with nitro  - More likely related to pacemaker firing   - Troponin negative, no acute EKG changes  - Per cardiology on 08/18, No evidence of CAD, on 8/6/2018 CCTA reported normal with 0 calcium score  - C/w metoprolol tartrate 12.5 mg PO TID  - C/w atorvastatin 40 mg PO qHS  - C/w aspirin 81 mg PO qD    #)  HTN  -C/w metoprolol tartrate 12.5 mg PO TID  -C/w Lasix 20 mg     #) Multiple sclerosis   -with recent flare, discharged on 08/19/19 with prednisone for 5 days (completed)  -Patient takes Tecfidera 240 mg PO qD but is apparently non-formulary; please contact [facility]  -Pain control with acetaminophen and baclofen and gabapentin and morphine  IR 15 as per pain management on last admission.  Allergy in chart is local rash to IV opioids, may give PO. Also got IV morphine in hospital without reaction,   -Ambulate w/ assistance (patient uses walker at baseline)  - Neuro: f/u with Dr. Brody on discharge; c/w Klonopin; c/w Baclofen 10mg q6h  - F/u pain management consult: Morphine 30 IR [id number] PRN    # H/o seizure disorder  C/w lamotrigine 100 mg PO BID    #) COPD on PRN home O2  -Currently on RA  -C/w Symbicort high dose 2 puffs BID  -Vitals per routine, pulse ox q4h    # Chronic pharyngitis , sinusitis  -C/w Cepacol, Claritin, Flonase    # DLD  C/w atorvastatin 40 mg PO qHS    # GERD  C/w pantoprazole 40 mg PO qAM (patient takes omeprazole at home)    # H/o esophageal stricture   -cont mechanical soft dysphagia 2 w/ thins    DVT ppx- full dose lovenox  GI ppx- c/w pantoprazole 40 mg PO qAM (on omeprazole at home)  Activity- as tolerated   Diet- mechanical soft dysphagia 2 w/ thins  Code status: Full  Dispo- patient from [facility]; functional at baseline w/ walker    Patient seen and examined independently and care D/W the intern. PGY1 progress note reviewed. Agree with the findings and plan of care. D/C planning. 47 y/o M w/ a PMH of v-tach s/p AICD, CHF, A fib/flutter, HTN, COPD (on O2 PRN), DLD, multiple sclerosis w/ multiple admissions for flares, seizure disorder (on lamotrigine), PTSD, depression, ? stroke/TIA, esophageal stricture, chronic sore throat/sinusitis, GERD, hemopneumothorax (2006), pericardial effusion (2006), cardiomyopathy, MI (2006, 2008), IBS, presenting post AICD firing.     #) ARVD (Arrhythmogenic right ventricular dysplasia) s/p AICD, possible AICD dysfunction  - H/o v-tach (no VT, VF, on AICD since 2006)  - C/w metoprolol tartrate 12.5 mg PO TID, Tikosyn 250 mcg BID   - Underwent fluoro to assess RV lead placement possibly dislodged   - S/p RV lead extraction and revision and battery change by CT sx  - No AC for 5 more days as per CT sx; f/u CT sx Dr Pastrana within 1w; AICD checked  - Vanco trough 10.3  - F/u w/ Dr Quiñones within 3-4w;  - 3 days of IV vancomycin secondary to re-operation for evacuation of post-op hematoma after AICD lead change and battery replacement. At end of Vancomycin course patient requires Keflex 500 mg Q12H for more 5 days.    #) Anginal symptoms, without prior evidence of CAD, ?H/o MI 2006, 2008  - With episode of anginal symptoms, pressure like, radiating to jaw, resolved with nitro  - More likely related to pacemaker firing   - Troponin negative, no acute EKG changes  - Per cardiology on 08/18, No evidence of CAD, on 8/6/2018 CCTA reported normal with 0 calcium score  - C/w metoprolol tartrate 12.5 mg PO TID  - C/w atorvastatin 40 mg PO qHS  - C/w aspirin 81 mg PO qD    #)  HTN  -C/w metoprolol tartrate 12.5 mg PO TID  -C/w Lasix 20 mg     #) Multiple sclerosis   -with recent flare, discharged on 08/19/19 with prednisone for 5 days (completed)  -Patient takes Tecfidera 240 mg PO qD but is apparently non-formulary; please contact [facility]  -Pain control with acetaminophen and baclofen and gabapentin and morphine  IR 15 as per pain management on last admission.  Allergy in chart is local rash to IV opioids, may give PO. Also got IV morphine in hospital without reaction,   -Ambulate w/ assistance (patient uses walker at baseline)  - Neuro: f/u with Dr. Brody on discharge; c/w Klonopin; c/w Baclofen 10mg q6h  - F/u pain management consult: Morphine 30 IR [id number] PRN    # H/o seizure disorder  C/w lamotrigine 100 mg PO BID    #) COPD on PRN home O2  -Currently on RA  -C/w Symbicort high dose 2 puffs BID  -Vitals per routine, pulse ox q4h    # Chronic pharyngitis , sinusitis  -C/w Cepacol, Claritin, Flonase    # DLD  C/w atorvastatin 40 mg PO qHS    # GERD  C/w pantoprazole 40 mg PO qAM (patient takes omeprazole at home)    # H/o esophageal stricture   -cont mechanical soft dysphagia 2 w/ thins    DVT ppx- full dose lovenox  GI ppx- c/w pantoprazole 40 mg PO qAM (on omeprazole at home)  Activity- as tolerated   Diet- mechanical soft dysphagia 2 w/ thins  Code status: Full  Dispo- patient from [facility]; functional at baseline w/ walker    Patient seen and examined independently and care D/W the intern. PGY1 progress note reviewed. Agree with the findings and plan of care. Refusing to go back to same long term facility. Will anticipate D/C in AM

## 2019-09-10 NOTE — CHART NOTE - NSCHARTNOTEFT_GEN_A_CORE
Registered dietitian limited note    Pt. continues to eat with good appetite, no acute GI distress noted, last BM 9/7. Weight trends relatively stable. No edema noted. Skin: incision (BS 19). Labs/meds reviewed. RD met with pt. yesterday to discuss diet order and clarify menu options. Consistent carb diet modifier was removed form pt's diet order. No RD intervention at this time. Will continue to monitor pt.

## 2019-09-10 NOTE — PROGRESS NOTE ADULT - SUBJECTIVE AND OBJECTIVE BOX
HPI  Patient is a 48y old Male who presents with a chief complaint of Chest pain, AICD firing (09 Sep 2019 17:22)    Currently admitted to medicine with the primary diagnosis of Chest pain     Today is hospital day 12d.     INTERVAL HPI / OVERNIGHT EVENTS:  Patient was examined and seen at bedside. This morning he refused a physical exam and stated "...as per my  I request to be evaluated by the other medicine team". The patient pointed to his chest to show me that he is no longer on a monitor.     ROS: Unable to elicit    PAST MEDICAL & SURGICAL HISTORY  PTSD (post-traumatic stress disorder)  Supraventricular arrhythmia: prior history  Cardiomyopathy  CHF (congestive heart failure)  Atrial fibrillation: s/p ablation, on eliquis  Diabetes mellitus: diet controlled  BPH (benign prostatic hyperplasia)  HTN (hypertension)  Seizures  Migraines  Pneumonia  MS (multiple sclerosis)  CAD (coronary artery disease)  Bipolar disorder  Anginal pain  Schizo affective schizophrenia  GERD (gastroesophageal reflux disease)  Seasonal allergies  Hypercholesteremia  COPD (chronic obstructive pulmonary disease)  CVA (cerebral vascular accident)  TIA (transient ischemic attack)  Syncope  Peripheral Neuropathy  Clinical Depression  H/O prior ablation treatment: for Afib  Cardiac pacemaker recipient  H/O hernia repair: right 1972,1991  History of hip replacement  S/P Orchiectomy: L for testicular CA  S/P Implantation of AICD    ALLERGIES  dexmethylphenidate (Unknown)  Dilantin (Rash)  dilantin, compazine, neurontin, ritalin, phenergan (Unknown)  Haldol (Unknown)  hydantoin derivatives (Other)  methylphenidate (Unknown)  phenytoin (Unknown)  prochlorperazine (Unknown)  promethazine (Dystonic RXN)  rash/hives (Anaphylaxis)  thioxanthenes (Unknown)    MEDICATIONS  STANDING MEDICATIONS  acetaminophen   Tablet .. 650 milliGRAM(s) Oral every 6 hours  aspirin enteric coated 81 milliGRAM(s) Oral daily  atorvastatin 40 milliGRAM(s) Oral at bedtime  baclofen 10 milliGRAM(s) Oral four times a day  buDESOnide 160 MICROgram(s)/formoterol 4.5 MICROgram(s) Inhaler - Peds 2 Puff(s) Inhalation two times a day  cholecalciferol 2000 Unit(s) Oral daily  dofetilide 250 MICROGram(s) Oral two times a day  fluticasone propionate (50 MICROgram(s)/actuation) Nasal Spray - Peds 2 Spray(s) Alternating Nostrils daily  furosemide    Tablet 20 milliGRAM(s) Oral daily  gabapentin 400 milliGRAM(s) Oral three times a day  lamoTRIgine 100 milliGRAM(s) Oral two times a day  loratadine 10 milliGRAM(s) Oral daily  metoprolol tartrate 12.5 milliGRAM(s) Oral three times a day  pantoprazole    Tablet 40 milliGRAM(s) Oral before breakfast  polyethylene glycol 3350 17 Gram(s) Oral every 12 hours  QUEtiapine 25 milliGRAM(s) Oral daily  QUEtiapine 50 milliGRAM(s) Oral at bedtime  sodium chloride 0.9%. 1000 milliLiter(s) IV Continuous <Continuous>  tamsulosin Oral Tab/Cap - Peds 0.4 milliGRAM(s) Oral at bedtime  tiotropium 18 MICROgram(s) Capsule 1 Capsule(s) Inhalation daily  vancomycin  IVPB      vancomycin  IVPB 1000 milliGRAM(s) IV Intermittent every 12 hours    PRN MEDICATIONS  benzocaine 15 mG/menthol 3.6 mG Lozenge 1 Lozenge Oral three times a day PRN  clonazePAM  Tablet 0.5 milliGRAM(s) Oral at bedtime PRN  glucagon  Injectable 1 milliGRAM(s) IntraMuscular once PRN  morphine  IR 30 milliGRAM(s) Oral every 6 hours PRN  nitroglycerin     SubLingual 0.4 milliGRAM(s) SubLingual every 5 minutes PRN  ondansetron    Tablet 4 milliGRAM(s) Oral two times a day PRN    VITALS:  T(F): 98.1  HR: 60  BP: 122/60  RR: 18  SpO2: 94%    PHYSICAL EXAM  Patient refused exam    LABS                        9.2    5.36  )-----------( 233      ( 10 Sep 2019 07:00 )             29.1     09-10    139  |  97<L>  |  25<H>  ----------------------------<  117<H>  4.1   |  25  |  1.2    Ca    9.6      10 Sep 2019 07:00  Phos  5.0     09-09  Mg     2.0     09-10    TPro  6.0  /  Alb  3.7  /  TBili  0.6  /  DBili  x   /  AST  22  /  ALT  35  /  AlkPhos  176<H>  09-10    Vancomycin Level, Trough (09.09.19 @ 18:05)    Vancomycin Level, Trough: 10.3: Vancomycin trough levels should be rapidly reached and maintained at  15-20 ug/ml for life threatening MRSA  infections such as sepsis, endocarditis, osteomyelitis and pneumonia. A  first trough level should be drawn  before the 3rd or 4th dose.  Risk of renal toxicity is increased for levels >15 ug/ml, in patients on  other nephrotoxic drugs, who are  hemodynamically unstable, have unstable renal function, or are on  Vancomycin therapy for >14 days. Renal function with  creatinine levels should be monitored for those patients. ug/mL HPI  Patient is a 48y old Male who presents with a chief complaint of Chest pain, AICD firing (09 Sep 2019 17:22)    Currently admitted to medicine with the primary diagnosis of Chest pain     Today is hospital day 12d.     INTERVAL HPI / OVERNIGHT EVENTS:  Patient was examined and seen at bedside. This morning he refused a physical exam and stated "...as per my  I request to be evaluated by the other medicine team". The patient pointed to his chest to show me that he is no longer on a monitor. I attempted to address the patients concerns however he refused to continue any conversation. The patient proceeded to walk around the unit and used the phone for an extended period of time stating "I'm on the phone with my " asking surrounding employees for their names.     ROS: Unable to elicit as patient refused any questioning.    PAST MEDICAL & SURGICAL HISTORY  PTSD (post-traumatic stress disorder)  Supraventricular arrhythmia: prior history  Cardiomyopathy  CHF (congestive heart failure)  Atrial fibrillation: s/p ablation, on eliquis  Diabetes mellitus: diet controlled  BPH (benign prostatic hyperplasia)  HTN (hypertension)  Seizures  Migraines  Pneumonia  MS (multiple sclerosis)  CAD (coronary artery disease)  Bipolar disorder  Anginal pain  Schizo affective schizophrenia  GERD (gastroesophageal reflux disease)  Seasonal allergies  Hypercholesteremia  COPD (chronic obstructive pulmonary disease)  CVA (cerebral vascular accident)  TIA (transient ischemic attack)  Syncope  Peripheral Neuropathy  Clinical Depression  H/O prior ablation treatment: for Afib  Cardiac pacemaker recipient  H/O hernia repair: right 1972,1991  History of hip replacement  S/P Orchiectomy: L for testicular CA  S/P Implantation of AICD    ALLERGIES  dexmethylphenidate (Unknown)  Dilantin (Rash)  dilantin, compazine, neurontin, ritalin, phenergan (Unknown)  Haldol (Unknown)  hydantoin derivatives (Other)  methylphenidate (Unknown)  phenytoin (Unknown)  prochlorperazine (Unknown)  promethazine (Dystonic RXN)  rash/hives (Anaphylaxis)  thioxanthenes (Unknown)    MEDICATIONS  STANDING MEDICATIONS  acetaminophen   Tablet .. 650 milliGRAM(s) Oral every 6 hours  aspirin enteric coated 81 milliGRAM(s) Oral daily  atorvastatin 40 milliGRAM(s) Oral at bedtime  baclofen 10 milliGRAM(s) Oral four times a day  buDESOnide 160 MICROgram(s)/formoterol 4.5 MICROgram(s) Inhaler - Peds 2 Puff(s) Inhalation two times a day  cholecalciferol 2000 Unit(s) Oral daily  dofetilide 250 MICROGram(s) Oral two times a day  fluticasone propionate (50 MICROgram(s)/actuation) Nasal Spray - Peds 2 Spray(s) Alternating Nostrils daily  furosemide    Tablet 20 milliGRAM(s) Oral daily  gabapentin 400 milliGRAM(s) Oral three times a day  lamoTRIgine 100 milliGRAM(s) Oral two times a day  loratadine 10 milliGRAM(s) Oral daily  metoprolol tartrate 12.5 milliGRAM(s) Oral three times a day  pantoprazole    Tablet 40 milliGRAM(s) Oral before breakfast  polyethylene glycol 3350 17 Gram(s) Oral every 12 hours  QUEtiapine 25 milliGRAM(s) Oral daily  QUEtiapine 50 milliGRAM(s) Oral at bedtime  sodium chloride 0.9%. 1000 milliLiter(s) IV Continuous <Continuous>  tamsulosin Oral Tab/Cap - Peds 0.4 milliGRAM(s) Oral at bedtime  tiotropium 18 MICROgram(s) Capsule 1 Capsule(s) Inhalation daily  vancomycin  IVPB      vancomycin  IVPB 1000 milliGRAM(s) IV Intermittent every 12 hours    PRN MEDICATIONS  benzocaine 15 mG/menthol 3.6 mG Lozenge 1 Lozenge Oral three times a day PRN  clonazePAM  Tablet 0.5 milliGRAM(s) Oral at bedtime PRN  glucagon  Injectable 1 milliGRAM(s) IntraMuscular once PRN  morphine  IR 30 milliGRAM(s) Oral every 6 hours PRN  nitroglycerin     SubLingual 0.4 milliGRAM(s) SubLingual every 5 minutes PRN  ondansetron    Tablet 4 milliGRAM(s) Oral two times a day PRN    VITALS:  T(F): 98.1  HR: 60  BP: 122/60  RR: 18  SpO2: 94%    PHYSICAL EXAM  Patient refused exam    LABS                        9.2    5.36  )-----------( 233      ( 10 Sep 2019 07:00 )             29.1     09-10    139  |  97<L>  |  25<H>  ----------------------------<  117<H>  4.1   |  25  |  1.2    Ca    9.6      10 Sep 2019 07:00  Phos  5.0     09-09  Mg     2.0     09-10    TPro  6.0  /  Alb  3.7  /  TBili  0.6  /  DBili  x   /  AST  22  /  ALT  35  /  AlkPhos  176<H>  09-10    Vancomycin Level, Trough (09.09.19 @ 18:05)    Vancomycin Level, Trough: 10.3: Vancomycin trough levels should be rapidly reached and maintained at  15-20 ug/ml for life threatening MRSA  infections such as sepsis, endocarditis, osteomyelitis and pneumonia. A  first trough level should be drawn  before the 3rd or 4th dose.  Risk of renal toxicity is increased for levels >15 ug/ml, in patients on  other nephrotoxic drugs, who are  hemodynamically unstable, have unstable renal function, or are on  Vancomycin therapy for >14 days. Renal function with  creatinine levels should be monitored for those patients. ug/mL

## 2019-09-11 ENCOUNTER — TRANSCRIPTION ENCOUNTER (OUTPATIENT)
Age: 49
End: 2019-09-11

## 2019-09-11 VITALS
SYSTOLIC BLOOD PRESSURE: 140 MMHG | RESPIRATION RATE: 20 BRPM | HEART RATE: 64 BPM | TEMPERATURE: 98 F | DIASTOLIC BLOOD PRESSURE: 75 MMHG

## 2019-09-11 LAB
ALBUMIN SERPL ELPH-MCNC: 3.7 G/DL — SIGNIFICANT CHANGE UP (ref 3.5–5.2)
ALP SERPL-CCNC: 173 U/L — HIGH (ref 30–115)
ALT FLD-CCNC: 31 U/L — SIGNIFICANT CHANGE UP (ref 0–41)
ANION GAP SERPL CALC-SCNC: 15 MMOL/L — HIGH (ref 7–14)
AST SERPL-CCNC: 22 U/L — SIGNIFICANT CHANGE UP (ref 0–41)
BILIRUB SERPL-MCNC: 0.6 MG/DL — SIGNIFICANT CHANGE UP (ref 0.2–1.2)
BUN SERPL-MCNC: 22 MG/DL — HIGH (ref 10–20)
CALCIUM SERPL-MCNC: 9.5 MG/DL — SIGNIFICANT CHANGE UP (ref 8.5–10.1)
CHLORIDE SERPL-SCNC: 99 MMOL/L — SIGNIFICANT CHANGE UP (ref 98–110)
CO2 SERPL-SCNC: 26 MMOL/L — SIGNIFICANT CHANGE UP (ref 17–32)
CREAT SERPL-MCNC: 1.4 MG/DL — SIGNIFICANT CHANGE UP (ref 0.7–1.5)
GLUCOSE SERPL-MCNC: 108 MG/DL — HIGH (ref 70–99)
HCT VFR BLD CALC: 26.5 % — LOW (ref 42–52)
HGB BLD-MCNC: 8.6 G/DL — LOW (ref 14–18)
MAGNESIUM SERPL-MCNC: 2.2 MG/DL — SIGNIFICANT CHANGE UP (ref 1.8–2.4)
MCHC RBC-ENTMCNC: 29.7 PG — SIGNIFICANT CHANGE UP (ref 27–31)
MCHC RBC-ENTMCNC: 32.5 G/DL — SIGNIFICANT CHANGE UP (ref 32–37)
MCV RBC AUTO: 91.4 FL — SIGNIFICANT CHANGE UP (ref 80–94)
NRBC # BLD: 0 /100 WBCS — SIGNIFICANT CHANGE UP (ref 0–0)
PHOSPHATE SERPL-MCNC: 4.5 MG/DL — SIGNIFICANT CHANGE UP (ref 2.1–4.9)
PLATELET # BLD AUTO: 265 K/UL — SIGNIFICANT CHANGE UP (ref 130–400)
POTASSIUM SERPL-MCNC: 3.7 MMOL/L — SIGNIFICANT CHANGE UP (ref 3.5–5)
POTASSIUM SERPL-SCNC: 3.7 MMOL/L — SIGNIFICANT CHANGE UP (ref 3.5–5)
PROT SERPL-MCNC: 6.1 G/DL — SIGNIFICANT CHANGE UP (ref 6–8)
RBC # BLD: 2.9 M/UL — LOW (ref 4.7–6.1)
RBC # FLD: 14.6 % — HIGH (ref 11.5–14.5)
SODIUM SERPL-SCNC: 140 MMOL/L — SIGNIFICANT CHANGE UP (ref 135–146)
WBC # BLD: 3.78 K/UL — LOW (ref 4.8–10.8)
WBC # FLD AUTO: 3.78 K/UL — LOW (ref 4.8–10.8)

## 2019-09-11 PROCEDURE — 99222 1ST HOSP IP/OBS MODERATE 55: CPT | Mod: GC

## 2019-09-11 PROCEDURE — 99239 HOSP IP/OBS DSCHRG MGMT >30: CPT

## 2019-09-11 RX ORDER — VANCOMYCIN HCL 1 G
1000 VIAL (EA) INTRAVENOUS
Refills: 0 | Status: DISCONTINUED | OUTPATIENT
Start: 2019-09-11 | End: 2019-09-11

## 2019-09-11 RX ORDER — KETOCONAZOLE 20 MG/G
1 AEROSOL, FOAM TOPICAL
Qty: 0 | Refills: 0 | DISCHARGE

## 2019-09-11 RX ORDER — APIXABAN 2.5 MG/1
1 TABLET, FILM COATED ORAL
Qty: 0 | Refills: 0 | DISCHARGE

## 2019-09-11 RX ORDER — CLONAZEPAM 1 MG
1 TABLET ORAL
Qty: 0 | Refills: 0 | DISCHARGE
Start: 2019-09-11

## 2019-09-11 RX ORDER — VANCOMYCIN HCL 1 G
1000 VIAL (EA) INTRAVENOUS ONCE
Refills: 0 | Status: DISCONTINUED | OUTPATIENT
Start: 2019-09-11 | End: 2019-09-11

## 2019-09-11 RX ORDER — ASPIRIN/CALCIUM CARB/MAGNESIUM 324 MG
1 TABLET ORAL
Qty: 0 | Refills: 0 | DISCHARGE
Start: 2019-09-11

## 2019-09-11 RX ORDER — CEPHALEXIN 500 MG
500 CAPSULE ORAL EVERY 12 HOURS
Refills: 0 | Status: DISCONTINUED | OUTPATIENT
Start: 2019-09-12 | End: 2019-09-11

## 2019-09-11 RX ORDER — CEPHALEXIN 500 MG
1 CAPSULE ORAL
Qty: 0 | Refills: 0 | DISCHARGE
Start: 2019-09-11 | End: 2019-09-16

## 2019-09-11 RX ADMIN — Medication 81 MILLIGRAM(S): at 11:01

## 2019-09-11 RX ADMIN — Medication 650 MILLIGRAM(S): at 05:42

## 2019-09-11 RX ADMIN — DOFETILIDE 250 MICROGRAM(S): 0.25 CAPSULE ORAL at 05:41

## 2019-09-11 RX ADMIN — POLYETHYLENE GLYCOL 3350 17 GRAM(S): 17 POWDER, FOR SOLUTION ORAL at 05:43

## 2019-09-11 RX ADMIN — DOFETILIDE 250 MICROGRAM(S): 0.25 CAPSULE ORAL at 17:06

## 2019-09-11 RX ADMIN — Medication 2000 UNIT(S): at 11:01

## 2019-09-11 RX ADMIN — Medication 650 MILLIGRAM(S): at 17:06

## 2019-09-11 RX ADMIN — GABAPENTIN 400 MILLIGRAM(S): 400 CAPSULE ORAL at 14:25

## 2019-09-11 RX ADMIN — Medication 10 MILLIGRAM(S): at 05:42

## 2019-09-11 RX ADMIN — Medication 12.5 MILLIGRAM(S): at 05:41

## 2019-09-11 RX ADMIN — LORATADINE 10 MILLIGRAM(S): 10 TABLET ORAL at 11:01

## 2019-09-11 RX ADMIN — Medication 10 MILLIGRAM(S): at 17:06

## 2019-09-11 RX ADMIN — QUETIAPINE FUMARATE 25 MILLIGRAM(S): 200 TABLET, FILM COATED ORAL at 11:01

## 2019-09-11 RX ADMIN — LAMOTRIGINE 100 MILLIGRAM(S): 25 TABLET, ORALLY DISINTEGRATING ORAL at 17:05

## 2019-09-11 RX ADMIN — MORPHINE SULFATE 30 MILLIGRAM(S): 50 CAPSULE, EXTENDED RELEASE ORAL at 05:57

## 2019-09-11 RX ADMIN — GABAPENTIN 400 MILLIGRAM(S): 400 CAPSULE ORAL at 05:42

## 2019-09-11 RX ADMIN — BUDESONIDE AND FORMOTEROL FUMARATE DIHYDRATE 2 PUFF(S): 160; 4.5 AEROSOL RESPIRATORY (INHALATION) at 11:46

## 2019-09-11 RX ADMIN — Medication 10 MILLIGRAM(S): at 11:01

## 2019-09-11 RX ADMIN — Medication 12.5 MILLIGRAM(S): at 14:25

## 2019-09-11 RX ADMIN — Medication 650 MILLIGRAM(S): at 11:01

## 2019-09-11 RX ADMIN — Medication 250 MILLIGRAM(S): at 05:43

## 2019-09-11 RX ADMIN — LAMOTRIGINE 100 MILLIGRAM(S): 25 TABLET, ORALLY DISINTEGRATING ORAL at 05:41

## 2019-09-11 RX ADMIN — MORPHINE SULFATE 30 MILLIGRAM(S): 50 CAPSULE, EXTENDED RELEASE ORAL at 11:57

## 2019-09-11 RX ADMIN — Medication 20 MILLIGRAM(S): at 05:41

## 2019-09-11 RX ADMIN — Medication 2 SPRAY(S): at 11:01

## 2019-09-11 RX ADMIN — PANTOPRAZOLE SODIUM 40 MILLIGRAM(S): 20 TABLET, DELAYED RELEASE ORAL at 08:12

## 2019-09-11 NOTE — CONSULT NOTE ADULT - ASSESSMENT
R/O PTSD R/O Schizoaffective disorder by hx.    maintain seroquel 25 mg am 50 mg po hs.  no need or indiction for ipp.  case management for aftercare   pt will benefit from residence i.e. adult home with supervision for medication administration   pt is capable making informed treatment decision.

## 2019-09-11 NOTE — DISCHARGE NOTE PROVIDER - CARE PROVIDERS DIRECT ADDRESSES
,[email or web address]

## 2019-09-11 NOTE — DISCHARGE NOTE NURSING/CASE MANAGEMENT/SOCIAL WORK - NSDCPEEMAIL_GEN_ALL_CORE
[facility] email [email or web address]

## 2019-09-11 NOTE — DISCHARGE NOTE PROVIDER - NSDCCPCAREPLAN_GEN_ALL_CORE_FT
PRINCIPAL DISCHARGE DIAGNOSIS  Diagnosis: Chest pain  Assessment and Plan of Treatment: You were found to have chest pain secondary to suspected AICD firing. On examination you were found to have a hematoma with suspected externalization of a lead. CT surgery replaced the lead with hematoma evacuation. You will need to follow up with Dr. Pastrana in 1 week and Dr. Quiñones in 3-4 weeks after discharge. For antibiotics you will need to complete 5 more days of Keflex (last dose morning of 9/16/2019). You will resume your ELIQUIS ON 9/14/2019      SECONDARY DISCHARGE DIAGNOSES  Diagnosis: Multiple sclerosis  Assessment and Plan of Treatment: You were evaluated by neurology who advised to continue on your current medication regimen: For pain Tyleonol, Gabapentin with Morphine PRN. Klonopin PRN and Baclofen are to be continued as well. You will continue taking Tecfidera at [facility].

## 2019-09-11 NOTE — PROGRESS NOTE ADULT - PROVIDER SPECIALTY LIST ADULT
CT Surgery
Electrophysiology
Internal Medicine
Physiatry
Critical Care
Internal Medicine

## 2019-09-11 NOTE — DISCHARGE NOTE PROVIDER - HOSPITAL COURSE
47 y/o M with PMHx of v-tach s/p AICD, CHF, A fib/flutter, HTN, COPD (on O2 PRN), DLD, multiple sclerosis w/ multiple admissions for flares, seizure disorder (on lamotrigine), PTSD, depression, ?stroke/TIA, esophageal stricture, chronic sore throat/sinusitis, GERD, hemopneumothorax (2006), pericardial effusion (2006), cardiomyopathy, MI (2006, 2008), IBS, presenting with chest pain, palpitations and AICD firing. He had recent admission to [facility] on 08/13/19with discharge on 08/19/19 for multiple sclerosis flare treated with IV solumedrol for 5 days and discharged on oral Prednisone for 5 more days. During his admission EP interrogated his pacemaker and multiple episodes of SVT and PAF were noted over past few months. His metoprolol was increased to 25 mg TID, and to return to see Dr. Quiñones in 2 months for battery change. He states that two days ago he felt mid sternal chest pain, 8/10, that radiated to the right side of his neck and face that resolved with sublingual nitroglycerin.  The following day he is "80% sure" he felt his pacemaker fire so he informed the staff at [facility] and was brought to the ED.   He also states that his pacemaker has been "buzzing and vibrating," and that he was told to come to the ED if he felt that.     His device was interrogated and showed the leads working with A.Fib documented. Fluoroscopy showed RV lead externalization. He was found to have a hematoma of ICD pocket and ICD battery depletion. On hospital day 6 he underwent a removal of the defibrillator lead and atrial lead with laser extraction and the old RV lead was used for temporary pacing. On hospital day 7 he had evacuation of the hematoma. He was discharged back to medicine. He was medically evaluated and monitored. His underlying MS was addressed by Neurology who advised to continue with     Klonopin and Baclofen on discharge as well as Tylenol and Gabapentin for pain control along with morphine. For antibiotics he will complete five days of of Keflex         For follow up he will see Dr. Pastrana in 1 week and Dr. Davis in 3-4 weeks.

## 2019-09-11 NOTE — CONSULT NOTE ADULT - SUBJECTIVE AND OBJECTIVE BOX
reviewed and referred to chart notes.     History of Present Illness: 	   47 y/o M with PMHx of v-tach s/p AICD, CHF, A fib/flutter, HTN, COPD (on O2 PRN), DLD, multiple sclerosis w/ multiple admissions for flares, seizure disorder (on lamotrigine), PTSD, depression, ?stroke/TIA, esophageal stricture, chronic sore throat/sinusitis, GERD, hemopneumothorax (2006), pericardial effusion (2006), cardiomyopathy, MI (2006, 2008), IBS, presenting with chest pain, palpitations and AICD firing. He had recent admission to [facility] on 08/13/19with discharge on 08/19/19 for multiple sclerosis flare reviewed and referred to chart notes.     History of Present Illness: 	   47 y/o M with PMHx of v-tach s/p AICD, CHF, A fib/flutter, HTN, COPD (on O2 PRN), DLD, multiple sclerosis w/ multiple admissions for flares, seizure disorder (on lamotrigine), PTSD, depression, ?stroke/TIA, esophageal stricture, chronic sore throat/sinusitis, GERD, hemopneumothorax (2006), pericardial effusion (2006), cardiomyopathy, MI (2006, 2008), IBS, presenting with chest pain, palpitations and AICD firing. He had recent admission to [facility] on 08/13/19with discharge on 08/19/19 for multiple sclerosis flare         INTERVAL HPI / OVERNIGHT EVENTS: for today.  Patient was examined and seen at bedside. This morning he refused a physical exam and stated "...as per my  I request to be evaluated by the other medicine team". The patient pointed to his chest to show me that he is no longer on a monitor. I attempted to address the patients concerns however he refused to continue any conversation. The patient proceeded to walk around the unit and used the phone for an extended period of time stating "I'm on the phone with my " asking surrounding employees for their names.     upon my approach pt reported that he has psych hx and is taking his medications given to him voluntarily. he does not want to participate for full MSE. He denies any active sx of depression or anxiety denies any hallucinations or delusional thinking. he denies any suicidal homicidal ideations. he admits his memory is not sharp as used to be due to flare up in multiple sclerosis.     past  hx of PTSD and schizoaffective disorder in the chart. no evidence available and pt is unwilling to share the information.     he is observed to be calm, eating well, is alert oriented to surroundings and situation. as per staff he has been compliant with medications, follows directions. no agitation or aggression reported. no observed psychotic behavior. no threat to self or others. i/j fair as observed by his knowledge of his illness, impact and need for treatment and compliance with it.  he is not cooperative for structured cognitive assessment . based on observation and interaction his cognitive function is globally intact.

## 2019-09-11 NOTE — PROGRESS NOTE ADULT - REASON FOR ADMISSION
Chest pain, AICD firing

## 2019-09-11 NOTE — PROGRESS NOTE ADULT - ATTENDING COMMENTS
Patient is seen and examined independently at bedside. I agree with the resident's note, history and plan as above. Corrections made where appropriate    All labs, radiologic studies, vitals, orders and medications list reviewed.     Patient is medically stable for discharge to resume anticoagulation 9/14 per CT surgery   c/w Keflex to finish antibiotic course  patient aware and agreeable with discharge plan
#Fever, +sob  check cxr  ua, ucx, bcx  LFTs noted, no new medications, check ruq us, cbc with diff  #AICD lead externalization; ppm upgraded to aicd 2005 for secondary prevention VT  f/u CTS for lead extraction, revision, and battery change  f/u eps  eliquis on hold, on lovenox    #Progress Note Handoff:  Pending (specify):  Consults_________, Tests________, Test Results_______, Other_____lead extraction____  Family discussion:d/w pt at bedside  Disposition: Home___/SNF___/Other________/Unknown at this time___x_____
Covering for Dr. Quiñones    49 yo M with history of VT s/p DC ICD for secondary prevention, AFib on Eliquis, HTN, COPD, DL, multiple sclerosis presenting with alarm and buzzing from his ICD. Patient states he initially had a PPM and was upgraded to ICD in 2005. We have been consulted for alarming device, which correlates to device at [facility]. Fluoroscopy performed yesterday confirmed externalized Riata lead. Patient would like to proceed with device extraction. He has an abandoned pace/sense RV lead that also needs to be removed.    Recommend  - Discontinue Eliquis and start heparin for AFib.   - Consult CTS for extraction planning, likely next week. Risks/benefits/alternatives of extraction discussed in detail with patient. All questions answered. Patient would like to proceed with extraction.

## 2019-09-11 NOTE — PHARMACOTHERAPY INTERVENTION NOTE - COMMENTS
Prescriber was contacted to clarify change from Vancomycin 1000mg iv q12h to Vancomycin 1000mg iv q24h if Vanco trough=10.3.  A per provider wasted only 1 time dose at 6pm and to start Keflex tomorrow.  Will correct order

## 2019-09-11 NOTE — DISCHARGE NOTE PROVIDER - CARE PROVIDER_API CALL
Jose Pastrana)  Surgery; Thoracic and Cardiac Surgery  [address], [address]  [place] [address]  Phone: [phone]  Fax: [phone]  Follow Up Time: 1 week    Alexi Quiñones; MICHAEL)  Cardiac Electrophysiology  [address]  [place] [address]  Phone: [phone]  Fax: [phone]  Follow Up Time: 1 month

## 2019-09-11 NOTE — DISCHARGE NOTE NURSING/CASE MANAGEMENT/SOCIAL WORK - NSDCPEWEB_GEN_ALL_CORE
[facility] for Tobacco Control website --- [email or web address] website --- [email or web address]

## 2019-09-11 NOTE — DISCHARGE NOTE NURSING/CASE MANAGEMENT/SOCIAL WORK - PATIENT PORTAL LINK FT
You can access the FollowMyHealth Patient Portal offered by [facility] by registering at the following website: [email or web address]. By joining [facility]’s FollowMyHealth portal, you will also be able to view your health information using other applications (apps) compatible with our system.

## 2019-09-11 NOTE — DISCHARGE NOTE PROVIDER - PROVIDER TOKENS
PROVIDER:[TOKEN:[[id number]],FOLLOWUP:[1 week]],PROVIDER:[TOKEN:[[id number]],FOLLOWUP:[1 month]]

## 2019-09-11 NOTE — PROGRESS NOTE ADULT - SUBJECTIVE AND OBJECTIVE BOX
Hospital Day:  13d    Subjective:    Patient is a 48y old  Male who presents with a chief complaint of Chest pain, AICD firing (11 Sep 2019 09:58)      Past Medical Hx:   PTSD (post-traumatic stress disorder)  Supraventricular arrhythmia  Cardiomyopathy  CHF (congestive heart failure)  Atrial fibrillation  Diabetes mellitus  Smoker  BPH (benign prostatic hyperplasia)  HTN (hypertension)  Seizures  Bipolar 1 disorder  Migraines  Pneumonia  MS (multiple sclerosis)  CAD (coronary artery disease)  Bipolar disorder  Anginal pain  Schizo affective schizophrenia  GERD (gastroesophageal reflux disease)  Seasonal allergies  HTN (hypertension)  Hypercholesteremia  COPD (chronic obstructive pulmonary disease)  Neuropathy  Atrial flutter  CVA (cerebral vascular accident)  TIA (transient ischemic attack)  Syncope  Chronic Hypotension  Hypotension  Peripheral Neuropathy  Human Immunodeficiency Virus (  S/P Implantation of Automatic  Personal History of Multiple S  Personal History of Mitral Nicki  Personal History of MI (Myocar  Personal History of Hypertensi  Personal History of Congestive  Personal History of Cardiomyop  Personal History of Atrial Fib  Personal History of Alcoholism  Clinical Depression    Past Sx:  H/O prior ablation treatment  Cardiac pacemaker recipient  H/O hernia repair  History of hip replacement  S/P Orchiectomy  SVT (Supraventricular Tachycar  GERD (Gastroesophageal Reflux  S/P Implantation of AICD    Allergies:  dexmethylphenidate (Unknown)  Dilantin (Rash)  dilantin, compazine, neurontin, ritalin, phenergan (Unknown)  Haldol (Unknown)  hydantoin derivatives (Other)  methylphenidate (Unknown)  phenytoin (Unknown)  prochlorperazine (Unknown)  promethazine (Dystonic RXN)  rash/hives (Anaphylaxis)  thioxanthenes (Unknown)    Current Meds:   Standng Meds:  acetaminophen   Tablet .. 650 milliGRAM(s) Oral every 6 hours  aspirin enteric coated 81 milliGRAM(s) Oral daily  atorvastatin 40 milliGRAM(s) Oral at bedtime  baclofen 10 milliGRAM(s) Oral four times a day  buDESOnide 160 MICROgram(s)/formoterol 4.5 MICROgram(s) Inhaler - Peds 2 Puff(s) Inhalation two times a day  cholecalciferol 2000 Unit(s) Oral daily  dofetilide 250 MICROGram(s) Oral two times a day  fluticasone propionate (50 MICROgram(s)/actuation) Nasal Spray - Peds 2 Spray(s) Alternating Nostrils daily  furosemide    Tablet 20 milliGRAM(s) Oral daily  gabapentin 400 milliGRAM(s) Oral three times a day  lamoTRIgine 100 milliGRAM(s) Oral two times a day  loratadine 10 milliGRAM(s) Oral daily  metoprolol tartrate 12.5 milliGRAM(s) Oral three times a day  pantoprazole    Tablet 40 milliGRAM(s) Oral before breakfast  polyethylene glycol 3350 17 Gram(s) Oral every 12 hours  QUEtiapine 25 milliGRAM(s) Oral daily  QUEtiapine 50 milliGRAM(s) Oral at bedtime  sodium chloride 0.9%. 1000 milliLiter(s) (20 mL/Hr) IV Continuous <Continuous>  tamsulosin Oral Tab/Cap - Peds 0.4 milliGRAM(s) Oral at bedtime  tiotropium 18 MICROgram(s) Capsule 1 Capsule(s) Inhalation daily  vancomycin  IVPB      vancomycin  IVPB 1000 milliGRAM(s) IV Intermittent every 12 hours    PRN Meds:  benzocaine 15 mG/menthol 3.6 mG Lozenge 1 Lozenge Oral three times a day PRN Sore Throat  clonazePAM  Tablet 0.5 milliGRAM(s) Oral at bedtime PRN agitation  glucagon  Injectable 1 milliGRAM(s) IntraMuscular once PRN Glucose LESS THAN 70 milligrams/deciliter  morphine  IR 30 milliGRAM(s) Oral every 6 hours PRN Moderate Pain (4 - 6)  nitroglycerin     SubLingual 0.4 milliGRAM(s) SubLingual every 5 minutes PRN Chest Pain  ondansetron    Tablet 4 milliGRAM(s) Oral two times a day PRN Nausea and/or Vomiting    HOME MEDICATIONS:  acetaminophen 650 mg oral tablet: 650 milligram(s) orally 4 times a day, As Needed  atorvastatin 40 mg oral tablet: 1 tab(s) orally once a day (at bedtime)  baclofen 10 mg oral tablet: 1 tab(s) orally 4 times a day  budesonide-formoterol 160 mcg-4.5 mcg/inh inhalation aerosol: 2 puff(s) inhaled 2 times a day  Cepacol Extra Strength Menthol 10 mg mucous membrane lozenge: 1 anderson(s) mucous membrane every 6 hours, As Needed  cholecalciferol oral tablet: 2000 unit(s) orally once a day  Claritin 10 mg oral tablet: 1 tab(s) orally once a day  Eliquis 5 mg oral tablet: 1 tab(s) orally 2 times a day  Flonase 50 mcg/inh nasal spray: 2 spray(s) nasal once a day  furosemide 20 mg oral tablet: 1 tab(s) orally once a day  gabapentin 400 mg oral capsule: 1 cap(s) orally 3 times a day  ketoconazole 2% topical shampoo: Apply topically to affected area once a day (in the evening)  lamoTRIgine 100 mg oral tablet: 1 tab(s) orally 2 times a day  metoprolol tartrate 25 mg oral tablet: 1 tab(s) orally every 8 hours  Mi-Acid oral suspension: 30 milliliter(s) orally 4 times a day, As Needed  morphine 15 mg oral tablet: 1 tab(s) orally every 4 hours, As needed, Severe Pain (7 - 10)  Nitrostat 0.4 mg sublingual tablet: sublingual 3 times a day, As Needed; may repeat after 5 min until relief  omeprazole 20 mg oral delayed release capsule: 1 cap(s) orally 2 times a day  polyethylene glycol 3350 oral powder for reconstitution: 17 gram(s) orally every 12 hours  QUEtiapine 25 mg oral tablet: 1 tab(s) orally once a day  SEROquel 50 mg oral tablet: 1 tab(s) orally once a day (at bedtime)  tamsulosin 0.4 mg oral capsule: 1 cap(s) orally once a day (at bedtime)  Tecfidera 240 mg oral delayed release capsule: 1 cap(s) orally once a day  Tikosyn 250 mcg oral capsule: 1 cap(s) orally 2 times a day  tiotropium 18 mcg inhalation capsule: 1 cap(s) inhaled once a day      Vital Signs:   T(F): 97.6 (09-11-19 @ 05:31), Max: 98.1 (09-10-19 @ 20:51)  HR: 68 (09-11-19 @ 05:31) (60 - 68)  BP: 106/54 (09-11-19 @ 05:31) (106/54 - 125/60)  RR: 20 (09-11-19 @ 05:31) (20 - 20)  SpO2: 97% (09-11-19 @ 05:31) (95% - 97%)      09-10-19 @ 07:01  -  09-11-19 @ 07:00  --------------------------------------------------------  IN: 1400 mL / OUT: 0 mL / NET: 1400 mL        Physical Exam:   GENERAL: NAD  HEENT: NCAT  CHEST/LUNG: CTAB  HEART: Regular rate and rhythm; s1 s2 appreciated, No murmurs, rubs, or gallops  ABDOMEN: Soft, Nontender, Nondistended; Bowel sounds present  EXTREMITIES: No LE edema b/l  NERVOUS SYSTEM:  Alert & Oriented X3        Labs:                         8.6    3.78  )-----------( 265      ( 11 Sep 2019 05:32 )             26.5       11 Sep 2019 05:32    140    |  99     |  22     ----------------------------<  108    3.7     |  26     |  1.4      Ca    9.5        11 Sep 2019 05:32  Phos  4.5       11 Sep 2019 05:32  Mg     2.2       11 Sep 2019 05:32    TPro  6.1    /  Alb  3.7    /  TBili  0.6    /  DBili  x      /  AST  22     /  ALT  31     /  AlkPhos  173    11 Sep 2019 05:32    Hemoglobin A1C, Whole Blood: 5.4 % (08-30-19 @ 07:40)    Culture - Blood (collected 09-04-19 @ 18:00)  Source: .Blood None  Final Report (09-10-19 @ 02:01):    No growth at 5 days.    Culture - Blood (collected 09-04-19 @ 18:00)  Source: .Blood None  Final Report (09-10-19 @ 02:01):    No growth at 5 days.    Radiology:   None Today    Assessment and Plan:   This is a 48 year old male with PMHx of V-tach s/p AICD, CHFpEF (EF 69% 8/2019), A-fib/Flutter Hospital Day:  13d    Subjective:    Patient is a 48y old  Male who presents with a chief complaint of Chest pain, AICD firing (11 Sep 2019 09:58)    Overnight patient had no major complaints. Seen and examined this morning at bedside. The patient this morning had no acute complaints. During the day he was constantly out of the room taking down everyone's names and writing down things at the nursing station. Spoke to the patient regarding his IJ central line. The patient prefers to keep it to avoid getting stuck but will be okay with an investigative look for IV placement under ultrasound. Will attempt to place a peripheral IV today with unit management at bedside. The patient also requested to review his medical chart. Will attempt to review with patient today.     Past Medical Hx:   PTSD (post-traumatic stress disorder)  Supraventricular arrhythmia  Cardiomyopathy  CHF (congestive heart failure)  Atrial fibrillation  Diabetes mellitus  Smoker  BPH (benign prostatic hyperplasia)  HTN (hypertension)  Seizures  Bipolar 1 disorder  Migraines  Pneumonia  MS (multiple sclerosis)  CAD (coronary artery disease)  Bipolar disorder  Anginal pain  Schizo affective schizophrenia  GERD (gastroesophageal reflux disease)  Seasonal allergies  HTN (hypertension)  Hypercholesteremia  COPD (chronic obstructive pulmonary disease)  Neuropathy  Atrial flutter  CVA (cerebral vascular accident)  TIA (transient ischemic attack)  Syncope  Chronic Hypotension  Hypotension  Peripheral Neuropathy  Human Immunodeficiency Virus (  S/P Implantation of Automatic  Personal History of Multiple S  Personal History of Mitral Nicki  Personal History of MI (Myocar  Personal History of Hypertensi  Personal History of Congestive  Personal History of Cardiomyop  Personal History of Atrial Fib  Personal History of Alcoholism  Clinical Depression    Past Sx:  H/O prior ablation treatment  Cardiac pacemaker recipient  H/O hernia repair  History of hip replacement  S/P Orchiectomy  SVT (Supraventricular Tachycar  GERD (Gastroesophageal Reflux  S/P Implantation of AICD    Allergies:  dexmethylphenidate (Unknown)  Dilantin (Rash)  dilantin, compazine, neurontin, ritalin, phenergan (Unknown)  Haldol (Unknown)  hydantoin derivatives (Other)  methylphenidate (Unknown)  phenytoin (Unknown)  prochlorperazine (Unknown)  promethazine (Dystonic RXN)  rash/hives (Anaphylaxis)  thioxanthenes (Unknown)    Current Meds:   Standng Meds:  acetaminophen   Tablet .. 650 milliGRAM(s) Oral every 6 hours  aspirin enteric coated 81 milliGRAM(s) Oral daily  atorvastatin 40 milliGRAM(s) Oral at bedtime  baclofen 10 milliGRAM(s) Oral four times a day  buDESOnide 160 MICROgram(s)/formoterol 4.5 MICROgram(s) Inhaler - Peds 2 Puff(s) Inhalation two times a day  cholecalciferol 2000 Unit(s) Oral daily  dofetilide 250 MICROGram(s) Oral two times a day  fluticasone propionate (50 MICROgram(s)/actuation) Nasal Spray - Peds 2 Spray(s) Alternating Nostrils daily  furosemide    Tablet 20 milliGRAM(s) Oral daily  gabapentin 400 milliGRAM(s) Oral three times a day  lamoTRIgine 100 milliGRAM(s) Oral two times a day  loratadine 10 milliGRAM(s) Oral daily  metoprolol tartrate 12.5 milliGRAM(s) Oral three times a day  pantoprazole    Tablet 40 milliGRAM(s) Oral before breakfast  polyethylene glycol 3350 17 Gram(s) Oral every 12 hours  QUEtiapine 25 milliGRAM(s) Oral daily  QUEtiapine 50 milliGRAM(s) Oral at bedtime  sodium chloride 0.9%. 1000 milliLiter(s) (20 mL/Hr) IV Continuous <Continuous>  tamsulosin Oral Tab/Cap - Peds 0.4 milliGRAM(s) Oral at bedtime  tiotropium 18 MICROgram(s) Capsule 1 Capsule(s) Inhalation daily  vancomycin  IVPB      vancomycin  IVPB 1000 milliGRAM(s) IV Intermittent every 12 hours    PRN Meds:  benzocaine 15 mG/menthol 3.6 mG Lozenge 1 Lozenge Oral three times a day PRN Sore Throat  clonazePAM  Tablet 0.5 milliGRAM(s) Oral at bedtime PRN agitation  glucagon  Injectable 1 milliGRAM(s) IntraMuscular once PRN Glucose LESS THAN 70 milligrams/deciliter  morphine  IR 30 milliGRAM(s) Oral every 6 hours PRN Moderate Pain (4 - 6)  nitroglycerin     SubLingual 0.4 milliGRAM(s) SubLingual every 5 minutes PRN Chest Pain  ondansetron    Tablet 4 milliGRAM(s) Oral two times a day PRN Nausea and/or Vomiting    HOME MEDICATIONS:  acetaminophen 650 mg oral tablet: 650 milligram(s) orally 4 times a day, As Needed  atorvastatin 40 mg oral tablet: 1 tab(s) orally once a day (at bedtime)  baclofen 10 mg oral tablet: 1 tab(s) orally 4 times a day  budesonide-formoterol 160 mcg-4.5 mcg/inh inhalation aerosol: 2 puff(s) inhaled 2 times a day  Cepacol Extra Strength Menthol 10 mg mucous membrane lozenge: 1 anderson(s) mucous membrane every 6 hours, As Needed  cholecalciferol oral tablet: 2000 unit(s) orally once a day  Claritin 10 mg oral tablet: 1 tab(s) orally once a day  Eliquis 5 mg oral tablet: 1 tab(s) orally 2 times a day  Flonase 50 mcg/inh nasal spray: 2 spray(s) nasal once a day  furosemide 20 mg oral tablet: 1 tab(s) orally once a day  gabapentin 400 mg oral capsule: 1 cap(s) orally 3 times a day  ketoconazole 2% topical shampoo: Apply topically to affected area once a day (in the evening)  lamoTRIgine 100 mg oral tablet: 1 tab(s) orally 2 times a day  metoprolol tartrate 25 mg oral tablet: 1 tab(s) orally every 8 hours  Mi-Acid oral suspension: 30 milliliter(s) orally 4 times a day, As Needed  morphine 15 mg oral tablet: 1 tab(s) orally every 4 hours, As needed, Severe Pain (7 - 10)  Nitrostat 0.4 mg sublingual tablet: sublingual 3 times a day, As Needed; may repeat after 5 min until relief  omeprazole 20 mg oral delayed release capsule: 1 cap(s) orally 2 times a day  polyethylene glycol 3350 oral powder for reconstitution: 17 gram(s) orally every 12 hours  QUEtiapine 25 mg oral tablet: 1 tab(s) orally once a day  SEROquel 50 mg oral tablet: 1 tab(s) orally once a day (at bedtime)  tamsulosin 0.4 mg oral capsule: 1 cap(s) orally once a day (at bedtime)  Tecfidera 240 mg oral delayed release capsule: 1 cap(s) orally once a day  Tikosyn 250 mcg oral capsule: 1 cap(s) orally 2 times a day  tiotropium 18 mcg inhalation capsule: 1 cap(s) inhaled once a day      Vital Signs:   T(F): 97.6 (09-11-19 @ 05:31), Max: 98.1 (09-10-19 @ 20:51)  HR: 68 (09-11-19 @ 05:31) (60 - 68)  BP: 106/54 (09-11-19 @ 05:31) (106/54 - 125/60)  RR: 20 (09-11-19 @ 05:31) (20 - 20)  SpO2: 97% (09-11-19 @ 05:31) (95% - 97%)      09-10-19 @ 07:01  -  09-11-19 @ 07:00  --------------------------------------------------------  IN: 1400 mL / OUT: 0 mL / NET: 1400 mL        Physical Exam:   GENERAL: NAD, ambulating around the unit  HEENT: NCAT, PERRLA, EOMI  CHEST/LUNG: CTA, No wheezing, rhonchi, rales  HEART: Regular rate and rhythm; s1 s2 appreciated, No murmurs, rubs, or gallops  ABDOMEN: Soft, Nontender, Nondistended; Bowel sounds present  EXTREMITIES: No LE edema b/l, Peripheral Pulses 2+, No cyanosis, No clubbing  NERVOUS SYSTEM:  Alert & Oriented X3, Non focal    Right sided IJ    Labs:                         8.6    3.78  )-----------( 265      ( 11 Sep 2019 05:32 )             26.5       11 Sep 2019 05:32    140    |  99     |  22     ----------------------------<  108    3.7     |  26     |  1.4      Ca    9.5        11 Sep 2019 05:32  Phos  4.5       11 Sep 2019 05:32  Mg     2.2       11 Sep 2019 05:32    TPro  6.1    /  Alb  3.7    /  TBili  0.6    /  DBili  x      /  AST  22     /  ALT  31     /  AlkPhos  173    11 Sep 2019 05:32    Hemoglobin A1C, Whole Blood: 5.4 % (08-30-19 @ 07:40)    Culture - Blood (collected 09-04-19 @ 18:00)  Source: .Blood None  Final Report (09-10-19 @ 02:01):    No growth at 5 days.    Culture - Blood (collected 09-04-19 @ 18:00)  Source: .Blood None  Final Report (09-10-19 @ 02:01):    No growth at 5 days.    Radiology:   None Today    Assessment and Plan:   This is a 48 year old male with PMHx of V-tach s/p AICD, CHFpEF (EF 69% 8/2019), A-fib/Flutter, HTN, COPD on O2 PRN, DLD, MS with numerous admissions for flares, Seizure disorder on Lamotrigine, PTSD, Depression, h/o TIA, Esophageal Stricture, GERD, Pericardial Effusion, Cardiomyopathy, H.o MI, IBS  who presented from NH with chest pain, palpitations, and AICD firing.     #Arrhythmogenic Right Ventricular dysplasia s/p AICD dysfunction  - Had RV lead extraction and revision as well as battery change  - No anticoagulation until 9/14/2019 per CT surgery  - Antibiotics: Vancomycin last dose this evening then 5 days of Keflex 500mg q12  - Follow up: 1 week after discharge with Dr. Pastrana; 3-4 weeks after discharge with Dr. Quiñones  - Continue on Dofetilide 250mg BID     #Angina  - No history of prior CAD but does have history of MI in 2006  - Pain likely secondary to firing of AICD  - Workup done in Aug with cardiology; Calcium score of 0  - Continue with Metoprolol 12.5 TID  - Continue with Atorvastatin 40mg qHS  - Continue with Aspirin 81mg     #Hypertension  - Continue on Metoprolol 12.5mg TID   - Continue with Lasix 20mg Daily    #MS  - Noted flare in August.   - On Tecfidera at [facility]   - Pain regimen: Morphine IR 30mg q6; okay with PO opioids but has local skin rash to IV opioids   - Continue with Baclofen 10mg q6 hours and   - Continue with Clonazepam 0.5mg PRN at bedtime   - Continue of Gabapentin 400mg TID    #H/o Seizure disorder  - Continue on lamotrigene 100mg BID     #H/o PTSD and Schizoaffective disorder  - Continue on Seroquel 25mg in the AM and 50mg HS   - Seen by psych: Can benefit from adult home and capable of making informed treatment decision     #Dyslipidemia:  - Continue on Atorvastatin    #GERD  - Continue with Protonix    #COPD on Home O2 PRN  - Not in any sign of acute exacerbation   - Continue on Symbicort 160mg 2 puffs BID   - Continue on Spiriva    #History of Sinusitis  - Continue on Cepacol Loratadine and Flonase    #suspected vitamin D deficiency  - Continue on Vitamin D supplementation     Activity: As tolerated  Diet: Dysphagia 2 with thins due to stricture history  DVT ppx: On hold until 9/14  GI ppx: Protonix  Code Status: Full Code  DISPO: From [place]; medically stable for discharge. Hospital Day:  13d    Subjective:    Patient is a 48y old  Male who presents with a chief complaint of Chest pain, AICD firing (11 Sep 2019 09:58)    Overnight patient had no major complaints. Seen and examined this morning at bedside. The patient this morning had no acute complaints. During the day he was constantly out of the room taking down everyone's names and writing down things at the nursing station. Spoke to the patient regarding his IJ central line. The patient prefers to keep it to avoid getting stuck but will be okay with an investigative look for IV placement under ultrasound. Will attempt to place a peripheral IV today with unit management at bedside. The patient also requested to review his medical chart. Will attempt to review with patient today.     Past Medical Hx:   PTSD (post-traumatic stress disorder)  Supraventricular arrhythmia  Cardiomyopathy  CHF (congestive heart failure)  Atrial fibrillation  Diabetes mellitus  Smoker  BPH (benign prostatic hyperplasia)  HTN (hypertension)  Seizures  Bipolar 1 disorder  Migraines  Pneumonia  MS (multiple sclerosis)  CAD (coronary artery disease)  Bipolar disorder  Anginal pain  Schizo affective schizophrenia  GERD (gastroesophageal reflux disease)  Seasonal allergies  HTN (hypertension)  Hypercholesteremia  COPD (chronic obstructive pulmonary disease)  Neuropathy  Atrial flutter  CVA (cerebral vascular accident)  TIA (transient ischemic attack)  Syncope  Chronic Hypotension  Hypotension  Peripheral Neuropathy  Human Immunodeficiency Virus (  S/P Implantation of Automatic  Personal History of Multiple S  Personal History of Mitral Nicki  Personal History of MI (Myocar  Personal History of Hypertensi  Personal History of Congestive  Personal History of Cardiomyop  Personal History of Atrial Fib  Personal History of Alcoholism  Clinical Depression    Past Sx:  H/O prior ablation treatment  Cardiac pacemaker recipient  H/O hernia repair  History of hip replacement  S/P Orchiectomy  SVT (Supraventricular Tachycar  GERD (Gastroesophageal Reflux  S/P Implantation of AICD    Allergies:  dexmethylphenidate (Unknown)  Dilantin (Rash)  dilantin, compazine, neurontin, ritalin, phenergan (Unknown)  Haldol (Unknown)  hydantoin derivatives (Other)  methylphenidate (Unknown)  phenytoin (Unknown)  prochlorperazine (Unknown)  promethazine (Dystonic RXN)  rash/hives (Anaphylaxis)  thioxanthenes (Unknown)    Current Meds:   Standng Meds:  acetaminophen   Tablet .. 650 milliGRAM(s) Oral every 6 hours  aspirin enteric coated 81 milliGRAM(s) Oral daily  atorvastatin 40 milliGRAM(s) Oral at bedtime  baclofen 10 milliGRAM(s) Oral four times a day  buDESOnide 160 MICROgram(s)/formoterol 4.5 MICROgram(s) Inhaler - Peds 2 Puff(s) Inhalation two times a day  cholecalciferol 2000 Unit(s) Oral daily  dofetilide 250 MICROGram(s) Oral two times a day  fluticasone propionate (50 MICROgram(s)/actuation) Nasal Spray - Peds 2 Spray(s) Alternating Nostrils daily  furosemide    Tablet 20 milliGRAM(s) Oral daily  gabapentin 400 milliGRAM(s) Oral three times a day  lamoTRIgine 100 milliGRAM(s) Oral two times a day  loratadine 10 milliGRAM(s) Oral daily  metoprolol tartrate 12.5 milliGRAM(s) Oral three times a day  pantoprazole    Tablet 40 milliGRAM(s) Oral before breakfast  polyethylene glycol 3350 17 Gram(s) Oral every 12 hours  QUEtiapine 25 milliGRAM(s) Oral daily  QUEtiapine 50 milliGRAM(s) Oral at bedtime  sodium chloride 0.9%. 1000 milliLiter(s) (20 mL/Hr) IV Continuous <Continuous>  tamsulosin Oral Tab/Cap - Peds 0.4 milliGRAM(s) Oral at bedtime  tiotropium 18 MICROgram(s) Capsule 1 Capsule(s) Inhalation daily  vancomycin  IVPB      vancomycin  IVPB 1000 milliGRAM(s) IV Intermittent every 12 hours    PRN Meds:  benzocaine 15 mG/menthol 3.6 mG Lozenge 1 Lozenge Oral three times a day PRN Sore Throat  clonazePAM  Tablet 0.5 milliGRAM(s) Oral at bedtime PRN agitation  glucagon  Injectable 1 milliGRAM(s) IntraMuscular once PRN Glucose LESS THAN 70 milligrams/deciliter  morphine  IR 30 milliGRAM(s) Oral every 6 hours PRN Moderate Pain (4 - 6)  nitroglycerin     SubLingual 0.4 milliGRAM(s) SubLingual every 5 minutes PRN Chest Pain  ondansetron    Tablet 4 milliGRAM(s) Oral two times a day PRN Nausea and/or Vomiting    HOME MEDICATIONS:  acetaminophen 650 mg oral tablet: 650 milligram(s) orally 4 times a day, As Needed  atorvastatin 40 mg oral tablet: 1 tab(s) orally once a day (at bedtime)  baclofen 10 mg oral tablet: 1 tab(s) orally 4 times a day  budesonide-formoterol 160 mcg-4.5 mcg/inh inhalation aerosol: 2 puff(s) inhaled 2 times a day  Cepacol Extra Strength Menthol 10 mg mucous membrane lozenge: 1 anderson(s) mucous membrane every 6 hours, As Needed  cholecalciferol oral tablet: 2000 unit(s) orally once a day  Claritin 10 mg oral tablet: 1 tab(s) orally once a day  Eliquis 5 mg oral tablet: 1 tab(s) orally 2 times a day  Flonase 50 mcg/inh nasal spray: 2 spray(s) nasal once a day  furosemide 20 mg oral tablet: 1 tab(s) orally once a day  gabapentin 400 mg oral capsule: 1 cap(s) orally 3 times a day  ketoconazole 2% topical shampoo: Apply topically to affected area once a day (in the evening)  lamoTRIgine 100 mg oral tablet: 1 tab(s) orally 2 times a day  metoprolol tartrate 25 mg oral tablet: 1 tab(s) orally every 8 hours  Mi-Acid oral suspension: 30 milliliter(s) orally 4 times a day, As Needed  morphine 15 mg oral tablet: 1 tab(s) orally every 4 hours, As needed, Severe Pain (7 - 10)  Nitrostat 0.4 mg sublingual tablet: sublingual 3 times a day, As Needed; may repeat after 5 min until relief  omeprazole 20 mg oral delayed release capsule: 1 cap(s) orally 2 times a day  polyethylene glycol 3350 oral powder for reconstitution: 17 gram(s) orally every 12 hours  QUEtiapine 25 mg oral tablet: 1 tab(s) orally once a day  SEROquel 50 mg oral tablet: 1 tab(s) orally once a day (at bedtime)  tamsulosin 0.4 mg oral capsule: 1 cap(s) orally once a day (at bedtime)  Tecfidera 240 mg oral delayed release capsule: 1 cap(s) orally once a day  Tikosyn 250 mcg oral capsule: 1 cap(s) orally 2 times a day  tiotropium 18 mcg inhalation capsule: 1 cap(s) inhaled once a day      Vital Signs:   T(F): 97.6 (09-11-19 @ 05:31), Max: 98.1 (09-10-19 @ 20:51)  HR: 68 (09-11-19 @ 05:31) (60 - 68)  BP: 106/54 (09-11-19 @ 05:31) (106/54 - 125/60)  RR: 20 (09-11-19 @ 05:31) (20 - 20)  SpO2: 97% (09-11-19 @ 05:31) (95% - 97%)    09-10-19 @ 07:01  -  09-11-19 @ 07:00  --------------------------------------------------------  IN: 1400 mL / OUT: 0 mL / NET: 1400 mL      Physical Exam:   GENERAL: NAD, ambulating around the unit  HEENT: EOMI  CHEST/LUNG: CTA, No wheezing, rhonchi, rales  HEART: Regular rate and rhythm; s1 s2 appreciated   ABDOMEN: Soft, Nontender, Nondistended; Bowel sounds present  EXTREMITIES: No LE edema b/l, No cyanosis, No clubbing  NERVOUS SYSTEM:  Alert & Oriented X3, Non focal    Right sided IJ    Labs:                         8.6    3.78  )-----------( 265      ( 11 Sep 2019 05:32 )             26.5       11 Sep 2019 05:32    140    |  99     |  22     ----------------------------<  108    3.7     |  26     |  1.4      Ca    9.5        11 Sep 2019 05:32  Phos  4.5       11 Sep 2019 05:32  Mg     2.2       11 Sep 2019 05:32    TPro  6.1    /  Alb  3.7    /  TBili  0.6    /  DBili  x      /  AST  22     /  ALT  31     /  AlkPhos  173    11 Sep 2019 05:32    Hemoglobin A1C, Whole Blood: 5.4 % (08-30-19 @ 07:40)    Culture - Blood (collected 09-04-19 @ 18:00)  Source: .Blood None  Final Report (09-10-19 @ 02:01):    No growth at 5 days.    Culture - Blood (collected 09-04-19 @ 18:00)  Source: .Blood None  Final Report (09-10-19 @ 02:01):    No growth at 5 days.    Radiology:   None Today    Assessment and Plan:   This is a 48 year old male with PMHx of V-tach s/p AICD, CHFpEF (EF 69% 8/2019), A-fib/Flutter, HTN, COPD on O2 PRN, DLD, MS with numerous admissions for flares, Seizure disorder on Lamotrigine, PTSD, Depression, h/o TIA, Esophageal Stricture, GERD, Pericardial Effusion, Cardiomyopathy, H.o MI, IBS  who presented from NH with chest pain, palpitations, and AICD firing.     #Arrhythmogenic Right Ventricular dysplasia s/p AICD dysfunction  - Had RV lead extraction and revision as well as battery change  - No anticoagulation until 9/14/2019 per CT surgery  - Antibiotics: Vancomycin last dose this evening then 5 days of Keflex 500mg q12  - Follow up: 1 week after discharge with Dr. Pastrana; 3-4 weeks after discharge with Dr. Quiñones  - Continue on Dofetilide 250mg BID     #Angina  - No history of prior CAD but does have history of MI in 2006  - Pain likely secondary to firing of AICD  - Workup done in Aug with cardiology; Calcium score of 0  - Continue with Metoprolol 12.5 TID  - Continue with Atorvastatin 40mg qHS  - Continue with Aspirin 81mg     #Hypertension  - Continue on Metoprolol 12.5mg TID   - Continue with Lasix 20mg Daily    #MS  - Noted flare in August.   - On Tecfidera at [facility]   - Pain regimen: Morphine IR 30mg q6; okay with PO opioids but has local skin rash to IV opioids   - Continue with Baclofen 10mg q6 hours and   - Continue with Clonazepam 0.5mg PRN at bedtime   - Continue of Gabapentin 400mg TID    #H/o Seizure disorder  - Continue on lamotrigene 100mg BID     #H/o PTSD and Schizoaffective disorder  - Continue on Seroquel 25mg in the AM and 50mg HS   - Seen by psych: Can benefit from adult home and capable of making informed treatment decision     #Dyslipidemia:  - Continue on Atorvastatin    #GERD  - Continue with Protonix    #COPD on Home O2 PRN  - Not in any sign of acute exacerbation   - Continue on Symbicort 160mg 2 puffs BID   - Continue on Spiriva    #History of Sinusitis  - Continue on Cepacol Loratadine and Flonase    #suspected vitamin D deficiency  - Continue on Vitamin D supplementation     Activity: As tolerated  Diet: Dysphagia 2 with thins due to stricture history  DVT ppx: On hold until 9/14  GI ppx: Protonix  Code Status: Full Code  DISPO: From [place]; medically stable for discharge.

## 2019-09-12 ENCOUNTER — OUTPATIENT (OUTPATIENT)
Dept: OUTPATIENT SERVICES | Facility: HOSPITAL | Age: 49
LOS: 1 days | Discharge: HOME | End: 2019-09-12

## 2019-09-12 DIAGNOSIS — I48.2 CHRONIC ATRIAL FIBRILLATION: ICD-10-CM

## 2019-09-12 DIAGNOSIS — G35 MULTIPLE SCLEROSIS: ICD-10-CM

## 2019-09-12 DIAGNOSIS — Z96.649 PRESENCE OF UNSPECIFIED ARTIFICIAL HIP JOINT: Chronic | ICD-10-CM

## 2019-09-12 DIAGNOSIS — Z98.890 OTHER SPECIFIED POSTPROCEDURAL STATES: Chronic | ICD-10-CM

## 2019-09-12 DIAGNOSIS — Z95.0 PRESENCE OF CARDIAC PACEMAKER: Chronic | ICD-10-CM

## 2019-09-14 RX ORDER — APIXABAN 2.5 MG/1
1 TABLET, FILM COATED ORAL
Qty: 0 | Refills: 0 | DISCHARGE
Start: 2019-09-14

## 2019-09-15 ENCOUNTER — INPATIENT (INPATIENT)
Facility: HOSPITAL | Age: 49
LOS: 7 days | Discharge: NURSING, REHAB OR LONG-TERM CARE | End: 2019-09-23
Attending: THORACIC SURGERY (CARDIOTHORACIC VASCULAR SURGERY) | Admitting: THORACIC SURGERY (CARDIOTHORACIC VASCULAR SURGERY)
Payer: MEDICAID

## 2019-09-15 VITALS
TEMPERATURE: 99 F | RESPIRATION RATE: 18 BRPM | HEART RATE: 95 BPM | DIASTOLIC BLOOD PRESSURE: 73 MMHG | SYSTOLIC BLOOD PRESSURE: 126 MMHG | OXYGEN SATURATION: 95 %

## 2019-09-15 DIAGNOSIS — Z96.649 PRESENCE OF UNSPECIFIED ARTIFICIAL HIP JOINT: Chronic | ICD-10-CM

## 2019-09-15 DIAGNOSIS — Z98.890 OTHER SPECIFIED POSTPROCEDURAL STATES: Chronic | ICD-10-CM

## 2019-09-15 DIAGNOSIS — Z95.0 PRESENCE OF CARDIAC PACEMAKER: Chronic | ICD-10-CM

## 2019-09-15 LAB
ALBUMIN SERPL ELPH-MCNC: 3.9 G/DL — SIGNIFICANT CHANGE UP (ref 3.5–5.2)
ALP SERPL-CCNC: 160 U/L — HIGH (ref 30–115)
ALT FLD-CCNC: 20 U/L — SIGNIFICANT CHANGE UP (ref 0–41)
ANION GAP SERPL CALC-SCNC: 14 MMOL/L — SIGNIFICANT CHANGE UP (ref 7–14)
APTT BLD: 31.5 SEC — SIGNIFICANT CHANGE UP (ref 27–39.2)
AST SERPL-CCNC: 21 U/L — SIGNIFICANT CHANGE UP (ref 0–41)
BASE EXCESS BLDV CALC-SCNC: 2.6 MMOL/L — HIGH (ref -2–2)
BILIRUB SERPL-MCNC: 0.2 MG/DL — SIGNIFICANT CHANGE UP (ref 0.2–1.2)
BUN SERPL-MCNC: 23 MG/DL — HIGH (ref 10–20)
CA-I SERPL-SCNC: 1.21 MMOL/L — SIGNIFICANT CHANGE UP (ref 1.12–1.3)
CALCIUM SERPL-MCNC: 9.4 MG/DL — SIGNIFICANT CHANGE UP (ref 8.5–10.1)
CHLORIDE SERPL-SCNC: 101 MMOL/L — SIGNIFICANT CHANGE UP (ref 98–110)
CO2 SERPL-SCNC: 26 MMOL/L — SIGNIFICANT CHANGE UP (ref 17–32)
CREAT SERPL-MCNC: 1.1 MG/DL — SIGNIFICANT CHANGE UP (ref 0.7–1.5)
GAS PNL BLDV: 143 MMOL/L — SIGNIFICANT CHANGE UP (ref 136–145)
GAS PNL BLDV: SIGNIFICANT CHANGE UP
GLUCOSE SERPL-MCNC: 99 MG/DL — SIGNIFICANT CHANGE UP (ref 70–99)
HCO3 BLDV-SCNC: 28 MMOL/L — SIGNIFICANT CHANGE UP (ref 22–29)
HCT VFR BLD CALC: 28.2 % — LOW (ref 42–52)
HCT VFR BLDA CALC: 29 % — LOW (ref 34–44)
HGB BLD CALC-MCNC: 9.4 G/DL — LOW (ref 14–18)
HGB BLD-MCNC: 8.8 G/DL — LOW (ref 14–18)
INR BLD: 1.44 RATIO — HIGH (ref 0.65–1.3)
LACTATE BLDV-MCNC: 0.9 MMOL/L — SIGNIFICANT CHANGE UP (ref 0.5–1.6)
MAGNESIUM SERPL-MCNC: 2 MG/DL — SIGNIFICANT CHANGE UP (ref 1.8–2.4)
MCHC RBC-ENTMCNC: 28.9 PG — SIGNIFICANT CHANGE UP (ref 27–31)
MCHC RBC-ENTMCNC: 31.2 G/DL — LOW (ref 32–37)
MCV RBC AUTO: 92.5 FL — SIGNIFICANT CHANGE UP (ref 80–94)
NRBC # BLD: 0 /100 WBCS — SIGNIFICANT CHANGE UP (ref 0–0)
NT-PROBNP SERPL-SCNC: 166 PG/ML — SIGNIFICANT CHANGE UP (ref 0–300)
PCO2 BLDV: 48 MMHG — SIGNIFICANT CHANGE UP (ref 41–51)
PH BLDV: 7.38 — SIGNIFICANT CHANGE UP (ref 7.26–7.43)
PLATELET # BLD AUTO: 380 K/UL — SIGNIFICANT CHANGE UP (ref 130–400)
PO2 BLDV: 45 MMHG — HIGH (ref 20–40)
POTASSIUM BLDV-SCNC: 3.8 MMOL/L — SIGNIFICANT CHANGE UP (ref 3.3–5.6)
POTASSIUM SERPL-MCNC: 4.2 MMOL/L — SIGNIFICANT CHANGE UP (ref 3.5–5)
POTASSIUM SERPL-SCNC: 4.2 MMOL/L — SIGNIFICANT CHANGE UP (ref 3.5–5)
PROT SERPL-MCNC: 6.3 G/DL — SIGNIFICANT CHANGE UP (ref 6–8)
PROTHROM AB SERPL-ACNC: 16.5 SEC — HIGH (ref 9.95–12.87)
RBC # BLD: 3.05 M/UL — LOW (ref 4.7–6.1)
RBC # FLD: 15.6 % — HIGH (ref 11.5–14.5)
SAO2 % BLDV: 78 % — SIGNIFICANT CHANGE UP
SODIUM SERPL-SCNC: 141 MMOL/L — SIGNIFICANT CHANGE UP (ref 135–146)
TROPONIN T SERPL-MCNC: <0.01 NG/ML — SIGNIFICANT CHANGE UP
WBC # BLD: 7.79 K/UL — SIGNIFICANT CHANGE UP (ref 4.8–10.8)
WBC # FLD AUTO: 7.79 K/UL — SIGNIFICANT CHANGE UP (ref 4.8–10.8)

## 2019-09-15 PROCEDURE — 99292 CRITICAL CARE ADDL 30 MIN: CPT

## 2019-09-15 PROCEDURE — 99291 CRITICAL CARE FIRST HOUR: CPT

## 2019-09-15 PROCEDURE — 93010 ELECTROCARDIOGRAM REPORT: CPT

## 2019-09-15 PROCEDURE — 71045 X-RAY EXAM CHEST 1 VIEW: CPT | Mod: 26

## 2019-09-15 RX ORDER — MORPHINE SULFATE 50 MG/1
4 CAPSULE, EXTENDED RELEASE ORAL ONCE
Refills: 0 | Status: DISCONTINUED | OUTPATIENT
Start: 2019-09-15 | End: 2019-09-15

## 2019-09-15 RX ADMIN — MORPHINE SULFATE 4 MILLIGRAM(S): 50 CAPSULE, EXTENDED RELEASE ORAL at 22:00

## 2019-09-15 RX ADMIN — MORPHINE SULFATE 4 MILLIGRAM(S): 50 CAPSULE, EXTENDED RELEASE ORAL at 20:42

## 2019-09-15 NOTE — ED ADULT NURSE NOTE - CHPI ED NUR SYMPTOMS NEG
no vomiting/no nausea/no fever/no diaphoresis/no dizziness/no syncope/no chills/no congestion/no back pain

## 2019-09-15 NOTE — ED PROVIDER NOTE - NS ED ROS FT
Constitutional:  No fevers or chills.  Eyes:  No visual changes, eye pain, or discharge.  ENT:  No hearing changes. No sore throat.  Neck:  No neck pain.  Cardiac:  +CP, no edema.  Resp:  No cough, +SOB. No hemoptysis.   GI:  No nausea, vomiting, diarrhea, or abdominal pain.  :  No dysuria, frequency, or hematuria.  MSK:  No myalgias or joint pain/swelling.  Neuro:  No headache, dizziness, or weakness.  Skin:  No skin rash.

## 2019-09-15 NOTE — ED PROVIDER NOTE - CARE PLAN
Assessment and plan of treatment:	Plan: [place], EKG, CXR, labs, cta chest, bedside ultrasound, reassess. Principal Discharge DX:	Pericardial effusion  Assessment and plan of treatment:	Plan: [place], EKG, CXR, labs, cta chest, bedside ultrasound, reassess.  Secondary Diagnosis:	Chronic anemia  Secondary Diagnosis:	Pulmonary hypertension  Secondary Diagnosis:	Hypoxia  Secondary Diagnosis:	Pneumonia

## 2019-09-15 NOTE — ED ADULT NURSE NOTE - OBJECTIVE STATEMENT
Pt is a 47 y/o male BIBA from NH complaining of midsternal chest pain, radiating to left chest since 3am. Pt also c/o SOB. Pt states pain is worse with movement and exertion, better with rest.

## 2019-09-15 NOTE — ED ADULT NURSE NOTE - NSIMPLEMENTINTERV_GEN_ALL_ED
Implemented All Fall with Harm Risk Interventions:  [place] to call system. Call bell, personal items and telephone within reach. Instruct patient to call for assistance. Room bathroom lighting operational. Non-slip footwear when patient is off stretcher. Physically safe environment: no spills, clutter or unnecessary equipment. Stretcher in lowest position, wheels locked, appropriate side rails in place. Provide visual cue, wrist band, yellow gown, etc. Monitor gait and stability. Monitor for mental status changes and reorient to person, place, and time. Review medications for side effects contributing to fall risk. Reinforce activity limits and safety measures with patient and family. Provide visual clues: red socks.

## 2019-09-15 NOTE — ED PROVIDER NOTE - OBJECTIVE STATEMENT
49yo M with PMH of vtach s/p AICD, MI, CHF, afib/flutter on Eliquis/ASA (full dose), HTN, COPD (on O2 PRN), DLD, multiple sclerosis, sz disorder (lamotrigine), PTSD, depression, TIA, esophageal stricture, sinusitis, GERD, hemopneumothorax (2006), pericardial effusion (2006), cardiomyopathy, IBS, admitted on 08/29/19 for aicd firing/lead misplacement and discharged on 09/11/19 (found to have hematoma of AICD pocket and ICD battery depletion s/p removal of defib lead and atrial lead with laser extraction (old RV lead was used for temporary pacing). CT surgeon is Dr. Pastrana, EP Dr. Quiñones. Patient is presenting to ED with chest pain with associated SOB that is worse with exertion. Describes pain as substernal, sharp, consant, non-radiating, and feels like previous MI. Former smoker. Denies any f/c, cough, abd pain, n/v/d, diaphoresis, HA/dizziness, numbness/tingling, neck pain/back pain, abd pain, diarrhea, melena/brbpr, or urinary sxs. Did took full dose ASA PTA.

## 2019-09-15 NOTE — ED PROVIDER NOTE - ATTENDING CONTRIBUTION TO CARE
47 y/o m w/ pmhx of v-tach s/p AICD, MI, CHF, A fib/flutter on eliquis and ASA, HTN, COPD (on O2 PRN), DLD, multiple sclerosis w/ multiple admissions for flares, seizure disorder (on lamotrigine), PTSD, depression, TIA, esophageal stricture, chronic sore throat/sinusitis, GERD, hemopneumothorax (2006), pericardial effusion (2006), cardiomyopathy, MI (2006, 2008), IBS admitted on 8/29/19 for aicd firing and discharged on 9/11(pt was found to have a hematoma of ICD pocket and ICD battery depletion s/p removal of the defibrillator lead and atrial lead with laser extraction and the old RV lead was used for temporary pacing. and evacuation of the hematoma, CT surgery Dr. Pastrana, EP Dr. Daivs) presents for chest pain associated with sob, worse with exertion, chest pain mid-sternal, sharp in nature, non-radiating, felt like when she had MI in the past. ex-smoker. No fever, chills, n/v, palpitations, diaphoresis, cough, ha/lh/dizziness, numbness/tingling, neck pain/ stiffness, abd pain, diarrhea, constipation, melena/brbpr, urinary symptoms, trauma, weakness, edema, calf pain/swelling/erythema, sick contacts, recent travel or rash.  Vital Signs: I have reviewed the initial vital signs. Constitutional: pt sitting on stretcher speaking full sentences. Integumentary: No rash. ENT: MMM NECK: Supple, non-tender, no meningeal signs. Cardiovascular: RRR, radial pulses 2/4 b/l. Respiratory: BS present b/l, poor resp effort and excursion, poor air exchange,  no accessory muscle use, no stridor. Gastrointestinal: BS present throughout all 4 quadrants, soft, nd, nt, no rebound tenderness or guarding, no cvat. Musculoskeletal: FROM, no edema, no calf pain/swelling/erythema. Neurologic: AAOx3, motor 5/5 and sensation intact throughout upper and lowe ext, CN II-XII intact, No facial droop or slurring of speech. No focal deficits.

## 2019-09-15 NOTE — ED PROVIDER NOTE - PROGRESS NOTE DETAILS
bedside ultrasound done with pericardial effusion noted, negaitve mcconells, no scalloping, pt in no distress, cta ordered, will continue to monitor and reassess. bedside ultrasound done with pericardial effusion noted, negative mcconells, no scalloping, pt in no distress, cta ordered, will continue to monitor and reassess. Patient re-evaluated. Patient is resting/sleeping. Pain improved. Awaiting CTA. Placed multiple 18g IVs for CTA. Unfortunately the contrast infiltrated the IV line and the study was run as a CT non-contrast. Consulted CT surgery. ct surgery aware of pt, Dr. Scales, will follow up in the morning , agree with plan for admisson, covered for PNA, admission to TELE> Signed out to MAR. ct surgery aware of pt, Dr. Scales, will follow up in the morning , agree with plan for admission, covered for PNA, admission to TELE>

## 2019-09-15 NOTE — ED PROVIDER NOTE - CLINICAL SUMMARY MEDICAL DECISION MAKING FREE TEXT BOX
pt aware of all labs and imaging, discussion had with ct surgery, report pt is k nown to desaturate at times, trace pericardial effusion noted, will follow, pt also treated for PNA, abx given, on monitor, admission to tele, medical admitting team aware of pt and admisson.

## 2019-09-15 NOTE — ED PROVIDER NOTE - PHYSICAL EXAMINATION
PHYSICAL EXAM: I have reviewed current vital signs.  GENERAL: NAD, appears uncomfortable 2/2 pain, O2 sat 100% and HR in 60s-70s (paced).  HEAD:  Normocephalic, atraumatic.  EYES: Conjunctiva and sclera clear.  ENT: MMM, no erythema/exudates.  NECK: Supple, full ROM.  CHEST/LUNG: Clear to auscultation bilaterally; no wheezes, rales, or rhonchi.  HEART: Regular rate and rhythm, normal S1 and S2; no murmurs, rubs, or gallops.  ABDOMEN: Soft, nontender, nondistended.  EXTREMITIES:  2+ peripheral pulses; no edema.  PSYCH: Cooperative, appropriate, normal mood and affect.  NEUROLOGY: A&O x 3. Motor 5/5. No focal neurological deficits.   SKIN: Warm and dry.

## 2019-09-16 LAB
ALBUMIN SERPL ELPH-MCNC: 3.8 G/DL — SIGNIFICANT CHANGE UP (ref 3.5–5.2)
ALP SERPL-CCNC: 170 U/L — HIGH (ref 30–115)
ALT FLD-CCNC: 19 U/L — SIGNIFICANT CHANGE UP (ref 0–41)
ANION GAP SERPL CALC-SCNC: 12 MMOL/L — SIGNIFICANT CHANGE UP (ref 7–14)
AST SERPL-CCNC: 18 U/L — SIGNIFICANT CHANGE UP (ref 0–41)
BASOPHILS # BLD AUTO: 0.05 K/UL — SIGNIFICANT CHANGE UP (ref 0–0.2)
BASOPHILS NFR BLD AUTO: 0.6 % — SIGNIFICANT CHANGE UP (ref 0–1)
BILIRUB SERPL-MCNC: 0.4 MG/DL — SIGNIFICANT CHANGE UP (ref 0.2–1.2)
BUN SERPL-MCNC: 19 MG/DL — SIGNIFICANT CHANGE UP (ref 10–20)
CALCIUM SERPL-MCNC: 8.9 MG/DL — SIGNIFICANT CHANGE UP (ref 8.5–10.1)
CHLORIDE SERPL-SCNC: 104 MMOL/L — SIGNIFICANT CHANGE UP (ref 98–110)
CO2 SERPL-SCNC: 26 MMOL/L — SIGNIFICANT CHANGE UP (ref 17–32)
CREAT SERPL-MCNC: 1.1 MG/DL — SIGNIFICANT CHANGE UP (ref 0.7–1.5)
EOSINOPHIL # BLD AUTO: 0.15 K/UL — SIGNIFICANT CHANGE UP (ref 0–0.7)
EOSINOPHIL NFR BLD AUTO: 1.8 % — SIGNIFICANT CHANGE UP (ref 0–8)
GLUCOSE BLDC GLUCOMTR-MCNC: 115 MG/DL — HIGH (ref 70–99)
GLUCOSE SERPL-MCNC: 107 MG/DL — HIGH (ref 70–99)
HCT VFR BLD CALC: 28.6 % — LOW (ref 42–52)
HGB BLD-MCNC: 8.9 G/DL — LOW (ref 14–18)
IMM GRANULOCYTES NFR BLD AUTO: 1.3 % — HIGH (ref 0.1–0.3)
LYMPHOCYTES # BLD AUTO: 1.18 K/UL — LOW (ref 1.2–3.4)
LYMPHOCYTES # BLD AUTO: 14 % — LOW (ref 20.5–51.1)
MAGNESIUM SERPL-MCNC: 2.1 MG/DL — SIGNIFICANT CHANGE UP (ref 1.8–2.4)
MCHC RBC-ENTMCNC: 29.3 PG — SIGNIFICANT CHANGE UP (ref 27–31)
MCHC RBC-ENTMCNC: 31.1 G/DL — LOW (ref 32–37)
MCV RBC AUTO: 94.1 FL — HIGH (ref 80–94)
MONOCYTES # BLD AUTO: 0.79 K/UL — HIGH (ref 0.1–0.6)
MONOCYTES NFR BLD AUTO: 9.4 % — HIGH (ref 1.7–9.3)
NEUTROPHILS # BLD AUTO: 6.15 K/UL — SIGNIFICANT CHANGE UP (ref 1.4–6.5)
NEUTROPHILS NFR BLD AUTO: 72.9 % — SIGNIFICANT CHANGE UP (ref 42.2–75.2)
NRBC # BLD: 0 /100 WBCS — SIGNIFICANT CHANGE UP (ref 0–0)
PLATELET # BLD AUTO: 350 K/UL — SIGNIFICANT CHANGE UP (ref 130–400)
POTASSIUM SERPL-MCNC: 3.8 MMOL/L — SIGNIFICANT CHANGE UP (ref 3.5–5)
POTASSIUM SERPL-SCNC: 3.8 MMOL/L — SIGNIFICANT CHANGE UP (ref 3.5–5)
PROT SERPL-MCNC: 6.3 G/DL — SIGNIFICANT CHANGE UP (ref 6–8)
RBC # BLD: 3.04 M/UL — LOW (ref 4.7–6.1)
RBC # FLD: 15.6 % — HIGH (ref 11.5–14.5)
SODIUM SERPL-SCNC: 142 MMOL/L — SIGNIFICANT CHANGE UP (ref 135–146)
TROPONIN T SERPL-MCNC: <0.01 NG/ML — SIGNIFICANT CHANGE UP
WBC # BLD: 8.43 K/UL — SIGNIFICANT CHANGE UP (ref 4.8–10.8)
WBC # FLD AUTO: 8.43 K/UL — SIGNIFICANT CHANGE UP (ref 4.8–10.8)

## 2019-09-16 PROCEDURE — 71250 CT THORAX DX C-: CPT | Mod: 26

## 2019-09-16 PROCEDURE — 99223 1ST HOSP IP/OBS HIGH 75: CPT

## 2019-09-16 PROCEDURE — ZZZZZ: CPT

## 2019-09-16 PROCEDURE — 93283 PRGRMG EVAL IMPLANTABLE DFB: CPT | Mod: 26

## 2019-09-16 PROCEDURE — 93306 TTE W/DOPPLER COMPLETE: CPT | Mod: 26

## 2019-09-16 RX ORDER — BUDESONIDE AND FORMOTEROL FUMARATE DIHYDRATE 160; 4.5 UG/1; UG/1
2 AEROSOL RESPIRATORY (INHALATION)
Refills: 0 | Status: DISCONTINUED | OUTPATIENT
Start: 2019-09-16 | End: 2019-09-20

## 2019-09-16 RX ORDER — KETOROLAC TROMETHAMINE 30 MG/ML
30 SYRINGE (ML) INJECTION ONCE
Refills: 0 | Status: DISCONTINUED | OUTPATIENT
Start: 2019-09-16 | End: 2019-09-16

## 2019-09-16 RX ORDER — QUETIAPINE FUMARATE 200 MG/1
50 TABLET, FILM COATED ORAL AT BEDTIME
Refills: 0 | Status: DISCONTINUED | OUTPATIENT
Start: 2019-09-16 | End: 2019-09-20

## 2019-09-16 RX ORDER — MORPHINE SULFATE 50 MG/1
15 CAPSULE, EXTENDED RELEASE ORAL EVERY 4 HOURS
Refills: 0 | Status: DISCONTINUED | OUTPATIENT
Start: 2019-09-16 | End: 2019-09-20

## 2019-09-16 RX ORDER — FLUTICASONE PROPIONATE 50 MCG
2 SPRAY, SUSPENSION NASAL
Refills: 0 | Status: DISCONTINUED | OUTPATIENT
Start: 2019-09-16 | End: 2019-09-20

## 2019-09-16 RX ORDER — QUETIAPINE FUMARATE 200 MG/1
25 TABLET, FILM COATED ORAL DAILY
Refills: 0 | Status: DISCONTINUED | OUTPATIENT
Start: 2019-09-16 | End: 2019-09-20

## 2019-09-16 RX ORDER — FUROSEMIDE 40 MG
20 TABLET ORAL DAILY
Refills: 0 | Status: DISCONTINUED | OUTPATIENT
Start: 2019-09-16 | End: 2019-09-20

## 2019-09-16 RX ORDER — METOPROLOL TARTRATE 50 MG
12.5 TABLET ORAL EVERY 8 HOURS
Refills: 0 | Status: DISCONTINUED | OUTPATIENT
Start: 2019-09-16 | End: 2019-09-20

## 2019-09-16 RX ORDER — CHLORHEXIDINE GLUCONATE 213 G/1000ML
1 SOLUTION TOPICAL
Refills: 0 | Status: DISCONTINUED | OUTPATIENT
Start: 2019-09-16 | End: 2019-09-20

## 2019-09-16 RX ORDER — CEFEPIME 1 G/1
2000 INJECTION, POWDER, FOR SOLUTION INTRAMUSCULAR; INTRAVENOUS ONCE
Refills: 0 | Status: COMPLETED | OUTPATIENT
Start: 2019-09-16 | End: 2019-09-16

## 2019-09-16 RX ORDER — PANTOPRAZOLE SODIUM 20 MG/1
40 TABLET, DELAYED RELEASE ORAL
Refills: 0 | Status: DISCONTINUED | OUTPATIENT
Start: 2019-09-16 | End: 2019-09-20

## 2019-09-16 RX ORDER — APIXABAN 2.5 MG/1
5 TABLET, FILM COATED ORAL EVERY 12 HOURS
Refills: 0 | Status: DISCONTINUED | OUTPATIENT
Start: 2019-09-16 | End: 2019-09-17

## 2019-09-16 RX ORDER — LORATADINE 10 MG/1
10 TABLET ORAL DAILY
Refills: 0 | Status: DISCONTINUED | OUTPATIENT
Start: 2019-09-16 | End: 2019-09-20

## 2019-09-16 RX ORDER — INFLUENZA VIRUS VACCINE 15; 15; 15; 15 UG/.5ML; UG/.5ML; UG/.5ML; UG/.5ML
0.5 SUSPENSION INTRAMUSCULAR ONCE
Refills: 0 | Status: COMPLETED | OUTPATIENT
Start: 2019-09-16 | End: 2019-09-16

## 2019-09-16 RX ORDER — VANCOMYCIN HCL 1 G
1000 VIAL (EA) INTRAVENOUS EVERY 12 HOURS
Refills: 0 | Status: DISCONTINUED | OUTPATIENT
Start: 2019-09-16 | End: 2019-09-17

## 2019-09-16 RX ORDER — DOFETILIDE 0.25 MG/1
250 CAPSULE ORAL
Refills: 0 | Status: DISCONTINUED | OUTPATIENT
Start: 2019-09-16 | End: 2019-09-20

## 2019-09-16 RX ORDER — BACLOFEN 100 %
10 POWDER (GRAM) MISCELLANEOUS
Refills: 0 | Status: DISCONTINUED | OUTPATIENT
Start: 2019-09-16 | End: 2019-09-20

## 2019-09-16 RX ORDER — TAMSULOSIN HYDROCHLORIDE 0.4 MG/1
0.4 CAPSULE ORAL AT BEDTIME
Refills: 0 | Status: DISCONTINUED | OUTPATIENT
Start: 2019-09-16 | End: 2019-09-20

## 2019-09-16 RX ORDER — ASPIRIN/CALCIUM CARB/MAGNESIUM 324 MG
81 TABLET ORAL DAILY
Refills: 0 | Status: DISCONTINUED | OUTPATIENT
Start: 2019-09-16 | End: 2019-09-20

## 2019-09-16 RX ORDER — CEFEPIME 1 G/1
2000 INJECTION, POWDER, FOR SOLUTION INTRAMUSCULAR; INTRAVENOUS EVERY 12 HOURS
Refills: 0 | Status: DISCONTINUED | OUTPATIENT
Start: 2019-09-17 | End: 2019-09-18

## 2019-09-16 RX ORDER — TIOTROPIUM BROMIDE 18 UG/1
1 CAPSULE ORAL; RESPIRATORY (INHALATION) DAILY
Refills: 0 | Status: DISCONTINUED | OUTPATIENT
Start: 2019-09-16 | End: 2019-09-20

## 2019-09-16 RX ORDER — CEFEPIME 1 G/1
INJECTION, POWDER, FOR SOLUTION INTRAMUSCULAR; INTRAVENOUS
Refills: 0 | Status: DISCONTINUED | OUTPATIENT
Start: 2019-09-16 | End: 2019-09-18

## 2019-09-16 RX ORDER — GABAPENTIN 400 MG/1
400 CAPSULE ORAL THREE TIMES A DAY
Refills: 0 | Status: DISCONTINUED | OUTPATIENT
Start: 2019-09-16 | End: 2019-09-20

## 2019-09-16 RX ORDER — LAMOTRIGINE 25 MG/1
100 TABLET, ORALLY DISINTEGRATING ORAL
Refills: 0 | Status: DISCONTINUED | OUTPATIENT
Start: 2019-09-16 | End: 2019-09-20

## 2019-09-16 RX ORDER — HYDROMORPHONE HYDROCHLORIDE 2 MG/ML
2 INJECTION INTRAMUSCULAR; INTRAVENOUS; SUBCUTANEOUS EVERY 4 HOURS
Refills: 0 | Status: DISCONTINUED | OUTPATIENT
Start: 2019-09-16 | End: 2019-09-20

## 2019-09-16 RX ORDER — MORPHINE SULFATE 50 MG/1
4 CAPSULE, EXTENDED RELEASE ORAL ONCE
Refills: 0 | Status: DISCONTINUED | OUTPATIENT
Start: 2019-09-16 | End: 2019-09-16

## 2019-09-16 RX ORDER — ATORVASTATIN CALCIUM 80 MG/1
40 TABLET, FILM COATED ORAL AT BEDTIME
Refills: 0 | Status: DISCONTINUED | OUTPATIENT
Start: 2019-09-16 | End: 2019-09-20

## 2019-09-16 RX ORDER — ACETAMINOPHEN 500 MG
650 TABLET ORAL EVERY 6 HOURS
Refills: 0 | Status: DISCONTINUED | OUTPATIENT
Start: 2019-09-16 | End: 2019-09-20

## 2019-09-16 RX ORDER — CLONAZEPAM 1 MG
0.5 TABLET ORAL AT BEDTIME
Refills: 0 | Status: DISCONTINUED | OUTPATIENT
Start: 2019-09-16 | End: 2019-09-20

## 2019-09-16 RX ORDER — VANCOMYCIN HCL 1 G
1000 VIAL (EA) INTRAVENOUS ONCE
Refills: 0 | Status: COMPLETED | OUTPATIENT
Start: 2019-09-16 | End: 2019-09-16

## 2019-09-16 RX ORDER — POLYETHYLENE GLYCOL 3350 17 G/17G
17 POWDER, FOR SOLUTION ORAL EVERY 12 HOURS
Refills: 0 | Status: DISCONTINUED | OUTPATIENT
Start: 2019-09-16 | End: 2019-09-20

## 2019-09-16 RX ADMIN — Medication 81 MILLIGRAM(S): at 11:34

## 2019-09-16 RX ADMIN — Medication 12.5 MILLIGRAM(S): at 13:39

## 2019-09-16 RX ADMIN — BUDESONIDE AND FORMOTEROL FUMARATE DIHYDRATE 2 PUFF(S): 160; 4.5 AEROSOL RESPIRATORY (INHALATION) at 08:54

## 2019-09-16 RX ADMIN — APIXABAN 5 MILLIGRAM(S): 2.5 TABLET, FILM COATED ORAL at 06:27

## 2019-09-16 RX ADMIN — DOFETILIDE 250 MICROGRAM(S): 0.25 CAPSULE ORAL at 17:57

## 2019-09-16 RX ADMIN — MORPHINE SULFATE 15 MILLIGRAM(S): 50 CAPSULE, EXTENDED RELEASE ORAL at 23:16

## 2019-09-16 RX ADMIN — Medication 30 MILLIGRAM(S): at 07:17

## 2019-09-16 RX ADMIN — APIXABAN 5 MILLIGRAM(S): 2.5 TABLET, FILM COATED ORAL at 17:57

## 2019-09-16 RX ADMIN — GABAPENTIN 400 MILLIGRAM(S): 400 CAPSULE ORAL at 06:33

## 2019-09-16 RX ADMIN — CEFEPIME 100 MILLIGRAM(S): 1 INJECTION, POWDER, FOR SOLUTION INTRAMUSCULAR; INTRAVENOUS at 18:00

## 2019-09-16 RX ADMIN — Medication 10 MILLIGRAM(S): at 17:57

## 2019-09-16 RX ADMIN — Medication 10 MILLIGRAM(S): at 11:34

## 2019-09-16 RX ADMIN — Medication 250 MILLIGRAM(S): at 18:00

## 2019-09-16 RX ADMIN — Medication 10 MILLIGRAM(S): at 23:10

## 2019-09-16 RX ADMIN — Medication 20 MILLIGRAM(S): at 06:26

## 2019-09-16 RX ADMIN — PANTOPRAZOLE SODIUM 40 MILLIGRAM(S): 20 TABLET, DELAYED RELEASE ORAL at 06:27

## 2019-09-16 RX ADMIN — LAMOTRIGINE 100 MILLIGRAM(S): 25 TABLET, ORALLY DISINTEGRATING ORAL at 11:52

## 2019-09-16 RX ADMIN — TAMSULOSIN HYDROCHLORIDE 0.4 MILLIGRAM(S): 0.4 CAPSULE ORAL at 23:10

## 2019-09-16 RX ADMIN — CEFEPIME 100 MILLIGRAM(S): 1 INJECTION, POWDER, FOR SOLUTION INTRAMUSCULAR; INTRAVENOUS at 02:10

## 2019-09-16 RX ADMIN — MORPHINE SULFATE 15 MILLIGRAM(S): 50 CAPSULE, EXTENDED RELEASE ORAL at 20:51

## 2019-09-16 RX ADMIN — MORPHINE SULFATE 15 MILLIGRAM(S): 50 CAPSULE, EXTENDED RELEASE ORAL at 16:30

## 2019-09-16 RX ADMIN — ATORVASTATIN CALCIUM 40 MILLIGRAM(S): 80 TABLET, FILM COATED ORAL at 23:10

## 2019-09-16 RX ADMIN — Medication 2 SPRAY(S): at 06:26

## 2019-09-16 RX ADMIN — LAMOTRIGINE 100 MILLIGRAM(S): 25 TABLET, ORALLY DISINTEGRATING ORAL at 17:57

## 2019-09-16 RX ADMIN — Medication 12.5 MILLIGRAM(S): at 23:10

## 2019-09-16 RX ADMIN — QUETIAPINE FUMARATE 50 MILLIGRAM(S): 200 TABLET, FILM COATED ORAL at 23:10

## 2019-09-16 RX ADMIN — Medication 650 MILLIGRAM(S): at 23:16

## 2019-09-16 RX ADMIN — Medication 0.5 MILLIGRAM(S): at 23:08

## 2019-09-16 RX ADMIN — Medication 250 MILLIGRAM(S): at 03:10

## 2019-09-16 RX ADMIN — MORPHINE SULFATE 15 MILLIGRAM(S): 50 CAPSULE, EXTENDED RELEASE ORAL at 16:01

## 2019-09-16 RX ADMIN — GABAPENTIN 400 MILLIGRAM(S): 400 CAPSULE ORAL at 23:10

## 2019-09-16 RX ADMIN — MORPHINE SULFATE 4 MILLIGRAM(S): 50 CAPSULE, EXTENDED RELEASE ORAL at 01:13

## 2019-09-16 RX ADMIN — MORPHINE SULFATE 4 MILLIGRAM(S): 50 CAPSULE, EXTENDED RELEASE ORAL at 01:50

## 2019-09-16 RX ADMIN — QUETIAPINE FUMARATE 25 MILLIGRAM(S): 200 TABLET, FILM COATED ORAL at 11:34

## 2019-09-16 RX ADMIN — LORATADINE 10 MILLIGRAM(S): 10 TABLET ORAL at 11:34

## 2019-09-16 RX ADMIN — GABAPENTIN 400 MILLIGRAM(S): 400 CAPSULE ORAL at 13:38

## 2019-09-16 RX ADMIN — Medication 30 MILLIGRAM(S): at 06:00

## 2019-09-16 RX ADMIN — TIOTROPIUM BROMIDE 1 CAPSULE(S): 18 CAPSULE ORAL; RESPIRATORY (INHALATION) at 08:54

## 2019-09-16 RX ADMIN — BUDESONIDE AND FORMOTEROL FUMARATE DIHYDRATE 2 PUFF(S): 160; 4.5 AEROSOL RESPIRATORY (INHALATION) at 21:19

## 2019-09-16 RX ADMIN — Medication 2 SPRAY(S): at 17:56

## 2019-09-16 NOTE — CONSULT NOTE ADULT - SUBJECTIVE AND OBJECTIVE BOX
Patient is a 48y old  Male who presents with a chief complaint of chest pain and shortness of breath secondary to pericardial effusion (16 Sep 2019 03:48)    HPI:  48 years old male with PMHx of arrhythmogenic ventricular tachycardia s/p AICD in 2005, HFpEF (EF 69% in August, 2019), afib/flutter on Eliquis/ASA 81mg, HTN, COPD (on 2L O2 PRN), DLD, multiple sclerosis, epilepsy, PTSD, depression, TIA, presented to the ED with complaint of chest pain and shortness of breath for 2 days. Patient was in his usual health 2 days ago until yesterday at 3AM, he woke up with substernal chest pain/pressure, 9/10, constant, radiates to left sided chest, and shortness of breath, with associated nonproductive cough. Patient states that his pain felt like the pain he had when his lead was dislodged years ago. He called CTS PA and was instructed to come to ED for evaluation if pain is getting worse. In the ED patient was unable to tolerate NC because of desaturation of pulse ox to 87% and recurrent chest pain. Patient was placed on NRB.  Patient denies fever/chills, abdominal pain, nausea/vomiting, diarrhea/constipation, or urinary symptoms. Denies recent travels or sick contact. Of note patient was recently admitted on 08/29/19 for AICD firing/lead misplacement and discharged on 09/11/19 (found to have hematoma of AICD pocket and ICD battery depletion s/p removal of defib lead and atrial lead with laser extraction (old RV lead was used for temporary pacing).     PAST MEDICAL & SURGICAL HISTORY:  PTSD (post-traumatic stress disorder)  Supraventricular arrhythmia: prior history  Cardiomyopathy  CHF (congestive heart failure)  Atrial fibrillation: s/p ablation, on eliquis  Diabetes mellitus: diet controlled  BPH (benign prostatic hyperplasia)  HTN (hypertension)  Seizures  Migraines  Pneumonia  MS (multiple sclerosis)  CAD (coronary artery disease)  Bipolar disorder  Anginal pain  Schizo affective schizophrenia  GERD (gastroesophageal reflux disease)  Seasonal allergies  Hypercholesteremia  COPD (chronic obstructive pulmonary disease)  CVA (cerebral vascular accident)  TIA (transient ischemic attack)  Syncope  Peripheral Neuropathy  Clinical Depression  H/O prior ablation treatment: for Afib  Cardiac pacemaker recipient  H/O hernia repair: right 1972,1991  History of hip replacement  S/P Orchiectomy: L for testicular CA  S/P Implantation of AICD      PREVIOUS DIAGNOSTIC TESTING:      ECHO  FINDINGS:    CHEST CT non contrast  FINDINGS:   CT Chest No Cont (09.16.19 @ 00:55) >  IMPRESSION:    No definite correlate for chest pain.    Bibasilar subsegmental atelectasis with more focal areas of   consolidation/atelectasis at the lung bases; correlate for possible   superimposed pneumonia in the appropriate clinical setting.    New small pericardial effusion measuring up to 0.7 cm.    Foci of air surrounding the AICD pocket, may be attributed to   postoperative changes        MEDICATIONS  (STANDING):  apixaban 5 milliGRAM(s) Oral every 12 hours  aspirin enteric coated 81 milliGRAM(s) Oral daily  atorvastatin 40 milliGRAM(s) Oral at bedtime  baclofen 10 milliGRAM(s) Oral four times a day  buDESOnide 160 MICROgram(s)/formoterol 4.5 MICROgram(s) Inhaler 2 Puff(s) Inhalation two times a day    chlorhexidine 4% Liquid 1 Application(s) Topical <User Schedule>  dofetilide 250 MICROGram(s) Oral two times a day  fluticasone propionate 50 MICROgram(s)/spray Nasal Spray 2 Spray(s) Both Nostrils two times a day  furosemide    Tablet 20 milliGRAM(s) Oral daily  gabapentin 400 milliGRAM(s) Oral three times a day  lamoTRIgine 100 milliGRAM(s) Oral two times a day  loratadine 10 milliGRAM(s) Oral daily  metoprolol tartrate 12.5 milliGRAM(s) Oral every 8 hours  pantoprazole    Tablet 40 milliGRAM(s) Oral before breakfast  polyethylene glycol 3350 17 Gram(s) Oral every 12 hours  QUEtiapine 25 milliGRAM(s) Oral daily  QUEtiapine 50 milliGRAM(s) Oral at bedtime  tamsulosin 0.4 milliGRAM(s) Oral at bedtime  tiotropium 18 MICROgram(s) Capsule 1 Capsule(s) Inhalation daily    MEDICATIONS  (PRN):  acetaminophen   Tablet .. 650 milliGRAM(s) Oral every 6 hours PRN Mild Pain (1 - 3)  clonazePAM  Tablet 0.5 milliGRAM(s) Oral at bedtime PRN agitation  HYDROmorphone   Tablet 2 milliGRAM(s) Oral every 4 hours PRN Moderate Pain (4 - 6)  morphine  IR 15 milliGRAM(s) Oral every 4 hours PRN Severe Pain (7 - 10)      FAMILY HISTORY:  Family history of colon cancer in mother  Family history of cardiomyopathy: Mother  Family history of cervical cancer (Mother)  Family history of early CAD (Mother)      SOCIAL HISTORY:    CIGARETTES: denies    ALCOHOL:denies    Past Surgical History:    Allergies:    dexmethylphenidate (Unknown)  Dilantin (Rash)  dilantin, compazine, neurontin, ritalin, phenergan (Unknown)  Haldol (Unknown)  hydantoin derivatives (Other)  methylphenidate (Unknown)  phenytoin (Unknown)  prochlorperazine (Unknown)  promethazine (Dystonic RXN)  rash/hives (Anaphylaxis)  thioxanthenes (Unknown)      REVIEW OF SYSTEMS:    Pt has SOB and left sided CP.  Otherwise 10 point ROS is negative      Vital Signs Last 24 Hrs  T(C): 36.7 (16 Sep 2019 08:18), Max: 37.1 (15 Sep 2019 18:53)  T(F): 98 (16 Sep 2019 08:18), Max: 98.8 (15 Sep 2019 18:53)  HR: 60 (16 Sep 2019 08:18) (60 - 95)  BP: 86/51 (16 Sep 2019 08:18) (86/51 - 128/70)  BP(mean): --  RR: 20 (16 Sep 2019 08:18) (18 - 22)  SpO2: 99% (16 Sep 2019 08:18) (87% - 99%)    PHYSICAL EXAM:    GENERAL: In no apparent distress, well nourished, and hydrated.  NECK: Supple and normal thyroid.  No JVD or carotid bruit.  Carotid pulse is 2+ bilaterally.  HEART: Regular rate and rhythm; No murmurs, rubs, or gallops.  PULMONARY: Clear to auscultation and perfusion.  No rales, wheezing, or rhonchi bilaterally.  ABDOMEN: Soft, Nontender, Nondistended; Bowel sounds present  EXTREMITIES:  2+ Peripheral Pulses, No edema  NEUROLOGICAL: Grossly nonfocal      INTERPRETATION OF TELEMETRY:    ECG:    I&O's Detail      LABS:                        8.9    8.43  )-----------( 350      ( 16 Sep 2019 11:56 )             28.6     09-16    142  |  104  |  19  ----------------------------<  107<H>  3.8   |  26  |  1.1    Ca    8.9      16 Sep 2019 11:56  Mg     2.1     09-16    TPro  6.3  /  Alb  3.8  /  TBili  0.4  /  DBili  x   /  AST  18  /  ALT  19  /  AlkPhos  170<H>  09-16    CARDIAC MARKERS ( 16 Sep 2019 11:56 )  x     / <0.01 ng/mL / x     / x     / x      CARDIAC MARKERS ( 15 Sep 2019 19:55 )  x     / <0.01 ng/mL / x     / x     / x          PT/INR - ( 15 Sep 2019 19:55 )   PT: 16.50 sec;   INR: 1.44 ratio         PTT - ( 15 Sep 2019 19:55 )  PTT:31.5 sec    BNPSerum Pro-Brain Natriuretic Peptide: 166 pg/mL (09-15 @ 19:55)    I&O's Detail    Daily Height in cm: 167.64 (15 Sep 2019 20:28)    Daily     RADIOLOGY & ADDITIONAL STUDIES: Patient is a 48y old  Male who presents with a chief complaint of chest pain and shortness of breath secondary to pericardial effusion (16 Sep 2019 03:48)    HPI:  48 years old male with PMHx of arrhythmogenic ventricular tachycardia s/p AICD in 2005, HFpEF (EF 69% in August, 2019), afib/flutter on Eliquis/ASA 81mg, HTN, COPD (on 2L O2 PRN), DLD, multiple sclerosis, epilepsy, PTSD, depression, TIA, presented to the ED with complaint of chest pain and shortness of breath for 2 days. Patient was in his usual health 2 days ago until yesterday at 3AM, he woke up with substernal chest pain/pressure, 9/10, constant, radiates to left sided chest, and shortness of breath, with associated nonproductive cough. Patient states that his pain felt like the pain he had when his lead was dislodged years ago. He called CTS PA and was instructed to come to ED for evaluation if pain is getting worse. In the ED patient was unable to tolerate NC because of desaturation of pulse ox to 87% and recurrent chest pain. Patient was placed on NRB.  Patient denies fever/chills, abdominal pain, nausea/vomiting, diarrhea/constipation, or urinary symptoms. Denies recent travels or sick contact. Of note patient was recently admitted on 08/29/19 for AICD firing/lead misplacement and discharged on 09/11/19 (found to have hematoma of AICD pocket and ICD battery depletion s/p removal of defib lead and atrial lead with laser extraction (old RV lead was used for temporary pacing).     PAST MEDICAL & SURGICAL HISTORY:  PTSD (post-traumatic stress disorder)  Supraventricular arrhythmia: prior history  Cardiomyopathy  CHF (congestive heart failure)  Atrial fibrillation: s/p ablation, on eliquis  Diabetes mellitus: diet controlled  BPH (benign prostatic hyperplasia)  HTN (hypertension)  Seizures  Migraines  Pneumonia  MS (multiple sclerosis)  CAD (coronary artery disease)  Bipolar disorder  Anginal pain  Schizo affective schizophrenia  GERD (gastroesophageal reflux disease)  Seasonal allergies  Hypercholesteremia  COPD (chronic obstructive pulmonary disease)  CVA (cerebral vascular accident)  TIA (transient ischemic attack)  Syncope  Peripheral Neuropathy  Clinical Depression  H/O prior ablation treatment: for Afib  Cardiac pacemaker recipient  H/O hernia repair: right 1972,1991  History of hip replacement  S/P Orchiectomy: L for testicular CA  S/P Implantation of AICD      PREVIOUS DIAGNOSTIC TESTING:      ECHO  FINDINGS:    CHEST CT non contrast  FINDINGS:   CT Chest No Cont (09.16.19 @ 00:55) >  IMPRESSION:    No definite correlate for chest pain.    Bibasilar subsegmental atelectasis with more focal areas of   consolidation/atelectasis at the lung bases; correlate for possible   superimposed pneumonia in the appropriate clinical setting.    New small pericardial effusion measuring up to 0.7 cm.    Foci of air surrounding the AICD pocket, may be attributed to   postoperative changes        MEDICATIONS  (STANDING):  apixaban 5 milliGRAM(s) Oral every 12 hours  aspirin enteric coated 81 milliGRAM(s) Oral daily  atorvastatin 40 milliGRAM(s) Oral at bedtime  baclofen 10 milliGRAM(s) Oral four times a day  buDESOnide 160 MICROgram(s)/formoterol 4.5 MICROgram(s) Inhaler 2 Puff(s) Inhalation two times a day    chlorhexidine 4% Liquid 1 Application(s) Topical <User Schedule>  dofetilide 250 MICROGram(s) Oral two times a day  fluticasone propionate 50 MICROgram(s)/spray Nasal Spray 2 Spray(s) Both Nostrils two times a day  furosemide    Tablet 20 milliGRAM(s) Oral daily  gabapentin 400 milliGRAM(s) Oral three times a day  lamoTRIgine 100 milliGRAM(s) Oral two times a day  loratadine 10 milliGRAM(s) Oral daily  metoprolol tartrate 12.5 milliGRAM(s) Oral every 8 hours  pantoprazole    Tablet 40 milliGRAM(s) Oral before breakfast  polyethylene glycol 3350 17 Gram(s) Oral every 12 hours  QUEtiapine 25 milliGRAM(s) Oral daily  QUEtiapine 50 milliGRAM(s) Oral at bedtime  tamsulosin 0.4 milliGRAM(s) Oral at bedtime  tiotropium 18 MICROgram(s) Capsule 1 Capsule(s) Inhalation daily    MEDICATIONS  (PRN):  acetaminophen   Tablet .. 650 milliGRAM(s) Oral every 6 hours PRN Mild Pain (1 - 3)  clonazePAM  Tablet 0.5 milliGRAM(s) Oral at bedtime PRN agitation  HYDROmorphone   Tablet 2 milliGRAM(s) Oral every 4 hours PRN Moderate Pain (4 - 6)  morphine  IR 15 milliGRAM(s) Oral every 4 hours PRN Severe Pain (7 - 10)      FAMILY HISTORY:  Family history of colon cancer in mother  Family history of cardiomyopathy: Mother  Family history of cervical cancer (Mother)  Family history of early CAD (Mother)      SOCIAL HISTORY:    CIGARETTES: denies    ALCOHOL:denies    Past Surgical History:    Allergies:    dexmethylphenidate (Unknown)  Dilantin (Rash)  dilantin, compazine, neurontin, ritalin, phenergan (Unknown)  Haldol (Unknown)  hydantoin derivatives (Other)  methylphenidate (Unknown)  phenytoin (Unknown)  prochlorperazine (Unknown)  promethazine (Dystonic RXN)  rash/hives (Anaphylaxis)  thioxanthenes (Unknown)      REVIEW OF SYSTEMS:    Pt has SOB and left sided CP.  Otherwise 10 point ROS is negative      Vital Signs Last 24 Hrs  T(C): 36.7 (16 Sep 2019 08:18), Max: 37.1 (15 Sep 2019 18:53)  T(F): 98 (16 Sep 2019 08:18), Max: 98.8 (15 Sep 2019 18:53)  HR: 60 (16 Sep 2019 08:18) (60 - 95)  BP: 86/51 (16 Sep 2019 08:18) (86/51 - 128/70)  BP(mean): --  RR: 20 (16 Sep 2019 08:18) (18 - 22)  SpO2: 99% (16 Sep 2019 08:18) (87% - 99%)    PHYSICAL EXAM:    GENERAL: In no apparent distress, well nourished, and hydrated.  NECK: Supple and normal thyroid.  No JVD or carotid bruit.  Carotid pulse is 2+ bilaterally.  HEART: Regular rate and rhythm; No murmurs, rubs, or gallops.  PULMONARY: Clear to auscultation and perfusion.  No rales, wheezing, or rhonchi bilaterally.  ABDOMEN: Soft, Nontender, Nondistended; Bowel sounds present  EXTREMITIES:  2+ Peripheral Pulses, No edema  NEUROLOGICAL: Grossly nonfocal      INTERPRETATION OF TELEMETRY:    ECG:  < from: 12 Lead ECG (09.15.19 @ 18:58) >    Ventricular Rate 60 BPM    Atrial Rate 60 BPM    P-R Interval 160 ms    QRS Duration 132 ms    Q-T Interval 418 ms    QTC Calculation(Bezet) 418 ms    P Axis 85 degrees    R Axis 25 degrees    T Axis 36 degrees    Diagnosis Line Atrial-paced rhythm  Right bundle branch block  Abnormal ECG    < end of copied text >      I&O's Detail      LABS:                        8.9    8.43  )-----------( 350      ( 16 Sep 2019 11:56 )             28.6     09-16    142  |  104  |  19  ----------------------------<  107<H>  3.8   |  26  |  1.1    Ca    8.9      16 Sep 2019 11:56  Mg     2.1     09-16    TPro  6.3  /  Alb  3.8  /  TBili  0.4  /  DBili  x   /  AST  18  /  ALT  19  /  AlkPhos  170<H>  09-16    CARDIAC MARKERS ( 16 Sep 2019 11:56 )  x     / <0.01 ng/mL / x     / x     / x      CARDIAC MARKERS ( 15 Sep 2019 19:55 )  x     / <0.01 ng/mL / x     / x     / x          PT/INR - ( 15 Sep 2019 19:55 )   PT: 16.50 sec;   INR: 1.44 ratio         PTT - ( 15 Sep 2019 19:55 )  PTT:31.5 sec    BNPSerum Pro-Brain Natriuretic Peptide: 166 pg/mL (09-15 @ 19:55)    I&O's Detail    Daily Height in cm: 167.64 (15 Sep 2019 20:28)    Daily     RADIOLOGY & ADDITIONAL STUDIES: Patient is a 48y old  Male who presents with a chief complaint of chest pain and shortness of breath secondary to pericardial effusion (16 Sep 2019 03:48)    HPI:  48 years old male with PMHx of arrhythmogenic ventricular tachycardia s/p AICD in 2005, HFpEF (EF 69% in August, 2019), afib/flutter on Eliquis/ASA 81mg, HTN, COPD (on 2L O2 PRN), DLD, multiple sclerosis, epilepsy, PTSD, depression, TIA, presented to the ED with complaint of chest pain and shortness of breath for 2 days. Patient was in his usual health 2 days ago until yesterday at 3AM, he woke up with substernal chest pain/pressure, 9/10, constant, radiates to left sided chest, and shortness of breath, with associated nonproductive cough. Patient states that his pain felt like the pain he had when his lead was dislodged years ago. He called CTS PA and was instructed to come to ED for evaluation if pain is getting worse. In the ED patient was unable to tolerate NC because of desaturation of pulse ox to 87% and recurrent chest pain. Patient was placed on NRB.  Patient denies fever/chills, abdominal pain, nausea/vomiting, diarrhea/constipation, or urinary symptoms. Denies recent travels or sick contact. Of note patient was recently admitted on 08/29/19 for AICD firing/lead misplacement and discharged on 09/11/19 (found to have hematoma of AICD pocket and ICD battery depletion s/p removal of defib lead and atrial lead with laser extraction (old RV lead was used for temporary pacing).     PAST MEDICAL & SURGICAL HISTORY:  PTSD (post-traumatic stress disorder)  Supraventricular arrhythmia: prior history  Cardiomyopathy  CHF (congestive heart failure)  Atrial fibrillation: s/p ablation, on eliquis  Diabetes mellitus: diet controlled  BPH (benign prostatic hyperplasia)  HTN (hypertension)  Seizures  Migraines  Pneumonia  MS (multiple sclerosis)  CAD (coronary artery disease)  Bipolar disorder  Anginal pain  Schizo affective schizophrenia  GERD (gastroesophageal reflux disease)  Seasonal allergies  Hypercholesteremia  COPD (chronic obstructive pulmonary disease)  CVA (cerebral vascular accident)  TIA (transient ischemic attack)  Syncope  Peripheral Neuropathy  Clinical Depression  H/O prior ablation treatment: for Afib  Cardiac pacemaker recipient  H/O hernia repair: right 1972,1991  History of hip replacement  S/P Orchiectomy: L for testicular CA  S/P Implantation of AICD      PREVIOUS DIAGNOSTIC TESTING:      ECHO  FINDINGS:    CHEST CT non contrast  FINDINGS:   CT Chest No Cont (09.16.19 @ 00:55) >  IMPRESSION:    No definite correlate for chest pain.    Bibasilar subsegmental atelectasis with more focal areas of   consolidation/atelectasis at the lung bases; correlate for possible   superimposed pneumonia in the appropriate clinical setting.    New small pericardial effusion measuring up to 0.7 cm.    Foci of air surrounding the AICD pocket, may be attributed to   postoperative changes        MEDICATIONS  (STANDING):  apixaban 5 milliGRAM(s) Oral every 12 hours  aspirin enteric coated 81 milliGRAM(s) Oral daily  atorvastatin 40 milliGRAM(s) Oral at bedtime  baclofen 10 milliGRAM(s) Oral four times a day  buDESOnide 160 MICROgram(s)/formoterol 4.5 MICROgram(s) Inhaler 2 Puff(s) Inhalation two times a day    chlorhexidine 4% Liquid 1 Application(s) Topical <User Schedule>  dofetilide 250 MICROGram(s) Oral two times a day  fluticasone propionate 50 MICROgram(s)/spray Nasal Spray 2 Spray(s) Both Nostrils two times a day  furosemide    Tablet 20 milliGRAM(s) Oral daily  gabapentin 400 milliGRAM(s) Oral three times a day  lamoTRIgine 100 milliGRAM(s) Oral two times a day  loratadine 10 milliGRAM(s) Oral daily  metoprolol tartrate 12.5 milliGRAM(s) Oral every 8 hours  pantoprazole    Tablet 40 milliGRAM(s) Oral before breakfast  polyethylene glycol 3350 17 Gram(s) Oral every 12 hours  QUEtiapine 25 milliGRAM(s) Oral daily  QUEtiapine 50 milliGRAM(s) Oral at bedtime  tamsulosin 0.4 milliGRAM(s) Oral at bedtime  tiotropium 18 MICROgram(s) Capsule 1 Capsule(s) Inhalation daily    MEDICATIONS  (PRN):  acetaminophen   Tablet .. 650 milliGRAM(s) Oral every 6 hours PRN Mild Pain (1 - 3)  clonazePAM  Tablet 0.5 milliGRAM(s) Oral at bedtime PRN agitation  HYDROmorphone   Tablet 2 milliGRAM(s) Oral every 4 hours PRN Moderate Pain (4 - 6)  morphine  IR 15 milliGRAM(s) Oral every 4 hours PRN Severe Pain (7 - 10)      FAMILY HISTORY:  Family history of colon cancer in mother  Family history of cardiomyopathy: Mother  Family history of cervical cancer (Mother)  Family history of early CAD (Mother)      SOCIAL HISTORY:    CIGARETTES: denies    ALCOHOL:denies    Past Surgical History:    Allergies:    dexmethylphenidate (Unknown)  Dilantin (Rash)  dilantin, compazine, neurontin, ritalin, phenergan (Unknown)  Haldol (Unknown)  hydantoin derivatives (Other)  methylphenidate (Unknown)  phenytoin (Unknown)  prochlorperazine (Unknown)  promethazine (Dystonic RXN)  rash/hives (Anaphylaxis)  thioxanthenes (Unknown)      REVIEW OF SYSTEMS:    Pt has SOB and left sided CP.  Otherwise 10 point ROS is negative      Vital Signs Last 24 Hrs  T(C): 36.7 (16 Sep 2019 08:18), Max: 37.1 (15 Sep 2019 18:53)  T(F): 98 (16 Sep 2019 08:18), Max: 98.8 (15 Sep 2019 18:53)  HR: 60 (16 Sep 2019 08:18) (60 - 95)  BP: 86/51 (16 Sep 2019 08:18) (86/51 - 128/70)  BP(mean): --  RR: 20 (16 Sep 2019 08:18) (18 - 22)  SpO2: 99% (16 Sep 2019 08:18) (87% - 99%)    PHYSICAL EXAM:    GENERAL: Currently wearing a non rebreather  NECK: Supple and normal thyroid.  No JVD or carotid bruit.  Carotid pulse is 2+ bilaterally.  HEART: Regular rate and rhythm; No murmurs, rubs, or gallops.  PULMONARY: Clear to auscultation and perfusion.  No rales, wheezing, or rhonchi bilaterally.  ABDOMEN: Soft, Nontender, Nondistended; Bowel sounds present  EXTREMITIES:  2+ Peripheral Pulses, No edema  NEUROLOGICAL: Grossly nonfocal      INTERPRETATION OF TELEMETRY:    ECG:  < from: 12 Lead ECG (09.15.19 @ 18:58) >    Ventricular Rate 60 BPM    Atrial Rate 60 BPM    P-R Interval 160 ms    QRS Duration 132 ms    Q-T Interval 418 ms    QTC Calculation(Bezet) 418 ms    P Axis 85 degrees    R Axis 25 degrees    T Axis 36 degrees    Diagnosis Line Atrial-paced rhythm  Right bundle branch block  Abnormal ECG    < end of copied text >      I&O's Detail      LABS:                        8.9    8.43  )-----------( 350      ( 16 Sep 2019 11:56 )             28.6     09-16    142  |  104  |  19  ----------------------------<  107<H>  3.8   |  26  |  1.1    Ca    8.9      16 Sep 2019 11:56  Mg     2.1     09-16    TPro  6.3  /  Alb  3.8  /  TBili  0.4  /  DBili  x   /  AST  18  /  ALT  19  /  AlkPhos  170<H>  09-16    CARDIAC MARKERS ( 16 Sep 2019 11:56 )  x     / <0.01 ng/mL / x     / x     / x      CARDIAC MARKERS ( 15 Sep 2019 19:55 )  x     / <0.01 ng/mL / x     / x     / x          PT/INR - ( 15 Sep 2019 19:55 )   PT: 16.50 sec;   INR: 1.44 ratio         PTT - ( 15 Sep 2019 19:55 )  PTT:31.5 sec    BNPSerum Pro-Brain Natriuretic Peptide: 166 pg/mL (09-15 @ 19:55)    I&O's Detail    Daily Height in cm: 167.64 (15 Sep 2019 20:28)    Daily     RADIOLOGY & ADDITIONAL STUDIES:  < from: Xray Chest 1 View- PORTABLE-Urgent (09.15.19 @ 19:35) >  Impression:      No radiographic evidence ofacute pulmonary disease.    < end of copied text >

## 2019-09-16 NOTE — H&P ADULT - HISTORY OF PRESENT ILLNESS
49yo M with PMH of vtach s/p AICD, MI, HFpEF (EF 69% in August, 2019), afib/flutter on Eliquis/ASA (full dose), HTN, COPD (on O2 PRN), DLD, multiple sclerosis, sz disorder (lamotrigine), PTSD, depression, TIA, esophageal stricture, sinusitis, GERD, hemopneumothorax (2006), pericardial effusion (2006), cardiomyopathy, IBS, admitted on 08/29/19 for aicd firing/lead misplacement and discharged on 09/11/19 (found to have hematoma of AICD pocket and ICD battery depletion s/p removal of defib lead and atrial lead with laser extraction (old RV lead was used for temporary pacing). CT surgeon is Dr. Pastrana, EP Dr. Quiñones. Patient is presenting to ED with chest pain with associated SOB that is worse with exertion. Describes pain as substernal, sharp, consant, non-radiating, and feels like previous MI. Former smoker. Denies any f/c, cough, abd pain, n/v/d, diaphoresis, HA/dizziness, numbness/tingling, neck pain/back pain, abd pain, diarrhea, melena/brbpr, or urinary sxs. Did took full dose ASA PTA      T(C): 36.9 (15 Sep 2019 23:00), Max: 37.1 (15 Sep 2019 18:53)  T(F): 98.5 (15 Sep 2019 23:00), Max: 98.8 (15 Sep 2019 18:53)  HR: 60 (16 Sep 2019 01:20) (60 - 95)  BP: 128/70 (15 Sep 2019 23:00) (122/65 - 128/70)  RR: 20 (16 Sep 2019 01:20) (18 - 20)  SpO2: 95% (16 Sep 2019 01:20) (94% - 98%) 48 years old male with PMHx of arrhythmogenic ventricular tachycardia s/p AICD in 2005, HFpEF (EF 69% in August, 2019), afib/flutter on Eliquis/ASA (full dose), HTN, COPD (on 2L O2 PRN), DLD, multiple sclerosis, epilepsy, PTSD, depression, TIA, presented to the ED with complaint of chest pain and shortness of breath for 2 days. Patient was in his usual health 2 days ago until yesterday at 3AM, he woke up with substernal chest pain/pressure, 9/10, constant, radiates to left sided chest, and shortness of breath, with associated nonproductive cough. Patient states that his pain felt like the pain he had when his lead was dislodged years ago. He called CTS PA and was instructed to come to ED for evaluation if pain is getting worse. In the ED patient was unable to tolerate NC because of desaturation of pulse ox and recurrent chest pain. Patient denies fever/chills, abdominal pain, nausea/vomiting, diarrhea/constipation, or urinary symptoms. Denies recent travels or sick contact. Of note patient was recently admitted on 08/29/19 for AICD firing/lead misplacement and discharged on 09/11/19 (found to have hematoma of AICD pocket and ICD battery depletion s/p removal of defib lead and atrial lead with laser extraction (old RV lead was used for temporary pacing).       T(C): 36.9 (15 Sep 2019 23:00), Max: 37.1 (15 Sep 2019 18:53)  T(F): 98.5 (15 Sep 2019 23:00), Max: 98.8 (15 Sep 2019 18:53)  HR: 60 (16 Sep 2019 01:20) (60 - 95)  BP: 128/70 (15 Sep 2019 23:00) (122/65 - 128/70)  RR: 20 (16 Sep 2019 01:20) (18 - 20)  SpO2: 95% (16 Sep 2019 01:20) (94% - 98%) 48 years old male with PMHx of arrhythmogenic ventricular tachycardia s/p AICD in 2005, HFpEF (EF 69% in August, 2019), afib/flutter on Eliquis/ASA 81mg, HTN, COPD (on 2L O2 PRN), DLD, multiple sclerosis, epilepsy, PTSD, depression, TIA, presented to the ED with complaint of chest pain and shortness of breath for 2 days. Patient was in his usual health 2 days ago until yesterday at 3AM, he woke up with substernal chest pain/pressure, 9/10, constant, radiates to left sided chest, and shortness of breath, with associated nonproductive cough. Patient states that his pain felt like the pain he had when his lead was dislodged years ago. He called [facility] PA and was instructed to come to ED for evaluation if pain is getting worse. In the ED patient was unable to tolerate NC because of desaturation of pulse ox and recurrent chest pain. Patient denies fever/chills, abdominal pain, nausea/vomiting, diarrhea/constipation, or urinary symptoms. Denies recent travels or sick contact. Of note patient was recently admitted on 08/29/19 for AICD firing/lead misplacement and discharged on 09/11/19 (found to have hematoma of AICD pocket and ICD battery depletion s/p removal of defib lead and atrial lead with laser extraction (old RV lead was used for temporary pacing).       T(C): 36.9 (15 Sep 2019 23:00), Max: 37.1 (15 Sep 2019 18:53)  T(F): 98.5 (15 Sep 2019 23:00), Max: 98.8 (15 Sep 2019 18:53)  HR: 60 (16 Sep 2019 01:20) (60 - 95)  BP: 128/70 (15 Sep 2019 23:00) (122/65 - 128/70)  RR: 20 (16 Sep 2019 01:20) (18 - 20)  SpO2: 95% (16 Sep 2019 01:20) (94% - 98%)

## 2019-09-16 NOTE — H&P ADULT - NSHPOUTPATIENTPROVIDERS_GEN_ALL_CORE
PCP Dr. Roche PCP Dr. Roche  Cardiology Dr. Anitra Quiñones  CTS Dr. Hess  Pulmonary Dr. Hernández @ [facility]

## 2019-09-16 NOTE — CONSULT NOTE ADULT - ASSESSMENT
Pericardial effusion  Arrhythmogenic ventricular tachycardia s/p AICD in 2005  HFpEF (EF 69% in August, 2019)  Afib/flutter  HTN  COPD (on 2L O2 PRN)  DLD  TIA    Plan/recommendations:  -tele monitoring  -f/u TTE to assess degree of pericardial effusion.  -broad spectrum antibiotics to treat underlying infection/PNA  -f/u CTA to rule out PE in light of concurrent hypoxia Pericardial effusion  Arrhythmogenic ventricular tachycardia s/p AICD in 2005  HFpEF (EF 69% in August, 2019)  Afib/flutter  HTN  COPD (on 2L O2 PRN)  DLD  TIA    Plan/recommendations:  -tele monitoring  -f/u TTE to assess degree of pericardial effusion.  -broad spectrum antibiotics to treat underlying infection/PNA  -f/u CTA to rule out PE in light of concurrent hypoxia    EPS fu as well.

## 2019-09-16 NOTE — CONSULT NOTE ADULT - ASSESSMENT
48 years old male with PMHx of arrhythmogenic ventricular tachycardia s/p AICD in 2005, HFpEF (EF 69% in August, 2019), afib/flutter on Eliquis/ASA 81mg, HTN, COPD (on 2L O2 PRN), DLD, multiple sclerosis, epilepsy, PTSD, depression, TIA, admitted 2 weeks ago for ICD firing - had lead revision with pocket hematoma post op.  presented to the ED with complaint of chest pain and shortness of breath for 2 days.      Acute hypoxic respiratory failure  VT s/p AICD  trace pericardial effusion  Afib/flutter on Eliquis    Plan:  - recommend repeat Chest CT with contrast to eval for PE (IV infiltrated during first CT so study was done as noncontrast  - CT surgery evaluation  - Pulmonary consult

## 2019-09-16 NOTE — H&P ADULT - NSHPSOCIALHISTORY_GEN_ALL_CORE
Former smoker of 27 years, smoked 1.5 PPD, quit 1 year ago  Former daily drinker, now only drinks on social gathering  Denies illicit drug      Lives at [facility] for long term stay

## 2019-09-16 NOTE — PATIENT PROFILE ADULT - NSPROHMSYMPCOND_GEN_A_NUR
PTSD (post-traumatic stress disorder)  Supraventricular arrhythmia  Cardiomyopathy  CHF (congestive heart failure)  Atrial fibrillation  Diabetes mellitus  BPH (benign prostatic hyperplasia)  HTN (hypertension)  Seizures  Migraines  Pneumonia  MS (multiple sclerosis)  CAD (coronary artery disease)  Bipolar disorder  Anginal pain  Schizo affective schizophrenia  GERD (gastroesophageal reflux disease)  Seasonal allergies  Hypercholesteremia  COPD (chronic obstructive pulmonary disease)  CVA (cerebral vascular accident)  TIA (transient ischemic attack)  Syncope  Peripheral Neuropathy  Clinical Depression

## 2019-09-16 NOTE — H&P ADULT - NSHPREVIEWOFSYSTEMS_GEN_ALL_CORE
CONSTITUTIONAL: No unintentional weight loss; no weakness; no fevers or chills  RESPIRATORY: No cough, wheezing, hemoptysis; no shortness of breath/shortness of breath on exertion; no orthopnea  CARDIOVASCULAR: No chest pain or palpitations  GASTROINTESTINAL: No abdominal or epigastric pain; no change in appetite; no early satiety; no nausea, vomiting, or hematemesis; no diarrhea or constipation; no melena or hematochezia; no change of stool caliber  GENITOURINARY: No dysuria, increased in urinary frequency or hematuria; no urethral discharge  NEUROLOGICAL: No headache/dizziness; no focal numbness or motor weakness  SKIN: No itching, rashes; no recent insect bites CONSTITUTIONAL: No unintentional weight loss; no weakness; no fevers or chills  RESPIRATORY: Reports shortness of breath and nonproductive cough, no wheezing, hemoptysis; no orthopnea  CARDIOVASCULAR: Reports chest pressure  GASTROINTESTINAL: No abdominal or epigastric pain; no change in appetite; no early satiety; no nausea, vomiting, or hematemesis; no diarrhea or constipation; no melena or hematochezia; no change of stool caliber  GENITOURINARY: No dysuria, increased in urinary frequency or hematuria; no urethral discharge  NEUROLOGICAL: No headache/dizziness; no focal numbness or motor weakness  SKIN: No itching, rashes; no recent insect bites

## 2019-09-16 NOTE — CONSULT NOTE ADULT - ATTENDING COMMENTS
Covering for Dr. Quiñones    47 yo M with history of VT s/p ICD for secondary prevention (2005) with recent extraction of externalized Riata lead now presenting with dyspnea and chest pain. Currently pt is on non-rebreather due to hypoxia. Work up thus far revealed small pericardial effusion.    Interrogation of device shows RV threshold of 4 at 0.4, otherwise stable parameters.    Recommend  - Work up of hypoxia. Pt will need a repeat CTA to eval for PE (as IV infiltrated on initial attempt). Initial CT also reads: possible superimposed pneumonia. Check blood and urine cultures. Clinical eval and possibly treat for pneumonia.   - Follow up official 2D echo results

## 2019-09-16 NOTE — CONSULT NOTE ADULT - SUBJECTIVE AND OBJECTIVE BOX
Date of Admission: 9/16/19    CHIEF COMPLAINT: chest pain and SOB    HISTORY OF PRESENT ILLNESS: 48yMale with PMH below presented to the hospital for chest pain and SOB. He has a history of arrhythmogenic ventricular tachycardia s/p AICD in 2005, HFpEF (EF 69% in August, 2019), afib/flutter on Eliquis/ASA 81mg, HTN, COPD (on 2L O2 PRN), DLD, multiple sclerosis, epilepsy, PTSD, depression, TIA, presented to the ED with complaint of chest pain and shortness of breath for 2 days. Patient was in his usual health 2 days ago until yesterday at 3AM, he woke up with substernal chest pain/pressure, 9/10, constant, radiates to left sided chest, and shortness of breath, with associated nonproductive cough. Patient states that his pain felt like the pain he had when his lead was dislodged years ago. He called CTS PA and was instructed to come to ED for evaluation if pain is getting worse. In the ED patient was unable to tolerate NC because of desaturation of pulse ox to 87% and recurrent chest pain. Patient was placed on NRB.  Patient denies fever, chills, abdominal pain, nausea, vomiting, diarrhea, constipation, or urinary symptoms. Denies recent travels or sick contact. Of note patient was recently admitted on 08/29/19 for AICD firing/lead misplacement and discharged on 09/11/19 (found to have hematoma of AICD pocket and ICD battery depletion s/p removal of defibrillator lead and atrial lead with laser extraction (old RV lead was used for temporary pacing).    PAST MEDICAL & SURGICAL HISTORY:  PTSD (post-traumatic stress disorder)  Supraventricular arrhythmia: prior history  Cardiomyopathy  CHF (congestive heart failure)  Atrial fibrillation: s/p ablation, on eliquis  Diabetes mellitus: diet controlled  BPH (benign prostatic hyperplasia)  HTN (hypertension)  Seizures  Migraines  Pneumonia  MS (multiple sclerosis)  CAD (coronary artery disease)  Bipolar disorder  Anginal pain  Schizo affective schizophrenia  GERD (gastroesophageal reflux disease)  Seasonal allergies  Hypercholesteremia  COPD (chronic obstructive pulmonary disease)  CVA (cerebral vascular accident)  TIA (transient ischemic attack)  Syncope  Peripheral Neuropathy  Clinical Depression  H/O prior ablation treatment: for Afib  Cardiac pacemaker recipient  H/O hernia repair: right 1972,1991  History of hip replacement  S/P Orchiectomy: L for testicular CA  S/P Implantation of AICD    FAMILY HISTORY:  Family history of colon cancer in mother  Family history of cardiomyopathy: Mother  Family history of cervical cancer (Mother)  Family history of early CAD (Mother)    Allergies:  dexmethylphenidate (Unknown)  Dilantin (Rash)  dilantin, compazine, neurontin, ritalin, phenergan (Unknown)  Haldol (Unknown)  hydantoin derivatives (Other)  methylphenidate (Unknown)  phenytoin (Unknown)  prochlorperazine (Unknown)  promethazine (Dystonic RXN)  rash/hives (Anaphylaxis)  thioxanthenes (Unknown)  	  Home Medications:  acetaminophen 650 mg oral tablet: 650 milligram(s) orally 4 times a day, As Needed (16 Sep 2019 04:40)  aspirin 81 mg oral delayed release tablet: 1 tab(s) orally once a day (16 Sep 2019 04:40)  atorvastatin 40 mg oral tablet: 1 tab(s) orally once a day (at bedtime) (16 Sep 2019 04:40)  baclofen 10 mg oral tablet: 1 tab(s) orally 4 times a day (16 Sep 2019 04:40)  budesonide-formoterol 160 mcg-4.5 mcg/inh inhalation aerosol: 2 puff(s) inhaled 2 times a day (16 Sep 2019 04:40)  Cepacol Extra Strength Menthol 10 mg mucous membrane lozenge: 1 anderson(s) mucous membrane every 6 hours, As Needed (16 Sep 2019 04:40)  cholecalciferol oral tablet: 2000 unit(s) orally once a day (16 Sep 2019 04:40)  Claritin 10 mg oral tablet: 1 tab(s) orally once a day (16 Sep 2019 04:40)  clonazePAM 0.5 mg oral tablet: 1 tab(s) orally once a day (at bedtime), As needed, agitation (16 Sep 2019 04:40)  Eliquis 5 mg oral tablet: 1 tab(s) orally 2 times a day. RESUME TAKING ON 9/14/2019 (16 Sep 2019 04:40)  Flonase 50 mcg/inh nasal spray: 2 spray(s) nasal once a day (16 Sep 2019 04:40)  furosemide 20 mg oral tablet: 1 tab(s) orally once a day (16 Sep 2019 04:40)  gabapentin 400 mg oral capsule: 1 cap(s) orally 3 times a day (16 Sep 2019 04:40)  HYDROmorphone 2 mg oral tablet: 1 tab(s) orally every 4 hours, As Needed (16 Sep 2019 04:40)  lamoTRIgine 100 mg oral tablet: 1 tab(s) orally 2 times a day (16 Sep 2019 04:40)  metoprolol tartrate 25 mg oral tablet: 0.5 tab(s) orally every 8 hours (16 Sep 2019 04:40)  Mi-Acid oral suspension: 30 milliliter(s) orally 4 times a day, As Needed (16 Sep 2019 04:40)  morphine 15 mg oral tablet: 1 tab(s) orally every 4 hours, As needed, Severe Pain (7 - 10) (16 Sep 2019 04:40)  Nitrostat 0.4 mg sublingual tablet: sublingual 3 times a day, As Needed; may repeat after 5 min until relief (16 Sep 2019 04:40)  omeprazole 20 mg oral delayed release capsule: 1 cap(s) orally 2 times a day (16 Sep 2019 04:40)  polyethylene glycol 3350 oral powder for reconstitution: 17 gram(s) orally every 12 hours (16 Sep 2019 04:40)  QUEtiapine 25 mg oral tablet: 1 tab(s) orally once a day (16 Sep 2019 04:40)  SEROquel 50 mg oral tablet: 1 tab(s) orally once a day (at bedtime) (16 Sep 2019 04:40)  tamsulosin 0.4 mg oral capsule: 1 cap(s) orally once a day (at bedtime) (16 Sep 2019 04:40)  Tecfidera 240 mg oral delayed release capsule: 1 cap(s) orally once a day (16 Sep 2019 04:40)  Tikosyn 250 mcg oral capsule: 1 cap(s) orally 2 times a day (16 Sep 2019 04:40)  tiotropium 18 mcg inhalation capsule: 1 cap(s) inhaled once a day (16 Sep 2019 04:40)    MEDICATIONS  (STANDING):  apixaban 5 milliGRAM(s) Oral every 12 hours  aspirin enteric coated 81 milliGRAM(s) Oral daily  atorvastatin 40 milliGRAM(s) Oral at bedtime  baclofen 10 milliGRAM(s) Oral four times a day  buDESOnide 160 MICROgram(s)/formoterol 4.5 MICROgram(s) Inhaler 2 Puff(s) Inhalation two times a day  cefepime   IVPB      cefepime   IVPB 2000 milliGRAM(s) IV Intermittent once  chlorhexidine 4% Liquid 1 Application(s) Topical <User Schedule>  dofetilide 250 MICROGram(s) Oral two times a day  fluticasone propionate 50 MICROgram(s)/spray Nasal Spray 2 Spray(s) Both Nostrils two times a day  furosemide    Tablet 20 milliGRAM(s) Oral daily  gabapentin 400 milliGRAM(s) Oral three times a day  lamoTRIgine 100 milliGRAM(s) Oral two times a day  loratadine 10 milliGRAM(s) Oral daily  metoprolol tartrate 12.5 milliGRAM(s) Oral every 8 hours  pantoprazole    Tablet 40 milliGRAM(s) Oral before breakfast  polyethylene glycol 3350 17 Gram(s) Oral every 12 hours  QUEtiapine 25 milliGRAM(s) Oral daily  QUEtiapine 50 milliGRAM(s) Oral at bedtime  tamsulosin 0.4 milliGRAM(s) Oral at bedtime  tiotropium 18 MICROgram(s) Capsule 1 Capsule(s) Inhalation daily  vancomycin  IVPB 1000 milliGRAM(s) IV Intermittent every 12 hours    MEDICATIONS  (PRN):  acetaminophen   Tablet .. 650 milliGRAM(s) Oral every 6 hours PRN Mild Pain (1 - 3)  clonazePAM  Tablet 0.5 milliGRAM(s) Oral at bedtime PRN agitation  HYDROmorphone   Tablet 2 milliGRAM(s) Oral every 4 hours PRN Moderate Pain (4 - 6)  morphine  IR 15 milliGRAM(s) Oral every 4 hours PRN Severe Pain (7 - 10)      SOCIAL HISTORY:    [X ] Non-smoker  [ ] Smoker  [ ] Alcohol    REVIEW OF SYSTEMS:  CONSTITUTIONAL: No fever, weight loss, or fatigue  CARDIOLOGY: (+) chest pain, shortness of breath. No syncopal episodes.   RESPIRATORY: (+)shortness of breath. No wheezeing.   NEUROLOGICAL: No weakness, no focal deficits to report.  ENDOCRINOLOGICAL: no recent change in diabetic medications.   GI: no BRBPR, no N,V,diarrhea.    PSYCHIATRY: normal mood and affect  HEENT: no nasal discharge, no ecchymosis  SKIN: no ecchymosis, no breakdown  MUSCULOSKELETAL: Full range of motion x4.      PHYSICAL EXAM:  T(C): 37.1 (09-16-19 @ 15:52), Max: 37.1 (09-15-19 @ 18:53)  HR: 60 (09-16-19 @ 15:52) (60 - 95)  BP: 118/62 (09-16-19 @ 15:52) (86/51 - 128/70)  RR: 18 (09-16-19 @ 15:52) (18 - 22)  SpO2: 96% (09-16-19 @ 15:52) (87% - 99%)  Wt(kg): --  I&O's Summary    Daily Height in cm: 167.64 (15 Sep 2019 20:28)    Daily     General Appearance: Normal	  Cardiovascular: Normal S1 S2, No JVD, No murmurs, No edema  Respiratory: Lungs clear to auscultation	  Psychiatry: A & O x 3, Mood & affect appropriate  Gastrointestinal:  Soft, Non-tender  Skin: No rashes, No ecchymoses, No cyanosis	  Neurologic: Non-focal  Extremities: Normal range of motion, No clubbing, cyanosis or edema  Vascular: Peripheral pulses palpable 2+ bilaterally    LABS:	 	                 8.9    8.43  )-----------( 350      ( 16 Sep 2019 11:56 )             28.6     09-16    142  |  104  |  19  ----------------------------<  107<H>  3.8   |  26  |  1.1    Ca    8.9      16 Sep 2019 11:56  Mg     2.1     09-16    TPro  6.3  /  Alb  3.8  /  TBili  0.4  /  DBili  x   /  AST  18  /  ALT  19  /  AlkPhos  170<H>  09-16    CARDIAC MARKERS ( 16 Sep 2019 11:56 )  x     / <0.01 ng/mL / x     / x     / x      CARDIAC MARKERS ( 15 Sep 2019 19:55 )  x     / <0.01 ng/mL / x     / x     / x          PT/INR - ( 15 Sep 2019 19:55 )   PT: 16.50 sec;   INR: 1.44 ratio         PTT - ( 15 Sep 2019 19:55 )  PTT:31.5 sec    proBNP: Serum Pro-Brain Natriuretic Peptide: 166 pg/mL (09-15 @ 19:55)    Lipid Profile:   HgA1c:   TSH:     CARDIAC MARKERS:  Troponin <0.01, CKMB --, CK --/ 09-16-19 @ 11:56  Troponin <0.01, CKMB --, CK --/ 09-15-19 @ 19:55    TELEMETRY EVENTS: none	    ECG:    < from: 12 Lead ECG (09.15.19 @ 18:58) >  Ventricular Rate 60 BPM  Atrial Rate 60 BPM  P-R Interval 160 ms  QRS Duration 132 ms  Q-T Interval 418 ms  QTC Calculation(Bezet) 418 ms  P Axis 85 degrees  R Axis 25 degrees  T Axis 36 degrees    Diagnosis Line Atrial-paced rhythm  Right bundle branch block  Abnormal ECG    Confirmed by JUSTINE TOBIN MD ([id number]) on 9/15/2019 11:16:42 PM    < end of copied text >  	  RADIOLOGY:  < from: Xray Chest 1 View- PORTABLE-Urgent (09.15.19 @ 19:35) >  Impression:      No radiographic evidence ofacute pulmonary disease.    < end of copied text>    < from: CT Chest No Cont (09.16.19 @ 00:55) >  IMPRESSION:    No definite correlate for chest pain.    Bibasilar subsegmental atelectasis with more focal areas of   consolidation/atelectasis at the lung bases; correlate for possible   superimposed pneumonia in the appropriate clinical setting.    New small pericardial effusion measuring up to 0.7 cm.    Foci of air surrounding the AICD pocket, may be attributed to   postoperative changes      MELISSA STAUFFER M.D., RESIDENT RADIOLOGIST  This document has been electronically signed.  ANAND CHAUDHARY M.D., ATTENDING RADIOLOGIST    < end of copied text >    OTHER: 	    PREVIOUS DIAGNOSTIC TESTING:    [ ] Echocardiogram: pending  [ ]  Catheterization:   [ ] Stress Test: < from: NM Nuclear Stress Pharmacologic Multiple (04.21.18 @ 10:41) >  Impression:   1. NORMAL LEXISCAN / REST MYOCARDIAL PERFUSION TOMOGRAPHY, WITH NO   EVIDENCE FOR ISCHEMIA DURING LEXISCAN INFUSION.   2. NORMAL RESTING LEFT VENTRICULAR WALL MOTION AND WALL THICKENING.  3. LEFT VENTRICULAR EJECTION FRACTION OF  61 % WHICH IS WITHIN RANGE OF   NORMAL.     ANAND CHAUDHARY M.D., ATTENDING RADIOLOGIST  This document has been electronically signed. Apr 21 2018 11:07AM    < end of copied text >      ASSESSMENT/PLAN:

## 2019-09-16 NOTE — H&P ADULT - ATTENDING COMMENTS
HPI as above.  Interval history: Pt seen and examined at bedside. Pt was on NRB at bedside but able to speak in full sentences. No cp during the encounter.   Vital Signs (24 Hrs):  T(C): 37.1 (09-16-19 @ 15:52), Max: 37.1 (09-15-19 @ 18:53)  HR: 60 (09-16-19 @ 15:52) (60 - 95)  BP: 118/62 (09-16-19 @ 15:52) (86/51 - 128/70)  RR: 18 (09-16-19 @ 15:52) (18 - 22)  SpO2: 96% (09-16-19 @ 15:52) (87% - 99%)  Wt(kg): --  Daily Height in cm: 167.64 (15 Sep 2019 20:28)    Daily     I&O's Summary    PHYSICAL EXAM:  GENERAL: NAD, obese  HEAD:  Atraumatic, Normocephalic  EYES: EOMI, PERRLA, conjunctiva and sclera clear  NECK: Supple, No JVD  CHEST/LUNG: distant breath sounds given body habitus  HEART: Regular rate and rhythm; No murmurs, rubs, or gallops  ABDOMEN: Soft, Nontender, Nondistended; Bowel sounds present  EXTREMITIES:  2+ Peripheral Pulses, No clubbing, cyanosis, or edema  PSYCH: AAOx3  NEUROLOGY: non-focal  SKIN: No rashes or lesions    Labs reviewed  Imaging reviewed: < from: CT Chest No Cont (09.16.19 @ 00:55) >    IMPRESSION:    No definite correlate for chest pain.    Bibasilar subsegmental atelectasis with more focal areas of   consolidation/atelectasis at the lung bases; correlate for possible   superimposed pneumonia in the appropriate clinical setting.    New small pericardial effusion measuring up to 0.7 cm.    Foci of air surrounding the AICD pocket, may be attributed to   postoperative changes    < end of copied text >    EKG reviewed: < from: 12 Lead ECG (09.15.19 @ 18:58) >    Diagnosis Line Atrial-paced rhythm  Right bundle branch block  Abnormal ECG    < end of copied text >    Plan  #chest pain and sob- possibly 2/2 pericardial effusion, s/p ppm, vs gram neg pna vs PE  -CTS on board follow up reccs  -initial CTA done unable to get contrast as IV blew, will repeat although pt is on eliquis  -CT showed possible focal areas of consolidation, pt desated and still on NRB- started on vanc  and cefipeme, checking mrsa, ID consult  -follow up cardio consult   -follow up EP consult  -NOT chf as pt pbnp wnl  -trop neg x2, ekg  a paced    #pericardial effusion  -new seen on CT, appears small  -check echo  -pt had low BP 80s repeat in 100s, will monitor for tamponade features.     #Progress Note Handoff  Pending (specify):  Consults__CTS, cardio, ID, follow up echo  Family discussion: sherry pt at bedside and agreed to plan  Disposition: Home___/SNF___/Other________/Unknown at this time_____x___

## 2019-09-16 NOTE — H&P ADULT - NSHPPHYSICALEXAM_GEN_ALL_CORE
GENERAL: NAD, lying in bed comfortably  HEAD: Atraumatic, Normocephalic  EYES: EOMI, PERRLA, conjunctiva pink and cornea white  ENT: Normal external ears and nose, no discharges; moist mucous membranes, no erythema on posterior oropharynx  NECK: Supple, nontender to palpation; no JVD  CHEST/LUNG: Clear to auscultation bilaterally; No rales, rhonchi, wheezing, or rubs. Unlabored respirations  HEART: Regular rate and rhythm; No murmurs, rubs, or gallops  ABDOMEN: Soft, nontender, nondistended; no rebound tenderness, no guarding; no hepatomegaly; normoactive/hyperactive/hypoactive bowel sounds  EXTREMITIES:  2+ Peripheral Pulses, brisk capillary refill. No clubbing, cyanosis, or petal edema  NERVOUS SYSTEM: Alert and oriented to person, time, place and situation, speech clear. No focal deficits   MSK: FROM all 4 extremities, full and equal strength  SKIN: No rashes or lesions GENERAL: in mild respiratory distress, NAD, lying in bed comfortably  HEAD: Atraumatic, Normocephalic  EYES: EOMI, PERRLA, conjunctiva pink and cornea white  ENT: Normal external ears and nose, no discharges; moist mucous membranes, no erythema on posterior oropharynx  NECK: Supple, nontender to palpation; no JVD  CHEST/LUNG: Bilateral basilar crackles  HEART: Regular rate and rhythm; No murmurs, rubs, or gallops  ABDOMEN: Soft, nontender, nondistended; no rebound tenderness, no guarding; no hepatomegaly; normoactive bowel sounds; left sided hematoma  EXTREMITIES:  2+ Peripheral Pulses, brisk capillary refill. No clubbing, cyanosis, or petal edema  NERVOUS SYSTEM: Alert and oriented to person, time, place and situation, speech clear. No focal deficits   MSK: FROM all 4 extremities, full and equal strength  SKIN: Multiple bruising on abdomen

## 2019-09-16 NOTE — H&P ADULT - ASSESSMENT
48 years old male with PMHx of arrhythmogenic ventricular tachycardia s/p AICD in 2005, HFpEF (EF 69% in August, 2019), afib/flutter on Eliquis/ASA (full dose), HTN, COPD (on 2L O2 PRN), DLD, multiple sclerosis, epilepsy, PTSD, depression, TIA, presented to the ED with complaint of chest pain and shortness of breath for 2 days.       # Unstable angina secondary to pericardial effusion  - Unlikely from PNA as there is no constitutional symptoms  - Currently stable, hemodynamically stable  - CT chest shows bibasilar subsegmental atelectasis, no PE, new small pericardial effusion of 0.7, bedside echo confirms this  - S/p vancomycin and cefepime, will hold off abx for now  - Check LE duplex, check RVP, check Trop x 2 more  - Wean off O2 to target O2 88-92%  - Consult Cardiology (Dr. Ayoub) and CTS (Marybel)  - Interrogate AICD ([facility])    # ARVT s/p AICD placement  - Currently rhythm at paced with HR 60  - Trop neg x 1, will tread one more set.   - EP to interrogate AICD    # Afib  - CHADsVASc 4 (TIA, HTN, CHF)  - Paced rhythm at 60, continue Lopressor 12.5mg q8hrs  - Continue Eliquis 5mg q12hrs for now (consider stopping it if hgb drops significantly)    # HTN  - Stable  - Continue Lopressor 12.5mg q8hrs      # HFpEF  - ECHO 8/2019 shows EF 69%, no pericardial effusion  - Not in acute heart failure, euvolemic  - Repeat ECHO  - Continue Lasix 20mg q24hrs, Lopressors 12.5mg q6hrs    # COPD  - On home O2 2L PRN  - Stable, continue Symbicort and  Duoneb    # Multiple sclerosis  - Stable  - Continue Tecfidera 240mg q24hrs  - Continue home dose pain medication  - PT/Rehab when stable    # Epilepsy  - No recent history of seizure  - Continue Lamotrigine 100mg q12hrs    # PTSD  - Continue Seroquel 25mg and 50mg, Klonopin 0.5cm      DVT ppx: Eliquis  GI ppx: Protonix  Diet: DOSH  Activity: ambulate s tolerated  Lines: Peripheral IVs  Code status: full code  Dispo: acute 48 years old male with PMHx of arrhythmogenic ventricular tachycardia s/p AICD in 2005, HFpEF (EF 69% in August, 2019), afib/flutter on Eliquis/ASA (full dose), HTN, COPD (on 2L O2 PRN), DLD, multiple sclerosis, epilepsy, PTSD, depression, TIA, presented to the ED with complaint of chest pain and shortness of breath for 2 days.       # Unstable angina secondary to pericardial effusion  - Unlikely from PNA as there is no constitutional symptoms  - Currently stable, hemodynamically stable  - CT chest shows bibasilar subsegmental atelectasis, no PE, new small pericardial effusion of 0.7, bedside echo confirms this  - S/p vancomycin and cefepime, will hold off abx for now  - Check LE duplex, check RVP, check Trop x 2 more  - Wean off O2 to target O2 88-92%  - Consult Cardiology (Dr. Ayoub) and CTS (Marybel)  - Interrogate AICD ([facility])    # ARVT s/p AICD placement  - Currently rhythm at paced with HR 60  - Trop neg x 1, will tread one more set.   - EP to interrogate AICD    # Afib  - CHADsVASc 4 (TIA, HTN, CHF)  - Paced rhythm at 60, continue Lopressor 12.5mg q8hrs  - Continue Eliquis 5mg q12hrs for now (consider stopping it if hgb drops significantly)    # HTN  - Stable  - Continue Lopressor 12.5mg q8hrs    # Normocytic anemia  - Hgb 8.8 on admission, baseline 13 in 8/2019  - No signs of active bleeding  - Continue to monitor, keep Hgb > 8  - Check iron study, B12, folate    # HFpEF  - ECHO 8/2019 shows EF 69%, no pericardial effusion  - Not in acute heart failure, euvolemic  - Repeat ECHO  - Continue Lasix 20mg q24hrs, Lopressors 12.5mg q6hrs    # COPD  - On home O2 2L PRN  - Stable, continue Symbicort and  Duoneb    # Multiple sclerosis  - Stable  - Continue Tecfidera 240mg q24hrs  - Continue home dose pain medication  - PT/Rehab when stable    # Epilepsy  - No recent history of seizure  - Continue Lamotrigine 100mg q12hrs    # PTSD  - Continue Seroquel 25mg and 50mg, Klonopin 0.5cm      DVT ppx: Eliquis  GI ppx: Protonix  Diet: DOSH  Activity: ambulate s tolerated  Lines: Peripheral IVs  Code status: full code  Dispo: acute 48 years old male with PMHx of arrhythmogenic ventricular tachycardia s/p AICD in 2005, HFpEF (EF 69% in August, 2019), afib/flutter on Eliquis/ASA 81mg, HTN, COPD (on 2L O2 PRN), DLD, multiple sclerosis, epilepsy, PTSD, depression, TIA, presented to the ED with complaint of chest pain and shortness of breath for 2 days.       # Unstable angina secondary to pericardial effusion  - Unlikely from PNA as there is no constitutional symptoms  - Currently stable, hemodynamically stable  - CT chest shows bibasilar subsegmental atelectasis, no PE, new small pericardial effusion of 0.7, bedside echo confirms this  - S/p vancomycin and cefepime, will hold off abx for now  - Check LE duplex, check RVP, check Trop x 2 more  - Wean off O2 to target O2 88-92%  - Consult Cardiology (Dr. Ayoub) and CTS (Marybel)  - Interrogate AICD ([facility])    # ARVT s/p AICD placement  - Currently rhythm at paced with HR 60  - Trop neg x 1, will tread one more set.   - EP to interrogate AICD    # Afib  - CHADsVASc 4 (TIA, HTN, CHF)  - Paced rhythm at 60, continue Lopressor 12.5mg q8hrs  - Continue Eliquis 5mg q12hrs for now (consider stopping it if hgb drops significantly)    # HTN  - Stable  - Continue Lopressor 12.5mg q8hrs    # Normocytic anemia  - Hgb 8.8 on admission, baseline 13 in 8/2019  - No signs of active bleeding  - Continue to monitor, keep Hgb > 8  - Check iron study, B12, folate    # HFpEF  - ECHO 8/2019 shows EF 69%, no pericardial effusion  - Not in acute heart failure, euvolemic  - Repeat ECHO  - Continue Lasix 20mg q24hrs, Lopressors 12.5mg q6hrs    # COPD  - On home O2 2L PRN  - Stable, continue Symbicort and  Duoneb    # Multiple sclerosis  - Stable  - Continue Tecfidera 240mg q24hrs  - Continue home dose pain medication  - PT/Rehab when stable    # Epilepsy  - No recent history of seizure  - Continue Lamotrigine 100mg q12hrs    # PTSD  - Continue Seroquel 25mg and 50mg, Klonopin 0.5cm      DVT ppx: Eliquis  GI ppx: Protonix  Diet: DOSH  Activity: ambulate s tolerated  Lines: Peripheral IVs  Code status: full code  Dispo: acute 48 years old male with PMHx of arrhythmogenic ventricular tachycardia s/p AICD in 2005, HFpEF (EF 69% in August, 2019), afib/flutter on Eliquis/ASA 81mg, HTN, COPD (on 2L O2 PRN), DLD, multiple sclerosis, epilepsy, PTSD, depression, TIA, presented to the ED with complaint of chest pain and shortness of breath for 2 days.       # Unstable angina secondary to pericardial effusion  - Unlikely from PNA as there is no constitutional symptoms  - Currently stable, hemodynamically stable  - CT chest shows bibasilar subsegmental atelectasis, no PE, new small pericardial effusion of 0.7, bedside echo confirms this  - S/p vancomycin and cefepime, will hold off abx for now  - Check LE duplex, check RVP, check Trop x 2 more  - Wean off O2 to target O2 88-92%  - Consult Cardiology (Dr. Ayoub) and CTS (Marybel)  - Interrogate AICD ([facility])    # ARVT s/p AICD placement  - Currently rhythm at paced with HR 60  - Trop neg x 1, will tread one more set.   - EP to interrogate AICD    # Afib  - CHADsVASc 4 (TIA, HTN, CHF)  - Paced rhythm at 60, continue Lopressor 12.5mg q8hrs  - Continue Eliquis 5mg q12hrs for now (consider stopping it if hgb drops significantly)    # HTN  - Stable  - Continue Lopressor 12.5mg q8hrs    # Normocytic anemia  - Hgb 8.8 on admission, baseline 13 in 8/2019  - No signs of active bleeding  - Continue to monitor, keep Hgb > 8  - Check iron study, B12, folate    # HFpEF  - ECHO 8/2019 shows EF 69%, no pericardial effusion  - Not in acute heart failure, euvolemic  - Repeat ECHO  - Continue Lasix 20mg q24hrs, Lopressors 12.5mg q6hrs    # COPD  - On home O2 2L PRN  - Stable, continue Symbicort and  Duoneb    # Multiple sclerosis  - Stable  - Continue Tecfidera 240mg q24hrs  - Continue home dose pain medication  - PT/Rehab when stable    # Epilepsy  - No recent episode of seizure  - Continue Lamotrigine 100mg q12hrs and gabapentin 400mg q8hrs    # PTSD  - Continue Seroquel 25mg and 50mg, Klonopin 0.5cm      DVT ppx: Eliquis  GI ppx: Protonix  Diet: DOSH  Activity: ambulate s tolerated  Lines: Peripheral IVs  Code status: full code  Dispo: acute

## 2019-09-16 NOTE — ED ADULT NURSE REASSESSMENT NOTE - NS ED NURSE REASSESS COMMENT FT1
Pt insisting to use non-rebreather mask despite O2 Sat within normal limits (92-94%) with nasal cannula, O2 at 3l/min. Pt states he feels more comfortable with non-rebreather at this time and will switch it off later
Received pt from primary nurse Ofelia for break relief .
endorsed pt to new primary admitting nurse LI Edmondson
Pt sleeping comfortably, denies chest pain/SOB. NSR on cardiac monitor, HR 60. O2 Sat 98%, pt denies difficulty breathing.

## 2019-09-16 NOTE — H&P ADULT - REASON FOR ADMISSION
shortness of breath, pericardial effusion chest pain and shortness of breath secondary to pericardial effusion

## 2019-09-16 NOTE — CHART NOTE - NSCHARTNOTEFT_GEN_A_CORE
consult received, CT scan of chest reviewed with all CT surgeons present this AM. Agree small pericardial effusion noted but no intervention required. Full consult to follow.

## 2019-09-16 NOTE — H&P ADULT - NSHPLABSRESULTS_GEN_ALL_CORE
8.8    7.79  )-----------( 380      ( 15 Sep 2019 19:55 )             28.2   09-15    141  |  101  |  23<H>  ----------------------------<  99  4.2   |  26  |  1.1    Ca    9.4      15 Sep 2019 19:55  Mg     2.0     09-15    TPro  6.3  /  Alb  3.9  /  TBili  0.2  /  DBili  x   /  AST  21  /  ALT  20  /  AlkPhos  160<H>  09-15    PT/INR - ( 15 Sep 2019 19:55 )   PT: 16.50 sec;   INR: 1.44 ratio         PTT - ( 15 Sep 2019 19:55 )  PTT:31.5 sec    CARDIAC MARKERS ( 15 Sep 2019 19:55 )  x     / <0.01 ng/mL / x     / x     / x          < from: CT Chest No Cont (09.16.19 @ 00:55) >    No definite correlate for chest pain.    Bibasilar subsegmental atelectasis with more focal areas of   consolidation/atelectasis at the lung bases; correlate for possible   superimposed pneumonia in the appropriate clinical setting.    New small pericardial effusion measuring up to 0.7 cm.    Foci of air surrounding the AICD pocket, may be attributed to   postoperative changes

## 2019-09-16 NOTE — PHARMACOTHERAPY INTERVENTION NOTE - COMMENTS
Dr. Umanzor was contacted to clarify major DDI of Lamictal and Tikosyn.   Provider confirmed patient's home meds and will monitor pt.

## 2019-09-16 NOTE — CONSULT NOTE ADULT - SUBJECTIVE AND OBJECTIVE BOX
Surgeon: /Barbi/ Victoriano    Consult requesting by: ED    HISTORY OF PRESENT ILLNESS:  48 years old male well know to CTS with PMHx of arrhythmogenic ventricular tachycardia s/p AICD in 2005, HFpEF (EF 69% in August, 2019), afib/flutter on Eliquis/ASA 81mg, HTN, COPD (on 2L O2 PRN), DLD, multiple sclerosis, epilepsy, PTSD, depression, TIA, presented to the ED with complaint of chest pain and shortness of breath for 2 days. Patient was in his usual health 2 days ago until yesterday at 3AM, he woke up with substernal chest pain/pressure, 9/10, constant, radiates to left sided chest, and shortness of breath, with associated nonproductive cough. Patient states that his pain felt like the pain he had when his lead was dislodged years ago. I spoke to patient earlier in the day and instructed him to come to ED for evaluation if pain is getting worse. In the ED patient was unable to tolerate NC because of desaturation of pulse ox to 87% and recurrent chest pain. Patient was placed on NRB.  Patient denies fever/chills, abdominal pain, nausea/vomiting, diarrhea/constipation, or urinary symptoms. He was admitted on 08/29/19 for AICD firing/lead misplacement via laser lead extraction and insertion of new ICD battery. PO he developed a hematoma at the surgical site which required evacuation in the OR.  The remainder of his stay was essentially uneventful. CT of chest noted small pericardial effusion for which CTS was consulted.      NYHA functional class    [ ] Class I (no limitation) [X ] Class II (slight limitation) [ ] Class III (marked limitation) [ ] Class IV (symptoms at rest)    PAST MEDICAL & SURGICAL HISTORY:  PTSD (post-traumatic stress disorder)  Supraventricular arrhythmia: prior history  Cardiomyopathy  CHF (congestive heart failure)  Atrial fibrillation: s/p ablation, on eliquis  Diabetes mellitus: diet controlled  BPH (benign prostatic hyperplasia)  HTN (hypertension)  Seizures  Migraines  Pneumonia  MS (multiple sclerosis)  CAD (coronary artery disease)  Bipolar disorder  Anginal pain  Schizo affective schizophrenia  GERD (gastroesophageal reflux disease)  Seasonal allergies  Hypercholesteremia  COPD (chronic obstructive pulmonary disease)  CVA (cerebral vascular accident)  TIA (transient ischemic attack)  Syncope  Peripheral Neuropathy  Clinical Depression  H/O prior ablation treatment: for Afib  Cardiac pacemaker recipient  H/O hernia repair: right 1972,1991  History of hip replacement  S/P Orchiectomy: L for testicular CA  S/P Implantation of AICD      MEDICATIONS  (STANDING):  apixaban 5 milliGRAM(s) Oral every 12 hours  aspirin enteric coated 81 milliGRAM(s) Oral daily  atorvastatin 40 milliGRAM(s) Oral at bedtime  baclofen 10 milliGRAM(s) Oral four times a day  buDESOnide 160 MICROgram(s)/formoterol 4.5 MICROgram(s) Inhaler 2 Puff(s) Inhalation two times a day  chlorhexidine 4% Liquid 1 Application(s) Topical <User Schedule>  dofetilide 250 MICROGram(s) Oral two times a day  fluticasone propionate 50 MICROgram(s)/spray Nasal Spray 2 Spray(s) Both Nostrils two times a day  furosemide    Tablet 20 milliGRAM(s) Oral daily  gabapentin 400 milliGRAM(s) Oral three times a day  lamoTRIgine 100 milliGRAM(s) Oral two times a day  loratadine 10 milliGRAM(s) Oral daily  metoprolol tartrate 12.5 milliGRAM(s) Oral every 8 hours  pantoprazole    Tablet 40 milliGRAM(s) Oral before breakfast  polyethylene glycol 3350 17 Gram(s) Oral every 12 hours  QUEtiapine 25 milliGRAM(s) Oral daily  QUEtiapine 50 milliGRAM(s) Oral at bedtime  tamsulosin 0.4 milliGRAM(s) Oral at bedtime  tiotropium 18 MICROgram(s) Capsule 1 Capsule(s) Inhalation daily    MEDICATIONS  (PRN):  acetaminophen   Tablet .. 650 milliGRAM(s) Oral every 6 hours PRN Mild Pain (1 - 3)  clonazePAM  Tablet 0.5 milliGRAM(s) Oral at bedtime PRN agitation  HYDROmorphone   Tablet 2 milliGRAM(s) Oral every 4 hours PRN Moderate Pain (4 - 6)  morphine  IR 15 milliGRAM(s) Oral every 4 hours PRN Severe Pain (7 - 10)    Antiplatelet therapy:                           Last dose/amt:    Allergies    dexmethylphenidate (Unknown)  Dilantin (Rash)  dilantin, compazine, neurontin, ritalin, phenergan (Unknown)  Haldol (Unknown)  hydantoin derivatives (Other)  methylphenidate (Unknown)  phenytoin (Unknown)  prochlorperazine (Unknown)  promethazine (Dystonic RXN)  rash/hives (Anaphylaxis)  thioxanthenes (Unknown)    Intolerances        SOCIAL HISTORY:  no current IVm, tobacco or ETOh abuse  Occupation: disability  Lives with: in a facility  FAMILY HISTORY:  Family history of colon cancer in mother  Family history of cardiomyopathy: Mother  Family history of cervical cancer (Mother)  Family history of early CAD (Mother)      Review of Systems  reviewed as per HPI and PMH                                                                                                                                                                                               PHYSICAL EXAM  Vital Signs Last 24 Hrs  T(C): 36.7 (16 Sep 2019 08:18), Max: 37.1 (15 Sep 2019 18:53)  T(F): 98 (16 Sep 2019 08:18), Max: 98.8 (15 Sep 2019 18:53)  HR: 60 (16 Sep 2019 08:18) (60 - 95)  BP: 86/51 (16 Sep 2019 08:18) (86/51 - 128/70)  BP(mean): --  RR: 20 (16 Sep 2019 08:18) (18 - 22)  SpO2: 99% (16 Sep 2019 08:18) (87% - 99%)  Right arm bp: Left arm bp;    CONSTITUTIONAL:                                                            LABS:                        8.9    8.43  )-----------( 350      ( 16 Sep 2019 11:56 )             28.6     09-16    142  |  104  |  19  ----------------------------<  107<H>  3.8   |  26  |  1.1    Ca    8.9      16 Sep 2019 11:56  Mg     2.1     09-16    TPro  6.3  /  Alb  3.8  /  TBili  0.4  /  DBili  x   /  AST  18  /  ALT  19  /  AlkPhos  170<H>  09-16    PT/INR - ( 15 Sep 2019 19:55 )   PT: 16.50 sec;   INR: 1.44 ratio         PTT - ( 15 Sep 2019 19:55 )  PTT:31.5 sec    CARDIAC MARKERS ( 16 Sep 2019 11:56 )  x     / <0.01 ng/mL / x     / x     / x      CARDIAC MARKERS ( 15 Sep 2019 19:55 )  x     / <0.01 ng/mL / x     / x     / x        < from: CT Chest No Cont (09.16.19 @ 00:55) >  IMPRESSION:    No definite correlate for chest pain.    Bibasilar subsegmental atelectasis with more focal areas of   consolidation/atelectasis at the lung bases; correlate for possible   superimposed pneumonia in the appropriate clinical setting.    New small pericardial effusion measuring up to 0.7 cm.    Foci of air surrounding the AICD pocket, may be attributed to   postoperative changes    < end of copied text >    < from: Xray Chest 1 View- PORTABLE-Urgent (09.15.19 @ 19:35) >  Impression:      No radiographic evidence ofacute pulmonary disease.        TTE / ROCIO:    Recommendation: (Procedures/Evaluations)  Continue with medical care      Impression:    49 yo male with extensive medical problems, on home O2, came to ED with CP and dyspnea.  Cardiac work-up thus far negative except for small pericardial effusion.      Assessment/ Plan:    CT of chest reviewed with CT attendings, pericardial effusion appreciated but no intervention needed at this time.  Suggest getting official TTE if not done yet, no results noted.  Will follow as needed.  Attending attestation to follow Surgeon: /Barbi/ Victoriano    Consult requesting by: ED    HISTORY OF PRESENT ILLNESS:  48 years old male well know to CTS with PMHx of arrhythmogenic ventricular tachycardia s/p AICD in 2005, HFpEF (EF 69% in August, 2019), afib/flutter on Eliquis/ASA 81mg, HTN, COPD (on 2L O2 PRN), DLD, multiple sclerosis, epilepsy, PTSD, depression, TIA, presented to the ED with complaint of chest pain and shortness of breath for 2 days. Patient was in his usual health 2 days ago until yesterday at 3AM, he woke up with substernal chest pain/pressure, 9/10, constant, radiates to left sided chest, and shortness of breath, with associated nonproductive cough. Patient states that his pain felt like the pain he had when his lead was dislodged years ago. I spoke to patient earlier in the day and instructed him to come to ED for evaluation if pain is getting worse. In the ED patient was unable to tolerate NC because of desaturation of pulse ox to 87% and recurrent chest pain. Patient was placed on NRB.  Patient denies fever/chills, abdominal pain, nausea/vomiting, diarrhea/constipation, or urinary symptoms. He was admitted on 08/29/19 for AICD firing/lead misplacement via laser lead extraction and insertion of new ICD battery. PO he developed a hematoma at the surgical site which required evacuation in the OR.  The remainder of his stay was essentially uneventful. CT of chest noted small pericardial effusion for which CTS was consulted.      NYHA functional class    [ ] Class I (no limitation) [X ] Class II (slight limitation) [ ] Class III (marked limitation) [ ] Class IV (symptoms at rest)    PAST MEDICAL & SURGICAL HISTORY:  PTSD (post-traumatic stress disorder)  Supraventricular arrhythmia: prior history  Cardiomyopathy  CHF (congestive heart failure)  Atrial fibrillation: s/p ablation, on eliquis  Diabetes mellitus: diet controlled  BPH (benign prostatic hyperplasia)  HTN (hypertension)  Seizures  Migraines  Pneumonia  MS (multiple sclerosis)  CAD (coronary artery disease)  Bipolar disorder  Anginal pain  Schizo affective schizophrenia  GERD (gastroesophageal reflux disease)  Seasonal allergies  Hypercholesteremia  COPD (chronic obstructive pulmonary disease)  CVA (cerebral vascular accident)  TIA (transient ischemic attack)  Syncope  Peripheral Neuropathy  Clinical Depression  H/O prior ablation treatment: for Afib  Cardiac pacemaker recipient  H/O hernia repair: right 1972,1991  History of hip replacement  S/P Orchiectomy: L for testicular CA  S/P Implantation of AICD      MEDICATIONS  (STANDING):  apixaban 5 milliGRAM(s) Oral every 12 hours  aspirin enteric coated 81 milliGRAM(s) Oral daily  atorvastatin 40 milliGRAM(s) Oral at bedtime  baclofen 10 milliGRAM(s) Oral four times a day  buDESOnide 160 MICROgram(s)/formoterol 4.5 MICROgram(s) Inhaler 2 Puff(s) Inhalation two times a day  chlorhexidine 4% Liquid 1 Application(s) Topical <User Schedule>  dofetilide 250 MICROGram(s) Oral two times a day  fluticasone propionate 50 MICROgram(s)/spray Nasal Spray 2 Spray(s) Both Nostrils two times a day  furosemide    Tablet 20 milliGRAM(s) Oral daily  gabapentin 400 milliGRAM(s) Oral three times a day  lamoTRIgine 100 milliGRAM(s) Oral two times a day  loratadine 10 milliGRAM(s) Oral daily  metoprolol tartrate 12.5 milliGRAM(s) Oral every 8 hours  pantoprazole    Tablet 40 milliGRAM(s) Oral before breakfast  polyethylene glycol 3350 17 Gram(s) Oral every 12 hours  QUEtiapine 25 milliGRAM(s) Oral daily  QUEtiapine 50 milliGRAM(s) Oral at bedtime  tamsulosin 0.4 milliGRAM(s) Oral at bedtime  tiotropium 18 MICROgram(s) Capsule 1 Capsule(s) Inhalation daily    MEDICATIONS  (PRN):  acetaminophen   Tablet .. 650 milliGRAM(s) Oral every 6 hours PRN Mild Pain (1 - 3)  clonazePAM  Tablet 0.5 milliGRAM(s) Oral at bedtime PRN agitation  HYDROmorphone   Tablet 2 milliGRAM(s) Oral every 4 hours PRN Moderate Pain (4 - 6)  morphine  IR 15 milliGRAM(s) Oral every 4 hours PRN Severe Pain (7 - 10)    Antiplatelet therapy:                           Last dose/amt:    Allergies    dexmethylphenidate (Unknown)  Dilantin (Rash)  dilantin, compazine, neurontin, ritalin, phenergan (Unknown)  Haldol (Unknown)  hydantoin derivatives (Other)  methylphenidate (Unknown)  phenytoin (Unknown)  prochlorperazine (Unknown)  promethazine (Dystonic RXN)  rash/hives (Anaphylaxis)  thioxanthenes (Unknown)    Intolerances        SOCIAL HISTORY:  no current IVm, tobacco or ETOh abuse  Occupation: disability  Lives with: in a facility  FAMILY HISTORY:  Family history of colon cancer in mother  Family history of cardiomyopathy: Mother  Family history of cervical cancer (Mother)  Family history of early CAD (Mother)      Review of Systems  reviewed as per HPI and PMH                                                                                                                                                                                               PHYSICAL EXAM  Vital Signs Last 24 Hrs  T(C): 36.7 (16 Sep 2019 08:18), Max: 37.1 (15 Sep 2019 18:53)  T(F): 98 (16 Sep 2019 08:18), Max: 98.8 (15 Sep 2019 18:53)  HR: 60 (16 Sep 2019 08:18) (60 - 95)  BP: 86/51 (16 Sep 2019 08:18) (86/51 - 128/70)  BP(mean): --  RR: 20 (16 Sep 2019 08:18) (18 - 22)  SpO2: 99% (16 Sep 2019 08:18) (87% - 99%)  Right arm bp: Left arm bp;    CONSTITUTIONAL:    patient sitting on stretcher, with non-rebreather  alert and oriented, little dyspneic  EOMI  neck supple, trachea midline  coronary; RRR, / murmur  Chest bilateral bs, shallow inspiration bilateral, right lower rales vs rhonchi  abdomen non tender  ext; no gross edema  neuro: moves all extremities                                                          LABS:                        8.9    8.43  )-----------( 350      ( 16 Sep 2019 11:56 )             28.6     09-16    142  |  104  |  19  ----------------------------<  107<H>  3.8   |  26  |  1.1    Ca    8.9      16 Sep 2019 11:56  Mg     2.1     09-16    TPro  6.3  /  Alb  3.8  /  TBili  0.4  /  DBili  x   /  AST  18  /  ALT  19  /  AlkPhos  170<H>  09-16    PT/INR - ( 15 Sep 2019 19:55 )   PT: 16.50 sec;   INR: 1.44 ratio         PTT - ( 15 Sep 2019 19:55 )  PTT:31.5 sec    CARDIAC MARKERS ( 16 Sep 2019 11:56 )  x     / <0.01 ng/mL / x     / x     / x      CARDIAC MARKERS ( 15 Sep 2019 19:55 )  x     / <0.01 ng/mL / x     / x     / x        < from: CT Chest No Cont (09.16.19 @ 00:55) >  IMPRESSION:    No definite correlate for chest pain.    Bibasilar subsegmental atelectasis with more focal areas of   consolidation/atelectasis at the lung bases; correlate for possible   superimposed pneumonia in the appropriate clinical setting.    New small pericardial effusion measuring up to 0.7 cm.    Foci of air surrounding the AICD pocket, may be attributed to   postoperative changes    < end of copied text >    < from: Xray Chest 1 View- PORTABLE-Urgent (09.15.19 @ 19:35) >  Impression:      No radiographic evidence ofacute pulmonary disease.        TTE / ROCIO:    Recommendation: (Procedures/Evaluations)  Continue with medical care  Work up for possible PE  get official TTE      Impression:    47 yo male with extensive medical problems, on home O2, came to ED with CP and dyspnea.  Cardiac work-up thus far negative except for small pericardial effusion.      Assessment/ Plan:    CT of chest reviewed with CT attendings, pericardial effusion appreciated but no intervention needed at this time.  Suggest getting official TTE and CT to r/o pE if not done yet, no results noted.  Will follow as needed.  Attending attestation to follow Surgeon: /Barbi/ Victoriano    Consult requesting by: ED    HISTORY OF PRESENT ILLNESS:  48 years old male well know to CTS with PMHx of arrhythmogenic ventricular tachycardia s/p AICD in 2005, HFpEF (EF 69% in August, 2019), afib/flutter on Eliquis/ASA 81mg, HTN, COPD (on 2L O2 PRN), DLD, multiple sclerosis, epilepsy, PTSD, depression, TIA, presented to the ED with complaint of chest pain and shortness of breath for 2 days. Patient was in his usual health 2 days ago until yesterday at 3AM, he woke up with substernal chest pain/pressure, 9/10, constant, radiates to left sided chest, and shortness of breath, with associated nonproductive cough. Patient states that his pain felt like the pain he had when his lead was dislodged years ago. I spoke to patient earlier in the day and instructed him to come to ED for evaluation if pain is getting worse. In the ED patient was unable to tolerate NC because of desaturation of pulse ox to 87% and recurrent chest pain. Patient was placed on NRB.  Patient denies fever/chills, abdominal pain, nausea/vomiting, diarrhea/constipation, or urinary symptoms. He was admitted on 08/29/19 for AICD firing/lead misplacement via laser lead extraction and insertion of new ICD battery. PO he developed a hematoma at the surgical site which required evacuation in the OR.  The remainder of his stay was essentially uneventful. CT of chest noted small pericardial effusion for which CTS was consulted.      NYHA functional class    [ ] Class I (no limitation) [X ] Class II (slight limitation) [ ] Class III (marked limitation) [ ] Class IV (symptoms at rest)    PAST MEDICAL & SURGICAL HISTORY:  PTSD (post-traumatic stress disorder)  Supraventricular arrhythmia: prior history  Cardiomyopathy  CHF (congestive heart failure)  Atrial fibrillation: s/p ablation, on eliquis  Diabetes mellitus: diet controlled  BPH (benign prostatic hyperplasia)  HTN (hypertension)  Seizures  Migraines  Pneumonia  MS (multiple sclerosis)  CAD (coronary artery disease)  Bipolar disorder  Anginal pain  Schizo affective schizophrenia  GERD (gastroesophageal reflux disease)  Seasonal allergies  Hypercholesteremia  COPD (chronic obstructive pulmonary disease)  CVA (cerebral vascular accident)  TIA (transient ischemic attack)  Syncope  Peripheral Neuropathy  Clinical Depression  H/O prior ablation treatment: for Afib  Cardiac pacemaker recipient  H/O hernia repair: right 1972,1991  History of hip replacement  S/P Orchiectomy: L for testicular CA  S/P Implantation of AICD      MEDICATIONS  (STANDING):  apixaban 5 milliGRAM(s) Oral every 12 hours  aspirin enteric coated 81 milliGRAM(s) Oral daily  atorvastatin 40 milliGRAM(s) Oral at bedtime  baclofen 10 milliGRAM(s) Oral four times a day  buDESOnide 160 MICROgram(s)/formoterol 4.5 MICROgram(s) Inhaler 2 Puff(s) Inhalation two times a day  chlorhexidine 4% Liquid 1 Application(s) Topical <User Schedule>  dofetilide 250 MICROGram(s) Oral two times a day  fluticasone propionate 50 MICROgram(s)/spray Nasal Spray 2 Spray(s) Both Nostrils two times a day  furosemide    Tablet 20 milliGRAM(s) Oral daily  gabapentin 400 milliGRAM(s) Oral three times a day  lamoTRIgine 100 milliGRAM(s) Oral two times a day  loratadine 10 milliGRAM(s) Oral daily  metoprolol tartrate 12.5 milliGRAM(s) Oral every 8 hours  pantoprazole    Tablet 40 milliGRAM(s) Oral before breakfast  polyethylene glycol 3350 17 Gram(s) Oral every 12 hours  QUEtiapine 25 milliGRAM(s) Oral daily  QUEtiapine 50 milliGRAM(s) Oral at bedtime  tamsulosin 0.4 milliGRAM(s) Oral at bedtime  tiotropium 18 MICROgram(s) Capsule 1 Capsule(s) Inhalation daily    MEDICATIONS  (PRN):  acetaminophen   Tablet .. 650 milliGRAM(s) Oral every 6 hours PRN Mild Pain (1 - 3)  clonazePAM  Tablet 0.5 milliGRAM(s) Oral at bedtime PRN agitation  HYDROmorphone   Tablet 2 milliGRAM(s) Oral every 4 hours PRN Moderate Pain (4 - 6)  morphine  IR 15 milliGRAM(s) Oral every 4 hours PRN Severe Pain (7 - 10)    Antiplatelet therapy:                           Last dose/amt:    Allergies    dexmethylphenidate (Unknown)  Dilantin (Rash)  dilantin, compazine, neurontin, ritalin, phenergan (Unknown)  Haldol (Unknown)  hydantoin derivatives (Other)  methylphenidate (Unknown)  phenytoin (Unknown)  prochlorperazine (Unknown)  promethazine (Dystonic RXN)  rash/hives (Anaphylaxis)  thioxanthenes (Unknown)    Intolerances        SOCIAL HISTORY:  no current IVm, tobacco or ETOh abuse  Occupation: disability  Lives with: in a facility  FAMILY HISTORY:  Family history of colon cancer in mother  Family history of cardiomyopathy: Mother  Family history of cervical cancer (Mother)  Family history of early CAD (Mother)      Review of Systems  reviewed as per HPI and PMH                                                                                                                                                                                               PHYSICAL EXAM  Vital Signs Last 24 Hrs  T(C): 36.7 (16 Sep 2019 08:18), Max: 37.1 (15 Sep 2019 18:53)  T(F): 98 (16 Sep 2019 08:18), Max: 98.8 (15 Sep 2019 18:53)  HR: 60 (16 Sep 2019 08:18) (60 - 95)  BP: 86/51 (16 Sep 2019 08:18) (86/51 - 128/70)  BP(mean): --  RR: 20 (16 Sep 2019 08:18) (18 - 22)  SpO2: 99% (16 Sep 2019 08:18) (87% - 99%)  Right arm bp: Left arm bp;    CONSTITUTIONAL:    patient sitting on stretcher, with non-rebreather  alert and oriented, little dyspneic  EOMI  neck supple, trachea midline  coronary; RRR, / murmur  Chest bilateral bs, shallow inspiration bilateral, right lower rales vs rhonchi, tegaderm remove left chest, steri's in place, wound clean and dry, no signs of infection  abdomen non tender  ext; no gross edema  neuro: moves all extremities                                                          LABS:                        8.9    8.43  )-----------( 350      ( 16 Sep 2019 11:56 )             28.6     09-16    142  |  104  |  19  ----------------------------<  107<H>  3.8   |  26  |  1.1    Ca    8.9      16 Sep 2019 11:56  Mg     2.1     09-16    TPro  6.3  /  Alb  3.8  /  TBili  0.4  /  DBili  x   /  AST  18  /  ALT  19  /  AlkPhos  170<H>  09-16    PT/INR - ( 15 Sep 2019 19:55 )   PT: 16.50 sec;   INR: 1.44 ratio         PTT - ( 15 Sep 2019 19:55 )  PTT:31.5 sec    CARDIAC MARKERS ( 16 Sep 2019 11:56 )  x     / <0.01 ng/mL / x     / x     / x      CARDIAC MARKERS ( 15 Sep 2019 19:55 )  x     / <0.01 ng/mL / x     / x     / x        < from: CT Chest No Cont (09.16.19 @ 00:55) >  IMPRESSION:    No definite correlate for chest pain.    Bibasilar subsegmental atelectasis with more focal areas of   consolidation/atelectasis at the lung bases; correlate for possible   superimposed pneumonia in the appropriate clinical setting.    New small pericardial effusion measuring up to 0.7 cm.    Foci of air surrounding the AICD pocket, may be attributed to   postoperative changes    < end of copied text >    < from: Xray Chest 1 View- PORTABLE-Urgent (09.15.19 @ 19:35) >  Impression:      No radiographic evidence ofacute pulmonary disease.        TTE / ROCIO:    Recommendation: (Procedures/Evaluations)  Continue with medical care  Work up for possible PE  get official TTE      Impression:    47 yo male with extensive medical problems, on home O2, came to ED with CP and dyspnea.  Cardiac work-up thus far negative except for small pericardial effusion.      Assessment/ Plan:    CT of chest reviewed with CT attendings, pericardial effusion appreciated but no intervention needed at this time.  Suggest getting official TTE and CT to r/o pE if not done yet, no results noted.  Will follow as needed.  Attending attestation to follow

## 2019-09-17 LAB
ALBUMIN SERPL ELPH-MCNC: 3.5 G/DL — SIGNIFICANT CHANGE UP (ref 3.5–5.2)
ALP SERPL-CCNC: 149 U/L — HIGH (ref 30–115)
ALT FLD-CCNC: 15 U/L — SIGNIFICANT CHANGE UP (ref 0–41)
ANION GAP SERPL CALC-SCNC: 11 MMOL/L — SIGNIFICANT CHANGE UP (ref 7–14)
ANION GAP SERPL CALC-SCNC: 12 MMOL/L — SIGNIFICANT CHANGE UP (ref 7–14)
AST SERPL-CCNC: 13 U/L — SIGNIFICANT CHANGE UP (ref 0–41)
BASOPHILS # BLD AUTO: 0.05 K/UL — SIGNIFICANT CHANGE UP (ref 0–0.2)
BASOPHILS # BLD AUTO: 0.06 K/UL — SIGNIFICANT CHANGE UP (ref 0–0.2)
BASOPHILS NFR BLD AUTO: 0.8 % — SIGNIFICANT CHANGE UP (ref 0–1)
BASOPHILS NFR BLD AUTO: 0.8 % — SIGNIFICANT CHANGE UP (ref 0–1)
BILIRUB SERPL-MCNC: 0.4 MG/DL — SIGNIFICANT CHANGE UP (ref 0.2–1.2)
BUN SERPL-MCNC: 18 MG/DL — SIGNIFICANT CHANGE UP (ref 10–20)
BUN SERPL-MCNC: 18 MG/DL — SIGNIFICANT CHANGE UP (ref 10–20)
CALCIUM SERPL-MCNC: 8.8 MG/DL — SIGNIFICANT CHANGE UP (ref 8.5–10.1)
CALCIUM SERPL-MCNC: 8.9 MG/DL — SIGNIFICANT CHANGE UP (ref 8.5–10.1)
CHLORIDE SERPL-SCNC: 105 MMOL/L — SIGNIFICANT CHANGE UP (ref 98–110)
CHLORIDE SERPL-SCNC: 106 MMOL/L — SIGNIFICANT CHANGE UP (ref 98–110)
CO2 SERPL-SCNC: 25 MMOL/L — SIGNIFICANT CHANGE UP (ref 17–32)
CO2 SERPL-SCNC: 25 MMOL/L — SIGNIFICANT CHANGE UP (ref 17–32)
CREAT SERPL-MCNC: 1.1 MG/DL — SIGNIFICANT CHANGE UP (ref 0.7–1.5)
CREAT SERPL-MCNC: 1.1 MG/DL — SIGNIFICANT CHANGE UP (ref 0.7–1.5)
EOSINOPHIL # BLD AUTO: 0.14 K/UL — SIGNIFICANT CHANGE UP (ref 0–0.7)
EOSINOPHIL # BLD AUTO: 0.16 K/UL — SIGNIFICANT CHANGE UP (ref 0–0.7)
EOSINOPHIL NFR BLD AUTO: 2 % — SIGNIFICANT CHANGE UP (ref 0–8)
EOSINOPHIL NFR BLD AUTO: 2.3 % — SIGNIFICANT CHANGE UP (ref 0–8)
FERRITIN SERPL-MCNC: 191 NG/ML — SIGNIFICANT CHANGE UP (ref 30–400)
FOLATE SERPL-MCNC: 3.4 NG/ML — LOW
GLUCOSE BLDC GLUCOMTR-MCNC: 126 MG/DL — HIGH (ref 70–99)
GLUCOSE SERPL-MCNC: 121 MG/DL — HIGH (ref 70–99)
GLUCOSE SERPL-MCNC: 123 MG/DL — HIGH (ref 70–99)
HCT VFR BLD CALC: 24.9 % — LOW (ref 42–52)
HCT VFR BLD CALC: 27.4 % — LOW (ref 42–52)
HGB BLD-MCNC: 7.9 G/DL — LOW (ref 14–18)
HGB BLD-MCNC: 8.5 G/DL — LOW (ref 14–18)
IMM GRANULOCYTES NFR BLD AUTO: 0.6 % — HIGH (ref 0.1–0.3)
IMM GRANULOCYTES NFR BLD AUTO: 0.8 % — HIGH (ref 0.1–0.3)
IRON SATN MFR SERPL: 22 UG/DL — LOW (ref 35–150)
IRON SATN MFR SERPL: 6 % — LOW (ref 15–50)
LYMPHOCYTES # BLD AUTO: 1.34 K/UL — SIGNIFICANT CHANGE UP (ref 1.2–3.4)
LYMPHOCYTES # BLD AUTO: 1.82 K/UL — SIGNIFICANT CHANGE UP (ref 1.2–3.4)
LYMPHOCYTES # BLD AUTO: 22.2 % — SIGNIFICANT CHANGE UP (ref 20.5–51.1)
LYMPHOCYTES # BLD AUTO: 23 % — SIGNIFICANT CHANGE UP (ref 20.5–51.1)
MAGNESIUM SERPL-MCNC: 2.3 MG/DL — SIGNIFICANT CHANGE UP (ref 1.8–2.4)
MCHC RBC-ENTMCNC: 28.8 PG — SIGNIFICANT CHANGE UP (ref 27–31)
MCHC RBC-ENTMCNC: 29.8 PG — SIGNIFICANT CHANGE UP (ref 27–31)
MCHC RBC-ENTMCNC: 31 G/DL — LOW (ref 32–37)
MCHC RBC-ENTMCNC: 31.7 G/DL — LOW (ref 32–37)
MCV RBC AUTO: 92.9 FL — SIGNIFICANT CHANGE UP (ref 80–94)
MCV RBC AUTO: 94 FL — SIGNIFICANT CHANGE UP (ref 80–94)
MONOCYTES # BLD AUTO: 0.58 K/UL — SIGNIFICANT CHANGE UP (ref 0.1–0.6)
MONOCYTES # BLD AUTO: 0.94 K/UL — HIGH (ref 0.1–0.6)
MONOCYTES NFR BLD AUTO: 11.9 % — HIGH (ref 1.7–9.3)
MONOCYTES NFR BLD AUTO: 9.6 % — HIGH (ref 1.7–9.3)
MRSA PCR RESULT.: NEGATIVE — SIGNIFICANT CHANGE UP
NEUTROPHILS # BLD AUTO: 3.88 K/UL — SIGNIFICANT CHANGE UP (ref 1.4–6.5)
NEUTROPHILS # BLD AUTO: 4.88 K/UL — SIGNIFICANT CHANGE UP (ref 1.4–6.5)
NEUTROPHILS NFR BLD AUTO: 61.7 % — SIGNIFICANT CHANGE UP (ref 42.2–75.2)
NEUTROPHILS NFR BLD AUTO: 64.3 % — SIGNIFICANT CHANGE UP (ref 42.2–75.2)
NRBC # BLD: 0 /100 WBCS — SIGNIFICANT CHANGE UP (ref 0–0)
NRBC # BLD: 0 /100 WBCS — SIGNIFICANT CHANGE UP (ref 0–0)
PHOSPHATE SERPL-MCNC: 4 MG/DL — SIGNIFICANT CHANGE UP (ref 2.1–4.9)
PLATELET # BLD AUTO: 318 K/UL — SIGNIFICANT CHANGE UP (ref 130–400)
PLATELET # BLD AUTO: 355 K/UL — SIGNIFICANT CHANGE UP (ref 130–400)
POTASSIUM SERPL-MCNC: 3.7 MMOL/L — SIGNIFICANT CHANGE UP (ref 3.5–5)
POTASSIUM SERPL-MCNC: 3.9 MMOL/L — SIGNIFICANT CHANGE UP (ref 3.5–5)
POTASSIUM SERPL-SCNC: 3.7 MMOL/L — SIGNIFICANT CHANGE UP (ref 3.5–5)
POTASSIUM SERPL-SCNC: 3.9 MMOL/L — SIGNIFICANT CHANGE UP (ref 3.5–5)
PROT SERPL-MCNC: 5.6 G/DL — LOW (ref 6–8)
RAPID RVP RESULT: SIGNIFICANT CHANGE UP
RBC # BLD: 2.65 M/UL — LOW (ref 4.7–6.1)
RBC # BLD: 2.95 M/UL — LOW (ref 4.7–6.1)
RBC # FLD: 15.2 % — HIGH (ref 11.5–14.5)
RBC # FLD: 15.5 % — HIGH (ref 11.5–14.5)
SODIUM SERPL-SCNC: 142 MMOL/L — SIGNIFICANT CHANGE UP (ref 135–146)
SODIUM SERPL-SCNC: 142 MMOL/L — SIGNIFICANT CHANGE UP (ref 135–146)
TIBC SERPL-MCNC: 346 UG/DL — SIGNIFICANT CHANGE UP (ref 220–430)
UIBC SERPL-MCNC: 324 UG/DL — SIGNIFICANT CHANGE UP (ref 110–370)
VIT B12 SERPL-MCNC: 377 PG/ML — SIGNIFICANT CHANGE UP (ref 232–1245)
WBC # BLD: 6.04 K/UL — SIGNIFICANT CHANGE UP (ref 4.8–10.8)
WBC # BLD: 7.91 K/UL — SIGNIFICANT CHANGE UP (ref 4.8–10.8)
WBC # FLD AUTO: 6.04 K/UL — SIGNIFICANT CHANGE UP (ref 4.8–10.8)
WBC # FLD AUTO: 7.91 K/UL — SIGNIFICANT CHANGE UP (ref 4.8–10.8)

## 2019-09-17 PROCEDURE — 71275 CT ANGIOGRAPHY CHEST: CPT | Mod: 26

## 2019-09-17 PROCEDURE — 99233 SBSQ HOSP IP/OBS HIGH 50: CPT

## 2019-09-17 RX ORDER — IRON SUCROSE 20 MG/ML
200 INJECTION, SOLUTION INTRAVENOUS EVERY 24 HOURS
Refills: 0 | Status: COMPLETED | OUTPATIENT
Start: 2019-09-17 | End: 2019-09-19

## 2019-09-17 RX ORDER — IPRATROPIUM BROMIDE 0.2 MG/ML
500 SOLUTION, NON-ORAL INHALATION ONCE
Refills: 0 | Status: COMPLETED | OUTPATIENT
Start: 2019-09-17 | End: 2019-09-17

## 2019-09-17 RX ADMIN — MORPHINE SULFATE 15 MILLIGRAM(S): 50 CAPSULE, EXTENDED RELEASE ORAL at 01:27

## 2019-09-17 RX ADMIN — QUETIAPINE FUMARATE 25 MILLIGRAM(S): 200 TABLET, FILM COATED ORAL at 12:25

## 2019-09-17 RX ADMIN — Medication 10 MILLIGRAM(S): at 17:28

## 2019-09-17 RX ADMIN — DOFETILIDE 250 MICROGRAM(S): 0.25 CAPSULE ORAL at 17:28

## 2019-09-17 RX ADMIN — Medication 10 MILLIGRAM(S): at 06:49

## 2019-09-17 RX ADMIN — Medication 0.5 MILLIGRAM(S): at 23:11

## 2019-09-17 RX ADMIN — Medication 12.5 MILLIGRAM(S): at 13:45

## 2019-09-17 RX ADMIN — LAMOTRIGINE 100 MILLIGRAM(S): 25 TABLET, ORALLY DISINTEGRATING ORAL at 06:49

## 2019-09-17 RX ADMIN — MORPHINE SULFATE 15 MILLIGRAM(S): 50 CAPSULE, EXTENDED RELEASE ORAL at 21:55

## 2019-09-17 RX ADMIN — GABAPENTIN 400 MILLIGRAM(S): 400 CAPSULE ORAL at 21:55

## 2019-09-17 RX ADMIN — Medication 2 SPRAY(S): at 17:33

## 2019-09-17 RX ADMIN — APIXABAN 5 MILLIGRAM(S): 2.5 TABLET, FILM COATED ORAL at 06:48

## 2019-09-17 RX ADMIN — Medication 10 MILLIGRAM(S): at 23:11

## 2019-09-17 RX ADMIN — Medication 500 MICROGRAM(S): at 21:23

## 2019-09-17 RX ADMIN — GABAPENTIN 400 MILLIGRAM(S): 400 CAPSULE ORAL at 06:48

## 2019-09-17 RX ADMIN — BUDESONIDE AND FORMOTEROL FUMARATE DIHYDRATE 2 PUFF(S): 160; 4.5 AEROSOL RESPIRATORY (INHALATION) at 10:20

## 2019-09-17 RX ADMIN — Medication 81 MILLIGRAM(S): at 12:26

## 2019-09-17 RX ADMIN — Medication 20 MILLIGRAM(S): at 06:48

## 2019-09-17 RX ADMIN — MORPHINE SULFATE 15 MILLIGRAM(S): 50 CAPSULE, EXTENDED RELEASE ORAL at 02:41

## 2019-09-17 RX ADMIN — LAMOTRIGINE 100 MILLIGRAM(S): 25 TABLET, ORALLY DISINTEGRATING ORAL at 17:28

## 2019-09-17 RX ADMIN — QUETIAPINE FUMARATE 50 MILLIGRAM(S): 200 TABLET, FILM COATED ORAL at 21:55

## 2019-09-17 RX ADMIN — MORPHINE SULFATE 15 MILLIGRAM(S): 50 CAPSULE, EXTENDED RELEASE ORAL at 10:51

## 2019-09-17 RX ADMIN — ATORVASTATIN CALCIUM 40 MILLIGRAM(S): 80 TABLET, FILM COATED ORAL at 21:55

## 2019-09-17 RX ADMIN — LORATADINE 10 MILLIGRAM(S): 10 TABLET ORAL at 12:24

## 2019-09-17 RX ADMIN — Medication 12.5 MILLIGRAM(S): at 06:52

## 2019-09-17 RX ADMIN — Medication 650 MILLIGRAM(S): at 07:41

## 2019-09-17 RX ADMIN — TIOTROPIUM BROMIDE 1 CAPSULE(S): 18 CAPSULE ORAL; RESPIRATORY (INHALATION) at 10:21

## 2019-09-17 RX ADMIN — Medication 12.5 MILLIGRAM(S): at 21:55

## 2019-09-17 RX ADMIN — DOFETILIDE 250 MICROGRAM(S): 0.25 CAPSULE ORAL at 06:49

## 2019-09-17 RX ADMIN — MORPHINE SULFATE 15 MILLIGRAM(S): 50 CAPSULE, EXTENDED RELEASE ORAL at 22:30

## 2019-09-17 RX ADMIN — CEFEPIME 100 MILLIGRAM(S): 1 INJECTION, POWDER, FOR SOLUTION INTRAMUSCULAR; INTRAVENOUS at 17:34

## 2019-09-17 RX ADMIN — CEFEPIME 100 MILLIGRAM(S): 1 INJECTION, POWDER, FOR SOLUTION INTRAMUSCULAR; INTRAVENOUS at 06:49

## 2019-09-17 RX ADMIN — TAMSULOSIN HYDROCHLORIDE 0.4 MILLIGRAM(S): 0.4 CAPSULE ORAL at 21:55

## 2019-09-17 RX ADMIN — PANTOPRAZOLE SODIUM 40 MILLIGRAM(S): 20 TABLET, DELAYED RELEASE ORAL at 06:48

## 2019-09-17 RX ADMIN — GABAPENTIN 400 MILLIGRAM(S): 400 CAPSULE ORAL at 13:45

## 2019-09-17 RX ADMIN — IRON SUCROSE 110 MILLIGRAM(S): 20 INJECTION, SOLUTION INTRAVENOUS at 16:16

## 2019-09-17 RX ADMIN — BUDESONIDE AND FORMOTEROL FUMARATE DIHYDRATE 2 PUFF(S): 160; 4.5 AEROSOL RESPIRATORY (INHALATION) at 20:55

## 2019-09-17 RX ADMIN — MORPHINE SULFATE 15 MILLIGRAM(S): 50 CAPSULE, EXTENDED RELEASE ORAL at 17:28

## 2019-09-17 RX ADMIN — MORPHINE SULFATE 15 MILLIGRAM(S): 50 CAPSULE, EXTENDED RELEASE ORAL at 18:00

## 2019-09-17 RX ADMIN — Medication 2 SPRAY(S): at 06:53

## 2019-09-17 RX ADMIN — Medication 10 MILLIGRAM(S): at 12:24

## 2019-09-17 RX ADMIN — Medication 250 MILLIGRAM(S): at 06:50

## 2019-09-17 NOTE — PROGRESS NOTE ADULT - SUBJECTIVE AND OBJECTIVE BOX
DAILY PROGRESS NOTE  ===========================================================  Patient Information:   Hospital Day:</CUATE HIGH  /  48y  /  Male  /b> 1d /  MRN#: [id number]  Working / Admitting Diagnosis:  1. Shortness of breath    |:::::::::::::::::::::::::::::| SUBJECTIVE |:::::::::::::::::::::::::::::::|  OVERNIGHT EVENTS:   No acute events noted. Pt was weaned off Venturi mask to Nasal Canula, feeling less SOB and better.   Refused CTA overnight, counseled this AM and agreeable.   Denies chest pain / SOB / palpitations / Nausea / Vomiting / constipation / diarrhea or abdominal pain.   ROS otherwise negative.    Past Medical History:   Anginal pain   Atrial fibrillation s/p ablation, on eliquis  Bipolar disorder   BPH (benign prostatic hyperplasia)   CAD (coronary artery disease)   Cardiomyopathy   CHF (congestive heart failure)   Clinical Depression   COPD (chronic obstructive pulmonary disease)   CVA (cerebral vascular accident)   Diabetes mellitus diet controlled  GERD (gastroesophageal reflux disease)   HTN (hypertension)   Hypercholesteremia   Migraines   MS (multiple sclerosis)   Peripheral Neuropathy   Pneumonia   PTSD (post-traumatic stress disorder)   Schizo affective schizophrenia   Seasonal allergies   Seizures   Supraventricular arrhythmia prior history  Syncope   TIA (transient ischemic attack).     PAST SURGICAL HISTORY:  Cardiac pacemaker recipient   H/O hernia repair right 1972,1991  H/O prior ablation treatment for Afib  History of hip replacement   S/P Implantation of AICD   S/P Orchiectomy L for testicular CA.     FAMILY HISTORY:  Family history of cardiomyopathy, Mother  Family history of colon cancer in mother    ALLERGIES:  dexmethylphenidate (Unknown)  Dilantin (Rash)  dilantin, compazine, neurontin, ritalin, phenergan (Unknown)  Haldol (Unknown)  hydantoin derivatives (Other)  methylphenidate (Unknown)  phenytoin (Unknown)  prochlorperazine (Unknown)  promethazine (Dystonic RXN)  rash/hives (Anaphylaxis)  thioxanthenes (Unknown)    HOME MEDICATIONS:  acetaminophen 650 mg oral tablet: 650 milligram(s) orally 4 times a day, As Needed (16 Sep 2019 04:40)  aspirin 81 mg oral delayed release tablet: 1 tab(s) orally once a day (16 Sep 2019 04:40)  atorvastatin 40 mg oral tablet: 1 tab(s) orally once a day (at bedtime) (16 Sep 2019 04:40)  baclofen 10 mg oral tablet: 1 tab(s) orally 4 times a day (16 Sep 2019 04:40)  budesonide-formoterol 160 mcg-4.5 mcg/inh inhalation aerosol: 2 puff(s) inhaled 2 times a day (16 Sep 2019 04:40)  Cepacol Extra Strength Menthol 10 mg mucous membrane lozenge: 1 anderson(s) mucous membrane every 6 hours, As Needed (16 Sep 2019 04:40)  cholecalciferol oral tablet: 2000 unit(s) orally once a day (16 Sep 2019 04:40)  Claritin 10 mg oral tablet: 1 tab(s) orally once a day (16 Sep 2019 04:40)  clonazePAM 0.5 mg oral tablet: 1 tab(s) orally once a day (at bedtime), As needed, agitation (16 Sep 2019 04:40)  Eliquis 5 mg oral tablet: 1 tab(s) orally 2 times a day. RESUME TAKING ON 9/14/2019 (16 Sep 2019 04:40)  Flonase 50 mcg/inh nasal spray: 2 spray(s) nasal once a day (16 Sep 2019 04:40)  furosemide 20 mg oral tablet: 1 tab(s) orally once a day (16 Sep 2019 04:40)  gabapentin 400 mg oral capsule: 1 cap(s) orally 3 times a day (16 Sep 2019 04:40)  HYDROmorphone 2 mg oral tablet: 1 tab(s) orally every 4 hours, As Needed (16 Sep 2019 04:40)  lamoTRIgine 100 mg oral tablet: 1 tab(s) orally 2 times a day (16 Sep 2019 04:40)  metoprolol tartrate 25 mg oral tablet: 0.5 tab(s) orally every 8 hours (16 Sep 2019 04:40)  Mi-Acid oral suspension: 30 milliliter(s) orally 4 times a day, As Needed (16 Sep 2019 04:40)  morphine 15 mg oral tablet: 1 tab(s) orally every 4 hours, As needed, Severe Pain (7 - 10) (16 Sep 2019 04:40)  Nitrostat 0.4 mg sublingual tablet: sublingual 3 times a day, As Needed; may repeat after 5 min until relief (16 Sep 2019 04:40)  omeprazole 20 mg oral delayed release capsule: 1 cap(s) orally 2 times a day (16 Sep 2019 04:40)  polyethylene glycol 3350 oral powder for reconstitution: 17 gram(s) orally every 12 hours (16 Sep 2019 04:40)  QUEtiapine 25 mg oral tablet: 1 tab(s) orally once a day (16 Sep 2019 04:40)  SEROquel 50 mg oral tablet: 1 tab(s) orally once a day (at bedtime) (16 Sep 2019 04:40)  tamsulosin 0.4 mg oral capsule: 1 cap(s) orally once a day (at bedtime) (16 Sep 2019 04:40)  Tecfidera 240 mg oral delayed release capsule: 1 cap(s) orally once a day (16 Sep 2019 04:40)  Tikosyn 250 mcg oral capsule: 1 cap(s) orally 2 times a day (16 Sep 2019 04:40)  tiotropium 18 mcg inhalation capsule: 1 cap(s) inhaled once a day (16 Sep 2019 04:40)      |:::::::::::::::::::::::::::| OBJECTIVE |:::::::::::::::::::::::::::|    VITAL SIGNS: Last 24 Hours  T(F): 97.3 (17 Sep 2019 07:54), Max: 98.8 (16 Sep 2019 15:52)  HR: 60 (17 Sep 2019 07:54) (60 - 60)  BP: 94/55 (17 Sep 2019 07:54) (94/55 - 118/62)  RR: 18 (17 Sep 2019 07:54) (18 - 18)  SpO2: 96% (17 Sep 2019 07:54) (96% - 98%)    PHYSICAL EXAM:  GENERAL:   Awake, alert; NAD.  HEENT:  Head NC/AT; Conjunctivae pink, Sclera anicteric & non-injected; Oral mucosa moist.   NECK:   Supple. NO JVD  CARDIO:   RRR; S1 & S2 audible  RESP:  No respiratory distress or accessory muscle use. CTAB  GI:   Soft/ NT/ND / No guarding; No rebound tenderness.  EXT:   Without any cyanosis, clubbing, rash, lesions or edema.     LAB RESULTS:                        7.9    6.04  )-----------( 318      ( 17 Sep 2019 07:24 )             24.9     09-17    142  |  105  |  18  ----------------------------<  121<H>  3.9   |  25  |  1.1    Ca    8.9      17 Sep 2019 07:24  Phos  4.0     09-17  Mg     2.3     09-17    TPro  5.6<L>  /  Alb  3.5  /  TBili  0.4  /  DBili  x   /  AST  13  /  ALT  15  /  AlkPhos  149<H>  09-17    PT/INR - ( 15 Sep 2019 19:55 )   PT: 16.50 sec;   INR: 1.44 ratio    PTT - ( 15 Sep 2019 19:55 )  PTT:31.5 sec    Troponin T, Serum: <0.01 ng/mL (09-16-19 @ 11:56)    CARDIAC MARKERS ( 16 Sep 2019 11:56 )  x     / <0.01 ng/mL / x     / x     / x      CARDIAC MARKERS ( 15 Sep 2019 19:55 )  x     / <0.01 ng/mL / x     / x     / x        RADIOLOGY:  < from: CT Chest No Cont (09.16.19 @ 00:55) >  IMPRESSION:    No definite correlate for chest pain.    Bibasilar subsegmental atelectasis with more focal areas of   consolidation/atelectasis at the lung bases; correlate for possible   superimposed pneumonia in the appropriate clinical setting.    New small pericardial effusion measuring up to 0.7 cm.    Foci of air surrounding the AICD pocket, may be attributed to   postoperative changes      < end of copied text >    INPATIENT MEDICATIONS:  apixaban 5 milliGRAM(s) Oral every 12 hours  aspirin enteric coated 81 milliGRAM(s) Oral daily  atorvastatin 40 milliGRAM(s) Oral at bedtime  baclofen 10 milliGRAM(s) Oral four times a day  buDESOnide 160 MICROgram(s)/formoterol 4.5 MICROgram(s) Inhaler 2 Puff(s) Inhalation two times a day  cefepime   IVPB      cefepime   IVPB 2000 milliGRAM(s) IV Intermittent every 12 hours  chlorhexidine 4% Liquid 1 Application(s) Topical <User Schedule>  dofetilide 250 MICROGram(s) Oral two times a day  fluticasone propionate 50 MICROgram(s)/spray Nasal Spray 2 Spray(s) Both Nostrils two times a day  furosemide    Tablet 20 milliGRAM(s) Oral daily  gabapentin 400 milliGRAM(s) Oral three times a day  influenza   Vaccine 0.5 milliLiter(s) IntraMuscular once  lamoTRIgine 100 milliGRAM(s) Oral two times a day  loratadine 10 milliGRAM(s) Oral daily  metoprolol tartrate 12.5 milliGRAM(s) Oral every 8 hours  pantoprazole    Tablet 40 milliGRAM(s) Oral before breakfast  polyethylene glycol 3350 17 Gram(s) Oral every 12 hours  QUEtiapine 25 milliGRAM(s) Oral daily  QUEtiapine 50 milliGRAM(s) Oral at bedtime  tamsulosin 0.4 milliGRAM(s) Oral at bedtime  tiotropium 18 MICROgram(s) Capsule 1 Capsule(s) Inhalation daily  vancomycin  IVPB 1000 milliGRAM(s) IV Intermittent every 12 hours    PRN MEDICATIONS  acetaminophen   Tablet .. 650 milliGRAM(s) Oral every 6 hours PRN  clonazePAM  Tablet 0.5 milliGRAM(s) Oral at bedtime PRN  HYDROmorphone   Tablet 2 milliGRAM(s) Oral every 4 hours PRN  morphine  IR 15 milliGRAM(s) Oral every 4 hours PRN    ------------------------------------------------------------------------------------------------------------

## 2019-09-17 NOTE — PROGRESS NOTE ADULT - ASSESSMENT
48M PMHx of arrhythmogenic ventricular tachycardia s/p AICD in 2005, HFpEF (EF 69% in August, 2019), afib/flutter on Eliquis/ASA 81mg, HTN, COPD (on 2L O2 PRN), DLD, multiple sclerosis, epilepsy, PTSD, depression, TIA, presented to the ED with complaint of chest pain and shortness of breath for 2 days.     # Chest pain and Sob possibly pericardial effusion vs gram neg pna vs PE  - CTS / Cardio / EPS all following. Will repeat CTA to r/o PE (low suspicion given compliant with Eliquis)  - continue Empiric treatment given improved with ABX and CT possible focal areas of consolidation. DC Vanc given MRSA negative. ID c/s pending. Wean o2 as tolerated.   - Echo with no effusion, No tamponade clinically. Low BP only during sleep. doubt CHF given not clinically overloaded.     # ARVT s/p AICD placement - Currently rhythm at paced with HR 60   # Afib - Paced rhythm at 60, continue Lopressor 12.5mg q8hrs and Eliquis 5 q12  # HTN - Stable. Lopressor 12.5mg q8hrs  # Normocytic anemia - Hgb 8.8 on admission, baseline 13 in 8/2019 No signs of active bleeding  # HFpEF - Not in acute heart failure, euvolemic. Continue Lasix 20mg q24hrs, Lopressors 12.5mg q6hrs  # COPD - On home O2 2L PRN. continue Symbicort and  Duoneb  # Multiple sclerosis - Stable Tecfidera 240mg q24hrs.  PT/Rehab when stable  # Epilepsy - Continue Lamotrigine 100mg q12hrs and gabapentin 400mg q8hrs  # PTSD - Continue Seroquel 25mg and 50mg, Klonopin 0.5cm  # DVT ppx - Eliquis  # GI ppx - Protonix  # Diet - DASH  # Activity - ambulate s tolerated  # Code status - Full code  # Dispo: acute 48M PMHx of arrhythmogenic ventricular tachycardia s/p AICD in 2005, HFpEF (EF 69% in August, 2019), afib/flutter on Eliquis/ASA 81mg, HTN, COPD (on 2L O2 PRN), DLD, multiple sclerosis, epilepsy, PTSD, depression, TIA, presented to the ED with complaint of chest pain and shortness of breath for 2 days.     # Chest pain and Sob possibly pericardial effusion vs gram neg pna vs PE  - CTS / Cardio / EPS all following. Will repeat CTA to r/o PE (low suspicion given compliant with Eliquis)  - continue Empiric treatment given improved with ABX and CT possible focal areas of consolidation. DC Vanc given MRSA negative. ID c/s pending. Wean o2 as tolerated.   - Echo with no effusion, No tamponade clinically. Low BP only during sleep. doubt CHF given not clinically overloaded.     # ARVT s/p AICD placement - Currently rhythm at paced with HR 60   # Afib - Paced rhythm at 60, continue Lopressor 12.5mg q8hrs and Eliquis 5 q12  # HTN - Stable. Lopressor 12.5mg q8hrs  # Normocytic anemia - Hgb 8.8 on admission, baseline 13 8/2019, start Ferrous sulfate  # HFpEF - Not in acute heart failure, euvolemic. Continue Lasix 20mg q24hrs, Lopressors 12.5mg q6hrs  # COPD - On home O2 2L PRN. continue Symbicort and  Duoneb  # Multiple sclerosis - Stable Tecfidera 240mg q24hrs.  PT/Rehab when stable  # Epilepsy - Continue Lamotrigine 100mg q12hrs and gabapentin 400mg q8hrs  # PTSD - Continue Seroquel 25mg and 50mg, Klonopin 0.5cm  # DVT ppx - Eliquis  # GI ppx - Protonix  # Diet - DASH  # Activity - ambulate s tolerated  # Code status - Full code  # Dispo: acute 48M PMHx of arrhythmogenic ventricular tachycardia s/p AICD in 2005, HFpEF (EF 69% in August, 2019), afib/flutter on Eliquis/ASA 81mg, HTN, COPD (on 2L O2 PRN), DLD, multiple sclerosis, epilepsy, PTSD, depression, TIA, presented to the ED with complaint of chest pain and shortness of breath for 2 days.     # Chest pain and Sob possibly pericardial effusion vs gram neg pna vs PE  - CTS / Cardio / EPS all following. Will repeat CTA to r/o PE (low suspicion given compliant with Eliquis) follow up CTA  - continue Empiric treatment given improved with ABX and CT possible focal areas of consolidation. DC Vanc given MRSA negative. ID c/s pending. Wean o2 as tolerated.   - Echo with no effusion, No tamponade clinically. Low BP only during sleep. doubt CHF given not clinically overloaded.     # Normocytic anemia - Hgb 7.9 on admission, baseline 13 8/2019, start venofer 200 mg x3 days, GI consult for possible intervention, repeat cbc in evening, holding eliquis today    # ARVT s/p AICD placement - Currently rhythm at paced with HR 60   # Afib - Paced rhythm at 60, continue Lopressor 12.5mg q8hrs and Eliquis 5 q12  # HTN - Stable. Lopressor 12.5mg q8hrs  # Chronic HFpEF - Not in acute heart failure, euvolemic. Continue Lasix 20mg q24hrs, Lopressors 12.5mg q6hrs  # COPD - On home O2 2L PRN. continue Symbicort and  Duoneb  # Multiple sclerosis - Stable Tecfidera 240mg q24hrs.  PT/Rehab when stable  # Epilepsy - Continue Lamotrigine 100mg q12hrs and gabapentin 400mg q8hrs  # PTSD - Continue Seroquel 25mg and 50mg, Klonopin 0.5cm  # DVT ppx - Eliquis  # GI ppx - Protonix  # Diet - DASH  # Activity - ambulate s tolerated  # Code status - Full code  # Dispo: acute

## 2019-09-18 LAB
ANION GAP SERPL CALC-SCNC: 13 MMOL/L — SIGNIFICANT CHANGE UP (ref 7–14)
BUN SERPL-MCNC: 12 MG/DL — SIGNIFICANT CHANGE UP (ref 10–20)
CALCIUM SERPL-MCNC: 9.2 MG/DL — SIGNIFICANT CHANGE UP (ref 8.5–10.1)
CHLORIDE SERPL-SCNC: 103 MMOL/L — SIGNIFICANT CHANGE UP (ref 98–110)
CO2 SERPL-SCNC: 25 MMOL/L — SIGNIFICANT CHANGE UP (ref 17–32)
CREAT SERPL-MCNC: 0.9 MG/DL — SIGNIFICANT CHANGE UP (ref 0.7–1.5)
ERYTHROCYTE [SEDIMENTATION RATE] IN BLOOD: 79 MM/HR — HIGH (ref 0–10)
GLUCOSE SERPL-MCNC: 106 MG/DL — HIGH (ref 70–99)
HCT VFR BLD CALC: 28.2 % — LOW (ref 42–52)
HGB BLD-MCNC: 8.7 G/DL — LOW (ref 14–18)
MAGNESIUM SERPL-MCNC: 2.1 MG/DL — SIGNIFICANT CHANGE UP (ref 1.8–2.4)
MCHC RBC-ENTMCNC: 28.7 PG — SIGNIFICANT CHANGE UP (ref 27–31)
MCHC RBC-ENTMCNC: 30.9 G/DL — LOW (ref 32–37)
MCV RBC AUTO: 93.1 FL — SIGNIFICANT CHANGE UP (ref 80–94)
NRBC # BLD: 0 /100 WBCS — SIGNIFICANT CHANGE UP (ref 0–0)
PHOSPHATE SERPL-MCNC: 3.4 MG/DL — SIGNIFICANT CHANGE UP (ref 2.1–4.9)
PLATELET # BLD AUTO: 345 K/UL — SIGNIFICANT CHANGE UP (ref 130–400)
POTASSIUM SERPL-MCNC: 4 MMOL/L — SIGNIFICANT CHANGE UP (ref 3.5–5)
POTASSIUM SERPL-SCNC: 4 MMOL/L — SIGNIFICANT CHANGE UP (ref 3.5–5)
RBC # BLD: 3.03 M/UL — LOW (ref 4.7–6.1)
RBC # FLD: 15.2 % — HIGH (ref 11.5–14.5)
SODIUM SERPL-SCNC: 141 MMOL/L — SIGNIFICANT CHANGE UP (ref 135–146)
WBC # BLD: 6.57 K/UL — SIGNIFICANT CHANGE UP (ref 4.8–10.8)
WBC # FLD AUTO: 6.57 K/UL — SIGNIFICANT CHANGE UP (ref 4.8–10.8)

## 2019-09-18 PROCEDURE — 99233 SBSQ HOSP IP/OBS HIGH 50: CPT

## 2019-09-18 RX ORDER — KETOROLAC TROMETHAMINE 30 MG/ML
10 SYRINGE (ML) INJECTION EVERY 6 HOURS
Refills: 0 | Status: DISCONTINUED | OUTPATIENT
Start: 2019-09-18 | End: 2019-09-18

## 2019-09-18 RX ORDER — FOLIC ACID 0.8 MG
1 TABLET ORAL DAILY
Refills: 0 | Status: DISCONTINUED | OUTPATIENT
Start: 2019-09-18 | End: 2019-09-20

## 2019-09-18 RX ORDER — ENOXAPARIN SODIUM 100 MG/ML
40 INJECTION SUBCUTANEOUS DAILY
Refills: 0 | Status: DISCONTINUED | OUTPATIENT
Start: 2019-09-18 | End: 2019-09-18

## 2019-09-18 RX ADMIN — MORPHINE SULFATE 15 MILLIGRAM(S): 50 CAPSULE, EXTENDED RELEASE ORAL at 22:45

## 2019-09-18 RX ADMIN — DOFETILIDE 250 MICROGRAM(S): 0.25 CAPSULE ORAL at 17:55

## 2019-09-18 RX ADMIN — GABAPENTIN 400 MILLIGRAM(S): 400 CAPSULE ORAL at 05:53

## 2019-09-18 RX ADMIN — Medication 12.5 MILLIGRAM(S): at 13:03

## 2019-09-18 RX ADMIN — BUDESONIDE AND FORMOTEROL FUMARATE DIHYDRATE 2 PUFF(S): 160; 4.5 AEROSOL RESPIRATORY (INHALATION) at 21:41

## 2019-09-18 RX ADMIN — Medication 40 MILLIGRAM(S): at 17:55

## 2019-09-18 RX ADMIN — PANTOPRAZOLE SODIUM 40 MILLIGRAM(S): 20 TABLET, DELAYED RELEASE ORAL at 07:23

## 2019-09-18 RX ADMIN — MORPHINE SULFATE 15 MILLIGRAM(S): 50 CAPSULE, EXTENDED RELEASE ORAL at 17:54

## 2019-09-18 RX ADMIN — Medication 2 SPRAY(S): at 18:00

## 2019-09-18 RX ADMIN — Medication 2 SPRAY(S): at 05:53

## 2019-09-18 RX ADMIN — BUDESONIDE AND FORMOTEROL FUMARATE DIHYDRATE 2 PUFF(S): 160; 4.5 AEROSOL RESPIRATORY (INHALATION) at 07:23

## 2019-09-18 RX ADMIN — LORATADINE 10 MILLIGRAM(S): 10 TABLET ORAL at 11:12

## 2019-09-18 RX ADMIN — DOFETILIDE 250 MICROGRAM(S): 0.25 CAPSULE ORAL at 05:52

## 2019-09-18 RX ADMIN — Medication 100 MILLIGRAM(S): at 17:54

## 2019-09-18 RX ADMIN — Medication 10 MILLIGRAM(S): at 17:55

## 2019-09-18 RX ADMIN — Medication 10 MILLIGRAM(S): at 11:12

## 2019-09-18 RX ADMIN — QUETIAPINE FUMARATE 50 MILLIGRAM(S): 200 TABLET, FILM COATED ORAL at 21:25

## 2019-09-18 RX ADMIN — Medication 10 MILLIGRAM(S): at 23:20

## 2019-09-18 RX ADMIN — MORPHINE SULFATE 15 MILLIGRAM(S): 50 CAPSULE, EXTENDED RELEASE ORAL at 05:52

## 2019-09-18 RX ADMIN — CEFEPIME 100 MILLIGRAM(S): 1 INJECTION, POWDER, FOR SOLUTION INTRAMUSCULAR; INTRAVENOUS at 05:52

## 2019-09-18 RX ADMIN — Medication 0.5 MILLIGRAM(S): at 23:20

## 2019-09-18 RX ADMIN — Medication 20 MILLIGRAM(S): at 05:53

## 2019-09-18 RX ADMIN — LAMOTRIGINE 100 MILLIGRAM(S): 25 TABLET, ORALLY DISINTEGRATING ORAL at 17:55

## 2019-09-18 RX ADMIN — TIOTROPIUM BROMIDE 1 CAPSULE(S): 18 CAPSULE ORAL; RESPIRATORY (INHALATION) at 13:36

## 2019-09-18 RX ADMIN — Medication 12.5 MILLIGRAM(S): at 21:25

## 2019-09-18 RX ADMIN — Medication 81 MILLIGRAM(S): at 11:12

## 2019-09-18 RX ADMIN — GABAPENTIN 400 MILLIGRAM(S): 400 CAPSULE ORAL at 21:25

## 2019-09-18 RX ADMIN — Medication 650 MILLIGRAM(S): at 11:16

## 2019-09-18 RX ADMIN — MORPHINE SULFATE 15 MILLIGRAM(S): 50 CAPSULE, EXTENDED RELEASE ORAL at 10:37

## 2019-09-18 RX ADMIN — Medication 10 MILLIGRAM(S): at 05:53

## 2019-09-18 RX ADMIN — GABAPENTIN 400 MILLIGRAM(S): 400 CAPSULE ORAL at 13:03

## 2019-09-18 RX ADMIN — IRON SUCROSE 110 MILLIGRAM(S): 20 INJECTION, SOLUTION INTRAVENOUS at 16:20

## 2019-09-18 RX ADMIN — LAMOTRIGINE 100 MILLIGRAM(S): 25 TABLET, ORALLY DISINTEGRATING ORAL at 05:53

## 2019-09-18 RX ADMIN — TAMSULOSIN HYDROCHLORIDE 0.4 MILLIGRAM(S): 0.4 CAPSULE ORAL at 21:25

## 2019-09-18 RX ADMIN — ATORVASTATIN CALCIUM 40 MILLIGRAM(S): 80 TABLET, FILM COATED ORAL at 21:25

## 2019-09-18 RX ADMIN — Medication 12.5 MILLIGRAM(S): at 05:53

## 2019-09-18 RX ADMIN — POLYETHYLENE GLYCOL 3350 17 GRAM(S): 17 POWDER, FOR SOLUTION ORAL at 05:52

## 2019-09-18 RX ADMIN — QUETIAPINE FUMARATE 25 MILLIGRAM(S): 200 TABLET, FILM COATED ORAL at 11:12

## 2019-09-18 RX ADMIN — MORPHINE SULFATE 15 MILLIGRAM(S): 50 CAPSULE, EXTENDED RELEASE ORAL at 22:01

## 2019-09-18 RX ADMIN — MORPHINE SULFATE 15 MILLIGRAM(S): 50 CAPSULE, EXTENDED RELEASE ORAL at 07:00

## 2019-09-18 NOTE — PROGRESS NOTE ADULT - ASSESSMENT
48 years old male with PMHx of arrhythmogenic ventricular tachycardia s/p AICD in 2005, HFpEF (EF 69% in August, 2019), afib/flutter on Eliquis/ASA 81mg, HTN, COPD (on 2L O2 PRN), DLD, multiple sclerosis, epilepsy, PTSD, depression, TIA, presented to the ED with complaint of chest pain and shortness of breath for 2 days. Patient was in his usual health 2 days ago until yesterday at 3AM, he woke up with substernal chest pain/pressure, 9/10, constant, radiates to left sided chest, and shortness of breath, with associated nonproductive cough. Patient states that his pain felt like the pain he had when his lead was dislodged years ago. He called [facility] and was instructed to come to ED for evaluation if pain is getting worse. Of note patient was recently admitted on 08/29/19 for AICD firing/lead misplacement and discharged on 09/11/19 (found to have hematoma of AICD pocket and ICD battery depletion s/p removal of defib lead and atrial lead with laser extraction (old RV lead was used for temporary pacing).     # Atypical chest pain-resolved  -Troponin T, Serum: <0.01 ng/mL (09.16.19 @ 11:56), Troponin T, Serum: <0.01 ng/mL (09.15.19 @ 19:55)  - 12 Lead ECG (09.15.19 @ 18:58) >Line Atrial-paced rhythm, Right bundle branch block  - no events on tele  - evaluated by cardiology    # Acute on chronic respiratory failure on home oxygen  -  CT Angio Chest w/ IV Cont (09.17.19 @ 15:50) >No central pulmonary embolism. Trace left pleural effusion with bibasilar atelectasis. Small pericardial effusion, increased since prior.   -  Transthoracic Echocardiogram (09.16.19 @ 17:05) >Left ventricular ejection fraction, by visual estimation, is 55 to 60%. Normal left ventricular size and wall thicknesses, with normal systolic and diastolic function.A small pericardial effusion is present. There is no evidence of cardiac tamponade.  - Started prednisone 40mg qd  - c/w budesonide, spiriva  - check RVP    # H/o VT s/p AICD, H/o Afib/aflutter, cardiomyopathy  - evaluated by EP  - c/w dofetilide, lasix, metoprolol  - restart eliquis once cleared by GI    # Acute on chronic normocytic anemia with folate deficiency  - Folate, Serum: 3.4 ng/mL (09.17.19 @ 07:24)  - Vitamin B12, Serum: 377 pg/mL (09.17.19 @ 07:24)  - % Saturation, Iron: 6 % (09.17.19 @ 07:24),Iron Total, Serum: 22 ug/dL (09.17.19 @ 07:24), Ferritin, Serum: 191 ng/mL (09.17.19 @ 07:24)  - Start folic acid  - start ferrous sulphate  - GI eval    # H/o CAD  - C/w ASA, statin, metoprolol    # H/o Seizures  - seizure precautions  - c/w lamotrigine    #H/o MS  - not in exacerbation  - c/w baclofen    # H/o Bipolar disoder/ PTSD/ schizo affective disorder  - c/w home meds    # H/o BPH  - c/w flomax    # H/o DM type 2  - diet controlled  - monitor  FS    # H/o peripheral neuropathy  - c/w neurontin    # GERD  - c/w protonix    # DVT prophylaxis    #Full code    # Pending: F/u RVP, GI eval  # Discussed with patient  # Disposition: home when stable 48 years old male with PMHx of arrhythmogenic ventricular tachycardia s/p AICD in 2005, HFpEF (EF 69% in August, 2019), afib/flutter on Eliquis/ASA 81mg, HTN, COPD (on 2L O2 PRN), DLD, multiple sclerosis, epilepsy, PTSD, depression, TIA, presented to the ED with complaint of chest pain and shortness of breath for 2 days. Patient was in his usual health 2 days ago until yesterday at 3AM, he woke up with substernal chest pain/pressure, 9/10, constant, radiates to left sided chest, and shortness of breath, with associated nonproductive cough. Patient states that his pain felt like the pain he had when his lead was dislodged years ago. He called [facility] and was instructed to come to ED for evaluation if pain is getting worse. Of note patient was recently admitted on 08/29/19 for AICD firing/lead misplacement and discharged on 09/11/19 (found to have hematoma of AICD pocket and ICD battery depletion s/p removal of defib lead and atrial lead with laser extraction (old RV lead was used for temporary pacing).     #Possible pericarditis and small pericardial effusion (acute)  - repeating TTE  - Per Id: started on po Doxycycline 100 mg q12h for 5 days    # Atypical chest pain- (resolved)  -Troponin T, Serum: <0.01 ng/mL (09.16.19 @ 11:56), Troponin T, Serum: <0.01 ng/mL (09.15.19 @ 19:55)  - 12 Lead ECG (09.15.19 @ 18:58) >Line Atrial-paced rhythm, Right bundle branch block  - no events on tele  - evaluated by cardiology    # Acute on chronic respiratory failure on home oxygen - 2L NC at home, on 3L NC here satting at 97%  -  CT Angio Chest w/ IV Cont (09.17.19 @ 15:50) >No central pulmonary embolism. Trace left pleural effusion with bibasilar atelectasis. Small pericardial effusion, increased since prior.   -  Transthoracic Echocardiogram (09.16.19 @ 17:05) >Left ventricular ejection fraction, by visual estimation, is 55 to 60%. Normal left ventricular size and wall thicknesses, with normal systolic and diastolic function. A small pericardial effusion is present. There is no evidence of cardiac tamponade.  - Started prednisone 40mg qd  - c/w budesonide, spiriva  - RVP negative    # H/o VT s/p AICD, H/o Afib/aflutter, cardiomyopathy  - evaluated by EP  - c/w dofetilide, lasix, metoprolol  - restart eliquis once cleared by GI    # Acute on chronic normocytic anemia with folate deficiency  - Folate, Serum: 3.4 ng/mL (09.17.19 @ 07:24)  - Vitamin B12, Serum: 377 pg/mL (09.17.19 @ 07:24)  - % Saturation, Iron: 6 % (09.17.19 @ 07:24),Iron Total, Serum: 22 ug/dL (09.17.19 @ 07:24), Ferritin, Serum: 191 ng/mL (09.17.19 @ 07:24)  - Started folic acid  - started ferrous sulphate  - GI eval    # H/o CAD  - C/w ASA, statin, metoprolol    # H/o Seizures  - seizure precautions  - c/w lamotrigine    #H/o MS  - not in exacerbation  - c/w baclofen    # H/o Bipolar disoder/ PTSD/ schizo affective disorder  - c/w home meds    # H/o BPH  - c/w flomax    # H/o DM type 2  - diet controlled  - monitor  FS    # H/o peripheral neuropathy  - c/w neurontin    # GERD  - c/w protonix    # DVT prophylaxis    #Full code    # Pending: GI eval    # Disposition: home when stable

## 2019-09-18 NOTE — PROGRESS NOTE ADULT - SUBJECTIVE AND OBJECTIVE BOX
Patient is a 48y old  Male who presents with a chief complaint of chest pain and shortness of breath (18 Sep 2019 10:12)    Patient was seen and examined.  Pt complains of sob  but looks comfortable  Denies chest pain, palpitation today  Denies fever, chills  Denies any other complaints.  All systems reviewed positive history as mentioned above.    PAST MEDICAL & SURGICAL HISTORY:  PTSD (post-traumatic stress disorder)  Supraventricular arrhythmia: prior history  Cardiomyopathy  CHF (congestive heart failure)  Atrial fibrillation: s/p ablation, on eliquis  Diabetes mellitus: diet controlled  BPH (benign prostatic hyperplasia)  HTN (hypertension)  Seizures  Migraines  MS (multiple sclerosis)  CAD (coronary artery disease)  Bipolar disorder  Anginal pain  Schizo affective schizophrenia  GERD (gastroesophageal reflux disease)  Seasonal allergies  Hypercholesteremia  COPD (chronic obstructive pulmonary disease)  CVA (cerebral vascular accident)  TIA (transient ischemic attack)  Syncope  Peripheral Neuropathy  Clinical Depression  Cardiac pacemaker recipient  H/O hernia repair: right 1972,1991  History of hip replacement  S/P Orchiectomy: L for testicular CA  S/P Implantation of AICD    Allergies  dexmethylphenidate (Unknown)  Dilantin (Rash)  dilantin, compazine, neurontin, ritalin, phenergan (Unknown)  Haldol (Unknown)  hydantoin derivatives (Other)  methylphenidate (Unknown)  phenytoin (Unknown)  prochlorperazine (Unknown)  promethazine (Dystonic RXN)  rash/hives (Anaphylaxis)  thioxanthenes (Unknown)    MEDICATIONS  (STANDING):  aspirin enteric coated 81 milliGRAM(s) Oral daily  atorvastatin 40 milliGRAM(s) Oral at bedtime  baclofen 10 milliGRAM(s) Oral four times a day  buDESOnide 160 MICROgram(s)/formoterol 4.5 MICROgram(s) Inhaler 2 Puff(s) Inhalation two times a day  chlorhexidine 4% Liquid 1 Application(s) Topical <User Schedule>  dofetilide 250 MICROGram(s) Oral two times a day  fluticasone propionate 50 MICROgram(s)/spray Nasal Spray 2 Spray(s) Both Nostrils two times a day  furosemide    Tablet 20 milliGRAM(s) Oral daily  gabapentin 400 milliGRAM(s) Oral three times a day  influenza   Vaccine 0.5 milliLiter(s) IntraMuscular once  iron sucrose IVPB 200 milliGRAM(s) IV Intermittent every 24 hours  lamoTRIgine 100 milliGRAM(s) Oral two times a day  loratadine 10 milliGRAM(s) Oral daily  metoprolol tartrate 12.5 milliGRAM(s) Oral every 8 hours  pantoprazole    Tablet 40 milliGRAM(s) Oral before breakfast  polyethylene glycol 3350 17 Gram(s) Oral every 12 hours  QUEtiapine 25 milliGRAM(s) Oral daily  QUEtiapine 50 milliGRAM(s) Oral at bedtime  tamsulosin 0.4 milliGRAM(s) Oral at bedtime  tiotropium 18 MICROgram(s) Capsule 1 Capsule(s) Inhalation daily    MEDICATIONS  (PRN):  acetaminophen   Tablet .. 650 milliGRAM(s) Oral every 6 hours PRN Mild Pain (1 - 3)  clonazePAM  Tablet 0.5 milliGRAM(s) Oral at bedtime PRN agitation  HYDROmorphone   Tablet 2 milliGRAM(s) Oral every 4 hours PRN Moderate Pain (4 - 6)  morphine  IR 15 milliGRAM(s) Oral every 4 hours PRN Severe Pain (7 - 10)    Vital Signs Last 24 Hrs  T(C): 36.8  T(F): 98.3  HR: 60  BP: 104/58  BP(mean): --  RR: 20  SpO2: 97%    O/E:  Awake, alert, not in distress.  HEENT: atraumatic, EOMI.  Chest: clear  CVS: SIS2 +, no murmur.  P/A: obese , BS+  CNS: non focal.  Ext: no edema feet.  Skin: no rash, no ulcers.  All systems reviewed positive findings as above.    POCT Blood Glucose.: 126 mg/dL (17 Sep 2019 08:04)                          8.7<L>  6.57  )-----------( 345      ( 18 Sep 2019 08:04 )             28.2<L>                        8.5<L>  7.91  )-----------( 355      ( 17 Sep 2019 20:43 )             27.4<L>    09-18    141  |  103  |  12  ----------------------------<  106<H>  4.0   |  25  |  0.9  09-17    142  |  105  |  18  ----------------------------<  121<H>  3.9   |  25  |  1.1    Ca    9.2      18 Sep 2019 08:04  Ca    8.9      17 Sep 2019 07:24  Phos  3.4     09-18  Mg     2.1     09-18    TPro  5.6<L>  /  Alb  3.5  /  TBili  0.4  /  DBili  x   /  AST  13  /  ALT  15  /  AlkPhos  149<H>  09-17

## 2019-09-18 NOTE — CONSULT NOTE ADULT - REASON FOR ADMISSION
chest pain and shortness of breath secondary to pericardial effusion

## 2019-09-18 NOTE — PROGRESS NOTE ADULT - ASSESSMENT
48 years old male with PMHx of arrhythmogenic ventricular tachycardia s/p AICD in 2005, HFpEF (EF 69% in August, 2019), afib/flutter on Eliquis/ASA 81mg, HTN, COPD (on 2L O2 PRN), DLD, multiple sclerosis, epilepsy, PTSD, depression, TIA, admitted 2 weeks ago for ICD firing - had lead revision/extraction with pocket hematoma post op.  presented to the ED with complaint of chest pain and shortness of breath for 2 days.    Acute hypoxic respiratory failure  VT s/p AICD  trace pericardial effusion  Afib/flutter on Eliquis  Anemia    Plan   - recommend resume tele while pt is in the hospital  - Hgb today is 8.7 (which is unchanged from admission).  Hgb of 7.9 was likely lab error.  Resume eliquis if no invasive procedures planned and cleared by GI. 48 years old male with PMHx of arrhythmogenic ventricular tachycardia s/p AICD in 2005, HFpEF (EF 69% in August, 2019), afib/flutter on Eliquis/ASA 81mg, HTN, COPD (on 2L O2 PRN), DLD, multiple sclerosis, epilepsy, PTSD, depression, TIA, admitted 2 weeks ago for ICD firing - had lead revision/extraction with pocket hematoma post op.  presented to the ED with complaint of chest pain and shortness of breath for 2 days.    Acute hypoxic respiratory failure  VT s/p AICD  trace pericardial effusion  Afib/flutter on Eliquis  Anemia    Plan   - recommend resume tele while pt is in the hospital  - repeat echocardiogram tomorrow morning  - hold eliquis  - stop lovenox

## 2019-09-18 NOTE — PROGRESS NOTE ADULT - SUBJECTIVE AND OBJECTIVE BOX
HPI  Patient is a 48y old Male who presents with a chief complaint of chest pain and shortness of breath secondary to pericardial effusion (18 Sep 2019 13:41)    Currently admitted to medicine with the primary diagnosis of Pericardial effusion     Today is hospital day 2d.     INTERVAL HPI / OVERNIGHT EVENTS:  Patient was examined and seen at bedside. This morning he is resting comfortably in bed and reports no new issues or overnight events.     ROS: Otherwise unremarkable     PAST MEDICAL & SURGICAL HISTORY  PTSD (post-traumatic stress disorder)  Supraventricular arrhythmia: prior history  Cardiomyopathy  CHF (congestive heart failure)  Atrial fibrillation: s/p ablation, on eliquis  Diabetes mellitus: diet controlled  BPH (benign prostatic hyperplasia)  HTN (hypertension)  Seizures  Migraines  Pneumonia  MS (multiple sclerosis)  CAD (coronary artery disease)  Bipolar disorder  Anginal pain  Schizo affective schizophrenia  GERD (gastroesophageal reflux disease)  Seasonal allergies  Hypercholesteremia  COPD (chronic obstructive pulmonary disease)  CVA (cerebral vascular accident)  TIA (transient ischemic attack)  Syncope  Peripheral Neuropathy  Clinical Depression  H/O prior ablation treatment: for Afib  Cardiac pacemaker recipient  H/O hernia repair: right 1972,1991  History of hip replacement  S/P Orchiectomy: L for testicular CA  S/P Implantation of AICD    ALLERGIES  dexmethylphenidate (Unknown)  Dilantin (Rash)  dilantin, compazine, neurontin, ritalin, phenergan (Unknown)  Haldol (Unknown)  hydantoin derivatives (Other)  methylphenidate (Unknown)  phenytoin (Unknown)  prochlorperazine (Unknown)  promethazine (Dystonic RXN)  rash/hives (Anaphylaxis)  thioxanthenes (Unknown)    MEDICATIONS  STANDING MEDICATIONS  aspirin enteric coated 81 milliGRAM(s) Oral daily  atorvastatin 40 milliGRAM(s) Oral at bedtime  baclofen 10 milliGRAM(s) Oral four times a day  buDESOnide 160 MICROgram(s)/formoterol 4.5 MICROgram(s) Inhaler 2 Puff(s) Inhalation two times a day  chlorhexidine 4% Liquid 1 Application(s) Topical <User Schedule>  dofetilide 250 MICROGram(s) Oral two times a day  doxycycline hyclate Capsule 100 milliGRAM(s) Oral every 12 hours  fluticasone propionate 50 MICROgram(s)/spray Nasal Spray 2 Spray(s) Both Nostrils two times a day  folic acid 1 milliGRAM(s) Oral daily  furosemide    Tablet 20 milliGRAM(s) Oral daily  gabapentin 400 milliGRAM(s) Oral three times a day  influenza   Vaccine 0.5 milliLiter(s) IntraMuscular once  iron sucrose IVPB 200 milliGRAM(s) IV Intermittent every 24 hours  lamoTRIgine 100 milliGRAM(s) Oral two times a day  loratadine 10 milliGRAM(s) Oral daily  metoprolol tartrate 12.5 milliGRAM(s) Oral every 8 hours  pantoprazole    Tablet 40 milliGRAM(s) Oral before breakfast  polyethylene glycol 3350 17 Gram(s) Oral every 12 hours  predniSONE   Tablet 40 milliGRAM(s) Oral daily  QUEtiapine 25 milliGRAM(s) Oral daily  QUEtiapine 50 milliGRAM(s) Oral at bedtime  tamsulosin 0.4 milliGRAM(s) Oral at bedtime  tiotropium 18 MICROgram(s) Capsule 1 Capsule(s) Inhalation daily    PRN MEDICATIONS  acetaminophen   Tablet .. 650 milliGRAM(s) Oral every 6 hours PRN  clonazePAM  Tablet 0.5 milliGRAM(s) Oral at bedtime PRN  HYDROmorphone   Tablet 2 milliGRAM(s) Oral every 4 hours PRN  morphine  IR 15 milliGRAM(s) Oral every 4 hours PRN    VITALS:  T(F): 100.8  HR: 60  BP: 114/54  RR: 18  SpO2: 96%    PHYSICAL EXAM  GEN: NAD, Resting comfortably in bed  PULM: Clear to auscultation bilaterally, No wheezes  CVS: Regular rate and rhythm, S1-S2, no murmurs  ABD: Soft, non-tender, non-distended, no guarding  EXT: No edema  NEURO: AAOx3, no focal deficits    LABS                        8.7    6.57  )-----------( 345      ( 18 Sep 2019 08:04 )             28.2     09-18    141  |  103  |  12  ----------------------------<  106<H>  4.0   |  25  |  0.9    Ca    9.2      18 Sep 2019 08:04  Phos  3.4     09-18  Mg     2.1     09-18    TPro  5.6<L>  /  Alb  3.5  /  TBili  0.4  /  DBili  x   /  AST  13  /  ALT  15  /  AlkPhos  149<H>  09-17          Sedimentation Rate, Erythrocyte: 79 mm/Hr <H> (09-18-19 @ 09:19)          RADIOLOGY

## 2019-09-18 NOTE — PROGRESS NOTE ADULT - SUBJECTIVE AND OBJECTIVE BOX
INTERVAL HPI/OVERNIGHT EVENTS:  Pt was taken off tele yesterday.  He says last night he had palpitations on and off for 30 minutes.  His breathing is improved but he is still requiring NC.    Eliquis was stopped due to low hemoglobin.  Pt denies melena, BRBPR, hematemesis    Chest CT negative for PE    MEDICATIONS  (STANDING):  aspirin enteric coated 81 milliGRAM(s) Oral daily  atorvastatin 40 milliGRAM(s) Oral at bedtime  baclofen 10 milliGRAM(s) Oral four times a day  buDESOnide 160 MICROgram(s)/formoterol 4.5 MICROgram(s) Inhaler 2 Puff(s) Inhalation two times a day  chlorhexidine 4% Liquid 1 Application(s) Topical <User Schedule>  dofetilide 250 MICROGram(s) Oral two times a day  fluticasone propionate 50 MICROgram(s)/spray Nasal Spray 2 Spray(s) Both Nostrils two times a day  furosemide    Tablet 20 milliGRAM(s) Oral daily  gabapentin 400 milliGRAM(s) Oral three times a day  influenza   Vaccine 0.5 milliLiter(s) IntraMuscular once  iron sucrose IVPB 200 milliGRAM(s) IV Intermittent every 24 hours  lamoTRIgine 100 milliGRAM(s) Oral two times a day  loratadine 10 milliGRAM(s) Oral daily  metoprolol tartrate 12.5 milliGRAM(s) Oral every 8 hours  pantoprazole    Tablet 40 milliGRAM(s) Oral before breakfast  polyethylene glycol 3350 17 Gram(s) Oral every 12 hours  QUEtiapine 25 milliGRAM(s) Oral daily  QUEtiapine 50 milliGRAM(s) Oral at bedtime  tamsulosin 0.4 milliGRAM(s) Oral at bedtime  tiotropium 18 MICROgram(s) Capsule 1 Capsule(s) Inhalation daily    MEDICATIONS  (PRN):  acetaminophen   Tablet .. 650 milliGRAM(s) Oral every 6 hours PRN Mild Pain (1 - 3)  clonazePAM  Tablet 0.5 milliGRAM(s) Oral at bedtime PRN agitation  HYDROmorphone   Tablet 2 milliGRAM(s) Oral every 4 hours PRN Moderate Pain (4 - 6)  ketorolac 10 milliGRAM(s) Oral every 6 hours PRN Mild Pain (1 - 3)  morphine  IR 15 milliGRAM(s) Oral every 4 hours PRN Severe Pain (7 - 10)      Allergies    dexmethylphenidate (Unknown)  Dilantin (Rash)  dilantin, compazine, neurontin, ritalin, phenergan (Unknown)  Haldol (Unknown)  hydantoin derivatives (Other)  methylphenidate (Unknown)  phenytoin (Unknown)  prochlorperazine (Unknown)  promethazine (Dystonic RXN)  rash/hives (Anaphylaxis)  thioxanthenes (Unknown)    Intolerances        REVIEW OF SYSTEMS  +SOB.  Remainder 10 point ROS is negative    Vital Signs Last 24 Hrs  T(C): 36.8 (18 Sep 2019 05:41), Max: 37.3 (17 Sep 2019 15:56)  T(F): 98.3 (18 Sep 2019 05:41), Max: 99.1 (17 Sep 2019 15:56)  HR: 60 (18 Sep 2019 05:41) (60 - 61)  BP: 104/58 (18 Sep 2019 05:41) (96/55 - 104/58)  BP(mean): --  RR: 20 (18 Sep 2019 05:41) (18 - 20)  SpO2: 97% (18 Sep 2019 05:41) (96% - 98%)      Physical Exam    GENERAL: Currently wearing a non rebreather  NECK: Supple and normal thyroid.  No JVD or carotid bruit.  Carotid pulse is 2+ bilaterally.  HEART: Regular rate and rhythm; No murmurs, rubs, or gallops.  PULMONARY: Clear to auscultation and perfusion.  No rales, wheezing, or rhonchi bilaterally.  ABDOMEN: Soft, Nontender, Nondistended; Bowel sounds present  EXTREMITIES:  2+ Peripheral Pulses, No edema  NEUROLOGICAL: Grossly nonfocal    LABS:                        8.7    6.57  )-----------( 345      ( 18 Sep 2019 08:04 )             28.2     09-18    141  |  103  |  12  ----------------------------<  106<H>  4.0   |  25  |  0.9    Ca    9.2      18 Sep 2019 08:04  Phos  3.4     09-18  Mg     2.1     09-18    TPro  5.6<L>  /  Alb  3.5  /  TBili  0.4  /  DBili  x   /  AST  13  /  ALT  15  /  AlkPhos  149<H>  09-17          RADIOLOGY & ADDITIONAL TESTS:  < from: CT Angio Chest w/ IV Cont (09.17.19 @ 15:50) >  IMPRESSION:      1. No central pulmonary embolism.     2. Trace left pleural effusion with bibasilar atelectasis.    3. Small pericardial effusion, increased since prior.     2D echo  < from: Transthoracic Echocardiogram (09.16.19 @ 17:05) >  Summary:   1. Left ventricular ejection fraction, by visual estimation, is 55 to   60%.   2. Normal left ventricular size and wall thicknesses, with normal   systolic and diastolic function.   3. The left ventricular diastolic function could not be assessed in this   study.    PHYSICIAN INTERPRETATION:  Left Ventricle: Normal left ventricular size and wall thicknesses, with   normal systolic and diastolic function. Left ventricular ejection   fraction, by visual estimation, is 55 to 60%. The left ventricular   diastolic function could not be assessed in this study.       LV Wall Scoring:  All segments are normal.    Right Ventricle: Normal right ventricular size and function.  Left Atrium: Normal left atrial size.  Right Atrium: Normal right atrial size.  Pericardium: A small pericardial effusion is present. There is no   evidence of cardiac tamponade.  MitralValve: Structurally normal mitral valve, with normal leaflet   excursion. The mitral valve is normal in structure. No evidence of mitral   valve regurgitation is seen.    < end of copied text >

## 2019-09-18 NOTE — PROGRESS NOTE ADULT - ASSESSMENT
48 years old male with PMHx of arrhythmogenic ventricular tachycardia s/p AICD in 2005, HFpEF (EF 69% in August, 2019), afib/flutter on Eliquis/ASA 81mg, HTN, COPD (on 2L O2 PRN), DLD, multiple sclerosis, epilepsy, PTSD, depression, TIA, presented to the ED with complaint of chest pain and shortness of breath for 2 days. Patient was in his usual health 2 days ago until yesterday at 3AM, he woke up with substernal chest pain/pressure, 9/10, constant, radiates to left sided chest, and shortness of breath, with associated nonproductive cough. Patient states that his pain felt like the pain he had when his lead was dislodged years ago. He called [facility] and was instructed to come to ED for evaluation if pain is getting worse. Of note patient was recently admitted on 08/29/19 for AICD firing/lead misplacement and discharged on 09/11/19 (found to have hematoma of AICD pocket and ICD battery depletion s/p removal of defib lead and atrial lead with laser extraction (old RV lead was used for temporary pacing).     # Atypical chest pain-resolved  -Troponin T, Serum: <0.01 ng/mL (09.16.19 @ 11:56), Troponin T, Serum: <0.01 ng/mL (09.15.19 @ 19:55)  - 12 Lead ECG (09.15.19 @ 18:58) >Line Atrial-paced rhythmRight bundle branch block  - no events on tele  - evaluated by cardiology    # Acute on chronic respiratory failure on home oxygen  -  CT Angio Chest w/ IV Cont (09.17.19 @ 15:50) >No central pulmonary embolism. Trace left pleural effusion with bibasilar atelectasis. Small pericardial effusion, increased since prior.   -  Transthoracic Echocardiogram (09.16.19 @ 17:05) >Left ventricular ejection fraction, by visual estimation, is 55 to 60%. Normal left ventricular size and wall thicknesses, with normal systolic and diastolic function.A small pericardial effusion is present. There is no evidence of cardiac tamponade.  - Start prednisone  - c/w budesonide, spiriva  - check RVP    # H/o VT s/p AICD, H/o Afib/aflutter, cardiomyopathy  - evaluated by EP  - c/w dofetilide, lasix, metoprolol  - restart eliquis once cleared by GI    # Acute on chronic normocytic anemia with folate deficiency  - Folate, Serum: 3.4 ng/mL (09.17.19 @ 07:24)  - Vitamin B12, Serum: 377 pg/mL (09.17.19 @ 07:24)  - % Saturation, Iron: 6 % (09.17.19 @ 07:24),Iron Total, Serum: 22 ug/dL (09.17.19 @ 07:24), Ferritin, Serum: 191 ng/mL (09.17.19 @ 07:24)  - Start folic acid  - start ferrous sulphate  - GI eval    # H/o CAD  - C/w ASA, statin, metoprolol    # H/o Seizures  - seizure precautions  - c/w lamotrigine    #H/o MS  - not in exacerbation  - c/w baclofen    # H/o Bipolar disoder/ PTSD/ schizo affective disorder  - c/w home meds    # H/o BPH  - c/w flomax    # H/o DM type 2  - diet controlled  - monitor  FS    # H/o peripheral neuropathy  - c/w neurontin    # GERD  - c/w protonix    # DVT prophylaxis    #Full code    # Pending: F/u RVP, GI eval  # Discussed with patient  # Disposition: home when stable

## 2019-09-18 NOTE — PROGRESS NOTE ADULT - SUBJECTIVE AND OBJECTIVE BOX
HPI  Patient is a 48y old Male who presents with a chief complaint of chest pain and shortness of breath secondary to pericardial effusion (18 Sep 2019 16:03)    Currently admitted to medicine with the primary diagnosis of Pericardial effusion     Today is hospital day 2d.     INTERVAL HPI / OVERNIGHT EVENTS:  Patient was examined and seen at bedside. This morning he is resting comfortably in bed and reports no new issues or overnight events.   Pt still with     ROS: Otherwise unremarkable     PAST MEDICAL & SURGICAL HISTORY  PTSD (post-traumatic stress disorder)  Supraventricular arrhythmia: prior history  Cardiomyopathy  CHF (congestive heart failure)  Atrial fibrillation: s/p ablation, on eliquis  Diabetes mellitus: diet controlled  BPH (benign prostatic hyperplasia)  HTN (hypertension)  Seizures  Migraines  Pneumonia  MS (multiple sclerosis)  CAD (coronary artery disease)  Bipolar disorder  Anginal pain  Schizo affective schizophrenia  GERD (gastroesophageal reflux disease)  Seasonal allergies  Hypercholesteremia  COPD (chronic obstructive pulmonary disease)  CVA (cerebral vascular accident)  TIA (transient ischemic attack)  Syncope  Peripheral Neuropathy  Clinical Depression  H/O prior ablation treatment: for Afib  Cardiac pacemaker recipient  H/O hernia repair: right 1972,1991  History of hip replacement  S/P Orchiectomy: L for testicular CA  S/P Implantation of AICD    ALLERGIES  dexmethylphenidate (Unknown)  Dilantin (Rash)  dilantin, compazine, neurontin, ritalin, phenergan (Unknown)  Haldol (Unknown)  hydantoin derivatives (Other)  methylphenidate (Unknown)  phenytoin (Unknown)  prochlorperazine (Unknown)  promethazine (Dystonic RXN)  rash/hives (Anaphylaxis)  thioxanthenes (Unknown)    MEDICATIONS  STANDING MEDICATIONS  aspirin enteric coated 81 milliGRAM(s) Oral daily  atorvastatin 40 milliGRAM(s) Oral at bedtime  baclofen 10 milliGRAM(s) Oral four times a day  buDESOnide 160 MICROgram(s)/formoterol 4.5 MICROgram(s) Inhaler 2 Puff(s) Inhalation two times a day  chlorhexidine 4% Liquid 1 Application(s) Topical <User Schedule>  dofetilide 250 MICROGram(s) Oral two times a day  doxycycline hyclate Capsule 100 milliGRAM(s) Oral every 12 hours  fluticasone propionate 50 MICROgram(s)/spray Nasal Spray 2 Spray(s) Both Nostrils two times a day  folic acid 1 milliGRAM(s) Oral daily  furosemide    Tablet 20 milliGRAM(s) Oral daily  gabapentin 400 milliGRAM(s) Oral three times a day  influenza   Vaccine 0.5 milliLiter(s) IntraMuscular once  iron sucrose IVPB 200 milliGRAM(s) IV Intermittent every 24 hours  lamoTRIgine 100 milliGRAM(s) Oral two times a day  loratadine 10 milliGRAM(s) Oral daily  metoprolol tartrate 12.5 milliGRAM(s) Oral every 8 hours  pantoprazole    Tablet 40 milliGRAM(s) Oral before breakfast  polyethylene glycol 3350 17 Gram(s) Oral every 12 hours  predniSONE   Tablet 40 milliGRAM(s) Oral daily  QUEtiapine 25 milliGRAM(s) Oral daily  QUEtiapine 50 milliGRAM(s) Oral at bedtime  tamsulosin 0.4 milliGRAM(s) Oral at bedtime  tiotropium 18 MICROgram(s) Capsule 1 Capsule(s) Inhalation daily    PRN MEDICATIONS  acetaminophen   Tablet .. 650 milliGRAM(s) Oral every 6 hours PRN  clonazePAM  Tablet 0.5 milliGRAM(s) Oral at bedtime PRN  HYDROmorphone   Tablet 2 milliGRAM(s) Oral every 4 hours PRN  morphine  IR 15 milliGRAM(s) Oral every 4 hours PRN    VITALS:  T(F): 100.8  HR: 60  BP: 114/54  RR: 18  SpO2: 96%    PHYSICAL EXAM  GEN: NAD, Resting comfortably in bed  PULM: Clear to auscultation bilaterally, No wheezes  CVS: Regular rate and rhythm, S1-S2, no murmurs  ABD: Soft, non-tender, non-distended, no guarding  EXT: No edema  NEURO: AAOx3, no focal deficits    LABS                        8.7    6.57  )-----------( 345      ( 18 Sep 2019 08:04 )             28.2     09-18    141  |  103  |  12  ----------------------------<  106<H>  4.0   |  25  |  0.9    Ca    9.2      18 Sep 2019 08:04  Phos  3.4     09-18  Mg     2.1     09-18    TPro  5.6<L>  /  Alb  3.5  /  TBili  0.4  /  DBili  x   /  AST  13  /  ALT  15  /  AlkPhos  149<H>  09-17          Sedimentation Rate, Erythrocyte: 79 mm/Hr <H> (09-18-19 @ 09:19)          RADIOLOGY HPI  Patient is a 48y old Male who presents with a chief complaint of chest pain and shortness of breath secondary to pericardial effusion (18 Sep 2019 16:03)    Currently admitted to medicine with the primary diagnosis of Pericardial effusion     Today is hospital day 2d.     INTERVAL HPI / OVERNIGHT EVENTS:  Patient was examined and seen at bedside. This morning he is resting comfortably in bed and reports no new issues or overnight events.   Pt still with sob and chest pain  currently working with the diagnosis of pericarditis    ROS: Otherwise unremarkable     PAST MEDICAL & SURGICAL HISTORY  PTSD (post-traumatic stress disorder)  Supraventricular arrhythmia: prior history  Cardiomyopathy  CHF (congestive heart failure)  Atrial fibrillation: s/p ablation, on eliquis  Diabetes mellitus: diet controlled  BPH (benign prostatic hyperplasia)  HTN (hypertension)  Seizures  Migraines  Pneumonia  MS (multiple sclerosis)  CAD (coronary artery disease)  Bipolar disorder  Anginal pain  Schizo affective schizophrenia  GERD (gastroesophageal reflux disease)  Seasonal allergies  Hypercholesteremia  COPD (chronic obstructive pulmonary disease)  CVA (cerebral vascular accident)  TIA (transient ischemic attack)  Syncope  Peripheral Neuropathy  Clinical Depression  H/O prior ablation treatment: for Afib  Cardiac pacemaker recipient  H/O hernia repair: right 1972,1991  History of hip replacement  S/P Orchiectomy: L for testicular CA  S/P Implantation of AICD    ALLERGIES  dexmethylphenidate (Unknown)  Dilantin (Rash)  dilantin, compazine, neurontin, ritalin, phenergan (Unknown)  Haldol (Unknown)  hydantoin derivatives (Other)  methylphenidate (Unknown)  phenytoin (Unknown)  prochlorperazine (Unknown)  promethazine (Dystonic RXN)  rash/hives (Anaphylaxis)  thioxanthenes (Unknown)    MEDICATIONS  STANDING MEDICATIONS  aspirin enteric coated 81 milliGRAM(s) Oral daily  atorvastatin 40 milliGRAM(s) Oral at bedtime  baclofen 10 milliGRAM(s) Oral four times a day  buDESOnide 160 MICROgram(s)/formoterol 4.5 MICROgram(s) Inhaler 2 Puff(s) Inhalation two times a day  chlorhexidine 4% Liquid 1 Application(s) Topical <User Schedule>  dofetilide 250 MICROGram(s) Oral two times a day  doxycycline hyclate Capsule 100 milliGRAM(s) Oral every 12 hours  fluticasone propionate 50 MICROgram(s)/spray Nasal Spray 2 Spray(s) Both Nostrils two times a day  folic acid 1 milliGRAM(s) Oral daily  furosemide    Tablet 20 milliGRAM(s) Oral daily  gabapentin 400 milliGRAM(s) Oral three times a day  influenza   Vaccine 0.5 milliLiter(s) IntraMuscular once  iron sucrose IVPB 200 milliGRAM(s) IV Intermittent every 24 hours  lamoTRIgine 100 milliGRAM(s) Oral two times a day  loratadine 10 milliGRAM(s) Oral daily  metoprolol tartrate 12.5 milliGRAM(s) Oral every 8 hours  pantoprazole    Tablet 40 milliGRAM(s) Oral before breakfast  polyethylene glycol 3350 17 Gram(s) Oral every 12 hours  predniSONE   Tablet 40 milliGRAM(s) Oral daily  QUEtiapine 25 milliGRAM(s) Oral daily  QUEtiapine 50 milliGRAM(s) Oral at bedtime  tamsulosin 0.4 milliGRAM(s) Oral at bedtime  tiotropium 18 MICROgram(s) Capsule 1 Capsule(s) Inhalation daily    PRN MEDICATIONS  acetaminophen   Tablet .. 650 milliGRAM(s) Oral every 6 hours PRN  clonazePAM  Tablet 0.5 milliGRAM(s) Oral at bedtime PRN  HYDROmorphone   Tablet 2 milliGRAM(s) Oral every 4 hours PRN  morphine  IR 15 milliGRAM(s) Oral every 4 hours PRN    VITALS:  T(F): 100.8  HR: 60  BP: 114/54  RR: 18  SpO2: 96%    PHYSICAL EXAM  GEN: NAD, Resting comfortably in bed  PULM: Clear to auscultation bilaterally, No wheezes  CVS: Regular rate and rhythm, S1-S2, no murmurs  ABD: Soft, non-tender, non-distended, no guarding  EXT: No edema  NEURO: AAOx3, no focal deficits    LABS                        8.7    6.57  )-----------( 345      ( 18 Sep 2019 08:04 )             28.2     09-18    141  |  103  |  12  ----------------------------<  106<H>  4.0   |  25  |  0.9    Ca    9.2      18 Sep 2019 08:04  Phos  3.4     09-18  Mg     2.1     09-18    TPro  5.6<L>  /  Alb  3.5  /  TBili  0.4  /  DBili  x   /  AST  13  /  ALT  15  /  AlkPhos  149<H>  09-17          Sedimentation Rate, Erythrocyte: 79 mm/Hr <H> (09-18-19 @ 09:19)          RADIOLOGY

## 2019-09-18 NOTE — CONSULT NOTE ADULT - SUBJECTIVE AND OBJECTIVE BOX
CUATE HORNE  48y, Male  Allergy: dexmethylphenidate (Unknown)  Dilantin (Rash)  dilantin, compazine, neurontin, ritalin, phenergan (Unknown)  Haldol (Unknown)  hydantoin derivatives (Other)  methylphenidate (Unknown)  phenytoin (Unknown)  prochlorperazine (Unknown)  promethazine (Dystonic RXN)  rash/hives (Anaphylaxis)  thioxanthenes (Unknown)      HPI:  48 years old male with PMHx of arrhythmogenic ventricular tachycardia s/p AICD in 2005, HFpEF (EF 69% in August, 2019), afib/flutter on Eliquis/ASA 81mg, HTN, COPD (on 2L O2 PRN), DLD, multiple sclerosis, epilepsy, PTSD, depression, TIA, presented to the ED with complaint of chest pain and shortness of breath for 2 days. Patient was in his usual health 2 days ago until yesterday at 3AM, he woke up with substernal chest pain/pressure, 9/10, constant, radiates to left sided chest, and shortness of breath, with associated nonproductive cough. Patient states that his pain felt like the pain he had when his lead was dislodged years ago. He called [facility] and was instructed to come to ED for evaluation if pain is getting worse. In the ED patient was unable to tolerate NC because of desaturation of pulse ox and recurrent chest pain. Patient denies fever/chills, abdominal pain, nausea/vomiting, diarrhea/constipation, or urinary symptoms. Denies recent travels or sick contact. Of note patient was recently admitted on 08/29/19 for AICD firing/lead misplacement and discharged on 09/11/19 (found to have hematoma of AICD pocket and ICD battery depletion s/p removal of defib lead and atrial lead with laser extraction (old RV lead was used for temporary pacing).       T(C): 36.9 (15 Sep 2019 23:00), Max: 37.1 (15 Sep 2019 18:53)  T(F): 98.5 (15 Sep 2019 23:00), Max: 98.8 (15 Sep 2019 18:53)  HR: 60 (16 Sep 2019 01:20) (60 - 95)  BP: 128/70 (15 Sep 2019 23:00) (122/65 - 128/70)  RR: 20 (16 Sep 2019 01:20) (18 - 20)  SpO2: 95% (16 Sep 2019 01:20) (94% - 98%) (16 Sep 2019 03:48)    FAMILY HISTORY:  Family history of colon cancer in mother  Family history of cardiomyopathy: Mother  Family history of cervical cancer (Mother)  Family history of early CAD (Mother)    PAST MEDICAL & SURGICAL HISTORY:  PTSD (post-traumatic stress disorder)  Supraventricular arrhythmia: prior history  Cardiomyopathy  CHF (congestive heart failure)  Atrial fibrillation: s/p ablation, on eliquis  Diabetes mellitus: diet controlled  BPH (benign prostatic hyperplasia)  HTN (hypertension)  Seizures  Migraines  Pneumonia  MS (multiple sclerosis)  CAD (coronary artery disease)  Bipolar disorder  Anginal pain  Schizo affective schizophrenia  GERD (gastroesophageal reflux disease)  Seasonal allergies  Hypercholesteremia  COPD (chronic obstructive pulmonary disease)  CVA (cerebral vascular accident)  TIA (transient ischemic attack)  Syncope  Peripheral Neuropathy  Clinical Depression  H/O prior ablation treatment: for Afib  Cardiac pacemaker recipient  H/O hernia repair: right 1972,1991  History of hip replacement  S/P Orchiectomy: L for testicular CA  S/P Implantation of AICD        VITALS:  T(F): 100.8, Max: 100.8 (09-18-19 @ 13:06)  HR: 60  BP: 114/54  RR: 18Vital Signs Last 24 Hrs  T(C): 38.2 (18 Sep 2019 13:06), Max: 38.2 (18 Sep 2019 13:06)  T(F): 100.8 (18 Sep 2019 13:06), Max: 100.8 (18 Sep 2019 13:06)  HR: 60 (18 Sep 2019 13:06) (60 - 61)  BP: 114/54 (18 Sep 2019 13:06) (96/55 - 114/54)  BP(mean): --  RR: 18 (18 Sep 2019 13:06) (18 - 20)  SpO2: 96% (18 Sep 2019 13:06) (96% - 98%)    TESTS & MEASUREMENTS:                        8.7    6.57  )-----------( 345      ( 18 Sep 2019 08:04 )             28.2     09-18    141  |  103  |  12  ----------------------------<  106<H>  4.0   |  25  |  0.9    Ca    9.2      18 Sep 2019 08:04  Phos  3.4     09-18  Mg     2.1     09-18    TPro  5.6<L>  /  Alb  3.5  /  TBili  0.4  /  DBili  x   /  AST  13  /  ALT  15  /  AlkPhos  149<H>  09-17    LIVER FUNCTIONS - ( 17 Sep 2019 07:24 )  Alb: 3.5 g/dL / Pro: 5.6 g/dL / ALK PHOS: 149 U/L / ALT: 15 U/L / AST: 13 U/L / GGT: x                   RADIOLOGY & ADDITIONAL TESTS:    ANTIBIOTICS:

## 2019-09-18 NOTE — CONSULT NOTE ADULT - ASSESSMENT
48 years old male with PMHx of arrhythmogenic ventricular tachycardia s/p AICD in 2005, HFpEF (EF 69% in August, 2019), afib/flutter on Eliquis/ASA 81mg, HTN, COPD (on 2L O2 PRN), DLD, multiple sclerosis, epilepsy, PTSD, depression, TIA, presented to the ED with complaint of chest pain and shortness of breath for 2 days. Patient was in his usual health 2 days ago until 9/15 at 3AM, he woke up with substernal chest pain/pressure, 9/10, constant, radiates to left sided chest, and shortness of breath, with associated nonproductive cough.     IMPRESSION:  No PNA  Cough secondary to atelectasis bacterial bronchitis  WBC 6.5  CT angio 9/17 left pleural effusion, bibasilar atalectsasis    RECOMMENDATIONS:  5 days po Doxycycline 100 mg q12h  recall prn please

## 2019-09-19 LAB
ANION GAP SERPL CALC-SCNC: 11 MMOL/L — SIGNIFICANT CHANGE UP (ref 7–14)
BASOPHILS # BLD AUTO: 0.04 K/UL — SIGNIFICANT CHANGE UP (ref 0–0.2)
BASOPHILS NFR BLD AUTO: 0.4 % — SIGNIFICANT CHANGE UP (ref 0–1)
BUN SERPL-MCNC: 16 MG/DL — SIGNIFICANT CHANGE UP (ref 10–20)
CALCIUM SERPL-MCNC: 10.1 MG/DL — SIGNIFICANT CHANGE UP (ref 8.5–10.1)
CHLORIDE SERPL-SCNC: 103 MMOL/L — SIGNIFICANT CHANGE UP (ref 98–110)
CO2 SERPL-SCNC: 27 MMOL/L — SIGNIFICANT CHANGE UP (ref 17–32)
CREAT SERPL-MCNC: 1 MG/DL — SIGNIFICANT CHANGE UP (ref 0.7–1.5)
CRP SERPL-MCNC: 6.51 MG/DL — HIGH (ref 0–0.4)
EOSINOPHIL # BLD AUTO: 0.01 K/UL — SIGNIFICANT CHANGE UP (ref 0–0.7)
EOSINOPHIL NFR BLD AUTO: 0.1 % — SIGNIFICANT CHANGE UP (ref 0–8)
GLUCOSE SERPL-MCNC: 152 MG/DL — HIGH (ref 70–99)
HCT VFR BLD CALC: 29.4 % — LOW (ref 42–52)
HGB BLD-MCNC: 9.3 G/DL — LOW (ref 14–18)
IMM GRANULOCYTES NFR BLD AUTO: 1 % — HIGH (ref 0.1–0.3)
LYMPHOCYTES # BLD AUTO: 0.8 K/UL — LOW (ref 1.2–3.4)
LYMPHOCYTES # BLD AUTO: 8.9 % — LOW (ref 20.5–51.1)
MAGNESIUM SERPL-MCNC: 2.1 MG/DL — SIGNIFICANT CHANGE UP (ref 1.8–2.4)
MCHC RBC-ENTMCNC: 29.2 PG — SIGNIFICANT CHANGE UP (ref 27–31)
MCHC RBC-ENTMCNC: 31.6 G/DL — LOW (ref 32–37)
MCV RBC AUTO: 92.2 FL — SIGNIFICANT CHANGE UP (ref 80–94)
MONOCYTES # BLD AUTO: 0.51 K/UL — SIGNIFICANT CHANGE UP (ref 0.1–0.6)
MONOCYTES NFR BLD AUTO: 5.7 % — SIGNIFICANT CHANGE UP (ref 1.7–9.3)
NEUTROPHILS # BLD AUTO: 7.57 K/UL — HIGH (ref 1.4–6.5)
NEUTROPHILS NFR BLD AUTO: 83.9 % — HIGH (ref 42.2–75.2)
NRBC # BLD: 0 /100 WBCS — SIGNIFICANT CHANGE UP (ref 0–0)
PHOSPHATE SERPL-MCNC: 3.3 MG/DL — SIGNIFICANT CHANGE UP (ref 2.1–4.9)
PLATELET # BLD AUTO: 407 K/UL — HIGH (ref 130–400)
POTASSIUM SERPL-MCNC: 5 MMOL/L — SIGNIFICANT CHANGE UP (ref 3.5–5)
POTASSIUM SERPL-SCNC: 5 MMOL/L — SIGNIFICANT CHANGE UP (ref 3.5–5)
RBC # BLD: 3.19 M/UL — LOW (ref 4.7–6.1)
RBC # FLD: 14.6 % — HIGH (ref 11.5–14.5)
SODIUM SERPL-SCNC: 141 MMOL/L — SIGNIFICANT CHANGE UP (ref 135–146)
WBC # BLD: 9.02 K/UL — SIGNIFICANT CHANGE UP (ref 4.8–10.8)
WBC # FLD AUTO: 9.02 K/UL — SIGNIFICANT CHANGE UP (ref 4.8–10.8)

## 2019-09-19 PROCEDURE — 93306 TTE W/DOPPLER COMPLETE: CPT | Mod: 26

## 2019-09-19 PROCEDURE — 99231 SBSQ HOSP IP/OBS SF/LOW 25: CPT | Mod: 57

## 2019-09-19 PROCEDURE — 99233 SBSQ HOSP IP/OBS HIGH 50: CPT

## 2019-09-19 RX ADMIN — Medication 10 MILLIGRAM(S): at 17:02

## 2019-09-19 RX ADMIN — Medication 20 MILLIGRAM(S): at 06:13

## 2019-09-19 RX ADMIN — POLYETHYLENE GLYCOL 3350 17 GRAM(S): 17 POWDER, FOR SOLUTION ORAL at 17:48

## 2019-09-19 RX ADMIN — Medication 10 MILLIGRAM(S): at 12:20

## 2019-09-19 RX ADMIN — GABAPENTIN 400 MILLIGRAM(S): 400 CAPSULE ORAL at 13:19

## 2019-09-19 RX ADMIN — MORPHINE SULFATE 15 MILLIGRAM(S): 50 CAPSULE, EXTENDED RELEASE ORAL at 17:48

## 2019-09-19 RX ADMIN — MORPHINE SULFATE 15 MILLIGRAM(S): 50 CAPSULE, EXTENDED RELEASE ORAL at 21:01

## 2019-09-19 RX ADMIN — Medication 2 SPRAY(S): at 17:03

## 2019-09-19 RX ADMIN — Medication 100 MILLIGRAM(S): at 17:02

## 2019-09-19 RX ADMIN — QUETIAPINE FUMARATE 25 MILLIGRAM(S): 200 TABLET, FILM COATED ORAL at 12:20

## 2019-09-19 RX ADMIN — LORATADINE 10 MILLIGRAM(S): 10 TABLET ORAL at 12:20

## 2019-09-19 RX ADMIN — QUETIAPINE FUMARATE 50 MILLIGRAM(S): 200 TABLET, FILM COATED ORAL at 21:02

## 2019-09-19 RX ADMIN — Medication 40 MILLIGRAM(S): at 06:14

## 2019-09-19 RX ADMIN — IRON SUCROSE 110 MILLIGRAM(S): 20 INJECTION, SOLUTION INTRAVENOUS at 16:58

## 2019-09-19 RX ADMIN — GABAPENTIN 400 MILLIGRAM(S): 400 CAPSULE ORAL at 06:13

## 2019-09-19 RX ADMIN — Medication 2 SPRAY(S): at 06:19

## 2019-09-19 RX ADMIN — TAMSULOSIN HYDROCHLORIDE 0.4 MILLIGRAM(S): 0.4 CAPSULE ORAL at 21:01

## 2019-09-19 RX ADMIN — TIOTROPIUM BROMIDE 1 CAPSULE(S): 18 CAPSULE ORAL; RESPIRATORY (INHALATION) at 07:58

## 2019-09-19 RX ADMIN — Medication 12.5 MILLIGRAM(S): at 21:01

## 2019-09-19 RX ADMIN — BUDESONIDE AND FORMOTEROL FUMARATE DIHYDRATE 2 PUFF(S): 160; 4.5 AEROSOL RESPIRATORY (INHALATION) at 21:02

## 2019-09-19 RX ADMIN — Medication 12.5 MILLIGRAM(S): at 13:19

## 2019-09-19 RX ADMIN — Medication 10 MILLIGRAM(S): at 06:13

## 2019-09-19 RX ADMIN — BUDESONIDE AND FORMOTEROL FUMARATE DIHYDRATE 2 PUFF(S): 160; 4.5 AEROSOL RESPIRATORY (INHALATION) at 07:59

## 2019-09-19 RX ADMIN — Medication 100 MILLIGRAM(S): at 06:13

## 2019-09-19 RX ADMIN — MORPHINE SULFATE 15 MILLIGRAM(S): 50 CAPSULE, EXTENDED RELEASE ORAL at 16:54

## 2019-09-19 RX ADMIN — DOFETILIDE 250 MICROGRAM(S): 0.25 CAPSULE ORAL at 06:13

## 2019-09-19 RX ADMIN — LAMOTRIGINE 100 MILLIGRAM(S): 25 TABLET, ORALLY DISINTEGRATING ORAL at 06:13

## 2019-09-19 RX ADMIN — POLYETHYLENE GLYCOL 3350 17 GRAM(S): 17 POWDER, FOR SOLUTION ORAL at 06:13

## 2019-09-19 RX ADMIN — MORPHINE SULFATE 15 MILLIGRAM(S): 50 CAPSULE, EXTENDED RELEASE ORAL at 22:24

## 2019-09-19 RX ADMIN — Medication 1 MILLIGRAM(S): at 13:18

## 2019-09-19 RX ADMIN — DOFETILIDE 250 MICROGRAM(S): 0.25 CAPSULE ORAL at 17:02

## 2019-09-19 RX ADMIN — Medication 81 MILLIGRAM(S): at 12:20

## 2019-09-19 RX ADMIN — MORPHINE SULFATE 15 MILLIGRAM(S): 50 CAPSULE, EXTENDED RELEASE ORAL at 06:14

## 2019-09-19 RX ADMIN — MORPHINE SULFATE 15 MILLIGRAM(S): 50 CAPSULE, EXTENDED RELEASE ORAL at 02:08

## 2019-09-19 RX ADMIN — MORPHINE SULFATE 15 MILLIGRAM(S): 50 CAPSULE, EXTENDED RELEASE ORAL at 12:55

## 2019-09-19 RX ADMIN — GABAPENTIN 400 MILLIGRAM(S): 400 CAPSULE ORAL at 21:02

## 2019-09-19 RX ADMIN — PANTOPRAZOLE SODIUM 40 MILLIGRAM(S): 20 TABLET, DELAYED RELEASE ORAL at 06:23

## 2019-09-19 RX ADMIN — MORPHINE SULFATE 15 MILLIGRAM(S): 50 CAPSULE, EXTENDED RELEASE ORAL at 12:20

## 2019-09-19 RX ADMIN — LAMOTRIGINE 100 MILLIGRAM(S): 25 TABLET, ORALLY DISINTEGRATING ORAL at 17:02

## 2019-09-19 RX ADMIN — ATORVASTATIN CALCIUM 40 MILLIGRAM(S): 80 TABLET, FILM COATED ORAL at 21:01

## 2019-09-19 RX ADMIN — Medication 12.5 MILLIGRAM(S): at 06:13

## 2019-09-19 NOTE — PRE-ANESTHESIA EVALUATION ADULT - NSRADCARDRESULTSFT_GEN_ALL_CORE
Summary:   1. Left ventricular ejection fraction, by visual estimation, is 55 to   60%.   2. Normal left ventricular size and wall thicknesses, with normal   systolic and diastolic function.   3. The left ventricular diastolic function could not be assessed in this   study.    PHYSICIAN INTERPRETATION:  Left Ventricle: Normal left ventricular size and wall thicknesses, with   normal systolic and diastolic function. Left ventricular ejection   fraction, by visual estimation, is 55 to 60%. The left ventricular   diastolic function could not be assessed in this study.       LV Wall Scoring:  All segments are normal.    Right Ventricle: Normal right ventricular size and function.  Left Atrium: Normal left atrial size.  Right Atrium: Normal right atrial size.  Pericardium: A small pericardial effusion is present. There is no   evidence of cardiac tamponade.  Mitral Valve: Structurally normal mitral valve, with normal leaflet   excursion. The mitral valve is normal in structure. No evidence of mitral   valve regurgitation is seen.  Tricuspid Valve: Structurally normal tricuspid valve, with normal leaflet   excursion. The tricuspid valve is normal in structure. Mild tricuspid   regurgitation is visualized.  Aortic Valve: Normal trileaflet aortic valve with normal opening. The   aortic valve is normal. No evidence of aortic valve regurgitation is seen.  Pulmonic Valve: Structurally normal pulmonic valve, with normal leaflet   excursion. The pulmonic valve is normal. No indication of pulmonic valve   regurgitation.  Aorta: The aortic root and ascending aorta are structurally normal, with   no evidence of dilitation.  Pulmonary Artery: The main pulmonary artery is normal in size.  Venous: The inferior vena cava was normal sized, with respiratory size   variation greater than 50%.

## 2019-09-19 NOTE — PROGRESS NOTE ADULT - SUBJECTIVE AND OBJECTIVE BOX
INTERVAL HPI/OVERNIGHT EVENTS:  No events overnight. Pt still with occasional chest pain.  Breathing is improving.  Pt had a repeat echo this morning    MEDICATIONS  (STANDING):  aspirin enteric coated 81 milliGRAM(s) Oral daily  atorvastatin 40 milliGRAM(s) Oral at bedtime  baclofen 10 milliGRAM(s) Oral four times a day  buDESOnide 160 MICROgram(s)/formoterol 4.5 MICROgram(s) Inhaler 2 Puff(s) Inhalation two times a day  chlorhexidine 4% Liquid 1 Application(s) Topical <User Schedule>  dofetilide 250 MICROGram(s) Oral two times a day  doxycycline hyclate Capsule 100 milliGRAM(s) Oral every 12 hours  fluticasone propionate 50 MICROgram(s)/spray Nasal Spray 2 Spray(s) Both Nostrils two times a day  folic acid 1 milliGRAM(s) Oral daily  furosemide    Tablet 20 milliGRAM(s) Oral daily  gabapentin 400 milliGRAM(s) Oral three times a day  influenza   Vaccine 0.5 milliLiter(s) IntraMuscular once  iron sucrose IVPB 200 milliGRAM(s) IV Intermittent every 24 hours  lamoTRIgine 100 milliGRAM(s) Oral two times a day  loratadine 10 milliGRAM(s) Oral daily  metoprolol tartrate 12.5 milliGRAM(s) Oral every 8 hours  pantoprazole    Tablet 40 milliGRAM(s) Oral before breakfast  polyethylene glycol 3350 17 Gram(s) Oral every 12 hours  predniSONE   Tablet 40 milliGRAM(s) Oral daily  QUEtiapine 25 milliGRAM(s) Oral daily  QUEtiapine 50 milliGRAM(s) Oral at bedtime  tamsulosin 0.4 milliGRAM(s) Oral at bedtime  tiotropium 18 MICROgram(s) Capsule 1 Capsule(s) Inhalation daily    MEDICATIONS  (PRN):  acetaminophen   Tablet .. 650 milliGRAM(s) Oral every 6 hours PRN Mild Pain (1 - 3)  clonazePAM  Tablet 0.5 milliGRAM(s) Oral at bedtime PRN agitation  HYDROmorphone   Tablet 2 milliGRAM(s) Oral every 4 hours PRN Moderate Pain (4 - 6)  morphine  IR 15 milliGRAM(s) Oral every 4 hours PRN Severe Pain (7 - 10)      Allergies    dexmethylphenidate (Unknown)  Dilantin (Rash)  dilantin, compazine, neurontin, ritalin, phenergan (Unknown)  Haldol (Unknown)  hydantoin derivatives (Other)  methylphenidate (Unknown)  phenytoin (Unknown)  prochlorperazine (Unknown)  promethazine (Dystonic RXN)  rash/hives (Anaphylaxis)  thioxanthenes (Unknown)    Intolerances        REVIEW OF SYSTEMS    + occasional CP.  remainder 10 point ROS is negative    Vital Signs Last 24 Hrs  T(C): 36.8 (19 Sep 2019 12:12), Max: 36.8 (19 Sep 2019 12:12)  T(F): 98.3 (19 Sep 2019 12:12), Max: 98.3 (19 Sep 2019 12:12)  HR: 60 (19 Sep 2019 12:12) (59 - 60)  BP: 105/51 (19 Sep 2019 12:12) (105/51 - 110/58)  BP(mean): --  RR: 20 (19 Sep 2019 12:12) (18 - 20)  SpO2: 98% (19 Sep 2019 12:12) (98% - 98%)    09-19-19 @ 07:01  -  09-19-19 @ 13:56  --------------------------------------------------------  IN: 0 mL / OUT: 300 mL / NET: -300 mL      Physical Exam    GENERAL: Currently wearing a non rebreather  NECK: Supple and normal thyroid.  No JVD or carotid bruit.  Carotid pulse is 2+ bilaterally.  HEART: Regular rate and rhythm; No murmurs, rubs, or gallops.  PULMONARY: Clear to auscultation and perfusion.  No rales, wheezing, or rhonchi bilaterally.  ABDOMEN: Soft, Nontender, Nondistended; Bowel sounds present  EXTREMITIES:  2+ Peripheral Pulses, No edema  NEUROLOGICAL: Grossly nonfocal    LABS:                        9.3    9.02  )-----------( 407      ( 19 Sep 2019 07:09 )             29.4     09-19    141  |  103  |  16  ----------------------------<  152<H>  5.0   |  27  |  1.0    Ca    10.1      19 Sep 2019 07:09  Phos  3.3     09-19  Mg     2.1     09-19    RADIOLOGY & ADDITIONAL TESTS:    Echocardiogram - pending

## 2019-09-19 NOTE — PRE-ANESTHESIA EVALUATION ADULT - NSANTHPMHFT_GEN_ALL_CORE
Pt w/ PMHx as above with symptomatic pericardial effusion after having lead extraction and evacuation of hematoma two weeks ago.

## 2019-09-19 NOTE — PROGRESS NOTE ADULT - SUBJECTIVE AND OBJECTIVE BOX
CTS ATTENDING      Patient known to my service, after ICD lead replacement with laser lead extraction, followed by evacuation of hematoma 9/5-9/6.  Readmitted with sudden onset of sharp pain in his chest, shortness of breath, has negative Sspiral CTA of the lungs, but has a new small pericardial effusion and RV lead voltage threshold has risen.    I agree with Dr. Quiñones, that this is most likely a lead migration and possible micro-perforation.      I have spoken with the patient at length, and explained that the lead will need to be repositioned or revised, and possibly, pericardial drainage may be needed if the effusion increases in size.    Patient will be scheduled for ICD lead revision, possible pericardial window, tomorrow afternoon.

## 2019-09-19 NOTE — PROGRESS NOTE ADULT - SUBJECTIVE AND OBJECTIVE BOX
Patient is a 48y old  Male who presents with a chief complaint of chest pain and shortness of breath (18 Sep 2019 10:12)    Patient was seen and examined.  Sob improving  Denies chest pain, palpitation today  Denies fever, chills  Denies any other complaints.  All systems reviewed positive history as mentioned above.    PAST MEDICAL & SURGICAL HISTORY:  PTSD (post-traumatic stress disorder)  Supraventricular arrhythmia: prior history  Cardiomyopathy  CHF (congestive heart failure)  Atrial fibrillation: s/p ablation, on eliquis  Diabetes mellitus: diet controlled  BPH (benign prostatic hyperplasia)  HTN (hypertension)  Seizures  Migraines  MS (multiple sclerosis)  CAD (coronary artery disease)  Bipolar disorder  Anginal pain  Schizo affective schizophrenia  GERD (gastroesophageal reflux disease)  Seasonal allergies  Hypercholesteremia  COPD (chronic obstructive pulmonary disease)  CVA (cerebral vascular accident)  TIA (transient ischemic attack)  Syncope  Peripheral Neuropathy  Clinical Depression  Cardiac pacemaker recipient  H/O hernia repair: right 1972,1991  History of hip replacement  S/P Orchiectomy: L for testicular CA  S/P Implantation of AICD    Allergies  dexmethylphenidate (Unknown)  Dilantin (Rash)  dilantin, compazine, neurontin, ritalin, phenergan (Unknown)  Haldol (Unknown)  hydantoin derivatives (Other)  methylphenidate (Unknown)  phenytoin (Unknown)  prochlorperazine (Unknown)  promethazine (Dystonic RXN)  rash/hives (Anaphylaxis)  thioxanthenes (Unknown)    MEDICATIONS  (STANDING):  aspirin enteric coated 81 milliGRAM(s) Oral daily  atorvastatin 40 milliGRAM(s) Oral at bedtime  baclofen 10 milliGRAM(s) Oral four times a day  buDESOnide 160 MICROgram(s)/formoterol 4.5 MICROgram(s) Inhaler 2 Puff(s) Inhalation two times a day  chlorhexidine 4% Liquid 1 Application(s) Topical <User Schedule>  dofetilide 250 MICROGram(s) Oral two times a day  doxycycline hyclate Capsule 100 milliGRAM(s) Oral every 12 hours  fluticasone propionate 50 MICROgram(s)/spray Nasal Spray 2 Spray(s) Both Nostrils two times a day  folic acid 1 milliGRAM(s) Oral daily  furosemide    Tablet 20 milliGRAM(s) Oral daily  gabapentin 400 milliGRAM(s) Oral three times a day  influenza   Vaccine 0.5 milliLiter(s) IntraMuscular once  iron sucrose IVPB 200 milliGRAM(s) IV Intermittent every 24 hours  lamoTRIgine 100 milliGRAM(s) Oral two times a day  loratadine 10 milliGRAM(s) Oral daily  metoprolol tartrate 12.5 milliGRAM(s) Oral every 8 hours  pantoprazole    Tablet 40 milliGRAM(s) Oral before breakfast  polyethylene glycol 3350 17 Gram(s) Oral every 12 hours  predniSONE   Tablet 40 milliGRAM(s) Oral daily  QUEtiapine 25 milliGRAM(s) Oral daily  QUEtiapine 50 milliGRAM(s) Oral at bedtime  tamsulosin 0.4 milliGRAM(s) Oral at bedtime  tiotropium 18 MICROgram(s) Capsule 1 Capsule(s) Inhalation daily    MEDICATIONS  (PRN):  acetaminophen   Tablet .. 650 milliGRAM(s) Oral every 6 hours PRN Mild Pain (1 - 3)  clonazePAM  Tablet 0.5 milliGRAM(s) Oral at bedtime PRN agitation  HYDROmorphone   Tablet 2 milliGRAM(s) Oral every 4 hours PRN Moderate Pain (4 - 6)  morphine  IR 15 milliGRAM(s) Oral every 4 hours PRN Severe Pain (7 - 10)    T(C): 36.6 (09-19-19 @ 05:54), Max: 38.2 (09-18-19 @ 13:06)  HR: 59 (09-19-19 @ 05:54) (59 - 60)  BP: 110/58 (09-19-19 @ 05:54) (105/57 - 114/54)  RR: 20 (09-19-19 @ 05:54) (18 - 20)  SpO2: 98% (09-18-19 @ 21:46) (96% - 98%)    O/E:  Awake, alert, not in distress.  HEENT: atraumatic, EOMI.  Chest: clear  CVS: SIS2 +, no murmur.  P/A: obese , BS+  CNS: non focal.  Ext: no edema feet.  Skin: no rash, no ulcers.  All systems reviewed positive findings as above.                          9.3<L>  9.02  )-----------( 407<H>    ( 19 Sep 2019 07:09 )             29.4<L>                        8.7<L>  6.57  )-----------( 345      ( 18 Sep 2019 08:04 )             28.2<L>  09-19    141  |  103  |  16  ----------------------------<  152<H>  5.0   |  27  |  1.0  09-18    141  |  103  |  12  ----------------------------<  106<H>  4.0   |  25  |  0.9    Ca    10.1      19 Sep 2019 07:09  Ca    9.2      18 Sep 2019 08:04  Phos  3.3     09-19  Mg     2.1     09-19

## 2019-09-19 NOTE — PROGRESS NOTE ADULT - ASSESSMENT
48 years old male with PMHx of arrhythmogenic ventricular tachycardia s/p AICD in 2005, HFpEF (EF 69% in August, 2019), afib/flutter on Eliquis/ASA 81mg, HTN, COPD (on 2L O2 PRN), DLD, multiple sclerosis, epilepsy, PTSD, depression, TIA, presented to the ED with complaint of chest pain and shortness of breath for 2 days. Patient was in his usual health 2 days ago until yesterday at 3AM, he woke up with substernal chest pain/pressure, 9/10, constant, radiates to left sided chest, and shortness of breath, with associated nonproductive cough. Patient states that his pain felt like the pain he had when his lead was dislodged years ago. He called [facility] and was instructed to come to ED for evaluation if pain is getting worse. Of note patient was recently admitted on 08/29/19 for AICD firing/lead misplacement and discharged on 09/11/19 (found to have hematoma of AICD pocket and ICD battery depletion s/p removal of defib lead and atrial lead with laser extraction (old RV lead was used for temporary pacing).     # Atypical chest pain-resolved  -Troponin T, Serum: <0.01 ng/mL (09.16.19 @ 11:56), Troponin T, Serum: <0.01 ng/mL (09.15.19 @ 19:55)  - 12 Lead ECG (09.15.19 @ 18:58) >Line Atrial-paced rhythmRight bundle branch block  - no events on tele  - evaluated by cardiology    # Acute Bronchitis , H/o  chronic respiratory failure on home oxygen  -  CT Angio Chest w/ IV Cont (09.17.19 @ 15:50) >No central pulmonary embolism. Trace left pleural effusion with bibasilar atelectasis. Small pericardial effusion, increased since prior.   -  Transthoracic Echocardiogram (09.16.19 @ 17:05) >Left ventricular ejection fraction, by visual estimation, is 55 to 60%. Normal left ventricular size and wall thicknesses, with normal systolic and diastolic function.A small pericardial effusion is present. There is no evidence of cardiac tamponade.  - C/w prednisone, Doxycycline  - c/w budesonide, spiriva  -  RVP neg    # H/o VT s/p AICD, H/o Afib/aflutter, cardiomyopathy  - evaluated by EP/ CTS- hold eliquis. Planned for lead change in AM  - c/w dofetilide, lasix, metoprolol  - repeat echo to f/u on pericardial effussion    # Acute on chronic normocytic anemia with folate deficiency  - Folate, Serum: 3.4 ng/mL (09.17.19 @ 07:24)  - Vitamin B12, Serum: 377 pg/mL (09.17.19 @ 07:24)  - % Saturation, Iron: 6 % (09.17.19 @ 07:24),Iron Total, Serum: 22 ug/dL (09.17.19 @ 07:24), Ferritin, Serum: 191 ng/mL (09.17.19 @ 07:24)  - c/w  folic acid  - on IV iron  - GI eval    # H/o CAD  - C/w ASA, statin, metoprolol    # H/o Seizures  - seizure precautions  - c/w lamotrigine    #H/o MS  - not in exacerbation  - c/w baclofen    # H/o Bipolar disoder/ PTSD/ schizo affective disorder  - c/w home meds    # H/o BPH  - c/w flomax    # H/o DM type 2  - diet controlled  - monitor  FS    # H/o peripheral neuropathy  - c/w neurontin    # GERD  - c/w protonix    # DVT prophylaxis    #Full code    # Pending:  GI eval. lead change in AM  # Discussed with patient  # Disposition: home when stable

## 2019-09-19 NOTE — PROGRESS NOTE ADULT - ASSESSMENT
48 years old male with PMHx of arrhythmogenic ventricular tachycardia s/p AICD in 2005, HFpEF (EF 69% in August, 2019), afib/flutter on Eliquis/ASA 81mg, HTN, COPD (on 2L O2 PRN), DLD, multiple sclerosis, epilepsy, PTSD, depression, TIA, admitted 2 weeks ago for ICD firing - had lead revision/extraction with pocket hematoma post op.  Presented to the ED with complaint of chest pain and shortness of breath for 2 days.    Possible microperforation  Acute hypoxic respiratory failure  VT s/p AICD  trace pericardial effusion  Afib/flutter    Plan   - Cont tele monitoring  - will follow up echo results to re-assess size of pericardial effusion  - Cont to hold eliquis  - possible OR with CT surgery to adjust lead

## 2019-09-19 NOTE — PROGRESS NOTE ADULT - ATTENDING COMMENTS
49 yo M with history of arrhythmogenic VT s/p AICD in 2005, HFpEF (EF 69% in August, 2019), afib/flutter on Eliquis/ASA 81mg, HTN, COPD (on 2L O2 PRN), DLD, multiple sclerosis, epilepsy, PTSD, depression, TIA, admitted 2 weeks ago for Riata lead externalization s/p lead extraction and new ICD lead implant. He presented to the ED with complaints of chest pain and shortness of breath for 2 days and has a new pericardial effusion.     Impression  Possible microperforation  Acute hypoxic respiratory failure  VT s/p AICD  trace pericardial effusion  Afib/flutter      Plan   - Cont tele monitoring  - Follow up on repeat echo to assess progression of pericardial effusion  - Cont to hold eliquis  - possible OR with CT surgery for lead revision
Pt seen and examined at bedside independently. pt doing well today. Given the drop in hgb, iron studies show iron deff, will give Iron, Gi consult, hold eliquis for now. CTA pending r/o pe. Pt now on NC and improving.  I agree with the above plan   #Progress Note Handoff  Pending (specify):  Consults__CTS, Cardio, ID, GI_______, Tests________, Test Results____CTA, CBC in pm___, Other_________  Family discussion: sherry pt and agreed to plan   Disposition: Home___/SNF___/Other________/Unknown at this time________

## 2019-09-19 NOTE — PRE-ANESTHESIA EVALUATION ADULT - NSANTHOSAYNRD_GEN_A_CORE
No. DOE screening performed.  STOP BANG Legend: 0-2 = LOW Risk; 3-4 = INTERMEDIATE Risk; 5-8 = HIGH Risk

## 2019-09-19 NOTE — PROGRESS NOTE ADULT - SUBJECTIVE AND OBJECTIVE BOX
48 years old male with PMHx of arrhythmogenic ventricular tachycardia s/p AICD in 2005, HFpEF (EF 69% in August, 2019), afib/flutter on Eliquis/ASA 81mg, HTN, COPD (on 2L O2 PRN), DLD, multiple sclerosis, epilepsy, PTSD, depression, TIA, presented to the ED with complaint of chest pain and shortness of breath for 2 days. Patient was in his usual health 2 days ago until yesterday at 3AM, he woke up with substernal chest pain/pressure, 9/10, constant, radiates to left sided chest, and shortness of breath, with associated nonproductive cough. Patient states that his pain felt like the pain he had when his lead was dislodged years ago. He called [facility] and was instructed to come to ED for evaluation if pain is getting worse. Of note patient was recently admitted on 08/29/19 for AICD firing/lead misplacement and discharged on 09/11/19 (found to have hematoma of AICD pocket and ICD battery depletion s/p removal of defib lead and atrial lead with laser extraction (old RV lead was used for temporary pacing).     #Possible pericarditis and small pericardial effusion (acute)  - repeating TTE, to be reviewed  - Per Id: started on po Doxycycline 100 mg q12h for 5 days  - Per CT surg - Patient will be scheduled for ICD lead revision, possible pericardial window, tomorrow afternoon.    # Atypical chest pain- (resolved)  -Troponin T, Serum: <0.01 ng/mL (09.16.19 @ 11:56), Troponin T, Serum: <0.01 ng/mL (09.15.19 @ 19:55)  - 12 Lead ECG (09.15.19 @ 18:58) >Line Atrial-paced rhythm, Right bundle branch block  - no events on tele  - evaluated by cardiology    # Acute on chronic respiratory failure on home oxygen - 2L NC at home, on 3L NC here satting at 97%  -  CT Angio Chest w/ IV Cont (09.17.19 @ 15:50) >No central pulmonary embolism. Trace left pleural effusion with bibasilar atelectasis. Small pericardial effusion, increased since prior.   -  Transthoracic Echocardiogram (09.16.19 @ 17:05) >Left ventricular ejection fraction, by visual estimation, is 55 to 60%. Normal left ventricular size and wall thicknesses, with normal systolic and diastolic function. A small pericardial effusion is present. There is no evidence of cardiac tamponade.  - Started prednisone 40mg qd  - c/w budesonide, spiriva  - RVP negative    # H/o VT s/p AICD, H/o Afib/aflutter, cardiomyopathy  - evaluated by EP  - c/w dofetilide, lasix, metoprolol  - restart eliquis once cleared by GI    # Acute on chronic normocytic anemia with folate deficiency  - Folate, Serum: 3.4 ng/mL (09.17.19 @ 07:24)  - Vitamin B12, Serum: 377 pg/mL (09.17.19 @ 07:24)  - % Saturation, Iron: 6 % (09.17.19 @ 07:24),Iron Total, Serum: 22 ug/dL (09.17.19 @ 07:24), Ferritin, Serum: 191 ng/mL (09.17.19 @ 07:24)  - Started folic acid  - started ferrous sulphate  - GI eval    # H/o CAD  - C/w ASA, statin, metoprolol    # H/o Seizures  - seizure precautions  - c/w lamotrigine    #H/o MS  - not in exacerbation  - c/w baclofen    # H/o Bipolar disoder/ PTSD/ schizo affective disorder  - c/w home meds    # H/o BPH  - c/w flomax    # H/o DM type 2  - diet controlled  - monitor  FS    # H/o peripheral neuropathy  - c/w neurontin    # GERD  - c/w protonix    # DVT prophylaxis    #Full code    # Pending: GI eval    # Disposition: home when stable

## 2019-09-20 LAB
ANION GAP SERPL CALC-SCNC: 10 MMOL/L — SIGNIFICANT CHANGE UP (ref 7–14)
BASOPHILS # BLD AUTO: 0.04 K/UL — SIGNIFICANT CHANGE UP (ref 0–0.2)
BASOPHILS NFR BLD AUTO: 0.3 % — SIGNIFICANT CHANGE UP (ref 0–1)
BUN SERPL-MCNC: 19 MG/DL — SIGNIFICANT CHANGE UP (ref 10–20)
CALCIUM SERPL-MCNC: 9.3 MG/DL — SIGNIFICANT CHANGE UP (ref 8.5–10.1)
CHLORIDE SERPL-SCNC: 103 MMOL/L — SIGNIFICANT CHANGE UP (ref 98–110)
CO2 SERPL-SCNC: 28 MMOL/L — SIGNIFICANT CHANGE UP (ref 17–32)
CREAT SERPL-MCNC: 0.9 MG/DL — SIGNIFICANT CHANGE UP (ref 0.7–1.5)
EOSINOPHIL # BLD AUTO: 0.02 K/UL — SIGNIFICANT CHANGE UP (ref 0–0.7)
EOSINOPHIL NFR BLD AUTO: 0.2 % — SIGNIFICANT CHANGE UP (ref 0–8)
GLUCOSE SERPL-MCNC: 153 MG/DL — HIGH (ref 70–99)
HCT VFR BLD CALC: 29.2 % — LOW (ref 42–52)
HGB BLD-MCNC: 9.1 G/DL — LOW (ref 14–18)
IMM GRANULOCYTES NFR BLD AUTO: 2.1 % — HIGH (ref 0.1–0.3)
LYMPHOCYTES # BLD AUTO: 1.47 K/UL — SIGNIFICANT CHANGE UP (ref 1.2–3.4)
LYMPHOCYTES # BLD AUTO: 12 % — LOW (ref 20.5–51.1)
MCHC RBC-ENTMCNC: 29.2 PG — SIGNIFICANT CHANGE UP (ref 27–31)
MCHC RBC-ENTMCNC: 31.2 G/DL — LOW (ref 32–37)
MCV RBC AUTO: 93.6 FL — SIGNIFICANT CHANGE UP (ref 80–94)
MONOCYTES # BLD AUTO: 0.72 K/UL — HIGH (ref 0.1–0.6)
MONOCYTES NFR BLD AUTO: 5.9 % — SIGNIFICANT CHANGE UP (ref 1.7–9.3)
NEUTROPHILS # BLD AUTO: 9.79 K/UL — HIGH (ref 1.4–6.5)
NEUTROPHILS NFR BLD AUTO: 79.5 % — HIGH (ref 42.2–75.2)
NRBC # BLD: 0 /100 WBCS — SIGNIFICANT CHANGE UP (ref 0–0)
PLATELET # BLD AUTO: 416 K/UL — HIGH (ref 130–400)
POTASSIUM SERPL-MCNC: 4.3 MMOL/L — SIGNIFICANT CHANGE UP (ref 3.5–5)
POTASSIUM SERPL-SCNC: 4.3 MMOL/L — SIGNIFICANT CHANGE UP (ref 3.5–5)
RBC # BLD: 3.12 M/UL — LOW (ref 4.7–6.1)
RBC # FLD: 14.8 % — HIGH (ref 11.5–14.5)
SODIUM SERPL-SCNC: 141 MMOL/L — SIGNIFICANT CHANGE UP (ref 135–146)
WBC # BLD: 12.3 K/UL — HIGH (ref 4.8–10.8)
WBC # FLD AUTO: 12.3 K/UL — HIGH (ref 4.8–10.8)

## 2019-09-20 PROCEDURE — 33216 INSERT 1 ELECTRODE PM-DEFIB: CPT

## 2019-09-20 PROCEDURE — 71045 X-RAY EXAM CHEST 1 VIEW: CPT | Mod: 26

## 2019-09-20 PROCEDURE — 33240 INSRT PULSE GEN W/SINGL LEAD: CPT | Mod: 79

## 2019-09-20 PROCEDURE — 99233 SBSQ HOSP IP/OBS HIGH 50: CPT

## 2019-09-20 PROCEDURE — 33244 REMOVE ELCTRD TRANSVENOUSLY: CPT | Mod: 79

## 2019-09-20 RX ORDER — HYDROMORPHONE HYDROCHLORIDE 2 MG/ML
2 INJECTION INTRAMUSCULAR; INTRAVENOUS; SUBCUTANEOUS EVERY 4 HOURS
Refills: 0 | Status: DISCONTINUED | OUTPATIENT
Start: 2019-09-20 | End: 2019-09-23

## 2019-09-20 RX ORDER — MORPHINE SULFATE 50 MG/1
15 CAPSULE, EXTENDED RELEASE ORAL EVERY 4 HOURS
Refills: 0 | Status: DISCONTINUED | OUTPATIENT
Start: 2019-09-20 | End: 2019-09-23

## 2019-09-20 RX ORDER — ASPIRIN/CALCIUM CARB/MAGNESIUM 324 MG
81 TABLET ORAL DAILY
Refills: 0 | Status: DISCONTINUED | OUTPATIENT
Start: 2019-09-20 | End: 2019-09-23

## 2019-09-20 RX ORDER — FLUTICASONE PROPIONATE 50 MCG
2 SPRAY, SUSPENSION NASAL
Refills: 0 | Status: DISCONTINUED | OUTPATIENT
Start: 2019-09-20 | End: 2019-09-23

## 2019-09-20 RX ORDER — LORATADINE 10 MG/1
10 TABLET ORAL DAILY
Refills: 0 | Status: DISCONTINUED | OUTPATIENT
Start: 2019-09-20 | End: 2019-09-23

## 2019-09-20 RX ORDER — FUROSEMIDE 40 MG
20 TABLET ORAL DAILY
Refills: 0 | Status: DISCONTINUED | OUTPATIENT
Start: 2019-09-20 | End: 2019-09-23

## 2019-09-20 RX ORDER — QUETIAPINE FUMARATE 200 MG/1
50 TABLET, FILM COATED ORAL AT BEDTIME
Refills: 0 | Status: DISCONTINUED | OUTPATIENT
Start: 2019-09-20 | End: 2019-09-23

## 2019-09-20 RX ORDER — ACETAMINOPHEN 500 MG
650 TABLET ORAL EVERY 6 HOURS
Refills: 0 | Status: DISCONTINUED | OUTPATIENT
Start: 2019-09-20 | End: 2019-09-23

## 2019-09-20 RX ORDER — GABAPENTIN 400 MG/1
400 CAPSULE ORAL THREE TIMES A DAY
Refills: 0 | Status: DISCONTINUED | OUTPATIENT
Start: 2019-09-20 | End: 2019-09-23

## 2019-09-20 RX ORDER — BACLOFEN 100 %
10 POWDER (GRAM) MISCELLANEOUS
Refills: 0 | Status: DISCONTINUED | OUTPATIENT
Start: 2019-09-20 | End: 2019-09-23

## 2019-09-20 RX ORDER — BUDESONIDE AND FORMOTEROL FUMARATE DIHYDRATE 160; 4.5 UG/1; UG/1
2 AEROSOL RESPIRATORY (INHALATION)
Refills: 0 | Status: DISCONTINUED | OUTPATIENT
Start: 2019-09-20 | End: 2019-09-23

## 2019-09-20 RX ORDER — POLYETHYLENE GLYCOL 3350 17 G/17G
17 POWDER, FOR SOLUTION ORAL EVERY 12 HOURS
Refills: 0 | Status: DISCONTINUED | OUTPATIENT
Start: 2019-09-20 | End: 2019-09-23

## 2019-09-20 RX ORDER — QUETIAPINE FUMARATE 200 MG/1
25 TABLET, FILM COATED ORAL DAILY
Refills: 0 | Status: DISCONTINUED | OUTPATIENT
Start: 2019-09-20 | End: 2019-09-23

## 2019-09-20 RX ORDER — CHLORHEXIDINE GLUCONATE 213 G/1000ML
1 SOLUTION TOPICAL
Refills: 0 | Status: DISCONTINUED | OUTPATIENT
Start: 2019-09-20 | End: 2019-09-23

## 2019-09-20 RX ORDER — CLONAZEPAM 1 MG
0.5 TABLET ORAL AT BEDTIME
Refills: 0 | Status: DISCONTINUED | OUTPATIENT
Start: 2019-09-20 | End: 2019-09-23

## 2019-09-20 RX ORDER — PANTOPRAZOLE SODIUM 20 MG/1
40 TABLET, DELAYED RELEASE ORAL
Refills: 0 | Status: DISCONTINUED | OUTPATIENT
Start: 2019-09-20 | End: 2019-09-23

## 2019-09-20 RX ORDER — LAMOTRIGINE 25 MG/1
100 TABLET, ORALLY DISINTEGRATING ORAL
Refills: 0 | Status: DISCONTINUED | OUTPATIENT
Start: 2019-09-20 | End: 2019-09-23

## 2019-09-20 RX ORDER — FOLIC ACID 0.8 MG
1 TABLET ORAL DAILY
Refills: 0 | Status: DISCONTINUED | OUTPATIENT
Start: 2019-09-20 | End: 2019-09-23

## 2019-09-20 RX ORDER — METOPROLOL TARTRATE 50 MG
12.5 TABLET ORAL EVERY 8 HOURS
Refills: 0 | Status: DISCONTINUED | OUTPATIENT
Start: 2019-09-20 | End: 2019-09-23

## 2019-09-20 RX ORDER — DOFETILIDE 0.25 MG/1
250 CAPSULE ORAL
Refills: 0 | Status: DISCONTINUED | OUTPATIENT
Start: 2019-09-20 | End: 2019-09-23

## 2019-09-20 RX ORDER — TAMSULOSIN HYDROCHLORIDE 0.4 MG/1
0.4 CAPSULE ORAL AT BEDTIME
Refills: 0 | Status: DISCONTINUED | OUTPATIENT
Start: 2019-09-20 | End: 2019-09-23

## 2019-09-20 RX ORDER — TIOTROPIUM BROMIDE 18 UG/1
1 CAPSULE ORAL; RESPIRATORY (INHALATION) DAILY
Refills: 0 | Status: DISCONTINUED | OUTPATIENT
Start: 2019-09-20 | End: 2019-09-23

## 2019-09-20 RX ORDER — ATORVASTATIN CALCIUM 80 MG/1
40 TABLET, FILM COATED ORAL AT BEDTIME
Refills: 0 | Status: DISCONTINUED | OUTPATIENT
Start: 2019-09-20 | End: 2019-09-23

## 2019-09-20 RX ADMIN — BUDESONIDE AND FORMOTEROL FUMARATE DIHYDRATE 2 PUFF(S): 160; 4.5 AEROSOL RESPIRATORY (INHALATION) at 09:47

## 2019-09-20 RX ADMIN — Medication 10 MILLIGRAM(S): at 11:13

## 2019-09-20 RX ADMIN — TAMSULOSIN HYDROCHLORIDE 0.4 MILLIGRAM(S): 0.4 CAPSULE ORAL at 21:22

## 2019-09-20 RX ADMIN — Medication 40 MILLIGRAM(S): at 05:24

## 2019-09-20 RX ADMIN — Medication 12.5 MILLIGRAM(S): at 21:22

## 2019-09-20 RX ADMIN — Medication 10 MILLIGRAM(S): at 05:24

## 2019-09-20 RX ADMIN — MORPHINE SULFATE 15 MILLIGRAM(S): 50 CAPSULE, EXTENDED RELEASE ORAL at 21:05

## 2019-09-20 RX ADMIN — Medication 100 MILLIGRAM(S): at 20:35

## 2019-09-20 RX ADMIN — ATORVASTATIN CALCIUM 40 MILLIGRAM(S): 80 TABLET, FILM COATED ORAL at 21:22

## 2019-09-20 RX ADMIN — HYDROMORPHONE HYDROCHLORIDE 2 MILLIGRAM(S): 2 INJECTION INTRAMUSCULAR; INTRAVENOUS; SUBCUTANEOUS at 19:10

## 2019-09-20 RX ADMIN — DOFETILIDE 250 MICROGRAM(S): 0.25 CAPSULE ORAL at 05:23

## 2019-09-20 RX ADMIN — Medication 10 MILLIGRAM(S): at 18:59

## 2019-09-20 RX ADMIN — MORPHINE SULFATE 15 MILLIGRAM(S): 50 CAPSULE, EXTENDED RELEASE ORAL at 10:13

## 2019-09-20 RX ADMIN — Medication 0.5 MILLIGRAM(S): at 23:03

## 2019-09-20 RX ADMIN — MORPHINE SULFATE 15 MILLIGRAM(S): 50 CAPSULE, EXTENDED RELEASE ORAL at 09:44

## 2019-09-20 RX ADMIN — MORPHINE SULFATE 15 MILLIGRAM(S): 50 CAPSULE, EXTENDED RELEASE ORAL at 20:35

## 2019-09-20 RX ADMIN — Medication 10 MILLIGRAM(S): at 01:07

## 2019-09-20 RX ADMIN — QUETIAPINE FUMARATE 50 MILLIGRAM(S): 200 TABLET, FILM COATED ORAL at 21:22

## 2019-09-20 RX ADMIN — Medication 20 MILLIGRAM(S): at 05:24

## 2019-09-20 RX ADMIN — PANTOPRAZOLE SODIUM 40 MILLIGRAM(S): 20 TABLET, DELAYED RELEASE ORAL at 06:06

## 2019-09-20 RX ADMIN — Medication 10 MILLIGRAM(S): at 23:03

## 2019-09-20 RX ADMIN — DOFETILIDE 250 MICROGRAM(S): 0.25 CAPSULE ORAL at 19:12

## 2019-09-20 RX ADMIN — LAMOTRIGINE 100 MILLIGRAM(S): 25 TABLET, ORALLY DISINTEGRATING ORAL at 05:24

## 2019-09-20 RX ADMIN — Medication 12.5 MILLIGRAM(S): at 05:24

## 2019-09-20 RX ADMIN — MORPHINE SULFATE 15 MILLIGRAM(S): 50 CAPSULE, EXTENDED RELEASE ORAL at 05:23

## 2019-09-20 RX ADMIN — Medication 2 SPRAY(S): at 05:24

## 2019-09-20 RX ADMIN — QUETIAPINE FUMARATE 25 MILLIGRAM(S): 200 TABLET, FILM COATED ORAL at 11:41

## 2019-09-20 RX ADMIN — GABAPENTIN 400 MILLIGRAM(S): 400 CAPSULE ORAL at 21:22

## 2019-09-20 RX ADMIN — LORATADINE 10 MILLIGRAM(S): 10 TABLET ORAL at 11:41

## 2019-09-20 RX ADMIN — Medication 100 MILLIGRAM(S): at 05:24

## 2019-09-20 RX ADMIN — TIOTROPIUM BROMIDE 1 CAPSULE(S): 18 CAPSULE ORAL; RESPIRATORY (INHALATION) at 07:33

## 2019-09-20 RX ADMIN — BUDESONIDE AND FORMOTEROL FUMARATE DIHYDRATE 2 PUFF(S): 160; 4.5 AEROSOL RESPIRATORY (INHALATION) at 19:11

## 2019-09-20 RX ADMIN — Medication 2 SPRAY(S): at 21:21

## 2019-09-20 RX ADMIN — QUETIAPINE FUMARATE 25 MILLIGRAM(S): 200 TABLET, FILM COATED ORAL at 19:00

## 2019-09-20 RX ADMIN — GABAPENTIN 400 MILLIGRAM(S): 400 CAPSULE ORAL at 05:23

## 2019-09-20 RX ADMIN — GABAPENTIN 400 MILLIGRAM(S): 400 CAPSULE ORAL at 11:59

## 2019-09-20 RX ADMIN — HYDROMORPHONE HYDROCHLORIDE 2 MILLIGRAM(S): 2 INJECTION INTRAMUSCULAR; INTRAVENOUS; SUBCUTANEOUS at 19:40

## 2019-09-20 RX ADMIN — LAMOTRIGINE 100 MILLIGRAM(S): 25 TABLET, ORALLY DISINTEGRATING ORAL at 19:04

## 2019-09-20 NOTE — PACU DISCHARGE NOTE - COMMENTS
Pt s/p uncomplicated replacement of defibrillator lead. Patient condition stable.   VS: 124/64; 60; 17; 97F; 94% O2Sat

## 2019-09-20 NOTE — BRIEF OPERATIVE NOTE - OPERATION/FINDINGS
UNSUCCESSFUL REPOSITIONING OF DISPLACED ICD LEAD, REQUIRED REPLACEMENT; ECHO SHOWED VERY SMALL PERICERDIAL EFFUSION UNSUITABLE FOR DRAINAGE

## 2019-09-20 NOTE — PROGRESS NOTE ADULT - SUBJECTIVE AND OBJECTIVE BOX
HPI  Patient is a 48y old Male who presents with a chief complaint of chest pain and shortness of breath secondary to pericardial effusion (20 Sep 2019 11:35)    Currently admitted to medicine with the primary diagnosis of Pericardial effusion     Today is hospital day 4d.     INTERVAL HPI / OVERNIGHT EVENTS:  Patient was examined and seen at bedside. This morning he is resting comfortably in bed and reports no new issues or overnight events.   Pt getting Lead revision and possible pericardial window today    ROS: Otherwise unremarkable     PAST MEDICAL & SURGICAL HISTORY  PTSD (post-traumatic stress disorder)  Supraventricular arrhythmia: prior history  Cardiomyopathy  CHF (congestive heart failure)  Atrial fibrillation: s/p ablation, on eliquis  Diabetes mellitus: diet controlled  BPH (benign prostatic hyperplasia)  HTN (hypertension)  Seizures  Migraines  Pneumonia  MS (multiple sclerosis)  CAD (coronary artery disease)  Bipolar disorder  Anginal pain  Schizo affective schizophrenia  GERD (gastroesophageal reflux disease)  Seasonal allergies  Hypercholesteremia  COPD (chronic obstructive pulmonary disease)  CVA (cerebral vascular accident)  TIA (transient ischemic attack)  Syncope  Peripheral Neuropathy  Clinical Depression  H/O prior ablation treatment: for Afib  Cardiac pacemaker recipient  H/O hernia repair: right 1972,1991  History of hip replacement  S/P Orchiectomy: L for testicular CA  S/P Implantation of AICD    ALLERGIES  dexmethylphenidate (Unknown)  Dilantin (Rash)  dilantin, compazine, neurontin, ritalin, phenergan (Unknown)  Haldol (Unknown)  hydantoin derivatives (Other)  methylphenidate (Unknown)  phenytoin (Unknown)  prochlorperazine (Unknown)  promethazine (Dystonic RXN)  rash/hives (Anaphylaxis)  thioxanthenes (Unknown)    MEDICATIONS  STANDING MEDICATIONS  influenza   Vaccine 0.5 milliLiter(s) IntraMuscular once    PRN MEDICATIONS    VITALS:  T(F): 98  HR: 64  BP: 108/55  RR: 18  SpO2: 96%    PHYSICAL EXAM  GEN: NAD, Resting comfortably in bed  PULM: Clear to auscultation bilaterally, No wheezes  CVS: S1-S2, no murmurs  ABD: Soft, non-tender, non-distended, no guarding  EXT: No edema  NEURO: AAOx3, no focal deficits    LABS                        9.1    12.30 )-----------( 416      ( 20 Sep 2019 06:15 )             29.2     09-20    141  |  103  |  19  ----------------------------<  153<H>  4.3   |  28  |  0.9    Ca    9.3      20 Sep 2019 06:15  Phos  3.3     09-19  Mg     2.1     09-19                    RADIOLOGY

## 2019-09-20 NOTE — PROGRESS NOTE ADULT - ASSESSMENT
48 years old male with PMHx of arrhythmogenic ventricular tachycardia s/p AICD in 2005, HFpEF (EF 69% in August, 2019), afib/flutter on Eliquis/ASA 81mg, HTN, COPD (on 2L O2 PRN), DLD, multiple sclerosis, epilepsy, PTSD, depression, TIA, presented to the ED with complaint of chest pain and shortness of breath for 2 days. Patient was in his usual health 2 days ago until yesterday at 3AM, he woke up with substernal chest pain/pressure, 9/10, constant, radiates to left sided chest, and shortness of breath, with associated nonproductive cough. Patient states that his pain felt like the pain he had when his lead was dislodged years ago. He called [facility] PA and was instructed to come to ED for evaluation if pain is getting worse. Of note patient was recently admitted on 08/29/19 for AICD firing/lead misplacement and discharged on 09/11/19 (found to have hematoma of AICD pocket and ICD battery depletion s/p removal of defib lead and atrial lead with laser extraction (old RV lead was used for temporary pacing).     #Possible pericarditis and small pericardial effusion (acute)  - repeating TTE, shows pericardial effusion  - Per Id: started on po Doxycycline 100 mg q12h for 5 days  - Per CT surg - Patient will be scheduled for ICD lead revision, possible pericardial window, today    # Atypical chest pain- (resolved)  -Troponin T, Serum: <0.01 ng/mL (09.16.19 @ 11:56), Troponin T, Serum: <0.01 ng/mL (09.15.19 @ 19:55)  - 12 Lead ECG (09.15.19 @ 18:58) >Line Atrial-paced rhythm, Right bundle branch block  - no events on tele  - evaluated by cardiology    # Acute on chronic respiratory failure on home oxygen - 2L NC at home, on 3L NC here satting at 97%  -  CT Angio Chest w/ IV Cont (09.17.19 @ 15:50) >No central pulmonary embolism. Trace left pleural effusion with bibasilar atelectasis. Small pericardial effusion, increased since prior.   -  Transthoracic Echocardiogram (09.16.19 @ 17:05) >Left ventricular ejection fraction, by visual estimation, is 55 to 60%. Normal left ventricular size and wall thicknesses, with normal systolic and diastolic function. A small pericardial effusion is present. There is no evidence of cardiac tamponade.  - Started prednisone 40mg qd  - c/w budesonide, spiriva  - RVP negative    # H/o VT s/p AICD, H/o Afib/aflutter, cardiomyopathy  - evaluated by EP  - c/w dofetilide, lasix, metoprolol  - restart eliquis once cleared by GI    # Acute on chronic normocytic anemia with folate deficiency  - Folate, Serum: 3.4 ng/mL (09.17.19 @ 07:24)  - Vitamin B12, Serum: 377 pg/mL (09.17.19 @ 07:24)  - % Saturation, Iron: 6 % (09.17.19 @ 07:24),Iron Total, Serum: 22 ug/dL (09.17.19 @ 07:24), Ferritin, Serum: 191 ng/mL (09.17.19 @ 07:24)  - Started folic acid  - started ferrous sulphate  - GI eval    # H/o CAD  - C/w ASA, statin, metoprolol    # H/o Seizures  - seizure precautions  - c/w lamotrigine    #H/o MS  - not in exacerbation  - c/w baclofen    # H/o Bipolar disoder/ PTSD/ schizo affective disorder  - c/w home meds    # H/o BPH  - c/w flomax    # H/o DM type 2  - diet controlled  - monitor  FS    # H/o peripheral neuropathy  - c/w neurontin    # GERD  - c/w protonix    # DVT prophylaxis    #Full code    # Pending: GI eval    # Disposition: home when stable

## 2019-09-20 NOTE — PROGRESS NOTE ADULT - ASSESSMENT
48 years old male with PMHx of arrhythmogenic ventricular tachycardia s/p AICD in 2005, HFpEF (EF 69% in August, 2019), afib/flutter on Eliquis/ASA 81mg, HTN, COPD (on 2L O2 PRN), DLD, multiple sclerosis, epilepsy, PTSD, depression, TIA, presented to the ED with complaint of chest pain and shortness of breath for 2 days. Patient was in his usual health 2 days ago until yesterday at 3AM, he woke up with substernal chest pain/pressure, 9/10, constant, radiates to left sided chest, and shortness of breath, with associated nonproductive cough. Patient states that his pain felt like the pain he had when his lead was dislodged years ago. He called [facility] PA and was instructed to come to ED for evaluation if pain is getting worse. Of note patient was recently admitted on 08/29/19 for AICD firing/lead misplacement and discharged on 09/11/19 (found to have hematoma of AICD pocket and ICD battery depletion s/p removal of defib lead and atrial lead with laser extraction (old RV lead was used for temporary pacing).     # Atypical chest pain-resolved  -Troponin T, Serum: <0.01 ng/mL (09.16.19 @ 11:56), Troponin T, Serum: <0.01 ng/mL (09.15.19 @ 19:55)  - 12 Lead ECG (09.15.19 @ 18:58) >Line Atrial-paced rhythmRight bundle branch block  - no events on tele  - evaluated by cardiology    # Acute Bronchitis , H/o  chronic respiratory failure on home oxygen  -  CT Angio Chest w/ IV Cont (09.17.19 @ 15:50) >No central pulmonary embolism. Trace left pleural effusion with bibasilar atelectasis. Small pericardial effusion, increased since prior.   -  Transthoracic Echocardiogram (09.16.19 @ 17:05) >Left ventricular ejection fraction, by visual estimation, is 55 to 60%. Normal left ventricular size and wall thicknesses, with normal systolic and diastolic function.A small pericardial effusion is present. There is no evidence of cardiac tamponade.  - C/w prednisone, Doxycycline  - c/w budesonide, spiriva  -  RVP neg    # Pericardial effusion, H/o VT s/p AICD, H/o Afib/aflutter, cardiomyopathy  - c/w dofetilide, lasix, metoprolol  -Transthoracic Echocardiogram (09.19.19 @ 10:56) > Left ventricular ejection fraction, by visual estimation, is 55 to 60%. Small pericardial effusion.  - EP F/u : Possible microperforation, OR with CT surgery for lead revision .   - CT surgery f/u:  most likely a lead migration and possible micro-perforation.  scheduled for ICD lead revision, possible pericardial window today    # Acute on chronic normocytic anemia with folate deficiency  - Folate, Serum: 3.4 ng/mL (09.17.19 @ 07:24)  - Vitamin B12, Serum: 377 pg/mL (09.17.19 @ 07:24)  - % Saturation, Iron: 6 % (09.17.19 @ 07:24),Iron Total, Serum: 22 ug/dL (09.17.19 @ 07:24), Ferritin, Serum: 191 ng/mL (09.17.19 @ 07:24)  - c/w  folic acid  - on IV iron  - GI eval    # H/o CAD  - C/w ASA, statin, metoprolol    # H/o Seizures  - seizure precautions  - c/w lamotrigine    #H/o MS  - not in exacerbation  - c/w baclofen    # H/o Bipolar disoder/ PTSD/ schizo affective disorder  - c/w home meds    # H/o BPH  - c/w flomax    # H/o DM type 2  - diet controlled  - monitor  FS    # H/o peripheral neuropathy  - c/w neurontin    # GERD  - c/w protonix    # DVT prophylaxis    #Full code    # Pending:  GI eval. lead revision and possible pericardial window today  # Discussed with patient  # Disposition: home when stable

## 2019-09-20 NOTE — BRIEF OPERATIVE NOTE - NSICDXBRIEFPROCEDURE_GEN_ALL_CORE_FT
PROCEDURES:  Replacement, transvenous electrode lead, pacing ICD 20-Sep-2019 16:29:35  Jose Pastrana

## 2019-09-20 NOTE — PROGRESS NOTE ADULT - SUBJECTIVE AND OBJECTIVE BOX
Patient is a 48y old  Male who presents with a chief complaint of chest pain and shortness of breath (18 Sep 2019 10:12)    Patient was seen and examined.  Planned for  for ICD lead revision, possible pericardial window today  Sob improving  Denies chest pain, palpitation today  Denies fever, chills  Denies any other complaints.  All systems reviewed positive history as mentioned above.    PAST MEDICAL & SURGICAL HISTORY:  PTSD (post-traumatic stress disorder)  Supraventricular arrhythmia: prior history  Cardiomyopathy  CHF (congestive heart failure)  Atrial fibrillation: s/p ablation, on eliquis  Diabetes mellitus: diet controlled  BPH (benign prostatic hyperplasia)  HTN (hypertension)  Seizures  Migraines  MS (multiple sclerosis)  CAD (coronary artery disease)  Bipolar disorder  Anginal pain  Schizo affective schizophrenia  GERD (gastroesophageal reflux disease)  Seasonal allergies  Hypercholesteremia  COPD (chronic obstructive pulmonary disease)  CVA (cerebral vascular accident)  TIA (transient ischemic attack)  Syncope  Peripheral Neuropathy  Clinical Depression  Cardiac pacemaker recipient  H/O hernia repair: right 1972,1991  History of hip replacement  S/P Orchiectomy: L for testicular CA  S/P Implantation of AICD    Allergies  dexmethylphenidate (Unknown)  Dilantin (Rash)  dilantin, compazine, neurontin, ritalin, phenergan (Unknown)  Haldol (Unknown)  hydantoin derivatives (Other)  methylphenidate (Unknown)  phenytoin (Unknown)  prochlorperazine (Unknown)  promethazine (Dystonic RXN)  rash/hives (Anaphylaxis)  thioxanthenes (Unknown)    MEDICATIONS  (STANDING):  aspirin enteric coated 81 milliGRAM(s) Oral daily  atorvastatin 40 milliGRAM(s) Oral at bedtime  baclofen 10 milliGRAM(s) Oral four times a day  buDESOnide 160 MICROgram(s)/formoterol 4.5 MICROgram(s) Inhaler 2 Puff(s) Inhalation two times a day  chlorhexidine 4% Liquid 1 Application(s) Topical <User Schedule>  dofetilide 250 MICROGram(s) Oral two times a day  doxycycline hyclate Capsule 100 milliGRAM(s) Oral every 12 hours  fluticasone propionate 50 MICROgram(s)/spray Nasal Spray 2 Spray(s) Both Nostrils two times a day  folic acid 1 milliGRAM(s) Oral daily  furosemide    Tablet 20 milliGRAM(s) Oral daily  gabapentin 400 milliGRAM(s) Oral three times a day  influenza   Vaccine 0.5 milliLiter(s) IntraMuscular once  lamoTRIgine 100 milliGRAM(s) Oral two times a day  loratadine 10 milliGRAM(s) Oral daily  metoprolol tartrate 12.5 milliGRAM(s) Oral every 8 hours  pantoprazole    Tablet 40 milliGRAM(s) Oral before breakfast  polyethylene glycol 3350 17 Gram(s) Oral every 12 hours  predniSONE   Tablet 40 milliGRAM(s) Oral daily  QUEtiapine 25 milliGRAM(s) Oral daily  QUEtiapine 50 milliGRAM(s) Oral at bedtime  tamsulosin 0.4 milliGRAM(s) Oral at bedtime  tiotropium 18 MICROgram(s) Capsule 1 Capsule(s) Inhalation daily    MEDICATIONS  (PRN):  acetaminophen   Tablet .. 650 milliGRAM(s) Oral every 6 hours PRN Mild Pain (1 - 3)  clonazePAM  Tablet 0.5 milliGRAM(s) Oral at bedtime PRN agitation  HYDROmorphone   Tablet 2 milliGRAM(s) Oral every 4 hours PRN Moderate Pain (4 - 6)  morphine  IR 15 milliGRAM(s) Oral every 4 hours PRN Severe Pain (7 - 10)    Vital Signs Last 24 Hrs  T(C): 36.2 (20 Sep 2019 05:20), Max: 36.8 (19 Sep 2019 12:12)  T(F): 97.2 (20 Sep 2019 05:20), Max: 98.3 (19 Sep 2019 12:12)  HR: 60 (20 Sep 2019 05:20) (60 - 60)  BP: 107/58 (20 Sep 2019 05:20) (105/51 - 117/59)  BP(mean): --  RR: 18 (20 Sep 2019 05:20) (18 - 20)  SpO2: 97% (20 Sep 2019 09:58) (97% - 98%)    O/E:  Awake, alert, not in distress.  HEENT: atraumatic, EOMI.  Chest: clear  CVS: SIS2 +, no murmur.  P/A: obese , BS+  CNS: non focal.  Ext: no edema feet.  Skin: no rash, no ulcers.  All systems reviewed positive findings as above.                                  9.1<L>  12.30<H> )-----------( 416<H>    ( 20 Sep 2019 06:15 )             29.2<L>                        9.3<L>  9.02  )-----------( 407<H>    ( 19 Sep 2019 07:09 )             29.4<L>    09-20    141  |  103  |  19  ----------------------------<  153<H>  4.3   |  28  |  0.9  09-19    141  |  103  |  16  ----------------------------<  152<H>  5.0   |  27  |  1.0    Ca    9.3      20 Sep 2019 06:15  Ca    10.1      19 Sep 2019 07:09  Phos  3.3     09-19  Mg     2.1     09-19

## 2019-09-21 LAB
ANION GAP SERPL CALC-SCNC: 13 MMOL/L — SIGNIFICANT CHANGE UP (ref 7–14)
BUN SERPL-MCNC: 23 MG/DL — HIGH (ref 10–20)
CALCIUM SERPL-MCNC: 9.7 MG/DL — SIGNIFICANT CHANGE UP (ref 8.5–10.1)
CHLORIDE SERPL-SCNC: 99 MMOL/L — SIGNIFICANT CHANGE UP (ref 98–110)
CO2 SERPL-SCNC: 27 MMOL/L — SIGNIFICANT CHANGE UP (ref 17–32)
CREAT SERPL-MCNC: 0.9 MG/DL — SIGNIFICANT CHANGE UP (ref 0.7–1.5)
GLUCOSE SERPL-MCNC: 120 MG/DL — HIGH (ref 70–99)
HCT VFR BLD CALC: 31.1 % — LOW (ref 42–52)
HGB BLD-MCNC: 9.6 G/DL — LOW (ref 14–18)
MCHC RBC-ENTMCNC: 28.7 PG — SIGNIFICANT CHANGE UP (ref 27–31)
MCHC RBC-ENTMCNC: 30.9 G/DL — LOW (ref 32–37)
MCV RBC AUTO: 93.1 FL — SIGNIFICANT CHANGE UP (ref 80–94)
NIGHT BLUE STAIN TISS: SIGNIFICANT CHANGE UP
NRBC # BLD: 0 /100 WBCS — SIGNIFICANT CHANGE UP (ref 0–0)
PLATELET # BLD AUTO: 416 K/UL — HIGH (ref 130–400)
POTASSIUM SERPL-MCNC: 4.2 MMOL/L — SIGNIFICANT CHANGE UP (ref 3.5–5)
POTASSIUM SERPL-SCNC: 4.2 MMOL/L — SIGNIFICANT CHANGE UP (ref 3.5–5)
RBC # BLD: 3.34 M/UL — LOW (ref 4.7–6.1)
RBC # FLD: 15.3 % — HIGH (ref 11.5–14.5)
SODIUM SERPL-SCNC: 139 MMOL/L — SIGNIFICANT CHANGE UP (ref 135–146)
SPECIMEN SOURCE: SIGNIFICANT CHANGE UP
WBC # BLD: 12.02 K/UL — HIGH (ref 4.8–10.8)
WBC # FLD AUTO: 12.02 K/UL — HIGH (ref 4.8–10.8)

## 2019-09-21 PROCEDURE — 71045 X-RAY EXAM CHEST 1 VIEW: CPT | Mod: 26

## 2019-09-21 PROCEDURE — 93010 ELECTROCARDIOGRAM REPORT: CPT

## 2019-09-21 RX ORDER — HEPARIN SODIUM 5000 [USP'U]/ML
5000 INJECTION INTRAVENOUS; SUBCUTANEOUS EVERY 8 HOURS
Refills: 0 | Status: DISCONTINUED | OUTPATIENT
Start: 2019-09-21 | End: 2019-09-23

## 2019-09-21 RX ORDER — CEFAZOLIN SODIUM 1 G
2000 VIAL (EA) INJECTION EVERY 8 HOURS
Refills: 0 | Status: COMPLETED | OUTPATIENT
Start: 2019-09-21 | End: 2019-09-21

## 2019-09-21 RX ADMIN — Medication 12.5 MILLIGRAM(S): at 21:44

## 2019-09-21 RX ADMIN — Medication 81 MILLIGRAM(S): at 11:27

## 2019-09-21 RX ADMIN — HYDROMORPHONE HYDROCHLORIDE 2 MILLIGRAM(S): 2 INJECTION INTRAMUSCULAR; INTRAVENOUS; SUBCUTANEOUS at 06:16

## 2019-09-21 RX ADMIN — DOFETILIDE 250 MICROGRAM(S): 0.25 CAPSULE ORAL at 05:44

## 2019-09-21 RX ADMIN — DOFETILIDE 250 MICROGRAM(S): 0.25 CAPSULE ORAL at 18:19

## 2019-09-21 RX ADMIN — Medication 100 MILLIGRAM(S): at 18:20

## 2019-09-21 RX ADMIN — HEPARIN SODIUM 5000 UNIT(S): 5000 INJECTION INTRAVENOUS; SUBCUTANEOUS at 21:44

## 2019-09-21 RX ADMIN — Medication 100 MILLIGRAM(S): at 15:00

## 2019-09-21 RX ADMIN — HYDROMORPHONE HYDROCHLORIDE 2 MILLIGRAM(S): 2 INJECTION INTRAMUSCULAR; INTRAVENOUS; SUBCUTANEOUS at 13:37

## 2019-09-21 RX ADMIN — HYDROMORPHONE HYDROCHLORIDE 2 MILLIGRAM(S): 2 INJECTION INTRAMUSCULAR; INTRAVENOUS; SUBCUTANEOUS at 05:46

## 2019-09-21 RX ADMIN — Medication 20 MILLIGRAM(S): at 05:45

## 2019-09-21 RX ADMIN — Medication 10 MILLIGRAM(S): at 11:27

## 2019-09-21 RX ADMIN — BUDESONIDE AND FORMOTEROL FUMARATE DIHYDRATE 2 PUFF(S): 160; 4.5 AEROSOL RESPIRATORY (INHALATION) at 20:39

## 2019-09-21 RX ADMIN — Medication 12.5 MILLIGRAM(S): at 13:10

## 2019-09-21 RX ADMIN — CHLORHEXIDINE GLUCONATE 1 APPLICATION(S): 213 SOLUTION TOPICAL at 05:44

## 2019-09-21 RX ADMIN — GABAPENTIN 400 MILLIGRAM(S): 400 CAPSULE ORAL at 05:45

## 2019-09-21 RX ADMIN — QUETIAPINE FUMARATE 50 MILLIGRAM(S): 200 TABLET, FILM COATED ORAL at 21:44

## 2019-09-21 RX ADMIN — HYDROMORPHONE HYDROCHLORIDE 2 MILLIGRAM(S): 2 INJECTION INTRAMUSCULAR; INTRAVENOUS; SUBCUTANEOUS at 13:07

## 2019-09-21 RX ADMIN — Medication 100 MILLIGRAM(S): at 05:44

## 2019-09-21 RX ADMIN — ATORVASTATIN CALCIUM 40 MILLIGRAM(S): 80 TABLET, FILM COATED ORAL at 21:43

## 2019-09-21 RX ADMIN — Medication 100 MILLIGRAM(S): at 09:13

## 2019-09-21 RX ADMIN — MORPHINE SULFATE 15 MILLIGRAM(S): 50 CAPSULE, EXTENDED RELEASE ORAL at 09:13

## 2019-09-21 RX ADMIN — PANTOPRAZOLE SODIUM 40 MILLIGRAM(S): 20 TABLET, DELAYED RELEASE ORAL at 06:01

## 2019-09-21 RX ADMIN — MORPHINE SULFATE 15 MILLIGRAM(S): 50 CAPSULE, EXTENDED RELEASE ORAL at 19:18

## 2019-09-21 RX ADMIN — Medication 2 SPRAY(S): at 18:19

## 2019-09-21 RX ADMIN — GABAPENTIN 400 MILLIGRAM(S): 400 CAPSULE ORAL at 13:09

## 2019-09-21 RX ADMIN — GABAPENTIN 400 MILLIGRAM(S): 400 CAPSULE ORAL at 21:44

## 2019-09-21 RX ADMIN — LORATADINE 10 MILLIGRAM(S): 10 TABLET ORAL at 11:27

## 2019-09-21 RX ADMIN — MORPHINE SULFATE 15 MILLIGRAM(S): 50 CAPSULE, EXTENDED RELEASE ORAL at 04:00

## 2019-09-21 RX ADMIN — Medication 12.5 MILLIGRAM(S): at 06:01

## 2019-09-21 RX ADMIN — MORPHINE SULFATE 15 MILLIGRAM(S): 50 CAPSULE, EXTENDED RELEASE ORAL at 03:36

## 2019-09-21 RX ADMIN — MORPHINE SULFATE 15 MILLIGRAM(S): 50 CAPSULE, EXTENDED RELEASE ORAL at 09:43

## 2019-09-21 RX ADMIN — BUDESONIDE AND FORMOTEROL FUMARATE DIHYDRATE 2 PUFF(S): 160; 4.5 AEROSOL RESPIRATORY (INHALATION) at 09:13

## 2019-09-21 RX ADMIN — Medication 2 SPRAY(S): at 05:45

## 2019-09-21 RX ADMIN — Medication 10 MILLIGRAM(S): at 18:19

## 2019-09-21 RX ADMIN — Medication 100 MILLIGRAM(S): at 22:39

## 2019-09-21 RX ADMIN — TAMSULOSIN HYDROCHLORIDE 0.4 MILLIGRAM(S): 0.4 CAPSULE ORAL at 21:44

## 2019-09-21 RX ADMIN — Medication 10 MILLIGRAM(S): at 05:44

## 2019-09-21 RX ADMIN — QUETIAPINE FUMARATE 25 MILLIGRAM(S): 200 TABLET, FILM COATED ORAL at 11:27

## 2019-09-21 RX ADMIN — LAMOTRIGINE 100 MILLIGRAM(S): 25 TABLET, ORALLY DISINTEGRATING ORAL at 18:20

## 2019-09-21 RX ADMIN — MORPHINE SULFATE 15 MILLIGRAM(S): 50 CAPSULE, EXTENDED RELEASE ORAL at 20:00

## 2019-09-21 RX ADMIN — Medication 1 MILLIGRAM(S): at 11:27

## 2019-09-21 RX ADMIN — LAMOTRIGINE 100 MILLIGRAM(S): 25 TABLET, ORALLY DISINTEGRATING ORAL at 05:44

## 2019-09-21 RX ADMIN — Medication 40 MILLIGRAM(S): at 05:45

## 2019-09-21 NOTE — PROGRESS NOTE ADULT - SUBJECTIVE AND OBJECTIVE BOX
OPERATIVE PROCEDURE(s):   S/P AICD lead revision             POD #     1                  SURGEON(s): MILY Ríos    HPI: 48 year old male known to service with PMHx of arrhythmogenic ventricular tachycardia s/p AICD in 2005, HFpEF (EF 69% in August, 2019), afib/flutter on Eliquis/ASA 81mg, HTN, COPD (on 2L O2 PRN), DLD, multiple sclerosis, epilepsy, PTSD, depression, TIA, presented to the ED with complaint of chest pain and shortness of breath for 2 days s/p AICD generator change and lead revision 09/11/19 complicated by hematoma required exploration. Patient was discharged and presents for above complaint. Patient required lead revision.     SUBJECTIVE ASSESSMENT: 48y Male patient seen and examined at bedside. Patient c/o post-operative pain.    Vital Signs Last 24 Hrs  T(F): 98.1 (21 Sep 2019 08:00), Max: 98.1 (21 Sep 2019 08:00)  HR: 60 (21 Sep 2019 10:00) (60 - 64)  BP: 103/58 (21 Sep 2019 10:00) (91/50 - 134/65)  BP(mean): 76 (21 Sep 2019 10:00) (65 - 94)  RR: 19 (21 Sep 2019 10:00) (14 - 93)  SpO2: 97% (21 Sep 2019 10:00) (90% - 100%)    I&O's Detail    20 Sep 2019 07:01  -  21 Sep 2019 07:00  --------------------------------------------------------  IN:    Oral Fluid: 600 mL  Total IN: 600 mL    OUT:    Voided: 600 mL  Total OUT: 600 mL    Net: I&O's Detail    19 Sep 2019 07:01  -  20 Sep 2019 07:00  --------------------------------------------------------  Total NET: -300 mL    20 Sep 2019 07:01  -  21 Sep 2019 07:00  --------------------------------------------------------  Total NET: 0 mL      Physical Exam:  General: NAD; A&Ox3  Cardiac: S1/S2, RRR, no murmur, no rubs  Lungs: unlabored respirations, CTA b/l, no wheeze, no rales, no crackles  Abdomen: Soft/NT/ND; positive bowel sounds x 4  Incisions: Incisions clean/dry/intact  Extremities: No edema b/l lower extremities; good capillary refill; no cyanosis; palpable 1+ pedal pulses b/l      LABS:                        9.6<L>  12.02<H> )-----------( 416<H>    ( 21 Sep 2019 04:00 )             31.1<L>                        9.1<L>  12.30<H> )-----------( 416<H>    ( 20 Sep 2019 06:15 )             29.2<L>    09-21    139  |  99  |  23<H>  ----------------------------<  120<H>  4.2   |  27  |  0.9  09-20    141  |  103  |  19  ----------------------------<  153<H>  4.3   |  28  |  0.9    Ca    9.7      21 Sep 2019 04:00  Phos  3.3     09-19  Mg     2.1     09-19      RADIOLOGY & ADDITIONAL TESTS:  CXR: < from: Xray Chest 1 View- PORTABLE-Urgent (09.20.19 @ 17:26) >  Impression:  Left basilar opacity.  < end of copied text >    EKG: < from: 12 Lead ECG (09.21.19 @ 07:31) >  Ventricular Rate 60 BPM  Atrial Rate 60 BPM  P-R Interval 214 ms  QRS Duration 142 ms  Q-T Interval 446 ms  QTC Calculation(Bezet) 446 ms  P Axis 32 degrees  R Axis 3 degrees  T Axis -6 degrees  Diagnosis Line Atrial-paced rhythm with prolonged AV conduction  Right bundle branch block  T wave abnormality, consider lateral ischemia  Abnormal ECG  Confirmed by Elysia Walters MD ([id number]) on 9/21/2019 8:39:04 AM  < end of copied text >      Allergies    dexmethylphenidate (Unknown)  Dilantin (Rash)  dilantin, compazine, neurontin, ritalin, phenergan (Unknown)  Haldol (Unknown)  hydantoin derivatives (Other)  methylphenidate (Unknown)  phenytoin (Unknown)  prochlorperazine (Unknown)  promethazine (Dystonic RXN)  rash/hives (Anaphylaxis)  thioxanthenes (Unknown)    Intolerances      MEDICATIONS  (STANDING):  aspirin enteric coated 81 milliGRAM(s) Oral daily  atorvastatin 40 milliGRAM(s) Oral at bedtime  baclofen 10 milliGRAM(s) Oral four times a day  buDESOnide 160 MICROgram(s)/formoterol 4.5 MICROgram(s) Inhaler 2 Puff(s) Inhalation two times a day  ceFAZolin   IVPB 2000 milliGRAM(s) IV Intermittent every 8 hours  chlorhexidine 4% Liquid 1 Application(s) Topical <User Schedule>  dofetilide 250 MICROGram(s) Oral two times a day  doxycycline hyclate Capsule 100 milliGRAM(s) Oral every 12 hours  fluticasone propionate 50 MICROgram(s)/spray Nasal Spray 2 Spray(s) Both Nostrils two times a day  folic acid 1 milliGRAM(s) Oral daily  furosemide    Tablet 20 milliGRAM(s) Oral daily  gabapentin 400 milliGRAM(s) Oral three times a day  heparin  Injectable 5000 Unit(s) SubCutaneous every 8 hours  influenza   Vaccine 0.5 milliLiter(s) IntraMuscular once  lamoTRIgine 100 milliGRAM(s) Oral two times a day  loratadine 10 milliGRAM(s) Oral daily  metoprolol tartrate 12.5 milliGRAM(s) Oral every 8 hours  pantoprazole    Tablet 40 milliGRAM(s) Oral before breakfast  polyethylene glycol 3350 17 Gram(s) Oral every 12 hours  predniSONE   Tablet 40 milliGRAM(s) Oral daily  QUEtiapine 25 milliGRAM(s) Oral daily  QUEtiapine 50 milliGRAM(s) Oral at bedtime  tamsulosin 0.4 milliGRAM(s) Oral at bedtime  tiotropium 18 MICROgram(s) Capsule 1 Capsule(s) Inhalation daily    MEDICATIONS  (PRN):  acetaminophen   Tablet .. 650 milliGRAM(s) Oral every 6 hours PRN Mild Pain (1 - 3)  clonazePAM  Tablet 0.5 milliGRAM(s) Oral at bedtime PRN agitation  HYDROmorphone   Tablet 2 milliGRAM(s) Oral every 4 hours PRN Moderate Pain (4 - 6)  morphine  IR 15 milliGRAM(s) Oral every 4 hours PRN Severe Pain (7 - 10)        Assessment/Plan:  48y Male status-post S/P AICD lead revision             POD #     1   - Case and plan discussed with CTU Intensivist and CT Surgeon - Dr. Pastrana/Barbi   - Continue CTU supportive care and ongoing plan of care as per continuing CTU rounds.    - Continue DVT/GI prophylaxis  - Incentive Spirometry 10 times an hour  - Continue to advance physical activity as tolerated and continue PT/OT as directed  1. device interrogated doing well

## 2019-09-22 LAB
ANION GAP SERPL CALC-SCNC: 11 MMOL/L — SIGNIFICANT CHANGE UP (ref 7–14)
BUN SERPL-MCNC: 27 MG/DL — HIGH (ref 10–20)
CALCIUM SERPL-MCNC: 8.8 MG/DL — SIGNIFICANT CHANGE UP (ref 8.5–10.1)
CHLORIDE SERPL-SCNC: 105 MMOL/L — SIGNIFICANT CHANGE UP (ref 98–110)
CO2 SERPL-SCNC: 26 MMOL/L — SIGNIFICANT CHANGE UP (ref 17–32)
CREAT SERPL-MCNC: 1.2 MG/DL — SIGNIFICANT CHANGE UP (ref 0.7–1.5)
GLUCOSE SERPL-MCNC: 120 MG/DL — HIGH (ref 70–99)
HCT VFR BLD CALC: 29.6 % — LOW (ref 42–52)
HGB BLD-MCNC: 9.1 G/DL — LOW (ref 14–18)
MCHC RBC-ENTMCNC: 29.3 PG — SIGNIFICANT CHANGE UP (ref 27–31)
MCHC RBC-ENTMCNC: 30.7 G/DL — LOW (ref 32–37)
MCV RBC AUTO: 95.2 FL — HIGH (ref 80–94)
NRBC # BLD: 0 /100 WBCS — SIGNIFICANT CHANGE UP (ref 0–0)
PLATELET # BLD AUTO: 366 K/UL — SIGNIFICANT CHANGE UP (ref 130–400)
POTASSIUM SERPL-MCNC: 3.8 MMOL/L — SIGNIFICANT CHANGE UP (ref 3.5–5)
POTASSIUM SERPL-SCNC: 3.8 MMOL/L — SIGNIFICANT CHANGE UP (ref 3.5–5)
RBC # BLD: 3.11 M/UL — LOW (ref 4.7–6.1)
RBC # FLD: 15.8 % — HIGH (ref 11.5–14.5)
SODIUM SERPL-SCNC: 142 MMOL/L — SIGNIFICANT CHANGE UP (ref 135–146)
WBC # BLD: 9.42 K/UL — SIGNIFICANT CHANGE UP (ref 4.8–10.8)
WBC # FLD AUTO: 9.42 K/UL — SIGNIFICANT CHANGE UP (ref 4.8–10.8)

## 2019-09-22 PROCEDURE — 71045 X-RAY EXAM CHEST 1 VIEW: CPT | Mod: 26

## 2019-09-22 RX ADMIN — GABAPENTIN 400 MILLIGRAM(S): 400 CAPSULE ORAL at 21:11

## 2019-09-22 RX ADMIN — Medication 12.5 MILLIGRAM(S): at 21:10

## 2019-09-22 RX ADMIN — Medication 1 MILLIGRAM(S): at 11:20

## 2019-09-22 RX ADMIN — MORPHINE SULFATE 15 MILLIGRAM(S): 50 CAPSULE, EXTENDED RELEASE ORAL at 02:00

## 2019-09-22 RX ADMIN — DOFETILIDE 250 MICROGRAM(S): 0.25 CAPSULE ORAL at 06:09

## 2019-09-22 RX ADMIN — HYDROMORPHONE HYDROCHLORIDE 2 MILLIGRAM(S): 2 INJECTION INTRAMUSCULAR; INTRAVENOUS; SUBCUTANEOUS at 11:45

## 2019-09-22 RX ADMIN — MORPHINE SULFATE 15 MILLIGRAM(S): 50 CAPSULE, EXTENDED RELEASE ORAL at 01:27

## 2019-09-22 RX ADMIN — GABAPENTIN 400 MILLIGRAM(S): 400 CAPSULE ORAL at 06:08

## 2019-09-22 RX ADMIN — MORPHINE SULFATE 15 MILLIGRAM(S): 50 CAPSULE, EXTENDED RELEASE ORAL at 17:10

## 2019-09-22 RX ADMIN — Medication 40 MILLIGRAM(S): at 06:09

## 2019-09-22 RX ADMIN — MORPHINE SULFATE 15 MILLIGRAM(S): 50 CAPSULE, EXTENDED RELEASE ORAL at 08:22

## 2019-09-22 RX ADMIN — HYDROMORPHONE HYDROCHLORIDE 2 MILLIGRAM(S): 2 INJECTION INTRAMUSCULAR; INTRAVENOUS; SUBCUTANEOUS at 11:19

## 2019-09-22 RX ADMIN — TIOTROPIUM BROMIDE 1 CAPSULE(S): 18 CAPSULE ORAL; RESPIRATORY (INHALATION) at 08:49

## 2019-09-22 RX ADMIN — BUDESONIDE AND FORMOTEROL FUMARATE DIHYDRATE 2 PUFF(S): 160; 4.5 AEROSOL RESPIRATORY (INHALATION) at 21:11

## 2019-09-22 RX ADMIN — Medication 10 MILLIGRAM(S): at 11:19

## 2019-09-22 RX ADMIN — MORPHINE SULFATE 15 MILLIGRAM(S): 50 CAPSULE, EXTENDED RELEASE ORAL at 17:45

## 2019-09-22 RX ADMIN — MORPHINE SULFATE 15 MILLIGRAM(S): 50 CAPSULE, EXTENDED RELEASE ORAL at 22:10

## 2019-09-22 RX ADMIN — Medication 2 SPRAY(S): at 17:11

## 2019-09-22 RX ADMIN — LORATADINE 10 MILLIGRAM(S): 10 TABLET ORAL at 11:20

## 2019-09-22 RX ADMIN — GABAPENTIN 400 MILLIGRAM(S): 400 CAPSULE ORAL at 15:35

## 2019-09-22 RX ADMIN — Medication 12.5 MILLIGRAM(S): at 14:29

## 2019-09-22 RX ADMIN — LAMOTRIGINE 100 MILLIGRAM(S): 25 TABLET, ORALLY DISINTEGRATING ORAL at 06:09

## 2019-09-22 RX ADMIN — HEPARIN SODIUM 5000 UNIT(S): 5000 INJECTION INTRAVENOUS; SUBCUTANEOUS at 14:30

## 2019-09-22 RX ADMIN — LAMOTRIGINE 100 MILLIGRAM(S): 25 TABLET, ORALLY DISINTEGRATING ORAL at 17:10

## 2019-09-22 RX ADMIN — Medication 10 MILLIGRAM(S): at 06:09

## 2019-09-22 RX ADMIN — Medication 81 MILLIGRAM(S): at 11:20

## 2019-09-22 RX ADMIN — Medication 100 MILLIGRAM(S): at 17:44

## 2019-09-22 RX ADMIN — MORPHINE SULFATE 15 MILLIGRAM(S): 50 CAPSULE, EXTENDED RELEASE ORAL at 09:00

## 2019-09-22 RX ADMIN — DOFETILIDE 250 MICROGRAM(S): 0.25 CAPSULE ORAL at 17:44

## 2019-09-22 RX ADMIN — TAMSULOSIN HYDROCHLORIDE 0.4 MILLIGRAM(S): 0.4 CAPSULE ORAL at 21:11

## 2019-09-22 RX ADMIN — PANTOPRAZOLE SODIUM 40 MILLIGRAM(S): 20 TABLET, DELAYED RELEASE ORAL at 06:09

## 2019-09-22 RX ADMIN — QUETIAPINE FUMARATE 25 MILLIGRAM(S): 200 TABLET, FILM COATED ORAL at 11:20

## 2019-09-22 RX ADMIN — HYDROMORPHONE HYDROCHLORIDE 2 MILLIGRAM(S): 2 INJECTION INTRAMUSCULAR; INTRAVENOUS; SUBCUTANEOUS at 19:51

## 2019-09-22 RX ADMIN — Medication 20 MILLIGRAM(S): at 06:09

## 2019-09-22 RX ADMIN — Medication 10 MILLIGRAM(S): at 17:10

## 2019-09-22 RX ADMIN — HEPARIN SODIUM 5000 UNIT(S): 5000 INJECTION INTRAVENOUS; SUBCUTANEOUS at 21:11

## 2019-09-22 RX ADMIN — ATORVASTATIN CALCIUM 40 MILLIGRAM(S): 80 TABLET, FILM COATED ORAL at 21:11

## 2019-09-22 RX ADMIN — QUETIAPINE FUMARATE 50 MILLIGRAM(S): 200 TABLET, FILM COATED ORAL at 21:11

## 2019-09-22 RX ADMIN — Medication 10 MILLIGRAM(S): at 00:06

## 2019-09-22 RX ADMIN — Medication 100 MILLIGRAM(S): at 06:08

## 2019-09-22 NOTE — PROGRESS NOTE ADULT - SUBJECTIVE AND OBJECTIVE BOX
CTU Attending Progress Daily Note     22 Sep 2019 14:31  POD# - 2    He has history of PTSD (post-traumatic stress disorder)  Supraventricular arrhythmia  Cardiomyopathy  CHF (congestive heart failure)  Atrial fibrillation  Diabetes mellitus  BPH (benign prostatic hyperplasia)  HTN (hypertension)  Seizures  Migraines  Pneumonia  MS (multiple sclerosis)  CAD (coronary artery disease)  Bipolar disorder  Anginal pain  Schizo affective schizophrenia  GERD (gastroesophageal reflux disease)  Seasonal allergies  Hypercholesteremia  COPD (chronic obstructive pulmonary disease)  CVA (cerebral vascular accident)  TIA (transient ischemic attack)  Syncope  Peripheral Neuropathy  Clinical Depression    Interval event for past 24 hr:  CUATE HORNE  48y had no event.   Current Complains:  post-op pain is improved     OBJECTIVE:  Vitals last 24 hrs  T(C): 36.1 (09-22-19 @ 10:00), Max: 37.3 (09-22-19 @ 00:00)  HR: 60 (09-22-19 @ 10:00) (60 - 62)  BP: 99/54 (09-22-19 @ 10:00) (94/52 - 113/61)  RR: 22 (09-22-19 @ 10:00) (18 - 48)  SpO2: 97% (09-22-19 @ 10:00) (97% - 98%)    IN:    IV PiggyBack: 150 mL    Oral Fluid: 720 mL  Total IN: 870 mL    OUT:    Voided: 1150 mL  Total OUT: 1150 mL    Total NET: -280 mL      LABS:                          9.1    9.42  )-----------( 366      ( 22 Sep 2019 04:30 )             29.6     Hemoglobin: 9.1 g/dL (09-22 @ 04:30)  Hemoglobin: 9.6 g/dL (09-21 @ 04:00)  Hemoglobin: 9.1 g/dL (09-20 @ 06:15)    09-22    142  |  105  |  27<H>  ----------------------------<  120<H>  3.8   |  26  |  1.2    Ca    8.8      22 Sep 2019 04:30      Creatinine, Serum: 1.2 mg/dL (09-22 @ 04:30)  Creatinine, Serum: 0.9 mg/dL (09-21 @ 04:00)  Creatinine, Serum: 0.9 mg/dL (09-20 @ 06:15)  Creatinine, Serum: 1.0 mg/dL (09-19 @ 07:09)          Culture - Acid Fast - Other w/Smear (collected 20 Sep 2019 15:22)  Source: .Other None    Culture - Surgical Swab (collected 20 Sep 2019 15:22)  Source: .Surgical Swab None  Preliminary Report (22 Sep 2019 07:03):    No growth      HOSPITAL MEDICATIONS:  MEDICATIONS  (STANDING):  aspirin enteric coated 81 milliGRAM(s) Oral daily  atorvastatin 40 milliGRAM(s) Oral at bedtime  baclofen 10 milliGRAM(s) Oral four times a day  buDESOnide 160 MICROgram(s)/formoterol 4.5 MICROgram(s) Inhaler 2 Puff(s) Inhalation two times a day  chlorhexidine 4% Liquid 1 Application(s) Topical <User Schedule>  dofetilide 250 MICROGram(s) Oral two times a day  doxycycline hyclate Capsule 100 milliGRAM(s) Oral every 12 hours  fluticasone propionate 50 MICROgram(s)/spray Nasal Spray 2 Spray(s) Both Nostrils two times a day  folic acid 1 milliGRAM(s) Oral daily  furosemide    Tablet 20 milliGRAM(s) Oral daily  gabapentin 400 milliGRAM(s) Oral three times a day  heparin  Injectable 5000 Unit(s) SubCutaneous every 8 hours  influenza   Vaccine 0.5 milliLiter(s) IntraMuscular once  lamoTRIgine 100 milliGRAM(s) Oral two times a day  loratadine 10 milliGRAM(s) Oral daily  metoprolol tartrate 12.5 milliGRAM(s) Oral every 8 hours  pantoprazole    Tablet 40 milliGRAM(s) Oral before breakfast  polyethylene glycol 3350 17 Gram(s) Oral every 12 hours  predniSONE   Tablet 40 milliGRAM(s) Oral daily  QUEtiapine 25 milliGRAM(s) Oral daily  QUEtiapine 50 milliGRAM(s) Oral at bedtime  tamsulosin 0.4 milliGRAM(s) Oral at bedtime  tiotropium 18 MICROgram(s) Capsule 1 Capsule(s) Inhalation daily    MEDICATIONS  (PRN):  acetaminophen   Tablet .. 650 milliGRAM(s) Oral every 6 hours PRN Mild Pain (1 - 3)  clonazePAM  Tablet 0.5 milliGRAM(s) Oral at bedtime PRN agitation  HYDROmorphone   Tablet 2 milliGRAM(s) Oral every 4 hours PRN Moderate Pain (4 - 6)  morphine  IR 15 milliGRAM(s) Oral every 4 hours PRN Severe Pain (7 - 10)    PHYSICAL EXAM:          CONSTITUTIONAL: Well-developed; well-nourished; in no acute distress.   	SKIN: warm, dry  	NECK: Supple; non tender.   	CARD: S1, S2 normal; no murmurs, Regular rate and rhythm.  	RESP: CTAB good air movement No wheezes, no rales no rhonchi.  	ABD: Soft, not tender, not distended,   	EXT: Normal ROM.    	LYMPH: No acute cervical adenopathy.  	NEURO: Alert, awake, motor 5/5 R, 5/5 L, sensation intact bilat,               PSYCH: Cooperative, appropriate.

## 2019-09-22 NOTE — PROGRESS NOTE ADULT - SUBJECTIVE AND OBJECTIVE BOX
OPERATIVE PROCEDURE(s):   S/P AICD lead revision             POD #     2                SURGEON(s): MILY Ríos    HPI: 48 year old male known to service with PMHx of arrhythmogenic ventricular tachycardia s/p AICD in 2005, HFpEF (EF 69% in August, 2019), afib/flutter on Eliquis/ASA 81mg, HTN, COPD (on 2L O2 PRN), DLD, multiple sclerosis, epilepsy, PTSD, depression, TIA, presented to the ED with complaint of chest pain and shortness of breath for 2 days s/p AICD generator change and lead revision 09/11/19 complicated by hematoma required exploration. Patient was discharged and presents for above complaint. Patient required lead revision.     SUBJECTIVE ASSESSMENT: 48y Male patient seen and examined at bedside. Patient c/o post-operative pain.    Vital Signs Last 24 Hrs  T(F): 97 (22 Sep 2019 10:00), Max: 99.1 (22 Sep 2019 00:00)  HR: 60 (22 Sep 2019 10:00) (60 - 62)  BP: 99/54 (22 Sep 2019 10:00) (94/52 - 133/55)  BP(mean): 71 (22 Sep 2019 10:00) (67 - 82)  RR: 22 (22 Sep 2019 10:00) (17 - 48)  SpO2: 97% (22 Sep 2019 10:00) (97% - 98%)    I&O's Detail    21 Sep 2019 07:01  -  22 Sep 2019 07:00  --------------------------------------------------------  IN:    IV PiggyBack: 150 mL    Oral Fluid: 720 mL  Total IN: 870 mL    OUT:    Voided: 1150 mL  Total OUT: 1150 mL    Net: I&O's Detail    20 Sep 2019 07:01  -  21 Sep 2019 07:00  --------------------------------------------------------  Total NET: 0 mL    21 Sep 2019 07:01  -  22 Sep 2019 07:00  --------------------------------------------------------  Total NET: -280 mL      Physical Exam:  General: NAD; A&Ox3  Cardiac: S1/S2, RRR, no murmur, no rubs  Lungs: unlabored respirations, CTA b/l, no wheeze, no rales, no crackles  Abdomen: Soft/NT/ND; positive bowel sounds x 4  Incisions: Incisions clean/dry/intact  Extremities: No edema b/l lower extremities; good capillary refill; no cyanosis; palpable 1+ pedal pulses b/l        LABS:                        9.1<L>  9.42  )-----------( 366      ( 22 Sep 2019 04:30 )             29.6<L>                        9.6<L>  12.02<H> )-----------( 416<H>    ( 21 Sep 2019 04:00 )             31.1<L>    09-22    142  |  105  |  27<H>  ----------------------------<  120<H>  3.8   |  26  |  1.2  09-21    139  |  99  |  23<H>  ----------------------------<  120<H>  4.2   |  27  |  0.9    Ca    8.8      22 Sep 2019 04:30      Allergies    dexmethylphenidate (Unknown)  Dilantin (Rash)  dilantin, compazine, neurontin, ritalin, phenergan (Unknown)  Haldol (Unknown)  hydantoin derivatives (Other)  methylphenidate (Unknown)  phenytoin (Unknown)  prochlorperazine (Unknown)  promethazine (Dystonic RXN)  rash/hives (Anaphylaxis)  thioxanthenes (Unknown)    Intolerances      MEDICATIONS  (STANDING):  aspirin enteric coated 81 milliGRAM(s) Oral daily  atorvastatin 40 milliGRAM(s) Oral at bedtime  baclofen 10 milliGRAM(s) Oral four times a day  buDESOnide 160 MICROgram(s)/formoterol 4.5 MICROgram(s) Inhaler 2 Puff(s) Inhalation two times a day  chlorhexidine 4% Liquid 1 Application(s) Topical <User Schedule>  dofetilide 250 MICROGram(s) Oral two times a day  doxycycline hyclate Capsule 100 milliGRAM(s) Oral every 12 hours  fluticasone propionate 50 MICROgram(s)/spray Nasal Spray 2 Spray(s) Both Nostrils two times a day  folic acid 1 milliGRAM(s) Oral daily  furosemide    Tablet 20 milliGRAM(s) Oral daily  gabapentin 400 milliGRAM(s) Oral three times a day  heparin  Injectable 5000 Unit(s) SubCutaneous every 8 hours  influenza   Vaccine 0.5 milliLiter(s) IntraMuscular once  lamoTRIgine 100 milliGRAM(s) Oral two times a day  loratadine 10 milliGRAM(s) Oral daily  metoprolol tartrate 12.5 milliGRAM(s) Oral every 8 hours  pantoprazole    Tablet 40 milliGRAM(s) Oral before breakfast  polyethylene glycol 3350 17 Gram(s) Oral every 12 hours  predniSONE   Tablet 40 milliGRAM(s) Oral daily  QUEtiapine 25 milliGRAM(s) Oral daily  QUEtiapine 50 milliGRAM(s) Oral at bedtime  tamsulosin 0.4 milliGRAM(s) Oral at bedtime  tiotropium 18 MICROgram(s) Capsule 1 Capsule(s) Inhalation daily    MEDICATIONS  (PRN):  acetaminophen   Tablet .. 650 milliGRAM(s) Oral every 6 hours PRN Mild Pain (1 - 3)  clonazePAM  Tablet 0.5 milliGRAM(s) Oral at bedtime PRN agitation  HYDROmorphone   Tablet 2 milliGRAM(s) Oral every 4 hours PRN Moderate Pain (4 - 6)  morphine  IR 15 milliGRAM(s) Oral every 4 hours PRN Severe Pain (7 - 10)    Assessment/Plan:  48y Male status-post S/P AICD lead revision             POD #     2   - Case and plan discussed with CTU Intensivist and CT Surgeon - Dr. Landon   - Continue CTU supportive care and ongoing plan of care as per continuing CTU rounds.    - Continue DVT/GI prophylaxis  - Incentive Spirometry 10 times an hour  - Continue to advance physical activity as tolerated and continue PT/OT as directed  1. device interrogated doing well  2. anticipate for d/c tomorrow to [facility].

## 2019-09-22 NOTE — PROGRESS NOTE ADULT - ASSESSMENT
s/p AICD lead revision POD #2   multiple medical problems including HFrEF and recent AF with RVR -- currently controed   no signs of infection -- ID input appreciated -- will complete 5days of Doxycycline   Patient has h/o peripheral neuropathy and chronic pain -- cont current pain regimen      D/w CTS. Anticipate discharge in the next 24hr

## 2019-09-23 ENCOUNTER — TRANSCRIPTION ENCOUNTER (OUTPATIENT)
Age: 49
End: 2019-09-23

## 2019-09-23 VITALS — HEIGHT: 64 IN | WEIGHT: 214.95 LBS

## 2019-09-23 DIAGNOSIS — G35 MULTIPLE SCLEROSIS: ICD-10-CM

## 2019-09-23 DIAGNOSIS — T82.110A BREAKDOWN (MECHANICAL) OF CARDIAC ELECTRODE, INITIAL ENCOUNTER: ICD-10-CM

## 2019-09-23 DIAGNOSIS — K21.9 GASTRO-ESOPHAGEAL REFLUX DISEASE WITHOUT ESOPHAGITIS: ICD-10-CM

## 2019-09-23 DIAGNOSIS — F32.89 OTHER SPECIFIED DEPRESSIVE EPISODES: ICD-10-CM

## 2019-09-23 DIAGNOSIS — F43.10 POST-TRAUMATIC STRESS DISORDER, UNSPECIFIED: ICD-10-CM

## 2019-09-23 DIAGNOSIS — E11.42 TYPE 2 DIABETES MELLITUS WITH DIABETIC POLYNEUROPATHY: ICD-10-CM

## 2019-09-23 DIAGNOSIS — I48.91 UNSPECIFIED ATRIAL FIBRILLATION: ICD-10-CM

## 2019-09-23 DIAGNOSIS — G43.909 MIGRAINE, UNSPECIFIED, NOT INTRACTABLE, WITHOUT STATUS MIGRAINOSUS: ICD-10-CM
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
## 2019-09-23 DIAGNOSIS — N40.0 BENIGN PROSTATIC HYPERPLASIA WITHOUT LOWER URINARY TRACT SYMPTOMS: ICD-10-CM

## 2019-09-23 DIAGNOSIS — G40.909 EPILEPSY, UNSPECIFIED, NOT INTRACTABLE, WITHOUT STATUS EPILEPTICUS: ICD-10-CM
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
## 2019-09-23 DIAGNOSIS — Z88.8 ALLERGY STATUS TO OTHER DRUGS, MEDICAMENTS AND BIOLOGICAL SUBSTANCES STATUS: ICD-10-CM

## 2019-09-23 DIAGNOSIS — J44.9 CHRONIC OBSTRUCTIVE PULMONARY DISEASE, UNSPECIFIED: ICD-10-CM

## 2019-09-23 DIAGNOSIS — Z86.73 PERSONAL HISTORY OF TRANSIENT ISCHEMIC ATTACK (TIA), AND CEREBRAL INFARCTION WITHOUT RESIDUAL DEFICITS: ICD-10-CM
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
## 2019-09-23 DIAGNOSIS — E78.5 HYPERLIPIDEMIA, UNSPECIFIED: ICD-10-CM

## 2019-09-23 DIAGNOSIS — R13.10 DYSPHAGIA, UNSPECIFIED: ICD-10-CM

## 2019-09-23 DIAGNOSIS — E87.6 HYPOKALEMIA: ICD-10-CM

## 2019-09-23 DIAGNOSIS — I25.2 OLD MYOCARDIAL INFARCTION: ICD-10-CM

## 2019-09-23 DIAGNOSIS — I11.0 HYPERTENSIVE HEART DISEASE WITH HEART FAILURE: ICD-10-CM

## 2019-09-23 DIAGNOSIS — I42.8 OTHER CARDIOMYOPATHIES: ICD-10-CM

## 2019-09-23 DIAGNOSIS — K22.2 ESOPHAGEAL OBSTRUCTION: ICD-10-CM

## 2019-09-23 DIAGNOSIS — I48.92 UNSPECIFIED ATRIAL FLUTTER: ICD-10-CM

## 2019-09-23 DIAGNOSIS — F25.0 SCHIZOAFFECTIVE DISORDER, BIPOLAR TYPE: ICD-10-CM

## 2019-09-23 DIAGNOSIS — Z99.81 DEPENDENCE ON SUPPLEMENTAL OXYGEN: ICD-10-CM

## 2019-09-23 DIAGNOSIS — I50.9 HEART FAILURE, UNSPECIFIED: ICD-10-CM

## 2019-09-23 DIAGNOSIS — I49.3 VENTRICULAR PREMATURE DEPOLARIZATION: ICD-10-CM

## 2019-09-23 DIAGNOSIS — Z79.01 LONG TERM (CURRENT) USE OF ANTICOAGULANTS: ICD-10-CM

## 2019-09-23 DIAGNOSIS — J32.9 CHRONIC SINUSITIS, UNSPECIFIED: ICD-10-CM

## 2019-09-23 LAB
ANION GAP SERPL CALC-SCNC: 11 MMOL/L — SIGNIFICANT CHANGE UP (ref 7–14)
BUN SERPL-MCNC: 24 MG/DL — HIGH (ref 10–20)
CALCIUM SERPL-MCNC: 9.2 MG/DL — SIGNIFICANT CHANGE UP (ref 8.5–10.1)
CHLORIDE SERPL-SCNC: 100 MMOL/L — SIGNIFICANT CHANGE UP (ref 98–110)
CO2 SERPL-SCNC: 28 MMOL/L — SIGNIFICANT CHANGE UP (ref 17–32)
CREAT SERPL-MCNC: 1 MG/DL — SIGNIFICANT CHANGE UP (ref 0.7–1.5)
GLUCOSE SERPL-MCNC: 115 MG/DL — HIGH (ref 70–99)
HCT VFR BLD CALC: 31 % — LOW (ref 42–52)
HGB BLD-MCNC: 9.4 G/DL — LOW (ref 14–18)
MCHC RBC-ENTMCNC: 28.7 PG — SIGNIFICANT CHANGE UP (ref 27–31)
MCHC RBC-ENTMCNC: 30.3 G/DL — LOW (ref 32–37)
MCV RBC AUTO: 94.5 FL — HIGH (ref 80–94)
NRBC # BLD: 0 /100 WBCS — SIGNIFICANT CHANGE UP (ref 0–0)
PLATELET # BLD AUTO: 348 K/UL — SIGNIFICANT CHANGE UP (ref 130–400)
POTASSIUM SERPL-MCNC: 4 MMOL/L — SIGNIFICANT CHANGE UP (ref 3.5–5)
POTASSIUM SERPL-SCNC: 4 MMOL/L — SIGNIFICANT CHANGE UP (ref 3.5–5)
RBC # BLD: 3.28 M/UL — LOW (ref 4.7–6.1)
RBC # FLD: 15.8 % — HIGH (ref 11.5–14.5)
SODIUM SERPL-SCNC: 139 MMOL/L — SIGNIFICANT CHANGE UP (ref 135–146)
WBC # BLD: 9.51 K/UL — SIGNIFICANT CHANGE UP (ref 4.8–10.8)
WBC # FLD AUTO: 9.51 K/UL — SIGNIFICANT CHANGE UP (ref 4.8–10.8)

## 2019-09-23 PROCEDURE — 93010 ELECTROCARDIOGRAM REPORT: CPT

## 2019-09-23 PROCEDURE — 71045 X-RAY EXAM CHEST 1 VIEW: CPT | Mod: 26

## 2019-09-23 RX ORDER — HEPARIN SODIUM 5000 [USP'U]/ML
5000 INJECTION INTRAVENOUS; SUBCUTANEOUS
Qty: 0 | Refills: 0 | DISCHARGE
Start: 2019-09-23

## 2019-09-23 RX ORDER — FOLIC ACID 0.8 MG
1 TABLET ORAL
Qty: 0 | Refills: 0 | DISCHARGE
Start: 2019-09-23

## 2019-09-23 RX ADMIN — LORATADINE 10 MILLIGRAM(S): 10 TABLET ORAL at 11:28

## 2019-09-23 RX ADMIN — MORPHINE SULFATE 15 MILLIGRAM(S): 50 CAPSULE, EXTENDED RELEASE ORAL at 09:10

## 2019-09-23 RX ADMIN — Medication 20 MILLIGRAM(S): at 05:35

## 2019-09-23 RX ADMIN — QUETIAPINE FUMARATE 25 MILLIGRAM(S): 200 TABLET, FILM COATED ORAL at 11:28

## 2019-09-23 RX ADMIN — Medication 1 MILLIGRAM(S): at 11:28

## 2019-09-23 RX ADMIN — CHLORHEXIDINE GLUCONATE 1 APPLICATION(S): 213 SOLUTION TOPICAL at 05:35

## 2019-09-23 RX ADMIN — LAMOTRIGINE 100 MILLIGRAM(S): 25 TABLET, ORALLY DISINTEGRATING ORAL at 05:35

## 2019-09-23 RX ADMIN — Medication 10 MILLIGRAM(S): at 00:22

## 2019-09-23 RX ADMIN — Medication 2 SPRAY(S): at 05:35

## 2019-09-23 RX ADMIN — DOFETILIDE 250 MICROGRAM(S): 0.25 CAPSULE ORAL at 05:34

## 2019-09-23 RX ADMIN — TIOTROPIUM BROMIDE 1 CAPSULE(S): 18 CAPSULE ORAL; RESPIRATORY (INHALATION) at 09:50

## 2019-09-23 RX ADMIN — GABAPENTIN 400 MILLIGRAM(S): 400 CAPSULE ORAL at 05:35

## 2019-09-23 RX ADMIN — POLYETHYLENE GLYCOL 3350 17 GRAM(S): 17 POWDER, FOR SOLUTION ORAL at 05:35

## 2019-09-23 RX ADMIN — GABAPENTIN 400 MILLIGRAM(S): 400 CAPSULE ORAL at 13:55

## 2019-09-23 RX ADMIN — Medication 10 MILLIGRAM(S): at 05:34

## 2019-09-23 RX ADMIN — Medication 40 MILLIGRAM(S): at 05:35

## 2019-09-23 RX ADMIN — Medication 12.5 MILLIGRAM(S): at 05:34

## 2019-09-23 RX ADMIN — MORPHINE SULFATE 15 MILLIGRAM(S): 50 CAPSULE, EXTENDED RELEASE ORAL at 03:58

## 2019-09-23 RX ADMIN — Medication 100 MILLIGRAM(S): at 05:34

## 2019-09-23 RX ADMIN — HYDROMORPHONE HYDROCHLORIDE 2 MILLIGRAM(S): 2 INJECTION INTRAMUSCULAR; INTRAVENOUS; SUBCUTANEOUS at 11:28

## 2019-09-23 RX ADMIN — PANTOPRAZOLE SODIUM 40 MILLIGRAM(S): 20 TABLET, DELAYED RELEASE ORAL at 06:02

## 2019-09-23 RX ADMIN — MORPHINE SULFATE 15 MILLIGRAM(S): 50 CAPSULE, EXTENDED RELEASE ORAL at 10:40

## 2019-09-23 RX ADMIN — BUDESONIDE AND FORMOTEROL FUMARATE DIHYDRATE 2 PUFF(S): 160; 4.5 AEROSOL RESPIRATORY (INHALATION) at 11:27

## 2019-09-23 RX ADMIN — HEPARIN SODIUM 5000 UNIT(S): 5000 INJECTION INTRAVENOUS; SUBCUTANEOUS at 05:35

## 2019-09-23 RX ADMIN — Medication 81 MILLIGRAM(S): at 11:28

## 2019-09-23 RX ADMIN — Medication 12.5 MILLIGRAM(S): at 13:55

## 2019-09-23 RX ADMIN — HYDROMORPHONE HYDROCHLORIDE 2 MILLIGRAM(S): 2 INJECTION INTRAMUSCULAR; INTRAVENOUS; SUBCUTANEOUS at 12:30

## 2019-09-23 RX ADMIN — Medication 10 MILLIGRAM(S): at 11:28

## 2019-09-23 NOTE — DISCHARGE NOTE PROVIDER - NSDCFUADDAPPT_GEN_ALL_CORE_FT
Please take all medications as prescribed.    Please follow up with your cardiothoracic surgeon, Dr. Pastrana  on  Tuesday 10/1/19 at 10:30am    Please follow up with your Electrophysiologist Dr. Quiñones in 1 month.

## 2019-09-23 NOTE — PROGRESS NOTE ADULT - REASON FOR ADMISSION
chest pain and shortness of breath secondary to pericardial effusion

## 2019-09-23 NOTE — DISCHARGE NOTE PROVIDER - NSDCFUADDINST_GEN_ALL_CORE_FT
No Heavy lifting >10 lbs and do not raise your arm above shoulder level for 4-6 weeks. Keep occlusive dressing intact, ok to shower with occlusive dressing, No submerging in water for 1 month. Occlusive dressing will be removed in cardiac surgery clinic by Dr. Pastrana.     Please follow up with Dr. Pastrana at [address] [place] [address], 2nd floor [phone] on Tuesday 10/1/19 at 10:30am

## 2019-09-23 NOTE — PROGRESS NOTE ADULT - ASSESSMENT
48 years old male with PMHx of arrhythmogenic ventricular tachycardia s/p AICD in 2005, HFpEF (EF 69% in August, 2019), afib/flutter on Eliquis/ASA 81mg, HTN, COPD (on 2L O2 PRN), DLD, multiple sclerosis, epilepsy, PTSD, depression, TIA, admitted 2 weeks ago for ICD firing - had lead revision/extraction with pocket hematoma post op.  Presented to the ED with complaint of chest pain and shortness of breath for 2 days.  Found to have lead dislodgement    ICD lead dislodgement  trace pericardial effusion  Afib/flutter    Plan   Pt is s/p lead replacement POD #3  Device was interrogated over the weekend - interrogation sent to MD per [facility] rep  Cont current meds  Follow up with Dr. Quiñones in 1 month  Physical limitations discussed with pt (no lifting >10 pounds with left arm, no lifting left arm higher than shoulder level, cannot submerge incision in water. )

## 2019-09-23 NOTE — PROGRESS NOTE ADULT - SUBJECTIVE AND OBJECTIVE BOX
INTERVAL HPI/OVERNIGHT EVENTS:    Pt is s/p lead replacement POD #3.  He is doing ok.  Still some pain but he says it's improved.  Pt is supposed to be discharged today.  No events on telemetry    MEDICATIONS  (STANDING):  aspirin enteric coated 81 milliGRAM(s) Oral daily  atorvastatin 40 milliGRAM(s) Oral at bedtime  baclofen 10 milliGRAM(s) Oral four times a day  buDESOnide 160 MICROgram(s)/formoterol 4.5 MICROgram(s) Inhaler 2 Puff(s) Inhalation two times a day  chlorhexidine 4% Liquid 1 Application(s) Topical <User Schedule>  dofetilide 250 MICROGram(s) Oral two times a day  doxycycline hyclate Capsule 100 milliGRAM(s) Oral every 12 hours  fluticasone propionate 50 MICROgram(s)/spray Nasal Spray 2 Spray(s) Both Nostrils two times a day  folic acid 1 milliGRAM(s) Oral daily  furosemide    Tablet 20 milliGRAM(s) Oral daily  gabapentin 400 milliGRAM(s) Oral three times a day  heparin  Injectable 5000 Unit(s) SubCutaneous every 8 hours  influenza   Vaccine 0.5 milliLiter(s) IntraMuscular once  lamoTRIgine 100 milliGRAM(s) Oral two times a day  loratadine 10 milliGRAM(s) Oral daily  metoprolol tartrate 12.5 milliGRAM(s) Oral every 8 hours  pantoprazole    Tablet 40 milliGRAM(s) Oral before breakfast  polyethylene glycol 3350 17 Gram(s) Oral every 12 hours  predniSONE   Tablet 40 milliGRAM(s) Oral daily  QUEtiapine 25 milliGRAM(s) Oral daily  QUEtiapine 50 milliGRAM(s) Oral at bedtime  tamsulosin 0.4 milliGRAM(s) Oral at bedtime  tiotropium 18 MICROgram(s) Capsule 1 Capsule(s) Inhalation daily    MEDICATIONS  (PRN):  acetaminophen   Tablet .. 650 milliGRAM(s) Oral every 6 hours PRN Mild Pain (1 - 3)  clonazePAM  Tablet 0.5 milliGRAM(s) Oral at bedtime PRN agitation  HYDROmorphone   Tablet 2 milliGRAM(s) Oral every 4 hours PRN Moderate Pain (4 - 6)  morphine  IR 15 milliGRAM(s) Oral every 4 hours PRN Severe Pain (7 - 10)      Allergies    dexmethylphenidate (Unknown)  Dilantin (Rash)  dilantin, compazine, neurontin, ritalin, phenergan (Unknown)  Haldol (Unknown)  hydantoin derivatives (Other)  methylphenidate (Unknown)  phenytoin (Unknown)  prochlorperazine (Unknown)  promethazine (Dystonic RXN)  rash/hives (Anaphylaxis)  thioxanthenes (Unknown)    Intolerances        REVIEW OF SYSTEMS  Improved pain.  Remainder 10 point ROS is negative    Vital Signs Last 24 Hrs  T(C): 36.3 (23 Sep 2019 08:00), Max: 36.6 (22 Sep 2019 14:00)  T(F): 97.3 (23 Sep 2019 08:00), Max: 97.8 (22 Sep 2019 14:00)  HR: 60 (23 Sep 2019 09:00) (60 - 60)  BP: 125/59 (23 Sep 2019 09:00) (93/55 - 125/59)  BP(mean): 83 (23 Sep 2019 09:00) (70 - 83)  RR: 21 (23 Sep 2019 09:00) (6 - 23)  SpO2: 98% (23 Sep 2019 09:00) (95% - 98%)    09-22-19 @ 07:01  -  09-23-19 @ 07:00  --------------------------------------------------------  IN: 720 mL / OUT: 1950 mL / NET: -1230 mL    09-23-19 @ 07:01 - 09-23-19 @ 10:57  --------------------------------------------------------  IN: 240 mL / OUT: 1000 mL / NET: -760 mL        Physical Exam    GENERAL: In no apparent distress, well nourished, and hydrated.  NECK: Supple. No JVD or carotid bruit.  Carotid pulse is 2+ bilaterally.  HEART: Regular rate and rhythm; No murmurs, rubs, or gallops.  PULMONARY: Clear to auscultation and perfusion.  No rales, wheezing, or rhonchi bilaterally.  ABDOMEN: Soft, Nontender, Nondistended; Bowel sounds present  EXTREMITIES:  2+ Peripheral Pulses, No clubbing, cyanosis, or edema  NEUROLOGICAL: Grossly nonfocal    LABS:                        9.4    9.51  )-----------( 348      ( 23 Sep 2019 04:20 )             31.0     09-23    139  |  100  |  24<H>  ----------------------------<  115<H>  4.0   |  28  |  1.0    Ca    9.2      23 Sep 2019 04:20            RADIOLOGY & ADDITIONAL TESTS:  Xray Chest 1 View- PORTABLE-Routine (09.22.19 @ 05:13) >  Impression:    Limited by obliquity and shortness of breath.    Opacity overlies the left lung.    Left-sided permanent pacemaker.

## 2019-09-23 NOTE — DISCHARGE NOTE PROVIDER - CARE PROVIDER_API CALL
Alexi Quiñones; MICHAEL)  Cardiac Electrophysiology  [address] [facility], [address]  [place] [address]  Phone: [phone]  Fax: [phone]  Follow Up Time: 1 month    Jose Pastrana)  Surgery; Thoracic and Cardiac Surgery  [address] [facility], [address]  [place] [address]  Phone: [phone]  Fax: [phone]  Follow Up Time: 1 week

## 2019-09-23 NOTE — DIETITIAN INITIAL EVALUATION ADULT. - FACTORS AFF FOOD INTAKE
Pt reports needing ground diet. Pt w/ many missing teeth. He says he has been asking for ground but never received it. He also reports some days he is on low sodium diet others not. But po intake ~100%. he reports having GERD but no specific foods make his symptoms worse. No GI distress. LBM 9/22. NKFA. No nutrition supplements PTA./none/difficulty chewing

## 2019-09-23 NOTE — PROGRESS NOTE ADULT - SUBJECTIVE AND OBJECTIVE BOX
OPERATIVE PROCEDURE(s):                POD #                       SURGEON(s): MICHELLE Pastrana  SUBJECTIVE ASSESSMENT: 48yMale patient seen and examined at bedside.    Vital Signs Last 24 Hrs  T(F): 97.5 (23 Sep 2019 04:02), Max: 97.8 (22 Sep 2019 14:00)  HR: 60 (23 Sep 2019 06:00) (60 - 60)  BP: 104/55 (23 Sep 2019 06:00) (99/54 - 113/59)  BP(mean): 74 (23 Sep 2019 06:00) (71 - 80)  RR: 7 (23 Sep 2019 06:00) (7 - 23)  SpO2: 97% (23 Sep 2019 06:00) (95% - 98%)    I&O's Detail    22 Sep 2019 07:01  -  23 Sep 2019 07:00  --------------------------------------------------------  IN:    Oral Fluid: 720 mL  Total IN: 720 mL    OUT:    Voided: 1950 mL  Total OUT: 1950 mL    Net: I&O's Detail    21 Sep 2019 07:01  -  22 Sep 2019 07:00  --------------------------------------------------------  Total NET: -280 mL    22 Sep 2019 07:01  -  23 Sep 2019 07:00  --------------------------------------------------------  Total NET: -1230 mL      Physical Exam:  General: NAD; A&Ox3  Cardiac: S1/S2, RRR, no murmur, no rubs  Lungs: unlabored respirations, CTA b/l, no wheeze, no rales, no crackles  Abdomen: Soft/NT/ND; positive bowel sounds x 4  Incisions: Incisions clean/dry/intact  Extremities: No edema b/l lower extremities; good capillary refill; no cyanosis; palpable 1+ pedal pulses b/l        LABS:                        9.4<L>  9.51  )-----------( 348      ( 23 Sep 2019 04:20 )             31.0<L>                        9.1<L>  9.42  )-----------( 366      ( 22 Sep 2019 04:30 )             29.6<L>    09-23    139  |  100  |  24<H>  ----------------------------<  115<H>  4.0   |  28  |  1.0  09-22    142  |  105  |  27<H>  ----------------------------<  120<H>  3.8   |  26  |  1.2    Ca    9.2      23 Sep 2019 04:20      RADIOLOGY & ADDITIONAL TESTS:  CXR:   EKG:  Allergies    dexmethylphenidate (Unknown)  Dilantin (Rash)  dilantin, compazine, neurontin, ritalin, phenergan (Unknown)  Haldol (Unknown)  hydantoin derivatives (Other)  methylphenidate (Unknown)  phenytoin (Unknown)  prochlorperazine (Unknown)  promethazine (Dystonic RXN)  rash/hives (Anaphylaxis)  thioxanthenes (Unknown)    Intolerances      MEDICATIONS  (STANDING):  aspirin enteric coated 81 milliGRAM(s) Oral daily  atorvastatin 40 milliGRAM(s) Oral at bedtime  baclofen 10 milliGRAM(s) Oral four times a day  buDESOnide 160 MICROgram(s)/formoterol 4.5 MICROgram(s) Inhaler 2 Puff(s) Inhalation two times a day  chlorhexidine 4% Liquid 1 Application(s) Topical <User Schedule>  dofetilide 250 MICROGram(s) Oral two times a day  doxycycline hyclate Capsule 100 milliGRAM(s) Oral every 12 hours  fluticasone propionate 50 MICROgram(s)/spray Nasal Spray 2 Spray(s) Both Nostrils two times a day  folic acid 1 milliGRAM(s) Oral daily  furosemide    Tablet 20 milliGRAM(s) Oral daily  gabapentin 400 milliGRAM(s) Oral three times a day  heparin  Injectable 5000 Unit(s) SubCutaneous every 8 hours  influenza   Vaccine 0.5 milliLiter(s) IntraMuscular once  lamoTRIgine 100 milliGRAM(s) Oral two times a day  loratadine 10 milliGRAM(s) Oral daily  metoprolol tartrate 12.5 milliGRAM(s) Oral every 8 hours  pantoprazole    Tablet 40 milliGRAM(s) Oral before breakfast  polyethylene glycol 3350 17 Gram(s) Oral every 12 hours  predniSONE   Tablet 40 milliGRAM(s) Oral daily  QUEtiapine 25 milliGRAM(s) Oral daily  QUEtiapine 50 milliGRAM(s) Oral at bedtime  tamsulosin 0.4 milliGRAM(s) Oral at bedtime  tiotropium 18 MICROgram(s) Capsule 1 Capsule(s) Inhalation daily    MEDICATIONS  (PRN):  acetaminophen   Tablet .. 650 milliGRAM(s) Oral every 6 hours PRN Mild Pain (1 - 3)  clonazePAM  Tablet 0.5 milliGRAM(s) Oral at bedtime PRN agitation  HYDROmorphone   Tablet 2 milliGRAM(s) Oral every 4 hours PRN Moderate Pain (4 - 6)  morphine  IR 15 milliGRAM(s) Oral every 4 hours PRN Severe Pain (7 - 10)    Home Medications:  acetaminophen 650 mg oral tablet: 650 milligram(s) orally 4 times a day, As Needed (16 Sep 2019 04:40)  aspirin 81 mg oral delayed release tablet: 1 tab(s) orally once a day (16 Sep 2019 04:40)  atorvastatin 40 mg oral tablet: 1 tab(s) orally once a day (at bedtime) (16 Sep 2019 04:40)  baclofen 10 mg oral tablet: 1 tab(s) orally 4 times a day (16 Sep 2019 04:40)  budesonide-formoterol 160 mcg-4.5 mcg/inh inhalation aerosol: 2 puff(s) inhaled 2 times a day (16 Sep 2019 04:40)  Cepacol Extra Strength Menthol 10 mg mucous membrane lozenge: 1 anderson(s) mucous membrane every 6 hours, As Needed (16 Sep 2019 04:40)  cholecalciferol oral tablet: 2000 unit(s) orally once a day (16 Sep 2019 04:40)  Claritin 10 mg oral tablet: 1 tab(s) orally once a day (16 Sep 2019 04:40)  clonazePAM 0.5 mg oral tablet: 1 tab(s) orally once a day (at bedtime), As needed, agitation (16 Sep 2019 04:40)  Eliquis 5 mg oral tablet: 1 tab(s) orally 2 times a day. RESUME TAKING ON 9/14/2019 (16 Sep 2019 04:40)  Flonase 50 mcg/inh nasal spray: 2 spray(s) nasal once a day (16 Sep 2019 04:40)  furosemide 20 mg oral tablet: 1 tab(s) orally once a day (16 Sep 2019 04:40)  gabapentin 400 mg oral capsule: 1 cap(s) orally 3 times a day (16 Sep 2019 04:40)  HYDROmorphone 2 mg oral tablet: 1 tab(s) orally every 4 hours, As Needed (16 Sep 2019 04:40)  lamoTRIgine 100 mg oral tablet: 1 tab(s) orally 2 times a day (16 Sep 2019 04:40)  metoprolol tartrate 25 mg oral tablet: 0.5 tab(s) orally every 8 hours (16 Sep 2019 04:40)  Mi-Acid oral suspension: 30 milliliter(s) orally 4 times a day, As Needed (16 Sep 2019 04:40)  morphine 15 mg oral tablet: 1 tab(s) orally every 4 hours, As needed, Severe Pain (7 - 10) (16 Sep 2019 04:40)  Nitrostat 0.4 mg sublingual tablet: sublingual 3 times a day, As Needed; may repeat after 5 min until relief (16 Sep 2019 04:40)  omeprazole 20 mg oral delayed release capsule: 1 cap(s) orally 2 times a day (16 Sep 2019 04:40)  polyethylene glycol 3350 oral powder for reconstitution: 17 gram(s) orally every 12 hours (16 Sep 2019 04:40)  QUEtiapine 25 mg oral tablet: 1 tab(s) orally once a day (16 Sep 2019 04:40)  SEROquel 50 mg oral tablet: 1 tab(s) orally once a day (at bedtime) (16 Sep 2019 04:40)  tamsulosin 0.4 mg oral capsule: 1 cap(s) orally once a day (at bedtime) (16 Sep 2019 04:40)  Tecfidera 240 mg oral delayed release capsule: 1 cap(s) orally once a day (16 Sep 2019 04:40)  Tikosyn 250 mcg oral capsule: 1 cap(s) orally 2 times a day (16 Sep 2019 04:40)  tiotropium 18 mcg inhalation capsule: 1 cap(s) inhaled once a day (16 Sep 2019 04:40)    Pharmacologic DVT Prophylaxis [] YES, [] NO: Contraindication:  [] HEPARIN: Dose: XX mg  Q24H     [] LOVENOX: Dose: XX mg  Q24H  SCD's: YES b/l    GI Prophylaxis: Protonix [], Pepcid []    Post-Op Beta-Blockers: [] Yes, [] No: contraindication:  Post-Op Nitrate: [] Yes, [] No: contraindication:  Post-Op Aspirin: [] Yes, [] No: contraindication:  Post-Op Statin: [] Yes, [] No: contraindication:    Ambulation/Activity Status:    Assessment/Plan:  48y Male status-post  - Case and plan discussed with CTU Intensivist and CT Surgeon - Dr. Munoz/Victoriano/Barbi   - Continue CTU supportive care and ongoing plan of care as per continuing CTU rounds.   - Continue DVT/GI prophylaxis  - Incentive Spirometry 10 times an hour  - Continue to advance physical activity as tolerated and continue PT/OT as directed  1. CAD: Continue ASA, statin, BB  2. HTN:   3. A. Fib:   4. COPD/Hypoxia:   5. DM/Glucose Control:     Social Service Disposition:      acetaminophen 650 mg oral tablet: 650 milligram(s) orally 4 times a day, As Needed  aspirin 81 mg oral delayed release tablet: 1 tab(s) orally once a day  atorvastatin 40 mg oral tablet: 1 tab(s) orally once a day (at bedtime)  baclofen 10 mg oral tablet: 1 tab(s) orally 4 times a day  budesonide-formoterol 160 mcg-4.5 mcg/inh inhalation aerosol: 2 puff(s) inhaled 2 times a day  Cepacol Extra Strength Menthol 10 mg mucous membrane lozenge: 1 anderson(s) mucous membrane every 6 hours, As Needed  cholecalciferol oral tablet: 2000 unit(s) orally once a day  Claritin 10 mg oral tablet: 1 tab(s) orally once a day  clonazePAM 0.5 mg oral tablet: 1 tab(s) orally once a day (at bedtime), As needed, agitation  Eliquis 5 mg oral tablet: 1 tab(s) orally 2 times a day. RESUME TAKING ON 9/14/2019  Flonase 50 mcg/inh nasal spray: 2 spray(s) nasal once a day  furosemide 20 mg oral tablet: 1 tab(s) orally once a day  gabapentin 400 mg oral capsule: 1 cap(s) orally 3 times a day  HYDROmorphone 2 mg oral tablet: 1 tab(s) orally every 4 hours, As Needed  lamoTRIgine 100 mg oral tablet: 1 tab(s) orally 2 times a day  metoprolol tartrate 25 mg oral tablet: 0.5 tab(s) orally every 8 hours  Mi-Acid oral suspension: 30 milliliter(s) orally 4 times a day, As Needed  morphine 15 mg oral tablet: 1 tab(s) orally every 4 hours, As needed, Severe Pain (7 - 10)  Nitrostat 0.4 mg sublingual tablet: sublingual 3 times a day, As Needed; may repeat after 5 min until relief  omeprazole 20 mg oral delayed release capsule: 1 cap(s) orally 2 times a day  polyethylene glycol 3350 oral powder for reconstitution: 17 gram(s) orally every 12 hours  QUEtiapine 25 mg oral tablet: 1 tab(s) orally once a day  SEROquel 50 mg oral tablet: 1 tab(s) orally once a day (at bedtime)  tamsulosin 0.4 mg oral capsule: 1 cap(s) orally once a day (at bedtime)  Tecfidera 240 mg oral delayed release capsule: 1 cap(s) orally once a day  Tikosyn 250 mcg oral capsule: 1 cap(s) orally 2 times a day  tiotropium 18 mcg inhalation capsule: 1 cap(s) inhaled once a day OPERATIVE PROCEDURE(s): S/P AICD lead revision             POD #     3                   SURGEON(s): MICHELLE Pastrana  SUBJECTIVE ASSESSMENT: 48yMale patient seen and examined at bedside. Patient denies any acute complaints at this time.     Vital Signs Last 24 Hrs  T(F): 97.5 (23 Sep 2019 04:02), Max: 97.8 (22 Sep 2019 14:00)  HR: 60 (23 Sep 2019 06:00) (60 - 60)  BP: 104/55 (23 Sep 2019 06:00) (99/54 - 113/59)  BP(mean): 74 (23 Sep 2019 06:00) (71 - 80)  RR: 7 (23 Sep 2019 06:00) (7 - 23)  SpO2: 97% (23 Sep 2019 06:00) (95% - 98%)    I&O's Detail    22 Sep 2019 07:01  -  23 Sep 2019 07:00  --------------------------------------------------------  IN:    Oral Fluid: 720 mL  Total IN: 720 mL    OUT:    Voided: 1950 mL  Total OUT: 1950 mL    Net: I&O's Detail    21 Sep 2019 07:01  -  22 Sep 2019 07:00  --------------------------------------------------------  Total NET: -280 mL    22 Sep 2019 07:01  -  23 Sep 2019 07:00  --------------------------------------------------------  Total NET: -1230 mL      Physical Exam:  General: NAD; A&Ox3  Cardiac: S1/S2, RRR, no murmur, no rubs  Lungs: unlabored respirations, CTA b/l, no wheeze, no rales, no crackles  Abdomen: Soft/NT/ND; positive bowel sounds x 4  Incisions: Incisions clean/dry/intact  Extremities: No edema b/l lower extremities; good capillary refill; no cyanosis; palpable 1+ pedal pulses b/l        LABS:                        9.4<L>  9.51  )-----------( 348      ( 23 Sep 2019 04:20 )             31.0<L>                        9.1<L>  9.42  )-----------( 366      ( 22 Sep 2019 04:30 )             29.6<L>    09-23    139  |  100  |  24<H>  ----------------------------<  115<H>  4.0   |  28  |  1.0  09-22    142  |  105  |  27<H>  ----------------------------<  120<H>  3.8   |  26  |  1.2    Ca    9.2      23 Sep 2019 04:20      RADIOLOGY & ADDITIONAL TESTS:  CXR: < from: Xray Chest 1 View- PORTABLE-Routine (09.22.19 @ 05:13) >  Impression:  Limited by obliquity and shortness of breath.  Opacity overlies the left lung.  Left-sided permanent pacemaker.  < end of copied text >    EKG: < from: 12 Lead ECG (09.23.19 @ 08:04) >  Ventricular Rate 60 BPM  Atrial Rate 60 BPM  P-R Interval 230 ms  QRS Duration 138 ms  Q-T Interval 440 ms  QTC Calculation(Bezet) 440 ms  P Axis 80 degrees  R Axis -8 degrees  T Axis 60 degrees  Diagnosis Line Atrial-paced rhythmwith prolonged AV conduction  Right bundle branch block  T wave abnormality, consider lateral ischemia  Abnormal ECG  Confirmed by Elysia Walters MD ([id number]) on 9/23/2019 9:17:35 AM  < end of copied text >      Allergies  dexmethylphenidate (Unknown)  Dilantin (Rash)  dilantin, compazine, neurontin, ritalin, phenergan (Unknown)  Haldol (Unknown)  hydantoin derivatives (Other)  methylphenidate (Unknown)  phenytoin (Unknown)  prochlorperazine (Unknown)  promethazine (Dystonic RXN)  rash/hives (Anaphylaxis)  thioxanthenes (Unknown)  Intolerances      MEDICATIONS  (STANDING):  aspirin enteric coated 81 milliGRAM(s) Oral daily  atorvastatin 40 milliGRAM(s) Oral at bedtime  baclofen 10 milliGRAM(s) Oral four times a day  buDESOnide 160 MICROgram(s)/formoterol 4.5 MICROgram(s) Inhaler 2 Puff(s) Inhalation two times a day  chlorhexidine 4% Liquid 1 Application(s) Topical <User Schedule>  dofetilide 250 MICROGram(s) Oral two times a day  doxycycline hyclate Capsule 100 milliGRAM(s) Oral every 12 hours  fluticasone propionate 50 MICROgram(s)/spray Nasal Spray 2 Spray(s) Both Nostrils two times a day  folic acid 1 milliGRAM(s) Oral daily  furosemide    Tablet 20 milliGRAM(s) Oral daily  gabapentin 400 milliGRAM(s) Oral three times a day  heparin  Injectable 5000 Unit(s) SubCutaneous every 8 hours  influenza   Vaccine 0.5 milliLiter(s) IntraMuscular once  lamoTRIgine 100 milliGRAM(s) Oral two times a day  loratadine 10 milliGRAM(s) Oral daily  metoprolol tartrate 12.5 milliGRAM(s) Oral every 8 hours  pantoprazole    Tablet 40 milliGRAM(s) Oral before breakfast  polyethylene glycol 3350 17 Gram(s) Oral every 12 hours  predniSONE   Tablet 40 milliGRAM(s) Oral daily  QUEtiapine 25 milliGRAM(s) Oral daily  QUEtiapine 50 milliGRAM(s) Oral at bedtime  tamsulosin 0.4 milliGRAM(s) Oral at bedtime  tiotropium 18 MICROgram(s) Capsule 1 Capsule(s) Inhalation daily    MEDICATIONS  (PRN):  acetaminophen   Tablet .. 650 milliGRAM(s) Oral every 6 hours PRN Mild Pain (1 - 3)  clonazePAM  Tablet 0.5 milliGRAM(s) Oral at bedtime PRN agitation  HYDROmorphone   Tablet 2 milliGRAM(s) Oral every 4 hours PRN Moderate Pain (4 - 6)  morphine  IR 15 milliGRAM(s) Oral every 4 hours PRN Severe Pain (7 - 10)    Home Medications:  acetaminophen 650 mg oral tablet: 650 milligram(s) orally 4 times a day, As Needed (16 Sep 2019 04:40)  aspirin 81 mg oral delayed release tablet: 1 tab(s) orally once a day (16 Sep 2019 04:40)  atorvastatin 40 mg oral tablet: 1 tab(s) orally once a day (at bedtime) (16 Sep 2019 04:40)  baclofen 10 mg oral tablet: 1 tab(s) orally 4 times a day (16 Sep 2019 04:40)  budesonide-formoterol 160 mcg-4.5 mcg/inh inhalation aerosol: 2 puff(s) inhaled 2 times a day (16 Sep 2019 04:40)  Cepacol Extra Strength Menthol 10 mg mucous membrane lozenge: 1 anderson(s) mucous membrane every 6 hours, As Needed (16 Sep 2019 04:40)  cholecalciferol oral tablet: 2000 unit(s) orally once a day (16 Sep 2019 04:40)  Claritin 10 mg oral tablet: 1 tab(s) orally once a day (16 Sep 2019 04:40)  clonazePAM 0.5 mg oral tablet: 1 tab(s) orally once a day (at bedtime), As needed, agitation (16 Sep 2019 04:40)  Eliquis 5 mg oral tablet: 1 tab(s) orally 2 times a day. RESUME TAKING ON 9/14/2019 (16 Sep 2019 04:40)  Flonase 50 mcg/inh nasal spray: 2 spray(s) nasal once a day (16 Sep 2019 04:40)  furosemide 20 mg oral tablet: 1 tab(s) orally once a day (16 Sep 2019 04:40)  gabapentin 400 mg oral capsule: 1 cap(s) orally 3 times a day (16 Sep 2019 04:40)  HYDROmorphone 2 mg oral tablet: 1 tab(s) orally every 4 hours, As Needed (16 Sep 2019 04:40)  lamoTRIgine 100 mg oral tablet: 1 tab(s) orally 2 times a day (16 Sep 2019 04:40)  metoprolol tartrate 25 mg oral tablet: 0.5 tab(s) orally every 8 hours (16 Sep 2019 04:40)  Mi-Acid oral suspension: 30 milliliter(s) orally 4 times a day, As Needed (16 Sep 2019 04:40)  morphine 15 mg oral tablet: 1 tab(s) orally every 4 hours, As needed, Severe Pain (7 - 10) (16 Sep 2019 04:40)  Nitrostat 0.4 mg sublingual tablet: sublingual 3 times a day, As Needed; may repeat after 5 min until relief (16 Sep 2019 04:40)  omeprazole 20 mg oral delayed release capsule: 1 cap(s) orally 2 times a day (16 Sep 2019 04:40)  polyethylene glycol 3350 oral powder for reconstitution: 17 gram(s) orally every 12 hours (16 Sep 2019 04:40)  QUEtiapine 25 mg oral tablet: 1 tab(s) orally once a day (16 Sep 2019 04:40)  SEROquel 50 mg oral tablet: 1 tab(s) orally once a day (at bedtime) (16 Sep 2019 04:40)  tamsulosin 0.4 mg oral capsule: 1 cap(s) orally once a day (at bedtime) (16 Sep 2019 04:40)  Tecfidera 240 mg oral delayed release capsule: 1 cap(s) orally once a day (16 Sep 2019 04:40)  Tikosyn 250 mcg oral capsule: 1 cap(s) orally 2 times a day (16 Sep 2019 04:40)  tiotropium 18 mcg inhalation capsule: 1 cap(s) inhaled once a day (16 Sep 2019 04:40)      Assessment/Plan:  48y Male status-post S/P AICD lead revision             POD #     3  - Case and plan discussed with CTU Intensivist and CT Surgeon - Dr. Pastrana   - Continue CTU supportive care and ongoing plan of care as per continuing CTU rounds.    - Continue DVT/GI prophylaxis  - Incentive Spirometry 10 times an hour  - Continue to advance physical activity as tolerated and continue PT/OT as directed  1. EP: continue dofetilide 250 MICROGram(s) Oral two times a day, device Interrogation complete device functioning well as per EP/ Please f/u with Dr. Quiñones in 1 month.  2. Post-op AICD lead revision: No Heavy lifting >10 lbs and do not raise your arm above shoulder level for 4-6 weeks. Keep occlusive dressing intact, ok to shower with occlusive dressing, No submerging in water for 1 month. Occlusive dressing will be removed in cardiac surgery clinic by Dr. Pastrana.   3. Pain management: Chronic pain: continue gabapentin 400 mg oral capsule: 1 cap(s) orally 3 times a day w/HYDROmorphone 2 mg oral tablet: 1 tab(s) orally every 4 hours, As Needed and morphine 15 mg oral tablet: 1 tab(s) orally every 4 hours, As needed, for Severe Pain (7 - 10). Patient continues to tolerate morphine with no adverse or allergic reactions.   4. Anticipate for d/c to SNF. [facility] sent and pending responses.    Social Service Disposition: Patient refusing discharge to [facility] 2/2 complaints of pain medications not being confirmed due to documented patient allergy at facility to morphine despite tolerating pain medication here.

## 2019-09-23 NOTE — PROGRESS NOTE ADULT - PROVIDER SPECIALTY LIST ADULT
CT Surgery
Critical Care
Electrophysiology
Hospitalist
Internal Medicine
Electrophysiology
Electrophysiology

## 2019-09-23 NOTE — DISCHARGE NOTE PROVIDER - CARE PROVIDERS DIRECT ADDRESSES
[email or web address]

## 2019-09-23 NOTE — DIETITIAN INITIAL EVALUATION ADULT. - ENERGY NEEDS
estimated calorie needs: 1665 - 1830 kcals/day (MSJ x 1.0 - 1.1 AF for obesity)  estimated protein needs: 59 - 71 gms/day (1.0 - 1.2 gm/kg IBW for obesity)  estimated fluid needs: 1ml/kcal or per LIP

## 2019-09-23 NOTE — DISCHARGE NOTE PROVIDER - NSDCCPCAREPLAN_GEN_ALL_CORE_FT
PRINCIPAL DISCHARGE DIAGNOSIS  Diagnosis: Pericardial effusion  Assessment and Plan of Treatment:       SECONDARY DISCHARGE DIAGNOSES  Diagnosis: Pneumonia  Assessment and Plan of Treatment:     Diagnosis: Hypoxia  Assessment and Plan of Treatment:     Diagnosis: Pulmonary hypertension  Assessment and Plan of Treatment:     Diagnosis: Chronic anemia  Assessment and Plan of Treatment:

## 2019-09-23 NOTE — DISCHARGE NOTE PROVIDER - PROVIDER TOKENS
PROVIDER:[TOKEN:[[id number]],FOLLOWUP:[1 month]],PROVIDER:[TOKEN:[[id number]],FOLLOWUP:[1 week]]

## 2019-09-23 NOTE — DISCHARGE NOTE NURSING/CASE MANAGEMENT/SOCIAL WORK - PATIENT PORTAL LINK FT
You can access the FollowMyHealth Patient Portal offered by [facility] by registering at the following website: [email or web address]. By joining [employer]’s FollowMyHealth portal, you will also be able to view your health information using other applications (apps) compatible with our system.

## 2019-09-23 NOTE — DIETITIAN INITIAL EVALUATION ADULT. - PHYSICAL APPEARANCE
BMI 31.0 IBW: 59 kg UBW: 215# at NH per pt. BS 20 surg incision. Pt is happy w/ evident weight loss. No edema noted. No physical signs of muscle/fat wasting.

## 2019-09-23 NOTE — DISCHARGE NOTE PROVIDER - HOSPITAL COURSE
48 year old male known to service with PMHx of arrhythmogenic ventricular tachycardia s/p AICD in 2005, HFpEF (EF 69% in August, 2019), afib/flutter on Eliquis/ASA 81mg, HTN, COPD (on 2L O2 PRN), DLD, multiple sclerosis, epilepsy, PTSD, depression, TIA, presented to the ED with complaint of chest pain and shortness of breath for 2 days s/p AICD generator change and lead revision 09/11/19 complicated by hematoma required exploration. Patient was discharged and presents for above complaint. Patient required lead revision. Post-operatively patient had an un-complicated hospital course and was discharged back to [facility] on POD #3.

## 2019-09-24 ENCOUNTER — OUTPATIENT (OUTPATIENT)
Dept: OUTPATIENT SERVICES | Facility: HOSPITAL | Age: 49
LOS: 1 days | Discharge: HOME | End: 2019-09-24

## 2019-09-24 DIAGNOSIS — Z98.890 OTHER SPECIFIED POSTPROCEDURAL STATES: Chronic | ICD-10-CM

## 2019-09-24 DIAGNOSIS — Z95.0 PRESENCE OF CARDIAC PACEMAKER: Chronic | ICD-10-CM

## 2019-09-24 DIAGNOSIS — G35 MULTIPLE SCLEROSIS: ICD-10-CM

## 2019-09-24 DIAGNOSIS — Z96.649 PRESENCE OF UNSPECIFIED ARTIFICIAL HIP JOINT: Chronic | ICD-10-CM

## 2019-09-26 LAB
CULTURE RESULTS: SIGNIFICANT CHANGE UP
SPECIMEN SOURCE: SIGNIFICANT CHANGE UP

## 2019-10-01 ENCOUNTER — APPOINTMENT (OUTPATIENT)
Dept: CARDIOTHORACIC SURGERY | Facility: CLINIC | Age: 49
End: 2019-10-01
Payer: MEDICAID

## 2019-10-01 ENCOUNTER — OUTPATIENT (OUTPATIENT)
Dept: OUTPATIENT SERVICES | Facility: HOSPITAL | Age: 49
LOS: 1 days | Discharge: HOME | End: 2019-10-01

## 2019-10-01 VITALS
OXYGEN SATURATION: 94 % | HEART RATE: 67 BPM | SYSTOLIC BLOOD PRESSURE: 90 MMHG | RESPIRATION RATE: 12 BRPM | DIASTOLIC BLOOD PRESSURE: 55 MMHG | HEIGHT: 66 IN | TEMPERATURE: 98.6 F

## 2019-10-01 DIAGNOSIS — Z98.890 OTHER SPECIFIED POSTPROCEDURAL STATES: Chronic | ICD-10-CM

## 2019-10-01 DIAGNOSIS — N39.0 URINARY TRACT INFECTION, SITE NOT SPECIFIED: ICD-10-CM

## 2019-10-01 DIAGNOSIS — Z95.0 PRESENCE OF CARDIAC PACEMAKER: Chronic | ICD-10-CM

## 2019-10-01 DIAGNOSIS — Z96.649 PRESENCE OF UNSPECIFIED ARTIFICIAL HIP JOINT: Chronic | ICD-10-CM

## 2019-10-01 PROCEDURE — 99024 POSTOP FOLLOW-UP VISIT: CPT

## 2019-10-02 DIAGNOSIS — I45.10 UNSPECIFIED RIGHT BUNDLE-BRANCH BLOCK: ICD-10-CM

## 2019-10-02 DIAGNOSIS — T82.120A DISPLACEMENT OF CARDIAC ELECTRODE, INITIAL ENCOUNTER: ICD-10-CM

## 2019-10-02 DIAGNOSIS — F32.9 MAJOR DEPRESSIVE DISORDER, SINGLE EPISODE, UNSPECIFIED: ICD-10-CM

## 2019-10-02 DIAGNOSIS — I25.10 ATHEROSCLEROTIC HEART DISEASE OF NATIVE CORONARY ARTERY WITHOUT ANGINA PECTORIS: ICD-10-CM

## 2019-10-02 DIAGNOSIS — K21.9 GASTRO-ESOPHAGEAL REFLUX DISEASE WITHOUT ESOPHAGITIS: ICD-10-CM

## 2019-10-02 DIAGNOSIS — Z86.73 PERSONAL HISTORY OF TRANSIENT ISCHEMIC ATTACK (TIA), AND CEREBRAL INFARCTION WITHOUT RESIDUAL DEFICITS: ICD-10-CM

## 2019-10-02 DIAGNOSIS — Z87.891 PERSONAL HISTORY OF NICOTINE DEPENDENCE: ICD-10-CM

## 2019-10-02 DIAGNOSIS — F43.10 POST-TRAUMATIC STRESS DISORDER, UNSPECIFIED: ICD-10-CM

## 2019-10-02 DIAGNOSIS — J98.11 ATELECTASIS: ICD-10-CM

## 2019-10-02 DIAGNOSIS — E11.9 TYPE 2 DIABETES MELLITUS WITHOUT COMPLICATIONS: ICD-10-CM

## 2019-10-02 DIAGNOSIS — I50.30 UNSPECIFIED DIASTOLIC (CONGESTIVE) HEART FAILURE: ICD-10-CM

## 2019-10-02 DIAGNOSIS — Z88.8 ALLERGY STATUS TO OTHER DRUGS, MEDICAMENTS AND BIOLOGICAL SUBSTANCES STATUS: ICD-10-CM

## 2019-10-02 DIAGNOSIS — I27.20 PULMONARY HYPERTENSION, UNSPECIFIED: ICD-10-CM

## 2019-10-02 DIAGNOSIS — Z82.49 FAMILY HISTORY OF ISCHEMIC HEART DISEASE AND OTHER DISEASES OF THE CIRCULATORY SYSTEM: ICD-10-CM

## 2019-10-02 DIAGNOSIS — I48.92 UNSPECIFIED ATRIAL FLUTTER: ICD-10-CM

## 2019-10-02 DIAGNOSIS — J44.9 CHRONIC OBSTRUCTIVE PULMONARY DISEASE, UNSPECIFIED: ICD-10-CM

## 2019-10-02 DIAGNOSIS — I11.0 HYPERTENSIVE HEART DISEASE WITH HEART FAILURE: ICD-10-CM

## 2019-10-02 DIAGNOSIS — Z80.0 FAMILY HISTORY OF MALIGNANT NEOPLASM OF DIGESTIVE ORGANS: ICD-10-CM

## 2019-10-02 DIAGNOSIS — I42.9 CARDIOMYOPATHY, UNSPECIFIED: ICD-10-CM

## 2019-10-02 DIAGNOSIS — Z95.810 PRESENCE OF AUTOMATIC (IMPLANTABLE) CARDIAC DEFIBRILLATOR: ICD-10-CM

## 2019-10-02 DIAGNOSIS — G35 MULTIPLE SCLEROSIS: ICD-10-CM

## 2019-10-02 DIAGNOSIS — J96.21 ACUTE AND CHRONIC RESPIRATORY FAILURE WITH HYPOXIA: ICD-10-CM

## 2019-10-02 DIAGNOSIS — N40.0 BENIGN PROSTATIC HYPERPLASIA WITHOUT LOWER URINARY TRACT SYMPTOMS: ICD-10-CM

## 2019-10-02 DIAGNOSIS — G40.909 EPILEPSY, UNSPECIFIED, NOT INTRACTABLE, WITHOUT STATUS EPILEPTICUS: ICD-10-CM

## 2019-10-02 DIAGNOSIS — I30.9 ACUTE PERICARDITIS, UNSPECIFIED: ICD-10-CM

## 2019-10-02 DIAGNOSIS — F31.9 BIPOLAR DISORDER, UNSPECIFIED: ICD-10-CM

## 2019-10-02 DIAGNOSIS — I48.91 UNSPECIFIED ATRIAL FIBRILLATION: ICD-10-CM

## 2019-10-02 DIAGNOSIS — D64.9 ANEMIA, UNSPECIFIED: ICD-10-CM

## 2019-10-02 DIAGNOSIS — Z80.49 FAMILY HISTORY OF MALIGNANT NEOPLASM OF OTHER GENITAL ORGANS: ICD-10-CM

## 2019-10-02 DIAGNOSIS — Z79.01 LONG TERM (CURRENT) USE OF ANTICOAGULANTS: ICD-10-CM

## 2019-10-05 LAB
CULTURE RESULTS: SIGNIFICANT CHANGE UP
CULTURE RESULTS: SIGNIFICANT CHANGE UP
SPECIMEN SOURCE: SIGNIFICANT CHANGE UP
SPECIMEN SOURCE: SIGNIFICANT CHANGE UP

## 2019-10-13 ENCOUNTER — INPATIENT (INPATIENT)
Facility: HOSPITAL | Age: 49
LOS: 7 days | Discharge: NURSING, REHAB OR LONG-TERM CARE | End: 2019-10-21
Attending: THORACIC SURGERY (CARDIOTHORACIC VASCULAR SURGERY) | Admitting: THORACIC SURGERY (CARDIOTHORACIC VASCULAR SURGERY)
Payer: MEDICAID

## 2019-10-13 VITALS
RESPIRATION RATE: 20 BRPM | DIASTOLIC BLOOD PRESSURE: 63 MMHG | SYSTOLIC BLOOD PRESSURE: 125 MMHG | OXYGEN SATURATION: 96 % | HEART RATE: 73 BPM | TEMPERATURE: 98 F

## 2019-10-13 DIAGNOSIS — T82.120A DISPLACEMENT OF CARDIAC ELECTRODE, INITIAL ENCOUNTER: Chronic | ICD-10-CM

## 2019-10-13 DIAGNOSIS — Z96.649 PRESENCE OF UNSPECIFIED ARTIFICIAL HIP JOINT: Chronic | ICD-10-CM

## 2019-10-13 DIAGNOSIS — Z95.0 PRESENCE OF CARDIAC PACEMAKER: Chronic | ICD-10-CM

## 2019-10-13 DIAGNOSIS — Z98.890 OTHER SPECIFIED POSTPROCEDURAL STATES: Chronic | ICD-10-CM

## 2019-10-13 LAB
ALBUMIN SERPL ELPH-MCNC: 3.8 G/DL — SIGNIFICANT CHANGE UP (ref 3.5–5.2)
ALP SERPL-CCNC: 138 U/L — HIGH (ref 30–115)
ALT FLD-CCNC: 15 U/L — SIGNIFICANT CHANGE UP (ref 0–41)
ANION GAP SERPL CALC-SCNC: 15 MMOL/L — HIGH (ref 7–14)
ANION GAP SERPL CALC-SCNC: 18 MMOL/L — HIGH (ref 7–14)
APPEARANCE UR: CLEAR — SIGNIFICANT CHANGE UP
APTT BLD: 37.1 SEC — SIGNIFICANT CHANGE UP (ref 27–39.2)
AST SERPL-CCNC: 37 U/L — SIGNIFICANT CHANGE UP (ref 0–41)
BACTERIA # UR AUTO: NEGATIVE — SIGNIFICANT CHANGE UP
BASE EXCESS BLDV CALC-SCNC: 3.2 MMOL/L — HIGH (ref -2–2)
BILIRUB SERPL-MCNC: 0.4 MG/DL — SIGNIFICANT CHANGE UP (ref 0.2–1.2)
BILIRUB UR-MCNC: NEGATIVE — SIGNIFICANT CHANGE UP
BLD GP AB SCN SERPL QL: SIGNIFICANT CHANGE UP
BUN SERPL-MCNC: 14 MG/DL — SIGNIFICANT CHANGE UP (ref 10–20)
BUN SERPL-MCNC: 14 MG/DL — SIGNIFICANT CHANGE UP (ref 10–20)
CA-I SERPL-SCNC: 1.16 MMOL/L — SIGNIFICANT CHANGE UP (ref 1.12–1.3)
CALCIUM SERPL-MCNC: 8.3 MG/DL — LOW (ref 8.5–10.1)
CALCIUM SERPL-MCNC: 9.2 MG/DL — SIGNIFICANT CHANGE UP (ref 8.5–10.1)
CHLORIDE SERPL-SCNC: 103 MMOL/L — SIGNIFICANT CHANGE UP (ref 98–110)
CHLORIDE SERPL-SCNC: 99 MMOL/L — SIGNIFICANT CHANGE UP (ref 98–110)
CO2 SERPL-SCNC: 21 MMOL/L — SIGNIFICANT CHANGE UP (ref 17–32)
CO2 SERPL-SCNC: 22 MMOL/L — SIGNIFICANT CHANGE UP (ref 17–32)
COLOR SPEC: YELLOW — SIGNIFICANT CHANGE UP
CREAT SERPL-MCNC: 0.9 MG/DL — SIGNIFICANT CHANGE UP (ref 0.7–1.5)
CREAT SERPL-MCNC: 1 MG/DL — SIGNIFICANT CHANGE UP (ref 0.7–1.5)
DIFF PNL FLD: NEGATIVE — SIGNIFICANT CHANGE UP
EPI CELLS # UR: 2 /HPF — SIGNIFICANT CHANGE UP (ref 0–5)
GAS PNL BLDV: 140 MMOL/L — SIGNIFICANT CHANGE UP (ref 136–145)
GAS PNL BLDV: SIGNIFICANT CHANGE UP
GLUCOSE SERPL-MCNC: 103 MG/DL — HIGH (ref 70–99)
GLUCOSE SERPL-MCNC: 108 MG/DL — HIGH (ref 70–99)
GLUCOSE UR QL: NEGATIVE — SIGNIFICANT CHANGE UP
HCO3 BLDV-SCNC: 29 MMOL/L — SIGNIFICANT CHANGE UP (ref 22–29)
HCT VFR BLD CALC: 28.6 % — LOW (ref 42–52)
HCT VFR BLDA CALC: 29.9 % — LOW (ref 34–44)
HGB BLD CALC-MCNC: 9.8 G/DL — LOW (ref 14–18)
HGB BLD-MCNC: 8.7 G/DL — LOW (ref 14–18)
HYALINE CASTS # UR AUTO: 5 /LPF — SIGNIFICANT CHANGE UP (ref 0–7)
INR BLD: 1.5 RATIO — HIGH (ref 0.65–1.3)
KETONES UR-MCNC: NEGATIVE — SIGNIFICANT CHANGE UP
LACTATE BLDV-MCNC: 0.9 MMOL/L — SIGNIFICANT CHANGE UP (ref 0.5–1.6)
LEUKOCYTE ESTERASE UR-ACNC: NEGATIVE — SIGNIFICANT CHANGE UP
MAGNESIUM SERPL-MCNC: 2.4 MG/DL — SIGNIFICANT CHANGE UP (ref 1.8–2.4)
MCHC RBC-ENTMCNC: 27.9 PG — SIGNIFICANT CHANGE UP (ref 27–31)
MCHC RBC-ENTMCNC: 30.4 G/DL — LOW (ref 32–37)
MCV RBC AUTO: 91.7 FL — SIGNIFICANT CHANGE UP (ref 80–94)
NITRITE UR-MCNC: NEGATIVE — SIGNIFICANT CHANGE UP
NRBC # BLD: 0 /100 WBCS — SIGNIFICANT CHANGE UP (ref 0–0)
NT-PROBNP SERPL-SCNC: 569 PG/ML — HIGH (ref 0–300)
PCO2 BLDV: 51 MMHG — SIGNIFICANT CHANGE UP (ref 41–51)
PH BLDV: 7.37 — SIGNIFICANT CHANGE UP (ref 7.26–7.43)
PH UR: 6 — SIGNIFICANT CHANGE UP (ref 5–8)
PLATELET # BLD AUTO: 309 K/UL — SIGNIFICANT CHANGE UP (ref 130–400)
PO2 BLDV: 20 MMHG — SIGNIFICANT CHANGE UP (ref 20–40)
POTASSIUM BLDV-SCNC: 4.1 MMOL/L — SIGNIFICANT CHANGE UP (ref 3.3–5.6)
POTASSIUM SERPL-MCNC: 4.3 MMOL/L — SIGNIFICANT CHANGE UP (ref 3.5–5)
POTASSIUM SERPL-MCNC: 5.4 MMOL/L — HIGH (ref 3.5–5)
POTASSIUM SERPL-SCNC: 4.3 MMOL/L — SIGNIFICANT CHANGE UP (ref 3.5–5)
POTASSIUM SERPL-SCNC: 5.4 MMOL/L — HIGH (ref 3.5–5)
PROT SERPL-MCNC: 7.2 G/DL — SIGNIFICANT CHANGE UP (ref 6–8)
PROT UR-MCNC: ABNORMAL
PROTHROM AB SERPL-ACNC: 17.2 SEC — HIGH (ref 9.95–12.87)
RBC # BLD: 3.12 M/UL — LOW (ref 4.7–6.1)
RBC # FLD: 15.6 % — HIGH (ref 11.5–14.5)
RBC CASTS # UR COMP ASSIST: 3 /HPF — SIGNIFICANT CHANGE UP (ref 0–4)
SAO2 % BLDV: 26 % — SIGNIFICANT CHANGE UP
SODIUM SERPL-SCNC: 138 MMOL/L — SIGNIFICANT CHANGE UP (ref 135–146)
SODIUM SERPL-SCNC: 140 MMOL/L — SIGNIFICANT CHANGE UP (ref 135–146)
SP GR SPEC: 1.04 — HIGH (ref 1.01–1.02)
TROPONIN T SERPL-MCNC: <0.01 NG/ML — SIGNIFICANT CHANGE UP
UROBILINOGEN FLD QL: SIGNIFICANT CHANGE UP
WBC # BLD: 7 K/UL — SIGNIFICANT CHANGE UP (ref 4.8–10.8)
WBC # FLD AUTO: 7 K/UL — SIGNIFICANT CHANGE UP (ref 4.8–10.8)
WBC UR QL: 2 /HPF — SIGNIFICANT CHANGE UP (ref 0–5)

## 2019-10-13 PROCEDURE — 93306 TTE W/DOPPLER COMPLETE: CPT | Mod: 26

## 2019-10-13 PROCEDURE — 99291 CRITICAL CARE FIRST HOUR: CPT

## 2019-10-13 PROCEDURE — 71045 X-RAY EXAM CHEST 1 VIEW: CPT | Mod: 26

## 2019-10-13 PROCEDURE — 93010 ELECTROCARDIOGRAM REPORT: CPT

## 2019-10-13 PROCEDURE — 99223 1ST HOSP IP/OBS HIGH 75: CPT

## 2019-10-13 RX ORDER — IPRATROPIUM BROMIDE 0.2 MG/ML
500 SOLUTION, NON-ORAL INHALATION EVERY 6 HOURS
Refills: 0 | Status: DISCONTINUED | OUTPATIENT
Start: 2019-10-13 | End: 2019-10-17

## 2019-10-13 RX ORDER — LAMOTRIGINE 25 MG/1
100 TABLET, ORALLY DISINTEGRATING ORAL
Refills: 0 | Status: DISCONTINUED | OUTPATIENT
Start: 2019-10-13 | End: 2019-10-17

## 2019-10-13 RX ORDER — MORPHINE SULFATE 50 MG/1
6 CAPSULE, EXTENDED RELEASE ORAL ONCE
Refills: 0 | Status: DISCONTINUED | OUTPATIENT
Start: 2019-10-13 | End: 2019-10-13

## 2019-10-13 RX ORDER — DOFETILIDE 0.25 MG/1
250 CAPSULE ORAL
Refills: 0 | Status: DISCONTINUED | OUTPATIENT
Start: 2019-10-13 | End: 2019-10-17

## 2019-10-13 RX ORDER — BUDESONIDE AND FORMOTEROL FUMARATE DIHYDRATE 160; 4.5 UG/1; UG/1
2 AEROSOL RESPIRATORY (INHALATION)
Refills: 0 | Status: DISCONTINUED | OUTPATIENT
Start: 2019-10-13 | End: 2019-10-17

## 2019-10-13 RX ORDER — POLYETHYLENE GLYCOL 3350 17 G/17G
17 POWDER, FOR SOLUTION ORAL EVERY 12 HOURS
Refills: 0 | Status: DISCONTINUED | OUTPATIENT
Start: 2019-10-13 | End: 2019-10-17

## 2019-10-13 RX ORDER — GABAPENTIN 400 MG/1
400 CAPSULE ORAL THREE TIMES A DAY
Refills: 0 | Status: DISCONTINUED | OUTPATIENT
Start: 2019-10-13 | End: 2019-10-17

## 2019-10-13 RX ORDER — CHOLECALCIFEROL (VITAMIN D3) 125 MCG
2000 CAPSULE ORAL DAILY
Refills: 0 | Status: DISCONTINUED | OUTPATIENT
Start: 2019-10-13 | End: 2019-10-17

## 2019-10-13 RX ORDER — ATORVASTATIN CALCIUM 80 MG/1
40 TABLET, FILM COATED ORAL AT BEDTIME
Refills: 0 | Status: DISCONTINUED | OUTPATIENT
Start: 2019-10-13 | End: 2019-10-17

## 2019-10-13 RX ORDER — BACLOFEN 100 %
10 POWDER (GRAM) MISCELLANEOUS
Refills: 0 | Status: DISCONTINUED | OUTPATIENT
Start: 2019-10-13 | End: 2019-10-17

## 2019-10-13 RX ORDER — ENOXAPARIN SODIUM 100 MG/ML
40 INJECTION SUBCUTANEOUS DAILY
Refills: 0 | Status: DISCONTINUED | OUTPATIENT
Start: 2019-10-13 | End: 2019-10-16

## 2019-10-13 RX ORDER — MORPHINE SULFATE 50 MG/1
30 CAPSULE, EXTENDED RELEASE ORAL EVERY 6 HOURS
Refills: 0 | Status: DISCONTINUED | OUTPATIENT
Start: 2019-10-13 | End: 2019-10-17

## 2019-10-13 RX ORDER — TAMSULOSIN HYDROCHLORIDE 0.4 MG/1
0.4 CAPSULE ORAL AT BEDTIME
Refills: 0 | Status: DISCONTINUED | OUTPATIENT
Start: 2019-10-13 | End: 2019-10-17

## 2019-10-13 RX ORDER — TIOTROPIUM BROMIDE 18 UG/1
1 CAPSULE ORAL; RESPIRATORY (INHALATION) DAILY
Refills: 0 | Status: DISCONTINUED | OUTPATIENT
Start: 2019-10-13 | End: 2019-10-16

## 2019-10-13 RX ORDER — FERROUS SULFATE 325(65) MG
325 TABLET ORAL DAILY
Refills: 0 | Status: DISCONTINUED | OUTPATIENT
Start: 2019-10-13 | End: 2019-10-17

## 2019-10-13 RX ORDER — QUETIAPINE FUMARATE 200 MG/1
25 TABLET, FILM COATED ORAL DAILY
Refills: 0 | Status: DISCONTINUED | OUTPATIENT
Start: 2019-10-13 | End: 2019-10-17

## 2019-10-13 RX ORDER — HYDROMORPHONE HYDROCHLORIDE 2 MG/ML
1 INJECTION INTRAMUSCULAR; INTRAVENOUS; SUBCUTANEOUS
Qty: 0 | Refills: 0 | DISCHARGE

## 2019-10-13 RX ORDER — LORATADINE 10 MG/1
10 TABLET ORAL DAILY
Refills: 0 | Status: DISCONTINUED | OUTPATIENT
Start: 2019-10-13 | End: 2019-10-17

## 2019-10-13 RX ORDER — MORPHINE SULFATE 50 MG/1
15 CAPSULE, EXTENDED RELEASE ORAL ONCE
Refills: 0 | Status: DISCONTINUED | OUTPATIENT
Start: 2019-10-13 | End: 2019-10-13

## 2019-10-13 RX ORDER — METOPROLOL TARTRATE 50 MG
12.5 TABLET ORAL EVERY 8 HOURS
Refills: 0 | Status: DISCONTINUED | OUTPATIENT
Start: 2019-10-13 | End: 2019-10-17

## 2019-10-13 RX ORDER — CLONAZEPAM 1 MG
0.5 TABLET ORAL AT BEDTIME
Refills: 0 | Status: DISCONTINUED | OUTPATIENT
Start: 2019-10-13 | End: 2019-10-17

## 2019-10-13 RX ORDER — IPRATROPIUM/ALBUTEROL SULFATE 18-103MCG
3 AEROSOL WITH ADAPTER (GRAM) INHALATION EVERY 6 HOURS
Refills: 0 | Status: DISCONTINUED | OUTPATIENT
Start: 2019-10-13 | End: 2019-10-13

## 2019-10-13 RX ORDER — CEFTRIAXONE 500 MG/1
1000 INJECTION, POWDER, FOR SOLUTION INTRAMUSCULAR; INTRAVENOUS ONCE
Refills: 0 | Status: COMPLETED | OUTPATIENT
Start: 2019-10-13 | End: 2019-10-13

## 2019-10-13 RX ORDER — ACETAMINOPHEN 500 MG
650 TABLET ORAL EVERY 6 HOURS
Refills: 0 | Status: DISCONTINUED | OUTPATIENT
Start: 2019-10-13 | End: 2019-10-15

## 2019-10-13 RX ORDER — QUETIAPINE FUMARATE 200 MG/1
50 TABLET, FILM COATED ORAL AT BEDTIME
Refills: 0 | Status: DISCONTINUED | OUTPATIENT
Start: 2019-10-13 | End: 2019-10-17

## 2019-10-13 RX ORDER — PANTOPRAZOLE SODIUM 20 MG/1
40 TABLET, DELAYED RELEASE ORAL
Refills: 0 | Status: DISCONTINUED | OUTPATIENT
Start: 2019-10-13 | End: 2019-10-17

## 2019-10-13 RX ORDER — FLUTICASONE PROPIONATE 50 MCG
2 SPRAY, SUSPENSION NASAL DAILY
Refills: 0 | Status: DISCONTINUED | OUTPATIENT
Start: 2019-10-13 | End: 2019-10-17

## 2019-10-13 RX ORDER — FOLIC ACID 0.8 MG
1 TABLET ORAL DAILY
Refills: 0 | Status: DISCONTINUED | OUTPATIENT
Start: 2019-10-13 | End: 2019-10-17

## 2019-10-13 RX ORDER — ASPIRIN/CALCIUM CARB/MAGNESIUM 324 MG
81 TABLET ORAL DAILY
Refills: 0 | Status: DISCONTINUED | OUTPATIENT
Start: 2019-10-13 | End: 2019-10-17

## 2019-10-13 RX ADMIN — QUETIAPINE FUMARATE 25 MILLIGRAM(S): 200 TABLET, FILM COATED ORAL at 12:53

## 2019-10-13 RX ADMIN — Medication 110 MILLIGRAM(S): at 22:24

## 2019-10-13 RX ADMIN — Medication 2 SPRAY(S): at 12:54

## 2019-10-13 RX ADMIN — DOFETILIDE 250 MICROGRAM(S): 0.25 CAPSULE ORAL at 18:21

## 2019-10-13 RX ADMIN — ATORVASTATIN CALCIUM 40 MILLIGRAM(S): 80 TABLET, FILM COATED ORAL at 21:04

## 2019-10-13 RX ADMIN — LAMOTRIGINE 100 MILLIGRAM(S): 25 TABLET, ORALLY DISINTEGRATING ORAL at 18:21

## 2019-10-13 RX ADMIN — CEFTRIAXONE 100 MILLIGRAM(S): 500 INJECTION, POWDER, FOR SOLUTION INTRAMUSCULAR; INTRAVENOUS at 04:24

## 2019-10-13 RX ADMIN — Medication 10 MILLIGRAM(S): at 12:52

## 2019-10-13 RX ADMIN — MORPHINE SULFATE 6 MILLIGRAM(S): 50 CAPSULE, EXTENDED RELEASE ORAL at 06:07

## 2019-10-13 RX ADMIN — MORPHINE SULFATE 30 MILLIGRAM(S): 50 CAPSULE, EXTENDED RELEASE ORAL at 13:52

## 2019-10-13 RX ADMIN — Medication 10 MILLIGRAM(S): at 23:32

## 2019-10-13 RX ADMIN — LORATADINE 10 MILLIGRAM(S): 10 TABLET ORAL at 12:53

## 2019-10-13 RX ADMIN — MORPHINE SULFATE 30 MILLIGRAM(S): 50 CAPSULE, EXTENDED RELEASE ORAL at 20:33

## 2019-10-13 RX ADMIN — GABAPENTIN 400 MILLIGRAM(S): 400 CAPSULE ORAL at 13:03

## 2019-10-13 RX ADMIN — QUETIAPINE FUMARATE 50 MILLIGRAM(S): 200 TABLET, FILM COATED ORAL at 21:04

## 2019-10-13 RX ADMIN — Medication 1 MILLIGRAM(S): at 12:53

## 2019-10-13 RX ADMIN — Medication 2000 UNIT(S): at 12:52

## 2019-10-13 RX ADMIN — Medication 325 MILLIGRAM(S): at 12:53

## 2019-10-13 RX ADMIN — MORPHINE SULFATE 30 MILLIGRAM(S): 50 CAPSULE, EXTENDED RELEASE ORAL at 21:04

## 2019-10-13 RX ADMIN — MORPHINE SULFATE 6 MILLIGRAM(S): 50 CAPSULE, EXTENDED RELEASE ORAL at 05:30

## 2019-10-13 RX ADMIN — TAMSULOSIN HYDROCHLORIDE 0.4 MILLIGRAM(S): 0.4 CAPSULE ORAL at 21:04

## 2019-10-13 RX ADMIN — Medication 81 MILLIGRAM(S): at 12:53

## 2019-10-13 RX ADMIN — GABAPENTIN 400 MILLIGRAM(S): 400 CAPSULE ORAL at 21:04

## 2019-10-13 RX ADMIN — Medication 10 MILLIGRAM(S): at 18:21

## 2019-10-13 NOTE — ED PROVIDER NOTE - ATTENDING CONTRIBUTION TO CARE
48 year old male known to service with PMHx of arrhythmogenic ventricular tachycardia s/p AICD in 2005, HFpEF EF 69%, afib/flutter on Eliquis and ASA 81mg, HTN, COPD (on 2L O2 PRN), DLD, multiple sclerosis, epilepsy, PTSD, depression, TIA, presented to the ED with complaint of chest pain and shortness of breath for 2 days s/p AICD generator change and lead revision 09/11/19 complicated by hematoma required exploration, pw recurrent feeling of firing of AICD this evening, Also states he is undergoing treatment of PNA.   Exam: NAD, NCAT, HEENT: mmm, EOMI, PERRLA, Neck: supple, nontender, nl ROM, Heart: RRR, no murmur, Lungs: BCTA, no signs of increased WOB, Abd: NTND, no guarding or rebound, no hernia palpated, no CVAT. MSK: chest, back, and ext nontender, nl rom, no deformity. Neuro: A&Ox3, normal strength, nl sensation throughout, normal speech.  A/P: EKG shows RBBB paced rhythm. Temp borderline febrile. Will recheck rectal temp. Possible incomplete response of PNA to abx. Labs, imaging, fluids, monitor, maintain in CC, symptom control, reassess.

## 2019-10-13 NOTE — ED PROVIDER NOTE - CLINICAL SUMMARY MEDICAL DECISION MAKING FREE TEXT BOX
pw AICD firing, possibly 2/2 detection of Atrial Flutter as abberant rhythm, in the context of partially treated PNA. Maintained in CC on monitor, given Abx, Patient to be admitted to an inpatient floor. Case discussed with and care endorsed to medical admitting resident. Admitting physician notified.

## 2019-10-13 NOTE — ED PROVIDER NOTE - OBJECTIVE STATEMENT
49 yo M with hx of arrhythmogenic ventricular tachycardia s/p AICD in 2005, HFpEF (EF 69% in August, 2019), afib/flutter on Eliquis/ASA 81mg, HTN, COPD (on 2L O2 PRN), DLD, multiple sclerosis, epilepsy, PTSD, depression, TIA, currently being treated for pneumonia with rocephin,  presents for evaluation of AICD firing once, onset tonight, with no associated symptoms. No fever, no chills, no headache, no nausea, no vomiting, no chest pain, no back pain, no abdominal pain, no SOB, no LOC. Pt states that he was walking to the corner store when he felt his AICD fire. Pt denies any other symptoms.

## 2019-10-13 NOTE — ED PROVIDER NOTE - GENITOURINARY NEGATIVE STATEMENT, MLM
Spoke with Vania, no questions or concerns at this time.    no dysuria, no frequency, and no hematuria.

## 2019-10-13 NOTE — H&P ADULT - NSHPSOCIALHISTORY_GEN_ALL_CORE
Former smoker of 27 years, smoked 1.5 PPD, quit 1 year ago  Prior EtOH abuse, now only drinks on social gatherings  Denies illicit drug use    Lives at [facility]

## 2019-10-13 NOTE — CONSULT NOTE ADULT - ASSESSMENT
48 years old male with PMHx of Arrythmogenic Ventricular Tachycardia and Cardiomyopathy s/p preventative AICD in 2005 complicated by hematoma of AICD pocket with battery depletion and Riata lead externalization s/p hematoma evacuation on 9/5-9/6 and laser lead extraction and new ICD lead implant + further complicated by trace pericardial effusion with no intervention and RV threshold elevation secondary to lead migration/dislodgement s/p lead replacement and re-positioning p/w AICD firing.      #AICD firing:  - VS stable.  - r/o ACS; initial troponin negative, Serial trop and EKG . Prior Lexiscan stress test 4/2018 within normal limits.  - r/o worsening pericardial effusion; currently hemodynamically stable, f/u repeat TTE and based on results consider consulting CT surgery  - AICD interrogation  - Patient is on Quetiapine and Tikosyn that can prolong QT , QTC: 413 ms in admission EKG      #Afib/Aflutter with prior history of ablation:  - C/w Tikosyn and Eliquis 48 year old male with PMHx of Arrythmogenic Ventricular Tachycardia and Cardiomyopathy s/p preventative AICD in 2005 complicated by hematoma of AICD pocket with battery depletion and Riata lead externalization s/p hematoma evacuation on 9/5-9/6 and laser lead extraction and new ICD lead implant + further complicated by trace pericardial effusion with no intervention and RV threshold elevation secondary to lead migration/dislodgement s/p lead replacement and re-positioning p/w AICD firing.      #AICD firing:  - VS stable.  - r/o ACS; initial troponin negative, Serial trop and EKG . Prior Lexiscan stress test 4/2018 within normal limits.  - r/o worsening pericardial effusion; currently hemodynamically stable, f/u repeat TTE and based on results consider consulting CT surgery  - AICD interrogation  - Patient is on Quetiapine and Tikosyn that can prolong QT , QTC: 413 ms in admission EKG      #Afib/Aflutter with prior history of ablation:  - C/w Tikosyn and Eliquis

## 2019-10-13 NOTE — H&P ADULT - NSICDXPASTMEDICALHX_GEN_ALL_CORE_FT
PAST MEDICAL HISTORY:  AICD (automatic cardioverter/defibrillator) present     Anginal pain     Atrial fibrillation s/p ablation, on eliquis    Bipolar disorder     BPH (benign prostatic hyperplasia)     BPH (benign prostatic hyperplasia)     CAD (coronary artery disease)     Cardiomyopathy     CHF (congestive heart failure)     Chronic heart failure with preserved ejection fraction     Chronic pain     Clinical Depression     COPD (chronic obstructive pulmonary disease)     CVA (cerebral vascular accident)     Diabetes mellitus diet controlled    GERD (gastroesophageal reflux disease)     HTN (hypertension)     Hypercholesteremia     Migraines     MS (multiple sclerosis)     Pericardial effusion without cardiac tamponade h/o trace pericardial effusion    Peripheral neuropathy     Peripheral Neuropathy     Pneumonia     PTSD (post-traumatic stress disorder)     Schizo affective schizophrenia     Seasonal allergies     Seizures     Supraventricular arrhythmia prior history    TIA (transient ischemic attack)     Type 2 diabetes mellitus     Ventricular tachycardia PAST MEDICAL HISTORY:  AICD (automatic cardioverter/defibrillator) present     Anemia of chronic disease     Anginal pain     Atrial fibrillation s/p ablation, on eliquis    Bipolar disorder     BPH (benign prostatic hyperplasia)     BPH (benign prostatic hyperplasia)     CAD (coronary artery disease)     Cardiomyopathy     CHF (congestive heart failure)     Chronic heart failure with preserved ejection fraction     Chronic pain     Clinical Depression     COPD (chronic obstructive pulmonary disease)     CVA (cerebral vascular accident)     Diabetes mellitus diet controlled    Folate-deficiency anemia     GERD (gastroesophageal reflux disease)     HTN (hypertension)     Hypercholesteremia     Iron deficiency anemia     Migraines     MS (multiple sclerosis)     Pericardial effusion without cardiac tamponade h/o trace pericardial effusion    Peripheral neuropathy     Peripheral Neuropathy     Pneumonia     PTSD (post-traumatic stress disorder)     Schizo affective schizophrenia     Seasonal allergies     Seizures     Supraventricular arrhythmia prior history    TIA (transient ischemic attack)     Type 2 diabetes mellitus     Ventricular tachycardia PAST MEDICAL HISTORY:  AICD (automatic cardioverter/defibrillator) present     Anemia of chronic disease     Anginal pain     Atrial fibrillation s/p ablation, on eliquis    Bipolar disorder     BPH (benign prostatic hyperplasia)     BPH (benign prostatic hyperplasia)     CAD (coronary artery disease)     Cardiomyopathy     CHF (congestive heart failure)     Chronic heart failure with preserved ejection fraction     Chronic pain     Chronic respiratory failure     Clinical Depression     COPD (chronic obstructive pulmonary disease)     CVA (cerebral vascular accident)     Diabetes mellitus diet controlled    Folate-deficiency anemia     GERD (gastroesophageal reflux disease)     HTN (hypertension)     Hypercholesteremia     Iron deficiency anemia     Migraines     MS (multiple sclerosis)     Pericardial effusion without cardiac tamponade h/o trace pericardial effusion    Peripheral neuropathy     Peripheral Neuropathy     Pneumonia     PTSD (post-traumatic stress disorder)     Schizo affective schizophrenia     Seasonal allergies     Seizures     Supraventricular arrhythmia prior history    TIA (transient ischemic attack)     Type 2 diabetes mellitus     Ventricular tachycardia PAST MEDICAL HISTORY:  AICD (automatic cardioverter/defibrillator) present     Anemia of chronic disease     Anginal pain     Atrial fibrillation s/p ablation, on eliquis    Bipolar disorder     BPH (benign prostatic hyperplasia)     BPH (benign prostatic hyperplasia)     CAD (coronary artery disease)     Cardiomyopathy     CHF (congestive heart failure)     Chronic heart failure with preserved ejection fraction     Chronic pain     Chronic respiratory failure secondary to COPD    Clinical Depression     Constipation     COPD (chronic obstructive pulmonary disease)     CVA (cerebral vascular accident)     Folate-deficiency anemia     GERD (gastroesophageal reflux disease)     HTN (hypertension)     Hypercholesteremia     Iron deficiency anemia     Migraines     MS (multiple sclerosis)     Pericardial effusion without cardiac tamponade h/o trace pericardial effusion    Peripheral neuropathy     Peripheral Neuropathy     Pneumonia     PTSD (post-traumatic stress disorder)     Schizo affective schizophrenia     Seasonal allergies     Seizures     Supraventricular arrhythmia prior history    TIA (transient ischemic attack)     Type 2 diabetes mellitus     Ventricular tachycardia PAST MEDICAL HISTORY:  AICD (automatic cardioverter/defibrillator) present     Anemia of chronic disease     Anginal pain     Atrial fibrillation s/p ablation, on eliquis    Bipolar disorder     BPH (benign prostatic hyperplasia)     BPH (benign prostatic hyperplasia)     CAD (coronary artery disease)     Cardiomyopathy     CHF (congestive heart failure)     Chronic heart failure with preserved ejection fraction     Chronic pain     Chronic respiratory failure secondary to COPD    Clinical Depression     Constipation     COPD (chronic obstructive pulmonary disease)     CVA (cerebral vascular accident)     Folate-deficiency anemia     GERD (gastroesophageal reflux disease)     HTN (hypertension)     Hypercholesteremia     Iron deficiency anemia     Migraines     MS (multiple sclerosis)     Pericardial effusion without cardiac tamponade h/o trace pericardial effusion    Peripheral neuropathy     Peripheral Neuropathy     Pneumonia     PTSD (post-traumatic stress disorder)     Schizo affective schizophrenia     Seasonal allergies     Seizures     Supraventricular arrhythmia prior history    TIA (transient ischemic attack)     Type 2 diabetes mellitus     Ventricular tachycardia     Vitamin D deficiency

## 2019-10-13 NOTE — H&P ADULT - NSICDXPASTSURGICALHX_GEN_ALL_CORE_FT
PAST SURGICAL HISTORY:  Displacement of electrode lead of cardiac pacemaker     H/O hernia repair right 1972,1991    H/O prior ablation treatment for Afib    History of evacuation of hematoma AICD pocket    History of hip replacement     S/P Implantation of AICD     S/P Orchiectomy L for testicular CA

## 2019-10-13 NOTE — H&P ADULT - NSHPPHYSICALEXAM_GEN_ALL_CORE
Vital Signs Last 24 Hrs  T(C): 37.3 (13 Oct 2019 07:30), Max: 37.8 (13 Oct 2019 02:06)  T(F): 99.1 (13 Oct 2019 07:30), Max: 100 (13 Oct 2019 02:06)  HR: 62 (13 Oct 2019 07:30) (62 - 106)  BP: 110/55 (13 Oct 2019 07:30) (106/61 - 125/63)  BP(mean): 68 (13 Oct 2019 06:06) (68 - 79)  RR: 18 (13 Oct 2019 07:30) (18 - 20)  SpO2: 97% (13 Oct 2019 07:30) (96% - 99%)    General:  HEENT:  Cardiac:  Chest/Respiratory:  Abdomen:  Extremities:  Skin:  Neuro:  Psych: Vital Signs Last 24 Hrs  T(C): 37.3 (13 Oct 2019 07:30), Max: 37.8 (13 Oct 2019 02:06)  T(F): 99.1 (13 Oct 2019 07:30), Max: 100 (13 Oct 2019 02:06)  HR: 62 (13 Oct 2019 07:30) (62 - 106)  BP: 110/55 (13 Oct 2019 07:30) (106/61 - 125/63)  BP(mean): 68 (13 Oct 2019 06:06) (68 - 79)  RR: 18 (13 Oct 2019 07:30) (18 - 20)  SpO2: 97% (13 Oct 2019 07:30) (96% - 99%)    General: sitting in bed, speaking in short sentences, not tachypneic, not ill appearing  HEENT: NCAT, left eye strabismus, PERRLA, poor dentition, neck supple  Cardiac: S1 S2 RRR, systolic murmur at LUSB  Chest/Respiratory: left upper chest surgical incision clean and no drainage, poor inspiratory effort, CTAB, BILLIE at left lower lobe, no audible crackles, no audible wheezes  Abdomen: +BS, soft, NTND  Extremities: +2 PP b/l, -ve LLE b/l  Skin: warm  Neuro: AAOx3, no focal deficits  Psych: normal affect, anxious

## 2019-10-13 NOTE — H&P ADULT - HISTORY OF PRESENT ILLNESS
48 years old male with PMHx of Arrhythmogenic Ventricular Tachycardia and Cardiomyopathy s/p AICD in 2005, HFpEF (EF 69% in August, 2019), Afib/Aflutter on Eliquis/ASA 81mg, Chronic Hypoxic Respiratory Failure secondary to COPD on 2L O2 PRN, CAD, TIA/CVA, HTN, DLD, DM II complicated by peripheral neuropathy and chronic pain, Multiple Sclerosis, Epilepsy/Seizures, GERD, Anemia of Chronic Disease + folate deficiency?, BPH, PTSD, Depression, Bipolar disorder, Schizoaffective Disorder, recently hospitalized from 8/29/19 till 9/11/19 for hematoma of AICD pocket with battery depletion and Riata lead externalization s/p hematoma evacuation on 9/5-9/6 and laser lead extraction and new ICD lead implant, and again recently hospitalized 9/15/19-9/23/19 for trace pericardial effusion with no intervention and RV threshold elevation secondary to lead migration/dislodgement s/p lead replacement and re-positioning, treated for Acute Bronchitis during prior hospitalization with Doxycycline and Prednisone, presenting for AICD firing. 48 years old male with PMHx of Arrhythmogenic Ventricular Tachycardia and Cardiomyopathy s/p AICD in 2005, HFpEF (EF 69% in August, 2019), Afib/Aflutter on Eliquis/ASA 81mg, Chronic Hypoxic Respiratory Failure secondary to COPD on 2L O2 PRN, CAD, TIA/CVA, HTN, DLD, DM II complicated by peripheral neuropathy and chronic pain, Multiple Sclerosis, Epilepsy/Seizures, GERD, Anemia of Chronic Disease + folate deficiency + Iron deficiency anemia, BPH, PTSD, Depression, Bipolar disorder, Schizoaffective Disorder, recently hospitalized from 8/29/19 till 9/11/19 for hematoma of AICD pocket with battery depletion and Riata lead externalization s/p hematoma evacuation on 9/5-9/6 and laser lead extraction and new ICD lead implant, and again recently hospitalized 9/15/19-9/23/19 for trace pericardial effusion with no intervention and RV threshold elevation secondary to lead migration/dislodgement s/p lead replacement and re-positioning, treated for Acute Bronchitis during prior hospitalization with Doxycycline and Prednisone, presenting for AICD firing. 48 years old male with PMHx of Arrhythmogenic Ventricular Tachycardia and Cardiomyopathy s/p AICD in 2005, HFpEF (EF 55-60%), Afib/Aflutter on Eliquis/ASA 81mg, Chronic Hypoxic Respiratory Failure secondary to COPD on 2L O2 PRN, CAD, TIA/CVA, HTN, DLD, DM II complicated by peripheral neuropathy and chronic pain, Multiple Sclerosis, Epilepsy/Seizures, Migraines, GERD, Anemia of Chronic Disease + folate deficiency + Iron deficiency anemia, BPH, PTSD, Depression, Bipolar disorder, Schizoaffective Disorder, recently hospitalized from 8/29/19 till 9/11/19 for hematoma of AICD pocket with battery depletion and Riata lead externalization s/p hematoma evacuation on 9/5-9/6 and laser lead extraction and new ICD lead implant, and again recently hospitalized 9/15/19-9/23/19 for trace pericardial effusion with no intervention and RV threshold elevation secondary to lead migration/dislodgement s/p lead replacement and re-positioning, treated for Acute Bronchitis during prior hospitalization with Doxycycline and Prednisone, presenting for AICD firing. 48 years old male with PMHx of Arrhythmogenic Ventricular Tachycardia and Cardiomyopathy s/p AICD in 2005, HFpEF (EF 55-60%), Afib/Aflutter on Eliquis/ASA 81mg, Chronic Hypoxic Respiratory Failure secondary to COPD on 2L O2 PRN, CAD, TIA/CVA, HTN, DLD, Peripheral neuropathy and chronic pain, Multiple Sclerosis, Epilepsy/Seizures, Migraines, GERD, Anemia of Chronic Disease + folate deficiency + Iron deficiency anemia, Constipation, BPH, PTSD, Depression, Bipolar disorder, Schizoaffective Disorder, recently hospitalized from 8/29/19 till 9/11/19 for hematoma of AICD pocket with battery depletion and Riata lead externalization s/p hematoma evacuation on 9/5-9/6 and laser lead extraction and new ICD lead implant, and again recently hospitalized 9/15/19-9/23/19 for trace pericardial effusion with no intervention and RV threshold elevation secondary to lead migration/dislodgement s/p lead replacement and re-positioning, treated for Acute Bronchitis during prior hospitalization with Doxycycline and Prednisone, presenting for AICD firing. Patient reports that he was walking to the nursing station at [facility] around 1AM in the morning to get his soda when he suddenly felt a tingling sensation in his chest followed by his defibrillator firing. Patient reports he sat down and the sensation resolved on its own. He denies chest pain, palpitations, lightheadedness, dizziness, shortness of breath prior or after the incident. Patient also reports that he has been having on/off fevers per the nurses at [facility] with a Tmax of 101 associated with dyspnea on exertion at times and that he has been on IV Ceftriaxone. Patient denies chills, cough, productive phlegm, lower extremity swelling/erythema/tenderness. 48 year old male with PMHx of Arrhythmogenic Ventricular Tachycardia and Cardiomyopathy s/p AICD in 2005, HFpEF (EF 55-60%), Afib/Aflutter on ASA 81mg but unsure if on Eliquis, Chronic Hypoxic Respiratory Failure secondary to COPD on 2L O2 PRN, CAD, TIA/CVA, HTN, DLD, Peripheral neuropathy and chronic pain, Multiple Sclerosis, Epilepsy/Seizures, Migraines, GERD, Anemia of Chronic Disease + folate deficiency + Iron deficiency anemia, Constipation, BPH, PTSD, Depression, Bipolar disorder, Schizoaffective Disorder, recently hospitalized from 8/29/19 till 9/11/19 for hematoma of AICD pocket with battery depletion and Riata lead externalization s/p hematoma evacuation on 9/5-9/6 and laser lead extraction and new ICD lead implant, and again recently hospitalized 9/15/19-9/23/19 for trace pericardial effusion with no intervention and RV threshold elevation secondary to lead migration/dislodgement s/p lead replacement and re-positioning, treated for Acute Bronchitis during prior hospitalization with Doxycycline and Prednisone, presenting for AICD firing. Patient reports that he was walking to the nursing station at [facility] around 1AM in the morning to get his soda when he suddenly felt a tingling sensation in his chest followed by his defibrillator firing. Patient reports he sat down and the sensation resolved on its own. He denies chest pain, palpitations, lightheadedness, dizziness, shortness of breath prior or after the incident. Patient also reports that he has been having on/off fevers per the nurses at [facility] with a Tmax of 101 associated with dyspnea on exertion at times and that he has been on IV Ceftriaxone. Patient denies chills, cough, productive phlegm, lower extremity swelling/erythema/tenderness.

## 2019-10-13 NOTE — H&P ADULT - NSHPLABSRESULTS_GEN_ALL_CORE
Labs                          8.7    7.00  )-----------( 309      ( 13 Oct 2019 02:09 )             28.6     10-13    138  |  99  |  14  ----------------------------<  108<H>  5.4<H>   |  21  |  1.0  (hemolyzed) Pending repeat  Blood Gas Venous - Potassium (10.13.19 @ 03:44)    Blood Gas Venous - Potassium: 4.1 mmoL/L    Ca    9.2      13 Oct 2019 02:09  Mg     2.4     10-13    TPro  7.2  /  Alb  3.8  /  TBili  0.4  /  DBili  x   /  AST  37  /  ALT  15  /  AlkPhos  138<H>  10-13    Serum Pro-Brain Natriuretic Peptide (10.13.19 @ 02:09)    Serum Pro-Brain Natriuretic Peptide: 569 pg/mL    Troponin T, Serum (10.13.19 @ 02:09)    Troponin T, Serum: <0.01 ng/mL    Blood Gas Profile - Venous (10.13.19 @ 03:44)    pH, Venous: 7.37    pCO2, Venous: 51 mmHg    pO2, Venous: 20 mmHg    HCO3, Venous: 29 mmoL/L    Base Excess, Venous: 3.2 mmoL/L    Oxygen Saturation, Venous: 26 %      Radiology  < from: Xray Chest 1 View- PORTABLE-Routine (09.23.19 @ 04:17) >  Impression:  Stable left basilar atelectasis.   < end of copied text >    < from: Transthoracic Echocardiogram (09.19.19 @ 10:56) >  Summary:   1. Left ventricular ejection fraction, by visual estimation, is 55 to 60%.   2. Small pericardial effusion.    PHYSICIAN INTERPRETATION:  Left Ventricle: Left ventricular ejection fraction, by visual estimation, is 55 to 60%.  Right Ventricle: Normal right ventricular size and function.  Pericardium: A small pericardial effusion is present. The pericardial effusion is posterior to the left ventricle. There is no evidence of cardiac tamponade.  Additional Comments: A pacer wire is visualized in the right ventricle. In comparison to the previous echocardiogram(s): The pericardial effusion is unchanged.  < end of copied text >    < from: CT Angio Chest w/ IV Cont (09.17.19 @ 15:50) >  IMPRESSION:    1. No central pulmonary embolism.   2. Trace left pleural effusion with bibasilar atelectasis.  3. Small pericardial effusion, increased since prior.   < end of copied text > Labs                          8.7    7.00  )-----------( 309      ( 13 Oct 2019 02:09 )             28.6     10-13    138  |  99  |  14  ----------------------------<  108<H>  5.4<H>   |  21  |  1.0  (hemolyzed) Pending repeat  Blood Gas Venous - Potassium (10.13.19 @ 03:44)    Blood Gas Venous - Potassium: 4.1 mmoL/L    Ca    9.2      13 Oct 2019 02:09  Mg     2.4     10-13    TPro  7.2  /  Alb  3.8  /  TBili  0.4  /  DBili  x   /  AST  37  /  ALT  15  /  AlkPhos  138<H>  10-13    Serum Pro-Brain Natriuretic Peptide (10.13.19 @ 02:09)    Serum Pro-Brain Natriuretic Peptide: 569 pg/mL    Troponin T, Serum (10.13.19 @ 02:09)    Troponin T, Serum: <0.01 ng/mL    Blood Gas Profile - Venous (10.13.19 @ 03:44)    pH, Venous: 7.37    pCO2, Venous: 51 mmHg    pO2, Venous: 20 mmHg    HCO3, Venous: 29 mmoL/L    Base Excess, Venous: 3.2 mmoL/L    Oxygen Saturation, Venous: 26 %      EKG    Radiology  < from: Xray Chest 1 View- PORTABLE-Routine (09.23.19 @ 04:17) >  Impression:  Stable left basilar atelectasis.   < end of copied text >    < from: Transthoracic Echocardiogram (09.19.19 @ 10:56) >  Summary:   1. Left ventricular ejection fraction, by visual estimation, is 55 to 60%.   2. Small pericardial effusion.    PHYSICIAN INTERPRETATION:  Left Ventricle: Left ventricular ejection fraction, by visual estimation, is 55 to 60%.  Right Ventricle: Normal right ventricular size and function.  Pericardium: A small pericardial effusion is present. The pericardial effusion is posterior to the left ventricle. There is no evidence of cardiac tamponade.  Additional Comments: A pacer wire is visualized in the right ventricle. In comparison to the previous echocardiogram(s): The pericardial effusion is unchanged.  < end of copied text >    < from: CT Angio Chest w/ IV Cont (09.17.19 @ 15:50) >  IMPRESSION:    1. No central pulmonary embolism.   2. Trace left pleural effusion with bibasilar atelectasis.  3. Small pericardial effusion, increased since prior.   < end of copied text >    < from: NM Nuclear Stress Pharmacologic Multiple (04.21.18 @ 10:41) >  Impression:   1. NORMAL LEXISCAN / REST MYOCARDIAL PERFUSION TOMOGRAPHY, WITH NO EVIDENCE FOR ISCHEMIA DURING LEXISCAN INFUSION.   2. NORMAL RESTING LEFT VENTRICULAR WALL MOTION AND WALL THICKENING.  3. LEFT VENTRICULAR EJECTION FRACTION OF  61 % WHICH IS WITHIN RANGE OF NORMAL.   < end of copied text > Labs                          8.7    7.00  )-----------( 309      ( 13 Oct 2019 02:09 )             28.6     10-13    138  |  99  |  14  ----------------------------<  108<H>  5.4<H>   |  21  |  1.0  (hemolyzed) Pending repeat  Blood Gas Venous - Potassium (10.13.19 @ 03:44)    Blood Gas Venous - Potassium: 4.1 mmoL/L    Ca    9.2      13 Oct 2019 02:09  Mg     2.4     10-13    TPro  7.2  /  Alb  3.8  /  TBili  0.4  /  DBili  x   /  AST  37  /  ALT  15  /  AlkPhos  138<H>  10-13    Serum Pro-Brain Natriuretic Peptide (10.13.19 @ 02:09)    Serum Pro-Brain Natriuretic Peptide: 569 pg/mL    Troponin T, Serum (10.13.19 @ 02:09)    Troponin T, Serum: <0.01 ng/mL    Blood Gas Profile - Venous (10.13.19 @ 03:44)    pH, Venous: 7.37    pCO2, Venous: 51 mmHg    pO2, Venous: 20 mmHg    HCO3, Venous: 29 mmoL/L    Base Excess, Venous: 3.2 mmoL/L    Oxygen Saturation, Venous: 26 %      EKG: atrial pacing, RBBB, TWI in V1-V6 non-specific    Radiology  < from: Xray Chest 1 View- PORTABLE-Routine (09.23.19 @ 04:17) >  Impression:  Stable left basilar atelectasis.   < end of copied text >    < from: Transthoracic Echocardiogram (09.19.19 @ 10:56) >  Summary:   1. Left ventricular ejection fraction, by visual estimation, is 55 to 60%.   2. Small pericardial effusion.    PHYSICIAN INTERPRETATION:  Left Ventricle: Left ventricular ejection fraction, by visual estimation, is 55 to 60%.  Right Ventricle: Normal right ventricular size and function.  Pericardium: A small pericardial effusion is present. The pericardial effusion is posterior to the left ventricle. There is no evidence of cardiac tamponade.  Additional Comments: A pacer wire is visualized in the right ventricle. In comparison to the previous echocardiogram(s): The pericardial effusion is unchanged.  < end of copied text >    < from: CT Angio Chest w/ IV Cont (09.17.19 @ 15:50) >  IMPRESSION:    1. No central pulmonary embolism.   2. Trace left pleural effusion with bibasilar atelectasis.  3. Small pericardial effusion, increased since prior.   < end of copied text >    < from: NM Nuclear Stress Pharmacologic Multiple (04.21.18 @ 10:41) >  Impression:   1. NORMAL LEXISCAN / REST MYOCARDIAL PERFUSION TOMOGRAPHY, WITH NO EVIDENCE FOR ISCHEMIA DURING LEXISCAN INFUSION.   2. NORMAL RESTING LEFT VENTRICULAR WALL MOTION AND WALL THICKENING.  3. LEFT VENTRICULAR EJECTION FRACTION OF  61 % WHICH IS WITHIN RANGE OF NORMAL.   < end of copied text > Labs                          8.7    7.00  )-----------( 309      ( 13 Oct 2019 02:09 )             28.6     10-13    138  |  99  |  14  ----------------------------<  108<H>  5.4<H>   |  21  |  1.0  (hemolyzed) Pending repeat  Blood Gas Venous - Potassium (10.13.19 @ 03:44)    Blood Gas Venous - Potassium: 4.1 mmoL/L    Ca    9.2      13 Oct 2019 02:09  Mg     2.4     10-13    TPro  7.2  /  Alb  3.8  /  TBili  0.4  /  DBili  x   /  AST  37  /  ALT  15  /  AlkPhos  138<H>  10-13    Serum Pro-Brain Natriuretic Peptide (10.13.19 @ 02:09)    Serum Pro-Brain Natriuretic Peptide: 569 pg/mL    Troponin T, Serum (10.13.19 @ 02:09)    Troponin T, Serum: <0.01 ng/mL    Blood Gas Profile - Venous (10.13.19 @ 03:44)    pH, Venous: 7.37    pCO2, Venous: 51 mmHg    pO2, Venous: 20 mmHg    HCO3, Venous: 29 mmoL/L    Base Excess, Venous: 3.2 mmoL/L    Oxygen Saturation, Venous: 26 %      EKG: atrial pacing, RBBB, TWI in V1-V6 non-specific    Radiology  CXR: cardiomegaly, AICD L upper chest, 2 leads, peribronchial cuffing    < from: Transthoracic Echocardiogram (09.19.19 @ 10:56) >  Summary:   1. Left ventricular ejection fraction, by visual estimation, is 55 to 60%.   2. Small pericardial effusion.    PHYSICIAN INTERPRETATION:  Left Ventricle: Left ventricular ejection fraction, by visual estimation, is 55 to 60%.  Right Ventricle: Normal right ventricular size and function.  Pericardium: A small pericardial effusion is present. The pericardial effusion is posterior to the left ventricle. There is no evidence of cardiac tamponade.  Additional Comments: A pacer wire is visualized in the right ventricle. In comparison to the previous echocardiogram(s): The pericardial effusion is unchanged.  < end of copied text >    < from: CT Angio Chest w/ IV Cont (09.17.19 @ 15:50) >  IMPRESSION:    1. No central pulmonary embolism.   2. Trace left pleural effusion with bibasilar atelectasis.  3. Small pericardial effusion, increased since prior.   < end of copied text >    < from: NM Nuclear Stress Pharmacologic Multiple (04.21.18 @ 10:41) >  Impression:   1. NORMAL LEXISCAN / REST MYOCARDIAL PERFUSION TOMOGRAPHY, WITH NO EVIDENCE FOR ISCHEMIA DURING LEXISCAN INFUSION.   2. NORMAL RESTING LEFT VENTRICULAR WALL MOTION AND WALL THICKENING.  3. LEFT VENTRICULAR EJECTION FRACTION OF  61 % WHICH IS WITHIN RANGE OF NORMAL.   < end of copied text >

## 2019-10-13 NOTE — CONSULT NOTE ADULT - SUBJECTIVE AND OBJECTIVE BOX
HPI:  48 years old male with PMHx of Arrhythmogenic Ventricular Tachycardia and Cardiomyopathy s/p AICD in 2005, HFpEF (EF 55-60%), Afib/Aflutter on Eliquis/ASA 81mg, Chronic Hypoxic Respiratory Failure secondary to COPD on 2L O2 PRN, CAD, TIA/CVA, HTN, DLD, DM II complicated by peripheral neuropathy and chronic pain, Multiple Sclerosis, Epilepsy/Seizures, Migraines, GERD, Anemia of Chronic Disease + folate deficiency + Iron deficiency anemia, BPH, PTSD, Depression, Bipolar disorder, Schizoaffective Disorder, recently hospitalized from 8/29/19 till 9/11/19 for hematoma of AICD pocket with battery depletion and Riata lead externalization s/p hematoma evacuation on 9/5-9/6 and laser lead extraction and new ICD lead implant, and again recently hospitalized 9/15/19-9/23/19 for trace pericardial effusion with no intervention and RV threshold elevation secondary to lead migration/dislodgement s/p lead replacement and re-positioning, treated for Acute Bronchitis during prior hospitalization with Doxycycline and Prednisone, presenting for AICD firing. (13 Oct 2019 08:33)      PAST MEDICAL & SURGICAL HISTORY  Chronic respiratory failure  Folate-deficiency anemia  Iron deficiency anemia  Anemia of chronic disease  Pericardial effusion without cardiac tamponade: h/o trace pericardial effusion  BPH (benign prostatic hyperplasia)  Chronic pain  Peripheral neuropathy  Type 2 diabetes mellitus  AICD (automatic cardioverter/defibrillator) present  Ventricular tachycardia  Chronic heart failure with preserved ejection fraction  PTSD (post-traumatic stress disorder)  Supraventricular arrhythmia: prior history  Cardiomyopathy  CHF (congestive heart failure)  Atrial fibrillation: s/p ablation, on eliquis  Diabetes mellitus: diet controlled  BPH (benign prostatic hyperplasia)  HTN (hypertension)  Seizures  Migraines  Pneumonia  MS (multiple sclerosis)  CAD (coronary artery disease)  Bipolar disorder  Anginal pain  Schizo affective schizophrenia  GERD (gastroesophageal reflux disease)  Seasonal allergies  Hypercholesteremia  COPD (chronic obstructive pulmonary disease)  CVA (cerebral vascular accident)  TIA (transient ischemic attack)  Peripheral Neuropathy  Clinical Depression  Displacement of electrode lead of cardiac pacemaker  History of evacuation of hematoma: AICD pocket  H/O prior ablation treatment: for Afib  H/O hernia repair: right 1972,1991  History of hip replacement  S/P Orchiectomy: L for testicular CA  S/P Implantation of AICD      FAMILY HISTORY:  FAMILY HISTORY:  Family history of colon cancer in mother  Family history of cardiomyopathy: Mother  Family history of cervical cancer (Mother)  Family history of early CAD (Mother)        ALLERGIES:  dexmethylphenidate (Unknown)  Dilantin (Rash)  dilantin, compazine, neurontin, ritalin, phenergan (Unknown)  Haldol (Unknown)  hydantoin derivatives (Other)  methylphenidate (Unknown)  phenytoin (Unknown)  prochlorperazine (Unknown)  promethazine (Dystonic RXN)  rash/hives (Anaphylaxis)  thioxanthenes (Unknown)            HOME MEDICATIONS:  Home Medications:  acetaminophen 650 mg oral tablet: 650 milligram(s) orally 4 times a day, As Needed (16 Sep 2019 04:40)  aspirin 81 mg oral delayed release tablet: 1 tab(s) orally once a day (16 Sep 2019 04:40)  atorvastatin 40 mg oral tablet: 1 tab(s) orally once a day (at bedtime) (16 Sep 2019 04:40)  baclofen 10 mg oral tablet: 1 tab(s) orally 4 times a day (16 Sep 2019 04:40)  budesonide-formoterol 160 mcg-4.5 mcg/inh inhalation aerosol: 2 puff(s) inhaled 2 times a day (16 Sep 2019 04:40)  Cepacol Extra Strength Menthol 10 mg mucous membrane lozenge: 1 anderson(s) mucous membrane every 6 hours, As Needed (16 Sep 2019 04:40)  cholecalciferol oral tablet: 2000 unit(s) orally once a day (16 Sep 2019 04:40)  Claritin 10 mg oral tablet: 1 tab(s) orally once a day (16 Sep 2019 04:40)  clonazePAM 0.5 mg oral tablet: 1 tab(s) orally once a day (at bedtime), As needed, agitation (16 Sep 2019 04:40)  Flonase 50 mcg/inh nasal spray: 2 spray(s) nasal once a day (16 Sep 2019 04:40)  folic acid 1 mg oral tablet: 1 tab(s) orally once a day (23 Sep 2019 13:40)  gabapentin 400 mg oral capsule: 1 cap(s) orally 3 times a day (16 Sep 2019 04:40)  heparin: 5000 unit(s) subcutaneous every 8 hours (23 Sep 2019 13:40)  HYDROmorphone 2 mg oral tablet: 1 tab(s) orally every 4 hours, As Needed (16 Sep 2019 04:40)  lamoTRIgine 100 mg oral tablet: 1 tab(s) orally 2 times a day (16 Sep 2019 04:40)  metoprolol tartrate 25 mg oral tablet: 0.5 tab(s) orally every 8 hours (16 Sep 2019 04:40)  Mi-Acid oral suspension: 30 milliliter(s) orally 4 times a day, As Needed (16 Sep 2019 04:40)  morphine 15 mg oral tablet: 1 tab(s) orally every 4 hours, As needed, Severe Pain (7 - 10) (16 Sep 2019 04:40)  Nitrostat 0.4 mg sublingual tablet: sublingual 3 times a day, As Needed; may repeat after 5 min until relief (16 Sep 2019 04:40)  omeprazole 20 mg oral delayed release capsule: 1 cap(s) orally 2 times a day (16 Sep 2019 04:40)  polyethylene glycol 3350 oral powder for reconstitution: 17 gram(s) orally every 12 hours (16 Sep 2019 04:40)  predniSONE 20 mg oral tablet: 2 tab(s) orally once a day (23 Sep 2019 13:40)  QUEtiapine 25 mg oral tablet: 1 tab(s) orally once a day (16 Sep 2019 04:40)  SEROquel 50 mg oral tablet: 1 tab(s) orally once a day (at bedtime) (16 Sep 2019 04:40)  tamsulosin 0.4 mg oral capsule: 1 cap(s) orally once a day (at bedtime) (16 Sep 2019 04:40)  Tecfidera 240 mg oral delayed release capsule: 1 cap(s) orally once a day (16 Sep 2019 04:40)  Tikosyn 250 mcg oral capsule: 1 cap(s) orally 2 times a day (16 Sep 2019 04:40)  tiotropium 18 mcg inhalation capsule: 1 cap(s) inhaled once a day (16 Sep 2019 04:40)      VITALS:   T(F): 99.1 (10-13 @ 07:30), Max: 100 (10-13 @ 02:06)  HR: 62 (10-13 @ 07:30) (62 - 106)  BP: 110/55 (10-13 @ 07:30) (106/61 - 125/63)  BP(mean): 68 (10-13 @ 06:06) (68 - 79)  RR: 18 (10-13 @ 07:30) (18 - 20)  SpO2: 97% (10-13 @ 07:30) (96% - 99%)    I&O's Summary        PHYSICAL EXAM:  NEURO: patient is awake , alert and oriented  GEN: Not in acute distress  NECK: no thyroid enlargement, no JVD  LUNGS: Clear to auscultation bilaterally   CARDIOVASCULAR: S1/S2 present, RRR , no murmurs or rubs, no carotid bruits,  + PP bilaterally  ABD: Soft, non-tender, non-distended, +BS  EXT: No TIM  SKIN: Intact    LABS:                        8.7    7.00  )-----------( 309      ( 13 Oct 2019 02:09 )             28.6     10-13    138  |  99  |  14  ----------------------------<  108<H>  5.4<H>   |  21  |  1.0    Ca    9.2      13 Oct 2019 02:09  Mg     2.4     10-13    TPro  7.2  /  Alb  3.8  /  TBili  0.4  /  DBili  x   /  AST  37  /  ALT  15  /  AlkPhos  138<H>  10-13      Troponin T, Serum: <0.01 ng/mL (10-13-19 @ 02:09)    CARDIAC MARKERS ( 13 Oct 2019 02:09 )  x     / <0.01 ng/mL / x     / x     / x            Troponin trend:    Serum Pro-Brain Natriuretic Peptide: 569 pg/mL (10-13-19 @ 02:09)    08-30 Chol 142 LDL 81 HDL 39<L> Trig 208<H>      RADIOLOGY:  < from: Transthoracic Echocardiogram (09.19.19 @ 10:56) >    Summary:   1. Left ventricular ejection fraction, by visual estimation, is 55 to   60%.   2. Small pericardial effusion.    < end of copied text >      ECG:    < from: 12 Lead ECG (10.13.19 @ 02:08) >  Q-T Interval 378 ms    QTC Calculation(Bezet) 413 ms    < end of copied text >    < from: 12 Lead ECG (10.13.19 @ 02:08) >    Diagnosis Line Atrial paced beats  Right bundle branch block  T wave abnormality, consider lateral ischemia  Abnormal ECG    < end of copied text > HPI:  48 years old male with PMHx of Arrhythmogenic Ventricular Tachycardia and Cardiomyopathy s/p AICD in 2005, HFpEF (EF 55-60%), Afib/Aflutter on Eliquis/ASA 81mg, Chronic Hypoxic Respiratory Failure secondary to COPD on 2L O2 PRN, CAD, TIA/CVA, HTN, DLD, DM II complicated by peripheral neuropathy and chronic pain, Multiple Sclerosis, Epilepsy/Seizures, Migraines, GERD, Anemia of Chronic Disease + folate deficiency + Iron deficiency anemia, BPH, PTSD, Depression, Bipolar disorder, Schizoaffective Disorder, recently hospitalized from 8/29/19 till 9/11/19 for hematoma of AICD pocket with battery depletion and Riata lead externalization s/p hematoma evacuation on 9/5-9/6 and laser lead extraction and new ICD lead implant, and again recently hospitalized 9/15/19-9/23/19 for trace pericardial effusion with no intervention and RV threshold elevation secondary to lead migration/dislodgement s/p lead replacement and re-positioning, treated for Acute Bronchitis during prior hospitalization with Doxycycline and Prednisone, presenting for AICD firing. (13 Oct 2019 08:33). Pt states he was also started on antibiotics at the nursing home but is not sure which ones but believes one of them is Rocephin.      PAST MEDICAL & SURGICAL HISTORY  Chronic respiratory failure  Folate-deficiency anemia  Iron deficiency anemia  Anemia of chronic disease  Pericardial effusion without cardiac tamponade: h/o trace pericardial effusion  BPH (benign prostatic hyperplasia)  Chronic pain  Peripheral neuropathy  Type 2 diabetes mellitus  AICD (automatic cardioverter/defibrillator) present  Ventricular tachycardia  Chronic heart failure with preserved ejection fraction  PTSD (post-traumatic stress disorder)  Supraventricular arrhythmia: prior history  Cardiomyopathy  CHF (congestive heart failure)  Atrial fibrillation: s/p ablation, on eliquis  Diabetes mellitus: diet controlled  BPH (benign prostatic hyperplasia)  HTN (hypertension)  Seizures  Migraines  Pneumonia  MS (multiple sclerosis)  CAD (coronary artery disease)  Bipolar disorder  Anginal pain  Schizo affective schizophrenia  GERD (gastroesophageal reflux disease)  Seasonal allergies  Hypercholesteremia  COPD (chronic obstructive pulmonary disease)  CVA (cerebral vascular accident)  TIA (transient ischemic attack)  Peripheral Neuropathy  Clinical Depression  Displacement of electrode lead of cardiac pacemaker  History of evacuation of hematoma: AICD pocket  H/O prior ablation treatment: for Afib  H/O hernia repair: right 1972,1991  History of hip replacement  S/P Orchiectomy: L for testicular CA  S/P Implantation of AICD      FAMILY HISTORY:  FAMILY HISTORY:  Family history of colon cancer in mother  Family history of cardiomyopathy: Mother  Family history of cervical cancer (Mother)  Family history of early CAD (Mother)        ALLERGIES:  dexmethylphenidate (Unknown)  Dilantin (Rash)  dilantin, compazine, neurontin, ritalin, phenergan (Unknown)  Haldol (Unknown)  hydantoin derivatives (Other)  methylphenidate (Unknown)  phenytoin (Unknown)  prochlorperazine (Unknown)  promethazine (Dystonic RXN)  rash/hives (Anaphylaxis)  thioxanthenes (Unknown)            HOME MEDICATIONS:  Home Medications:  acetaminophen 650 mg oral tablet: 650 milligram(s) orally 4 times a day, As Needed (16 Sep 2019 04:40)  aspirin 81 mg oral delayed release tablet: 1 tab(s) orally once a day (16 Sep 2019 04:40)  atorvastatin 40 mg oral tablet: 1 tab(s) orally once a day (at bedtime) (16 Sep 2019 04:40)  baclofen 10 mg oral tablet: 1 tab(s) orally 4 times a day (16 Sep 2019 04:40)  budesonide-formoterol 160 mcg-4.5 mcg/inh inhalation aerosol: 2 puff(s) inhaled 2 times a day (16 Sep 2019 04:40)  Cepacol Extra Strength Menthol 10 mg mucous membrane lozenge: 1 anderson(s) mucous membrane every 6 hours, As Needed (16 Sep 2019 04:40)  cholecalciferol oral tablet: 2000 unit(s) orally once a day (16 Sep 2019 04:40)  Claritin 10 mg oral tablet: 1 tab(s) orally once a day (16 Sep 2019 04:40)  clonazePAM 0.5 mg oral tablet: 1 tab(s) orally once a day (at bedtime), As needed, agitation (16 Sep 2019 04:40)  Flonase 50 mcg/inh nasal spray: 2 spray(s) nasal once a day (16 Sep 2019 04:40)  folic acid 1 mg oral tablet: 1 tab(s) orally once a day (23 Sep 2019 13:40)  gabapentin 400 mg oral capsule: 1 cap(s) orally 3 times a day (16 Sep 2019 04:40)  heparin: 5000 unit(s) subcutaneous every 8 hours (23 Sep 2019 13:40)  HYDROmorphone 2 mg oral tablet: 1 tab(s) orally every 4 hours, As Needed (16 Sep 2019 04:40)  lamoTRIgine 100 mg oral tablet: 1 tab(s) orally 2 times a day (16 Sep 2019 04:40)  metoprolol tartrate 25 mg oral tablet: 0.5 tab(s) orally every 8 hours (16 Sep 2019 04:40)  Mi-Acid oral suspension: 30 milliliter(s) orally 4 times a day, As Needed (16 Sep 2019 04:40)  morphine 15 mg oral tablet: 1 tab(s) orally every 4 hours, As needed, Severe Pain (7 - 10) (16 Sep 2019 04:40)  Nitrostat 0.4 mg sublingual tablet: sublingual 3 times a day, As Needed; may repeat after 5 min until relief (16 Sep 2019 04:40)  omeprazole 20 mg oral delayed release capsule: 1 cap(s) orally 2 times a day (16 Sep 2019 04:40)  polyethylene glycol 3350 oral powder for reconstitution: 17 gram(s) orally every 12 hours (16 Sep 2019 04:40)  predniSONE 20 mg oral tablet: 2 tab(s) orally once a day (23 Sep 2019 13:40)  QUEtiapine 25 mg oral tablet: 1 tab(s) orally once a day (16 Sep 2019 04:40)  SEROquel 50 mg oral tablet: 1 tab(s) orally once a day (at bedtime) (16 Sep 2019 04:40)  tamsulosin 0.4 mg oral capsule: 1 cap(s) orally once a day (at bedtime) (16 Sep 2019 04:40)  Tecfidera 240 mg oral delayed release capsule: 1 cap(s) orally once a day (16 Sep 2019 04:40)  Tikosyn 250 mcg oral capsule: 1 cap(s) orally 2 times a day (16 Sep 2019 04:40)  tiotropium 18 mcg inhalation capsule: 1 cap(s) inhaled once a day (16 Sep 2019 04:40)      VITALS:   T(F): 99.1 (10-13 @ 07:30), Max: 100 (10-13 @ 02:06)  HR: 62 (10-13 @ 07:30) (62 - 106)  BP: 110/55 (10-13 @ 07:30) (106/61 - 125/63)  BP(mean): 68 (10-13 @ 06:06) (68 - 79)  RR: 18 (10-13 @ 07:30) (18 - 20)  SpO2: 97% (10-13 @ 07:30) (96% - 99%)    I&O's Summary        PHYSICAL EXAM:  NEURO: patient is awake , alert and oriented  GEN: Not in acute distress  NECK: no thyroid enlargement, no JVD  LUNGS: Clear to auscultation bilaterally   CARDIOVASCULAR: S1/S2 present, RRR. Left infraclavicular incision site healing well. No erythema or hematoma or swelling noted.  ABD: Soft, non-tender, non-distended, +BS  EXT: No TIM  SKIN: Intact    LABS:                        8.7    7.00  )-----------( 309      ( 13 Oct 2019 02:09 )             28.6     10-13    138  |  99  |  14  ----------------------------<  108<H>  5.4<H>   |  21  |  1.0    Ca    9.2      13 Oct 2019 02:09  Mg     2.4     10-13    TPro  7.2  /  Alb  3.8  /  TBili  0.4  /  DBili  x   /  AST  37  /  ALT  15  /  AlkPhos  138<H>  10-13      Troponin T, Serum: <0.01 ng/mL (10-13-19 @ 02:09)    CARDIAC MARKERS ( 13 Oct 2019 02:09 )  x     / <0.01 ng/mL / x     / x     / x            Troponin trend:    Serum Pro-Brain Natriuretic Peptide: 569 pg/mL (10-13-19 @ 02:09)    08-30 Chol 142 LDL 81 HDL 39<L> Trig 208<H>      RADIOLOGY:   from: Transthoracic Echocardiogram (09.19.19 @ 10:56) >  Summary:   1. Left ventricular ejection fraction, by visual estimation, is 55 to 60%.   2. Small pericardial effusion.    < end of copied text >      ECG:  < from: 12 Lead ECG (10.13.19 @ 02:08) >  Q-T Interval 378 ms  QTC Calculation(Bezet) 413 ms  < end of copied text >    < from: 12 Lead ECG (10.13.19 @ 02:08) >  Diagnosis Line Atrial paced beats  Right bundle branch block  T wave abnormality, consider lateral ischemia  Abnormal ECG  < end of copied text >

## 2019-10-13 NOTE — ED ADULT TRIAGE NOTE - CHIEF COMPLAINT QUOTE
Pt BIBA from nursing home, states his defib fired about 10 mins ago. Pt states he was walking and felt short of breathe and then felt shock. Pt extremely pale.

## 2019-10-13 NOTE — H&P ADULT - ASSESSMENT
Repeat TTE H/o Arrythmogenic Ventricular Tachycardia and Cardiomyopathy s/p preventative AICD in 2005 complicated by hematoma of AICD pocket with battery depletion and Riata lead externalization s/p hematoma evacuation on 9/5-9/6 and laser lead extraction and new ICD lead implant + further complicated by trace pericardial effusion with no intervention and RV threshold elevation secondary to lead migration/dislodgement s/p lead replacement and re-positioning  - VS stable.  - r/o ACS; troponin negative, f/u repeat Troponin at 11AM and 4PM. EKG . Prior Lexiscan stress test 4/2018 within normal limits.  - r/o worsening pericardial effusion; currently hemodynamically stable, f/u repeat TTE and based on results consider consulting CT surgery  - r/o lead migration/dislodgement; cardiac electrophysiology consulted    HFpEF (EF 55%)    Afib/Aflutter with prior history of ablation    CAD    H/o CVA/TIA    H/o Seizures/Epilepsy; seizure precautions    Chronic Hypoxic Respiratory failure secondary to COPD    HTN    DLD; resume atorvastatin 40mg qd    DM II complicated by peripheral neuropathy and chronic pain; start insulin protocol, avoid hypoglycemia, resume gabapentin 400mg tid, educate patient on importance of diabetes care with podiatry and ophthalmology follow up    Multifactorial anemia - Anemia of Chronic Disease + Iron deficiency anemia + Folate deficiency anemia    GERD; omeprazole 20 outpatient, pantoprazole as inpatient    BPH; resume tamsulosin 0.4 qd    H/o Multiple Sclerosis; resume baclofen 10 qd    Chronic pain; per pain management, resume clonazepam 0.5mg HS prn, resume gabapentin 400mg tid, resume morphine IR 30mg q6h prn    Seasonal allergies; resume flonase 50mcg/IH and claritin 10mg qd    H/o PTSD, Depression, Schizoaffective Disorder, Bipolar Disorder    DVT ppx; on eliquis  GI ppx; h/o GERD on omeprazole, pantoprazole as inpatient  Activity; ambulate as tolerated, fall precautions  Diet; DASH, low Na, carb consistent  Dispo; pending AICD interrogation and further recommendations by EP  Code status; H/o Arrythmogenic Ventricular Tachycardia and Cardiomyopathy s/p preventative AICD in 2005 complicated by hematoma of AICD pocket with battery depletion and Riata lead externalization s/p hematoma evacuation on 9/5-9/6 and laser lead extraction and new ICD lead implant + further complicated by trace pericardial effusion with no intervention and RV threshold elevation secondary to lead migration/dislodgement s/p lead replacement and re-positioning  - VS stable. Clinically and hemodynamically stable and asymptomatic.  - r/o ACS; troponin negative, f/u repeat Troponin at 11AM and 4PM. EKG: atrial pacing, RBBB, TWI in V1-V6 non-specific. Prior Lexiscan stress test 4/2018 within normal limits.  - r/o worsening pericardial effusion; currently hemodynamically stable, f/u repeat TTE and based on results consider consulting CT surgery  - r/o lead migration/dislodgement or mis-firing; cardiac electrophysiology consulted. Per EP, AICD firing was secondary to Afib/Aflut with RVR, need better rate control    Afib/Aflutter with prior history of ablation, was possibly in RVR which caused the AICD to fire  - VS stable. Clinically and hemodynamically stable.  - Need to optimize rate control  - [id number] (HFpEF, HTN, CVA/TIA) 4  - Per prior notes patient on/was on Eliquis but patient is unsure if he takes it.  - Resume dofetilide 250mcg bid and metoprolol 12.5mg q8h  - Can increase metoprolol, will not induce bradycardia as patient is paced at 60  - Cardiology consult for rate control and anticoagulation.    HFpEF (EF 55%); not in exacerbation, resume metoprolol 12.5 bid, consider adding ACEI    CAD, h/o CVA/TIA; resume aspirin 81, metoprolol 12.5 tid and atorvastatin 40    H/o Seizures/Epilepsy; seizure precautions, resume lamotrigine 100mg bid    Chronic Hypoxic Respiratory failure secondary to COPD on home O2 2L NC prn; resume budesonide/formoterol 160-4.5 2puffs bid + albuterol/ipratropium q4h prn    HTN; on metoprolol 12.5 tid    DLD; resume atorvastatin 40mg qd    Multifactorial anemia - Anemia of Chronic Disease + Iron deficiency anemia + Folate deficiency anemia; resume folate qd, will add ferrous sulfate 325mg qd    GERD; omeprazole 20 outpatient, pantoprazole as inpatient    BPH; resume tamsulosin 0.4 qd    H/o Multiple Sclerosis; resume baclofen 10 qd and Tecfidera (Dimethyl fumarate) 240mcg qd (unavailable at [facility], need to obtain from [facility], call in AM and request it to be delivered)    Chronic pain; per pain management, resume clonazepam 0.5mg HS prn, resume gabapentin 400mg tid, resume morphine IR 30mg q6h prn    Constipation; polyethylene glycol bid    Seasonal allergies; resume flonase 50mcg/IH and claritin 10mg qd    H/o PTSD, Depression, Schizoaffective Disorder, Bipolar Disorder; resume lamotrigine 100mg bid, quetiapine 25mg in AM and 50mg HS    DVT ppx; on eliquis  GI ppx; h/o GERD on omeprazole, pantoprazole as inpatient  Activity; ambulate as tolerated, fall precautions  Diet; DASH, low Na, chopped in small pieces  Dispo; pending AICD interrogation and further recommendations by EP  Code status; 48 year old male with PMHx of Arrhythmogenic Ventricular Tachycardia and Cardiomyopathy s/p AICD in 2005, HFpEF (EF 55-60%), Afib/Aflutter on ASA 81mg but unsure if on Eliquis, Chronic Hypoxic Respiratory Failure secondary to COPD on 2L O2 PRN, CAD, TIA/CVA, HTN, DLD, Peripheral neuropathy and chronic pain, Multiple Sclerosis, Epilepsy/Seizures, Migraines, GERD, Anemia of Chronic Disease + folate deficiency + Iron deficiency anemia, Constipation, BPH, PTSD, Depression, Bipolar disorder, Schizoaffective Disorder, recently hospitalized from 8/29/19 till 9/11/19 for hematoma of AICD pocket with battery depletion and Riata lead externalization s/p hematoma evacuation on 9/5-9/6 and laser lead extraction and new ICD lead implant, and again recently hospitalized 9/15/19-9/23/19 for trace pericardial effusion with no intervention and RV threshold elevation secondary to lead migration/dislodgement s/p lead replacement and re-positioning, treated for Acute Bronchitis during prior hospitalization with Doxycycline and Prednisone, presenting for AICD firing.    H/o Arrhythmogenic Ventricular Tachycardia and Cardiomyopathy s/p preventative AICD in 2005 complicated by hematoma of AICD pocket with battery depletion and Riata lead externalization s/p hematoma evacuation on 9/5-9/6 and laser lead extraction and new ICD lead implant + further complicated by trace pericardial effusion with no intervention and RV threshold elevation secondary to lead migration/dislodgement s/p lead replacement and re-positioning  - VS stable. Clinically and hemodynamically stable and asymptomatic.  -  not prolonged. Electrolytes within normal limits, keep K > 4 and Mg > 2.  - r/o ACS; troponin negative, f/u repeat Troponin at 11AM and 4PM. EKG: atrial pacing, RBBB, TWI in V1-V6 non-specific. Prior Lexiscan stress test 4/2018 within normal limits.  - r/o worsening pericardial effusion; currently hemodynamically stable, f/u repeat TTE and based on results consider consulting CT surgery  - r/o lead migration/dislodgement or mis-firing; cardiac electrophysiology consulted. Per EP, AICD firing was secondary to Afib/Aflut with RVR, need better rate control  - Monitor in Telemetry duration of hospital stay    Afib/Aflutter with prior history of ablation, was possibly in RVR which caused the AICD to fire  - VS stable. Clinically and hemodynamically stable.  - Need to optimize rate control  - [id number] (HFpEF, HTN, CVA/TIA) 4  - Per prior notes patient on/was on Eliquis but patient is unsure if he takes it.  - Resume dofetilide 250mcg bid and metoprolol 12.5mg q8h  - Can increase metoprolol, will not induce bradycardia as patient is paced at 60  - Dofetilide + lamotrigine can increase QTc interval by decreasing dofetilide renal excretion; consider decreasing dofetilide if Afib with RVR secondary to QTc prolongation  - Cardiology consult for rate control and anticoagulation.    H/o fever + recent acute bronchitis and treatment for PNA  - VS stable as inpatient however patient reports fevers at the nursing home. Clinically not coughing but short of breath and speaking in short sentences  - CXR showing cardiomegaly and possible peribronchial cuffing, pending official read  - Will start patient on     HFpEF (EF 55%); not in exacerbation, resume metoprolol 12.5 bid, consider adding ACEI if BP can tolerate    CAD, h/o CVA/TIA; resume aspirin 81, metoprolol 12.5 tid and atorvastatin 40    H/o Seizures/Epilepsy; seizure precautions, resume lamotrigine 100mg bid    Chronic Hypoxic Respiratory failure secondary to COPD on home O2 2L NC prn; resume budesonide/formoterol 160-4.5 2puffs bid + ipratropium 18mcg qd + albuterol/ipratropium q6h prn    HTN; on metoprolol 12.5 tid    DLD; atorvastatin 40mg qd    Multifactorial anemia - Anemia of Chronic Disease + Iron deficiency anemia + Folate deficiency anemia; resume folic acid qd, will add ferrous sulfate 325mg qd    GERD; omeprazole 20mg bid outpatient, pantoprazole 40 qd as inpatient    BPH; resume tamsulosin 0.4 qd    H/o Multiple Sclerosis; resume baclofen 10 q6h and Tecfidera (Dimethyl fumarate) 240mcg qd (unavailable at [facility], need to obtain from [facility], call in AM and request it to be delivered)    Chronic pain; per pain management, resume clonazepam 0.5mg HS prn, resume gabapentin 400mg tid, resume morphine IR 30mg q6h prn    Constipation; polyethylene glycol bid    Vitamin D deficiency; cholecalciferol 2000U qd    Seasonal allergies; resume flonase 50mcg/IH and claritin 10mg qd    H/o PTSD, Depression, Schizoaffective Disorder, Bipolar Disorder; resume lamotrigine 100mg bid, quetiapine 25mg in AM and 50mg HS    Med Rec from prior hospitalization.  DVT ppx; will start lovenox 40 qd, patient needs to be on Eliquis, clarify with Cardiology  GI ppx; h/o GERD on omeprazole, pantoprazole as inpatient  Activity; ambulate as tolerated, fall precautions  Diet; DASH, low Na, chopped in small pieces  Dispo; pending AICD interrogation and further recommendations by EP    Sign out to resident on call [id number] at 11:16 AM 48 year old male with PMHx of Arrhythmogenic Ventricular Tachycardia and Cardiomyopathy s/p AICD in 2005, HFpEF (EF 55-60%), Afib/Aflutter on ASA 81mg but unsure if on Eliquis, Chronic Hypoxic Respiratory Failure secondary to COPD on 2L O2 PRN, CAD, TIA/CVA, HTN, DLD, Peripheral neuropathy and chronic pain, Multiple Sclerosis, Epilepsy/Seizures, Migraines, GERD, Anemia of Chronic Disease + folate deficiency + Iron deficiency anemia, Constipation, BPH, PTSD, Depression, Bipolar disorder, Schizoaffective Disorder, recently hospitalized from 8/29/19 till 9/11/19 for hematoma of AICD pocket with battery depletion and Riata lead externalization s/p hematoma evacuation on 9/5-9/6 and laser lead extraction and new ICD lead implant, and again recently hospitalized 9/15/19-9/23/19 for trace pericardial effusion with no intervention and RV threshold elevation secondary to lead migration/dislodgement s/p lead replacement and re-positioning, treated for Acute Bronchitis during prior hospitalization with Doxycycline and Prednisone, presenting for AICD firing.    H/o Arrhythmogenic Ventricular Tachycardia and Cardiomyopathy s/p preventative AICD in 2005 complicated by hematoma of AICD pocket with battery depletion and Riata lead externalization s/p hematoma evacuation on 9/5-9/6 and laser lead extraction and new ICD lead implant + further complicated by trace pericardial effusion with no intervention and RV threshold elevation secondary to lead migration/dislodgement s/p lead replacement and re-positioning  - VS stable. Clinically and hemodynamically stable and asymptomatic.  -  not prolonged. Electrolytes within normal limits, keep K > 4 and Mg > 2.  - r/o ACS; troponin negative, f/u repeat Troponin at 11AM and 4PM. EKG: atrial pacing, RBBB, TWI in V1-V6 non-specific. Prior Lexiscan stress test 4/2018 within normal limits.  - r/o worsening pericardial effusion; currently hemodynamically stable, f/u repeat TTE and based on results consider consulting CT surgery  - r/o lead migration/dislodgement or mis-firing; cardiac electrophysiology consulted. Per EP, AICD firing was secondary to Afib/Aflut with RVR, need better rate control  - Monitor in Telemetry duration of hospital stay    Afib/Aflutter with prior history of ablation, was possibly in RVR which caused the AICD to fire  - VS stable. Clinically and hemodynamically stable.  - Need to optimize rate control  - [id number] (HFpEF, HTN, CVA/TIA) 4  - Per prior notes patient on/was on Eliquis but patient is unsure if he takes it.  - Resume dofetilide 250mcg bid and metoprolol 12.5mg q8h  - Can increase metoprolol, will not induce bradycardia as patient is paced at 60  - Dofetilide + lamotrigine can increase QTc interval by decreasing dofetilide renal excretion; consider decreasing dofetilide if Afib with RVR secondary to QTc prolongation  - Cardiology consult for rate control and anticoagulation.    H/o fever + recent acute bronchitis and treatment for PNA  - VS stable as inpatient however patient reports fevers at the nursing home. Clinically not coughing but short of breath and speaking in short sentences  - CXR showing cardiomegaly and possible peribronchial cuffing, pending official read  - Patient was on Ceftriaxone IV  - Will start patient on Doxycycline 100mg PO bid for 3 more days  - Incentive spirometer    HFpEF (EF 55%); not in exacerbation, resume metoprolol 12.5 bid, consider adding ACEI if BP can tolerate    CAD, h/o CVA/TIA; resume aspirin 81, metoprolol 12.5 tid and atorvastatin 40    H/o Seizures/Epilepsy; seizure precautions, resume lamotrigine 100mg bid    Chronic Hypoxic Respiratory failure secondary to COPD on home O2 2L NC prn; resume budesonide/formoterol 160-4.5 2puffs bid + ipratropium 18mcg qd + albuterol/ipratropium q6h prn (patient requesting although not wheezing)    HTN; on metoprolol 12.5 tid    DLD; atorvastatin 40mg qd    Multifactorial anemia - Anemia of Chronic Disease + Iron deficiency anemia + Folate deficiency anemia; resume folic acid qd, will add ferrous sulfate 325mg qd    GERD; omeprazole 20mg bid outpatient, pantoprazole 40 qd as inpatient    BPH; resume tamsulosin 0.4 qd    H/o Multiple Sclerosis; resume baclofen 10 q6h and Tecfidera (Dimethyl fumarate) 240mcg qd (unavailable at [facility], need to obtain from [facility], call in AM and request it to be delivered)    Chronic pain; per pain management, resume clonazepam 0.5mg HS prn, resume gabapentin 400mg tid, resume morphine IR 30mg q6h prn    Constipation; polyethylene glycol bid    Vitamin D deficiency; cholecalciferol 2000U qd    Seasonal allergies; resume flonase 50mcg/IH and claritin 10mg qd    H/o PTSD, Depression, Schizoaffective Disorder, Bipolar Disorder; resume lamotrigine 100mg bid, quetiapine 25mg in AM and 50mg HS    Med Rec from prior hospitalization.  DVT ppx; will start lovenox 40 qd, patient needs to be on Eliquis, clarify with Cardiology  GI ppx; h/o GERD on omeprazole, pantoprazole as inpatient  Activity; ambulate as tolerated, fall precautions  Diet; DASH, low Na, chopped in small pieces  Dispo; pending AICD interrogation and further recommendations by EP    Sign out to resident on call [id number] at 11:16 AM 48 year old male with PMHx of Arrhythmogenic Ventricular Tachycardia and Cardiomyopathy s/p AICD in 2005, HFpEF (EF 55-60%), Afib/Aflutter on ASA 81mg but unsure if on Eliquis, Chronic Hypoxic Respiratory Failure secondary to COPD on 2L O2 PRN, CAD, TIA/CVA, HTN, DLD, Peripheral neuropathy and chronic pain, Multiple Sclerosis, Epilepsy/Seizures, Migraines, GERD, Anemia of Chronic Disease + folate deficiency + Iron deficiency anemia, Constipation, BPH, PTSD, Depression, Bipolar disorder, Schizoaffective Disorder, recently hospitalized from 8/29/19 till 9/11/19 for hematoma of AICD pocket with battery depletion and Riata lead externalization s/p hematoma evacuation on 9/5-9/6 and laser lead extraction and new ICD lead implant, and again recently hospitalized 9/15/19-9/23/19 for trace pericardial effusion with no intervention and RV threshold elevation secondary to lead migration/dislodgement s/p lead replacement and re-positioning, treated for Acute Bronchitis during prior hospitalization with Doxycycline and Prednisone, presenting for AICD firing.    H/o Arrhythmogenic Ventricular Tachycardia and Cardiomyopathy s/p preventative AICD in 2005 complicated by hematoma of AICD pocket with battery depletion and Riata lead externalization s/p hematoma evacuation on 9/5-9/6 and laser lead extraction and new ICD lead implant + further complicated by trace pericardial effusion with no intervention and RV threshold elevation secondary to lead migration/dislodgement s/p lead replacement and re-positioning  - VS stable. Clinically and hemodynamically stable and asymptomatic.  -  not prolonged. Electrolytes within normal limits, keep K > 4 and Mg > 2.  - r/o ACS; troponin negative, f/u repeat Troponin at 11AM and 4PM. EKG: atrial pacing, RBBB, TWI in V1-V6 non-specific. Prior Lexiscan stress test 4/2018 within normal limits.  - r/o worsening pericardial effusion; currently hemodynamically stable, f/u repeat TTE and based on results consider consulting CT surgery  - r/o lead migration/dislodgement or mis-firing; cardiac electrophysiology consulted. Per EP, AICD firing was secondary to Afib/Aflut with RVR, need better rate control  - Monitor in Telemetry duration of hospital stay    Afib/Aflutter with prior history of ablation, was possibly in RVR which caused the AICD to fire  - VS stable. Clinically and hemodynamically stable.  - Need to optimize rate control  - [id number] (HFpEF, HTN, CVA/TIA) 4  - Per prior notes patient on/was on Eliquis but patient is unsure if he takes it.  - Resume dofetilide 250mcg bid and metoprolol 12.5mg q8h  - Can increase metoprolol, will not induce bradycardia as patient is paced at 60  - Dofetilide + lamotrigine can increase QTc interval by decreasing dofetilide renal excretion; consider decreasing dofetilide if Afib with RVR secondary to QTc prolongation  - Cardiology consult for rate control and anticoagulation.    H/o fever + recent acute bronchitis and treatment for PNA  - VS stable as inpatient however patient reports fevers at the nursing home. Clinically not coughing but short of breath and speaking in short sentences  - CXR showing cardiomegaly and possible peribronchial cuffing, pending official read  - Patient was on Ceftriaxone IV  - Will start patient on Doxycycline 100mg PO bid for 3 more days  - Incentive spirometer    HFpEF (EF 55%); not in exacerbation, resume metoprolol 12.5 bid, consider adding ACEI if BP can tolerate, daily weights, low Na    CAD, h/o CVA/TIA; resume aspirin 81, metoprolol 12.5 tid and atorvastatin 40    H/o Seizures/Epilepsy; seizure precautions, resume lamotrigine 100mg bid    Chronic Hypoxic Respiratory failure secondary to COPD on home O2 2L NC prn; resume budesonide/formoterol 160-4.5 2puffs bid + ipratropium 18mcg qd + albuterol/ipratropium q6h prn (patient requesting although not wheezing)    HTN; on metoprolol 12.5 tid    DLD; atorvastatin 40mg qd    Multifactorial anemia - Anemia of Chronic Disease + Iron deficiency anemia + Folate deficiency anemia; resume folic acid qd, will add ferrous sulfate 325mg qd    GERD; omeprazole 20mg bid outpatient, pantoprazole 40 qd as inpatient    BPH; resume tamsulosin 0.4 qd    H/o Multiple Sclerosis; resume baclofen 10 q6h and Tecfidera (Dimethyl fumarate) 240mcg qd (unavailable at [facility], need to obtain from [facility], call in AM and request it to be delivered)    Chronic pain; per pain management, resume clonazepam 0.5mg HS prn, resume gabapentin 400mg tid, resume morphine IR 30mg q6h prn    Constipation; polyethylene glycol bid    Vitamin D deficiency; cholecalciferol 2000U qd    Seasonal allergies; resume flonase 50mcg/IH and claritin 10mg qd    H/o PTSD, Depression, Schizoaffective Disorder, Bipolar Disorder; resume lamotrigine 100mg bid, quetiapine 25mg in AM and 50mg HS    Med Rec from prior hospitalization.  DVT ppx; will start lovenox 40 qd, patient needs to be on Eliquis, clarify with Cardiology  GI ppx; h/o GERD on omeprazole, pantoprazole as inpatient  Activity; ambulate as tolerated, fall precautions  Diet; DASH, low Na, mechanical soft  Dispo; pending AICD interrogation and further recommendations by EP    Sign out to resident on call [id number] at 11:16 AM 48 year old male with PMHx of Arrhythmogenic Ventricular Tachycardia and Cardiomyopathy s/p AICD in 2005, HFpEF (EF 55-60%), Afib/Aflutter on ASA 81mg but unsure if on Eliquis, Chronic Hypoxic Respiratory Failure secondary to COPD on 2L O2 PRN, CAD, TIA/CVA, HTN, DLD, Peripheral neuropathy and chronic pain, Multiple Sclerosis, Epilepsy/Seizures, Migraines, GERD, Anemia of Chronic Disease + folate deficiency + Iron deficiency anemia, Constipation, BPH, PTSD, Depression, Bipolar disorder, Schizoaffective Disorder, recently hospitalized from 8/29/19 till 9/11/19 for hematoma of AICD pocket with battery depletion and Riata lead externalization s/p hematoma evacuation on 9/5-9/6 and laser lead extraction and new ICD lead implant, and again recently hospitalized 9/15/19-9/23/19 for trace pericardial effusion with no intervention and RV threshold elevation secondary to lead migration/dislodgement s/p lead replacement and re-positioning, treated for Acute Bronchitis during prior hospitalization with Doxycycline and Prednisone, presenting for AICD firing.    H/o Arrhythmogenic Ventricular Tachycardia and Cardiomyopathy s/p preventative AICD in 2005 complicated by hematoma of AICD pocket with battery depletion and Riata lead externalization s/p hematoma evacuation on 9/5-9/6 and laser lead extraction and new ICD lead implant + further complicated by trace pericardial effusion with no intervention and RV threshold elevation secondary to lead migration/dislodgement s/p lead replacement and re-positioning  - VS stable. Clinically and hemodynamically stable and asymptomatic.  -  not prolonged. Electrolytes within normal limits, keep K > 4 and Mg > 2.  - r/o ACS; troponin negative, f/u repeat Troponin at 11AM and 4PM. EKG: atrial pacing, RBBB, TWI in V1-V6 non-specific. Prior Lexiscan stress test 4/2018 within normal limits.  - r/o worsening pericardial effusion; currently hemodynamically stable, f/u repeat TTE and based on results consider consulting CT surgery  - r/o lead migration/dislodgement or mis-firing; cardiac electrophysiology consulted. Per EP, AICD firing was secondary to Afib/Aflut with RVR, need better rate control  - Monitor in Telemetry duration of hospital stay    Afib/Aflutter with prior history of ablation, was possibly in RVR which caused the AICD to fire  - VS stable. Clinically and hemodynamically stable.  - Need to optimize rate control  - [id number] (HFpEF, HTN, CVA/TIA) 4  - Per prior notes patient on/was on Eliquis but patient is unsure if he takes it.  - Resume dofetilide 250mcg bid and metoprolol 12.5mg q8h  - Can increase metoprolol, will not induce bradycardia as patient is paced at 60  - Dofetilide + lamotrigine can increase QTc interval by decreasing dofetilide renal excretion; consider decreasing dofetilide if Afib with RVR secondary to QTc prolongation  - F/u TSH and free T4 4pm  - Cardiology consult for rate control and anticoagulation.    H/o fever + recent acute bronchitis and treatment for PNA  - VS stable as inpatient however patient reports fevers at the nursing home. Clinically not coughing but short of breath and speaking in short sentences  - CXR showing cardiomegaly and possible peribronchial cuffing, pending official read  - Patient was on Ceftriaxone IV  - Will start patient on Doxycycline 100mg PO bid for 3 more days  - Incentive spirometer    HFpEF (EF 55%); not in exacerbation, resume metoprolol 12.5 bid, consider adding ACEI if BP can tolerate, daily weights, low Na    CAD, h/o CVA/TIA; resume aspirin 81, metoprolol 12.5 tid and atorvastatin 40    H/o Seizures/Epilepsy; seizure precautions, resume lamotrigine 100mg bid    Chronic Hypoxic Respiratory failure secondary to COPD on home O2 2L NC prn; resume budesonide/formoterol 160-4.5 2puffs bid + ipratropium 18mcg qd + albuterol/ipratropium q6h prn (patient requesting although not wheezing)    HTN; on metoprolol 12.5 tid    DLD; atorvastatin 40mg qd    Multifactorial anemia - Anemia of Chronic Disease + Iron deficiency anemia + Folate deficiency anemia; resume folic acid qd, will add ferrous sulfate 325mg qd    GERD; omeprazole 20mg bid outpatient, pantoprazole 40 qd as inpatient    BPH; resume tamsulosin 0.4 qd    H/o Multiple Sclerosis; resume baclofen 10 q6h and Tecfidera (Dimethyl fumarate) 240mcg qd (unavailable at [facility], need to obtain from [facility], call in AM and request it to be delivered)    Chronic pain; per pain management, resume clonazepam 0.5mg HS prn, resume gabapentin 400mg tid, resume morphine IR 30mg q6h prn    Constipation; polyethylene glycol bid    Vitamin D deficiency; cholecalciferol 2000U qd    Seasonal allergies; resume flonase 50mcg/IH and claritin 10mg qd    H/o PTSD, Depression, Schizoaffective Disorder, Bipolar Disorder; resume lamotrigine 100mg bid, quetiapine 25mg in AM and 50mg HS    Med Rec from prior hospitalization.  DVT ppx; will start lovenox 40 qd, patient needs to be on Eliquis, clarify with Cardiology  GI ppx; h/o GERD on omeprazole, pantoprazole as inpatient  Activity; ambulate as tolerated, fall precautions  Diet; DASH, low Na, mechanical soft  Dispo; pending AICD interrogation and further recommendations by EP    Sign out to resident on call [phone] at 11:16 AM

## 2019-10-13 NOTE — H&P ADULT - NSHPOUTPATIENTPROVIDERS_GEN_ALL_CORE
PCP Dr. Roche  Cardiology Dr. Ayoub  EP Dr. Quiñones  CTS Dr. Pastrana/Dr. Landon  Pulmonary Dr. Hernández @ [facility]

## 2019-10-13 NOTE — H&P ADULT - ATTENDING COMMENTS
patient seen and examined , agree with pgy 3 assesment and plan except as indicated above,   GEN Lying in no acute distress  HEENT Pupils equal and reactive to light and accommodationSupple Neck  PULM Clear to auscultation bilaterally  CV s1s2   GI + bowel sounds nontnender  EXT no cyanosis or edema  PSYCH awake alert and oriented x 3  INTEG No Lesions  NEURO YARBROUGH  #Aicd firing secondary to tachycardia secondary to bronchitis   ceftriaxone   cultures   ep follow up   2d echo   tsh and free t 4  #CKD 2   Progress Note Handoff    Pending: cw tele monitoring , ep following tsh, echocardiogram   Family discussion: patient is of sound mind and agrees to plan of care    Disposition: [facility]

## 2019-10-14 LAB
ALBUMIN SERPL ELPH-MCNC: 3.6 G/DL — SIGNIFICANT CHANGE UP (ref 3.5–5.2)
ALP SERPL-CCNC: 126 U/L — HIGH (ref 30–115)
ALT FLD-CCNC: 13 U/L — SIGNIFICANT CHANGE UP (ref 0–41)
ANION GAP SERPL CALC-SCNC: 12 MMOL/L — SIGNIFICANT CHANGE UP (ref 7–14)
AST SERPL-CCNC: 15 U/L — SIGNIFICANT CHANGE UP (ref 0–41)
BASOPHILS # BLD AUTO: 0.06 K/UL — SIGNIFICANT CHANGE UP (ref 0–0.2)
BASOPHILS NFR BLD AUTO: 1 % — SIGNIFICANT CHANGE UP (ref 0–1)
BILIRUB SERPL-MCNC: 0.3 MG/DL — SIGNIFICANT CHANGE UP (ref 0.2–1.2)
BUN SERPL-MCNC: 16 MG/DL — SIGNIFICANT CHANGE UP (ref 10–20)
CALCIUM SERPL-MCNC: 8.8 MG/DL — SIGNIFICANT CHANGE UP (ref 8.5–10.1)
CHLORIDE SERPL-SCNC: 102 MMOL/L — SIGNIFICANT CHANGE UP (ref 98–110)
CO2 SERPL-SCNC: 25 MMOL/L — SIGNIFICANT CHANGE UP (ref 17–32)
CREAT SERPL-MCNC: 1 MG/DL — SIGNIFICANT CHANGE UP (ref 0.7–1.5)
CULTURE RESULTS: NO GROWTH — SIGNIFICANT CHANGE UP
EOSINOPHIL # BLD AUTO: 0.15 K/UL — SIGNIFICANT CHANGE UP (ref 0–0.7)
EOSINOPHIL NFR BLD AUTO: 2.4 % — SIGNIFICANT CHANGE UP (ref 0–8)
FOLATE SERPL-MCNC: 6.1 NG/ML — SIGNIFICANT CHANGE UP
GLUCOSE BLDC GLUCOMTR-MCNC: 140 MG/DL — HIGH (ref 70–99)
GLUCOSE SERPL-MCNC: 101 MG/DL — HIGH (ref 70–99)
HCT VFR BLD CALC: 24.8 % — LOW (ref 42–52)
HCT VFR BLD CALC: 25.7 % — LOW (ref 42–52)
HGB BLD-MCNC: 7.4 G/DL — LOW (ref 14–18)
HGB BLD-MCNC: 7.7 G/DL — LOW (ref 14–18)
IMM GRANULOCYTES NFR BLD AUTO: 1 % — HIGH (ref 0.1–0.3)
LYMPHOCYTES # BLD AUTO: 1.25 K/UL — SIGNIFICANT CHANGE UP (ref 1.2–3.4)
LYMPHOCYTES # BLD AUTO: 20.1 % — LOW (ref 20.5–51.1)
MAGNESIUM SERPL-MCNC: 2.4 MG/DL — SIGNIFICANT CHANGE UP (ref 1.8–2.4)
MCHC RBC-ENTMCNC: 27.2 PG — SIGNIFICANT CHANGE UP (ref 27–31)
MCHC RBC-ENTMCNC: 27.2 PG — SIGNIFICANT CHANGE UP (ref 27–31)
MCHC RBC-ENTMCNC: 29.8 G/DL — LOW (ref 32–37)
MCHC RBC-ENTMCNC: 30 G/DL — LOW (ref 32–37)
MCV RBC AUTO: 90.8 FL — SIGNIFICANT CHANGE UP (ref 80–94)
MCV RBC AUTO: 91.2 FL — SIGNIFICANT CHANGE UP (ref 80–94)
MONOCYTES # BLD AUTO: 0.69 K/UL — HIGH (ref 0.1–0.6)
MONOCYTES NFR BLD AUTO: 11.1 % — HIGH (ref 1.7–9.3)
NEUTROPHILS # BLD AUTO: 4 K/UL — SIGNIFICANT CHANGE UP (ref 1.4–6.5)
NEUTROPHILS NFR BLD AUTO: 64.4 % — SIGNIFICANT CHANGE UP (ref 42.2–75.2)
NRBC # BLD: 0 /100 WBCS — SIGNIFICANT CHANGE UP (ref 0–0)
NRBC # BLD: 0 /100 WBCS — SIGNIFICANT CHANGE UP (ref 0–0)
PLATELET # BLD AUTO: 404 K/UL — HIGH (ref 130–400)
PLATELET # BLD AUTO: 421 K/UL — HIGH (ref 130–400)
POTASSIUM SERPL-MCNC: 4.4 MMOL/L — SIGNIFICANT CHANGE UP (ref 3.5–5)
POTASSIUM SERPL-SCNC: 4.4 MMOL/L — SIGNIFICANT CHANGE UP (ref 3.5–5)
PROT SERPL-MCNC: 6.2 G/DL — SIGNIFICANT CHANGE UP (ref 6–8)
RBC # BLD: 2.72 M/UL — LOW (ref 4.7–6.1)
RBC # BLD: 2.83 M/UL — LOW (ref 4.7–6.1)
RBC # FLD: 15.9 % — HIGH (ref 11.5–14.5)
RBC # FLD: 15.9 % — HIGH (ref 11.5–14.5)
SODIUM SERPL-SCNC: 139 MMOL/L — SIGNIFICANT CHANGE UP (ref 135–146)
SPECIMEN SOURCE: SIGNIFICANT CHANGE UP
T4 FREE SERPL-MCNC: 1.3 NG/DL — SIGNIFICANT CHANGE UP (ref 0.9–1.8)
T4 FREE SERPL-MCNC: 1.3 NG/DL — SIGNIFICANT CHANGE UP (ref 0.9–1.8)
TSH SERPL-MCNC: 1.32 UIU/ML — SIGNIFICANT CHANGE UP (ref 0.27–4.2)
TSH SERPL-MCNC: 1.66 UIU/ML — SIGNIFICANT CHANGE UP (ref 0.27–4.2)
WBC # BLD: 6.21 K/UL — SIGNIFICANT CHANGE UP (ref 4.8–10.8)
WBC # BLD: 6.6 K/UL — SIGNIFICANT CHANGE UP (ref 4.8–10.8)
WBC # FLD AUTO: 6.21 K/UL — SIGNIFICANT CHANGE UP (ref 4.8–10.8)
WBC # FLD AUTO: 6.6 K/UL — SIGNIFICANT CHANGE UP (ref 4.8–10.8)

## 2019-10-14 PROCEDURE — 71250 CT THORAX DX C-: CPT | Mod: 26

## 2019-10-14 PROCEDURE — 99233 SBSQ HOSP IP/OBS HIGH 50: CPT

## 2019-10-14 PROCEDURE — 99024 POSTOP FOLLOW-UP VISIT: CPT

## 2019-10-14 RX ORDER — INFLUENZA VIRUS VACCINE 15; 15; 15; 15 UG/.5ML; UG/.5ML; UG/.5ML; UG/.5ML
0.5 SUSPENSION INTRAMUSCULAR ONCE
Refills: 0 | Status: DISCONTINUED | OUTPATIENT
Start: 2019-10-14 | End: 2019-10-21

## 2019-10-14 RX ADMIN — ENOXAPARIN SODIUM 40 MILLIGRAM(S): 100 INJECTION SUBCUTANEOUS at 11:34

## 2019-10-14 RX ADMIN — Medication 10 MILLIGRAM(S): at 17:10

## 2019-10-14 RX ADMIN — DOFETILIDE 250 MICROGRAM(S): 0.25 CAPSULE ORAL at 05:37

## 2019-10-14 RX ADMIN — Medication 2000 UNIT(S): at 11:32

## 2019-10-14 RX ADMIN — DOFETILIDE 250 MICROGRAM(S): 0.25 CAPSULE ORAL at 17:11

## 2019-10-14 RX ADMIN — Medication 650 MILLIGRAM(S): at 15:43

## 2019-10-14 RX ADMIN — LAMOTRIGINE 100 MILLIGRAM(S): 25 TABLET, ORALLY DISINTEGRATING ORAL at 17:10

## 2019-10-14 RX ADMIN — Medication 325 MILLIGRAM(S): at 11:32

## 2019-10-14 RX ADMIN — Medication 110 MILLIGRAM(S): at 10:10

## 2019-10-14 RX ADMIN — POLYETHYLENE GLYCOL 3350 17 GRAM(S): 17 POWDER, FOR SOLUTION ORAL at 17:11

## 2019-10-14 RX ADMIN — Medication 10 MILLIGRAM(S): at 23:18

## 2019-10-14 RX ADMIN — QUETIAPINE FUMARATE 50 MILLIGRAM(S): 200 TABLET, FILM COATED ORAL at 21:42

## 2019-10-14 RX ADMIN — GABAPENTIN 400 MILLIGRAM(S): 400 CAPSULE ORAL at 05:37

## 2019-10-14 RX ADMIN — Medication 10 MILLIGRAM(S): at 11:32

## 2019-10-14 RX ADMIN — MORPHINE SULFATE 30 MILLIGRAM(S): 50 CAPSULE, EXTENDED RELEASE ORAL at 23:20

## 2019-10-14 RX ADMIN — QUETIAPINE FUMARATE 25 MILLIGRAM(S): 200 TABLET, FILM COATED ORAL at 11:33

## 2019-10-14 RX ADMIN — Medication 500 MICROGRAM(S): at 08:42

## 2019-10-14 RX ADMIN — Medication 650 MILLIGRAM(S): at 18:56

## 2019-10-14 RX ADMIN — LAMOTRIGINE 100 MILLIGRAM(S): 25 TABLET, ORALLY DISINTEGRATING ORAL at 05:36

## 2019-10-14 RX ADMIN — Medication 81 MILLIGRAM(S): at 11:32

## 2019-10-14 RX ADMIN — Medication 110 MILLIGRAM(S): at 17:10

## 2019-10-14 RX ADMIN — Medication 12.5 MILLIGRAM(S): at 21:42

## 2019-10-14 RX ADMIN — BUDESONIDE AND FORMOTEROL FUMARATE DIHYDRATE 2 PUFF(S): 160; 4.5 AEROSOL RESPIRATORY (INHALATION) at 23:18

## 2019-10-14 RX ADMIN — Medication 500 MICROGRAM(S): at 17:18

## 2019-10-14 RX ADMIN — MORPHINE SULFATE 30 MILLIGRAM(S): 50 CAPSULE, EXTENDED RELEASE ORAL at 04:32

## 2019-10-14 RX ADMIN — PANTOPRAZOLE SODIUM 40 MILLIGRAM(S): 20 TABLET, DELAYED RELEASE ORAL at 05:38

## 2019-10-14 RX ADMIN — GABAPENTIN 400 MILLIGRAM(S): 400 CAPSULE ORAL at 21:42

## 2019-10-14 RX ADMIN — LORATADINE 10 MILLIGRAM(S): 10 TABLET ORAL at 11:35

## 2019-10-14 RX ADMIN — Medication 1 MILLIGRAM(S): at 11:33

## 2019-10-14 RX ADMIN — GABAPENTIN 400 MILLIGRAM(S): 400 CAPSULE ORAL at 13:26

## 2019-10-14 RX ADMIN — ATORVASTATIN CALCIUM 40 MILLIGRAM(S): 80 TABLET, FILM COATED ORAL at 21:42

## 2019-10-14 RX ADMIN — POLYETHYLENE GLYCOL 3350 17 GRAM(S): 17 POWDER, FOR SOLUTION ORAL at 05:36

## 2019-10-14 RX ADMIN — TAMSULOSIN HYDROCHLORIDE 0.4 MILLIGRAM(S): 0.4 CAPSULE ORAL at 21:42

## 2019-10-14 RX ADMIN — TIOTROPIUM BROMIDE 1 CAPSULE(S): 18 CAPSULE ORAL; RESPIRATORY (INHALATION) at 08:41

## 2019-10-14 RX ADMIN — Medication 10 MILLIGRAM(S): at 05:36

## 2019-10-14 NOTE — CONSULT NOTE ADULT - ASSESSMENT
49 yo male with extensive medical problems, on home O2, presented to the ED s/p inappropriate firing of AICD now found to have a moderately sized pericardial effusion    Assessment/ Plan:  Will discuss with surgical attending   Attending attestation to follow

## 2019-10-14 NOTE — PROGRESS NOTE ADULT - SUBJECTIVE AND OBJECTIVE BOX
INTERVAL HPI/OVERNIGHT EVENTS:  Patient without tele events, currently SR 60 BPM. Patient c/o feeling tired today. Otherwise denies chest pain, SOB, fevers, chills, dizziness, n/v/d/c.    MEDICATIONS  (STANDING):  aspirin enteric coated 81 milliGRAM(s) Oral daily  atorvastatin 40 milliGRAM(s) Oral at bedtime  baclofen 10 milliGRAM(s) Oral four times a day  buDESOnide 160 MICROgram(s)/formoterol 4.5 MICROgram(s) Inhaler 2 Puff(s) Inhalation two times a day  cholecalciferol 2000 Unit(s) Oral daily  dofetilide 250 MICROGram(s) Oral two times a day  doxycycline IVPB      doxycycline IVPB 100 milliGRAM(s) IV Intermittent every 12 hours  enoxaparin Injectable 40 milliGRAM(s) SubCutaneous daily  ferrous    sulfate 325 milliGRAM(s) Oral daily  fluticasone propionate (50 MICROgram(s)/actuation) Nasal Spray - Peds 2 Spray(s) Alternating Nostrils daily  folic acid 1 milliGRAM(s) Oral daily  gabapentin 400 milliGRAM(s) Oral three times a day  influenza   Vaccine 0.5 milliLiter(s) IntraMuscular once  lamoTRIgine 100 milliGRAM(s) Oral two times a day  loratadine 10 milliGRAM(s) Oral daily  metoprolol tartrate 12.5 milliGRAM(s) Oral every 8 hours  pantoprazole    Tablet 40 milliGRAM(s) Oral before breakfast  polyethylene glycol 3350 17 Gram(s) Oral every 12 hours  QUEtiapine 25 milliGRAM(s) Oral daily  QUEtiapine 50 milliGRAM(s) Oral at bedtime  tamsulosin 0.4 milliGRAM(s) Oral at bedtime  tiotropium 18 MICROgram(s) Capsule 1 Capsule(s) Inhalation daily    MEDICATIONS  (PRN):  acetaminophen   Tablet .. 650 milliGRAM(s) Oral every 6 hours PRN Mild Pain (1 - 3)  clonazePAM  Tablet 0.5 milliGRAM(s) Oral at bedtime PRN agitation  ipratropium    for Nebulization 500 MICROGram(s) Nebulizer every 6 hours PRN Shortness of Breath and/or Wheezing  morphine  IR 30 milliGRAM(s) Oral every 6 hours PRN Moderate Pain (4 - 6)      Allergies    dexmethylphenidate (Unknown)  Dilantin (Rash)  dilantin, compazine, neurontin, ritalin, phenergan (Unknown)  Haldol (Unknown)  hydantoin derivatives (Other)  methylphenidate (Unknown)  phenytoin (Unknown)  prochlorperazine (Unknown)  promethazine (Dystonic RXN)  rash/hives (Anaphylaxis)  thioxanthenes (Unknown)    Intolerances        REVIEW OF SYSTEMS  A ten-point review of systems was otherwise negative except as noted.      Vital Signs Last 24 Hrs  T(C): 37.3 (14 Oct 2019 11:03), Max: 38.1 (14 Oct 2019 08:15)  T(F): 99.1 (14 Oct 2019 11:03), Max: 100.5 (14 Oct 2019 08:15)  HR: 60 (14 Oct 2019 11:03) (60 - 62)  BP: 127/67 (14 Oct 2019 11:03) (100/50 - 139/62)  BP(mean): --  RR: 14 (14 Oct 2019 11:03) (14 - 18)  SpO2: 100% (14 Oct 2019 11:03) (96% - 100%)      Physical Exam    GENERAL: In no apparent distress, well nourished, and hydrated.  HEART: Regular rate and rhythm; No murmurs, rubs, or gallops.  PULMONARY: Bibasilar rales, No wheezing or rhonchi bilaterally.  ABDOMEN: Soft, Nontender, Nondistended; Bowel sounds present  EXTREMITIES:  2+ Peripheral Pulses, No clubbing, cyanosis, or edema  NEUROLOGICAL: Grossly nonfocal. AO x3, speech clear    LABS:                        7.4    6.21  )-----------( 404      ( 14 Oct 2019 06:39 )             24.8     10-14    139  |  102  |  16  ----------------------------<  101<H>  4.4   |  25  |  1.0    Ca    8.8      14 Oct 2019 06:39  Mg     2.4     10-    TPro  6.2  /  Alb  3.6  /  TBili  0.3  /  DBili  x   /  AST  15  /  ALT  13  /  AlkPhos  126<H>  10-14    PT/INR - ( 13 Oct 2019 11:26 )   PT: 17.20 sec;   INR: 1.50 ratio         PTT - ( 13 Oct 2019 11:26 )  PTT:37.1 sec  Urinalysis Basic - ( 13 Oct 2019 16:11 )    Color: Yellow / Appearance: Clear / S.036 / pH: x  Gluc: x / Ketone: Negative  / Bili: Negative / Urobili: <2 mg/dL   Blood: x / Protein: 100 mg/dL / Nitrite: Negative   Leuk Esterase: Negative / RBC: 3 /HPF / WBC 2 /HPF   Sq Epi: x / Non Sq Epi: 2 /HPF / Bacteria: Negative    RADIOLOGY & ADDITIONAL TESTS:  < from: Xray Chest 1 View-PORTABLE IMMEDIATE (10.13.19 @ 02:32) >    Impression:      No radiographic evidence of acute cardiopulmonary disease.    < end of copied text >    < from: Transthoracic Echocardiogram (10.13.19 @ 17:23) >  Summary:   1. Left ventricular ejection fraction, by visual estimation, is 55 to   60%.   2. Technically good study.   3. Normal global left ventricular systolic function.  4. Normal left ventricular internal cavity size.   5. Moderate pericardial effusion.   6. Mild thickening of the anterior and posterior mitral valve leaflets.   7. Mild-moderate tricuspid regurgitation.    PHYSICIAN INTERPRETATION:  Left Ventricle: The left ventricular internal cavity size is normal. Left   ventricular wall thickness is normal. Global LV systolic function was   normal. Left ventricular ejection fraction, by visual estimation, is 55   to 60%.  Right Ventricle: The right ventricular size is normal. RV systolic   function is normal.  Left Atrium: Normal left atrial size.  Right Atrium: Normal right atrial size.  Pericardium: A moderately sized pericardial effusion is present. The   pericardial effusion is globally located aroundthe entire heart. There   is no evidence of cardiac tamponade.  Mitral Valve: Mild thickening of the anterior and posterior mitral valve   leaflets. Mild mitral valve regurgitation is seen.  Tricuspid Valve: Mild-moderate tricuspid regurgitation is visualized.  Aortic Valve: The aortic valve is trileaflet.  Pulmonic Valve: The pulmonic valve is thickened with good excursion.   Trace pulmonic valve regurgitation.  Venous: The inferior vena cava was dilated, with respiratory size   variation greater than 50%.  Additional Comments: A pacer wire is visualized in the right ventricle   and right atrium.    < end of copied text > INTERVAL HPI/OVERNIGHT EVENTS:  Patient without tele events, currently SR 60 BPM. Patient c/o feeling tired today. Otherwise denies chest pain, SOB, fevers, chills, dizziness, n/v/d/c.    MEDICATIONS  (STANDING):  aspirin enteric coated 81 milliGRAM(s) Oral daily  atorvastatin 40 milliGRAM(s) Oral at bedtime  baclofen 10 milliGRAM(s) Oral four times a day  buDESOnide 160 MICROgram(s)/formoterol 4.5 MICROgram(s) Inhaler 2 Puff(s) Inhalation two times a day  cholecalciferol 2000 Unit(s) Oral daily  dofetilide 250 MICROGram(s) Oral two times a day  doxycycline IVPB      doxycycline IVPB 100 milliGRAM(s) IV Intermittent every 12 hours  enoxaparin Injectable 40 milliGRAM(s) SubCutaneous daily  ferrous    sulfate 325 milliGRAM(s) Oral daily  fluticasone propionate (50 MICROgram(s)/actuation) Nasal Spray - Peds 2 Spray(s) Alternating Nostrils daily  folic acid 1 milliGRAM(s) Oral daily  gabapentin 400 milliGRAM(s) Oral three times a day  influenza   Vaccine 0.5 milliLiter(s) IntraMuscular once  lamoTRIgine 100 milliGRAM(s) Oral two times a day  loratadine 10 milliGRAM(s) Oral daily  metoprolol tartrate 12.5 milliGRAM(s) Oral every 8 hours  pantoprazole    Tablet 40 milliGRAM(s) Oral before breakfast  polyethylene glycol 3350 17 Gram(s) Oral every 12 hours  QUEtiapine 25 milliGRAM(s) Oral daily  QUEtiapine 50 milliGRAM(s) Oral at bedtime  tamsulosin 0.4 milliGRAM(s) Oral at bedtime  tiotropium 18 MICROgram(s) Capsule 1 Capsule(s) Inhalation daily    MEDICATIONS  (PRN):  acetaminophen   Tablet .. 650 milliGRAM(s) Oral every 6 hours PRN Mild Pain (1 - 3)  clonazePAM  Tablet 0.5 milliGRAM(s) Oral at bedtime PRN agitation  ipratropium    for Nebulization 500 MICROGram(s) Nebulizer every 6 hours PRN Shortness of Breath and/or Wheezing  morphine  IR 30 milliGRAM(s) Oral every 6 hours PRN Moderate Pain (4 - 6)      Allergies    dexmethylphenidate (Unknown)  Dilantin (Rash)  dilantin, compazine, neurontin, ritalin, phenergan (Unknown)  Haldol (Unknown)  hydantoin derivatives (Other)  methylphenidate (Unknown)  phenytoin (Unknown)  prochlorperazine (Unknown)  promethazine (Dystonic RXN)  rash/hives (Anaphylaxis)  thioxanthenes (Unknown)    Intolerances        REVIEW OF SYSTEMS  A ten-point review of systems was otherwise negative except as noted.      Vital Signs Last 24 Hrs  T(C): 37.3 (14 Oct 2019 11:03), Max: 38.1 (14 Oct 2019 08:15)  T(F): 99.1 (14 Oct 2019 11:03), Max: 100.5 (14 Oct 2019 08:15)  HR: 60 (14 Oct 2019 11:03) (60 - 62)  BP: 127/67 (14 Oct 2019 11:03) (100/50 - 139/62)  BP(mean): --  RR: 14 (14 Oct 2019 11:03) (14 - 18)  SpO2: 100% (14 Oct 2019 11:03) (96% - 100%)      Physical Exam    GENERAL: In no apparent distress, appears lethargic  HEART: Regular rate and rhythm; incision site left infraclavicular well healed  PULMONARY: Bibasilar rales  ABDOMEN: Soft, Nontender, Nondistended; Bowel sounds present  EXTREMITIES:  2+ Peripheral Pulses, No clubbing, cyanosis, or edema  NEUROLOGICAL: Grossly nonfocal. AO x3, speech clear    LABS:                        7.4    6.21  )-----------( 404      ( 14 Oct 2019 06:39 )             24.8     10-    139  |  102  |  16  ----------------------------<  101<H>  4.4   |  25  |  1.0    Ca    8.8      14 Oct 2019 06:39  Mg     2.4     10-14    TPro  6.2  /  Alb  3.6  /  TBili  0.3  /  DBili  x   /  AST  15  /  ALT  13  /  AlkPhos  126<H>  10-14    PT/INR - ( 13 Oct 2019 11:26 )   PT: 17.20 sec;   INR: 1.50 ratio         PTT - ( 13 Oct 2019 11:26 )  PTT:37.1 sec  Urinalysis Basic - ( 13 Oct 2019 16:11 )    Color: Yellow / Appearance: Clear / S.036 / pH: x  Gluc: x / Ketone: Negative  / Bili: Negative / Urobili: <2 mg/dL   Blood: x / Protein: 100 mg/dL / Nitrite: Negative   Leuk Esterase: Negative / RBC: 3 /HPF / WBC 2 /HPF   Sq Epi: x / Non Sq Epi: 2 /HPF / Bacteria: Negative    RADIOLOGY & ADDITIONAL TESTS:  < from: Xray Chest 1 View-PORTABLE IMMEDIATE (10.13.19 @ 02:32) >    Impression:      No radiographic evidence of acute cardiopulmonary disease.    < end of copied text >    < from: Transthoracic Echocardiogram (10.13.19 @ 17:23) >  Summary:   1. Left ventricular ejection fraction, by visual estimation, is 55 to 60%.   2. Technically good study.   3. Normal global left ventricular systolic function.   4. Normal left ventricular internal cavity size.   5. Moderate pericardial effusion.   6. Mild thickening of the anterior and posterior mitral valve leaflets.   7. Mild-moderate tricuspid regurgitation.    < end of copied text >

## 2019-10-14 NOTE — PROGRESS NOTE ADULT - SUBJECTIVE AND OBJECTIVE BOX
Patient is a 48y old  Male who presents with a chief complaint of AICD firing +    Admitted to Medicine for Evaluation for Investigation of AICD malfunction     Interval events: none    Today is hosp day 1  Patient is resting comfortably in bed   No complaints This AM  Tolerating diet with regular BM  Ambulates independently   Plan: CT Surgery to Evaluate for Moderate Pleural Effusion     PAST MEDICAL & SURGICAL HISTORY:  Vitamin D deficiency  Constipation  Chronic respiratory failure: secondary to COPD  Folate-deficiency anemia  Iron deficiency anemia  Anemia of chronic disease  Pericardial effusion without cardiac tamponade: h/o trace pericardial effusion  BPH (benign prostatic hyperplasia)  Chronic pain  Peripheral neuropathy  Type 2 diabetes mellitus  AICD (automatic cardioverter/defibrillator) present  Ventricular tachycardia  Chronic heart failure with preserved ejection fraction  PTSD (post-traumatic stress disorder)  Supraventricular arrhythmia: prior history  Cardiomyopathy  CHF (congestive heart failure)  Atrial fibrillation: s/p ablation, on eliquis  BPH (benign prostatic hyperplasia)  HTN (hypertension)  Seizures  Migraines  Pneumonia  MS (multiple sclerosis)  CAD (coronary artery disease)  Bipolar disorder  Anginal pain  Schizo affective schizophrenia  GERD (gastroesophageal reflux disease)  Seasonal allergies  Hypercholesteremia  COPD (chronic obstructive pulmonary disease)  CVA (cerebral vascular accident)  TIA (transient ischemic attack)  Peripheral Neuropathy  Clinical Depression  Displacement of electrode lead of cardiac pacemaker  History of evacuation of hematoma: AICD pocket  H/O prior ablation treatment: for Afib  H/O hernia repair: right   History of hip replacement  S/P Orchiectomy: L for testicular CA  S/P Implantation of AICD      MEDICATIONS  (STANDING):  aspirin enteric coated 81 milliGRAM(s) Oral daily  atorvastatin 40 milliGRAM(s) Oral at bedtime  baclofen 10 milliGRAM(s) Oral four times a day  buDESOnide 160 MICROgram(s)/formoterol 4.5 MICROgram(s) Inhaler 2 Puff(s) Inhalation two times a day  cholecalciferol 2000 Unit(s) Oral daily  dofetilide 250 MICROGram(s) Oral two times a day  doxycycline IVPB      doxycycline IVPB 100 milliGRAM(s) IV Intermittent every 12 hours  enoxaparin Injectable 40 milliGRAM(s) SubCutaneous daily  ferrous    sulfate 325 milliGRAM(s) Oral daily  fluticasone propionate (50 MICROgram(s)/actuation) Nasal Spray - Peds 2 Spray(s) Alternating Nostrils daily  folic acid 1 milliGRAM(s) Oral daily  gabapentin 400 milliGRAM(s) Oral three times a day  influenza   Vaccine 0.5 milliLiter(s) IntraMuscular once  lamoTRIgine 100 milliGRAM(s) Oral two times a day  loratadine 10 milliGRAM(s) Oral daily  metoprolol tartrate 12.5 milliGRAM(s) Oral every 8 hours  pantoprazole    Tablet 40 milliGRAM(s) Oral before breakfast  polyethylene glycol 3350 17 Gram(s) Oral every 12 hours  QUEtiapine 25 milliGRAM(s) Oral daily  QUEtiapine 50 milliGRAM(s) Oral at bedtime  tamsulosin 0.4 milliGRAM(s) Oral at bedtime  tiotropium 18 MICROgram(s) Capsule 1 Capsule(s) Inhalation daily    MEDICATIONS  (PRN):  acetaminophen   Tablet .. 650 milliGRAM(s) Oral every 6 hours PRN Mild Pain (1 - 3)  clonazePAM  Tablet 0.5 milliGRAM(s) Oral at bedtime PRN agitation  ipratropium    for Nebulization 500 MICROGram(s) Nebulizer every 6 hours PRN Shortness of Breath and/or Wheezing  morphine  IR 30 milliGRAM(s) Oral every 6 hours PRN Moderate Pain (4 - 6)          Vital Signs Last 24 Hrs  T(C): 38.1 (14 Oct 2019 08:15), Max: 38.1 (14 Oct 2019 08:15)  T(F): 100.5 (14 Oct 2019 08:15), Max: 100.5 (14 Oct 2019 08:15)  HR: 60 (14 Oct 2019 08:15) (60 - 62)  BP: 139/62 (14 Oct 2019 08:15) (100/50 - 139/62)  BP(mean): --  RR: 18 (14 Oct 2019 08:15) (18 - 18)  SpO2: 96% (14 Oct 2019 08:15) (96% - 99%)  CAPILLARY BLOOD GLUCOSE        I&O's Summary      Physical Exam:    -     General : NAD, Resting comfortably in bed     -      Cardiac: S1/S2 appreciated RRR, No murmurs     -      Pulm: Mild Rhonchi b/l lung bases     -      GI: Soft, NT, ND     -      Musculoskeletal: Atraumatic, NO LE edema, No skin color changes     -      Neuro: AO x 3 non-focal          Labs:                        7.4    6.21  )-----------( 404      ( 14 Oct 2019 06:39 )             24.8             10-14    139  |  102  |  16  ----------------------------<  101<H>  4.4   |  25  |  1.0    Ca    8.8      14 Oct 2019 06:39  Mg     2.4     10    TPro  6.2  /  Alb  3.6  /  TBili  0.3  /  DBili  x   /  AST  15  /  ALT  13  /  AlkPhos  126<H>  10-    LIVER FUNCTIONS - ( 14 Oct 2019 06:39 )  Alb: 3.6 g/dL / Pro: 6.2 g/dL / ALK PHOS: 126 U/L / ALT: 13 U/L / AST: 15 U/L / GGT: x                 PT/INR - ( 13 Oct 2019 11:26 )   PT: 17.20 sec;   INR: 1.50 ratio         PTT - ( 13 Oct 2019 11:26 )  PTT:37.1 sec  CARDIAC MARKERS ( 13 Oct 2019 16:50 )  x     / <0.01 ng/mL / x     / x     / x      CARDIAC MARKERS ( 13 Oct 2019 11:26 )  x     / <0.01 ng/mL / x     / x     / x      CARDIAC MARKERS ( 13 Oct 2019 02:09 )  x     / <0.01 ng/mL / x     / x     / x            Urinalysis Basic - ( 13 Oct 2019 16:11 )    Color: Yellow / Appearance: Clear / S.036 / pH: x  Gluc: x / Ketone: Negative  / Bili: Negative / Urobili: <2 mg/dL   Blood: x / Protein: 100 mg/dL / Nitrite: Negative   Leuk Esterase: Negative / RBC: 3 /HPF / WBC 2 /HPF   Sq Epi: x / Non Sq Epi: 2 /HPF / Bacteria: Negative        Culture - Blood (collected 13 Oct 2019 02:13)  Source: .Blood Blood-Peripheral  Preliminary Report (14 Oct 2019 07:00):    No growth to date.    Culture - Blood (collected 13 Oct 2019 02:13)  Source: .Blood Blood-Peripheral  Preliminary Report (14 Oct 2019 07:00):    No growth to date.        Imaging:    ECG: Patient is a 48y old  Male who presents with a chief complaint of AICD firing +    Admitted to Medicine for Evaluation for Investigation of AICD malfunction     Interval events: none    Today is hosp day 1  Patient is resting comfortably in bed   No complaints This AM  Tolerating diet with regular BM  Ambulates independently   Plan: CT Surgery to Evaluate for Moderate Pleural Effusion   -Will hold Eliquis for now until after evaluation by CT surg   -f/up 4pm cbc     PAST MEDICAL & SURGICAL HISTORY:  Vitamin D deficiency  Constipation  Chronic respiratory failure: secondary to COPD  Folate-deficiency anemia  Iron deficiency anemia  Anemia of chronic disease  Pericardial effusion without cardiac tamponade: h/o trace pericardial effusion  BPH (benign prostatic hyperplasia)  Chronic pain  Peripheral neuropathy  Type 2 diabetes mellitus  AICD (automatic cardioverter/defibrillator) present  Ventricular tachycardia  Chronic heart failure with preserved ejection fraction  PTSD (post-traumatic stress disorder)  Supraventricular arrhythmia: prior history  Cardiomyopathy  CHF (congestive heart failure)  Atrial fibrillation: s/p ablation, on eliquis  BPH (benign prostatic hyperplasia)  HTN (hypertension)  Seizures  Migraines  Pneumonia  MS (multiple sclerosis)  CAD (coronary artery disease)  Bipolar disorder  Anginal pain  Schizo affective schizophrenia  GERD (gastroesophageal reflux disease)  Seasonal allergies  Hypercholesteremia  COPD (chronic obstructive pulmonary disease)  CVA (cerebral vascular accident)  TIA (transient ischemic attack)  Peripheral Neuropathy  Clinical Depression  Displacement of electrode lead of cardiac pacemaker  History of evacuation of hematoma: AICD pocket  H/O prior ablation treatment: for Afib  H/O hernia repair: right   History of hip replacement  S/P Orchiectomy: L for testicular CA  S/P Implantation of AICD      MEDICATIONS  (STANDING):  aspirin enteric coated 81 milliGRAM(s) Oral daily  atorvastatin 40 milliGRAM(s) Oral at bedtime  baclofen 10 milliGRAM(s) Oral four times a day  buDESOnide 160 MICROgram(s)/formoterol 4.5 MICROgram(s) Inhaler 2 Puff(s) Inhalation two times a day  cholecalciferol 2000 Unit(s) Oral daily  dofetilide 250 MICROGram(s) Oral two times a day  doxycycline IVPB      doxycycline IVPB 100 milliGRAM(s) IV Intermittent every 12 hours  enoxaparin Injectable 40 milliGRAM(s) SubCutaneous daily  ferrous    sulfate 325 milliGRAM(s) Oral daily  fluticasone propionate (50 MICROgram(s)/actuation) Nasal Spray - Peds 2 Spray(s) Alternating Nostrils daily  folic acid 1 milliGRAM(s) Oral daily  gabapentin 400 milliGRAM(s) Oral three times a day  influenza   Vaccine 0.5 milliLiter(s) IntraMuscular once  lamoTRIgine 100 milliGRAM(s) Oral two times a day  loratadine 10 milliGRAM(s) Oral daily  metoprolol tartrate 12.5 milliGRAM(s) Oral every 8 hours  pantoprazole    Tablet 40 milliGRAM(s) Oral before breakfast  polyethylene glycol 3350 17 Gram(s) Oral every 12 hours  QUEtiapine 25 milliGRAM(s) Oral daily  QUEtiapine 50 milliGRAM(s) Oral at bedtime  tamsulosin 0.4 milliGRAM(s) Oral at bedtime  tiotropium 18 MICROgram(s) Capsule 1 Capsule(s) Inhalation daily    MEDICATIONS  (PRN):  acetaminophen   Tablet .. 650 milliGRAM(s) Oral every 6 hours PRN Mild Pain (1 - 3)  clonazePAM  Tablet 0.5 milliGRAM(s) Oral at bedtime PRN agitation  ipratropium    for Nebulization 500 MICROGram(s) Nebulizer every 6 hours PRN Shortness of Breath and/or Wheezing  morphine  IR 30 milliGRAM(s) Oral every 6 hours PRN Moderate Pain (4 - 6)          Vital Signs Last 24 Hrs  T(C): 38.1 (14 Oct 2019 08:15), Max: 38.1 (14 Oct 2019 08:15)  T(F): 100.5 (14 Oct 2019 08:15), Max: 100.5 (14 Oct 2019 08:15)  HR: 60 (14 Oct 2019 08:15) (60 - 62)  BP: 139/62 (14 Oct 2019 08:15) (100/50 - 139/62)  BP(mean): --  RR: 18 (14 Oct 2019 08:15) (18 - 18)  SpO2: 96% (14 Oct 2019 08:15) (96% - 99%)  CAPILLARY BLOOD GLUCOSE        I&O's Summary      Physical Exam:    -     General : NAD, Resting comfortably in bed     -      Cardiac: S1/S2 appreciated RRR, No murmurs     -      Pulm: Mild Rhonchi b/l lung bases     -      GI: Soft, NT, ND     -      Musculoskeletal: Atraumatic, NO LE edema, No skin color changes     -      Neuro: AO x 3 non-focal          Labs:                        7.4    6.21  )-----------( 404      ( 14 Oct 2019 06:39 )             24.8             10-    139  |  102  |  16  ----------------------------<  101<H>  4.4   |  25  |  1.0    Ca    8.8      14 Oct 2019 06:39  Mg     2.4     10-    TPro  6.2  /  Alb  3.6  /  TBili  0.3  /  DBili  x   /  AST  15  /  ALT  13  /  AlkPhos  126<H>  10-14    LIVER FUNCTIONS - ( 14 Oct 2019 06:39 )  Alb: 3.6 g/dL / Pro: 6.2 g/dL / ALK PHOS: 126 U/L / ALT: 13 U/L / AST: 15 U/L / GGT: x                 PT/INR - ( 13 Oct 2019 11:26 )   PT: 17.20 sec;   INR: 1.50 ratio         PTT - ( 13 Oct 2019 11:26 )  PTT:37.1 sec  CARDIAC MARKERS ( 13 Oct 2019 16:50 )  x     / <0.01 ng/mL / x     / x     / x      CARDIAC MARKERS ( 13 Oct 2019 11:26 )  x     / <0.01 ng/mL / x     / x     / x      CARDIAC MARKERS ( 13 Oct 2019 02:09 )  x     / <0.01 ng/mL / x     / x     / x            Urinalysis Basic - ( 13 Oct 2019 16:11 )    Color: Yellow / Appearance: Clear / S.036 / pH: x  Gluc: x / Ketone: Negative  / Bili: Negative / Urobili: <2 mg/dL   Blood: x / Protein: 100 mg/dL / Nitrite: Negative   Leuk Esterase: Negative / RBC: 3 /HPF / WBC 2 /HPF   Sq Epi: x / Non Sq Epi: 2 /HPF / Bacteria: Negative        Culture - Blood (collected 13 Oct 2019 02:13)  Source: .Blood Blood-Peripheral  Preliminary Report (14 Oct 2019 07:00):    No growth to date.    Culture - Blood (collected 13 Oct 2019 02:13)  Source: .Blood Blood-Peripheral  Preliminary Report (14 Oct 2019 07:00):    No growth to date.        Imaging:    ECG:

## 2019-10-14 NOTE — PROGRESS NOTE ADULT - ASSESSMENT
48 year old male with PMHx of Arrythmogenic Ventricular Tachycardia and Cardiomyopathy s/p preventative AICD in 2005 complicated by hematoma of AICD pocket with battery depletion and Riata lead externalization s/p hematoma evacuation on 9/5-9/6 and laser lead extraction and new ICD lead implant + further complicated by trace pericardial effusion with no intervention and RV threshold elevation secondary to lead migration/dislodgement s/p lead replacement and re-positioning p/w AICD firing.      Firing of AICD  - VS stable. Clinically and hemodynamically stable and asymptomatic.  -  not prolonged. Electrolytes within normal limits, keep K > 4 and Mg > 2.  - ACS ruled Out; troponin negative x3, EKG: atrial pacing, RBBB, Non-specific TWI in V1-V6     ** Prior Lexiscan stress test 4/2018 within normal limits**   -Device Interrogated by EP: inappropriate shock for AFib/AFl with RVR (HR up to 226 bpm).     ***Recs: Continue BB and Tikosyn. Increase Metoprolol slowly as BP allows    ***Check TSH and free T4: received and pending   -Recent Echo: Moderate Pericardial Effusion: will call CT surgery for Evaluation   - [id number] (HFpEF, HTN, CVA/TIA) 4 : per Pt he is taking Eliquis will confirm with [facility]   - c/w  dofetilide 250mcg bid and metoprolol 12.5mg q8h  - Cardiology consult for rate control and anticoagulation: ordered     H/o fever + recent acute bronchitis and treatment for PNA  -c/w Doxycyline for 3 more days to complete 5day course    HFpEF (EF 55%); not in exacerbation,   -c/w metoprolol 12.5 bid    CAD, h/o CVA/TIA  -c/w aspirin 81, metoprolol 12.5 tid and atorvastatin 40    H/o Seizures/Epilepsy  seizure precautions   c/w lamotrigine 100mg bid    Chronic Hypoxic Respiratory failure secondary to COPD   -n home O2 2L NC prn;   -c/w  Symbicort, Duonebs and Inhaler and Doxycycline     Essential HTN:    -C/W metoprolol 12.5 tid    DLD: c/w  Atorvastatin 40mg     Multifactorial anemia : AOCD, + RYAN+  Folate deficiency anemia  -c/w Folic acid and ferrous sulfate 325mg qd    GERD  omeprazole 20mg bid outpatient,   pantoprazole 40 qd as inpatient    BPH: c/w  tamsulosin 0.4 qd    H/o Multiple Sclerosis  -c/w me baclofen 10 q6h and Tecfidera (Dimethyl fumarate) 240mcg qd   (unavailable at [facility], need to obtain from [facility], call in AM and request it to be delivered): will ask today     Chronic pain; per pain management,   -c/w clonazepam, gabapentin,morphine IR 30mg q6h prn    Constipation  -polyethylene glycol bid    Vitamin D deficiency  cholecalciferol 2000U qd    Seasonal allergies;   c/w Flonase and Claritin 10mg    H/o PTSD, Depression, Schizoaffective Disorder, Bipolar Disorder  -c/w Lamotrigine, quetiapine     DVT ppx;  Lovenox 40 qd, patient needs to be on Eliquis: will Clarify with NH   GI ppx; h/o GERD on PPI   Full code   Dispo; From NH  -Pending Clearance from EP and CT Surgery 48 year old male with PMHx of Arrythmogenic Ventricular Tachycardia and Cardiomyopathy s/p preventative AICD in 2005 complicated by hematoma of AICD pocket with battery depletion and Riata lead externalization s/p hematoma evacuation on 9/5-9/6 and laser lead extraction and new ICD lead implant + further complicated by trace pericardial effusion with no intervention and RV threshold elevation secondary to lead migration/dislodgement s/p lead replacement and re-positioning p/w AICD firing.      Firing of AICD  - VS stable. Clinically and hemodynamically stable and asymptomatic.  -  not prolonged. Electrolytes within normal limits, keep K > 4 and Mg > 2.  - ACS ruled Out; troponin negative x3, EKG: atrial pacing, RBBB, Non-specific TWI in V1-V6     ** Prior Lexiscan stress test 4/2018 within normal limits**   -Device Interrogated by EP: inappropriate shock for AFib/AFl with RVR (HR up to 226 bpm).     ***Recs: Continue BB and Tikosyn. Increase Metoprolol slowly as BP allows    ***Check TSH and free T4: received and pending   -Recent Echo: Moderate Pericardial Effusion: will call CT surgery for Evaluation   - [id number] (HFpEF, HTN, CVA/TIA) 4 : per Pt he is taking Eliquis will confirm with [facility]   - c/w  dofetilide 250mcg bid and metoprolol 12.5mg q8h  - Cardiology consult for rate control and anticoagulation: ordered     H/o fever + recent acute bronchitis and treatment for PNA  -c/w Doxycyline for 3 more days to complete 5day course    HFpEF (EF 55%); not in exacerbation,   -c/w metoprolol 12.5 bid    CAD, h/o CVA/TIA  -c/w aspirin 81, metoprolol 12.5 tid and atorvastatin 40    H/o Seizures/Epilepsy  seizure precautions   c/w lamotrigine 100mg bid    Chronic Hypoxic Respiratory failure secondary to COPD   -n home O2 2L NC prn;   -c/w  Symbicort, Duonebs and Inhaler and Doxycycline     Essential HTN:    -C/W metoprolol 12.5 tid    DLD: c/w  Atorvastatin 40mg     Multifactorial anemia : AOCD, + RYAN+  Folate deficiency anemia  -c/w Folic acid and ferrous sulfate 325mg qd    GERD  omeprazole 20mg bid outpatient,   pantoprazole 40 qd as inpatient    BPH: c/w  tamsulosin 0.4 qd    H/o Multiple Sclerosis  -c/w me baclofen 10 q6h and Tecfidera (Dimethyl fumarate) 240mcg qd   (unavailable at [facility], need to obtain from [facility], call in AM and request it to be delivered): will ask today     Chronic pain; per pain management,   -c/w clonazepam, gabapentin,morphine IR 30mg q6h prn    Constipation  -polyethylene glycol bid    Vitamin D deficiency  cholecalciferol 2000U qd    Seasonal allergies;   c/w Flonase and Claritin 10mg    H/o PTSD, Depression, Schizoaffective Disorder, Bipolar Disorder  -c/w Lamotrigine, quetiapine     DVT ppx;  Lovenox 40 qd, Eliquis on hold for now    GI ppx; h/o GERD on PPI   Full code   Dispo; From NH  -Pending Clearance from EP and CT Surgery

## 2019-10-14 NOTE — CONSULT NOTE ADULT - SUBJECTIVE AND OBJECTIVE BOX
CUATE HORNE [id number]  48y Male    HPI:  48 years old male well know to CTS with PMHx of arrhythmogenic ventricular tachycardia s/p AICD in , HFpEF (EF 69% in ), afib/flutter on Eliquis/ASA 81mg, HTN, COPD (on 2L O2 PRN), DLD, multiple sclerosis, epilepsy, PTSD, depression, TIA, presented to the ED with complaint of his AICD firing. Interrogation found inappropriate shock for AFib/AFl with RVR (HR up to 226 bpm). recently hospitalized from 19 till 19 for hematoma of AICD pocket with battery depletion and Riata lead externalization s/p hematoma evacuation on - and laser lead extraction and new ICD lead implant, and again recently hospitalized 9/15/19-19 for trace pericardial effusion with no intervention and RV threshold elevation secondary to lead migration/dislodgement s/p lead replacement and re-positioning, treated for Acute Bronchitis during prior hospitalization with Doxycycline and Prednisone. Patient reports that he was walking to the nursing station at [facility] around 1AM in the morning to get his soda when he suddenly felt a tingling sensation in his chest followed by his defibrillator firing. Patient reports he sat down and the sensation resolved on its own. He denies chest pain, palpitations, lightheadedness, dizziness, shortness of breath prior or after the incident. Patient also reports that he has been having on/off fevers per the nurses at [facility] with a Tmax of 101 associated with dyspnea on exertion at times and that he has been on IV Ceftriaxone. Patient denies chills, cough, productive phlegm, lower extremity swelling/erythema/tenderness. Repeat echo found his pericardial effusion is now moderately sized and larger then when compared to 1 month ago.       PAST MEDICAL & SURGICAL HISTORY:  Vitamin D deficiency  Constipation  Chronic respiratory failure: secondary to COPD  Folate-deficiency anemia  Iron deficiency anemia  Anemia of chronic disease  Pericardial effusion without cardiac tamponade: h/o trace pericardial effusion  BPH (benign prostatic hyperplasia)  Chronic pain  Peripheral neuropathy  Type 2 diabetes mellitus  AICD (automatic cardioverter/defibrillator) present  Ventricular tachycardia  Chronic heart failure with preserved ejection fraction  PTSD (post-traumatic stress disorder)  Supraventricular arrhythmia: prior history  Cardiomyopathy  CHF (congestive heart failure)  Atrial fibrillation: s/p ablation, on eliquis  BPH (benign prostatic hyperplasia)  HTN (hypertension)  Seizures  Migraines  Pneumonia  MS (multiple sclerosis)  CAD (coronary artery disease)  Bipolar disorder  Anginal pain  Schizo affective schizophrenia  GERD (gastroesophageal reflux disease)  Seasonal allergies  Hypercholesteremia  COPD (chronic obstructive pulmonary disease)  CVA (cerebral vascular accident)  TIA (transient ischemic attack)  Peripheral Neuropathy  Clinical Depression  Displacement of electrode lead of cardiac pacemaker  History of evacuation of hematoma: AICD pocket  H/O prior ablation treatment: for Afib  H/O hernia repair: right   History of hip replacement  S/P Orchiectomy: L for testicular CA  S/P Implantation of AICD        MEDICATIONS  (STANDING):  aspirin enteric coated 81 milliGRAM(s) Oral daily  atorvastatin 40 milliGRAM(s) Oral at bedtime  baclofen 10 milliGRAM(s) Oral four times a day  buDESOnide 160 MICROgram(s)/formoterol 4.5 MICROgram(s) Inhaler 2 Puff(s) Inhalation two times a day  cholecalciferol 2000 Unit(s) Oral daily  dofetilide 250 MICROGram(s) Oral two times a day  doxycycline IVPB      doxycycline IVPB 100 milliGRAM(s) IV Intermittent every 12 hours  enoxaparin Injectable 40 milliGRAM(s) SubCutaneous daily  ferrous    sulfate 325 milliGRAM(s) Oral daily  fluticasone propionate (50 MICROgram(s)/actuation) Nasal Spray - Peds 2 Spray(s) Alternating Nostrils daily  folic acid 1 milliGRAM(s) Oral daily  gabapentin 400 milliGRAM(s) Oral three times a day  influenza   Vaccine 0.5 milliLiter(s) IntraMuscular once  lamoTRIgine 100 milliGRAM(s) Oral two times a day  loratadine 10 milliGRAM(s) Oral daily  metoprolol tartrate 12.5 milliGRAM(s) Oral every 8 hours  pantoprazole    Tablet 40 milliGRAM(s) Oral before breakfast  polyethylene glycol 3350 17 Gram(s) Oral every 12 hours  QUEtiapine 25 milliGRAM(s) Oral daily  QUEtiapine 50 milliGRAM(s) Oral at bedtime  tamsulosin 0.4 milliGRAM(s) Oral at bedtime  tiotropium 18 MICROgram(s) Capsule 1 Capsule(s) Inhalation daily    MEDICATIONS  (PRN):  acetaminophen   Tablet .. 650 milliGRAM(s) Oral every 6 hours PRN Mild Pain (1 - 3)  clonazePAM  Tablet 0.5 milliGRAM(s) Oral at bedtime PRN agitation  ipratropium    for Nebulization 500 MICROGram(s) Nebulizer every 6 hours PRN Shortness of Breath and/or Wheezing  morphine  IR 30 milliGRAM(s) Oral every 6 hours PRN Moderate Pain (4 - 6)      Allergies    dexmethylphenidate (Unknown)  Dilantin (Rash)  dilantin, compazine, neurontin, ritalin, phenergan (Unknown)  Haldol (Unknown)  hydantoin derivatives (Other)  methylphenidate (Unknown)  phenytoin (Unknown)  prochlorperazine (Unknown)  promethazine (Dystonic RXN)  rash/hives (Anaphylaxis)  thioxanthenes (Unknown)    REVIEW OF SYSTEMS  [x] A ten-point review of systems was otherwise negative except as noted.  [ ] Due to altered mental status/intubation, subjective information were not able to be obtained from the patient. History was obtained, to the extent possible, from review of the chart and collateral sources of information.      Vital Signs Last 24 Hrs  T(C): 37.3 (14 Oct 2019 11:03), Max: 38.1 (14 Oct 2019 08:15)  T(F): 99.1 (14 Oct 2019 11:03), Max: 100.5 (14 Oct 2019 08:15)  HR: 60 (14 Oct 2019 11:03) (60 - 62)  BP: 127/67 (14 Oct 2019 11:03) (100/50 - 139/62)  BP(mean): --  RR: 14 (14 Oct 2019 11:03) (14 - 18)  SpO2: 100% (14 Oct 2019 11:03) (96% - 100%)    PHYSICAL EXAM:  GENERAL: NAD, A&Ox3  CHEST/LUNG: Bilateral basilar crackles  HEART: Regular rate and rhythm  ABDOMEN: Soft, Nontender, Nondistended;     LABS:                      7.4    6.21  )-----------( 404      ( 14 Oct 2019 06:39 )             24.8       Auto Neutrophil %: 64.4 % (10-14-19 @ 06:39)  Auto Immature Granulocyte %: 1.0 % (10-14-19 @ 06:39)    10-14    139  |  102  |  16  ----------------------------<  101<H>  4.4   |  25  |  1.0      Calcium, Total Serum: 8.8 mg/dL (10-14-19 @ 06:39)    LFTs:             6.2  | 0.3  | 15       ------------------[126     ( 14 Oct 2019 06:39 )  3.6  | x    | 13          Blood Gas Venous - Lactate: 0.9 mmoL/L (10-13-19 @ 03:44)    Coags:     17.20  ----< 1.50    ( 13 Oct 2019 11:26 )     37.1        CARDIAC MARKERS ( 13 Oct 2019 16:50 )  x     / <0.01 ng/mL / x     / x     / x      CARDIAC MARKERS ( 13 Oct 2019 11:26 )  x     / <0.01 ng/mL / x     / x     / x      CARDIAC MARKERS ( 13 Oct 2019 02:09 )  x     / <0.01 ng/mL / x     / x     / x          Serum Pro-Brain Natriuretic Peptide: 569 pg/mL (10-13-19 @ 02:09)    Urinalysis Basic - ( 13 Oct 2019 16:11 )    Color: Yellow / Appearance: Clear / S.036 / pH: x  Gluc: x / Ketone: Negative  / Bili: Negative / Urobili: <2 mg/dL   Blood: x / Protein: 100 mg/dL / Nitrite: Negative   Leuk Esterase: Negative / RBC: 3 /HPF / WBC 2 /HPF   Sq Epi: x / Non Sq Epi: 2 /HPF / Bacteria: Negative      Culture - Blood (collected 13 Oct 2019 02:13)  Source: .Blood Blood-Peripheral  Preliminary Report (14 Oct 2019 07:00):    No growth to date.    Culture - Blood (collected 13 Oct 2019 02:13)  Source: .Blood Blood-Peripheral  Preliminary Report (14 Oct 2019 07:00):    No growth to date.      RADIOLOGY & ADDITIONAL STUDIES:  Summary:   1. Left ventricular ejection fraction, by visual estimation, is 55 to 60%.   2. Technically good study.   3. Normal global left ventricular systolic function.   4. Normal left ventricular internal cavity size.   5. Moderate pericardial effusion.   6. Mild thickening of the anterior and posterior mitral valve leaflets.   7. Mild-moderate tricuspid regurgitation.     PHYSICIAN INTERPRETATION:   Left Ventricle: The left ventricular internal cavity size is normal. Left ventricular wall thickness is normal. Global LV systolic function was normal. Left ventricular ejection fraction, by visual estimation, is 55 to 60%.   Right Ventricle: The right ventricular size is normal. RV systolic function is normal.   Left Atrium: Normal left atrial size.   Right Atrium: Normal right atrial size.   Pericardium: A moderately sized pericardial effusion is present. The pericardial effusion is globally located around the entire heart. There is no evidence of cardiac tamponade.   Mitral Valve: Mild thickening of the anterior and posterior mitral valve leaflets. Mild mitral valve regurgitation is seen.   Tricuspid Valve: Mild-moderate tricuspid regurgitation is visualized.   Aortic Valve: The aortic valve is trileaflet.   Pulmonic Valve: The pulmonic valve is thickened with good excursion. Trace pulmonic valve regurgitation.   Venous: The inferior vena cava was dilated, with respiratory size variation greater than 50%.   Additional Comments: A pacer wire is visualized in the right ventricle and right atrium.

## 2019-10-14 NOTE — PROGRESS NOTE ADULT - ASSESSMENT
EP: Dr. Quiñones  Cardiologist: Dr. Ayoub    49 yo M with history of arrhythmogenic VT s/p AICD in 2005, HFpEF (EF 69% in August, 2019), Afib/Aflutter on Eliquis/ASA 81mg, HTN, COPD (on 2L O2 PRN), DLD, multiple sclerosis, epilepsy, PTSD, depression, TIA, with recent admission for Riata lead externalization s/p lead extraction and new ICD lead implant. He presented 2 weeks later with a new pericardial effusion and was found to have lead micro dislodgement and underwent lead revision. He now presents with ICD fire. Echo with moderate pericardial effusion. TSH/free T$ normal.    Impression:  ICD fire - inappropriate shock for AFib/AFl with RVR (HR up to 226 bpm)  Afib/flutter  VT s/p ICD with recent lead extraction and post op lead revision after micro dislodgement    Recommend  - Admit to tele   - Continue BB and Tikosyn. Increase Metoprolol slowly as BP allows  - Monitor lytes and keep K>4 and Mg>2  - H/H downtrending ( 7.4/24.8) , may require transfusion- please trend  - CT surgery consult for pericardial effusion EP: Dr. Quiñones  Cardiologist: Dr. Ayoub    47 yo M with history of arrhythmogenic VT s/p AICD in 2005, HFpEF (EF 69% in August, 2019), Afib/Aflutter on Eliquis/ASA 81mg, HTN, COPD (on 2L O2 PRN), DLD, multiple sclerosis, epilepsy, PTSD, depression, TIA, with recent admission for Riata lead externalization s/p lead extraction and new ICD lead implant. He presented 2 weeks later with a new pericardial effusion and was found to have lead micro dislodgement and underwent lead revision. He now presents with ICD fire. Echo with moderate pericardial effusion. TSH/free T4 normal.    Impression:  ICD fire - inappropriate shock for AFib/AFl with RVR (HR up to 226 bpm)  Afib/flutter  VT s/p ICD with recent lead extraction and post op lead revision after micro dislodgement    Recommend  - Admit to tele   - Continue BB and Tikosyn. Increase Metoprolol slowly as BP allows  - Monitor lytes and keep K>4 and Mg>2  - H/H downtrending ( 7.4/24.8) , may require transfusion- please trend  - CT surgery consult for pericardial effusion that is larger in size that prior echo. Anticoagulation per CTS

## 2019-10-15 DIAGNOSIS — R45.3 DEMORALIZATION AND APATHY: ICD-10-CM

## 2019-10-15 DIAGNOSIS — F39 UNSPECIFIED MOOD [AFFECTIVE] DISORDER: ICD-10-CM

## 2019-10-15 LAB
ALBUMIN SERPL ELPH-MCNC: 3.4 G/DL — LOW (ref 3.5–5.2)
ALBUMIN SERPL ELPH-MCNC: 3.5 G/DL — SIGNIFICANT CHANGE UP (ref 3.5–5.2)
ALP SERPL-CCNC: 130 U/L — HIGH (ref 30–115)
ALP SERPL-CCNC: 134 U/L — HIGH (ref 30–115)
ALT FLD-CCNC: 14 U/L — SIGNIFICANT CHANGE UP (ref 0–41)
ALT FLD-CCNC: 15 U/L — SIGNIFICANT CHANGE UP (ref 0–41)
ANION GAP SERPL CALC-SCNC: 11 MMOL/L — SIGNIFICANT CHANGE UP (ref 7–14)
ANION GAP SERPL CALC-SCNC: 12 MMOL/L — SIGNIFICANT CHANGE UP (ref 7–14)
ANION GAP SERPL CALC-SCNC: 14 MMOL/L — SIGNIFICANT CHANGE UP (ref 7–14)
APTT BLD: 35.7 SEC — SIGNIFICANT CHANGE UP (ref 27–39.2)
AST SERPL-CCNC: 16 U/L — SIGNIFICANT CHANGE UP (ref 0–41)
AST SERPL-CCNC: 17 U/L — SIGNIFICANT CHANGE UP (ref 0–41)
BASOPHILS # BLD AUTO: 0.04 K/UL — SIGNIFICANT CHANGE UP (ref 0–0.2)
BASOPHILS # BLD AUTO: 0.04 K/UL — SIGNIFICANT CHANGE UP (ref 0–0.2)
BASOPHILS # BLD AUTO: 0.05 K/UL — SIGNIFICANT CHANGE UP (ref 0–0.2)
BASOPHILS NFR BLD AUTO: 0.5 % — SIGNIFICANT CHANGE UP (ref 0–1)
BASOPHILS NFR BLD AUTO: 0.6 % — SIGNIFICANT CHANGE UP (ref 0–1)
BASOPHILS NFR BLD AUTO: 0.7 % — SIGNIFICANT CHANGE UP (ref 0–1)
BILIRUB SERPL-MCNC: 0.5 MG/DL — SIGNIFICANT CHANGE UP (ref 0.2–1.2)
BILIRUB SERPL-MCNC: 0.5 MG/DL — SIGNIFICANT CHANGE UP (ref 0.2–1.2)
BLD GP AB SCN SERPL QL: SIGNIFICANT CHANGE UP
BUN SERPL-MCNC: 14 MG/DL — SIGNIFICANT CHANGE UP (ref 10–20)
BUN SERPL-MCNC: 14 MG/DL — SIGNIFICANT CHANGE UP (ref 10–20)
BUN SERPL-MCNC: 16 MG/DL — SIGNIFICANT CHANGE UP (ref 10–20)
CALCIUM SERPL-MCNC: 8.7 MG/DL — SIGNIFICANT CHANGE UP (ref 8.5–10.1)
CALCIUM SERPL-MCNC: 8.7 MG/DL — SIGNIFICANT CHANGE UP (ref 8.5–10.1)
CHLORIDE SERPL-SCNC: 100 MMOL/L — SIGNIFICANT CHANGE UP (ref 98–110)
CHLORIDE SERPL-SCNC: 101 MMOL/L — SIGNIFICANT CHANGE UP (ref 98–110)
CHLORIDE SERPL-SCNC: 102 MMOL/L — SIGNIFICANT CHANGE UP (ref 98–110)
CO2 SERPL-SCNC: 24 MMOL/L — SIGNIFICANT CHANGE UP (ref 17–32)
CO2 SERPL-SCNC: 25 MMOL/L — SIGNIFICANT CHANGE UP (ref 17–32)
CO2 SERPL-SCNC: 28 MMOL/L — SIGNIFICANT CHANGE UP (ref 17–32)
CREAT SERPL-MCNC: 0.9 MG/DL — SIGNIFICANT CHANGE UP (ref 0.7–1.5)
CREAT SERPL-MCNC: 1 MG/DL — SIGNIFICANT CHANGE UP (ref 0.7–1.5)
CREAT SERPL-MCNC: 1 MG/DL — SIGNIFICANT CHANGE UP (ref 0.7–1.5)
EOSINOPHIL # BLD AUTO: 0.05 K/UL — SIGNIFICANT CHANGE UP (ref 0–0.7)
EOSINOPHIL # BLD AUTO: 0.09 K/UL — SIGNIFICANT CHANGE UP (ref 0–0.7)
EOSINOPHIL # BLD AUTO: 0.11 K/UL — SIGNIFICANT CHANGE UP (ref 0–0.7)
EOSINOPHIL NFR BLD AUTO: 0.6 % — SIGNIFICANT CHANGE UP (ref 0–8)
EOSINOPHIL NFR BLD AUTO: 1.4 % — SIGNIFICANT CHANGE UP (ref 0–8)
EOSINOPHIL NFR BLD AUTO: 1.6 % — SIGNIFICANT CHANGE UP (ref 0–8)
GLUCOSE SERPL-MCNC: 107 MG/DL — HIGH (ref 70–99)
GLUCOSE SERPL-MCNC: 114 MG/DL — HIGH (ref 70–99)
GLUCOSE SERPL-MCNC: 125 MG/DL — HIGH (ref 70–99)
HCT VFR BLD CALC: 23.5 % — LOW (ref 42–52)
HCT VFR BLD CALC: 23.8 % — LOW (ref 42–52)
HCT VFR BLD CALC: 24.4 % — LOW (ref 42–52)
HGB BLD-MCNC: 7.1 G/DL — LOW (ref 14–18)
HGB BLD-MCNC: 7.2 G/DL — LOW (ref 14–18)
HGB BLD-MCNC: 7.5 G/DL — LOW (ref 14–18)
IMM GRANULOCYTES NFR BLD AUTO: 0.9 % — HIGH (ref 0.1–0.3)
IMM GRANULOCYTES NFR BLD AUTO: 1 % — HIGH (ref 0.1–0.3)
IMM GRANULOCYTES NFR BLD AUTO: 1.5 % — HIGH (ref 0.1–0.3)
INR BLD: 1.52 RATIO — HIGH (ref 0.65–1.3)
LACTATE SERPL-SCNC: 0.7 MMOL/L — SIGNIFICANT CHANGE UP (ref 0.5–2.2)
LYMPHOCYTES # BLD AUTO: 0.79 K/UL — LOW (ref 1.2–3.4)
LYMPHOCYTES # BLD AUTO: 0.88 K/UL — LOW (ref 1.2–3.4)
LYMPHOCYTES # BLD AUTO: 1.01 K/UL — LOW (ref 1.2–3.4)
LYMPHOCYTES # BLD AUTO: 10.3 % — LOW (ref 20.5–51.1)
LYMPHOCYTES # BLD AUTO: 11.9 % — LOW (ref 20.5–51.1)
LYMPHOCYTES # BLD AUTO: 14.7 % — LOW (ref 20.5–51.1)
MAGNESIUM SERPL-MCNC: 2.3 MG/DL — SIGNIFICANT CHANGE UP (ref 1.8–2.4)
MAGNESIUM SERPL-MCNC: 2.3 MG/DL — SIGNIFICANT CHANGE UP (ref 1.8–2.4)
MCHC RBC-ENTMCNC: 27.3 PG — SIGNIFICANT CHANGE UP (ref 27–31)
MCHC RBC-ENTMCNC: 27.4 PG — SIGNIFICANT CHANGE UP (ref 27–31)
MCHC RBC-ENTMCNC: 27.6 PG — SIGNIFICANT CHANGE UP (ref 27–31)
MCHC RBC-ENTMCNC: 30.2 G/DL — LOW (ref 32–37)
MCHC RBC-ENTMCNC: 30.3 G/DL — LOW (ref 32–37)
MCHC RBC-ENTMCNC: 30.7 G/DL — LOW (ref 32–37)
MCV RBC AUTO: 89.7 FL — SIGNIFICANT CHANGE UP (ref 80–94)
MCV RBC AUTO: 90.2 FL — SIGNIFICANT CHANGE UP (ref 80–94)
MCV RBC AUTO: 90.7 FL — SIGNIFICANT CHANGE UP (ref 80–94)
MONOCYTES # BLD AUTO: 0.75 K/UL — HIGH (ref 0.1–0.6)
MONOCYTES # BLD AUTO: 0.83 K/UL — HIGH (ref 0.1–0.6)
MONOCYTES # BLD AUTO: 1.22 K/UL — HIGH (ref 0.1–0.6)
MONOCYTES NFR BLD AUTO: 11.3 % — HIGH (ref 1.7–9.3)
MONOCYTES NFR BLD AUTO: 12.1 % — HIGH (ref 1.7–9.3)
MONOCYTES NFR BLD AUTO: 14.3 % — HIGH (ref 1.7–9.3)
NEUTROPHILS # BLD AUTO: 4.79 K/UL — SIGNIFICANT CHANGE UP (ref 1.4–6.5)
NEUTROPHILS # BLD AUTO: 4.88 K/UL — SIGNIFICANT CHANGE UP (ref 1.4–6.5)
NEUTROPHILS # BLD AUTO: 6.25 K/UL — SIGNIFICANT CHANGE UP (ref 1.4–6.5)
NEUTROPHILS NFR BLD AUTO: 69.9 % — SIGNIFICANT CHANGE UP (ref 42.2–75.2)
NEUTROPHILS NFR BLD AUTO: 73.3 % — SIGNIFICANT CHANGE UP (ref 42.2–75.2)
NEUTROPHILS NFR BLD AUTO: 73.4 % — SIGNIFICANT CHANGE UP (ref 42.2–75.2)
NRBC # BLD: 0 /100 WBCS — SIGNIFICANT CHANGE UP (ref 0–0)
PHOSPHATE SERPL-MCNC: 3.6 MG/DL — SIGNIFICANT CHANGE UP (ref 2.1–4.9)
PLATELET # BLD AUTO: 379 K/UL — SIGNIFICANT CHANGE UP (ref 130–400)
PLATELET # BLD AUTO: 386 K/UL — SIGNIFICANT CHANGE UP (ref 130–400)
PLATELET # BLD AUTO: 396 K/UL — SIGNIFICANT CHANGE UP (ref 130–400)
POTASSIUM SERPL-MCNC: 4.4 MMOL/L — SIGNIFICANT CHANGE UP (ref 3.5–5)
POTASSIUM SERPL-SCNC: 4.4 MMOL/L — SIGNIFICANT CHANGE UP (ref 3.5–5)
PROT SERPL-MCNC: 6 G/DL — SIGNIFICANT CHANGE UP (ref 6–8)
PROT SERPL-MCNC: 6.1 G/DL — SIGNIFICANT CHANGE UP (ref 6–8)
PROTHROM AB SERPL-ACNC: 17.4 SEC — HIGH (ref 9.95–12.87)
RBC # BLD: 2.59 M/UL — LOW (ref 4.7–6.1)
RBC # BLD: 2.64 M/UL — LOW (ref 4.7–6.1)
RBC # BLD: 2.72 M/UL — LOW (ref 4.7–6.1)
RBC # FLD: 15.9 % — HIGH (ref 11.5–14.5)
SODIUM SERPL-SCNC: 137 MMOL/L — SIGNIFICANT CHANGE UP (ref 135–146)
SODIUM SERPL-SCNC: 140 MMOL/L — SIGNIFICANT CHANGE UP (ref 135–146)
SODIUM SERPL-SCNC: 140 MMOL/L — SIGNIFICANT CHANGE UP (ref 135–146)
WBC # BLD: 6.65 K/UL — SIGNIFICANT CHANGE UP (ref 4.8–10.8)
WBC # BLD: 6.86 K/UL — SIGNIFICANT CHANGE UP (ref 4.8–10.8)
WBC # BLD: 8.52 K/UL — SIGNIFICANT CHANGE UP (ref 4.8–10.8)
WBC # FLD AUTO: 6.65 K/UL — SIGNIFICANT CHANGE UP (ref 4.8–10.8)
WBC # FLD AUTO: 6.86 K/UL — SIGNIFICANT CHANGE UP (ref 4.8–10.8)
WBC # FLD AUTO: 8.52 K/UL — SIGNIFICANT CHANGE UP (ref 4.8–10.8)

## 2019-10-15 PROCEDURE — 99232 SBSQ HOSP IP/OBS MODERATE 35: CPT

## 2019-10-15 PROCEDURE — 99024 POSTOP FOLLOW-UP VISIT: CPT

## 2019-10-15 PROCEDURE — 99253 IP/OBS CNSLTJ NEW/EST LOW 45: CPT | Mod: GC

## 2019-10-15 PROCEDURE — 99233 SBSQ HOSP IP/OBS HIGH 50: CPT

## 2019-10-15 RX ORDER — ACETAMINOPHEN 500 MG
650 TABLET ORAL EVERY 6 HOURS
Refills: 0 | Status: DISCONTINUED | OUTPATIENT
Start: 2019-10-15 | End: 2019-10-17

## 2019-10-15 RX ADMIN — LAMOTRIGINE 100 MILLIGRAM(S): 25 TABLET, ORALLY DISINTEGRATING ORAL at 06:09

## 2019-10-15 RX ADMIN — GABAPENTIN 400 MILLIGRAM(S): 400 CAPSULE ORAL at 13:51

## 2019-10-15 RX ADMIN — Medication 650 MILLIGRAM(S): at 00:27

## 2019-10-15 RX ADMIN — DOFETILIDE 250 MICROGRAM(S): 0.25 CAPSULE ORAL at 06:09

## 2019-10-15 RX ADMIN — QUETIAPINE FUMARATE 25 MILLIGRAM(S): 200 TABLET, FILM COATED ORAL at 12:45

## 2019-10-15 RX ADMIN — TAMSULOSIN HYDROCHLORIDE 0.4 MILLIGRAM(S): 0.4 CAPSULE ORAL at 21:18

## 2019-10-15 RX ADMIN — GABAPENTIN 400 MILLIGRAM(S): 400 CAPSULE ORAL at 21:18

## 2019-10-15 RX ADMIN — GABAPENTIN 400 MILLIGRAM(S): 400 CAPSULE ORAL at 06:09

## 2019-10-15 RX ADMIN — PANTOPRAZOLE SODIUM 40 MILLIGRAM(S): 20 TABLET, DELAYED RELEASE ORAL at 06:09

## 2019-10-15 RX ADMIN — MORPHINE SULFATE 30 MILLIGRAM(S): 50 CAPSULE, EXTENDED RELEASE ORAL at 10:18

## 2019-10-15 RX ADMIN — Medication 2000 UNIT(S): at 12:45

## 2019-10-15 RX ADMIN — QUETIAPINE FUMARATE 50 MILLIGRAM(S): 200 TABLET, FILM COATED ORAL at 21:18

## 2019-10-15 RX ADMIN — Medication 10 MILLIGRAM(S): at 06:09

## 2019-10-15 RX ADMIN — Medication 81 MILLIGRAM(S): at 12:44

## 2019-10-15 RX ADMIN — Medication 650 MILLIGRAM(S): at 12:42

## 2019-10-15 RX ADMIN — Medication 1 MILLIGRAM(S): at 12:49

## 2019-10-15 RX ADMIN — Medication 10 MILLIGRAM(S): at 23:20

## 2019-10-15 RX ADMIN — Medication 10 MILLIGRAM(S): at 17:28

## 2019-10-15 RX ADMIN — MORPHINE SULFATE 30 MILLIGRAM(S): 50 CAPSULE, EXTENDED RELEASE ORAL at 09:01

## 2019-10-15 RX ADMIN — Medication 650 MILLIGRAM(S): at 13:31

## 2019-10-15 RX ADMIN — LORATADINE 10 MILLIGRAM(S): 10 TABLET ORAL at 12:45

## 2019-10-15 RX ADMIN — Medication 12.5 MILLIGRAM(S): at 06:09

## 2019-10-15 RX ADMIN — Medication 12.5 MILLIGRAM(S): at 13:51

## 2019-10-15 RX ADMIN — Medication 12.5 MILLIGRAM(S): at 21:18

## 2019-10-15 RX ADMIN — POLYETHYLENE GLYCOL 3350 17 GRAM(S): 17 POWDER, FOR SOLUTION ORAL at 06:09

## 2019-10-15 RX ADMIN — Medication 325 MILLIGRAM(S): at 12:45

## 2019-10-15 RX ADMIN — MORPHINE SULFATE 30 MILLIGRAM(S): 50 CAPSULE, EXTENDED RELEASE ORAL at 21:18

## 2019-10-15 RX ADMIN — LAMOTRIGINE 100 MILLIGRAM(S): 25 TABLET, ORALLY DISINTEGRATING ORAL at 17:28

## 2019-10-15 RX ADMIN — MORPHINE SULFATE 30 MILLIGRAM(S): 50 CAPSULE, EXTENDED RELEASE ORAL at 22:01

## 2019-10-15 RX ADMIN — Medication 10 MILLIGRAM(S): at 12:46

## 2019-10-15 RX ADMIN — DOFETILIDE 250 MICROGRAM(S): 0.25 CAPSULE ORAL at 17:28

## 2019-10-15 RX ADMIN — BUDESONIDE AND FORMOTEROL FUMARATE DIHYDRATE 2 PUFF(S): 160; 4.5 AEROSOL RESPIRATORY (INHALATION) at 08:26

## 2019-10-15 RX ADMIN — BUDESONIDE AND FORMOTEROL FUMARATE DIHYDRATE 2 PUFF(S): 160; 4.5 AEROSOL RESPIRATORY (INHALATION) at 21:28

## 2019-10-15 RX ADMIN — ATORVASTATIN CALCIUM 40 MILLIGRAM(S): 80 TABLET, FILM COATED ORAL at 21:18

## 2019-10-15 NOTE — CONSULT NOTE ADULT - ASSESSMENT
ASSESSMENT  49 yo M with MS, Arrhythmogenic Ventricular Tachycardia and Cardiomyopathy s/p AICD in 2005, HFpEF (EF 55-60%), Afib/Aflutter, Chronic Hypoxic Respiratory Failure secondary to COPD on 2L O2 PRN, CAD, TIA/CVA, HTN, DLD, Peripheral neuropathy and chronic pain, Epilepsy/Seizures, Migraines, GERD, BPH, PTSD, Depression, Bipolar disorder, Schizoaffective Disorder, recently hospitalized from 8/29/19 till 9/11/19 for hematoma of AICD pocket with battery depletion and Riata lead externalization s/p hematoma evacuation on 9/5-9/6 and laser lead extraction and new ICD lead implant, and again recently hospitalized 9/15/19-9/23/19 for trace pericardial effusion with no intervention and RV threshold elevation secondary to lead migration/dislodgement s/p lead replacement and re-positioning, treated for Acute Bronchitis during prior hospitalization with Doxycycline and Prednisone, presenting for AICD firing    IMPRESSION  #Febrile tm 102.4 without leukocytosis or sepsis  #Moderate to large sized complex pericardial fluid with question of hemopericardium    suspect   #B/l pleural effusions & atelectasis  #Sepsis ruled out on admission   #Obesity BMI (kg/m2): 35.1    RECOMMENDATIONS  - This note is not complete, all recommendations to follow.     Spectra [id number] ASSESSMENT  47 yo M with MS, Arrhythmogenic Ventricular Tachycardia and Cardiomyopathy s/p AICD in 2005, HFpEF (EF 55-60%), Afib/Aflutter, Chronic Hypoxic Respiratory Failure secondary to COPD on 2L O2 PRN, CAD, TIA/CVA, HTN, DLD, Peripheral neuropathy and chronic pain, Epilepsy/Seizures, Migraines, GERD, BPH, PTSD, Depression, Bipolar disorder, Schizoaffective Disorder, recently hospitalized from 8/29/19 till 9/11/19 for hematoma of AICD pocket with battery depletion and Riata lead externalization s/p hematoma evacuation on 9/5-9/6 and laser lead extraction and new ICD lead implant, and again recently hospitalized 9/15/19-9/23/19 for trace pericardial effusion with no intervention and RV threshold elevation secondary to lead migration/dislodgement s/p lead replacement and re-positioning, treated for Acute Bronchitis during prior hospitalization with Doxycycline and Prednisone, presenting for AICD firing    IMPRESSION  #Febrile tm 102.4 without leukocytosis or sepsis, s/p continuing to spike despite PO abx and IV Ceftriaxone- suspect reactive/inflammatory 2/2 pericardial fluid     BCX NG    UA neg    CXR with pleural effusion, no evidence of PNA  #Moderate to large sized complex pericardial fluid with question of hemopericardium, downtrending hgb     suspect   #B/l pleural effusions & atelectasis  #Sepsis ruled out on admission   #Obesity BMI (kg/m2): 35.1    RECOMMENDATIONS  - Recommend monitoring off abx  - If hemodynamic compromise, start Cefepime 2g q12h IV  - Plan for drainage  - Monitor WBC Hgb    Spectra [id number]

## 2019-10-15 NOTE — PROGRESS NOTE ADULT - SUBJECTIVE AND OBJECTIVE BOX
GENERAL SURGERY PROGRESS NOTE     , CUATE peguero  Male  Hospital day :2d    OVERNIGHT EVENTS: no acute events overnight    T(F): 100.3 (10-15-19 @ 05:12), Max: 102.4 (10-15-19 @ 01:42)  HR: 60 (10-15-19 @ 05:12) (60 - 66)  BP: 115/56 (10-15-19 @ 05:12) (105/54 - 135/72)  RR: 18 (10-15-19 @ 05:12) (16 - 18)  SpO2: 98% (10-15-19 @ 08:15) (94% - 100%)    DIET/FLUIDS: cholecalciferol 2000 Unit(s) Oral daily  ferrous    sulfate 325 milliGRAM(s) Oral daily  folic acid 1 milliGRAM(s) Oral daily     GI proph:  pantoprazole    Tablet 40 milliGRAM(s) Oral before breakfast    AC/ proph: aspirin enteric coated 81 milliGRAM(s) Oral daily    PHYSICAL EXAM:  GENERAL: NAD, well-appearing  CHEST/LUNG: Clear to auscultation bilaterally  HEART: Regular rate and rhythm  ABDOMEN: Soft, Nontender, Nondistended;   EXTREMITIES:  No clubbing, cyanosis, or edema    LABS  CAPILLARY BLOOD GLUCOSE  POCT Blood Glucose.: 140 mg/dL (14 Oct 2019 19:31)                          7.1    6.86  )-----------( 379      ( 15 Oct 2019 05:17 )             23.5       Auto Neutrophil %: 69.9 % (10-15-19 @ 05:17)  Auto Immature Granulocyte %: 1.0 % (10-15-19 @ 05:17)  Auto Neutrophil %: 73.3 % (10-15-19 @ 02:27)  Auto Immature Granulocyte %: 1.5 % (10-15-19 @ 02:27)    10-15    140  |  102  |  14  ----------------------------<  107<H>  4.4   |  24  |  1.0      Calcium, Total Serum: 8.7 mg/dL (10-15-19 @ 05:17)    LFTs:             6.1  | 0.5  | 16       ------------------[130     ( 15 Oct 2019 02:27 )  3.5  | x    | 15            Lactate, Blood: 0.7 mmol/L (10-15-19 @ 01:51)  Blood Gas Venous - Lactate: 0.9 mmoL/L (10-13-19 @ 03:44)    Coags:     17.20  ----< 1.50    ( 13 Oct 2019 11:26 )     37.1      CARDIAC MARKERS ( 13 Oct 2019 16:50 )  x     / <0.01 ng/mL / x     / x     / x      CARDIAC MARKERS ( 13 Oct 2019 11:26 )  x     / <0.01 ng/mL / x     / x     / x        Serum Pro-Brain Natriuretic Peptide: 569 pg/mL (10-13-19 @ 02:09)    Urinalysis Basic - ( 13 Oct 2019 16:11 )    Color: Yellow / Appearance: Clear / S.036 / pH: x  Gluc: x / Ketone: Negative  / Bili: Negative / Urobili: <2 mg/dL   Blood: x / Protein: 100 mg/dL / Nitrite: Negative   Leuk Esterase: Negative / RBC: 3 /HPF / WBC 2 /HPF   Sq Epi: x / Non Sq Epi: 2 /HPF / Bacteria: Negative    Culture - Urine (collected 13 Oct 2019 16:11)  Source: .Urine Clean Catch (Midstream)  Final Report (14 Oct 2019 17:26):    No growth    Culture - Blood (collected 13 Oct 2019 02:13)  Source: .Blood Blood-Peripheral  Preliminary Report (14 Oct 2019 07:00):    No growth to date.    Culture - Blood (collected 13 Oct 2019 02:13)  Source: .Blood Blood-Peripheral  Preliminary Report (14 Oct 2019 07:00):    No growth to date.

## 2019-10-15 NOTE — CONSULT NOTE ADULT - SUBJECTIVE AND OBJECTIVE BOX
CUATE HORNE  48y, Male  Allergy: dexmethylphenidate (Unknown)  Dilantin (Rash)  dilantin, compazine, neurontin, ritalin, phenergan (Unknown)  Haldol (Unknown)  hydantoin derivatives (Other)  methylphenidate (Unknown)  phenytoin (Unknown)  prochlorperazine (Unknown)  promethazine (Dystonic RXN)  rash/hives (Anaphylaxis)  thioxanthenes (Unknown)      CHIEF COMPLAINT: AICD firing (14 Oct 2019 14:01)      HPI:  48 year old male with PMHx of Arrhythmogenic Ventricular Tachycardia and Cardiomyopathy s/p AICD in , HFpEF (EF 55-60%), Afib/Aflutter on ASA 81mg but unsure if on Eliquis, Chronic Hypoxic Respiratory Failure secondary to COPD on 2L O2 PRN, CAD, TIA/CVA, HTN, DLD, Peripheral neuropathy and chronic pain, Multiple Sclerosis, Epilepsy/Seizures, Migraines, GERD, Anemia of Chronic Disease + folate deficiency + Iron deficiency anemia, Constipation, BPH, PTSD, Depression, Bipolar disorder, Schizoaffective Disorder, recently hospitalized from 19 till 19 for hematoma of AICD pocket with battery depletion and Riata lead externalization s/p hematoma evacuation on - and laser lead extraction and new ICD lead implant, and again recently hospitalized 9/15/19-19 for trace pericardial effusion with no intervention and RV threshold elevation secondary to lead migration/dislodgement s/p lead replacement and re-positioning, treated for Acute Bronchitis during prior hospitalization with Doxycycline and Prednisone, presenting for AICD firing. Patient reports that he was walking to the nursing station at [facility] around 1AM in the morning to get his soda when he suddenly felt a tingling sensation in his chest followed by his defibrillator firing. Patient reports he sat down and the sensation resolved on its own. He denies chest pain, palpitations, lightheadedness, dizziness, shortness of breath prior or after the incident. Patient also reports that he has been having on/off fevers per the nurses at [facility] with a Tmax of 101 associated with dyspnea on exertion at times and that he has been on IV Ceftriaxone. Patient denies chills, cough, productive phlegm, lower extremity swelling/erythema/tenderness. (13 Oct 2019 08:33)      INFECTIOUS DISEASE HISTORY:    PAST MEDICAL & SURGICAL HISTORY:  Vitamin D deficiency  Constipation  Chronic respiratory failure: secondary to COPD  Folate-deficiency anemia  Iron deficiency anemia  Anemia of chronic disease  Pericardial effusion without cardiac tamponade: h/o trace pericardial effusion  BPH (benign prostatic hyperplasia)  Chronic pain  Peripheral neuropathy  Type 2 diabetes mellitus  AICD (automatic cardioverter/defibrillator) present  Ventricular tachycardia  Chronic heart failure with preserved ejection fraction  PTSD (post-traumatic stress disorder)  Supraventricular arrhythmia: prior history  Cardiomyopathy  CHF (congestive heart failure)  Atrial fibrillation: s/p ablation, on eliquis  BPH (benign prostatic hyperplasia)  HTN (hypertension)  Seizures  Migraines  Pneumonia  MS (multiple sclerosis)  CAD (coronary artery disease)  Bipolar disorder  Anginal pain  Schizo affective schizophrenia  GERD (gastroesophageal reflux disease)  Seasonal allergies  Hypercholesteremia  COPD (chronic obstructive pulmonary disease)  CVA (cerebral vascular accident)  TIA (transient ischemic attack)  Peripheral Neuropathy  Clinical Depression  Displacement of electrode lead of cardiac pacemaker  History of evacuation of hematoma: AICD pocket  H/O prior ablation treatment: for Afib  H/O hernia repair: right   History of hip replacement  S/P Orchiectomy: L for testicular CA  S/P Implantation of AICD      FAMILY HISTORY  Family history of colon cancer in mother  Family history of cardiomyopathy  Family history of cervical cancer (Mother)  Family history of early CAD (Mother)  No pertinent family history in first degree relatives      SOCIAL HISTORY  Social History (marital status, living situation, occupation, tobacco use, alcohol and drug use, and sexual history): Former smoker of 27 years, smoked 1.5 PPD, quit 1 year ago  	Prior EtOH abuse, now only drinks on social gatherings  	Denies illicit drug use    	Lives at [facility]  General: Denies rigors, nightsweats  HEENT: Denies headache, rhinorrhea, sore throat, eye pain  CV: Denies CP, palpitations  PULM: Denies SOB, wheezing  GI: Denies hematemesis, hematochezia, melena  : Denies discharge, hematuria  MSK: Denies arthralgias, myalgias  SKIN: Denies rash, lesions  NEURO: Denies paresthesias, weakness  PSYCH: Denies depression, anxiety    VITALS:  T(F): 100.3, Max: 102.4 (10-15-19 @ 01:42)  HR: 60  BP: 115/56  RR: 18Vital Signs Last 24 Hrs  T(C): 37.9 (15 Oct 2019 05:12), Max: 39.1 (15 Oct 2019 01:42)  T(F): 100.3 (15 Oct 2019 05:12), Max: 102.4 (15 Oct 2019 01:42)  HR: 60 (15 Oct 2019 05:12) (60 - 66)  BP: 115/56 (15 Oct 2019 05:12) (105/54 - 139/62)  BP(mean): --  RR: 18 (15 Oct 2019 05:12) (14 - 18)  SpO2: 100% (14 Oct 2019 23:22) (94% - 100%)    PHYSICAL EXAM:  Gen: NAD, resting in bed  HEENT: Normocephalic, atraumatic  Neck: supple, no lymphadenopathy  CV: Regular rate & regular rhythm  Lungs: decreased BS at bases, no fremitus  Abdomen: Soft, BS present  Ext: Warm, well perfused  Neuro: non focal, awake  Skin: no rash, no erythema  Lines: no phlebitis    TESTS & MEASUREMENTS:                        7.1    6.86  )-----------( 379      ( 15 Oct 2019 05:17 )             23.5     10-15    140  |  102  |  14  ----------------------------<  107<H>  4.4   |  24  |  1.0    Ca    8.7      15 Oct 2019 05:17  Mg     2.3     10-15    TPro  6.1  /  Alb  3.5  /  TBili  0.5  /  DBili  x   /  AST  16  /  ALT  15  /  AlkPhos  130<H>  10-15    eGFR if Non African American: 90 mL/min/1.73M2 (10-15-19 @ 05:17)  eGFR if African American: 104 mL/min/1.73M2 (10-15-19 @ 05:17)  eGFR if Non African American: 90 mL/min/1.73M2 (10-15-19 @ 02:27)  eGFR if : 104 mL/min/1.73M2 (10-15-19 @ 02:27)    LIVER FUNCTIONS - ( 15 Oct 2019 02:27 )  Alb: 3.5 g/dL / Pro: 6.1 g/dL / ALK PHOS: 130 U/L / ALT: 15 U/L / AST: 16 U/L / GGT: x           Urinalysis Basic - ( 13 Oct 2019 16:11 )    Color: Yellow / Appearance: Clear / S.036 / pH: x  Gluc: x / Ketone: Negative  / Bili: Negative / Urobili: <2 mg/dL   Blood: x / Protein: 100 mg/dL / Nitrite: Negative   Leuk Esterase: Negative / RBC: 3 /HPF / WBC 2 /HPF   Sq Epi: x / Non Sq Epi: 2 /HPF / Bacteria: Negative        Culture - Urine (collected 10-13-19 @ 16:11)  Source: .Urine Clean Catch (Midstream)  Final Report (10-14-19 @ 17:26):    No growth    Culture - Blood (collected 10-13-19 @ 02:13)  Source: .Blood Blood-Peripheral  Preliminary Report (10-14-19 @ 07:00):    No growth to date.    Culture - Blood (collected 10-13-19 @ 02:13)  Source: .Blood Blood-Peripheral  Preliminary Report (10-14-19 @ 07:00):    No growth to date.        Lactate, Blood: 0.7 mmol/L (10-15-19 @ 01:51)  Blood Gas Venous - Lactate: 0.9 mmoL/L (10-13-19 @ 03:44)      INFECTIOUS DISEASES TESTING  MRSA PCR Result.: Negative (19 @ 22:29)  HIV-1/2 Combo Result: Nonreact (19 @ 05:37)      RADIOLOGY & ADDITIONAL TESTS:  I have personally reviewed the last Chest xray  CXR      CT  CT Chest No Cont:   EXAM:  CT CHEST            PROCEDURE DATE:  10/14/2019            INTERPRETATION:  CLINICAL HISTORY/ REASON FOR EXAM: Pericardial effusion.    TECHNIQUE: Multislice helical sections were obtained from the thoracic   inlet to the lung bases without contrast administration. Coronal and   sagittal reformatted images are also submitted.    COMPARISON: CT chest 2019.      FINDINGS:    LUNGS, PLEURA AND AIRWAYS: No obstructing endobronchial lesions.   Bilateral pleural effusions, larger on the left with compressive   atelectasis involving the entire lower lobe and part of the left upper   lobe lingular segment. Compressive atelectasis at the right lung base.    MEDIASTINUM/LYMPH NODES: No supraclavicular, mediastinal, hilar or   axillary adenopathy.     HEART/GREAT VESSELS: Moderate to large sized pericardial complex fluid,   increased since 2019 with Hounsfield units measuring up to 59 (in   anterior and lateral aspect of right ventricle) and in some areas   particularly in the posterior dependent aspect of the Hounsfield unit of   the fluid measures up to 5 as simple fluid. Metallic streaky artifact   from the ICD leads and generator. Limited evaluation of cardiac chambers   to exclude cardiac tamponade. AICD wires visualized.Thoracic aorta and   main pulmonary arteries are normal in caliber. Aortic arch demonstrates   normal branching pattern. Atherosclerotic disease, including coronary   artery calcifications.    BONES/SOFT TISSUES: Multilevel degenerative changes of the thoracic spine.    VISUALIZED UPPER ABDOMEN: No focal abnormalities.    IMPRESSION:    1.  Moderate to large sized complex pericardial fluid (with question   hemopericardium considering Hounsfield units of 59 in some areas),   increased since 2019. Lack of intravenous contrast with suboptimal   evaluation of cardiac chambers causing limitation to exclude cardiac   tamponade. Recommend further evaluation by echocardiogram.    2.  Bilateral pleural effusions, larger on the left with compressive   atelectasis involving the entire lower lobe and part of the left upper   lobe lingular segment.       Spoke with OPHELIA TAYLOR on 10/15/2019 7:01 AM with readback.              ANDRES DOTSON M.D., RESIDENT RADIOLOGIST  This document has been electronically signed.  KADI WATERS M.D., ATTENDING RADIOLOGIST  This document has been electronically signed. Oct 15 2019  7:01AM             (10-14-19 @ 20:03)      CARDIOLOGY TESTING  Transthoracic Echocardiogram:    EXAM:  2-D ECHO (TTE) COMPLETE        PROCEDURE DATE:  10/13/2019      INTERPRETATION:  REPORT:    TRANSTHORACIC ECHOCARDIOGRAM REPORT         Patient Name:   CUATE HORNE Accession #: [id number]  Medical Rec #:  [id number]   Height:      66.0 in 167.6 cm  YOB: 1970  Weight:      230.0 lb 104.33 kg  Patient Age:    48 years    BSA:         2.12 m²  Patient Gender: M           BP:          100/50 mmHg       Date of Exam:        10/13/2019 5:23:28 PM  Referring Physician: [id number] DWAYNE DUMONT  Sonographer:         Juana Donaldson  Reading Physician:   Zeferino Tinajero M.D.    Procedure:     2D Echo/Doppler/Color Doppler Complete.  Indications:   I31.3 - Pericardial Effusion noninflammatory  Diagnosis:     Pericardial effusion (noninflammatory) - I31.3  Study Details: Technically good study.         Summary:   1. Left ventricular ejection fraction, by visual estimation, is 55 to   60%.   2. Technically good study.   3. Normal global left ventricular systolic function.  4. Normal left ventricular internal cavity size.   5. Moderate pericardial effusion.   6. Mild thickening of the anterior and posterior mitral valve leaflets.   7. Mild-moderate tricuspid regurgitation.    PHYSICIAN INTERPRETATION:  Left Ventricle: The left ventricular internal cavity size is normal. Left   ventricular wall thickness is normal. Global LV systolic function was   normal. Left ventricular ejection fraction, by visual estimation, is 55   to 60%.  Right Ventricle: The right ventricular size is normal. RV systolic   function is normal.  Left Atrium: Normal left atrial size.  Right Atrium: Normal right atrial size.  Pericardium: A moderately sized pericardial effusion is present. The   pericardial effusion is globally located aroundthe entire heart. There   is no evidence of cardiac tamponade.  Mitral Valve: Mild thickening of the anterior and posterior mitral valve   leaflets. Mild mitral valve regurgitation is seen.  Tricuspid Valve: Mild-moderate tricuspid regurgitation is visualized.  Aortic Valve: The aortic valve is trileaflet.  Pulmonic Valve: The pulmonic valve is thickened with good excursion.   Trace pulmonic valve regurgitation.  Venous: The inferior vena cava was dilated, with respiratory size   variation greater than 50%.  Additional Comments: A pacer wire is visualized in the right ventricle   and right atrium.       2D AND M-MODE MEASUREMENTS (normal ranges within parentheses):  Left Ventricle:                  Normal  IVSd (2D):              0.82 cm (0.7-1.1)  LVPWd (2D):             0.73 cm (0.7-1.1)  LVIDd (2D):             5.20 cm (3.4-5.7)  LVIDs (2D):             3.40 cm  LV FS (2D):             34.5 %   (>25%)  Relative Wall Thickness  0.28    (<0.42)    SPECTRAL DOPPLER ANALYSIS:  LV DIASTOLICFUNCTION:  MV Peak E: 0.99 m/s Decel Time: 247 msec  MV Peak A: 0.39 m/s  E/A Ratio: 2.55    Aortic Valve:  AoV VMax:    1.47 m/s  AoV Pk Grad: 8.6 mmHg    LVOT Vmax: 1.09 m/s  LVOT VTI:  0.23 m    Mitral Valve:  MV P1/2 Time: 71.57 msec  MV Area, PHT: 3.07 cm²    Tricuspid Valve and PA/RV Systolic Pressure: TR Max Velocity: 2.64 m/s RA   Pressure:  RVSP/PASP:       [id number] Zeferino Tinajero M.D., Electronically signed on 10/14/2019 at   7:25:51 AM              *** Final ***                    MARLA NERI  This document has been electronically signed. Oct 13 2019  5:23PM             (10-13-19 @ 17:23)  12 Lead ECG:   Ventricular Rate 60 BPM    Atrial Rate 60 BPM    P-R Interval 178 ms    QRS Duration 138 ms    Q-T Interval 436 ms    QTC Calculation(Bezet) 436 ms    P Axis -8 degrees    R Axis 5 degrees    T Axis -38 degrees    Diagnosis Line Atrial-paced rhythm  Right bundle branch block  Abnormal ECG    Confirmed by ANALI NERI, ZEFERINO ([id number]) on 10/13/2019 3:54:20 PM (10-13-19 @ 09:44)      MEDICATIONS  aspirin enteric coated 81  atorvastatin 40  baclofen 10  buDESOnide 160 MICROgram(s)/formoterol 4.5 MICROgram(s) Inhaler 2  cholecalciferol 2000  dofetilide 250  doxycycline IVPB   doxycycline IVPB 100  enoxaparin Injectable 40  ferrous    sulfate 325  fluticasone propionate (50 MICROgram(s)/actuation) Nasal Spray - Peds 2  folic acid 1  gabapentin 400  influenza   Vaccine 0.5  lamoTRIgine 100  loratadine 10  metoprolol tartrate 12.5  pantoprazole    Tablet 40  polyethylene glycol 3350 17  QUEtiapine 25  QUEtiapine 50  tamsulosin 0.4  tiotropium 18 MICROgram(s) Capsule 1      ANTIBIOTICS:  doxycycline IVPB      doxycycline IVPB 100 milliGRAM(s) IV Intermittent every 12 hours      ALLERGIES:  dexmethylphenidate (Unknown)  Dilantin (Rash)  dilantin, compazine, neurontin, ritalin, phenergan (Unknown)  Haldol (Unknown)  hydantoin derivatives (Other)  methylphenidate (Unknown)  phenytoin (Unknown)  prochlorperazine (Unknown)  promethazine (Dystonic RXN)  rash/hives (Anaphylaxis)  thioxanthenes (Unknown) CUATE HORNE  48y, Male  Allergy: dexmethylphenidate (Unknown)  Dilantin (Rash)  dilantin, compazine, neurontin, ritalin, phenergan (Unknown)  Haldol (Unknown)  hydantoin derivatives (Other)  methylphenidate (Unknown)  phenytoin (Unknown)  prochlorperazine (Unknown)  promethazine (Dystonic RXN)  rash/hives (Anaphylaxis)  thioxanthenes (Unknown)      CHIEF COMPLAINT: AICD firing (14 Oct 2019 14:01)      HPI:  48 year old male with PMHx of Arrhythmogenic Ventricular Tachycardia and Cardiomyopathy s/p AICD in , HFpEF (EF 55-60%), Afib/Aflutter on ASA 81mg but unsure if on Eliquis, Chronic Hypoxic Respiratory Failure secondary to COPD on 2L O2 PRN, CAD, TIA/CVA, HTN, DLD, Peripheral neuropathy and chronic pain, Multiple Sclerosis, Epilepsy/Seizures, Migraines, GERD, Anemia of Chronic Disease + folate deficiency + Iron deficiency anemia, Constipation, BPH, PTSD, Depression, Bipolar disorder, Schizoaffective Disorder, recently hospitalized from 19 till 19 for hematoma of AICD pocket with battery depletion and Riata lead externalization s/p hematoma evacuation on - and laser lead extraction and new ICD lead implant, and again recently hospitalized 9/15/19-19 for trace pericardial effusion with no intervention and RV threshold elevation secondary to lead migration/dislodgement s/p lead replacement and re-positioning, treated for Acute Bronchitis during prior hospitalization with Doxycycline and Prednisone, presenting for AICD firing. Patient reports that he was walking to the nursing station at [facility] around 1AM in the morning to get his soda when he suddenly felt a tingling sensation in his chest followed by his defibrillator firing. Patient reports he sat down and the sensation resolved on its own. He denies chest pain, palpitations, lightheadedness, dizziness, shortness of breath prior or after the incident. Patient also reports that he has been having on/off fevers per the nurses at [facility] with a Tmax of 101 associated with dyspnea on exertion at times and that he has been on IV Ceftriaxone. Patient denies chills, cough, productive phlegm, lower extremity swelling/erythema/tenderness. (13 Oct 2019 08:33)      INFECTIOUS DISEASE HISTORY:  Reports had some flu-like sx, then this resolved. Reports had urinary sx and a UA/UCX were negative. No current urinary sx. He was on a PO abx at NH then on Ceftriaxone IV x2 days and continued to have fevers.   No diarrhea  Pt requesting [facility] specialist, worried he may have Ebola. Reassured patient.     PAST MEDICAL & SURGICAL HISTORY:  Vitamin D deficiency  Constipation  Chronic respiratory failure: secondary to COPD  Folate-deficiency anemia  Iron deficiency anemia  Anemia of chronic disease  Pericardial effusion without cardiac tamponade: h/o trace pericardial effusion  BPH (benign prostatic hyperplasia)  Chronic pain  Peripheral neuropathy  Type 2 diabetes mellitus  AICD (automatic cardioverter/defibrillator) present  Ventricular tachycardia  Chronic heart failure with preserved ejection fraction  PTSD (post-traumatic stress disorder)  Supraventricular arrhythmia: prior history  Cardiomyopathy  CHF (congestive heart failure)  Atrial fibrillation: s/p ablation, on eliquis  BPH (benign prostatic hyperplasia)  HTN (hypertension)  Seizures  Migraines  Pneumonia  MS (multiple sclerosis)  CAD (coronary artery disease)  Bipolar disorder  Anginal pain  Schizo affective schizophrenia  GERD (gastroesophageal reflux disease)  Seasonal allergies  Hypercholesteremia  COPD (chronic obstructive pulmonary disease)  CVA (cerebral vascular accident)  TIA (transient ischemic attack)  Peripheral Neuropathy  Clinical Depression  Displacement of electrode lead of cardiac pacemaker  History of evacuation of hematoma: AICD pocket  H/O prior ablation treatment: for Afib  H/O hernia repair: right   History of hip replacement  S/P Orchiectomy: L for testicular CA  S/P Implantation of AICD      FAMILY HISTORY  Family history of colon cancer in mother  Family history of cardiomyopathy  Family history of cervical cancer (Mother)  Family history of early CAD (Mother)  No pertinent family history in first degree relatives      SOCIAL HISTORY  Social History (marital status, living situation, occupation, tobacco use, alcohol and drug use, and sexual history): Former smoker of 27 years, smoked 1.5 PPD, quit 1 year ago  	Prior EtOH abuse, now only drinks on social gatherings  	Denies illicit drug use    	Lives at [facility]      ROS  General: Denies nightsweats, wt loss  HEENT: Denies headache, rhinorrhea, sore throat, eye pain  CV: as noted above   PULM: as noted above   GI: Denies hematemesis, hematochezia, melena  : Denies discharge, hematuria  MSK: Denies arthralgias, myalgias  SKIN: Denies rash, lesions  NEURO: Denies paresthesias, weakness  PSYCH: Denies depression, anxiety    VITALS:  T(F): 100.3, Max: 102.4 (10-15-19 @ 01:42)  HR: 60  BP: 115/56  RR: 18Vital Signs Last 24 Hrs  T(C): 37.9 (15 Oct 2019 05:12), Max: 39.1 (15 Oct 2019 01:42)  T(F): 100.3 (15 Oct 2019 05:12), Max: 102.4 (15 Oct 2019 01:42)  HR: 60 (15 Oct 2019 05:12) (60 - 66)  BP: 115/56 (15 Oct 2019 05:12) (105/54 - 139/62)  BP(mean): --  RR: 18 (15 Oct 2019 05:12) (14 - 18)  SpO2: 100% (14 Oct 2019 23:22) (94% - 100%)    PHYSICAL EXAM:  Gen: chronically ill appearing NAD, resting in bed  HEENT: Normocephalic, atraumatic  Neck: supple, no lymphadenopathy  CV: Regular rate & regular rhythm, L chest incision cdi   Lungs: decreased BS at bases, no fremitus  Abdomen: Soft, BS present  Ext: Warm, well perfused  Neuro: non focal, awake  Skin: no rash, no erythema  Lines: no phlebitis    TESTS & MEASUREMENTS:                        7.1    6.86  )-----------( 379      ( 15 Oct 2019 05:17 )             23.5     10-15    140  |  102  |  14  ----------------------------<  107<H>  4.4   |  24  |  1.0    Ca    8.7      15 Oct 2019 05:17  Mg     2.3     10-15    TPro  6.1  /  Alb  3.5  /  TBili  0.5  /  DBili  x   /  AST  16  /  ALT  15  /  AlkPhos  130<H>  10-15    eGFR if Non African American: 90 mL/min/1.73M2 (10-15-19 @ 05:17)  eGFR if African American: 104 mL/min/1.73M2 (10-15-19 @ 05:17)  eGFR if Non African American: 90 mL/min/1.73M2 (10-15-19 @ 02:27)  eGFR if : 104 mL/min/1.73M2 (10-15-19 @ 02:27)    LIVER FUNCTIONS - ( 15 Oct 2019 02:27 )  Alb: 3.5 g/dL / Pro: 6.1 g/dL / ALK PHOS: 130 U/L / ALT: 15 U/L / AST: 16 U/L / GGT: x           Urinalysis Basic - ( 13 Oct 2019 16:11 )    Color: Yellow / Appearance: Clear / S.036 / pH: x  Gluc: x / Ketone: Negative  / Bili: Negative / Urobili: <2 mg/dL   Blood: x / Protein: 100 mg/dL / Nitrite: Negative   Leuk Esterase: Negative / RBC: 3 /HPF / WBC 2 /HPF   Sq Epi: x / Non Sq Epi: 2 /HPF / Bacteria: Negative        Culture - Urine (collected 10-13-19 @ 16:11)  Source: .Urine Clean Catch (Midstream)  Final Report (10-14-19 @ 17:26):    No growth    Culture - Blood (collected 10-13-19 @ 02:13)  Source: .Blood Blood-Peripheral  Preliminary Report (10-14-19 @ 07:00):    No growth to date.    Culture - Blood (collected 10-13-19 @ 02:13)  Source: .Blood Blood-Peripheral  Preliminary Report (10-14-19 @ 07:00):    No growth to date.        Lactate, Blood: 0.7 mmol/L (10-15-19 @ 01:51)  Blood Gas Venous - Lactate: 0.9 mmoL/L (10-13-19 @ 03:44)      INFECTIOUS DISEASES TESTING  MRSA PCR Result.: Negative (19 @ 22:29)  HIV-1/2 Combo Result: Nonreact (19 @ 05:37)      RADIOLOGY & ADDITIONAL TESTS:  I have personally reviewed the last Chest xray  CXR      CT  CT Chest No Cont:   EXAM:  CT CHEST            PROCEDURE DATE:  10/14/2019            INTERPRETATION:  CLINICAL HISTORY/ REASON FOR EXAM: Pericardial effusion.    TECHNIQUE: Multislice helical sections were obtained from the thoracic   inlet to the lung bases without contrast administration. Coronal and   sagittal reformatted images are also submitted.    COMPARISON: CT chest 2019.      FINDINGS:    LUNGS, PLEURA AND AIRWAYS: No obstructing endobronchial lesions.   Bilateral pleural effusions, larger on the left with compressive   atelectasis involving the entire lower lobe and part of the left upper   lobe lingular segment. Compressive atelectasis at the right lung base.    MEDIASTINUM/LYMPH NODES: No supraclavicular, mediastinal, hilar or   axillary adenopathy.     HEART/GREAT VESSELS: Moderate to large sized pericardial complex fluid,   increased since 2019 with Hounsfield units measuring up to 59 (in   anterior and lateral aspect of right ventricle) and in some areas   particularly in the posterior dependent aspect of the Hounsfield unit of   the fluid measures up to 5 as simple fluid. Metallic streaky artifact   from the ICD leads and generator. Limited evaluation of cardiac chambers   to exclude cardiac tamponade. AICD wires visualized.Thoracic aorta and   main pulmonary arteries are normal in caliber. Aortic arch demonstrates   normal branching pattern. Atherosclerotic disease, including coronary   artery calcifications.    BONES/SOFT TISSUES: Multilevel degenerative changes of the thoracic spine.    VISUALIZED UPPER ABDOMEN: No focal abnormalities.    IMPRESSION:    1.  Moderate to large sized complex pericardial fluid (with question   hemopericardium considering Hounsfield units of 59 in some areas),   increased since 2019. Lack of intravenous contrast with suboptimal   evaluation of cardiac chambers causing limitation to exclude cardiac   tamponade. Recommend further evaluation by echocardiogram.    2.  Bilateral pleural effusions, larger on the left with compressive   atelectasis involving the entire lower lobe and part of the left upper   lobe lingular segment.       Spoke with OPHELIA TAYLOR on 10/15/2019 7:01 AM with readback.              ANDRES DOTSON M.D., RESIDENT RADIOLOGIST  This document has been electronically signed.  KADI WATERS M.D., ATTENDING RADIOLOGIST  This document has been electronically signed. Oct 15 2019  7:01AM             (10-14- @ 20:03)      CARDIOLOGY TESTING  Transthoracic Echocardiogram:    EXAM:  2-D ECHO (TTE) COMPLETE        PROCEDURE DATE:  10/13/2019      INTERPRETATION:  REPORT:    TRANSTHORACIC ECHOCARDIOGRAM REPORT         Patient Name:   CUATE HORNE Accession #: [id number]  Medical Rec #:  [id number]   Height:      66.0 in 167.6 cm  YOB: 1970  Weight:      230.0 lb 104.33 kg  Patient Age:    48 years    BSA:         2.12 m²  Patient Gender: M           BP:          100/50 mmHg       Date of Exam:        10/13/2019 5:23:28 PM  Referring Physician: [id number] DWAYNE DUMONT  Sonographer:         Juana Donaldson  Reading Physician:   Zeferino Briones M.D.    Procedure:     2D Echo/Doppler/Color Doppler Complete.  Indications:   I31.3 - Pericardial Effusion noninflammatory  Diagnosis:     Pericardial effusion (noninflammatory) - I31.3  Study Details: Technically good study.         Summary:   1. Left ventricular ejection fraction, by visual estimation, is 55 to   60%.   2. Technically good study.   3. Normal global left ventricular systolic function.  4. Normal left ventricular internal cavity size.   5. Moderate pericardial effusion.   6. Mild thickening of the anterior and posterior mitral valve leaflets.   7. Mild-moderate tricuspid regurgitation.    PHYSICIAN INTERPRETATION:  Left Ventricle: The left ventricular internal cavity size is normal. Left   ventricular wall thickness is normal. Global LV systolic function was   normal. Left ventricular ejection fraction, by visual estimation, is 55   to 60%.  Right Ventricle: The right ventricular size is normal. RV systolic   function is normal.  Left Atrium: Normal left atrial size.  Right Atrium: Normal right atrial size.  Pericardium: A moderately sized pericardial effusion is present. The   pericardial effusion is globally located aroundthe entire heart. There   is no evidence of cardiac tamponade.  Mitral Valve: Mild thickening of the anterior and posterior mitral valve   leaflets. Mild mitral valve regurgitation is seen.  Tricuspid Valve: Mild-moderate tricuspid regurgitation is visualized.  Aortic Valve: The aortic valve is trileaflet.  Pulmonic Valve: The pulmonic valve is thickened with good excursion.   Trace pulmonic valve regurgitation.  Venous: The inferior vena cava was dilated, with respiratory size   variation greater than 50%.  Additional Comments: A pacer wire is visualized in the right ventricle   and right atrium.       2D AND M-MODE MEASUREMENTS (normal ranges within parentheses):  Left Ventricle:                  Normal  IVSd (2D):              0.82 cm (0.7-1.1)  LVPWd (2D):             0.73 cm (0.7-1.1)  LVIDd (2D):             5.20 cm (3.4-5.7)  LVIDs (2D):             3.40 cm  LV FS (2D):             34.5 %   (>25%)  Relative Wall Thickness  0.28    (<0.42)    SPECTRAL DOPPLER ANALYSIS:  LV DIASTOLICFUNCTION:  MV Peak E: 0.99 m/s Decel Time: 247 msec  MV Peak A: 0.39 m/s  E/A Ratio: 2.55    Aortic Valve:  AoV VMax:    1.47 m/s  AoV Pk Grad: 8.6 mmHg    LVOT Vmax: 1.09 m/s  LVOT VTI:  0.23 m    Mitral Valve:  MV P1/2 Time: 71.57 msec  MV Area, PHT: 3.07 cm²    Tricuspid Valve and PA/RV Systolic Pressure: TR Max Velocity: 2.64 m/s RA   Pressure:  RVSP/PASP:       [id number] Zeferino Briones M.D., Electronically signed on 10/14/2019 at   7:25:51 AM              *** Final ***                    MARLA NERI  This document has been electronically signed. Oct 13 2019  5:23PM             (10-13-19 @ 17:23)  12 Lead ECG:   Ventricular Rate 60 BPM    Atrial Rate 60 BPM    P-R Interval 178 ms    QRS Duration 138 ms    Q-T Interval 436 ms    QTC Calculation(Bezet) 436 ms    P Axis -8 degrees    R Axis 5 degrees    T Axis -38 degrees    Diagnosis Line Atrial-paced rhythm  Right bundle branch block  Abnormal ECG    Confirmed by ZEFERINO BRIONES MD ([id number]) on 10/13/2019 3:54:20 PM (10-13-19 @ 09:44)      MEDICATIONS  aspirin enteric coated 81  atorvastatin 40  baclofen 10  buDESOnide 160 MICROgram(s)/formoterol 4.5 MICROgram(s) Inhaler 2  cholecalciferol 2000  dofetilide 250  doxycycline IVPB   doxycycline IVPB 100  enoxaparin Injectable 40  ferrous    sulfate 325  fluticasone propionate (50 MICROgram(s)/actuation) Nasal Spray - Peds 2  folic acid 1  gabapentin 400  influenza   Vaccine 0.5  lamoTRIgine 100  loratadine 10  metoprolol tartrate 12.5  pantoprazole    Tablet 40  polyethylene glycol 3350 17  QUEtiapine 25  QUEtiapine 50  tamsulosin 0.4  tiotropium 18 MICROgram(s) Capsule 1      ANTIBIOTICS:  doxycycline IVPB      doxycycline IVPB 100 milliGRAM(s) IV Intermittent every 12 hours      ALLERGIES:  dexmethylphenidate (Unknown)  Dilantin (Rash)  dilantin, compazine, neurontin, ritalin, phenergan (Unknown)  Haldol (Unknown)  hydantoin derivatives (Other)  methylphenidate (Unknown)  phenytoin (Unknown)  prochlorperazine (Unknown)  promethazine (Dystonic RXN)  rash/hives (Anaphylaxis)  thioxanthenes (Unknown)

## 2019-10-15 NOTE — CHART NOTE - NSCHARTNOTEFT_GEN_A_CORE
Patient was having disruptive behavior throughout the day claiming that he is being imprisoned against his will. He was calling his "" and  to complain and being verbally abusive with nurses. The severity of his condition and the risk of him dying if he leaves was explained to the patient. He iterated his understanding of his condition and the risks of him leaving but  couldnot give a valid reason fo why he wants to leave. I called his brother Mr Zana Vargas who lives in [place] and explained the situation to him. He told me he understands that his brother has a complex psychiatric history and is willing to give us verbal consent to any procedures we deem necessary. Psychiatry were called to evaluate the capacity of the patient to leave AMA.  Upon further discussion including psychiatry team and the patient, he agreed to stay in hospital because its too late for him to go back to nursing home. He also agreed to receive the blood transfusion and undergo the procedure tomorrow.

## 2019-10-15 NOTE — BEHAVIORAL HEALTH ASSESSMENT NOTE - COMMENTS ON SUICIDE RISK/PROTECTIVE FACTORS:
Protective factors include future oriented, access to emergency medical services and known to use them in times of distress, no acute suicidality, access to outpatient psychiatrist at SNF, compliant with and knowledgeable about medications, feelings of closeness to adult children. Risk Factors include acute medical illness, chronic medical illness, irritability, history of IPP admission.

## 2019-10-15 NOTE — BEHAVIORAL HEALTH ASSESSMENT NOTE - OTHER PAST PSYCHIATRIC HISTORY (INCLUDE DETAILS REGARDING ONSET, COURSE OF ILLNESS, INPATIENT/OUTPATIENT TREATMENT)
1 prior IPP admission in 2002, for PTSD exacerbation per the patient. No known suicide attempts.   Currently follows outpatient psychiatrist at Nursing Facility ([facility])

## 2019-10-15 NOTE — BEHAVIORAL HEALTH ASSESSMENT NOTE - CASE SUMMARY
Mr Miguel is a 48 year old man with a history of PTSD whop was admitted to the medical service for the management of hemopericardium. As per medical team, patient has a fever , needs to be monitored overnight and is schedule to have a pericardiocentesis tomorrow. Psychiatry consult was called for the evaluation of the patient's capacity to leave the hospital against medical advice . Patient was initially very irritable and disruptive however over time , he was able to calm down for the interview .  it appears that patient was insisting on leaving the hospital because of his perception of  poor care and poor communication from hospital staff and his preference to return to the hospital for the procedure . However it appears that since logistically his residence would not be able to facilitate his transportation needs , patient is agreeable to stay in the hospital. he does not appear acutely psychotic , manic or depressed. he does not appear to have suicidal ideations, intent or plan.   For now  patient is agreeable to stay in the hospital and is agreeable to do the pericardiocentesis thus the evaluation is not an issue for now .   At this time, patient is not considered an imminent danger to himself or others and does not need inpatient psychiatric hospitalization. Patient will follow up with his outpatient psychiatrist at [facility], [place], operated by [facility] upon discharge.

## 2019-10-15 NOTE — PROGRESS NOTE ADULT - SUBJECTIVE AND OBJECTIVE BOX
CUATE HORNE 48y Male  MRN#: [id number]   CODE STATUS: Full      SUBJECTIVE  Patient is a 48y old Male  known to have MS, Arrhythmogenic Ventricular Tachycardia and Cardiomyopathy s/p AICD in , HFpEF (EF 55-60%), Afib/Aflutter, Chronic Hypoxic Respiratory Failure secondary to COPD on 2L O2 PRN, CAD, TIA/CVA, HTN, DLD, Peripheral neuropathy and chronic pain, Epilepsy/Seizures, Migraines, GERD, BPH, PTSD, Depression, Bipolar disorder, Schizoaffective Disorder who presents with a chief complaint of AICD firing (15 Oct 2019 07:43)  Patient had a recent admission for Riata lead externalization s/p lead extraction and new ICD lead implant. He presented 2 weeks later with a new pericardial effusion and was found to have lead micro dislodgement and underwent lead revision. He now presents with ICD fire. Echo with moderate pericardial effusion. CT scan showing possible hemopericardium. Hb downtrending. Patient was having fevers throughout the night.     OBJECTIVE  PAST MEDICAL & SURGICAL HISTORY  Vitamin D deficiency  Constipation  Chronic respiratory failure: secondary to COPD  Folate-deficiency anemia  Iron deficiency anemia  Anemia of chronic disease  Pericardial effusion without cardiac tamponade: h/o trace pericardial effusion  BPH (benign prostatic hyperplasia)  Chronic pain  Peripheral neuropathy  Type 2 diabetes mellitus  AICD (automatic cardioverter/defibrillator) present  Ventricular tachycardia  Chronic heart failure with preserved ejection fraction  PTSD (post-traumatic stress disorder)  Supraventricular arrhythmia: prior history  Cardiomyopathy  CHF (congestive heart failure)  Atrial fibrillation: s/p ablation, on eliquis  BPH (benign prostatic hyperplasia)  HTN (hypertension)  Seizures  Migraines  Pneumonia  MS (multiple sclerosis)  CAD (coronary artery disease)  Bipolar disorder  Anginal pain  Schizo affective schizophrenia  GERD (gastroesophageal reflux disease)  Seasonal allergies  Hypercholesteremia  COPD (chronic obstructive pulmonary disease)  CVA (cerebral vascular accident)  TIA (transient ischemic attack)  Peripheral Neuropathy  Clinical Depression  Displacement of electrode lead of cardiac pacemaker  History of evacuation of hematoma: AICD pocket  H/O prior ablation treatment: for Afib  H/O hernia repair: right   History of hip replacement  S/P Orchiectomy: L for testicular CA  S/P Implantation of AICD    ALLERGIES:  dexmethylphenidate (Unknown)  Dilantin (Rash)  dilantin, compazine, neurontin, ritalin, phenergan (Unknown)  Haldol (Unknown)  hydantoin derivatives (Other)  methylphenidate (Unknown)  phenytoin (Unknown)  prochlorperazine (Unknown)  promethazine (Dystonic RXN)  rash/hives (Anaphylaxis)  thioxanthenes (Unknown)    MEDICATIONS:  STANDING MEDICATIONS  aspirin enteric coated 81 milliGRAM(s) Oral daily  atorvastatin 40 milliGRAM(s) Oral at bedtime  baclofen 10 milliGRAM(s) Oral four times a day  buDESOnide 160 MICROgram(s)/formoterol 4.5 MICROgram(s) Inhaler 2 Puff(s) Inhalation two times a day  cholecalciferol 2000 Unit(s) Oral daily  dofetilide 250 MICROGram(s) Oral two times a day  doxycycline IVPB      doxycycline IVPB 100 milliGRAM(s) IV Intermittent every 12 hours  enoxaparin Injectable 40 milliGRAM(s) SubCutaneous daily  ferrous    sulfate 325 milliGRAM(s) Oral daily  fluticasone propionate (50 MICROgram(s)/actuation) Nasal Spray - Peds 2 Spray(s) Alternating Nostrils daily  folic acid 1 milliGRAM(s) Oral daily  gabapentin 400 milliGRAM(s) Oral three times a day  influenza   Vaccine 0.5 milliLiter(s) IntraMuscular once  lamoTRIgine 100 milliGRAM(s) Oral two times a day  loratadine 10 milliGRAM(s) Oral daily  metoprolol tartrate 12.5 milliGRAM(s) Oral every 8 hours  pantoprazole    Tablet 40 milliGRAM(s) Oral before breakfast  polyethylene glycol 3350 17 Gram(s) Oral every 12 hours  QUEtiapine 25 milliGRAM(s) Oral daily  QUEtiapine 50 milliGRAM(s) Oral at bedtime  tamsulosin 0.4 milliGRAM(s) Oral at bedtime  tiotropium 18 MICROgram(s) Capsule 1 Capsule(s) Inhalation daily    PRN MEDICATIONS  acetaminophen   Tablet .. 650 milliGRAM(s) Oral every 6 hours PRN  clonazePAM  Tablet 0.5 milliGRAM(s) Oral at bedtime PRN  ipratropium    for Nebulization 500 MICROGram(s) Nebulizer every 6 hours PRN  morphine  IR 30 milliGRAM(s) Oral every 6 hours PRN      VITAL SIGNS: Last 24 Hours  T(C): 37.9 (15 Oct 2019 05:12), Max: 39.1 (15 Oct 2019 01:42)  T(F): 100.3 (15 Oct 2019 05:12), Max: 102.4 (15 Oct 2019 01:42)  HR: 60 (15 Oct 2019 05:12) (60 - 66)  BP: 115/56 (15 Oct 2019 05:12) (105/54 - 135/72)  BP(mean): --  RR: 18 (15 Oct 2019 05:12) (14 - 18)  SpO2: 98% (15 Oct 2019 08:15) (94% - 100%)    LABS:                        7.1    6.86  )-----------( 379      ( 15 Oct 2019 05:17 )             23.5     10-15    140  |  102  |  14  ----------------------------<  107<H>  4.4   |  24  |  1.0    Ca    8.7      15 Oct 2019 05:17  Mg     2.3     10-15    TPro  6.1  /  Alb  3.5  /  TBili  0.5  /  DBili  x   /  AST  16  /  ALT  15  /  AlkPhos  130<H>  10-15    PT/INR - ( 13 Oct 2019 11:26 )   PT: 17.20 sec;   INR: 1.50 ratio         PTT - ( 13 Oct 2019 11:26 )  PTT:37.1 sec  Urinalysis Basic - ( 13 Oct 2019 16:11 )    Color: Yellow / Appearance: Clear / S.036 / pH: x  Gluc: x / Ketone: Negative  / Bili: Negative / Urobili: <2 mg/dL   Blood: x / Protein: 100 mg/dL / Nitrite: Negative   Leuk Esterase: Negative / RBC: 3 /HPF / WBC 2 /HPF   Sq Epi: x / Non Sq Epi: 2 /HPF / Bacteria: Negative        Lactate, Blood: 0.7 mmol/L (10-15-19 @ 01:51)      Culture - Urine (collected 13 Oct 2019 16:11)  Source: .Urine Clean Catch (Midstream)  Final Report (14 Oct 2019 17:26):    No growth    Culture - Blood (collected 13 Oct 2019 02:13)  Source: .Blood Blood-Peripheral  Preliminary Report (14 Oct 2019 07:00):    No growth to date.    Culture - Blood (collected 13 Oct 2019 02:13)  Source: .Blood Blood-Peripheral  Preliminary Report (14 Oct 2019 07:00):    No growth to date.      CARDIAC MARKERS ( 13 Oct 2019 16:50 )  x     / <0.01 ng/mL / x     / x     / x      CARDIAC MARKERS ( 13 Oct 2019 11:26 )  x     / <0.01 ng/mL / x     / x     / x            PHYSICAL EXAM:    GENERAL: NAD, well-developed, AAOx3  HEENT:  Atraumatic, Normocephalic. EOMI, PERRLA, conjunctiva and sclera clear, No JVD  PULMONARY: Clear to auscultation bilaterally; No wheeze  CARDIOVASCULAR: Regular rate and rhythm; No murmurs, rubs, or gallops  GASTROINTESTINAL: Soft, Nontender, Nondistended; Bowel sounds present      ASSESSMENT & PLAN    # Moderate to Large  Moderate to large complex pericardial fluid (with question   hemopericardium)  - Hemodynamically stable  - CT surgery on board: will do percardiocentesis tomorrow; NPO after midnight  - Transfuse one unit of PRBC and follow up CBC q 12 hrs    #Fevers   - most probably related to the effusion  - ID on board: will hold antibiotics  - Start Ceftrixone 2 g IV q 12 hrs if hemodynamically unstable    # AICD Firing  - ICD fire - inappropriate shock for AFib/AFl with RVR (HR up to 226 bpm)  - VT s/p ICD with recent lead extraction and post op lead revision after micro dislodgement  - EP on board  - Continue BB and Tikosyn.   - Increase Metoprolol slowly as BP allows  - Monitor lytes and keep K>4 and Mg>2      #CAD, h/o CVA/TIA  -c/w aspirin 81, metoprolol  and atorvastatin 40    #H/o Seizures/Epilepsy  seizure precautions   c/w lamotrigine 100mg bid    # Chronic Hypoxic Respiratory failure secondary to COPD   -on home O2 2L NC prn;   -c/w  Symbicort, Duonebs and Inhaler    #Essential HTN:    -C/W metoprolol 12.5 tid    DLD: c/w  Atorvastatin 40mg     Multifactorial anemia : AOCD, + RYAN+  Folate deficiency anemia  -c/w Folic acid and ferrous sulfate 325mg qd    #GERD  -pantoprazole 40 qd     #BPH: c/w  tamsulosin 0.4 qd    # Multiple Sclerosis  -c/w me baclofen 10 q6h and Tecfidera (Dimethyl fumarate) 240mcg qd      #Chronic pain; per pain management,   -c/w clonazepam, gabapentin,morphine IR 30mg q6h prn    #Constipation  -polyethylene glycol bid    #Vitamin D deficiency  cholecalciferol 2000U qd    # PTSD, Depression, Schizoaffective Disorder, Bipolar Disorder  -c/w Lamotrigine, quetiapine     DVT ppx;  Lovenox 40 qd  GI ppx; h/o GERD on PPI   Full code   Dispo; From NH

## 2019-10-15 NOTE — PROGRESS NOTE ADULT - SUBJECTIVE AND OBJECTIVE BOX
INTERVAL HPI/OVERNIGHT EVENTS:  s/p CT revealing large effusion  denies any complains at this time besides the following:  -abx stopped (per ID)  -O2 sats monitor to be taken away  - not getting blood transfusion  wants to leave    MEDICATIONS  (STANDING):  aspirin enteric coated 81 milliGRAM(s) Oral daily  atorvastatin 40 milliGRAM(s) Oral at bedtime  baclofen 10 milliGRAM(s) Oral four times a day  buDESOnide 160 MICROgram(s)/formoterol 4.5 MICROgram(s) Inhaler 2 Puff(s) Inhalation two times a day  cholecalciferol 2000 Unit(s) Oral daily  dofetilide 250 MICROGram(s) Oral two times a day  enoxaparin Injectable 40 milliGRAM(s) SubCutaneous daily  ferrous    sulfate 325 milliGRAM(s) Oral daily  fluticasone propionate (50 MICROgram(s)/actuation) Nasal Spray - Peds 2 Spray(s) Alternating Nostrils daily  folic acid 1 milliGRAM(s) Oral daily  gabapentin 400 milliGRAM(s) Oral three times a day  influenza   Vaccine 0.5 milliLiter(s) IntraMuscular once  lamoTRIgine 100 milliGRAM(s) Oral two times a day  loratadine 10 milliGRAM(s) Oral daily  metoprolol tartrate 12.5 milliGRAM(s) Oral every 8 hours  pantoprazole    Tablet 40 milliGRAM(s) Oral before breakfast  polyethylene glycol 3350 17 Gram(s) Oral every 12 hours  QUEtiapine 25 milliGRAM(s) Oral daily  QUEtiapine 50 milliGRAM(s) Oral at bedtime  tamsulosin 0.4 milliGRAM(s) Oral at bedtime  tiotropium 18 MICROgram(s) Capsule 1 Capsule(s) Inhalation daily    MEDICATIONS  (PRN):  acetaminophen   Tablet .. 650 milliGRAM(s) Oral every 6 hours PRN Mild Pain (1 - 3)  clonazePAM  Tablet 0.5 milliGRAM(s) Oral at bedtime PRN agitation  ipratropium    for Nebulization 500 MICROGram(s) Nebulizer every 6 hours PRN Shortness of Breath and/or Wheezing  morphine  IR 30 milliGRAM(s) Oral every 6 hours PRN Moderate Pain (4 - 6)      Allergies    dexmethylphenidate (Unknown)  Dilantin (Rash)  dilantin, compazine, neurontin, ritalin, phenergan (Unknown)  Haldol (Unknown)  hydantoin derivatives (Other)  methylphenidate (Unknown)  phenytoin (Unknown)  prochlorperazine (Unknown)  promethazine (Dystonic RXN)  rash/hives (Anaphylaxis)  thioxanthenes (Unknown)    Intolerances        REVIEW OF SYSTEMS    [x ] A ten-point review of systems was otherwise negative except as noted.    Vital Signs Last 24 Hrs  T(C): 37.9 (15 Oct 2019 05:12), Max: 39.1 (15 Oct 2019 01:42)  T(F): 100.3 (15 Oct 2019 05:12), Max: 102.4 (15 Oct 2019 01:42)  HR: 60 (15 Oct 2019 05:12) (60 - 66)  BP: 115/56 (15 Oct 2019 05:12) (105/54 - 135/72)  BP(mean): --  RR: 18 (15 Oct 2019 05:12) (14 - 18)  SpO2: 98% (15 Oct 2019 08:15) (94% - 100%)    10-14-19 @ 07:01  -  10-15-19 @ 07:00  --------------------------------------------------------  IN: 0 mL / OUT: 800 mL / NET: -800 mL        PHYSICAL EXAM:    GENERAL: In no apparent distress, well nourished, and hydrated.  HEART: Regular rate and rhythm; No murmur; NO rubs, or gallops. distant heart sounds  Lt chest wound AICD site well healed  PULMONARY: Clear to auscultation and percussion.  Normal expansion/effort. No rales, wheezing, or rhonchi bilaterally.  ABDOMEN: Soft, Nontender, Nondistended; Bowel sounds present  EXTREMITIES:  Extremities warm, pink, well-perfused, 2+ Peripheral Pulses, No clubbing, cyanosis, or edema  NEUROLOGICAL: alert & oriented x 3, no focal deficits, PERRLA, EOMI    LABS:                        7.1    6.86  )-----------( 379      ( 15 Oct 2019 05:17 )             23.5     10-15    140  |  102  |  14  ----------------------------<  107<H>  4.4   |  24  |  1.0    Ca    8.7      15 Oct 2019 05:17  Mg     2.3     10-15    TPro  6.1  /  Alb  3.5  /  TBili  0.5  /  DBili  x   /  AST  16  /  ALT  15  /  AlkPhos  130<H>  10-15    PT/INR - ( 13 Oct 2019 11:26 )   PT: 17.20 sec;   INR: 1.50 ratio         PTT - ( 13 Oct 2019 11:26 )  PTT:37.1 sec  Urinalysis Basic - ( 13 Oct 2019 16:11 )    Color: Yellow / Appearance: Clear / S.036 / pH: x  Gluc: x / Ketone: Negative  / Bili: Negative / Urobili: <2 mg/dL   Blood: x / Protein: 100 mg/dL / Nitrite: Negative   Leuk Esterase: Negative / RBC: 3 /HPF / WBC 2 /HPF   Sq Epi: x / Non Sq Epi: 2 /HPF / Bacteria: Negative        RADIOLOGY & ADDITIONAL TESTS:  < from: CT Chest No Cont (10.14.19 @ 20:03) >    IMPRESSION:    1.  Moderate to large sized complex pericardial fluid (with question   hemopericardium considering Hounsfield units of 59 in some areas),   increased since 2019. Lack of intravenous contrast with suboptimal   evaluation of cardiac chambers causing limitation to exclude cardiac   tamponade. Recommend further evaluation by echocardiogram.    2.  Bilateral pleural effusions, larger on the left with compressive   atelectasis involving the entire lower lobe and part of the left upper   lobe lingular segment.     < end of copied text >

## 2019-10-15 NOTE — BEHAVIORAL HEALTH ASSESSMENT NOTE - RISK ASSESSMENT
Protective factors include future oriented, access to emergency medical services and known to use them in times of distress, no acute suicidality, access to outpatient psychiatrist at SNF, compliant with and knowledgeable about medications, feelings of closeness to adult children. Risk Factors include acute medical illness, chronic medical illness, irritability, history of IPP admission.    Based on the above patient does not require IPP at this time. Low Acute Suicide Risk

## 2019-10-15 NOTE — PROGRESS NOTE ADULT - ASSESSMENT
47 yo male with extensive medical problems, on home O2, presented to the ED s/p inappropriate firing of AICD now found to have a moderately sized pericardial effusion    Plan:  - pericardiocentesis +/- thoracocentesis possible 10/16 or 10/17 49 yo male with extensive medical problems, on home O2, presented to the ED s/p inappropriate firing of AICD now found to have a moderately sized pericardial effusion    Plan:  - pericardiocentesis +/- thoracocentesis possible 10/16 or 10/17  - please obtain full pre op labs today: CBC, BMP, Mg, Phos, Coag, Type and Screen  - NPO after midnight tonight with IVF while NPO

## 2019-10-15 NOTE — PROGRESS NOTE ADULT - ATTENDING COMMENTS
patient seen and examined , agree with pgy 2 assesment and plan except as indicated above,   patient is adament to leave the hospital , he is aaox4 , verbalizes understanding to his condition , and understands the risks of leaving against medical advice including but not limited to death  , further in spite of his leaving against medical advice and in regards to his condition called ct surgery to come to evaluate and plan around this issue in order to  adjust to the development that he wants to leave right now   his statement is " I understand I need pericardiocentesis , I want to leave and come back tomorrow, I do not want to be monitored ."   EN Lying in no acute distress  HEENT Pupils equal and reactive to light and accommodationSupple Neck  PULM Clear to auscultation bilaterally  CV s1s2   GI + bowel sounds nontnender  EXT no cyanosis or edema  PSYCH awake alert and oriented x 3  INTEG No Lesions  NEURO YARBROUGH  #CKD 2   Leaving against medical advice , and I reiterated the  risk of death  he verbalizes udnerstadn and performed teach back exercising full comptence in the decision
patient seen and examined , agree with pgy 3 assesment and plan except as indicated above,   EN Lying in no acute distress  HEENT Pupils equal and reactive to light and accommodationSupple Neck  PULM Clear to auscultation bilaterally  CV s1s2   GI + bowel sounds nontnender  EXT no cyanosis or edema  PSYCH awake alert and oriented x 3  INTEG No Lesions  NEURO YARBROUGH  #CKD 2   #Pericardial effusion ct surgery following   #CHronic HFpEF  #Tricuspid regurgitiaton no acute intervention   Progress Note Handoff    Pending:  continue with telemetry , ct surgery eval , will need pt rehab  Family discussion: patient is of sound mind and agrees to plan of care    [facility]

## 2019-10-15 NOTE — BEHAVIORAL HEALTH ASSESSMENT NOTE - NSBHCHARTREVIEWVS_PSY_A_CORE FT
ICU Vital Signs Last 24 Hrs  T(C): 38.9 (15 Oct 2019 13:48), Max: 39.1 (15 Oct 2019 01:42)  T(F): 102 (15 Oct 2019 13:48), Max: 102.4 (15 Oct 2019 01:42)  HR: 60 (15 Oct 2019 05:12) (60 - 66)  BP: 115/56 (15 Oct 2019 05:12) (105/54 - 135/72)  RR: 18 (15 Oct 2019 05:12) (16 - 18)  SpO2: 98% (15 Oct 2019 08:15) (97% - 100%)

## 2019-10-15 NOTE — BEHAVIORAL HEALTH ASSESSMENT NOTE - NS ED BHA SUICIDALITY PRESENT CURRENT INTENT DETAILS COLLATERAL FT
76 yo F hx Breast Ca- remote R mastectomy, recent lung ca- RT, chronic pain, prior SBOs, COPD (active smoker) admitted 6/7 with recurrent vomiting, with abdominal pain / vomiting,   from SBO/  seen by surgery, SHAKIRA on CKD 2/ dehydration.  CT scan  with sbo,  anD   right pna,  pt is an active smoker, refusing to quit, ct chest , 4/19/18  noted/  oncology dr hernandez, avoid  narcotics, if  possible    s/p   aztreonam/  vanco, 1  dose/  pt with pna/  gram negative./ and  bandemia  and uti with GNR/ klebsiella, SHAKIRA, resolving, d/c  on po ceftin    normal WBC, No tachycardia, BP stable, tolerates CTX, blood cult -no growth to date, urine cx with Kleb--chronic    D/C IV Ceftriaxone, Step down to Ceftin 250 mg po BID- another 3 days, if/when ready for dc    Chronic Klebsiella colonization with pyuria, but no dysuria, also targeted by above regimen.  Pt refusing [facility]IE and home care.  D/C to home.
None

## 2019-10-15 NOTE — BEHAVIORAL HEALTH ASSESSMENT NOTE - HPI (INCLUDE ILLNESS QUALITY, SEVERITY, DURATION, TIMING, CONTEXT, MODIFYING FACTORS, ASSOCIATED SIGNS AND SYMPTOMS)
48-year-old, white, male, domiciled at [facility], single, father of 2 children (aged 15 and 26), with an extensive past medical history including multiple sclerosis, epilepsy, COPD, chronic pain, psychiatric history of Post Traumatic Stress Disorder, admitted to medicine for AICD firing and pleural effusion. Psychiatry consulted for capacity to leave AMA.     Upon approach patient on phone with legal, speaking loudly, he states "I am alert and oriented times three, I have full capacity to leave right now", he is noncompliant with interview. Reassessed later, more calm, cooperative, is alert and oriented x3.     He states that "I am tired of being lied to", stating that he wants to leave the hospital environment due to perceived poor communication with medical staff. He states he was told he would have a blood transfusion then later told he didn't need it, which upset him. He also states he was told he has pneumonia but later told he doesn't.     He denies current use of tobacco, cannabis, alcohol, illicit substances. Denies current sx of shaw or psychosis. Denies acute mood disturbance. Denies suicidality at this time. 48-year-old, white, male, domiciled at [facility], single, father of 2 children (aged 15 and 26), with an extensive past medical history including multiple sclerosis, epilepsy, COPD, chronic pain, psychiatric history of Post Traumatic Stress Disorder, admitted to medicine for AICD firing and pleural effusion. Psychiatry consulted for capacity to leave AMA.     Upon approach patient on phone with legal, speaking loudly, he states "I am alert and oriented times three, I have full capacity to leave right now", he is noncompliant with interview. Reassessed later, more calm, cooperative, is alert and oriented x3.     He states that "I am tired of being lied to", stating that he wants to leave the hospital environment due to perceived poor communication with medical staff. He states he was told he would have a blood transfusion then later told he didn't need it, which upset him. He also states he was told he has pneumonia but later told that there was no infectious cause .   patient reports that he wanted to return to his residence [facility] earlier today and intends to come back to the hospital tomorrow for the procedure. he reports that he understand that he understand that he has blood in his pericardial sac which could potentially compress his heart since ti is increasing. patient reports that he is unhappy about how he has been treated since he came to the hospital and has been trying to contact the patient representatives about his complaints. patient however reports that he just go off the phone with [facility] and that it was too late for them to facilitate his transportation back to the hospital tomorrow. patient states " I don't have a choice , I will stay". patient was asked how we could make his stay in the hospital more comfortable. he states " they need to communicate with me".     He denies current use of tobacco, cannabis, alcohol, illicit substances. Denies current sx of shaw or psychosis. Denies acute mood disturbance. Denies suicidality at this time.

## 2019-10-15 NOTE — BEHAVIORAL HEALTH ASSESSMENT NOTE - SUMMARY
48-year-old, white, male, domiciled at [facility], single, father of 2 children (aged 15 and 26), with an extensive past medical history including multiple sclerosis, epilepsy, COPD, chronic pain, psychiatric history of Post Traumatic Stress Disorder, admitted to medicine for AICD firing and pleural effusion. Psychiatry consulted for capacity to leave AMA.     Currently the patient does not want to leave AMA therefore there is not a current indication for capacity evaluation. His initial statements appear to have been in the context of frustration with t  hospital environment with an emphasis on his perception of poor communication. Suggest frequent and clear communication to mitigate potential conflict between him and hospital staff. 48-year-old, white, male, domiciled at [facility], single, father of 2 children (aged 15 and 26), with an extensive past medical history including multiple sclerosis, epilepsy, COPD, chronic pain, psychiatric history of Post Traumatic Stress Disorder, admitted to medicine for AICD firing and pericardial effusion. Psychiatry consulted for capacity to leave AMA.     Currently the patient does not want to leave AMA therefore there is not a current indication for capacity evaluation. His initial statements appear to have been in the context of frustration with the hospital environment with an emphasis on his perception of poor communication. Suggest frequent and clear communication to mitigate potential conflict between him and hospital staff.

## 2019-10-15 NOTE — PROGRESS NOTE ADULT - ASSESSMENT
48 year old male with PMHx of Arrhythmogenic Ventricular Tachycardia and Cardiomyopathy s/p preventative AICD in 2005, recent admission 9/2019 for Generator change and Riata lead extraction complicated by hematoma of AICD pocket s/p hematoma evacuation, further complicated by trace pericardial effusion with no intervention and RV threshold elevation secondary to lead migration/dislodgement s/p lead replacement and re-positioning p/w AICD firing for rapid AF @200.  Now with worsening pericardial effusion    Plan for pericardiocentesis tomorrow with Dr Pastrana  Maintain electrolytes K>4.0 Mg >2.0  continue Metoprolol and Tikosyn  keep Hgb >9.0  ID cleared pt off Abx, fever due to effusion    Pt wants to leave against medical advice, and planning to come back for for procedure directly from NH  Primary team and CTS/Green team aware

## 2019-10-16 LAB
ALBUMIN SERPL ELPH-MCNC: 3.4 G/DL — LOW (ref 3.5–5.2)
ALP SERPL-CCNC: 137 U/L — HIGH (ref 30–115)
ALT FLD-CCNC: 14 U/L — SIGNIFICANT CHANGE UP (ref 0–41)
ANION GAP SERPL CALC-SCNC: 13 MMOL/L — SIGNIFICANT CHANGE UP (ref 7–14)
ANION GAP SERPL CALC-SCNC: 16 MMOL/L — HIGH (ref 7–14)
AST SERPL-CCNC: 17 U/L — SIGNIFICANT CHANGE UP (ref 0–41)
BASOPHILS # BLD AUTO: 0.05 K/UL — SIGNIFICANT CHANGE UP (ref 0–0.2)
BASOPHILS NFR BLD AUTO: 0.6 % — SIGNIFICANT CHANGE UP (ref 0–1)
BILIRUB SERPL-MCNC: 0.7 MG/DL — SIGNIFICANT CHANGE UP (ref 0.2–1.2)
BLD GP AB SCN SERPL QL: SIGNIFICANT CHANGE UP
BUN SERPL-MCNC: 13 MG/DL — SIGNIFICANT CHANGE UP (ref 10–20)
BUN SERPL-MCNC: 15 MG/DL — SIGNIFICANT CHANGE UP (ref 10–20)
CALCIUM SERPL-MCNC: 8.8 MG/DL — SIGNIFICANT CHANGE UP (ref 8.5–10.1)
CALCIUM SERPL-MCNC: 8.9 MG/DL — SIGNIFICANT CHANGE UP (ref 8.5–10.1)
CALCIUM SERPL-MCNC: 9.1 MG/DL — SIGNIFICANT CHANGE UP (ref 8.5–10.1)
CHLORIDE SERPL-SCNC: 100 MMOL/L — SIGNIFICANT CHANGE UP (ref 98–110)
CHLORIDE SERPL-SCNC: 102 MMOL/L — SIGNIFICANT CHANGE UP (ref 98–110)
CO2 SERPL-SCNC: 24 MMOL/L — SIGNIFICANT CHANGE UP (ref 17–32)
CO2 SERPL-SCNC: 26 MMOL/L — SIGNIFICANT CHANGE UP (ref 17–32)
CREAT SERPL-MCNC: 1 MG/DL — SIGNIFICANT CHANGE UP (ref 0.7–1.5)
CREAT SERPL-MCNC: 1 MG/DL — SIGNIFICANT CHANGE UP (ref 0.7–1.5)
EOSINOPHIL # BLD AUTO: 0.07 K/UL — SIGNIFICANT CHANGE UP (ref 0–0.7)
EOSINOPHIL NFR BLD AUTO: 0.8 % — SIGNIFICANT CHANGE UP (ref 0–8)
GLUCOSE BLDC GLUCOMTR-MCNC: 117 MG/DL — HIGH (ref 70–99)
GLUCOSE SERPL-MCNC: 104 MG/DL — HIGH (ref 70–99)
GLUCOSE SERPL-MCNC: 124 MG/DL — HIGH (ref 70–99)
HCT VFR BLD CALC: 25 % — LOW (ref 42–52)
HCT VFR BLD CALC: 26 % — LOW (ref 42–52)
HGB BLD-MCNC: 7.5 G/DL — LOW (ref 14–18)
HGB BLD-MCNC: 7.9 G/DL — LOW (ref 14–18)
IMM GRANULOCYTES NFR BLD AUTO: 0.9 % — HIGH (ref 0.1–0.3)
INR BLD: 1.51 RATIO — HIGH (ref 0.65–1.3)
INR BLD: 1.55 RATIO — HIGH (ref 0.65–1.3)
LYMPHOCYTES # BLD AUTO: 1.2 K/UL — SIGNIFICANT CHANGE UP (ref 1.2–3.4)
LYMPHOCYTES # BLD AUTO: 14.2 % — LOW (ref 20.5–51.1)
MAGNESIUM SERPL-MCNC: 2.4 MG/DL — SIGNIFICANT CHANGE UP (ref 1.8–2.4)
MAGNESIUM SERPL-MCNC: 2.4 MG/DL — SIGNIFICANT CHANGE UP (ref 1.8–2.4)
MCHC RBC-ENTMCNC: 27.2 PG — SIGNIFICANT CHANGE UP (ref 27–31)
MCHC RBC-ENTMCNC: 27.4 PG — SIGNIFICANT CHANGE UP (ref 27–31)
MCHC RBC-ENTMCNC: 30 G/DL — LOW (ref 32–37)
MCHC RBC-ENTMCNC: 30.4 G/DL — LOW (ref 32–37)
MCV RBC AUTO: 90.3 FL — SIGNIFICANT CHANGE UP (ref 80–94)
MCV RBC AUTO: 90.6 FL — SIGNIFICANT CHANGE UP (ref 80–94)
MONOCYTES # BLD AUTO: 0.95 K/UL — HIGH (ref 0.1–0.6)
MONOCYTES NFR BLD AUTO: 11.2 % — HIGH (ref 1.7–9.3)
NEUTROPHILS # BLD AUTO: 6.11 K/UL — SIGNIFICANT CHANGE UP (ref 1.4–6.5)
NEUTROPHILS NFR BLD AUTO: 72.3 % — SIGNIFICANT CHANGE UP (ref 42.2–75.2)
NRBC # BLD: 0 /100 WBCS — SIGNIFICANT CHANGE UP (ref 0–0)
NRBC # BLD: 0 /100 WBCS — SIGNIFICANT CHANGE UP (ref 0–0)
PHOSPHATE SERPL-MCNC: 3.5 MG/DL — SIGNIFICANT CHANGE UP (ref 2.1–4.9)
PLATELET # BLD AUTO: 419 K/UL — HIGH (ref 130–400)
PLATELET # BLD AUTO: 422 K/UL — HIGH (ref 130–400)
POTASSIUM SERPL-MCNC: 4.5 MMOL/L — SIGNIFICANT CHANGE UP (ref 3.5–5)
POTASSIUM SERPL-MCNC: 4.6 MMOL/L — SIGNIFICANT CHANGE UP (ref 3.5–5)
POTASSIUM SERPL-SCNC: 4.5 MMOL/L — SIGNIFICANT CHANGE UP (ref 3.5–5)
POTASSIUM SERPL-SCNC: 4.6 MMOL/L — SIGNIFICANT CHANGE UP (ref 3.5–5)
PROT SERPL-MCNC: 6.2 G/DL — SIGNIFICANT CHANGE UP (ref 6–8)
PROTHROM AB SERPL-ACNC: 17.3 SEC — HIGH (ref 9.95–12.87)
PROTHROM AB SERPL-ACNC: 17.8 SEC — HIGH (ref 9.95–12.87)
RBC # BLD: 2.76 M/UL — LOW (ref 4.7–6.1)
RBC # BLD: 2.88 M/UL — LOW (ref 4.7–6.1)
RBC # FLD: 15.9 % — HIGH (ref 11.5–14.5)
RBC # FLD: 16.1 % — HIGH (ref 11.5–14.5)
SODIUM SERPL-SCNC: 140 MMOL/L — SIGNIFICANT CHANGE UP (ref 135–146)
SODIUM SERPL-SCNC: 141 MMOL/L — SIGNIFICANT CHANGE UP (ref 135–146)
WBC # BLD: 8.46 K/UL — SIGNIFICANT CHANGE UP (ref 4.8–10.8)
WBC # BLD: 8.9 K/UL — SIGNIFICANT CHANGE UP (ref 4.8–10.8)
WBC # FLD AUTO: 8.46 K/UL — SIGNIFICANT CHANGE UP (ref 4.8–10.8)
WBC # FLD AUTO: 8.9 K/UL — SIGNIFICANT CHANGE UP (ref 4.8–10.8)

## 2019-10-16 PROCEDURE — 99024 POSTOP FOLLOW-UP VISIT: CPT

## 2019-10-16 PROCEDURE — 99233 SBSQ HOSP IP/OBS HIGH 50: CPT

## 2019-10-16 RX ORDER — SENNA PLUS 8.6 MG/1
2 TABLET ORAL AT BEDTIME
Refills: 0 | Status: DISCONTINUED | OUTPATIENT
Start: 2019-10-16 | End: 2019-10-17

## 2019-10-16 RX ORDER — SODIUM CHLORIDE 9 MG/ML
1000 INJECTION INTRAMUSCULAR; INTRAVENOUS; SUBCUTANEOUS
Refills: 0 | Status: DISCONTINUED | OUTPATIENT
Start: 2019-10-16 | End: 2019-10-16

## 2019-10-16 RX ORDER — DOCUSATE SODIUM 100 MG
100 CAPSULE ORAL
Refills: 0 | Status: DISCONTINUED | OUTPATIENT
Start: 2019-10-16 | End: 2019-10-17

## 2019-10-16 RX ORDER — VANCOMYCIN HCL 1 G
1250 VIAL (EA) INTRAVENOUS EVERY 12 HOURS
Refills: 0 | Status: DISCONTINUED | OUTPATIENT
Start: 2019-10-16 | End: 2019-10-17

## 2019-10-16 RX ORDER — CEFEPIME 1 G/1
2000 INJECTION, POWDER, FOR SOLUTION INTRAMUSCULAR; INTRAVENOUS EVERY 12 HOURS
Refills: 0 | Status: DISCONTINUED | OUTPATIENT
Start: 2019-10-17 | End: 2019-10-17

## 2019-10-16 RX ORDER — CEFEPIME 1 G/1
2000 INJECTION, POWDER, FOR SOLUTION INTRAMUSCULAR; INTRAVENOUS ONCE
Refills: 0 | Status: COMPLETED | OUTPATIENT
Start: 2019-10-16 | End: 2019-10-16

## 2019-10-16 RX ORDER — CEFEPIME 1 G/1
INJECTION, POWDER, FOR SOLUTION INTRAMUSCULAR; INTRAVENOUS
Refills: 0 | Status: DISCONTINUED | OUTPATIENT
Start: 2019-10-16 | End: 2019-10-17

## 2019-10-16 RX ADMIN — Medication 650 MILLIGRAM(S): at 04:45

## 2019-10-16 RX ADMIN — ATORVASTATIN CALCIUM 40 MILLIGRAM(S): 80 TABLET, FILM COATED ORAL at 21:03

## 2019-10-16 RX ADMIN — MORPHINE SULFATE 30 MILLIGRAM(S): 50 CAPSULE, EXTENDED RELEASE ORAL at 05:32

## 2019-10-16 RX ADMIN — Medication 650 MILLIGRAM(S): at 21:02

## 2019-10-16 RX ADMIN — Medication 650 MILLIGRAM(S): at 05:07

## 2019-10-16 RX ADMIN — DOFETILIDE 250 MICROGRAM(S): 0.25 CAPSULE ORAL at 05:03

## 2019-10-16 RX ADMIN — GABAPENTIN 400 MILLIGRAM(S): 400 CAPSULE ORAL at 21:03

## 2019-10-16 RX ADMIN — Medication 325 MILLIGRAM(S): at 11:00

## 2019-10-16 RX ADMIN — Medication 500 MICROGRAM(S): at 12:48

## 2019-10-16 RX ADMIN — GABAPENTIN 400 MILLIGRAM(S): 400 CAPSULE ORAL at 13:10

## 2019-10-16 RX ADMIN — SENNA PLUS 2 TABLET(S): 8.6 TABLET ORAL at 21:02

## 2019-10-16 RX ADMIN — BUDESONIDE AND FORMOTEROL FUMARATE DIHYDRATE 2 PUFF(S): 160; 4.5 AEROSOL RESPIRATORY (INHALATION) at 07:40

## 2019-10-16 RX ADMIN — POLYETHYLENE GLYCOL 3350 17 GRAM(S): 17 POWDER, FOR SOLUTION ORAL at 05:03

## 2019-10-16 RX ADMIN — Medication 10 MILLIGRAM(S): at 23:33

## 2019-10-16 RX ADMIN — LAMOTRIGINE 100 MILLIGRAM(S): 25 TABLET, ORALLY DISINTEGRATING ORAL at 05:03

## 2019-10-16 RX ADMIN — MORPHINE SULFATE 30 MILLIGRAM(S): 50 CAPSULE, EXTENDED RELEASE ORAL at 16:20

## 2019-10-16 RX ADMIN — LORATADINE 10 MILLIGRAM(S): 10 TABLET ORAL at 11:01

## 2019-10-16 RX ADMIN — Medication 650 MILLIGRAM(S): at 21:03

## 2019-10-16 RX ADMIN — QUETIAPINE FUMARATE 50 MILLIGRAM(S): 200 TABLET, FILM COATED ORAL at 21:03

## 2019-10-16 RX ADMIN — Medication 2000 UNIT(S): at 11:00

## 2019-10-16 RX ADMIN — CEFEPIME 100 MILLIGRAM(S): 1 INJECTION, POWDER, FOR SOLUTION INTRAMUSCULAR; INTRAVENOUS at 14:27

## 2019-10-16 RX ADMIN — Medication 100 MILLIGRAM(S): at 17:02

## 2019-10-16 RX ADMIN — Medication 12.5 MILLIGRAM(S): at 05:03

## 2019-10-16 RX ADMIN — PANTOPRAZOLE SODIUM 40 MILLIGRAM(S): 20 TABLET, DELAYED RELEASE ORAL at 07:39

## 2019-10-16 RX ADMIN — TAMSULOSIN HYDROCHLORIDE 0.4 MILLIGRAM(S): 0.4 CAPSULE ORAL at 21:03

## 2019-10-16 RX ADMIN — Medication 81 MILLIGRAM(S): at 11:00

## 2019-10-16 RX ADMIN — Medication 10 MILLIGRAM(S): at 11:00

## 2019-10-16 RX ADMIN — Medication 10 MILLIGRAM(S): at 17:01

## 2019-10-16 RX ADMIN — Medication 1 MILLIGRAM(S): at 11:00

## 2019-10-16 RX ADMIN — MORPHINE SULFATE 30 MILLIGRAM(S): 50 CAPSULE, EXTENDED RELEASE ORAL at 22:26

## 2019-10-16 RX ADMIN — LAMOTRIGINE 100 MILLIGRAM(S): 25 TABLET, ORALLY DISINTEGRATING ORAL at 17:01

## 2019-10-16 RX ADMIN — DOFETILIDE 250 MICROGRAM(S): 0.25 CAPSULE ORAL at 17:01

## 2019-10-16 RX ADMIN — Medication 12.5 MILLIGRAM(S): at 21:03

## 2019-10-16 RX ADMIN — QUETIAPINE FUMARATE 25 MILLIGRAM(S): 200 TABLET, FILM COATED ORAL at 11:00

## 2019-10-16 RX ADMIN — Medication 166.67 MILLIGRAM(S): at 14:26

## 2019-10-16 RX ADMIN — MORPHINE SULFATE 30 MILLIGRAM(S): 50 CAPSULE, EXTENDED RELEASE ORAL at 04:45

## 2019-10-16 RX ADMIN — Medication 2 SPRAY(S): at 12:58

## 2019-10-16 RX ADMIN — BUDESONIDE AND FORMOTEROL FUMARATE DIHYDRATE 2 PUFF(S): 160; 4.5 AEROSOL RESPIRATORY (INHALATION) at 21:02

## 2019-10-16 RX ADMIN — Medication 12.5 MILLIGRAM(S): at 13:10

## 2019-10-16 RX ADMIN — SODIUM CHLORIDE 75 MILLILITER(S): 9 INJECTION INTRAMUSCULAR; INTRAVENOUS; SUBCUTANEOUS at 05:37

## 2019-10-16 RX ADMIN — MORPHINE SULFATE 30 MILLIGRAM(S): 50 CAPSULE, EXTENDED RELEASE ORAL at 23:00

## 2019-10-16 RX ADMIN — GABAPENTIN 400 MILLIGRAM(S): 400 CAPSULE ORAL at 05:03

## 2019-10-16 RX ADMIN — Medication 10 MILLIGRAM(S): at 05:03

## 2019-10-16 NOTE — DIETITIAN INITIAL EVALUATION ADULT. - RD TO REMAIN AVAILABLE
RD to monitor diet order, energy intake, NFPF, body comp, glucose and renal profile. Pt at risk f/u 3 days

## 2019-10-16 NOTE — PROGRESS NOTE ADULT - ASSESSMENT
48 year old male with PMHx of Arrhythmogenic Ventricular Tachycardia and Cardiomyopathy s/p AICD in 2005, HFpEF (EF 55-60%), Afib/Aflutter on ASA 81mg but unsure if on Eliquis, Chronic Hypoxic Respiratory Failure secondary to COPD on 2L O2 PRN, CAD, TIA/CVA, HTN, DLD, Peripheral neuropathy and chronic pain, Multiple Sclerosis, Epilepsy/Seizures, Migraines, GERD, Anemia of Chronic Disease + folate deficiency + Iron deficiency anemia, Constipation, BPH, PTSD, Depression, Bipolar disorder, Schizoaffective Disorder, recently hospitalized from 8/29/19 till 9/11/19 for hematoma of AICD pocket with battery depletion and Riata lead externalization s/p hematoma evacuation on 9/5-9/6 and laser lead extraction and new ICD lead implant, and again recently hospitalized 9/15/19-9/23/19 for trace pericardial effusion with no intervention and RV threshold elevation secondary to lead migration/dislodgement s/p lead replacement and re-positioning, treated for Acute Bronchitis during prior hospitalization with Doxycycline and Prednisone, presenting for AICD firing. Patient also reports that he has been having on/off fevers per the nurses at [facility] with a Tmax of 101 associated with dyspnea on exertion at times.    # Fever probably sec to pericardial effusion  - Blood cultures, UA, urine cultures neg  - Rapid RVP Result: NotDetected (09.17.19 @ 01:00)  - Xray Chest 1 View-PORTABLE IMMEDIATE (10.13.19 @ 02:32) >No radiographic evidence of acute cardiopulmonary disease.  - evaluated by ID: recommend : empiric Vanc 1.25 q12h IV and Cefepime 2g q12h IV  -  pericardial fluid G/S and cultures,    # Pericardial effusion probably hemopericardium  - CT Chest No Cont (10.14.19 @ 20:03) >Moderate to large sized complex pericardial fluid (with question hemopericardium considering Hounsfield units of 59 in some areas), increased since 9/17/2019. Bilateral pleural effusions, larger on the left with compressive atelectasis involving the entire lower lobe and part of the left upper lobe lingular segment.   - Transthoracic Echocardiogram (10.13.19 @ 17:23) >Left ventricular ejection fraction, by visual estimation, is 55 to 60%. Moderate pericardial effusion. Mild-moderate tricuspid regurgitation.  - evaluated by CTS: pericardiocentesis +/- thoracocentesis possible 10/16 or 10/17    # H/o Ventricular tachycardia, H/o Afib/flutter  - s/p ICD with recent lead extraction and post op lead revision after micro dislodgement  - Device interrogation shows inappropriate shock for AFib/AFl with RVR (HR up to 226 bpm).   - c/w metoprolol and  Tikosyn.   - Thyroid Stimulating Hormone, Serum: 1.32 uIU/mL (10.13.19 @ 16:50)  - evaluated by EP    # Acute on chronic anemia, H/o iron and folate deficiency  - S/p 2 units PRBC  - will transfuse with 1 unit PRBC today  - Folate, Serum: 6.1 ng/mL (10.13.19 @ 11:26)  - Vitamin B12, Serum: 377 pg/mL (09.17.19 @ 07:24)  - Iron - Total Binding Capacity.: 346 ug/dL (09.17.19 @ 07:24)  - c/w ferrous sulphate, folic acid    # H/o CAD  - c/w ASA, statin, metoprolol    # H/o CVA  - c/w ASa, statin    # H/o seizures  - seizure precautions  - c/w lamotrigine    # Chronic resp failure on home oxygen  - c/w Spiriva, budesonide    # H/o Multiple sclerosis  - c/w home meds    # H/o chronic pain  - c/w pain meds    # H/o BPH  - c/w flomax    # H/o PTSD, schizoaffective disorder  - c/w home meds    # H/o Vit D deficiency  - Vitamin D, 25-Hydroxy: 16: 30 - 80 ng/mL (08.15.19 @ 08:06)  -Vitamin D, 1, 25-Dihydroxy: 55.9 pg/mL (08.15.19 @ 08:06)  - c/w vit D    # GI/DVT prophylaxis    # Full code    #Pending: pericardiocentesis  # Discussed with patient  # Disposition:

## 2019-10-16 NOTE — DIETITIAN INITIAL EVALUATION ADULT. - PHYSICAL APPEARANCE
BMI 35.1. IBW: 142#+/-10%. Pt says #- timeframe unknown, intentional wt loss. Mid back stage II and R hip stage II pressure injuries noted. No significant physical signs of wasting observed./obese

## 2019-10-16 NOTE — PROGRESS NOTE ADULT - ASSESSMENT
47 yo M with MS, Arrhythmogenic Ventricular Tachycardia and Cardiomyopathy s/p AICD in 2005, HFpEF (EF 55-60%), Afib/Aflutter, Chronic Hypoxic Respiratory Failure secondary to COPD on 2L O2 PRN, CAD, TIA/CVA, HTN, DLD, Peripheral neuropathy and chronic pain, Epilepsy/Seizures, Migraines, GERD, BPH, PTSD, Depression, Bipolar disorder, Schizoaffective Disorder, recently hospitalized from 8/29/19 till 9/11/19 for hematoma of AICD pocket with battery depletion and Riata lead externalization s/p hematoma evacuation on 9/5-9/6 and laser lead extraction and new ICD lead implant, and again recently hospitalized 9/15/19-9/23/19 for trace pericardial effusion with no intervention and RV threshold elevation secondary to lead migration/dislodgement s/p lead replacement and re-positioning, treated for acute Bronchitis during prior hospitalization with Doxycycline and Prednisone, presenting for inappropriate AICD firing, found to have moderate pericardial effusion w questionable hemoperocardium, complicated with fevers that did not respond with antibiotics currently off antibiotics and down trending Hg, s/p pRBC transfusions, hemodynamically stable pending pericardiocentesis +/- thoracentesis.       #Moderate to large sized complex pericardial fluid with question of hemopericardium  -Hg improved today s/p 2 U [RBC, HD stable   -CT surgery on board: will do pericardiocentesis +/- thora today  - CBC q 12 hrs    Febrile tm 101.8 without leukocytosis or sepsis, s/p continuing to spike despite PO abx and IV Ceftriaxone- suspect reactive/inflammatory 2/2 pericardial fluid   -BCX NG   -UA neg  -CXR with pleural effusion, no evidence of PNA  - infectious disease team is following, rec starting cefepime 2g q12H IV if hemodynamic compromise       # AICD Firing  - ICD fire - inappropriate shock for AFib/AFl with RVR (HR up to 226 bpm)  - VT s/p ICD with recent lead extraction and post op lead revision after micro dislodgement  - EP on board  - Continue BB and Tikosyn  - Increase Metoprolol slowly as BP allows: currently on metop 12.5 q8h   - Monitor lytes and keep K>4 and Mg>2    #CAD, h/o CVA/TIA  -c/w aspirin 81, metoprolol  and atorvastatin 40    #H/o Seizures/Epilepsy  -seizure precautions   -c/w lamotrigine 100mg bid    # Chronic Hypoxic Respiratory failure secondary to COPD   -on home O2 2L NC prn;   -c/w  Symbicort, Duonebs and Inhaler    #Essential HTN:    -C/W metoprolol 12.5 tid    DLD: c/w  Atorvastatin 40mg     Multifactorial anemia : AOCD, + RYAN+  Folate deficiency anemia  -c/w Folic acid and ferrous sulfate 325mg qd    #GERD  -pantoprazole 40 qd     #BPH  -c/w  tamsulosin 0.4 qd    # Multiple Sclerosis  -c/w me baclofen 10 q6h and Tecfidera (Dimethyl fumarate) 240mcg qd      #Chronic pain; per pain management,   -c/w clonazepam, gabapentin, morphine IR 30mg q6h prn    #Constipation  -polyethylene glycol bid    #Vitamin D deficiency  cholecalciferol 2000U qd    # PTSD, Depression, Schizoaffective Disorder, Bipolar Disorder  -c/w Lamotrigine, quetiapine     DVT ppx;  Lovenox 40 qd  GI ppx; h/o GERD on PPI   Full code   Dispo; From NH, CT sx today 49 yo M with MS, Arrhythmogenic Ventricular Tachycardia and Cardiomyopathy s/p AICD in 2005, HFpEF (EF 55-60%), Afib/Aflutter, Chronic Hypoxic Respiratory Failure secondary to COPD on 2L O2 PRN, CAD, TIA/CVA, HTN, DLD, Peripheral neuropathy and chronic pain, Epilepsy/Seizures, Migraines, GERD, BPH, PTSD, Depression, Bipolar disorder, Schizoaffective Disorder, recently hospitalized from 8/29/19 till 9/11/19 for hematoma of AICD pocket with battery depletion and Riata lead externalization s/p hematoma evacuation on 9/5-9/6 and laser lead extraction and new ICD lead implant, and again recently hospitalized 9/15/19-9/23/19 for trace pericardial effusion with no intervention and RV threshold elevation secondary to lead migration/dislodgement s/p lead replacement and re-positioning, treated for acute Bronchitis during prior hospitalization with Doxycycline and Prednisone, presenting for inappropriate AICD firing, found to have moderate pericardial effusion w questionable hemoperocardium, complicated with fevers that did not respond with antibiotics currently off antibiotics and down trending Hg, s/p pRBC transfusions, hemodynamically stable pending pericardiocentesis +/- thoracentesis.       #Moderate to large sized complex pericardial fluid with question of hemopericardium  -Hg improved today s/p 2 U [RBC, HD stable   -CT surgery on board: will do pericardiocentesis +/- thora tmrw, NPO MN, keep active T&S, last 10/15  - CBC q 12 hrs: f/u 4pm CBC     Febrile tm 101.8 without leukocytosis or sepsis, s/p continuing to spike despite PO abx and IV Ceftriaxone  - suspect reactive/inflammatory 2/2 pericardial fluid   -BCX NG   -UA neg  -CXR with pleural effusion, no evidence of PNA  - infectious disease team is following, rec starting cefepime 2g q12H IV if hemodynamic compromise     # AICD Firing  - ICD fire - inappropriate shock for AFib/AFl with RVR (HR up to 226 bpm)  - VT s/p ICD with recent lead extraction and post op lead revision after micro dislodgement  - EP on board  - Continue BB and Tikosyn  - Increase Metoprolol slowly as BP allows: currently on metop 12.5 q8h   - Monitor lytes and keep K>4 and Mg>2    #CAD, h/o CVA/TIA  -c/w aspirin 81, metoprolol  and atorvastatin 40    #H/o Seizures/Epilepsy  -seizure precautions   -c/w lamotrigine 100mg bid    # Chronic Hypoxic Respiratory failure secondary to COPD   -on home O2 2L NC prn;   -c/w Symbicort, Duonebs and Inhaler    #Essential HTN:    -C/W metoprolol 12.5 tid    DLD  - c/w  Atorvastatin 40mg     Multifactorial anemia : AOCD, + RYAN+  Folate deficiency anemia  -c/w Folic acid and ferrous sulfate 325mg qd    #GERD  -pantoprazole 40 qd     #BPH  -c/w  tamsulosin 0.4 qd    # Multiple Sclerosis  -c/w me baclofen 10 q6h and Tecfidera (Dimethyl fumarate) 240mcg qd      #Chronic pain; per pain management,   -c/w clonazepam, gabapentin, morphine IR 30mg q6h prn    #Constipation  -polyethylene glycol bid    #Vitamin D deficiency  cholecalciferol 2000U qd    # PTSD, Depression, Schizoaffective Disorder, Bipolar Disorder  -c/w Lamotrigine, quetiapine     DVT ppx;  Lovenox 40 qd  GI ppx; h/o GERD on PPI   Full code   Dispo; From NH, CT sx tmrw, NPO MN 47 yo M with MS, Arrhythmogenic Ventricular Tachycardia and Cardiomyopathy s/p AICD in 2005, HFpEF (EF 55-60%), Afib/Aflutter, Chronic Hypoxic Respiratory Failure secondary to COPD on 2L O2 PRN, CAD, TIA/CVA, HTN, DLD, Peripheral neuropathy and chronic pain, Epilepsy/Seizures, Migraines, GERD, BPH, PTSD, Depression, Bipolar disorder, Schizoaffective Disorder, recently hospitalized from 8/29/19 till 9/11/19 for hematoma of AICD pocket with battery depletion and Riata lead externalization s/p hematoma evacuation on 9/5-9/6 and laser lead extraction and new ICD lead implant, and again recently hospitalized 9/15/19-9/23/19 for trace pericardial effusion with no intervention and RV threshold elevation secondary to lead migration/dislodgement s/p lead replacement and re-positioning, treated for acute Bronchitis during prior hospitalization with Doxycycline and Prednisone, presenting for inappropriate AICD firing, found to have moderate pericardial effusion w questionable hemoperocardium, complicated with fevers that did not respond with antibiotics currently off antibiotics and down trending Hg, s/p pRBC transfusions, hemodynamically stable pending pericardiocentesis +/- thoracentesis.       #Moderate to large sized complex pericardial fluid with question of hemopericardium  -Hg improved today s/p 2 U [RBC, Hg 7.9 today, will transfuse 1U today, f/u 4pm cbc   -CT surgery on board: will do pericardiocentesis +/- thora tmrw, NPO MN, keep active T&S, last 10/15  -CBC q 12 hrs: f/u 4pm CBC     Febrile tm 101.8 without leukocytosis or sepsis, s/p continuing to spike despite PO abx and IV Ceftriaxone  - suspect reactive/inflammatory 2/2 pericardial fluid   -BCX NG   -UA neg  -CXR with pleural effusion, no evidence of PNA  - infectious disease team is following, rec starting cefepime 2g q12H IV if hemodynamic compromise     # AICD Firing  - ICD fire - inappropriate shock for AFib/AFl with RVR (HR up to 226 bpm)  - VT s/p ICD with recent lead extraction and post op lead revision after micro dislodgement  - EP on board  - Continue BB and Tikosyn  - Increase Metoprolol slowly as BP allows: currently on metop 12.5 q8h   - Monitor lytes and keep K>4 and Mg>2    #CAD, h/o CVA/TIA  -c/w aspirin 81, metoprolol  and atorvastatin 40    #H/o Seizures/Epilepsy  -seizure precautions   -c/w lamotrigine 100mg bid    # Chronic Hypoxic Respiratory failure secondary to COPD   -on home O2 2L NC prn;   -c/w Symbicort, Duonebs and Inhaler    #Essential HTN:    -C/W metoprolol 12.5 tid    DLD  - c/w  Atorvastatin 40mg     Multifactorial anemia : AOCD, + RYAN+  Folate deficiency anemia  -c/w Folic acid and ferrous sulfate 325mg qd    #GERD  -pantoprazole 40 qd     #BPH  -c/w  tamsulosin 0.4 qd    # Multiple Sclerosis  -c/w me baclofen 10 q6h and Tecfidera (Dimethyl fumarate) 240mcg qd      #Chronic pain; per pain management,   -c/w clonazepam, gabapentin, morphine IR 30mg q6h prn    #Constipation  -polyethylene glycol bid    #Vitamin D deficiency  cholecalciferol 2000U qd    # PTSD, Depression, Schizoaffective Disorder, Bipolar Disorder  -c/w Lamotrigine, quetiapine     DVT ppx;  Lovenox 40 qd  GI ppx; h/o GERD on PPI   Full code   Dispo; From NH, CT sx tmrw, NPO MN 47 yo M with MS, Arrhythmogenic Ventricular Tachycardia and Cardiomyopathy s/p AICD in 2005, HFpEF (EF 55-60%), Afib/Aflutter, Chronic Hypoxic Respiratory Failure secondary to COPD on 2L O2 PRN, CAD, TIA/CVA, HTN, DLD, Peripheral neuropathy and chronic pain, Epilepsy/Seizures, Migraines, GERD, BPH, PTSD, Depression, Bipolar disorder, Schizoaffective Disorder, recently hospitalized from 8/29/19 till 9/11/19 for hematoma of AICD pocket with battery depletion and Riata lead externalization s/p hematoma evacuation on 9/5-9/6 and laser lead extraction and new ICD lead implant, and again recently hospitalized 9/15/19-9/23/19 for trace pericardial effusion with no intervention and RV threshold elevation secondary to lead migration/dislodgement s/p lead replacement and re-positioning, treated for acute Bronchitis during prior hospitalization with Doxycycline and Prednisone, presenting for inappropriate AICD firing, found to have moderate pericardial effusion w questionable hemoperocardium, complicated with fevers that did not respond with antibiotics currently off antibiotics and down trending Hg, s/p pRBC transfusions, hemodynamically stable pending pericardiocentesis +/- thoracentesis.       #Moderate to large sized complex pericardial fluid with question of hemopericardium  -Hg improved today s/p 2 U [RBC, Hg 7.9 today, will transfuse 1U today, f/u 4pm cbc   -CT surgery on board: will do pericardiocentesis +/- thora tmrw, NPO MN, keep active T&S, last 10/15  -CBC q 12 hrs: f/u 4pm CBC     Febrile tm 101.8 without leukocytosis or sepsis, s/p continuing to spike despite PO abx and IV Ceftriaxone  - suspect reactive/inflammatory 2/2 pericardial fluid   -BCX NG   -UA neg  -CXR with pleural effusion, no evidence of PNA  - infectious disease team is following, empiric Vanc 1.25 q12h IV and Cefepime 2g q12h IV (started 10/16, (van trough for fri am), to send pericardial fluid G/S and cultures, will d/c ABX if negative     # AICD Firing  - ICD fire -per EP:  inappropriate shock for AFib/AFl with RVR (HR up to 226 bpm)  - VT s/p ICD with recent lead extraction and post op lead revision after micro dislodgement  - EP on board  - Continue BB and Tikosyn  - Increase Metoprolol slowly as BP allows: currently on metop 12.5 q8h   - Monitor lytes and keep K>4 and Mg>2    #CAD, h/o CVA/TIA  -c/w aspirin 81, metoprolol  and atorvastatin 40    #H/o Seizures/Epilepsy  -seizure precautions   -c/w lamotrigine 100mg bid    # Chronic Hypoxic Respiratory failure secondary to COPD   -on home O2 2L NC prn;   -c/w Symbicort, Duonebs and Inhaler    #Essential HTN:    -C/W metoprolol 12.5 tid    DLD  - c/w  Atorvastatin 40mg     Multifactorial anemia : AOCD, + RYAN+  Folate deficiency anemia  -c/w Folic acid and ferrous sulfate 325mg qd    #GERD  -pantoprazole 40 qd     #BPH  -c/w  tamsulosin 0.4 qd    # Multiple Sclerosis  -c/w me baclofen 10 q6h and Tecfidera (Dimethyl fumarate) 240mcg qd      #Chronic pain; per pain management,   -c/w clonazepam, gabapentin, morphine IR 30mg q6h prn    #Constipation  -polyethylene glycol bid    #Vitamin D deficiency  cholecalciferol 2000U qd    # PTSD, Depression, Schizoaffective Disorder, Bipolar Disorder  -c/w Lamotrigine, quetiapine     DVT ppx;  Lovenox 40 qd  GI ppx; h/o GERD on PPI   Full code   Dispo; From NH, CT sx tmrw, NPO MN 49 yo M with MS, Arrhythmogenic Ventricular Tachycardia and Cardiomyopathy s/p AICD in 2005, HFpEF (EF 55-60%), Afib/Aflutter, Chronic Hypoxic Respiratory Failure secondary to COPD on 2L O2 PRN, CAD, TIA/CVA, HTN, DLD, Peripheral neuropathy and chronic pain, Epilepsy/Seizures, Migraines, GERD, BPH, PTSD, Depression, Bipolar disorder, Schizoaffective Disorder, recently hospitalized from 8/29/19 till 9/11/19 for hematoma of AICD pocket with battery depletion and Riata lead externalization s/p hematoma evacuation on 9/5-9/6 and laser lead extraction and new ICD lead implant, and again recently hospitalized 9/15/19-9/23/19 for trace pericardial effusion with no intervention and RV threshold elevation secondary to lead migration/dislodgement s/p lead replacement and re-positioning, treated for acute Bronchitis during prior hospitalization with Doxycycline and Prednisone, presenting for inappropriate AICD firing, found to have moderate pericardial effusion w questionable hemoperocardium, complicated with fevers that did not respond with antibiotics currently off antibiotics and down trending Hg, s/p pRBC transfusions, hemodynamically stable pending pericardiocentesis +/- thoracentesis.       #Moderate to large sized complex pericardial fluid with question of hemopericardium  -Hg improved today s/p 2 U [RBC, Hg 7.9 today, will transfuse 1U today, f/u 4pm cbc   -CT surgery on board: will do pericardiocentesis +/- thora tmrw, NPO MN, keep active T&S, last 10/15  -CBC q 12 hrs: f/u 4pm CBC     Febrile tm 101.8 without leukocytosis or sepsis, s/p continuing to spike despite PO abx and IV Ceftriaxone  - suspect reactive/inflammatory 2/2 pericardial fluid   -BCX NG   -UA neg  -CXR with pleural effusion, no evidence of PNA  - infectious disease team is following, empiric Vanc 1.25 q12h IV and Cefepime 2g q12h IV (started 10/16, (van trough for fri am), to send pericardial fluid G/S and cultures, will d/c ABX if negative     # AICD Firing  - ICD fire -per EP:  inappropriate shock for AFib/AFl with RVR (HR up to 226 bpm)  - VT s/p ICD with recent lead extraction and post op lead revision after micro dislodgement  - EP on board  - Continue BB and Tikosyn  - Increase Metoprolol slowly as BP allows: currently on metop 12.5 q8h   - Monitor lytes and keep K>4 and Mg>2    #CAD, h/o CVA/TIA  -c/w aspirin 81, metoprolol  and atorvastatin 40    #H/o Seizures/Epilepsy  -seizure precautions   -c/w lamotrigine 100mg bid    # Chronic Hypoxic Respiratory failure secondary to COPD   -on home O2 2L NC prn;   -c/w Symbicort, Duonebs and Inhaler    #Essential HTN:    -C/W metoprolol 12.5 tid    DLD  - c/w  Atorvastatin 40mg     Multifactorial anemia : AOCD, + RYAN+  Folate deficiency anemia  -c/w Folic acid and ferrous sulfate 325mg qd    #GERD  -pantoprazole 40 qd     #BPH  -c/w  tamsulosin 0.4 qd    # Multiple Sclerosis  -c/w me baclofen 10 q6h and Tecfidera (Dimethyl fumarate) 240mcg qd      #Chronic pain; per pain management,   -c/w clonazepam, gabapentin, morphine IR 30mg q6h prn    #Constipation  -polyethylene glycol bid    #Vitamin D deficiency  cholecalciferol 2000U qd    # PTSD, Depression, Schizoaffective Disorder, Bipolar Disorder  -c/w Lamotrigine, quetiapine     DVT ppx;  Lovenox 40 qd  GI ppx; h/o GERD on PPI   Full code   Dispo; From NH, CT sx tmrw, NPO [place] , speech and swallow eval for current diet

## 2019-10-16 NOTE — DIETITIAN INITIAL EVALUATION ADULT. - ENERGY NEEDS
estimated energy needs: 1805-2355kcal (MSJx1.0-1.3AF) accounts for obese bmi, bedbound status, pressure injuries stage II  estimated protein needs:77-90g (1.2-1/4g/kg) as above  estimated fluid needs: per LIP

## 2019-10-16 NOTE — PROGRESS NOTE ADULT - SUBJECTIVE AND OBJECTIVE BOX
Patient is a 48y old  Male who presents with a chief complaint of AICD firing (16 Oct 2019 10:58)    Patient was seen and examined.  Pt was febrile yesterday  Denies chest pain, sob, palpitation  Denies any other complaints.  All systems reviewed    PAST MEDICAL & SURGICAL HISTORY:  Vitamin D deficiency  Constipation  Chronic respiratory failure: secondary to COPD  Folate-deficiency anemia  Iron deficiency anemia  Anemia of chronic disease  Pericardial effusion without cardiac tamponade: h/o trace pericardial effusion  BPH (benign prostatic hyperplasia)  Chronic pain  Peripheral neuropathy  Type 2 diabetes mellitus  AICD (automatic cardioverter/defibrillator) present  Ventricular tachycardia  Chronic heart failure with preserved ejection fraction  PTSD (post-traumatic stress disorder)  Supraventricular arrhythmia: prior history  Cardiomyopathy  Atrial fibrillation: s/p ablation, on eliquis  HTN (hypertension)  Seizures  Migraines  Pneumonia  MS (multiple sclerosis)  CAD (coronary artery disease)  Bipolar disorder  Anginal pain  Schizo affective schizophrenia  GERD (gastroesophageal reflux disease)  Seasonal allergies  Hypercholesteremia  COPD (chronic obstructive pulmonary disease)  CVA (cerebral vascular accident)  TIA (transient ischemic attack)  Clinical Depression  Displacement of electrode lead of cardiac pacemaker  History of evacuation of hematoma: AICD pocket  H/O prior ablation treatment: for Afib  H/O hernia repair: right 1972,1991  History of hip replacement  S/P Orchiectomy: L for testicular CA  S/P Implantation of AICD      Allergies  dexmethylphenidate (Unknown)  Dilantin (Rash)  dilantin, compazine, neurontin, ritalin, phenergan (Unknown)  Haldol (Unknown)  hydantoin derivatives (Other)  methylphenidate (Unknown)  phenytoin (Unknown)  prochlorperazine (Unknown)  promethazine (Dystonic RXN)  rash/hives (Anaphylaxis)  thioxanthenes (Unknown)    MEDICATIONS  (STANDING):  aspirin enteric coated 81 milliGRAM(s) Oral daily  atorvastatin 40 milliGRAM(s) Oral at bedtime  baclofen 10 milliGRAM(s) Oral four times a day  buDESOnide 160 MICROgram(s)/formoterol 4.5 MICROgram(s) Inhaler 2 Puff(s) Inhalation two times a day  cholecalciferol 2000 Unit(s) Oral daily  docusate sodium 100 milliGRAM(s) Oral two times a day  dofetilide 250 MICROGram(s) Oral two times a day  enoxaparin Injectable 40 milliGRAM(s) SubCutaneous daily  ferrous    sulfate 325 milliGRAM(s) Oral daily  fluticasone propionate (50 MICROgram(s)/actuation) Nasal Spray - Peds 2 Spray(s) Alternating Nostrils daily  folic acid 1 milliGRAM(s) Oral daily  gabapentin 400 milliGRAM(s) Oral three times a day  influenza   Vaccine 0.5 milliLiter(s) IntraMuscular once  lamoTRIgine 100 milliGRAM(s) Oral two times a day  loratadine 10 milliGRAM(s) Oral daily  metoprolol tartrate 12.5 milliGRAM(s) Oral every 8 hours  pantoprazole    Tablet 40 milliGRAM(s) Oral before breakfast  polyethylene glycol 3350 17 Gram(s) Oral every 12 hours  QUEtiapine 25 milliGRAM(s) Oral daily  QUEtiapine 50 milliGRAM(s) Oral at bedtime  senna 2 Tablet(s) Oral at bedtime  tamsulosin 0.4 milliGRAM(s) Oral at bedtime    MEDICATIONS  (PRN):  acetaminophen   Tablet .. 650 milliGRAM(s) Oral every 6 hours PRN Temp greater or equal to 38C (100.4F), Mild Pain (1 - 3)  clonazePAM  Tablet 0.5 milliGRAM(s) Oral at bedtime PRN agitation  ipratropium    for Nebulization 500 MICROGram(s) Nebulizer every 6 hours PRN Shortness of Breath and/or Wheezing  morphine  IR 30 milliGRAM(s) Oral every 6 hours PRN Moderate Pain (4 - 6)    Vital Signs Last 24 Hrs  T(C): 38.8  T(F): 101.8  HR: 65  BP: 116/67  BP(mean): --  RR: 18  SpO2: 94%    O/E:  Awake, alert, not in distress.  HEENT: atraumatic, EOMI.  Chest: decreased breath sounds  CVS: SIS2 +, no murmur.  P/A: Soft, BS+  CNS: non focal.  Ext: no edema feet.  Skin: no rash, no ulcers.  All systems reviewed positive findings as above.    POCT Blood Glucose.: 117 mg/dL (16 Oct 2019 07:29)                          7.9<L>  8.46  )-----------( 422<H>    ( 16 Oct 2019 06:31 )             26.0<L>                        7.5<L>  8.90  )-----------( 419<H>    ( 16 Oct 2019 01:09 )             25.0<L>    10-16    141  |  102  |  13  ----------------------------<  124<H>  4.5   |  26  |  1.0  10-15    140  |  101  |  14  ----------------------------<  125<H>  4.4   |  28  |  0.9    Ca    8.9      16 Oct 2019 06:31  Ca    8.8      15 Oct 2019 22:14  Ca    8.7      15 Oct 2019 05:17  Ca    8.7      15 Oct 2019 02:27  Phos  3.6     10-15  Mg     2.4     10-16    TPro  6.2  /  Alb  3.4<L>  /  TBili  0.7  /  DBili  x   /  AST  17  /  ALT  14  /  AlkPhos  137<H>  10-16  TPro  6.0  /  Alb  3.4<L>  /  TBili  0.5  /  DBili  x   /  AST  17  /  ALT  14  /  AlkPhos  134<H>  10-15  TPro  6.1  /  Alb  3.5  /  TBili  0.5  /  DBili  x   /  AST  16  /  ALT  15  /  AlkPhos  130<H>  10-15          PT/INR - ( 16 Oct 2019 01:09 )   PT: 17.80 sec;   INR: 1.55 ratio         PTT - ( 15 Oct 2019 22:14 )  PTT:35.7 sec      Culture - Urine (collected 13 Oct 2019 16:11)  Source: .Urine Clean Catch (Midstream)  Final Report (14 Oct 2019 17:26):    No growth

## 2019-10-16 NOTE — PROGRESS NOTE ADULT - ASSESSMENT
Assessment:  49 yo male with extensive medical problems, on home O2, presented to the ED s/p inappropriate firing of AICD now found to have a moderately sized pericardial effusion    Plan:  - pericardiocentesis +/- thoracocentesis possible 10/16 or 10/17

## 2019-10-16 NOTE — DIETITIAN INITIAL EVALUATION ADULT. - FACTORS AFF FOOD INTAKE
Pt reports fair appetite and PO intake since admission, consumed ~50% of meal trays yesterday. Says he has had a decrease in appetite and often sleeps through meals. PTA pt has very good appetite and intake, reports attempting to lose wt recently. Desires Ensure enlive (chocolate) until intake returns to baseline. NKFA. Pt reports not needing nectar thickened liquids and is agitated about it. Wants SLP c/s. Says he takes supplements PTA but cannot specify. NKFA. Last BM PTA- says this is normal for him, LIP aware.

## 2019-10-16 NOTE — PROGRESS NOTE ADULT - ASSESSMENT
ASSESSMENT  47 yo M with MS, Arrhythmogenic Ventricular Tachycardia and Cardiomyopathy s/p AICD in 2005, HFpEF (EF 55-60%), Afib/Aflutter, Chronic Hypoxic Respiratory Failure secondary to COPD on 2L O2 PRN, CAD, TIA/CVA, HTN, DLD, Peripheral neuropathy and chronic pain, Epilepsy/Seizures, Migraines, GERD, BPH, PTSD, Depression, Bipolar disorder, Schizoaffective Disorder, recently hospitalized from 8/29/19 till 9/11/19 for hematoma of AICD pocket with battery depletion and Riata lead externalization s/p hematoma evacuation on 9/5-9/6 and laser lead extraction and new ICD lead implant, and again recently hospitalized 9/15/19-9/23/19 for trace pericardial effusion with no intervention and RV threshold elevation secondary to lead migration/dislodgement s/p lead replacement and re-positioning, treated for Acute Bronchitis during prior hospitalization with Doxycycline and Prednisone, presenting for AICD firing    IMPRESSION  #Febrile tm 102.4 without leukocytosis or sepsis, s/p continuing to spike despite PO abx and IV Ceftriaxone- suspect reactive/inflammatory 2/2 pericardial fluid     BCX NG x3    UA neg    CXR with pleural effusion, no evidence of PNA  #Moderate to large sized complex pericardial fluid with question of hemopericardium, downtrending hgb     suspect   #B/l pleural effusions & atelectasis  #Sepsis ruled out on admission   #Obesity BMI (kg/m2): 35.1    RECOMMENDATIONS  - OK for empiric Vanc 1.25 q12h IV and Cefepime 2g q12h IV  - Please send pericardial fluid G/S and cultures, will d/c ABX if negative   - Plan for drainage  - Monitor WBC Hgb    Spectra [phone]

## 2019-10-16 NOTE — DIETITIAN INITIAL EVALUATION ADULT. - OTHER INFO
Pt presented to ED for inappropriate AICD firing. Pt found to have moderate pericardial effusion with questionable hemoperocardium, complicated with fevers that did not respond to abx. To transfuse IU today, CT surgery to do pericardiocentesis tomorrow. Noted hx includes, CAD, COPD, DLD, GERD, MS, Constipation, vit D def.

## 2019-10-16 NOTE — PROGRESS NOTE ADULT - SUBJECTIVE AND OBJECTIVE BOX
CUATE HORNE  48y, Male  Allergy: dexmethylphenidate (Unknown)  Dilantin (Rash)  dilantin, compazine, neurontin, ritalin, phenergan (Unknown)  Haldol (Unknown)  hydantoin derivatives (Other)  methylphenidate (Unknown)  phenytoin (Unknown)  prochlorperazine (Unknown)  promethazine (Dystonic RXN)  rash/hives (Anaphylaxis)  thioxanthenes (Unknown)      CHIEF COMPLAINT: AICD firing (16 Oct 2019 07:33)      INTERVAL EVENTS/HPI  - No acute events overnight  - T(F): , Max: 102 (10-15-19 @ 13:48)  - /56  - Denies any worsening symptoms  - Tolerating medication  - WBC Count: 8.46 (10-16-19 @ 06:31)      ROS  General: Denies rigors, nightsweats  HEENT: Denies headache, rhinorrhea, sore throat, eye pain  CV: Denies CP, palpitations  PULM: Denies SOB, wheezing  GI: Denies hematemesis, hematochezia, melena  : Denies discharge, hematuria  MSK: Denies arthralgias, myalgias  SKIN: Denies rash, lesions  NEURO: Denies paresthesias, weakness  PSYCH: Denies depression, anxiety    VITALS:  T(F): 101.8, Max: 102 (10-15-19 @ 13:48)  HR: 65  BP: 116/67  RR: 18Vital Signs Last 24 Hrs  T(C): 38.8 (16 Oct 2019 04:43), Max: 38.9 (15 Oct 2019 13:48)  T(F): 101.8 (16 Oct 2019 04:43), Max: 102 (15 Oct 2019 13:48)  HR: 65 (16 Oct 2019 04:43) (60 - 67)  BP: 116/67 (16 Oct 2019 04:43) (109/56 - 132/61)  BP(mean): --  RR: 18 (16 Oct 2019 04:43) (18 - 18)  SpO2: 94% (15 Oct 2019 17:58) (94% - 94%)      PHYSICAL EXAM:  Gen: chronically ill appearing NAD, resting in bed  HEENT: Normocephalic, atraumatic  Neck: supple, no lymphadenopathy  CV: Regular rate & regular rhythm, L chest incision cdi   Lungs: decreased BS at bases, no fremitus  Abdomen: Soft, BS present  Ext: Warm, well perfused  Neuro: non focal, awake  Skin: no rash, no erythema  Lines: no phlebitis      FH: Non-contributory  Social Hx: Non-contributory    TESTS & MEASUREMENTS:                        7.9    8.46  )-----------( 422      ( 16 Oct 2019 06:31 )             26.0     10-16    141  |  102  |  13  ----------------------------<  124<H>  4.5   |  26  |  1.0    Ca    8.9      16 Oct 2019 06:31  Phos  3.6     10-15  Mg     2.4     10-16    TPro  6.2  /  Alb  3.4<L>  /  TBili  0.7  /  DBili  x   /  AST  17  /  ALT  14  /  AlkPhos  137<H>  10-16    eGFR if Non African American: 90 mL/min/1.73M2 (10-16-19 @ 06:31)  eGFR if African American: 104 mL/min/1.73M2 (10-16-19 @ 06:31)  eGFR if Non African American: 102 mL/min/1.73M2 (10-15-19 @ 22:14)  eGFR if African American: 118 mL/min/1.73M2 (10-15-19 @ 22:14)    LIVER FUNCTIONS - ( 16 Oct 2019 06:31 )  Alb: 3.4 g/dL / Pro: 6.2 g/dL / ALK PHOS: 137 U/L / ALT: 14 U/L / AST: 17 U/L / GGT: x               Culture - Urine (collected 10-13-19 @ 16:11)  Source: .Urine Clean Catch (Midstream)  Final Report (10-14-19 @ 17:26):    No growth    Culture - Blood (collected 10-13-19 @ 02:13)  Source: .Blood Blood-Peripheral  Preliminary Report (10-14-19 @ 07:00):    No growth to date.    Culture - Blood (collected 10-13-19 @ 02:13)  Source: .Blood Blood-Peripheral  Preliminary Report (10-14-19 @ 07:00):    No growth to date.        Lactate, Blood: 0.7 mmol/L (10-15-19 @ 01:51)  Blood Gas Venous - Lactate: 0.9 mmoL/L (10-13-19 @ 03:44)      INFECTIOUS DISEASES TESTING  Rapid RVP Result: NotDetec (09-17-19 @ 01:00)  MRSA PCR Result.: Negative (09-16-19 @ 22:29)  HIV-1/2 Combo Result: Nonreact (08-14-19 @ 05:37)      RADIOLOGY & ADDITIONAL TESTS:  I have personally reviewed the last Chest xray  CXR      CT  CT Chest No Cont:   EXAM:  CT CHEST            PROCEDURE DATE:  10/14/2019            INTERPRETATION:  CLINICAL HISTORY/ REASON FOR EXAM: Pericardial effusion.    TECHNIQUE: Multislice helical sections were obtained from the thoracic   inlet to the lung bases without contrast administration. Coronal and   sagittal reformatted images are also submitted.    COMPARISON: CT chest 9/17/2019.      FINDINGS:    LUNGS, PLEURA AND AIRWAYS: No obstructing endobronchial lesions.   Bilateral pleural effusions, larger on the left with compressive   atelectasis involving the entire lower lobe and part of the left upper   lobe lingular segment. Compressive atelectasis at the right lung base.    MEDIASTINUM/LYMPH NODES: No supraclavicular, mediastinal, hilar or   axillary adenopathy.     HEART/GREAT VESSELS: Moderate to large sized pericardial complex fluid,   increased since 9/17/2019 with Hounsfield units measuring up to 59 (in   anterior and lateral aspect of right ventricle) and in some areas   particularly in the posterior dependent aspect of the Hounsfield unit of   the fluid measures up to 5 as simple fluid. Metallic streaky artifact   from the ICD leads and generator. Limited evaluation of cardiac chambers   to exclude cardiac tamponade. AICD wires visualized.Thoracic aorta and   main pulmonary arteries are normal in caliber. Aortic arch demonstrates   normal branching pattern. Atherosclerotic disease, including coronary   artery calcifications.    BONES/SOFT TISSUES: Multilevel degenerative changes of the thoracic spine.    VISUALIZED UPPER ABDOMEN: No focal abnormalities.    IMPRESSION:    1.  Moderate to large sized complex pericardial fluid (with question   hemopericardium considering Hounsfield units of 59 in some areas),   increased since 9/17/2019. Lack of intravenous contrast with suboptimal   evaluation of cardiac chambers causing limitation to exclude cardiac   tamponade. Recommend further evaluation by echocardiogram.    2.  Bilateral pleural effusions, larger on the left with compressive   atelectasis involving the entire lower lobe and part of the left upper   lobe lingular segment.       Spoke with OPHELIA TAYLOR on 10/15/2019 7:01 AM with readback.              ANDRES DOTSON M.D., RESIDENT RADIOLOGIST  This document has been electronically signed.  KADI WATERS M.D., ATTENDING RADIOLOGIST  This document has been electronically signed. Oct 15 2019  7:01AM             (10-14-19 @ 20:03)      CARDIOLOGY TESTING  Transthoracic Echocardiogram:    EXAM:  2-D ECHO (TTE) COMPLETE        PROCEDURE DATE:  10/13/2019      INTERPRETATION:  REPORT:    TRANSTHORACIC ECHOCARDIOGRAM REPORT         Patient Name:   CUATE HORNE Accession #: [id number]  Medical Rec #:  [id number]   Height:      66.0 in 167.6 cm  YOB: 1970  Weight:      230.0 lb 104.33 kg  Patient Age:    48 years    BSA:         2.12 m²  Patient Gender: M           BP:          100/50 mmHg       Date of Exam:        10/13/2019 5:23:28 PM  Referring Physician: [id number] DWAYNE DUMONT  Sonographer:         Juana Donaldson  Reading Physician:   Zeferino Briones M.D.    Procedure:     2D Echo/Doppler/Color Doppler Complete.  Indications:   I31.3 - Pericardial Effusion noninflammatory  Diagnosis:     Pericardial effusion (noninflammatory) - I31.3  Study Details: Technically good study.         Summary:   1. Left ventricular ejection fraction, by visual estimation, is 55 to   60%.   2. Technically good study.   3. Normal global left ventricular systolic function.  4. Normal left ventricular internal cavity size.   5. Moderate pericardial effusion.   6. Mild thickening of the anterior and posterior mitral valve leaflets.   7. Mild-moderate tricuspid regurgitation.    PHYSICIAN INTERPRETATION:  Left Ventricle: The left ventricular internal cavity size is normal. Left   ventricular wall thickness is normal. Global LV systolic function was   normal. Left ventricular ejection fraction, by visual estimation, is 55   to 60%.  Right Ventricle: The right ventricular size is normal. RV systolic   function is normal.  Left Atrium: Normal left atrial size.  Right Atrium: Normal right atrial size.  Pericardium: A moderately sized pericardial effusion is present. The   pericardial effusion is globally located aroundthe entire heart. There   is no evidence of cardiac tamponade.  Mitral Valve: Mild thickening of the anterior and posterior mitral valve   leaflets. Mild mitral valve regurgitation is seen.  Tricuspid Valve: Mild-moderate tricuspid regurgitation is visualized.  Aortic Valve: The aortic valve is trileaflet.  Pulmonic Valve: The pulmonic valve is thickened with good excursion.   Trace pulmonic valve regurgitation.  Venous: The inferior vena cava was dilated, with respiratory size   variation greater than 50%.  Additional Comments: A pacer wire is visualized in the right ventricle   and right atrium.       2D AND M-MODE MEASUREMENTS (normal ranges within parentheses):  Left Ventricle:                  Normal  IVSd (2D):              0.82 cm (0.7-1.1)  LVPWd (2D):             0.73 cm (0.7-1.1)  LVIDd (2D):             5.20 cm (3.4-5.7)  LVIDs (2D):             3.40 cm  LV FS (2D):             34.5 %   (>25%)  Relative Wall Thickness  0.28    (<0.42)    SPECTRAL DOPPLER ANALYSIS:  LV DIASTOLICFUNCTION:  MV Peak E: 0.99 m/s Decel Time: 247 msec  MV Peak A: 0.39 m/s  E/A Ratio: 2.55    Aortic Valve:  AoV VMax:    1.47 m/s  AoV Pk Grad: 8.6 mmHg    LVOT Vmax: 1.09 m/s  LVOT VTI:  0.23 m    Mitral Valve:  MV P1/2 Time: 71.57 msec  MV Area, PHT: 3.07 cm²    Tricuspid Valve and PA/RV Systolic Pressure: TR Max Velocity: 2.64 m/s RA   Pressure:  RVSP/PASP:       [id number] Zeferino Briones M.D., Electronically signed on 10/14/2019 at   7:25:51 AM              *** Final ***                    MARLA NERI  This document has been electronically signed. Oct 13 2019  5:23PM             (10-13-19 @ 17:23)  12 Lead ECG:   Ventricular Rate 60 BPM    Atrial Rate 60 BPM    P-R Interval 178 ms    QRS Duration 138 ms    Q-T Interval 436 ms    QTC Calculation(Bezet) 436 ms    P Axis -8 degrees    R Axis 5 degrees    T Axis -38 degrees    Diagnosis Line Atrial-paced rhythm  Right bundle branch block  Abnormal ECG    Confirmed by ZEFERINO BRIONES MD ([id number]) on 10/13/2019 3:54:20 PM (10-13-19 @ 09:44)      MEDICATIONS  aspirin enteric coated 81  atorvastatin 40  baclofen 10  buDESOnide 160 MICROgram(s)/formoterol 4.5 MICROgram(s) Inhaler 2  cholecalciferol 2000  docusate sodium 100  dofetilide 250  enoxaparin Injectable 40  ferrous    sulfate 325  fluticasone propionate (50 MICROgram(s)/actuation) Nasal Spray - Peds 2  folic acid 1  gabapentin 400  influenza   Vaccine 0.5  lamoTRIgine 100  loratadine 10  metoprolol tartrate 12.5  pantoprazole    Tablet 40  polyethylene glycol 3350 17  QUEtiapine 25  QUEtiapine 50  senna 2  tamsulosin 0.4      ANTIBIOTICS:      All available historical records have been reviewed

## 2019-10-16 NOTE — PROGRESS NOTE ADULT - SUBJECTIVE AND OBJECTIVE BOX
SUBJECTIVE:    Patient is a 48y old Male who presents with a chief complaint of AICD firing (16 Oct 2019 03:08)    Currently admitted to medicine with the primary diagnosis of AICD malfunction     Today is hospital day 3d. Non verbal with nods and responds to commands. Denies chest pain, palpitations, shortness of breath.   INTERVAL EVENTS: s/p 1 addl U of pRBC     PAST MEDICAL & SURGICAL HISTORY  Vitamin D deficiency  Constipation  Chronic respiratory failure: secondary to COPD  Folate-deficiency anemia  Iron deficiency anemia  Anemia of chronic disease  Pericardial effusion without cardiac tamponade: h/o trace pericardial effusion  BPH (benign prostatic hyperplasia)  Chronic pain  Peripheral neuropathy  Type 2 diabetes mellitus  AICD (automatic cardioverter/defibrillator) present  Ventricular tachycardia  Chronic heart failure with preserved ejection fraction  PTSD (post-traumatic stress disorder)  Supraventricular arrhythmia: prior history  Cardiomyopathy  CHF (congestive heart failure)  Atrial fibrillation: s/p ablation, on eliquis  BPH (benign prostatic hyperplasia)  HTN (hypertension)  Seizures  Migraines  Pneumonia  MS (multiple sclerosis)  CAD (coronary artery disease)  Bipolar disorder  Anginal pain  Schizo affective schizophrenia  GERD (gastroesophageal reflux disease)  Seasonal allergies  Hypercholesteremia  COPD (chronic obstructive pulmonary disease)  CVA (cerebral vascular accident)  TIA (transient ischemic attack)  Peripheral Neuropathy  Clinical Depression  Displacement of electrode lead of cardiac pacemaker  History of evacuation of hematoma: AICD pocket  H/O prior ablation treatment: for Afib  H/O hernia repair: right 1972,1991  History of hip replacement  S/P Orchiectomy: L for testicular CA  S/P Implantation of AICD      ALLERGIES:  dexmethylphenidate (Unknown)  Dilantin (Rash)  dilantin, compazine, neurontin, ritalin, phenergan (Unknown)  Haldol (Unknown)  hydantoin derivatives (Other)  methylphenidate (Unknown)  phenytoin (Unknown)  prochlorperazine (Unknown)  promethazine (Dystonic RXN)  rash/hives (Anaphylaxis)  thioxanthenes (Unknown)    MEDICATIONS:  STANDING MEDICATIONS  aspirin enteric coated 81 milliGRAM(s) Oral daily  atorvastatin 40 milliGRAM(s) Oral at bedtime  baclofen 10 milliGRAM(s) Oral four times a day  buDESOnide 160 MICROgram(s)/formoterol 4.5 MICROgram(s) Inhaler 2 Puff(s) Inhalation two times a day  cholecalciferol 2000 Unit(s) Oral daily  dofetilide 250 MICROGram(s) Oral two times a day  enoxaparin Injectable 40 milliGRAM(s) SubCutaneous daily  ferrous    sulfate 325 milliGRAM(s) Oral daily  fluticasone propionate (50 MICROgram(s)/actuation) Nasal Spray - Peds 2 Spray(s) Alternating Nostrils daily  folic acid 1 milliGRAM(s) Oral daily  gabapentin 400 milliGRAM(s) Oral three times a day  influenza   Vaccine 0.5 milliLiter(s) IntraMuscular once  lamoTRIgine 100 milliGRAM(s) Oral two times a day  loratadine 10 milliGRAM(s) Oral daily  metoprolol tartrate 12.5 milliGRAM(s) Oral every 8 hours  pantoprazole    Tablet 40 milliGRAM(s) Oral before breakfast  polyethylene glycol 3350 17 Gram(s) Oral every 12 hours  QUEtiapine 25 milliGRAM(s) Oral daily  QUEtiapine 50 milliGRAM(s) Oral at bedtime  sodium chloride 0.9%. 1000 milliLiter(s) IV Continuous <Continuous>  tamsulosin 0.4 milliGRAM(s) Oral at bedtime  tiotropium 18 MICROgram(s) Capsule 1 Capsule(s) Inhalation daily    PRN MEDICATIONS  acetaminophen   Tablet .. 650 milliGRAM(s) Oral every 6 hours PRN  clonazePAM  Tablet 0.5 milliGRAM(s) Oral at bedtime PRN  ipratropium    for Nebulization 500 MICROGram(s) Nebulizer every 6 hours PRN  morphine  IR 30 milliGRAM(s) Oral every 6 hours PRN    VITALS:   T(F): 101.8  HR: 65  BP: 116/67  RR: 18  SpO2: 94%    LABS:                        7.9    8.46  )-----------( 422      ( 16 Oct 2019 06:31 )             26.0     10-15    140  |  101  |  14  ----------------------------<  125<H>  4.4   |  28  |  0.9    Ca    8.8      15 Oct 2019 22:14  Phos  3.6     10-15  Mg     2.3     10-15    TPro  6.0  /  Alb  3.4<L>  /  TBili  0.5  /  DBili  x   /  AST  17  /  ALT  14  /  AlkPhos  134<H>  10-15    PT/INR - ( 16 Oct 2019 01:09 )   PT: 17.80 sec;   INR: 1.55 ratio       PTT - ( 15 Oct 2019 22:14 )  PTT:35.7 sec    Culture - Urine (collected 13 Oct 2019 16:11)  Source: .Urine Clean Catch (Midstream)  Final Report (14 Oct 2019 17:26):    No growth        PHYSICAL EXAM:  GEN: obese male in no acute distress, NC in place  PULM/CHEST: dec breath sounds at bases b/l, no rales wheezes or rhonchi  CVS: Regular rate and rhythm, S1-S2, no murmurs  ABD: Soft, non-tender, non-distended, +BS  EXT: No edema  NEURO: AAOx3

## 2019-10-16 NOTE — DIETITIAN INITIAL EVALUATION ADULT. - [facility] MALE
Important Medication Changes none    Further testing to be done:none        Next follow up visit: In office in 1 Year              HERE IS SOME IMPORTANT INFORMATION FOR OUR PATIENTS    If you need refills- call your pharmacist and they will contact our office.    If you have medical concerns call    [phone]. ( [facility] Office)                                                          [phone]  ( [place] office)                                                          [phone] ( [facility] office)                                                           [phone](   Office)                   If you have a question during office hours call  and to make appointment [phone]. You will eventually speak with my nurses. If you need to speak to me directly, let them know and I will get back to you as soon as possible.  Better yet, you can consider signing up for [employer], our popular online communication portal to schedule an appointment, ask for a refill, or contact our staff. Go to:  [email or web address]    Wan Saunders MD  
[phone]

## 2019-10-16 NOTE — PROGRESS NOTE ADULT - SUBJECTIVE AND OBJECTIVE BOX
GENERAL SURGERY PROGRESS NOTE     CUATE HORNE  Male  Hospital day: 4d    OVERNIGHT EVENTS: patient received 1 uPRBC for Hgb 7.2 overnight. Patient is ready for OR today vs. bobbi with active type & screen, NPO, K 4.4, Hgb 7.5 and INR 1.55 with recent CXR and ECG.    T(F): 101.7 (10-16-19 @ 01:46), Max: 102 (10-15-19 @ 13:48)  HR: 67 (10-16-19 @ 01:46) (60 - 67)  BP: 109/56 (10-16-19 @ 01:46) (109/56 - 132/61)  RR: 18 (10-15-19 @ 22:04) (18 - 18)  SpO2: 94% (10-15-19 @ 17:58) (94% - 98%)    DIET/FLUIDS: cholecalciferol 2000 Unit(s) Oral daily  ferrous    sulfate 325 milliGRAM(s) Oral daily  folic acid 1 milliGRAM(s) Oral daily    GI proph:  pantoprazole    Tablet 40 milliGRAM(s) Oral before breakfast    AC/ proph: aspirin enteric coated 81 milliGRAM(s) Oral daily    PHYSICAL EXAM:  GENERAL: NAD, well-appearing  CHEST/LUNG: Clear to auscultation bilaterally  HEART: Regular rate and rhythm  ABDOMEN: Soft, Nontender, Nondistended;   EXTREMITIES:  No clubbing, cyanosis, or edema    Labs:  CAPILLARY BLOOD GLUCOSE               7.5    8.90  )-----------( 419      ( 16 Oct 2019 01:09 )             25.0       Auto Immature Granulocyte %: 0.9 % (10-15-19 @ 22:14)  Auto Neutrophil %: 73.4 % (10-15-19 @ 22:14)  Auto Neutrophil %: 69.9 % (10-15-19 @ 05:17)  Auto Immature Granulocyte %: 1.0 % (10-15-19 @ 05:17)    10-15    140  |  101  |  14  ----------------------------<  125<H>  4.4   |  28  |  0.9    eGFR if Non African American: 102 mL/min/1.73M2 (10-15-19 @ 22:14)    LFTs:             6.0  | 0.5  | 17       ------------------[134     ( 15 Oct 2019 22:14 )  3.4  | x    | 14          Lactate, Blood: 0.7 mmol/L (10-15-19 @ 01:51)  Blood Gas Venous - Lactate: 0.9 mmoL/L (10-13-19 @ 03:44)    Coags:     17.80  ----< 1.55    ( 16 Oct 2019 01:09 )     x         Serum Pro-Brain Natriuretic Peptide: 569 pg/mL (10-13-19 @ 02:09)    Culture - Urine (collected 13 Oct 2019 16:11)  Source: .Urine Clean Catch (Midstream)  Final Report (14 Oct 2019 17:26):    No growth

## 2019-10-16 NOTE — DIETITIAN INITIAL EVALUATION ADULT. - DIET TYPE
RECS: 1. Consult SLP. 2. Diet consistency per SLP. Continue DASH/TLC diet modifier. 3. Add Ensure Enlive BID until PO returned to baseline. 4. Add daily MVI/minerals.

## 2019-10-17 ENCOUNTER — RESULT REVIEW (OUTPATIENT)
Age: 49
End: 2019-10-17

## 2019-10-17 LAB
ANION GAP SERPL CALC-SCNC: 14 MMOL/L — SIGNIFICANT CHANGE UP (ref 7–14)
BASOPHILS # BLD AUTO: 0.05 K/UL — SIGNIFICANT CHANGE UP (ref 0–0.2)
BASOPHILS # BLD AUTO: 0.05 K/UL — SIGNIFICANT CHANGE UP (ref 0–0.2)
BASOPHILS NFR BLD AUTO: 0.6 % — SIGNIFICANT CHANGE UP (ref 0–1)
BASOPHILS NFR BLD AUTO: 0.6 % — SIGNIFICANT CHANGE UP (ref 0–1)
BUN SERPL-MCNC: 16 MG/DL — SIGNIFICANT CHANGE UP (ref 10–20)
CALCIUM SERPL-MCNC: 9 MG/DL — SIGNIFICANT CHANGE UP (ref 8.5–10.1)
CHLORIDE SERPL-SCNC: 100 MMOL/L — SIGNIFICANT CHANGE UP (ref 98–110)
CO2 SERPL-SCNC: 25 MMOL/L — SIGNIFICANT CHANGE UP (ref 17–32)
CREAT SERPL-MCNC: 0.9 MG/DL — SIGNIFICANT CHANGE UP (ref 0.7–1.5)
EOSINOPHIL # BLD AUTO: 0.09 K/UL — SIGNIFICANT CHANGE UP (ref 0–0.7)
EOSINOPHIL # BLD AUTO: 0.12 K/UL — SIGNIFICANT CHANGE UP (ref 0–0.7)
EOSINOPHIL NFR BLD AUTO: 1.1 % — SIGNIFICANT CHANGE UP (ref 0–8)
EOSINOPHIL NFR BLD AUTO: 1.5 % — SIGNIFICANT CHANGE UP (ref 0–8)
GLUCOSE BLDC GLUCOMTR-MCNC: 148 MG/DL — HIGH (ref 70–99)
GLUCOSE SERPL-MCNC: 133 MG/DL — HIGH (ref 70–99)
HCT VFR BLD CALC: 28.3 % — LOW (ref 42–52)
HCT VFR BLD CALC: 30 % — LOW (ref 42–52)
HGB BLD-MCNC: 8.6 G/DL — LOW (ref 14–18)
HGB BLD-MCNC: 9 G/DL — LOW (ref 14–18)
IMM GRANULOCYTES NFR BLD AUTO: 1.1 % — HIGH (ref 0.1–0.3)
IMM GRANULOCYTES NFR BLD AUTO: 1.1 % — HIGH (ref 0.1–0.3)
LYMPHOCYTES # BLD AUTO: 1.01 K/UL — LOW (ref 1.2–3.4)
LYMPHOCYTES # BLD AUTO: 1.09 K/UL — LOW (ref 1.2–3.4)
LYMPHOCYTES # BLD AUTO: 12.7 % — LOW (ref 20.5–51.1)
LYMPHOCYTES # BLD AUTO: 13.7 % — LOW (ref 20.5–51.1)
MAGNESIUM SERPL-MCNC: 2.6 MG/DL — HIGH (ref 1.8–2.4)
MCHC RBC-ENTMCNC: 27.1 PG — SIGNIFICANT CHANGE UP (ref 27–31)
MCHC RBC-ENTMCNC: 27.7 PG — SIGNIFICANT CHANGE UP (ref 27–31)
MCHC RBC-ENTMCNC: 30 G/DL — LOW (ref 32–37)
MCHC RBC-ENTMCNC: 30.4 G/DL — LOW (ref 32–37)
MCV RBC AUTO: 90.4 FL — SIGNIFICANT CHANGE UP (ref 80–94)
MCV RBC AUTO: 91 FL — SIGNIFICANT CHANGE UP (ref 80–94)
MONOCYTES # BLD AUTO: 0.74 K/UL — HIGH (ref 0.1–0.6)
MONOCYTES # BLD AUTO: 1.13 K/UL — HIGH (ref 0.1–0.6)
MONOCYTES NFR BLD AUTO: 14.2 % — HIGH (ref 1.7–9.3)
MONOCYTES NFR BLD AUTO: 9.3 % — SIGNIFICANT CHANGE UP (ref 1.7–9.3)
NEUTROPHILS # BLD AUTO: 5.56 K/UL — SIGNIFICANT CHANGE UP (ref 1.4–6.5)
NEUTROPHILS # BLD AUTO: 5.85 K/UL — SIGNIFICANT CHANGE UP (ref 1.4–6.5)
NEUTROPHILS NFR BLD AUTO: 70.3 % — SIGNIFICANT CHANGE UP (ref 42.2–75.2)
NEUTROPHILS NFR BLD AUTO: 73.8 % — SIGNIFICANT CHANGE UP (ref 42.2–75.2)
NRBC # BLD: 0 /100 WBCS — SIGNIFICANT CHANGE UP (ref 0–0)
NRBC # BLD: 0 /100 WBCS — SIGNIFICANT CHANGE UP (ref 0–0)
PLATELET # BLD AUTO: 399 K/UL — SIGNIFICANT CHANGE UP (ref 130–400)
PLATELET # BLD AUTO: 441 K/UL — HIGH (ref 130–400)
POTASSIUM SERPL-MCNC: 4.6 MMOL/L — SIGNIFICANT CHANGE UP (ref 3.5–5)
POTASSIUM SERPL-SCNC: 4.6 MMOL/L — SIGNIFICANT CHANGE UP (ref 3.5–5)
RBC # BLD: 3.11 M/UL — LOW (ref 4.7–6.1)
RBC # BLD: 3.32 M/UL — LOW (ref 4.7–6.1)
RBC # FLD: 15.9 % — HIGH (ref 11.5–14.5)
RBC # FLD: 15.9 % — HIGH (ref 11.5–14.5)
SODIUM SERPL-SCNC: 139 MMOL/L — SIGNIFICANT CHANGE UP (ref 135–146)
WBC # BLD: 7.93 K/UL — SIGNIFICANT CHANGE UP (ref 4.8–10.8)
WBC # BLD: 8.74 K/UL — SIGNIFICANT CHANGE UP (ref 4.8–10.8)
WBC # FLD AUTO: 7.93 K/UL — SIGNIFICANT CHANGE UP (ref 4.8–10.8)
WBC # FLD AUTO: 8.74 K/UL — SIGNIFICANT CHANGE UP (ref 4.8–10.8)

## 2019-10-17 PROCEDURE — 88112 CYTOPATH CELL ENHANCE TECH: CPT | Mod: 26

## 2019-10-17 PROCEDURE — 33025 INCISION OF HEART SAC: CPT | Mod: 78

## 2019-10-17 PROCEDURE — 99233 SBSQ HOSP IP/OBS HIGH 50: CPT

## 2019-10-17 PROCEDURE — 71045 X-RAY EXAM CHEST 1 VIEW: CPT | Mod: 26

## 2019-10-17 PROCEDURE — 93010 ELECTROCARDIOGRAM REPORT: CPT

## 2019-10-17 PROCEDURE — 32551 INSERTION OF CHEST TUBE: CPT | Mod: 78,59

## 2019-10-17 PROCEDURE — 88305 TISSUE EXAM BY PATHOLOGIST: CPT | Mod: 26

## 2019-10-17 RX ORDER — CLONAZEPAM 1 MG
0.5 TABLET ORAL AT BEDTIME
Refills: 0 | Status: DISCONTINUED | OUTPATIENT
Start: 2019-10-17 | End: 2019-10-21

## 2019-10-17 RX ORDER — QUETIAPINE FUMARATE 200 MG/1
25 TABLET, FILM COATED ORAL DAILY
Refills: 0 | Status: DISCONTINUED | OUTPATIENT
Start: 2019-10-17 | End: 2019-10-21

## 2019-10-17 RX ORDER — BUDESONIDE AND FORMOTEROL FUMARATE DIHYDRATE 160; 4.5 UG/1; UG/1
2 AEROSOL RESPIRATORY (INHALATION)
Refills: 0 | Status: DISCONTINUED | OUTPATIENT
Start: 2019-10-17 | End: 2019-10-21

## 2019-10-17 RX ORDER — ACETAMINOPHEN 500 MG
650 TABLET ORAL EVERY 6 HOURS
Refills: 0 | Status: DISCONTINUED | OUTPATIENT
Start: 2019-10-17 | End: 2019-10-17

## 2019-10-17 RX ORDER — MORPHINE SULFATE 50 MG/1
3 CAPSULE, EXTENDED RELEASE ORAL EVERY 4 HOURS
Refills: 0 | Status: DISCONTINUED | OUTPATIENT
Start: 2019-10-17 | End: 2019-10-17

## 2019-10-17 RX ORDER — MORPHINE SULFATE 50 MG/1
2 CAPSULE, EXTENDED RELEASE ORAL EVERY 4 HOURS
Refills: 0 | Status: DISCONTINUED | OUTPATIENT
Start: 2019-10-17 | End: 2019-10-17

## 2019-10-17 RX ORDER — POLYETHYLENE GLYCOL 3350 17 G/17G
17 POWDER, FOR SOLUTION ORAL EVERY 12 HOURS
Refills: 0 | Status: DISCONTINUED | OUTPATIENT
Start: 2019-10-17 | End: 2019-10-21

## 2019-10-17 RX ORDER — FLUTICASONE PROPIONATE 50 MCG
2 SPRAY, SUSPENSION NASAL DAILY
Refills: 0 | Status: DISCONTINUED | OUTPATIENT
Start: 2019-10-17 | End: 2019-10-21

## 2019-10-17 RX ORDER — SENNA PLUS 8.6 MG/1
2 TABLET ORAL AT BEDTIME
Refills: 0 | Status: DISCONTINUED | OUTPATIENT
Start: 2019-10-17 | End: 2019-10-21

## 2019-10-17 RX ORDER — MORPHINE SULFATE 50 MG/1
30 CAPSULE, EXTENDED RELEASE ORAL EVERY 6 HOURS
Refills: 0 | Status: DISCONTINUED | OUTPATIENT
Start: 2019-10-17 | End: 2019-10-17

## 2019-10-17 RX ORDER — SODIUM CHLORIDE 9 MG/ML
1000 INJECTION, SOLUTION INTRAVENOUS
Refills: 0 | Status: DISCONTINUED | OUTPATIENT
Start: 2019-10-17 | End: 2019-10-21

## 2019-10-17 RX ORDER — BACLOFEN 100 %
10 POWDER (GRAM) MISCELLANEOUS
Refills: 0 | Status: DISCONTINUED | OUTPATIENT
Start: 2019-10-17 | End: 2019-10-21

## 2019-10-17 RX ORDER — MORPHINE SULFATE 50 MG/1
30 CAPSULE, EXTENDED RELEASE ORAL EVERY 6 HOURS
Refills: 0 | Status: DISCONTINUED | OUTPATIENT
Start: 2019-10-17 | End: 2019-10-18

## 2019-10-17 RX ORDER — ASPIRIN/CALCIUM CARB/MAGNESIUM 324 MG
81 TABLET ORAL DAILY
Refills: 0 | Status: DISCONTINUED | OUTPATIENT
Start: 2019-10-17 | End: 2019-10-21

## 2019-10-17 RX ORDER — PANTOPRAZOLE SODIUM 20 MG/1
40 TABLET, DELAYED RELEASE ORAL
Refills: 0 | Status: DISCONTINUED | OUTPATIENT
Start: 2019-10-17 | End: 2019-10-21

## 2019-10-17 RX ORDER — ATORVASTATIN CALCIUM 80 MG/1
40 TABLET, FILM COATED ORAL AT BEDTIME
Refills: 0 | Status: DISCONTINUED | OUTPATIENT
Start: 2019-10-17 | End: 2019-10-21

## 2019-10-17 RX ORDER — MORPHINE SULFATE 50 MG/1
2 CAPSULE, EXTENDED RELEASE ORAL
Refills: 0 | Status: DISCONTINUED | OUTPATIENT
Start: 2019-10-17 | End: 2019-10-17

## 2019-10-17 RX ORDER — CHOLECALCIFEROL (VITAMIN D3) 125 MCG
2000 CAPSULE ORAL DAILY
Refills: 0 | Status: DISCONTINUED | OUTPATIENT
Start: 2019-10-17 | End: 2019-10-21

## 2019-10-17 RX ORDER — METOPROLOL TARTRATE 50 MG
12.5 TABLET ORAL EVERY 8 HOURS
Refills: 0 | Status: DISCONTINUED | OUTPATIENT
Start: 2019-10-17 | End: 2019-10-21

## 2019-10-17 RX ORDER — TIOTROPIUM BROMIDE 18 UG/1
1 CAPSULE ORAL; RESPIRATORY (INHALATION) DAILY
Refills: 0 | Status: DISCONTINUED | OUTPATIENT
Start: 2019-10-17 | End: 2019-10-20

## 2019-10-17 RX ORDER — ONDANSETRON 8 MG/1
4 TABLET, FILM COATED ORAL ONCE
Refills: 0 | Status: DISCONTINUED | OUTPATIENT
Start: 2019-10-17 | End: 2019-10-17

## 2019-10-17 RX ORDER — FOLIC ACID 0.8 MG
1 TABLET ORAL DAILY
Refills: 0 | Status: DISCONTINUED | OUTPATIENT
Start: 2019-10-17 | End: 2019-10-21

## 2019-10-17 RX ORDER — QUETIAPINE FUMARATE 200 MG/1
50 TABLET, FILM COATED ORAL AT BEDTIME
Refills: 0 | Status: DISCONTINUED | OUTPATIENT
Start: 2019-10-17 | End: 2019-10-21

## 2019-10-17 RX ORDER — CEFEPIME 1 G/1
2000 INJECTION, POWDER, FOR SOLUTION INTRAMUSCULAR; INTRAVENOUS EVERY 12 HOURS
Refills: 0 | Status: DISCONTINUED | OUTPATIENT
Start: 2019-10-17 | End: 2019-10-20

## 2019-10-17 RX ORDER — LORATADINE 10 MG/1
10 TABLET ORAL DAILY
Refills: 0 | Status: DISCONTINUED | OUTPATIENT
Start: 2019-10-17 | End: 2019-10-21

## 2019-10-17 RX ORDER — HEPARIN SODIUM 5000 [USP'U]/ML
5000 INJECTION INTRAVENOUS; SUBCUTANEOUS EVERY 8 HOURS
Refills: 0 | Status: DISCONTINUED | OUTPATIENT
Start: 2019-10-17 | End: 2019-10-21

## 2019-10-17 RX ORDER — LAMOTRIGINE 25 MG/1
100 TABLET, ORALLY DISINTEGRATING ORAL
Refills: 0 | Status: DISCONTINUED | OUTPATIENT
Start: 2019-10-17 | End: 2019-10-21

## 2019-10-17 RX ORDER — ACETAMINOPHEN 500 MG
650 TABLET ORAL EVERY 6 HOURS
Refills: 0 | Status: DISCONTINUED | OUTPATIENT
Start: 2019-10-17 | End: 2019-10-21

## 2019-10-17 RX ORDER — FERROUS SULFATE 325(65) MG
325 TABLET ORAL DAILY
Refills: 0 | Status: DISCONTINUED | OUTPATIENT
Start: 2019-10-17 | End: 2019-10-21

## 2019-10-17 RX ORDER — TAMSULOSIN HYDROCHLORIDE 0.4 MG/1
0.4 CAPSULE ORAL AT BEDTIME
Refills: 0 | Status: DISCONTINUED | OUTPATIENT
Start: 2019-10-17 | End: 2019-10-21

## 2019-10-17 RX ORDER — DOFETILIDE 0.25 MG/1
250 CAPSULE ORAL
Refills: 0 | Status: DISCONTINUED | OUTPATIENT
Start: 2019-10-17 | End: 2019-10-21

## 2019-10-17 RX ORDER — GABAPENTIN 400 MG/1
400 CAPSULE ORAL THREE TIMES A DAY
Refills: 0 | Status: DISCONTINUED | OUTPATIENT
Start: 2019-10-17 | End: 2019-10-21

## 2019-10-17 RX ORDER — DOCUSATE SODIUM 100 MG
100 CAPSULE ORAL
Refills: 0 | Status: DISCONTINUED | OUTPATIENT
Start: 2019-10-17 | End: 2019-10-21

## 2019-10-17 RX ADMIN — Medication 10 MILLIGRAM(S): at 05:06

## 2019-10-17 RX ADMIN — Medication 81 MILLIGRAM(S): at 23:26

## 2019-10-17 RX ADMIN — Medication 10 MILLIGRAM(S): at 11:08

## 2019-10-17 RX ADMIN — CEFEPIME 100 MILLIGRAM(S): 1 INJECTION, POWDER, FOR SOLUTION INTRAMUSCULAR; INTRAVENOUS at 05:05

## 2019-10-17 RX ADMIN — Medication 1 MILLIGRAM(S): at 11:08

## 2019-10-17 RX ADMIN — TAMSULOSIN HYDROCHLORIDE 0.4 MILLIGRAM(S): 0.4 CAPSULE ORAL at 23:36

## 2019-10-17 RX ADMIN — Medication 12.5 MILLIGRAM(S): at 05:06

## 2019-10-17 RX ADMIN — PANTOPRAZOLE SODIUM 40 MILLIGRAM(S): 20 TABLET, DELAYED RELEASE ORAL at 06:06

## 2019-10-17 RX ADMIN — Medication 12.5 MILLIGRAM(S): at 23:24

## 2019-10-17 RX ADMIN — Medication 2000 UNIT(S): at 23:27

## 2019-10-17 RX ADMIN — MORPHINE SULFATE 30 MILLIGRAM(S): 50 CAPSULE, EXTENDED RELEASE ORAL at 05:06

## 2019-10-17 RX ADMIN — Medication 2 SPRAY(S): at 11:08

## 2019-10-17 RX ADMIN — DOFETILIDE 250 MICROGRAM(S): 0.25 CAPSULE ORAL at 05:06

## 2019-10-17 RX ADMIN — Medication 10 MILLIGRAM(S): at 23:30

## 2019-10-17 RX ADMIN — GABAPENTIN 400 MILLIGRAM(S): 400 CAPSULE ORAL at 05:06

## 2019-10-17 RX ADMIN — GABAPENTIN 400 MILLIGRAM(S): 400 CAPSULE ORAL at 23:24

## 2019-10-17 RX ADMIN — LORATADINE 10 MILLIGRAM(S): 10 TABLET ORAL at 11:08

## 2019-10-17 RX ADMIN — MORPHINE SULFATE 2 MILLIGRAM(S): 50 CAPSULE, EXTENDED RELEASE ORAL at 19:51

## 2019-10-17 RX ADMIN — ATORVASTATIN CALCIUM 40 MILLIGRAM(S): 80 TABLET, FILM COATED ORAL at 23:24

## 2019-10-17 RX ADMIN — Medication 325 MILLIGRAM(S): at 11:08

## 2019-10-17 RX ADMIN — MORPHINE SULFATE 30 MILLIGRAM(S): 50 CAPSULE, EXTENDED RELEASE ORAL at 11:11

## 2019-10-17 RX ADMIN — MORPHINE SULFATE 2 MILLIGRAM(S): 50 CAPSULE, EXTENDED RELEASE ORAL at 19:53

## 2019-10-17 RX ADMIN — BUDESONIDE AND FORMOTEROL FUMARATE DIHYDRATE 2 PUFF(S): 160; 4.5 AEROSOL RESPIRATORY (INHALATION) at 07:36

## 2019-10-17 RX ADMIN — MORPHINE SULFATE 2 MILLIGRAM(S): 50 CAPSULE, EXTENDED RELEASE ORAL at 20:08

## 2019-10-17 RX ADMIN — QUETIAPINE FUMARATE 50 MILLIGRAM(S): 200 TABLET, FILM COATED ORAL at 23:25

## 2019-10-17 RX ADMIN — MORPHINE SULFATE 30 MILLIGRAM(S): 50 CAPSULE, EXTENDED RELEASE ORAL at 06:04

## 2019-10-17 RX ADMIN — QUETIAPINE FUMARATE 25 MILLIGRAM(S): 200 TABLET, FILM COATED ORAL at 11:07

## 2019-10-17 RX ADMIN — GABAPENTIN 400 MILLIGRAM(S): 400 CAPSULE ORAL at 13:12

## 2019-10-17 RX ADMIN — Medication 2000 UNIT(S): at 11:08

## 2019-10-17 RX ADMIN — MORPHINE SULFATE 2 MILLIGRAM(S): 50 CAPSULE, EXTENDED RELEASE ORAL at 19:36

## 2019-10-17 RX ADMIN — Medication 166.67 MILLIGRAM(S): at 05:37

## 2019-10-17 RX ADMIN — MORPHINE SULFATE 3 MILLIGRAM(S): 50 CAPSULE, EXTENDED RELEASE ORAL at 22:36

## 2019-10-17 RX ADMIN — SODIUM CHLORIDE 70 MILLILITER(S): 9 INJECTION, SOLUTION INTRAVENOUS at 19:15

## 2019-10-17 RX ADMIN — POLYETHYLENE GLYCOL 3350 17 GRAM(S): 17 POWDER, FOR SOLUTION ORAL at 05:06

## 2019-10-17 RX ADMIN — SENNA PLUS 2 TABLET(S): 8.6 TABLET ORAL at 23:36

## 2019-10-17 RX ADMIN — Medication 81 MILLIGRAM(S): at 11:08

## 2019-10-17 RX ADMIN — Medication 650 MILLIGRAM(S): at 23:30

## 2019-10-17 RX ADMIN — Medication 650 MILLIGRAM(S): at 13:12

## 2019-10-17 RX ADMIN — Medication 100 MILLIGRAM(S): at 05:06

## 2019-10-17 RX ADMIN — HEPARIN SODIUM 5000 UNIT(S): 5000 INJECTION INTRAVENOUS; SUBCUTANEOUS at 23:37

## 2019-10-17 RX ADMIN — LAMOTRIGINE 100 MILLIGRAM(S): 25 TABLET, ORALLY DISINTEGRATING ORAL at 05:06

## 2019-10-17 NOTE — PROGRESS NOTE ADULT - SUBJECTIVE AND OBJECTIVE BOX
Patient is a 48y old  Male who presents with a chief complaint of AICD firing (16 Oct 2019 10:58)    Patient was seen and examined.  Pt planned for pericardiocentesis today  Denies chest pain, sob, palpitation  Denies any other complaints.  All systems reviewed    PAST MEDICAL & SURGICAL HISTORY:  Vitamin D deficiency  Constipation  Chronic respiratory failure: secondary to COPD  Folate-deficiency anemia  Iron deficiency anemia  Anemia of chronic disease  Pericardial effusion without cardiac tamponade: h/o trace pericardial effusion  BPH (benign prostatic hyperplasia)  Chronic pain  Peripheral neuropathy  Type 2 diabetes mellitus  AICD (automatic cardioverter/defibrillator) present  Ventricular tachycardia  Chronic heart failure with preserved ejection fraction  PTSD (post-traumatic stress disorder)  Supraventricular arrhythmia: prior history  Cardiomyopathy  Atrial fibrillation: s/p ablation, on eliquis  HTN (hypertension)  Seizures  Migraines  Pneumonia  MS (multiple sclerosis)  CAD (coronary artery disease)  Bipolar disorder  Anginal pain  Schizo affective schizophrenia  GERD (gastroesophageal reflux disease)  Seasonal allergies  Hypercholesteremia  COPD (chronic obstructive pulmonary disease)  CVA (cerebral vascular accident)  TIA (transient ischemic attack)  Clinical Depression  Displacement of electrode lead of cardiac pacemaker  History of evacuation of hematoma: AICD pocket  H/O prior ablation treatment: for Afib  H/O hernia repair: right 1972,1991  History of hip replacement  S/P Orchiectomy: L for testicular CA  S/P Implantation of AICD      Allergies  dexmethylphenidate (Unknown)  Dilantin (Rash)  dilantin, compazine, neurontin, ritalin, phenergan (Unknown)  Haldol (Unknown)  hydantoin derivatives (Other)  methylphenidate (Unknown)  phenytoin (Unknown)  prochlorperazine (Unknown)  promethazine (Dystonic RXN)  rash/hives (Anaphylaxis)  thioxanthenes (Unknown)    MEDICATIONS  (STANDING):  aspirin enteric coated 81 milliGRAM(s) Oral daily  atorvastatin 40 milliGRAM(s) Oral at bedtime  baclofen 10 milliGRAM(s) Oral four times a day  buDESOnide 160 MICROgram(s)/formoterol 4.5 MICROgram(s) Inhaler 2 Puff(s) Inhalation two times a day  cefepime   IVPB      cefepime   IVPB 2000 milliGRAM(s) IV Intermittent every 12 hours  cholecalciferol 2000 Unit(s) Oral daily  docusate sodium 100 milliGRAM(s) Oral two times a day  dofetilide 250 MICROGram(s) Oral two times a day  ferrous    sulfate 325 milliGRAM(s) Oral daily  fluticasone propionate (50 MICROgram(s)/actuation) Nasal Spray - Peds 2 Spray(s) Alternating Nostrils daily  folic acid 1 milliGRAM(s) Oral daily  gabapentin 400 milliGRAM(s) Oral three times a day  influenza   Vaccine 0.5 milliLiter(s) IntraMuscular once  lamoTRIgine 100 milliGRAM(s) Oral two times a day  loratadine 10 milliGRAM(s) Oral daily  metoprolol tartrate 12.5 milliGRAM(s) Oral every 8 hours  pantoprazole    Tablet 40 milliGRAM(s) Oral before breakfast  polyethylene glycol 3350 17 Gram(s) Oral every 12 hours  QUEtiapine 25 milliGRAM(s) Oral daily  QUEtiapine 50 milliGRAM(s) Oral at bedtime  senna 2 Tablet(s) Oral at bedtime  tamsulosin 0.4 milliGRAM(s) Oral at bedtime  vancomycin  IVPB 1250 milliGRAM(s) IV Intermittent every 12 hours    MEDICATIONS  (PRN):  acetaminophen   Tablet .. 650 milliGRAM(s) Oral every 6 hours PRN Temp greater or equal to 38C (100.4F), Mild Pain (1 - 3)  clonazePAM  Tablet 0.5 milliGRAM(s) Oral at bedtime PRN agitation  ipratropium    for Nebulization 500 MICROGram(s) Nebulizer every 6 hours PRN Shortness of Breath and/or Wheezing  morphine  IR 30 milliGRAM(s) Oral every 6 hours PRN Moderate Pain (4 - 6)    T(C): 37.1 (10-17-19 @ 04:52), Max: 38.2 (10-16-19 @ 21:10)  HR: 62 (10-17-19 @ 04:52) (60 - 63)  BP: 122/60 (10-17-19 @ 04:52) (116/60 - 122/90)  RR: 18 (10-17-19 @ 04:52) (18 - 18)  SpO2: 94% (10-17-19 @ 04:52) (94% - 94%)    O/E:  Awake, alert, not in distress.  HEENT: atraumatic, EOMI.  Chest: decreased breath sounds  CVS: SIS2 +, no murmur.  P/A: Soft, BS+  CNS: non focal.  Ext: no edema feet.  Skin: no rash, no ulcers.  All systems reviewed positive findings as above.                          9.0<L>  8.74  )-----------( 441<H>    ( 16 Oct 2019 22:35 )             30.0<L>                        8.6<L>  7.93  )-----------( 399      ( 16 Oct 2019 17:46 )             28.3<L>  10-17    139  |  100  |  16  ----------------------------<  133<H>  4.6   |  25  |  0.9  10-16    140  |  100  |  15  ----------------------------<  104<H>  4.6   |  24  |  1.0    Ca    9.0      17 Oct 2019 07:36  Ca    9.1      16 Oct 2019 22:35  Ca    8.9      16 Oct 2019 06:31  Ca    8.8      15 Oct 2019 22:14  Phos  3.5     10-16  Mg     2.6     10-17    TPro  6.2  /  Alb  3.4<L>  /  TBili  0.7  /  DBili  x   /  AST  17  /  ALT  14  /  AlkPhos  137<H>  10-16  TPro  6.0  /  Alb  3.4<L>  /  TBili  0.5  /  DBili  x   /  AST  17  /  ALT  14  /  AlkPhos  134<H>  10-15

## 2019-10-17 NOTE — PRE-ANESTHESIA EVALUATION ADULT - ANESTHESIA, PREVIOUS REACTION, PROFILE
says he went into vtach, about 12 hrs after hip replacemetn. (states anesthesiologist said it could have been due to the anesthesia)

## 2019-10-17 NOTE — PROGRESS NOTE ADULT - SUBJECTIVE AND OBJECTIVE BOX
SUBJECTIVE:    Patient is a 48y old Male who presents with a chief complaint of AICD firing (16 Oct 2019 11:42)    Currently admitted to medicine with the primary diagnosis of AICD malfunction     Today is hospital day 4d. No somatic complaints.   INTERVAL EVENTS: NAEON s/p 1U pRBC     PAST MEDICAL & SURGICAL HISTORY  Vitamin D deficiency  Constipation  Chronic respiratory failure: secondary to COPD  Folate-deficiency anemia  Iron deficiency anemia  Anemia of chronic disease  Pericardial effusion without cardiac tamponade: h/o trace pericardial effusion  BPH (benign prostatic hyperplasia)  Chronic pain  Peripheral neuropathy  Type 2 diabetes mellitus  AICD (automatic cardioverter/defibrillator) present  Ventricular tachycardia  Chronic heart failure with preserved ejection fraction  PTSD (post-traumatic stress disorder)  Supraventricular arrhythmia: prior history  Cardiomyopathy  CHF (congestive heart failure)  Atrial fibrillation: s/p ablation, on eliquis  BPH (benign prostatic hyperplasia)  HTN (hypertension)  Seizures  Migraines  Pneumonia  MS (multiple sclerosis)  CAD (coronary artery disease)  Bipolar disorder  Anginal pain  Schizo affective schizophrenia  GERD (gastroesophageal reflux disease)  Seasonal allergies  Hypercholesteremia  COPD (chronic obstructive pulmonary disease)  CVA (cerebral vascular accident)  TIA (transient ischemic attack)  Peripheral Neuropathy  Clinical Depression  Displacement of electrode lead of cardiac pacemaker  History of evacuation of hematoma: AICD pocket  H/O prior ablation treatment: for Afib  H/O hernia repair: right 1972,1991  History of hip replacement  S/P Orchiectomy: L for testicular CA  S/P Implantation of AICD      ALLERGIES:  dexmethylphenidate (Unknown)  Dilantin (Rash)  dilantin, compazine, neurontin, ritalin, phenergan (Unknown)  Haldol (Unknown)  hydantoin derivatives (Other)  methylphenidate (Unknown)  phenytoin (Unknown)  prochlorperazine (Unknown)  promethazine (Dystonic RXN)  rash/hives (Anaphylaxis)  thioxanthenes (Unknown)    MEDICATIONS:  STANDING MEDICATIONS  aspirin enteric coated 81 milliGRAM(s) Oral daily  atorvastatin 40 milliGRAM(s) Oral at bedtime  baclofen 10 milliGRAM(s) Oral four times a day  buDESOnide 160 MICROgram(s)/formoterol 4.5 MICROgram(s) Inhaler 2 Puff(s) Inhalation two times a day  cefepime   IVPB      cefepime   IVPB 2000 milliGRAM(s) IV Intermittent every 12 hours  cholecalciferol 2000 Unit(s) Oral daily  docusate sodium 100 milliGRAM(s) Oral two times a day  dofetilide 250 MICROGram(s) Oral two times a day  ferrous    sulfate 325 milliGRAM(s) Oral daily  fluticasone propionate (50 MICROgram(s)/actuation) Nasal Spray - Peds 2 Spray(s) Alternating Nostrils daily  folic acid 1 milliGRAM(s) Oral daily  gabapentin 400 milliGRAM(s) Oral three times a day  influenza   Vaccine 0.5 milliLiter(s) IntraMuscular once  lamoTRIgine 100 milliGRAM(s) Oral two times a day  loratadine 10 milliGRAM(s) Oral daily  metoprolol tartrate 12.5 milliGRAM(s) Oral every 8 hours  pantoprazole    Tablet 40 milliGRAM(s) Oral before breakfast  polyethylene glycol 3350 17 Gram(s) Oral every 12 hours  QUEtiapine 25 milliGRAM(s) Oral daily  QUEtiapine 50 milliGRAM(s) Oral at bedtime  senna 2 Tablet(s) Oral at bedtime  tamsulosin 0.4 milliGRAM(s) Oral at bedtime  vancomycin  IVPB 1250 milliGRAM(s) IV Intermittent every 12 hours    PRN MEDICATIONS  acetaminophen   Tablet .. 650 milliGRAM(s) Oral every 6 hours PRN  clonazePAM  Tablet 0.5 milliGRAM(s) Oral at bedtime PRN  ipratropium    for Nebulization 500 MICROGram(s) Nebulizer every 6 hours PRN  morphine  IR 30 milliGRAM(s) Oral every 6 hours PRN    VITALS:   T(F): 98.7  HR: 62  BP: 122/60  RR: 18  SpO2: 94%    LABS:                        9.0    8.74  )-----------( 441      ( 16 Oct 2019 22:35 )             30.0     10-16    140  |  100  |  15  ----------------------------<  104<H>  4.6   |  24  |  1.0    Ca    9.1      16 Oct 2019 22:35  Phos  3.5     10-16  Mg     2.4     10-16    TPro  6.2  /  Alb  3.4<L>  /  TBili  0.7  /  DBili  x   /  AST  17  /  ALT  14  /  AlkPhos  137<H>  10-16    PT/INR - ( 16 Oct 2019 22:35 )   PT: 17.30 sec;   INR: 1.51 ratio         PTT - ( 15 Oct 2019 22:14 )  PTT:35.7 sec    Culture - Blood (collected 15 Oct 2019 01:51)  Source: .Blood Blood-Peripheral  Preliminary Report (16 Oct 2019 13:01):    No growth to date.          RADIOLOGY:    PHYSICAL EXAM:  GEN: No acute distress  PULM/CHEST: dec breath sounds at bases b/l   CVS: Regular rate and rhythm, S1-S2, no murmurs  ABD: Soft, non-tender, non-distended, +BS  EXT: No edema  NEURO: AAOx3

## 2019-10-17 NOTE — PROGRESS NOTE ADULT - ASSESSMENT
47 yo M with MS, Arrhythmogenic Ventricular Tachycardia and Cardiomyopathy s/p AICD in 2005, HFpEF (EF 55-60%), Afib/Aflutter, Chronic Hypoxic Respiratory Failure secondary to COPD on 2L O2 PRN, CAD, TIA/CVA, HTN, DLD, Peripheral neuropathy and chronic pain, Epilepsy/Seizures, Migraines, GERD, BPH, PTSD, Depression, Bipolar disorder, Schizoaffective Disorder, recently hospitalized from 8/29/19 till 9/11/19 for hematoma of AICD pocket with battery depletion and Riata lead externalization s/p hematoma evacuation on 9/5-9/6 and laser lead extraction and new ICD lead implant, and again recently hospitalized 9/15/19-9/23/19 for trace pericardial effusion with no intervention and RV threshold elevation secondary to lead migration/dislodgement s/p lead replacement and re-positioning,  presented this admission for inappropriate AICD firing, found to have moderate pericardial effusion w questionable hemopericardium complicated with fevers, initially off antibiotics, now resumed, with down trending Hg, s/p pRBC transfusions, hemodynamically stable pending pericardiocentesis +/- thoracentesis.     #Moderate to large sized complex pericardial fluid with question of hemopericardium  -Hg improved today s/p 2 U [RBC, Hg 7.9 today, s/p 1U 10/16, with appropriate response, Hg 9; keep active T&S (last  10/16)   -CT surgery on board:  planned for pericardiocentesis +/- thora today   -CBC q 12 hrs: f/u 4pm CBC     Febrile tm 101.8 without leukocytosis or sepsis, s/p continuing to spike despite PO abx and IV Ceftriaxone  - suspect reactive/inflammatory 2/2 pericardial fluid   -BCX NG   -UA neg  -CXR with pleural effusion, no evidence of PNA  - infectious disease team is following, empiric Vanc 1.25 q12h IV and Cefepime 2g q12h IV (started 10/16, (van trough for fri am), to send pericardial fluid G/S and cultures, will d/c ABX if negative     # AICD Firing  - ICD fire -per EP:  inappropriate shock for AFib/AFl with RVR (HR up to 226 bpm)  - VT s/p ICD with recent lead extraction and post op lead revision after micro dislodgement  - EP on board  - Continue BB and Tikosyn  - Increase Metoprolol slowly as BP allows: currently on metop 12.5 q8h   - Monitor lytes and keep K>4 and Mg>2    #CAD, h/o CVA/TIA  -c/w aspirin 81, metoprolol  and atorvastatin 40    #H/o Seizures/Epilepsy  -seizure precautions   -c/w lamotrigine 100mg bid    # Chronic Hypoxic Respiratory failure secondary to COPD   -on home O2 2L NC prn;   -c/w Symbicort, Duonebs and Inhaler    #Essential HTN:    -C/W metoprolol 12.5 tid    DLD  - c/w  Atorvastatin 40mg     Multifactorial anemia : AOCD, + RYAN+  Folate deficiency anemia  -c/w Folic acid and ferrous sulfate 325mg qd    #GERD  -pantoprazole 40 qd     #BPH  -c/w  tamsulosin 0.4 qd    # Multiple Sclerosis  -c/w me baclofen 10 q6h and Tecfidera (Dimethyl fumarate) 240mcg qd      #Chronic pain; per pain management,   -c/w clonazepam, gabapentin, morphine IR 30mg q6h prn    #Constipation  -polyethylene glycol bid    #Vitamin D deficiency  cholecalciferol 2000U qd    # PTSD, Depression, Schizoaffective Disorder, Bipolar Disorder  -c/w Lamotrigine, quetiapine     DVT ppx;  Lovenox 40 qd  GI ppx; h/o GERD on PPI   Full code   Dispo; From NH, CT sx, speech and swallow eval for current diet (evaluated by speech and swallow 2 months ago, rec barium swallow??) 47 yo M with MS, Arrhythmogenic Ventricular Tachycardia and Cardiomyopathy s/p AICD in 2005, HFpEF (EF 55-60%), Afib/Aflutter, Chronic Hypoxic Respiratory Failure secondary to COPD on 2L O2 PRN, CAD, TIA/CVA, HTN, DLD, Peripheral neuropathy and chronic pain, Epilepsy/Seizures, Migraines, GERD, BPH, PTSD, Depression, Bipolar disorder, Schizoaffective Disorder, recently hospitalized from 8/29/19 till 9/11/19 for hematoma of AICD pocket with battery depletion and Riata lead externalization s/p hematoma evacuation on 9/5-9/6 and laser lead extraction and new ICD lead implant, and again recently hospitalized 9/15/19-9/23/19 for trace pericardial effusion with no intervention and RV threshold elevation secondary to lead migration/dislodgement s/p lead replacement and re-positioning,  presented this admission for inappropriate AICD firing, found to have moderate pericardial effusion w questionable hemopericardium complicated with fevers, initially off antibiotics, now resumed, with down trending Hg, s/p pRBC transfusions, hemodynamically stable pending pericardiocentesis +/- thoracentesis.     #Moderate to large sized complex pericardial fluid with question of hemopericardium  -Hg improved today s/p 2 U [RBC, Hg 7.9 today, s/p 1U 10/16, with appropriate response, Hg 9; keep active T&S (last  10/16)   -CT surgery on board:  planned for pericardiocentesis +/- thora today   -CBC q 12 hrs: f/u 4pm CBC     Febrile tm 101.8 without leukocytosis or sepsis, s/p continuing to spike despite PO abx and IV Ceftriaxone  - suspect reactive/inflammatory 2/2 pericardial fluid   -BCX NG  -UA neg  -RVP pending   -CXR with pleural effusion, no evidence of PNA  - infectious disease team is following, empiric Vanc 1.25 q12h IV and Cefepime 2g q12h IV (started 10/16, (van trough for fri am), to send pericardial fluid G/S and cultures, will d/c ABX if negative     # AICD Firing  - ICD fire -per EP:  inappropriate shock for AFib/AFl with RVR (HR up to 226 bpm)  - VT s/p ICD with recent lead extraction and post op lead revision after micro dislodgement  - EP on board  - Continue BB and Tikosyn  - Increase Metoprolol slowly as BP allows: currently on metop 12.5 q8h   - Monitor lytes and keep K>4 and Mg>2    #CAD, h/o CVA/TIA  -c/w aspirin 81, metoprolol  and atorvastatin 40    #H/o Seizures/Epilepsy  -seizure precautions   -c/w lamotrigine 100mg bid    # Chronic Hypoxic Respiratory failure secondary to COPD   -on home O2 2L NC prn;   -c/w Symbicort, Duonebs and Inhaler    #Essential HTN:    -C/W metoprolol 12.5 tid    DLD  - c/w  Atorvastatin 40mg     Multifactorial anemia : AOCD, + RYAN+  Folate deficiency anemia  -c/w Folic acid and ferrous sulfate 325mg qd    #GERD  -pantoprazole 40 qd     #BPH  -c/w  tamsulosin 0.4 qd    # Multiple Sclerosis  -c/w me baclofen 10 q6h and Tecfidera (Dimethyl fumarate) 240mcg qd      #Chronic pain; per pain management,   -c/w clonazepam, gabapentin, morphine IR 30mg q6h prn    #Constipation  -polyethylene glycol bid    #Vitamin D deficiency  cholecalciferol 2000U qd    # PTSD, Depression, Schizoaffective Disorder, Bipolar Disorder  -c/w Lamotrigine, quetiapine     DVT ppx;  Lovenox 40 qd  GI ppx; h/o GERD on PPI   Full code   Dispo; From NH, CT sx, speech and swallow eval for current diet (evaluated by speech and swallow 2 months ago, rec barium swallow??)

## 2019-10-17 NOTE — PACU DISCHARGE NOTE - COMMENTS
S/P PERICARDIAL WINDOW  BP  129/56  HR   142  RR   16  SAT 88%  REPORT GIVEN TO NURSE AND DR ABRAHAM FROM ANESTHESIA WHO ACCEPTED RESPONSIBILITIES TAKING CARE OF PT

## 2019-10-17 NOTE — PROGRESS NOTE ADULT - SUBJECTIVE AND OBJECTIVE BOX
GENERAL SURGERY PROGRESS NOTE     CUATE HORNE  Male  Hospital day :4d    OVERNIGHT EVENTS: no acute events overnight    T(F): 98.7 (10-17-19 @ 04:52), Max: 100.8 (10-16-19 @ 21:10)  HR: 62 (10-17-19 @ 04:52) (60 - 63)  BP: 122/60 (10-17-19 @ 04:52) (116/60 - 122/90)  RR: 18 (10-17-19 @ 04:52) (18 - 18)  SpO2: 94% (10-17-19 @ 04:52) (94% - 94%)    DIET/FLUIDS: cholecalciferol 2000 Unit(s) Oral daily  ferrous    sulfate 325 milliGRAM(s) Oral daily  folic acid 1 milliGRAM(s) Oral daily    GI proph:  pantoprazole    Tablet 40 milliGRAM(s) Oral before breakfast    AC/ proph: aspirin enteric coated 81 milliGRAM(s) Oral daily    ABx: cefepime   IVPB      cefepime   IVPB 2000 milliGRAM(s) IV Intermittent every 12 hours  vancomycin  IVPB 1250 milliGRAM(s) IV Intermittent every 12 hours    PHYSICAL EXAM:  GENERAL: NAD, well-appearing  CHEST/LUNG: Clear to auscultation bilaterally  HEART: Regular rate and rhythm  ABDOMEN: Soft, Nontender, Nondistended;   EXTREMITIES:  No clubbing, cyanosis, or edema    LABS             9.0    8.74  )-----------( 441      ( 16 Oct 2019 22:35 )             30.0       Auto Immature Granulocyte %: 1.1 % (10-17-19 @ 07:36)  Auto Neutrophil %: 73.8 % (10-17-19 @ 07:36)  Auto Neutrophil %: 70.3 % (10-16-19 @ 17:46)  Auto Immature Granulocyte %: 1.1 % (10-16-19 @ 17:46)    10-17    139  |  100  |  16  ----------------------------<  133<H>  4.6   |  25  |  0.9      Calcium, Total Serum: 9.0 mg/dL (10-17-19 @ 07:36)      LFTs:             6.2  | 0.7  | 17       ------------------[137     ( 16 Oct 2019 06:31 )  3.4  | x    | 14          Lipase:x      Amylase:x         Lactate, Blood: 0.7 mmol/L (10-15-19 @ 01:51)      Coags:     17.30  ----< 1.51    ( 16 Oct 2019 22:35 )     x           Serum Pro-Brain Natriuretic Peptide: 569 pg/mL (10-13-19 @ 02:09)      Culture - Blood (collected 15 Oct 2019 01:51)  Source: .Blood Blood-Peripheral  Preliminary Report (16 Oct 2019 13:01):    No growth to date.

## 2019-10-17 NOTE — PROGRESS NOTE ADULT - ASSESSMENT
48 year old male with PMHx of Arrhythmogenic Ventricular Tachycardia and Cardiomyopathy s/p AICD in 2005, HFpEF (EF 55-60%), Afib/Aflutter on ASA 81mg but unsure if on Eliquis, Chronic Hypoxic Respiratory Failure secondary to COPD on 2L O2 PRN, CAD, TIA/CVA, HTN, DLD, Peripheral neuropathy and chronic pain, Multiple Sclerosis, Epilepsy/Seizures, Migraines, GERD, Anemia of Chronic Disease + folate deficiency + Iron deficiency anemia, Constipation, BPH, PTSD, Depression, Bipolar disorder, Schizoaffective Disorder, recently hospitalized from 8/29/19 till 9/11/19 for hematoma of AICD pocket with battery depletion and Riata lead externalization s/p hematoma evacuation on 9/5-9/6 and laser lead extraction and new ICD lead implant, and again recently hospitalized 9/15/19-9/23/19 for trace pericardial effusion with no intervention and RV threshold elevation secondary to lead migration/dislodgement s/p lead replacement and re-positioning, treated for Acute Bronchitis during prior hospitalization with Doxycycline and Prednisone, presenting for AICD firing. Patient also reports that he has been having on/off fevers per the nurses at [facility] with a Tmax of 101 associated with dyspnea on exertion at times.    # Fever probably sec to pericardial effusion  - Blood cultures, UA, urine cultures neg  - Rapid RVP Result: NotDetected (09.17.19 @ 01:00)  - Xray Chest 1 View-PORTABLE IMMEDIATE (10.13.19 @ 02:32) >No radiographic evidence of acute cardiopulmonary disease.  - evaluated by ID: recommend : empiric Vanc 1.25 q12h IV and Cefepime 2g q12h IV  -  pericardial fluid G/S and cultures,    # Pericardial effusion probably hemopericardium  - CT Chest No Cont (10.14.19 @ 20:03) >Moderate to large sized complex pericardial fluid (with question hemopericardium considering Hounsfield units of 59 in some areas), increased since 9/17/2019. Bilateral pleural effusions, larger on the left with compressive atelectasis involving the entire lower lobe and part of the left upper lobe lingular segment.   - Transthoracic Echocardiogram (10.13.19 @ 17:23) >Left ventricular ejection fraction, by visual estimation, is 55 to 60%. Moderate pericardial effusion. Mild-moderate tricuspid regurgitation.  - evaluated by CTS: pericardiocentesis +/- thoracocentesis today 10/17    # H/o Ventricular tachycardia, H/o Afib/flutter  - s/p ICD with recent lead extraction and post op lead revision after micro dislodgement  - Device interrogation shows inappropriate shock for AFib/AFl with RVR (HR up to 226 bpm).   - c/w metoprolol and  Tikosyn.   - Thyroid Stimulating Hormone, Serum: 1.32 uIU/mL (10.13.19 @ 16:50)  - evaluated by EP    # Acute on chronic anemia, H/o iron and folate deficiency  - S/p 3 units PRBC  - Folate, Serum: 6.1 ng/mL (10.13.19 @ 11:26)  - Vitamin B12, Serum: 377 pg/mL (09.17.19 @ 07:24)  - Iron - Total Binding Capacity.: 346 ug/dL (09.17.19 @ 07:24)  - c/w ferrous sulphate, folic acid    # H/o CAD  - c/w ASA, statin, metoprolol    # H/o CVA  - c/w ASa, statin    # H/o seizures  - seizure precautions  - c/w lamotrigine    # Chronic resp failure on home oxygen  - c/w Spiriva, budesonide    # H/o Multiple sclerosis  - c/w home meds    # H/o chronic pain  - c/w pain meds    # H/o BPH  - c/w flomax    # H/o PTSD, schizoaffective disorder  - c/w home meds    # H/o Vit D deficiency  - Vitamin D, 25-Hydroxy: 16: 30 - 80 ng/mL (08.15.19 @ 08:06)  -Vitamin D, 1, 25-Dihydroxy: 55.9 pg/mL (08.15.19 @ 08:06)  - c/w vit D    # GI/DVT prophylaxis    # Full code    #Pending: pericardiocentesis today  # Discussed with patient  # Disposition: SNF

## 2019-10-17 NOTE — PROGRESS NOTE ADULT - ASSESSMENT
Assessment:  49 yo male with extensive medical problems, on home O2, presented to the ED s/p inappropriate firing of AICD now found to have a moderately sized pericardial effusion    Plan:  - pericardiocentesis +/- thoracocentesis today  - POC after

## 2019-10-17 NOTE — PRE-ANESTHESIA EVALUATION ADULT - NSANTHPMHFT_GEN_ALL_CORE
47 y/o male with significant medical hx including Multiple sclerosis, cardiomyopathy, AICD for hx of Vtach, chronic resp failure for COPD on 2L NC, chrnoic pain, peripheral neupopathy, DM type 2, epilepsy, bipolar, afib s/p ablation, comes in for misfiring AICD, and found to have pericardial effusion requiring pericardial window.

## 2019-10-18 LAB
ALBUMIN SERPL ELPH-MCNC: 3.4 G/DL — LOW (ref 3.5–5.2)
ALP SERPL-CCNC: 144 U/L — HIGH (ref 30–115)
ALT FLD-CCNC: 17 U/L — SIGNIFICANT CHANGE UP (ref 0–41)
ANION GAP SERPL CALC-SCNC: 10 MMOL/L — SIGNIFICANT CHANGE UP (ref 7–14)
ANION GAP SERPL CALC-SCNC: 13 MMOL/L — SIGNIFICANT CHANGE UP (ref 7–14)
APTT BLD: 35.1 SEC — SIGNIFICANT CHANGE UP (ref 27–39.2)
AST SERPL-CCNC: 22 U/L — SIGNIFICANT CHANGE UP (ref 0–41)
BASOPHILS # BLD AUTO: 0.02 K/UL — SIGNIFICANT CHANGE UP (ref 0–0.2)
BASOPHILS NFR BLD AUTO: 0.2 % — SIGNIFICANT CHANGE UP (ref 0–1)
BILIRUB SERPL-MCNC: 0.3 MG/DL — SIGNIFICANT CHANGE UP (ref 0.2–1.2)
BUN SERPL-MCNC: 16 MG/DL — SIGNIFICANT CHANGE UP (ref 10–20)
BUN SERPL-MCNC: 16 MG/DL — SIGNIFICANT CHANGE UP (ref 10–20)
CALCIUM SERPL-MCNC: 8.8 MG/DL — SIGNIFICANT CHANGE UP (ref 8.5–10.1)
CALCIUM SERPL-MCNC: 9.1 MG/DL — SIGNIFICANT CHANGE UP (ref 8.5–10.1)
CHLORIDE SERPL-SCNC: 101 MMOL/L — SIGNIFICANT CHANGE UP (ref 98–110)
CHLORIDE SERPL-SCNC: 101 MMOL/L — SIGNIFICANT CHANGE UP (ref 98–110)
CO2 SERPL-SCNC: 26 MMOL/L — SIGNIFICANT CHANGE UP (ref 17–32)
CO2 SERPL-SCNC: 28 MMOL/L — SIGNIFICANT CHANGE UP (ref 17–32)
CREAT SERPL-MCNC: 0.7 MG/DL — SIGNIFICANT CHANGE UP (ref 0.7–1.5)
CREAT SERPL-MCNC: 0.8 MG/DL — SIGNIFICANT CHANGE UP (ref 0.7–1.5)
CULTURE RESULTS: SIGNIFICANT CHANGE UP
CULTURE RESULTS: SIGNIFICANT CHANGE UP
EOSINOPHIL # BLD AUTO: 0 K/UL — SIGNIFICANT CHANGE UP (ref 0–0.7)
EOSINOPHIL NFR BLD AUTO: 0 % — SIGNIFICANT CHANGE UP (ref 0–8)
GLUCOSE BLDC GLUCOMTR-MCNC: 148 MG/DL — HIGH (ref 70–99)
GLUCOSE BLDC GLUCOMTR-MCNC: 162 MG/DL — HIGH (ref 70–99)
GLUCOSE SERPL-MCNC: 140 MG/DL — HIGH (ref 70–99)
GLUCOSE SERPL-MCNC: 240 MG/DL — HIGH (ref 70–99)
GRAM STN FLD: SIGNIFICANT CHANGE UP
HCT VFR BLD CALC: 28.8 % — LOW (ref 42–52)
HCT VFR BLD CALC: 29.1 % — LOW (ref 42–52)
HGB BLD-MCNC: 8.8 G/DL — LOW (ref 14–18)
HGB BLD-MCNC: 8.9 G/DL — LOW (ref 14–18)
IMM GRANULOCYTES NFR BLD AUTO: 1 % — HIGH (ref 0.1–0.3)
INR BLD: 1.39 RATIO — HIGH (ref 0.65–1.3)
LYMPHOCYTES # BLD AUTO: 0.36 K/UL — LOW (ref 1.2–3.4)
LYMPHOCYTES # BLD AUTO: 4.3 % — LOW (ref 20.5–51.1)
MAGNESIUM SERPL-MCNC: 2.5 MG/DL — HIGH (ref 1.8–2.4)
MAGNESIUM SERPL-MCNC: 2.5 MG/DL — HIGH (ref 1.8–2.4)
MCHC RBC-ENTMCNC: 27.3 PG — SIGNIFICANT CHANGE UP (ref 27–31)
MCHC RBC-ENTMCNC: 27.5 PG — SIGNIFICANT CHANGE UP (ref 27–31)
MCHC RBC-ENTMCNC: 30.6 G/DL — LOW (ref 32–37)
MCHC RBC-ENTMCNC: 30.6 G/DL — LOW (ref 32–37)
MCV RBC AUTO: 89.3 FL — SIGNIFICANT CHANGE UP (ref 80–94)
MCV RBC AUTO: 90 FL — SIGNIFICANT CHANGE UP (ref 80–94)
MONOCYTES # BLD AUTO: 0.42 K/UL — SIGNIFICANT CHANGE UP (ref 0.1–0.6)
MONOCYTES NFR BLD AUTO: 5 % — SIGNIFICANT CHANGE UP (ref 1.7–9.3)
NEUTROPHILS # BLD AUTO: 7.44 K/UL — HIGH (ref 1.4–6.5)
NEUTROPHILS NFR BLD AUTO: 89.5 % — HIGH (ref 42.2–75.2)
NRBC # BLD: 0 /100 WBCS — SIGNIFICANT CHANGE UP (ref 0–0)
NRBC # BLD: 0 /100 WBCS — SIGNIFICANT CHANGE UP (ref 0–0)
PHOSPHATE SERPL-MCNC: 3 MG/DL — SIGNIFICANT CHANGE UP (ref 2.1–4.9)
PLATELET # BLD AUTO: 402 K/UL — HIGH (ref 130–400)
PLATELET # BLD AUTO: 447 K/UL — HIGH (ref 130–400)
POTASSIUM SERPL-MCNC: 4.5 MMOL/L — SIGNIFICANT CHANGE UP (ref 3.5–5)
POTASSIUM SERPL-MCNC: 4.9 MMOL/L — SIGNIFICANT CHANGE UP (ref 3.5–5)
POTASSIUM SERPL-SCNC: 4.5 MMOL/L — SIGNIFICANT CHANGE UP (ref 3.5–5)
POTASSIUM SERPL-SCNC: 4.9 MMOL/L — SIGNIFICANT CHANGE UP (ref 3.5–5)
PROT SERPL-MCNC: 6.1 G/DL — SIGNIFICANT CHANGE UP (ref 6–8)
PROTHROM AB SERPL-ACNC: 15.9 SEC — HIGH (ref 9.95–12.87)
RAPID RVP RESULT: SIGNIFICANT CHANGE UP
RBC # BLD: 3.2 M/UL — LOW (ref 4.7–6.1)
RBC # BLD: 3.26 M/UL — LOW (ref 4.7–6.1)
RBC # FLD: 15.8 % — HIGH (ref 11.5–14.5)
RBC # FLD: 15.9 % — HIGH (ref 11.5–14.5)
SODIUM SERPL-SCNC: 139 MMOL/L — SIGNIFICANT CHANGE UP (ref 135–146)
SODIUM SERPL-SCNC: 140 MMOL/L — SIGNIFICANT CHANGE UP (ref 135–146)
SPECIMEN SOURCE: SIGNIFICANT CHANGE UP
WBC # BLD: 10.4 K/UL — SIGNIFICANT CHANGE UP (ref 4.8–10.8)
WBC # BLD: 8.32 K/UL — SIGNIFICANT CHANGE UP (ref 4.8–10.8)
WBC # FLD AUTO: 10.4 K/UL — SIGNIFICANT CHANGE UP (ref 4.8–10.8)
WBC # FLD AUTO: 8.32 K/UL — SIGNIFICANT CHANGE UP (ref 4.8–10.8)

## 2019-10-18 PROCEDURE — 93010 ELECTROCARDIOGRAM REPORT: CPT

## 2019-10-18 PROCEDURE — 71045 X-RAY EXAM CHEST 1 VIEW: CPT | Mod: 26

## 2019-10-18 RX ORDER — CHLORHEXIDINE GLUCONATE 213 G/1000ML
1 SOLUTION TOPICAL
Refills: 0 | Status: DISCONTINUED | OUTPATIENT
Start: 2019-10-18 | End: 2019-10-21

## 2019-10-18 RX ORDER — MORPHINE SULFATE 50 MG/1
30 CAPSULE, EXTENDED RELEASE ORAL EVERY 4 HOURS
Refills: 0 | Status: DISCONTINUED | OUTPATIENT
Start: 2019-10-18 | End: 2019-10-21

## 2019-10-18 RX ADMIN — GABAPENTIN 400 MILLIGRAM(S): 400 CAPSULE ORAL at 22:49

## 2019-10-18 RX ADMIN — MORPHINE SULFATE 30 MILLIGRAM(S): 50 CAPSULE, EXTENDED RELEASE ORAL at 19:17

## 2019-10-18 RX ADMIN — Medication 100 MILLIGRAM(S): at 05:31

## 2019-10-18 RX ADMIN — Medication 100 MILLIGRAM(S): at 18:17

## 2019-10-18 RX ADMIN — MORPHINE SULFATE 30 MILLIGRAM(S): 50 CAPSULE, EXTENDED RELEASE ORAL at 05:31

## 2019-10-18 RX ADMIN — Medication 650 MILLIGRAM(S): at 11:38

## 2019-10-18 RX ADMIN — CEFEPIME 100 MILLIGRAM(S): 1 INJECTION, POWDER, FOR SOLUTION INTRAMUSCULAR; INTRAVENOUS at 18:17

## 2019-10-18 RX ADMIN — BUDESONIDE AND FORMOTEROL FUMARATE DIHYDRATE 2 PUFF(S): 160; 4.5 AEROSOL RESPIRATORY (INHALATION) at 10:14

## 2019-10-18 RX ADMIN — Medication 10 MILLIGRAM(S): at 18:17

## 2019-10-18 RX ADMIN — POLYETHYLENE GLYCOL 3350 17 GRAM(S): 17 POWDER, FOR SOLUTION ORAL at 18:17

## 2019-10-18 RX ADMIN — POLYETHYLENE GLYCOL 3350 17 GRAM(S): 17 POWDER, FOR SOLUTION ORAL at 05:31

## 2019-10-18 RX ADMIN — MORPHINE SULFATE 3 MILLIGRAM(S): 50 CAPSULE, EXTENDED RELEASE ORAL at 00:00

## 2019-10-18 RX ADMIN — LAMOTRIGINE 100 MILLIGRAM(S): 25 TABLET, ORALLY DISINTEGRATING ORAL at 05:31

## 2019-10-18 RX ADMIN — Medication 650 MILLIGRAM(S): at 00:00

## 2019-10-18 RX ADMIN — PANTOPRAZOLE SODIUM 40 MILLIGRAM(S): 20 TABLET, DELAYED RELEASE ORAL at 07:02

## 2019-10-18 RX ADMIN — MORPHINE SULFATE 30 MILLIGRAM(S): 50 CAPSULE, EXTENDED RELEASE ORAL at 11:14

## 2019-10-18 RX ADMIN — Medication 2 SPRAY(S): at 13:13

## 2019-10-18 RX ADMIN — CEFEPIME 100 MILLIGRAM(S): 1 INJECTION, POWDER, FOR SOLUTION INTRAMUSCULAR; INTRAVENOUS at 05:30

## 2019-10-18 RX ADMIN — GABAPENTIN 400 MILLIGRAM(S): 400 CAPSULE ORAL at 15:00

## 2019-10-18 RX ADMIN — LAMOTRIGINE 100 MILLIGRAM(S): 25 TABLET, ORALLY DISINTEGRATING ORAL at 18:16

## 2019-10-18 RX ADMIN — MORPHINE SULFATE 30 MILLIGRAM(S): 50 CAPSULE, EXTENDED RELEASE ORAL at 10:14

## 2019-10-18 RX ADMIN — QUETIAPINE FUMARATE 50 MILLIGRAM(S): 200 TABLET, FILM COATED ORAL at 22:49

## 2019-10-18 RX ADMIN — Medication 650 MILLIGRAM(S): at 12:38

## 2019-10-18 RX ADMIN — HEPARIN SODIUM 5000 UNIT(S): 5000 INJECTION INTRAVENOUS; SUBCUTANEOUS at 05:28

## 2019-10-18 RX ADMIN — GABAPENTIN 400 MILLIGRAM(S): 400 CAPSULE ORAL at 05:31

## 2019-10-18 RX ADMIN — Medication 1 MILLIGRAM(S): at 11:42

## 2019-10-18 RX ADMIN — Medication 2000 UNIT(S): at 11:39

## 2019-10-18 RX ADMIN — Medication 650 MILLIGRAM(S): at 05:28

## 2019-10-18 RX ADMIN — QUETIAPINE FUMARATE 25 MILLIGRAM(S): 200 TABLET, FILM COATED ORAL at 11:38

## 2019-10-18 RX ADMIN — Medication 10 MILLIGRAM(S): at 05:31

## 2019-10-18 RX ADMIN — MORPHINE SULFATE 30 MILLIGRAM(S): 50 CAPSULE, EXTENDED RELEASE ORAL at 14:29

## 2019-10-18 RX ADMIN — MORPHINE SULFATE 30 MILLIGRAM(S): 50 CAPSULE, EXTENDED RELEASE ORAL at 06:00

## 2019-10-18 RX ADMIN — MORPHINE SULFATE 30 MILLIGRAM(S): 50 CAPSULE, EXTENDED RELEASE ORAL at 18:45

## 2019-10-18 RX ADMIN — Medication 650 MILLIGRAM(S): at 18:17

## 2019-10-18 RX ADMIN — Medication 650 MILLIGRAM(S): at 19:17

## 2019-10-18 RX ADMIN — Medication 12.5 MILLIGRAM(S): at 05:28

## 2019-10-18 RX ADMIN — Medication 12.5 MILLIGRAM(S): at 22:49

## 2019-10-18 RX ADMIN — ATORVASTATIN CALCIUM 40 MILLIGRAM(S): 80 TABLET, FILM COATED ORAL at 22:49

## 2019-10-18 RX ADMIN — Medication 10 MILLIGRAM(S): at 11:39

## 2019-10-18 RX ADMIN — BUDESONIDE AND FORMOTEROL FUMARATE DIHYDRATE 2 PUFF(S): 160; 4.5 AEROSOL RESPIRATORY (INHALATION) at 20:19

## 2019-10-18 RX ADMIN — SENNA PLUS 2 TABLET(S): 8.6 TABLET ORAL at 22:49

## 2019-10-18 RX ADMIN — DOFETILIDE 250 MICROGRAM(S): 0.25 CAPSULE ORAL at 05:31

## 2019-10-18 RX ADMIN — MORPHINE SULFATE 30 MILLIGRAM(S): 50 CAPSULE, EXTENDED RELEASE ORAL at 15:29

## 2019-10-18 RX ADMIN — Medication 325 MILLIGRAM(S): at 11:38

## 2019-10-18 RX ADMIN — LORATADINE 10 MILLIGRAM(S): 10 TABLET ORAL at 11:43

## 2019-10-18 RX ADMIN — Medication 12.5 MILLIGRAM(S): at 15:00

## 2019-10-18 NOTE — SWALLOW BEDSIDE ASSESSMENT ADULT - SLP PERTINENT HISTORY OF CURRENT PROBLEM
pt NPO for planned pericardiocentesis +/- thora today. SLP c/s 2' reported hx of dysphagia ?MBS completed at [facility] in 2016. SLP to f/u. case d/w MD Ken.
pt NPO for planned pericardiocentesis +/- thora today. SLP c/s 2' reported hx of dysphagia ?MBS completed at [facility] in 2016. SLP to f/u. case d/w MD Ken.

## 2019-10-18 NOTE — CHART NOTE - NSCHARTNOTEFT_GEN_A_CORE
Post Operative Check    Patient is post op from a Creation of pericardial window by thoracotomy (59917)   and is resting in bed comfortably    Vitals    T(C): 36.6 (10-17-19 @ 22:00), Max: 39.4 (10-17-19 @ 10:04)  HR: 140 (10-17-19 @ 22:00) (62 - 166)  BP: 85/65 (10-17-19 @ 19:30) (85/65 - 122/60)  RR: 26 (10-17-19 @ 22:00) (11 - 30)  SpO2: 96% (10-17-19 @ 22:00) (85% - 96%)  Wt(kg): --      10-16 @ 07:01  -  10-17 @ 07:00  --------------------------------------------------------  IN:    Oral Fluid: 480 mL  Total IN: 480 mL    OUT:    Voided: 200 mL  Total OUT: 200 mL    Total NET: 280 mL      10-17 @ 07:01  -  10-18 @ 00:52  --------------------------------------------------------  IN:    lactated ringers.: 70 mL  Total IN: 70 mL    OUT:    Chest Tube: 35 mL    Chest Tube: 126 mL  Total OUT: 161 mL    Total NET: -91 mL          Physical Exam  General: NAD AAOx3   Cards: RRR S1S2  Resp: CTAB  [place]set: CT no airleak  Abdomen: Soft non-tender, non-distended  Ext: NTBL    Labs  Labs:  CAPILLARY BLOOD GLUCOSE      POCT Blood Glucose.: 148 mg/dL (17 Oct 2019 21:56)                          9.0    8.74  )-----------( 441      ( 16 Oct 2019 22:35 )             30.0       Auto Immature Granulocyte %: 1.1 % (10-17-19 @ 07:36)  Auto Neutrophil %: 73.8 % (10-17-19 @ 07:36)    10-17    139  |  100  |  16  ----------------------------<  133<H>  4.6   |  25  |  0.9      Calcium, Total Serum: 9.0 mg/dL (10-17-19 @ 07:36)      LFTs:             6.2  | 0.7  | 17       ------------------[137     ( 16 Oct 2019 06:31 )  3.4  | x    | 14          Lipase:x      Amylase:x         Lactate, Blood: 0.7 mmol/L (10-15-19 @ 01:51)      Coags:     17.30  ----< 1.51    ( 16 Oct 2019 22:35 )     x               Serum Pro-Brain Natriuretic Peptide: 569 pg/mL (10-13-19 @ 02:09)          Culture - Blood (collected 15 Oct 2019 01:51)  Source: .Blood Blood-Peripheral  Preliminary Report (16 Oct 2019 13:01):    No growth to date.          Radiology:       Patient is a 48y old Male s/p Creation of pericardial window by thoracotomy, still in A fib  Plan:  -CCU management

## 2019-10-18 NOTE — CHART NOTE - NSCHARTNOTEFT_GEN_A_CORE
Called by medicine attending regarding transfer of service to surgery. Explained that decision should be made between current primary team attending and Dr Pastrana. Called by medicine attending regarding transfer of service to surgery. Explained that decision should be made between current primary team attending and Dr Pastrana, and that transfer should not be made until discussion is made between attending and Dr Pastrana.

## 2019-10-18 NOTE — PROGRESS NOTE ADULT - SUBJECTIVE AND OBJECTIVE BOX
GENERAL SURGERY PROGRESS NOTE     CUATE HORNE  Male  Hospital day: 6d  POD: 1d  Procedure: Creation of pericardial window by thoracotomy    OVERNIGHT EVENTS: no acute events overnight, patient went into afib with RVR in OR which continued post operatively. Patient has been in normal sinus rhythm on Lopressor since 2200.    T(F): 97.8 (10-18-19 @ 04:00), Max: 103 (10-17-19 @ 10:04)  HR: 60 (10-18-19 @ 06:00) (60 - 166)  BP: 85/65 (10-17-19 @ 19:30) (85/65 - 109/60)  ABP: 134/68 (10-18-19 @ 06:00) (108/62 - 134/68)  ABP(mean): 92 (10-18-19 @ 06:00) (78 - 92)  RR: 15 (10-18-19 @ 06:00) (11 - 31)  SpO2: 100% (10-18-19 @ 06:00) (85% - 100%)    DIET/FLUIDS: cholecalciferol 2000 Unit(s) Oral daily  ferrous    sulfate 325 milliGRAM(s) Oral daily  folic acid 1 milliGRAM(s) Oral daily  lactated ringers. 1000 milliLiter(s) IV Continuous <Continuous>     GI proph:  pantoprazole    Tablet 40 milliGRAM(s) Oral before breakfast    AC/ proph: aspirin enteric coated 81 milliGRAM(s) Oral daily  heparin  Injectable 5000 Unit(s) SubCutaneous every 8 hours    ABx: cefepime   IVPB 2000 milliGRAM(s) IV Intermittent every 12 hours    PHYSICAL EXAM:  GENERAL: well-appearing, requiring 15L on face mask  CHEST/LUNG: Clear to auscultation bilaterally, pericardial drain and chest tube in L chest, dressing clean, dry and intact  HEART: Regular rate and rhythm  ABDOMEN: Soft, Nontender, Nondistended;   EXTREMITIES:  No clubbing, cyanosis, or edema    Labs:  CAPILLARY BLOOD GLUCOSE  POCT Blood Glucose.: 148 mg/dL (17 Oct 2019 21:56)                        8.9    8.32  )-----------( 402      ( 18 Oct 2019 04:50 )             29.1       Auto Neutrophil %: 89.5 % (10-18-19 @ 04:50)  Auto Immature Granulocyte %: 1.0 % (10-18-19 @ 04:50)    10-18    140  |  101  |  16  ----------------------------<  240<H>  4.5   |  26  |  0.8    Magnesium, Serum: 2.5 mg/dL (10-18-19 @ 04:50)    LFTs:             6.1  | 0.3  | 22       ------------------[144     ( 18 Oct 2019 04:50 )  3.4  | x    | 17          Coags:     15.90  ----< 1.39    ( 18 Oct 2019 04:50 )     35.1      Serum Pro-Brain Natriuretic Peptide: 569 pg/mL (10-13-19 @ 02:09)    Culture - Body Fluid with Gram Stain (collected 17 Oct 2019 18:00)  Source: .Body Fluid None  Gram Stain (18 Oct 2019 05:54):    No polymorphonuclear cells seen    No organisms seen    by cytocentrifuge

## 2019-10-18 NOTE — PROGRESS NOTE ADULT - ASSESSMENT
Assessment:  47 yo male with extensive medical problems, on home O2, presented to the ED s/p firing of AICD found to have a moderately sized pericardial effusion now s/p thoracotomy with pericardial window draining 600cc fluid    Plan:  - pain control: 30mg morphine PO IR q4  - regular diet  - OOB to chair'  - monitor drain outputs  - follow cardiology recs       - continue to hole anticoagulation       - c/w ASA 81mg, Lipitor, Lopressor 12.5mg q12, tikosyn       - f/u with EPS       - when able to downgrade goes to tele

## 2019-10-18 NOTE — PROGRESS NOTE ADULT - ASSESSMENT
ASSESSMENT  47 yo M with MS, Arrhythmogenic Ventricular Tachycardia and Cardiomyopathy s/p AICD in 2005, HFpEF (EF 55-60%), Afib/Aflutter, Chronic Hypoxic Respiratory Failure secondary to COPD on 2L O2 PRN, CAD, TIA/CVA, HTN, DLD, Peripheral neuropathy and chronic pain, Epilepsy/Seizures, Migraines, GERD, BPH, PTSD, Depression, Bipolar disorder, Schizoaffective Disorder, recently hospitalized from 8/29/19 till 9/11/19 for hematoma of AICD pocket with battery depletion and Riata lead externalization s/p hematoma evacuation on 9/5-9/6 and laser lead extraction and new ICD lead implant, and again recently hospitalized 9/15/19-9/23/19 for trace pericardial effusion with no intervention and RV threshold elevation secondary to lead migration/dislodgement s/p lead replacement and re-positioning, treated for Acute Bronchitis during prior hospitalization with Doxycycline and Prednisone, presenting for AICD firing    IMPRESSION  #Hemopericardium s/p OR 10/18 Creation of pericardial window by thoracotomy & CT  #Febrile tm 102.4 without leukocytosis or sepsis, suspect reactive/inflammatory 2/2 pericardial fluid     Pericardial G/S no PMN, no orgs    BCX NG x3    UA neg    CXR with pleural effusion, no evidence of PNA  #Moderate to large sized complex pericardial fluid with question of hemopericardium, downtrending hgb     suspect   #B/l pleural effusions & atelectasis  #Sepsis ruled out on admission   #Obesity BMI (kg/m2): 35.1    RECOMMENDATIONS  - If pericardial cx NG, d/c cefepime     Spectra [id number]

## 2019-10-18 NOTE — CONSULT NOTE ADULT - SUBJECTIVE AND OBJECTIVE BOX
Date of Admission: 10/13/2019    CHIEF COMPLAINT: AICD firing    HISTORY OF PRESENT ILLNESS:  49 yo M with MS, Arrhythmogenic Ventricular Tachycardia and Cardiomyopathy s/p AICD in 2005, HFpEF (EF 55-60%), Afib/Aflutter, Chronic Hypoxic Respiratory Failure secondary to COPD on 2L O2 PRN, CAD, TIA/CVA, HTN, DLD, Peripheral neuropathy and chronic pain, Epilepsy/Seizures, Migraines, GERD, BPH, PTSD, Depression, Bipolar disorder, Schizoaffective Disorder, recently hospitalized from 8/29/19 till 9/11/19 for hematoma of AICD pocket with battery depletion and Riata lead externalization s/p hematoma evacuation on 9/5-9/6 and laser lead extraction and new ICD lead implant, and again recently hospitalized 9/15/19-9/23/19 for trace pericardial effusion with no intervention and RV threshold elevation secondary to lead migration/dislodgement s/p lead replacement and re-positioning, treated for acute Bronchitis during prior hospitalization with Doxycycline and Prednisone, presenting for inappropriate AICD firing, found to have moderate pericardial effusion w questionable hemoperocardium, complicated with fevers that did not respond with antibiotics currently off antibiotics and down trending Hg, s/p pRBC transfusions, hemodynamically stable pending pericardiocentesis +/- thoracentesis.         PAST MEDICAL & SURGICAL HISTORY:  Vitamin D deficiency  Constipation  Chronic respiratory failure: secondary to COPD  Folate-deficiency anemia  Iron deficiency anemia  Anemia of chronic disease  Pericardial effusion without cardiac tamponade: h/o trace pericardial effusion  BPH (benign prostatic hyperplasia)  Chronic pain  Peripheral neuropathy  Type 2 diabetes mellitus  AICD (automatic cardioverter/defibrillator) present  Ventricular tachycardia  Chronic heart failure with preserved ejection fraction  PTSD (post-traumatic stress disorder)  Supraventricular arrhythmia: prior history  Cardiomyopathy  CHF (congestive heart failure)  Atrial fibrillation: s/p ablation, on eliquis  BPH (benign prostatic hyperplasia)  HTN (hypertension)  Seizures  Migraines  Pneumonia  MS (multiple sclerosis)  CAD (coronary artery disease)  Bipolar disorder  Anginal pain  Schizo affective schizophrenia  GERD (gastroesophageal reflux disease)  Seasonal allergies  Hypercholesteremia  COPD (chronic obstructive pulmonary disease)  CVA (cerebral vascular accident)  TIA (transient ischemic attack)  Peripheral Neuropathy  Clinical Depression  Displacement of electrode lead of cardiac pacemaker  History of evacuation of hematoma: AICD pocket  H/O prior ablation treatment: for Afib  H/O hernia repair: right 1972,1991  History of hip replacement  S/P Orchiectomy: L for testicular CA  S/P Implantation of AICD      FAMILY HISTORY:  [ ] no pertinent family history of premature cardiovascular disease in first degree relatives.  Mother:   Father:   Siblings:     SOCIAL HISTORY:    [ ] Non-smoker  [ ] Smoker  [ ] Alcohol    Allergies    dexmethylphenidate (Unknown)  Dilantin (Rash)  dilantin, compazine, neurontin, ritalin, phenergan (Unknown)  Haldol (Unknown)  hydantoin derivatives (Other)  methylphenidate (Unknown)  phenytoin (Unknown)  prochlorperazine (Unknown)  promethazine (Dystonic RXN)  rash/hives (Anaphylaxis)  thioxanthenes (Unknown)    Intolerances    	    REVIEW OF SYSTEMS:  CONSTITUTIONAL: No fever, weight loss, or fatigue  CARDIOLOGY: denies chest pain, shortness of breath or syncopal episodes.   RESPIRATORY: denies shortness of breath, wheezeing.   NEUROLOGICAL: No weakness, no focal deficits to report.  ENDOCRINOLOGICAL: no recent change in diabetic medications.   GI: no BRBPR, no N,V,diarrhea.    PSYCHIATRY: normal mood and affect  HEENT: no nasal discharge, no ecchymosis  SKIN: no ecchymosis, no breakdown  MUSCULOSKELETAL: Full range of motion x4.     PHYSICAL EXAM:  T(C): 36.7 (10-18-19 @ 02:00), Max: 39.4 (10-17-19 @ 10:04)  HR: 60 (10-18-19 @ 04:00) (60 - 166)  BP: 85/65 (10-17-19 @ 19:30) (85/65 - 109/60)  RR: 18 (10-18-19 @ 04:00) (11 - 30)  SpO2: 99% (10-18-19 @ 04:00) (85% - 99%)  Wt(kg): --  I&O's Summary    16 Oct 2019 07:01  -  17 Oct 2019 07:00  --------------------------------------------------------  IN: 480 mL / OUT: 200 mL / NET: 280 mL    17 Oct 2019 07:01  -  18 Oct 2019 05:26  --------------------------------------------------------  IN: 70 mL / OUT: 161 mL / NET: -91 mL        General Appearance: well appearing, normal for age and gender. 	  Neck: normal JVP, no bruit.   Eyes: No xanthomalasia, Extra ocular muscles intact.   Cardiovascular: regular rate and rhythm S1 S2, No JVD, No murmurs, No edema  Respiratory: Lungs clear to auscultation	  Psychiatry: Alert and oriented x 3, Mood & affect appropriate  Gastrointestinal:  Soft, Non-tender  Skin/Integumen: No rashes, No ecchymoses, No cyanosis	  Neurologic: Non-focal  Musculoskeletal/ extremities: Normal range of motion, No clubbing, cyanosis or edema  Vascular: Peripheral pulses palpable 2+ bilaterally    LABS:	 	                          8.9    8.32  )-----------( 402      ( 18 Oct 2019 04:50 )             29.1     10-17    139  |  100  |  16  ----------------------------<  133<H>  4.6   |  25  |  0.9    Ca    9.0      17 Oct 2019 07:36  Phos  3.5     10-16  Mg     2.6     10-17    TPro  6.2  /  Alb  3.4<L>  /  TBili  0.7  /  DBili  x   /  AST  17  /  ALT  14  /  AlkPhos  137<H>  10-16        PT/INR - ( 18 Oct 2019 04:50 )   PT: 15.90 sec;   INR: 1.39 ratio         PTT - ( 18 Oct 2019 04:50 )  PTT:35.1 sec    CARDIAC MARKERS:            TELEMETRY EVENTS: 	      ECG:  	    RADIOLOGY:      PREVIOUS DIAGNOSTIC TESTING:    [ ] Echocardiogram:    [ ]  Catheterization:    [ ] Stress Test:  	  	    Home Medications:  acetaminophen 650 mg oral tablet: 650 milligram(s) orally 4 times a day, As Needed (13 Oct 2019 10:36)  aspirin 81 mg oral delayed release tablet: 1 tab(s) orally once a day (13 Oct 2019 10:36)  atorvastatin 40 mg oral tablet: 1 tab(s) orally once a day (at bedtime) (13 Oct 2019 10:36)  baclofen 10 mg oral tablet: 1 tab(s) orally 4 times a day (13 Oct 2019 10:36)  budesonide-formoterol 160 mcg-4.5 mcg/inh inhalation aerosol: 2 puff(s) inhaled 2 times a day (13 Oct 2019 10:36)  Cepacol Extra Strength Menthol 10 mg mucous membrane lozenge: 1 anderson(s) mucous membrane every 6 hours, As Needed (13 Oct 2019 10:36)  cholecalciferol oral tablet: 2000 unit(s) orally once a day (13 Oct 2019 10:36)  Claritin 10 mg oral tablet: 1 tab(s) orally once a day (13 Oct 2019 10:36)  clonazePAM 0.5 mg oral tablet: 1 tab(s) orally once a day (at bedtime), As needed, agitation (13 Oct 2019 10:36)  Flonase 50 mcg/inh nasal spray: 2 spray(s) nasal once a day (13 Oct 2019 10:36)  folic acid 1 mg oral tablet: 1 tab(s) orally once a day (13 Oct 2019 10:36)  gabapentin 400 mg oral capsule: 1 cap(s) orally 3 times a day (13 Oct 2019 10:36)  heparin: 5000 unit(s) subcutaneous every 8 hours (13 Oct 2019 10:36)  lamoTRIgine 100 mg oral tablet: 1 tab(s) orally 2 times a day (13 Oct 2019 10:36)  metoprolol tartrate 25 mg oral tablet: 0.5 tab(s) orally every 8 hours (13 Oct 2019 10:36)  Mi-Acid oral suspension: 30 milliliter(s) orally 4 times a day, As Needed (13 Oct 2019 10:36)  Morphine IR 30 mg oral tablet: 1 tab(s) orally every 6 hours, As Needed - 10) (13 Oct 2019 10:36)  Nitrostat 0.4 mg sublingual tablet: sublingual 3 times a day, As Needed; may repeat after 5 min until relief (13 Oct 2019 10:36)  omeprazole 20 mg oral delayed release capsule: 1 cap(s) orally 2 times a day (13 Oct 2019 10:36)  polyethylene glycol 3350 oral powder for reconstitution: 17 gram(s) orally every 12 hours (13 Oct 2019 10:36)  QUEtiapine 25 mg oral tablet: 1 tab(s) orally once a day (13 Oct 2019 10:36)  SEROquel 50 mg oral tablet: 1 tab(s) orally once a day (at bedtime) (13 Oct 2019 10:36)  tamsulosin 0.4 mg oral capsule: 1 cap(s) orally once a day (at bedtime) (13 Oct 2019 10:36)  Tecfidera 240 mg oral delayed release capsule: 1 cap(s) orally once a day (13 Oct 2019 10:36)  Tikosyn 250 mcg oral capsule: 1 cap(s) orally 2 times a day (13 Oct 2019 10:36)  tiotropium 18 mcg inhalation capsule: 1 cap(s) inhaled once a day (13 Oct 2019 10:36)    MEDICATIONS  (STANDING):  acetaminophen   Tablet .. 650 milliGRAM(s) Oral every 6 hours  aspirin enteric coated 81 milliGRAM(s) Oral daily  atorvastatin 40 milliGRAM(s) Oral at bedtime  baclofen 10 milliGRAM(s) Oral four times a day  buDESOnide 160 MICROgram(s)/formoterol 4.5 MICROgram(s) Inhaler 2 Puff(s) Inhalation two times a day  cefepime   IVPB 2000 milliGRAM(s) IV Intermittent every 12 hours  cholecalciferol 2000 Unit(s) Oral daily  docusate sodium 100 milliGRAM(s) Oral two times a day  dofetilide 250 MICROGram(s) Oral two times a day  ferrous    sulfate 325 milliGRAM(s) Oral daily  fluticasone propionate (50 MICROgram(s)/actuation) Nasal Spray - Peds 2 Spray(s) Alternating Nostrils daily  folic acid 1 milliGRAM(s) Oral daily  gabapentin 400 milliGRAM(s) Oral three times a day  heparin  Injectable 5000 Unit(s) SubCutaneous every 8 hours  influenza   Vaccine 0.5 milliLiter(s) IntraMuscular once  lactated ringers. 1000 milliLiter(s) (70 mL/Hr) IV Continuous <Continuous>  lamoTRIgine 100 milliGRAM(s) Oral two times a day  loratadine 10 milliGRAM(s) Oral daily  metoprolol tartrate 12.5 milliGRAM(s) Oral every 8 hours  pantoprazole    Tablet 40 milliGRAM(s) Oral before breakfast  polyethylene glycol 3350 17 Gram(s) Oral every 12 hours  QUEtiapine 25 milliGRAM(s) Oral daily  QUEtiapine 50 milliGRAM(s) Oral at bedtime  senna 2 Tablet(s) Oral at bedtime  tamsulosin 0.4 milliGRAM(s) Oral at bedtime  tiotropium 18 MICROgram(s) Capsule 1 Capsule(s) Inhalation daily    MEDICATIONS  (PRN):  clonazePAM  Tablet 0.5 milliGRAM(s) Oral at bedtime PRN agitation  morphine  IR 30 milliGRAM(s) Oral every 6 hours PRN Moderate Pain (4 - 6) Date of Admission: 10/13/2019    CHIEF COMPLAINT: AICD firing    HISTORY OF PRESENT ILLNESS:  49 yo M with MS, Arrhythmogenic Ventricular Tachycardia and Cardiomyopathy s/p AICD in 2005, HFpEF (EF 55-60%), Afib/Aflutter on Eliquis, Chronic Hypoxic Respiratory Failure secondary to COPD on 2L O2 PRN, CAD, TIA/CVA, HTN, DLD, Peripheral neuropathy and chronic pain, Epilepsy/Seizures, Migraines, GERD, BPH, PTSD, Depression, Bipolar disorder, Schizoaffective Disorder, recently hospitalized from 8/29/19 till 9/11/19 for hematoma of AICD pocket with battery depletion and  lead externalization s/p hematoma evacuation on 9/5-9/6 and laser lead extraction and new ICD lead implant, and again recently hospitalized 9/15/19-9/23/19 for trace pericardial effusion with no intervention and RV threshold elevation secondary to lead migration/dislodgement s/p lead replacement and re-positioning, treated for acute Bronchitis during prior hospitalization with Doxycycline and Prednisone, presenting for inappropriate AICD firing, found to have moderate pericardial effusion and hemopericardium complicated with fevers that did not respond with antibiotics currently off antibiotics and down trending Hg, s/p pRBC transfusions. Pt. underwent pericardiocentesis with drain, and left sided chest tube placement with drainage of hemopericardium.    Pt. upgraded to CCU upon CT surgery request for monintoring postoperatively. Pt. denies any active chest pain, sob, or palpitations. Chest tube and pericardial drain draining sanguinous fluid.         PAST MEDICAL & SURGICAL HISTORY:  Vitamin D deficiency  Constipation  Chronic respiratory failure: secondary to COPD  Folate-deficiency anemia  Iron deficiency anemia  Anemia of chronic disease  Pericardial effusion without cardiac tamponade: h/o trace pericardial effusion  BPH (benign prostatic hyperplasia)  Chronic pain  Peripheral neuropathy  Type 2 diabetes mellitus  AICD (automatic cardioverter/defibrillator) present  Ventricular tachycardia  Chronic heart failure with preserved ejection fraction  PTSD (post-traumatic stress disorder)  Supraventricular arrhythmia: prior history  Cardiomyopathy  CHF (congestive heart failure)  Atrial fibrillation: s/p ablation, on eliquis  BPH (benign prostatic hyperplasia)  HTN (hypertension)  Seizures  Migraines  Pneumonia  MS (multiple sclerosis)  CAD (coronary artery disease)  Bipolar disorder  Anginal pain  Schizo affective schizophrenia  GERD (gastroesophageal reflux disease)  Seasonal allergies  Hypercholesteremia  COPD (chronic obstructive pulmonary disease)  CVA (cerebral vascular accident)  TIA (transient ischemic attack)  Peripheral Neuropathy  Clinical Depression  Displacement of electrode lead of cardiac pacemaker  History of evacuation of hematoma: AICD pocket  H/O prior ablation treatment: for Afib  H/O hernia repair: right 1972,1991  History of hip replacement  S/P Orchiectomy: L for testicular CA  S/P Implantation of AICD      FAMILY HISTORY:   history of premature CAD  Mother: CM  Father:   Siblings:     SOCIAL HISTORY:    Former smoker of 27 years, smoked 1.5 PPD, quit 1 year ago  Prior EtOH abuse, now only drinks on social gatherings  Denies illicit drug use      Allergies    dexmethylphenidate (Unknown)  Dilantin (Rash)  dilantin, compazine, neurontin, ritalin, phenergan (Unknown)  Haldol (Unknown)  hydantoin derivatives (Other)  methylphenidate (Unknown)  phenytoin (Unknown)  prochlorperazine (Unknown)  promethazine (Dystonic RXN)  rash/hives (Anaphylaxis)  thioxanthenes (Unknown)    Intolerances    	    REVIEW OF SYSTEMS:  CONSTITUTIONAL: No fever, weight loss, or fatigue  CARDIOLOGY: denies chest pain, shortness of breath or syncopal episodes.   RESPIRATORY: denies shortness of breath, wheezeing.   NEUROLOGICAL: No weakness, no focal deficits to report.  ENDOCRINOLOGICAL: no recent change in diabetic medications.   GI: no BRBPR, no N,V,diarrhea.    PSYCHIATRY: normal mood and affect  HEENT: no nasal discharge, no ecchymosis  SKIN: no ecchymosis, no breakdown  MUSCULOSKELETAL: Full range of motion x4.     PHYSICAL EXAM:  T(C): 36.7 (10-18-19 @ 02:00), Max: 39.4 (10-17-19 @ 10:04)  HR: 60 (10-18-19 @ 04:00) (60 - 166)  BP: 85/65 (10-17-19 @ 19:30) (85/65 - 109/60)  RR: 18 (10-18-19 @ 04:00) (11 - 30)  SpO2: 99% (10-18-19 @ 04:00) (85% - 99%)  Wt(kg): --  I&O's Summary    16 Oct 2019 07:01  -  17 Oct 2019 07:00  --------------------------------------------------------  IN: 480 mL / OUT: 200 mL / NET: 280 mL    17 Oct 2019 07:01  -  18 Oct 2019 05:26  --------------------------------------------------------  IN: 70 mL / OUT: 161 mL / NET: -91 mL        General Appearance: well appearing, normal for age and gender. 	  Neck: normal JVP, no bruit.   Eyes: No xanthomalasia, Extra ocular muscles intact.   Cardiovascular: regular rate and rhythm S1 S2, No JVD, No murmurs, No edema  Respiratory: mild crackles on auscultation bilaterally, decreased breath sounds LLL base  Psychiatry: Alert and oriented x 3, Mood & affect appropriate  Gastrointestinal:  Soft, Non-tender  Skin/Integumen: No rashes, No ecchymoses, No cyanosis	  Neurologic: Non-focal  Musculoskeletal/ extremities: Normal range of motion, No clubbing, cyanosis or edema  Vascular: Peripheral pulses palpable 2+ bilaterally    LABS:	 	                          8.9    8.32  )-----------( 402      ( 18 Oct 2019 04:50 )             29.1     10-17    139  |  100  |  16  ----------------------------<  133<H>  4.6   |  25  |  0.9    Ca    9.0      17 Oct 2019 07:36  Phos  3.5     10-16  Mg     2.6     10-17    TPro  6.2  /  Alb  3.4<L>  /  TBili  0.7  /  DBili  x   /  AST  17  /  ALT  14  /  AlkPhos  137<H>  10-16        PT/INR - ( 18 Oct 2019 04:50 )   PT: 15.90 sec;   INR: 1.39 ratio         PTT - ( 18 Oct 2019 04:50 )  PTT:35.1 sec      TELEMETRY EVENTS: 	    none    ECG:  	  A.Fib with RVR @140bpm, RBBB    RADIOLOGY:    CT Chest No Cont (10.14.19 @ 20:03)   1.  Moderate to large sized complex pericardial fluid (with question   hemopericardium considering Hounsfield units of 59 in some areas),   increased since 9/17/2019. Lack of intravenous contrast with suboptimal   evaluation of cardiac chambers causing limitation to exclude cardiac   tamponade. Recommend further evaluation by echocardiogram.    2.  Bilateral pleural effusions, larger on the left with compressive   atelectasis involving the entire lower lobe and part of the left upper   lobe lingular segment.       CXR: left sided pleural effusion      PREVIOUS DIAGNOSTIC TESTING:    [x Echocardiogram:  Transthoracic Echocardiogram (10.13.19 @ 17:23)    1. Left ventricular ejection fraction, by visual estimation, is 55 to   60%.   2. Technically good study.   3. Normal global left ventricular systolic function.  4. Normal left ventricular internal cavity size.   5. Moderate pericardial effusion.   6. Mild thickening of the anterior and posterior mitral valve leaflets.   7. Mild-moderate tricuspid regurgitation.      	    Home Medications:  acetaminophen 650 mg oral tablet: 650 milligram(s) orally 4 times a day, As Needed (13 Oct 2019 10:36)  aspirin 81 mg oral delayed release tablet: 1 tab(s) orally once a day (13 Oct 2019 10:36)  atorvastatin 40 mg oral tablet: 1 tab(s) orally once a day (at bedtime) (13 Oct 2019 10:36)  baclofen 10 mg oral tablet: 1 tab(s) orally 4 times a day (13 Oct 2019 10:36)  budesonide-formoterol 160 mcg-4.5 mcg/inh inhalation aerosol: 2 puff(s) inhaled 2 times a day (13 Oct 2019 10:36)  Cepacol Extra Strength Menthol 10 mg mucous membrane lozenge: 1 anderson(s) mucous membrane every 6 hours, As Needed (13 Oct 2019 10:36)  cholecalciferol oral tablet: 2000 unit(s) orally once a day (13 Oct 2019 10:36)  Claritin 10 mg oral tablet: 1 tab(s) orally once a day (13 Oct 2019 10:36)  clonazePAM 0.5 mg oral tablet: 1 tab(s) orally once a day (at bedtime), As needed, agitation (13 Oct 2019 10:36)  Flonase 50 mcg/inh nasal spray: 2 spray(s) nasal once a day (13 Oct 2019 10:36)  folic acid 1 mg oral tablet: 1 tab(s) orally once a day (13 Oct 2019 10:36)  gabapentin 400 mg oral capsule: 1 cap(s) orally 3 times a day (13 Oct 2019 10:36)  heparin: 5000 unit(s) subcutaneous every 8 hours (13 Oct 2019 10:36)  lamoTRIgine 100 mg oral tablet: 1 tab(s) orally 2 times a day (13 Oct 2019 10:36)  metoprolol tartrate 25 mg oral tablet: 0.5 tab(s) orally every 8 hours (13 Oct 2019 10:36)  Mi-Acid oral suspension: 30 milliliter(s) orally 4 times a day, As Needed (13 Oct 2019 10:36)  Morphine IR 30 mg oral tablet: 1 tab(s) orally every 6 hours, As Needed - 10) (13 Oct 2019 10:36)  Nitrostat 0.4 mg sublingual tablet: sublingual 3 times a day, As Needed; may repeat after 5 min until relief (13 Oct 2019 10:36)  omeprazole 20 mg oral delayed release capsule: 1 cap(s) orally 2 times a day (13 Oct 2019 10:36)  polyethylene glycol 3350 oral powder for reconstitution: 17 gram(s) orally every 12 hours (13 Oct 2019 10:36)  QUEtiapine 25 mg oral tablet: 1 tab(s) orally once a day (13 Oct 2019 10:36)  SEROquel 50 mg oral tablet: 1 tab(s) orally once a day (at bedtime) (13 Oct 2019 10:36)  tamsulosin 0.4 mg oral capsule: 1 cap(s) orally once a day (at bedtime) (13 Oct 2019 10:36)  Tecfidera 240 mg oral delayed release capsule: 1 cap(s) orally once a day (13 Oct 2019 10:36)  Tikosyn 250 mcg oral capsule: 1 cap(s) orally 2 times a day (13 Oct 2019 10:36)  tiotropium 18 mcg inhalation capsule: 1 cap(s) inhaled once a day (13 Oct 2019 10:36)    MEDICATIONS  (STANDING):  acetaminophen   Tablet .. 650 milliGRAM(s) Oral every 6 hours  aspirin enteric coated 81 milliGRAM(s) Oral daily  atorvastatin 40 milliGRAM(s) Oral at bedtime  baclofen 10 milliGRAM(s) Oral four times a day  buDESOnide 160 MICROgram(s)/formoterol 4.5 MICROgram(s) Inhaler 2 Puff(s) Inhalation two times a day  cefepime   IVPB 2000 milliGRAM(s) IV Intermittent every 12 hours  cholecalciferol 2000 Unit(s) Oral daily  docusate sodium 100 milliGRAM(s) Oral two times a day  dofetilide 250 MICROGram(s) Oral two times a day  ferrous    sulfate 325 milliGRAM(s) Oral daily  fluticasone propionate (50 MICROgram(s)/actuation) Nasal Spray - Peds 2 Spray(s) Alternating Nostrils daily  folic acid 1 milliGRAM(s) Oral daily  gabapentin 400 milliGRAM(s) Oral three times a day  heparin  Injectable 5000 Unit(s) SubCutaneous every 8 hours  influenza   Vaccine 0.5 milliLiter(s) IntraMuscular once  lactated ringers. 1000 milliLiter(s) (70 mL/Hr) IV Continuous <Continuous>  lamoTRIgine 100 milliGRAM(s) Oral two times a day  loratadine 10 milliGRAM(s) Oral daily  metoprolol tartrate 12.5 milliGRAM(s) Oral every 8 hours  pantoprazole    Tablet 40 milliGRAM(s) Oral before breakfast  polyethylene glycol 3350 17 Gram(s) Oral every 12 hours  QUEtiapine 25 milliGRAM(s) Oral daily  QUEtiapine 50 milliGRAM(s) Oral at bedtime  senna 2 Tablet(s) Oral at bedtime  tamsulosin 0.4 milliGRAM(s) Oral at bedtime  tiotropium 18 MICROgram(s) Capsule 1 Capsule(s) Inhalation daily    MEDICATIONS  (PRN):  clonazePAM  Tablet 0.5 milliGRAM(s) Oral at bedtime PRN agitation  morphine  IR 30 milliGRAM(s) Oral every 6 hours PRN Moderate Pain (4 - 6)

## 2019-10-18 NOTE — PROGRESS NOTE ADULT - SUBJECTIVE AND OBJECTIVE BOX
CUATE HORNE  48y, Male  Allergy: dexmethylphenidate (Unknown)  Dilantin (Rash)  dilantin, compazine, neurontin, ritalin, phenergan (Unknown)  Haldol (Unknown)  hydantoin derivatives (Other)  methylphenidate (Unknown)  phenytoin (Unknown)  prochlorperazine (Unknown)  promethazine (Dystonic RXN)  rash/hives (Anaphylaxis)  thioxanthenes (Unknown)      CHIEF COMPLAINT: AICD firing (18 Oct 2019 09:00)      INTERVAL EVENTS/HPI  - No acute events overnight, s/p OR yesterday  - T(F): , Max: 103 (10-17-19 @ 10:04), now fever curve downtrending   - Denies any worsening symptoms  - Tolerating medication  - WBC Count: 8.32 (10-18-19 @ 04:50)      ROS  General: Denies rigors, nightsweats  HEENT: Denies headache, rhinorrhea, sore throat, eye pain  CV: Denies CP, palpitations  PULM: +SOB, - wheezing  GI: Denies hematemesis, hematochezia, melena  : Denies discharge, hematuria  MSK: Denies arthralgias, myalgias  SKIN: Denies rash, lesions  NEURO: Denies paresthesias, weakness  PSYCH: Denies depression, anxiety    VITALS:  T(F): 97.8, Max: 103 (10-17-19 @ 10:04)  HR: 60  BP: 85/65  RR: 15Vital Signs Last 24 Hrs  T(C): 36.6 (18 Oct 2019 04:00), Max: 39.4 (17 Oct 2019 10:04)  T(F): 97.8 (18 Oct 2019 04:00), Max: 103 (17 Oct 2019 10:04)  HR: 60 (18 Oct 2019 06:00) (60 - 166)  BP: 85/65 (17 Oct 2019 19:30) (85/65 - 109/60)  BP(mean): --  RR: 15 (18 Oct 2019 06:00) (11 - 31)  SpO2: 100% (18 Oct 2019 06:00) (85% - 100%)    PHYSICAL EXAM:  Gen: chronically ill appearing NAD, resting in bed  HEENT: Normocephalic, atraumatic  Neck: supple, no lymphadenopathy  CV: Regular rate & regular rhythm,  +CT  Lungs: decreased BS at bases, no fremitus  Abdomen: Soft, BS present  Ext: Warm, well perfused  Neuro: non focal, awake  Skin: no rash, no erythema  Lines: no phlebitis        FH: Non-contributory  Social Hx: Non-contributory    TESTS & MEASUREMENTS:                        8.9    8.32  )-----------( 402      ( 18 Oct 2019 04:50 )             29.1     10-18    140  |  101  |  16  ----------------------------<  240<H>  4.5   |  26  |  0.8    Ca    9.1      18 Oct 2019 04:50  Phos  3.5     10-16  Mg     2.5     10-18    TPro  6.1  /  Alb  3.4<L>  /  TBili  0.3  /  DBili  x   /  AST  22  /  ALT  17  /  AlkPhos  144<H>  10-18    eGFR if Non : 107 mL/min/1.73M2 (10-18-19 @ 04:50)  eGFR if African American: 124 mL/min/1.73M2 (10-18-19 @ 04:50)    LIVER FUNCTIONS - ( 18 Oct 2019 04:50 )  Alb: 3.4 g/dL / Pro: 6.1 g/dL / ALK PHOS: 144 U/L / ALT: 17 U/L / AST: 22 U/L / GGT: x               Culture - Body Fluid with Gram Stain (collected 10-17-19 @ 18:00)  Source: .Body Fluid None  Gram Stain (10-18-19 @ 05:54):    No polymorphonuclear cells seen    No organisms seen    by cytocentrifuge    Culture - Blood (collected 10-15-19 @ 01:51)  Source: .Blood Blood-Peripheral  Preliminary Report (10-16-19 @ 13:01):    No growth to date.    Culture - Urine (collected 10-13-19 @ 16:11)  Source: .Urine Clean Catch (Midstream)  Final Report (10-14-19 @ 17:26):    No growth    Culture - Blood (collected 10-13-19 @ 02:13)  Source: .Blood Blood-Peripheral  Final Report (10-18-19 @ 07:00):    No growth at 5 days.    Culture - Blood (collected 10-13-19 @ 02:13)  Source: .Blood Blood-Peripheral  Final Report (10-18-19 @ 07:00):    No growth at 5 days.        Lactate, Blood: 0.7 mmol/L (10-15-19 @ 01:51)      INFECTIOUS DISEASES TESTING  Rapid RVP Result: NotDetec (10-17-19 @ 10:20)  Rapid RVP Result: NotDetec (09-17-19 @ 01:00)  MRSA PCR Result.: Negative (09-16-19 @ 22:29)  HIV-1/2 Combo Result: Nonreact (08-14-19 @ 05:37)      RADIOLOGY & ADDITIONAL TESTS:  I have personally reviewed the last Chest xray  CXR      CT      CARDIOLOGY TESTING  12 Lead ECG:   Ventricular Rate 60 BPM    Atrial Rate 60 BPM    P-R Interval 176 ms    QRS Duration 144 ms    Q-T Interval 466 ms    QTC Calculation(Bezet) 466 ms    P Axis 37 degrees    R Axis -10 degrees    T Axis 62 degrees    Diagnosis Line Atrial-paced rhythm  Right bundle branch block  T wave abnormality, consider lateral ischemia  Abnormal ECG    Confirmed by BHAVYA ALCALA MD ([id number]) on 10/18/2019 8:59:53 AM (10-18-19 @ 07:57)  12 Lead ECG:   Ventricular Rate 140 BPM    Atrial Rate 125 BPM    QRS Duration 138 ms    Q-T Interval 354 ms    QTC Calculation(Bezet) 540 ms    R Axis 4 degrees    T Axis -89 degrees    Diagnosis Line Atrial fibrillation with rapid ventricular response  Right bundle branch block  T wave abnormality, consider lateral ischemia  Abnormal ECG    Confirmed by Sedrick Nye ([id number]) on 10/18/2019 12:51:41 AM (10-17-19 @ 20:01)      MEDICATIONS  acetaminophen   Tablet .. 650  aspirin enteric coated 81  atorvastatin 40  baclofen 10  buDESOnide 160 MICROgram(s)/formoterol 4.5 MICROgram(s) Inhaler 2  cefepime   IVPB 2000  chlorhexidine 4% Liquid 1  cholecalciferol 2000  docusate sodium 100  dofetilide 250  ferrous    sulfate 325  fluticasone propionate (50 MICROgram(s)/actuation) Nasal Spray - Peds 2  folic acid 1  gabapentin 400  heparin  Injectable 5000  influenza   Vaccine 0.5  lactated ringers. 1000  lamoTRIgine 100  loratadine 10  metoprolol tartrate 12.5  pantoprazole    Tablet 40  polyethylene glycol 3350 17  QUEtiapine 25  QUEtiapine 50  senna 2  tamsulosin 0.4  tiotropium 18 MICROgram(s) Capsule 1      ANTIBIOTICS:  cefepime   IVPB 2000 milliGRAM(s) IV Intermittent every 12 hours      All available historical records have been reviewed

## 2019-10-18 NOTE — SWALLOW BEDSIDE ASSESSMENT ADULT - COMMENTS
dysphagia evaluation conducted bedside. pt received alert, verbally responsive. pt on venti mask. as per RN, MD Mcpherson, pt tolerates current diet. pt refused to consume solids at this time.

## 2019-10-18 NOTE — CONSULT NOTE ADULT - ASSESSMENT
47 yo M with MS, Arrhythmogenic Ventricular Tachycardia and Cardiomyopathy s/p AICD in 2005, HFpEF (EF 55-60%), Afib/Aflutter on Eliquis, Chronic Hypoxic Respiratory Failure secondary to COPD on 2L O2 PRN, CAD, TIA/CVA, HTN, DLD, Peripheral neuropathy and chronic pain, Epilepsy/Seizures, Migraines, GERD, BPH, PTSD, Depression, Bipolar disorder, Schizoaffective Disorder, recently hospitalized from 8/29/19 till 9/11/19 for hematoma of AICD pocket with battery depletion and  lead externalization s/p hematoma evacuation on 9/5-9/6 and laser lead extraction and new ICD lead implant, and again recently hospitalized 9/15/19-9/23/19 for trace pericardial effusion with no intervention and RV threshold elevation secondary to lead migration/dislodgement s/p lead replacement and re-positioning, treated for acute Bronchitis during prior hospitalization with Doxycycline and Prednisone, presenting for inappropriate AICD firing, found to have moderate pericardial effusion and hemopericardium pericardiocentesis with drain, and left sided chest tube placement with drainage of pericardial effusion and pleural effusion.    A & P:    ARVC s/p AICD  A.Fib/A.Flutter off A/C due to hemopericardium  HFpEF  Pericardial Effusion s/p pericardial drain  Fever of unknown etiology    -EKG initially showed A.Fib with RVR, currently in NSR with HR in 60s  -continue to hold anticoagulation  -c/w ASA 81mg, Lipitor, Lopressor 12.5mg Q12mg, tikosyn  -work up for fever, and c/w Abx as per ID  -follow up with EPS  -monitor in CCU as per CT surgery, can downgrade to telemetry once stable and cleared by CT surgery 49 yo M with MS, Arrhythmogenic Ventricular Tachycardia and Cardiomyopathy s/p AICD in 2005, HFpEF (EF 55-60%), Afib/Aflutter on Eliquis, Chronic Hypoxic Respiratory Failure secondary to COPD on 2L O2 PRN, CAD, TIA/CVA, HTN, DLD, Peripheral neuropathy and chronic pain, Epilepsy/Seizures, Migraines, GERD, BPH, PTSD, Depression, Bipolar disorder, Schizoaffective Disorder, recently hospitalized from 8/29/19 till 9/11/19 for hematoma of AICD pocket with battery depletion and  lead externalization s/p hematoma evacuation on 9/5-9/6 and laser lead extraction and new ICD lead implant, and again recently hospitalized 9/15/19-9/23/19 for trace pericardial effusion with no intervention and RV threshold elevation secondary to lead migration/dislodgement s/p lead replacement and re-positioning, treated for acute Bronchitis during prior hospitalization with Doxycycline and Prednisone, presenting for inappropriate AICD firing, found to have moderate pericardial effusion and hemopericardium pericardiocentesis with drain, and left sided chest tube placement with drainage of pericardial effusion and pleural effusion.    A & P:    ARVC s/p AICD  A.Fib/A.Flutter off A/C due to hemopericardium  HFpEF  Pericardial Effusion s/p pericardial drain  Fever of unknown etiology    -EKG initially showed A.Fib with RVR, currently in NSR with HR in 60s  -continue to hold anticoagulation  -c/w ASA 81mg, Lipitor, Lopressor 12.5mg Q12mg, tikosyn    agree with above.   CT surgical fu for removal of drain.   hold anticoagulation for now.   -work up for fever, and c/w Abx as per ID  -follow up with EPS  -monitor in CCU as per CT surgery, can downgrade to telemetry once stable and cleared by CT surgery

## 2019-10-18 NOTE — CHART NOTE - NSCHARTNOTEFT_GEN_A_CORE
Transfer note: transfer of care from medicine to CT surg      49 yo M with history of arrhythmogenic VT s/p AICD in 2005, HFpEF (EF 55-60%), Afib/Aflutter on Eliquis/ASA 81mg, HTN, COPD (on 2L O2 PRN), DLD, multiple sclerosis, epilepsy, PTSD, depression, TIA, with recent admission in August for Riata lead externalization s/p lead extraction and new ICD lead implant. He presented 2 weeks later with a new pericardial effusion and was found to have lead micro dislodgement and underwent lead revision.  This admission he presented on 10/13 for AICD firing.  He was found to have a moderate pericardial effusion and hemopericardium.  He underwent pericardiocentesis with drain placement and left sided chest tube placement with drainage of hemopericardium yesterday on 10/17 with CT surg.    Plan:    #Afib, Vtach s/p AICD  -Device interrogation: inappropriate shock for AFib/AFl with RVR (HR up to 226 bpm).   -EKG initially showed A.Fib with RVR, currently in NSR with HR in 60s  -continue to hold anticoagulation due to drop in Hb  -CCU monitoring  -A line placed 10/17  -c/w ASA 81mg, Lipitor, Lopressor 12.5mg Q12mg, tikosyn  -follow up with EPS  -monitor in CCU as per CT surgery, can downgrade to telemetry once stable and cleared by CT surgery      # Fever probably sec to pericardial effusion (reactive, inflammatory rxn)  -Tmax 102.4 without leukocytosis or sepsis  -Blood cultures, UA, urine cultures neg  -Rapid RVP Result: NotDetected (09.17.19 @ 01:00)  -Xray Chest 1 View-PORTABLE IMMEDIATE (10.13.19 @ 02:32) >No radiographic evidence of acute cardiopulmonary disease.  -evaluated by ID: recommend : empiric Vanc 1.25 q12h IV and Cefepime 2g q12h IV  -pericardial fluid G/S no PMN and no organisms. f/u cultures   -if pericardial culture neg, d/c cefepime    # Pericardial effusion/hemopericardium- POD 1 s/p pericardial window by thoractomy  -echo on 10/13 showing moderate pericardial effusion  -CT following  -pericardiocentesis with drain placement and L sided chest tube yesterday on 10/17 with CT surg  -removed 600 ccs fluid  -Hb stable at 8.9, continue to trend  -s/p 3 units  -pain control with 30mg morphine PO IR q4  -hold anticoagulation

## 2019-10-19 LAB
ANION GAP SERPL CALC-SCNC: 12 MMOL/L — SIGNIFICANT CHANGE UP (ref 7–14)
ANION GAP SERPL CALC-SCNC: 15 MMOL/L — HIGH (ref 7–14)
BUN SERPL-MCNC: 17 MG/DL — SIGNIFICANT CHANGE UP (ref 10–20)
BUN SERPL-MCNC: 20 MG/DL — SIGNIFICANT CHANGE UP (ref 10–20)
CALCIUM SERPL-MCNC: 9 MG/DL — SIGNIFICANT CHANGE UP (ref 8.5–10.1)
CALCIUM SERPL-MCNC: 9.2 MG/DL — SIGNIFICANT CHANGE UP (ref 8.5–10.1)
CHLORIDE SERPL-SCNC: 101 MMOL/L — SIGNIFICANT CHANGE UP (ref 98–110)
CHLORIDE SERPL-SCNC: 102 MMOL/L — SIGNIFICANT CHANGE UP (ref 98–110)
CO2 SERPL-SCNC: 26 MMOL/L — SIGNIFICANT CHANGE UP (ref 17–32)
CO2 SERPL-SCNC: 27 MMOL/L — SIGNIFICANT CHANGE UP (ref 17–32)
CREAT SERPL-MCNC: 0.8 MG/DL — SIGNIFICANT CHANGE UP (ref 0.7–1.5)
CREAT SERPL-MCNC: 0.9 MG/DL — SIGNIFICANT CHANGE UP (ref 0.7–1.5)
CULTURE RESULTS: SIGNIFICANT CHANGE UP
ESTIMATED AVERAGE GLUCOSE: 103 MG/DL — SIGNIFICANT CHANGE UP (ref 68–114)
GLUCOSE BLDC GLUCOMTR-MCNC: 119 MG/DL — HIGH (ref 70–99)
GLUCOSE SERPL-MCNC: 106 MG/DL — HIGH (ref 70–99)
GLUCOSE SERPL-MCNC: 128 MG/DL — HIGH (ref 70–99)
HBA1C BLD-MCNC: 5.2 % — SIGNIFICANT CHANGE UP (ref 4–5.6)
HCT VFR BLD CALC: 30.1 % — LOW (ref 42–52)
HCT VFR BLD CALC: 31.8 % — LOW (ref 42–52)
HGB BLD-MCNC: 9.2 G/DL — LOW (ref 14–18)
HGB BLD-MCNC: 9.4 G/DL — LOW (ref 14–18)
MAGNESIUM SERPL-MCNC: 2.4 MG/DL — SIGNIFICANT CHANGE UP (ref 1.8–2.4)
MAGNESIUM SERPL-MCNC: 2.5 MG/DL — HIGH (ref 1.8–2.4)
MCHC RBC-ENTMCNC: 27.1 PG — SIGNIFICANT CHANGE UP (ref 27–31)
MCHC RBC-ENTMCNC: 27.6 PG — SIGNIFICANT CHANGE UP (ref 27–31)
MCHC RBC-ENTMCNC: 29.6 G/DL — LOW (ref 32–37)
MCHC RBC-ENTMCNC: 30.6 G/DL — LOW (ref 32–37)
MCV RBC AUTO: 90.4 FL — SIGNIFICANT CHANGE UP (ref 80–94)
MCV RBC AUTO: 91.6 FL — SIGNIFICANT CHANGE UP (ref 80–94)
NRBC # BLD: 0 /100 WBCS — SIGNIFICANT CHANGE UP (ref 0–0)
NRBC # BLD: 0 /100 WBCS — SIGNIFICANT CHANGE UP (ref 0–0)
PHOSPHATE SERPL-MCNC: 3.4 MG/DL — SIGNIFICANT CHANGE UP (ref 2.1–4.9)
PLATELET # BLD AUTO: 391 K/UL — SIGNIFICANT CHANGE UP (ref 130–400)
PLATELET # BLD AUTO: 476 K/UL — HIGH (ref 130–400)
POTASSIUM SERPL-MCNC: 4.3 MMOL/L — SIGNIFICANT CHANGE UP (ref 3.5–5)
POTASSIUM SERPL-MCNC: 5.1 MMOL/L — HIGH (ref 3.5–5)
POTASSIUM SERPL-SCNC: 4.3 MMOL/L — SIGNIFICANT CHANGE UP (ref 3.5–5)
POTASSIUM SERPL-SCNC: 5.1 MMOL/L — HIGH (ref 3.5–5)
RBC # BLD: 3.33 M/UL — LOW (ref 4.7–6.1)
RBC # BLD: 3.47 M/UL — LOW (ref 4.7–6.1)
RBC # FLD: 16 % — HIGH (ref 11.5–14.5)
RBC # FLD: 16.4 % — HIGH (ref 11.5–14.5)
SODIUM SERPL-SCNC: 141 MMOL/L — SIGNIFICANT CHANGE UP (ref 135–146)
SODIUM SERPL-SCNC: 142 MMOL/L — SIGNIFICANT CHANGE UP (ref 135–146)
SPECIMEN SOURCE: SIGNIFICANT CHANGE UP
WBC # BLD: 10.38 K/UL — SIGNIFICANT CHANGE UP (ref 4.8–10.8)
WBC # BLD: 9.41 K/UL — SIGNIFICANT CHANGE UP (ref 4.8–10.8)
WBC # FLD AUTO: 10.38 K/UL — SIGNIFICANT CHANGE UP (ref 4.8–10.8)
WBC # FLD AUTO: 9.41 K/UL — SIGNIFICANT CHANGE UP (ref 4.8–10.8)

## 2019-10-19 PROCEDURE — 93010 ELECTROCARDIOGRAM REPORT: CPT

## 2019-10-19 RX ORDER — DOFETILIDE 0.25 MG/1
250 CAPSULE ORAL ONCE
Refills: 0 | Status: COMPLETED | OUTPATIENT
Start: 2019-10-19 | End: 2019-10-19

## 2019-10-19 RX ADMIN — MORPHINE SULFATE 30 MILLIGRAM(S): 50 CAPSULE, EXTENDED RELEASE ORAL at 06:13

## 2019-10-19 RX ADMIN — LAMOTRIGINE 100 MILLIGRAM(S): 25 TABLET, ORALLY DISINTEGRATING ORAL at 05:46

## 2019-10-19 RX ADMIN — QUETIAPINE FUMARATE 50 MILLIGRAM(S): 200 TABLET, FILM COATED ORAL at 22:24

## 2019-10-19 RX ADMIN — POLYETHYLENE GLYCOL 3350 17 GRAM(S): 17 POWDER, FOR SOLUTION ORAL at 05:45

## 2019-10-19 RX ADMIN — Medication 100 MILLIGRAM(S): at 18:35

## 2019-10-19 RX ADMIN — Medication 325 MILLIGRAM(S): at 11:08

## 2019-10-19 RX ADMIN — CHLORHEXIDINE GLUCONATE 1 APPLICATION(S): 213 SOLUTION TOPICAL at 05:45

## 2019-10-19 RX ADMIN — BUDESONIDE AND FORMOTEROL FUMARATE DIHYDRATE 2 PUFF(S): 160; 4.5 AEROSOL RESPIRATORY (INHALATION) at 21:54

## 2019-10-19 RX ADMIN — LAMOTRIGINE 100 MILLIGRAM(S): 25 TABLET, ORALLY DISINTEGRATING ORAL at 18:39

## 2019-10-19 RX ADMIN — MORPHINE SULFATE 30 MILLIGRAM(S): 50 CAPSULE, EXTENDED RELEASE ORAL at 00:48

## 2019-10-19 RX ADMIN — Medication 12.5 MILLIGRAM(S): at 14:47

## 2019-10-19 RX ADMIN — DOFETILIDE 250 MICROGRAM(S): 0.25 CAPSULE ORAL at 09:30

## 2019-10-19 RX ADMIN — POLYETHYLENE GLYCOL 3350 17 GRAM(S): 17 POWDER, FOR SOLUTION ORAL at 18:37

## 2019-10-19 RX ADMIN — Medication 10 MILLIGRAM(S): at 18:35

## 2019-10-19 RX ADMIN — GABAPENTIN 400 MILLIGRAM(S): 400 CAPSULE ORAL at 22:24

## 2019-10-19 RX ADMIN — SENNA PLUS 2 TABLET(S): 8.6 TABLET ORAL at 22:24

## 2019-10-19 RX ADMIN — Medication 12.5 MILLIGRAM(S): at 05:45

## 2019-10-19 RX ADMIN — MORPHINE SULFATE 30 MILLIGRAM(S): 50 CAPSULE, EXTENDED RELEASE ORAL at 18:36

## 2019-10-19 RX ADMIN — Medication 650 MILLIGRAM(S): at 18:36

## 2019-10-19 RX ADMIN — Medication 650 MILLIGRAM(S): at 00:12

## 2019-10-19 RX ADMIN — GABAPENTIN 400 MILLIGRAM(S): 400 CAPSULE ORAL at 05:45

## 2019-10-19 RX ADMIN — Medication 2 SPRAY(S): at 11:16

## 2019-10-19 RX ADMIN — Medication 650 MILLIGRAM(S): at 06:45

## 2019-10-19 RX ADMIN — ATORVASTATIN CALCIUM 40 MILLIGRAM(S): 80 TABLET, FILM COATED ORAL at 22:24

## 2019-10-19 RX ADMIN — DOFETILIDE 250 MICROGRAM(S): 0.25 CAPSULE ORAL at 18:37

## 2019-10-19 RX ADMIN — Medication 81 MILLIGRAM(S): at 11:08

## 2019-10-19 RX ADMIN — CEFEPIME 100 MILLIGRAM(S): 1 INJECTION, POWDER, FOR SOLUTION INTRAMUSCULAR; INTRAVENOUS at 18:34

## 2019-10-19 RX ADMIN — Medication 650 MILLIGRAM(S): at 11:16

## 2019-10-19 RX ADMIN — Medication 100 MILLIGRAM(S): at 05:45

## 2019-10-19 RX ADMIN — PANTOPRAZOLE SODIUM 40 MILLIGRAM(S): 20 TABLET, DELAYED RELEASE ORAL at 08:12

## 2019-10-19 RX ADMIN — TAMSULOSIN HYDROCHLORIDE 0.4 MILLIGRAM(S): 0.4 CAPSULE ORAL at 00:13

## 2019-10-19 RX ADMIN — Medication 10 MILLIGRAM(S): at 23:53

## 2019-10-19 RX ADMIN — Medication 650 MILLIGRAM(S): at 00:35

## 2019-10-19 RX ADMIN — LORATADINE 10 MILLIGRAM(S): 10 TABLET ORAL at 11:10

## 2019-10-19 RX ADMIN — Medication 10 MILLIGRAM(S): at 11:10

## 2019-10-19 RX ADMIN — QUETIAPINE FUMARATE 25 MILLIGRAM(S): 200 TABLET, FILM COATED ORAL at 11:10

## 2019-10-19 RX ADMIN — Medication 2000 UNIT(S): at 11:10

## 2019-10-19 RX ADMIN — MORPHINE SULFATE 30 MILLIGRAM(S): 50 CAPSULE, EXTENDED RELEASE ORAL at 01:12

## 2019-10-19 RX ADMIN — MORPHINE SULFATE 30 MILLIGRAM(S): 50 CAPSULE, EXTENDED RELEASE ORAL at 15:00

## 2019-10-19 RX ADMIN — Medication 650 MILLIGRAM(S): at 20:00

## 2019-10-19 RX ADMIN — GABAPENTIN 400 MILLIGRAM(S): 400 CAPSULE ORAL at 14:47

## 2019-10-19 RX ADMIN — Medication 650 MILLIGRAM(S): at 05:45

## 2019-10-19 RX ADMIN — Medication 650 MILLIGRAM(S): at 23:53

## 2019-10-19 RX ADMIN — Medication 12.5 MILLIGRAM(S): at 22:24

## 2019-10-19 RX ADMIN — TAMSULOSIN HYDROCHLORIDE 0.4 MILLIGRAM(S): 0.4 CAPSULE ORAL at 23:53

## 2019-10-19 RX ADMIN — Medication 10 MILLIGRAM(S): at 00:12

## 2019-10-19 RX ADMIN — Medication 1 MILLIGRAM(S): at 11:10

## 2019-10-19 RX ADMIN — MORPHINE SULFATE 30 MILLIGRAM(S): 50 CAPSULE, EXTENDED RELEASE ORAL at 06:45

## 2019-10-19 RX ADMIN — Medication 650 MILLIGRAM(S): at 11:08

## 2019-10-19 RX ADMIN — MORPHINE SULFATE 30 MILLIGRAM(S): 50 CAPSULE, EXTENDED RELEASE ORAL at 19:00

## 2019-10-19 RX ADMIN — MORPHINE SULFATE 30 MILLIGRAM(S): 50 CAPSULE, EXTENDED RELEASE ORAL at 23:51

## 2019-10-19 RX ADMIN — MORPHINE SULFATE 30 MILLIGRAM(S): 50 CAPSULE, EXTENDED RELEASE ORAL at 22:36

## 2019-10-19 RX ADMIN — CEFEPIME 100 MILLIGRAM(S): 1 INJECTION, POWDER, FOR SOLUTION INTRAMUSCULAR; INTRAVENOUS at 05:46

## 2019-10-19 RX ADMIN — Medication 10 MILLIGRAM(S): at 05:45

## 2019-10-19 RX ADMIN — MORPHINE SULFATE 30 MILLIGRAM(S): 50 CAPSULE, EXTENDED RELEASE ORAL at 14:48

## 2019-10-19 NOTE — PROGRESS NOTE ADULT - SUBJECTIVE AND OBJECTIVE BOX
GENERAL SURGERY PROGRESS NOTE     , [address] day :6d  POD:  Procedure: Creation of pericardial window by thoracotomy    Surgical Attending: Jose Pastrana  Overnight events: No acute events overnight, patient continues to be rate controlled in the 60's on Tikosyn and Metorprolol, no other complaints overnight.      T(F): 97.3 (10-19-19 @ 00:00), Max: 98.1 (10-18-19 @ 12:00)  HR: 60 (10-19-19 @ 01:00) (58 - 62)  BP: --  ABP: 130/80 (10-19-19 @ 01:00) (90/74 - 136/68)  ABP(mean): 98 (10-19-19 @ 01:00) (68 - 98)  RR: 17 (10-19-19 @ 01:00) (14 - 40)  SpO2: 93% (10-18-19 @ 23:00) (93% - 100%)      10-17-19 @ 07:01  -  10-18-19 @ 07:00  --------------------------------------------------------  IN:    lactated ringers.: 70 mL  Total IN: 70 mL    OUT:    Chest Tube: 115 mL    Chest Tube: 161 mL    Voided: 150 mL  Total OUT: 426 mL    Total NET: -356 mL      10-18-19 @ 07:01  -  10-19-19 @ 02:09  --------------------------------------------------------  IN:    IV PiggyBack: 50 mL    Oral Fluid: 300 mL  Total IN: 350 mL    OUT:    Chest Tube: 15 mL    Chest Tube: 49 mL    Incontinent per Condom Catheter: 725 mL  Total OUT: 789 mL    Total NET: -439 mL        DIET/FLUIDS: cholecalciferol 2000 Unit(s) Oral daily  ferrous    sulfate 325 milliGRAM(s) Oral daily  folic acid 1 milliGRAM(s) Oral daily  lactated ringers. 1000 milliLiter(s) IV Continuous <Continuous>       GI proph:  pantoprazole    Tablet 40 milliGRAM(s) Oral before breakfast    AC/ proph: aspirin enteric coated 81 milliGRAM(s) Oral daily  heparin  Injectable 5000 Unit(s) SubCutaneous every 8 hours    ABx: cefepime   IVPB 2000 milliGRAM(s) IV Intermittent every 12 hours    PHYSICAL EXAM:  GENERAL: well-appearing, requiring 15L on face mask  CHEST/LUNG: Clear to auscultation bilaterally, pericardial drain and chest tube in L chest, dressing clean, dry and intact  HEART: Regular rate and rhythm  ABDOMEN: Soft, Nontender, Nondistended;   EXTREMITIES:  No clubbing, cyanosis, or edema    LABS  Labs:  CAPILLARY BLOOD GLUCOSE  POCT Blood Glucose.: 162 mg/dL (18 Oct 2019 22:25)  POCT Blood Glucose.: 148 mg/dL (18 Oct 2019 17:20)                          8.8    10.40 )-----------( 447      ( 18 Oct 2019 21:11 )             28.8       Auto Neutrophil %: 89.5 % (10-18-19 @ 04:50)  Auto Immature Granulocyte %: 1.0 % (10-18-19 @ 04:50)    10-18    139  |  101  |  16  ----------------------------<  140<H>  4.9   |  28  |  0.7      Calcium, Total Serum: 8.8 mg/dL (10-18-19 @ 21:11)      LFTs:             6.1  | 0.3  | 22       ------------------[144     ( 18 Oct 2019 04:50 )  3.4  | x    | 17            Coags:     15.90  ----< 1.39    ( 18 Oct 2019 04:50 )     35.1      Brain Natriuretic Peptide: 569 pg/mL (10-13-19 @ 02:09)    Culture - Fungal, Body Fluid (collected 17 Oct 2019 18:00)  Source: .Body Fluid None  Preliminary Report (18 Oct 2019 15:10):    Testing in progress    Culture - Body Fluid with Gram Stain (collected 17 Oct 2019 18:00)  Source: .Body Fluid None  Gram Stain (18 Oct 2019 05:54):    No polymorphonuclear cells seen    No organisms seen    by cytocentrifuge  Preliminary Report (18 Oct 2019 18:42):    No growth

## 2019-10-19 NOTE — PROGRESS NOTE ADULT - ASSESSMENT
ASSESSMENT  47 yo M with MS, Arrhythmogenic Ventricular Tachycardia and Cardiomyopathy s/p AICD in 2005, HFpEF (EF 55-60%), Afib/Aflutter, Chronic Hypoxic Respiratory Failure secondary to COPD on 2L O2 PRN, CAD, TIA/CVA, HTN, DLD, Peripheral neuropathy and chronic pain, Epilepsy/Seizures, Migraines, GERD, BPH, PTSD, Depression, Bipolar disorder, Schizoaffective Disorder, recently hospitalized from 8/29/19 till 9/11/19 for hematoma of AICD pocket with battery depletion and Riata lead externalization s/p hematoma evacuation on 9/5-9/6 and laser lead extraction and new ICD lead implant, and again recently hospitalized 9/15/19-9/23/19 for trace pericardial effusion with no intervention and RV threshold elevation secondary to lead migration/dislodgement s/p lead replacement and re-positioning, treated for Acute Bronchitis during prior hospitalization with Doxycycline and Prednisone, presenting for AICD firing    IMPRESSION  #Hemopericardium s/p OR 10/18 Creation of pericardial window by thoracotomy & CT  #Febrile tm 102.4 without leukocytosis or sepsis, suspect reactive/inflammatory 2/2 pericardial fluid     Pericardial G/S no PMN, no orgs with negative cultures    BCX NG x3    UA neg    CXR with pleural effusion, no evidence of PNA  #Moderate to large sized complex pericardial fluid with question of hemopericardium, downtrending hgb     #B/l pleural effusions & atelectasis  #Sepsis ruled out on admission   #Obesity BMI (kg/m2): 35.1    RECOMMENDATIONS  - d/c cefepime

## 2019-10-19 NOTE — PROGRESS NOTE ADULT - SUBJECTIVE AND OBJECTIVE BOX
, CUATE  48y, Male    All available historical data reviewed    OVERNIGHT EVENTS:    no fevers, no pressors, Left CT in place, no diarrhea    VITALS:  T(F): 97.3, Max: 98.1 (10-18-19 @ 12:00)  HR: 60  BP: --  RR: 14Vital Signs Last 24 Hrs  T(C): 36.3 (19 Oct 2019 00:00), Max: 36.7 (18 Oct 2019 12:00)  T(F): 97.3 (19 Oct 2019 00:00), Max: 98.1 (18 Oct 2019 12:00)  HR: 60 (19 Oct 2019 05:00) (58 - 62)  BP: --  BP(mean): --  RR: 14 (19 Oct 2019 05:00) (13 - 40)  SpO2: 98% (19 Oct 2019 05:00) (92% - 100%)    TESTS & MEASUREMENTS:                        9.2    10.38 )-----------( 476      ( 19 Oct 2019 05:00 )             30.1     10-18    139  |  101  |  16  ----------------------------<  140<H>  4.9   |  28  |  0.7    Ca    8.8      18 Oct 2019 21:11  Phos  3.0     10-18  Mg     2.5     10-18    TPro  6.1  /  Alb  3.4<L>  /  TBili  0.3  /  DBili  x   /  AST  22  /  ALT  17  /  AlkPhos  144<H>  10-18    LIVER FUNCTIONS - ( 18 Oct 2019 04:50 )  Alb: 3.4 g/dL / Pro: 6.1 g/dL / ALK PHOS: 144 U/L / ALT: 17 U/L / AST: 22 U/L / GGT: x             Culture - Fungal, Body Fluid (collected 10-17-19 @ 18:00)  Source: .Body Fluid None  Preliminary Report (10-18-19 @ 15:10):    Testing in progress    Culture - Body Fluid with Gram Stain (collected 10-17-19 @ 18:00)  Source: .Body Fluid None  Gram Stain (10-18-19 @ 05:54):    No polymorphonuclear cells seen    No organisms seen    by cytocentrifuge  Preliminary Report (10-18-19 @ 18:42):    No growth    Culture - Blood (collected 10-15-19 @ 01:51)  Source: .Blood Blood-Peripheral  Preliminary Report (10-16-19 @ 13:01):    No growth to date.    Culture - Urine (collected 10-13-19 @ 16:11)  Source: .Urine Clean Catch (Midstream)  Final Report (10-14-19 @ 17:26):    No growth    Culture - Blood (collected 10-13-19 @ 02:13)  Source: .Blood Blood-Peripheral  Final Report (10-18-19 @ 07:00):    No growth at 5 days.    Culture - Blood (collected 10-13-19 @ 02:13)  Source: .Blood Blood-Peripheral  Final Report (10-18-19 @ 07:00):    No growth at 5 days.            RADIOLOGY & ADDITIONAL TESTS:  Personal review of radiological diagnostics performed  Echo and EKG results noted when applicable.     ANTIBIOTICS:  cefepime   IVPB 2000 milliGRAM(s) IV Intermittent every 12 hours

## 2019-10-19 NOTE — PROGRESS NOTE ADULT - ASSESSMENT
Assessment:  47 yo male with extensive medical problems, on home O2, presented to the ED s/p firing of AICD found to have a moderately sized pericardial effusion now s/p thoracotomy with pericardial window draining 600cc fluid    Plan:  - Possible DG to 3C   - Trend HR - rate control Afib   - pain control: 30mg morphine PO IR q4  - regular diet  - OOB to chair'  - monitor drain outputs  - follow cardiology recs       - continue to hold anticoagulation       - c/w ASA 81mg, Lipitor, Lopressor 12.5mg q12, tikosyn

## 2019-10-19 NOTE — CHART NOTE - NSCHARTNOTEFT_GEN_A_CORE
Registered Dietitian Follow-Up     Patient Profile Reviewed                           Yes [x]   No []     Nutrition History Previously Obtained        Yes [x]  No []       Pertinent Subjective Information: Pt. dozing off with O2 mask during visit. Spoke to RD at bedside- reports pt's been eating fairly well. Observed ~75% PO at breakfast today. Noted SLP reevaluated pt. yesterday and rec regular texture with thins. Dysphagia 2 mech soft nectar liq still active in EMR. Will communicate with resident.      Pertinent Medical Interventions: Afib, Vtach s/p AICD- CCU monitoring.  Fever probably sec to pericardial effusion: ID following.  Pericardial effusion/hemopericardium- POD 1 s/p pericardial window by thoracotomy.        Diet order: dysphagia 2 mech soft nectar consistency fluids, DASH/TLC, low Na     Anthropometrics:  - Ht. 167.6cm  - Wt. 95kg on 10/19 vs. 95.7kg dosing weight- relatively stable.    - %wt change  - BMI 34.1  - IBW 64.5kg     Pertinent Lab Data: (10/19) RBC 3.33, Hg 9.2, Hct 30.1, K 5.1, glu 128, Mg 2.5     Pertinent Meds: Heparin, Atorvastatin, vit D, Colace, Ferrous sulfate, Folic acid, Metoprolol, Protonix, Miralax. Senna      Physical Findings:  - Appearance: 2+ generalized edema  - GI function: no symptoms noted, last BM ??  - Tubes: none noted  - Oral/Mouth cavity:  - Skin: incision (BS 16)      Nutrition Requirements (from RD note on 10/16)  Weight Used: 98.6kg, IBW 64.5kg      Estimated Energy Needs    Continue [x]  Adjust [] 1805-2355kcal ([id number]) accounts for obese bmi, bedbound status, pressure injuries stage II  Adjusted Energy Recommendations:   kcal/day        Estimated Protein Needs    Continue [x]  Adjust [] 77-90g (1.2-1/4g/kg IBW) as above  Adjusted Protein Recommendations:   gm/day        Estimated Fluid Needs        Continue [x]  Adjust [] per LIP  Adjusted Fluid Recommendations:   mL/day     Nutrient Intake: ~75% PO at meals         [] Previous Nutrition Diagnosis: Inadequate Oral Intake- improving            [] Ongoing          [] Resolved     Nutrition Intervention: meals and snacks, medical food supplement    Rec: Change diet order to DASH/TLC as SLP rec regular texture thin consistency, monitor need for carb consistency and add Glucerna daily     Goal/Expected Outcome: In 4 days pt. to consistently consume at least 75% PO at meals      Indicator/Monitoring: diet order, energy intake, body composition, NFPF

## 2019-10-20 LAB
ANION GAP SERPL CALC-SCNC: 11 MMOL/L — SIGNIFICANT CHANGE UP (ref 7–14)
BUN SERPL-MCNC: 20 MG/DL — SIGNIFICANT CHANGE UP (ref 10–20)
CALCIUM SERPL-MCNC: 9.3 MG/DL — SIGNIFICANT CHANGE UP (ref 8.5–10.1)
CHLORIDE SERPL-SCNC: 103 MMOL/L — SIGNIFICANT CHANGE UP (ref 98–110)
CO2 SERPL-SCNC: 29 MMOL/L — SIGNIFICANT CHANGE UP (ref 17–32)
CREAT SERPL-MCNC: 0.9 MG/DL — SIGNIFICANT CHANGE UP (ref 0.7–1.5)
CULTURE RESULTS: SIGNIFICANT CHANGE UP
GLUCOSE SERPL-MCNC: 89 MG/DL — SIGNIFICANT CHANGE UP (ref 70–99)
HCT VFR BLD CALC: 34.1 % — LOW (ref 42–52)
HGB BLD-MCNC: 10 G/DL — LOW (ref 14–18)
MAGNESIUM SERPL-MCNC: 2.2 MG/DL — SIGNIFICANT CHANGE UP (ref 1.8–2.4)
MCHC RBC-ENTMCNC: 27.1 PG — SIGNIFICANT CHANGE UP (ref 27–31)
MCHC RBC-ENTMCNC: 29.3 G/DL — LOW (ref 32–37)
MCV RBC AUTO: 92.4 FL — SIGNIFICANT CHANGE UP (ref 80–94)
NRBC # BLD: 0 /100 WBCS — SIGNIFICANT CHANGE UP (ref 0–0)
PHOSPHATE SERPL-MCNC: 4.9 MG/DL — SIGNIFICANT CHANGE UP (ref 2.1–4.9)
PLATELET # BLD AUTO: 557 K/UL — HIGH (ref 130–400)
POTASSIUM SERPL-MCNC: 4.6 MMOL/L — SIGNIFICANT CHANGE UP (ref 3.5–5)
POTASSIUM SERPL-SCNC: 4.6 MMOL/L — SIGNIFICANT CHANGE UP (ref 3.5–5)
RBC # BLD: 3.69 M/UL — LOW (ref 4.7–6.1)
RBC # FLD: 16.6 % — HIGH (ref 11.5–14.5)
SODIUM SERPL-SCNC: 143 MMOL/L — SIGNIFICANT CHANGE UP (ref 135–146)
SPECIMEN SOURCE: SIGNIFICANT CHANGE UP
WBC # BLD: 7.04 K/UL — SIGNIFICANT CHANGE UP (ref 4.8–10.8)
WBC # FLD AUTO: 7.04 K/UL — SIGNIFICANT CHANGE UP (ref 4.8–10.8)

## 2019-10-20 PROCEDURE — 71045 X-RAY EXAM CHEST 1 VIEW: CPT | Mod: 26

## 2019-10-20 PROCEDURE — 93010 ELECTROCARDIOGRAM REPORT: CPT

## 2019-10-20 RX ORDER — MORPHINE SULFATE 50 MG/1
4 CAPSULE, EXTENDED RELEASE ORAL ONCE
Refills: 0 | Status: DISCONTINUED | OUTPATIENT
Start: 2019-10-20 | End: 2019-10-20

## 2019-10-20 RX ORDER — IPRATROPIUM/ALBUTEROL SULFATE 18-103MCG
3 AEROSOL WITH ADAPTER (GRAM) INHALATION EVERY 6 HOURS
Refills: 0 | Status: DISCONTINUED | OUTPATIENT
Start: 2019-10-20 | End: 2019-10-21

## 2019-10-20 RX ORDER — DIPHENHYDRAMINE HCL 50 MG
25 CAPSULE ORAL ONCE
Refills: 0 | Status: COMPLETED | OUTPATIENT
Start: 2019-10-20 | End: 2019-10-20

## 2019-10-20 RX ADMIN — Medication 12.5 MILLIGRAM(S): at 14:16

## 2019-10-20 RX ADMIN — Medication 650 MILLIGRAM(S): at 18:18

## 2019-10-20 RX ADMIN — MORPHINE SULFATE 30 MILLIGRAM(S): 50 CAPSULE, EXTENDED RELEASE ORAL at 22:02

## 2019-10-20 RX ADMIN — GABAPENTIN 400 MILLIGRAM(S): 400 CAPSULE ORAL at 14:16

## 2019-10-20 RX ADMIN — ATORVASTATIN CALCIUM 40 MILLIGRAM(S): 80 TABLET, FILM COATED ORAL at 21:44

## 2019-10-20 RX ADMIN — Medication 12.5 MILLIGRAM(S): at 21:44

## 2019-10-20 RX ADMIN — Medication 650 MILLIGRAM(S): at 12:23

## 2019-10-20 RX ADMIN — Medication 10 MILLIGRAM(S): at 06:08

## 2019-10-20 RX ADMIN — DOFETILIDE 250 MICROGRAM(S): 0.25 CAPSULE ORAL at 18:17

## 2019-10-20 RX ADMIN — Medication 2 SPRAY(S): at 12:23

## 2019-10-20 RX ADMIN — MORPHINE SULFATE 30 MILLIGRAM(S): 50 CAPSULE, EXTENDED RELEASE ORAL at 06:18

## 2019-10-20 RX ADMIN — Medication 100 MILLIGRAM(S): at 18:18

## 2019-10-20 RX ADMIN — GABAPENTIN 400 MILLIGRAM(S): 400 CAPSULE ORAL at 21:46

## 2019-10-20 RX ADMIN — Medication 10 MILLIGRAM(S): at 12:23

## 2019-10-20 RX ADMIN — BUDESONIDE AND FORMOTEROL FUMARATE DIHYDRATE 2 PUFF(S): 160; 4.5 AEROSOL RESPIRATORY (INHALATION) at 20:27

## 2019-10-20 RX ADMIN — GABAPENTIN 400 MILLIGRAM(S): 400 CAPSULE ORAL at 06:14

## 2019-10-20 RX ADMIN — Medication 325 MILLIGRAM(S): at 12:19

## 2019-10-20 RX ADMIN — Medication 650 MILLIGRAM(S): at 06:02

## 2019-10-20 RX ADMIN — Medication 81 MILLIGRAM(S): at 12:19

## 2019-10-20 RX ADMIN — HEPARIN SODIUM 5000 UNIT(S): 5000 INJECTION INTRAVENOUS; SUBCUTANEOUS at 06:14

## 2019-10-20 RX ADMIN — Medication 100 MILLIGRAM(S): at 06:09

## 2019-10-20 RX ADMIN — SENNA PLUS 2 TABLET(S): 8.6 TABLET ORAL at 21:44

## 2019-10-20 RX ADMIN — CHLORHEXIDINE GLUCONATE 1 APPLICATION(S): 213 SOLUTION TOPICAL at 06:09

## 2019-10-20 RX ADMIN — BUDESONIDE AND FORMOTEROL FUMARATE DIHYDRATE 2 PUFF(S): 160; 4.5 AEROSOL RESPIRATORY (INHALATION) at 07:59

## 2019-10-20 RX ADMIN — TAMSULOSIN HYDROCHLORIDE 0.4 MILLIGRAM(S): 0.4 CAPSULE ORAL at 21:44

## 2019-10-20 RX ADMIN — MORPHINE SULFATE 30 MILLIGRAM(S): 50 CAPSULE, EXTENDED RELEASE ORAL at 06:17

## 2019-10-20 RX ADMIN — MORPHINE SULFATE 4 MILLIGRAM(S): 50 CAPSULE, EXTENDED RELEASE ORAL at 14:16

## 2019-10-20 RX ADMIN — QUETIAPINE FUMARATE 25 MILLIGRAM(S): 200 TABLET, FILM COATED ORAL at 12:18

## 2019-10-20 RX ADMIN — LAMOTRIGINE 100 MILLIGRAM(S): 25 TABLET, ORALLY DISINTEGRATING ORAL at 18:20

## 2019-10-20 RX ADMIN — MORPHINE SULFATE 30 MILLIGRAM(S): 50 CAPSULE, EXTENDED RELEASE ORAL at 16:12

## 2019-10-20 RX ADMIN — CEFEPIME 100 MILLIGRAM(S): 1 INJECTION, POWDER, FOR SOLUTION INTRAMUSCULAR; INTRAVENOUS at 06:09

## 2019-10-20 RX ADMIN — Medication 12.5 MILLIGRAM(S): at 06:10

## 2019-10-20 RX ADMIN — MORPHINE SULFATE 4 MILLIGRAM(S): 50 CAPSULE, EXTENDED RELEASE ORAL at 13:41

## 2019-10-20 RX ADMIN — LORATADINE 10 MILLIGRAM(S): 10 TABLET ORAL at 12:20

## 2019-10-20 RX ADMIN — DOFETILIDE 250 MICROGRAM(S): 0.25 CAPSULE ORAL at 06:09

## 2019-10-20 RX ADMIN — Medication 1 MILLIGRAM(S): at 12:19

## 2019-10-20 RX ADMIN — Medication 10 MILLIGRAM(S): at 18:18

## 2019-10-20 RX ADMIN — POLYETHYLENE GLYCOL 3350 17 GRAM(S): 17 POWDER, FOR SOLUTION ORAL at 18:19

## 2019-10-20 RX ADMIN — Medication 2000 UNIT(S): at 12:19

## 2019-10-20 RX ADMIN — LAMOTRIGINE 100 MILLIGRAM(S): 25 TABLET, ORALLY DISINTEGRATING ORAL at 06:13

## 2019-10-20 RX ADMIN — Medication 650 MILLIGRAM(S): at 12:18

## 2019-10-20 RX ADMIN — MORPHINE SULFATE 30 MILLIGRAM(S): 50 CAPSULE, EXTENDED RELEASE ORAL at 22:29

## 2019-10-20 RX ADMIN — Medication 650 MILLIGRAM(S): at 18:33

## 2019-10-20 RX ADMIN — Medication 650 MILLIGRAM(S): at 05:53

## 2019-10-20 RX ADMIN — MORPHINE SULFATE 30 MILLIGRAM(S): 50 CAPSULE, EXTENDED RELEASE ORAL at 18:16

## 2019-10-20 RX ADMIN — POLYETHYLENE GLYCOL 3350 17 GRAM(S): 17 POWDER, FOR SOLUTION ORAL at 06:12

## 2019-10-20 RX ADMIN — QUETIAPINE FUMARATE 50 MILLIGRAM(S): 200 TABLET, FILM COATED ORAL at 21:44

## 2019-10-20 RX ADMIN — PANTOPRAZOLE SODIUM 40 MILLIGRAM(S): 20 TABLET, DELAYED RELEASE ORAL at 06:10

## 2019-10-20 NOTE — PROGRESS NOTE ADULT - SUBJECTIVE AND OBJECTIVE BOX
GENERAL SURGERY PROGRESS NOTE     CUATE HORNE  Male  Hospital day: 8d  POD: 2d  Procedure: Creation of pericardial window by thoracotomy    OVERNIGHT EVENTS: no acute events overnight, A line became nonfunctional and was removed    T(F): 96.6 (10-20-19 @ 00:00), Max: 98.5 (10-19-19 @ 08:00)  HR: 60 (10-20-19 @ 02:00) (60 - 60)  BP: 103/66 (10-20-19 @ 02:00) (90/56 - 111/65)  ABP: 134/80 (10-19-19 @ 21:00) (86/78 - 144/84)  ABP(mean): 102 (10-19-19 @ 21:00) (72 - 106)  RR: 5 (10-20-19 @ 02:00) (4 - 29)  SpO2: 97% (10-20-19 @ 02:00) (91% - 99%)    DIET/FLUIDS: cholecalciferol 2000 Unit(s) Oral daily  ferrous    sulfate 325 milliGRAM(s) Oral daily  folic acid 1 milliGRAM(s) Oral daily  lactated ringers. 1000 milliLiter(s) IV Continuous <Continuous>    URINE:   10-18-19 @ 07:01  -  10-19-19 @ 07:00  --------------------------------------------------------  OUT: 1050 mL    GI proph:  pantoprazole    Tablet 40 milliGRAM(s) Oral before breakfast    AC/ proph: aspirin enteric coated 81 milliGRAM(s) Oral daily  heparin  Injectable 5000 Unit(s) SubCutaneous every 8 hours    ABx: cefepime   IVPB 2000 milliGRAM(s) IV Intermittent every 12 hours    PHYSICAL EXAM:  GENERAL: NAD, well-appearing  CHEST/LUNG: Clear to auscultation bilaterally, two CT in place  HEART: Regular rate and rhythm  ABDOMEN: Soft, Nontender, Nondistended;   EXTREMITIES:  No clubbing, cyanosis, or edema    Labs:  CAPILLARY BLOOD GLUCOSE  POCT Blood Glucose.: 119 mg/dL (19 Oct 2019 17:41)               9.4    9.41  )-----------( 391      ( 19 Oct 2019 22:30 )             31.8       10-19    142  |  101  |  20  ----------------------------<  106<H>  4.3   |  26  |  0.9    Calcium, Total Serum: 9.2 mg/dL (10-19-19 @ 22:30)    LFTs:             6.1  | 0.3  | 22       ------------------[144     ( 18 Oct 2019 04:50 )  3.4  | x    | 17          Coags:     15.90  ----< 1.39    ( 18 Oct 2019 04:50 )     35.1      Serum Pro-Brain Natriuretic Peptide: 569 pg/mL (10-13-19 @ 02:09)    Culture - Acid Fast (collected 17 Oct 2019 18:00)    Culture - Fungal, Body Fluid (collected 17 Oct 2019 18:00)  Source: .Body Fluid None  Preliminary Report (18 Oct 2019 15:10):    Testing in progress    Culture - Body Fluid with Gram Stain (collected 17 Oct 2019 18:00)  Source: .Body Fluid None  Gram Stain (18 Oct 2019 05:54):    No polymorphonuclear cells seen    No organisms seen    by cytocentrifuge  Preliminary Report (18 Oct 2019 18:42):    No growth    RADIOLOGY & ADDITIONAL TESTS:    < from: Xray Chest 1 View- PORTABLE-Routine (10.18.19 @ 05:12) >  Impression:    Stable enlarged cardiac silhouette/pericardial effusion.  < end of copied text >

## 2019-10-20 NOTE — PROGRESS NOTE ADULT - SUBJECTIVE AND OBJECTIVE BOX
, CUATE  48y, Male    All available historical data reviewed    OVERNIGHT EVENTS:    no fevers, has no complaints, no pressors. L CT in place  ROS:  General: Denies rigors, nightsweats  HEENT: Denies headache, rhinorrhea, sore throat, eye pain  CV: Denies CP, palpitations  PULM: Denies SOB, wheezing  GI: Denies hematemesis, hematochezia, melena  : Denies discharge, hematuria  MSK: Denies arthralgias, myalgias  SKIN: Denies rash, lesions  NEURO: Denies paresthesias, weakness  PSYCH: Denies depression, anxiety    VITALS:  T(F): 97.4, Max: 98.5 (10-19-19 @ 08:00)  HR: 62  BP: 122/68  RR: 8Vital Signs Last 24 Hrs  T(C): 36.3 (20 Oct 2019 05:00), Max: 36.9 (19 Oct 2019 08:00)  T(F): 97.4 (20 Oct 2019 05:00), Max: 98.5 (19 Oct 2019 08:00)  HR: 62 (20 Oct 2019 05:00) (60 - 62)  BP: 122/68 (20 Oct 2019 05:00) (90/56 - 122/68)  BP(mean): 84 (20 Oct 2019 05:00) (67 - 85)  RR: 8 (20 Oct 2019 05:00) (0 - 29)  SpO2: 95% (20 Oct 2019 05:00) (91% - 98%)    TESTS & MEASUREMENTS:                        9.4    9.41  )-----------( 391      ( 19 Oct 2019 22:30 )             31.8     10-19    142  |  101  |  20  ----------------------------<  106<H>  4.3   |  26  |  0.9    Ca    9.2      19 Oct 2019 22:30  Phos  3.4     10-19  Mg     2.4     10-19          Culture - Acid Fast (collected 10-17-19 @ 18:00)    Culture - Fungal, Body Fluid (collected 10-17-19 @ 18:00)  Source: .Body Fluid None  Preliminary Report (10-18-19 @ 15:10):    Testing in progress    Culture - Body Fluid with Gram Stain (collected 10-17-19 @ 18:00)  Source: .Body Fluid None  Gram Stain (10-18-19 @ 05:54):    No polymorphonuclear cells seen    No organisms seen    by cytocentrifuge  Preliminary Report (10-18-19 @ 18:42):    No growth    Culture - Blood (collected 10-15-19 @ 01:51)  Source: .Blood Blood-Peripheral  Preliminary Report (10-16-19 @ 13:01):    No growth to date.    Culture - Urine (collected 10-13-19 @ 16:11)  Source: .Urine Clean Catch (Midstream)  Final Report (10-14-19 @ 17:26):    No growth            RADIOLOGY & ADDITIONAL TESTS:  Personal review of radiological diagnostics performed  Echo and EKG results noted when applicable.     ANTIBIOTICS:

## 2019-10-20 NOTE — PROGRESS NOTE ADULT - ASSESSMENT
Assessment:  47 yo male with extensive medical problems, on home O2, presented to the ED s/p firing of AICD found to have a moderately sized pericardial effusion now s/p thoracotomy with pericardial window draining 600cc fluid    Plan:  - Trend HR - rate control Afib   - pain control: 30mg morphine PO IR q4  - regular diet  - OOB to chair'  - monitor drain outputs  - follow cardiology recs       - continue to hold anticoagulation       - c/w ASA 81mg, Lipitor, Lopressor 12.5mg q12, tikosyn  - f/u AM CXR  - daily ECG for QT prolongation while on Tikosyn

## 2019-10-21 ENCOUNTER — TRANSCRIPTION ENCOUNTER (OUTPATIENT)
Age: 49
End: 2019-10-21

## 2019-10-21 VITALS
SYSTOLIC BLOOD PRESSURE: 122 MMHG | OXYGEN SATURATION: 98 % | HEART RATE: 60 BPM | DIASTOLIC BLOOD PRESSURE: 61 MMHG | RESPIRATION RATE: 20 BRPM

## 2019-10-21 LAB — NON-GYNECOLOGICAL CYTOLOGY STUDY: SIGNIFICANT CHANGE UP

## 2019-10-21 PROCEDURE — 71045 X-RAY EXAM CHEST 1 VIEW: CPT | Mod: 26

## 2019-10-21 RX ORDER — APIXABAN 2.5 MG/1
1 TABLET, FILM COATED ORAL
Qty: 60 | Refills: 0
Start: 2019-10-21 | End: 2019-11-19

## 2019-10-21 RX ORDER — FERROUS SULFATE 325(65) MG
1 TABLET ORAL
Qty: 90 | Refills: 0
Start: 2019-10-21 | End: 2019-11-19

## 2019-10-21 RX ADMIN — Medication 650 MILLIGRAM(S): at 02:25

## 2019-10-21 RX ADMIN — Medication 650 MILLIGRAM(S): at 11:30

## 2019-10-21 RX ADMIN — Medication 81 MILLIGRAM(S): at 11:38

## 2019-10-21 RX ADMIN — Medication 2 SPRAY(S): at 11:38

## 2019-10-21 RX ADMIN — MORPHINE SULFATE 30 MILLIGRAM(S): 50 CAPSULE, EXTENDED RELEASE ORAL at 17:30

## 2019-10-21 RX ADMIN — Medication 10 MILLIGRAM(S): at 02:25

## 2019-10-21 RX ADMIN — Medication 10 MILLIGRAM(S): at 11:37

## 2019-10-21 RX ADMIN — Medication 12.5 MILLIGRAM(S): at 14:27

## 2019-10-21 RX ADMIN — Medication 650 MILLIGRAM(S): at 06:48

## 2019-10-21 RX ADMIN — Medication 650 MILLIGRAM(S): at 06:30

## 2019-10-21 RX ADMIN — Medication 1 MILLIGRAM(S): at 11:37

## 2019-10-21 RX ADMIN — CHLORHEXIDINE GLUCONATE 1 APPLICATION(S): 213 SOLUTION TOPICAL at 06:30

## 2019-10-21 RX ADMIN — MORPHINE SULFATE 30 MILLIGRAM(S): 50 CAPSULE, EXTENDED RELEASE ORAL at 02:32

## 2019-10-21 RX ADMIN — GABAPENTIN 400 MILLIGRAM(S): 400 CAPSULE ORAL at 14:27

## 2019-10-21 RX ADMIN — MORPHINE SULFATE 30 MILLIGRAM(S): 50 CAPSULE, EXTENDED RELEASE ORAL at 07:29

## 2019-10-21 RX ADMIN — Medication 100 MILLIGRAM(S): at 17:24

## 2019-10-21 RX ADMIN — Medication 650 MILLIGRAM(S): at 11:38

## 2019-10-21 RX ADMIN — Medication 650 MILLIGRAM(S): at 00:00

## 2019-10-21 RX ADMIN — Medication 650 MILLIGRAM(S): at 17:24

## 2019-10-21 RX ADMIN — Medication 10 MILLIGRAM(S): at 06:30

## 2019-10-21 RX ADMIN — MORPHINE SULFATE 30 MILLIGRAM(S): 50 CAPSULE, EXTENDED RELEASE ORAL at 02:33

## 2019-10-21 RX ADMIN — QUETIAPINE FUMARATE 25 MILLIGRAM(S): 200 TABLET, FILM COATED ORAL at 11:37

## 2019-10-21 RX ADMIN — MORPHINE SULFATE 30 MILLIGRAM(S): 50 CAPSULE, EXTENDED RELEASE ORAL at 17:24

## 2019-10-21 RX ADMIN — Medication 100 MILLIGRAM(S): at 06:30

## 2019-10-21 RX ADMIN — DOFETILIDE 250 MICROGRAM(S): 0.25 CAPSULE ORAL at 17:24

## 2019-10-21 RX ADMIN — Medication 12.5 MILLIGRAM(S): at 06:30

## 2019-10-21 RX ADMIN — LAMOTRIGINE 100 MILLIGRAM(S): 25 TABLET, ORALLY DISINTEGRATING ORAL at 17:24

## 2019-10-21 RX ADMIN — POLYETHYLENE GLYCOL 3350 17 GRAM(S): 17 POWDER, FOR SOLUTION ORAL at 06:31

## 2019-10-21 RX ADMIN — Medication 325 MILLIGRAM(S): at 11:37

## 2019-10-21 RX ADMIN — PANTOPRAZOLE SODIUM 40 MILLIGRAM(S): 20 TABLET, DELAYED RELEASE ORAL at 06:31

## 2019-10-21 RX ADMIN — Medication 10 MILLIGRAM(S): at 17:24

## 2019-10-21 RX ADMIN — MORPHINE SULFATE 30 MILLIGRAM(S): 50 CAPSULE, EXTENDED RELEASE ORAL at 12:06

## 2019-10-21 RX ADMIN — Medication 650 MILLIGRAM(S): at 17:30

## 2019-10-21 RX ADMIN — MORPHINE SULFATE 30 MILLIGRAM(S): 50 CAPSULE, EXTENDED RELEASE ORAL at 06:50

## 2019-10-21 RX ADMIN — LORATADINE 10 MILLIGRAM(S): 10 TABLET ORAL at 11:39

## 2019-10-21 RX ADMIN — GABAPENTIN 400 MILLIGRAM(S): 400 CAPSULE ORAL at 06:31

## 2019-10-21 RX ADMIN — BUDESONIDE AND FORMOTEROL FUMARATE DIHYDRATE 2 PUFF(S): 160; 4.5 AEROSOL RESPIRATORY (INHALATION) at 08:26

## 2019-10-21 RX ADMIN — Medication 2000 UNIT(S): at 11:37

## 2019-10-21 RX ADMIN — LAMOTRIGINE 100 MILLIGRAM(S): 25 TABLET, ORALLY DISINTEGRATING ORAL at 06:31

## 2019-10-21 RX ADMIN — DOFETILIDE 250 MICROGRAM(S): 0.25 CAPSULE ORAL at 06:31

## 2019-10-21 RX ADMIN — MORPHINE SULFATE 30 MILLIGRAM(S): 50 CAPSULE, EXTENDED RELEASE ORAL at 14:24

## 2019-10-21 RX ADMIN — HEPARIN SODIUM 5000 UNIT(S): 5000 INJECTION INTRAVENOUS; SUBCUTANEOUS at 06:34

## 2019-10-21 NOTE — DISCHARGE NOTE PROVIDER - CARE PROVIDER_API CALL
Jose Pastrana)  Surgery; Thoracic and Cardiac Surgery  [address], [address]  [place] [address]  Phone: [phone]  Fax: [phone]  Follow Up Time: 1 week    Alexi Quiñones; MICHAEL)  Cardiac Electrophysiology  [address]  [place] [address]  Phone: [phone]  Fax: [phone]  Follow Up Time: 2 weeks

## 2019-10-21 NOTE — DISCHARGE NOTE PROVIDER - HOSPITAL COURSE
48M w/ PMH of arrhythmogenic ventricular tachycardia and cardiomyopathy s/p AICD in 2005, HFpEF (EF 55-60%), Afib/Aflutter on ASA 81mg, Chronic Hypoxic Respiratory Failure secondary to COPD on 2L O2 PRN, CAD, TIA/CVA, HTN, DLD, Peripheral neuropathy and chronic pain, Multiple Sclerosis, Epilepsy/Seizures, Migraines, GERD, Anemia of Chronic Disease + folate deficiency + Iron deficiency anemia, Constipation, BPH, PTSD, Depression, Bipolar disorder, Schizoaffective Disorder, recently hospitalized from 8/29/19 till 9/11/19 for hematoma of AICD pocket with battery depletion and Riata lead externalization s/p hematoma evacuation on 9/5-9/6 and laser lead extraction and new ICD lead implant, and again recently hospitalized 9/15/19-9/23/19 for trace pericardial effusion with no intervention and RV threshold elevation secondary to lead migration/dislodgement s/p lead replacement and re-positioning, treated for Acute Bronchitis during prior hospitalization with Doxycycline and Prednisone, presenting for AICD firing. Patient reports that he was walking to the nursing station at [facility] around 1AM in the morning to get his soda when he suddenly felt a tingling sensation in his chest followed by his defibrillator firing. Patient reports he sat down and the sensation resolved on its own. He denies chest pain, palpitations, lightheadedness, dizziness, shortness of breath prior or after the incident. Patient also reports that he has been having on/off fevers per the nurses at [facility] with a Tmax of 101 associated with dyspnea on exertion at times and that he has been on IV Ceftriaxone. Patient denies chills, cough, productive phlegm, lower extremity swelling/erythema/tenderness. Repeat Echo on admission showed:     < from: Transthoracic Echocardiogram (10.13.19 @ 17:23) >        Summary:     1. Left ventricular ejection fraction, by visual estimation, is 55 to     60%.     2. Technically good study.     3. Normal global left ventricular systolic function.    4. Normal left ventricular internal cavity size.     5. Moderate pericardial effusion.     6. Mild thickening of the anterior and posterior mitral valve leaflets.     7. Mild-moderate tricuspid regurgitation.        Pericardium: A moderately sized pericardial effusion is present. The     pericardial effusion is globally located around the entire heart. There     is no evidence of cardiac tamponade.    < end of copied text >        Pt was admitted to medicine, and cardiothoracic surgery was consulted on 10/14 for possible surgical intervention.     10/15: EPS recommended continuing beta blocker and Tikosyn and increasing metoprolol as BP allowed. Behavioral health was consulted to assess capacity when the pt was attempting to leave AMA. They decided that the pt was not an imminent danger to himself and did not need inpatient psychiatric hospitalization. They recommended to follow up with his outpatient psychiatrist at [facility] on discharge.    10/16: ID recommended starting empiric vancomycin and cefepime. Additionally, the pt received 1u pRBC for Hb 7.2.     10/17: The pt was taken to the OR with Dr. Doran for creation of pericardial window by thoracotomy, with findings of hemorrhagic pericardial effusion. Pericardial fluid was sent for culture and cytology, as well as the pericardium. A 24Fr pericardial martir and 28Fr L pleural chest tube were placed.     10/18: Cardiology was consulted and recommended holding anticoagulations and continuing his home aspirin, lipitor, lopressor, and Tikosyn.     10/19: Pt continued to be rate controlled. ID recommended discontinuing cefepime.     10/20: A line became nonfunctional and was removed. Both chest tubes were removed.         At the time of discharge, he was medically stable. He was able to tolerate a regular diet, have pain controlled, and was rate controlled.

## 2019-10-21 NOTE — PROGRESS NOTE ADULT - REASON FOR ADMISSION
AICD firing

## 2019-10-21 NOTE — DISCHARGE NOTE PROVIDER - NSDCCPCAREPLAN_GEN_ALL_CORE_FT
PRINCIPAL DISCHARGE DIAGNOSIS  Diagnosis: AICD malfunction  Assessment and Plan of Treatment: Found to have moderately sized pericardial effusion s/p thoracotomy with creation of pericardial window and placement of pericardial and pleural drain with interval removal of drains.   Pain: Continue to take your current pain medications.   Anticoagulation: You received heparin subcutaneously for anticoagulation prophylaxis here. Please restart your home Eliquis 5mg twice a day tonight.   Activity: Continue to ambulate as tolerated.   Diet: Continue your regular diet.   Dressings: Change dressings daily. You may shower normally, let water run over wounds. Do not soak (in baths, pools, or otherwise) for at least 3 weeks.   Follow up: With Dr. Pastrana in 1-2 weeks. Call to schedule an appointment.

## 2019-10-21 NOTE — DISCHARGE NOTE NURSING/CASE MANAGEMENT/SOCIAL WORK - PATIENT PORTAL LINK FT
You can access the FollowMyHealth Patient Portal offered by [facility] by registering at the following website: [email or web address]. By joining [employer]’s FollowMyHealth portal, you will also be able to view your health information using other applications (apps) compatible with our system.

## 2019-10-21 NOTE — PROGRESS NOTE ADULT - SUBJECTIVE AND OBJECTIVE BOX
GENERAL SURGERY PROGRESS NOTE     CUATE HORNE  Male  Hospital day :8d  POD:  Procedure: Creation of pericardial window by thoracotomy    OVERNIGHT EVENTS: no acute overnight events. Both CT's were removed. No issues.    T(F): 97 (10-21-19 @ 00:00), Max: 98 (10-20-19 @ 12:36)  HR: 60 (10-21-19 @ 00:00) (60 - 62)  BP: 87/52 (10-21-19 @ 00:00) (87/52 - 122/68)  RR: 17 (10-21-19 @ 00:00) (0 - 33)  SpO2: 98% (10-21-19 @ 00:00) (90% - 98%)    DIET/FLUIDS: cholecalciferol 2000 Unit(s) Oral daily  ferrous    sulfate 325 milliGRAM(s) Oral daily  folic acid 1 milliGRAM(s) Oral daily  lactated ringers. 1000 milliLiter(s) IV Continuous <Continuous>     GI proph:  pantoprazole    Tablet 40 milliGRAM(s) Oral before breakfast    AC/ proph: aspirin enteric coated 81 milliGRAM(s) Oral daily  heparin  Injectable 5000 Unit(s) SubCutaneous every 8 hours    PHYSICAL EXAM:  GENERAL: NAD, well-appearing  CHEST/LUNG: Clear to auscultation bilaterally  HEART: Regular rate and rhythm  ABDOMEN: Soft, Nontender, Nondistended;   EXTREMITIES:  No clubbing, cyanosis, or edema    LABS                       10.0   7.04  )-----------( 557      ( 20 Oct 2019 20:50 )             34.1         10-20    143  |  103  |  20  ----------------------------<  89  4.6   |  29  |  0.9      Calcium, Total Serum: 9.3 mg/dL (10-20-19 @ 20:50)    Serum Pro-Brain Natriuretic Peptide: 569 pg/mL (10-13-19 @ 02:09)    RADIOLOGY & ADDITIONAL TESTS:  < from: Xray Chest 1 View-PORTABLE IMMEDIATE (10.20.19 @ 20:43) >  Impression:      Left lower lobe opacity/pleural effusion no change. No air leak.

## 2019-10-21 NOTE — PROGRESS NOTE ADULT - ASSESSMENT
Assessment:  47 yo male with extensive medical problems, on home O2, presented to the ED s/p firing of AICD found to have a moderately sized pericardial effusion now s/p thoracotomy with pericardial window. Pericardial drain and CT both removed with no issues.    Plan:  - Trend HR - rate control Afib   - pain control: 30mg morphine PO IR q4  - regular diet  - AAT  - monitor drain outputs  - follow cardiology recs       - continue to hold anticoagulation       - c/w ASA 81mg, Lipitor, Lopressor 12.5mg q12, tikosyn  - f/u AM CXR  - daily ECG for QT prolongation while on Tikosyn  - PT/rehab  - Dispo planning

## 2019-10-21 NOTE — PROGRESS NOTE ADULT - PROVIDER SPECIALTY LIST ADULT
CT Surgery
Electrophysiology
Hospitalist
Hospitalist
Infectious Disease
Internal Medicine
CT Surgery
Electrophysiology
Infectious Disease

## 2019-10-21 NOTE — DISCHARGE NOTE PROVIDER - NSDCFUSCHEDAPPT_GEN_ALL_CORE_FT
CUATE HORNE ; 10/22/2019 ; NPP [facility] [address] CUATE Carreon ; 11/01/2019 ; NPP Cardio [address] CUATE HORNE ; 10/22/2019 ; N[facility] [address] CUATE Carreon ; 11/01/2019 ; NPP Cardio [address] CUATE HORNE ; 10/22/2019 ; NPP [facility] [address] CUATE Carreon ; 11/01/2019 ; NPP Cardio [address]

## 2019-10-21 NOTE — DISCHARGE NOTE PROVIDER - PROVIDER TOKENS
PROVIDER:[TOKEN:[[id number]],FOLLOWUP:[1 week]],PROVIDER:[TOKEN:[[id number]],FOLLOWUP:[2 weeks]]

## 2019-10-21 NOTE — CONSULT NOTE ADULT - SUBJECTIVE AND OBJECTIVE BOX
HPI:  48 year old male with PMHx of Arrhythmogenic Ventricular Tachycardia and Cardiomyopathy s/p AICD in 2005, HFpEF (EF 55-60%), Afib/Aflutter on ASA 81mg but unsure if on Eliquis, Chronic Hypoxic Respiratory Failure secondary to COPD on 2L O2 PRN, CAD, TIA/CVA, HTN, DLD, Peripheral neuropathy and chronic pain, Multiple Sclerosis, Epilepsy/Seizures, Migraines, GERD, Anemia of Chronic Disease + folate deficiency + Iron deficiency anemia, Constipation, BPH, PTSD, Depression, Bipolar disorder, Schizoaffective Disorder, recently hospitalized from 8/29/19 till 9/11/19 for hematoma of AICD pocket with battery depletion and Riata lead externalization s/p hematoma evacuation on 9/5-9/6 and laser lead extraction and new ICD lead implant, and again recently hospitalized 9/15/19-9/23/19 for trace pericardial effusion with no intervention and RV threshold elevation secondary to lead migration/dislodgement s/p lead replacement and re-positioning, treated for Acute Bronchitis during prior hospitalization with Doxycycline and Prednisone, presenting for AICD firing. Patient reports that he was walking to the nursing station at [facility] around 1AM in the morning to get his soda when he suddenly felt a tingling sensation in his chest followed by his defibrillator firing. Patient reports he sat down and the sensation resolved on its own. He denies chest pain, palpitations, lightheadedness, dizziness, shortness of breath prior or after the incident. Patient also reports that he has been having on/off fevers per the nurses at [facility] with a Tmax of 101 associated with dyspnea on exertion at times and that he has been on IV Ceftriaxone. Patient denies chills, cough, productive phlegm, lower extremity swelling/erythema/tenderness. (13 Oct 2019 08:33)      PAST MEDICAL & SURGICAL HISTORY:  Vitamin D deficiency  Constipation  Chronic respiratory failure: secondary to COPD  Folate-deficiency anemia  Iron deficiency anemia  Anemia of chronic disease  Pericardial effusion without cardiac tamponade: h/o trace pericardial effusion  BPH (benign prostatic hyperplasia)  Chronic pain  Peripheral neuropathy  Type 2 diabetes mellitus  AICD (automatic cardioverter/defibrillator) present  Ventricular tachycardia  Chronic heart failure with preserved ejection fraction  PTSD (post-traumatic stress disorder)  Supraventricular arrhythmia: prior history  Cardiomyopathy  CHF (congestive heart failure)  Atrial fibrillation: s/p ablation, on eliquis  BPH (benign prostatic hyperplasia)  HTN (hypertension)  Seizures  Migraines  Pneumonia  MS (multiple sclerosis)  CAD (coronary artery disease)  Bipolar disorder  Anginal pain  Schizo affective schizophrenia  GERD (gastroesophageal reflux disease)  Seasonal allergies  Hypercholesteremia  COPD (chronic obstructive pulmonary disease)  CVA (cerebral vascular accident)  TIA (transient ischemic attack)  Peripheral Neuropathy  Clinical Depression  Displacement of electrode lead of cardiac pacemaker  History of evacuation of hematoma: AICD pocket  H/O prior ablation treatment: for Afib  H/O hernia repair: right 1972,1991  History of hip replacement  S/P Orchiectomy: L for testicular CA  S/P Implantation of AICD      Hospital Course: s/p pericardiac window for pericardiac effusion    TODAY'S SUBJECTIVE & REVIEW OF SYMPTOMS:     Constitutional WNL   Cardio WNL   Resp WNL   GI WNL  Heme WNL  Endo WNL  Skin WNL  MSK Weakness  Neuro WNL  Cognitive WNL  Psych WNL      MEDICATIONS  (STANDING):  acetaminophen   Tablet .. 650 milliGRAM(s) Oral every 6 hours  aspirin enteric coated 81 milliGRAM(s) Oral daily  atorvastatin 40 milliGRAM(s) Oral at bedtime  baclofen 10 milliGRAM(s) Oral four times a day  buDESOnide 160 MICROgram(s)/formoterol 4.5 MICROgram(s) Inhaler 2 Puff(s) Inhalation two times a day  chlorhexidine 4% Liquid 1 Application(s) Topical <User Schedule>  cholecalciferol 2000 Unit(s) Oral daily  docusate sodium 100 milliGRAM(s) Oral two times a day  dofetilide 250 MICROGram(s) Oral two times a day  ferrous    sulfate 325 milliGRAM(s) Oral daily  fluticasone propionate (50 MICROgram(s)/actuation) Nasal Spray - Peds 2 Spray(s) Alternating Nostrils daily  folic acid 1 milliGRAM(s) Oral daily  gabapentin 400 milliGRAM(s) Oral three times a day  heparin  Injectable 5000 Unit(s) SubCutaneous every 8 hours  influenza   Vaccine 0.5 milliLiter(s) IntraMuscular once  lactated ringers. 1000 milliLiter(s) (70 mL/Hr) IV Continuous <Continuous>  lamoTRIgine 100 milliGRAM(s) Oral two times a day  loratadine 10 milliGRAM(s) Oral daily  metoprolol tartrate 12.5 milliGRAM(s) Oral every 8 hours  pantoprazole    Tablet 40 milliGRAM(s) Oral before breakfast  polyethylene glycol 3350 17 Gram(s) Oral every 12 hours  QUEtiapine 25 milliGRAM(s) Oral daily  QUEtiapine 50 milliGRAM(s) Oral at bedtime  senna 2 Tablet(s) Oral at bedtime  tamsulosin 0.4 milliGRAM(s) Oral at bedtime    MEDICATIONS  (PRN):  ALBUTerol/ipratropium for Nebulization 3 milliLiter(s) Nebulizer every 6 hours PRN Shortness of Breath and/or Wheezing  clonazePAM  Tablet 0.5 milliGRAM(s) Oral at bedtime PRN agitation  morphine  IR 30 milliGRAM(s) Oral every 4 hours PRN Moderate Pain (4 - 6)      FAMILY HISTORY:  Family history of colon cancer in mother  Family history of cardiomyopathy: Mother  Family history of cervical cancer (Mother)  Family history of early CAD (Mother)      Allergies    dexmethylphenidate (Unknown)  Dilantin (Rash)  dilantin, compazine, , ritalin, phenergan (Unknown)  Haldol (Unknown)  hydantoin derivatives (Other)  methylphenidate (Unknown)  phenytoin (Unknown)  prochlorperazine (Unknown)  promethazine (Dystonic RXN)  rash/hives (Anaphylaxis)  thioxanthenes (Unknown)    Intolerances        SOCIAL HISTORY:    [  ] Etoh  [  ] Smoking  [  ] Substance abuse     Home Environment:  [  ] Home Alone  [  ] Lives with Family  [  ] Home Health Aid    Dwelling:  [  ] Apartment  [  ] Private House  [  ] Adult Home  [  ] Skilled Nursing Facility      [ x ] Short Term  [  ] Long Term  [  ] Stairs       Elevator [  ]    FUNCTIONAL STATUS PTA: (Check all that apply)  Ambulation: [   ]Independent    [x  ] Dependent     [  ] Non-Ambulatory  Assistive Device: [  ] SA Cane  [  ]  Q Cane  [x  ] Walker  [  ]  Wheelchair  ADL : [  ] Independent  [x  ]  Dependent       Vital Signs Last 24 Hrs  T(C): 36.7 (21 Oct 2019 08:00), Max: 36.7 (21 Oct 2019 08:00)  T(F): 98 (21 Oct 2019 08:00), Max: 98 (21 Oct 2019 08:00)  HR: 60 (21 Oct 2019 10:00) (60 - 80)  BP: 105/63 (21 Oct 2019 10:00) (87/52 - 118/61)  BP(mean): 79 (21 Oct 2019 10:00) (66 - 94)  RR: 14 (21 Oct 2019 10:00) (5 - 37)  SpO2: 97% (21 Oct 2019 10:00) (93% - 98%)      PHYSICAL EXAM: Alert & Oriented X3  GENERAL: NAD  HEAD:  Atraumatic, Normocephalic  CHEST/LUNG: Clear   HEART: S1S2+  ABDOMEN: Soft, Nontender  EXTREMITIES:  no calf tenderness    NERVOUS SYSTEM:  Cranial Nerves 2-12 intact [  ] Abnormal  [  ]  ROM: WFL all extremities [ x ]  Abnormal [  ]  Motor Strength: WFL all extremities  [x  ]  Abnormal [  ]  Sensation: intact to light touch [ x ] Abnormal [  ]  Reflexes: Symmetric [  ]  Abnormal [  ]    FUNCTIONAL STATUS:  Bed Mobility: Independent [  ]  Supervision [  ]  Needs Assistance [ x ]  N/A [  ]  Transfers: Independent [  ]  Supervision [  ]  Needs Assistance [ x ]  N/A [  ]   Ambulation: Independent [  ]  Supervision [  ]  Needs Assistance [  ]  N/A [  ]  ADL: Independent [  ] Requires Assistance [  ] N/A [  ]      LABS:                        10.0   7.04  )-----------( 557      ( 20 Oct 2019 20:50 )             34.1     10-20    143  |  103  |  20  ----------------------------<  89  4.6   |  29  |  0.9    Ca    9.3      20 Oct 2019 20:50  Phos  4.9     10-20  Mg     2.2     10-20            RADIOLOGY & ADDITIONAL STUDIES:    Assesment:

## 2019-10-22 ENCOUNTER — APPOINTMENT (OUTPATIENT)
Dept: CARDIOTHORACIC SURGERY | Facility: CLINIC | Age: 49
End: 2019-10-22

## 2019-10-22 LAB
CULTURE RESULTS: SIGNIFICANT CHANGE UP
GLUCOSE BLDC GLUCOMTR-MCNC: 122 MG/DL — HIGH (ref 70–99)
SPECIMEN SOURCE: SIGNIFICANT CHANGE UP
SURGICAL PATHOLOGY STUDY: SIGNIFICANT CHANGE UP

## 2019-10-23 PROBLEM — D63.8 ANEMIA IN OTHER CHRONIC DISEASES CLASSIFIED ELSEWHERE: Chronic | Status: ACTIVE | Noted: 2019-10-13

## 2019-10-23 PROBLEM — E55.9 VITAMIN D DEFICIENCY, UNSPECIFIED: Chronic | Status: ACTIVE | Noted: 2019-10-13

## 2019-10-23 PROBLEM — I50.32 CHRONIC DIASTOLIC (CONGESTIVE) HEART FAILURE: Chronic | Status: ACTIVE | Noted: 2019-10-13

## 2019-10-23 PROBLEM — D50.9 IRON DEFICIENCY ANEMIA, UNSPECIFIED: Chronic | Status: ACTIVE | Noted: 2019-10-13

## 2019-10-23 PROBLEM — K59.00 CONSTIPATION, UNSPECIFIED: Chronic | Status: ACTIVE | Noted: 2019-10-13

## 2019-10-23 PROBLEM — I47.2 VENTRICULAR TACHYCARDIA: Chronic | Status: ACTIVE | Noted: 2019-10-13

## 2019-10-23 PROBLEM — J96.10 CHRONIC RESPIRATORY FAILURE, UNSPECIFIED WHETHER WITH HYPOXIA OR HYPERCAPNIA: Chronic | Status: ACTIVE | Noted: 2019-10-13

## 2019-10-23 PROBLEM — I31.3 PERICARDIAL EFFUSION (NONINFLAMMATORY): Chronic | Status: ACTIVE | Noted: 2019-10-13

## 2019-10-23 PROBLEM — D52.9 FOLATE DEFICIENCY ANEMIA, UNSPECIFIED: Chronic | Status: ACTIVE | Noted: 2019-10-13

## 2019-10-23 PROBLEM — G62.9 POLYNEUROPATHY, UNSPECIFIED: Chronic | Status: ACTIVE | Noted: 2019-10-13

## 2019-10-23 PROBLEM — N40.0 BENIGN PROSTATIC HYPERPLASIA WITHOUT LOWER URINARY TRACT SYMPTOMS: Chronic | Status: ACTIVE | Noted: 2019-10-13

## 2019-10-23 PROBLEM — G89.29 OTHER CHRONIC PAIN: Chronic | Status: ACTIVE | Noted: 2019-10-13

## 2019-10-23 PROBLEM — E11.9 TYPE 2 DIABETES MELLITUS WITHOUT COMPLICATIONS: Chronic | Status: ACTIVE | Noted: 2019-10-13

## 2019-10-25 RX ORDER — APIXABAN 2.5 MG/1
1 TABLET, FILM COATED ORAL
Qty: 60 | Refills: 0
Start: 2019-10-25 | End: 2019-11-23

## 2019-10-26 LAB
CULTURE RESULTS: SIGNIFICANT CHANGE UP
SPECIMEN SOURCE: SIGNIFICANT CHANGE UP

## 2019-10-28 ENCOUNTER — FORM ENCOUNTER (OUTPATIENT)
Age: 49
End: 2019-10-28

## 2019-10-29 ENCOUNTER — OUTPATIENT (OUTPATIENT)
Dept: OUTPATIENT SERVICES | Facility: HOSPITAL | Age: 49
LOS: 1 days | Discharge: HOME | End: 2019-10-29
Payer: MEDICAID

## 2019-10-29 ENCOUNTER — APPOINTMENT (OUTPATIENT)
Dept: CARDIOTHORACIC SURGERY | Facility: CLINIC | Age: 49
End: 2019-10-29
Payer: MEDICAID

## 2019-10-29 ENCOUNTER — OTHER (OUTPATIENT)
Age: 49
End: 2019-10-29

## 2019-10-29 ENCOUNTER — APPOINTMENT (OUTPATIENT)
Dept: CARDIOTHORACIC SURGERY | Facility: CLINIC | Age: 49
End: 2019-10-29

## 2019-10-29 VITALS
HEART RATE: 62 BPM | BODY MASS INDEX: 29.7 KG/M2 | RESPIRATION RATE: 13 BRPM | WEIGHT: 184 LBS | TEMPERATURE: 99 F | SYSTOLIC BLOOD PRESSURE: 107 MMHG | DIASTOLIC BLOOD PRESSURE: 74 MMHG | OXYGEN SATURATION: 94 %

## 2019-10-29 DIAGNOSIS — Z98.890 OTHER SPECIFIED POSTPROCEDURAL STATES: Chronic | ICD-10-CM

## 2019-10-29 DIAGNOSIS — T82.120A DISPLACEMENT OF CARDIAC ELECTRODE, INITIAL ENCOUNTER: Chronic | ICD-10-CM

## 2019-10-29 DIAGNOSIS — Z98.890 OTHER SPECIFIED POSTPROCEDURAL STATES: ICD-10-CM

## 2019-10-29 DIAGNOSIS — Z96.649 PRESENCE OF UNSPECIFIED ARTIFICIAL HIP JOINT: Chronic | ICD-10-CM

## 2019-10-29 PROCEDURE — 71046 X-RAY EXAM CHEST 2 VIEWS: CPT | Mod: 26

## 2019-10-29 PROCEDURE — 99024 POSTOP FOLLOW-UP VISIT: CPT

## 2019-10-30 ENCOUNTER — APPOINTMENT (OUTPATIENT)
Dept: CARDIOTHORACIC SURGERY | Facility: CLINIC | Age: 49
End: 2019-10-30

## 2019-11-01 ENCOUNTER — APPOINTMENT (OUTPATIENT)
Dept: CARDIOLOGY | Facility: CLINIC | Age: 49
End: 2019-11-01
Payer: MEDICAID

## 2019-11-01 VITALS
HEART RATE: 60 BPM | DIASTOLIC BLOOD PRESSURE: 67 MMHG | SYSTOLIC BLOOD PRESSURE: 97 MMHG | WEIGHT: 184 LBS | BODY MASS INDEX: 29.57 KG/M2 | HEIGHT: 66 IN

## 2019-11-01 DIAGNOSIS — Z86.79 PERSONAL HISTORY OF OTHER DISEASES OF THE CIRCULATORY SYSTEM: ICD-10-CM

## 2019-11-01 PROCEDURE — 99213 OFFICE O/P EST LOW 20 MIN: CPT

## 2019-11-01 PROCEDURE — 93283 PRGRMG EVAL IMPLANTABLE DFB: CPT

## 2019-11-01 PROCEDURE — 93290 INTERROG DEV EVAL ICPMS IP: CPT

## 2019-11-01 RX ORDER — DOFETILIDE 0.25 MG/1
250 CAPSULE ORAL
Refills: 0 | Status: DISCONTINUED | COMMUNITY
End: 2019-11-01

## 2019-11-01 NOTE — PROCEDURE
[ICD] : Implantable cardioverter-defibrillator [DDDR] : DDDR [Impedance: ___ Ohms] : current cell impedance is [unfilled] Ohms [Charge Time: ___ sec] : charge time was [unfilled] seconds [Longevity: ___ months] : The estimated remaining battery life is [unfilled] months [Normal] : The battery status is normal. [Threshold Testing Performed] : Threshold testing was performed [Atrial] : Atrial [Ventricular] : Ventricular [Lead Imp:  ___ohms] : lead impedance was [unfilled] ohms [Sensing Amplitude ___mv] : sensing amplitude was [unfilled] mv [___V @] : [unfilled] V [___ ms] : [unfilled] ms [Outputs/Safety Margin] : output changed to allow for adequate safety margin [Programmed for Longevity] : output reprogrammed for improved battery longevity [Counters Reset] : the counters were reset [Asense-Vsense ___ %] : Asense-Vsense [unfilled]% [Asense-Vpace ___ %] : Asense-Vpace [unfilled]% [Apace-Vsense ___ %] : Apace-Vsense [unfilled]% [Apace-Vpace ___ %] : Apace-Vpace [unfilled]% [de-identified] : [facility] [employer] [de-identified] : Fortify [id number] [de-identified] : [id number] [de-identified] : 9/5/2019 [de-identified] : 60 [de-identified] : V. Pulse Amplitude changed from 3.5V to 2.5V to optimize battery.  [de-identified] : 42T/AF with Rapid Ventricular Response, example printed. 193 AMS episodes, AT/AF [place]: 2.6%. \par 2 consecutive AT/AF with Rapid Ventricular Response in VF zone, successful ATP therapy on 10/23/19. No shock delivered. \par [employer]Vue stable.

## 2019-11-01 NOTE — ASSESSMENT
[FreeTextEntry1] : AF - pt continue to have PAF\par - will cont rate control at this time\par - DC Tikosyn - cont Tikosyn till new dose of metoprolol is available \par - increase metoprolol

## 2019-11-01 NOTE — HISTORY OF PRESENT ILLNESS
[None] : The patient complains of no symptoms [Palpitations] : no palpitations [SOB] : no dyspnea [Syncope] : no syncope [Erythema at Site] : no erythema at device site [Swelling at Site] : no swelling at device site [de-identified] : Patietn found tohave fracture RV lead S?P lead extraction and re-implant. Later found tohave pericardial effusion with micro-perf. Now pt is doing much better

## 2019-11-02 ENCOUNTER — INPATIENT (INPATIENT)
Facility: HOSPITAL | Age: 49
LOS: 3 days | Discharge: NURSING, REHAB OR LONG-TERM CARE | End: 2019-11-06
Attending: INTERNAL MEDICINE | Admitting: INTERNAL MEDICINE
Payer: MEDICAID

## 2019-11-02 VITALS
RESPIRATION RATE: 26 BRPM | DIASTOLIC BLOOD PRESSURE: 83 MMHG | SYSTOLIC BLOOD PRESSURE: 111 MMHG | TEMPERATURE: 103 F | HEIGHT: 66 IN | HEART RATE: 172 BPM | WEIGHT: 201.06 LBS | OXYGEN SATURATION: 98 %

## 2019-11-02 DIAGNOSIS — Z98.890 OTHER SPECIFIED POSTPROCEDURAL STATES: Chronic | ICD-10-CM

## 2019-11-02 DIAGNOSIS — T82.120A DISPLACEMENT OF CARDIAC ELECTRODE, INITIAL ENCOUNTER: Chronic | ICD-10-CM

## 2019-11-02 DIAGNOSIS — Z96.649 PRESENCE OF UNSPECIFIED ARTIFICIAL HIP JOINT: Chronic | ICD-10-CM

## 2019-11-02 LAB
ALBUMIN SERPL ELPH-MCNC: 4 G/DL — SIGNIFICANT CHANGE UP (ref 3.5–5.2)
ALP SERPL-CCNC: 158 U/L — HIGH (ref 30–115)
ALT FLD-CCNC: 17 U/L — SIGNIFICANT CHANGE UP (ref 0–41)
ANION GAP SERPL CALC-SCNC: 15 MMOL/L — HIGH (ref 7–14)
APPEARANCE UR: CLEAR — SIGNIFICANT CHANGE UP
APTT BLD: 37.3 SEC — SIGNIFICANT CHANGE UP (ref 27–39.2)
AST SERPL-CCNC: 22 U/L — SIGNIFICANT CHANGE UP (ref 0–41)
BACTERIA # UR AUTO: ABNORMAL
BASE EXCESS BLDV CALC-SCNC: 4.2 MMOL/L — HIGH (ref -2–2)
BASOPHILS # BLD AUTO: 0.09 K/UL — SIGNIFICANT CHANGE UP (ref 0–0.2)
BASOPHILS NFR BLD AUTO: 1 % — SIGNIFICANT CHANGE UP (ref 0–1)
BILIRUB SERPL-MCNC: 0.5 MG/DL — SIGNIFICANT CHANGE UP (ref 0.2–1.2)
BILIRUB UR-MCNC: NEGATIVE — SIGNIFICANT CHANGE UP
BUN SERPL-MCNC: 14 MG/DL — SIGNIFICANT CHANGE UP (ref 10–20)
CA-I SERPL-SCNC: 1.14 MMOL/L — SIGNIFICANT CHANGE UP (ref 1.12–1.3)
CALCIUM SERPL-MCNC: 9 MG/DL — SIGNIFICANT CHANGE UP (ref 8.5–10.1)
CHLORIDE SERPL-SCNC: 97 MMOL/L — LOW (ref 98–110)
CO2 SERPL-SCNC: 27 MMOL/L — SIGNIFICANT CHANGE UP (ref 17–32)
COLOR SPEC: SIGNIFICANT CHANGE UP
CREAT SERPL-MCNC: 1.1 MG/DL — SIGNIFICANT CHANGE UP (ref 0.7–1.5)
DIFF PNL FLD: NEGATIVE — SIGNIFICANT CHANGE UP
EOSINOPHIL # BLD AUTO: 0.06 K/UL — SIGNIFICANT CHANGE UP (ref 0–0.7)
EOSINOPHIL NFR BLD AUTO: 0.7 % — SIGNIFICANT CHANGE UP (ref 0–8)
EPI CELLS # UR: 0 /HPF — SIGNIFICANT CHANGE UP (ref 0–5)
GAS PNL BLDV: 140 MMOL/L — SIGNIFICANT CHANGE UP (ref 136–145)
GAS PNL BLDV: SIGNIFICANT CHANGE UP
GLUCOSE SERPL-MCNC: 116 MG/DL — HIGH (ref 70–99)
GLUCOSE UR QL: NEGATIVE — SIGNIFICANT CHANGE UP
HCO3 BLDV-SCNC: 30 MMOL/L — HIGH (ref 22–29)
HCT VFR BLD CALC: 37.9 % — LOW (ref 42–52)
HCT VFR BLDA CALC: 37.1 % — SIGNIFICANT CHANGE UP (ref 34–44)
HGB BLD CALC-MCNC: 12.1 G/DL — LOW (ref 14–18)
HGB BLD-MCNC: 11.4 G/DL — LOW (ref 14–18)
HYALINE CASTS # UR AUTO: 6 /LPF — SIGNIFICANT CHANGE UP (ref 0–7)
IMM GRANULOCYTES NFR BLD AUTO: 0.3 % — SIGNIFICANT CHANGE UP (ref 0.1–0.3)
INR BLD: 1.26 RATIO — SIGNIFICANT CHANGE UP (ref 0.65–1.3)
KETONES UR-MCNC: NEGATIVE — SIGNIFICANT CHANGE UP
LACTATE BLDV-MCNC: 1.8 MMOL/L — HIGH (ref 0.5–1.6)
LEUKOCYTE ESTERASE UR-ACNC: ABNORMAL
LYMPHOCYTES # BLD AUTO: 0.94 K/UL — LOW (ref 1.2–3.4)
LYMPHOCYTES # BLD AUTO: 10.3 % — LOW (ref 20.5–51.1)
MCHC RBC-ENTMCNC: 26.8 PG — LOW (ref 27–31)
MCHC RBC-ENTMCNC: 30.1 G/DL — LOW (ref 32–37)
MCV RBC AUTO: 89 FL — SIGNIFICANT CHANGE UP (ref 80–94)
MONOCYTES # BLD AUTO: 0.66 K/UL — HIGH (ref 0.1–0.6)
MONOCYTES NFR BLD AUTO: 7.2 % — SIGNIFICANT CHANGE UP (ref 1.7–9.3)
NEUTROPHILS # BLD AUTO: 7.36 K/UL — HIGH (ref 1.4–6.5)
NEUTROPHILS NFR BLD AUTO: 80.5 % — HIGH (ref 42.2–75.2)
NITRITE UR-MCNC: POSITIVE
NRBC # BLD: 0 /100 WBCS — SIGNIFICANT CHANGE UP (ref 0–0)
NT-PROBNP SERPL-SCNC: 875 PG/ML — HIGH (ref 0–300)
PCO2 BLDV: 50 MMHG — SIGNIFICANT CHANGE UP (ref 41–51)
PH BLDV: 7.39 — SIGNIFICANT CHANGE UP (ref 7.26–7.43)
PH UR: 7.5 — SIGNIFICANT CHANGE UP (ref 5–8)
PLATELET # BLD AUTO: 257 K/UL — SIGNIFICANT CHANGE UP (ref 130–400)
PO2 BLDV: 38 MMHG — SIGNIFICANT CHANGE UP (ref 20–40)
POTASSIUM BLDV-SCNC: 3.6 MMOL/L — SIGNIFICANT CHANGE UP (ref 3.3–5.6)
POTASSIUM SERPL-MCNC: 4 MMOL/L — SIGNIFICANT CHANGE UP (ref 3.5–5)
POTASSIUM SERPL-SCNC: 4 MMOL/L — SIGNIFICANT CHANGE UP (ref 3.5–5)
PROT SERPL-MCNC: 6.6 G/DL — SIGNIFICANT CHANGE UP (ref 6–8)
PROT UR-MCNC: SIGNIFICANT CHANGE UP
PROTHROM AB SERPL-ACNC: 14.5 SEC — HIGH (ref 9.95–12.87)
RBC # BLD: 4.26 M/UL — LOW (ref 4.7–6.1)
RBC # FLD: 16.8 % — HIGH (ref 11.5–14.5)
RBC CASTS # UR COMP ASSIST: 3 /HPF — SIGNIFICANT CHANGE UP (ref 0–4)
SAO2 % BLDV: 70 % — SIGNIFICANT CHANGE UP
SODIUM SERPL-SCNC: 139 MMOL/L — SIGNIFICANT CHANGE UP (ref 135–146)
SP GR SPEC: 1.01 — SIGNIFICANT CHANGE UP (ref 1.01–1.02)
TROPONIN T SERPL-MCNC: <0.01 NG/ML — SIGNIFICANT CHANGE UP
UROBILINOGEN FLD QL: SIGNIFICANT CHANGE UP
WBC # BLD: 9.14 K/UL — SIGNIFICANT CHANGE UP (ref 4.8–10.8)
WBC # FLD AUTO: 9.14 K/UL — SIGNIFICANT CHANGE UP (ref 4.8–10.8)
WBC UR QL: 99 /HPF — HIGH (ref 0–5)

## 2019-11-02 PROCEDURE — 99222 1ST HOSP IP/OBS MODERATE 55: CPT

## 2019-11-02 PROCEDURE — 99285 EMERGENCY DEPT VISIT HI MDM: CPT

## 2019-11-02 PROCEDURE — 99223 1ST HOSP IP/OBS HIGH 75: CPT | Mod: AI

## 2019-11-02 PROCEDURE — 93283 PRGRMG EVAL IMPLANTABLE DFB: CPT | Mod: 26

## 2019-11-02 PROCEDURE — 71045 X-RAY EXAM CHEST 1 VIEW: CPT | Mod: 26

## 2019-11-02 PROCEDURE — 93010 ELECTROCARDIOGRAM REPORT: CPT | Mod: 76

## 2019-11-02 RX ORDER — FLUTICASONE PROPIONATE 50 MCG
2 SPRAY, SUSPENSION NASAL
Refills: 0 | Status: DISCONTINUED | OUTPATIENT
Start: 2019-11-02 | End: 2019-11-06

## 2019-11-02 RX ORDER — CEFEPIME 1 G/1
1000 INJECTION, POWDER, FOR SOLUTION INTRAMUSCULAR; INTRAVENOUS ONCE
Refills: 0 | Status: COMPLETED | OUTPATIENT
Start: 2019-11-02 | End: 2019-11-02

## 2019-11-02 RX ORDER — TAMSULOSIN HYDROCHLORIDE 0.4 MG/1
0.4 CAPSULE ORAL AT BEDTIME
Refills: 0 | Status: DISCONTINUED | OUTPATIENT
Start: 2019-11-02 | End: 2019-11-06

## 2019-11-02 RX ORDER — LORATADINE 10 MG/1
10 TABLET ORAL DAILY
Refills: 0 | Status: DISCONTINUED | OUTPATIENT
Start: 2019-11-02 | End: 2019-11-06

## 2019-11-02 RX ORDER — METOPROLOL TARTRATE 50 MG
50 TABLET ORAL DAILY
Refills: 0 | Status: DISCONTINUED | OUTPATIENT
Start: 2019-11-03 | End: 2019-11-06

## 2019-11-02 RX ORDER — APIXABAN 2.5 MG/1
5 TABLET, FILM COATED ORAL EVERY 12 HOURS
Refills: 0 | Status: DISCONTINUED | OUTPATIENT
Start: 2019-11-02 | End: 2019-11-06

## 2019-11-02 RX ORDER — CLONAZEPAM 1 MG
0.5 TABLET ORAL AT BEDTIME
Refills: 0 | Status: DISCONTINUED | OUTPATIENT
Start: 2019-11-02 | End: 2019-11-06

## 2019-11-02 RX ORDER — MORPHINE SULFATE 50 MG/1
2 CAPSULE, EXTENDED RELEASE ORAL ONCE
Refills: 0 | Status: DISCONTINUED | OUTPATIENT
Start: 2019-11-02 | End: 2019-11-02

## 2019-11-02 RX ORDER — GABAPENTIN 400 MG/1
400 CAPSULE ORAL THREE TIMES A DAY
Refills: 0 | Status: DISCONTINUED | OUTPATIENT
Start: 2019-11-02 | End: 2019-11-06

## 2019-11-02 RX ORDER — ATORVASTATIN CALCIUM 80 MG/1
40 TABLET, FILM COATED ORAL AT BEDTIME
Refills: 0 | Status: DISCONTINUED | OUTPATIENT
Start: 2019-11-02 | End: 2019-11-06

## 2019-11-02 RX ORDER — ACETAMINOPHEN 500 MG
975 TABLET ORAL ONCE
Refills: 0 | Status: COMPLETED | OUTPATIENT
Start: 2019-11-02 | End: 2019-11-02

## 2019-11-02 RX ORDER — TIOTROPIUM BROMIDE 18 UG/1
1 CAPSULE ORAL; RESPIRATORY (INHALATION) DAILY
Refills: 0 | Status: DISCONTINUED | OUTPATIENT
Start: 2019-11-02 | End: 2019-11-06

## 2019-11-02 RX ORDER — BUDESONIDE AND FORMOTEROL FUMARATE DIHYDRATE 160; 4.5 UG/1; UG/1
2 AEROSOL RESPIRATORY (INHALATION)
Refills: 0 | Status: DISCONTINUED | OUTPATIENT
Start: 2019-11-02 | End: 2019-11-06

## 2019-11-02 RX ORDER — NITROGLYCERIN 6.5 MG
0.4 CAPSULE, EXTENDED RELEASE ORAL
Refills: 0 | Status: DISCONTINUED | OUTPATIENT
Start: 2019-11-02 | End: 2019-11-06

## 2019-11-02 RX ORDER — QUETIAPINE FUMARATE 200 MG/1
50 TABLET, FILM COATED ORAL AT BEDTIME
Refills: 0 | Status: DISCONTINUED | OUTPATIENT
Start: 2019-11-02 | End: 2019-11-06

## 2019-11-02 RX ORDER — CEFEPIME 1 G/1
1000 INJECTION, POWDER, FOR SOLUTION INTRAMUSCULAR; INTRAVENOUS EVERY 8 HOURS
Refills: 0 | Status: DISCONTINUED | OUTPATIENT
Start: 2019-11-02 | End: 2019-11-05

## 2019-11-02 RX ORDER — BACLOFEN 100 %
10 POWDER (GRAM) MISCELLANEOUS
Refills: 0 | Status: DISCONTINUED | OUTPATIENT
Start: 2019-11-02 | End: 2019-11-06

## 2019-11-02 RX ORDER — ACETAMINOPHEN 500 MG
650 TABLET ORAL EVERY 4 HOURS
Refills: 0 | Status: DISCONTINUED | OUTPATIENT
Start: 2019-11-02 | End: 2019-11-06

## 2019-11-02 RX ORDER — DOFETILIDE 0.25 MG/1
250 CAPSULE ORAL
Refills: 0 | Status: DISCONTINUED | OUTPATIENT
Start: 2019-11-02 | End: 2019-11-02

## 2019-11-02 RX ORDER — SODIUM CHLORIDE 9 MG/ML
1000 INJECTION INTRAMUSCULAR; INTRAVENOUS; SUBCUTANEOUS ONCE
Refills: 0 | Status: COMPLETED | OUTPATIENT
Start: 2019-11-02 | End: 2019-11-02

## 2019-11-02 RX ORDER — METOPROLOL TARTRATE 50 MG
12.5 TABLET ORAL EVERY 8 HOURS
Refills: 0 | Status: COMPLETED | OUTPATIENT
Start: 2019-11-02 | End: 2019-11-02

## 2019-11-02 RX ORDER — MORPHINE SULFATE 50 MG/1
30 CAPSULE, EXTENDED RELEASE ORAL EVERY 6 HOURS
Refills: 0 | Status: DISCONTINUED | OUTPATIENT
Start: 2019-11-02 | End: 2019-11-06

## 2019-11-02 RX ORDER — DIMETHYL FUMARATE 240 MG/1
240 CAPSULE ORAL DAILY
Refills: 0 | Status: DISCONTINUED | OUTPATIENT
Start: 2019-11-02 | End: 2019-11-05

## 2019-11-02 RX ORDER — PANTOPRAZOLE SODIUM 20 MG/1
40 TABLET, DELAYED RELEASE ORAL
Refills: 0 | Status: DISCONTINUED | OUTPATIENT
Start: 2019-11-02 | End: 2019-11-06

## 2019-11-02 RX ORDER — FOLIC ACID 0.8 MG
1 TABLET ORAL DAILY
Refills: 0 | Status: DISCONTINUED | OUTPATIENT
Start: 2019-11-02 | End: 2019-11-06

## 2019-11-02 RX ORDER — ASPIRIN/CALCIUM CARB/MAGNESIUM 324 MG
81 TABLET ORAL DAILY
Refills: 0 | Status: DISCONTINUED | OUTPATIENT
Start: 2019-11-02 | End: 2019-11-06

## 2019-11-02 RX ORDER — ONDANSETRON 8 MG/1
4 TABLET, FILM COATED ORAL ONCE
Refills: 0 | Status: COMPLETED | OUTPATIENT
Start: 2019-11-02 | End: 2019-11-02

## 2019-11-02 RX ORDER — VANCOMYCIN HCL 1 G
1000 VIAL (EA) INTRAVENOUS ONCE
Refills: 0 | Status: COMPLETED | OUTPATIENT
Start: 2019-11-02 | End: 2019-11-02

## 2019-11-02 RX ORDER — METOPROLOL TARTRATE 50 MG
12.5 TABLET ORAL THREE TIMES A DAY
Refills: 0 | Status: DISCONTINUED | OUTPATIENT
Start: 2019-11-02 | End: 2019-11-02

## 2019-11-02 RX ORDER — QUETIAPINE FUMARATE 200 MG/1
25 TABLET, FILM COATED ORAL DAILY
Refills: 0 | Status: DISCONTINUED | OUTPATIENT
Start: 2019-11-02 | End: 2019-11-06

## 2019-11-02 RX ORDER — LAMOTRIGINE 25 MG/1
100 TABLET, ORALLY DISINTEGRATING ORAL
Refills: 0 | Status: DISCONTINUED | OUTPATIENT
Start: 2019-11-02 | End: 2019-11-06

## 2019-11-02 RX ORDER — POLYETHYLENE GLYCOL 3350 17 G/17G
17 POWDER, FOR SOLUTION ORAL EVERY 12 HOURS
Refills: 0 | Status: DISCONTINUED | OUTPATIENT
Start: 2019-11-02 | End: 2019-11-06

## 2019-11-02 RX ADMIN — ONDANSETRON 4 MILLIGRAM(S): 8 TABLET, FILM COATED ORAL at 06:53

## 2019-11-02 RX ADMIN — Medication 81 MILLIGRAM(S): at 14:01

## 2019-11-02 RX ADMIN — GABAPENTIN 400 MILLIGRAM(S): 400 CAPSULE ORAL at 11:28

## 2019-11-02 RX ADMIN — DOFETILIDE 250 MICROGRAM(S): 0.25 CAPSULE ORAL at 14:02

## 2019-11-02 RX ADMIN — CEFEPIME 100 MILLIGRAM(S): 1 INJECTION, POWDER, FOR SOLUTION INTRAMUSCULAR; INTRAVENOUS at 06:25

## 2019-11-02 RX ADMIN — CEFEPIME 100 MILLIGRAM(S): 1 INJECTION, POWDER, FOR SOLUTION INTRAMUSCULAR; INTRAVENOUS at 14:13

## 2019-11-02 RX ADMIN — QUETIAPINE FUMARATE 25 MILLIGRAM(S): 200 TABLET, FILM COATED ORAL at 14:03

## 2019-11-02 RX ADMIN — BUDESONIDE AND FORMOTEROL FUMARATE DIHYDRATE 2 PUFF(S): 160; 4.5 AEROSOL RESPIRATORY (INHALATION) at 22:32

## 2019-11-02 RX ADMIN — CEFEPIME 100 MILLIGRAM(S): 1 INJECTION, POWDER, FOR SOLUTION INTRAMUSCULAR; INTRAVENOUS at 20:30

## 2019-11-02 RX ADMIN — LAMOTRIGINE 100 MILLIGRAM(S): 25 TABLET, ORALLY DISINTEGRATING ORAL at 17:45

## 2019-11-02 RX ADMIN — Medication 1 MILLIGRAM(S): at 14:02

## 2019-11-02 RX ADMIN — MORPHINE SULFATE 30 MILLIGRAM(S): 50 CAPSULE, EXTENDED RELEASE ORAL at 22:00

## 2019-11-02 RX ADMIN — GABAPENTIN 400 MILLIGRAM(S): 400 CAPSULE ORAL at 22:34

## 2019-11-02 RX ADMIN — Medication 10 MILLIGRAM(S): at 14:01

## 2019-11-02 RX ADMIN — Medication 2 SPRAY(S): at 17:46

## 2019-11-02 RX ADMIN — POLYETHYLENE GLYCOL 3350 17 GRAM(S): 17 POWDER, FOR SOLUTION ORAL at 17:46

## 2019-11-02 RX ADMIN — MORPHINE SULFATE 2 MILLIGRAM(S): 50 CAPSULE, EXTENDED RELEASE ORAL at 14:00

## 2019-11-02 RX ADMIN — Medication 650 MILLIGRAM(S): at 20:30

## 2019-11-02 RX ADMIN — LORATADINE 10 MILLIGRAM(S): 10 TABLET ORAL at 14:02

## 2019-11-02 RX ADMIN — Medication 10 MILLIGRAM(S): at 17:45

## 2019-11-02 RX ADMIN — APIXABAN 5 MILLIGRAM(S): 2.5 TABLET, FILM COATED ORAL at 14:01

## 2019-11-02 RX ADMIN — ATORVASTATIN CALCIUM 40 MILLIGRAM(S): 80 TABLET, FILM COATED ORAL at 22:34

## 2019-11-02 RX ADMIN — LAMOTRIGINE 100 MILLIGRAM(S): 25 TABLET, ORALLY DISINTEGRATING ORAL at 14:02

## 2019-11-02 RX ADMIN — Medication 12.5 MILLIGRAM(S): at 11:28

## 2019-11-02 RX ADMIN — SODIUM CHLORIDE 1000 MILLILITER(S): 9 INJECTION INTRAMUSCULAR; INTRAVENOUS; SUBCUTANEOUS at 06:53

## 2019-11-02 RX ADMIN — Medication 250 MILLIGRAM(S): at 06:54

## 2019-11-02 RX ADMIN — QUETIAPINE FUMARATE 50 MILLIGRAM(S): 200 TABLET, FILM COATED ORAL at 22:35

## 2019-11-02 NOTE — PHARMACOTHERAPY INTERVENTION NOTE - COMMENTS
Dr Currie filled out non-formulary form.  Medication is not available and pending approval   Patient is from a group home and has no one to bring in medication.  Dr Currie is aware patient will not receive medication till (possible)Monday 11/4  As per Sophia (inventory) med is not available through [facility] and/or [facility] has to be speciality ordered by company.

## 2019-11-02 NOTE — H&P ADULT - ASSESSMENT
8 years old male with PMHx of arrhythmogenic ventricular tachycardia s/p AICD in 2005, HFpEF (EF 69% in August, 2019), afib/flutter on Eliquis/ASA 81mg, HTN, COPD (on 2L O2 PRN), DLD, multiple sclerosis, epilepsy, PTSD, depression, TIA, presented to the ED with complaint of dysuria and fever for a few days.    # sepsis due to UTI vs PNA:  - admit to medicine  - s/ 1 L IVF, now has low grade fever  - IV cefepime q 8hrs  - blood and urine culture  - c/w tylenol prn  - f/u CXR     # AVRT s/p AICD placement  - had an episode of tachycardia the resolved spontaneously, however did not get dofetilide last night  - Currently rhythm at paced with HR 60  - Trop neg x 1  - EP follow up    # Afib  - CHADsVASc 4 (TIA, HTN, CHF)  - Paced rhythm at 60, continue Lopressor 12.5mg q8hrs  - Continue Eliquis 5mg q12hrs for now (consider stopping it if hgb drops significantly)    # HTN  - Stable  - Continue Lopressor 12.5mg q8hrs    # Normocytic anemia  - Hgb 8.8 on admission, baseline 13 in 8/2019  - No signs of active bleeding  - Continue to monitor, keep Hgb > 8  - Check iron study, B12, folate    # HFpEF  - ECHO 8/2019 shows EF 69%, no pericardial effusion  - Not in acute heart failure, euvolemic  - Repeat ECHO  - Continue Lasix 40mg q24hrs, Lopressors 12.5mg q6hrs    # COPD  - On home O2 2L PRN  - Stable, continue Symbicort and  Duoneb    # Multiple sclerosis  - Stable  - Continue Tecfidera 240mg q24hrs (please call central pharmacy on Monday   - Continue home dose pain medication (patient on morphine 30 q6hrs PRN, will change to oxycodone 15 mg q6 hrs prn due to unavailability pharmacy   - PT/Rehab when stable    # Epilepsy  - No recent episode of seizure  - Continue Lamotrigine 100mg q12hrs and gabapentin 400mg q8hrs    # PTSD  - Continue Seroquel 25mg and 50mg, Klonopin 0.5cm      DVT ppx: Eliquis  GI ppx: Protonix  Diet: DASH  Activity: ambulate as tolerated  Lines: Peripheral IVs  Code status: full code  Dispo: acute 8 years old male with PMHx of arrhythmogenic ventricular tachycardia s/p AICD in 2005, HFpEF (EF 69% in August, 2019), afib/flutter on Eliquis/ASA 81mg, HTN, COPD (on 2L O2 PRN), DLD, multiple sclerosis, epilepsy, PTSD, depression, TIA, presented to the ED with complaint of dysuria and fever for a few days.    # sepsis due to UTI vs PNA:  - admit to medicine  - s/ 1 L IVF, now has low grade fever  - IV cefepime q 8hrs  - blood and urine culture  - c/w tylenol prn  - f/u CXR     # AVRT s/p AICD placement  - had an episode of tachycardia the resolved spontaneously, however did not get dofetilide last night  - Currently rhythm at paced with HR 60  - Trop neg x 1  - EP follow up    # Afib  - CHADsVASc 4 (TIA, HTN, CHF)  - Paced rhythm at 60, continue Lopressor 12.5mg q8hrs  - Continue Eliquis 5mg q12hrs for now (consider stopping it if hgb drops significantly)    # HTN  - Stable  - Continue Lopressor 12.5mg q8hrs    # Normocytic anemia  - Hb 11.4  - No signs of active bleeding  - Continue to monitor, keep Hgb > 8      # HFpEF  - ECHO 8/2019 shows EF 69%, no pericardial effusion  - Not in acute heart failure, euvolemic  - Repeat ECHO  - Continue Lasix 40mg q24hrs, Lopressors 12.5mg q6hrs    # COPD  - On home O2 2L PRN  - Stable, continue Symbicort and  Duoneb    # Multiple sclerosis  - Stable  - Continue Tecfidera 240mg q24hrs (please call central pharmacy on Monday   - Continue home dose pain medication (patient on morphine 30 q6hrs PRN, will change to oxycodone 15 mg q6 hrs prn due to unavailability pharmacy   - PT/Rehab when stable    # Epilepsy  - No recent episode of seizure  - Continue Lamotrigine 100mg q12hrs and gabapentin 400mg q8hrs    # PTSD  - Continue Seroquel 25mg and 50mg, Klonopin 0.5cm      DVT ppx: Eliquis  GI ppx: Protonix  Diet: DASH  Activity: ambulate as tolerated  Lines: Peripheral IVs  Code status: full code  Dispo: acute

## 2019-11-02 NOTE — ED PROVIDER NOTE - NS ED ROS FT
Constitutional: See HPI.  Eyes: No visual changes, eye pain or discharge.  ENMT: No hearing changes, pain, discharge or infections. No neck pain or stiffness.  Cardiac: +SOB: No chest pain, or edema. No chest pain with exertion.  Respiratory: No cough or respiratory distress.   GI: No nausea, vomiting, diarrhea or abdominal pain.  : No dysuria, frequency or burning.  MS: No myalgia, muscle weakness, joint pain or back pain.  Neuro: No headache or weakness. No LOC.  Skin: No skin rash.  Endo: No hx of DM, thyroid disease  Except as documented in HPI, all other review of systems is negative

## 2019-11-02 NOTE — ED ADULT NURSE REASSESSMENT NOTE - NS ED NURSE REASSESS COMMENT FT1
Patient is alert,oriented . VS checked ,no fever . Patient IV came off  accidentally , 3001 notified about IV placement ,patient in continous cardiac monitering . Will continue to moniter

## 2019-11-02 NOTE — H&P ADULT - HISTORY OF PRESENT ILLNESS
48 years old male with PMHx of arrhythmogenic ventricular tachycardia s/p AICD in 2005, HFpEF (EF 69% in August, 2019), afib/flutter on Eliquis/ASA 81mg, HTN, COPD (on 2L O2 PRN), DLD, multiple sclerosis, epilepsy, PTSD, depression, TIA, presented to the ED with complaint of dysuria and fever for a few days. Patient reports daily fever 102 without chills or rigors. He has associated dysuria 48 years old male with PMHx of arrhythmogenic ventricular tachycardia s/p AICD in 2005, HFpEF (EF 69% in August, 2019), afib/flutter on Eliquis/ASA 81mg, HTN, COPD (on 2L O2 PRN), DLD, multiple sclerosis, epilepsy, PTSD, depression, TIA, presented to the ED with complaint of dysuria and fever for a few days. Patient reports daily fever 102 and nausea without chills or rigors. He has associated dysuria without hematuria. No vomiting, diarrhea. He recently visited dr Quiñones's office for management of afib and AICD, and plans were to stop dofetilide and increase dose of metoprolol. However, patient unsure about dose of metoprolol.   Initially  /70 T 102.7, given 1 L NS then had improved VS, HR has been stable since then.  WBC within normal range, UA grossly positive 48 years old male with PMHx of arrhythmogenic ventricular tachycardia s/p AICD in 2005, HFpEF (EF 69% in August, 2019), afib/flutter on Eliquis/ASA 81mg, HTN, COPD (on 2L O2 PRN), DLD, multiple sclerosis, epilepsy, PTSD, depression, TIA, presented to the ED with complaint of dysuria and fever for a few days. Patient reports daily fever 102 and nausea without chills or rigors. He has associated dysuria without hematuria. No vomiting, diarrhea. He recently visited dr Quiñones's office for management of afib and AICD, and plans were to stop dofetilide and increase dose of metoprolol. However, patient unsure about dose of metoprolol.   Initially  /70 T 102.7, given 1 L NS then had improved VS, HR has been stable since then.  WBC within normal range, UA grossly positive.

## 2019-11-02 NOTE — H&P ADULT - NSHPPHYSICALEXAM_GEN_ALL_CORE
PHYSICAL EXAM:    T(C): 37.9 (11-02-19 @ 08:25), Max: 39.4 (11-02-19 @ 04:35)  HR: 60 (11-02-19 @ 08:25) (60 - 172)  BP: 106/59 (11-02-19 @ 08:25) (97/64 - 111/83)  RR: 20 (11-02-19 @ 08:25) (20 - 26)  SpO2: 94% (11-02-19 @ 08:25) (94% - 98%)    Constitutional: NAD  HEENT: Neck supple, No oral lesions, no throat redness or swelling  Respiratory: Breath sounds  CTA b/l, no accessory muscle use  Cardiovascular: irregular rate and rhythm, normal S1 S2, no murmurs  Gastrointestinal: soft non-tender no hepatosplenomegaly, normal BS  Genitourinary: no lesions, no discharge  Extremities: positive peripheral pulses no extremity edema  Neurological: AAO4 no focal motor deficit  Skin: no lesions or wounds  Musculoskeletal: no joint swelling or tenderness

## 2019-11-02 NOTE — CONSULT NOTE ADULT - SUBJECTIVE AND OBJECTIVE BOX
HPI:  48 years old male with PMHx of arrhythmogenic ventricular tachycardia s/p AICD in 2005, HFpEF (EF 69% in ), afib/flutter on Eliquis/ASA 81mg, HTN, COPD (on 2L O2 PRN), DLD, multiple sclerosis, epilepsy, PTSD, depression, TIA, presented to the ED with complaint of dysuria and fever for a few days. Patient reports daily fever 102 and nausea without chills or rigors. He has associated dysuria without hematuria. No vomiting, diarrhea. He recently visited dr Quiñones's office for management of afib and AICD, and plans were to stop dofetilide and increase dose of metoprolol. However, patient unsure about dose of metoprolol.   Initially  /70 T 102.7, given 1 L NS then had improved VS, HR has been stable since then.  WBC within normal range, UA grossly positive. (2019 11:24)      Patient is a 48y old  Male who presents with a chief complaint of Fever and dysuria (2019 11:24)      REVIEW OF SYSTEMS  CONSTITUTIONAL: fever and nausea.  EYES/ENT: No visual changes;  No vertigo or throat pain   NECK: No pain or stiffness. No cervical midline tenderness.  RESPIRATORY: No difficulty breathing, cough, wheezing.  CARDIOVASCULAR: No chest pain or palpitations  GASTROINTESTINAL: No abdominal or epigastric pain. No nausea, vomiting, or hematemesis. No diarrhea or constipation. No melena, BRBPR, or hematochezia.  GENITOURINARY: + dysuria, no hematuria   NEUROLOGICAL: No headache. No numbness or weakness.  PSYCHIATRIC: + anxiety      PAST MEDICAL & SURGICAL HISTORY:  Vitamin D deficiency  Constipation  Chronic respiratory failure: secondary to COPD  Folate-deficiency anemia  Iron deficiency anemia  Anemia of chronic disease  Pericardial effusion without cardiac tamponade: h/o trace pericardial effusion  BPH (benign prostatic hyperplasia)  Chronic pain  Peripheral neuropathy  Type 2 diabetes mellitus  AICD (automatic cardioverter/defibrillator) present  Ventricular tachycardia  Chronic heart failure with preserved ejection fraction  PTSD (post-traumatic stress disorder)  Supraventricular arrhythmia: prior history  Cardiomyopathy  CHF (congestive heart failure)  Atrial fibrillation: s/p ablation, on eliquis  BPH (benign prostatic hyperplasia)  HTN (hypertension)  Seizures  Migraines  Pneumonia  MS (multiple sclerosis)  CAD (coronary artery disease)  Bipolar disorder  Anginal pain  Schizo affective schizophrenia  GERD (gastroesophageal reflux disease)  Seasonal allergies  Hypercholesteremia  COPD (chronic obstructive pulmonary disease)  CVA (cerebral vascular accident)  TIA (transient ischemic attack)  Peripheral Neuropathy  Clinical Depression  Displacement of electrode lead of cardiac pacemaker  History of evacuation of hematoma: AICD pocket  H/O prior ablation treatment: for Afib  H/O hernia repair: right   History of hip replacement  S/P Orchiectomy: L for testicular CA  S/P Implantation of AICD            PREVIOUS DIAGNOSTIC TESTING:      ECHO   FINDINGS:     < from: Transthoracic Echocardiogram (10.13.19 @ 17:23) >  Summary:   1. Left ventricular ejection fraction, by visual estimation, is 55 to   60%.   2. Technically good study.   3. Normal global left ventricular systolic function.  4. Normal left ventricular internal cavity size.   5. Moderate pericardial effusion.   6. Mild thickening of the anterior and posterior mitral valve leaflets.   7. Mild-moderate tricuspid regurgitation.    < end of copied text >      STRESS  FINDINGS:    < from: NM Nuclear Stress Pharmacologic Multiple (18 @ 10:41) >  Impression:   1. NORMAL LEXISCAN / REST MYOCARDIAL PERFUSION TOMOGRAPHY, WITH NO   EVIDENCE FOR ISCHEMIA DURING LEXISCAN INFUSION.   2. NORMAL RESTING LEFT VENTRICULAR WALL MOTION AND WALL THICKENING.  3. LEFT VENTRICULAR EJECTION FRACTION OF  61 % WHICH IS WITHIN RANGE OF   NORMAL.     < end of copied text >      CATHETERIZATION  FINDINGS:    ELECTROPHYSIOLOGY STUDY  FINDINGS:  Normal function defibrillator  I checked thresholds manually and reprogrammed for longevity.  Episode of Atrial Flutter with rate 166 to 210 bpm lasting 14 minutes on 2019 at 12:12 AM, terminated spontaneously.  Reprogrammed device to DDD 70    CAROTID ULTRASOUND:  FINDINGS    VENOUS DUPLEX SCAN:  FINDINGS:    CHEST CT PULMONARY ANGIO with IV Contrast:      FINDINGS:  MEDICATIONS  (STANDING):  apixaban 5 milliGRAM(s) Oral every 12 hours  aspirin enteric coated 81 milliGRAM(s) Oral daily  atorvastatin 40 milliGRAM(s) Oral at bedtime  baclofen 10 milliGRAM(s) Oral four times a day  budesonide 160 MICROgram(s)/formoterol 4.5 MICROgram(s) Inhaler 2 Puff(s) Inhalation two times a day  cefepime   IVPB 1000 milliGRAM(s) IV Intermittent every 8 hours  dimethyl fumarate DR Capsule 240 milliGRAM(s) Oral daily  dofetilide 250 MICROGram(s) Oral two times a day  fluticasone propionate 50 MICROgram(s)/spray Nasal Spray 2 Spray(s) Both Nostrils two times a day  folic acid 1 milliGRAM(s) Oral daily  gabapentin 400 milliGRAM(s) Oral three times a day  lamoTRIgine 100 milliGRAM(s) Oral two times a day  loratadine 10 milliGRAM(s) Oral daily  metoprolol tartrate 12.5 milliGRAM(s) Oral three times a day  morphine  - Injectable 2 milliGRAM(s) IV Push once  pantoprazole    Tablet 40 milliGRAM(s) Oral before breakfast  polyethylene glycol 3350 17 Gram(s) Oral every 12 hours  QUEtiapine 25 milliGRAM(s) Oral daily  QUEtiapine 50 milliGRAM(s) Oral at bedtime  tamsulosin 0.4 milliGRAM(s) Oral at bedtime  tiotropium 18 MICROgram(s) Capsule 1 Capsule(s) Inhalation daily    MEDICATIONS  (PRN):  acetaminophen   Tablet .. 650 milliGRAM(s) Oral every 4 hours PRN Mild Pain (1 - 3)  clonazePAM  Tablet 0.5 milliGRAM(s) Oral at bedtime PRN anxiety  morphine  IR 30 milliGRAM(s) Oral every 6 hours PRN Severe Pain (7 - 10)  nitroglycerin     SubLingual 0.4 milliGRAM(s) SubLingual every 5 minutes PRN Chest Pain   Home Medications:  acetaminophen 650 mg oral tablet: 650 milligram(s) orally 4 times a day, As Needed (13 Oct 2019 10:36)  aspirin 81 mg oral delayed release tablet: 1 tab(s) orally once a day (13 Oct 2019 10:36)  atorvastatin 40 mg oral tablet: 1 tab(s) orally once a day (at bedtime) (13 Oct 2019 10:36)  baclofen 10 mg oral tablet: 1 tab(s) orally 4 times a day (13 Oct 2019 10:36)  budesonide-formoterol 160 mcg-4.5 mcg/inh inhalation aerosol: 2 puff(s) inhaled 2 times a day (13 Oct 2019 10:36)  Cepacol Extra Strength Menthol 10 mg mucous membrane lozenge: 1 anderson(s) mucous membrane every 6 hours, As Needed (13 Oct 2019 10:36)  cholecalciferol oral tablet: 2000 unit(s) orally once a day (13 Oct 2019 10:36)  Claritin 10 mg oral tablet: 1 tab(s) orally once a day (13 Oct 2019 10:36)  clonazePAM 0.5 mg oral tablet: 1 tab(s) orally once a day (at bedtime), As needed, PTSD (21 Oct 2019 16:41)  Flonase 50 mcg/inh nasal spray: 2 spray(s) nasal once a day (13 Oct 2019 10:36)  folic acid 1 mg oral tablet: 1 tab(s) orally once a day (13 Oct 2019 10:36)  gabapentin 400 mg oral capsule: 1 cap(s) orally 3 times a day (13 Oct 2019 10:36)  lamoTRIgine 100 mg oral tablet: 1 tab(s) orally 2 times a day (13 Oct 2019 10:36)  metoprolol tartrate 25 mg oral tablet: 0.5 tab(s) orally every 8 hours (13 Oct 2019 10:36)  Mi-Acid oral suspension: 30 milliliter(s) orally 4 times a day, As Needed (13 Oct 2019 10:36)  Morphine IR 30 mg oral tablet: 1 tab(s) orally every 6 hours, As Needed - 10) (13 Oct 2019 10:36)  Nitrostat 0.4 mg sublingual tablet: sublingual 3 times a day, As Needed; may repeat after 5 min until relief (13 Oct 2019 10:36)  omeprazole 20 mg oral delayed release capsule: 1 cap(s) orally 2 times a day (13 Oct 2019 10:36)  polyethylene glycol 3350 oral powder for reconstitution: 17 gram(s) orally every 12 hours (13 Oct 2019 10:36)  QUEtiapine 25 mg oral tablet: 1 tab(s) orally once a day (13 Oct 2019 10:36)  SEROquel 50 mg oral tablet: 1 tab(s) orally once a day (at bedtime) (13 Oct 2019 10:36)  tamsulosin 0.4 mg oral capsule: 1 cap(s) orally once a day (at bedtime) (13 Oct 2019 10:36)  Tecfidera 240 mg oral delayed release capsule: 1 cap(s) orally once a day (13 Oct 2019 10:36)  Tikosyn 250 mcg oral capsule: 1 cap(s) orally 2 times a day (13 Oct 2019 10:36)  tiotropium 18 mcg inhalation capsule: 1 cap(s) inhaled once a day (13 Oct 2019 10:36)      FAMILY HISTORY:  Family history of colon cancer in mother  Family history of cardiomyopathy: Mother  Family history of cervical cancer (Mother)  Family history of early CAD (Mother)      SOCIAL HISTORY:    CIGARETTES:    ALCOHOL:        Vital Signs Last 24 Hrs  T(C): 37.9 (2019 08:25), Max: 39.4 (2019 04:35)  T(F): 100.2 (2019 08:25), Max: 102.9 (2019 04:35)  HR: 60 (2019 08:25) (60 - 172)  BP: 106/59 (2019 08:25) (97/64 - 111/83)  BP(mean): --  RR: 20 (2019 08:25) (20 - 26)  SpO2: 94% (2019 08:25) (94% - 98%)    General: ill looking, bed bound  HEENT: NCAT, left eye strabismus, PERRLA  Lymph Nodes: No cervical or submandibular JS  Cardio: S1 S2, atrial paced rhythm  Chest/Lungs: no erythema around device, CTAB, DAEB due to habitus  Abdomen: +BS soft NTND  Extremities: +2 PP b/l, -ve LLE b/l  Skin: warm, dry  Neuro: AAOx3, no focal deficits  Psych: anxiety    INTERPRETATION OF TELEMETRY:  < from: 12 Lead ECG (19 @ 04:12) >  Ventricular Rate 67 BPM  Atrial Rate 61 BPM  QRS Duration 138 ms  Q-T Interval 396 ms  QTC Calculation(Bezet) 418 ms  R Axis -10 degrees  T Axis -8 degrees  Diagnosis Line Atrial-paced rhythm with occasional ventricular-paced complexes  Right bundle branch block  Abnormal ECG    < end of copied text >          LABS:                        11.4   9.14  )-----------( 257      ( 2019 04:30 )             37.9     11-    139  |  97<L>  |  14  ----------------------------<  116<H>  4.0   |  27  |  1.1    Ca    9.0      2019 04:30    TPro  6.6  /  Alb  4.0  /  TBili  0.5  /  DBili  x   /  AST  22  /  ALT  17  /  AlkPhos  158<H>  11-02    CARDIAC MARKERS ( 2019 04:30 )  x     / <0.01 ng/mL / x     / x     / x          PT/INR - ( 2019 04:30 )   PT: 14.50 sec;   INR: 1.26 ratio         PTT - ( 2019 04:30 )  PTT:37.3 sec  LIVER FUNCTIONS - ( 2019 04:30 )  Alb: 4.0 g/dL / Pro: 6.6 g/dL / ALK PHOS: 158 U/L / ALT: 17 U/L / AST: 22 U/L / GGT: x             Urinalysis Basic - ( 2019 05:30 )  Color: Light Yellow / Appearance: Clear / S.014 / pH: x  Gluc: x / Ketone: Negative  / Bili: Negative / Urobili: <2 mg/dL   Blood: x / Protein: Trace / Nitrite: Positive   Leuk Esterase: Moderate / RBC: 3 /HPF / WBC 99 /HPF   Sq Epi: x / Non Sq Epi: 0 /HPF / Bacteria: Many      BNPSerum Pro-Brain Natriuretic Peptide: 875 pg/mL <H> ( @ 04:30)    I&O's Detail    Daily Height in cm: 167.64 (2019 04:05)    Daily     RADIOLOGY & ADDITIONAL STUDIES:

## 2019-11-02 NOTE — CONSULT NOTE ADULT - ASSESSMENT
ASSESSMENT  48y M admitted with SEPSIS;SVT(SUPRAVENTRICULAR TACHYCARDIA)      PROBLEMS  1. Severe Sepsis ( T>101F, Pulse>90, Resp Rate>20), lactic acidosis, due to suspected UTI in Bipolar pt with hx multiple sclerosis. Has dysuria    New problem with additional W/U   acute illness with systemic symptoms    11/2 U/A  Nitrite: Positive /Leuk Esterase: Moderate / RBC: 3 /HPF / WBC 99 /HPF / Bacteria: Many      Lactic acidosis by VBG      BMI (kg/m2): 32.5 (11-02-19 @ 04:05)    PLAN  - Await blood cultures, urine culture  Start Cefepime 1gm q8h. Pt agrees.  Need official Cxray report

## 2019-11-02 NOTE — ED ADULT NURSE NOTE - PMH
AICD (automatic cardioverter/defibrillator) present    Anemia of chronic disease    Anginal pain    Atrial fibrillation  s/p ablation, on eliquis  Bipolar disorder    BPH (benign prostatic hyperplasia)    BPH (benign prostatic hyperplasia)    CAD (coronary artery disease)    Cardiomyopathy    CHF (congestive heart failure)    Chronic heart failure with preserved ejection fraction    Chronic pain    Chronic respiratory failure  secondary to COPD  Clinical Depression    Constipation    COPD (chronic obstructive pulmonary disease)    CVA (cerebral vascular accident)    Folate-deficiency anemia    GERD (gastroesophageal reflux disease)    HTN (hypertension)    Hypercholesteremia    Iron deficiency anemia    Migraines    MS (multiple sclerosis)    Pericardial effusion without cardiac tamponade  h/o trace pericardial effusion  Peripheral Neuropathy    Peripheral neuropathy    Pneumonia    PTSD (post-traumatic stress disorder)    Schizo affective schizophrenia    Seasonal allergies    Seizures    Supraventricular arrhythmia  prior history  TIA (transient ischemic attack)    Type 2 diabetes mellitus    Ventricular tachycardia    Vitamin D deficiency

## 2019-11-02 NOTE — ED PROVIDER NOTE - CLINICAL SUMMARY MEDICAL DECISION MAKING FREE TEXT BOX
Patient presents with tachycardia and fever. labs, ua, ekg, cxr done. Originally tachy to 170s but broke and now in paced rhythm at 70s. Patient offered antibiotics and treatment that he refused. States that he will not start antibiotics until ID sees him. Patient admitted for further management.

## 2019-11-02 NOTE — H&P ADULT - NSHPREVIEWOFSYSTEMS_GEN_ALL_CORE
REVIEW OF SYSTEMS    CONSTITUTIONAL: fever and nausea.  EYES/ENT: No visual changes;  No vertigo or throat pain   NECK: No pain or stiffness. No cervical midline tenderness.  RESPIRATORY: No difficulty breathing, cough, wheezing.  CARDIOVASCULAR: No chest pain or palpitations  GASTROINTESTINAL: No abdominal or epigastric pain. No nausea, vomiting, or hematemesis. No diarrhea or constipation. No melena, BRBPR, or hematochezia.  GENITOURINARY: + dysuria, no hematuria   NEUROLOGICAL: No headache. No numbness or weakness.

## 2019-11-02 NOTE — ED ADULT NURSE REASSESSMENT NOTE - NS ED NURSE REASSESS COMMENT FT1
Patient refusing IV placement. Patient refusing Tylenol for fever and ABT. Resident made aware and was unsuccessful in convinvince patient to accept treatment. Escalated to attending physician who was also unsuccessful of convincing patient for treatment. When asked if patient wanted to leave AMA he stated, "No I want to lay here and hold a bed." Charge nurse notified and at the bedside.

## 2019-11-02 NOTE — ED PROVIDER NOTE - PROGRESS NOTE DETAILS
Janet: patient has a hx of CHF, received 500cc bolus of NS en route, will give 1L here in ER only. Janet: code sepsis identified, patient has a hx of CHF, received 500cc bolus of NS en route, will give 1L here in ER only. Janet: patient refusing IV abx, another IV placement (1st IV blew). Patient also states he was brought to Er solely because the NH didn't give him one of his medications which is the reason that his HR was 189. Patient states he does not believe he has a fever or an infection and is demanding to see ID before getting treated. Patient agrees to admission for ID consult. extensive conversation had with the patient who states that he does not believe his temperature is due to a fever. Patient refusing all antibiotics and treatement for the infection at this time. Janet: patient agreed to be admitted however states he will refuse abx until ID evaluates him. Patient also states he will only be admitted to either F9 or CEU because he is not on good terms with someone on 3C.

## 2019-11-02 NOTE — H&P ADULT - NSICDXPASTMEDICALHX_GEN_ALL_CORE_FT
PAST MEDICAL HISTORY:  AICD (automatic cardioverter/defibrillator) present     Anemia of chronic disease     Anginal pain     Atrial fibrillation s/p ablation, on eliquis    Bipolar disorder     BPH (benign prostatic hyperplasia)     BPH (benign prostatic hyperplasia)     CAD (coronary artery disease)     Cardiomyopathy     CHF (congestive heart failure)     Chronic heart failure with preserved ejection fraction     Chronic pain     Chronic respiratory failure secondary to COPD    Clinical Depression     Constipation     COPD (chronic obstructive pulmonary disease)     CVA (cerebral vascular accident)     Folate-deficiency anemia     GERD (gastroesophageal reflux disease)     HTN (hypertension)     Hypercholesteremia     Iron deficiency anemia     Migraines     MS (multiple sclerosis)     Pericardial effusion without cardiac tamponade h/o trace pericardial effusion    Peripheral neuropathy     Peripheral Neuropathy     Pneumonia     PTSD (post-traumatic stress disorder)     Schizo affective schizophrenia     Seasonal allergies     Seizures     Supraventricular arrhythmia prior history    TIA (transient ischemic attack)     Type 2 diabetes mellitus     Ventricular tachycardia     Vitamin D deficiency

## 2019-11-02 NOTE — CONSULT NOTE ADULT - ASSESSMENT
48 years old male with PMHx of arrhythmogenic ventricular tachycardia s/p AICD in 2005, HFpEF (EF 69% in August, 2019), Afib/Aflutter on Eliquis/ASA 81mg, HTN, COPD (on 2L O2 PRN), DLD, multiple sclerosis, epilepsy, PTSD, depression, TIA, presented to the ED with complaint of dysuria and fever for a few days.    Impression  Fever  Episode of Atrial Flutter with RVR  Normal function ICD    Recommendation  Stop Dofetilide  Start Toprol XL 50mg once a day, as tolerated by BP  TTE to r/o pericardial effusion  Device interrogated, refer to chart  TSH and free T4  Blood culture  Will follow up

## 2019-11-02 NOTE — H&P ADULT - ATTENDING COMMENTS
A 49 y/o male  with PMH Ventricular tachycardia s/p AICD in 2005, Afib/flutter on Eliquis, HTN, COPD (on 2L O2 PRN),  multiple sclerosis, epilepsy, pericardial effusion s/p recent pericardial window was brought to ED from NH for fever and chills. Symptoms started last night with chills, he placed blanket and nurse checked it was 103, so the called EMS, he also reports dysuria for two days, he denies chest pain or SOB, he reports episodes of cough with green sputum early this AM while in the ED. In the ED he found tachycardic , Temp 102.7, UA was positive. CXR showed stable left lower lobe opacity    PHYSICAL EXAM:  GENERAL: NAD, well-developed  HEAD:  Atraumatic, Normocephalic  EYES: EOMI, PERRLA, conjunctiva and sclera clear  NECK: Supple, No JVD  CHEST/LUNG: bibasilar fine rales  HEART: Regular rate and rhythm; S1 S2, distant hear sounds, no murmurs.   ABDOMEN: Soft, Nontender, Nondistended; Bowel sounds present  EXTREMITIES:  2+ Peripheral Pulses, No clubbing, cyanosis, or edema  PSYCH: AAOx3  NEUROLOGY: non-focal  SKIN: No rashes or lesions    A/P:   Severe Sepsis  UTI and Possible Pneumonia.   Start on Cefepime as per ID. Pending blood and urine cx.     Supraventricular tachycardia: Hx of VTach and AFIB s/p AICD.   Self aborted, patient was on Dofetilide and Metoprolol,   Consult EP, telemetry monitor.   Continue Eliquis.     Pericardial effusion:   s/p recent pericardial window. Wound is clean  Plan to repeat echo.

## 2019-11-02 NOTE — ED PROVIDER NOTE - CARE PLAN
Principal Discharge DX:	Sepsis  Secondary Diagnosis:	SVT (supraventricular tachycardia) Principal Discharge DX:	Sepsis  Secondary Diagnosis:	Tachycardia

## 2019-11-02 NOTE — ED ADULT NURSE NOTE - PSH
Displacement of electrode lead of cardiac pacemaker    H/O hernia repair  right 1972,1991  H/O prior ablation treatment  for Afib  History of evacuation of hematoma  AICD pocket  History of hip replacement    S/P Implantation of AICD    S/P Orchiectomy  L for testicular CA

## 2019-11-02 NOTE — ED PROVIDER NOTE - OBJECTIVE STATEMENT
49 y/o male with pmhx of arrhythmogenic ventricular tachycardia s/p AICD in 2005, HFpEF (EF 69% in August, 2019), afib/flutter on Eliquis/ASA 81mg, HTN, COPD (on 2L O2 PRN), DLD, multiple sclerosis, epilepsy, PTSD, depression, TIA presents from NH for fever and tachycardia. Patient states he was very cold at the NH, nurse took vital signs and found him to have fever of 104F 47 y/o male with pmhx of arrhythmogenic ventricular tachycardia s/p AICD in 2005, HFpEF (EF 69% in August, 2019), afib/flutter,, HTN, COPD (on 2L O2 PRN), DLD, multiple sclerosis, epilepsy, PTSD, depression, TIA presents from NH for fever and tachycardia. Patient states he was very cold at the NH, nurse took vital signs and found him to have fever of 104F and HR of 189. Patient states he went to see Dr. Quiñones earlier today and was in aflutter at that time. Patient admits to SOB however states that has been there for the past month. Denies cough, chest pain, headache, abdominal pain, n/v/d, dysuria, leg swelling.

## 2019-11-02 NOTE — PROCEDURE NOTE - ADDITIONAL PROCEDURE DETAILS
Normal function defibrillator  I checked thresholds manually and reprogrammed for longevity.  Episode of Atrial Flutter with rate 166 to 210 bpm lasting 14 minutes on 11/2/2019 at 12:12 AM, terminated spontaneously.  Reprogrammed device to DDD 70

## 2019-11-02 NOTE — CONSULT NOTE ADULT - SUBJECTIVE AND OBJECTIVE BOX
CUATE HORNE  48y, Male  Allergy: dexmethylphenidate (Unknown)  Dilantin (Rash)  dilantin, compazine, , ritalin, phenergan (Unknown)  Haldol (Unknown)  hydantoin derivatives (Other)  methylphenidate (Unknown)  phenytoin (Unknown)  prochlorperazine (Unknown)  promethazine (Dystonic RXN)  rash/hives (Anaphylaxis)  thioxanthenes (Unknown)      CHIEF COMPLAINT:     HPI:    FAMILY HISTORY:  Family history of colon cancer in mother  Family history of cardiomyopathy: Mother  Family history of cervical cancer (Mother)  Family history of early CAD (Mother)    PAST MEDICAL & SURGICAL HISTORY:  Vitamin D deficiency  Constipation  Chronic respiratory failure: secondary to COPD  Folate-deficiency anemia  Iron deficiency anemia  Anemia of chronic disease  Pericardial effusion without cardiac tamponade: h/o trace pericardial effusion  BPH (benign prostatic hyperplasia)  Chronic pain  Peripheral neuropathy  Type 2 diabetes mellitus  AICD (automatic cardioverter/defibrillator) present  Ventricular tachycardia  Chronic heart failure with preserved ejection fraction  PTSD (post-traumatic stress disorder)  Supraventricular arrhythmia: prior history  Cardiomyopathy  CHF (congestive heart failure)  Atrial fibrillation: s/p ablation, on eliquis  BPH (benign prostatic hyperplasia)  HTN (hypertension)  Seizures  Migraines  Pneumonia  MS (multiple sclerosis)  CAD (coronary artery disease)  Bipolar disorder  Anginal pain  Schizo affective schizophrenia  GERD (gastroesophageal reflux disease)  Seasonal allergies  Hypercholesteremia  COPD (chronic obstructive pulmonary disease)  CVA (cerebral vascular accident)  TIA (transient ischemic attack)  Peripheral Neuropathy  Clinical Depression  Displacement of electrode lead of cardiac pacemaker  History of evacuation of hematoma: AICD pocket  H/O prior ablation treatment: for Afib  H/O hernia repair: right   History of hip replacement  S/P Orchiectomy: L for testicular CA  S/P Implantation of AICD      Substance Use ( x ) never used  (  ) IVDU (  ) Other:  Tobacco Usage:  (   ) never smoked   ( x  ) former smoker   (   ) current smoker   Alcohol Usage: (   ) social  (   ) daily use (   x) denies  Sexual History: na      ROS  General: Denies fevers, chills, nightsweats, weight loss  HEENT: Denies headache, rhinorrhea, sore throat, eye pain  CV: Denies CP, palpitations  PULM: Denies SOB, cough  GI: Denies abdominal pain, diarrhea  : Denies dysuria, hematuria  MSK: Denies arthralgias  SKIN: Denies rash   NEURO: Denies paresthesias, weakness  PSYCH: Denies depression    VITALS:  T(F): 100.2, Max: 102.9 (19 @ 04:35)  HR: 60  BP: 106/59  RR: 20Vital Signs Last 24 Hrs  T(C): 37.9 (2019 08:25), Max: 39.4 (2019 04:35)  T(F): 100.2 (2019 08:25), Max: 102.9 (2019 04:35)  HR: 60 (2019 08:25) (60 - 172)  BP: 106/59 (2019 08:25) (97/64 - 111/83)  BP(mean): --  RR: 20 (2019 08:25) (20 - 26)  SpO2: 94% (2019 08:25) (94% - 98%)    PHYSICAL EXAM:  Gen: NAD, resting in bed  HEENT: Normocephalic, atraumatic  Neck: supple, no lymphadenopathy  CV: Regular rate & regular rhythm  Lungs: decreased BS at bases, no fremitus  Abdomen: Soft, BS present  Ext: Warm, well perfused  Neuro: non focal, awake  Skin: no rash, no erythema    TESTS & MEASUREMENTS:                        11.4   9.14  )-----------( 257      ( 2019 04:30 )             37.9         139  |  97<L>  |  14  ----------------------------<  116<H>  4.0   |  27  |  1.1    Ca    9.0      2019 04:30    TPro  6.6  /  Alb  4.0  /  TBili  0.5  /  DBili  x   /  AST  22  /  ALT  17  /  AlkPhos  158<H>      eGFR if Non African American: 80 mL/min/1.73M2 (19 @ 04:30)  eGFR if : 93 mL/min/1.73M2 (19 @ 04:30)    LIVER FUNCTIONS - ( 2019 04:30 )  Alb: 4.0 g/dL / Pro: 6.6 g/dL / ALK PHOS: 158 U/L / ALT: 17 U/L / AST: 22 U/L / GGT: x           Urinalysis Basic - ( 2019 05:30 )    Color: Light Yellow / Appearance: Clear / S.014 / pH: x  Gluc: x / Ketone: Negative  / Bili: Negative / Urobili: <2 mg/dL   Blood: x / Protein: Trace / Nitrite: Positive   Leuk Esterase: Moderate / RBC: 3 /HPF / WBC 99 /HPF   Sq Epi: x / Non Sq Epi: 0 /HPF / Bacteria: Many          Blood Gas Venous - Lactate: 1.8 mmoL/L (19 @ 04:07)      INFECTIOUS DISEASES TESTING  MRSA PCR Result.: Negative (19 @ 22:29)  HIV-1/2 Combo Result: Nonreact (19 @ 05:37)      RADIOLOGY & ADDITIONAL TESTS:  I have personally reviewed the last Chest xray  CXR      CT      CARDIOLOGY TESTING  12 Lead ECG:   Ventricular Rate 67 BPM    Atrial Rate 61 BPM    QRS Duration 138 ms    Q-T Interval 396 ms    QTC Calculation(Bezet) 418 ms    R Axis -10 degrees    T Axis -8 degrees    Diagnosis Line Atrial-paced rhythm with occasional ventricular-paced complexes  Right bundle branch block  Abnormal ECG    Confirmed by Elysia Walters MD ([id number]) on 2019 7:53:36 AM (19 @ 04:12)  12 Lead ECG:   Ventricular Rate 169 BPM    Atrial Rate 55 BPM    QRS Duration 130 ms    Q-T Interval 292 ms    QTC Calculation(Bezet) 489 ms    R Axis -10 degrees    T Axis 214 degrees    Diagnosis Line Supraventricular tachycardia  Right bundle branch block  T wave abnormality, consider inferolateral ischemia  Abnormal ECG    Confirmed by Elysia Walters MD ([id number]) on 2019 7:52:09 AM (19 @ 04:01)      MEDICATIONS      ANTIBIOTICS:      All available historical data has been reviewed

## 2019-11-02 NOTE — ED PROVIDER NOTE - ATTENDING CONTRIBUTION TO CARE
49 yo M pmh of V tach with AICD, a fib/flutter, htn, copd, hld, MS, epilepsy, PTSD, TIA presents from [facility] for fever and tachycardia. Last few days has had a fever. Was found to be tachycardic to the 170s so sent to the ED. On arrival patient febrile to 104. Recently had CT surgery done for a hematoma around his aicd. States that he saw dr. leon today in the office and was told that he is still in a flutter. Currently has not cp, no sob, no n/v/d, no abdominal pain.     CONSTITUTIONAL: Well-developed; well-nourished; in no acute distress.   SKIN: warm, dry  HEAD: Normocephalic; atraumatic.  EYES: PERRL, EOMI, no conjunctival erythema  ENT: No nasal discharge; airway clear.  NECK: Supple; non tender.  CARD: S1, S2 normal;  Regular rate and rhythm. heeling scar to the left chest. no cellulitis. AICD in left chest.   RESP: No wheezes, rales or rhonchi.  ABD: soft non tender, non distended, no rebound or guarding  EXT: Normal ROM.    LYMPH: No acute cervical adenopathy.  NEURO: Alert, oriented, grossly unremarkable.

## 2019-11-03 LAB
ANION GAP SERPL CALC-SCNC: 12 MMOL/L — SIGNIFICANT CHANGE UP (ref 7–14)
BUN SERPL-MCNC: 10 MG/DL — SIGNIFICANT CHANGE UP (ref 10–20)
CALCIUM SERPL-MCNC: 9.1 MG/DL — SIGNIFICANT CHANGE UP (ref 8.5–10.1)
CHLORIDE SERPL-SCNC: 104 MMOL/L — SIGNIFICANT CHANGE UP (ref 98–110)
CO2 SERPL-SCNC: 27 MMOL/L — SIGNIFICANT CHANGE UP (ref 17–32)
CREAT SERPL-MCNC: 0.9 MG/DL — SIGNIFICANT CHANGE UP (ref 0.7–1.5)
GLUCOSE SERPL-MCNC: 86 MG/DL — SIGNIFICANT CHANGE UP (ref 70–99)
HCT VFR BLD CALC: 35 % — LOW (ref 42–52)
HGB BLD-MCNC: 10.4 G/DL — LOW (ref 14–18)
MCHC RBC-ENTMCNC: 26.8 PG — LOW (ref 27–31)
MCHC RBC-ENTMCNC: 29.7 G/DL — LOW (ref 32–37)
MCV RBC AUTO: 90.2 FL — SIGNIFICANT CHANGE UP (ref 80–94)
NRBC # BLD: 0 /100 WBCS — SIGNIFICANT CHANGE UP (ref 0–0)
PLATELET # BLD AUTO: 219 K/UL — SIGNIFICANT CHANGE UP (ref 130–400)
POTASSIUM SERPL-MCNC: 4.2 MMOL/L — SIGNIFICANT CHANGE UP (ref 3.5–5)
POTASSIUM SERPL-SCNC: 4.2 MMOL/L — SIGNIFICANT CHANGE UP (ref 3.5–5)
RBC # BLD: 3.88 M/UL — LOW (ref 4.7–6.1)
RBC # FLD: 16.9 % — HIGH (ref 11.5–14.5)
SODIUM SERPL-SCNC: 143 MMOL/L — SIGNIFICANT CHANGE UP (ref 135–146)
WBC # BLD: 3.48 K/UL — LOW (ref 4.8–10.8)
WBC # FLD AUTO: 3.48 K/UL — LOW (ref 4.8–10.8)

## 2019-11-03 PROCEDURE — 93306 TTE W/DOPPLER COMPLETE: CPT | Mod: 26

## 2019-11-03 PROCEDURE — 99233 SBSQ HOSP IP/OBS HIGH 50: CPT

## 2019-11-03 RX ADMIN — TAMSULOSIN HYDROCHLORIDE 0.4 MILLIGRAM(S): 0.4 CAPSULE ORAL at 22:56

## 2019-11-03 RX ADMIN — PANTOPRAZOLE SODIUM 40 MILLIGRAM(S): 20 TABLET, DELAYED RELEASE ORAL at 08:58

## 2019-11-03 RX ADMIN — GABAPENTIN 400 MILLIGRAM(S): 400 CAPSULE ORAL at 13:35

## 2019-11-03 RX ADMIN — LAMOTRIGINE 100 MILLIGRAM(S): 25 TABLET, ORALLY DISINTEGRATING ORAL at 06:08

## 2019-11-03 RX ADMIN — CEFEPIME 100 MILLIGRAM(S): 1 INJECTION, POWDER, FOR SOLUTION INTRAMUSCULAR; INTRAVENOUS at 22:56

## 2019-11-03 RX ADMIN — CEFEPIME 100 MILLIGRAM(S): 1 INJECTION, POWDER, FOR SOLUTION INTRAMUSCULAR; INTRAVENOUS at 16:18

## 2019-11-03 RX ADMIN — BUDESONIDE AND FORMOTEROL FUMARATE DIHYDRATE 2 PUFF(S): 160; 4.5 AEROSOL RESPIRATORY (INHALATION) at 21:04

## 2019-11-03 RX ADMIN — LORATADINE 10 MILLIGRAM(S): 10 TABLET ORAL at 13:35

## 2019-11-03 RX ADMIN — GABAPENTIN 400 MILLIGRAM(S): 400 CAPSULE ORAL at 22:57

## 2019-11-03 RX ADMIN — APIXABAN 5 MILLIGRAM(S): 2.5 TABLET, FILM COATED ORAL at 06:09

## 2019-11-03 RX ADMIN — MORPHINE SULFATE 30 MILLIGRAM(S): 50 CAPSULE, EXTENDED RELEASE ORAL at 09:47

## 2019-11-03 RX ADMIN — ATORVASTATIN CALCIUM 40 MILLIGRAM(S): 80 TABLET, FILM COATED ORAL at 22:54

## 2019-11-03 RX ADMIN — QUETIAPINE FUMARATE 50 MILLIGRAM(S): 200 TABLET, FILM COATED ORAL at 22:56

## 2019-11-03 RX ADMIN — APIXABAN 5 MILLIGRAM(S): 2.5 TABLET, FILM COATED ORAL at 16:58

## 2019-11-03 RX ADMIN — Medication 10 MILLIGRAM(S): at 13:36

## 2019-11-03 RX ADMIN — MORPHINE SULFATE 30 MILLIGRAM(S): 50 CAPSULE, EXTENDED RELEASE ORAL at 11:45

## 2019-11-03 RX ADMIN — Medication 50 MILLIGRAM(S): at 08:59

## 2019-11-03 RX ADMIN — Medication 10 MILLIGRAM(S): at 06:08

## 2019-11-03 RX ADMIN — CEFEPIME 100 MILLIGRAM(S): 1 INJECTION, POWDER, FOR SOLUTION INTRAMUSCULAR; INTRAVENOUS at 06:06

## 2019-11-03 RX ADMIN — Medication 1 MILLIGRAM(S): at 13:35

## 2019-11-03 RX ADMIN — BUDESONIDE AND FORMOTEROL FUMARATE DIHYDRATE 2 PUFF(S): 160; 4.5 AEROSOL RESPIRATORY (INHALATION) at 08:59

## 2019-11-03 RX ADMIN — GABAPENTIN 400 MILLIGRAM(S): 400 CAPSULE ORAL at 06:09

## 2019-11-03 RX ADMIN — Medication 10 MILLIGRAM(S): at 06:06

## 2019-11-03 RX ADMIN — Medication 81 MILLIGRAM(S): at 13:36

## 2019-11-03 RX ADMIN — Medication 10 MILLIGRAM(S): at 16:59

## 2019-11-03 RX ADMIN — TAMSULOSIN HYDROCHLORIDE 0.4 MILLIGRAM(S): 0.4 CAPSULE ORAL at 00:16

## 2019-11-03 RX ADMIN — Medication 10 MILLIGRAM(S): at 22:57

## 2019-11-03 RX ADMIN — MORPHINE SULFATE 30 MILLIGRAM(S): 50 CAPSULE, EXTENDED RELEASE ORAL at 17:21

## 2019-11-03 RX ADMIN — LAMOTRIGINE 100 MILLIGRAM(S): 25 TABLET, ORALLY DISINTEGRATING ORAL at 16:59

## 2019-11-03 RX ADMIN — QUETIAPINE FUMARATE 25 MILLIGRAM(S): 200 TABLET, FILM COATED ORAL at 13:34

## 2019-11-03 RX ADMIN — MORPHINE SULFATE 30 MILLIGRAM(S): 50 CAPSULE, EXTENDED RELEASE ORAL at 16:59

## 2019-11-03 NOTE — PROGRESS NOTE ADULT - SUBJECTIVE AND OBJECTIVE BOX
CUATE HORNE  48y  Male      Patient is a 48y old  Male who presents with a chief complaint of Fever and dysuria (2019 13:56)      INTERVAL HPI/OVERNIGHT EVENTS:  He feels weak, still with dysuria. No event on telemetry   Vital Signs Last 24 Hrs  T(C): 35.8 (2019 07:55), Max: 36.6 (2019 16:11)  T(F): 96.4 (2019 07:55), Max: 97.8 (2019 16:11)  HR: 70 (2019 07:55) (69 - 70)  BP: 121/57 (2019 07:55) (94/51 - 121/57)  BP(mean): --  RR: 18 (2019 07:55) (18 - 18)  SpO2: 97% (2019 07:55) (97% - 98%)      19 @ 07:01  -  19 @ 14:59  --------------------------------------------------------  IN: 0 mL / OUT: 1000 mL / NET: -1000 mL            Consultant(s) Notes Reviewed:  [x ] YES  [ ] NO          MEDICATIONS  (STANDING):  apixaban 5 milliGRAM(s) Oral every 12 hours  aspirin enteric coated 81 milliGRAM(s) Oral daily  atorvastatin 40 milliGRAM(s) Oral at bedtime  baclofen 10 milliGRAM(s) Oral four times a day  budesonide 160 MICROgram(s)/formoterol 4.5 MICROgram(s) Inhaler 2 Puff(s) Inhalation two times a day  cefepime   IVPB 1000 milliGRAM(s) IV Intermittent every 8 hours  dimethyl fumarate DR Capsule 240 milliGRAM(s) Oral daily  fluticasone propionate 50 MICROgram(s)/spray Nasal Spray 2 Spray(s) Both Nostrils two times a day  folic acid 1 milliGRAM(s) Oral daily  gabapentin 400 milliGRAM(s) Oral three times a day  lamoTRIgine 100 milliGRAM(s) Oral two times a day  loratadine 10 milliGRAM(s) Oral daily  metoprolol succinate ER 50 milliGRAM(s) Oral daily  pantoprazole    Tablet 40 milliGRAM(s) Oral before breakfast  polyethylene glycol 3350 17 Gram(s) Oral every 12 hours  QUEtiapine 25 milliGRAM(s) Oral daily  QUEtiapine 50 milliGRAM(s) Oral at bedtime  tamsulosin 0.4 milliGRAM(s) Oral at bedtime  tiotropium 18 MICROgram(s) Capsule 1 Capsule(s) Inhalation daily    MEDICATIONS  (PRN):  acetaminophen   Tablet .. 650 milliGRAM(s) Oral every 4 hours PRN Mild Pain (1 - 3)  clonazePAM  Tablet 0.5 milliGRAM(s) Oral at bedtime PRN anxiety  morphine  IR 30 milliGRAM(s) Oral every 6 hours PRN Severe Pain (7 - 10)  nitroglycerin     SubLingual 0.4 milliGRAM(s) SubLingual every 5 minutes PRN Chest Pain      LABS                          10.4   3.48  )-----------( 219      ( 2019 05:19 )             35.0     11    143  |  104  |  10  ----------------------------<  86  4.2   |  27  |  0.9    Ca    9.1      2019 05:19    TPro  6.6  /  Alb  4.0  /  TBili  0.5  /  DBili  x   /  AST  22  /  ALT  17  /  AlkPhos  158<H>  02      Urinalysis Basic - ( 2019 05:30 )    Color: Light Yellow / Appearance: Clear / S.014 / pH: x  Gluc: x / Ketone: Negative  / Bili: Negative / Urobili: <2 mg/dL   Blood: x / Protein: Trace / Nitrite: Positive   Leuk Esterase: Moderate / RBC: 3 /HPF / WBC 99 /HPF   Sq Epi: x / Non Sq Epi: 0 /HPF / Bacteria: Many      PT/INR - ( 2019 04:30 )   PT: 14.50 sec;   INR: 1.26 ratio         PTT - ( 2019 04:30 )  PTT:37.3 sec  Lactate Trend    CARDIAC MARKERS ( 2019 04:30 )  x     / <0.01 ng/mL / x     / x     / x          CAPILLARY BLOOD GLUCOSE          Culture - Urine (collected 19 @ 05:30)  Source: .Urine Clean Catch (Midstream)  Preliminary Report (19 @ 14:37):    >100,000 CFU/ml Gram Negative Rods    Culture - Blood (collected 19 @ 04:30)  Source: .Blood Blood-Peripheral  Preliminary Report (19 @ 11:01):    No growth to date.    Culture - Blood (collected 19 @ 04:30)  Source: .Blood Blood-Peripheral  Preliminary Report (19 @ 11:01):    No growth to date.        RADIOLOGY & ADDITIONAL TESTS:    Imaging Personally Reviewed:  [ ] YES  [ ] NO    HEALTH ISSUES - PROBLEM Dx:        PHYSICAL EXAM:  GENERAL: NAD, well-developed  HEAD:  Atraumatic, Normocephalic  EYES: EOMI, PERRLA, conjunctiva and sclera clear  NECK: Supple, No JVD  CHEST/LUNG: bibasilar fine rales  HEART: Regular rate and rhythm; S1 S2, distant hear sounds, no murmurs.   ABDOMEN: Soft, Nontender, Nondistended; Bowel sounds present  EXTREMITIES:  2+ Peripheral Pulses, No clubbing, cyanosis, or edema  PSYCH: AAOx3  NEUROLOGY: non-focal  SKIN: No rashes or lesions

## 2019-11-03 NOTE — PROGRESS NOTE ADULT - ASSESSMENT
CUATE HORNE 48y Male  MRN#: [id number]   CODE STATUS:________      SUBJECTIVE  Patient is a 48y old Male who presents with a chief complaint of Fever and dysuria (2019 14:58)  Currently admitted to medicine with the primary diagnosis of Sepsis  Hospital course has been complicated by _______.   Today is hospital day 1d, and this morning he is _________ and reports ________ overnight events.     Present Today:           Blum Catheter ()No/ ()Yes? Indication:          Central Line ()No/ ()Yes? Indication:          IV Fluids ()No/ ()Yes? Type:  Rate:  Indication:        OBJECTIVE  PAST MEDICAL & SURGICAL HISTORY  Vitamin D deficiency  Constipation  Chronic respiratory failure: secondary to COPD  Folate-deficiency anemia  Iron deficiency anemia  Anemia of chronic disease  Pericardial effusion without cardiac tamponade: h/o trace pericardial effusion  BPH (benign prostatic hyperplasia)  Chronic pain  Peripheral neuropathy  Type 2 diabetes mellitus  AICD (automatic cardioverter/defibrillator) present  Ventricular tachycardia  Chronic heart failure with preserved ejection fraction  PTSD (post-traumatic stress disorder)  Supraventricular arrhythmia: prior history  Cardiomyopathy  CHF (congestive heart failure)  Atrial fibrillation: s/p ablation, on eliquis  BPH (benign prostatic hyperplasia)  HTN (hypertension)  Seizures  Migraines  Pneumonia  MS (multiple sclerosis)  CAD (coronary artery disease)  Bipolar disorder  Anginal pain  Schizo affective schizophrenia  GERD (gastroesophageal reflux disease)  Seasonal allergies  Hypercholesteremia  COPD (chronic obstructive pulmonary disease)  CVA (cerebral vascular accident)  TIA (transient ischemic attack)  Peripheral Neuropathy  Clinical Depression  Displacement of electrode lead of cardiac pacemaker  History of evacuation of hematoma: AICD pocket  H/O prior ablation treatment: for Afib  H/O hernia repair: right   History of hip replacement  S/P Orchiectomy: L for testicular CA  S/P Implantation of AICD    ALLERGIES:  dexmethylphenidate (Unknown)  Dilantin (Rash)  dilantin, compazine, , ritalin, phenergan (Unknown)  Haldol (Unknown)  hydantoin derivatives (Other)  methylphenidate (Unknown)  phenytoin (Unknown)  prochlorperazine (Unknown)  promethazine (Dystonic RXN)  rash/hives (Anaphylaxis)  thioxanthenes (Unknown)      HOME MEDICATIONS:  HOME MEDICATIONS:  acetaminophen 650 mg oral tablet: 650 milligram(s) orally 4 times a day, As Needed (13 Oct 2019 10:36)  aspirin 81 mg oral delayed release tablet: 1 tab(s) orally once a day (13 Oct 2019 10:36)  atorvastatin 40 mg oral tablet: 1 tab(s) orally once a day (at bedtime) (13 Oct 2019 10:36)  baclofen 10 mg oral tablet: 1 tab(s) orally 4 times a day (13 Oct 2019 10:36)  budesonide-formoterol 160 mcg-4.5 mcg/inh inhalation aerosol: 2 puff(s) inhaled 2 times a day (13 Oct 2019 10:36)  Cepacol Extra Strength Menthol 10 mg mucous membrane lozenge: 1 anderson(s) mucous membrane every 6 hours, As Needed (13 Oct 2019 10:36)  cholecalciferol oral tablet: 2000 unit(s) orally once a day (13 Oct 2019 10:36)  Claritin 10 mg oral tablet: 1 tab(s) orally once a day (13 Oct 2019 10:36)  clonazePAM 0.5 mg oral tablet: 1 tab(s) orally once a day (at bedtime), As needed, PTSD (21 Oct 2019 16:41)  Flonase 50 mcg/inh nasal spray: 2 spray(s) nasal once a day (13 Oct 2019 10:36)  folic acid 1 mg oral tablet: 1 tab(s) orally once a day (13 Oct 2019 10:36)  gabapentin 400 mg oral capsule: 1 cap(s) orally 3 times a day (13 Oct 2019 10:36)  lamoTRIgine 100 mg oral tablet: 1 tab(s) orally 2 times a day (13 Oct 2019 10:36)  metoprolol tartrate 25 mg oral tablet: 0.5 tab(s) orally every 8 hours (13 Oct 2019 10:36)  Mi-Acid oral suspension: 30 milliliter(s) orally 4 times a day, As Needed (13 Oct 2019 10:36)  Morphine IR 30 mg oral tablet: 1 tab(s) orally every 6 hours, As Needed - 10) (13 Oct 2019 10:36)  Nitrostat 0.4 mg sublingual tablet: sublingual 3 times a day, As Needed; may repeat after 5 min until relief (13 Oct 2019 10:36)  omeprazole 20 mg oral delayed release capsule: 1 cap(s) orally 2 times a day (13 Oct 2019 10:36)  polyethylene glycol 3350 oral powder for reconstitution: 17 gram(s) orally every 12 hours (13 Oct 2019 10:36)  QUEtiapine 25 mg oral tablet: 1 tab(s) orally once a day (13 Oct 2019 10:36)  SEROquel 50 mg oral tablet: 1 tab(s) orally once a day (at bedtime) (13 Oct 2019 10:36)  tamsulosin 0.4 mg oral capsule: 1 cap(s) orally once a day (at bedtime) (13 Oct 2019 10:36)  Tecfidera 240 mg oral delayed release capsule: 1 cap(s) orally once a day (13 Oct 2019 10:36)  Tikosyn 250 mcg oral capsule: 1 cap(s) orally 2 times a day (13 Oct 2019 10:36)  tiotropium 18 mcg inhalation capsule: 1 cap(s) inhaled once a day (13 Oct 2019 10:36)      MEDICATIONS:  STANDING MEDICATIONS  apixaban 5 milliGRAM(s) Oral every 12 hours  aspirin enteric coated 81 milliGRAM(s) Oral daily  atorvastatin 40 milliGRAM(s) Oral at bedtime  baclofen 10 milliGRAM(s) Oral four times a day  budesonide 160 MICROgram(s)/formoterol 4.5 MICROgram(s) Inhaler 2 Puff(s) Inhalation two times a day  cefepime   IVPB 1000 milliGRAM(s) IV Intermittent every 8 hours  dimethyl fumarate DR Capsule 240 milliGRAM(s) Oral daily  fluticasone propionate 50 MICROgram(s)/spray Nasal Spray 2 Spray(s) Both Nostrils two times a day  folic acid 1 milliGRAM(s) Oral daily  gabapentin 400 milliGRAM(s) Oral three times a day  lamoTRIgine 100 milliGRAM(s) Oral two times a day  loratadine 10 milliGRAM(s) Oral daily  metoprolol succinate ER 50 milliGRAM(s) Oral daily  pantoprazole    Tablet 40 milliGRAM(s) Oral before breakfast  polyethylene glycol 3350 17 Gram(s) Oral every 12 hours  QUEtiapine 25 milliGRAM(s) Oral daily  QUEtiapine 50 milliGRAM(s) Oral at bedtime  tamsulosin 0.4 milliGRAM(s) Oral at bedtime  tiotropium 18 MICROgram(s) Capsule 1 Capsule(s) Inhalation daily    PRN MEDICATIONS  acetaminophen   Tablet .. 650 milliGRAM(s) Oral every 4 hours PRN  clonazePAM  Tablet 0.5 milliGRAM(s) Oral at bedtime PRN  morphine  IR 30 milliGRAM(s) Oral every 6 hours PRN  nitroglycerin     SubLingual 0.4 milliGRAM(s) SubLingual every 5 minutes PRN      VITAL SIGNS: Last 24 Hours  T(C): 36.4 (2019 19:45), Max: 36.4 (2019 00:22)  T(F): 97.5 (2019 19:45), Max: 97.5 (2019 00:22)  HR: 72 (2019 19:45) (69 - 72)  BP: 90/53 (2019 19:45) (90/53 - 121/57)  BP(mean): --  RR: 18 (2019 19:45) (18 - 18)  SpO2: 98% (2019 19:45) (95% - 98%)    LABS:                        10.4   3.48  )-----------( 219      ( 2019 05:19 )             35.0     11-03    143  |  104  |  10  ----------------------------<  86  4.2   |  27  |  0.9    Ca    9.1      2019 05:19    TPro  6.6  /  Alb  4.0  /  TBili  0.5  /  DBili  x   /  AST  22  /  ALT  17  /  AlkPhos  158<H>  11-02    PT/INR - ( 2019 04:30 )   PT: 14.50 sec;   INR: 1.26 ratio         PTT - ( 2019 04:30 )  PTT:37.3 sec  Urinalysis Basic - ( 2019 05:30 )    Color: Light Yellow / Appearance: Clear / S.014 / pH: x  Gluc: x / Ketone: Negative  / Bili: Negative / Urobili: <2 mg/dL   Blood: x / Protein: Trace / Nitrite: Positive   Leuk Esterase: Moderate / RBC: 3 /HPF / WBC 99 /HPF   Sq Epi: x / Non Sq Epi: 0 /HPF / Bacteria: Many            Culture - Urine (collected 2019 05:30)  Source: .Urine Clean Catch (Midstream)  Preliminary Report (2019 14:37):    >100,000 CFU/ml Gram Negative Rods    Culture - Blood (collected 2019 04:30)  Source: .Blood Blood-Peripheral  Preliminary Report (2019 11:01):    No growth to date.    Culture - Blood (collected 2019 04:30)  Source: .Blood Blood-Peripheral  Preliminary Report (2019 11:01):    No growth to date.      CARDIAC MARKERS ( 2019 04:30 )  x     / <0.01 ng/mL / x     / x     / x          RADIOLOGY:      PHYSICAL EXAM:    GENERAL: NAD, well-developed, AAOx3  HEENT:  Atraumatic, Normocephalic. EOMI, PERRLA, conjunctiva and sclera clear, No JVD  PULMONARY: Clear to auscultation bilaterally; No wheeze  CARDIOVASCULAR: Regular rate and rhythm; No murmurs, rubs, or gallops  GASTROINTESTINAL: Soft, Nontender, Nondistended; Bowel sounds present  MUSCULOSKELETAL:  2+ Peripheral Pulses, No clubbing, cyanosis, or edema  NEUROLOGY: non-focal  SKIN: No rashes or lesions      ADMISSION SUMMARY  Patient is a 48y old Male who presents with a chief complaint of Fever and dysuria (2019 14:58)  Currently admitted to medicine with the primary diagnosis of Sepsis  Hospital course has been complicated by _______.       ASSESSMENT & PLAN    1. SEPSIS;SVT(SUPRAVENTRICULAR TACHYCARDIA)      2.    3. Vitamin D deficiency  Constipation  Chronic respiratory failure: secondary to COPD  Folate-deficiency anemia  Iron deficiency anemia  Anemia of chronic disease  Pericardial effusion without cardiac tamponade: h/o trace pericardial effusion  BPH (benign prostatic hyperplasia)  Chronic pain  Peripheral neuropathy  Type 2 diabetes mellitus  AICD (automatic cardioverter/defibrillator) present  Ventricular tachycardia  Chronic heart failure with preserved ejection fraction  PTSD (post-traumatic stress disorder)  Supraventricular arrhythmia: prior history  Cardiomyopathy  CHF (congestive heart failure)  Atrial fibrillation: s/p ablation, on eliquis  BPH (benign prostatic hyperplasia)  HTN (hypertension)  Seizures  Migraines  Pneumonia  MS (multiple sclerosis)  CAD (coronary artery disease)  Bipolar disorder  Anginal pain  Schizo affective schizophrenia  GERD (gastroesophageal reflux disease)  Seasonal allergies  Hypercholesteremia  COPD (chronic obstructive pulmonary disease)  CVA (cerebral vascular accident)  TIA (transient ischemic attack)  Peripheral Neuropathy  Clinical Depression        Present today:  ( ) Congestive Heart Failure, Yes? ( )Acute / ( )Acute on Chronic / ( )Chronic  :  ( )Systolic / ( )Diastolic               Plan:  ( ) Complicated Pneumonia, Type?  ( )Parapneumonic effusion / ( )Abscess / ( ) Multilobar / ( )Other               Plan:  ( ) Morbid Obesity, Yes? BMI:               Plan:  ( ) Functional Quadriplegia               Plan:  ( ) Encephalopathy               Plan:    DVT ppx:  GI ppx:  Diet:  Activity:  Lines:  Code status:  Dispo: CUATE HORNE 48y Male  MRN#: [id number]   CODE STATUS: full code      SUBJECTIVE  Patient is a 48y old Male who presents with a chief complaint of fever and dysuria  Currently admitted to medicine with the primary diagnosis of acute cystitis  Today is hospital day 1d, and this morning he is resting and reports no overnight events.       OBJECTIVE  PAST MEDICAL & SURGICAL HISTORY  Vitamin D deficiency  Constipation  Chronic respiratory failure: secondary to COPD  Folate-deficiency anemia  Iron deficiency anemia  Anemia of chronic disease  Pericardial effusion without cardiac tamponade: h/o trace pericardial effusion  BPH (benign prostatic hyperplasia)  Chronic pain  Peripheral neuropathy  Type 2 diabetes mellitus  AICD (automatic cardioverter/defibrillator) present  Ventricular tachycardia  Chronic heart failure with preserved ejection fraction  PTSD (post-traumatic stress disorder)  Supraventricular arrhythmia: prior history  Cardiomyopathy  CHF (congestive heart failure)  Atrial fibrillation: s/p ablation, on eliquis  BPH (benign prostatic hyperplasia)  HTN (hypertension)  Seizures  Migraines  Pneumonia  MS (multiple sclerosis)  CAD (coronary artery disease)  Bipolar disorder  Anginal pain  Schizo affective schizophrenia  GERD (gastroesophageal reflux disease)  Seasonal allergies  Hypercholesteremia  COPD (chronic obstructive pulmonary disease)  CVA (cerebral vascular accident)  TIA (transient ischemic attack)  Peripheral Neuropathy  Clinical Depression  Displacement of electrode lead of cardiac pacemaker  History of evacuation of hematoma: AICD pocket  H/O prior ablation treatment: for Afib  H/O hernia repair: right   History of hip replacement  S/P Orchiectomy: L for testicular CA  S/P Implantation of AICD    ALLERGIES:  dexmethylphenidate (Unknown)  Dilantin (Rash)  dilantin, compazine, , ritalin, phenergan (Unknown)  Haldol (Unknown)  hydantoin derivatives (Other)  methylphenidate (Unknown)  phenytoin (Unknown)  prochlorperazine (Unknown)  promethazine (Dystonic RXN)  rash/hives (Anaphylaxis)  thioxanthenes (Unknown)      HOME MEDICATIONS:  HOME MEDICATIONS:  acetaminophen 650 mg oral tablet: 650 milligram(s) orally 4 times a day, As Needed (13 Oct 2019 10:36)  aspirin 81 mg oral delayed release tablet: 1 tab(s) orally once a day (13 Oct 2019 10:36)  atorvastatin 40 mg oral tablet: 1 tab(s) orally once a day (at bedtime) (13 Oct 2019 10:36)  baclofen 10 mg oral tablet: 1 tab(s) orally 4 times a day (13 Oct 2019 10:36)  budesonide-formoterol 160 mcg-4.5 mcg/inh inhalation aerosol: 2 puff(s) inhaled 2 times a day (13 Oct 2019 10:36)  Cepacol Extra Strength Menthol 10 mg mucous membrane lozenge: 1 anderson(s) mucous membrane every 6 hours, As Needed (13 Oct 2019 10:36)  cholecalciferol oral tablet: 2000 unit(s) orally once a day (13 Oct 2019 10:36)  Claritin 10 mg oral tablet: 1 tab(s) orally once a day (13 Oct 2019 10:36)  clonazePAM 0.5 mg oral tablet: 1 tab(s) orally once a day (at bedtime), As needed, PTSD (21 Oct 2019 16:41)  Flonase 50 mcg/inh nasal spray: 2 spray(s) nasal once a day (13 Oct 2019 10:36)  folic acid 1 mg oral tablet: 1 tab(s) orally once a day (13 Oct 2019 10:36)  gabapentin 400 mg oral capsule: 1 cap(s) orally 3 times a day (13 Oct 2019 10:36)  lamoTRIgine 100 mg oral tablet: 1 tab(s) orally 2 times a day (13 Oct 2019 10:36)  metoprolol tartrate 25 mg oral tablet: 0.5 tab(s) orally every 8 hours (13 Oct 2019 10:36)  Mi-Acid oral suspension: 30 milliliter(s) orally 4 times a day, As Needed (13 Oct 2019 10:36)  Morphine IR 30 mg oral tablet: 1 tab(s) orally every 6 hours, As Needed - 10) (13 Oct 2019 10:36)  Nitrostat 0.4 mg sublingual tablet: sublingual 3 times a day, As Needed; may repeat after 5 min until relief (13 Oct 2019 10:36)  omeprazole 20 mg oral delayed release capsule: 1 cap(s) orally 2 times a day (13 Oct 2019 10:36)  polyethylene glycol 3350 oral powder for reconstitution: 17 gram(s) orally every 12 hours (13 Oct 2019 10:36)  QUEtiapine 25 mg oral tablet: 1 tab(s) orally once a day (13 Oct 2019 10:36)  SEROquel 50 mg oral tablet: 1 tab(s) orally once a day (at bedtime) (13 Oct 2019 10:36)  tamsulosin 0.4 mg oral capsule: 1 cap(s) orally once a day (at bedtime) (13 Oct 2019 10:36)  Tecfidera 240 mg oral delayed release capsule: 1 cap(s) orally once a day (13 Oct 2019 10:36)  Tikosyn 250 mcg oral capsule: 1 cap(s) orally 2 times a day (13 Oct 2019 10:36)  tiotropium 18 mcg inhalation capsule: 1 cap(s) inhaled once a day (13 Oct 2019 10:36)      MEDICATIONS:  STANDING MEDICATIONS  apixaban 5 milliGRAM(s) Oral every 12 hours  aspirin enteric coated 81 milliGRAM(s) Oral daily  atorvastatin 40 milliGRAM(s) Oral at bedtime  baclofen 10 milliGRAM(s) Oral four times a day  budesonide 160 MICROgram(s)/formoterol 4.5 MICROgram(s) Inhaler 2 Puff(s) Inhalation two times a day  cefepime   IVPB 1000 milliGRAM(s) IV Intermittent every 8 hours  dimethyl fumarate DR Capsule 240 milliGRAM(s) Oral daily  fluticasone propionate 50 MICROgram(s)/spray Nasal Spray 2 Spray(s) Both Nostrils two times a day  folic acid 1 milliGRAM(s) Oral daily  gabapentin 400 milliGRAM(s) Oral three times a day  lamoTRIgine 100 milliGRAM(s) Oral two times a day  loratadine 10 milliGRAM(s) Oral daily  metoprolol succinate ER 50 milliGRAM(s) Oral daily  pantoprazole    Tablet 40 milliGRAM(s) Oral before breakfast  polyethylene glycol 3350 17 Gram(s) Oral every 12 hours  QUEtiapine 25 milliGRAM(s) Oral daily  QUEtiapine 50 milliGRAM(s) Oral at bedtime  tamsulosin 0.4 milliGRAM(s) Oral at bedtime  tiotropium 18 MICROgram(s) Capsule 1 Capsule(s) Inhalation daily    PRN MEDICATIONS  acetaminophen   Tablet .. 650 milliGRAM(s) Oral every 4 hours PRN  clonazePAM  Tablet 0.5 milliGRAM(s) Oral at bedtime PRN  morphine  IR 30 milliGRAM(s) Oral every 6 hours PRN  nitroglycerin     SubLingual 0.4 milliGRAM(s) SubLingual every 5 minutes PRN      VITAL SIGNS: Last 24 Hours  T(C): 36.4 (2019 19:45), Max: 36.4 (2019 00:22)  T(F): 97.5 (2019 19:45), Max: 97.5 (2019 00:22)  HR: 72 (2019 19:45) (69 - 72)  BP: 90/53 (2019 19:45) (90/53 - 121/57)  BP(mean): --  RR: 18 (2019 19:45) (18 - 18)  SpO2: 98% (2019 19:45) (95% - 98%)    LABS:                        10.4   3.48  )-----------( 219      ( 2019 05:19 )             35.0     11    143  |  104  |  10  ----------------------------<  86  4.2   |  27  |  0.9    Ca    9.1      2019 05:19    TPro  6.6  /  Alb  4.0  /  TBili  0.5  /  DBili  x   /  AST  22  /  ALT  17  /  AlkPhos  158<H>  11-02    PT/INR - ( 2019 04:30 )   PT: 14.50 sec;   INR: 1.26 ratio         PTT - ( 2019 04:30 )  PTT:37.3 sec  Urinalysis Basic - ( 2019 05:30 )    Color: Light Yellow / Appearance: Clear / S.014 / pH: x  Gluc: x / Ketone: Negative  / Bili: Negative / Urobili: <2 mg/dL   Blood: x / Protein: Trace / Nitrite: Positive   Leuk Esterase: Moderate / RBC: 3 /HPF / WBC 99 /HPF   Sq Epi: x / Non Sq Epi: 0 /HPF / Bacteria: Many      Culture - Urine (collected 2019 05:30)  Source: .Urine Clean Catch (Midstream)  Preliminary Report (2019 14:37):    >100,000 CFU/ml Gram Negative Rods    Culture - Blood (collected 2019 04:30)  Source: .Blood Blood-Peripheral  Preliminary Report (2019 11:01):    No growth to date.    Culture - Blood (collected 2019 04:30)  Source: .Blood Blood-Peripheral  Preliminary Report (2019 11:01):    No growth to date.    CARDIAC MARKERS ( 2019 04:30 )  x     / <0.01 ng/mL / x     / x     / x        RADIOLOGY:    < from: Xray Chest 1 View AP/PA (19 @ 04:48) >  Improving left basilar opacity.    < end of copied text >    PHYSICAL EXAM:    GENERAL: NAD, well-developed, AAOx3  HEENT:  Atraumatic, Normocephalic. EOMI, PERRLA, conjunctiva and sclera clear, No JVD  PULMONARY: Clear to auscultation bilaterally; No wheeze  CARDIOVASCULAR: Regular rate and rhythm; No murmurs, rubs, or gallops  GASTROINTESTINAL: Soft, Nontender, Nondistended; Bowel sounds present  MUSCULOSKELETAL:  2+ Peripheral Pulses, No clubbing, cyanosis, or edema  NEUROLOGY: non-focal  SKIN: No rashes or lesions      ADMISSION SUMMARY  Patient is a 48y old Male who presents with a chief complaint of fever and dysuria  Currently admitted to medicine with the primary diagnosis of acute cystitis      ASSESSMENT & PLAN  8 years old male with PMHx of arrhythmogenic ventricular tachycardia s/p AICD in , HFpEF (EF 69% in ), afib/flutter on Eliquis/ASA 81mg, HTN, COPD (on 2L O2 PRN), DLD, multiple sclerosis, epilepsy, PTSD, depression, TIA, presented to the ED with complaint of dysuria and fever for a few days.    # Acute cystitis  - CXR unremarkable  - IV cefepime q 8hrs as per ID  - BCx negative x 2 so far, UCx shows GNR  - c/w tylenol prn for fever control    # AVRT s/p AICD placement  - had an episode of tachycardia the resolved spontaneously  - Currently rhythm at paced with HR 60  - Trop neg x 1  - EP recs appreciated: stop dofetilide, start Toprol XL 50mg q24hrs, TTE to rule out pericardial effusion  - TSH and T4    # Afib  - CHADsVASc 4 (TIA, HTN, CHF)  - Paced rhythm at 60, start Toprol 50mg q24hrs  - Continue Eliquis 5mg q12hrs for now (consider stopping it if hgb drops significantly)    # HTN  - Stable  - Continue Toprol    # Normocytic anemia  - Hb 11.4  - No signs of active bleeding  - Continue to monitor, keep Hgb > 8      # HFpEF  - ECHO 2019 shows EF 69%, no pericardial effusion  - Not in acute heart failure, euvolemic  - Repeat ECHO  - Continue Lasix 40mg q24hrs, Toprol 50mg    # COPD  - On home O2 2L PRN  - Stable, continue Symbicort and  Duoneb    # Multiple sclerosis  - Stable  - Continue Tecfidera 240mg q24hrs, Baclofen and Neurontin  - Continue Pain control (please call central pharmacy on Monday)  - PT/Rehab when stable    # Epilepsy  - No recent episode of seizure  - Continue Lamotrigine 100mg q12hrs and gabapentin 400mg q8hrs    # PTSD  - Continue Seroquel 25mg and 50mg, Klonopin 0.5cm      DVT ppx: Eliquis  GI ppx: Protonix  Diet: DASH  Activity: increase as tolerated  Lines: Peripheral IVs  Code status: Full code  Dispo: acute

## 2019-11-03 NOTE — PROGRESS NOTE ADULT - ASSESSMENT
A 49 y/o male  with PMH Ventricular tachycardia s/p AICD in 2005, Afib/flutter on Eliquis, HTN, COPD (on 2L O2 PRN),  multiple sclerosis, epilepsy, pericardial effusion s/p recent pericardial window was brought to ED from NH for fever and chills. Symptoms started last night with chills, he placed blanket and nurse checked it was 103, so the called EMS, he also reports dysuria for two days, he denies chest pain or SOB, he reports episodes of cough with green sputum early this AM while in the ED. In the ED he found tachycardic , Temp 102.7, UA was positive. CXR showed stable left lower lobe opacity      A/P:   Severe Sepsis  UTI  CXR showed improved left lower lobe opacity, patient has new cough with greenish sputum.   Urine cx is growing gram negative rods.   Continue Cefepime as per ID. Pending blood and urine cx.     Atrial flutter with rapid ventricular response, Hx of VTach and AFIB s/p AICD.   Self aborted, patient was on Dofetilide and Metoprolol,   EP recommended to stop Dofetilide and continue Metoprolol, telemetry monitor.   Continue Eliquis.     Pericardial effusion:   s/p recent pericardial window. Wound is clean  Repeated echo today showed LVEF 45-50%, trivial pericardial effusion.     Chronic HFpEF  ECHO mildly decreased global LV systolic function 45-50%  Continue Lasix 40mg q24hrs, Lopressors 12.5mg q6hrs    Chronic COPD  Stable, lungs are clear.   Continue Symbicort and  Duoneb    Multiple sclerosis  On Baclofen, Morphine and Neurontin.   Continue Tecfidera 240mg q24hrs (please call central pharmacy on Monday       Epilepsy  Continue Lamotrigine 100mg q12hrs and gabapentin 400mg q8hrs    PTSD  Continue Seroquel 25mg and 50mg, Klonopin 0.5cm    #Progress Note Handoff:  Pending (specify):  urine cx, ID follow up  Family discussion:  Disposition: STR.

## 2019-11-04 LAB
-  AMIKACIN: SIGNIFICANT CHANGE UP
-  AMPICILLIN/SULBACTAM: SIGNIFICANT CHANGE UP
-  AMPICILLIN: SIGNIFICANT CHANGE UP
-  AZTREONAM: SIGNIFICANT CHANGE UP
-  CEFAZOLIN: SIGNIFICANT CHANGE UP
-  CEFEPIME: SIGNIFICANT CHANGE UP
-  CEFOXITIN: SIGNIFICANT CHANGE UP
-  CEFTRIAXONE: SIGNIFICANT CHANGE UP
-  CIPROFLOXACIN: SIGNIFICANT CHANGE UP
-  ERTAPENEM: SIGNIFICANT CHANGE UP
-  GENTAMICIN: SIGNIFICANT CHANGE UP
-  IMIPENEM: SIGNIFICANT CHANGE UP
-  LEVOFLOXACIN: SIGNIFICANT CHANGE UP
-  MEROPENEM: SIGNIFICANT CHANGE UP
-  NITROFURANTOIN: SIGNIFICANT CHANGE UP
-  PIPERACILLIN/TAZOBACTAM: SIGNIFICANT CHANGE UP
-  TIGECYCLINE: SIGNIFICANT CHANGE UP
-  TOBRAMYCIN: SIGNIFICANT CHANGE UP
-  TRIMETHOPRIM/SULFAMETHOXAZOLE: SIGNIFICANT CHANGE UP
ALBUMIN SERPL ELPH-MCNC: 3.6 G/DL — SIGNIFICANT CHANGE UP (ref 3.5–5.2)
ALP SERPL-CCNC: 134 U/L — HIGH (ref 30–115)
ALT FLD-CCNC: 14 U/L — SIGNIFICANT CHANGE UP (ref 0–41)
ANION GAP SERPL CALC-SCNC: 12 MMOL/L — SIGNIFICANT CHANGE UP (ref 7–14)
AST SERPL-CCNC: 16 U/L — SIGNIFICANT CHANGE UP (ref 0–41)
BASOPHILS # BLD AUTO: 0.06 K/UL — SIGNIFICANT CHANGE UP (ref 0–0.2)
BASOPHILS NFR BLD AUTO: 1.3 % — HIGH (ref 0–1)
BILIRUB SERPL-MCNC: 0.3 MG/DL — SIGNIFICANT CHANGE UP (ref 0.2–1.2)
BUN SERPL-MCNC: 15 MG/DL — SIGNIFICANT CHANGE UP (ref 10–20)
CALCIUM SERPL-MCNC: 9.4 MG/DL — SIGNIFICANT CHANGE UP (ref 8.5–10.1)
CHLORIDE SERPL-SCNC: 103 MMOL/L — SIGNIFICANT CHANGE UP (ref 98–110)
CO2 SERPL-SCNC: 30 MMOL/L — SIGNIFICANT CHANGE UP (ref 17–32)
CREAT SERPL-MCNC: 1 MG/DL — SIGNIFICANT CHANGE UP (ref 0.7–1.5)
CULTURE RESULTS: SIGNIFICANT CHANGE UP
EOSINOPHIL # BLD AUTO: 0.16 K/UL — SIGNIFICANT CHANGE UP (ref 0–0.7)
EOSINOPHIL NFR BLD AUTO: 3.5 % — SIGNIFICANT CHANGE UP (ref 0–8)
GLUCOSE SERPL-MCNC: 99 MG/DL — SIGNIFICANT CHANGE UP (ref 70–99)
HCT VFR BLD CALC: 35.2 % — LOW (ref 42–52)
HGB BLD-MCNC: 10.6 G/DL — LOW (ref 14–18)
IMM GRANULOCYTES NFR BLD AUTO: 0.4 % — HIGH (ref 0.1–0.3)
LYMPHOCYTES # BLD AUTO: 1.75 K/UL — SIGNIFICANT CHANGE UP (ref 1.2–3.4)
LYMPHOCYTES # BLD AUTO: 38.8 % — SIGNIFICANT CHANGE UP (ref 20.5–51.1)
MAGNESIUM SERPL-MCNC: 2.1 MG/DL — SIGNIFICANT CHANGE UP (ref 1.8–2.4)
MCHC RBC-ENTMCNC: 27 PG — SIGNIFICANT CHANGE UP (ref 27–31)
MCHC RBC-ENTMCNC: 30.1 G/DL — LOW (ref 32–37)
MCV RBC AUTO: 89.6 FL — SIGNIFICANT CHANGE UP (ref 80–94)
METHOD TYPE: SIGNIFICANT CHANGE UP
MONOCYTES # BLD AUTO: 0.4 K/UL — SIGNIFICANT CHANGE UP (ref 0.1–0.6)
MONOCYTES NFR BLD AUTO: 8.9 % — SIGNIFICANT CHANGE UP (ref 1.7–9.3)
NEUTROPHILS # BLD AUTO: 2.12 K/UL — SIGNIFICANT CHANGE UP (ref 1.4–6.5)
NEUTROPHILS NFR BLD AUTO: 47.1 % — SIGNIFICANT CHANGE UP (ref 42.2–75.2)
NRBC # BLD: 0 /100 WBCS — SIGNIFICANT CHANGE UP (ref 0–0)
ORGANISM # SPEC MICROSCOPIC CNT: SIGNIFICANT CHANGE UP
ORGANISM # SPEC MICROSCOPIC CNT: SIGNIFICANT CHANGE UP
PLATELET # BLD AUTO: 250 K/UL — SIGNIFICANT CHANGE UP (ref 130–400)
POTASSIUM SERPL-MCNC: 4.4 MMOL/L — SIGNIFICANT CHANGE UP (ref 3.5–5)
POTASSIUM SERPL-SCNC: 4.4 MMOL/L — SIGNIFICANT CHANGE UP (ref 3.5–5)
PROT SERPL-MCNC: 6.1 G/DL — SIGNIFICANT CHANGE UP (ref 6–8)
RBC # BLD: 3.93 M/UL — LOW (ref 4.7–6.1)
RBC # FLD: 16.8 % — HIGH (ref 11.5–14.5)
SODIUM SERPL-SCNC: 145 MMOL/L — SIGNIFICANT CHANGE UP (ref 135–146)
SPECIMEN SOURCE: SIGNIFICANT CHANGE UP
T4 FREE SERPL-MCNC: 1.2 NG/DL — SIGNIFICANT CHANGE UP (ref 0.9–1.8)
TSH SERPL-MCNC: 0.51 UIU/ML — SIGNIFICANT CHANGE UP (ref 0.27–4.2)
WBC # BLD: 4.51 K/UL — LOW (ref 4.8–10.8)
WBC # FLD AUTO: 4.51 K/UL — LOW (ref 4.8–10.8)

## 2019-11-04 PROCEDURE — 99233 SBSQ HOSP IP/OBS HIGH 50: CPT

## 2019-11-04 RX ADMIN — PANTOPRAZOLE SODIUM 40 MILLIGRAM(S): 20 TABLET, DELAYED RELEASE ORAL at 06:50

## 2019-11-04 RX ADMIN — Medication 81 MILLIGRAM(S): at 11:17

## 2019-11-04 RX ADMIN — CEFEPIME 100 MILLIGRAM(S): 1 INJECTION, POWDER, FOR SOLUTION INTRAMUSCULAR; INTRAVENOUS at 23:05

## 2019-11-04 RX ADMIN — APIXABAN 5 MILLIGRAM(S): 2.5 TABLET, FILM COATED ORAL at 18:32

## 2019-11-04 RX ADMIN — MORPHINE SULFATE 30 MILLIGRAM(S): 50 CAPSULE, EXTENDED RELEASE ORAL at 08:38

## 2019-11-04 RX ADMIN — TAMSULOSIN HYDROCHLORIDE 0.4 MILLIGRAM(S): 0.4 CAPSULE ORAL at 23:06

## 2019-11-04 RX ADMIN — GABAPENTIN 400 MILLIGRAM(S): 400 CAPSULE ORAL at 06:41

## 2019-11-04 RX ADMIN — MORPHINE SULFATE 30 MILLIGRAM(S): 50 CAPSULE, EXTENDED RELEASE ORAL at 22:15

## 2019-11-04 RX ADMIN — MORPHINE SULFATE 30 MILLIGRAM(S): 50 CAPSULE, EXTENDED RELEASE ORAL at 00:14

## 2019-11-04 RX ADMIN — GABAPENTIN 400 MILLIGRAM(S): 400 CAPSULE ORAL at 16:33

## 2019-11-04 RX ADMIN — LAMOTRIGINE 100 MILLIGRAM(S): 25 TABLET, ORALLY DISINTEGRATING ORAL at 18:32

## 2019-11-04 RX ADMIN — QUETIAPINE FUMARATE 50 MILLIGRAM(S): 200 TABLET, FILM COATED ORAL at 23:06

## 2019-11-04 RX ADMIN — Medication 10 MILLIGRAM(S): at 06:41

## 2019-11-04 RX ADMIN — TIOTROPIUM BROMIDE 1 CAPSULE(S): 18 CAPSULE ORAL; RESPIRATORY (INHALATION) at 16:30

## 2019-11-04 RX ADMIN — CEFEPIME 100 MILLIGRAM(S): 1 INJECTION, POWDER, FOR SOLUTION INTRAMUSCULAR; INTRAVENOUS at 06:44

## 2019-11-04 RX ADMIN — LORATADINE 10 MILLIGRAM(S): 10 TABLET ORAL at 11:18

## 2019-11-04 RX ADMIN — Medication 10 MILLIGRAM(S): at 18:33

## 2019-11-04 RX ADMIN — CEFEPIME 100 MILLIGRAM(S): 1 INJECTION, POWDER, FOR SOLUTION INTRAMUSCULAR; INTRAVENOUS at 16:29

## 2019-11-04 RX ADMIN — MORPHINE SULFATE 30 MILLIGRAM(S): 50 CAPSULE, EXTENDED RELEASE ORAL at 20:35

## 2019-11-04 RX ADMIN — LAMOTRIGINE 100 MILLIGRAM(S): 25 TABLET, ORALLY DISINTEGRATING ORAL at 06:42

## 2019-11-04 RX ADMIN — Medication 1 MILLIGRAM(S): at 11:17

## 2019-11-04 RX ADMIN — BUDESONIDE AND FORMOTEROL FUMARATE DIHYDRATE 2 PUFF(S): 160; 4.5 AEROSOL RESPIRATORY (INHALATION) at 08:39

## 2019-11-04 RX ADMIN — Medication 2 SPRAY(S): at 19:07

## 2019-11-04 RX ADMIN — Medication 10 MILLIGRAM(S): at 11:17

## 2019-11-04 RX ADMIN — POLYETHYLENE GLYCOL 3350 17 GRAM(S): 17 POWDER, FOR SOLUTION ORAL at 06:50

## 2019-11-04 RX ADMIN — APIXABAN 5 MILLIGRAM(S): 2.5 TABLET, FILM COATED ORAL at 06:41

## 2019-11-04 RX ADMIN — GABAPENTIN 400 MILLIGRAM(S): 400 CAPSULE ORAL at 23:06

## 2019-11-04 RX ADMIN — ATORVASTATIN CALCIUM 40 MILLIGRAM(S): 80 TABLET, FILM COATED ORAL at 23:05

## 2019-11-04 RX ADMIN — Medication 10 MILLIGRAM(S): at 23:06

## 2019-11-04 RX ADMIN — BUDESONIDE AND FORMOTEROL FUMARATE DIHYDRATE 2 PUFF(S): 160; 4.5 AEROSOL RESPIRATORY (INHALATION) at 23:12

## 2019-11-04 RX ADMIN — QUETIAPINE FUMARATE 25 MILLIGRAM(S): 200 TABLET, FILM COATED ORAL at 11:17

## 2019-11-04 NOTE — PROGRESS NOTE ADULT - ASSESSMENT
48y M admitted with SEPSIS;SVT(SUPRAVENTRICULAR TACHYCARDIA)      PROBLEMS  1. Severe Sepsis ( T>101F, Pulse>90, Resp Rate>20), lactic acidosis, due to suspected UTI in Bipolar pt with hx multiple sclerosis. Has dysuria    On cefepime   IVPB 1000 milliGRAM(s) IV Intermittent every 8 hours    11/2 Urine C&S   >100,000 CFU/ml Gram Negative Rods  11/2 Cxray with Improving left basilar opacity.    New problem with additional W/U   acute illness with systemic symptoms    11/2 U/A  Nitrite: Positive /Leuk Esterase: Moderate / RBC: 3 /HPF / WBC 99 /HPF / Bacteria: Many      Lactic acidosis by VBG      BMI (kg/m2): 32.5 (11-02-19 @ 04:05)    PLAN  - Await ID & susceptibilities of GNR on urine culture  Continue Cefepime 1gm q8h

## 2019-11-04 NOTE — PROGRESS NOTE ADULT - SUBJECTIVE AND OBJECTIVE BOX
DAILY PROGRESS NOTE  ===========================================================    Patient Information:  CUATE HORNE  /  48y  /  Male  /  MRN#: [id number]    Hospital Day: 2d     |:::::::::::::::::::::::::::| SUBJECTIVE |:::::::::::::::::::::::::::|    OVERNIGHT EVENTS: None reported  TODAY: Patient was seen today at bedside.    |:::::::::::::::::::::::::::| OBJECTIVE |:::::::::::::::::::::::::::|    VITAL SIGNS: Last 24 Hours  T(F): 97.4 (04 Nov 2019 07:29), Max: 97.8 (03 Nov 2019 23:38)  HR: 73 (04 Nov 2019 07:29) (70 - 73)  BP: 103/78 (04 Nov 2019 07:29) (89/51 - 103/78)  RR: 18 (04 Nov 2019 07:29) (18 - 18)  SpO2: 98% (04 Nov 2019 07:29) (95% - 98%)    11-03-19 @ 07:01  -  11-04-19 @ 07:00  --------------------------------------------------------  IN: 0 mL / OUT: 1000 mL / NET: -1000 mL    PHYSICAL EXAM:  GENERAL:   Awake, alert; NAD.  HEENT:  Head NC/AT; Conjunctivae pink, Sclera anicteric; Oral mucosa moist.  CARDIO:   Regular rate; Regular rhythm; S1 & S2.  RESP:   No rales or rhonchi appreciated.  GI:   Soft; NT/ND; BS; No guarding; No rebound tenderness.  EXT:   No edema in UE and LE.  NEURO:   PERRL.  SKIN:   Intact.    LAB RESULTS:                        10.6   4.51  )-----------( 250      ( 04 Nov 2019 05:30 )             35.2    145  |  103  |  15  --------------------<  99  4.4   |  30  |  1.0    Ca    9.4      Mg     2.1         TPro  6.1  /  Alb  3.6  /  TBili  0.3  /  DBili  x   /  AST  16  /  ALT  14  /  AlkPhos  134<H>     MICROBIOLOGY:  Culture - Urine (collected 02 Nov 2019 05:30)  Source: .Urine Clean Catch (Midstream)  Preliminary Report (03 Nov 2019 14:37):    >100,000 CFU/ml Gram Negative Rods    Culture - Blood (collected 02 Nov 2019 04:30)  Source: .Blood Blood-Peripheral  Preliminary Report (03 Nov 2019 11:01):    No growth to date.    Culture - Blood (collected 02 Nov 2019 04:30)  Source: .Blood Blood-Peripheral  Preliminary Report (03 Nov 2019 11:01):    No growth to date.    RADIOLOGY:    ALLERGIES:  dexmethylphenidate (Unknown)  Dilantin (Rash)  dilantin, compazine, , ritalin, phenergan (Unknown)  Haldol (Unknown)  hydantoin derivatives (Other)  methylphenidate (Unknown)  phenytoin (Unknown)  prochlorperazine (Unknown)  promethazine (Dystonic RXN)  rash/hives (Anaphylaxis)  thioxanthenes (Unknown)    HOME MEDICATIONS:  acetaminophen 650 mg oral tablet: 650 milligram(s) orally 4 times a day, As Needed (13 Oct 2019 10:36)  aspirin 81 mg oral delayed release tablet: 1 tab(s) orally once a day (13 Oct 2019 10:36)  atorvastatin 40 mg oral tablet: 1 tab(s) orally once a day (at bedtime) (13 Oct 2019 10:36)  baclofen 10 mg oral tablet: 1 tab(s) orally 4 times a day (13 Oct 2019 10:36)  budesonide-formoterol 160 mcg-4.5 mcg/inh inhalation aerosol: 2 puff(s) inhaled 2 times a day (13 Oct 2019 10:36)  Cepacol Extra Strength Menthol 10 mg mucous membrane lozenge: 1 anderson(s) mucous membrane every 6 hours, As Needed (13 Oct 2019 10:36)  cholecalciferol oral tablet: 2000 unit(s) orally once a day (13 Oct 2019 10:36)  Claritin 10 mg oral tablet: 1 tab(s) orally once a day (13 Oct 2019 10:36)  clonazePAM 0.5 mg oral tablet: 1 tab(s) orally once a day (at bedtime), As needed, PTSD (21 Oct 2019 16:41)  Flonase 50 mcg/inh nasal spray: 2 spray(s) nasal once a day (13 Oct 2019 10:36)  folic acid 1 mg oral tablet: 1 tab(s) orally once a day (13 Oct 2019 10:36)  gabapentin 400 mg oral capsule: 1 cap(s) orally 3 times a day (13 Oct 2019 10:36)  lamoTRIgine 100 mg oral tablet: 1 tab(s) orally 2 times a day (13 Oct 2019 10:36)  metoprolol tartrate 25 mg oral tablet: 0.5 tab(s) orally every 8 hours (13 Oct 2019 10:36)  Mi-Acid oral suspension: 30 milliliter(s) orally 4 times a day, As Needed (13 Oct 2019 10:36)  Morphine IR 30 mg oral tablet: 1 tab(s) orally every 6 hours, As Needed - 10) (13 Oct 2019 10:36)  Nitrostat 0.4 mg sublingual tablet: sublingual 3 times a day, As Needed; may repeat after 5 min until relief (13 Oct 2019 10:36)  omeprazole 20 mg oral delayed release capsule: 1 cap(s) orally 2 times a day (13 Oct 2019 10:36)  polyethylene glycol 3350 oral powder for reconstitution: 17 gram(s) orally every 12 hours (13 Oct 2019 10:36)  QUEtiapine 25 mg oral tablet: 1 tab(s) orally once a day (13 Oct 2019 10:36)  SEROquel 50 mg oral tablet: 1 tab(s) orally once a day (at bedtime) (13 Oct 2019 10:36)  tamsulosin 0.4 mg oral capsule: 1 cap(s) orally once a day (at bedtime) (13 Oct 2019 10:36)  Tecfidera 240 mg oral delayed release capsule: 1 cap(s) orally once a day (13 Oct 2019 10:36)  Tikosyn 250 mcg oral capsule: 1 cap(s) orally 2 times a day (13 Oct 2019 10:36)  tiotropium 18 mcg inhalation capsule: 1 cap(s) inhaled once a day (13 Oct 2019 10:36)    =========================================================== DAILY PROGRESS NOTE  ===========================================================    Patient Information:  CUATE HORNE  /  48y  /  Male  /  MRN#: [id number]    Hospital Day: 2d     |:::::::::::::::::::::::::::| SUBJECTIVE |:::::::::::::::::::::::::::|    OVERNIGHT EVENTS: None reported  TODAY: Patient was seen today at bedside.    |:::::::::::::::::::::::::::| OBJECTIVE |:::::::::::::::::::::::::::|    VITAL SIGNS: Last 24 Hours  T(F): 97.4 (04 Nov 2019 07:29), Max: 97.8 (03 Nov 2019 23:38)  HR: 73 (04 Nov 2019 07:29) (70 - 73)  BP: 103/78 (04 Nov 2019 07:29) (89/51 - 103/78)  RR: 18 (04 Nov 2019 07:29) (18 - 18)  SpO2: 98% (04 Nov 2019 07:29) (95% - 98%)    11-03-19 @ 07:01  -  11-04-19 @ 07:00  --------------------------------------------------------  IN: 0 mL / OUT: 1000 mL / NET: -1000 mL    PHYSICAL EXAM:  GENERAL:   Awake, alert.  HEENT:  Head NC/AT; Conjunctivae pink, Sclera anicteric.  CARDIO:   Regular rate; Regular rhythm; S1 & S2.  RESP:   Decreased sounds at base.  GI:   Soft; NT/ND.  EXT:   No edema in LE.  NEURO:   PERRL.    LAB RESULTS:                        10.6   4.51  )-----------( 250      ( 04 Nov 2019 05:30 )             35.2    145  |  103  |  15  --------------------<  99  4.4   |  30  |  1.0    Ca    9.4      Mg     2.1         TPro  6.1  /  Alb  3.6  /  TBili  0.3  /  DBili  x   /  AST  16  /  ALT  14  /  AlkPhos  134<H>     MICROBIOLOGY:  Culture - Urine (collected 02 Nov 2019 05:30)  Source: .Urine Clean Catch (Midstream)  Preliminary Report (03 Nov 2019 14:37):    >100,000 CFU/ml Gram Negative Rods    Culture - Blood (collected 02 Nov 2019 04:30)  Source: .Blood Blood-Peripheral  Preliminary Report (03 Nov 2019 11:01):    No growth to date.    Culture - Blood (collected 02 Nov 2019 04:30)  Source: .Blood Blood-Peripheral  Preliminary Report (03 Nov 2019 11:01):    No growth to date.    RADIOLOGY:    ALLERGIES:  dexmethylphenidate (Unknown)  Dilantin (Rash)  dilantin, compazine, , ritalin, phenergan (Unknown)  Haldol (Unknown)  hydantoin derivatives (Other)  methylphenidate (Unknown)  phenytoin (Unknown)  prochlorperazine (Unknown)  promethazine (Dystonic RXN)  rash/hives (Anaphylaxis)  thioxanthenes (Unknown)    HOME MEDICATIONS:  acetaminophen 650 mg oral tablet: 650 milligram(s) orally 4 times a day, As Needed (13 Oct 2019 10:36)  aspirin 81 mg oral delayed release tablet: 1 tab(s) orally once a day (13 Oct 2019 10:36)  atorvastatin 40 mg oral tablet: 1 tab(s) orally once a day (at bedtime) (13 Oct 2019 10:36)  baclofen 10 mg oral tablet: 1 tab(s) orally 4 times a day (13 Oct 2019 10:36)  budesonide-formoterol 160 mcg-4.5 mcg/inh inhalation aerosol: 2 puff(s) inhaled 2 times a day (13 Oct 2019 10:36)  Cepacol Extra Strength Menthol 10 mg mucous membrane lozenge: 1 anderson(s) mucous membrane every 6 hours, As Needed (13 Oct 2019 10:36)  cholecalciferol oral tablet: 2000 unit(s) orally once a day (13 Oct 2019 10:36)  Claritin 10 mg oral tablet: 1 tab(s) orally once a day (13 Oct 2019 10:36)  clonazePAM 0.5 mg oral tablet: 1 tab(s) orally once a day (at bedtime), As needed, PTSD (21 Oct 2019 16:41)  Flonase 50 mcg/inh nasal spray: 2 spray(s) nasal once a day (13 Oct 2019 10:36)  folic acid 1 mg oral tablet: 1 tab(s) orally once a day (13 Oct 2019 10:36)  gabapentin 400 mg oral capsule: 1 cap(s) orally 3 times a day (13 Oct 2019 10:36)  lamoTRIgine 100 mg oral tablet: 1 tab(s) orally 2 times a day (13 Oct 2019 10:36)  metoprolol tartrate 25 mg oral tablet: 0.5 tab(s) orally every 8 hours (13 Oct 2019 10:36)  Mi-Acid oral suspension: 30 milliliter(s) orally 4 times a day, As Needed (13 Oct 2019 10:36)  Morphine IR 30 mg oral tablet: 1 tab(s) orally every 6 hours, As Needed - 10) (13 Oct 2019 10:36)  Nitrostat 0.4 mg sublingual tablet: sublingual 3 times a day, As Needed; may repeat after 5 min until relief (13 Oct 2019 10:36)  omeprazole 20 mg oral delayed release capsule: 1 cap(s) orally 2 times a day (13 Oct 2019 10:36)  polyethylene glycol 3350 oral powder for reconstitution: 17 gram(s) orally every 12 hours (13 Oct 2019 10:36)  QUEtiapine 25 mg oral tablet: 1 tab(s) orally once a day (13 Oct 2019 10:36)  SEROquel 50 mg oral tablet: 1 tab(s) orally once a day (at bedtime) (13 Oct 2019 10:36)  tamsulosin 0.4 mg oral capsule: 1 cap(s) orally once a day (at bedtime) (13 Oct 2019 10:36)  Tecfidera 240 mg oral delayed release capsule: 1 cap(s) orally once a day (13 Oct 2019 10:36)  Tikosyn 250 mcg oral capsule: 1 cap(s) orally 2 times a day (13 Oct 2019 10:36)  tiotropium 18 mcg inhalation capsule: 1 cap(s) inhaled once a day (13 Oct 2019 10:36)    ===========================================================

## 2019-11-04 NOTE — PHYSICAL THERAPY INITIAL EVALUATION ADULT - ACTIVE RANGE OF MOTION EXAMINATION, REHAB EVAL
R hip 1/2 AROM, R knee WFL, R ankle - 20 degrees dorsiflexion , LLE hip flexion / knee extension WFL, ankle DF to neutral, RUE WFL, LUE: shoulder flexion to ~ 90 degrees 2* to recent AICD repair

## 2019-11-04 NOTE — PROGRESS NOTE ADULT - SUBJECTIVE AND OBJECTIVE BOX
CUATE HORNE  48y  Male      Patient is a 48y old  Male who presents with a chief complaint of Fever and dysuria (04 Nov 2019 14:27)      INTERVAL HPI/OVERNIGHT EVENTS:  He feels ok, urine symptoms improved, no fever, no events on telemetry  Vital Signs Last 24 Hrs  T(C): 36.3 (04 Nov 2019 07:29), Max: 36.6 (03 Nov 2019 23:38)  T(F): 97.4 (04 Nov 2019 07:29), Max: 97.8 (03 Nov 2019 23:38)  HR: 73 (04 Nov 2019 07:29) (70 - 73)  BP: 103/78 (04 Nov 2019 07:29) (89/51 - 103/78)  BP(mean): --  RR: 18 (04 Nov 2019 07:29) (18 - 18)  SpO2: 98% (04 Nov 2019 07:29) (95% - 98%)      11-03-19 @ 07:01  -  11-04-19 @ 07:00  --------------------------------------------------------  IN: 0 mL / OUT: 1000 mL / NET: -1000 mL            Consultant(s) Notes Reviewed:  [x ] YES  [ ] NO          MEDICATIONS  (STANDING):  apixaban 5 milliGRAM(s) Oral every 12 hours  aspirin enteric coated 81 milliGRAM(s) Oral daily  atorvastatin 40 milliGRAM(s) Oral at bedtime  baclofen 10 milliGRAM(s) Oral four times a day  budesonide 160 MICROgram(s)/formoterol 4.5 MICROgram(s) Inhaler 2 Puff(s) Inhalation two times a day  cefepime   IVPB 1000 milliGRAM(s) IV Intermittent every 8 hours  dimethyl fumarate DR Capsule 240 milliGRAM(s) Oral daily  fluticasone propionate 50 MICROgram(s)/spray Nasal Spray 2 Spray(s) Both Nostrils two times a day  folic acid 1 milliGRAM(s) Oral daily  gabapentin 400 milliGRAM(s) Oral three times a day  lamoTRIgine 100 milliGRAM(s) Oral two times a day  loratadine 10 milliGRAM(s) Oral daily  metoprolol succinate ER 50 milliGRAM(s) Oral daily  pantoprazole    Tablet 40 milliGRAM(s) Oral before breakfast  polyethylene glycol 3350 17 Gram(s) Oral every 12 hours  QUEtiapine 25 milliGRAM(s) Oral daily  QUEtiapine 50 milliGRAM(s) Oral at bedtime  tamsulosin 0.4 milliGRAM(s) Oral at bedtime  tiotropium 18 MICROgram(s) Capsule 1 Capsule(s) Inhalation daily    MEDICATIONS  (PRN):  acetaminophen   Tablet .. 650 milliGRAM(s) Oral every 4 hours PRN Mild Pain (1 - 3)  clonazePAM  Tablet 0.5 milliGRAM(s) Oral at bedtime PRN anxiety  morphine  IR 30 milliGRAM(s) Oral every 6 hours PRN Severe Pain (7 - 10)  nitroglycerin     SubLingual 0.4 milliGRAM(s) SubLingual every 5 minutes PRN Chest Pain      LABS                          10.6   4.51  )-----------( 250      ( 04 Nov 2019 05:30 )             35.2     11-04    145  |  103  |  15  ----------------------------<  99  4.4   |  30  |  1.0    Ca    9.4      04 Nov 2019 05:30  Mg     2.1     11-04    TPro  6.1  /  Alb  3.6  /  TBili  0.3  /  DBili  x   /  AST  16  /  ALT  14  /  AlkPhos  134<H>  11-04          Lactate Trend        CAPILLARY BLOOD GLUCOSE          Culture - Urine (collected 11-02-19 @ 05:30)  Source: .Urine Clean Catch (Midstream)  Preliminary Report (11-03-19 @ 14:37):    >100,000 CFU/ml Gram Negative Rods    Culture - Blood (collected 11-02-19 @ 04:30)  Source: .Blood Blood-Peripheral  Preliminary Report (11-03-19 @ 11:01):    No growth to date.    Culture - Blood (collected 11-02-19 @ 04:30)  Source: .Blood Blood-Peripheral  Preliminary Report (11-03-19 @ 11:01):    No growth to date.        RADIOLOGY & ADDITIONAL TESTS:    Imaging Personally Reviewed:  [ ] YES  [ ] NO    HEALTH ISSUES - PROBLEM Dx:        PHYSICAL EXAM:  GENERAL: NAD, well-developed  HEAD:  Atraumatic, Normocephalic  EYES: EOMI, PERRLA, conjunctiva and sclera clear  NECK: Supple, No JVD  CHEST/LUNG: bibasilar fine rales  HEART: Regular rate and rhythm; S1 S2, distant hear sounds, no murmurs.   ABDOMEN: Soft, Nontender, Nondistended; Bowel sounds present  EXTREMITIES:  2+ Peripheral Pulses, No clubbing, cyanosis, or edema  PSYCH: AAOx3  NEUROLOGY: non-focal  SKIN: No rashes or lesions

## 2019-11-04 NOTE — PROGRESS NOTE ADULT - SUBJECTIVE AND OBJECTIVE BOX
CUATE HORNE  48y, Male  Allergy: dexmethylphenidate (Unknown)  Dilantin (Rash)  dilantin, compazine, , ritalin, phenergan (Unknown)  Haldol (Unknown)  hydantoin derivatives (Other)  methylphenidate (Unknown)  phenytoin (Unknown)  prochlorperazine (Unknown)  promethazine (Dystonic RXN)  rash/hives (Anaphylaxis)  thioxanthenes (Unknown)      CHIEF COMPLAINT: Fever and dysuria (2019 23:08)      INTERVAL EVENTS/HPI  - No acute events overnight  - T(F): , Max: 97.8 (19 @ 23:38)  - Denies any worsening symptoms  - Tolerating medication  - WBC Count: 4.51 K/uL (19 @ 05:30)      ROS  General: Denies fevers, chills, nightsweats, weight loss  HEENT: Denies headache, rhinorrhea, sore throat, eye pain  CV: Denies CP, palpitations  PULM: Denies SOB, cough  GI: Denies abdominal pain, diarrhea  : Denies dysuria, hematuria  MSK: Denies arthralgias  SKIN: Denies rash   NEURO: Denies paresthesias, weakness  PSYCH: Denies depression    FH non-contributory   Social Hx non-contributory    VITALS:  T(F): 97.4, Max: 97.8 (19 @ 23:38)  HR: 73  BP: 103/78  RR: 18Vital Signs Last 24 Hrs  T(C): 36.3 (2019 07:29), Max: 36.6 (2019 23:38)  T(F): 97.4 (2019 07:29), Max: 97.8 (2019 23:38)  HR: 73 (2019 07:29) (70 - 73)  BP: 103/78 (2019 07:29) (89/51 - 103/78)  BP(mean): --  RR: 18 (2019 07:29) (18 - 18)  SpO2: 98% (2019 07:29) (95% - 98%)    PHYSICAL EXAM:  Gen: NAD, resting in bed  HEENT: Normocephalic, atraumatic  Neck: supple, no lymphadenopathy  CV: Regular rate & regular rhythm  Lungs: decreased BS at bases, no fremitus  Abdomen: Soft, BS present  Ext: Warm, well perfused  Neuro: non focal, awake  Skin: no rash, no erythema      TESTS & MEASUREMENTS:                        10.6   4.51  )-----------( 250      ( 2019 05:30 )             35.2         145  |  103  |  15  ----------------------------<  99  4.4   |  30  |  1.0    Ca    9.4      2019 05:30  Mg     2.1         TPro  6.1  /  Alb  3.6  /  TBili  0.3  /  DBili  x   /  AST  16  /  ALT  14  /  AlkPhos  134<H>      eGFR if Non African American: 90 mL/min/1.73M2 (19 @ 05:30)  eGFR if African American: 104 mL/min/1.73M2 (19 @ 05:30)    LIVER FUNCTIONS - ( 2019 05:30 )  Alb: 3.6 g/dL / Pro: 6.1 g/dL / ALK PHOS: 134 U/L / ALT: 14 U/L / AST: 16 U/L / GGT: x               Culture - Urine (collected 19 @ 05:30)  Source: .Urine Clean Catch (Midstream)  Preliminary Report (19 @ 14:37):    >100,000 CFU/ml Gram Negative Rods    Culture - Blood (collected 19 @ 04:30)  Source: .Blood Blood-Peripheral  Preliminary Report (19 @ 11:01):    No growth to date.    Culture - Blood (collected 19 @ 04:30)  Source: .Blood Blood-Peripheral  Preliminary Report (19 @ 11:01):    No growth to date.        Blood Gas Venous - Lactate: 1.8 mmoL/L (19 @ 04:07)      INFECTIOUS DISEASES TESTING  MRSA PCR Result.: Negative (19 @ 22:29)  HIV-1/2 Combo Result: Nonreact (19 @ 05:37)      RADIOLOGY & ADDITIONAL TESTS:  I have personally reviewed the last Chest xray  CXR  Xray Chest 1 View AP/PA:   EXAM:  XR CHEST FRONTAL 1V            PROCEDURE DATE:  2019            INTERPRETATION:  Clinical History / Reason for exam: Sepsis    Comparison : Chest radiograph 10/29/2019.    Technique/Positioning: Single portable AP radiograph the chest.    Findings:    Support devices: Left chest wall ICD    Cardiac/mediastinum/hilum: Likely unchanged allowing for differences in   rotation and projection    Lung parenchyma/Pleura: Improving left basilar opacity. No pneumothorax    Skeleton/soft tissues: Unchanged    Impression:      Improving left basilar opacity.                      KENRICK BLAND M.D., ATTENDING RADIOLOGIST  This document has been electronically signed. 2019 11:33PM             (19 @ 04:48)      CT      CARDIOLOGY TESTING  Transthoracic Echocardiogram:    EXAM:  2-D ECHO (TTE) COMPLETE        PROCEDURE DATE:  2019      INTERPRETATION:  REPORT:    TRANSTHORACIC ECHOCARDIOGRAM REPORT         Patient Name:   CUATE HORNE Accession #: [id number]  Medical Rec #:  [id number]   Height:      66.0 in 167.6 cm  YOB: 1970  Weight:      201.0 lb 91.17 kg  Patient Age:    48 years    BSA:         2.00 m²  Patient Gender: M           BP:          121/57 mmHg       Date of Exam:        11/3/2019 11:11:37 AM  Referring Physician: [id number] ED UNASSIGNED  Sonographer:         Sylwia Clay  Reading Physician:   Bee Serrato M.D.    Procedure:   2D Echo/Doppler/Color Doppler Complete.  Indications: I31.3 - Pericardial Effusion noninflammatory  Diagnosis:   Pericardial effusion (noninflammatory) - I31.3         Summary:   1. Left ventricular ejection fraction, by visual estimation, is 45 to   50%.   2. Mildly decreased global left ventricular systolic function.   3. Entire septum and basal and mid inferior wall are abnormal as   described above.   4. Trivial pericardial effusion.    PHYSICIAN INTERPRETATION:  Left Ventricle: The left ventricular internal cavity size is mildly   increased. Left ventricular wall thickness is normal. Global LV systolic   function was mildly decreased. Left ventricular ejection fraction, by   visual estimation, is 45 to 50%. The left ventricular diastolic function   could not be assessed in this study.       LV Wall Scoring:  The entire septum and basal and mid inferior wall are hypokinetic.    Right Ventricle: Normal right ventricular size and function.  Left Atrium: Normal left atrial size.  Right Atrium: Normal right atrial size.  Pericardium: Trivial pericardial effusion is present.  Mitral Valve: Thickening of the anterior mitral leaflet. No evidence of   mitral stenosis. No mitral valve regurgitation is seen.  Tricuspid Valve: The tricuspid valve is normal in structure. Mild   tricuspid regurgitation is visualized.  Aortic Valve: The aortic valve is trileaflet. No evidence of aortic   stenosis. No aortic regurgitation.  Pulmonic Valve: Trace pulmonic valve regurgitation.  Aorta: Aortic root measured at sinotubular junction is normal.  Venous: The inferior vena cava was normal sized, with respiratory size   variation less than 50%.  Additional Comments: A pacer wire is visualized in the right ventricle.       2D AND M-MODE MEASUREMENTS (normal ranges within parentheses):  Left                  Normal   Aorta/Left             Normal  Ventricle:                     Atrium:  IVSd (2D):  0.61 cm  (0.7-1.1) AoV Cusp       2.19  (1.5-2.6)  LVPWd       0.61 cm  (0.7-1.1) Separation:     cm  (2D):                          Left Atrium    3.87  (1.9-4.0)  LVIDd       5.60 cm  (3.4-5.7) (Mmode):        cm  (2D):         LA Volume      10.2  LVIDs       3.51 cm            Index         ml/m²  (2D):                          Right  LV FS       37.4 %    (>25%)   Ventricle:  (2D):                          RVd (2D):      3.55 cm  IVSd        0.74 cm  (0.7-1.1)  (Mmode):  LVPWd       0.86 cm  (0.7-1.1)  (Mmode):  LVIDd       5.15 cm  (3.4-5.7)  (Mmode):  LVIDs       3.59 cm  (Mmode):  LV FS       30.3 %    (>25%)  (Mmode):  Relative     0.22     (<0.42)  Wall  Thickness  Rel. Wall    0.33     (<0.42)  Thickness  Mm  LV Mass    71.4 g/m²  Index:  Mmode    SPECTRAL DOPPLER ANALYSIS:  LV DIASTOLIC FUNCTION:  MV Peak E: 0.74 m/s Decel Time: 173 msec  MV Peak A: 0.60 m/s  E/A Ratio: 1.22    Aortic Valve:  AoV VMax:    1.16 m/s AoV Area, Vmax: 5.43 cm² Vmax Indx: 2.71 cm²/m²  AoV Pk Grad: 5.4 mmHg    LVOT Vmax: 0.96 m/s  LVOT VTI:  0.19 m  LVOT Diam: 2.90 cm    Mitral Valve:  MV P1/2 Time: 50.11 msec  MV Area, PHT: 4.39 cm²    Tricuspid Valve and PA/RV Systolic Pressure: TR Max Velocity: 2.25 m/s RA   Pressure: 8 mmHg RVSP/PASP: 28.3 mmHg    Pulmonic Valve:  PV Max Velocity: 0.61 m/s PV Max P.5 mmHg PV Mean PG:       [id number] Bee Serrato M.D., Electronically signed on 11/3/2019 at   12:50:37 PM              *** Final ***                    BEE SERRATO M.D., ATTENDING CARDIOLOGIST  This document has been electronically signed. Nov  3 2019 11:11AM             (19 @ 11:11)  12 Lead ECG:   Ventricular Rate 67 BPM    Atrial Rate 61 BPM    QRS Duration 138 ms    Q-T Interval 396 ms    QTC Calculation(Bezet) 418 ms    R Axis -10 degrees    T Axis -8 degrees    Diagnosis Line Atrial-paced rhythm with occasional ventricular-paced complexes  Right bundle branch block  Abnormal ECG    Confirmed by Bee Serrato MD ([id number]) on 2019 7:53:36 AM (19 @ 04:12)      MEDICATIONS  apixaban 5  aspirin enteric coated 81  atorvastatin 40  baclofen 10  budesonide 160 MICROgram(s)/formoterol 4.5 MICROgram(s) Inhaler 2  cefepime   IVPB 1000  dimethyl fumarate DR Capsule 240  fluticasone propionate 50 MICROgram(s)/spray Nasal Spray 2  folic acid 1  gabapentin 400  lamoTRIgine 100  loratadine 10  metoprolol succinate ER 50  pantoprazole    Tablet 40  polyethylene glycol 3350 17  QUEtiapine 25  QUEtiapine 50  tamsulosin 0.4  tiotropium 18 MICROgram(s) Capsule 1      ANTIBIOTICS:  cefepime   IVPB 1000 milliGRAM(s) IV Intermittent every 8 hours      All available historical data has been reviewed

## 2019-11-04 NOTE — PROGRESS NOTE ADULT - SUBJECTIVE AND OBJECTIVE BOX
HPI:  48 years old male with PMHx of arrhythmogenic ventricular tachycardia s/p AICD in 2005, HFpEF (EF 69% in August, 2019), afib/flutter on Eliquis/ASA 81mg, HTN, COPD (on 2L O2 PRN), DLD, multiple sclerosis, epilepsy, PTSD, depression, TIA, presented to the ED with complaint of dysuria and fever for a few days. Patient reports daily fever 102 and nausea without chills or rigors. He has associated dysuria without hematuria. No vomiting, diarrhea. He recently visited dr Quiñones's office for management of afib and AICD, and plans were to stop dofetilide and increase dose of metoprolol. However, patient unsure about dose of metoprolol.   Initially  /70 T 102.7, given 1 L NS then had improved VS, HR has been stable since then.  WBC within normal range, UA grossly positive. (02 Nov 2019 11:24)      T(C): 36.3 (11-04-19 @ 07:29), Max: 36.6 (11-03-19 @ 23:38)  HR: 73 (11-04-19 @ 07:29) (70 - 73)  BP: 103/78 (11-04-19 @ 07:29) (89/51 - 103/78)  RR: 18 (11-04-19 @ 07:29) (18 - 18)  SpO2: 98% (11-04-19 @ 07:29) (95% - 98%)    MOTOR EXAM      Physical Medicine And Rehabilitation Services are not indicated in this patient for the following Reason(s):    [    ] Patient is medically unstable      [    ]  Patient does not have appropriate activity orders      [     ] Patient has no weight bearing status for:      [     ] Patient is independently ambulating      [   x  ]  Patient is from Skilled Nursing Facility and is appropriate to return LTC AT [facility]      [     ] Patient was non-ambulatory prior to admission      [     ]  Other      WILL CANCEL PM&R request

## 2019-11-04 NOTE — PROGRESS NOTE ADULT - ASSESSMENT
A 47 y/o male  with PMH Ventricular tachycardia s/p AICD in 2005, Afib/flutter on Eliquis, HTN, COPD (on 2L O2 PRN),  multiple sclerosis, epilepsy, pericardial effusion s/p recent pericardial window was brought to ED from NH for fever and chills. Symptoms started last night with chills, he placed blanket and nurse checked it was 103, so the called EMS, he also reports dysuria for two days, he denies chest pain or SOB, he reports episodes of cough with green sputum early this AM while in the ED. In the ED he found tachycardic , Temp 102.7, UA was positive. CXR showed stable left lower lobe opacity      A/P:   Severe Sepsis  UTI  CXR showed improved left lower lobe opacity, patient has new cough with greenish sputum.   Urine cx is growing gram negative rods.   Continue Cefepime as per ID. Pending blood and urine cx.     Atrial flutter with rapid ventricular response, Hx of VTach and AFIB s/p AICD.   Self aborted, patient was on Dofetilide and Metoprolol,   EP recommended to stop Dofetilide and continue Metoprolol, telemetry monitor.   Continue Eliquis.     Pericardial effusion:   s/p recent pericardial window. Wound is clean  Repeated echo today showed LVEF 45-50%, trivial pericardial effusion.     Chronic HFpEF  ECHO mildly decreased global LV systolic function 45-50%  Continue Lasix 40mg q24hrs, Lopressors 12.5mg q6hrs    Chronic COPD  Stable, lungs are clear.   Continue Symbicort and  Duoneb    Multiple sclerosis  On Baclofen, Morphine and Neurontin.   Continue Tecfidera 240mg q24hrs (please call central pharmacy on Monday       Epilepsy  Continue Lamotrigine 100mg q12hrs and gabapentin 400mg q8hrs    PTSD  Continue Seroquel 25mg and 50mg, Klonopin 0.5cm    #Progress Note Handoff:  Pending (specify):  urine cx, ID follow up  Family discussion:  Disposition: STR.

## 2019-11-04 NOTE — PROGRESS NOTE ADULT - ASSESSMENT
Severe Sepsis due to UTI  -CXR showed improved left lower lobe opacity, patient has new cough with greenish sputum  -Urine cx is growing gram negative rods  -Continue Cefepime as per ID; Pending blood and urine Cx    Atrial flutter with rapid ventricular response, Hx of VTach and AFIB s/p AICD  -Self aborted, patient was on Dofetilide and Metoprolol  -EP recommended to stop Dofetilide and continue Metoprolol, telemetry monitor  -Continue Eliquis     Hx of Pericardial effusion; s/p recent pericardial window  -Wound is clean  -Repeated echo today showed LVEF 45-50%, trivial pericardial effusion.     Hx of Chronic HFpEF  -ECHO mildly decreased global LV systolic function 45-50%  -Continue Lasix 40mg q24hrs, Lopressor 12.5mg q6hrs    Hx of Chronic COPD  -Stable, lungs are clear  -Continue Symbicort and  Duoneb    Hx of Multiple sclerosis  -On Baclofen, Morphine and Neurontin.   -Continue Tecfidera 240mg q24hrs (please call central pharmacy on Monday     Hx of Epilepsy  -Continue Lamotrigine 100mg q12hrs and gabapentin 400mg q8hrs    Hx of PTSD  -Continue Seroquel 25mg and 50mg, Klonopin 0.5cm Severe Sepsis due to UTI  -CXR showed improved left lower lobe opacity, patient has new cough with greenish sputum  -Urine cx is growing gram negative rods; Pending results  -Blood cultures NTD  -Continue Cefepime; Deescalate with sensitivities    Atrial flutter with rapid ventricular response, Hx of VTach and AFIB s/p AICD  -Self aborted; patient was on Dofetilide and Metoprolol  -EP recommended to stop Dofetilide and continue Metoprolol  -Continue Eliquis     Hx of Pericardial effusion; s/p recent pericardial window  -Wound is clean  -Repeated echo today showed LVEF 45-50%, trivial pericardial effusion    Hx of Chronic HFpEF  -ECHO mildly decreased global LV systolic function 45-50%  -Continue Lasix and Lopressor    Hx of Chronic COPD  -Stable, lungs are clear  -Continue Symbicort and  Duoneb    Hx of Multiple sclerosis  -On Baclofen, Morphine and Neurontin.   -Continue Tecfidera 240mg daily    Hx of Epilepsy  -Continue Lamotrigine and gabapentin    Hx of PTSD  -Continue Seroquel and Klonopin

## 2019-11-05 ENCOUNTER — TRANSCRIPTION ENCOUNTER (OUTPATIENT)
Age: 49
End: 2019-11-05

## 2019-11-05 DIAGNOSIS — I31.3 PERICARDIAL EFFUSION (NONINFLAMMATORY): ICD-10-CM

## 2019-11-05 DIAGNOSIS — J96.11 CHRONIC RESPIRATORY FAILURE WITH HYPOXIA: ICD-10-CM

## 2019-11-05 DIAGNOSIS — Z87.891 PERSONAL HISTORY OF NICOTINE DEPENDENCE: ICD-10-CM

## 2019-11-05 DIAGNOSIS — I50.32 CHRONIC DIASTOLIC (CONGESTIVE) HEART FAILURE: ICD-10-CM

## 2019-11-05 DIAGNOSIS — G40.909 EPILEPSY, UNSPECIFIED, NOT INTRACTABLE, WITHOUT STATUS EPILEPTICUS: ICD-10-CM

## 2019-11-05 DIAGNOSIS — I48.91 UNSPECIFIED ATRIAL FIBRILLATION: ICD-10-CM

## 2019-11-05 DIAGNOSIS — J44.9 CHRONIC OBSTRUCTIVE PULMONARY DISEASE, UNSPECIFIED: ICD-10-CM

## 2019-11-05 DIAGNOSIS — I25.10 ATHEROSCLEROTIC HEART DISEASE OF NATIVE CORONARY ARTERY WITHOUT ANGINA PECTORIS: ICD-10-CM

## 2019-11-05 DIAGNOSIS — I47.2 VENTRICULAR TACHYCARDIA: ICD-10-CM

## 2019-11-05 DIAGNOSIS — J90 PLEURAL EFFUSION, NOT ELSEWHERE CLASSIFIED: ICD-10-CM

## 2019-11-05 DIAGNOSIS — I42.9 CARDIOMYOPATHY, UNSPECIFIED: ICD-10-CM

## 2019-11-05 DIAGNOSIS — J98.11 ATELECTASIS: ICD-10-CM

## 2019-11-05 DIAGNOSIS — D63.8 ANEMIA IN OTHER CHRONIC DISEASES CLASSIFIED ELSEWHERE: ICD-10-CM

## 2019-11-05 DIAGNOSIS — Z86.73 PERSONAL HISTORY OF TRANSIENT ISCHEMIC ATTACK (TIA), AND CEREBRAL INFARCTION WITHOUT RESIDUAL DEFICITS: ICD-10-CM

## 2019-11-05 DIAGNOSIS — I13.0 HYPERTENSIVE HEART AND CHRONIC KIDNEY DISEASE WITH HEART FAILURE AND STAGE 1 THROUGH STAGE 4 CHRONIC KIDNEY DISEASE, OR UNSPECIFIED CHRONIC KIDNEY DISEASE: ICD-10-CM

## 2019-11-05 DIAGNOSIS — E78.5 HYPERLIPIDEMIA, UNSPECIFIED: ICD-10-CM

## 2019-11-05 DIAGNOSIS — I48.92 UNSPECIFIED ATRIAL FLUTTER: ICD-10-CM

## 2019-11-05 DIAGNOSIS — I31.2 HEMOPERICARDIUM, NOT ELSEWHERE CLASSIFIED: ICD-10-CM

## 2019-11-05 DIAGNOSIS — E66.9 OBESITY, UNSPECIFIED: ICD-10-CM

## 2019-11-05 DIAGNOSIS — F43.10 POST-TRAUMATIC STRESS DISORDER, UNSPECIFIED: ICD-10-CM

## 2019-11-05 DIAGNOSIS — N18.2 CHRONIC KIDNEY DISEASE, STAGE 2 (MILD): ICD-10-CM

## 2019-11-05 DIAGNOSIS — D52.9 FOLATE DEFICIENCY ANEMIA, UNSPECIFIED: ICD-10-CM

## 2019-11-05 DIAGNOSIS — N40.0 BENIGN PROSTATIC HYPERPLASIA WITHOUT LOWER URINARY TRACT SYMPTOMS: ICD-10-CM

## 2019-11-05 DIAGNOSIS — Z95.810 PRESENCE OF AUTOMATIC (IMPLANTABLE) CARDIAC DEFIBRILLATOR: ICD-10-CM

## 2019-11-05 DIAGNOSIS — Z79.01 LONG TERM (CURRENT) USE OF ANTICOAGULANTS: ICD-10-CM

## 2019-11-05 DIAGNOSIS — G62.9 POLYNEUROPATHY, UNSPECIFIED: ICD-10-CM

## 2019-11-05 DIAGNOSIS — G35 MULTIPLE SCLEROSIS: ICD-10-CM

## 2019-11-05 DIAGNOSIS — Z85.47 PERSONAL HISTORY OF MALIGNANT NEOPLASM OF TESTIS: ICD-10-CM

## 2019-11-05 DIAGNOSIS — F25.0 SCHIZOAFFECTIVE DISORDER, BIPOLAR TYPE: ICD-10-CM

## 2019-11-05 LAB
ANION GAP SERPL CALC-SCNC: 11 MMOL/L — SIGNIFICANT CHANGE UP (ref 7–14)
BASOPHILS # BLD AUTO: 0.04 K/UL — SIGNIFICANT CHANGE UP (ref 0–0.2)
BASOPHILS NFR BLD AUTO: 0.6 % — SIGNIFICANT CHANGE UP (ref 0–1)
BUN SERPL-MCNC: 15 MG/DL — SIGNIFICANT CHANGE UP (ref 10–20)
CALCIUM SERPL-MCNC: 10 MG/DL — SIGNIFICANT CHANGE UP (ref 8.5–10.1)
CHLORIDE SERPL-SCNC: 95 MMOL/L — LOW (ref 98–110)
CO2 SERPL-SCNC: 30 MMOL/L — SIGNIFICANT CHANGE UP (ref 17–32)
CREAT SERPL-MCNC: 0.9 MG/DL — SIGNIFICANT CHANGE UP (ref 0.7–1.5)
EOSINOPHIL # BLD AUTO: 0.11 K/UL — SIGNIFICANT CHANGE UP (ref 0–0.7)
EOSINOPHIL NFR BLD AUTO: 1.5 % — SIGNIFICANT CHANGE UP (ref 0–8)
GLUCOSE SERPL-MCNC: 120 MG/DL — HIGH (ref 70–99)
HCT VFR BLD CALC: 37.5 % — LOW (ref 42–52)
HGB BLD-MCNC: 11.5 G/DL — LOW (ref 14–18)
IMM GRANULOCYTES NFR BLD AUTO: 0.3 % — SIGNIFICANT CHANGE UP (ref 0.1–0.3)
LYMPHOCYTES # BLD AUTO: 2.03 K/UL — SIGNIFICANT CHANGE UP (ref 1.2–3.4)
LYMPHOCYTES # BLD AUTO: 28.3 % — SIGNIFICANT CHANGE UP (ref 20.5–51.1)
MCHC RBC-ENTMCNC: 27.1 PG — SIGNIFICANT CHANGE UP (ref 27–31)
MCHC RBC-ENTMCNC: 30.7 G/DL — LOW (ref 32–37)
MCV RBC AUTO: 88.4 FL — SIGNIFICANT CHANGE UP (ref 80–94)
MONOCYTES # BLD AUTO: 0.55 K/UL — SIGNIFICANT CHANGE UP (ref 0.1–0.6)
MONOCYTES NFR BLD AUTO: 7.7 % — SIGNIFICANT CHANGE UP (ref 1.7–9.3)
NEUTROPHILS # BLD AUTO: 4.42 K/UL — SIGNIFICANT CHANGE UP (ref 1.4–6.5)
NEUTROPHILS NFR BLD AUTO: 61.6 % — SIGNIFICANT CHANGE UP (ref 42.2–75.2)
NRBC # BLD: 0 /100 WBCS — SIGNIFICANT CHANGE UP (ref 0–0)
PLATELET # BLD AUTO: 260 K/UL — SIGNIFICANT CHANGE UP (ref 130–400)
POTASSIUM SERPL-MCNC: 4.3 MMOL/L — SIGNIFICANT CHANGE UP (ref 3.5–5)
POTASSIUM SERPL-SCNC: 4.3 MMOL/L — SIGNIFICANT CHANGE UP (ref 3.5–5)
RBC # BLD: 4.24 M/UL — LOW (ref 4.7–6.1)
RBC # FLD: 17 % — HIGH (ref 11.5–14.5)
SODIUM SERPL-SCNC: 136 MMOL/L — SIGNIFICANT CHANGE UP (ref 135–146)
WBC # BLD: 7.17 K/UL — SIGNIFICANT CHANGE UP (ref 4.8–10.8)
WBC # FLD AUTO: 7.17 K/UL — SIGNIFICANT CHANGE UP (ref 4.8–10.8)

## 2019-11-05 PROCEDURE — 99233 SBSQ HOSP IP/OBS HIGH 50: CPT

## 2019-11-05 RX ORDER — FUROSEMIDE 40 MG
20 TABLET ORAL DAILY
Refills: 0 | Status: DISCONTINUED | OUTPATIENT
Start: 2019-11-06 | End: 2019-11-06

## 2019-11-05 RX ORDER — DOFETILIDE 0.25 MG/1
1 CAPSULE ORAL
Qty: 0 | Refills: 0 | DISCHARGE

## 2019-11-05 RX ORDER — INFLUENZA VIRUS VACCINE 15; 15; 15; 15 UG/.5ML; UG/.5ML; UG/.5ML; UG/.5ML
0.5 SUSPENSION INTRAMUSCULAR ONCE
Refills: 0 | Status: DISCONTINUED | OUTPATIENT
Start: 2019-11-05 | End: 2019-11-06

## 2019-11-05 RX ORDER — METOPROLOL TARTRATE 50 MG
1 TABLET ORAL
Qty: 0 | Refills: 0 | DISCHARGE
Start: 2019-11-05

## 2019-11-05 RX ORDER — ACETAMINOPHEN 500 MG
650 TABLET ORAL
Qty: 0 | Refills: 0 | DISCHARGE

## 2019-11-05 RX ORDER — FUROSEMIDE 40 MG
1 TABLET ORAL
Qty: 1 | Refills: 0
Start: 2019-11-05

## 2019-11-05 RX ORDER — FUROSEMIDE 40 MG
40 TABLET ORAL DAILY
Refills: 0 | Status: DISCONTINUED | OUTPATIENT
Start: 2019-11-05 | End: 2019-11-06

## 2019-11-05 RX ORDER — IPRATROPIUM/ALBUTEROL SULFATE 18-103MCG
3 AEROSOL WITH ADAPTER (GRAM) INHALATION EVERY 6 HOURS
Refills: 0 | Status: DISCONTINUED | OUTPATIENT
Start: 2019-11-05 | End: 2019-11-06

## 2019-11-05 RX ORDER — ERTAPENEM SODIUM 1 G/1
1 INJECTION, POWDER, LYOPHILIZED, FOR SOLUTION INTRAMUSCULAR; INTRAVENOUS
Qty: 4 | Refills: 0
Start: 2019-11-05 | End: 2019-11-08

## 2019-11-05 RX ADMIN — LORATADINE 10 MILLIGRAM(S): 10 TABLET ORAL at 12:18

## 2019-11-05 RX ADMIN — Medication 10 MILLIGRAM(S): at 05:55

## 2019-11-05 RX ADMIN — BUDESONIDE AND FORMOTEROL FUMARATE DIHYDRATE 2 PUFF(S): 160; 4.5 AEROSOL RESPIRATORY (INHALATION) at 22:29

## 2019-11-05 RX ADMIN — GABAPENTIN 400 MILLIGRAM(S): 400 CAPSULE ORAL at 14:33

## 2019-11-05 RX ADMIN — BUDESONIDE AND FORMOTEROL FUMARATE DIHYDRATE 2 PUFF(S): 160; 4.5 AEROSOL RESPIRATORY (INHALATION) at 09:02

## 2019-11-05 RX ADMIN — MORPHINE SULFATE 30 MILLIGRAM(S): 50 CAPSULE, EXTENDED RELEASE ORAL at 20:05

## 2019-11-05 RX ADMIN — Medication 10 MILLIGRAM(S): at 18:53

## 2019-11-05 RX ADMIN — POLYETHYLENE GLYCOL 3350 17 GRAM(S): 17 POWDER, FOR SOLUTION ORAL at 05:55

## 2019-11-05 RX ADMIN — Medication 1 MILLIGRAM(S): at 12:15

## 2019-11-05 RX ADMIN — CEFEPIME 100 MILLIGRAM(S): 1 INJECTION, POWDER, FOR SOLUTION INTRAMUSCULAR; INTRAVENOUS at 14:34

## 2019-11-05 RX ADMIN — Medication 50 MILLIGRAM(S): at 05:55

## 2019-11-05 RX ADMIN — ATORVASTATIN CALCIUM 40 MILLIGRAM(S): 80 TABLET, FILM COATED ORAL at 22:29

## 2019-11-05 RX ADMIN — MORPHINE SULFATE 30 MILLIGRAM(S): 50 CAPSULE, EXTENDED RELEASE ORAL at 03:45

## 2019-11-05 RX ADMIN — Medication 81 MILLIGRAM(S): at 12:15

## 2019-11-05 RX ADMIN — PANTOPRAZOLE SODIUM 40 MILLIGRAM(S): 20 TABLET, DELAYED RELEASE ORAL at 05:55

## 2019-11-05 RX ADMIN — APIXABAN 5 MILLIGRAM(S): 2.5 TABLET, FILM COATED ORAL at 05:55

## 2019-11-05 RX ADMIN — QUETIAPINE FUMARATE 50 MILLIGRAM(S): 200 TABLET, FILM COATED ORAL at 22:29

## 2019-11-05 RX ADMIN — Medication 10 MILLIGRAM(S): at 12:15

## 2019-11-05 RX ADMIN — LAMOTRIGINE 100 MILLIGRAM(S): 25 TABLET, ORALLY DISINTEGRATING ORAL at 05:55

## 2019-11-05 RX ADMIN — TIOTROPIUM BROMIDE 1 CAPSULE(S): 18 CAPSULE ORAL; RESPIRATORY (INHALATION) at 09:03

## 2019-11-05 RX ADMIN — Medication 3 MILLILITER(S): at 05:22

## 2019-11-05 RX ADMIN — CEFEPIME 100 MILLIGRAM(S): 1 INJECTION, POWDER, FOR SOLUTION INTRAMUSCULAR; INTRAVENOUS at 06:43

## 2019-11-05 RX ADMIN — MORPHINE SULFATE 30 MILLIGRAM(S): 50 CAPSULE, EXTENDED RELEASE ORAL at 10:32

## 2019-11-05 RX ADMIN — APIXABAN 5 MILLIGRAM(S): 2.5 TABLET, FILM COATED ORAL at 18:53

## 2019-11-05 RX ADMIN — GABAPENTIN 400 MILLIGRAM(S): 400 CAPSULE ORAL at 05:55

## 2019-11-05 RX ADMIN — Medication 2 SPRAY(S): at 18:58

## 2019-11-05 RX ADMIN — QUETIAPINE FUMARATE 25 MILLIGRAM(S): 200 TABLET, FILM COATED ORAL at 12:15

## 2019-11-05 RX ADMIN — MORPHINE SULFATE 30 MILLIGRAM(S): 50 CAPSULE, EXTENDED RELEASE ORAL at 02:58

## 2019-11-05 RX ADMIN — TAMSULOSIN HYDROCHLORIDE 0.4 MILLIGRAM(S): 0.4 CAPSULE ORAL at 22:29

## 2019-11-05 RX ADMIN — Medication 40 MILLIGRAM(S): at 05:55

## 2019-11-05 RX ADMIN — GABAPENTIN 400 MILLIGRAM(S): 400 CAPSULE ORAL at 22:29

## 2019-11-05 RX ADMIN — LAMOTRIGINE 100 MILLIGRAM(S): 25 TABLET, ORALLY DISINTEGRATING ORAL at 18:53

## 2019-11-05 RX ADMIN — POLYETHYLENE GLYCOL 3350 17 GRAM(S): 17 POWDER, FOR SOLUTION ORAL at 18:53

## 2019-11-05 RX ADMIN — Medication 2 SPRAY(S): at 05:56

## 2019-11-05 RX ADMIN — Medication 3 MILLILITER(S): at 14:34

## 2019-11-05 NOTE — DISCHARGE NOTE PROVIDER - NSDCCPCAREPLAN_GEN_ALL_CORE_FT
PRINCIPAL DISCHARGE DIAGNOSIS  Diagnosis: Sepsis  Assessment and Plan of Treatment: You presented with fever and tachycardia due to your urine infection. The sepsis resolved with treatment.      SECONDARY DISCHARGE DIAGNOSES  Diagnosis: Atrial flutter with rapid ventricular response  Assessment and Plan of Treatment: You developed a rapid heart rate which was likely a response to your infection. Please follow up with your treating physician for further management and control of your heart rate.    Diagnosis: UTI due to extended-spectrum beta lactamase (ESBL) producing Escherichia coli  Assessment and Plan of Treatment: Please continue to take the prescribed antibiotics until completion. You should follow up with your primary care doctor within 1 week of discharge. PRINCIPAL DISCHARGE DIAGNOSIS  Diagnosis: Sepsis  Assessment and Plan of Treatment: You presented with fever and tachycardia due to your urine infection. The sepsis resolved with treatment.      SECONDARY DISCHARGE DIAGNOSES  Diagnosis: Atrial flutter with rapid ventricular response  Assessment and Plan of Treatment: You developed a rapid heart rate which was likely a response to your infection. Please follow up with your treating physician for further management and control of your heart rate.    Diagnosis: UTI due to extended-spectrum beta lactamase (ESBL) producing Escherichia coli  Assessment and Plan of Treatment: Please continue to take the prescribed antibiotics until completion (3 more doses). You should follow up with your primary care doctor within 1 week of discharge.

## 2019-11-05 NOTE — PROGRESS NOTE ADULT - SUBJECTIVE AND OBJECTIVE BOX
DAILY PROGRESS NOTE  ===========================================================    Patient Information:  CUATE HORNE  /  48y  /  Male  /  MRN#: [id number]    Hospital Day: 3d     |:::::::::::::::::::::::::::| SUBJECTIVE |:::::::::::::::::::::::::::|    OVERNIGHT EVENTS:   TODAY: Patient was seen today at bedside. Review of systems is otherwise negative.    |:::::::::::::::::::::::::::| OBJECTIVE |:::::::::::::::::::::::::::|    VITAL SIGNS: Last 24 Hours  T(C): 36.8 (05 Nov 2019 07:47), Max: 36.9 (05 Nov 2019 00:08)  T(F): 98.3 (05 Nov 2019 07:47), Max: 98.4 (05 Nov 2019 00:08)  HR: 70 (05 Nov 2019 07:47) (70 - 73)  BP: 105/57 (05 Nov 2019 07:47) (96/57 - 105/57)  BP(mean): --  RR: 18 (05 Nov 2019 07:47) (18 - 18)  SpO2: 95% (05 Nov 2019 07:47) (95% - 95%)    11-04-19 @ 07:01  -  11-05-19 @ 07:00  --------------------------------------------------------  IN: 0 mL / OUT: 500 mL / NET: -500 mL      PHYSICAL EXAM:  GENERAL:   Awake, alert; NAD.  HEENT:  Head NC/AT; Conjunctivae pink, Sclera anicteric; L-EJ.  CARDIO:   Regular rate; Regular rhythm; S1 & S2.  RESP:   Decreased BL.  GI:   Soft; NT/ND; BS; No guarding; No rebound tenderness.  EXT:   Trace LE edema.    LAB RESULTS:                        11.5   7.17  )-----------( 260      ( 05 Nov 2019 05:51 )             37.5     11-05    136  |  95<L>  |  15  ----------------------------<  120<H>  4.3   |  30  |  0.9    Ca    10.0      05 Nov 2019 05:51  Mg     2.1     11-04    TPro  6.1  /  Alb  3.6  /  TBili  0.3  /  DBili  x   /  AST  16  /  ALT  14  /  AlkPhos  134<H>     MICROBIOLOGY:  Cultures with ESBL E. coli    RADIOLOGY:  NTR    ALLERGIES:  dexmethylphenidate (Unknown)  Dilantin (Rash)  dilantin, compazine, , ritalin, phenergan (Unknown)  Haldol (Unknown)  hydantoin derivatives (Other)  methylphenidate (Unknown)  phenytoin (Unknown)  prochlorperazine (Unknown)  promethazine (Dystonic RXN)  rash/hives (Anaphylaxis)  thioxanthenes (Unknown)    HOME MEDICATIONS:  acetaminophen 650 mg oral tablet: 650 milligram(s) orally 4 times a day, As Needed (13 Oct 2019 10:36)  aspirin 81 mg oral delayed release tablet: 1 tab(s) orally once a day (13 Oct 2019 10:36)  atorvastatin 40 mg oral tablet: 1 tab(s) orally once a day (at bedtime) (13 Oct 2019 10:36)  baclofen 10 mg oral tablet: 1 tab(s) orally 4 times a day (13 Oct 2019 10:36)  budesonide-formoterol 160 mcg-4.5 mcg/inh inhalation aerosol: 2 puff(s) inhaled 2 times a day (13 Oct 2019 10:36)  Cepacol Extra Strength Menthol 10 mg mucous membrane lozenge: 1 anderson(s) mucous membrane every 6 hours, As Needed (13 Oct 2019 10:36)  cholecalciferol oral tablet: 2000 unit(s) orally once a day (13 Oct 2019 10:36)  Claritin 10 mg oral tablet: 1 tab(s) orally once a day (13 Oct 2019 10:36)  clonazePAM 0.5 mg oral tablet: 1 tab(s) orally once a day (at bedtime), As needed, PTSD (21 Oct 2019 16:41)  Flonase 50 mcg/inh nasal spray: 2 spray(s) nasal once a day (13 Oct 2019 10:36)  folic acid 1 mg oral tablet: 1 tab(s) orally once a day (13 Oct 2019 10:36)  gabapentin 400 mg oral capsule: 1 cap(s) orally 3 times a day (13 Oct 2019 10:36)  lamoTRIgine 100 mg oral tablet: 1 tab(s) orally 2 times a day (13 Oct 2019 10:36)  metoprolol tartrate 25 mg oral tablet: 0.5 tab(s) orally every 8 hours (13 Oct 2019 10:36)  Mi-Acid oral suspension: 30 milliliter(s) orally 4 times a day, As Needed (13 Oct 2019 10:36)  Morphine IR 30 mg oral tablet: 1 tab(s) orally every 6 hours, As Needed - 10) (13 Oct 2019 10:36)  Nitrostat 0.4 mg sublingual tablet: sublingual 3 times a day, As Needed; may repeat after 5 min until relief (13 Oct 2019 10:36)  omeprazole 20 mg oral delayed release capsule: 1 cap(s) orally 2 times a day (13 Oct 2019 10:36)  polyethylene glycol 3350 oral powder for reconstitution: 17 gram(s) orally every 12 hours (13 Oct 2019 10:36)  QUEtiapine 25 mg oral tablet: 1 tab(s) orally once a day (13 Oct 2019 10:36)  SEROquel 50 mg oral tablet: 1 tab(s) orally once a day (at bedtime) (13 Oct 2019 10:36)  tamsulosin 0.4 mg oral capsule: 1 cap(s) orally once a day (at bedtime) (13 Oct 2019 10:36)  Tecfidera 240 mg oral delayed release capsule: 1 cap(s) orally once a day (13 Oct 2019 10:36)  Tikosyn 250 mcg oral capsule: 1 cap(s) orally 2 times a day (13 Oct 2019 10:36)  tiotropium 18 mcg inhalation capsule: 1 cap(s) inhaled once a day (13 Oct 2019 10:36)    ===========================================================

## 2019-11-05 NOTE — DISCHARGE NOTE NURSING/CASE MANAGEMENT/SOCIAL WORK - NSDCPEELIQUIS_GEN_ALL_CORE
Apixaban/Eliquis - Compliance/Apixaban/Eliquis - Follow up monitoring/Apixaban/Eliquis - Dietary Advice/Apixaban/Eliquis - Potential for adverse drug reactions and interactions

## 2019-11-05 NOTE — PROGRESS NOTE ADULT - ASSESSMENT
Severe Sepsis due to UTI  -CXR showed improved left lower lobe opacity, patient has new cough with greenish sputum  -Urine cx is growing gram negative rods; ESBL E. coli  -Blood cultures NTD  -Changing Abx per ID recommendations    Atrial flutter with rapid ventricular response, Hx of VTach and AFIB s/p AICD  -Self aborted; patient was on Dofetilide and Metoprolol  -EP recommended to stop Dofetilide and continue Metoprolol  -Continue Eliquis     Hx of Pericardial effusion; s/p recent pericardial window  -Wound is clean  -Repeated echo today showed LVEF 45-50%, trivial pericardial effusion    Hx of Chronic HFpEF  -ECHO mildly decreased global LV systolic function 45-50%  -Continue Lasix and Lopressor    Hx of Chronic COPD  -Stable, lungs are clear  -Continue Symbicort and  Duoneb    Hx of Multiple sclerosis  -On Baclofen, Morphine and Neurontin.   -Tecfidera not available in the hospital; resume upon discharge    Hx of Epilepsy  -Continue Lamotrigine and gabapentin    Hx of PTSD  -Continue Seroquel and Klonopin     DISPO: Possible D/C in 24 hours pending D/D/D per ID recommendations Will need Midline

## 2019-11-05 NOTE — DISCHARGE NOTE PROVIDER - NSDCMRMEDTOKEN_GEN_ALL_CORE_FT
aspirin 81 mg oral delayed release tablet: 1 tab(s) orally once a day  atorvastatin 40 mg oral tablet: 1 tab(s) orally once a day (at bedtime)  baclofen 10 mg oral tablet: 1 tab(s) orally 4 times a day  budesonide-formoterol 160 mcg-4.5 mcg/inh inhalation aerosol: 2 puff(s) inhaled 2 times a day  Cepacol Extra Strength Menthol 10 mg mucous membrane lozenge: 1 anderson(s) mucous membrane every 6 hours, As Needed  cholecalciferol oral tablet: 2000 unit(s) orally once a day  Claritin 10 mg oral tablet: 1 tab(s) orally once a day  clonazePAM 0.5 mg oral tablet: 1 tab(s) orally once a day (at bedtime), As needed, PTSD  ertapenem 1 g injection: 1 gram(s) intravenously once a day   Flonase 50 mcg/inh nasal spray: 2 spray(s) nasal once a day  folic acid 1 mg oral tablet: 1 tab(s) orally once a day  gabapentin 400 mg oral capsule: 1 cap(s) orally 3 times a day  lamoTRIgine 100 mg oral tablet: 1 tab(s) orally 2 times a day  Lasix 20 mg oral tablet: 40mg once a day, then 20mg once a day; Alternating days  metoprolol succinate 50 mg oral tablet, extended release: 1 tab(s) orally once a day  Mi-Acid oral suspension: 30 milliliter(s) orally 4 times a day, As Needed  Morphine IR 30 mg oral tablet: 1 tab(s) orally every 6 hours, As Needed - 10)  Nitrostat 0.4 mg sublingual tablet: sublingual 3 times a day, As Needed; may repeat after 5 min until relief  omeprazole 20 mg oral delayed release capsule: 1 cap(s) orally 2 times a day  polyethylene glycol 3350 oral powder for reconstitution: 17 gram(s) orally every 12 hours  QUEtiapine 25 mg oral tablet: 1 tab(s) orally once a day  SEROquel 50 mg oral tablet: 1 tab(s) orally once a day (at bedtime)  tamsulosin 0.4 mg oral capsule: 1 cap(s) orally once a day (at bedtime)  Tecfidera 240 mg oral delayed release capsule: 1 cap(s) orally once a day  tiotropium 18 mcg inhalation capsule: 1 cap(s) inhaled once a day apixaban 5 mg oral tablet: 1 tab(s) orally every 12 hours  aspirin 81 mg oral delayed release tablet: 1 tab(s) orally once a day  atorvastatin 40 mg oral tablet: 1 tab(s) orally once a day (at bedtime)  baclofen 10 mg oral tablet: 1 tab(s) orally 3 times a day  budesonide-formoterol 160 mcg-4.5 mcg/inh inhalation aerosol: 2 puff(s) inhaled 2 times a day  Cepacol Extra Strength Menthol 10 mg mucous membrane lozenge: 1 anderson(s) mucous membrane every 6 hours, As Needed  cholecalciferol oral tablet: 2000 unit(s) orally once a day  Claritin 10 mg oral tablet: 1 tab(s) orally once a day  clonazePAM 0.5 mg oral tablet: 1 tab(s) orally once a day (at bedtime), As needed, PTSD  ertapenem 1 g injection: 1 gram(s) intravenously once a day   Flonase 50 mcg/inh nasal spray: 2 spray(s) nasal once a day  folic acid 1 mg oral tablet: 1 tab(s) orally once a day  gabapentin 400 mg oral capsule: 1 cap(s) orally 3 times a day  lamoTRIgine 100 mg oral tablet: 1 tab(s) orally 2 times a day  Lasix 20 mg oral tablet: 40mg once a day, then 20mg once a day; Alternating days  metoprolol succinate 50 mg oral tablet, extended release: 1 tab(s) orally once a day; MAY GIVE 4 TABS OF 12.5MG AT ONE TIME FOR DOSING  Mi-Acid oral suspension: 30 milliliter(s) orally 4 times a day, As Needed  Morphine IR 30 mg oral tablet: 1 tab(s) orally every 6 hours, As Needed - 10)  Nitrostat 0.4 mg sublingual tablet: sublingual 3 times a day, As Needed; may repeat after 5 min until relief  omeprazole 20 mg oral delayed release capsule: 1 cap(s) orally 2 times a day  polyethylene glycol 3350 oral powder for reconstitution: 17 gram(s) orally every 12 hours  QUEtiapine 25 mg oral tablet: 1 tab(s) orally once a day  SEROquel 50 mg oral tablet: 1 tab(s) orally once a day (at bedtime)  tamsulosin 0.4 mg oral capsule: 1 cap(s) orally once a day (at bedtime)  Tecfidera 240 mg oral delayed release capsule: 1 cap(s) orally once a day  tiotropium 18 mcg inhalation capsule: 1 cap(s) inhaled once a day

## 2019-11-05 NOTE — DISCHARGE NOTE NURSING/CASE MANAGEMENT/SOCIAL WORK - PATIENT PORTAL LINK FT
You can access the FollowMyHealth Patient Portal offered by [facility] by registering at the following website: [email or web address]. By joining [employer]’s FollowMyHealth portal, you will also be able to view your health information using other applications (apps) compatible with our system.

## 2019-11-05 NOTE — DISCHARGE NOTE PROVIDER - CARE PROVIDER_API CALL
Darvin Roche)  Internal Medicine  [address] [place] [address], [address]  [place] [address]  Phone: [phone]  Fax: [phone]  Follow Up Time:

## 2019-11-05 NOTE — PROGRESS NOTE ADULT - SUBJECTIVE AND OBJECTIVE BOX
CUATE HORNE  48y  Male      Patient is a 48y old  Male who presents with a chief complaint of Fever and dysuria (05 Nov 2019 10:40)      INTERVAL HPI/OVERNIGHT EVENTS:  No fever, no abdominal pain, urinary symptoms resolved.   Vital Signs Last 24 Hrs  T(C): 36.8 (05 Nov 2019 07:47), Max: 36.9 (05 Nov 2019 00:08)  T(F): 98.3 (05 Nov 2019 07:47), Max: 98.4 (05 Nov 2019 00:08)  HR: 70 (05 Nov 2019 07:47) (70 - 73)  BP: 105/57 (05 Nov 2019 07:47) (96/57 - 105/57)  BP(mean): --  RR: 18 (05 Nov 2019 07:47) (18 - 18)  SpO2: 95% (05 Nov 2019 07:47) (95% - 95%)      11-04-19 @ 07:01  -  11-05-19 @ 07:00  --------------------------------------------------------  IN: 0 mL / OUT: 500 mL / NET: -500 mL            Consultant(s) Notes Reviewed:  [x ] YES  [ ] NO          MEDICATIONS  (STANDING):  albuterol/ipratropium for Nebulization 3 milliLiter(s) Nebulizer every 6 hours  apixaban 5 milliGRAM(s) Oral every 12 hours  aspirin enteric coated 81 milliGRAM(s) Oral daily  atorvastatin 40 milliGRAM(s) Oral at bedtime  baclofen 10 milliGRAM(s) Oral four times a day  budesonide 160 MICROgram(s)/formoterol 4.5 MICROgram(s) Inhaler 2 Puff(s) Inhalation two times a day  cefepime   IVPB 1000 milliGRAM(s) IV Intermittent every 8 hours  fluticasone propionate 50 MICROgram(s)/spray Nasal Spray 2 Spray(s) Both Nostrils two times a day  folic acid 1 milliGRAM(s) Oral daily  furosemide    Tablet 40 milliGRAM(s) Oral daily  gabapentin 400 milliGRAM(s) Oral three times a day  influenza   Vaccine 0.5 milliLiter(s) IntraMuscular once  lamoTRIgine 100 milliGRAM(s) Oral two times a day  loratadine 10 milliGRAM(s) Oral daily  metoprolol succinate ER 50 milliGRAM(s) Oral daily  pantoprazole    Tablet 40 milliGRAM(s) Oral before breakfast  polyethylene glycol 3350 17 Gram(s) Oral every 12 hours  QUEtiapine 25 milliGRAM(s) Oral daily  QUEtiapine 50 milliGRAM(s) Oral at bedtime  tamsulosin 0.4 milliGRAM(s) Oral at bedtime  tiotropium 18 MICROgram(s) Capsule 1 Capsule(s) Inhalation daily    MEDICATIONS  (PRN):  acetaminophen   Tablet .. 650 milliGRAM(s) Oral every 4 hours PRN Mild Pain (1 - 3)  clonazePAM  Tablet 0.5 milliGRAM(s) Oral at bedtime PRN anxiety  morphine  IR 30 milliGRAM(s) Oral every 6 hours PRN Severe Pain (7 - 10)  nitroglycerin     SubLingual 0.4 milliGRAM(s) SubLingual every 5 minutes PRN Chest Pain      LABS                          11.5   7.17  )-----------( 260      ( 05 Nov 2019 05:51 )             37.5     11-05    136  |  95<L>  |  15  ----------------------------<  120<H>  4.3   |  30  |  0.9    Ca    10.0      05 Nov 2019 05:51  Mg     2.1     11-04    TPro  6.1  /  Alb  3.6  /  TBili  0.3  /  DBili  x   /  AST  16  /  ALT  14  /  AlkPhos  134<H>  11-04          Lactate Trend        CAPILLARY BLOOD GLUCOSE          Culture - Urine (collected 11-02-19 @ 05:30)  Source: .Urine Clean Catch (Midstream)  Final Report (11-04-19 @ 17:46):    >100,000 CFU/ml Escherichia coli ESBL  Organism: Escherichia coli ESBL (11-04-19 @ 17:46)  Organism: Escherichia coli ESBL (11-04-19 @ 17:46)      -  Amikacin: S <=16      -  Ampicillin: R >16 These ampicillin results predict results for amoxicillin      -  Ampicillin/Sulbactam: R 16/8 Enterobacter, Citrobacter, and Serratia may develop resistance during prolonged therapy (3-4 days)      -  Aztreonam: R >16      -  Cefazolin: R >16 (MIC_CL_COM_ENTERIC_CEFAZU) For uncomplicated UTI with K. pneumoniae, E. coli, or P. mirablis: CARLYN <=16 is sensitive and CARLYN >=32 is resistant. This also predicts results for oral agents cefaclor, cefdinir, cefpodoxime, cefprozil, cefuroxime axetil, cephalexin and locarbef for uncomplicated UTI. Note that some isolates may be susceptible to these agents while testing resistant to cefazolin.      -  Cefepime: R >16      -  Cefoxitin: S <=8      -  Ceftriaxone: R >32 Enterobacter, Citrobacter, and Serratia may develop resistance during prolonged therapy      -  Ciprofloxacin: R >2      -  Ertapenem: S <=1      -  Gentamicin: S <=4      -  Imipenem: S <=1      -  Levofloxacin: R >4      -  Meropenem: S <=1      -  Nitrofurantoin: S <=32 Should not be used to treat pyelonephritis      -  Piperacillin/Tazobactam: R <=16      -  Tigecycline: S <=2      -  Tobramycin: R >8      -  Trimethoprim/Sulfamethoxazole: R >2/38      Method Type: CARLYN    Culture - Blood (collected 11-02-19 @ 04:30)  Source: .Blood Blood-Peripheral  Preliminary Report (11-03-19 @ 11:01):    No growth to date.    Culture - Blood (collected 11-02-19 @ 04:30)  Source: .Blood Blood-Peripheral  Preliminary Report (11-03-19 @ 11:01):    No growth to date.        RADIOLOGY & ADDITIONAL TESTS:    Imaging Personally Reviewed:  [ ] YES  [ ] NO    HEALTH ISSUES - PROBLEM Dx:        PHYSICAL EXAM:  GENERAL: NAD, well-developed  HEAD:  Atraumatic, Normocephalic  EYES: EOMI, PERRLA, conjunctiva and sclera clear  NECK: Supple, No JVD  CHEST/LUNG: bibasilar fine rales  HEART: Regular rate and rhythm; S1 S2, distant hear sounds, no murmurs.   ABDOMEN: Soft, Nontender, Nondistended; Bowel sounds present  EXTREMITIES:  2+ Peripheral Pulses, No clubbing, cyanosis, or edema  PSYCH: AAOx3  NEUROLOGY: non-focal  SKIN: No rashes or lesions

## 2019-11-05 NOTE — DISCHARGE NOTE PROVIDER - HOSPITAL COURSE
This is a 49 y/o male with PMH Ventricular tachycardia s/p AICD in 2005, Afib/flutter on Eliquis, HTN, COPD (on 2L O2 PRN), multiple sclerosis, epilepsy, pericardial effusion s/p recent pericardial window who was brought to ED from NH for fever and chills. He had a temperature of 103F at the NH. He also complained of dysuria. In the ED, he was tachycardic , Temp 102.7, UA was positive. He was admitted for severe sepsis with urine culture growing ESBL E. coli; completing treatment with ertapenem. For his Aflutter with RVR, EP was consulted and recommended to stop Dofetilide and continue Metoprolol.

## 2019-11-05 NOTE — CHART NOTE - NSCHARTNOTEFT_GEN_A_CORE
Patient was planned for discharge back to nursing home. However, discharge was late and the patient reported he was going to appeal; Anticipated for tomorrow morning.

## 2019-11-05 NOTE — PATIENT PROFILE ADULT - NSPRESCRUSEDDRG_GEN_A_NUR
Hospital course: POD#1: patient doing well, pain controlled. Hess still in place.   POD#2: Patient c/o shoulder pain, relieved by Percocet. She is ambulating and nursing well.    Interval History:     She is doing well this morning. She is tolerating a regular diet without nausea or vomiting. She is voiding spontaneously. She is ambulating. She has passed flatus, and has not a BM. Vaginal bleeding is mild. She denies fever or chills. Abdominal pain is moderate and controlled with oral medications. She is breastfeeding. She desires circumcision for her male baby: yes, R/B A discussed, consents signed after questions answered.    Objective:     Vital Signs (Most Recent):  Temp: 98.1 °F (36.7 °C) (11/11/17 0905)  Pulse: 85 (11/11/17 0905)  Resp: 18 (11/11/17 0905)  BP: 116/69 (11/11/17 0905)  SpO2: 99 % (11/11/17 0905) Vital Signs (24h Range):  Temp:  [97.7 °F (36.5 °C)-98.1 °F (36.7 °C)] 98.1 °F (36.7 °C)  Pulse:  [59-85] 85  Resp:  [18] 18  SpO2:  [99 %] 99 %  BP: (102-116)/(55-69) 116/69     Weight: 69.4 kg (153 lb)  Body mass index is 27.98 kg/m².    No intake or output data in the 24 hours ending 11/11/17 1025    Significant Labs:  Lab Results   Component Value Date    GROUPTRH A POS 11/09/2017    HEPBSAG Negative 05/08/2017    STREPBCULT No Group B Streptococcus isolated 10/17/2017       Recent Labs  Lab 11/10/17  0516   HGB 9.6*   HCT 28.9*       I have personallly reviewed all pertinent lab results from the last 24 hours.    Physical Exam:   Constitutional: She is oriented to person, place, and time. She appears well-developed and well-nourished.       Cardiovascular: Normal rate.     Pulmonary/Chest: Effort normal.        Abdominal: Soft. She exhibits abdominal incision (Clean, dry and intact). Tenderness: Mild incisional tenderness, no fundal tenderness.     Genitourinary: Uterus normal.           Musculoskeletal: She exhibits edema (1+ edema). She exhibits no tenderness.       Neurological: She is alert and  oriented to person, place, and time.     Psychiatric: She has a normal mood and affect.      No

## 2019-11-05 NOTE — PROGRESS NOTE ADULT - ASSESSMENT
A 47 y/o male  with PMH Ventricular tachycardia s/p AICD in 2005, Afib/flutter on Eliquis, HTN, COPD (on 2L O2 PRN),  multiple sclerosis, epilepsy, pericardial effusion s/p recent pericardial window was brought to ED from NH for fever and chills. Symptoms started last night with chills, he placed blanket and nurse checked it was 103, so the called EMS, he also reports dysuria for two days, he denies chest pain or SOB, he reports episodes of cough with green sputum early this AM while in the ED. In the ED he found tachycardic , Temp 102.7, UA was positive. CXR showed stable left lower lobe opacity      A/P:   Severe Sepsis  UTI  CXR showed improved left lower lobe opacity, patient has new cough with greenish sputum.   Urine cx is growing E.coli ESBL  Discontinue Cefepime, discharge to NH o Meropenem or Ertapenem, pending ID follow up.     Atrial flutter with rapid ventricular response, Hx of VTach and AFIB s/p AICD.   Self aborted, patient was on Dofetilide and Metoprolol,   EP recommended to stop Dofetilide and continue Metoprolol, telemetry monitor.   Continue Eliquis.     Pericardial effusion:   s/p recent pericardial window. Wound is clean  Repeated echo today showed LVEF 45-50%, trivial pericardial effusion.     Chronic HFpEF  ECHO mildly decreased global LV systolic function 45-50%  Continue Lasix 40mg q24hrs, Lopressors 12.5mg q6hrs    Chronic COPD  Stable, lungs are clear.   Continue Symbicort and  Duoneb    Multiple sclerosis  On Baclofen, Morphine and Neurontin.   Continue Tecfidera 240mg q24hrs (please call central pharmacy on Monday     Epilepsy  Continue Lamotrigine 100mg q12hrs and gabapentin 400mg q8hrs    PTSD  Continue Seroquel 25mg and 50mg, Klonopin 0.5cm    #Progress Note Handoff:  Pending (specify):  discharge back to NH, ID follow up.   Family discussion:  Disposition: STR.

## 2019-11-06 ENCOUNTER — APPOINTMENT (OUTPATIENT)
Dept: CARDIOTHORACIC SURGERY | Facility: CLINIC | Age: 49
End: 2019-11-06

## 2019-11-06 VITALS — DIASTOLIC BLOOD PRESSURE: 68 MMHG | SYSTOLIC BLOOD PRESSURE: 115 MMHG

## 2019-11-06 PROCEDURE — 99239 HOSP IP/OBS DSCHRG MGMT >30: CPT

## 2019-11-06 RX ORDER — BACLOFEN 100 %
1 POWDER (GRAM) MISCELLANEOUS
Qty: 0 | Refills: 0 | DISCHARGE

## 2019-11-06 RX ORDER — APIXABAN 2.5 MG/1
1 TABLET, FILM COATED ORAL
Qty: 0 | Refills: 0 | DISCHARGE
Start: 2019-11-06

## 2019-11-06 RX ORDER — ERTAPENEM SODIUM 1 G/1
1 INJECTION, POWDER, LYOPHILIZED, FOR SOLUTION INTRAMUSCULAR; INTRAVENOUS
Qty: 3 | Refills: 0
Start: 2019-11-06 | End: 2019-11-08

## 2019-11-06 RX ORDER — ERTAPENEM SODIUM 1 G/1
1000 INJECTION, POWDER, LYOPHILIZED, FOR SOLUTION INTRAMUSCULAR; INTRAVENOUS ONCE
Refills: 0 | Status: DISCONTINUED | OUTPATIENT
Start: 2019-11-06 | End: 2019-11-06

## 2019-11-06 RX ORDER — MEROPENEM 1 G/30ML
1000 INJECTION INTRAVENOUS EVERY 8 HOURS
Refills: 0 | Status: DISCONTINUED | OUTPATIENT
Start: 2019-11-06 | End: 2019-11-06

## 2019-11-06 RX ADMIN — MEROPENEM 100 MILLIGRAM(S): 1 INJECTION INTRAVENOUS at 10:40

## 2019-11-06 RX ADMIN — Medication 50 MILLIGRAM(S): at 05:48

## 2019-11-06 RX ADMIN — Medication 10 MILLIGRAM(S): at 05:49

## 2019-11-06 RX ADMIN — MORPHINE SULFATE 30 MILLIGRAM(S): 50 CAPSULE, EXTENDED RELEASE ORAL at 15:48

## 2019-11-06 RX ADMIN — Medication 10 MILLIGRAM(S): at 11:34

## 2019-11-06 RX ADMIN — TIOTROPIUM BROMIDE 1 CAPSULE(S): 18 CAPSULE ORAL; RESPIRATORY (INHALATION) at 08:26

## 2019-11-06 RX ADMIN — GABAPENTIN 400 MILLIGRAM(S): 400 CAPSULE ORAL at 13:44

## 2019-11-06 RX ADMIN — APIXABAN 5 MILLIGRAM(S): 2.5 TABLET, FILM COATED ORAL at 05:48

## 2019-11-06 RX ADMIN — Medication 2 SPRAY(S): at 05:49

## 2019-11-06 RX ADMIN — MORPHINE SULFATE 30 MILLIGRAM(S): 50 CAPSULE, EXTENDED RELEASE ORAL at 02:48

## 2019-11-06 RX ADMIN — Medication 1 MILLIGRAM(S): at 11:34

## 2019-11-06 RX ADMIN — Medication 81 MILLIGRAM(S): at 11:34

## 2019-11-06 RX ADMIN — Medication 3 MILLILITER(S): at 13:44

## 2019-11-06 RX ADMIN — Medication 3 MILLILITER(S): at 08:26

## 2019-11-06 RX ADMIN — LORATADINE 10 MILLIGRAM(S): 10 TABLET ORAL at 11:34

## 2019-11-06 RX ADMIN — LAMOTRIGINE 100 MILLIGRAM(S): 25 TABLET, ORALLY DISINTEGRATING ORAL at 05:48

## 2019-11-06 RX ADMIN — QUETIAPINE FUMARATE 25 MILLIGRAM(S): 200 TABLET, FILM COATED ORAL at 11:34

## 2019-11-06 RX ADMIN — Medication 650 MILLIGRAM(S): at 12:16

## 2019-11-06 RX ADMIN — MEROPENEM 100 MILLIGRAM(S): 1 INJECTION INTRAVENOUS at 13:44

## 2019-11-06 RX ADMIN — Medication 40 MILLIGRAM(S): at 05:48

## 2019-11-06 RX ADMIN — PANTOPRAZOLE SODIUM 40 MILLIGRAM(S): 20 TABLET, DELAYED RELEASE ORAL at 08:26

## 2019-11-06 RX ADMIN — GABAPENTIN 400 MILLIGRAM(S): 400 CAPSULE ORAL at 05:48

## 2019-11-06 RX ADMIN — MORPHINE SULFATE 30 MILLIGRAM(S): 50 CAPSULE, EXTENDED RELEASE ORAL at 08:32

## 2019-11-06 NOTE — PROGRESS NOTE ADULT - SUBJECTIVE AND OBJECTIVE BOX
pt seen and examined. feeling better. picc line in place on the right arm, clean site.   Vital Signs Last 24 Hrs  T(C): 36.7 (06 Nov 2019 07:57), Max: 36.7 (05 Nov 2019 23:55)  T(F): 98 (06 Nov 2019 07:57), Max: 98 (05 Nov 2019 23:55)  HR: 79 (06 Nov 2019 07:57) (69 - 79)  BP: 115/68 (06 Nov 2019 11:27) (110/53 - 117/57)  RR: 18 (06 Nov 2019 07:57) (18 - 18)  SpO2: 96% (06 Nov 2019 07:57) (96% - 96%)    Physical exam:   constitutional NAD, AAOX3, Respiratory  lungs CTA, CVS heart RRR, GI: abdomen Soft NT, ND, BS+, skin: right arm picc in place, clean site. not tender  neuro exam limited due to MS                          11.5   7.17  )-----------( 260      ( 05 Nov 2019 05:51 )             37.5   11-05    136  |  95<L>  |  15  ----------------------------<  120<H>  4.3   |  30  |  0.9    Ca    10.0      05 Nov 2019 05:51 pt seen and examined. feeling better. picc line in place on the right arm, clean site.   Vital Signs Last 24 Hrs  T(C): 36.7 (06 Nov 2019 07:57), Max: 36.7 (05 Nov 2019 23:55)  T(F): 98 (06 Nov 2019 07:57), Max: 98 (05 Nov 2019 23:55)  HR: 79 (06 Nov 2019 07:57) (69 - 79)  BP: 115/68 (06 Nov 2019 11:27) (110/53 - 117/57)  RR: 18 (06 Nov 2019 07:57) (18 - 18)  SpO2: 96% (06 Nov 2019 07:57) (96% - 96%)    Physical exam:   constitutional NAD, AAOX3, Respiratory  lungs CTA, CVS heart RRR, GI: abdomen Soft NT, ND, BS+, skin: right arm picc in place, clean site. not tender  neuro exam limited due to MS                          11.5   7.17  )-----------( 260      ( 05 Nov 2019 05:51 )             37.5   11-05    136  |  95<L>  |  15  ----------------------------<  120<H>  4.3   |  30  |  0.9    Ca    10.0      05 Nov 2019 05:51    a/p  UTI , ESBL, ertapenemx7 days per ID, picc care. fu with ID    dc back to SNF  time spent 35 min

## 2019-11-06 NOTE — PROGRESS NOTE ADULT - SUBJECTIVE AND OBJECTIVE BOX
CUATE HORNE  48y, Male  Allergy: dexmethylphenidate (Unknown)  Dilantin (Rash)  dilantin, compazine, , ritalin, phenergan (Unknown)  Haldol (Unknown)  hydantoin derivatives (Other)  methylphenidate (Unknown)  phenytoin (Unknown)  prochlorperazine (Unknown)  promethazine (Dystonic RXN)  rash/hives (Anaphylaxis)  thioxanthenes (Unknown)      CHIEF COMPLAINT: Fever and dysuria (2019 16:34)      INTERVAL EVENTS/HPI  - No acute events overnight  - T(F): , Max: 98 (19 @ 23:55)  - Denies any worsening symptoms  - Tolerating medication  - WBC Count: 7.17 (19 @ 05:51)  WBC Count: 4.51 (19 @ 05:30)  - Creatinine, Serum: 0.9 (19 @ 05:51)       ROS  General: Denies rigors, nightsweats  HEENT: Denies headache, rhinorrhea, sore throat, eye pain  CV: Denies CP, palpitations  PULM: Denies wheezing, hemoptysis  GI: Denies hematemesis, hematochezia, melena  : Denies discharge, hematuria  MSK: Denies arthralgias, myalgias  SKIN: Denies rash, lesions  NEURO: Denies paresthesias, weakness  PSYCH: Denies depression, anxiety    VITALS:  T(F): 98, Max: 98 (19 @ 23:55)  HR: 79  BP: 110/60  RR: 18Vital Signs Last 24 Hrs  T(C): 36.7 (2019 07:57), Max: 36.7 (2019 23:55)  T(F): 98 (2019 07:57), Max: 98 (2019 23:55)  HR: 79 (2019 07:57) (69 - 79)  BP: 110/60 (2019 05:42) (110/53 - 117/57)  BP(mean): --  RR: 18 (2019 07:57) (18 - 18)  SpO2: 96% (2019 07:57) (96% - 96%)    PHYSICAL EXAM:  Gen: NAD, resting in bed  HEENT: Normocephalic, atraumatic  Neck: supple, no lymphadenopathy  CV: Regular rate & regular rhythm, L chest TTP  Lungs: decreased BS at bases, no fremitus  Abdomen: Soft, BS present no suprapubic tenderness, no CVAT   Ext: Warm, well perfused  Neuro: non focal, awake  Skin: no rash, no erythema  Lines: no phlebitis    FH: Non-contributory  Social Hx: Non-contributory    TESTS & MEASUREMENTS:                        11.5   7.17  )-----------( 260      ( 2019 05:51 )             37.5         136  |  95<L>  |  15  ----------------------------<  120<H>  4.3   |  30  |  0.9    Ca    10.0      2019 05:51              Culture - Urine (collected 19 @ 05:30)  Source: .Urine Clean Catch (Midstream)  Final Report (19 @ 17:46):    >100,000 CFU/ml Escherichia coli ESBL  Organism: Escherichia coli ESBL (19 @ 17:46)  Organism: Escherichia coli ESBL (19 @ 17:46)      -  Amikacin: S <=16      -  Ampicillin: R >16 These ampicillin results predict results for amoxicillin      -  Ampicillin/Sulbactam: R 16/8 Enterobacter, Citrobacter, and Serratia may develop resistance during prolonged therapy (3-4 days)      -  Aztreonam: R >16      -  Cefazolin: R >16 (MIC_CL_COM_ENTERIC_CEFAZU) For uncomplicated UTI with K. pneumoniae, E. coli, or P. mirablis: CARLYN <=16 is sensitive and CARLYN >=32 is resistant. This also predicts results for oral agents cefaclor, cefdinir, cefpodoxime, cefprozil, cefuroxime axetil, cephalexin and locarbef for uncomplicated UTI. Note that some isolates may be susceptible to these agents while testing resistant to cefazolin.      -  Cefepime: R >16      -  Cefoxitin: S <=8      -  Ceftriaxone: R >32 Enterobacter, Citrobacter, and Serratia may develop resistance during prolonged therapy      -  Ciprofloxacin: R >2      -  Ertapenem: S <=1      -  Gentamicin: S <=4      -  Imipenem: S <=1      -  Levofloxacin: R >4      -  Meropenem: S <=1      -  Nitrofurantoin: S <=32 Should not be used to treat pyelonephritis      -  Piperacillin/Tazobactam: R <=16      -  Tigecycline: S <=2      -  Tobramycin: R >8      -  Trimethoprim/Sulfamethoxazole: R >2/38      Method Type: CARLYN    Culture - Blood (collected 19 @ 04:30)  Source: .Blood Blood-Peripheral  Preliminary Report (19 @ 11:01):    No growth to date.    Culture - Blood (collected 19 @ 04:30)  Source: .Blood Blood-Peripheral  Preliminary Report (19 @ 11:01):    No growth to date.        Blood Gas Venous - Lactate: 1.8 mmoL/L (19 @ 04:07)      INFECTIOUS DISEASES TESTING  Rapid RVP Result: NotDetec (10-17-19 @ 10:20)  Rapid RVP Result: NotDetec (19 @ 01:00)  MRSA PCR Result.: Negative (19 @ 22:29)  HIV-1/2 Combo Result: Nonreact (19 @ 05:37)      RADIOLOGY & ADDITIONAL TESTS:  I have personally reviewed the last Chest xray  CXR      CT      CARDIOLOGY TESTING  Transthoracic Echocardiogram:    EXAM:  2-D ECHO (TTE) COMPLETE        PROCEDURE DATE:  2019      INTERPRETATION:  REPORT:    TRANSTHORACIC ECHOCARDIOGRAM REPORT         Patient Name:   CUATE HORNE Accession #: [id number]  Medical Rec #:  [id number]   Height:      66.0 in 167.6 cm  YOB: 1970  Weight:      201.0 lb 91.17 kg  Patient Age:    48 years    BSA:         2.00 m²  Patient Gender: M           BP:          121/57 mmHg       Date of Exam:        11/3/2019 11:11:37 AM  Referring Physician: [id number] ED UNASSIGNED  Sonographer:         Sylwia Clay  Reading Physician:   Bee Walters M.D.    Procedure:   2D Echo/Doppler/Color Doppler Complete.  Indications: I31.3 - Pericardial Effusion noninflammatory  Diagnosis:   Pericardial effusion (noninflammatory) - I31.3         Summary:   1. Left ventricular ejection fraction, by visual estimation, is 45 to   50%.   2. Mildly decreased global left ventricular systolic function.   3. Entire septum and basal and mid inferior wall are abnormal as   described above.   4. Trivial pericardial effusion.    PHYSICIAN INTERPRETATION:  Left Ventricle: The left ventricular internal cavity size is mildly   increased. Left ventricular wall thickness is normal. Global LV systolic   function was mildly decreased. Left ventricular ejection fraction, by   visual estimation, is 45 to 50%. The left ventricular diastolic function   could not be assessed in this study.       LV Wall Scoring:  The entire septum and basal and mid inferior wall are hypokinetic.    Right Ventricle: Normal right ventricular size and function.  Left Atrium: Normal left atrial size.  Right Atrium: Normal right atrial size.  Pericardium: Trivial pericardial effusion is present.  Mitral Valve: Thickening of the anterior mitral leaflet. No evidence of   mitral stenosis. No mitral valve regurgitation is seen.  Tricuspid Valve: The tricuspid valve is normal in structure. Mild   tricuspid regurgitation is visualized.  Aortic Valve: The aortic valve is trileaflet. No evidence of aortic   stenosis. No aortic regurgitation.  Pulmonic Valve: Trace pulmonic valve regurgitation.  Aorta: Aortic root measured at sinotubular junction is normal.  Venous: The inferior vena cava was normal sized, with respiratory size   variation less than 50%.  Additional Comments: A pacer wire is visualized in the right ventricle.       2D AND M-MODE MEASUREMENTS (normal ranges within parentheses):  Left                  Normal   Aorta/Left             Normal  Ventricle:                     Atrium:  IVSd (2D):  0.61 cm  (0.7-1.1) AoV Cusp       2.19  (1.5-2.6)  LVPWd       0.61 cm  (0.7-1.1) Separation:     cm  (2D):                          Left Atrium    3.87  (1.9-4.0)  LVIDd       5.60 cm  (3.4-5.7) (Mmode):        cm  (2D):         LA Volume      10.2  LVIDs       3.51 cm            Index         ml/m²  (2D):                          Right  LV FS       37.4 %    (>25%)   Ventricle:  (2D):                          RVd (2D):      3.55 cm  IVSd        0.74 cm  (0.7-1.1)  (Mmode):  LVPWd       0.86 cm  (0.7-1.1)  (Mmode):  LVIDd       5.15 cm  (3.4-5.7)  (Mmode):  LVIDs       3.59 cm  (Mmode):  LV FS       30.3 %    (>25%)  (Mmode):  Relative     0.22     (<0.42)  Wall  Thickness  Rel. Wall    0.33     (<0.42)  Thickness  Mm  LV Mass    71.4 g/m²  Index:  Mmode    SPECTRAL DOPPLER ANALYSIS:  LV DIASTOLIC FUNCTION:  MV Peak E: 0.74 m/s Decel Time: 173 msec  MV Peak A: 0.60 m/s  E/A Ratio: 1.22    Aortic Valve:  AoV VMax:    1.16 m/s AoV Area, Vmax: 5.43 cm² Vmax Indx: 2.71 cm²/m²  AoV Pk Grad: 5.4 mmHg    LVOT Vmax: 0.96 m/s  LVOT VTI:  0.19 m  LVOT Diam: 2.90 cm    Mitral Valve:  MV P1/2 Time: 50.11 msec  MV Area, PHT: 4.39 cm²    Tricuspid Valve and PA/RV Systolic Pressure: TR Max Velocity: 2.25 m/s RA   Pressure: 8 mmHg RVSP/PASP: 28.3 mmHg    Pulmonic Valve:  PV Max Velocity: 0.61 m/s PV Max P.5 mmHg PV Mean PG:       [id number] Bee Walters M.D., Electronically signed on 11/3/2019 at   12:50:37 PM              *** Final ***                    BEE WALTERS M.D., ATTENDING CARDIOLOGIST  This document has been electronically signed. Nov  3 2019 11:11AM             (19 @ 11:11)  12 Lead ECG:   Ventricular Rate 67 BPM    Atrial Rate 61 BPM    QRS Duration 138 ms    Q-T Interval 396 ms    QTC Calculation(Bezet) 418 ms    R Axis -10 degrees    T Axis -8 degrees    Diagnosis Line Atrial-paced rhythm with occasional ventricular-paced complexes  Right bundle branch block  Abnormal ECG    Confirmed by Bee Walters MD ([id number]) on 2019 7:53:36 AM (19 @ 04:12)      MEDICATIONS  albuterol/ipratropium for Nebulization 3  apixaban 5  aspirin enteric coated 81  atorvastatin 40  baclofen 10  budesonide 160 MICROgram(s)/formoterol 4.5 MICROgram(s) Inhaler 2  fluticasone propionate 50 MICROgram(s)/spray Nasal Spray 2  folic acid 1  furosemide    Tablet 20  furosemide    Tablet 40  gabapentin 400  influenza   Vaccine 0.5  lamoTRIgine 100  loratadine 10  metoprolol succinate ER 50  pantoprazole    Tablet 40  polyethylene glycol 3350 17  QUEtiapine 25  QUEtiapine 50  tamsulosin 0.4  tiotropium 18 MICROgram(s) Capsule 1      ANTIBIOTICS:      All available historical records have been reviewed

## 2019-11-06 NOTE — PROGRESS NOTE ADULT - ASSESSMENT
48y M admitted with SEPSIS;SVT(SUPRAVENTRICULAR TACHYCARDIA)      PROBLEMS  1. Severe Sepsis ( T>101F, Pulse>90, Resp Rate>20), lactic acidosis, due to suspected UTI in Bipolar pt with hx multiple sclerosis 2/2 acute cystitis   UCX ESBL (R bactrim)  11/2 Urine C&S   >100,000 CFU/ml Gram Negative Rods  11/2 Cxray with Improving left basilar opacity.  11/2 U/A  Nitrite: Positive /Leuk Esterase: Moderate / RBC: 3 /HPF / WBC 99 /HPF / Bacteria: Many    2. Lactic acidosis by VBG  3. BMI (kg/m2): 32.5 (11-02-19 @ 04:05)    PLAN  - shraddha 1g q8h IV, d/c with midline on ertapenem 1g q24h to complete total of 7 days   - please call micro to add on fosfomycin sensitivities

## 2019-11-06 NOTE — PROGRESS NOTE ADULT - REASON FOR ADMISSION
Fever and dysuria

## 2019-11-07 LAB
-  FOSFOMYCIN: 27 — SIGNIFICANT CHANGE UP
-  FOSFOMYCIN: SIGNIFICANT CHANGE UP
CULTURE RESULTS: SIGNIFICANT CHANGE UP
CULTURE RESULTS: SIGNIFICANT CHANGE UP
METHOD TYPE: SIGNIFICANT CHANGE UP
ORGANISM # SPEC MICROSCOPIC CNT: SIGNIFICANT CHANGE UP
SPECIMEN SOURCE: SIGNIFICANT CHANGE UP
SPECIMEN SOURCE: SIGNIFICANT CHANGE UP

## 2019-11-08 NOTE — CONSULT NOTE ADULT - PROBLEM SELECTOR RECOMMENDATION 9
Dr. Marin visited at bedside, discussed findings and recommendations with patient and family member; all questions asked and answered. Verbalized understanding of information given. Handout provided at time of discharge.   RECURRENT CHEST PAIN UN CERTAIN ETIOLOGY  WORK UP  FOR CAD

## 2019-11-09 LAB
CULTURE RESULTS: SIGNIFICANT CHANGE UP
SPECIMEN SOURCE: SIGNIFICANT CHANGE UP

## 2019-11-12 DIAGNOSIS — I47.2 VENTRICULAR TACHYCARDIA: ICD-10-CM

## 2019-11-12 DIAGNOSIS — R50.9 FEVER, UNSPECIFIED: ICD-10-CM

## 2019-11-12 DIAGNOSIS — I11.0 HYPERTENSIVE HEART DISEASE WITH HEART FAILURE: ICD-10-CM

## 2019-11-12 DIAGNOSIS — Z90.79 ACQUIRED ABSENCE OF OTHER GENITAL ORGAN(S): ICD-10-CM

## 2019-11-12 DIAGNOSIS — Z87.891 PERSONAL HISTORY OF NICOTINE DEPENDENCE: ICD-10-CM

## 2019-11-12 DIAGNOSIS — F43.10 POST-TRAUMATIC STRESS DISORDER, UNSPECIFIED: ICD-10-CM

## 2019-11-12 DIAGNOSIS — G35 MULTIPLE SCLEROSIS: ICD-10-CM

## 2019-11-12 DIAGNOSIS — Z95.810 PRESENCE OF AUTOMATIC (IMPLANTABLE) CARDIAC DEFIBRILLATOR: ICD-10-CM

## 2019-11-12 DIAGNOSIS — Z79.01 LONG TERM (CURRENT) USE OF ANTICOAGULANTS: ICD-10-CM

## 2019-11-12 DIAGNOSIS — N39.0 URINARY TRACT INFECTION, SITE NOT SPECIFIED: ICD-10-CM

## 2019-11-12 DIAGNOSIS — Z99.81 DEPENDENCE ON SUPPLEMENTAL OXYGEN: ICD-10-CM

## 2019-11-12 DIAGNOSIS — J44.9 CHRONIC OBSTRUCTIVE PULMONARY DISEASE, UNSPECIFIED: ICD-10-CM

## 2019-11-12 DIAGNOSIS — I48.91 UNSPECIFIED ATRIAL FIBRILLATION: ICD-10-CM

## 2019-11-12 DIAGNOSIS — Z86.73 PERSONAL HISTORY OF TRANSIENT ISCHEMIC ATTACK (TIA), AND CEREBRAL INFARCTION WITHOUT RESIDUAL DEFICITS: ICD-10-CM

## 2019-11-12 DIAGNOSIS — J96.10 CHRONIC RESPIRATORY FAILURE, UNSPECIFIED WHETHER WITH HYPOXIA OR HYPERCAPNIA: ICD-10-CM

## 2019-11-12 DIAGNOSIS — Z88.8 ALLERGY STATUS TO OTHER DRUGS, MEDICAMENTS AND BIOLOGICAL SUBSTANCES STATUS: ICD-10-CM

## 2019-11-12 DIAGNOSIS — D63.8 ANEMIA IN OTHER CHRONIC DISEASES CLASSIFIED ELSEWHERE: ICD-10-CM

## 2019-11-12 DIAGNOSIS — I50.32 CHRONIC DIASTOLIC (CONGESTIVE) HEART FAILURE: ICD-10-CM

## 2019-11-12 DIAGNOSIS — Z85.47 PERSONAL HISTORY OF MALIGNANT NEOPLASM OF TESTIS: ICD-10-CM

## 2019-11-12 DIAGNOSIS — F31.9 BIPOLAR DISORDER, UNSPECIFIED: ICD-10-CM

## 2019-11-12 DIAGNOSIS — N30.00 ACUTE CYSTITIS WITHOUT HEMATURIA: ICD-10-CM

## 2019-11-12 DIAGNOSIS — G89.29 OTHER CHRONIC PAIN: ICD-10-CM

## 2019-11-12 DIAGNOSIS — A41.9 SEPSIS, UNSPECIFIED ORGANISM: ICD-10-CM

## 2019-11-12 DIAGNOSIS — R65.20 SEVERE SEPSIS WITHOUT SEPTIC SHOCK: ICD-10-CM

## 2019-11-12 DIAGNOSIS — G40.909 EPILEPSY, UNSPECIFIED, NOT INTRACTABLE, WITHOUT STATUS EPILEPTICUS: ICD-10-CM

## 2019-11-12 DIAGNOSIS — E78.5 HYPERLIPIDEMIA, UNSPECIFIED: ICD-10-CM

## 2019-11-12 DIAGNOSIS — N40.0 BENIGN PROSTATIC HYPERPLASIA WITHOUT LOWER URINARY TRACT SYMPTOMS: ICD-10-CM

## 2019-11-12 DIAGNOSIS — K21.9 GASTRO-ESOPHAGEAL REFLUX DISEASE WITHOUT ESOPHAGITIS: ICD-10-CM

## 2019-11-12 DIAGNOSIS — Z79.82 LONG TERM (CURRENT) USE OF ASPIRIN: ICD-10-CM

## 2019-11-12 DIAGNOSIS — K59.00 CONSTIPATION, UNSPECIFIED: ICD-10-CM

## 2019-11-16 LAB
CULTURE RESULTS: SIGNIFICANT CHANGE UP
SPECIMEN SOURCE: SIGNIFICANT CHANGE UP

## 2019-11-20 ENCOUNTER — APPOINTMENT (OUTPATIENT)
Dept: CARDIOTHORACIC SURGERY | Facility: CLINIC | Age: 49
End: 2019-11-20
Payer: MEDICAID

## 2019-11-20 VITALS
SYSTOLIC BLOOD PRESSURE: 111 MMHG | DIASTOLIC BLOOD PRESSURE: 81 MMHG | WEIGHT: 198 LBS | RESPIRATION RATE: 14 BRPM | HEIGHT: 66 IN | OXYGEN SATURATION: 97 % | BODY MASS INDEX: 31.82 KG/M2 | HEART RATE: 72 BPM | TEMPERATURE: 98.8 F

## 2019-11-20 DIAGNOSIS — Z98.890 OTHER SPECIFIED POSTPROCEDURAL STATES: ICD-10-CM

## 2019-11-20 PROCEDURE — 99024 POSTOP FOLLOW-UP VISIT: CPT

## 2019-11-20 RX ORDER — FLUTICASONE FUROATE AND VILANTEROL TRIFENATATE 200; 25 UG/1; UG/1
200-25 POWDER RESPIRATORY (INHALATION)
Refills: 0 | Status: DISCONTINUED | COMMUNITY
End: 2019-11-20

## 2019-11-20 RX ORDER — DRONABINOL 5 MG/1
5 CAPSULE ORAL
Refills: 0 | Status: DISCONTINUED | COMMUNITY
End: 2019-11-20

## 2019-11-28 ENCOUNTER — OUTPATIENT (OUTPATIENT)
Dept: OUTPATIENT SERVICES | Facility: HOSPITAL | Age: 49
LOS: 1 days | Discharge: HOME | End: 2019-11-28

## 2019-11-28 DIAGNOSIS — Z98.890 OTHER SPECIFIED POSTPROCEDURAL STATES: Chronic | ICD-10-CM

## 2019-11-28 DIAGNOSIS — Z96.649 PRESENCE OF UNSPECIFIED ARTIFICIAL HIP JOINT: Chronic | ICD-10-CM

## 2019-11-28 DIAGNOSIS — N39.0 URINARY TRACT INFECTION, SITE NOT SPECIFIED: ICD-10-CM

## 2019-11-28 DIAGNOSIS — T82.120A DISPLACEMENT OF CARDIAC ELECTRODE, INITIAL ENCOUNTER: Chronic | ICD-10-CM

## 2019-12-02 NOTE — H&P ADULT - NSICDXPASTSURGICALHX_GEN_ALL_CORE_FT
normal...
PAST SURGICAL HISTORY:  Cardiac pacemaker recipient     H/O hernia repair right 1972,1991    H/O prior ablation treatment for Afib    History of hip replacement     S/P Implantation of AICD     S/P Orchiectomy L for testicular CA

## 2019-12-20 ENCOUNTER — OUTPATIENT (OUTPATIENT)
Dept: OUTPATIENT SERVICES | Facility: HOSPITAL | Age: 49
LOS: 1 days | Discharge: HOME | End: 2019-12-20

## 2019-12-20 DIAGNOSIS — Z98.890 OTHER SPECIFIED POSTPROCEDURAL STATES: Chronic | ICD-10-CM

## 2019-12-20 DIAGNOSIS — R50.9 FEVER, UNSPECIFIED: ICD-10-CM

## 2019-12-20 DIAGNOSIS — T82.120A DISPLACEMENT OF CARDIAC ELECTRODE, INITIAL ENCOUNTER: Chronic | ICD-10-CM

## 2019-12-20 DIAGNOSIS — Z96.649 PRESENCE OF UNSPECIFIED ARTIFICIAL HIP JOINT: Chronic | ICD-10-CM

## 2019-12-30 ENCOUNTER — APPOINTMENT (OUTPATIENT)
Dept: CARDIOLOGY | Facility: CLINIC | Age: 49
End: 2019-12-30
Payer: MEDICAID

## 2019-12-30 VITALS
DIASTOLIC BLOOD PRESSURE: 66 MMHG | HEART RATE: 70 BPM | BODY MASS INDEX: 31.5 KG/M2 | WEIGHT: 196 LBS | SYSTOLIC BLOOD PRESSURE: 116 MMHG | HEIGHT: 66 IN

## 2019-12-30 PROCEDURE — 93283 PRGRMG EVAL IMPLANTABLE DFB: CPT

## 2019-12-30 PROCEDURE — 99213 OFFICE O/P EST LOW 20 MIN: CPT

## 2019-12-30 PROCEDURE — 93290 INTERROG DEV EVAL ICPMS IP: CPT

## 2019-12-31 NOTE — ASSESSMENT
[FreeTextEntry1] : AF - pt continue to have PAF\par - will cont rate control at this time\par - cont metoprolol

## 2019-12-31 NOTE — PHYSICAL EXAM
[Normal Appearance] : normal appearance [General Appearance - Well Developed] : well developed [Well Groomed] : well groomed [No Deformities] : no deformities [General Appearance - Well Nourished] : well nourished [Heart Sounds] : normal S1 and S2 [General Appearance - In No Acute Distress] : no acute distress [Heart Rate And Rhythm] : heart rate and rhythm were normal [Murmurs] : no murmurs present [Respiration, Rhythm And Depth] : normal respiratory rhythm and effort [Exaggerated Use Of Accessory Muscles For Inspiration] : no accessory muscle use [Auscultation Breath Sounds / Voice Sounds] : lungs were clear to auscultation bilaterally [Well-Healed] : well-healed [Dry] : dry [Clean] : clean [Abdomen Soft] : soft [Abdomen Tenderness] : non-tender [Nail Clubbing] : no clubbing of the fingernails [Cyanosis, Localized] : no localized cyanosis [Abdomen Mass (___ Cm)] : no abdominal mass palpated [] : no ischemic changes [Petechial Hemorrhages (___cm)] : no petechial hemorrhages

## 2019-12-31 NOTE — HISTORY OF PRESENT ILLNESS
[None] : The patient complains of no symptoms [Palpitations] : no palpitations [SOB] : no dyspnea [Syncope] : no syncope [Swelling at Site] : no swelling at device site [de-identified] : Patietn found tohave fracture RV lead S?P lead extraction and re-implant. Later found tohave pericardial effusion with micro-perf. Now pt is doing much better [Erythema at Site] : no erythema at device site

## 2019-12-31 NOTE — PROCEDURE
[ICD] : Implantable cardioverter-defibrillator [DDDR] : DDDR [Charge Time: ___ sec] : charge time was [unfilled] seconds [Impedance: ___ Ohms] : current cell impedance is [unfilled] Ohms [Normal] : The battery status is normal. [Threshold Testing Performed] : Threshold testing was performed [Longevity: ___ months] : The estimated remaining battery life is [unfilled] months [Ventricular] : Ventricular [Atrial] : Atrial [Lead Imp:  ___ohms] : lead impedance was [unfilled] ohms [Sensing Amplitude ___mv] : sensing amplitude was [unfilled] mv [Outputs/Safety Margin] : output changed to allow for adequate safety margin [___ ms] : [unfilled] ms [___V @] : [unfilled] V [Counters Reset] : the counters were reset [Programmed for Longevity] : output reprogrammed for improved battery longevity [Asense-Vsense ___ %] : Asense-Vsense [unfilled]% [Asense-Vpace ___ %] : Asense-Vpace [unfilled]% [Apace-Vpace ___ %] : Apace-Vpace [unfilled]% [Apace-Vsense ___ %] : Apace-Vsense [unfilled]% [de-identified] : [facility] [employer] [de-identified] : [id number] [de-identified] : Fortify [id number] [de-identified] : 9/5/2019 [de-identified] : 60 [de-identified] : 1 SVT episode (duration 6.5 min)\par CorVue stable

## 2020-01-05 NOTE — H&P ADULT - REASON FOR ADMISSION
pt w/ hx copd / etoh abuse / wcopd exac and intoxication  mult admissions  ciwa protocol  iv steroids/taper steroids as per pulm   change to po   pulm eval noted  nebs   o2  dvt proph  cards f/u noted  dvt proph  gi proph   hypomagnesemia  supp mg prn  hyponatremia improved  fluid restrict  renal eval noted  f/u   pt    d/c likely on monday Chest pain, AICD firing

## 2020-02-18 ENCOUNTER — OUTPATIENT (OUTPATIENT)
Dept: OUTPATIENT SERVICES | Facility: HOSPITAL | Age: 50
LOS: 1 days | Discharge: HOME | End: 2020-02-18

## 2020-02-18 DIAGNOSIS — Z98.890 OTHER SPECIFIED POSTPROCEDURAL STATES: Chronic | ICD-10-CM

## 2020-02-18 DIAGNOSIS — T82.120A DISPLACEMENT OF CARDIAC ELECTRODE, INITIAL ENCOUNTER: Chronic | ICD-10-CM

## 2020-02-18 DIAGNOSIS — Z96.649 PRESENCE OF UNSPECIFIED ARTIFICIAL HIP JOINT: Chronic | ICD-10-CM

## 2020-02-18 DIAGNOSIS — K02.63 DENTAL CARIES ON SMOOTH SURFACE PENETRATING INTO PULP: ICD-10-CM

## 2020-03-13 ENCOUNTER — APPOINTMENT (OUTPATIENT)
Dept: CARDIOLOGY | Facility: CLINIC | Age: 50
End: 2020-03-13
Payer: MEDICAID

## 2020-03-13 VITALS
BODY MASS INDEX: 33.11 KG/M2 | WEIGHT: 206 LBS | SYSTOLIC BLOOD PRESSURE: 103 MMHG | HEART RATE: 69 BPM | DIASTOLIC BLOOD PRESSURE: 66 MMHG | TEMPERATURE: 98 F | HEIGHT: 66 IN

## 2020-03-13 DIAGNOSIS — I50.22 CHRONIC SYSTOLIC (CONGESTIVE) HEART FAILURE: ICD-10-CM

## 2020-03-13 PROCEDURE — 93290 INTERROG DEV EVAL ICPMS IP: CPT

## 2020-03-13 PROCEDURE — 99213 OFFICE O/P EST LOW 20 MIN: CPT

## 2020-03-13 PROCEDURE — 93283 PRGRMG EVAL IMPLANTABLE DFB: CPT

## 2020-03-21 PROBLEM — I50.22 CHRONIC SYSTOLIC CONGESTIVE HEART FAILURE: Status: ACTIVE | Noted: 2019-12-31

## 2020-03-21 NOTE — HISTORY OF PRESENT ILLNESS
[None] : The patient complains of no symptoms [Palpitations] : no palpitations [SOB] : no dyspnea [Syncope] : no syncope [Erythema at Site] : no erythema at device site [Swelling at Site] : no swelling at device site [de-identified] : Patietn found tohave fracture RV lead S?P lead extraction and re-implant. Later found tohave pericardial effusion with micro-perf. Now pt is doing much better

## 2020-03-21 NOTE — PROCEDURE
[ICD] : Implantable cardioverter-defibrillator [DDDR] : DDDR [Impedance: ___ Ohms] : current cell impedance is [unfilled] Ohms [Charge Time: ___ sec] : charge time was [unfilled] seconds [Longevity: ___ months] : The estimated remaining battery life is [unfilled] months [Normal] : The battery status is normal. [Threshold Testing Performed] : Threshold testing was performed [Atrial] : Atrial [Ventricular] : Ventricular [Lead Imp:  ___ohms] : lead impedance was [unfilled] ohms [Sensing Amplitude ___mv] : sensing amplitude was [unfilled] mv [___V @] : [unfilled] V [___ ms] : [unfilled] ms [Counters Reset] : the counters were reset [Asense-Vsense ___ %] : Asense-Vsense [unfilled]% [Asense-Vpace ___ %] : Asense-Vpace [unfilled]% [Apace-Vsense ___ %] : Apace-Vsense [unfilled]% [Apace-Vpace ___ %] : Apace-Vpace [unfilled]% [Programmed for Longevity] : output reprogrammed for improved battery longevity [de-identified] : [employer] [de-identified] : Fortify [id number] [de-identified] : [id number] [de-identified] : 9/5/2019 [de-identified] : 70 [de-identified] : 95 episodes of SVT - longest 7 minutes on Mar 3, 2020 (printed).\par 3 AMS episodes. \par CorVue stable

## 2020-03-23 ENCOUNTER — EMERGENCY (EMERGENCY)
Facility: HOSPITAL | Age: 50
LOS: 0 days | Discharge: NURSING, REHAB OR LONG-TERM CARE | End: 2020-03-23
Attending: EMERGENCY MEDICINE | Admitting: EMERGENCY MEDICINE
Payer: MEDICAID

## 2020-03-23 VITALS
DIASTOLIC BLOOD PRESSURE: 72 MMHG | OXYGEN SATURATION: 98 % | RESPIRATION RATE: 20 BRPM | HEART RATE: 70 BPM | TEMPERATURE: 101 F | SYSTOLIC BLOOD PRESSURE: 135 MMHG

## 2020-03-23 VITALS — TEMPERATURE: 100 F

## 2020-03-23 DIAGNOSIS — I11.0 HYPERTENSIVE HEART DISEASE WITH HEART FAILURE: ICD-10-CM

## 2020-03-23 DIAGNOSIS — Z88.8 ALLERGY STATUS TO OTHER DRUGS, MEDICAMENTS AND BIOLOGICAL SUBSTANCES STATUS: ICD-10-CM

## 2020-03-23 DIAGNOSIS — R06.02 SHORTNESS OF BREATH: ICD-10-CM

## 2020-03-23 DIAGNOSIS — J44.9 CHRONIC OBSTRUCTIVE PULMONARY DISEASE, UNSPECIFIED: ICD-10-CM

## 2020-03-23 DIAGNOSIS — Z98.890 OTHER SPECIFIED POSTPROCEDURAL STATES: Chronic | ICD-10-CM

## 2020-03-23 DIAGNOSIS — Z96.649 PRESENCE OF UNSPECIFIED ARTIFICIAL HIP JOINT: ICD-10-CM

## 2020-03-23 DIAGNOSIS — I50.9 HEART FAILURE, UNSPECIFIED: ICD-10-CM

## 2020-03-23 DIAGNOSIS — I63.9 CEREBRAL INFARCTION, UNSPECIFIED: ICD-10-CM

## 2020-03-23 DIAGNOSIS — R05 COUGH: ICD-10-CM

## 2020-03-23 DIAGNOSIS — T82.120A DISPLACEMENT OF CARDIAC ELECTRODE, INITIAL ENCOUNTER: Chronic | ICD-10-CM

## 2020-03-23 DIAGNOSIS — R50.9 FEVER, UNSPECIFIED: ICD-10-CM

## 2020-03-23 DIAGNOSIS — E11.9 TYPE 2 DIABETES MELLITUS WITHOUT COMPLICATIONS: ICD-10-CM

## 2020-03-23 DIAGNOSIS — E78.5 HYPERLIPIDEMIA, UNSPECIFIED: ICD-10-CM

## 2020-03-23 DIAGNOSIS — Z96.649 PRESENCE OF UNSPECIFIED ARTIFICIAL HIP JOINT: Chronic | ICD-10-CM

## 2020-03-23 DIAGNOSIS — Z87.891 PERSONAL HISTORY OF NICOTINE DEPENDENCE: ICD-10-CM

## 2020-03-23 LAB
ALBUMIN SERPL ELPH-MCNC: 4 G/DL — SIGNIFICANT CHANGE UP (ref 3.5–5.2)
ALP SERPL-CCNC: 116 U/L — HIGH (ref 30–115)
ALT FLD-CCNC: 18 U/L — SIGNIFICANT CHANGE UP (ref 0–41)
ANION GAP SERPL CALC-SCNC: 13 MMOL/L — SIGNIFICANT CHANGE UP (ref 7–14)
AST SERPL-CCNC: 26 U/L — SIGNIFICANT CHANGE UP (ref 0–41)
BASOPHILS # BLD AUTO: 0.02 K/UL — SIGNIFICANT CHANGE UP (ref 0–0.2)
BASOPHILS NFR BLD AUTO: 0.6 % — SIGNIFICANT CHANGE UP (ref 0–1)
BILIRUB SERPL-MCNC: 0.2 MG/DL — SIGNIFICANT CHANGE UP (ref 0.2–1.2)
BUN SERPL-MCNC: 13 MG/DL — SIGNIFICANT CHANGE UP (ref 10–20)
CALCIUM SERPL-MCNC: 8.6 MG/DL — SIGNIFICANT CHANGE UP (ref 8.5–10.1)
CHLORIDE SERPL-SCNC: 99 MMOL/L — SIGNIFICANT CHANGE UP (ref 98–110)
CO2 SERPL-SCNC: 26 MMOL/L — SIGNIFICANT CHANGE UP (ref 17–32)
CREAT SERPL-MCNC: 0.9 MG/DL — SIGNIFICANT CHANGE UP (ref 0.7–1.5)
EOSINOPHIL # BLD AUTO: 0.03 K/UL — SIGNIFICANT CHANGE UP (ref 0–0.7)
EOSINOPHIL NFR BLD AUTO: 0.9 % — SIGNIFICANT CHANGE UP (ref 0–8)
FLU A RESULT: NEGATIVE — SIGNIFICANT CHANGE UP
FLU A RESULT: NEGATIVE — SIGNIFICANT CHANGE UP
FLUAV AG NPH QL: NEGATIVE — SIGNIFICANT CHANGE UP
FLUBV AG NPH QL: NEGATIVE — SIGNIFICANT CHANGE UP
GLUCOSE SERPL-MCNC: 101 MG/DL — HIGH (ref 70–99)
HCT VFR BLD CALC: 36 % — LOW (ref 42–52)
HGB BLD-MCNC: 11.5 G/DL — LOW (ref 14–18)
IMM GRANULOCYTES NFR BLD AUTO: 0.3 % — SIGNIFICANT CHANGE UP (ref 0.1–0.3)
LYMPHOCYTES # BLD AUTO: 0.99 K/UL — LOW (ref 1.2–3.4)
LYMPHOCYTES # BLD AUTO: 29.1 % — SIGNIFICANT CHANGE UP (ref 20.5–51.1)
MCHC RBC-ENTMCNC: 29 PG — SIGNIFICANT CHANGE UP (ref 27–31)
MCHC RBC-ENTMCNC: 31.9 G/DL — LOW (ref 32–37)
MCV RBC AUTO: 90.7 FL — SIGNIFICANT CHANGE UP (ref 80–94)
MONOCYTES # BLD AUTO: 0.35 K/UL — SIGNIFICANT CHANGE UP (ref 0.1–0.6)
MONOCYTES NFR BLD AUTO: 10.3 % — HIGH (ref 1.7–9.3)
NEUTROPHILS # BLD AUTO: 2 K/UL — SIGNIFICANT CHANGE UP (ref 1.4–6.5)
NEUTROPHILS NFR BLD AUTO: 58.8 % — SIGNIFICANT CHANGE UP (ref 42.2–75.2)
NRBC # BLD: 0 /100 WBCS — SIGNIFICANT CHANGE UP (ref 0–0)
NT-PROBNP SERPL-SCNC: 176 PG/ML — SIGNIFICANT CHANGE UP (ref 0–300)
PLATELET # BLD AUTO: 151 K/UL — SIGNIFICANT CHANGE UP (ref 130–400)
POTASSIUM SERPL-MCNC: 3.8 MMOL/L — SIGNIFICANT CHANGE UP (ref 3.5–5)
POTASSIUM SERPL-SCNC: 3.8 MMOL/L — SIGNIFICANT CHANGE UP (ref 3.5–5)
PROT SERPL-MCNC: 6.2 G/DL — SIGNIFICANT CHANGE UP (ref 6–8)
RBC # BLD: 3.97 M/UL — LOW (ref 4.7–6.1)
RBC # FLD: 14.5 % — SIGNIFICANT CHANGE UP (ref 11.5–14.5)
RSV RESULT: NEGATIVE — SIGNIFICANT CHANGE UP
RSV RNA RESP QL NAA+PROBE: NEGATIVE — SIGNIFICANT CHANGE UP
SODIUM SERPL-SCNC: 138 MMOL/L — SIGNIFICANT CHANGE UP (ref 135–146)
TROPONIN T SERPL-MCNC: <0.01 NG/ML — SIGNIFICANT CHANGE UP
WBC # BLD: 3.4 K/UL — LOW (ref 4.8–10.8)
WBC # FLD AUTO: 3.4 K/UL — LOW (ref 4.8–10.8)

## 2020-03-23 PROCEDURE — 93010 ELECTROCARDIOGRAM REPORT: CPT

## 2020-03-23 PROCEDURE — 99285 EMERGENCY DEPT VISIT HI MDM: CPT

## 2020-03-23 PROCEDURE — 71045 X-RAY EXAM CHEST 1 VIEW: CPT | Mod: 26

## 2020-03-23 RX ORDER — ACETAMINOPHEN 500 MG
650 TABLET ORAL ONCE
Refills: 0 | Status: COMPLETED | OUTPATIENT
Start: 2020-03-23 | End: 2020-03-23

## 2020-03-23 RX ADMIN — Medication 650 MILLIGRAM(S): at 13:29

## 2020-03-23 NOTE — ED PROVIDER NOTE - PATIENT PORTAL LINK FT
You can access the FollowMyHealth Patient Portal offered by [facility] by registering at the following website: [email or web address]. By joining [facility] [employer]’s FollowMyHealth portal, you will also be able to view your health information using other applications (apps) compatible with our system.

## 2020-03-23 NOTE — ED ADULT NURSE NOTE - CAS EDN DISCHARGE INTERVENTIONS
patient came to ED with IV access from nursing home and as per DR. Unger  -pt will be discharged with that IV

## 2020-03-23 NOTE — ED PROVIDER NOTE - PHYSICAL EXAMINATION
PHYSICAL EXAM: I have reviewed current vital signs.  GENERAL: NAD, obese.  HEAD:  Normocephalic, atraumatic.  EYES: Conjunctiva and sclera clear.  ENT: MMM.  NECK: Supple, FROM.  CHEST/LUNG: Clear to auscultation bilaterally; no wheezes, rales, or rhonchi.  HEART: Regular rate and rhythm, normal S1 and S2; no murmurs, rubs, or gallops.  ABDOMEN: Soft, nontender, nondistended.  EXTREMITIES:  2+ peripheral pulses; no edema.  NEUROLOGY: A&O x 3. Motor 5/5. No focal neurological deficits.  SKIN: Warm and dry.

## 2020-03-23 NOTE — ED PROVIDER NOTE - NS ED ROS FT
Constitutional:  No fevers or chills.  Eyes:  No visual changes, eye pain, or discharge.  ENT:  No hearing changes. No sore throat.  Neck:  No neck pain or stiffness.  Cardiac:  No CP or edema.  Resp:  No cough or SOB. No hemoptysis.   GI:  No nausea, vomiting, diarrhea, or abdominal pain.  :  No dysuria, frequency, or hematuria.  MSK:  No myalgias or joint pain/swelling.  Neuro:  No headache, dizziness, or weakness.  Skin:  No skin rash. Constitutional:  +F/c.  Eyes:  No visual changes, eye pain, or discharge.  ENT:  No sore throat.  Neck:  No neck pain or stiffness.  Cardiac:  No CP or edema.  Resp:  +SOB/cough, no hemoptysis.  GI:  No nausea, vomiting, diarrhea, or abdominal pain.  MSK:  No myalgias or joint pain/swelling.  Neuro:  No headache, dizziness, or weakness.  Skin:  No skin rash.

## 2020-03-23 NOTE — ED PROVIDER NOTE - OBJECTIVE STATEMENT
48yo M with PMH of AICD, anemia, afib on Eliquis, bipolar disorder, BPH, CAD, cardiomyopathy, CHF, chronic pain, COPD, CVA, GERD, HTN, HLD, migraines, trace pericardial effusion, neuropathy, MS, schizophrenia, PTSD, DM, and TIA presenting to ED with URI sxs x 4-5 days. Patient had fever at home (cannot recall tmax)/chills, cough (no hemoptysis), and mild increased SOB that started today. Endorses 48yo M with PMH of AICD, anemia, afib on Eliquis, bipolar disorder, BPH, CAD, cardiomyopathy, CHF, chronic pain, COPD, CVA, GERD, HTN, HLD, migraines, trace pericardial effusion, neuropathy, MS, schizophrenia, PTSD, DM, and TIA presenting to ED with URI sxs x 4-5 days. Patient had fever/chills (cannot recall tmax), cough (no hemoptysis), and mild increased SOB that started today. Was supposed to have blood work/CXR done but did not have it done and came to ED. Denies any recent travel, active CP, abdominal pain, vomiting/diarrhea, rashes, injuries/traumas/falls, or LE pain/swelling. Former smoker. 50yo M with PMH of AICD, anemia, afib on Eliquis, bipolar disorder, BPH, CAD, cardiomyopathy, CHF, chronic pain, COPD, CVA, GERD, HTN, HLD, migraines, trace pericardial effusion, neuropathy, MS, schizophrenia, PTSD, DM, and TIA presenting to ED with URI sxs x 4-5 days. Patient had fever/chills (tmax of about 102), cough (no hemoptysis), and mild increased SOB that started today. Was supposed to have blood work/CXR done but did not have it done and came to ED. Denies any recent travel, active CP, abdominal pain, vomiting/diarrhea, rashes, injuries/traumas/falls, or LE pain/swelling. Former smoker.

## 2020-03-23 NOTE — ED PROVIDER NOTE - ATTENDING CONTRIBUTION TO CARE
49 M to ED from NH for fever and cough  complicated medical history as above  sx worsening over past few days so to ED for eval.  Exam as noted

## 2020-03-23 NOTE — ED PROVIDER NOTE - PROGRESS NOTE DETAILS
Labs stable/unremarkable, covid test sent. Instructed to self quarantine for 2 weeks. Hemodynamically stable, CXR clear. Full DC instructions and precaution signs and symptoms discussed. Proper follow up ensured.  Medications administered and prescribed/OTC home meds discussed.  All questions and concerns from patient addressed.  Understanding of instructions verbalized.

## 2020-03-23 NOTE — ED ADULT NURSE NOTE - OBJECTIVE STATEMENT
Pt BIBA from nursing home due to covid symptoms as per pt. Pt c/o SOB, fever  -tmax 102.3. Pt c/o cough for several day. Pt stated he was not being treated by Primary care doctor work and called department of [facility] and [facility] to be taken to the hospital. Pt assessed, a&ox4, vss - placed on oxygen due to desaturation while sleeping  -placed on 2L NC - now saturating 99%. b/l lungs auscultated  -clear. Pt denies any n/v/d. Will continue to monitor and assess/ Pt BIBA from nursing home due to covid symptoms as per pt. Pt c/o SOB, fever  -tmax 102.3. Pt c/o cough for several day. Pt stated he was not being treated by Primary care doctor work and called department of [facility] and [facility] to be taken to the hospital. Pt assessed, a&ox4, vss - placed on oxygen due to desaturation while sleeping  -placed on 2L NC - now saturating 99%. b/l lungs auscultated  -clear. Pt denies any n/v/d. Will continue to monitor and assess. Pt came to Ed with right hand IV that was placed in nursing home.

## 2020-03-23 NOTE — ED ADULT NURSE NOTE - NSIMPLEMENTINTERV_GEN_ALL_ED
Implemented All Universal Safety Interventions:  [place] to call system. Call bell, personal items and telephone within reach. Instruct patient to call for assistance. Room bathroom lighting operational. Non-slip footwear when patient is off stretcher. Physically safe environment: no spills, clutter or unnecessary equipment. Stretcher in lowest position, wheels locked, appropriate side rails in place.

## 2020-03-23 NOTE — ED ADULT NURSE NOTE - FAMILY HISTORY
Family history of cardiomyopathy, Mother     Family history of colon cancer in mother     Mother  Still living? Unknown  Family history of early CAD, Age at diagnosis: Age Unknown  Family history of cervical cancer, Age at diagnosis: Age Unknown

## 2020-03-23 NOTE — ED PROVIDER NOTE - NSFOLLOWUPINSTRUCTIONS_ED_ALL_ED_FT
Please self quarantine for two weeks and stay at home until 04/06/20. Do not go out.    Cough    Coughing is a reflex that clears your throat and your airways. Coughing helps to heal and protect your lungs. It is normal to cough occasionally, but a cough that happens with other symptoms or lasts a long time may be a sign of a condition that needs treatment. Coughing may be caused by infections, asthma or COPD, smoking, postnasal drip, gastroesophageal reflux, as well as other medical conditions. Take medicines only as instructed by your health care provider. Avoid environments or triggers that causes you to cough at work or at home.    SEEK IMMEDIATE MEDICAL CARE IF YOU HAVE ANY OF THE FOLLOWING SYMPTOMS: coughing up blood, shortness of breath, rapid heart rate, chest pain, unexplained weight loss or night sweats.      If you are sick with COVID-19 or suspect you are infected with the virus that causes COVID-19, follow the steps below to help prevent the disease from spreading to people in your home and community.   https://www.cdc.gov/coronavirus/2019-ncov/downloads/sick-with-2019-nCoV-fact-sheet.pdf    Stay home except to get medical care   You should restrict activities outside your home, except for getting medical care. Do not go to work, school, or public areas. Avoid using public transportation, ride-sharing, or taxis.   Separate yourself from other people and animals in your home   People: As much as possible, you should stay in a specific room and away from other people in your home. Also, you should use a separate bathroom, if available.   Animals: Do not handle pets or other animals while sick. See COVID-19 and Animals for more information.   Call ahead before visiting your doctor   If you have a medical appointment, call the healthcare provider and tell them that you have or may have COVID-19. This will help the healthcare provider’s office take steps to keep other people from getting infected or exposed.   Wear a facemask   You should wear a facemask when you are around other people (e.g., sharing a room or vehicle) or pets and before you enter a healthcare provider’s office. If you are not able to wear a facemask (for example, because it causes trouble breathing), then people who live with you should not stay in the same room with you, or they should wear a facemask if they enter your room.   Cover your coughs and sneezes   Cover your mouth and nose with a tissue when you cough or sneeze. Throw used tissues in a lined trash can; immediately wash your hands with soap and water for at least 20 seconds or clean your hands with an alcohol-based hand  that contains at least 60% alcohol covering all surfaces of your hands and rubbing them together until they feel dry. Soap and water should be used preferentially if hands are visibly dirty.   Avoid sharing personal household items   You should not share dishes, drinking glasses, cups, eating utensils, towels, or bedding with other people or pets in your home. After using these items, they should be washed thoroughly with soap and water.   Clean your hands often   Wash your hands often with soap and water for at least 20 seconds. If soap and water are not available, clean your hands with an alcohol-based hand  that contains at least 60% alcohol, covering all surfaces of your hands and rubbing them together until they feel dry. Soap and water should be used preferentially if hands are visibly dirty. Avoid touching your eyes, nose, and mouth with unwashed hands.   Clean all “high-touch” surfaces every day   High touch surfaces include counters, tabletops, doorknobs, bathroom fixtures, toilets, phones, keyboards, tablets, and bedside tables. Also, clean any surfaces that may have blood, stool, or body fluids on them. Use a household cleaning spray or wipe, according to the label instructions. Labels contain instructions for safe and effective use of the cleaning product including precautions you should take when applying the product, such as wearing gloves and making sure you have good ventilation during use of the product.   Monitor your symptoms   Seek prompt medical attention if your illness is worsening (e.g., difficulty breathing). Before seeking care, call your healthcare provider and tell them that you have, or are being evaluated for, COVID-19. Put on a facemask before you enter the facility. These steps will help the healthcare provider’s office to keep other people in the office or waiting room from getting infected or exposed. Ask your healthcare provider to call the local or state health department. Persons who are placed under active monitoring or facilitated self-monitoring should follow instructions provided by their local health department or occupational health professionals, as appropriate. When working with your local health department check their available hours. If you have a medical emergency and need to call 911, notify the dispatch personnel that you have, or are being evaluated for COVID-19. If possible, put on a facemask before emergency medical services arrive.   Discontinuing home isolation   Patients with confirmed COVID-19 should remain under home isolation precautions until the risk of secondary transmission to others is thought to be low. The decision to discontinue home isolation precautions should be made on a case-by-case basis, in consultation with healthcare providers and state and local health departments.  For more information: www.cdc.gov/COVID19

## 2020-03-23 NOTE — ED ADULT TRIAGE NOTE - CHIEF COMPLAINT QUOTE
BIBEMS from nursing home. "I am having COVID symptoms. cough, fevers and shortness of breath." pt was supposed to have blood work chest xray done but states PMD never completed it. karthikeyan to R hand from nursing home currently prescribed to zpack for one day.

## 2020-03-24 LAB — SARS-COV-2 RNA SPEC QL NAA+PROBE: DETECTED

## 2020-03-26 NOTE — ED POST DISCHARGE NOTE - RESULT SUMMARY
AFSHAN JEFFERSON, AT [facility] WAS AWARE OF COVID-19 POSITIVE STATUS. WAS CALLED BY [facility]. PATIENT IS ON ISOLATION AT NH.

## 2020-06-26 ENCOUNTER — APPOINTMENT (OUTPATIENT)
Dept: CARDIOLOGY | Facility: CLINIC | Age: 50
End: 2020-06-26

## 2020-07-24 ENCOUNTER — APPOINTMENT (OUTPATIENT)
Dept: CARDIOLOGY | Facility: CLINIC | Age: 50
End: 2020-07-24

## 2020-08-27 NOTE — ED ADULT TRIAGE NOTE - ARRIVAL FROM
August 27, 2020       Patient: Олег Ramirez   YOB: 1985   Date of Visit: 8/27/2020         To Whom It May Concern:    In my medical opinion, I recommend that Олег Ramirez remain out of work until 9-.    If you have any questions or concerns, please don't hesitate to call [phone]          Sincerely,          Nasima Collins P.A.-C.  Electronically Signed      Home

## 2020-12-03 ENCOUNTER — APPOINTMENT (OUTPATIENT)
Dept: CARDIOLOGY | Facility: CLINIC | Age: 50
End: 2020-12-03

## 2020-12-10 ENCOUNTER — APPOINTMENT (OUTPATIENT)
Dept: CARDIOLOGY | Facility: CLINIC | Age: 50
End: 2020-12-10
Payer: MEDICAID

## 2020-12-10 VITALS
DIASTOLIC BLOOD PRESSURE: 74 MMHG | WEIGHT: 180 LBS | SYSTOLIC BLOOD PRESSURE: 112 MMHG | HEART RATE: 97 BPM | HEIGHT: 66 IN | TEMPERATURE: 97.2 F | BODY MASS INDEX: 28.93 KG/M2

## 2020-12-10 PROCEDURE — 99214 OFFICE O/P EST MOD 30 MIN: CPT | Mod: 25

## 2020-12-10 PROCEDURE — 93290 INTERROG DEV EVAL ICPMS IP: CPT | Mod: 59

## 2020-12-10 PROCEDURE — 93283 PRGRMG EVAL IMPLANTABLE DFB: CPT | Mod: 59

## 2020-12-10 RX ORDER — FLUTICASONE FUROATE 27.5 UG/1
27.5 SPRAY, METERED NASAL DAILY
Refills: 0 | Status: COMPLETED | COMMUNITY
End: 2020-12-10

## 2020-12-10 RX ORDER — ASPIRIN/CALCIUM/MAG/ALUMINUM 325 MG
TABLET ORAL
Refills: 0 | Status: COMPLETED | COMMUNITY
End: 2020-12-10

## 2020-12-10 RX ORDER — OMEPRAZOLE 20 MG/1
20 CAPSULE, DELAYED RELEASE ORAL
Refills: 0 | Status: COMPLETED | COMMUNITY
End: 2020-12-10

## 2020-12-10 RX ORDER — LORATADINE 5 MG
17 TABLET,CHEWABLE ORAL
Refills: 0 | Status: COMPLETED | COMMUNITY
End: 2020-12-10

## 2020-12-10 RX ORDER — BUDESONIDE AND FORMOTEROL FUMARATE DIHYDRATE 160; 4.5 UG/1; UG/1
160-4.5 AEROSOL RESPIRATORY (INHALATION) TWICE DAILY
Refills: 0 | Status: COMPLETED | COMMUNITY
End: 2020-12-10

## 2020-12-10 RX ORDER — MORPHINE SULFATE 30 MG/1
30 TABLET ORAL TWICE DAILY
Refills: 0 | Status: COMPLETED | COMMUNITY
End: 2020-12-10

## 2020-12-10 RX ORDER — ACETAMINOPHEN 325 MG/1
325 TABLET ORAL
Refills: 0 | Status: COMPLETED | COMMUNITY
End: 2020-12-10

## 2020-12-10 RX ORDER — FUROSEMIDE 20 MG/1
20 TABLET ORAL
Refills: 0 | Status: COMPLETED | COMMUNITY
End: 2020-12-10

## 2020-12-19 NOTE — PHYSICAL EXAM
[General Appearance - Well Developed] : well developed [Normal Appearance] : normal appearance [Well Groomed] : well groomed [General Appearance - Well Nourished] : well nourished [No Deformities] : no deformities [General Appearance - In No Acute Distress] : no acute distress [Respiration, Rhythm And Depth] : normal respiratory rhythm and effort [Exaggerated Use Of Accessory Muscles For Inspiration] : no accessory muscle use [Auscultation Breath Sounds / Voice Sounds] : lungs were clear to auscultation bilaterally [Clean] : clean [Dry] : dry [Well-Healed] : well-healed [Abdomen Soft] : soft [Abdomen Tenderness] : non-tender [Abdomen Mass (___ Cm)] : no abdominal mass palpated [Nail Clubbing] : no clubbing of the fingernails [Cyanosis, Localized] : no localized cyanosis [Petechial Hemorrhages (___cm)] : no petechial hemorrhages [] : no ischemic changes

## 2020-12-19 NOTE — ASSESSMENT
[FreeTextEntry1] : ## Paroxysmal AF s/p ablation \par ## ARVD s/p DC ICD\par ## Atrial standstill s/p DC ICD\par \par - Recurrent episodes of AF needing hospitalization. Discussed different management options including trial of anti-arrhythmic meds such as tikosyn/amiodarone, repeat ablation or AV node ablation. Considering his prior failure with AADs (Tikosyn) and young age, he opted for ablation which is reasonable.\par - We will continue Eliquis\par - We will do CT heart to assess LA size, pulmonary veins.\par - If appropriate, we will proceed with ROCIO/RF ablation/CARTO/GA with ICD re-programming (Medtronic).

## 2020-12-19 NOTE — PROCEDURE
[No] : not [Sinus Bradycardia] : sinus bradycardia [ICD] : Implantable cardioverter-defibrillator [DDDR] : DDDR [Impedance: ___ Ohms] : current cell impedance is [unfilled] Ohms [Charge Time: ___ sec] : charge time was [unfilled] seconds [Longevity: ___ months] : The estimated remaining battery life is [unfilled] months [Threshold Testing Performed] : Threshold testing was performed [Lead Imp:  ___ohms] : lead impedance was [unfilled] ohms [Sensing Amplitude ___mv] : sensing amplitude was [unfilled] mv [___V @] : [unfilled] V [___ ms] : [unfilled] ms [None] : none [Counters Reset] : the counters were reset [Apace-Vsense ___ %] : Apace-Vsense [unfilled]% [Apace-Vpace ___ %] : Apace-Vpace [unfilled]% [Normal] : The battery status is normal. [de-identified] : [facility]  [de-identified] : [id number] [de-identified] : [id number] [de-identified] : 9/05/2019 [de-identified] : 70/105 [de-identified] : 180 episodes of AMS longest duration 4 days on 8/27/2020. AF burden 4.4%. Previous episodes of SVT/VT/NSVT on 8/26/2020 patient was in hospital with cardioversion from SVT. No new events since 8/26/2020. Corvue shows fluid on 9/17/2020-9/27/2020; now stable. Setting up merlin @ [facility] for patient.

## 2020-12-19 NOTE — HISTORY OF PRESENT ILLNESS
[de-identified] : I had a pleasure of seeing Mr. Miguel for follow-up consultation for atrial fibrillation and ICD care. He is usually seen by Dr. Quiñones.\par \par Mr. Miguel is a 50-year old man with history of paroxysmal AF s/p ablation (x2, 2017), Atrial standstill s/p PPM (2001) --> ARVD s/p ICD placement --> ICD lead malfunction s/p ICD lead extraction s/p DC ICD lead placement (Medtronic) --> Perforation s/p RV lead revision, HIV, DL, bipolar disorder, seizure, depression, HTN is here for management of his atrial fibrillation. He had an episode of AF where he neneded cardioversion at  [place] in 08/20. + Palpitations. He has used tikosyn before- continue to have AF episodes. He has never been on AV Node block agents due to relative hypotension.\par Denies chest pain, shortness of breath, dizziness or LOC.\par

## 2020-12-25 ENCOUNTER — OUTPATIENT (OUTPATIENT)
Dept: OUTPATIENT SERVICES | Facility: HOSPITAL | Age: 50
LOS: 1 days | Discharge: HOME | End: 2020-12-25

## 2020-12-25 DIAGNOSIS — Z98.890 OTHER SPECIFIED POSTPROCEDURAL STATES: Chronic | ICD-10-CM

## 2020-12-25 DIAGNOSIS — Z11.59 ENCOUNTER FOR SCREENING FOR OTHER VIRAL DISEASES: ICD-10-CM

## 2020-12-25 DIAGNOSIS — Z96.649 PRESENCE OF UNSPECIFIED ARTIFICIAL HIP JOINT: Chronic | ICD-10-CM

## 2020-12-25 DIAGNOSIS — T82.120A DISPLACEMENT OF CARDIAC ELECTRODE, INITIAL ENCOUNTER: Chronic | ICD-10-CM

## 2020-12-27 RX ORDER — CEFAZOLIN SODIUM 1 G
2000 VIAL (EA) INJECTION ONCE
Refills: 0 | Status: COMPLETED | OUTPATIENT
Start: 2020-12-28 | End: 2020-12-28

## 2020-12-28 ENCOUNTER — INPATIENT (INPATIENT)
Facility: HOSPITAL | Age: 50
LOS: 1 days | Discharge: HOME | End: 2020-12-30
Attending: INTERNAL MEDICINE | Admitting: INTERNAL MEDICINE
Payer: MEDICAID

## 2020-12-28 VITALS — WEIGHT: 179.9 LBS | HEIGHT: 66 IN

## 2020-12-28 DIAGNOSIS — T82.120A DISPLACEMENT OF CARDIAC ELECTRODE, INITIAL ENCOUNTER: Chronic | ICD-10-CM

## 2020-12-28 DIAGNOSIS — Z98.890 OTHER SPECIFIED POSTPROCEDURAL STATES: Chronic | ICD-10-CM

## 2020-12-28 DIAGNOSIS — Z96.649 PRESENCE OF UNSPECIFIED ARTIFICIAL HIP JOINT: Chronic | ICD-10-CM

## 2020-12-28 LAB
ALBUMIN SERPL ELPH-MCNC: 4.4 G/DL — SIGNIFICANT CHANGE UP (ref 3.5–5.2)
ALP SERPL-CCNC: 110 U/L — SIGNIFICANT CHANGE UP (ref 30–115)
ALT FLD-CCNC: 12 U/L — SIGNIFICANT CHANGE UP (ref 0–41)
ANION GAP SERPL CALC-SCNC: 14 MMOL/L — SIGNIFICANT CHANGE UP (ref 7–14)
APTT BLD: 30.1 SEC — SIGNIFICANT CHANGE UP (ref 27–39.2)
AST SERPL-CCNC: 14 U/L — SIGNIFICANT CHANGE UP (ref 0–41)
BILIRUB DIRECT SERPL-MCNC: <0.2 MG/DL — SIGNIFICANT CHANGE UP (ref 0–0.2)
BILIRUB INDIRECT FLD-MCNC: >0.1 MG/DL — LOW (ref 0.2–1.2)
BILIRUB SERPL-MCNC: 0.3 MG/DL — SIGNIFICANT CHANGE UP (ref 0.2–1.2)
BUN SERPL-MCNC: 17 MG/DL — SIGNIFICANT CHANGE UP (ref 10–20)
CALCIUM SERPL-MCNC: 9.1 MG/DL — SIGNIFICANT CHANGE UP (ref 8.5–10.1)
CHLORIDE SERPL-SCNC: 105 MMOL/L — SIGNIFICANT CHANGE UP (ref 98–110)
CO2 SERPL-SCNC: 22 MMOL/L — SIGNIFICANT CHANGE UP (ref 17–32)
CREAT SERPL-MCNC: 0.9 MG/DL — SIGNIFICANT CHANGE UP (ref 0.7–1.5)
GLUCOSE BLDC GLUCOMTR-MCNC: 91 MG/DL — SIGNIFICANT CHANGE UP (ref 70–99)
GLUCOSE SERPL-MCNC: 96 MG/DL — SIGNIFICANT CHANGE UP (ref 70–99)
HCT VFR BLD CALC: 40.7 % — LOW (ref 42–52)
HGB BLD-MCNC: 13.4 G/DL — LOW (ref 14–18)
INR BLD: 0.99 RATIO — SIGNIFICANT CHANGE UP (ref 0.65–1.3)
MAGNESIUM SERPL-MCNC: 1.7 MG/DL — LOW (ref 1.8–2.4)
MCHC RBC-ENTMCNC: 31.2 PG — HIGH (ref 27–31)
MCHC RBC-ENTMCNC: 32.9 G/DL — SIGNIFICANT CHANGE UP (ref 32–37)
MCV RBC AUTO: 94.9 FL — HIGH (ref 80–94)
NRBC # BLD: 0 /100 WBCS — SIGNIFICANT CHANGE UP (ref 0–0)
PLATELET # BLD AUTO: 199 K/UL — SIGNIFICANT CHANGE UP (ref 130–400)
POTASSIUM SERPL-MCNC: 3.5 MMOL/L — SIGNIFICANT CHANGE UP (ref 3.5–5)
POTASSIUM SERPL-SCNC: 3.5 MMOL/L — SIGNIFICANT CHANGE UP (ref 3.5–5)
PROT SERPL-MCNC: 6.6 G/DL — SIGNIFICANT CHANGE UP (ref 6–8)
PROTHROM AB SERPL-ACNC: 11.4 SEC — SIGNIFICANT CHANGE UP (ref 9.95–12.87)
RBC # BLD: 4.29 M/UL — LOW (ref 4.7–6.1)
RBC # FLD: 13.2 % — SIGNIFICANT CHANGE UP (ref 11.5–14.5)
SODIUM SERPL-SCNC: 141 MMOL/L — SIGNIFICANT CHANGE UP (ref 135–146)
WBC # BLD: 8.97 K/UL — SIGNIFICANT CHANGE UP (ref 4.8–10.8)
WBC # FLD AUTO: 8.97 K/UL — SIGNIFICANT CHANGE UP (ref 4.8–10.8)

## 2020-12-28 PROCEDURE — 93325 DOPPLER ECHO COLOR FLOW MAPG: CPT | Mod: 26

## 2020-12-28 PROCEDURE — 93655 ICAR CATH ABLTJ DSCRT ARRHYT: CPT

## 2020-12-28 PROCEDURE — 75572 CT HRT W/3D IMAGE: CPT | Mod: 26

## 2020-12-28 PROCEDURE — 93656 COMPRE EP EVAL ABLTJ ATR FIB: CPT

## 2020-12-28 PROCEDURE — 93312 ECHO TRANSESOPHAGEAL: CPT | Mod: 26

## 2020-12-28 RX ORDER — LAMOTRIGINE 25 MG/1
1 TABLET, ORALLY DISINTEGRATING ORAL
Qty: 0 | Refills: 0 | DISCHARGE

## 2020-12-28 RX ORDER — ACETAMINOPHEN 500 MG
1000 TABLET ORAL ONCE
Refills: 0 | Status: COMPLETED | OUTPATIENT
Start: 2020-12-28 | End: 2020-12-28

## 2020-12-28 RX ORDER — HYDROMORPHONE HYDROCHLORIDE 2 MG/ML
1 INJECTION INTRAMUSCULAR; INTRAVENOUS; SUBCUTANEOUS
Refills: 0 | Status: DISCONTINUED | OUTPATIENT
Start: 2020-12-28 | End: 2020-12-30

## 2020-12-28 RX ORDER — FAMOTIDINE 10 MG/ML
20 INJECTION INTRAVENOUS ONCE
Refills: 0 | Status: COMPLETED | OUTPATIENT
Start: 2020-12-28 | End: 2020-12-28

## 2020-12-28 RX ORDER — LORATADINE 10 MG/1
1 TABLET ORAL
Qty: 0 | Refills: 0 | DISCHARGE

## 2020-12-28 RX ORDER — FOLIC ACID 0.8 MG
1 TABLET ORAL DAILY
Refills: 0 | Status: DISCONTINUED | OUTPATIENT
Start: 2020-12-28 | End: 2020-12-30

## 2020-12-28 RX ORDER — SODIUM CHLORIDE 9 MG/ML
1000 INJECTION, SOLUTION INTRAVENOUS
Refills: 0 | Status: DISCONTINUED | OUTPATIENT
Start: 2020-12-28 | End: 2020-12-28

## 2020-12-28 RX ORDER — GABAPENTIN 400 MG/1
400 CAPSULE ORAL THREE TIMES A DAY
Refills: 0 | Status: DISCONTINUED | OUTPATIENT
Start: 2020-12-28 | End: 2020-12-30

## 2020-12-28 RX ORDER — APIXABAN 2.5 MG/1
5 TABLET, FILM COATED ORAL EVERY 12 HOURS
Refills: 0 | Status: DISCONTINUED | OUTPATIENT
Start: 2020-12-28 | End: 2020-12-30

## 2020-12-28 RX ORDER — OMEPRAZOLE 10 MG/1
1 CAPSULE, DELAYED RELEASE ORAL
Qty: 0 | Refills: 0 | DISCHARGE

## 2020-12-28 RX ORDER — LAMOTRIGINE 25 MG/1
200 TABLET, ORALLY DISINTEGRATING ORAL
Refills: 0 | Status: DISCONTINUED | OUTPATIENT
Start: 2020-12-28 | End: 2020-12-30

## 2020-12-28 RX ORDER — PHENYLEPHRINE HYDROCHLORIDE 10 MG/ML
1 INJECTION INTRAVENOUS
Qty: 160 | Refills: 0 | Status: DISCONTINUED | OUTPATIENT
Start: 2020-12-28 | End: 2020-12-30

## 2020-12-28 RX ORDER — ATORVASTATIN CALCIUM 80 MG/1
40 TABLET, FILM COATED ORAL AT BEDTIME
Refills: 0 | Status: DISCONTINUED | OUTPATIENT
Start: 2020-12-28 | End: 2020-12-30

## 2020-12-28 RX ORDER — FLUTICASONE PROPIONATE 50 MCG
2 SPRAY, SUSPENSION NASAL
Qty: 0 | Refills: 0 | DISCHARGE

## 2020-12-28 RX ORDER — PANTOPRAZOLE SODIUM 20 MG/1
40 TABLET, DELAYED RELEASE ORAL
Refills: 0 | Status: DISCONTINUED | OUTPATIENT
Start: 2020-12-29 | End: 2020-12-30

## 2020-12-28 RX ORDER — BENZOCAINE 10 %
1 GEL (GRAM) MUCOUS MEMBRANE
Qty: 0 | Refills: 0 | DISCHARGE

## 2020-12-28 RX ORDER — QUETIAPINE FUMARATE 200 MG/1
25 TABLET, FILM COATED ORAL DAILY
Refills: 0 | Status: DISCONTINUED | OUTPATIENT
Start: 2020-12-28 | End: 2020-12-30

## 2020-12-28 RX ORDER — ACETAMINOPHEN 500 MG
650 TABLET ORAL ONCE
Refills: 0 | Status: COMPLETED | OUTPATIENT
Start: 2020-12-28 | End: 2020-12-28

## 2020-12-28 RX ORDER — METOPROLOL TARTRATE 50 MG
50 TABLET ORAL DAILY
Refills: 0 | Status: DISCONTINUED | OUTPATIENT
Start: 2020-12-28 | End: 2020-12-30

## 2020-12-28 RX ORDER — TIOTROPIUM BROMIDE 18 UG/1
1 CAPSULE ORAL; RESPIRATORY (INHALATION) DAILY
Refills: 0 | Status: DISCONTINUED | OUTPATIENT
Start: 2020-12-28 | End: 2020-12-30

## 2020-12-28 RX ORDER — HYDROMORPHONE HYDROCHLORIDE 2 MG/ML
0.5 INJECTION INTRAMUSCULAR; INTRAVENOUS; SUBCUTANEOUS
Refills: 0 | Status: DISCONTINUED | OUTPATIENT
Start: 2020-12-28 | End: 2020-12-30

## 2020-12-28 RX ORDER — QUETIAPINE FUMARATE 200 MG/1
1 TABLET, FILM COATED ORAL
Qty: 0 | Refills: 0 | DISCHARGE

## 2020-12-28 RX ORDER — TAMSULOSIN HYDROCHLORIDE 0.4 MG/1
0.4 CAPSULE ORAL AT BEDTIME
Refills: 0 | Status: DISCONTINUED | OUTPATIENT
Start: 2020-12-28 | End: 2020-12-30

## 2020-12-28 RX ORDER — BACLOFEN 100 %
10 POWDER (GRAM) MISCELLANEOUS THREE TIMES A DAY
Refills: 0 | Status: DISCONTINUED | OUTPATIENT
Start: 2020-12-28 | End: 2020-12-30

## 2020-12-28 RX ORDER — PANTOPRAZOLE SODIUM 20 MG/1
40 TABLET, DELAYED RELEASE ORAL ONCE
Refills: 0 | Status: COMPLETED | OUTPATIENT
Start: 2020-12-28 | End: 2020-12-28

## 2020-12-28 RX ORDER — APIXABAN 2.5 MG/1
5 TABLET, FILM COATED ORAL EVERY 12 HOURS
Refills: 0 | Status: DISCONTINUED | OUTPATIENT
Start: 2020-12-28 | End: 2020-12-28

## 2020-12-28 RX ADMIN — QUETIAPINE FUMARATE 25 MILLIGRAM(S): 200 TABLET, FILM COATED ORAL at 21:14

## 2020-12-28 RX ADMIN — FAMOTIDINE 20 MILLIGRAM(S): 10 INJECTION INTRAVENOUS at 21:13

## 2020-12-28 RX ADMIN — Medication 50 MILLIGRAM(S): at 21:13

## 2020-12-28 RX ADMIN — Medication 10 MILLIGRAM(S): at 18:12

## 2020-12-28 RX ADMIN — Medication 1000 MILLIGRAM(S): at 17:53

## 2020-12-28 RX ADMIN — PHENYLEPHRINE HYDROCHLORIDE 15.3 MICROGRAM(S)/KG/MIN: 10 INJECTION INTRAVENOUS at 18:45

## 2020-12-28 RX ADMIN — PANTOPRAZOLE SODIUM 40 MILLIGRAM(S): 20 TABLET, DELAYED RELEASE ORAL at 21:22

## 2020-12-28 RX ADMIN — ATORVASTATIN CALCIUM 40 MILLIGRAM(S): 80 TABLET, FILM COATED ORAL at 21:13

## 2020-12-28 RX ADMIN — PHENYLEPHRINE HYDROCHLORIDE 15.3 MICROGRAM(S)/KG/MIN: 10 INJECTION INTRAVENOUS at 17:21

## 2020-12-28 RX ADMIN — TAMSULOSIN HYDROCHLORIDE 0.4 MILLIGRAM(S): 0.4 CAPSULE ORAL at 21:13

## 2020-12-28 RX ADMIN — APIXABAN 5 MILLIGRAM(S): 2.5 TABLET, FILM COATED ORAL at 21:13

## 2020-12-28 RX ADMIN — HYDROMORPHONE HYDROCHLORIDE 0.5 MILLIGRAM(S): 2 INJECTION INTRAMUSCULAR; INTRAVENOUS; SUBCUTANEOUS at 18:24

## 2020-12-28 RX ADMIN — LAMOTRIGINE 200 MILLIGRAM(S): 25 TABLET, ORALLY DISINTEGRATING ORAL at 18:10

## 2020-12-28 RX ADMIN — Medication 400 MILLIGRAM(S): at 17:19

## 2020-12-28 RX ADMIN — GABAPENTIN 400 MILLIGRAM(S): 400 CAPSULE ORAL at 18:10

## 2020-12-28 RX ADMIN — HYDROMORPHONE HYDROCHLORIDE 0.5 MILLIGRAM(S): 2 INJECTION INTRAMUSCULAR; INTRAVENOUS; SUBCUTANEOUS at 18:42

## 2020-12-28 RX ADMIN — Medication 100 MILLIGRAM(S): at 12:04

## 2020-12-28 RX ADMIN — Medication 1 MILLIGRAM(S): at 21:13

## 2020-12-28 NOTE — H&P ADULT - NSICDXPASTMEDICALHX_GEN_ALL_CORE_FT
PAST MEDICAL HISTORY:  AICD (automatic cardioverter/defibrillator) present SJM    Anemia of chronic disease     Anginal pain     Atrial fibrillation s/p ablation, on eliquis    Bipolar disorder     BPH (benign prostatic hyperplasia)     BPH (benign prostatic hyperplasia)     CAD (coronary artery disease)     Cardiomyopathy     CHF (congestive heart failure)     Chronic heart failure with preserved ejection fraction     Chronic pain     Chronic respiratory failure secondary to COPD    Clinical Depression     Constipation     COPD (chronic obstructive pulmonary disease)     CVA (cerebral vascular accident)     Folate-deficiency anemia     GERD (gastroesophageal reflux disease)     HTN (hypertension)     Hypercholesteremia     Iron deficiency anemia     Migraines     MS (multiple sclerosis)     Pericardial effusion without cardiac tamponade h/o trace pericardial effusion    Peripheral neuropathy     Peripheral Neuropathy     Pneumonia     PTSD (post-traumatic stress disorder)     Schizo affective schizophrenia     Seasonal allergies     Seizures     Supraventricular arrhythmia prior history    TIA (transient ischemic attack)     Type 2 diabetes mellitus     Ventricular tachycardia     Vitamin D deficiency

## 2020-12-28 NOTE — CHART NOTE - NSCHARTNOTEFT_GEN_A_CORE
PROCEDURES:   •	AFIB Ablation by Pulmonary Vein Isolation (PVI)  •	EP study with CS catheter placement and pacing  •	Double transseptal puncture with LA entry  •	General Anesthesia  •	Intracardiac echocardiography  •	Ultrasound for venous access  •	Three-dimensional intracardiac electro-anatomic mapping   •	Acute drug testing/IV Drug-Isuprel Administration   •	Cavo-tricuspid isthmus ablation             BRIEF SUMMARY:  RIPV + LSPV connected- successful PVI + CTI performed.    : Servando Jarquin MD, [facility], [facility]     INDICATION FOR PROCEDURE: Symptomatic paroxysmal atrial fibrillation intolerant to anti-arrhythmic medication     ROCIO was done prior to the procedure showed left atrial enlargement with no left atrial thrombus. Please see separate report for detailed findings.     CONSENT: The risks, benefits and alternatives to the procedure have been described to the patient in detail. All questions were answered.  The patient expressed understanding and has agreed to proceed.      PRESENTING RHYTHM: Sinus Rhythm    DETAILS OF PROCEDURE: The patient was brought to the EP Lab in a fasting state after informed and written consent was obtained. Cardiac rhythm, blood pressure, heart rate, respirations, oxygen saturation and level of consciousness were monitored prior to, throughout and after the procedure. General anesthesia was administered by trained staff members with the supervision of an anesthesiology attending. The patient was placed supine on the fluoroscopy table, prepped and draped in the usual sterile fashion.  Local anesthetic was provided. Ultrasound was utilized to confirm femoral venous patency. Needle access was obtained under ultrasound guidance and a permanent recording obtained.  Venous access was obtained using a micropuncture needle in the right/left femoral veins under fluoroscopic/anatomic and ultrasound guidance using the modified Seldinger technique.  Guidewires were placed through each venous access. Two 8.5 French SL-1 sheaths in right femoral vein (Initially, 2 short 8-french sheaths), and one 11 French and a 6 French short sheaths in left femoral vein.    PRE-PROCEDURE IMAGING:  A 64 Slice CT Angiogram was obtained and reviewed prior to the procedure which demonstrated 2 left pulmonary vein ostia and 2 right pulmonary vein ostia.      INTRACARDIAC ECHO:  Intracardiac echocardiography was performed under the guidance of fluoroscopy from the mid right atrium. Cardiac anatomy was assessed. There was trace no baseline pericardial effusion. The left atrial size was enlarged. No thrombus was seen.  Post-procedure, the intracardiac echocardiogram was repeated and there remained unchanged.    CATHETER PLACEMENT: The intracardiac echo catheter was advanced via the femoral vein to the mid-right atrium. A pentaray catheter and the Biosense Gama SmartTouch DF curve catheter were advanced via long sheaths into the left atrium after transseptal puncture.  A deflectable decapolar catheter was advanced in the CS.     DOUBLE TRANSSEPTAL PUNCTURE WITH LA ENTRY:   We used the RF transseptal needle under the guidance of fluoroscopic images and intracardiac echo. The intra-atrial septum was punctured and left atrial access obtained. Anticoagulation with Heparin IV bolus was immediately provided after puncture. We titrated the heparin drip per protocol to keep ACT greater than 350 throughout the left atrial ablation. ACT were checked every 15-20 minutes and adjusted per protocol. A second transseptal puncture was performed using a similar technique. Intra-atrial pressure was recorded through transseptal sheaths and ICE imaging confirmed LA entry.     THREE DIMENSIONAL ELECTRO-ANATOMIC MAPPING: Using the mapping catheter and CARTO system, 3-D electro-anatomic mapping of the left atrium and pulmonary veins were created. The left atrial anatomy correlated well with cardiac CT.    PULMONARY VENOUS ISOLATION: Pulmonary vein potentials were identified at the antra of LSPV and RIPV. LIPV and RSPV were electrically isolated..  We performed a wide area circumferential ablation around the left superior and right inferior pulmonary veins to create electrical isolation.   All pulmonary venous antra were isolated successfully. Post ablation, pacing was done within and outside of the pulmonary vein to demonstrate conduction block both into and out of the vein. The total time from last ablation lesion to confirmation of pulmonary vein entrance block was about 30 minutes. After completion of ablation, an EP study was performed including left atrial pacing and recording.    An esophageal temperature probe was used to monitor temperatures on the posterior wall. The esophagus course was rightward.  We were prepared to come off RF immediately when the esophageal temperature probe increased more than 1.0-degree C or to a max temperature of 38.5.  If a significant temperature rise was seen, the location was marked separately on the 3D non-contact map and maximum temperature achieved was recorded.  We came off ablation early due to temperature rises when ablating near RIPV.  We did not have to limit ablation lesions due to a rise in temperature. Resting and often going to other areas was performed when ablating near the esophagus to allow recovery of the esophagus between lesions. 20-25 beltran was used on the posterior wall of the left atrium and up to 30-35 beltran on the anterior wall. Points were marked with different colors on the 3D non-contact map based on time of treatment at each site. The anesthesia personnel in the room moved the esophageal temperature probe in line with the level of ablation when treating the posterior wall. The electrophysiology nurse, anesthesia staff and physician continuously monitored the temperature. During ablation, the impedance was closely monitored by both the physician and the electrophysiology staff. We came off ablation once the impedance decreased by 10% indicating a transmural burn.     Contact force was also monitored continuously during the ablation. An average of 10 grams of force (5-15 grams) were used on the posterior wall at 20-25 beltran for 12-15 seconds and no longer than 20 seconds. If the electrical signals did not decrease significantly, the power was increased to 25 beltran for an additional 5-10 seconds at each site.  Phrenic evaluation: Pacing was utilized with ablation anterior to the right sided pulmonary veins to confirm no phrenic capture at sites of ablation.     Dissociated Firing of the PVs: Did not occur.    Vagal Effect:  No marked response was present.    Atrial Scarring: The atrial scarring included complete scarring around antra of all pulmonary veins except in antral regions of posterior wall of LSPV and inferior wall of RIPV. This is consistent with prior cryoballoon ablation with possible connection in LSPV and RIPV.     A Cavo-tricuspid isthmus ablation for right atrial flutter was performed.  RF ablation was applied in a linear fashion across the cavo-tricuspid isthmus using a spot and drag technique. Bidirectional block was achieved. The trans-isthmus conduction time was over 155 ms.     INTRAATRIAL PACING: Entrance and exit block across all four PVs was confirmed by intra-atrial pacing and by pacing inside each vein.     EXTERNAL DC CARDIOVERSION:  was not performed.     DRUG INFUSION: Isoproterenol 20 mcg/min was initiated for 5 minutes. During this time, patient had no atrial fibrillation. There was no significant atrial ectopy.     RAPID PACING: Rapid atrial pacing x3 to 250 msec did not induce atrial fibrillation or atrial flutter. On Isuprel washout, rapid atrial pacing x3 to 250 msec did not induce atrial fibrillation or atrial flutter.    ARUN-PROCEDURAL DEVICE PROGRAMMING: Pre-procedural ICD re-programming was done to turn of tachy therapies and avoid interference with ablation. Post-procedure interrogation and programming was performed to ensure proper device functioning.     DIAGNOSTIC EP STUDY:   Intervals: Sinus,  msec, OR interval 200 msec, QRSd 118 msec,  msec.   Post ablation conduction across the cavo-tricuspid isthmus was 155 ms.     Post ablation intracardiac echo showed no change in pericardial effusion or LA thrombus.   Sheaths were removed and 'figure-of-8' stitches were placed.  The patient tolerated the procedure well.         IMPRESSION:   Symptomatic paroxysmal Atrial Fibrillation (along with inappropriate ICD shocks) status post successful pulmonary vein antral isolation   Cavo-tricuspid Isthmus Ablation  High dose isoproterenol infusion        PLAN:   - Will monitor patient overnight at the hospital.   - Check access site/sites in the morning. Sutures will be removed in am.  - Continue anticoagulation with Eliquis  - Rate control: Metoprolol (home dose)  - Rhythm control medication: None  - Protonix/PPI for 4 weeks  - IV diuresis and potassium replacement as needed.   - Outpatient follow up in 1 month

## 2020-12-28 NOTE — H&P ADULT - NSHPLABSRESULTS_GEN_ALL_CORE
LABS:  CAPILLARY BLOOD GLUCOSE                              13.4   8.97  )-----------( 199      ( 28 Dec 2020 07:17 )             40.7

## 2020-12-28 NOTE — H&P ADULT - ASSESSMENT
51yo Male with persistent paroxysmal AF with RVR and multiple hosiptal admission  dailure of AAD therapy  Presents for elective ablation of AF    admit to tele post op  CT heart to assess LA size, pulmonary veins  Ancef preop (ICD)  bedrest per protocol  con't Eliquis  Protonix post op

## 2020-12-28 NOTE — H&P ADULT - HISTORY OF PRESENT ILLNESS
50y Male with h/o HTN, COPD (home on 2L O2 PRN), multiple sclerosis, seizure disorder, Atrial standstill s/p PPM 2001,  ARVD, VTach  upgrade to DC AICD ([facility]) 2005 for secondary prevention, battary change 2019. AF/AFL on Eliquis, prior ablation 2017, multiple recurrences while on Tikosyn, multiple admissions for AF RVR, most recent 8/2020 in [place], requiring DCCV, presents for elective ablation of recurrent symptomatic AF.     12/10/2020   Procedure  Device Check The patient is not pacemaker dependent.   Underlying Rhythm: sinus bradycardia.   Device Type: Implantable cardioverter-defibrillator   Pacemaker/ICD : [employer].   Model: 2357-40Q. Serial Number: [id number]. Date of Implant: 9/05/2019.   Mode: DDDR. Rate: 70/105.   Battery Status: current cell impedance is 63 Ohms and charge time was 8.5 seconds The estimated remaining battery life is 5.4 years months. The battery status is normal.   Measurement: Threshold testing was performed.   Atrial: lead impedance was 230 ohms. sensing amplitude was N/A mv. Pacing Threshold: 0.5 V @ 0.4 ms.   Rt Ventricle:. lead impedance was 390 ohms. sensing amplitude was >12.0 mv. Pacing Threshold: 1.0 V @ 0.4 ms.   Program Changes: none. The counters were reset.   Dual Chamber:  Apace-Vsense 94%, Apace-Vpace 9.3%.   Comments:. 180 episodes of AMS longest duration 4 days on 8/27/2020. AF burden 4.4%. Previous episodes of SVT/VT/NSVT on 8/26/2020 patient was in hospital with cardioversion from SVT. No new events since 8/26/2020. Corvue shows fluid on 9/17/2020-9/27/2020; now stable. Setting up merlin @ home for patient

## 2020-12-28 NOTE — CHART NOTE - NSCHARTNOTEFT_GEN_A_CORE
Electrophysiology Brief Post-Op Note    I have personally seen and examined the patient.  I agree with the history and physical which I have reviewed and noted any changes below.  12-28-20 @ 16:28    PRE-OP DIAGNOSIS: Paroxysmal AFib    POST-OP DIAGNOSIS: Paroxysmal AFib    PROCEDURE:   - ROCIO   - AFib Ablation (RF)     VASCULAR ACCESS (with ultrasound guidance)   - Right femoral vein: 8.5 Fr x2   - Left femoral vein: 8 Fr, 11 Fr      Physician: Servando Jarquin MD  Assistant: None    ANESTHESIA TYPE:  [ X ] General Anesthesia  [  ] Sedation  [ X ] Local/Regional    ESTIMATED BLOOD LOSS:  20  mL    CONDITION  [  ] Critical  [  ] Serious  [  ]Fair  [ X ]Good    SPECIMENS REMOVED (IF APPLICABLE): N/A    IMPLANTS (IF APPLICABLE): N/A    FINDINGS: Paroxysmal AFib. LSPV and RIPV connected- successful isolation. CTI performed with bidirectional block. No pericardial effusion. Device reprogrammed to pre-procedure parameters.    COMPLICATIONS: None    PLAN OF CARE  -       Admit to telemetry  -        Bedrest for 4 hours  -       Start home oral anticoagulation tonight after bedrest is over  -       Remove chapin when bedrest is over  -        PPI x 1 month  -       Will remove groin stitches in am  -       Anticipate DC in am  -       Follow up with me in the office in 1 month

## 2020-12-29 ENCOUNTER — TRANSCRIPTION ENCOUNTER (OUTPATIENT)
Age: 50
End: 2020-12-29

## 2020-12-29 DIAGNOSIS — I48.0 PAROXYSMAL ATRIAL FIBRILLATION: ICD-10-CM

## 2020-12-29 PROCEDURE — 99233 SBSQ HOSP IP/OBS HIGH 50: CPT

## 2020-12-29 PROCEDURE — 93010 ELECTROCARDIOGRAM REPORT: CPT

## 2020-12-29 RX ORDER — ONDANSETRON 8 MG/1
4 TABLET, FILM COATED ORAL EVERY 8 HOURS
Refills: 0 | Status: DISCONTINUED | OUTPATIENT
Start: 2020-12-29 | End: 2020-12-30

## 2020-12-29 RX ORDER — PANTOPRAZOLE SODIUM 20 MG/1
1 TABLET, DELAYED RELEASE ORAL
Qty: 30 | Refills: 0
Start: 2020-12-29 | End: 2021-01-27

## 2020-12-29 RX ORDER — ONDANSETRON 8 MG/1
4 TABLET, FILM COATED ORAL ONCE
Refills: 0 | Status: COMPLETED | OUTPATIENT
Start: 2020-12-29 | End: 2020-12-29

## 2020-12-29 RX ORDER — PANTOPRAZOLE SODIUM 20 MG/1
40 TABLET, DELAYED RELEASE ORAL EVERY 12 HOURS
Refills: 0 | Status: DISCONTINUED | OUTPATIENT
Start: 2020-12-29 | End: 2020-12-30

## 2020-12-29 RX ADMIN — ATORVASTATIN CALCIUM 40 MILLIGRAM(S): 80 TABLET, FILM COATED ORAL at 21:33

## 2020-12-29 RX ADMIN — TIOTROPIUM BROMIDE 1 CAPSULE(S): 18 CAPSULE ORAL; RESPIRATORY (INHALATION) at 09:33

## 2020-12-29 RX ADMIN — Medication 50 MILLIGRAM(S): at 05:37

## 2020-12-29 RX ADMIN — LAMOTRIGINE 200 MILLIGRAM(S): 25 TABLET, ORALLY DISINTEGRATING ORAL at 16:58

## 2020-12-29 RX ADMIN — GABAPENTIN 400 MILLIGRAM(S): 400 CAPSULE ORAL at 21:33

## 2020-12-29 RX ADMIN — Medication 10 MILLIGRAM(S): at 21:33

## 2020-12-29 RX ADMIN — ONDANSETRON 4 MILLIGRAM(S): 8 TABLET, FILM COATED ORAL at 08:45

## 2020-12-29 RX ADMIN — HYDROMORPHONE HYDROCHLORIDE 0.5 MILLIGRAM(S): 2 INJECTION INTRAMUSCULAR; INTRAVENOUS; SUBCUTANEOUS at 21:01

## 2020-12-29 RX ADMIN — HYDROMORPHONE HYDROCHLORIDE 0.5 MILLIGRAM(S): 2 INJECTION INTRAMUSCULAR; INTRAVENOUS; SUBCUTANEOUS at 21:35

## 2020-12-29 RX ADMIN — APIXABAN 5 MILLIGRAM(S): 2.5 TABLET, FILM COATED ORAL at 05:37

## 2020-12-29 RX ADMIN — Medication 10 MILLIGRAM(S): at 05:36

## 2020-12-29 RX ADMIN — GABAPENTIN 400 MILLIGRAM(S): 400 CAPSULE ORAL at 13:02

## 2020-12-29 RX ADMIN — LAMOTRIGINE 200 MILLIGRAM(S): 25 TABLET, ORALLY DISINTEGRATING ORAL at 05:37

## 2020-12-29 RX ADMIN — PANTOPRAZOLE SODIUM 40 MILLIGRAM(S): 20 TABLET, DELAYED RELEASE ORAL at 16:58

## 2020-12-29 RX ADMIN — QUETIAPINE FUMARATE 25 MILLIGRAM(S): 200 TABLET, FILM COATED ORAL at 13:02

## 2020-12-29 RX ADMIN — PANTOPRAZOLE SODIUM 40 MILLIGRAM(S): 20 TABLET, DELAYED RELEASE ORAL at 05:37

## 2020-12-29 RX ADMIN — Medication 1 MILLIGRAM(S): at 13:01

## 2020-12-29 RX ADMIN — Medication 10 MILLIGRAM(S): at 13:01

## 2020-12-29 RX ADMIN — TAMSULOSIN HYDROCHLORIDE 0.4 MILLIGRAM(S): 0.4 CAPSULE ORAL at 21:33

## 2020-12-29 RX ADMIN — GABAPENTIN 400 MILLIGRAM(S): 400 CAPSULE ORAL at 05:36

## 2020-12-29 RX ADMIN — APIXABAN 5 MILLIGRAM(S): 2.5 TABLET, FILM COATED ORAL at 16:58

## 2020-12-29 NOTE — DISCHARGE NOTE PROVIDER - NSDCMRMEDTOKEN_GEN_ALL_CORE_FT
apixaban 5 mg oral tablet: 1 tab(s) orally every 12 hours  atorvastatin 40 mg oral tablet: 1 tab(s) orally once a day (at bedtime)  baclofen 10 mg oral tablet: 1 tab(s) orally 3 times a day  folic acid 1 mg oral tablet: 1 tab(s) orally once a day  gabapentin 400 mg oral capsule: 1 cap(s) orally 3 times a day  lamoTRIgine 100 mg oral tablet: 2 tab(s) orally 2 times a day  metoprolol succinate 50 mg oral tablet, extended release: 1 tab(s) orally once a day; MAY GIVE 4 TABS OF 12.5MG AT ONE TIME FOR DOSING  Nitrostat 0.4 mg sublingual tablet: sublingual 3 times a day, As Needed; may repeat after 5 min until relief  QUEtiapine 25 mg oral tablet: 1 tab(s) orally once a day  tamsulosin 0.4 mg oral capsule: 1 cap(s) orally once a day (at bedtime)  Tecfidera 240 mg oral delayed release capsule: 1 cap(s) orally once a day  tiotropium 18 mcg inhalation capsule: 1 cap(s) inhaled once a day

## 2020-12-29 NOTE — DISCHARGE NOTE PROVIDER - CARE PROVIDER_API CALL
Servando Jarquin  [address] [place] [address]  Phone: [phone]  Fax: (   )    -  Scheduled Appointment: 01/28/2021 01:30 PM

## 2020-12-29 NOTE — DISCHARGE NOTE PROVIDER - PROVIDER TOKENS
FREE:[LAST:[Britton],FIRST:[Servando],PHONE:[phone]],FAX:[(   [phone]    -],ADDRESS:[[address] [place] [address]],SCHEDULEDAPPT:[01/28/2021],SCHEDULEDAPPTTIME:[01:30 PM]]

## 2020-12-29 NOTE — PROGRESS NOTE ADULT - ATTENDING COMMENTS
## Paroxysmal AF s/p AF ablation (RF)  ## ARVD, VT s/p AICD  ## HTN    - Feels overall good.  - PPI + Zofran  - Socia worker c/s  - Continue with Eliquis and metoprolol.  - Limited echo today

## 2020-12-29 NOTE — DISCHARGE NOTE PROVIDER - HOSPITAL COURSE
50y Male with h/o HTN, COPD (home on 2L O2 PRN), multiple sclerosis, seizure disorder, Atrial standstill s/p PPM 2001,  ARVD, VTach  upgrade to DC AICD ([facility]) 2005 for secondary prevention, battary change 2019. AF/AFL on Eliquis, prior ablation 2017, multiple recurrences while on Tikosyn, multiple admissions for AF RVR, most recent 8/2020 in [place], requiring DCCV, presents for elective ablation of recurrent symptomatic AF.

## 2020-12-30 ENCOUNTER — TRANSCRIPTION ENCOUNTER (OUTPATIENT)
Age: 50
End: 2020-12-30

## 2020-12-30 VITALS
TEMPERATURE: 99 F | RESPIRATION RATE: 18 BRPM | DIASTOLIC BLOOD PRESSURE: 60 MMHG | WEIGHT: 176.37 LBS | SYSTOLIC BLOOD PRESSURE: 117 MMHG | HEART RATE: 69 BPM

## 2020-12-30 PROCEDURE — 99232 SBSQ HOSP IP/OBS MODERATE 35: CPT

## 2020-12-30 RX ADMIN — LAMOTRIGINE 200 MILLIGRAM(S): 25 TABLET, ORALLY DISINTEGRATING ORAL at 05:24

## 2020-12-30 RX ADMIN — HYDROMORPHONE HYDROCHLORIDE 0.5 MILLIGRAM(S): 2 INJECTION INTRAMUSCULAR; INTRAVENOUS; SUBCUTANEOUS at 01:51

## 2020-12-30 RX ADMIN — APIXABAN 5 MILLIGRAM(S): 2.5 TABLET, FILM COATED ORAL at 05:24

## 2020-12-30 RX ADMIN — HYDROMORPHONE HYDROCHLORIDE 0.5 MILLIGRAM(S): 2 INJECTION INTRAMUSCULAR; INTRAVENOUS; SUBCUTANEOUS at 01:45

## 2020-12-30 RX ADMIN — Medication 10 MILLIGRAM(S): at 05:24

## 2020-12-30 RX ADMIN — GABAPENTIN 400 MILLIGRAM(S): 400 CAPSULE ORAL at 05:25

## 2020-12-30 RX ADMIN — HYDROMORPHONE HYDROCHLORIDE 0.5 MILLIGRAM(S): 2 INJECTION INTRAMUSCULAR; INTRAVENOUS; SUBCUTANEOUS at 06:26

## 2020-12-30 RX ADMIN — HYDROMORPHONE HYDROCHLORIDE 0.5 MILLIGRAM(S): 2 INJECTION INTRAMUSCULAR; INTRAVENOUS; SUBCUTANEOUS at 07:00

## 2020-12-30 RX ADMIN — PANTOPRAZOLE SODIUM 40 MILLIGRAM(S): 20 TABLET, DELAYED RELEASE ORAL at 05:24

## 2020-12-30 RX ADMIN — Medication 50 MILLIGRAM(S): at 05:24

## 2020-12-30 NOTE — PROGRESS NOTE ADULT - ATTENDING COMMENTS
AF sp Ablation (RF- PVI+ CTI), POD#2  Atrial Standstill sp PPM  VT sp DC ICD upgrade ([employer])  MS  Seizure D/P  COPD  HTN    - Feels better.   - Nausea resolved  - DC home today with home meds + PPI  - OP fup as scheduled.

## 2020-12-30 NOTE — PROGRESS NOTE ADULT - SUBJECTIVE AND OBJECTIVE BOX
INTERVAL HPI/OVERNIGHT EVENTS: No acute events overnight, patient with mild nausea. No vomiting or CP.    MEDICATIONS  (STANDING):  apixaban 5 milliGRAM(s) Oral every 12 hours  atorvastatin 40 milliGRAM(s) Oral at bedtime  baclofen 10 milliGRAM(s) Oral three times a day  folic acid 1 milliGRAM(s) Oral daily  gabapentin 400 milliGRAM(s) Oral three times a day  lamoTRIgine 200 milliGRAM(s) Oral two times a day  metoprolol succinate ER 50 milliGRAM(s) Oral daily  pantoprazole    Tablet 40 milliGRAM(s) Oral before breakfast  phenylephrine    Infusion 1 MICROgram(s)/kG/Min (15.3 mL/Hr) IV Continuous <Continuous>  QUEtiapine 25 milliGRAM(s) Oral daily  tamsulosin 0.4 milliGRAM(s) Oral at bedtime  tiotropium 18 MICROgram(s) Capsule 1 Capsule(s) Inhalation daily    MEDICATIONS  (PRN):  HYDROmorphone  Injectable 0.5 milliGRAM(s) IV Push every 10 minutes PRN Moderate Pain (4 - 6)  HYDROmorphone  Injectable 1 milliGRAM(s) IV Push every 10 minutes PRN Severe Pain (7 - 10)      Allergies    dexmethylphenidate (Unknown)  Dilantin (Rash)  dilantin, compazine, , ritalin, phenergan (Unknown)  Haldol (Unknown)  hydantoin derivatives (Other)  methylphenidate (Unknown)  phenytoin (Unknown)  prochlorperazine (Unknown)  promethazine (Dystonic RXN)  rash/hives (Anaphylaxis)  thioxanthenes (Unknown)    Intolerances        REVIEW OF SYSTEMS: +Nausea; No CP, palpitations, dizziness or SOB    Vital Signs Last 24 Hrs  T(C): 36.1 (29 Dec 2020 05:09), Max: 37.2 (28 Dec 2020 21:29)  T(F): 97 (29 Dec 2020 05:09), Max: 99 (28 Dec 2020 21:29)  HR: 72 (29 Dec 2020 05:09) (69 - 72)  BP: 118/64 (29 Dec 2020 05:09) (90/53 - 118/64)  BP(mean): --  RR: 18 (29 Dec 2020 05:09) (18 - 20)  SpO2: --    12-28-20 @ 07:01  -  12-29-20 @ 07:00  --------------------------------------------------------  IN: 200 mL / OUT: 600 mL / NET: -400 mL        Physical Exam  GENERAL: In no apparent distress, well nourished, and hydrated.  HEAD:  Atraumatic, Normocephalic  EYES: EOMI, PERRLA, conjunctiva and sclera clear  ENMT: No tonsillar erythema, exudates, or enlargements; ist mucous membranes, Good dentition, No lesions  NECK: Supple and normal thyroid.  No JVD or carotid bruit.  Carotid pulse is 2+ bilaterally.  HEART: Regular rate and rhythm; No murmurs, rubs, or gallops.  PULMONARY: Clear to auscultation and perfusion.  No rales, wheezing, or rhonchi bilaterally.  ABDOMEN: Soft, Nontender, Nondistended; Bowel sounds present  EXTREMITIES:  2+ Peripheral Pulses, No clubbing, cyanosis, or edema  SKIN: Sutures removed from BL groins, no hematoma  NEUROLOGICAL: Grossly nonfocal    LABS:                        13.4   8.97  )-----------( 199      ( 28 Dec 2020 07:17 )             40.7     12-28    141  |  105  |  17  ----------------------------<  96  3.5   |  22  |  0.9    Ca    9.1      28 Dec 2020 07:17  Mg     1.7     12-28    TPro  6.6  /  Alb  4.4  /  TBili  0.3  /  DBili  <0.2  /  AST  14  /  ALT  12  /  AlkPhos  110  12-28    PT/INR - ( 28 Dec 2020 07:17 )   PT: 11.40 sec;   INR: 0.99 ratio         PTT - ( 28 Dec 2020 07:17 )  PTT:30.1 sec      RADIOLOGY & ADDITIONAL TESTS:
INTERVAL HPI/OVERNIGHT EVENTS: No acute events overnight, patient with mild nausea. No vomiting or CP.    MEDICATIONS  (STANDING):  apixaban 5 milliGRAM(s) Oral every 12 hours  atorvastatin 40 milliGRAM(s) Oral at bedtime  baclofen 10 milliGRAM(s) Oral three times a day  folic acid 1 milliGRAM(s) Oral daily  gabapentin 400 milliGRAM(s) Oral three times a day  lamoTRIgine 200 milliGRAM(s) Oral two times a day  metoprolol succinate ER 50 milliGRAM(s) Oral daily  pantoprazole    Tablet 40 milliGRAM(s) Oral before breakfast  phenylephrine    Infusion 1 MICROgram(s)/kG/Min (15.3 mL/Hr) IV Continuous <Continuous>  QUEtiapine 25 milliGRAM(s) Oral daily  tamsulosin 0.4 milliGRAM(s) Oral at bedtime  tiotropium 18 MICROgram(s) Capsule 1 Capsule(s) Inhalation daily    MEDICATIONS  (PRN):  HYDROmorphone  Injectable 0.5 milliGRAM(s) IV Push every 10 minutes PRN Moderate Pain (4 - 6)  HYDROmorphone  Injectable 1 milliGRAM(s) IV Push every 10 minutes PRN Severe Pain (7 - 10)      Allergies    dexmethylphenidate (Unknown)  Dilantin (Rash)  dilantin, compazine, , ritalin, phenergan (Unknown)  Haldol (Unknown)  hydantoin derivatives (Other)  methylphenidate (Unknown)  phenytoin (Unknown)  prochlorperazine (Unknown)  promethazine (Dystonic RXN)  rash/hives (Anaphylaxis)  thioxanthenes (Unknown)    Intolerances        REVIEW OF SYSTEMS: +Nausea; No CP, palpitations, dizziness or SOB    Vital Signs Last 24 Hrs  T(C): 36.1 (29 Dec 2020 05:09), Max: 37.2 (28 Dec 2020 21:29)  T(F): 97 (29 Dec 2020 05:09), Max: 99 (28 Dec 2020 21:29)  HR: 72 (29 Dec 2020 05:09) (69 - 72)  BP: 118/64 (29 Dec 2020 05:09) (90/53 - 118/64)  BP(mean): --  RR: 18 (29 Dec 2020 05:09) (18 - 20)  SpO2: --    12-28-20 @ 07:01  -  12-29-20 @ 07:00  --------------------------------------------------------  IN: 200 mL / OUT: 600 mL / NET: -400 mL        Physical Exam  GENERAL: In no apparent distress, well nourished, and hydrated.  HEAD:  Atraumatic, Normocephalic  EYES: EOMI, PERRLA, conjunctiva and sclera clear  ENMT: No tonsillar erythema, exudates, or enlargements; ist mucous membranes, Good dentition, No lesions  NECK: Supple and normal thyroid.  No JVD or carotid bruit.  Carotid pulse is 2+ bilaterally.  HEART: Regular rate and rhythm; No murmurs, rubs, or gallops.  PULMONARY: Clear to auscultation and perfusion.  No rales, wheezing, or rhonchi bilaterally.  ABDOMEN: Soft, Nontender, Nondistended; Bowel sounds present  EXTREMITIES:  2+ Peripheral Pulses, No clubbing, cyanosis, or edema  SKIN: Sutures removed from BL groins, no hematoma  NEUROLOGICAL: Grossly nonfocal    LABS:                        13.4   8.97  )-----------( 199      ( 28 Dec 2020 07:17 )             40.7     12-28    141  |  105  |  17  ----------------------------<  96  3.5   |  22  |  0.9    Ca    9.1      28 Dec 2020 07:17  Mg     1.7     12-28    TPro  6.6  /  Alb  4.4  /  TBili  0.3  /  DBili  <0.2  /  AST  14  /  ALT  12  /  AlkPhos  110  12-28    PT/INR - ( 28 Dec 2020 07:17 )   PT: 11.40 sec;   INR: 0.99 ratio         PTT - ( 28 Dec 2020 07:17 )  PTT:30.1 sec      RADIOLOGY & ADDITIONAL TESTS:

## 2020-12-30 NOTE — PROGRESS NOTE ADULT - ASSESSMENT
Assessment: 50y Male with h/o HTN, COPD (home on 2L O2 PRN), multiple sclerosis, seizure disorder, Atrial standstill s/p PPM 2001,  ARVD, VTach  upgrade to DC AICD ([facility]) 2005 for secondary prevention, battary change 2019. AF/AFL on Eliquis, prior ablation 2017, multiple recurrences while on Tikosyn, multiple admissions for AF RVR, most recent 8/2020 in [place], requiring DCCV, presents for elective ablation of recurrent symptomatic AF.     Impression:  AF sp Ablation, POD#1  Atrial Standstill sp PPM  VT sp DC ICD upgrade ([employer])  MS  Seizure D/P  COPD  HTN    Plan:  - Continue all home medications including Eliquis 5mg Q12h  - No heavy lifting x 2 weeks  - Can shower, do not scrub groins x 1 week  - Follow up with Dr Jarquin 1/28 at 1:30  [address]  [facility] [address] [phone]
Assessment: 50y Male with h/o HTN, COPD (home on 2L O2 PRN), multiple sclerosis, seizure disorder, Atrial standstill s/p PPM 2001,  ARVD, VTach  upgrade to DC AICD ([facility]) 2005 for secondary prevention, battary change 2019. AF/AFL on Eliquis, prior ablation 2017, multiple recurrences while on Tikosyn, multiple admissions for AF RVR, most recent 8/2020 in [place], requiring DCCV, presents for elective ablation of recurrent symptomatic AF.     Impression:  AF sp Ablation, POD#2  Atrial Standstill sp PPM  VT sp DC ICD upgrade ([employer])  MS  Seizure D/P  COPD  HTN    Plan:  - Continue all home medications including Eliquis 5mg Q12h  - No heavy lifting x 2 weeks  - Can shower, do not scrub groins x 1 week  - Follow up with Dr Jarquin 1/28 at 1:30  [address]  [facility] [place] [address] [phone]

## 2021-01-04 DIAGNOSIS — J96.10 CHRONIC RESPIRATORY FAILURE, UNSPECIFIED WHETHER WITH HYPOXIA OR HYPERCAPNIA: ICD-10-CM

## 2021-01-04 DIAGNOSIS — E11.42 TYPE 2 DIABETES MELLITUS WITH DIABETIC POLYNEUROPATHY: ICD-10-CM

## 2021-01-04 DIAGNOSIS — N40.0 BENIGN PROSTATIC HYPERPLASIA WITHOUT LOWER URINARY TRACT SYMPTOMS: ICD-10-CM

## 2021-01-04 DIAGNOSIS — I42.8 OTHER CARDIOMYOPATHIES: ICD-10-CM

## 2021-01-04 DIAGNOSIS — I11.0 HYPERTENSIVE HEART DISEASE WITH HEART FAILURE: ICD-10-CM

## 2021-01-04 DIAGNOSIS — I50.32 CHRONIC DIASTOLIC (CONGESTIVE) HEART FAILURE: ICD-10-CM

## 2021-01-04 DIAGNOSIS — E78.00 PURE HYPERCHOLESTEROLEMIA, UNSPECIFIED: ICD-10-CM

## 2021-01-04 DIAGNOSIS — J44.9 CHRONIC OBSTRUCTIVE PULMONARY DISEASE, UNSPECIFIED: ICD-10-CM

## 2021-01-04 DIAGNOSIS — Z79.01 LONG TERM (CURRENT) USE OF ANTICOAGULANTS: ICD-10-CM

## 2021-01-04 DIAGNOSIS — F43.10 POST-TRAUMATIC STRESS DISORDER, UNSPECIFIED: ICD-10-CM

## 2021-01-04 DIAGNOSIS — F31.9 BIPOLAR DISORDER, UNSPECIFIED: ICD-10-CM

## 2021-01-04 DIAGNOSIS — G89.29 OTHER CHRONIC PAIN: ICD-10-CM

## 2021-01-04 DIAGNOSIS — I48.91 UNSPECIFIED ATRIAL FIBRILLATION: ICD-10-CM

## 2021-01-04 DIAGNOSIS — Z88.8 ALLERGY STATUS TO OTHER DRUGS, MEDICAMENTS AND BIOLOGICAL SUBSTANCES: ICD-10-CM

## 2021-01-04 DIAGNOSIS — G40.909 EPILEPSY, UNSPECIFIED, NOT INTRACTABLE, WITHOUT STATUS EPILEPTICUS: ICD-10-CM

## 2021-01-04 DIAGNOSIS — R00.1 BRADYCARDIA, UNSPECIFIED: ICD-10-CM

## 2021-01-04 DIAGNOSIS — Z99.81 DEPENDENCE ON SUPPLEMENTAL OXYGEN: ICD-10-CM

## 2021-01-04 DIAGNOSIS — G35 MULTIPLE SCLEROSIS: ICD-10-CM

## 2021-01-04 DIAGNOSIS — K21.9 GASTRO-ESOPHAGEAL REFLUX DISEASE WITHOUT ESOPHAGITIS: ICD-10-CM

## 2021-01-04 DIAGNOSIS — F25.9 SCHIZOAFFECTIVE DISORDER, UNSPECIFIED: ICD-10-CM

## 2021-01-04 DIAGNOSIS — Z95.810 PRESENCE OF AUTOMATIC (IMPLANTABLE) CARDIAC DEFIBRILLATOR: ICD-10-CM

## 2021-01-04 DIAGNOSIS — Z85.47 PERSONAL HISTORY OF MALIGNANT NEOPLASM OF TESTIS: ICD-10-CM

## 2021-01-04 DIAGNOSIS — E55.9 VITAMIN D DEFICIENCY, UNSPECIFIED: ICD-10-CM

## 2021-01-04 DIAGNOSIS — G43.909 MIGRAINE, UNSPECIFIED, NOT INTRACTABLE, WITHOUT STATUS MIGRAINOSUS: ICD-10-CM

## 2021-01-04 DIAGNOSIS — D63.8 ANEMIA IN OTHER CHRONIC DISEASES CLASSIFIED ELSEWHERE: ICD-10-CM

## 2021-01-04 DIAGNOSIS — Z86.73 PERSONAL HISTORY OF TRANSIENT ISCHEMIC ATTACK (TIA), AND CEREBRAL INFARCTION WITHOUT RESIDUAL DEFICITS: ICD-10-CM

## 2021-01-04 DIAGNOSIS — I47.2 VENTRICULAR TACHYCARDIA: ICD-10-CM

## 2021-01-04 DIAGNOSIS — D50.9 IRON DEFICIENCY ANEMIA, UNSPECIFIED: ICD-10-CM

## 2021-01-04 DIAGNOSIS — I25.10 ATHEROSCLEROTIC HEART DISEASE OF NATIVE CORONARY ARTERY WITHOUT ANGINA PECTORIS: ICD-10-CM

## 2021-01-04 DIAGNOSIS — I48.92 UNSPECIFIED ATRIAL FLUTTER: ICD-10-CM

## 2021-01-08 NOTE — H&P ADULT - NSHPOUTPATIENTPROVIDERS_GEN_ALL_CORE
Dr. Kramer (neurologist)   Dr. Quiñones (EP)  Dr. Perez (GI)  Dr. Roche (PMD) Dr. Kramer (Neurologist)   Dr. Quiñones (EP)  Dr. Perez (GI)  Dr. Roche (PMD) [Breast Lump] : breast lump [Negative] : Heme/Lymph

## 2021-01-11 PROBLEM — Z95.810 PRESENCE OF AUTOMATIC (IMPLANTABLE) CARDIAC DEFIBRILLATOR: Chronic | Status: ACTIVE | Noted: 2019-10-13

## 2021-01-25 ENCOUNTER — APPOINTMENT (OUTPATIENT)
Dept: CARDIOLOGY | Facility: CLINIC | Age: 51
End: 2021-01-25

## 2021-01-28 ENCOUNTER — APPOINTMENT (OUTPATIENT)
Dept: CARDIOLOGY | Facility: CLINIC | Age: 51
End: 2021-01-28

## 2021-02-04 ENCOUNTER — INPATIENT (INPATIENT)
Facility: HOSPITAL | Age: 51
LOS: 4 days | Discharge: NURSING, REHAB OR LONG-TERM CARE | End: 2021-02-09
Attending: INTERNAL MEDICINE | Admitting: INTERNAL MEDICINE
Payer: MEDICAID

## 2021-02-04 DIAGNOSIS — Z98.890 OTHER SPECIFIED POSTPROCEDURAL STATES: Chronic | ICD-10-CM

## 2021-02-04 DIAGNOSIS — Z96.649 PRESENCE OF UNSPECIFIED ARTIFICIAL HIP JOINT: Chronic | ICD-10-CM

## 2021-02-04 DIAGNOSIS — T82.120A DISPLACEMENT OF CARDIAC ELECTRODE, INITIAL ENCOUNTER: Chronic | ICD-10-CM

## 2021-02-04 PROCEDURE — 99223 1ST HOSP IP/OBS HIGH 75: CPT

## 2021-02-04 RX ORDER — ATORVASTATIN CALCIUM 80 MG/1
40 TABLET, FILM COATED ORAL AT BEDTIME
Refills: 0 | Status: DISCONTINUED | OUTPATIENT
Start: 2021-02-04 | End: 2021-02-09

## 2021-02-04 RX ORDER — TAMSULOSIN HYDROCHLORIDE 0.4 MG/1
0.4 CAPSULE ORAL AT BEDTIME
Refills: 0 | Status: DISCONTINUED | OUTPATIENT
Start: 2021-02-04 | End: 2021-02-09

## 2021-02-04 RX ORDER — TIOTROPIUM BROMIDE 18 UG/1
1 CAPSULE ORAL; RESPIRATORY (INHALATION) DAILY
Refills: 0 | Status: DISCONTINUED | OUTPATIENT
Start: 2021-02-04 | End: 2021-02-09

## 2021-02-04 RX ORDER — QUETIAPINE FUMARATE 200 MG/1
25 TABLET, FILM COATED ORAL THREE TIMES A DAY
Refills: 0 | Status: DISCONTINUED | OUTPATIENT
Start: 2021-02-04 | End: 2021-02-09

## 2021-02-04 RX ORDER — METOPROLOL TARTRATE 50 MG
50 TABLET ORAL DAILY
Refills: 0 | Status: DISCONTINUED | OUTPATIENT
Start: 2021-02-04 | End: 2021-02-09

## 2021-02-04 RX ORDER — LAMOTRIGINE 25 MG/1
200 TABLET, ORALLY DISINTEGRATING ORAL
Refills: 0 | Status: DISCONTINUED | OUTPATIENT
Start: 2021-02-04 | End: 2021-02-09

## 2021-02-04 RX ORDER — APIXABAN 2.5 MG/1
5 TABLET, FILM COATED ORAL
Refills: 0 | Status: DISCONTINUED | OUTPATIENT
Start: 2021-02-04 | End: 2021-02-05

## 2021-02-04 RX ORDER — GABAPENTIN 400 MG/1
400 CAPSULE ORAL THREE TIMES A DAY
Refills: 0 | Status: DISCONTINUED | OUTPATIENT
Start: 2021-02-04 | End: 2021-02-09

## 2021-02-04 RX ORDER — SERTRALINE 25 MG/1
50 TABLET, FILM COATED ORAL DAILY
Refills: 0 | Status: DISCONTINUED | OUTPATIENT
Start: 2021-02-04 | End: 2021-02-09

## 2021-02-04 RX ORDER — DIMETHYL FUMARATE 240 MG/1
1 CAPSULE ORAL
Qty: 0 | Refills: 0 | DISCHARGE

## 2021-02-04 RX ORDER — FOLIC ACID 0.8 MG
1 TABLET ORAL DAILY
Refills: 0 | Status: DISCONTINUED | OUTPATIENT
Start: 2021-02-04 | End: 2021-02-09

## 2021-02-04 RX ORDER — NITROGLYCERIN 6.5 MG
1 CAPSULE, EXTENDED RELEASE ORAL
Qty: 0 | Refills: 0 | DISCHARGE

## 2021-02-04 RX ORDER — BACLOFEN 100 %
10 POWDER (GRAM) MISCELLANEOUS EVERY 8 HOURS
Refills: 0 | Status: DISCONTINUED | OUTPATIENT
Start: 2021-02-04 | End: 2021-02-09

## 2021-02-04 RX ORDER — ASPIRIN/CALCIUM CARB/MAGNESIUM 324 MG
81 TABLET ORAL DAILY
Refills: 0 | Status: DISCONTINUED | OUTPATIENT
Start: 2021-02-04 | End: 2021-02-09

## 2021-02-04 RX ORDER — ACETAMINOPHEN 500 MG
650 TABLET ORAL EVERY 6 HOURS
Refills: 0 | Status: DISCONTINUED | OUTPATIENT
Start: 2021-02-04 | End: 2021-02-09

## 2021-02-04 RX ADMIN — QUETIAPINE FUMARATE 25 MILLIGRAM(S): 200 TABLET, FILM COATED ORAL at 22:46

## 2021-02-04 RX ADMIN — Medication 10 MILLIGRAM(S): at 22:46

## 2021-02-04 RX ADMIN — TAMSULOSIN HYDROCHLORIDE 0.4 MILLIGRAM(S): 0.4 CAPSULE ORAL at 22:46

## 2021-02-04 RX ADMIN — ATORVASTATIN CALCIUM 40 MILLIGRAM(S): 80 TABLET, FILM COATED ORAL at 22:46

## 2021-02-04 RX ADMIN — GABAPENTIN 400 MILLIGRAM(S): 400 CAPSULE ORAL at 22:46

## 2021-02-04 NOTE — H&P ADULT - NSHPREVIEWOFSYSTEMS_GEN_ALL_CORE
REVIEW OF SYSTEMS:    CONSTITUTIONAL: No weakness, fevers or chills  EYES/ENT: No visual changes;  No vertigo or throat pain   NECK: No pain or stiffness  RESPIRATORY: No cough, wheezing, hemoptysis; No shortness of breath  CARDIOVASCULAR: Chest pressure but no palpitations  GASTROINTESTINAL: No abdominal or epigastric pain. No nausea, vomiting, or hematemesis; No diarrhea or constipation. No melena or hematochezia.  GENITOURINARY: No dysuria, frequency or hematuria  NEUROLOGICAL: No numbness or weakness  SKIN: No itching, rashes

## 2021-02-04 NOTE — H&P ADULT - ATTENDING COMMENTS
49 y/o M pmh of MI no stents, arrhythmogenic RV dysplasia w/ AICD implant, Afib on Eliquis, s/p afib ablation 3 weeks ago at [facility] transferred from [facility] for atrial flutter ablation    ## Atypical Atrial Flutter    - NPO today for possible ablation  - Continue eliquis and metoprolol

## 2021-02-04 NOTE — H&P ADULT - HISTORY OF PRESENT ILLNESS
49 y/o M current smoker ([id number]), pmh of MI no stents, arrhythmogenic RV dysplasia w/ AICD implant, Afib on Eliquis, s/p afib ablation 3 weeks ago at [facility], epilepsy, MS, peripheral neuropathy, COPD transferred from [facility] for a-flutter ablation. He went to [facility] when he began feeling chest pain x1d, gradually worsening. States that pain was similar to previous MI. Started at rest, worsened with exertion, radiation to jaw, 10/10 pain, intermittent for 6hrs. Not a/w fever, cough, SOB, lower leg swelling/pain, or palpitations (on admission).   Per [facility] charts: In ED his trops were neg x2. EKG showed SR, RBBB, and STD more pronounced in anteroseptal leads. Echo showed mod enlarged RV, mild reduced LV function w/ LVEF 45%, mod-sev TR. On 2/3 patient developed aflutter with HR to 160s w/ episodes of Afib. Patient was transferred to [facility] for further management with Dr. Jarquin.

## 2021-02-04 NOTE — H&P ADULT - ASSESSMENT
49 y/o M pmh of MI no stents, arrhythmogenic RV dysplasia w/ AICD implant, Afib on Eliquis, s/p afib ablation 3 weeks ago at [facility] transferred from [facility] for ablation. VSS currently stable. Patient in no acute distress and chest pain resolved.    #Aflutter w/ RVR  #Hx of ARVD  - Currently in NSR, hemodynamically stable  > NPO after midnight for ablation   > C/w metoprolol and eliquis  > Hold AM eliquis  > CBC, CMP, Mag, Coags  > COVID swab    #CAD  - Admission to [facility] for chest pain  > Send trops and CK  > C/w statin and ASA    #MS  > Will send Tecfidera non-formulary form  > C/w gabapentin  > C/w baclofen  > Tylenol PRN  > Patient states Dr. Lynne prescribed 2mg diluadid q6h PRN, will order for now, will need to call and confirm    #Epilepsy (stable)  > C/w keppra    #COPD (stable)  > C/w spiriva    FULL CODE  Dispo: Acute, pending ablation  Diet: DASH/TLC  Activity: IAT  DVT ppx: Eliquis  GI ppx: Protonix

## 2021-02-05 VITALS
TEMPERATURE: 98 F | RESPIRATION RATE: 18 BRPM | HEART RATE: 70 BPM | SYSTOLIC BLOOD PRESSURE: 103 MMHG | DIASTOLIC BLOOD PRESSURE: 61 MMHG

## 2021-02-05 DIAGNOSIS — G89.29 OTHER CHRONIC PAIN: ICD-10-CM

## 2021-02-05 LAB
ALBUMIN SERPL ELPH-MCNC: 3.9 G/DL — SIGNIFICANT CHANGE UP (ref 3.5–5.2)
ALP SERPL-CCNC: 100 U/L — SIGNIFICANT CHANGE UP (ref 30–115)
ALT FLD-CCNC: 14 U/L — SIGNIFICANT CHANGE UP (ref 0–41)
ANION GAP SERPL CALC-SCNC: 12 MMOL/L — SIGNIFICANT CHANGE UP (ref 7–14)
APTT BLD: 36.5 SEC — SIGNIFICANT CHANGE UP (ref 27–39.2)
AST SERPL-CCNC: 16 U/L — SIGNIFICANT CHANGE UP (ref 0–41)
BILIRUB SERPL-MCNC: 0.2 MG/DL — SIGNIFICANT CHANGE UP (ref 0.2–1.2)
BUN SERPL-MCNC: 21 MG/DL — HIGH (ref 10–20)
CALCIUM SERPL-MCNC: 9.1 MG/DL — SIGNIFICANT CHANGE UP (ref 8.5–10.1)
CHLORIDE SERPL-SCNC: 101 MMOL/L — SIGNIFICANT CHANGE UP (ref 98–110)
CK SERPL-CCNC: 45 U/L — SIGNIFICANT CHANGE UP (ref 0–225)
CO2 SERPL-SCNC: 26 MMOL/L — SIGNIFICANT CHANGE UP (ref 17–32)
CREAT SERPL-MCNC: 1.1 MG/DL — SIGNIFICANT CHANGE UP (ref 0.7–1.5)
GLUCOSE SERPL-MCNC: 88 MG/DL — SIGNIFICANT CHANGE UP (ref 70–99)
HCT VFR BLD CALC: 37.6 % — LOW (ref 42–52)
HGB BLD-MCNC: 12.4 G/DL — LOW (ref 14–18)
INR BLD: 1.15 RATIO — SIGNIFICANT CHANGE UP (ref 0.65–1.3)
MAGNESIUM SERPL-MCNC: 2 MG/DL — SIGNIFICANT CHANGE UP (ref 1.8–2.4)
MCHC RBC-ENTMCNC: 30.9 PG — SIGNIFICANT CHANGE UP (ref 27–31)
MCHC RBC-ENTMCNC: 33 G/DL — SIGNIFICANT CHANGE UP (ref 32–37)
MCV RBC AUTO: 93.8 FL — SIGNIFICANT CHANGE UP (ref 80–94)
NRBC # BLD: 0 /100 WBCS — SIGNIFICANT CHANGE UP (ref 0–0)
PLATELET # BLD AUTO: 165 K/UL — SIGNIFICANT CHANGE UP (ref 130–400)
POTASSIUM SERPL-MCNC: 4.5 MMOL/L — SIGNIFICANT CHANGE UP (ref 3.5–5)
POTASSIUM SERPL-SCNC: 4.5 MMOL/L — SIGNIFICANT CHANGE UP (ref 3.5–5)
PROT SERPL-MCNC: 5.8 G/DL — LOW (ref 6–8)
PROTHROM AB SERPL-ACNC: 13.2 SEC — HIGH (ref 9.95–12.87)
RBC # BLD: 4.01 M/UL — LOW (ref 4.7–6.1)
RBC # FLD: 12.6 % — SIGNIFICANT CHANGE UP (ref 11.5–14.5)
SARS-COV-2 RNA SPEC QL NAA+PROBE: SIGNIFICANT CHANGE UP
SODIUM SERPL-SCNC: 139 MMOL/L — SIGNIFICANT CHANGE UP (ref 135–146)
TROPONIN T SERPL-MCNC: 0.01 NG/ML — SIGNIFICANT CHANGE UP
WBC # BLD: 4.03 K/UL — LOW (ref 4.8–10.8)
WBC # FLD AUTO: 4.03 K/UL — LOW (ref 4.8–10.8)

## 2021-02-05 PROCEDURE — 99232 SBSQ HOSP IP/OBS MODERATE 35: CPT

## 2021-02-05 PROCEDURE — 93283 PRGRMG EVAL IMPLANTABLE DFB: CPT | Mod: 26

## 2021-02-05 RX ORDER — HYDROMORPHONE HYDROCHLORIDE 2 MG/ML
2 INJECTION INTRAMUSCULAR; INTRAVENOUS; SUBCUTANEOUS ONCE
Refills: 0 | Status: DISCONTINUED | OUTPATIENT
Start: 2021-02-05 | End: 2021-02-05

## 2021-02-05 RX ORDER — HYDROMORPHONE HYDROCHLORIDE 2 MG/ML
2 INJECTION INTRAMUSCULAR; INTRAVENOUS; SUBCUTANEOUS EVERY 6 HOURS
Refills: 0 | Status: DISCONTINUED | OUTPATIENT
Start: 2021-02-05 | End: 2021-02-05

## 2021-02-05 RX ORDER — HYDROMORPHONE HYDROCHLORIDE 2 MG/ML
2 INJECTION INTRAMUSCULAR; INTRAVENOUS; SUBCUTANEOUS EVERY 6 HOURS
Refills: 0 | Status: DISCONTINUED | OUTPATIENT
Start: 2021-02-05 | End: 2021-02-09

## 2021-02-05 RX ADMIN — Medication 10 MILLIGRAM(S): at 21:53

## 2021-02-05 RX ADMIN — APIXABAN 5 MILLIGRAM(S): 2.5 TABLET, FILM COATED ORAL at 05:47

## 2021-02-05 RX ADMIN — ATORVASTATIN CALCIUM 40 MILLIGRAM(S): 80 TABLET, FILM COATED ORAL at 21:53

## 2021-02-05 RX ADMIN — QUETIAPINE FUMARATE 25 MILLIGRAM(S): 200 TABLET, FILM COATED ORAL at 05:48

## 2021-02-05 RX ADMIN — Medication 10 MILLIGRAM(S): at 05:48

## 2021-02-05 RX ADMIN — Medication 50 MILLIGRAM(S): at 05:48

## 2021-02-05 RX ADMIN — HYDROMORPHONE HYDROCHLORIDE 2 MILLIGRAM(S): 2 INJECTION INTRAMUSCULAR; INTRAVENOUS; SUBCUTANEOUS at 17:28

## 2021-02-05 RX ADMIN — GABAPENTIN 400 MILLIGRAM(S): 400 CAPSULE ORAL at 21:53

## 2021-02-05 RX ADMIN — TAMSULOSIN HYDROCHLORIDE 0.4 MILLIGRAM(S): 0.4 CAPSULE ORAL at 21:53

## 2021-02-05 RX ADMIN — LAMOTRIGINE 200 MILLIGRAM(S): 25 TABLET, ORALLY DISINTEGRATING ORAL at 05:48

## 2021-02-05 RX ADMIN — HYDROMORPHONE HYDROCHLORIDE 2 MILLIGRAM(S): 2 INJECTION INTRAMUSCULAR; INTRAVENOUS; SUBCUTANEOUS at 07:26

## 2021-02-05 RX ADMIN — QUETIAPINE FUMARATE 25 MILLIGRAM(S): 200 TABLET, FILM COATED ORAL at 21:53

## 2021-02-05 RX ADMIN — LAMOTRIGINE 200 MILLIGRAM(S): 25 TABLET, ORALLY DISINTEGRATING ORAL at 17:26

## 2021-02-05 RX ADMIN — HYDROMORPHONE HYDROCHLORIDE 2 MILLIGRAM(S): 2 INJECTION INTRAMUSCULAR; INTRAVENOUS; SUBCUTANEOUS at 14:12

## 2021-02-05 RX ADMIN — HYDROMORPHONE HYDROCHLORIDE 2 MILLIGRAM(S): 2 INJECTION INTRAMUSCULAR; INTRAVENOUS; SUBCUTANEOUS at 01:18

## 2021-02-05 RX ADMIN — GABAPENTIN 400 MILLIGRAM(S): 400 CAPSULE ORAL at 05:48

## 2021-02-05 NOTE — PROGRESS NOTE ADULT - SUBJECTIVE AND OBJECTIVE BOX
INTERVAL HPI/OVERNIGHT EVENTS: No acute events overnight    MEDICATIONS  (STANDING):  aspirin  chewable 81 milliGRAM(s) Oral daily  atorvastatin 40 milliGRAM(s) Oral at bedtime  baclofen 10 milliGRAM(s) Oral every 8 hours  folic acid 1 milliGRAM(s) Oral daily  gabapentin 400 milliGRAM(s) Oral three times a day  lamoTRIgine 200 milliGRAM(s) Oral two times a day  metoprolol succinate ER 50 milliGRAM(s) Oral daily  QUEtiapine 25 milliGRAM(s) Oral three times a day  sertraline 50 milliGRAM(s) Oral daily  tamsulosin 0.4 milliGRAM(s) Oral at bedtime  tiotropium 18 MICROgram(s) Capsule 1 Capsule(s) Inhalation daily    MEDICATIONS  (PRN):  acetaminophen   Tablet .. 650 milliGRAM(s) Oral every 6 hours PRN Mild Pain (1 - 3), Moderate Pain (4 - 6)  HYDROmorphone   Tablet 2 milliGRAM(s) Oral every 6 hours PRN Severe Pain (7 - 10)      Allergies:  dexmethylphenidate (Unknown)  Dilantin (Rash)  dilantin, compazine, , ritalin, phenergan (Unknown)  Haldol (Unknown)  hydantoin derivatives (Other)  methylphenidate (Unknown)  phenytoin (Unknown)  prochlorperazine (Unknown)  promethazine (Dystonic RXN)  rash/hives (Anaphylaxis)  thioxanthenes (Unknown)    Intolerances        REVIEW OF SYSTEMS: No CP, palpitations, dizziness or SOB    Vital Signs Last 24 Hrs  T(C): --  T(F): --  HR: --  BP: --  BP(mean): --  RR: --  SpO2: --    02-04-21 @ 07:01  -  02-05-21 @ 07:00  --------------------------------------------------------  IN: 0 mL / OUT: 200 mL / NET: -200 mL    02-05-21 @ 07:01  -  02-05-21 @ 15:19  --------------------------------------------------------  IN: 0 mL / OUT: 750 mL / NET: -750 mL        Physical Exam  GENERAL: In no apparent distress, well nourished, and hydrated.  EYES: EOMI, PERRLA, conjunctiva and sclera clear  ENMT: Moist mucous membranes  NECK: Supple  HEART: Irregular rate and rhythm; No murmurs, rubs, or gallops.  PULMONARY: Clear to auscultation and perfusion.  No rales, wheezing, or rhonchi bilaterally.  ABDOMEN: Soft, Nontender, Nondistended; Bowel sounds present  EXTREMITIES:  2+ Peripheral Pulses, No clubbing, cyanosis, or edema  NEUROLOGICAL: Grossly nonfocal    LABS:                        12.4   4.03  )-----------( 165      ( 05 Feb 2021 01:34 )             37.6     02-05    139  |  101  |  21<H>  ----------------------------<  88  4.5   |  26  |  1.1    Ca    9.1      05 Feb 2021 01:34  Mg     2.0     02-05    TPro  5.8<L>  /  Alb  3.9  /  TBili  0.2  /  DBili  x   /  AST  16  /  ALT  14  /  AlkPhos  100  02-05    PT/INR - ( 05 Feb 2021 01:34 )   PT: 13.20 sec;   INR: 1.15 ratio         PTT - ( 05 Feb 2021 01:34 )  PTT:36.5 sec      RADIOLOGY & ADDITIONAL TESTS:

## 2021-02-05 NOTE — CONSULT NOTE ADULT - PROBLEM SELECTOR RECOMMENDATION 9
1. Change acetaminophen   Tablet .. 650 milliGRAM(s) Oral every 6 hours Standing  2. Con't baclofen 10 milliGRAM(s) Oral every 8 hours  3. Con't gabapentin 400 milliGRAM(s) Oral three times a day  4. DC HYDROmorphone   Tablet 2 milliGRAM(s) Oral every 6 hours PRN  5. Start Oxycodone 15mg PO Q4hrs PRN

## 2021-02-05 NOTE — CONSULT NOTE ADULT - SUBJECTIVE AND OBJECTIVE BOX
Pain Management Consult Note  02-05-21 @ 17:46     50y Male current smoker ([id number]), pmh of MI no stents, arrhythmogenic RV dysplasia w/ AICD implant, Afib on Eliquis, s/p afib ablation 3 weeks ago at [facility], epilepsy, MS, peripheral neuropathy, COPD transferred from [facility] for a-flutter ablation. Complains today of chronic bilat hip pain radiating to bilat lower ext's.       Allergies    dexmethylphenidate (Unknown)  Dilantin (Rash)  dilantin, compazine, , ritalin, phenergan (Unknown)  Haldol (Unknown)  hydantoin derivatives (Other)  methylphenidate (Unknown)  phenytoin (Unknown)  prochlorperazine (Unknown)  promethazine (Dystonic RXN)  rash/hives (Anaphylaxis)  thioxanthenes (Unknown)    Intolerances        PAST MEDICAL & SURGICAL HISTORY:  Vitamin D deficiency    Constipation    Chronic respiratory failure  secondary to COPD    Folate-deficiency anemia    Iron deficiency anemia    Anemia of chronic disease    Pericardial effusion without cardiac tamponade  h/o trace pericardial effusion    BPH (benign prostatic hyperplasia)    Chronic pain    Peripheral neuropathy    Type 2 diabetes mellitus    AICD (automatic cardioverter/defibrillator) present  SJM    Ventricular tachycardia    Chronic heart failure with preserved ejection fraction    PTSD (post-traumatic stress disorder)    Supraventricular arrhythmia  prior history    Cardiomyopathy    CHF (congestive heart failure)    Atrial fibrillation  s/p ablation, on eliquis    BPH (benign prostatic hyperplasia)    HTN (hypertension)    Seizures    Migraines    Pneumonia    MS (multiple sclerosis)    CAD (coronary artery disease)    Bipolar disorder    Anginal pain    Schizo affective schizophrenia    GERD (gastroesophageal reflux disease)    Seasonal allergies    Hypercholesteremia    COPD (chronic obstructive pulmonary disease)    CVA (cerebral vascular accident)    TIA (transient ischemic attack)    Peripheral Neuropathy    Clinical Depression    Displacement of electrode lead of cardiac pacemaker    History of evacuation of hematoma  AICD pocket    H/O prior ablation treatment  for Afib    H/O hernia repair  right 1972,1991    History of hip replacement    S/P Orchiectomy  L for testicular CA    S/P Implantation of AICD        Home Medications:  apixaban 5 mg oral tablet: 1 tab(s) orally every 12 hours (28 Dec 2020 07:59)  atorvastatin 40 mg oral tablet: 1 tab(s) orally once a day (at bedtime) (28 Dec 2020 07:59)  baclofen 10 mg oral tablet: 1 tab(s) orally 3 times a day (28 Dec 2020 07:59)  folic acid 1 mg oral tablet: 1 tab(s) orally once a day (28 Dec 2020 07:59)  gabapentin 400 mg oral capsule: 1 cap(s) orally 3 times a day (28 Dec 2020 07:59)  lamoTRIgine 100 mg oral tablet: 2 tab(s) orally 2 times a day (28 Dec 2020 07:59)  metoprolol succinate 50 mg oral tablet, extended release: 1 tab(s) orally once a day; MAY GIVE 4 TABS OF 12.5MG AT ONE TIME FOR DOSING (28 Dec 2020 07:59)  QUEtiapine 25 mg oral tablet: 1 tab(s) orally once a day (28 Dec 2020 07:59)  sertraline 50 mg oral tablet: 1 tab(s) orally once a day (04 Feb 2021 21:10)  tamsulosin 0.4 mg oral capsule: 1 cap(s) orally once a day (at bedtime) (28 Dec 2020 07:59)  Tecfidera 240 mg oral delayed release capsule: 1 cap(s) orally 2 times a day (04 Feb 2021 21:26)  tiotropium 18 mcg inhalation capsule: 1 cap(s) inhaled once a day (28 Dec 2020 07:59)      SOCIAL HISTORY:  Denies Smoking, Alcohol, or Drug Use    dexmethylphenidate (Unknown)  Dilantin (Rash)  dilantin, compazine, , ritalin, phenergan (Unknown)  Haldol (Unknown)  hydantoin derivatives (Other)  methylphenidate (Unknown)  phenytoin (Unknown)  prochlorperazine (Unknown)  promethazine (Dystonic RXN)  rash/hives (Anaphylaxis)  thioxanthenes (Unknown)      MEDICATIONS  (STANDING):  aspirin  chewable 81 milliGRAM(s) Oral daily  atorvastatin 40 milliGRAM(s) Oral at bedtime  baclofen 10 milliGRAM(s) Oral every 8 hours  folic acid 1 milliGRAM(s) Oral daily  gabapentin 400 milliGRAM(s) Oral three times a day  lamoTRIgine 200 milliGRAM(s) Oral two times a day  metoprolol succinate ER 50 milliGRAM(s) Oral daily  QUEtiapine 25 milliGRAM(s) Oral three times a day  sertraline 50 milliGRAM(s) Oral daily  tamsulosin 0.4 milliGRAM(s) Oral at bedtime  tiotropium 18 MICROgram(s) Capsule 1 Capsule(s) Inhalation daily    MEDICATIONS  (PRN):  acetaminophen   Tablet .. 650 milliGRAM(s) Oral every 6 hours PRN Mild Pain (1 - 3), Moderate Pain (4 - 6)  HYDROmorphone   Tablet 2 milliGRAM(s) Oral every 6 hours PRN Severe Pain (7 - 10)      Vital Signs Last 24 Hrs  T(C): --  T(F): --  HR: --  BP: --  BP(mean): --  RR: --  SpO2: --      02-04-21 @ 07:01  -  02-05-21 @ 07:00  --------------------------------------------------------  IN: 0 mL / OUT: 200 mL / NET: -200 mL    02-05-21 @ 07:01  -  02-05-21 @ 17:46  --------------------------------------------------------  IN: 0 mL / OUT: 750 mL / NET: -750 mL        LABS:                          12.4   4.03  )-----------( 165      ( 05 Feb 2021 01:34 )             37.6     02-05    139  |  101  |  21<H>  ----------------------------<  88  4.5   |  26  |  1.1    Ca    9.1      05 Feb 2021 01:34  Mg     2.0     02-05    TPro  5.8<L>  /  Alb  3.9  /  TBili  0.2  /  DBili  x   /  AST  16  /  ALT  14  /  AlkPhos  100  02-05    LIVER FUNCTIONS - ( 05 Feb 2021 01:34 )  Alb: 3.9 g/dL / Pro: 5.8 g/dL / ALK PHOS: 100 U/L / ALT: 14 U/L / AST: 16 U/L / GGT: x           PT/INR - ( 05 Feb 2021 01:34 )   PT: 13.20 sec;   INR: 1.15 ratio         PTT - ( 05 Feb 2021 01:34 )  PTT:36.5 sec        RADIOLOGY:  EXAM:  CT HEART WITHOUT CORONARIES IC            PROCEDURE DATE:  12/28/2020            INTERPRETATION:  CT HEART WITHOUT CORONARIES WITH IV CONTRAST  ------------------  CLINICAL HISTORY:  50 years Male with to assess LA size, pulmonary veins DIAGNOSIS: [to assess LA size, pulmonary veins    TECHNIQUE:  *  ECG gated images of the chest were obtained in a volumetric acquisition from the yas to the diaphragm after administration of intravenous contrast.  *  CONTRAST: 70 cc Omnipaque 350  *  3-D volume rendered images reconstructed at an independent workstation.      INTERPRETATION  ------------------  COMPARISON: Chest CT, 10/14/2019.      FINDINGS:    Non Cardiac Findings:    Tubes/Lines: None.    Mediastinum/Lymph Nodes:No lymphadenopathy in the visualized chest.    Lungs, Pleura, and Airways: Visualized lungs are without mass or consolidation.    Bones and soft tissues: Pectus excavatum. No acute abnormality.    Cardiac Findings:    Left atrium measures 2.5 cm in greatest AP dimension.    The right ventricle appears slightly enlarged. One pacer lead is in the right atrial appendage and 2 pacer leads are in the right ventricle. No pericardial effusion.  No left intracardiac mass or thrombus including within the left atrial appendage is noted. Main pulmonary artery is enlarged measuring approximately 3.4 cm.    The superior segment of the right lower lobe demonstrates a sperate ostium from the inferior pulmonary vein (see uploaded 3D model). Ostial measurements of the pulmonary veins are as follows:    *  RT superior pulmonary vein :  17 x 16 mm  *  RT superior right lower lobe segment: 9 x 5 mm  *  RT inferior pulmonary vein :  12 x 10 mm  *  LT superior pulmonary vein :  25 x 14 mm  *  LT inferior pulmonary vein :  16 x 12 mm    ------------------  IMPRESSION:  ------------------    Three draining pulmonary veins on the right with sperate ostium of the vein draining superior segment of the right lower lobe and normal anatomy two pulmonary veins on the left. Measurements as above.    Visualized lungs without consolidation or mass.    Main pulmonary artery is enlarged measuring approximately 3.4 cm. which can be seen with pulmonary hypertension.    MARTINA MUNOZ MD; Attending Radiologist  This document has been electronically signed. Dec 28 2020 11:28AM          Drug Screen:        [ ]  [place]  Reviewed on 2/5/21, 5:48P  Patient Name: Brendon MiguelBirth Date: 1970  Address: [place], [place] [address]Ofp: Male  Rx Written	Rx Dispensed	Drug	Quantity	Days Supply	Prescriber Name  12/30/2020	12/30/2020	tramadol hcl 50 mg tablet	21	7	Mary Cast S  Payment Method Medicaid  Dispenser [facility]  06/23/2020	06/23/2020	pregabalin 75 mg capsule	90	30	Marion Garcia  Payment Method Medicaid  Dispenser [facility]  06/15/2020	06/15/2020	morphine sulfate ir 30 mg tab	15	5	Mary Cast S  Payment Method Medicaid  Dispenser [facility]    Patient Name: Brendon MiguelBirth Date: 1970  Address: [address] [place] [id number]: Male  Rx Written	Rx Dispensed	Drug	Quantity	Days Supply	Prescriber Name  06/14/2020	06/15/2020	morphine sulfate ir 30 mg tab	4	1	Araseli Yancey  Payment Method Medicaid  Dispenser [facility]  06/02/2020	06/03/2020	morphine sulfate ir 30 mg tab	30	7	Araseli Yancey NP  Payment Method Medicaid  Dispenser [facility]  04/30/2020	05/01/2020	morphine sulfate ir 30 mg tab	120	30	Araseli Yancey NP  Payment Method Cash  Dispenser [facility]  04/21/2020	04/22/2020	morphine sulfate ir 30 mg tab	30	7	Araseli Yancey NP  Payment Method Cash  Dispenser [facility]  04/11/2020	04/12/2020	morphine sulfate ir 30 mg tab	30	7	Araseli Yancey NP  Payment Method Cash  Dispenser [facility]  03/26/2020	03/27/2020	morphine sulfate ir 30 mg tab	30	7	Rigoberto Mckeon  Payment Method Cash  Dispenser [facility]  02/27/2020	03/17/2020	morphine sulfate ir 30 mg tab	30	7	Rigoberto Mckeon  Payment Method Cash  Dispenser [facility]  02/27/2020	02/28/2020	morphine sulfate ir 30 mg tab	30	7	Rigoberto Mckeon  Payment Method Cash  Dispenser [facility]  02/27/2020	02/27/2020	morphine sulfate ir 30 mg tab	30	7	Rigoberto Mckeon  Payment Method Cash  Dispenser [facility]   Pain Management Consult Note  02-05-21 @ 17:46     50y Male current smoker ([id number]), pmh of MI no stents, arrhythmogenic RV dysplasia w/ AICD implant, Afib on Eliquis, s/p afib ablation 3 weeks ago at [facility], epilepsy, MS, peripheral neuropathy, COPD transferred from [facility] for a-flutter ablation. Complains today of chronic bilat hip pain radiating to bilat lower ext's.       Allergies    dexmethylphenidate (Unknown)  Dilantin (Rash)  dilantin, compazine, , ritalin, phenergan (Unknown)  Haldol (Unknown)  hydantoin derivatives (Other)  methylphenidate (Unknown)  phenytoin (Unknown)  prochlorperazine (Unknown)  promethazine (Dystonic RXN)  rash/hives (Anaphylaxis)  thioxanthenes (Unknown)    Intolerances        PAST MEDICAL & SURGICAL HISTORY:  Vitamin D deficiency    Constipation    Chronic respiratory failure  secondary to COPD    Folate-deficiency anemia    Iron deficiency anemia    Anemia of chronic disease    Pericardial effusion without cardiac tamponade  h/o trace pericardial effusion    BPH (benign prostatic hyperplasia)    Chronic pain    Peripheral neuropathy    Type 2 diabetes mellitus    AICD (automatic cardioverter/defibrillator) present  SJM    Ventricular tachycardia    Chronic heart failure with preserved ejection fraction    PTSD (post-traumatic stress disorder)    Supraventricular arrhythmia  prior history    Cardiomyopathy    CHF (congestive heart failure)    Atrial fibrillation  s/p ablation, on eliquis    BPH (benign prostatic hyperplasia)    HTN (hypertension)    Seizures    Migraines    Pneumonia    MS (multiple sclerosis)    CAD (coronary artery disease)    Bipolar disorder    Anginal pain    Schizo affective schizophrenia    GERD (gastroesophageal reflux disease)    Seasonal allergies    Hypercholesteremia    COPD (chronic obstructive pulmonary disease)    CVA (cerebral vascular accident)    TIA (transient ischemic attack)    Peripheral Neuropathy    Clinical Depression    Displacement of electrode lead of cardiac pacemaker    History of evacuation of hematoma  AICD pocket    H/O prior ablation treatment  for Afib    H/O hernia repair  right 1972,1991    History of hip replacement    S/P Orchiectomy  L for testicular CA    S/P Implantation of AICD        Home Medications:  apixaban 5 mg oral tablet: 1 tab(s) orally every 12 hours (28 Dec 2020 07:59)  atorvastatin 40 mg oral tablet: 1 tab(s) orally once a day (at bedtime) (28 Dec 2020 07:59)  baclofen 10 mg oral tablet: 1 tab(s) orally 3 times a day (28 Dec 2020 07:59)  folic acid 1 mg oral tablet: 1 tab(s) orally once a day (28 Dec 2020 07:59)  gabapentin 400 mg oral capsule: 1 cap(s) orally 3 times a day (28 Dec 2020 07:59)  lamoTRIgine 100 mg oral tablet: 2 tab(s) orally 2 times a day (28 Dec 2020 07:59)  metoprolol succinate 50 mg oral tablet, extended release: 1 tab(s) orally once a day; MAY GIVE 4 TABS OF 12.5MG AT ONE TIME FOR DOSING (28 Dec 2020 07:59)  QUEtiapine 25 mg oral tablet: 1 tab(s) orally once a day (28 Dec 2020 07:59)  sertraline 50 mg oral tablet: 1 tab(s) orally once a day (04 Feb 2021 21:10)  tamsulosin 0.4 mg oral capsule: 1 cap(s) orally once a day (at bedtime) (28 Dec 2020 07:59)  Tecfidera 240 mg oral delayed release capsule: 1 cap(s) orally 2 times a day (04 Feb 2021 21:26)  tiotropium 18 mcg inhalation capsule: 1 cap(s) inhaled once a day (28 Dec 2020 07:59)      SOCIAL HISTORY:  Denies Smoking, Alcohol, or Drug Use    dexmethylphenidate (Unknown)  Dilantin (Rash)  dilantin, compazine, , ritalin, phenergan (Unknown)  Haldol (Unknown)  hydantoin derivatives (Other)  methylphenidate (Unknown)  phenytoin (Unknown)  prochlorperazine (Unknown)  promethazine (Dystonic RXN)  rash/hives (Anaphylaxis)  thioxanthenes (Unknown)      MEDICATIONS  (STANDING):  aspirin  chewable 81 milliGRAM(s) Oral daily  atorvastatin 40 milliGRAM(s) Oral at bedtime  baclofen 10 milliGRAM(s) Oral every 8 hours  folic acid 1 milliGRAM(s) Oral daily  gabapentin 400 milliGRAM(s) Oral three times a day  lamoTRIgine 200 milliGRAM(s) Oral two times a day  metoprolol succinate ER 50 milliGRAM(s) Oral daily  QUEtiapine 25 milliGRAM(s) Oral three times a day  sertraline 50 milliGRAM(s) Oral daily  tamsulosin 0.4 milliGRAM(s) Oral at bedtime  tiotropium 18 MICROgram(s) Capsule 1 Capsule(s) Inhalation daily    MEDICATIONS  (PRN):  acetaminophen   Tablet .. 650 milliGRAM(s) Oral every 6 hours PRN Mild Pain (1 - 3), Moderate Pain (4 - 6)  HYDROmorphone   Tablet 2 milliGRAM(s) Oral every 6 hours PRN Severe Pain (7 - 10)            02-04-21 @ 07:01  -  02-05-21 @ 07:00  --------------------------------------------------------  IN: 0 mL / OUT: 200 mL / NET: -200 mL    02-05-21 @ 07:01  -  02-05-21 @ 17:46  --------------------------------------------------------  IN: 0 mL / OUT: 750 mL / NET: -750 mL        LABS:                          12.4   4.03  )-----------( 165      ( 05 Feb 2021 01:34 )             37.6     02-05    139  |  101  |  21<H>  ----------------------------<  88  4.5   |  26  |  1.1    Ca    9.1      05 Feb 2021 01:34  Mg     2.0     02-05    TPro  5.8<L>  /  Alb  3.9  /  TBili  0.2  /  DBili  x   /  AST  16  /  ALT  14  /  AlkPhos  100  02-05    LIVER FUNCTIONS - ( 05 Feb 2021 01:34 )  Alb: 3.9 g/dL / Pro: 5.8 g/dL / ALK PHOS: 100 U/L / ALT: 14 U/L / AST: 16 U/L / GGT: x           PT/INR - ( 05 Feb 2021 01:34 )   PT: 13.20 sec;   INR: 1.15 ratio         PTT - ( 05 Feb 2021 01:34 )  PTT:36.5 sec        RADIOLOGY:  EXAM:  CT HEART WITHOUT CORONARIES IC            PROCEDURE DATE:  12/28/2020            INTERPRETATION:  CT HEART WITHOUT CORONARIES WITH IV CONTRAST  ------------------  CLINICAL HISTORY:  50 years Male with to assess LA size, pulmonary veins DIAGNOSIS: [to assess LA size, pulmonary veins    TECHNIQUE:  *  ECG gated images of the chest were obtained in a volumetric acquisition from the yas to the diaphragm after administration of intravenous contrast.  *  CONTRAST: 70 cc Omnipaque 350  *  3-D volume rendered images reconstructed at an independent workstation.      INTERPRETATION  ------------------  COMPARISON: Chest CT, 10/14/2019.      FINDINGS:    Non Cardiac Findings:    Tubes/Lines: None.    Mediastinum/Lymph Nodes:No lymphadenopathy in the visualized chest.    Lungs, Pleura, and Airways: Visualized lungs are without mass or consolidation.    Bones and soft tissues: Pectus excavatum. No acute abnormality.    Cardiac Findings:    Left atrium measures 2.5 cm in greatest AP dimension.    The right ventricle appears slightly enlarged. One pacer lead is in the right atrial appendage and 2 pacer leads are in the right ventricle. No pericardial effusion.  No left intracardiac mass or thrombus including within the left atrial appendage is noted. Main pulmonary artery is enlarged measuring approximately 3.4 cm.    The superior segment of the right lower lobe demonstrates a sperate ostium from the inferior pulmonary vein (see uploaded 3D model). Ostial measurements of the pulmonary veins are as follows:    *  RT superior pulmonary vein :  17 x 16 mm  *  RT superior right lower lobe segment: 9 x 5 mm  *  RT inferior pulmonary vein :  12 x 10 mm  *  LT superior pulmonary vein :  25 x 14 mm  *  LT inferior pulmonary vein :  16 x 12 mm    ------------------  IMPRESSION:  ------------------    Three draining pulmonary veins on the right with sperate ostium of the vein draining superior segment of the right lower lobe and normal anatomy two pulmonary veins on the left. Measurements as above.    Visualized lungs without consolidation or mass.    Main pulmonary artery is enlarged measuring approximately 3.4 cm. which can be seen with pulmonary hypertension.    MARTINA MUNOZ MD; Attending Radiologist  This document has been electronically signed. Dec 28 2020 11:28AM          Drug Screen:        [ ]  [place]  Reviewed on 2/5/21, 5:48P  Patient Name: Brendon Khan Date: 1970  Address: [place], [place] [address]Mgx: Male  Rx Written	Rx Dispensed	Drug	Quantity	Days Supply	Prescriber Name  12/30/2020	12/30/2020	tramadol hcl 50 mg tablet	21	7	Mary Cast S  Payment Method Medicaid  Dispenser [facility]  06/23/2020	06/23/2020	pregabalin 75 mg capsule	90	30	Livshrohith, Marion  Payment Method Medicaid  Dispenser [facility]  06/15/2020	06/15/2020	morphine sulfate ir 30 mg tab	15	5	Mary Cast S  Payment Method Medicaid  Dispenser [facility]    Patient Name: Brendon Khan Date: 1970  Address: [address] [place], [place] [address]Npd: Male  Rx Written	Rx Dispensed	Drug	Quantity	Days Supply	Prescriber Name  06/14/2020	06/15/2020	morphine sulfate ir 30 mg tab	4	1	Araseli Yancey  Payment Method Medicaid  Dispenser [facility]  06/02/2020	06/03/2020	morphine sulfate ir 30 mg tab	30	7	Araseli Yancey NP  Payment Method Medicaid  Dispenser [facility]  04/30/2020	05/01/2020	morphine sulfate ir 30 mg tab	120	30	Araseli Yancey NP  Payment Method Cash  Dispenser [facility]  04/21/2020	04/22/2020	morphine sulfate ir 30 mg tab	30	7	Araseli Yancey NP  Payment Method Cash  Dispenser [facility]  04/11/2020	04/12/2020	morphine sulfate ir 30 mg tab	30	7	Araseli Yancey NP  Payment Method Cash  Dispenser [facility]  03/26/2020	03/27/2020	morphine sulfate ir 30 mg tab	30	7	Rigoberto Mckeon  Payment Method Cash  Dispenser [facility]  02/27/2020	03/17/2020	morphine sulfate ir 30 mg tab	30	7	Rigoberto Mckeon  Payment Method Cash  Dispenser [facility]  02/27/2020	02/28/2020	morphine sulfate ir 30 mg tab	30	7	Rigoberto Mckeon  Payment Method Cash  Dispenser [facility]  02/27/2020	02/27/2020	morphine sulfate ir 30 mg tab	30	7	Aperggato, Rigoberto A  Payment Method Cash  Dispenser [facility]

## 2021-02-05 NOTE — PROGRESS NOTE ADULT - ASSESSMENT
EP: Dr Quiñones    Assessment: 49 y/o M pmh of MI no stents, arrhythmogenic RV dysplasia w/ AICD implant ([employer]), Afib on Eliquis, s/p afib ablation 3 weeks ago at [facility] transferred from [facility] for atrial flutter ablation. VSS currently stable. Patient in no acute distress and chest pain resolved.    Impression:  AF on Eliquis sp Ablation  Atrial Flutter  Arrhythmogenic RV Dysplasia sp ICD ([employer])  MS    Plan:  - Scheduled for AFL ablation on Monday 2/8  - Will repeat COVID tomorrow  - NPO after midnight on Monday  - Dilaudid 2mg PO Q6h PRN  -  EP: Dr Quiñones    Assessment: 51 y/o M pmh of MI no stents, arrhythmogenic RV dysplasia w/ AICD implant ([employer]), Afib on Eliquis, s/p afib ablation 3 weeks ago at [facility] transferred from [facility] for atrial flutter ablation. VSS currently stable. Patient in no acute distress and chest pain resolved.    Impression:  AF on Eliquis sp Ablation  Atrial Flutter  Arrhythmogenic RV Dysplasia sp ICD ([employer])  MS    Plan:  - Scheduled for AFL ablation on Monday 2/8  - Will repeat COVID tomorrow  - NPO after midnight on Monday  - Dilaudid 2mg PO Q6h PRN  - Cont Eliquis  - Will follow

## 2021-02-05 NOTE — CONSULT NOTE ADULT - ASSESSMENT
REVIEW OF SYSTEMS    General:	NAD   Skin/Breast:	Neg  Ophthalmologic:	Neg  ENMT: Neg	  Respiratory and Thorax: Neg	  Cardiovascular:	A-Fib, CHF  Gastrointestinal:	Neg  Genitourinary:	Neg  Musculoskeletal:	 Chronic pain   Neurological:	MS  Psychiatric:	Neg  Hematology/Lymphatics:	Neg  Endocrine:	DM II        PHYSICAL EXAM:    GENERAL: NAD, well-groomed, well-developed  HEAD:  Atraumatic, Normocephalic  EYES: EOMI, PERRLA, conjunctiva and sclera clear  ENMT: No tonsillar erythema, exudates, or enlargement; Moist mucous membranes, Good dentition, No lesions  NECK: Supple, No JVD, Normal thyroid  NERVOUS SYSTEM:  Alert & Oriented X3, Good concentration; Motor Strength 5/5 B/L upper and lower extremities  CHEST/LUNG: Clear to percussion bilaterally; No rales, rhonchi, wheezing, or rubs  HEART: Regular rate and rhythm; No murmurs, rubs, or gallops  ABDOMEN: Soft, Nontender, Nondistended; Bowel sounds present  EXTREMITIES: No clubbing, cyanosis, or edema, Ambulates with walker  LYMPH: No lymphadenopathy noted  SKIN: No rashes or lesions      Patient lying in bed supine. Patient states his pain is to bilat hip radiating to to bilat lower ext's. from lower back/hips to just above the ankles. Patient states he was supposed to f/u with the [facility] pain service after his first visit 2018. Patient states he is getting Dilaudid from [facility] assistant living he resides at. Pain is moderate to severe when it does come. The pain is intermittent, sometimes 2 days without pain.   Patient states he wants to f/u with the [facility] pain service and is open to other pain relief modalities.     REVIEW OF SYSTEMS    General:	NAD   Skin/Breast:	Neg  Ophthalmologic:	Neg  ENMT: Neg	  Respiratory and Thorax: Neg	  Cardiovascular:	A-Fib, CHF  Gastrointestinal:	Neg  Genitourinary:	Neg  Musculoskeletal:	 Chronic pain , LBP  Neurological:	MS  Psychiatric:	Neg  Hematology/Lymphatics:	 on blood thinner  Endocrine:	DM II        PHYSICAL EXAM:    GENERAL: NAD, well-groomed, well-developed  HEAD:  Atraumatic, Normocephalic  EYES: EOMI, PERRLA, conjunctiva and sclera clear  NECK: Supple, No JVD, Normal thyroid  NERVOUS SYSTEM:  Alert & Oriented X3, Good concentration; Motor Strength 5/5 B/L upper and lower extremities  CHEST/LUNG: Clear to percussion bilaterally  HEART: irregular   ABDOMEN: Soft, Nontender, Nondistended;  EXTREMITIES: Ambulates with walker        Patient lying in bed supine. Patient states his pain is to bilat hip radiating to to bilat lower ext's. from lower back/hips to just above the ankles. Patient states he was supposed to f/u with the [facility] pain service after his first visit 2018. Patient states he is getting Dilaudid from [facility] assistant living he resides at. Pain is moderate to severe when it does come. The pain is intermittent, sometimes 2 days without pain.   Patient states he wants to f/u with the [facility] pain service and is open to other pain relief modalities.

## 2021-02-05 NOTE — PROGRESS NOTE ADULT - ATTENDING COMMENTS
- ICD interrogation showed episodes of AFL.  - Ablation canceled today due to scheduling conflict  - Cont with eliquis  - Ablation rescheduled for monday am. MARISA JONES sunday

## 2021-02-06 LAB
SARS-COV-2 IGG SERPL QL IA: POSITIVE
SARS-COV-2 IGM SERPL IA-ACNC: 132 INDEX — HIGH

## 2021-02-06 PROCEDURE — 99232 SBSQ HOSP IP/OBS MODERATE 35: CPT

## 2021-02-06 RX ADMIN — Medication 81 MILLIGRAM(S): at 12:43

## 2021-02-06 RX ADMIN — Medication 50 MILLIGRAM(S): at 05:37

## 2021-02-06 RX ADMIN — HYDROMORPHONE HYDROCHLORIDE 2 MILLIGRAM(S): 2 INJECTION INTRAMUSCULAR; INTRAVENOUS; SUBCUTANEOUS at 00:01

## 2021-02-06 RX ADMIN — LAMOTRIGINE 200 MILLIGRAM(S): 25 TABLET, ORALLY DISINTEGRATING ORAL at 05:38

## 2021-02-06 RX ADMIN — GABAPENTIN 400 MILLIGRAM(S): 400 CAPSULE ORAL at 05:38

## 2021-02-06 RX ADMIN — HYDROMORPHONE HYDROCHLORIDE 2 MILLIGRAM(S): 2 INJECTION INTRAMUSCULAR; INTRAVENOUS; SUBCUTANEOUS at 12:44

## 2021-02-06 RX ADMIN — QUETIAPINE FUMARATE 25 MILLIGRAM(S): 200 TABLET, FILM COATED ORAL at 05:37

## 2021-02-06 RX ADMIN — QUETIAPINE FUMARATE 25 MILLIGRAM(S): 200 TABLET, FILM COATED ORAL at 12:44

## 2021-02-06 RX ADMIN — HYDROMORPHONE HYDROCHLORIDE 2 MILLIGRAM(S): 2 INJECTION INTRAMUSCULAR; INTRAVENOUS; SUBCUTANEOUS at 19:39

## 2021-02-06 RX ADMIN — GABAPENTIN 400 MILLIGRAM(S): 400 CAPSULE ORAL at 21:38

## 2021-02-06 RX ADMIN — LAMOTRIGINE 200 MILLIGRAM(S): 25 TABLET, ORALLY DISINTEGRATING ORAL at 17:13

## 2021-02-06 RX ADMIN — Medication 1 MILLIGRAM(S): at 12:43

## 2021-02-06 RX ADMIN — ATORVASTATIN CALCIUM 40 MILLIGRAM(S): 80 TABLET, FILM COATED ORAL at 21:38

## 2021-02-06 RX ADMIN — Medication 10 MILLIGRAM(S): at 12:43

## 2021-02-06 RX ADMIN — Medication 10 MILLIGRAM(S): at 05:38

## 2021-02-06 RX ADMIN — Medication 10 MILLIGRAM(S): at 21:38

## 2021-02-06 RX ADMIN — QUETIAPINE FUMARATE 25 MILLIGRAM(S): 200 TABLET, FILM COATED ORAL at 21:38

## 2021-02-06 RX ADMIN — GABAPENTIN 400 MILLIGRAM(S): 400 CAPSULE ORAL at 12:43

## 2021-02-06 RX ADMIN — TAMSULOSIN HYDROCHLORIDE 0.4 MILLIGRAM(S): 0.4 CAPSULE ORAL at 21:39

## 2021-02-06 RX ADMIN — SERTRALINE 50 MILLIGRAM(S): 25 TABLET, FILM COATED ORAL at 12:44

## 2021-02-06 NOTE — PROGRESS NOTE ADULT - ASSESSMENT
EP: Dr Quiñones    Assessment: 51 y/o M pmh of MI no stents, arrhythmogenic RV dysplasia w/ AICD implant ([employer]), Afib on Eliquis, s/p afib ablation 3 weeks ago at [facility] transferred from [facility] for atrial flutter ablation. VSS currently stable. Patient in no acute distress and chest pain resolved.    Impression:  AF on Eliquis sp Ablation  Atrial Flutter  Arrhythmogenic RV Dysplasia sp ICD ([employer])  MS    Plan:  - Scheduled for AFL ablation on Monday 2/8  - Will repeat COVID tomorrow  - NPO after midnight on Monday  - Dilaudid 2mg PO Q6h PRN  - Cont Eliquis  - Will follow EP: Dr Quiñones    Assessment: 51 y/o M pmh of MI no stents, arrhythmogenic RV dysplasia w/ AICD implant ([employer]), Afib on Eliquis, s/p afib ablation 3 weeks ago at [facility] transferred from [facility] for atrial flutter ablation. VSS currently stable. Patient in no acute distress and chest pain resolved.    Impression:  AF on Eliquis sp Ablation  Atrial Flutter  Arrhythmogenic RV Dysplasia sp ICD ([employer])  MS    Plan:  - Scheduled for AFL ablation on Monday 2/8  - COVID pending  - NPO after midnight on Monday  - Dilaudid 2mg PO Q6h PRN  - Cont Eliquis  - Will follow

## 2021-02-07 LAB — SARS-COV-2 RNA SPEC QL NAA+PROBE: SIGNIFICANT CHANGE UP

## 2021-02-07 RX ADMIN — Medication 1 MILLIGRAM(S): at 11:40

## 2021-02-07 RX ADMIN — GABAPENTIN 400 MILLIGRAM(S): 400 CAPSULE ORAL at 05:43

## 2021-02-07 RX ADMIN — Medication 10 MILLIGRAM(S): at 05:42

## 2021-02-07 RX ADMIN — HYDROMORPHONE HYDROCHLORIDE 2 MILLIGRAM(S): 2 INJECTION INTRAMUSCULAR; INTRAVENOUS; SUBCUTANEOUS at 04:43

## 2021-02-07 RX ADMIN — HYDROMORPHONE HYDROCHLORIDE 2 MILLIGRAM(S): 2 INJECTION INTRAMUSCULAR; INTRAVENOUS; SUBCUTANEOUS at 23:23

## 2021-02-07 RX ADMIN — SERTRALINE 50 MILLIGRAM(S): 25 TABLET, FILM COATED ORAL at 11:40

## 2021-02-07 RX ADMIN — Medication 50 MILLIGRAM(S): at 05:43

## 2021-02-07 RX ADMIN — TIOTROPIUM BROMIDE 1 CAPSULE(S): 18 CAPSULE ORAL; RESPIRATORY (INHALATION) at 09:20

## 2021-02-07 RX ADMIN — ATORVASTATIN CALCIUM 40 MILLIGRAM(S): 80 TABLET, FILM COATED ORAL at 22:07

## 2021-02-07 RX ADMIN — GABAPENTIN 400 MILLIGRAM(S): 400 CAPSULE ORAL at 14:18

## 2021-02-07 RX ADMIN — Medication 10 MILLIGRAM(S): at 22:07

## 2021-02-07 RX ADMIN — Medication 81 MILLIGRAM(S): at 11:40

## 2021-02-07 RX ADMIN — HYDROMORPHONE HYDROCHLORIDE 2 MILLIGRAM(S): 2 INJECTION INTRAMUSCULAR; INTRAVENOUS; SUBCUTANEOUS at 17:43

## 2021-02-07 RX ADMIN — LAMOTRIGINE 200 MILLIGRAM(S): 25 TABLET, ORALLY DISINTEGRATING ORAL at 05:42

## 2021-02-07 RX ADMIN — LAMOTRIGINE 200 MILLIGRAM(S): 25 TABLET, ORALLY DISINTEGRATING ORAL at 17:44

## 2021-02-07 RX ADMIN — GABAPENTIN 400 MILLIGRAM(S): 400 CAPSULE ORAL at 22:07

## 2021-02-07 RX ADMIN — HYDROMORPHONE HYDROCHLORIDE 2 MILLIGRAM(S): 2 INJECTION INTRAMUSCULAR; INTRAVENOUS; SUBCUTANEOUS at 11:40

## 2021-02-07 RX ADMIN — TAMSULOSIN HYDROCHLORIDE 0.4 MILLIGRAM(S): 0.4 CAPSULE ORAL at 22:08

## 2021-02-07 RX ADMIN — Medication 10 MILLIGRAM(S): at 14:17

## 2021-02-07 RX ADMIN — QUETIAPINE FUMARATE 25 MILLIGRAM(S): 200 TABLET, FILM COATED ORAL at 22:07

## 2021-02-07 RX ADMIN — QUETIAPINE FUMARATE 25 MILLIGRAM(S): 200 TABLET, FILM COATED ORAL at 05:42

## 2021-02-08 DIAGNOSIS — F43.10 POST-TRAUMATIC STRESS DISORDER, UNSPECIFIED: ICD-10-CM

## 2021-02-08 DIAGNOSIS — F31.9 BIPOLAR DISORDER, UNSPECIFIED: ICD-10-CM

## 2021-02-08 PROCEDURE — 93312 ECHO TRANSESOPHAGEAL: CPT | Mod: 26

## 2021-02-08 PROCEDURE — 93662 INTRACARDIAC ECG (ICE): CPT | Mod: 26

## 2021-02-08 PROCEDURE — 93653 COMPRE EP EVAL TX SVT: CPT

## 2021-02-08 PROCEDURE — 93613 INTRACARDIAC EPHYS 3D MAPG: CPT

## 2021-02-08 PROCEDURE — 93623 PRGRMD STIMJ&PACG IV RX NFS: CPT | Mod: 26

## 2021-02-08 PROCEDURE — 93320 DOPPLER ECHO COMPLETE: CPT | Mod: 26

## 2021-02-08 RX ORDER — APIXABAN 2.5 MG/1
5 TABLET, FILM COATED ORAL EVERY 12 HOURS
Refills: 0 | Status: DISCONTINUED | OUTPATIENT
Start: 2021-02-08 | End: 2021-02-09

## 2021-02-08 RX ORDER — BENZOCAINE AND MENTHOL 5; 1 G/100ML; G/100ML
1 LIQUID ORAL ONCE
Refills: 0 | Status: DISCONTINUED | OUTPATIENT
Start: 2021-02-08 | End: 2021-02-09

## 2021-02-08 RX ORDER — HYDROMORPHONE HYDROCHLORIDE 2 MG/ML
1 INJECTION INTRAMUSCULAR; INTRAVENOUS; SUBCUTANEOUS ONCE
Refills: 0 | Status: DISCONTINUED | OUTPATIENT
Start: 2021-02-08 | End: 2021-02-08

## 2021-02-08 RX ORDER — VANCOMYCIN HCL 1 G
1000 VIAL (EA) INTRAVENOUS ONCE
Refills: 0 | Status: COMPLETED | OUTPATIENT
Start: 2021-02-08 | End: 2021-02-08

## 2021-02-08 RX ADMIN — Medication 250 MILLIGRAM(S): at 12:50

## 2021-02-08 RX ADMIN — LAMOTRIGINE 200 MILLIGRAM(S): 25 TABLET, ORALLY DISINTEGRATING ORAL at 17:07

## 2021-02-08 RX ADMIN — Medication 50 MILLIGRAM(S): at 05:28

## 2021-02-08 RX ADMIN — TAMSULOSIN HYDROCHLORIDE 0.4 MILLIGRAM(S): 0.4 CAPSULE ORAL at 22:35

## 2021-02-08 RX ADMIN — QUETIAPINE FUMARATE 25 MILLIGRAM(S): 200 TABLET, FILM COATED ORAL at 22:35

## 2021-02-08 RX ADMIN — Medication 10 MILLIGRAM(S): at 22:35

## 2021-02-08 RX ADMIN — Medication 650 MILLIGRAM(S): at 18:02

## 2021-02-08 RX ADMIN — HYDROMORPHONE HYDROCHLORIDE 1 MILLIGRAM(S): 2 INJECTION INTRAMUSCULAR; INTRAVENOUS; SUBCUTANEOUS at 16:12

## 2021-02-08 RX ADMIN — GABAPENTIN 400 MILLIGRAM(S): 400 CAPSULE ORAL at 16:42

## 2021-02-08 RX ADMIN — Medication 10 MILLIGRAM(S): at 05:26

## 2021-02-08 RX ADMIN — Medication 10 MILLIGRAM(S): at 16:42

## 2021-02-08 RX ADMIN — HYDROMORPHONE HYDROCHLORIDE 2 MILLIGRAM(S): 2 INJECTION INTRAMUSCULAR; INTRAVENOUS; SUBCUTANEOUS at 19:58

## 2021-02-08 RX ADMIN — QUETIAPINE FUMARATE 25 MILLIGRAM(S): 200 TABLET, FILM COATED ORAL at 16:41

## 2021-02-08 RX ADMIN — HYDROMORPHONE HYDROCHLORIDE 2 MILLIGRAM(S): 2 INJECTION INTRAMUSCULAR; INTRAVENOUS; SUBCUTANEOUS at 05:26

## 2021-02-08 RX ADMIN — GABAPENTIN 400 MILLIGRAM(S): 400 CAPSULE ORAL at 22:35

## 2021-02-08 RX ADMIN — QUETIAPINE FUMARATE 25 MILLIGRAM(S): 200 TABLET, FILM COATED ORAL at 05:28

## 2021-02-08 RX ADMIN — GABAPENTIN 400 MILLIGRAM(S): 400 CAPSULE ORAL at 05:26

## 2021-02-08 RX ADMIN — LAMOTRIGINE 200 MILLIGRAM(S): 25 TABLET, ORALLY DISINTEGRATING ORAL at 05:28

## 2021-02-08 RX ADMIN — ATORVASTATIN CALCIUM 40 MILLIGRAM(S): 80 TABLET, FILM COATED ORAL at 22:35

## 2021-02-08 RX ADMIN — APIXABAN 5 MILLIGRAM(S): 2.5 TABLET, FILM COATED ORAL at 22:35

## 2021-02-08 NOTE — BEHAVIORAL HEALTH ASSESSMENT NOTE - DESCRIPTION (FIRST USE, LAST USE, QUANTITY, FREQUENCY, DURATION)
1 PPD x 30 years, reports 1st cigarette immediately after awakening, last cigarette Wednesday "Poppers" and LSD in "previous lifetime" Last used cannabis in April 2020 for chronic pain. Pending medical marijuana card

## 2021-02-08 NOTE — PRE-ANESTHESIA EVALUATION ADULT - NSRADCARDRESULTSFT_GEN_ALL_CORE
Summary:   1. Left ventricular ejection fraction, by visual estimation, is 45 to   50%.   2. Mildly decreased global left ventricular systolic function.   3. Entire septum and basal and mid inferior wall are abnormal as   described above.   4. Trivial pericardial effusion.

## 2021-02-08 NOTE — PRE-ANESTHESIA EVALUATION ADULT - ANESTHESIA, PREVIOUS REACTION, PROFILE
says he went into vtach, about 12 hrs after hip replacemetn. (states anesthesiologist said it could have been due to the anesthesia)
says he went into vtach, about 12 hrs after hip replacemetn. (states anesthesiologist said it could have been due to the anesthesia)
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
## 2021-02-08 NOTE — BEHAVIORAL HEALTH ASSESSMENT NOTE - NSBHCHARTREVIEWVS_PSY_A_CORE FT
Vital Signs Last 24 Hrs  T(C): 36.1 (08 Feb 2021 17:15), Max: 36.4 (07 Feb 2021 20:48)  T(F): 97 (08 Feb 2021 17:15), Max: 97.6 (07 Feb 2021 20:48)  HR: 69 (08 Feb 2021 17:15) (69 - 82)  BP: 97/54 (08 Feb 2021 17:15) (90/54 - 118/67)  BP(mean): --  RR: 18 (08 Feb 2021 17:15) (18 - 18)  SpO2: --

## 2021-02-08 NOTE — PROGRESS NOTE ADULT - SUBJECTIVE AND OBJECTIVE BOX
Electrophysiology Brief Post-Op Note      I have personally seen and examined the patient.  I agree with the history and physical which I have reviewed and noted any changes below.  02-08-21 @ 11:49    PRE-OP DIAGNOSIS: AFL    POST-OP DIAGNOSIS: aFL    PROCEDURE: ablation    Vendor Representative was present for clinical support.    Physician: Ishmael  Assistant: None    ANESTHESIA TYPE:  [X  ]General Anesthesia  [  ] Sedation  [ X ] Local/Regional    ESTIMATED BLOOD LOSS:    40   mL    CONDITION  [  ] Critical  [  ] Serious  [  ]Fair  [ X ]Good      SPECIMENS REMOVED (IF APPLICABLE):  none    IMPLANTS (IF APPLICABLE)  none    FINDINGS: none    COMPLICATIONS: none     PLAN OF CARE  -	Start Eliquis 5 mg q12 tonight at 10 pm  -	Start protonix 40 mg daily tonight  -	Bed rest till am  -	Admit to telemetry

## 2021-02-08 NOTE — BEHAVIORAL HEALTH ASSESSMENT NOTE - OTHER
None Patient has a stable relationship with current therapist. [facility] assisted living [facility] assisted living

## 2021-02-08 NOTE — BEHAVIORAL HEALTH ASSESSMENT NOTE - SUMMARY
Brendon Miguel is a 49 yo  male, domiciled by self at [facility] living [facility], on disability, with pphx of PTSD and reported bipolar disorder, 2 prior iPP admissions, 2 prior suicide attempts, last 1996, with marijuana use disorder, and nicotine use disorder, who is currently admitted to medicine s/p atrial flutter; psychiatry consulted for mental health evaluation for clearance back to living facility. Upon assessment, pt presents with euthymic mood, congruent, and full affect, with linear thought process and without notable thought content (denies SI/HI) or perceptual disturbances. Pt does not currently meet criteria for any major psychiatric condition at this time or presenting as an acute danger to himself or others requiring IPP admission.     Plan:  - No acute psychiatric interventions indicated at this time  - Pt to resume outpatient psychotropic medications (seroquel, lamictal though lamictal is prescribed also for seizure disorder  - Pt to f/u with therapist on Tuesday/thursday upon discharge (resume regularly scheduled appointments)  - Pt to f/u with psychiatrist upon discharge regarding his current outpatient psychiatric regimen

## 2021-02-08 NOTE — BEHAVIORAL HEALTH ASSESSMENT NOTE - HPI (INCLUDE ILLNESS QUALITY, SEVERITY, DURATION, TIMING, CONTEXT, MODIFYING FACTORS, ASSOCIATED SIGNS AND SYMPTOMS)
Brendon Miguel is a 51 yo  male, domiciled by self at [facility] living [facility], on disability, with pphx of PTSD and reported bipolar disorder, 2 prior iPP admissions, 2 prior suicide attempts, last 1996, with marijuana use disorder, and nicotine use disorder, who is currently admitted to medicine s/p atrial flutter; psychiatry consulted for mental health evaluation for clearance back to living facility. Upon approach, pt is calm and cooperative, reports he is feeling "fine". Patient reports getting 6-12 hours of sleep per night though variable; he states his overall sleep is "good." Reports no decrease in interest (watching movies, writing in diary). Denies change in energy, concentration, appetite at this time. Denies current SI/HI. Patient notes that he is easily distracted, but per his neurologist, this is due to his MS. Denies irritability, grandiosity, and excessive spending at this time. Patient denies AVH and feelings of paranoia at this time. Patient does not offer any other complaints.

## 2021-02-08 NOTE — CHART NOTE - NSCHARTNOTEFT_GEN_A_CORE
Cardiac Electrophysiology Procedure Note      PROCEDURE:  Electrophysiological Study  Tricuspid-caval isthmus ablation to treat atrial flutter   Computarized 3D mapping  Fluoroscopy    INDICATION: Patient with history of AF ablation and symptomatic AFL      OPERATORS:  Attending:	Alexi Quiñones MD                 MATERIALS  Sheath	Insertion Site	Catheter	Location  11 Fr and SR-0	RFV	5 F quad and ablation	HRA and other    6 Fr	RFV	CS	CS  8 Fr and SR-0	RFV	5 F quad and ablation	HRA and other    8 Fr and SR-0	RFV	5 F quad and ablation	HRA and other    			    Ablation catheter  Diameter	Size	Type	System  7 Fr	4 mm	Biosense Gama Mateusz-Star	RFA    Baseline Intervals  Rhythm	CL / Rate (msec)	QRS (msec)	QT (msec)	HV (msec)  AFL	240	100	410	45    DESCRIPTION OF PROCEDURE  The patient was brought to the Procedure Room in a nonsedated and fasting state antibiotics. Informed, written consent was obtained prior to the procedure. Intermittent boluses of Versed and Fentanyl were used to maintain comfort and analgesia throughout the procedure. Blood pressure, oxygenation and level of comfort were stable throughout. Both groins were cleaned and prepped with the applications of Betadine Solution. Patient was then covered from head to toe with sterile drapes in the usual manner.     Right inguinal area and arterial pulse were defined; 20 cc of lidocaine solution were infiltrated into the skin overlying the area.     Next, using needle and guidewire, the right femoral vein was accessed three times using modified Seldinger technique. The guidewires were followed up the IVC, right of the spine, to confirm venous access. Three introducer sheaths were placed into the femoral vein. The dilators and guidewires were removed and, through the sheaths, the catheters were advanced into the heart under fluoroscopic guidance.     Catheters were placed over the septal tricuspid valve area to obtain and confirm a proximal His signal, and at the tricuspid valve annulus. Baseline parameters were obtained and intervals were measured at these sites.    Atrial flutter was found with cycle length of 240 ms with CS 9,10 activated earlier than distal CS. Entrainment was performed from  and showed PPI of 200 ms. Entrainment from a right lateral atrial wall showed PPI of 40 ms. Examined from CTI showed PPI of 60 ms. There was significant amount of viable. During advancement of penta-ray the flutter terminated. Despite rapid atrial  pacing and administration of Isuprel atrial flutter was unable to be reduced.    The key will tricuspid isthmus was mapped during pacing from proximal CS. Initial conduction across the isthmus was 140 ms. A small leak was found. The ablation catheter was advanced and positioned under fluoroscopic and electrical mapping at the isthmus. Using FAM anatomic map was performed utilizing BDA 3 system. With power 40 W and temp <40C, RFA was performed across the CTI.       Assessment of Bidirectional Block:  Medial to lateral block was achieved with conduction delay of 180 msec which increased from prior. Lateral to medial block was achieved with conduction delay of 170 msec which also increased from prior.  Differential atrial pacing was performed from inferior and superior RA walls that confirmed bidirectional block. Studies were repeated after 30 minutes to rule out edema, bidirectional block was still present. At that point, procedure was considered completed.     Studies were repeated after 30 minutes to rule out edema, bidirectional block was still present.        The catheters were removed and the sheaths pulled with manual pressure applied to ensure hemostasis. A dry, sterile dressing was placed over this. The patient was returned to a hospital room in stable condition. Needle count were performed and found to be consistent at the end of the procedure    COMPLICATIONS:  The patient tolerated the procedure well. There were no immediate complications.    CONCLUSIONS:  Isthmus dependant counter-clockwise atrial flutter with successful isthmus ablation using conversion to sinus and bidirectional block as endpoints.            _______________________________  Alexi Quiñones MD  Cardiac Electrophysiology
Patient reports that he takes Seroquel 75 mg at night only.  He has paper from [facility] mentioning Tab. Seroquel 25 mg 3 tabs at 8 PM.    Patient has already received Seroquel 25 mg twice today, will give 25 mg tonight and switch to 75 mg at bedtime only tomorrow.
PACU ANESTHESIA ADMISSION NOTE      Procedure:   Post op diagnosis:      ____  Intubated  TV:______       Rate: ______      FiO2: ______    __x__  Patent Airway    __x__  Full return of protective reflexes    _x___  Full recovery from anesthesia / back to baseline     Vitals:   T: 97          R:  18                BP: 98/55                 Sat:  98                 P: 72       Mental Status:  _x___ Awake   _____ Alert   _____ Drowsy   _____ Sedated    Nausea/Vomiting:  __x__ NO  ______Yes,   See Post - Op Orders          Pain Scale (0-10):  __8___    Treatment: ____ None    _x___ See Post - Op/PCA Orders   Dilaudid 1mg IVP     Post - Operative Fluids:   ____ Oral   ____ See Post - Op Orders    Plan: Discharge:   ____Home       __x___Floor     _____Critical Care    _____  Other:_________________    Comments:

## 2021-02-08 NOTE — BEHAVIORAL HEALTH ASSESSMENT NOTE - OTHER PAST PSYCHIATRIC HISTORY (INCLUDE DETAILS REGARDING ONSET, COURSE OF ILLNESS, INPATIENT/OUTPATIENT TREATMENT)
Pt reports Bipolar disorder: diagnosed at age 15, currently reports taking Lamictal 10 mg po qdaily    Also reports h/o PTSD: diagnosed 2014, currently reports taking Seroquel 25 mg in the AM and 75 mg nighttime

## 2021-02-08 NOTE — BEHAVIORAL HEALTH ASSESSMENT NOTE - RISK ASSESSMENT
Low Acute Suicide Risk Pt has low acute risk for self despite h/o 2 prior IPP admissions for suicidality as he currently has no active suicidal ideations, plan or intent, he is future oriented, he is help-seeking, he has good therapeutic relationship with therapist, is medication compliant, has good access to care, and has outpatient followup.

## 2021-02-08 NOTE — BEHAVIORAL HEALTH ASSESSMENT NOTE - ORIENTED TO SITUATION
Continue to watch closely.  Encourage fluids  If refusing to drink and not having a wet diaper at least every 8 hours, she should be brought to ER  If inconsolable, she should be brought to ER.       Yes

## 2021-02-09 ENCOUNTER — TRANSCRIPTION ENCOUNTER (OUTPATIENT)
Age: 51
End: 2021-02-09

## 2021-02-09 VITALS — HEART RATE: 78 BPM | RESPIRATION RATE: 18 BRPM | SYSTOLIC BLOOD PRESSURE: 106 MMHG | DIASTOLIC BLOOD PRESSURE: 61 MMHG

## 2021-02-09 LAB — SARS-COV-2 RNA SPEC QL NAA+PROBE: SIGNIFICANT CHANGE UP

## 2021-02-09 PROCEDURE — 93010 ELECTROCARDIOGRAM REPORT: CPT

## 2021-02-09 PROCEDURE — 99232 SBSQ HOSP IP/OBS MODERATE 35: CPT

## 2021-02-09 RX ORDER — ASPIRIN/CALCIUM CARB/MAGNESIUM 324 MG
1 TABLET ORAL
Qty: 0 | Refills: 0 | DISCHARGE
Start: 2021-02-09

## 2021-02-09 RX ORDER — QUETIAPINE FUMARATE 200 MG/1
75 TABLET, FILM COATED ORAL AT BEDTIME
Refills: 0 | Status: DISCONTINUED | OUTPATIENT
Start: 2021-02-09 | End: 2021-02-09

## 2021-02-09 RX ORDER — PANTOPRAZOLE SODIUM 20 MG/1
1 TABLET, DELAYED RELEASE ORAL
Qty: 0 | Refills: 0 | DISCHARGE
Start: 2021-02-09

## 2021-02-09 RX ADMIN — GABAPENTIN 400 MILLIGRAM(S): 400 CAPSULE ORAL at 05:43

## 2021-02-09 RX ADMIN — Medication 1 MILLIGRAM(S): at 11:11

## 2021-02-09 RX ADMIN — HYDROMORPHONE HYDROCHLORIDE 2 MILLIGRAM(S): 2 INJECTION INTRAMUSCULAR; INTRAVENOUS; SUBCUTANEOUS at 08:20

## 2021-02-09 RX ADMIN — SERTRALINE 50 MILLIGRAM(S): 25 TABLET, FILM COATED ORAL at 11:11

## 2021-02-09 RX ADMIN — APIXABAN 5 MILLIGRAM(S): 2.5 TABLET, FILM COATED ORAL at 05:43

## 2021-02-09 RX ADMIN — APIXABAN 5 MILLIGRAM(S): 2.5 TABLET, FILM COATED ORAL at 16:53

## 2021-02-09 RX ADMIN — Medication 10 MILLIGRAM(S): at 16:53

## 2021-02-09 RX ADMIN — HYDROMORPHONE HYDROCHLORIDE 2 MILLIGRAM(S): 2 INJECTION INTRAMUSCULAR; INTRAVENOUS; SUBCUTANEOUS at 14:31

## 2021-02-09 RX ADMIN — Medication 50 MILLIGRAM(S): at 05:42

## 2021-02-09 RX ADMIN — Medication 10 MILLIGRAM(S): at 05:43

## 2021-02-09 RX ADMIN — LAMOTRIGINE 200 MILLIGRAM(S): 25 TABLET, ORALLY DISINTEGRATING ORAL at 16:54

## 2021-02-09 RX ADMIN — HYDROMORPHONE HYDROCHLORIDE 2 MILLIGRAM(S): 2 INJECTION INTRAMUSCULAR; INTRAVENOUS; SUBCUTANEOUS at 02:09

## 2021-02-09 RX ADMIN — GABAPENTIN 400 MILLIGRAM(S): 400 CAPSULE ORAL at 16:53

## 2021-02-09 RX ADMIN — Medication 81 MILLIGRAM(S): at 11:10

## 2021-02-09 RX ADMIN — LAMOTRIGINE 200 MILLIGRAM(S): 25 TABLET, ORALLY DISINTEGRATING ORAL at 05:43

## 2021-02-09 NOTE — DISCHARGE NOTE PROVIDER - NSDCFUADDINST_GEN_ALL_CORE_FT
continue Eliquis   - protonix 40 mg daily x 30 days  - No heavy lifting or exertional activities for 5-7days  - Can take a shower, no bathtub for 5days, do not submerge yourself in water  - FU in EP office Dr Jarquin 3/10 @10am  [address] [place] [address], [place] [address]  [phone]

## 2021-02-09 NOTE — DISCHARGE NOTE PROVIDER - NSDCMRMEDTOKEN_GEN_ALL_CORE_FT
apixaban 5 mg oral tablet: 1 tab(s) orally every 12 hours  aspirin 81 mg oral tablet, chewable: 1 tab(s) orally once a day  atorvastatin 40 mg oral tablet: 1 tab(s) orally once a day (at bedtime)  baclofen 10 mg oral tablet: 1 tab(s) orally 3 times a day  folic acid 1 mg oral tablet: 1 tab(s) orally once a day  gabapentin 400 mg oral capsule: 1 cap(s) orally 3 times a day  lamoTRIgine 100 mg oral tablet: 2 tab(s) orally 2 times a day  metoprolol succinate 50 mg oral tablet, extended release: 1 tab(s) orally once a day; MAY GIVE 4 TABS OF 12.5MG AT ONE TIME FOR DOSING  pantoprazole 40 mg oral delayed release tablet: 1 tab(s) orally once a day  QUEtiapine 25 mg oral tablet: 1 tab(s) orally once a day  sertraline 50 mg oral tablet: 1 tab(s) orally once a day  tamsulosin 0.4 mg oral capsule: 1 cap(s) orally once a day (at bedtime)  Tecfidera 240 mg oral delayed release capsule: 1 cap(s) orally 2 times a day  tiotropium 18 mcg inhalation capsule: 1 cap(s) inhaled once a day

## 2021-02-09 NOTE — DISCHARGE NOTE PROVIDER - CARE PROVIDER_API CALL
Servando Jarquin (MD)  Cardiac Electrophysiology; Cardiology; Internal Medicine  [address] [place] [address]  Phone: [phone]  Fax: [phone]  Established Patient  Scheduled Appointment: 03/10/2021 10:00 AM

## 2021-02-09 NOTE — DISCHARGE NOTE PROVIDER - NSDCFUSCHEDAPPT_GEN_ALL_CORE_FT
CUATE HORNE ; 03/11/2021 ; [facility] [address] ; 03/17/2021 ; [facility] [address]

## 2021-02-09 NOTE — DISCHARGE NOTE NURSING/CASE MANAGEMENT/SOCIAL WORK - PATIENT PORTAL LINK FT
You can access the FollowMyHealth Patient Portal offered by [facility] by registering at the following website: [email or web address]. By joining [facility]’s FollowMyHealth portal, you will also be able to view your health information using other applications (apps) compatible with our system.

## 2021-02-09 NOTE — PROGRESS NOTE ADULT - ASSESSMENT
A/P  Patient S/P AF    Ablation  Patient is doing well  - continue Eliquis   - protonix 40 mg daily x 30 days  - No heavy lifting or exertional activities for 5-7days  - Can take a shower, no bathtub for 5days, do not submerge yourself in water  - FU in EP office Dr Jarquin 3/10 @[address], Suite [facility]  [phone]

## 2021-02-09 NOTE — DISCHARGE NOTE PROVIDER - PROVIDER TOKENS
PROVIDER:[TOKEN:[[id number]],SCHEDULEDAPPT:[03/10/2021],SCHEDULEDAPPTTIME:[10:00 AM],ESTABLISHEDPATIENT:[T]]

## 2021-02-09 NOTE — DISCHARGE NOTE PROVIDER - HOSPITAL COURSE
50yMale with h/o Atrial fibrillation, underwent elective ablation. Now in NSR.  Procedure was uneventful. Patient tolerated procedure well.  Patient is stable for discharge

## 2021-02-09 NOTE — DISCHARGE NOTE PROVIDER - NSDCCPCAREPLAN_GEN_ALL_CORE_FT
PRINCIPAL DISCHARGE DIAGNOSIS  Diagnosis: Atrial fibrillation  Assessment and Plan of Treatment: paroxysmal

## 2021-02-09 NOTE — PROGRESS NOTE ADULT - SUBJECTIVE AND OBJECTIVE BOX
INTERVAL HPI/OVERNIGHT EVENTS:  Patient is s/p Aflutter ablation, now POD #1, no immediate complications noted. B/L groin sutures removed. In NSR, no tele events noted.   Patient denies fever, chills, dizziness, syncope, chest pain, palpitations, SOB, cough, abd pain, n/v/d/c, dysuria, hematuria or unusual rash.     MEDICATIONS  (STANDING):  apixaban 5 milliGRAM(s) Oral every 12 hours  aspirin  chewable 81 milliGRAM(s) Oral daily  atorvastatin 40 milliGRAM(s) Oral at bedtime  baclofen 10 milliGRAM(s) Oral every 8 hours  benzocaine 15 mG/menthol 3.6 mG (Sugar-Free) Lozenge 1 Lozenge Oral once  folic acid 1 milliGRAM(s) Oral daily  gabapentin 400 milliGRAM(s) Oral three times a day  lamoTRIgine 200 milliGRAM(s) Oral two times a day  metoprolol succinate ER 50 milliGRAM(s) Oral daily  QUEtiapine 75 milliGRAM(s) Oral at bedtime  sertraline 50 milliGRAM(s) Oral daily  tamsulosin 0.4 milliGRAM(s) Oral at bedtime  tiotropium 18 MICROgram(s) Capsule 1 Capsule(s) Inhalation daily    MEDICATIONS  (PRN):  acetaminophen   Tablet .. 650 milliGRAM(s) Oral every 6 hours PRN Mild Pain (1 - 3), Moderate Pain (4 - 6)  HYDROmorphone   Tablet 2 milliGRAM(s) Oral every 6 hours PRN Severe Pain (7 - 10)      Allergies    dexmethylphenidate (Unknown)  Dilantin (Rash)  dilantin, compazine, , ritalin, phenergan (Unknown)  Haldol (Unknown)  hydantoin derivatives (Other)  methylphenidate (Unknown)  phenytoin (Unknown)  prochlorperazine (Unknown)  promethazine (Dystonic RXN)  rash/hives (Anaphylaxis)  thioxanthenes (Unknown)      REVIEW OF SYSTEMS   A ten-point review of systems was otherwise negative except as noted.      Vital Signs Last 24 Hrs  T(C): 36.9 (09 Feb 2021 05:02), Max: 36.9 (09 Feb 2021 05:02)  T(F): 98.4 (09 Feb 2021 05:02), Max: 98.4 (09 Feb 2021 05:02)  HR: 70 (09 Feb 2021 05:02) (69 - 72)  BP: 102/57 (09 Feb 2021 05:02) (97/54 - 102/57)  BP(mean): --  RR: 18 (09 Feb 2021 05:02) (18 - 18)  SpO2: --    02-08-21 @ 07:01  -  02-09-21 @ 07:00  --------------------------------------------------------  IN: 360 mL / OUT: 1065 mL / NET: -705 mL    02-09-21 @ 07:01  -  02-09-21 @ 09:29  --------------------------------------------------------  IN: 0 mL / OUT: 500 mL / NET: -500 mL        Physical Exam  GENERAL: In no apparent distress, well nourished, and hydrated.  HEART: Regular rate and rhythm; No murmurs, rubs, or gallops.  PULMONARY: Clear to auscultation and perfusion.  No rales, wheezing, or rhonchi bilaterally.  ABDOMEN: Soft, Nontender, Nondistended; Bowel sounds present  EXTREMITIES:  2+ Peripheral Pulses, No clubbing, cyanosis, or edema  NEUROLOGICAL: AO x3, YARBROUGH, speech clear    LABS:      02-08-21 @ 07:01  -  02-09-21 @ 07:00  --------------------------------------------------------  IN: 360 mL / OUT: 1065 mL / NET: -705 mL    02-09-21 @ 07:01  -  02-09-21 @ 09:29  --------------------------------------------------------  IN: 0 mL / OUT: 500 mL / NET: -500 mL    02-08-21 @ 07:01  -  02-09-21 @ 07:00  --------------------------------------------------------  IN: 360 mL / OUT: 1065 mL / NET: -705 mL    02-09-21 @ 07:01  -  02-09-21 @ 09:29  --------------------------------------------------------  IN: 0 mL / OUT: 500 mL / NET: -500 mL      RADIOLOGY & ADDITIONAL TESTS:     INTERVAL HPI/OVERNIGHT EVENTS:  Patient is s/p Aflutter ablation, now POD #1, no immediate complications noted. B/L groin sutures removed. In NSR, no tele events noted.   Patient denies fever, chills, dizziness, syncope, chest pain, palpitations, SOB, cough, abd pain, n/v/d/c, dysuria, hematuria or unusual rash.     MEDICATIONS  (STANDING):  apixaban 5 milliGRAM(s) Oral every 12 hours  aspirin  chewable 81 milliGRAM(s) Oral daily  atorvastatin 40 milliGRAM(s) Oral at bedtime  baclofen 10 milliGRAM(s) Oral every 8 hours  benzocaine 15 mG/menthol 3.6 mG (Sugar-Free) Lozenge 1 Lozenge Oral once  folic acid 1 milliGRAM(s) Oral daily  gabapentin 400 milliGRAM(s) Oral three times a day  lamoTRIgine 200 milliGRAM(s) Oral two times a day  metoprolol succinate ER 50 milliGRAM(s) Oral daily  QUEtiapine 75 milliGRAM(s) Oral at bedtime  sertraline 50 milliGRAM(s) Oral daily  tamsulosin 0.4 milliGRAM(s) Oral at bedtime  tiotropium 18 MICROgram(s) Capsule 1 Capsule(s) Inhalation daily    MEDICATIONS  (PRN):  acetaminophen   Tablet .. 650 milliGRAM(s) Oral every 6 hours PRN Mild Pain (1 - 3), Moderate Pain (4 - 6)  HYDROmorphone   Tablet 2 milliGRAM(s) Oral every 6 hours PRN Severe Pain (7 - 10)      Allergies    dexmethylphenidate (Unknown)  Dilantin (Rash)  dilantin, compazine, , ritalin, phenergan (Unknown)  Haldol (Unknown)  hydantoin derivatives (Other)  methylphenidate (Unknown)  phenytoin (Unknown)  prochlorperazine (Unknown)  promethazine (Dystonic RXN)  rash/hives (Anaphylaxis)  thioxanthenes (Unknown)      REVIEW OF SYSTEMS   A ten-point review of systems was otherwise negative except as noted.      Vital Signs Last 24 Hrs  T(C): 36.9 (09 Feb 2021 05:02), Max: 36.9 (09 Feb 2021 05:02)  T(F): 98.4 (09 Feb 2021 05:02), Max: 98.4 (09 Feb 2021 05:02)  HR: 70 (09 Feb 2021 05:02) (69 - 72)  BP: 102/57 (09 Feb 2021 05:02) (97/54 - 102/57)  BP(mean): --  RR: 18 (09 Feb 2021 05:02) (18 - 18)  SpO2: --    02-08-21 @ 07:01  -  02-09-21 @ 07:00  --------------------------------------------------------  IN: 360 mL / OUT: 1065 mL / NET: -705 mL    02-09-21 @ 07:01  -  02-09-21 @ 09:29  --------------------------------------------------------  IN: 0 mL / OUT: 500 mL / NET: -500 mL        Physical Exam  GENERAL: In no apparent distress, well nourished, and hydrated.  HEART: Regular rate and rhythm; No murmurs, rubs, or gallops.  PULMONARY: Clear to auscultation and perfusion.  No rales, wheezing, or rhonchi bilaterally.  ABDOMEN: Soft, Nontender, Nondistended; Bowel sounds present  EXTREMITIES:  2+ Peripheral Pulses, No clubbing, cyanosis, or edema  NEUROLOGICAL: AO x3, YARBROUGH, speech clear    LABS:      02-08-21 @ 07:01  -  02-09-21 @ 07:00  --------------------------------------------------------  IN: 360 mL / OUT: 1065 mL / NET: -705 mL    02-09-21 @ 07:01  -  02-09-21 @ 09:29  --------------------------------------------------------  IN: 0 mL / OUT: 500 mL / NET: -500 mL    02-08-21 @ 07:01  -  02-09-21 @ 07:00  --------------------------------------------------------  IN: 360 mL / OUT: 1065 mL / NET: -705 mL    02-09-21 @ 07:01  -  02-09-21 @ 09:29  --------------------------------------------------------  IN: 0 mL / OUT: 500 mL / NET: -500 mL      RADIOLOGY & ADDITIONAL TESTS:

## 2021-02-14 DIAGNOSIS — Z95.810 PRESENCE OF AUTOMATIC (IMPLANTABLE) CARDIAC DEFIBRILLATOR: ICD-10-CM

## 2021-02-14 DIAGNOSIS — I11.0 HYPERTENSIVE HEART DISEASE WITH HEART FAILURE: ICD-10-CM

## 2021-02-14 DIAGNOSIS — F43.10 POST-TRAUMATIC STRESS DISORDER, UNSPECIFIED: ICD-10-CM

## 2021-02-14 DIAGNOSIS — I48.4 ATYPICAL ATRIAL FLUTTER: ICD-10-CM

## 2021-02-14 DIAGNOSIS — G35 MULTIPLE SCLEROSIS: ICD-10-CM

## 2021-02-14 DIAGNOSIS — N40.0 BENIGN PROSTATIC HYPERPLASIA WITHOUT LOWER URINARY TRACT SYMPTOMS: ICD-10-CM

## 2021-02-14 DIAGNOSIS — G89.29 OTHER CHRONIC PAIN: ICD-10-CM

## 2021-02-14 DIAGNOSIS — F17.210 NICOTINE DEPENDENCE, CIGARETTES, UNCOMPLICATED: ICD-10-CM

## 2021-02-14 DIAGNOSIS — I48.0 PAROXYSMAL ATRIAL FIBRILLATION: ICD-10-CM

## 2021-02-14 DIAGNOSIS — I50.32 CHRONIC DIASTOLIC (CONGESTIVE) HEART FAILURE: ICD-10-CM

## 2021-02-14 DIAGNOSIS — G40.909 EPILEPSY, UNSPECIFIED, NOT INTRACTABLE, WITHOUT STATUS EPILEPTICUS: ICD-10-CM

## 2021-02-14 DIAGNOSIS — Z98.890 OTHER SPECIFIED POSTPROCEDURAL STATES: ICD-10-CM

## 2021-02-14 DIAGNOSIS — E53.8 DEFICIENCY OF OTHER SPECIFIED B GROUP VITAMINS: ICD-10-CM

## 2021-02-14 DIAGNOSIS — F31.9 BIPOLAR DISORDER, UNSPECIFIED: ICD-10-CM

## 2021-02-14 DIAGNOSIS — D63.8 ANEMIA IN OTHER CHRONIC DISEASES CLASSIFIED ELSEWHERE: ICD-10-CM

## 2021-02-14 DIAGNOSIS — J44.9 CHRONIC OBSTRUCTIVE PULMONARY DISEASE, UNSPECIFIED: ICD-10-CM

## 2021-02-14 DIAGNOSIS — Z79.01 LONG TERM (CURRENT) USE OF ANTICOAGULANTS: ICD-10-CM

## 2021-02-14 DIAGNOSIS — J96.10 CHRONIC RESPIRATORY FAILURE, UNSPECIFIED WHETHER WITH HYPOXIA OR HYPERCAPNIA: ICD-10-CM

## 2021-02-14 DIAGNOSIS — G62.9 POLYNEUROPATHY, UNSPECIFIED: ICD-10-CM

## 2021-02-14 DIAGNOSIS — I25.119 ATHEROSCLEROTIC HEART DISEASE OF NATIVE CORONARY ARTERY WITH UNSPECIFIED ANGINA PECTORIS: ICD-10-CM

## 2021-02-14 DIAGNOSIS — E55.9 VITAMIN D DEFICIENCY, UNSPECIFIED: ICD-10-CM

## 2021-02-14 DIAGNOSIS — I48.92 UNSPECIFIED ATRIAL FLUTTER: ICD-10-CM

## 2021-02-14 DIAGNOSIS — I42.8 OTHER CARDIOMYOPATHIES: ICD-10-CM

## 2021-02-14 DIAGNOSIS — I25.2 OLD MYOCARDIAL INFARCTION: ICD-10-CM

## 2021-02-21 ENCOUNTER — INPATIENT (INPATIENT)
Facility: HOSPITAL | Age: 51
LOS: 3 days | Discharge: HOME | End: 2021-02-25
Attending: INTERNAL MEDICINE | Admitting: INTERNAL MEDICINE
Payer: MEDICAID

## 2021-02-21 VITALS
OXYGEN SATURATION: 94 % | WEIGHT: 179.9 LBS | TEMPERATURE: 97 F | DIASTOLIC BLOOD PRESSURE: 68 MMHG | RESPIRATION RATE: 18 BRPM | HEIGHT: 66 IN | HEART RATE: 70 BPM | SYSTOLIC BLOOD PRESSURE: 110 MMHG

## 2021-02-21 DIAGNOSIS — Z98.890 OTHER SPECIFIED POSTPROCEDURAL STATES: Chronic | ICD-10-CM

## 2021-02-21 DIAGNOSIS — Z96.649 PRESENCE OF UNSPECIFIED ARTIFICIAL HIP JOINT: Chronic | ICD-10-CM

## 2021-02-21 DIAGNOSIS — T82.120A DISPLACEMENT OF CARDIAC ELECTRODE, INITIAL ENCOUNTER: Chronic | ICD-10-CM

## 2021-02-21 LAB
ALBUMIN SERPL ELPH-MCNC: 4.2 G/DL — SIGNIFICANT CHANGE UP (ref 3.5–5.2)
ALP SERPL-CCNC: 113 U/L — SIGNIFICANT CHANGE UP (ref 30–115)
ALT FLD-CCNC: 11 U/L — SIGNIFICANT CHANGE UP (ref 0–41)
ANION GAP SERPL CALC-SCNC: 11 MMOL/L — SIGNIFICANT CHANGE UP (ref 7–14)
APTT BLD: 45.3 SEC — HIGH (ref 27–39.2)
AST SERPL-CCNC: 14 U/L — SIGNIFICANT CHANGE UP (ref 0–41)
BASOPHILS # BLD AUTO: 0.08 K/UL — SIGNIFICANT CHANGE UP (ref 0–0.2)
BASOPHILS NFR BLD AUTO: 1.2 % — HIGH (ref 0–1)
BILIRUB SERPL-MCNC: 0.3 MG/DL — SIGNIFICANT CHANGE UP (ref 0.2–1.2)
BUN SERPL-MCNC: 17 MG/DL — SIGNIFICANT CHANGE UP (ref 10–20)
CALCIUM SERPL-MCNC: 9.8 MG/DL — SIGNIFICANT CHANGE UP (ref 8.5–10.1)
CHLORIDE SERPL-SCNC: 105 MMOL/L — SIGNIFICANT CHANGE UP (ref 98–110)
CO2 SERPL-SCNC: 24 MMOL/L — SIGNIFICANT CHANGE UP (ref 17–32)
CREAT SERPL-MCNC: 0.6 MG/DL — LOW (ref 0.7–1.5)
EOSINOPHIL # BLD AUTO: 0.08 K/UL — SIGNIFICANT CHANGE UP (ref 0–0.7)
EOSINOPHIL NFR BLD AUTO: 1.2 % — SIGNIFICANT CHANGE UP (ref 0–8)
GLUCOSE SERPL-MCNC: 82 MG/DL — SIGNIFICANT CHANGE UP (ref 70–99)
HCT VFR BLD CALC: 37.4 % — LOW (ref 42–52)
HGB BLD-MCNC: 12.1 G/DL — LOW (ref 14–18)
IMM GRANULOCYTES NFR BLD AUTO: 0.5 % — HIGH (ref 0.1–0.3)
INR BLD: 1.55 RATIO — HIGH (ref 0.65–1.3)
LYMPHOCYTES # BLD AUTO: 2.17 K/UL — SIGNIFICANT CHANGE UP (ref 1.2–3.4)
LYMPHOCYTES # BLD AUTO: 33.1 % — SIGNIFICANT CHANGE UP (ref 20.5–51.1)
MCHC RBC-ENTMCNC: 30.9 PG — SIGNIFICANT CHANGE UP (ref 27–31)
MCHC RBC-ENTMCNC: 32.4 G/DL — SIGNIFICANT CHANGE UP (ref 32–37)
MCV RBC AUTO: 95.7 FL — HIGH (ref 80–94)
MONOCYTES # BLD AUTO: 0.46 K/UL — SIGNIFICANT CHANGE UP (ref 0.1–0.6)
MONOCYTES NFR BLD AUTO: 7 % — SIGNIFICANT CHANGE UP (ref 1.7–9.3)
NEUTROPHILS # BLD AUTO: 3.73 K/UL — SIGNIFICANT CHANGE UP (ref 1.4–6.5)
NEUTROPHILS NFR BLD AUTO: 57 % — SIGNIFICANT CHANGE UP (ref 42.2–75.2)
NRBC # BLD: 0 /100 WBCS — SIGNIFICANT CHANGE UP (ref 0–0)
NT-PROBNP SERPL-SCNC: 157 PG/ML — SIGNIFICANT CHANGE UP (ref 0–300)
PLATELET # BLD AUTO: 231 K/UL — SIGNIFICANT CHANGE UP (ref 130–400)
POTASSIUM SERPL-MCNC: 4 MMOL/L — SIGNIFICANT CHANGE UP (ref 3.5–5)
POTASSIUM SERPL-SCNC: 4 MMOL/L — SIGNIFICANT CHANGE UP (ref 3.5–5)
PROT SERPL-MCNC: 6.5 G/DL — SIGNIFICANT CHANGE UP (ref 6–8)
PROTHROM AB SERPL-ACNC: 17.8 SEC — HIGH (ref 9.95–12.87)
RBC # BLD: 3.91 M/UL — LOW (ref 4.7–6.1)
RBC # FLD: 13.2 % — SIGNIFICANT CHANGE UP (ref 11.5–14.5)
SARS-COV-2 RNA SPEC QL NAA+PROBE: SIGNIFICANT CHANGE UP
SODIUM SERPL-SCNC: 140 MMOL/L — SIGNIFICANT CHANGE UP (ref 135–146)
TROPONIN T SERPL-MCNC: <0.01 NG/ML — SIGNIFICANT CHANGE UP
WBC # BLD: 6.55 K/UL — SIGNIFICANT CHANGE UP (ref 4.8–10.8)
WBC # FLD AUTO: 6.55 K/UL — SIGNIFICANT CHANGE UP (ref 4.8–10.8)

## 2021-02-21 PROCEDURE — 99285 EMERGENCY DEPT VISIT HI MDM: CPT

## 2021-02-21 PROCEDURE — 71045 X-RAY EXAM CHEST 1 VIEW: CPT | Mod: 26

## 2021-02-21 PROCEDURE — 93010 ELECTROCARDIOGRAM REPORT: CPT

## 2021-02-21 RX ORDER — ASPIRIN/CALCIUM CARB/MAGNESIUM 324 MG
81 TABLET ORAL ONCE
Refills: 0 | Status: COMPLETED | OUTPATIENT
Start: 2021-02-21 | End: 2021-02-21

## 2021-02-21 RX ORDER — ACETAMINOPHEN 500 MG
650 TABLET ORAL ONCE
Refills: 0 | Status: COMPLETED | OUTPATIENT
Start: 2021-02-21 | End: 2021-02-21

## 2021-02-21 RX ORDER — ASPIRIN/CALCIUM CARB/MAGNESIUM 324 MG
325 TABLET ORAL ONCE
Refills: 0 | Status: COMPLETED | OUTPATIENT
Start: 2021-02-21 | End: 2021-02-21

## 2021-02-21 RX ORDER — ASPIRIN/CALCIUM CARB/MAGNESIUM 324 MG
81 TABLET ORAL DAILY
Refills: 0 | Status: DISCONTINUED | OUTPATIENT
Start: 2021-02-21 | End: 2021-02-25

## 2021-02-21 RX ADMIN — Medication 650 MILLIGRAM(S): at 21:03

## 2021-02-21 RX ADMIN — Medication 81 MILLIGRAM(S): at 21:04

## 2021-02-21 NOTE — ED ADULT NURSE NOTE - PMH
AICD (automatic cardioverter/defibrillator) present  SJM  Anemia of chronic disease    Anginal pain    Atrial fibrillation  s/p ablation, on eliquis  Bipolar disorder    BPH (benign prostatic hyperplasia)    BPH (benign prostatic hyperplasia)    CAD (coronary artery disease)    Cardiomyopathy    CHF (congestive heart failure)    Chronic heart failure with preserved ejection fraction    Chronic pain    Chronic respiratory failure  secondary to COPD  Clinical Depression    Constipation    COPD (chronic obstructive pulmonary disease)    CVA (cerebral vascular accident)    Folate-deficiency anemia    GERD (gastroesophageal reflux disease)    HTN (hypertension)    Hypercholesteremia    Iron deficiency anemia    Migraines    MS (multiple sclerosis)    Pericardial effusion without cardiac tamponade  h/o trace pericardial effusion  Peripheral Neuropathy    Peripheral neuropathy    Pneumonia    PTSD (post-traumatic stress disorder)    Schizo affective schizophrenia    Seasonal allergies    Seizures    Supraventricular arrhythmia  prior history  TIA (transient ischemic attack)    Type 2 diabetes mellitus    Ventricular tachycardia    Vitamin D deficiency

## 2021-02-21 NOTE — ED PROVIDER NOTE - PHYSICAL EXAMINATION
CONSTITUTIONAL: Well-appearing; well-nourished; in no apparent distress.   EYES: PERRL; EOM intact.   ENT: normal nose; no rhinorrhea; normal pharynx with no tonsillar hypertrophy.   NECK: Supple; non-tender; no cervical lymphadenopathy.   CARDIOVASCULAR: Normal S1, S2; no murmurs, rubs, or gallops. Equal radial pulses   RESPIRATORY: Normal chest excursion with respiration; breath sounds clear and equal bilaterally; no wheezes, rhonchi, or rales.  GI/: Normal bowel sounds; non-distended; non-tender; no palpable organomegaly.   MS: No evidence of trauma or deformity. Non-tender to palpation. No scoliosis. No CVA tenderness. Normal ROM in all four extremities; non-tender to palpation; distal pulses are normal.   extrem: no peripheral edema. No calf ttp  SKIN: Normal for age and race; warm; dry; good turgor; no apparent lesions or exudate.   NEURO/PSYCH: A & O x 4; grossly unremarkable. mood and manner are appropriate. Grooming and personal hygiene are appropriate. No apparent thoughts of harm to self or others.

## 2021-02-21 NOTE — ED ADULT NURSE NOTE - NSIMPLEMENTINTERV_GEN_ALL_ED
Implemented All Fall with Harm Risk Interventions:  Maxie to call system. Call bell, personal items and telephone within reach. Instruct patient to call for assistance. Room bathroom lighting operational. Non-slip footwear when patient is off stretcher. Physically safe environment: no spills, clutter or unnecessary equipment. Stretcher in lowest position, wheels locked, appropriate side rails in place. Provide visual cue, wrist band, yellow gown, etc. Monitor gait and stability. Monitor for mental status changes and reorient to person, place, and time. Review medications for side effects contributing to fall risk. Reinforce activity limits and safety measures with patient and family. Provide visual clues: red socks.

## 2021-02-21 NOTE — ED ADULT TRIAGE NOTE - CHIEF COMPLAINT QUOTE
as per ems pt dr said his pacemaker sent out a signal saying he was in an arrythmia. pt had left sided chest pain that resolved. on feb 16 nstemi and signed out of izabel on feb 19

## 2021-02-21 NOTE — ED PROVIDER NOTE - OBJECTIVE STATEMENT
50 year old M with hx of epilepsy, MS, COPD, CAD s/p MI no stents, a fib on eliquis s/p ablation 02/08 with Dr. Jarquin, arrhythmogenic RV dysplasia with AICD, ppm c/o CP. Pt sts was admitted to [facility] 02/16 for CP/had NSTEMI? Pt sts he was suppose to have cardiac cath but left ama from hospital on 02/19. Pt c/o worsening cp today. Sts received call from [employer] around 4:30 pm today stating he was in an arrythmia. Pt denies any assoc sob, nausea, vomiting, diaphoresis, abd pain, fever/chills, uri symptoms, leg pain/swelling.

## 2021-02-21 NOTE — ED ADULT NURSE NOTE - OBJECTIVE STATEMENT
chest pain started Tuesday midsternally radiated to left shoulder and jaw. pt denies SOB. pt with hx ablation x 2 weeks, and NSTEMI on Tuesday . pt was placed on cardiac monitor upon arrival

## 2021-02-21 NOTE — ED ADULT NURSE NOTE - NSFALLRSKASSESSDT_ED_ALL_ED
PROGRESS NOTE



Indra is 33, seeing me in followup for a compliancy check regarding his obstructive

sleep apnea.  He was diagnosed having severe RADHA with an AHI of 38.  I noted that the

patient is unable to achieve good compliance on the CPAP machine.  He tells that he

keeps to pull off the machine and mask throughout the night.  He is averaging around

3.1 hours of CPAP use per night. His CPAP use for more than 4 hours is 3 out of 30.

Leak factor 25 L/minute and the AHI while treatment down to 2.4 and he is using his

Mirage FX nose mask.  Unable to tolerate full face mask.  Unable to tolerate the nose

pillows.



BP is 116/64, pulse 94, respirations 16, temperature 98.1, weight is 305.  General

appearance is calm comfortable.  Head is atraumatic, normocephalic.  Neck is short,

supple.  No goiter. No neck masses.  Lungs diminished otherwise clear.  Heart sounds

are regular rate and rhythm.  Normal S1, S2.  No S3.  No murmurs.  Abdomen is soft,

nontender.  No organomegaly.

EXTREMITIES: No edema.  No cyanosis or clubbing.

SKIN:  Negative for any wounds or ulceration.



IMPRESSION:

1. Symptomatic obstructive sleep apnea with an AHI of 38.

2. Chronic fatigue and sleepiness.

3. Obesity, BMI of 40.

4. Overbite.



PLAN:

1. Provide the patient AirFit N20 large size nose mask.

2. Improved compliancy.

3. Weight loss.

4. See me back in 6-8 weeks time in follow up.





MMKIMMY / IJN: [id number] / Job#: [id number] 21-Feb-2021 19:49

## 2021-02-21 NOTE — ED PROVIDER NOTE - NS ED ROS FT
Constitutional: no fever, chills, no recent weight loss, change in appetite or malaise  Eyes: no redness/discharge/pain/vision changes  ENT: no rhinorrhea/ear pain/sore throat  Cardiac: + CP. No SOB or edema.  Respiratory: No cough or respiratory distress  GI: No nausea, vomiting, diarrhea or abdominal pain.  : No dysuria, frequency, urgency or hematuria  MS: no pain to back or extremities, no loss of ROM, no weakness  Neuro: No headache or weakness. No LOC.  Skin: No skin rash.  Endocrine: No history of thyroid disease or diabetes.  Except as documented in the HPI, all other systems are negative.

## 2021-02-21 NOTE — ED PROVIDER NOTE - ATTENDING CONTRIBUTION TO CARE
49 yo M pmh as stated in chart pw chest pain. CP intermittently for the past 1 week. States he was admitted at [facility] 2/16 and left AMA because they would not transfer him here. Also received a call today stating he had an arrhythmia noted on his pacemaker/aicd. No sob, no abd pain, no back pain, no fever/chills, no urinary sx, no leg swelling, no urinary sx, no syncope, no LH, no dizziness.     CONSTITUTIONAL: Well-developed; well-nourished; in no acute distress. Sitting up and providing appropriate history and physical examination  SKIN: skin exam is warm and dry, no acute rash.  HEAD: Normocephalic; atraumatic.  EYES: PERRL, 3 mm bilateral, no nystagmus, EOM intact; conjunctiva and sclera clear.  ENT: No nasal discharge; airway clear.  NECK: Supple; non tender. + full passive ROM in all directions. No JVD  CARD: S1, S2 normal; no murmurs, gallops, or rubs. Regular rate and rhythm. + Symmetric Strong Pulses  RESP: No wheezes, rales or rhonchi. Good air movement bilaterally  ABD: soft; non-distended; non-tender. No Rebound, No Guarding, No signs of peritonitis, No CVA tenderness. No pulsatile abdominal mass. + Strong and Symmetric Pulses  EXT: Normal ROM. No clubbing, cyanosis or edema. Dp and Pt Pulses intact. Cap refill less than 3 seconds  NEURO: CN 2-12 intact, normal finger to nose, normal romberg, stable gait, no sensory or motor deficits, Alert, oriented, grossly unremarkable. No Focal deficits. GCS 15. NIH 0  PSYCH: Cooperative, appropriate.

## 2021-02-22 LAB
ALBUMIN SERPL ELPH-MCNC: 4.1 G/DL — SIGNIFICANT CHANGE UP (ref 3.5–5.2)
ALP SERPL-CCNC: 115 U/L — SIGNIFICANT CHANGE UP (ref 30–115)
ALT FLD-CCNC: 11 U/L — SIGNIFICANT CHANGE UP (ref 0–41)
ANION GAP SERPL CALC-SCNC: 13 MMOL/L — SIGNIFICANT CHANGE UP (ref 7–14)
AST SERPL-CCNC: 15 U/L — SIGNIFICANT CHANGE UP (ref 0–41)
BASOPHILS # BLD AUTO: 0.08 K/UL — SIGNIFICANT CHANGE UP (ref 0–0.2)
BASOPHILS NFR BLD AUTO: 1.5 % — HIGH (ref 0–1)
BILIRUB SERPL-MCNC: 0.4 MG/DL — SIGNIFICANT CHANGE UP (ref 0.2–1.2)
BUN SERPL-MCNC: 19 MG/DL — SIGNIFICANT CHANGE UP (ref 10–20)
CALCIUM SERPL-MCNC: 9.1 MG/DL — SIGNIFICANT CHANGE UP (ref 8.5–10.1)
CHLORIDE SERPL-SCNC: 109 MMOL/L — SIGNIFICANT CHANGE UP (ref 98–110)
CK MB CFR SERPL CALC: <1 NG/ML — SIGNIFICANT CHANGE UP (ref 0.6–6.3)
CK SERPL-CCNC: 49 U/L — SIGNIFICANT CHANGE UP (ref 0–225)
CO2 SERPL-SCNC: 20 MMOL/L — SIGNIFICANT CHANGE UP (ref 17–32)
CREAT SERPL-MCNC: 0.8 MG/DL — SIGNIFICANT CHANGE UP (ref 0.7–1.5)
EOSINOPHIL # BLD AUTO: 0.1 K/UL — SIGNIFICANT CHANGE UP (ref 0–0.7)
EOSINOPHIL NFR BLD AUTO: 1.8 % — SIGNIFICANT CHANGE UP (ref 0–8)
GLUCOSE BLDC GLUCOMTR-MCNC: 107 MG/DL — HIGH (ref 70–99)
GLUCOSE BLDC GLUCOMTR-MCNC: 98 MG/DL — SIGNIFICANT CHANGE UP (ref 70–99)
GLUCOSE SERPL-MCNC: 80 MG/DL — SIGNIFICANT CHANGE UP (ref 70–99)
HCT VFR BLD CALC: 36.9 % — LOW (ref 42–52)
HGB BLD-MCNC: 12.1 G/DL — LOW (ref 14–18)
IMM GRANULOCYTES NFR BLD AUTO: 0.2 % — SIGNIFICANT CHANGE UP (ref 0.1–0.3)
LYMPHOCYTES # BLD AUTO: 1.97 K/UL — SIGNIFICANT CHANGE UP (ref 1.2–3.4)
LYMPHOCYTES # BLD AUTO: 36 % — SIGNIFICANT CHANGE UP (ref 20.5–51.1)
MAGNESIUM SERPL-MCNC: 1.8 MG/DL — SIGNIFICANT CHANGE UP (ref 1.8–2.4)
MCHC RBC-ENTMCNC: 31.3 PG — HIGH (ref 27–31)
MCHC RBC-ENTMCNC: 32.8 G/DL — SIGNIFICANT CHANGE UP (ref 32–37)
MCV RBC AUTO: 95.3 FL — HIGH (ref 80–94)
MONOCYTES # BLD AUTO: 0.4 K/UL — SIGNIFICANT CHANGE UP (ref 0.1–0.6)
MONOCYTES NFR BLD AUTO: 7.3 % — SIGNIFICANT CHANGE UP (ref 1.7–9.3)
NEUTROPHILS # BLD AUTO: 2.91 K/UL — SIGNIFICANT CHANGE UP (ref 1.4–6.5)
NEUTROPHILS NFR BLD AUTO: 53.2 % — SIGNIFICANT CHANGE UP (ref 42.2–75.2)
NRBC # BLD: 0 /100 WBCS — SIGNIFICANT CHANGE UP (ref 0–0)
PLATELET # BLD AUTO: 219 K/UL — SIGNIFICANT CHANGE UP (ref 130–400)
POTASSIUM SERPL-MCNC: 4.3 MMOL/L — SIGNIFICANT CHANGE UP (ref 3.5–5)
POTASSIUM SERPL-SCNC: 4.3 MMOL/L — SIGNIFICANT CHANGE UP (ref 3.5–5)
PROT SERPL-MCNC: 6.3 G/DL — SIGNIFICANT CHANGE UP (ref 6–8)
RBC # BLD: 3.87 M/UL — LOW (ref 4.7–6.1)
RBC # FLD: 13.2 % — SIGNIFICANT CHANGE UP (ref 11.5–14.5)
SODIUM SERPL-SCNC: 142 MMOL/L — SIGNIFICANT CHANGE UP (ref 135–146)
TROPONIN T SERPL-MCNC: <0.01 NG/ML — SIGNIFICANT CHANGE UP
WBC # BLD: 5.47 K/UL — SIGNIFICANT CHANGE UP (ref 4.8–10.8)
WBC # FLD AUTO: 5.47 K/UL — SIGNIFICANT CHANGE UP (ref 4.8–10.8)

## 2021-02-22 PROCEDURE — 93306 TTE W/DOPPLER COMPLETE: CPT | Mod: 26

## 2021-02-22 PROCEDURE — 99223 1ST HOSP IP/OBS HIGH 75: CPT

## 2021-02-22 RX ORDER — TIOTROPIUM BROMIDE 18 UG/1
1 CAPSULE ORAL; RESPIRATORY (INHALATION) DAILY
Refills: 0 | Status: DISCONTINUED | OUTPATIENT
Start: 2021-02-22 | End: 2021-02-25

## 2021-02-22 RX ORDER — KETOROLAC TROMETHAMINE 30 MG/ML
15 SYRINGE (ML) INJECTION ONCE
Refills: 0 | Status: DISCONTINUED | OUTPATIENT
Start: 2021-02-22 | End: 2021-02-22

## 2021-02-22 RX ORDER — PANTOPRAZOLE SODIUM 20 MG/1
40 TABLET, DELAYED RELEASE ORAL
Refills: 0 | Status: DISCONTINUED | OUTPATIENT
Start: 2021-02-22 | End: 2021-02-25

## 2021-02-22 RX ORDER — METOPROLOL TARTRATE 50 MG
50 TABLET ORAL DAILY
Refills: 0 | Status: DISCONTINUED | OUTPATIENT
Start: 2021-02-22 | End: 2021-02-25

## 2021-02-22 RX ORDER — HYDROMORPHONE HYDROCHLORIDE 2 MG/ML
2 INJECTION INTRAMUSCULAR; INTRAVENOUS; SUBCUTANEOUS ONCE
Refills: 0 | Status: DISCONTINUED | OUTPATIENT
Start: 2021-02-22 | End: 2021-02-22

## 2021-02-22 RX ORDER — CHLORHEXIDINE GLUCONATE 213 G/1000ML
1 SOLUTION TOPICAL
Refills: 0 | Status: DISCONTINUED | OUTPATIENT
Start: 2021-02-22 | End: 2021-02-25

## 2021-02-22 RX ORDER — GABAPENTIN 400 MG/1
400 CAPSULE ORAL THREE TIMES A DAY
Refills: 0 | Status: DISCONTINUED | OUTPATIENT
Start: 2021-02-22 | End: 2021-02-25

## 2021-02-22 RX ORDER — LAMOTRIGINE 25 MG/1
200 TABLET, ORALLY DISINTEGRATING ORAL
Refills: 0 | Status: DISCONTINUED | OUTPATIENT
Start: 2021-02-22 | End: 2021-02-25

## 2021-02-22 RX ORDER — BACLOFEN 100 %
10 POWDER (GRAM) MISCELLANEOUS EVERY 8 HOURS
Refills: 0 | Status: DISCONTINUED | OUTPATIENT
Start: 2021-02-22 | End: 2021-02-25

## 2021-02-22 RX ORDER — SERTRALINE 25 MG/1
50 TABLET, FILM COATED ORAL DAILY
Refills: 0 | Status: DISCONTINUED | OUTPATIENT
Start: 2021-02-22 | End: 2021-02-25

## 2021-02-22 RX ORDER — FOLIC ACID 0.8 MG
1 TABLET ORAL DAILY
Refills: 0 | Status: DISCONTINUED | OUTPATIENT
Start: 2021-02-22 | End: 2021-02-25

## 2021-02-22 RX ORDER — TAMSULOSIN HYDROCHLORIDE 0.4 MG/1
0.4 CAPSULE ORAL AT BEDTIME
Refills: 0 | Status: DISCONTINUED | OUTPATIENT
Start: 2021-02-22 | End: 2021-02-25

## 2021-02-22 RX ORDER — QUETIAPINE FUMARATE 200 MG/1
25 TABLET, FILM COATED ORAL DAILY
Refills: 0 | Status: DISCONTINUED | OUTPATIENT
Start: 2021-02-22 | End: 2021-02-25

## 2021-02-22 RX ORDER — APIXABAN 2.5 MG/1
5 TABLET, FILM COATED ORAL EVERY 12 HOURS
Refills: 0 | Status: DISCONTINUED | OUTPATIENT
Start: 2021-02-22 | End: 2021-02-23

## 2021-02-22 RX ORDER — ATORVASTATIN CALCIUM 80 MG/1
40 TABLET, FILM COATED ORAL AT BEDTIME
Refills: 0 | Status: DISCONTINUED | OUTPATIENT
Start: 2021-02-22 | End: 2021-02-25

## 2021-02-22 RX ADMIN — Medication 81 MILLIGRAM(S): at 11:06

## 2021-02-22 RX ADMIN — HYDROMORPHONE HYDROCHLORIDE 2 MILLIGRAM(S): 2 INJECTION INTRAMUSCULAR; INTRAVENOUS; SUBCUTANEOUS at 02:25

## 2021-02-22 RX ADMIN — HYDROMORPHONE HYDROCHLORIDE 2 MILLIGRAM(S): 2 INJECTION INTRAMUSCULAR; INTRAVENOUS; SUBCUTANEOUS at 11:27

## 2021-02-22 RX ADMIN — ATORVASTATIN CALCIUM 40 MILLIGRAM(S): 80 TABLET, FILM COATED ORAL at 21:56

## 2021-02-22 RX ADMIN — GABAPENTIN 400 MILLIGRAM(S): 400 CAPSULE ORAL at 21:56

## 2021-02-22 RX ADMIN — LAMOTRIGINE 200 MILLIGRAM(S): 25 TABLET, ORALLY DISINTEGRATING ORAL at 16:39

## 2021-02-22 RX ADMIN — LAMOTRIGINE 200 MILLIGRAM(S): 25 TABLET, ORALLY DISINTEGRATING ORAL at 05:24

## 2021-02-22 RX ADMIN — GABAPENTIN 400 MILLIGRAM(S): 400 CAPSULE ORAL at 13:31

## 2021-02-22 RX ADMIN — GABAPENTIN 400 MILLIGRAM(S): 400 CAPSULE ORAL at 05:24

## 2021-02-22 RX ADMIN — SERTRALINE 50 MILLIGRAM(S): 25 TABLET, FILM COATED ORAL at 11:06

## 2021-02-22 RX ADMIN — Medication 1 MILLIGRAM(S): at 11:06

## 2021-02-22 RX ADMIN — PANTOPRAZOLE SODIUM 40 MILLIGRAM(S): 20 TABLET, DELAYED RELEASE ORAL at 05:25

## 2021-02-22 RX ADMIN — HYDROMORPHONE HYDROCHLORIDE 2 MILLIGRAM(S): 2 INJECTION INTRAMUSCULAR; INTRAVENOUS; SUBCUTANEOUS at 21:03

## 2021-02-22 RX ADMIN — Medication 10 MILLIGRAM(S): at 21:56

## 2021-02-22 RX ADMIN — APIXABAN 5 MILLIGRAM(S): 2.5 TABLET, FILM COATED ORAL at 16:39

## 2021-02-22 RX ADMIN — APIXABAN 5 MILLIGRAM(S): 2.5 TABLET, FILM COATED ORAL at 05:24

## 2021-02-22 RX ADMIN — Medication 10 MILLIGRAM(S): at 13:31

## 2021-02-22 RX ADMIN — Medication 10 MILLIGRAM(S): at 05:24

## 2021-02-22 RX ADMIN — TIOTROPIUM BROMIDE 1 CAPSULE(S): 18 CAPSULE ORAL; RESPIRATORY (INHALATION) at 09:01

## 2021-02-22 RX ADMIN — TAMSULOSIN HYDROCHLORIDE 0.4 MILLIGRAM(S): 0.4 CAPSULE ORAL at 21:56

## 2021-02-22 RX ADMIN — Medication 50 MILLIGRAM(S): at 05:25

## 2021-02-22 RX ADMIN — QUETIAPINE FUMARATE 25 MILLIGRAM(S): 200 TABLET, FILM COATED ORAL at 11:06

## 2021-02-22 NOTE — H&P ADULT - ATTENDING COMMENTS
Patient seen and examined independently. Resident's H & P reviewed. Agree with the findings and plan of care except,  A 50 years old male presented to the ED with intermittent chest tightness for the last six days. Pt states that he received a call from [employer] his [employer], and informed him that he was having arrythmia. Pt was initially went to [facility] on 2/16. He was told that he had MI but he left AMA on 2/19.     CE x 3 negative.   EKG: Atrial paced rhythm @ 70/min. RBB. Inverted T waves in leads V2-V6 (Interpreted by me.)  CXR: NAPD    ASSESSMENT:    1. CP  2. CAD  3. Arrhythmogenic RV dysplasia w/ AICD implant  4. A-Fib S/P ablation  5. Epilepsy  6. MS  7. COPD  8. Schizoaffective disorder  9. Peripheral Neuropathy    PLAN:    . Cont tele  . ECHO  . Cardiology eval  . CE x 4 negative.   . Cont his home med.

## 2021-02-22 NOTE — H&P ADULT - NSHPPHYSICALEXAM_GEN_ALL_CORE
PHYSICAL EXAM:  GENERAL: NAD, lying in bed comfortably  HEAD:  Atraumatic, Normocephalic  EYES: EOMI, PERRLA, conjunctiva and sclera clear  ENT: Moist mucous membranes  NECK: Supple, No JVD  CHEST/LUNG: Clear to auscultation bilaterally; No rales, rhonchi, wheezing, or rubs. Unlabored respirations  HEART: Regular rate and rhythm; No murmurs, rubs, or gallops  ABDOMEN: Bowel sounds present; Soft, Nontender, Nondistended. No hepatomegaly  EXTREMITIES:  2+ Peripheral Pulses, brisk capillary refill. No clubbing, cyanosis, or edema  NERVOUS SYSTEM:  Alert & Oriented X3, speech clear. No deficits   MSK: FROM all 4 extremities, full and equal strength  SKIN: No rashes or lesions PHYSICAL EXAM:  GENERAL: NAD, lying in bed comfortably  HEAD:  Atraumatic, Normocephalic  EYES: EOMI, PERRLA, conjunctiva and sclera clear  ENT: Moist mucous membranes  NECK: Supple, No JVD  CHEST/LUNG: Clear to auscultation bilaterally; No rales, rhonchi, wheezing, or rubs. Unlabored respirations  HEART/CVS: Regular rate and rhythm; No murmurs, rubs, or gallops  ABDOMEN/GI: Bowel sounds present; Soft, Nontender, Nondistended. No hepatomegaly  EXTREMITIES:  2+ Peripheral Pulses, brisk capillary refill. No clubbing, cyanosis, or edema  NERVOUS SYSTEM:  Alert & Oriented X3, speech clear. No deficits   MSK: FROM all 4 extremities, full and equal strength  SKIN: No rashes or lesions

## 2021-02-22 NOTE — H&P ADULT - ASSESSMENT
CUATE HORNE is a 50y Male with a past medical history as described above who presented for chest pain and was told an arrhythmia was present on his device.     # Chest Pain - rule-out ACS   # CAD  - First troponin negative, follow-up next set   - Follow-up cardiology consult  - Continue ASA and Eliquis for now   - Continue Lipitor and Toprol   - Follow-up EKG in AM    # Arrhythmogenic RV dysplasia  - Follow-up EPS consult   - Continue home meds for now   - EKG in AM    # MS  - Continue Baclofen, Lamictal, and gabapentin     # Schizoaffective disorder  - Continue Seroquel, Zoloft     # COPD  - Continue tiotropium daily     # GERD  - Continue Protonix 40mg PO daily     # BPH  - Continue Flomax 0.4mg daily     # Folate deficiency   - Continue folic acid 1mg PO daily     DVT ppx: Eliquis 5mg q12   GI ppx: Protonix 40mg daily   Diet: DASH/TLC   Activity: IAT   Dispo: From home   Code: FULL

## 2021-02-22 NOTE — H&P ADULT - HISTORY OF PRESENT ILLNESS
Chief Complaint: Chest Pain    Past Medical History: CAD, arrhythmogenic RV dysplasia w/ AICD implant, atrial fib s/p ablation, epilepsy, MS, peripheral neuropathy, COPD, schizoaffective disorder     Mr. Miguel is a 50M with a past medical history as described above who presented to the hospital for worsening chest pain. He states chest pain began to occur intermittently over the last week. He was admitted at [facility] on 2/16 for chest pain and what he reports as an "NSTEMI." He was asking to be transferred to [facility], and left AMA on 2/19 since he would not be transferred. He says they were planning to do a cardiac cath. Yesterday afternoon, he received a call from [employer] his [employer], and informed him that he was in an arrythmia. He was nervous about this and came to the ED for evaluation.     In the ED, he was vitally stable. Labs are unremarkable, other than mild anemia and elevated coags (pt is on Eliquis.) Admitted to [facility] for observation and Cardiology/EPS evaluation.

## 2021-02-23 ENCOUNTER — TRANSCRIPTION ENCOUNTER (OUTPATIENT)
Age: 51
End: 2021-02-23

## 2021-02-23 LAB
ALBUMIN SERPL ELPH-MCNC: 3.9 G/DL — SIGNIFICANT CHANGE UP (ref 3.5–5.2)
ALP SERPL-CCNC: 101 U/L — SIGNIFICANT CHANGE UP (ref 30–115)
ALT FLD-CCNC: 10 U/L — SIGNIFICANT CHANGE UP (ref 0–41)
ANION GAP SERPL CALC-SCNC: 12 MMOL/L — SIGNIFICANT CHANGE UP (ref 7–14)
AST SERPL-CCNC: 14 U/L — SIGNIFICANT CHANGE UP (ref 0–41)
BASOPHILS # BLD AUTO: 0.08 K/UL — SIGNIFICANT CHANGE UP (ref 0–0.2)
BASOPHILS NFR BLD AUTO: 1.3 % — HIGH (ref 0–1)
BILIRUB SERPL-MCNC: 0.2 MG/DL — SIGNIFICANT CHANGE UP (ref 0.2–1.2)
BUN SERPL-MCNC: 19 MG/DL — SIGNIFICANT CHANGE UP (ref 10–20)
CALCIUM SERPL-MCNC: 9.2 MG/DL — SIGNIFICANT CHANGE UP (ref 8.5–10.1)
CHLORIDE SERPL-SCNC: 103 MMOL/L — SIGNIFICANT CHANGE UP (ref 98–110)
CO2 SERPL-SCNC: 24 MMOL/L — SIGNIFICANT CHANGE UP (ref 17–32)
CREAT SERPL-MCNC: 0.8 MG/DL — SIGNIFICANT CHANGE UP (ref 0.7–1.5)
EOSINOPHIL # BLD AUTO: 0.14 K/UL — SIGNIFICANT CHANGE UP (ref 0–0.7)
EOSINOPHIL NFR BLD AUTO: 2.2 % — SIGNIFICANT CHANGE UP (ref 0–8)
GLUCOSE SERPL-MCNC: 93 MG/DL — SIGNIFICANT CHANGE UP (ref 70–99)
HCT VFR BLD CALC: 37.2 % — LOW (ref 42–52)
HGB BLD-MCNC: 12.1 G/DL — LOW (ref 14–18)
IMM GRANULOCYTES NFR BLD AUTO: 0.3 % — SIGNIFICANT CHANGE UP (ref 0.1–0.3)
LYMPHOCYTES # BLD AUTO: 2.46 K/UL — SIGNIFICANT CHANGE UP (ref 1.2–3.4)
LYMPHOCYTES # BLD AUTO: 38.9 % — SIGNIFICANT CHANGE UP (ref 20.5–51.1)
MAGNESIUM SERPL-MCNC: 1.8 MG/DL — SIGNIFICANT CHANGE UP (ref 1.8–2.4)
MCHC RBC-ENTMCNC: 31.1 PG — HIGH (ref 27–31)
MCHC RBC-ENTMCNC: 32.5 G/DL — SIGNIFICANT CHANGE UP (ref 32–37)
MCV RBC AUTO: 95.6 FL — HIGH (ref 80–94)
MONOCYTES # BLD AUTO: 0.48 K/UL — SIGNIFICANT CHANGE UP (ref 0.1–0.6)
MONOCYTES NFR BLD AUTO: 7.6 % — SIGNIFICANT CHANGE UP (ref 1.7–9.3)
NEUTROPHILS # BLD AUTO: 3.14 K/UL — SIGNIFICANT CHANGE UP (ref 1.4–6.5)
NEUTROPHILS NFR BLD AUTO: 49.7 % — SIGNIFICANT CHANGE UP (ref 42.2–75.2)
NRBC # BLD: 0 /100 WBCS — SIGNIFICANT CHANGE UP (ref 0–0)
PLATELET # BLD AUTO: 220 K/UL — SIGNIFICANT CHANGE UP (ref 130–400)
POTASSIUM SERPL-MCNC: 4.5 MMOL/L — SIGNIFICANT CHANGE UP (ref 3.5–5)
POTASSIUM SERPL-SCNC: 4.5 MMOL/L — SIGNIFICANT CHANGE UP (ref 3.5–5)
PROT SERPL-MCNC: 6.3 G/DL — SIGNIFICANT CHANGE UP (ref 6–8)
RBC # BLD: 3.89 M/UL — LOW (ref 4.7–6.1)
RBC # FLD: 13.2 % — SIGNIFICANT CHANGE UP (ref 11.5–14.5)
SARS-COV-2 IGG SERPL QL IA: POSITIVE
SARS-COV-2 IGM SERPL IA-ACNC: 2.38 INDEX — HIGH
SODIUM SERPL-SCNC: 139 MMOL/L — SIGNIFICANT CHANGE UP (ref 135–146)
WBC # BLD: 6.32 K/UL — SIGNIFICANT CHANGE UP (ref 4.8–10.8)
WBC # FLD AUTO: 6.32 K/UL — SIGNIFICANT CHANGE UP (ref 4.8–10.8)

## 2021-02-23 PROCEDURE — 99233 SBSQ HOSP IP/OBS HIGH 50: CPT

## 2021-02-23 RX ORDER — KETOROLAC TROMETHAMINE 30 MG/ML
10 SYRINGE (ML) INJECTION ONCE
Refills: 0 | Status: DISCONTINUED | OUTPATIENT
Start: 2021-02-23 | End: 2021-02-23

## 2021-02-23 RX ADMIN — TIOTROPIUM BROMIDE 1 CAPSULE(S): 18 CAPSULE ORAL; RESPIRATORY (INHALATION) at 07:53

## 2021-02-23 RX ADMIN — APIXABAN 5 MILLIGRAM(S): 2.5 TABLET, FILM COATED ORAL at 05:14

## 2021-02-23 RX ADMIN — PANTOPRAZOLE SODIUM 40 MILLIGRAM(S): 20 TABLET, DELAYED RELEASE ORAL at 05:14

## 2021-02-23 RX ADMIN — Medication 10 MILLIGRAM(S): at 21:57

## 2021-02-23 RX ADMIN — SERTRALINE 50 MILLIGRAM(S): 25 TABLET, FILM COATED ORAL at 11:01

## 2021-02-23 RX ADMIN — GABAPENTIN 400 MILLIGRAM(S): 400 CAPSULE ORAL at 05:15

## 2021-02-23 RX ADMIN — Medication 10 MILLIGRAM(S): at 05:14

## 2021-02-23 RX ADMIN — QUETIAPINE FUMARATE 25 MILLIGRAM(S): 200 TABLET, FILM COATED ORAL at 11:01

## 2021-02-23 RX ADMIN — LAMOTRIGINE 200 MILLIGRAM(S): 25 TABLET, ORALLY DISINTEGRATING ORAL at 05:14

## 2021-02-23 RX ADMIN — TAMSULOSIN HYDROCHLORIDE 0.4 MILLIGRAM(S): 0.4 CAPSULE ORAL at 21:57

## 2021-02-23 RX ADMIN — Medication 1 MILLIGRAM(S): at 11:00

## 2021-02-23 RX ADMIN — GABAPENTIN 400 MILLIGRAM(S): 400 CAPSULE ORAL at 14:41

## 2021-02-23 RX ADMIN — ATORVASTATIN CALCIUM 40 MILLIGRAM(S): 80 TABLET, FILM COATED ORAL at 21:57

## 2021-02-23 RX ADMIN — Medication 81 MILLIGRAM(S): at 11:00

## 2021-02-23 RX ADMIN — LAMOTRIGINE 200 MILLIGRAM(S): 25 TABLET, ORALLY DISINTEGRATING ORAL at 17:11

## 2021-02-23 RX ADMIN — GABAPENTIN 400 MILLIGRAM(S): 400 CAPSULE ORAL at 21:57

## 2021-02-23 RX ADMIN — Medication 50 MILLIGRAM(S): at 05:14

## 2021-02-23 RX ADMIN — Medication 10 MILLIGRAM(S): at 14:41

## 2021-02-23 NOTE — DISCHARGE NOTE PROVIDER - CARE PROVIDER_API CALL
Jose Ayoub)  Cardiovascular Disease; Interventional Cardiology  [address] [place] [address]  Phone: [phone]  Fax: [phone]  Follow Up Time:

## 2021-02-23 NOTE — DISCHARGE NOTE PROVIDER - NSDCMRMEDTOKEN_GEN_ALL_CORE_FT
apixaban 5 mg oral tablet: 1 tab(s) orally every 12 hours  aspirin 81 mg oral tablet, chewable: 1 tab(s) orally once a day  atorvastatin 40 mg oral tablet: 1 tab(s) orally once a day (at bedtime)  baclofen 10 mg oral tablet: 1 tab(s) orally 3 times a day  folic acid 1 mg oral tablet: 1 tab(s) orally once a day  gabapentin 400 mg oral capsule: 1 cap(s) orally 3 times a day  lamoTRIgine 100 mg oral tablet: 2 tab(s) orally 2 times a day  metoprolol succinate 50 mg oral tablet, extended release: 1 tab(s) orally once a day; MAY GIVE 4 TABS OF 12.5MG AT ONE TIME FOR DOSING  pantoprazole 40 mg oral delayed release tablet: 1 tab(s) orally once a day  QUEtiapine 25 mg oral tablet: 1 tab(s) orally once a day  sertraline 50 mg oral tablet: 1 tab(s) orally once a day  tamsulosin 0.4 mg oral capsule: 1 cap(s) orally once a day (at bedtime)  Tecfidera 240 mg oral delayed release capsule: 1 cap(s) orally 2 times a day  tiotropium 18 mcg inhalation capsule: 1 cap(s) inhaled once a day   apixaban 5 mg oral tablet: 1 tab(s) orally every 12 hours  aspirin 81 mg oral tablet, chewable: 1 tab(s) orally once a day  atorvastatin 40 mg oral tablet: 1 tab(s) orally once a day (at bedtime)  baclofen 10 mg oral tablet: 1 tab(s) orally 3 times a day  folic acid 1 mg oral tablet: 1 tab(s) orally once a day  gabapentin 400 mg oral capsule: 1 cap(s) orally 3 times a day  lamoTRIgine 100 mg oral tablet: 2 tab(s) orally 2 times a day  metoprolol succinate 50 mg oral tablet, extended release: 1 tab(s) orally once a day; MAY GIVE 4 TABS OF 12.5MG AT ONE TIME FOR DOSING  pantoprazole 40 mg oral delayed release tablet: 1 tab(s) orally once a day  QUEtiapine 25 mg oral tablet: 1 tab(s) orally 3 times a day  sertraline 50 mg oral tablet: 1 tab(s) orally once a day  tamsulosin 0.4 mg oral capsule: 1 cap(s) orally once a day (at bedtime)  Tecfidera 240 mg oral delayed release capsule: 1 cap(s) orally 2 times a day  tiotropium 18 mcg inhalation capsule: 1 cap(s) inhaled once a day   apixaban 5 mg oral tablet: 1 tab(s) orally every 12 hours  aspirin 81 mg oral tablet, chewable: 1 tab(s) orally once a day  atorvastatin 40 mg oral tablet: 1 tab(s) orally once a day (at bedtime)  baclofen 10 mg oral tablet: 1 tab(s) orally 3 times a day  folic acid 1 mg oral tablet: 1 tab(s) orally once a day  gabapentin 400 mg oral capsule: 1 cap(s) orally 3 times a day  lamoTRIgine 100 mg oral tablet: 2 tab(s) orally 2 times a day  metoprolol succinate 50 mg oral tablet, extended release: 1 tab(s) orally once a day; MAY GIVE 4 TABS OF 12.5MG AT ONE TIME FOR DOSING  pantoprazole 40 mg oral delayed release tablet: 1 tab(s) orally once a day  QUEtiapine 25 mg oral tablet: 1 tab(s) orally 3 times a day  sertraline 50 mg oral tablet: 1 tab(s) orally once a day  tamsulosin 0.4 mg oral capsule: 1 cap(s) orally once a day (at bedtime)  Tecfidera 240 mg oral delayed release capsule: 1 cap(s) orally 2 times a day  tiotropium 18 mcg inhalation capsule: 1 cap(s) inhaled once a day  traMADol 50 mg oral tablet: 1 tab(s) orally every 6 hours, As Needed  as needed for pain MDD:max 4 tablets a day

## 2021-02-23 NOTE — DISCHARGE NOTE PROVIDER - DISCHARGE DATE
(Z23) Need for prophylactic vaccination and inoculation against influenza  (primary encounter diagnosis)  Comment:    Plan: FLU VACCINE, INCREASED ANTIGEN, PRESV FREE, AGE        65+ [35829], ADMIN INFLUENZA (For MEDICARE         Patients ONLY) []             (H04.553) Nasolacrimal obstruction, bilateral  Comment: bilateral   Plan: gentamicin (GARAMYCIN) 0.3 % ophthalmic         solution, OPHTHALMOLOGY ADULT REFERRAL                   23-Feb-2021

## 2021-02-23 NOTE — PROGRESS NOTE ADULT - ASSESSMENT
A 50 years old male presented to the ED with intermittent chest tightness for the last six days. Pt states that he received a call from [employer], his [employer], and informed him that he was having arrythmia. Pt was initially went to [facility] on 2/16. He was told that he had MI but he left AMA on 2/19.     CE x 3 negative.   EKG: Atrial paced rhythm @ 70/min. RBB. Inverted T waves in leads V2-V6 (Interpreted by me.)  CXR: NAPD      1. CP likely musculoskeletal  2. CAD  3. Arrhythmogenic RV dysplasia w/ AICD implant  4. A-Fib S/P ablation  5. Epilepsy  6. MS  7. COPD  8. Schizoaffective disorder  9. Peripheral Neuropathy          PLAN:    . Cont tele  . S/P device interrogation. No events  . ECHO reviewed. EF is 55-60%  . Cardiology eval  . CE x 4 negative.   . Cont his home med.    . Can D/C him home if cleared by cardiology     A 50 years old male presented to the ED with intermittent chest tightness for the last six days. Pt states that he received a call from [employer], his [employer] company, and informed him that he was having arrythmia. Pt was initially went to [facility] on 2/16. He was told that he had MI but he left AMA on 2/19.     CE x 3 negative.   EKG: Atrial paced rhythm @ 70/min. RBB. Inverted T waves in leads V2-V6 (Interpreted by me.)  CXR: NAPD      1. CP   2. CAD  3. Arrhythmogenic RV dysplasia w/ AICD implant  4. A-Fib S/P ablation  5. Epilepsy  6. MS  7. COPD  8. Schizoaffective disorder  9. Peripheral Neuropathy          PLAN:    . Cont tele  . S/P device interrogation. No events  . ECHO reviewed. EF is 55-60%  . Cardiology eval  . CE x 4 negative.   . Cont his home med.    . Can D/C him home if cleared by cardiology

## 2021-02-23 NOTE — DISCHARGE NOTE PROVIDER - HOSPITAL COURSE
Mr. Miguel is a 50M with a past medical history of peripheral neuropathy, COPD, schizoaffective disorder  who presented to the hospital for worsening chest pain. He states chest pain began to occur intermittently over the last week. He was admitted at [facility] on 2/16 for chest pain and what he reports as an "NSTEMI." He was asking to be transferred to [facility], and left AMA on 2/19 since he would not be transferred. He says they were planning to do a cardiac cath. Yesterday afternoon, he received a call from [employer], and informed him that he was in an arrythmia. He was nervous about this and came to the ED for evaluation.     In the ED, he was vitally stable. Labs are unremarkable, other than mild anemia and elevated coags (pt is on Eliquis.) Admitted to [facility] for observation and Cardiology/EPS evaluation.    During his hospital stay, his troponin was negative  EKG was all WNL  He was seen by EP who interrogated his device and found to events.   He was seen by cardiology who..... Mr. Miguel is a 50M with a past medical history of peripheral neuropathy, COPD, schizoaffective disorder  who presented to the hospital for worsening chest pain. He states chest pain began to occur intermittently over the last week. He was admitted at [facility] on 2/16 for chest pain and what he reports as an "NSTEMI." He was asking to be transferred to [facility], and left AMA on 2/19 since he would not be transferred. He says they were planning to do a cardiac cath. Yesterday afternoon, he received a call from [employer], and informed him that he was in an arrythmia. He was nervous about this and came to the ED for evaluation.     In the ED, he was vitally stable. Labs are unremarkable, other than mild anemia and elevated coags (pt is on Eliquis.) Admitted to [facility] for observation and Cardiology/EPS evaluation.    During his hospital stay, his troponin was negative. EKG was all WNL. He was seen by EP who interrogated his device and found no events. Cardiology saw him and recommended he have a cath 2/24.      Mr. Miguel is a 50M with a past medical history of peripheral neuropathy, COPD, schizoaffective disorder  who presented to the hospital for worsening chest pain. He states chest pain began to occur intermittently over the last week. He was admitted at [facility] on 2/16 for chest pain and what he reports as an "NSTEMI." He was asking to be transferred to [facility], and left AMA on 2/19 since he would not be transferred. He says they were planning to do a cardiac cath. Yesterday afternoon, he received a call from [employer], and informed him that he was in an arrythmia. He was nervous about this and came to the ED for evaluation.     In the ED, he was vitally stable. Labs are unremarkable, other than mild anemia and elevated coags (pt is on Eliquis.) Admitted to [facility] for observation and Cardiology/EPS evaluation.    During his hospital stay, his troponin was negative. EKG was all WNL. He was seen by EP who interrogated his device and found no events. Cardiology saw him and recommended he have a cath 2/24 which was unremarkable      Mr. Miguel is a 50M with a past medical history of peripheral neuropathy, COPD, schizoaffective disorder  who presented to the hospital for worsening chest pain. He states chest pain began to occur intermittently over the last week. He was admitted at [facility] on 2/16 for chest pain and what he reports as an "NSTEMI." He was asking to be transferred to [facility], and left AMA on 2/19 since he would not be transferred. He says they were planning to do a cardiac cath. Yesterday afternoon, he received a call from [employer], and informed him that he was in an arrythmia. He was nervous about this and came to the ED for evaluation.     In the ED, he was vitally stable. Labs are unremarkable, other than mild anemia and elevated coags (pt is on Eliquis.) Admitted to tele for observation and Cardiology/EPS evaluation.    During his hospital stay, his troponin was negative. EKG was all WNL. He was seen by EP who interrogated his device and found no events. Cardiology saw him and recommended he have a cath 2/24 which was unremarkable   Pain management saw the patient and recommended tramadol 50mg q6hrs PRN for 3 days.

## 2021-02-23 NOTE — DISCHARGE NOTE PROVIDER - NSDCCPCAREPLAN_GEN_ALL_CORE_FT
PRINCIPAL DISCHARGE DIAGNOSIS  Diagnosis: Chest pain  Assessment and Plan of Treatment: You presented for chest pain.   Your EKG was normal and troponin was negative.  You were seen by electrophysiology who interrogated his pacemaker You were seen by cardiology ..............       PRINCIPAL DISCHARGE DIAGNOSIS  Diagnosis: Chest pain  Assessment and Plan of Treatment: Chest pain can be caused by many different conditions which may or may not be dangerous. Causes include heartburn, lung infections, heart attack, blood clot in lungs, skin infections, strain or damage to muscle, cartilage, or bones, etc. In addition to a history and physical examination, an electrocardiogram (ECG) or other lab tests may have been performed to determine the cause of your chest pain. Follow up with your primary care provider or with a cardiologist as instructed.   SEEK IMMEDIATE MEDICAL CARE IF YOU HAVE ANY OF THE FOLLOWING SYMPTOMS: worsening chest pain, coughing up blood, unexplained back/neck/jaw pain, severe abdominal pain, dizziness or lightheadedness, fainting, shortness of breath, sweaty or clammy skin, vomiting, or racing heart beat. These symptoms may represent a serious problem that is an emergency. Do not wait to see if the symptoms will go away. Get medical help right away. Call 911 and do not drive yourself to the hospital.

## 2021-02-23 NOTE — DISCHARGE NOTE PROVIDER - NSDCFUADDINST_GEN_ALL_CORE_FT
Follow up with your PMD in 1-2 weeks. Please discuss with your PMD your overall condition specifically surrounding your current situation.

## 2021-02-24 PROCEDURE — 99233 SBSQ HOSP IP/OBS HIGH 50: CPT

## 2021-02-24 RX ORDER — TRAMADOL HYDROCHLORIDE 50 MG/1
25 TABLET ORAL ONCE
Refills: 0 | Status: DISCONTINUED | OUTPATIENT
Start: 2021-02-24 | End: 2021-02-24

## 2021-02-24 RX ORDER — SODIUM CHLORIDE 9 MG/ML
1000 INJECTION INTRAMUSCULAR; INTRAVENOUS; SUBCUTANEOUS
Refills: 0 | Status: DISCONTINUED | OUTPATIENT
Start: 2021-02-24 | End: 2021-02-25

## 2021-02-24 RX ADMIN — SERTRALINE 50 MILLIGRAM(S): 25 TABLET, FILM COATED ORAL at 12:51

## 2021-02-24 RX ADMIN — GABAPENTIN 400 MILLIGRAM(S): 400 CAPSULE ORAL at 22:00

## 2021-02-24 RX ADMIN — Medication 81 MILLIGRAM(S): at 12:51

## 2021-02-24 RX ADMIN — Medication 10 MILLIGRAM(S): at 05:45

## 2021-02-24 RX ADMIN — PANTOPRAZOLE SODIUM 40 MILLIGRAM(S): 20 TABLET, DELAYED RELEASE ORAL at 05:46

## 2021-02-24 RX ADMIN — GABAPENTIN 400 MILLIGRAM(S): 400 CAPSULE ORAL at 05:45

## 2021-02-24 RX ADMIN — Medication 50 MILLIGRAM(S): at 05:46

## 2021-02-24 RX ADMIN — TAMSULOSIN HYDROCHLORIDE 0.4 MILLIGRAM(S): 0.4 CAPSULE ORAL at 22:00

## 2021-02-24 RX ADMIN — TIOTROPIUM BROMIDE 1 CAPSULE(S): 18 CAPSULE ORAL; RESPIRATORY (INHALATION) at 09:45

## 2021-02-24 RX ADMIN — LAMOTRIGINE 200 MILLIGRAM(S): 25 TABLET, ORALLY DISINTEGRATING ORAL at 05:46

## 2021-02-24 RX ADMIN — ATORVASTATIN CALCIUM 40 MILLIGRAM(S): 80 TABLET, FILM COATED ORAL at 22:00

## 2021-02-24 RX ADMIN — Medication 10 MILLIGRAM(S): at 22:02

## 2021-02-24 RX ADMIN — LAMOTRIGINE 200 MILLIGRAM(S): 25 TABLET, ORALLY DISINTEGRATING ORAL at 22:00

## 2021-02-24 RX ADMIN — Medication 1 MILLIGRAM(S): at 12:51

## 2021-02-24 RX ADMIN — TRAMADOL HYDROCHLORIDE 25 MILLIGRAM(S): 50 TABLET ORAL at 12:50

## 2021-02-24 RX ADMIN — QUETIAPINE FUMARATE 25 MILLIGRAM(S): 200 TABLET, FILM COATED ORAL at 12:51

## 2021-02-24 RX ADMIN — GABAPENTIN 400 MILLIGRAM(S): 400 CAPSULE ORAL at 12:52

## 2021-02-24 RX ADMIN — Medication 10 MILLIGRAM(S): at 12:51

## 2021-02-24 RX ADMIN — TRAMADOL HYDROCHLORIDE 25 MILLIGRAM(S): 50 TABLET ORAL at 22:00

## 2021-02-24 NOTE — PROGRESS NOTE ADULT - ASSESSMENT
A 50 years old male presented to the ED with intermittent chest tightness for the last six days. Pt states that he received a call from [employer], his [employer], and informed him that he was having arrythmia. Pt was initially went to [facility] on 2/16. He was told that he had MI but he left AMA on 2/19.     CE x 3 negative.   EKG: Atrial paced rhythm @ 70/min. RBB. Inverted T waves in leads V2-V6 (Interpreted by me.)  CXR: NAPD      1. CP   2. CAD  3. Arrhythmogenic RV dysplasia w/ AICD implant  4. A-Fib S/P ablation  5. Epilepsy  6. MS  7. COPD  8. Schizoaffective disorder  9. Peripheral Neuropathy          PLAN:    . Cont tele  . Scheduled for cath today  . S/P device interrogation. No events  . ECHO reviewed. EF is 55-60%  . Cont to hold Eliquis for cath today  . CE x 4 negative.   . Cont his home med.    . Will D/C home once cleared by cardiology

## 2021-02-25 ENCOUNTER — TRANSCRIPTION ENCOUNTER (OUTPATIENT)
Age: 51
End: 2021-02-25

## 2021-02-25 VITALS
RESPIRATION RATE: 18 BRPM | TEMPERATURE: 98 F | DIASTOLIC BLOOD PRESSURE: 60 MMHG | HEART RATE: 69 BPM | SYSTOLIC BLOOD PRESSURE: 110 MMHG

## 2021-02-25 DIAGNOSIS — G89.29 OTHER CHRONIC PAIN: ICD-10-CM

## 2021-02-25 LAB
ALBUMIN SERPL ELPH-MCNC: 3.9 G/DL — SIGNIFICANT CHANGE UP (ref 3.5–5.2)
ALP SERPL-CCNC: 115 U/L — SIGNIFICANT CHANGE UP (ref 30–115)
ALT FLD-CCNC: 11 U/L — SIGNIFICANT CHANGE UP (ref 0–41)
ANION GAP SERPL CALC-SCNC: 13 MMOL/L — SIGNIFICANT CHANGE UP (ref 7–14)
AST SERPL-CCNC: 13 U/L — SIGNIFICANT CHANGE UP (ref 0–41)
BASOPHILS # BLD AUTO: 0.08 K/UL — SIGNIFICANT CHANGE UP (ref 0–0.2)
BASOPHILS NFR BLD AUTO: 1.3 % — HIGH (ref 0–1)
BILIRUB SERPL-MCNC: <0.2 MG/DL — SIGNIFICANT CHANGE UP (ref 0.2–1.2)
BUN SERPL-MCNC: 28 MG/DL — HIGH (ref 10–20)
CALCIUM SERPL-MCNC: 9.6 MG/DL — SIGNIFICANT CHANGE UP (ref 8.5–10.1)
CHLORIDE SERPL-SCNC: 107 MMOL/L — SIGNIFICANT CHANGE UP (ref 98–110)
CO2 SERPL-SCNC: 23 MMOL/L — SIGNIFICANT CHANGE UP (ref 17–32)
CREAT SERPL-MCNC: 1.4 MG/DL — SIGNIFICANT CHANGE UP (ref 0.7–1.5)
EOSINOPHIL # BLD AUTO: 0.15 K/UL — SIGNIFICANT CHANGE UP (ref 0–0.7)
EOSINOPHIL NFR BLD AUTO: 2.5 % — SIGNIFICANT CHANGE UP (ref 0–8)
GLUCOSE SERPL-MCNC: 90 MG/DL — SIGNIFICANT CHANGE UP (ref 70–99)
HCT VFR BLD CALC: 37.3 % — LOW (ref 42–52)
HGB BLD-MCNC: 12.3 G/DL — LOW (ref 14–18)
IMM GRANULOCYTES NFR BLD AUTO: 0.3 % — SIGNIFICANT CHANGE UP (ref 0.1–0.3)
LYMPHOCYTES # BLD AUTO: 2.23 K/UL — SIGNIFICANT CHANGE UP (ref 1.2–3.4)
LYMPHOCYTES # BLD AUTO: 37.3 % — SIGNIFICANT CHANGE UP (ref 20.5–51.1)
MAGNESIUM SERPL-MCNC: 1.7 MG/DL — LOW (ref 1.8–2.4)
MCHC RBC-ENTMCNC: 31.3 PG — HIGH (ref 27–31)
MCHC RBC-ENTMCNC: 33 G/DL — SIGNIFICANT CHANGE UP (ref 32–37)
MCV RBC AUTO: 94.9 FL — HIGH (ref 80–94)
MONOCYTES # BLD AUTO: 0.56 K/UL — SIGNIFICANT CHANGE UP (ref 0.1–0.6)
MONOCYTES NFR BLD AUTO: 9.4 % — HIGH (ref 1.7–9.3)
NEUTROPHILS # BLD AUTO: 2.94 K/UL — SIGNIFICANT CHANGE UP (ref 1.4–6.5)
NEUTROPHILS NFR BLD AUTO: 49.2 % — SIGNIFICANT CHANGE UP (ref 42.2–75.2)
NRBC # BLD: 0 /100 WBCS — SIGNIFICANT CHANGE UP (ref 0–0)
PLATELET # BLD AUTO: 212 K/UL — SIGNIFICANT CHANGE UP (ref 130–400)
POTASSIUM SERPL-MCNC: 4.6 MMOL/L — SIGNIFICANT CHANGE UP (ref 3.5–5)
POTASSIUM SERPL-SCNC: 4.6 MMOL/L — SIGNIFICANT CHANGE UP (ref 3.5–5)
PROT SERPL-MCNC: 6.3 G/DL — SIGNIFICANT CHANGE UP (ref 6–8)
RBC # BLD: 3.93 M/UL — LOW (ref 4.7–6.1)
RBC # FLD: 13.2 % — SIGNIFICANT CHANGE UP (ref 11.5–14.5)
SARS-COV-2 RNA SPEC QL NAA+PROBE: SIGNIFICANT CHANGE UP
SODIUM SERPL-SCNC: 143 MMOL/L — SIGNIFICANT CHANGE UP (ref 135–146)
WBC # BLD: 5.98 K/UL — SIGNIFICANT CHANGE UP (ref 4.8–10.8)
WBC # FLD AUTO: 5.98 K/UL — SIGNIFICANT CHANGE UP (ref 4.8–10.8)

## 2021-02-25 PROCEDURE — 99239 HOSP IP/OBS DSCHRG MGMT >30: CPT

## 2021-02-25 RX ORDER — MAGNESIUM OXIDE 400 MG ORAL TABLET 241.3 MG
400 TABLET ORAL
Refills: 0 | Status: DISCONTINUED | OUTPATIENT
Start: 2021-02-25 | End: 2021-02-25

## 2021-02-25 RX ORDER — MAGNESIUM SULFATE 500 MG/ML
2 VIAL (ML) INJECTION ONCE
Refills: 0 | Status: DISCONTINUED | OUTPATIENT
Start: 2021-02-25 | End: 2021-02-25

## 2021-02-25 RX ORDER — TRAMADOL HYDROCHLORIDE 50 MG/1
1 TABLET ORAL
Qty: 12 | Refills: 0
Start: 2021-02-25 | End: 2021-02-27

## 2021-02-25 RX ORDER — APIXABAN 2.5 MG/1
5 TABLET, FILM COATED ORAL EVERY 12 HOURS
Refills: 0 | Status: DISCONTINUED | OUTPATIENT
Start: 2021-02-25 | End: 2021-02-25

## 2021-02-25 RX ORDER — QUETIAPINE FUMARATE 200 MG/1
1 TABLET, FILM COATED ORAL
Qty: 0 | Refills: 0 | DISCHARGE

## 2021-02-25 RX ORDER — IBUPROFEN 200 MG
400 TABLET ORAL ONCE
Refills: 0 | Status: COMPLETED | OUTPATIENT
Start: 2021-02-25 | End: 2021-02-25

## 2021-02-25 RX ORDER — TRAMADOL HYDROCHLORIDE 50 MG/1
25 TABLET ORAL ONCE
Refills: 0 | Status: DISCONTINUED | OUTPATIENT
Start: 2021-02-25 | End: 2021-02-25

## 2021-02-25 RX ADMIN — TIOTROPIUM BROMIDE 1 CAPSULE(S): 18 CAPSULE ORAL; RESPIRATORY (INHALATION) at 08:46

## 2021-02-25 RX ADMIN — QUETIAPINE FUMARATE 25 MILLIGRAM(S): 200 TABLET, FILM COATED ORAL at 10:23

## 2021-02-25 RX ADMIN — Medication 10 MILLIGRAM(S): at 06:16

## 2021-02-25 RX ADMIN — Medication 400 MILLIGRAM(S): at 01:39

## 2021-02-25 RX ADMIN — MAGNESIUM OXIDE 400 MG ORAL TABLET 400 MILLIGRAM(S): 241.3 TABLET ORAL at 10:23

## 2021-02-25 RX ADMIN — SERTRALINE 50 MILLIGRAM(S): 25 TABLET, FILM COATED ORAL at 10:23

## 2021-02-25 RX ADMIN — PANTOPRAZOLE SODIUM 40 MILLIGRAM(S): 20 TABLET, DELAYED RELEASE ORAL at 06:16

## 2021-02-25 RX ADMIN — APIXABAN 5 MILLIGRAM(S): 2.5 TABLET, FILM COATED ORAL at 16:40

## 2021-02-25 RX ADMIN — GABAPENTIN 400 MILLIGRAM(S): 400 CAPSULE ORAL at 12:24

## 2021-02-25 RX ADMIN — LAMOTRIGINE 200 MILLIGRAM(S): 25 TABLET, ORALLY DISINTEGRATING ORAL at 16:41

## 2021-02-25 RX ADMIN — Medication 10 MILLIGRAM(S): at 12:24

## 2021-02-25 RX ADMIN — Medication 50 MILLIGRAM(S): at 06:15

## 2021-02-25 RX ADMIN — TRAMADOL HYDROCHLORIDE 25 MILLIGRAM(S): 50 TABLET ORAL at 04:03

## 2021-02-25 RX ADMIN — GABAPENTIN 400 MILLIGRAM(S): 400 CAPSULE ORAL at 06:16

## 2021-02-25 RX ADMIN — Medication 1 MILLIGRAM(S): at 10:23

## 2021-02-25 RX ADMIN — LAMOTRIGINE 200 MILLIGRAM(S): 25 TABLET, ORALLY DISINTEGRATING ORAL at 06:15

## 2021-02-25 RX ADMIN — APIXABAN 5 MILLIGRAM(S): 2.5 TABLET, FILM COATED ORAL at 08:46

## 2021-02-25 RX ADMIN — MAGNESIUM OXIDE 400 MG ORAL TABLET 400 MILLIGRAM(S): 241.3 TABLET ORAL at 16:41

## 2021-02-25 RX ADMIN — CHLORHEXIDINE GLUCONATE 1 APPLICATION(S): 213 SOLUTION TOPICAL at 06:16

## 2021-02-25 RX ADMIN — Medication 81 MILLIGRAM(S): at 10:23

## 2021-02-25 NOTE — DISCHARGE NOTE NURSING/CASE MANAGEMENT/SOCIAL WORK - PATIENT PORTAL LINK FT
You can access the FollowMyHealth Patient Portal offered by [facility] by registering at the following website: [email or web address]. By joining [employer]’s FollowMyHealth portal, you will also be able to view your health information using other applications (apps) compatible with our system.

## 2021-02-25 NOTE — CHART NOTE - NSCHARTNOTESELECT_GEN_ALL_CORE
Discharge
EPS-Interrogation
Cath
Pain Management/Event Note
Pain Management/Event Note
post cath note/Event Note

## 2021-02-25 NOTE — PROGRESS NOTE ADULT - SUBJECTIVE AND OBJECTIVE BOX
CUATE HORNE 50y Male  MRN#: [id number]   CODE STATUS: Full       SUBJECTIVE  Patient is a 50y old Male who presents with a chief complaint of Chest Pain (23 Feb 2021 14:23)  Currently admitted to medicine with the primary diagnosis of Chest pain      Today is hospital day 2d, and this morning he is sleeping in bed. Plan for cath today. Patient currently denies any chest pain.   No overnight events.       OBJECTIVE  PAST MEDICAL & SURGICAL HISTORY  Vitamin D deficiency    Constipation    Chronic respiratory failure  secondary to COPD    Folate-deficiency anemia    Iron deficiency anemia    Anemia of chronic disease    Pericardial effusion without cardiac tamponade  h/o trace pericardial effusion    BPH (benign prostatic hyperplasia)    Chronic pain    Peripheral neuropathy    Type 2 diabetes mellitus    AICD (automatic cardioverter/defibrillator) present  SJM    Ventricular tachycardia    Chronic heart failure with preserved ejection fraction    PTSD (post-traumatic stress disorder)    Supraventricular arrhythmia  prior history    Cardiomyopathy    CHF (congestive heart failure)    Atrial fibrillation  s/p ablation, on eliquis    BPH (benign prostatic hyperplasia)    HTN (hypertension)    Seizures    Migraines    Pneumonia    MS (multiple sclerosis)    CAD (coronary artery disease)    Bipolar disorder    Anginal pain    Schizo affective schizophrenia    GERD (gastroesophageal reflux disease)    Seasonal allergies    Hypercholesteremia    COPD (chronic obstructive pulmonary disease)    CVA (cerebral vascular accident)    TIA (transient ischemic attack)    Peripheral Neuropathy    Clinical Depression    Displacement of electrode lead of cardiac pacemaker    History of evacuation of hematoma  AICD pocket    H/O prior ablation treatment  for Afib    H/O hernia repair  right 1972,1991    History of hip replacement    S/P Orchiectomy  L for testicular CA    S/P Implantation of AICD      ALLERGIES:  dexmethylphenidate (Unknown)  Dilantin (Rash)  dilantin, compazine, , ritalin, phenergan (Unknown)  Haldol (Unknown)  hydantoin derivatives (Other)  methylphenidate (Unknown)  phenytoin (Unknown)  prochlorperazine (Unknown)  promethazine (Dystonic RXN)  rash/hives (Anaphylaxis)  thioxanthenes (Unknown)    MEDICATIONS:  STANDING MEDICATIONS  aspirin enteric coated 81 milliGRAM(s) Oral daily  atorvastatin 40 milliGRAM(s) Oral at bedtime  baclofen 10 milliGRAM(s) Oral every 8 hours  chlorhexidine 4% Liquid 1 Application(s) Topical <User Schedule>  folic acid 1 milliGRAM(s) Oral daily  gabapentin 400 milliGRAM(s) Oral three times a day  lamoTRIgine 200 milliGRAM(s) Oral two times a day  metoprolol succinate ER 50 milliGRAM(s) Oral daily  pantoprazole    Tablet 40 milliGRAM(s) Oral before breakfast  QUEtiapine 25 milliGRAM(s) Oral daily  sertraline 50 milliGRAM(s) Oral daily  tamsulosin 0.4 milliGRAM(s) Oral at bedtime  tiotropium 18 MICROgram(s) Capsule 1 Capsule(s) Inhalation daily    PRN MEDICATIONS      VITAL SIGNS: Last 24 Hours  T(C): 36.1 (24 Feb 2021 04:41), Max: 36.2 (23 Feb 2021 13:43)  T(F): 97 (24 Feb 2021 04:41), Max: 97.2 (23 Feb 2021 13:43)  HR: 71 (24 Feb 2021 04:41) (70 - 71)  BP: 118/69 (24 Feb 2021 04:41) (108/69 - 128/68)  BP(mean): --  RR: 18 (24 Feb 2021 04:41) (18 - 18)  SpO2: 93% (24 Feb 2021 04:41) (93% - 93%)    LABS:                        12.1   6.32  )-----------( 220      ( 23 Feb 2021 05:29 )             37.2     02-23    139  |  103  |  19  ----------------------------<  93  4.5   |  24  |  0.8    Ca    9.2      23 Feb 2021 05:29  Mg     1.8     02-23    TPro  6.3  /  Alb  3.9  /  TBili  0.2  /  DBili  x   /  AST  14  /  ALT  10  /  AlkPhos  101  02-23        PHYSICAL EXAM:  GENERAL: NAD, well-developed, resting in bed   HEENT:  Atraumatic, Normocephalic. conjunctiva and sclera clear, No JVD  PULMONARY: Clear to auscultation bilaterally; No wheeze  CARDIOVASCULAR: Regular rate and rhythm; No murmurs, rubs, or gallops  GASTROINTESTINAL: Soft, Nontender, Nondistended; Bowel sounds present  MUSCULOSKELETAL:  No clubbing, cyanosis, or edema  NEUROLOGY: non-focal  SKIN: No rashes or lesions        ASSESSMENT & PLAN   50y Male with a past medical history as described above who presented for chest pain and was told an arrhythmia was present on his device.     # Chest Pain - rule-out ACS   # CAD, Arrhythmogenic RV dysplasia s/p AICD   - previously admitted to [facility] but patient left AMA - medical records show trops of 0.05 -> 0.03   - trops neg x2   - Continue ASA, Eliquis, Lipitor and Toprol   - EP interrogation of AICD - no events   - Cardiology consult - Dr Pradhan - cath today     # MS  - Continue Baclofen, Lamictal, and gabapentin   - patient requesting dilaudid, called [facility] and they confirmed that he does not get that medication   - F/u pain management     # Schizoaffective disorder  - Continue Seroquel, Zoloft     # COPD  - Continue tiotropium daily     # GERD  - Continue Protonix 40mg PO daily     # BPH  - Continue Flomax 0.4mg daily     # Folate deficiency   - Continue folic acid 1mg PO daily     DVT ppx: Eliquis 5mg q12   GI ppx: Protonix 40mg daily   Diet: DASH/TLC   Activity: IAT   Dispo: From [facility], cath today   Code: FULL
  CUATE  50y Male    CHIEF COMPLAINT:    Patient is a 50y old  Male who presents with a chief complaint of Chest Pain (24 Feb 2021 11:49)      INTERVAL HPI/OVERNIGHT EVENTS:    Patient seen and examined. S/P cath. Clean coronaries.     ROS: All other systems are negative.    Vital Signs:    T(F): 98.6 (02-25-21 @ 05:15), Max: 98.6 (02-25-21 @ 05:15)  HR: 69 (02-25-21 @ 05:15) (69 - 83)  BP: 107/56 (02-25-21 @ 05:15) (101/61 - 109/67)  RR: 18 (02-25-21 @ 05:15) (18 - 18)  SpO2: --  I&O's Summary    24 Feb 2021 07:01  -  25 Feb 2021 07:00  --------------------------------------------------------  IN: 200 mL / OUT: 700 mL / NET: -500 mL      Daily     Daily   CAPILLARY BLOOD GLUCOSE          PHYSICAL EXAM:    GENERAL:  NAD  SKIN: No rashes or lesions  HENT: Atraumatic Normocephalic. PERRL. Moist membranes.  NECK: Supple, No JVD. No lymphadenopathy.  PULMONARY: CTA B/L. No wheezing. No rales  CVS: Normal S1, S2. Rate and Rhythm are regular. No murmurs.  ABDOMEN/GI: Soft, Nontender, Nondistended; BS present  EXTREMITIES: Peripheral pulses intact. No edema B/L LE.  NEUROLOGIC:  No motor or sensory deficit.  PSYCH: Alert & oriented x 3    Consultant(s) Notes Reviewed:  [x ] YES  [ ] NO  Care Discussed with Consultants/Other Providers [ x] YES  [ ] NO    EKG reviewed  Telemetry reviewed    LABS:                        12.3   5.98  )-----------( 212      ( 25 Feb 2021 05:42 )             37.3     02-25    143  |  107  |  28<H>  ----------------------------<  90   Creatinine Trend: 1.4<--, 0.8<--, 0.8<--, 0.6<--, 1.1<--  4.6   |  23  |  1.4    Ca    9.6      25 Feb 2021 05:42  Mg     1.7     02-25    TPro  6.3  /  Alb  3.9  /  TBili  <0.2  /  DBili  x   /  AST  13  /  ALT  11  /  AlkPhos  115  02-25      Serum Pro-Brain Natriuretic Peptide: 157 pg/mL (02-21-21 @ 20:57)          RADIOLOGY & ADDITIONAL TESTS:      Imaging or report Personally Reviewed:  [ ] YES  [ ] NO    Medications:  Standing  apixaban 5 milliGRAM(s) Oral every 12 hours  aspirin enteric coated 81 milliGRAM(s) Oral daily  atorvastatin 40 milliGRAM(s) Oral at bedtime  baclofen 10 milliGRAM(s) Oral every 8 hours  chlorhexidine 4% Liquid 1 Application(s) Topical <User Schedule>  folic acid 1 milliGRAM(s) Oral daily  gabapentin 400 milliGRAM(s) Oral three times a day  lamoTRIgine 200 milliGRAM(s) Oral two times a day  magnesium oxide 400 milliGRAM(s) Oral three times a day with meals  metoprolol succinate ER 50 milliGRAM(s) Oral daily  pantoprazole    Tablet 40 milliGRAM(s) Oral before breakfast  QUEtiapine 25 milliGRAM(s) Oral daily  sertraline 50 milliGRAM(s) Oral daily  sodium chloride 0.9%. 1000 milliLiter(s) IV Continuous <Continuous>  tamsulosin 0.4 milliGRAM(s) Oral at bedtime  tiotropium 18 MICROgram(s) Capsule 1 Capsule(s) Inhalation daily    PRN Meds      Case discussed with resident    Care discussed with pt/family        
CUATE HORNE 50y Male  MRN#: [id number]   CODE STATUS: Full       SUBJECTIVE  Patient is a 50y old Male who presents with a chief complaint of Chest Pain (23 Feb 2021 10:20)  Currently admitted to medicine with the primary diagnosis of Chest pain    Today is hospital day 1d, and this morning he is sleeping in bed with no overnight events. He is complaining of pain and wants dilaudid. Istop was checked and he was never prescribed dilaudid.   Called [facility] for verification of dilaudid use and they confirmed that he does not take dilaudid. Will refer to pain management for medication recommendations.         OBJECTIVE  PAST MEDICAL & SURGICAL HISTORY  Vitamin D deficiency    Constipation    Chronic respiratory failure  secondary to COPD    Folate-deficiency anemia    Iron deficiency anemia    Anemia of chronic disease    Pericardial effusion without cardiac tamponade  h/o trace pericardial effusion    BPH (benign prostatic hyperplasia)    Chronic pain    Peripheral neuropathy    Type 2 diabetes mellitus    AICD (automatic cardioverter/defibrillator) present  SJM    Ventricular tachycardia    Chronic heart failure with preserved ejection fraction    PTSD (post-traumatic stress disorder)    Supraventricular arrhythmia  prior history    Cardiomyopathy    CHF (congestive heart failure)    Atrial fibrillation  s/p ablation, on eliquis    BPH (benign prostatic hyperplasia)    HTN (hypertension)    Seizures    Migraines    Pneumonia    MS (multiple sclerosis)    CAD (coronary artery disease)    Bipolar disorder    Anginal pain    Schizo affective schizophrenia    GERD (gastroesophageal reflux disease)    Seasonal allergies    Hypercholesteremia    COPD (chronic obstructive pulmonary disease)    CVA (cerebral vascular accident)    TIA (transient ischemic attack)    Peripheral Neuropathy    Clinical Depression    Displacement of electrode lead of cardiac pacemaker    History of evacuation of hematoma  AICD pocket    H/O prior ablation treatment  for Afib    H/O hernia repair  right 1972,1991    History of hip replacement    S/P Orchiectomy  L for testicular CA    S/P Implantation of AICD      ALLERGIES:  dexmethylphenidate (Unknown)  Dilantin (Rash)  dilantin, compazine, , ritalin, phenergan (Unknown)  Haldol (Unknown)  hydantoin derivatives (Other)  methylphenidate (Unknown)  phenytoin (Unknown)  prochlorperazine (Unknown)  promethazine (Dystonic RXN)  rash/hives (Anaphylaxis)  thioxanthenes (Unknown)    MEDICATIONS:  STANDING MEDICATIONS  apixaban 5 milliGRAM(s) Oral every 12 hours  aspirin enteric coated 81 milliGRAM(s) Oral daily  atorvastatin 40 milliGRAM(s) Oral at bedtime  baclofen 10 milliGRAM(s) Oral every 8 hours  chlorhexidine 4% Liquid 1 Application(s) Topical <User Schedule>  folic acid 1 milliGRAM(s) Oral daily  gabapentin 400 milliGRAM(s) Oral three times a day  lamoTRIgine 200 milliGRAM(s) Oral two times a day  metoprolol succinate ER 50 milliGRAM(s) Oral daily  pantoprazole    Tablet 40 milliGRAM(s) Oral before breakfast  QUEtiapine 25 milliGRAM(s) Oral daily  sertraline 50 milliGRAM(s) Oral daily  tamsulosin 0.4 milliGRAM(s) Oral at bedtime  tiotropium 18 MICROgram(s) Capsule 1 Capsule(s) Inhalation daily    PRN MEDICATIONS      VITAL SIGNS: Last 24 Hours  T(C): 36.3 (23 Feb 2021 05:05), Max: 36.4 (22 Feb 2021 20:50)  T(F): 97.4 (23 Feb 2021 05:05), Max: 97.6 (22 Feb 2021 20:50)  HR: 71 (23 Feb 2021 05:05) (69 - 71)  BP: 103/57 (23 Feb 2021 05:05) (97/56 - 124/68)  BP(mean): --  RR: 18 (23 Feb 2021 05:05) (18 - 20)  SpO2: 95% (22 Feb 2021 19:40) (95% - 95%)    LABS:                        12.1   6.32  )-----------( 220      ( 23 Feb 2021 05:29 )             37.2     02-23    139  |  103  |  19  ----------------------------<  93  4.5   |  24  |  0.8    Ca    9.2      23 Feb 2021 05:29  Mg     1.8     02-23    TPro  6.3  /  Alb  3.9  /  TBili  0.2  /  DBili  x   /  AST  14  /  ALT  10  /  AlkPhos  101  02-23    PT/INR - ( 21 Feb 2021 19:48 )   PT: 17.80 sec;   INR: 1.55 ratio         PTT - ( 21 Feb 2021 19:48 )  PTT:45.3 sec          CARDIAC MARKERS ( 22 Feb 2021 05:35 )  x     / <0.01 ng/mL / 49 U/L / x     / <1.0 ng/mL  CARDIAC MARKERS ( 21 Feb 2021 19:48 )  x     / <0.01 ng/mL / x     / x     / x          PHYSICAL EXAM:  GENERAL: NAD, well-developed, resting in bed   HEENT:  Atraumatic, Normocephalic. conjunctiva and sclera clear, No JVD  PULMONARY: Clear to auscultation bilaterally; No wheeze  CARDIOVASCULAR: Regular rate and rhythm; No murmurs, rubs, or gallops  GASTROINTESTINAL: Soft, Nontender, Nondistended; Bowel sounds present  MUSCULOSKELETAL:  No clubbing, cyanosis, or edema  NEUROLOGY: non-focal  SKIN: No rashes or lesions        ASSESSMENT & PLAN   50y Male with a past medical history as described above who presented for chest pain and was told an arrhythmia was present on his device.     # Chest Pain - rule-out ACS   # CAD, Arrhythmogenic RV dysplasia s/p AICD   - previously admitted to [facility] but patient left AMA - medical records show trops of 0.05 -> 0.03   - trops neg x2   - Continue ASA, Eliquis, Lipitor and Toprol   - EP interrogation of AICD - no events   - Follow-up cardiology consult - Dr Pradhan     # MS  - Continue Baclofen, Lamictal, and gabapentin   - F/u pain management     # Schizoaffective disorder  - Continue Seroquel, Zoloft     # COPD  - Continue tiotropium daily     # GERD  - Continue Protonix 40mg PO daily     # BPH  - Continue Flomax 0.4mg daily     # Folate deficiency   - Continue folic acid 1mg PO daily     DVT ppx: Eliquis 5mg q12   GI ppx: Protonix 40mg daily   Diet: DASH/TLC   Activity: IAT   Dispo: From [facility]  Code: FULL  
CUATE HORNE 50y Male  MRN#: [id number]   CODE STATUS: Full       SUBJECTIVE  Patient is a 50y old Male who presents with a chief complaint of Chest Pain (22 Feb 2021 09:41)  Currently admitted to medicine with the primary diagnosis of Chest pain    Today is hospital day , and this morning he is resting comfortably in bed with no complaints. Patient denies having chest pain.   No overnight events.       OBJECTIVE  PAST MEDICAL & SURGICAL HISTORY  Vitamin D deficiency    Constipation    Chronic respiratory failure  secondary to COPD    Folate-deficiency anemia    Iron deficiency anemia    Anemia of chronic disease    Pericardial effusion without cardiac tamponade  h/o trace pericardial effusion    BPH (benign prostatic hyperplasia)    Chronic pain    Peripheral neuropathy    Type 2 diabetes mellitus    AICD (automatic cardioverter/defibrillator) present  SJM    Ventricular tachycardia    Chronic heart failure with preserved ejection fraction    PTSD (post-traumatic stress disorder)    Supraventricular arrhythmia  prior history    Cardiomyopathy    CHF (congestive heart failure)    Atrial fibrillation  s/p ablation, on eliquis    BPH (benign prostatic hyperplasia)    HTN (hypertension)    Seizures    Migraines    Pneumonia    MS (multiple sclerosis)    CAD (coronary artery disease)    Bipolar disorder    Anginal pain    Schizo affective schizophrenia    GERD (gastroesophageal reflux disease)    Seasonal allergies    Hypercholesteremia    COPD (chronic obstructive pulmonary disease)    CVA (cerebral vascular accident)    TIA (transient ischemic attack)    Peripheral Neuropathy    Clinical Depression    Displacement of electrode lead of cardiac pacemaker    History of evacuation of hematoma  AICD pocket    H/O prior ablation treatment  for Afib    H/O hernia repair  right 1972,1991    History of hip replacement    S/P Orchiectomy  L for testicular CA    S/P Implantation of AICD      ALLERGIES:  dexmethylphenidate (Unknown)  Dilantin (Rash)  dilantin, compazine, , ritalin, phenergan (Unknown)  Haldol (Unknown)  hydantoin derivatives (Other)  methylphenidate (Unknown)  phenytoin (Unknown)  prochlorperazine (Unknown)  promethazine (Dystonic RXN)  rash/hives (Anaphylaxis)  thioxanthenes (Unknown)    MEDICATIONS:  STANDING MEDICATIONS  apixaban 5 milliGRAM(s) Oral every 12 hours  aspirin enteric coated 81 milliGRAM(s) Oral daily  atorvastatin 40 milliGRAM(s) Oral at bedtime  baclofen 10 milliGRAM(s) Oral every 8 hours  chlorhexidine 4% Liquid 1 Application(s) Topical <User Schedule>  folic acid 1 milliGRAM(s) Oral daily  gabapentin 400 milliGRAM(s) Oral three times a day  lamoTRIgine 200 milliGRAM(s) Oral two times a day  metoprolol succinate ER 50 milliGRAM(s) Oral daily  pantoprazole    Tablet 40 milliGRAM(s) Oral before breakfast  QUEtiapine 25 milliGRAM(s) Oral daily  sertraline 50 milliGRAM(s) Oral daily  tamsulosin 0.4 milliGRAM(s) Oral at bedtime  tiotropium 18 MICROgram(s) Capsule 1 Capsule(s) Inhalation daily    PRN MEDICATIONS      VITAL SIGNS: Last 24 Hours  T(C): 35.7 (22 Feb 2021 03:10), Max: 36.1 (21 Feb 2021 19:04)  T(F): 96.2 (22 Feb 2021 03:10), Max: 97 (21 Feb 2021 19:04)  HR: 69 (22 Feb 2021 03:10) (69 - 70)  BP: 108/65 (22 Feb 2021 03:10) (108/65 - 110/68)  BP(mean): --  RR: 19 (22 Feb 2021 03:10) (18 - 19)  SpO2: 98% (22 Feb 2021 03:05) (94% - 98%)    LABS:                        12.1   6.55  )-----------( 231      ( 21 Feb 2021 19:48 )             37.4     02-21    140  |  105  |  17  ----------------------------<  82  4.0   |  24  |  0.6<L>    Ca    9.8      21 Feb 2021 19:48    TPro  6.5  /  Alb  4.2  /  TBili  0.3  /  DBili  x   /  AST  14  /  ALT  11  /  AlkPhos  113  02-21    PT/INR - ( 21 Feb 2021 19:48 )   PT: 17.80 sec;   INR: 1.55 ratio         PTT - ( 21 Feb 2021 19:48 )  PTT:45.3 sec      Creatine Kinase, Serum: 49 U/L (02-22-21 @ 05:35)  Troponin T, Serum: <0.01 ng/mL (02-22-21 @ 05:35)  Troponin T, Serum: <0.01 ng/mL (02-21-21 @ 19:48)      CARDIAC MARKERS ( 22 Feb 2021 05:35 )  x     / <0.01 ng/mL / 49 U/L / x     / <1.0 ng/mL  CARDIAC MARKERS ( 21 Feb 2021 19:48 )  x     / <0.01 ng/mL / x     / x     / x          RADIOLOGY:  < from: Xray Chest 1 View-PORTABLE IMMEDIATE (Xray Chest 1 View-PORTABLE IMMEDIATE .) (02.21.21 @ 21:13) >  EXAM:  XR CHEST PORTABLE IMMED 1V        PROCEDURE DATE:  02/21/2021    INTERPRETATION:  Clinical History / Reason for exam: Chest pain.  Comparison : Chest radiograph November 2, 2019.  Technique/Positioning: Frontal portable.    Findings:  Support devices: Telemetry leads overlie the thorax. Dual-chamber left-sided ICD.  Cardiac/mediastinum/hilum: Heart is enlarged.  Lung parenchyma/Pleura: Right midlung granulomata.  Skeleton/soft tissues: Unchanged    Impression:  No radiographic evidence of acute cardiopulmonary disease.  Support devices as described.    MELISSA SANCHEZ MD; Attending Interventional Radiologist  This document has been electronically signed. Feb 22 2021  9:13AM  < end of copied text >    PHYSICAL EXAM:  GENERAL: NAD, well-developed, resting in bed   HEENT:  Atraumatic, Normocephalic. conjunctiva and sclera clear, No JVD  PULMONARY: Clear to auscultation bilaterally; No wheeze  CARDIOVASCULAR: Regular rate and rhythm; No murmurs, rubs, or gallops  GASTROINTESTINAL: Soft, Nontender, Nondistended; Bowel sounds present  MUSCULOSKELETAL:  No clubbing, cyanosis, or edema  NEUROLOGY: non-focal  SKIN: No rashes or lesions        ASSESSMENT & PLAN   50y Male with a past medical history as described above who presented for chest pain and was told an arrhythmia was present on his device.     # Chest Pain - rule-out ACS   # CAD, Arrhythmogenic RV dysplasia s/p AICD   - previously admitted to [facility] but patient left AMA - F/u medical records    - trops neg x2   - Continue ASA, Eliquis, Lipitor and Toprol   - Follow-up cardiology consult - Dr Pradhan   - F/u EPS consult Ishmael   - Follow-up EKG in AM    # MS  - Continue Baclofen, Lamictal, and gabapentin   - F/u pain management     # Schizoaffective disorder  - Continue Seroquel, Zoloft     # COPD  - Continue tiotropium daily     # GERD  - Continue Protonix 40mg PO daily     # BPH  - Continue Flomax 0.4mg daily     # Folate deficiency   - Continue folic acid 1mg PO daily     DVT ppx: Eliquis 5mg q12   GI ppx: Protonix 40mg daily   Diet: DASH/TLC   Activity: IAT   Dispo: From home   Code: FULL 
  CUATE HORNE  50y Male    CHIEF COMPLAINT:    Patient is a 50y old  Male who presents with a chief complaint of Chest Pain (2021 08:48)      INTERVAL HPI/OVERNIGHT EVENTS:    Patient seen and examined. No cp. No sob. Scheduled for cath today    ROS: All other systems are negative.    Vital Signs:    T(F): 97 (21 @ 04:41), Max: 97.2 (21 @ 13:43)  HR: 71 (21 @ 04:41) (70 - 71)  BP: 118/69 (21 @ 04:41) (108/69 - 128/68)  RR: 18 (21 @ 04:41) (18 - 18)  SpO2: 93% (21 @ 04:41) (93% - 93%)  I&O's Summary    2021 07:01  -  2021 07:00  --------------------------------------------------------  IN: 500 mL / OUT: 350 mL / NET: 150 mL    2021 07:01  -  2021 11:49  --------------------------------------------------------  IN: 200 mL / OUT: 400 mL / NET: -200 mL      Daily     Daily Weight in k.2 (2021 04:41)  CAPILLARY BLOOD GLUCOSE          PHYSICAL EXAM:    GENERAL:  NAD  SKIN: No rashes or lesions  HENT: Atraumatic Normocephalic. PERRL. Moist membranes.  NECK: Supple, No JVD. No lymphadenopathy.  PULMONARY: CTA B/L. No wheezing. No rales  CVS: Normal S1, S2. Rate and Rhythm are regular. No murmurs.  ABDOMEN/GI: Soft, Nontender, Nondistended; BS present  EXTREMITIES: Peripheral pulses intact. No edema B/L LE.  NEUROLOGIC:  No motor or sensory deficit.  PSYCH: Alert & oriented x 3    Consultant(s) Notes Reviewed:  [x ] YES  [ ] NO  Care Discussed with Consultants/Other Providers [ x] YES  [ ] NO    EKG reviewed  Telemetry reviewed    LABS:                        12.1   6.32  )-----------( 220      ( 2021 05:29 )             37.2         139  |  103  |  19  ----------------------------<  93  4.5   |  24  |  0.8    Ca    9.2      2021 05:29  Mg     1.8         TPro  6.3  /  Alb  3.9  /  TBili  0.2  /  DBili  x   /  AST  14  /  ALT  10  /  AlkPhos  101        Serum Pro-Brain Natriuretic Peptide: 157 pg/mL (21 @ 20:57)    Trop <0.01, CKMB <1.0, CK 49, 21 @ 05:35  Trop <0.01, CKMB --, CK --, 21 @ 19:48        RADIOLOGY & ADDITIONAL TESTS:      Imaging or report Personally Reviewed:  [ ] YES  [ ] NO    Medications:  Standing  aspirin enteric coated 81 milliGRAM(s) Oral daily  atorvastatin 40 milliGRAM(s) Oral at bedtime  baclofen 10 milliGRAM(s) Oral every 8 hours  chlorhexidine 4% Liquid 1 Application(s) Topical <User Schedule>  folic acid 1 milliGRAM(s) Oral daily  gabapentin 400 milliGRAM(s) Oral three times a day  lamoTRIgine 200 milliGRAM(s) Oral two times a day  metoprolol succinate ER 50 milliGRAM(s) Oral daily  pantoprazole    Tablet 40 milliGRAM(s) Oral before breakfast  QUEtiapine 25 milliGRAM(s) Oral daily  sertraline 50 milliGRAM(s) Oral daily  tamsulosin 0.4 milliGRAM(s) Oral at bedtime  tiotropium 18 MICROgram(s) Capsule 1 Capsule(s) Inhalation daily  traMADol 25 milliGRAM(s) Oral once    PRN Meds      Case discussed with resident    Care discussed with pt/family        
  CUATE HORNE  50y Male    CHIEF COMPLAINT:    Patient is a 50y old  Male who presents with a chief complaint of Chest Pain (2021 11:24)      INTERVAL HPI/OVERNIGHT EVENTS:    Patient seen and examined. Feels better today. No cp or sob. No palpitations.     ROS: All other systems are negative.    Vital Signs:    T(F): 97.2 (21 @ 13:43), Max: 97.6 (21 @ 20:50)  HR: 70 (21 @ 13:43) (69 - 71)  BP: 108/69 (21 @ 13:43) (97/56 - 124/68)  RR: 18 (21 @ 13:43) (18 - 20)  SpO2: 95% (21 @ 19:40) (95% - 95%)  I&O's Summary    2021 07:01  -  2021 07:00  --------------------------------------------------------  IN: 410 mL / OUT: 1100 mL / NET: -690 mL    2021 07:01  -  2021 14:23  --------------------------------------------------------  IN: 500 mL / OUT: 0 mL / NET: 500 mL      Daily     Daily Weight in k.3 (2021 05:41)  CAPILLARY BLOOD GLUCOSE          PHYSICAL EXAM:    GENERAL:  NAD  SKIN: No rashes or lesions  HENT: Atraumatic Normocephalic. PERRL. Moist membranes.  NECK: Supple, No JVD. No lymphadenopathy.  PULMONARY: CTA B/L. No wheezing. No rales  CVS: Normal S1, S2. Rate and Rhythm are regular. No murmurs.  ABDOMEN/GI: Soft, Nontender, Nondistended; BS present  EXTREMITIES: Peripheral pulses intact. No edema B/L LE.  NEUROLOGIC:  No motor or sensory deficit.  PSYCH: Alert & oriented x 3    Consultant(s) Notes Reviewed:  [x ] YES  [ ] NO  Care Discussed with Consultants/Other Providers [ x] YES  [ ] NO    EKG reviewed  Telemetry reviewed    LABS:                        12.1   6.32  )-----------( 220      ( 2021 05:29 )             37.2         139  |  103  |  19  ----------------------------<  93  4.5   |  24  |  0.8    Ca    9.2      2021 05:29  Mg     1.8         TPro  6.3  /  Alb  3.9  /  TBili  0.2  /  DBili  x   /  AST  14  /  ALT  10  /  AlkPhos  101      PT/INR - ( 2021 19:48 )   PT: 17.80 sec;   INR: 1.55 ratio         PTT - ( 2021 19:48 )  PTT:45.3 sec  Serum Pro-Brain Natriuretic Peptide: 157 pg/mL (21 @ 20:57)    Trop <0.01, CKMB <1.0, CK 49, 21 @ 05:35  Trop <0.01, CKMB --, CK --, 21 @ 19:48        RADIOLOGY & ADDITIONAL TESTS:    < from: TTE Echo Complete w/o Contrast w/ Doppler (21 @ 14:28) >    Summary:   1. Left ventricular ejection fraction, by visual estimation, is 55 to 60%.   2. Normal global left ventricular systolic function.   3. Normal left ventricular internal cavity size.   4. Normal left atrial size.   5. Normal right atrial size.   6. No evidence of mitral valve regurgitation.   7. Mild tricuspid regurgitation.   8. LA volume Index is 20.4 ml/m² ml/m2.    < end of copied text >    Imaging or report Personally Reviewed:  [ ] YES  [ ] NO    Medications:  Standing  apixaban 5 milliGRAM(s) Oral every 12 hours  aspirin enteric coated 81 milliGRAM(s) Oral daily  atorvastatin 40 milliGRAM(s) Oral at bedtime  baclofen 10 milliGRAM(s) Oral every 8 hours  chlorhexidine 4% Liquid 1 Application(s) Topical <User Schedule>  folic acid 1 milliGRAM(s) Oral daily  gabapentin 400 milliGRAM(s) Oral three times a day  lamoTRIgine 200 milliGRAM(s) Oral two times a day  metoprolol succinate ER 50 milliGRAM(s) Oral daily  pantoprazole    Tablet 40 milliGRAM(s) Oral before breakfast  QUEtiapine 25 milliGRAM(s) Oral daily  sertraline 50 milliGRAM(s) Oral daily  tamsulosin 0.4 milliGRAM(s) Oral at bedtime  tiotropium 18 MICROgram(s) Capsule 1 Capsule(s) Inhalation daily    PRN Meds      Case discussed with resident    Care discussed with pt/family

## 2021-02-25 NOTE — CONSULT NOTE ADULT - SUBJECTIVE AND OBJECTIVE BOX
HPI:  Chief Complaint: Chest Pain    Past Medical History: CAD, arrhythmogenic RV dysplasia w/ AICD implant, atrial fib s/p ablation recently, epilepsy, MS, peripheral neuropathy, COPD, schizoaffective disorder     Mr. Miguel is a 50M with a past medical history as described above who presented to the hospital for worsening chest pain. He states chest pain began to occur intermittently over the last week. He describes the pain as retrosternal chest pain, sometimes radiating to the left arm that lasts a few minutes and resolves spontaneously. He was admitted at [facility] on 2/16 for chest pain and what he reports as an "NSTEMI" and reduced EF. He was asking to be transferred to [facility], and left AMA on 2/19 since he would not be transferred. He says they were planning to do a cardiac cath. Yesterday afternoon, he received a call from [employer] his [employer], and informed him that he was in an arrythmia. He was nervous about this and came to the ED for evaluation. He reports that he felt palpitations as well.    In the ED, he was vitally stable. Labs are unremarkable, other than mild anemia and elevated coags (pt is on Eliquis.) Admitted to [facility] [place] for observation and Cardiology/EPS evaluation.   (22 Feb 2021 03:33)      PAST MEDICAL & SURGICAL HISTORY  Vitamin D deficiency    Constipation    Chronic respiratory failure  secondary to COPD    Folate-deficiency anemia    Iron deficiency anemia    Anemia of chronic disease    Pericardial effusion without cardiac tamponade  h/o trace pericardial effusion    BPH (benign prostatic hyperplasia)    Chronic pain    Peripheral neuropathy    Type 2 diabetes mellitus    AICD (automatic cardioverter/defibrillator) present  SJM    Ventricular tachycardia    Chronic heart failure with preserved ejection fraction    PTSD (post-traumatic stress disorder)    Supraventricular arrhythmia  prior history    Cardiomyopathy    CHF (congestive heart failure)    Atrial fibrillation  s/p ablation, on eliquis    BPH (benign prostatic hyperplasia)    HTN (hypertension)    Seizures    Migraines    Pneumonia    MS (multiple sclerosis)    CAD (coronary artery disease)    Bipolar disorder    Anginal pain    Schizo affective schizophrenia    GERD (gastroesophageal reflux disease)    Seasonal allergies    Hypercholesteremia    COPD (chronic obstructive pulmonary disease)    CVA (cerebral vascular accident)    TIA (transient ischemic attack)    Peripheral Neuropathy    Clinical Depression    Displacement of electrode lead of cardiac pacemaker    History of evacuation of hematoma  AICD pocket    H/O prior ablation treatment  for Afib    H/O hernia repair  right 1972,1991    History of hip replacement    S/P Orchiectomy  L for testicular CA    S/P Implantation of AICD        FAMILY HISTORY:  FAMILY HISTORY:  Family history of colon cancer in mother    Family history of cardiomyopathy  Mother    Family history of cervical cancer (Mother)    Family history of early CAD (Mother)        SOCIAL HISTORY:  [X]smoker  []Alcohol  []Drug    ALLERGIES:  dexmethylphenidate (Unknown)  Dilantin (Rash)  dilantin, compazine, , ritalin, phenergan (Unknown)  Haldol (Unknown)  hydantoin derivatives (Other)  methylphenidate (Unknown)  phenytoin (Unknown)  prochlorperazine (Unknown)  promethazine (Dystonic RXN)  rash/hives (Anaphylaxis)  thioxanthenes (Unknown)      MEDICATIONS:  MEDICATIONS  (STANDING):  apixaban 5 milliGRAM(s) Oral every 12 hours  aspirin enteric coated 81 milliGRAM(s) Oral daily  atorvastatin 40 milliGRAM(s) Oral at bedtime  baclofen 10 milliGRAM(s) Oral every 8 hours  chlorhexidine 4% Liquid 1 Application(s) Topical <User Schedule>  folic acid 1 milliGRAM(s) Oral daily  gabapentin 400 milliGRAM(s) Oral three times a day  lamoTRIgine 200 milliGRAM(s) Oral two times a day  metoprolol succinate ER 50 milliGRAM(s) Oral daily  pantoprazole    Tablet 40 milliGRAM(s) Oral before breakfast  QUEtiapine 25 milliGRAM(s) Oral daily  sertraline 50 milliGRAM(s) Oral daily  tamsulosin 0.4 milliGRAM(s) Oral at bedtime  tiotropium 18 MICROgram(s) Capsule 1 Capsule(s) Inhalation daily    MEDICATIONS  (PRN):      HOME MEDICATIONS:  Home Medications:  apixaban 5 mg oral tablet: 1 tab(s) orally every 12 hours (22 Feb 2021 03:48)  aspirin 81 mg oral tablet, chewable: 1 tab(s) orally once a day (22 Feb 2021 03:48)  atorvastatin 40 mg oral tablet: 1 tab(s) orally once a day (at bedtime) (22 Feb 2021 03:48)  baclofen 10 mg oral tablet: 1 tab(s) orally 3 times a day (22 Feb 2021 03:48)  folic acid 1 mg oral tablet: 1 tab(s) orally once a day (22 Feb 2021 03:48)  gabapentin 400 mg oral capsule: 1 cap(s) orally 3 times a day (22 Feb 2021 03:48)  lamoTRIgine 100 mg oral tablet: 2 tab(s) orally 2 times a day (22 Feb 2021 03:48)  metoprolol succinate 50 mg oral tablet, extended release: 1 tab(s) orally once a day; MAY GIVE 4 TABS OF 12.5MG AT ONE TIME FOR DOSING (22 Feb 2021 03:48)  pantoprazole 40 mg oral delayed release tablet: 1 tab(s) orally once a day (22 Feb 2021 03:48)  QUEtiapine 25 mg oral tablet: 1 tab(s) orally once a day (22 Feb 2021 03:48)  sertraline 50 mg oral tablet: 1 tab(s) orally once a day (22 Feb 2021 03:48)  tamsulosin 0.4 mg oral capsule: 1 cap(s) orally once a day (at bedtime) (22 Feb 2021 03:48)  Tecfidera 240 mg oral delayed release capsule: 1 cap(s) orally 2 times a day (22 Feb 2021 03:48)  tiotropium 18 mcg inhalation capsule: 1 cap(s) inhaled once a day (22 Feb 2021 03:48)      VITALS:   T(F): 96.2 (02-22 @ 03:10), Max: 97 (02-21 @ 19:04)  HR: 69 (02-22 @ 03:10) (69 - 70)  BP: 108/65 (02-22 @ 03:10) (108/65 - 110/68)  BP(mean): --  RR: 19 (02-22 @ 03:10) (18 - 19)  SpO2: 98% (02-22 @ 03:05) (94% - 98%)    I&O's Summary    21 Feb 2021 07:01  -  22 Feb 2021 07:00  --------------------------------------------------------  IN: 120 mL / OUT: 0 mL / NET: 120 mL    22 Feb 2021 07:01  -  22 Feb 2021 09:41  --------------------------------------------------------  IN: 200 mL / OUT: 0 mL / NET: 200 mL        REVIEW OF SYSTEMS:  CONSTITUTIONAL: No weakness, fevers or chills  EYES: No visual changes  ENT: No vertigo or throat pain   NECK: No pain or stiffness  RESPIRATORY: No cough, wheezing, hemoptysis; No shortness of breath  CARDIOVASCULAR: Chest pain as described in HPI  GASTROINTESTINAL: No abdominal or epigastric pain. No nausea, vomiting, or hematemesis; No diarrhea or constipation. No melena or hematochezia.  GENITOURINARY: No dysuria, frequency or hematuria  NEUROLOGICAL: No numbness or weakness  SKIN: No itching, no rashes  MSK: No pain    PHYSICAL EXAM:  NEURO: patient is awake , alert and oriented  GEN: Not in acute distress  NECK: no thyroid enlargement, no JVD  LUNGS: Clear to auscultation bilaterally   CARDIOVASCULAR: S1/S2 present, RRR  ABD: Soft, non-tender, non-distended  EXT: No TIM  SKIN: Intact    LABS:                        12.1   6.55  )-----------( 231      ( 21 Feb 2021 19:48 )             37.4     02-21    140  |  105  |  17  ----------------------------<  82  4.0   |  24  |  0.6<L>    Ca    9.8      21 Feb 2021 19:48    TPro  6.5  /  Alb  4.2  /  TBili  0.3  /  DBili  x   /  AST  14  /  ALT  11  /  AlkPhos  113  02-21    PT/INR - ( 21 Feb 2021 19:48 )   PT: 17.80 sec;   INR: 1.55 ratio         PTT - ( 21 Feb 2021 19:48 )  PTT:45.3 sec  Creatine Kinase, Serum: 49 U/L (02-22-21 @ 05:35)  Troponin T, Serum: <0.01 ng/mL (02-22-21 @ 05:35)  Troponin T, Serum: <0.01 ng/mL (02-21-21 @ 19:48)    CARDIAC MARKERS ( 22 Feb 2021 05:35 )  x     / <0.01 ng/mL / 49 U/L / x     / <1.0 ng/mL  CARDIAC MARKERS ( 21 Feb 2021 19:48 )  x     / <0.01 ng/mL / x     / x     / x            Troponin trend:    Serum Pro-Brain Natriuretic Peptide: 157 pg/mL (02-21-21 @ 20:57)          RADIOLOGY:  -CXR:    < from: Xray Chest 1 View-PORTABLE IMMEDIATE (Xray Chest 1 View-PORTABLE IMMEDIATE .) (02.21.21 @ 21:13) >  Findings:    Support devices: Telemetry leads overlie the thorax. Dual-chamber left-sided ICD.    Cardiac/mediastinum/hilum: Heart is enlarged.    Lung parenchyma/Pleura: Right midlung granulomata.    Skeleton/soft tissues: Unchanged    Impression:    No radiographic evidence of acute cardiopulmonary disease.    Support devices as described.    < end of copied text >  -TTE:    < from: Transthoracic Echocardiogram (11.03.19 @ 11:11) >  Summary:   1. Left ventricular ejection fraction, by visual estimation, is 45 to   50%.   2. Mildly decreased global left ventricular systolic function.   3. Entire septum and basal and mid inferior wall are abnormal as   described above.   4. Trivial pericardial effusion.    < end of copied text >  -CCTA:  -STRESS TEST:  -CATHETERIZATION      ECG: atrial paced; unchanged from previous ECG    TELEMETRY EVENTS:  
Pain Management Consult Note  02-25-21 @ 15:09     Past Medical History: CAD, arrhythmogenic RV dysplasia w/ AICD implant, atrial fib s/p ablation, epilepsy, MS, peripheral neuropathy, COPD, schizoaffective disorder. Presented to the ED for worsening chest pain x1 week. Patient was admitted to [facility] on 2/16/21 for a NSTEMI. Patient left AMA.    Patient now complains of pain to the lower back with pain radiating to bilat lower ext's.         Allergies    dexmethylphenidate (Unknown)  Dilantin (Rash)  dilantin, compazine, , ritalin, phenergan (Unknown)  Haldol (Unknown)  hydantoin derivatives (Other)  methylphenidate (Unknown)  phenytoin (Unknown)  prochlorperazine (Unknown)  promethazine (Dystonic RXN)  rash/hives (Anaphylaxis)  thioxanthenes (Unknown)    Intolerances        PAST MEDICAL & SURGICAL HISTORY:  Vitamin D deficiency    Constipation    Chronic respiratory failure  secondary to COPD    Folate-deficiency anemia    Iron deficiency anemia    Anemia of chronic disease    Pericardial effusion without cardiac tamponade  h/o trace pericardial effusion    BPH (benign prostatic hyperplasia)    Chronic pain    Peripheral neuropathy    Type 2 diabetes mellitus    AICD (automatic cardioverter/defibrillator) present  SJM    Ventricular tachycardia    Chronic heart failure with preserved ejection fraction    PTSD (post-traumatic stress disorder)    Supraventricular arrhythmia  prior history    Cardiomyopathy    CHF (congestive heart failure)    Atrial fibrillation  s/p ablation, on eliquis    BPH (benign prostatic hyperplasia)    HTN (hypertension)    Seizures    Migraines    Pneumonia    MS (multiple sclerosis)    CAD (coronary artery disease)    Bipolar disorder    Anginal pain    Schizo affective schizophrenia    GERD (gastroesophageal reflux disease)    Seasonal allergies    Hypercholesteremia    COPD (chronic obstructive pulmonary disease)    CVA (cerebral vascular accident)    TIA (transient ischemic attack)    Peripheral Neuropathy    Clinical Depression    Displacement of electrode lead of cardiac pacemaker    History of evacuation of hematoma  AICD pocket    H/O prior ablation treatment  for Afib    H/O hernia repair  right 1972,1991    History of hip replacement    S/P Orchiectomy  L for testicular CA    S/P Implantation of AICD        Home Medications:  apixaban 5 mg oral tablet: 1 tab(s) orally every 12 hours (22 Feb 2021 03:48)  aspirin 81 mg oral tablet, chewable: 1 tab(s) orally once a day (22 Feb 2021 03:48)  atorvastatin 40 mg oral tablet: 1 tab(s) orally once a day (at bedtime) (22 Feb 2021 03:48)  baclofen 10 mg oral tablet: 1 tab(s) orally 3 times a day (22 Feb 2021 03:48)  folic acid 1 mg oral tablet: 1 tab(s) orally once a day (22 Feb 2021 03:48)  gabapentin 400 mg oral capsule: 1 cap(s) orally 3 times a day (22 Feb 2021 03:48)  lamoTRIgine 100 mg oral tablet: 2 tab(s) orally 2 times a day (22 Feb 2021 03:48)  metoprolol succinate 50 mg oral tablet, extended release: 1 tab(s) orally once a day; MAY GIVE 4 TABS OF 12.5MG AT ONE TIME FOR DOSING (22 Feb 2021 03:48)  pantoprazole 40 mg oral delayed release tablet: 1 tab(s) orally once a day (22 Feb 2021 03:48)  QUEtiapine 25 mg oral tablet: 1 tab(s) orally 3 times a day (25 Feb 2021 14:28)  sertraline 50 mg oral tablet: 1 tab(s) orally once a day (22 Feb 2021 03:48)  tamsulosin 0.4 mg oral capsule: 1 cap(s) orally once a day (at bedtime) (22 Feb 2021 03:48)  Tecfidera 240 mg oral delayed release capsule: 1 cap(s) orally 2 times a day (22 Feb 2021 03:48)  tiotropium 18 mcg inhalation capsule: 1 cap(s) inhaled once a day (22 Feb 2021 03:48)      SOCIAL HISTORY:  Denies Smoking, Alcohol, or Drug Use    dexmethylphenidate (Unknown)  Dilantin (Rash)  dilantin, compazine, , ritalin, phenergan (Unknown)  Haldol (Unknown)  hydantoin derivatives (Other)  methylphenidate (Unknown)  phenytoin (Unknown)  prochlorperazine (Unknown)  promethazine (Dystonic RXN)  rash/hives (Anaphylaxis)  thioxanthenes (Unknown)      MEDICATIONS  (STANDING):  apixaban 5 milliGRAM(s) Oral every 12 hours  aspirin enteric coated 81 milliGRAM(s) Oral daily  atorvastatin 40 milliGRAM(s) Oral at bedtime  baclofen 10 milliGRAM(s) Oral every 8 hours  chlorhexidine 4% Liquid 1 Application(s) Topical <User Schedule>  folic acid 1 milliGRAM(s) Oral daily  gabapentin 400 milliGRAM(s) Oral three times a day  lamoTRIgine 200 milliGRAM(s) Oral two times a day  magnesium oxide 400 milliGRAM(s) Oral three times a day with meals  metoprolol succinate ER 50 milliGRAM(s) Oral daily  pantoprazole    Tablet 40 milliGRAM(s) Oral before breakfast  QUEtiapine 25 milliGRAM(s) Oral daily  sertraline 50 milliGRAM(s) Oral daily  sodium chloride 0.9%. 1000 milliLiter(s) (75 mL/Hr) IV Continuous <Continuous>  tamsulosin 0.4 milliGRAM(s) Oral at bedtime  tiotropium 18 MICROgram(s) Capsule 1 Capsule(s) Inhalation daily    MEDICATIONS  (PRN):      Vital Signs Last 24 Hrs  T(C): 36.8 (25 Feb 2021 13:59), Max: 37 (25 Feb 2021 05:15)  T(F): 98.2 (25 Feb 2021 13:59), Max: 98.6 (25 Feb 2021 05:15)  HR: 69 (25 Feb 2021 13:59) (69 - 72)  BP: 110/60 (25 Feb 2021 13:59) (107/56 - 110/60)  BP(mean): --  RR: 18 (25 Feb 2021 13:59) (18 - 18)  SpO2: --      02-24-21 @ 07:01  -  02-25-21 @ 07:00  --------------------------------------------------------  IN: 200 mL / OUT: 700 mL / NET: -500 mL        LABS:                          12.3   5.98  )-----------( 212      ( 25 Feb 2021 05:42 )             37.3     02-25    143  |  107  |  28<H>  ----------------------------<  90  4.6   |  23  |  1.4    Ca    9.6      25 Feb 2021 05:42  Mg     1.7     02-25    TPro  6.3  /  Alb  3.9  /  TBili  <0.2  /  DBili  x   /  AST  13  /  ALT  11  /  AlkPhos  115  02-25    LIVER FUNCTIONS - ( 25 Feb 2021 05:42 )  Alb: 3.9 g/dL / Pro: 6.3 g/dL / ALK PHOS: 115 U/L / ALT: 11 U/L / AST: 13 U/L / GGT: x                   RADIOLOGY:  EXAM:  XR CHEST PORTABLE IMMED 1V            PROCEDURE DATE:  02/21/2021            INTERPRETATION:  Clinical History / Reason for exam: Chest pain.    Comparison : Chest radiograph November 2, 2019.    Technique/Positioning: Frontal portable.    Findings:    Support devices: Telemetry leads overlie the thorax. Dual-chamber left-sided ICD.    Cardiac/mediastinum/hilum: Heart is enlarged.    Lung parenchyma/Pleura: Right midlung granulomata.    Skeleton/soft tissues: Unchanged    Impression:    No radiographic evidence of acute cardiopulmonary disease.    Support devices as described.    MELISSA SANCHEZ MD; Attending Interventional Radiologist  This document has been electronically signed. Feb 22 2021  9:13AM    EXAM:  CT HEART WITHOUT CORONARIES IC            PROCEDURE DATE:  12/28/2020            INTERPRETATION:  CT HEART WITHOUT CORONARIES WITH IV CONTRAST  ------------------  CLINICAL HISTORY:  50 years Male with to assess LA size, pulmonary veins DIAGNOSIS: [to assess LA size, pulmonary veins    TECHNIQUE:  *  ECG gated images of the chest were obtained in a volumetric acquisition from the yas to the diaphragm after administration of intravenous contrast.  *  CONTRAST: 70 cc Omnipaque 350  *  3-D volume rendered images reconstructed at an independent workstation.      INTERPRETATION  ------------------  COMPARISON: Chest CT, 10/14/2019.      FINDINGS:    Non Cardiac Findings:    Tubes/Lines: None.    Mediastinum/Lymph Nodes:No lymphadenopathy in the visualized chest.    Lungs, Pleura, and Airways: Visualized lungs are without mass or consolidation.    Bones and soft tissues: Pectus excavatum. No acute abnormality.    Cardiac Findings:    Left atrium measures 2.5 cm in greatest AP dimension.    The right ventricle appears slightly enlarged. One pacer lead is in the right atrial appendage and 2 pacer leads are in the right ventricle. No pericardial effusion.  No left intracardiac mass or thrombus including within the left atrial appendage is noted. Main pulmonary artery is enlarged measuring approximately 3.4 cm.    The superior segment of the right lower lobe demonstrates a sperate ostium from the inferior pulmonary vein (see uploaded 3D model). Ostial measurements of the pulmonary veins are as follows:    *  RT superior pulmonary vein :  17 x 16 mm  *  RT superior right lower lobe segment: 9 x 5 mm  *  RT inferior pulmonary vein :  12 x 10 mm  *  LT superior pulmonary vein :  25 x 14 mm  *  LT inferior pulmonary vein :  16 x 12 mm    ------------------  IMPRESSION:  ------------------    Three draining pulmonary veins on the right with sperate ostium of the vein draining superior segment of the right lower lobe and normal anatomy two pulmonary veins on the left. Measurements as above.    Visualized lungs without consolidation or mass.    Main pulmonary artery is enlarged measuring approximately 3.4 cm. which can be seen with pulmonary hypertension.      MARTINA MUNOZ MD; Attending Radiologist  This document has been electronically signed. Dec 28 2020 11:28AM            Drug Screen:        [ ]  [place]  Reviewed on

## 2021-02-25 NOTE — CONSULT NOTE ADULT - PROBLEM SELECTOR RECOMMENDATION 9
1. gabapentin 400 milliGRAM(s) Oral three times a day  2. Start Tramadol 50mg PO Q6hrs PRN (May discharge with the same as Q6hrs prn x3 days)  3. Start Acetaminophen 650mg PO Q6hrs standing (may con't at home as directed OTC)

## 2021-02-25 NOTE — CONSULT NOTE ADULT - ASSESSMENT
50 y.o male patient with PMH of CAD, arrhythmogenic RV dysplasia w/ AICD implant, atrial fib s/p ablation recently, epilepsy, MS, peripheral neuropathy, COPD, schizoaffective disorder presented to the hospital for worsening chest pain as well as arrhythmia     # Chest pain  - Patient had recent ablation  - Atypical  - Bedside echo showed EF of 50% with no wall motion abnormalities or effusion  - Continue telemetry  - Trend cardiac enzymes and serial EKGS  - Check 2D echo  - Based on results, will decide on ischemic work-up  - Continue aspirin, atorvastatin and toprol  - Patient on Eliquis for atrial fibrillation; continue for now    # Arrhythmia  - As per patient, he was called for an arrhythmia  - Quick interrogation showed no events but possible atrial undersensing  - F/U with EP  - Continue Eliquis for anticoagulation for atrial fibrillation
REVIEW OF SYSTEMS    General:	NAD   Skin/Breast:	Neg  Ophthalmologic:	Neg  ENMT: Neg	  Respiratory and Thorax: Neg	  Cardiovascular:	CAD  Gastrointestinal:	Neg  Genitourinary:	Neg  Musculoskeletal:	 Chronic back pain  Neurological:	Neg  Psychiatric: Shizo affective disorse	  Hematology/Lymphatics:	Neg  Endocrine:	Neg        PHYSICAL EXAM:    GENERAL: NAD, well-groomed, well-developed  HEAD:  Atraumatic, Normocephalic  EYES: EOMI, PERRLA, conjunctiva and sclera clear  ENMT: No tonsillar erythema, exudates, or enlargement; Moist mucous membranes, Good dentition, No lesions  NECK: Supple, No JVD, Normal thyroid  NERVOUS SYSTEM:  Alert & Oriented X3, Good concentration; Motor Strength 5/5 B/L upper and lower extremities; DTRs 2+ intact and symmetric  CHEST/LUNG: Clear to percussion bilaterally; No rales, rhonchi, wheezing, or rubs  HEART: Regular rate and rhythm; No murmurs, rubs, or gallops  ABDOMEN: Soft, Nontender, Nondistended; Bowel sounds present  EXTREMITIES:  2+ Peripheral Pulses, No clubbing, cyanosis, or edema  LYMPH: No lymphadenopathy noted  SKIN: No rashes or lesions      Patient ambulatory independently. Complains of pain to bilat legs, chronic radiating from the lower back. pain 7/10now. constant and sharp. Discussed pain regimen as patient is for discharge today. Patient agrees with plan and does have an appt. with the [facility] pain service on March 17.

## 2021-02-25 NOTE — CONSULT NOTE ADULT - NSTELEHEALTH_GEN_ALL_CORE
Detail Level: Zone
Samples Given: Targadox 50 mg
Initiate Treatment: Targadox 50 mg 2 tablets once daily with food
Initiate Treatment: Targadox 50 mg twice daily x 3 months\\nPanoxyl wash in the shower
No

## 2021-02-25 NOTE — CHART NOTE - NSCHARTNOTEFT_GEN_A_CORE
@nd attempt to see patient for pain consult. Patient not in the room as he went to IR for a procedure.
Patient is on the add-on schedule for [facility] tomorrow afternoon.  D/C eliquis   NPO after light breakfast
Preliminary Cardiac Catheterization Post-Procedure Report    PRE-OP DIAGNOSIS:  Suspected unstable angina    PROCEDURE: Coronary angiogram, [facility]    Attending: Dr. Ayoub  Fellow: Dr. Brock    ANESTHESIA TYPE  [  ]General Anesthesia  [ x ] Sedation  [ x ] Local/Regional    ESTIMATED BLOOD LOSS:   < 10 mL    CONDITION  [  ] Critical  [  ] Serious  [  ]Fair  [ x ]Good    ACCESS & HEMOSTASIS  [ x ] Right radial  -> D stat  [  ] Right femoral  [  ] Left radial  [  ] Left femoral       FINDINGS  Hemodynamics: Hemodynamic assessment demonstrates normal LVEDP.     Ventricles: EF calculated by contrast ventriculography was 55 %.     Coronary circulation: The coronary circulation is right dominant. There was no angiographic evidence for occlusive coronary artery disease. Left main: Angiography showed no evidence of disease. LAD: Angiography showed no evidence of disease. 1st diagonal: Angiography showed no evidence of disease. Circumflex: The vessel was large sized. Angiography showed no evidence of disease. 1st obtuse marginal: Angiography showed no evidence of disease. RCA: Angiography showed no evidence of disease. Right PDA: Angiography showed no evidence of disease. Right posterolateral segment: Angiography showed no evidence of disease.       PROCEDURE SUMMARY  No angiographic evidence of coronary artery disease.       RECOMMENDATIONS    -IV hydration post procedure   -Restart Eliquis in AM  -Risk factor modification  -Out pt f/u with cardiology
pain mgmt consult requested. Attempted to see the patient, but he was not in the room.
<<<RESIDENT DISCHARGE NOTE>>>     CUATE HORNE  MRN-[id number]    Patient had cath yesterday which was unremarkable. Patient is stable for discharge today.     VITAL SIGNS:  T(F): 98.6 (02-25-21 @ 05:15), Max: 98.6 (02-25-21 @ 05:15)  HR: 69 (02-25-21 @ 05:15)  BP: 107/56 (02-25-21 @ 05:15)  SpO2: --      PHYSICAL EXAMINATION:  GEN: NAD, Resting comfortably in bed  Patient refused physical exam     TEST RESULTS:                        12.3   5.98  )-----------( 212      ( 25 Feb 2021 05:42 )             37.3       02-25    143  |  107  |  28<H>  ----------------------------<  90  4.6   |  23  |  1.4    Ca    9.6      25 Feb 2021 05:42  Mg     1.7     02-25    TPro  6.3  /  Alb  3.9  /  TBili  <0.2  /  DBili  x   /  AST  13  /  ALT  11  /  AlkPhos  115  02-25      FINAL DISCHARGE INTERVIEW:  Resident(s) Present: Dr. Yamini Paiz    DISCHARGE MEDICATION RECONCILIATION  reviewed with Attending   DISPOSITION:   [  ] Home,    [  ] Home with Visiting Nursing Services,   [  ]  SNF/ NH,    [  ] Acute Rehab (4A),   [ x ] Other (Specify: [facility])
Device: [facility] [employer]  Interrogation complete. Device functioning normally.     Events: None    Recommendations: Masood quiros outpatient as scheduled    Reviewed by Dr. Jarquin    EPS [id number]

## 2021-02-25 NOTE — PROGRESS NOTE ADULT - ASSESSMENT
A 50 years old male presented to the ED with intermittent chest tightness for the last six days. Pt states that he received a call from "[employer], [place]", his [employer] company, and informed him that he was having arrythmia. Pt was initially went to [facility] on 2/16. He was told that he had MI but he left AMA on 2/19.     CE x 3 negative.   EKG: Atrial paced rhythm @ 70/min. RBB. Inverted T waves in leads V2-V6 (Interpreted by me.)  CXR: NAPD      1. CP likely musculoskeletal due to MS  2. CAD  3. Arrhythmogenic RV dysplasia w/ AICD implant  4. A-Fib S/P ablation  5. Epilepsy  6. MS  7. COPD  8. Schizoaffective disorder  9. Peripheral Neuropathy          PLAN:    . S/P cath. Clean coronaries.   . S/P device interrogation. No events  . ECHO reviewed. EF is 55-60%  . CE x 4 negative.   . Restart his Eliquis  . Cont his home med.    . D/C home    * Med rec reviewed. Plan of care D/W the pt. Time spent 32 minutes.

## 2021-02-26 LAB — SARS-COV-2 RNA SPEC QL NAA+PROBE: SIGNIFICANT CHANGE UP

## 2021-03-04 DIAGNOSIS — K21.9 GASTRO-ESOPHAGEAL REFLUX DISEASE WITHOUT ESOPHAGITIS: ICD-10-CM

## 2021-03-04 DIAGNOSIS — Z96.649 PRESENCE OF UNSPECIFIED ARTIFICIAL HIP JOINT: ICD-10-CM

## 2021-03-04 DIAGNOSIS — Z85.47 PERSONAL HISTORY OF MALIGNANT NEOPLASM OF TESTIS: ICD-10-CM

## 2021-03-04 DIAGNOSIS — N40.0 BENIGN PROSTATIC HYPERPLASIA WITHOUT LOWER URINARY TRACT SYMPTOMS: ICD-10-CM

## 2021-03-04 DIAGNOSIS — F17.210 NICOTINE DEPENDENCE, CIGARETTES, UNCOMPLICATED: ICD-10-CM

## 2021-03-04 DIAGNOSIS — Z95.810 PRESENCE OF AUTOMATIC (IMPLANTABLE) CARDIAC DEFIBRILLATOR: ICD-10-CM

## 2021-03-04 DIAGNOSIS — I25.2 OLD MYOCARDIAL INFARCTION: ICD-10-CM

## 2021-03-04 DIAGNOSIS — R07.89 OTHER CHEST PAIN: ICD-10-CM

## 2021-03-04 DIAGNOSIS — J44.9 CHRONIC OBSTRUCTIVE PULMONARY DISEASE, UNSPECIFIED: ICD-10-CM

## 2021-03-04 DIAGNOSIS — G43.909 MIGRAINE, UNSPECIFIED, NOT INTRACTABLE, WITHOUT STATUS MIGRAINOSUS: ICD-10-CM

## 2021-03-04 DIAGNOSIS — I11.0 HYPERTENSIVE HEART DISEASE WITH HEART FAILURE: ICD-10-CM

## 2021-03-04 DIAGNOSIS — I45.10 UNSPECIFIED RIGHT BUNDLE-BRANCH BLOCK: ICD-10-CM

## 2021-03-04 DIAGNOSIS — Z79.01 LONG TERM (CURRENT) USE OF ANTICOAGULANTS: ICD-10-CM

## 2021-03-04 DIAGNOSIS — I42.8 OTHER CARDIOMYOPATHIES: ICD-10-CM

## 2021-03-04 DIAGNOSIS — I50.9 HEART FAILURE, UNSPECIFIED: ICD-10-CM

## 2021-03-04 DIAGNOSIS — F43.10 POST-TRAUMATIC STRESS DISORDER, UNSPECIFIED: ICD-10-CM

## 2021-03-04 DIAGNOSIS — Z86.73 PERSONAL HISTORY OF TRANSIENT ISCHEMIC ATTACK (TIA), AND CEREBRAL INFARCTION WITHOUT RESIDUAL DEFICITS: ICD-10-CM

## 2021-03-04 DIAGNOSIS — E55.9 VITAMIN D DEFICIENCY, UNSPECIFIED: ICD-10-CM

## 2021-03-04 DIAGNOSIS — I48.91 UNSPECIFIED ATRIAL FIBRILLATION: ICD-10-CM

## 2021-03-04 DIAGNOSIS — F25.0 SCHIZOAFFECTIVE DISORDER, BIPOLAR TYPE: ICD-10-CM

## 2021-03-04 DIAGNOSIS — G35 MULTIPLE SCLEROSIS: ICD-10-CM

## 2021-03-04 DIAGNOSIS — E53.8 DEFICIENCY OF OTHER SPECIFIED B GROUP VITAMINS: ICD-10-CM

## 2021-03-04 DIAGNOSIS — I25.10 ATHEROSCLEROTIC HEART DISEASE OF NATIVE CORONARY ARTERY WITHOUT ANGINA PECTORIS: ICD-10-CM

## 2021-03-04 DIAGNOSIS — G40.909 EPILEPSY, UNSPECIFIED, NOT INTRACTABLE, WITHOUT STATUS EPILEPTICUS: ICD-10-CM

## 2021-03-04 DIAGNOSIS — D63.8 ANEMIA IN OTHER CHRONIC DISEASES CLASSIFIED ELSEWHERE: ICD-10-CM

## 2021-03-04 DIAGNOSIS — E11.42 TYPE 2 DIABETES MELLITUS WITH DIABETIC POLYNEUROPATHY: ICD-10-CM

## 2021-03-04 DIAGNOSIS — G89.29 OTHER CHRONIC PAIN: ICD-10-CM

## 2021-03-09 ENCOUNTER — NON-APPOINTMENT (OUTPATIENT)
Age: 51
End: 2021-03-09

## 2021-03-11 ENCOUNTER — APPOINTMENT (OUTPATIENT)
Dept: CARDIOLOGY | Facility: CLINIC | Age: 51
End: 2021-03-11

## 2021-03-12 ENCOUNTER — EMERGENCY (EMERGENCY)
Facility: HOSPITAL | Age: 51
LOS: 0 days | Discharge: HOME | End: 2021-03-12
Attending: EMERGENCY MEDICINE | Admitting: EMERGENCY MEDICINE
Payer: MEDICAID

## 2021-03-12 VITALS
DIASTOLIC BLOOD PRESSURE: 71 MMHG | RESPIRATION RATE: 18 BRPM | OXYGEN SATURATION: 96 % | HEART RATE: 70 BPM | HEIGHT: 66 IN | SYSTOLIC BLOOD PRESSURE: 112 MMHG | WEIGHT: 179.9 LBS | TEMPERATURE: 99 F

## 2021-03-12 DIAGNOSIS — Z98.890 OTHER SPECIFIED POSTPROCEDURAL STATES: Chronic | ICD-10-CM
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
## 2021-03-12 DIAGNOSIS — M79.675 PAIN IN LEFT TOE(S): ICD-10-CM

## 2021-03-12 DIAGNOSIS — Z96.649 PRESENCE OF UNSPECIFIED ARTIFICIAL HIP JOINT: Chronic | ICD-10-CM

## 2021-03-12 DIAGNOSIS — Z95.5 PRESENCE OF CORONARY ANGIOPLASTY IMPLANT AND GRAFT: ICD-10-CM
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
## 2021-03-12 DIAGNOSIS — I25.10 ATHEROSCLEROTIC HEART DISEASE OF NATIVE CORONARY ARTERY WITHOUT ANGINA PECTORIS: ICD-10-CM

## 2021-03-12 DIAGNOSIS — J44.9 CHRONIC OBSTRUCTIVE PULMONARY DISEASE, UNSPECIFIED: ICD-10-CM
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
## 2021-03-12 DIAGNOSIS — I11.0 HYPERTENSIVE HEART DISEASE WITH HEART FAILURE: ICD-10-CM

## 2021-03-12 DIAGNOSIS — E78.00 PURE HYPERCHOLESTEROLEMIA, UNSPECIFIED: ICD-10-CM

## 2021-03-12 DIAGNOSIS — S92.402A DISPLACED UNSPECIFIED FRACTURE OF LEFT GREAT TOE, INITIAL ENCOUNTER FOR CLOSED FRACTURE: ICD-10-CM

## 2021-03-12 DIAGNOSIS — T82.120A DISPLACEMENT OF CARDIAC ELECTRODE, INITIAL ENCOUNTER: Chronic | ICD-10-CM

## 2021-03-12 DIAGNOSIS — I50.22 CHRONIC SYSTOLIC (CONGESTIVE) HEART FAILURE: ICD-10-CM

## 2021-03-12 DIAGNOSIS — Z79.01 LONG TERM (CURRENT) USE OF ANTICOAGULANTS: ICD-10-CM
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
## 2021-03-12 PROCEDURE — 99284 EMERGENCY DEPT VISIT MOD MDM: CPT

## 2021-03-12 PROCEDURE — 73630 X-RAY EXAM OF FOOT: CPT | Mod: 26,LT

## 2021-03-12 RX ORDER — ACETAMINOPHEN 500 MG
975 TABLET ORAL ONCE
Refills: 0 | Status: COMPLETED | OUTPATIENT
Start: 2021-03-12 | End: 2021-03-12

## 2021-03-12 RX ORDER — TRAMADOL HYDROCHLORIDE 50 MG/1
50 TABLET ORAL ONCE
Refills: 0 | Status: DISCONTINUED | OUTPATIENT
Start: 2021-03-12 | End: 2021-03-12

## 2021-03-12 RX ADMIN — TRAMADOL HYDROCHLORIDE 50 MILLIGRAM(S): 50 TABLET ORAL at 20:05

## 2021-03-12 RX ADMIN — Medication 975 MILLIGRAM(S): at 20:05

## 2021-03-12 NOTE — ED PROVIDER NOTE - CARE PROVIDER_API CALL
Will Alvarez (DPM)  Surgery  [address] [place] [address]  Phone: [phone]  Fax: [phone]  Follow Up Time:

## 2021-03-12 NOTE — ED PROVIDER NOTE - PATIENT PORTAL LINK FT
You can access the FollowMyHealth Patient Portal offered by [facility] by registering at the following website: [email or web address]. By joining [facility]’s FollowMyHealth portal, you will also be able to view your health information using other applications (apps) compatible with our system.

## 2021-03-12 NOTE — ED ADULT TRIAGE NOTE - CHIEF COMPLAINT QUOTE
Patient c.o left foot big toe pain since today. On assessment bruising present to left foot big toe and patient unable to move digit. +pulses present in LLE.

## 2021-03-12 NOTE — ED PROCEDURE NOTE - ATTENDING CONTRIBUTION TO CARE
Patient with Lt foot toe fx, patient permission obtained, pooja taping applied and hard sole shoe placed. Patient tolerated procedure well, no complications noted. LLE extremity remained distally NVI before and after the procedure.

## 2021-03-12 NOTE — ED PROVIDER NOTE - PROGRESS NOTE DETAILS
pt placed in hard -soled shoe, given podiatry f.u, will dc pt placed in hard -soled shoe, given podiatry f.u, will dc  pt aware of left 5th toe fx, likely chronic

## 2021-03-12 NOTE — ED PROVIDER NOTE - CLINICAL SUMMARY MEDICAL DECISION MAKING FREE TEXT BOX
patient remained stable in ED, improved well, results of the diagnostic studies reviewed and discussed with patient, Patient remained awake, alert, and comfortable, tolerated PO. Discussed with patient in detail about the need for close outpatient follow up and the need to return to ED for any persistent, or worsening symptoms, for any new symptoms/concerns. patient verbalized understanding and agreed. patient is given detail aftercare instructions and is instructed well to f/u as outpatient for further care.

## 2021-03-12 NOTE — ED PROVIDER NOTE - ATTENDING CONTRIBUTION TO CARE
Medtronic Dual PPM implanted 2/4/20 by Dr. Qureshi - after inpatient consult for AF and conversion pauses/symptomatic bradycardia.   She is on Amiodarone, Eliquis, Metoprolol.   appt could be made for Friday October 30ths at 1:15 pm.   Will have CPA call to schedule.    Patient states that while walking with walker, accidentally bumped his toe to the bed frame, started having pain, so was brought to ED for evaluation. Patient denies fall, denies any other injuries. Patient is c/o Lt great toe pain only, denies any other injuries. Patient states that, he takes tramadol for his chronic pain days 4 times, and missed his 4 pm tramadol dose, so wanted to have it while in ED. Patient denies any other symptoms, is c/o LT foot great toe pain only.   Vitals reviewed.   patient is awake, alert, speaking in full sentences, answering questions appropriately, appears well and is comfortable.

## 2021-03-12 NOTE — ED PROVIDER NOTE - NS ED ROS FT
MS: + toe pain No myalgia, muscle weakness,  back pain.  Neuro:  No headache or weakness.  No LOC.  Skin:  No skin rash.   Endocrine: No history of thyroid disease or diabetes.  Except as documented in the HPI,  all other systems are negative.

## 2021-03-12 NOTE — ED PROVIDER NOTE - OBJECTIVE STATEMENT
Pt is a 49y/o male with a pmhx of HTN, HLD, CAD with stents on aspirin, afib on eliquis presents today for eval of left great toe pain x 1 day after banging it into bed. Pt denies fever, chills, weakness, numbness, fall, head trauma.

## 2021-03-17 ENCOUNTER — OUTPATIENT (OUTPATIENT)
Dept: OUTPATIENT SERVICES | Facility: HOSPITAL | Age: 51
LOS: 1 days | Discharge: HOME | End: 2021-03-17

## 2021-03-17 ENCOUNTER — APPOINTMENT (OUTPATIENT)
Dept: PAIN MANAGEMENT | Facility: CLINIC | Age: 51
End: 2021-03-17

## 2021-03-17 ENCOUNTER — NON-APPOINTMENT (OUTPATIENT)
Age: 51
End: 2021-03-17

## 2021-03-17 VITALS
TEMPERATURE: 97.4 F | SYSTOLIC BLOOD PRESSURE: 128 MMHG | HEART RATE: 75 BPM | OXYGEN SATURATION: 97 % | DIASTOLIC BLOOD PRESSURE: 80 MMHG

## 2021-03-17 DIAGNOSIS — Z98.890 OTHER SPECIFIED POSTPROCEDURAL STATES: Chronic | ICD-10-CM

## 2021-03-17 DIAGNOSIS — G35 MULTIPLE SCLEROSIS: ICD-10-CM

## 2021-03-17 DIAGNOSIS — Z96.649 PRESENCE OF UNSPECIFIED ARTIFICIAL HIP JOINT: Chronic | ICD-10-CM

## 2021-03-17 DIAGNOSIS — T82.120A DISPLACEMENT OF CARDIAC ELECTRODE, INITIAL ENCOUNTER: Chronic | ICD-10-CM

## 2021-03-17 NOTE — REVIEW OF SYSTEMS
[Back Pain] : back pain [Neck Pain] : neck pain [Muscle Pain] : muscle pain [Numbness] : no numbness [Negative] : Heme/Lymph [de-identified] : Numbness at times when the pain started

## 2021-03-17 NOTE — HISTORY OF PRESENT ILLNESS
[Back Pain] : back pain [Neck Pain] : neck pain [___ yrs] : [unfilled] year(s) ago [Constant] : constant [9] : a maximum pain level of 9/10 [Aching] : aching [Shooting] : shooting [Electric] : electric [Standing] : standing [Walking] : walking [Lifting] : lifting [Medications] : medications [de-identified] : with exacerbations  [de-identified] : The pains he has are different in different areas, but mostly they are characterized as a cramping sensation [FreeTextEntry3] : Movement causes more pain  [FreeTextEntry4] : Says he is here because he wanted dilaudid or tramadol, inactivity

## 2021-03-17 NOTE — ASSESSMENT
[FreeTextEntry1] : 50 M w/ PMH A fib, bipolar, HIV, DL and stated MS, presenting for opioid medications for his MS related pain\par \par - Can increase dose of gabapentin to 600 mg, but the patient would rather go to his pmd rather than simply coming to me for the increase in the gabapentin. \par - Physical therapy rx for patient\par - Cannot give cymbalta due to ssri he is currently taking\par - Pt is taking lamotrigene \par - Opioids not written

## 2021-03-25 ENCOUNTER — APPOINTMENT (OUTPATIENT)
Dept: CARDIOLOGY | Facility: CLINIC | Age: 51
End: 2021-03-25
Payer: MEDICAID

## 2021-03-25 VITALS
TEMPERATURE: 96.7 F | SYSTOLIC BLOOD PRESSURE: 117 MMHG | BODY MASS INDEX: 27.32 KG/M2 | WEIGHT: 170 LBS | HEART RATE: 70 BPM | DIASTOLIC BLOOD PRESSURE: 76 MMHG | HEIGHT: 66 IN

## 2021-03-25 PROCEDURE — 99215 OFFICE O/P EST HI 40 MIN: CPT

## 2021-03-25 PROCEDURE — 93290 INTERROG DEV EVAL ICPMS IP: CPT | Mod: 26

## 2021-03-25 PROCEDURE — 93000 ELECTROCARDIOGRAM COMPLETE: CPT | Mod: 59

## 2021-03-25 PROCEDURE — 93283 PRGRMG EVAL IMPLANTABLE DFB: CPT

## 2021-03-25 NOTE — PROCEDURE
[ICD] : Implantable cardioverter-defibrillator [DDD] : DDD [Longevity: ___ months] : The estimated remaining battery life is [unfilled] months [Threshold Testing Performed] : Threshold testing was performed [Lead Imp:  ___ohms] : lead impedance was [unfilled] ohms [Sensing Amplitude ___mv] : sensing amplitude was [unfilled] mv [___V @] : [unfilled] V [___ ms] : [unfilled] ms [Counters Reset] : the counters were reset [Apace-Vpace ___ %] : Apace-Vpace [unfilled]% [de-identified] : Atrial dependant [de-identified] : [facility] [employer] [de-identified] : [id number] [de-identified] : [id number] [de-identified] : 9/5/2019 [de-identified] : 70 [de-identified] : Atrial output increased to 3.5V@0.5,

## 2021-03-25 NOTE — ASSESSMENT
[FreeTextEntry1] : ## Paroxysmal AF s/p ablation (see above for more detailed)\par ## ARVD s/p DC ICD\par ## Atrial standstill s/p DC ICD\par ## New RV lead malfunction\par \par - ICD interrogation shows normally functioning DC-ICD. Battery life ok. Optivol below threshold. 1 episode of 1:1 SVT see above. ICD lead dislodgement.\par - Discussed need to reposition/re-adjust ICD lead\par - As he is atrially dependent, we will attempt this in CTOR with surgery back up\par - Plan : Temp cath from groin, Attempt to reposition ICD lead from pocket. If unsuccessful , we will attempt to remove the lead individually and re-implant. If has binds with other leads, we will extract and re-implant all leads.\par - Eliquis on hold for 3 days\par

## 2021-03-25 NOTE — HISTORY OF PRESENT ILLNESS
[de-identified] : I had a pleasure of seeing Mr. Miguel for follow-up consultation for atrial fibrillation and ICD care. He is usually seen by Dr. Quiñones.\par \par Mr. Miguel is a 50-year old man with history of paroxysmal AF s/p ablation (x2, 2017) s/p re-do RF (12/20, PVI-CTI) s/p early flutter s/p spontaneoud conversion during procedure and CTI touch-up (1/21),  Atrial standstill s/p PPM (2001) --> ARVD s/p ICD placement --> ICD lead malfunction s/p ICD lead extraction s/p DC ICD lead placement (Medtronic) --> Perforation s/p RV lead revision, HIV, DL, bipolar disorder, seizure, depression, HTN is here for management of his atrial fibrillation. \par \par From prior notes: He had an episode of AF where he needed cardioversion at  [place] in 08/20. + Palpitations. He has used tikosyn before- continue to have AF episodes. He has never been on AV Node block agents due to relative hypotension.\par \par 3/25: Feels fine. Had kicked door, but not involved in fight.\par \par Denies chest pain, shortness of breath, dizziness or LOC.\par \par EKG: AP-VS @ 70/min, RBBB.\par TTE (02/21): EF 55-60%, nl LA/RA\par

## 2021-03-27 ENCOUNTER — OUTPATIENT (OUTPATIENT)
Dept: OUTPATIENT SERVICES | Facility: HOSPITAL | Age: 51
LOS: 1 days | Discharge: HOME | End: 2021-03-27

## 2021-03-27 DIAGNOSIS — Z98.890 OTHER SPECIFIED POSTPROCEDURAL STATES: Chronic | ICD-10-CM

## 2021-03-27 DIAGNOSIS — Z96.649 PRESENCE OF UNSPECIFIED ARTIFICIAL HIP JOINT: Chronic | ICD-10-CM

## 2021-03-27 DIAGNOSIS — Z11.59 ENCOUNTER FOR SCREENING FOR OTHER VIRAL DISEASES: ICD-10-CM

## 2021-03-27 DIAGNOSIS — T82.120A DISPLACEMENT OF CARDIAC ELECTRODE, INITIAL ENCOUNTER: Chronic | ICD-10-CM

## 2021-03-28 ENCOUNTER — INPATIENT (INPATIENT)
Facility: HOSPITAL | Age: 51
LOS: 2 days | Discharge: NURSING, REHAB OR LONG-TERM CARE | End: 2021-03-31
Attending: HOSPITALIST | Admitting: HOSPITALIST
Payer: MEDICAID

## 2021-03-28 VITALS
RESPIRATION RATE: 18 BRPM | HEART RATE: 70 BPM | TEMPERATURE: 98 F | WEIGHT: 190.04 LBS | HEIGHT: 66 IN | SYSTOLIC BLOOD PRESSURE: 105 MMHG | DIASTOLIC BLOOD PRESSURE: 70 MMHG | OXYGEN SATURATION: 99 %

## 2021-03-28 DIAGNOSIS — G35 MULTIPLE SCLEROSIS: ICD-10-CM

## 2021-03-28 DIAGNOSIS — Z96.649 PRESENCE OF UNSPECIFIED ARTIFICIAL HIP JOINT: Chronic | ICD-10-CM

## 2021-03-28 DIAGNOSIS — T82.827A: ICD-10-CM

## 2021-03-28 DIAGNOSIS — Z98.890 OTHER SPECIFIED POSTPROCEDURAL STATES: Chronic | ICD-10-CM

## 2021-03-28 DIAGNOSIS — Z79.01 LONG TERM (CURRENT) USE OF ANTICOAGULANTS: ICD-10-CM

## 2021-03-28 DIAGNOSIS — E78.5 HYPERLIPIDEMIA, UNSPECIFIED: ICD-10-CM

## 2021-03-28 DIAGNOSIS — J44.9 CHRONIC OBSTRUCTIVE PULMONARY DISEASE, UNSPECIFIED: ICD-10-CM

## 2021-03-28 DIAGNOSIS — Z21 ASYMPTOMATIC HUMAN IMMUNODEFICIENCY VIRUS [HIV] INFECTION STATUS: ICD-10-CM

## 2021-03-28 DIAGNOSIS — I50.32 CHRONIC DIASTOLIC (CONGESTIVE) HEART FAILURE: ICD-10-CM

## 2021-03-28 DIAGNOSIS — T82.190A OTHER MECHANICAL COMPLICATION OF CARDIAC ELECTRODE, INITIAL ENCOUNTER: ICD-10-CM

## 2021-03-28 DIAGNOSIS — I11.0 HYPERTENSIVE HEART DISEASE WITH HEART FAILURE: ICD-10-CM

## 2021-03-28 DIAGNOSIS — T82.120A DISPLACEMENT OF CARDIAC ELECTRODE, INITIAL ENCOUNTER: Chronic | ICD-10-CM

## 2021-03-28 DIAGNOSIS — I42.9 CARDIOMYOPATHY, UNSPECIFIED: ICD-10-CM
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
## 2021-03-28 DIAGNOSIS — F25.0 SCHIZOAFFECTIVE DISORDER, BIPOLAR TYPE: ICD-10-CM

## 2021-03-28 DIAGNOSIS — G62.9 POLYNEUROPATHY, UNSPECIFIED: ICD-10-CM

## 2021-03-28 DIAGNOSIS — T82.897A OTHER SPECIFIED COMPLICATION OF CARDIAC PROSTHETIC DEVICES, IMPLANTS AND GRAFTS, INITIAL ENCOUNTER: ICD-10-CM
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
## 2021-03-28 DIAGNOSIS — I48.0 PAROXYSMAL ATRIAL FIBRILLATION: ICD-10-CM

## 2021-03-28 DIAGNOSIS — K21.9 GASTRO-ESOPHAGEAL REFLUX DISEASE WITHOUT ESOPHAGITIS: ICD-10-CM

## 2021-03-28 DIAGNOSIS — E53.8 DEFICIENCY OF OTHER SPECIFIED B GROUP VITAMINS: ICD-10-CM

## 2021-03-28 LAB
ALBUMIN SERPL ELPH-MCNC: 4.6 G/DL — SIGNIFICANT CHANGE UP (ref 3.5–5.2)
ALP SERPL-CCNC: 128 U/L — HIGH (ref 30–115)
ALT FLD-CCNC: 14 U/L — SIGNIFICANT CHANGE UP (ref 0–41)
ANION GAP SERPL CALC-SCNC: 10 MMOL/L — SIGNIFICANT CHANGE UP (ref 7–14)
APTT BLD: 39.9 SEC — HIGH (ref 27–39.2)
AST SERPL-CCNC: 17 U/L — SIGNIFICANT CHANGE UP (ref 0–41)
BASOPHILS # BLD AUTO: 0.1 K/UL — SIGNIFICANT CHANGE UP (ref 0–0.2)
BASOPHILS NFR BLD AUTO: 1.6 % — HIGH (ref 0–1)
BILIRUB SERPL-MCNC: 0.5 MG/DL — SIGNIFICANT CHANGE UP (ref 0.2–1.2)
BLD GP AB SCN SERPL QL: SIGNIFICANT CHANGE UP
BUN SERPL-MCNC: 13 MG/DL — SIGNIFICANT CHANGE UP (ref 10–20)
CALCIUM SERPL-MCNC: 9.5 MG/DL — SIGNIFICANT CHANGE UP (ref 8.5–10.1)
CHLORIDE SERPL-SCNC: 105 MMOL/L — SIGNIFICANT CHANGE UP (ref 98–110)
CO2 SERPL-SCNC: 26 MMOL/L — SIGNIFICANT CHANGE UP (ref 17–32)
CREAT SERPL-MCNC: 0.9 MG/DL — SIGNIFICANT CHANGE UP (ref 0.7–1.5)
EOSINOPHIL # BLD AUTO: 0.11 K/UL — SIGNIFICANT CHANGE UP (ref 0–0.7)
EOSINOPHIL NFR BLD AUTO: 1.8 % — SIGNIFICANT CHANGE UP (ref 0–8)
GLUCOSE SERPL-MCNC: 92 MG/DL — SIGNIFICANT CHANGE UP (ref 70–99)
HCT VFR BLD CALC: 40 % — LOW (ref 42–52)
HGB BLD-MCNC: 13.1 G/DL — LOW (ref 14–18)
IMM GRANULOCYTES NFR BLD AUTO: 0.7 % — HIGH (ref 0.1–0.3)
INR BLD: 1.02 RATIO — SIGNIFICANT CHANGE UP (ref 0.65–1.3)
LYMPHOCYTES # BLD AUTO: 2.26 K/UL — SIGNIFICANT CHANGE UP (ref 1.2–3.4)
LYMPHOCYTES # BLD AUTO: 36.7 % — SIGNIFICANT CHANGE UP (ref 20.5–51.1)
MAGNESIUM SERPL-MCNC: 2.1 MG/DL — SIGNIFICANT CHANGE UP (ref 1.8–2.4)
MCHC RBC-ENTMCNC: 30.8 PG — SIGNIFICANT CHANGE UP (ref 27–31)
MCHC RBC-ENTMCNC: 32.8 G/DL — SIGNIFICANT CHANGE UP (ref 32–37)
MCV RBC AUTO: 94.1 FL — HIGH (ref 80–94)
MONOCYTES # BLD AUTO: 0.42 K/UL — SIGNIFICANT CHANGE UP (ref 0.1–0.6)
MONOCYTES NFR BLD AUTO: 6.8 % — SIGNIFICANT CHANGE UP (ref 1.7–9.3)
NEUTROPHILS # BLD AUTO: 3.22 K/UL — SIGNIFICANT CHANGE UP (ref 1.4–6.5)
NEUTROPHILS NFR BLD AUTO: 52.4 % — SIGNIFICANT CHANGE UP (ref 42.2–75.2)
NRBC # BLD: 0 /100 WBCS — SIGNIFICANT CHANGE UP (ref 0–0)
PLATELET # BLD AUTO: 225 K/UL — SIGNIFICANT CHANGE UP (ref 130–400)
POTASSIUM SERPL-MCNC: 4.2 MMOL/L — SIGNIFICANT CHANGE UP (ref 3.5–5)
POTASSIUM SERPL-SCNC: 4.2 MMOL/L — SIGNIFICANT CHANGE UP (ref 3.5–5)
PROT SERPL-MCNC: 7 G/DL — SIGNIFICANT CHANGE UP (ref 6–8)
PROTHROM AB SERPL-ACNC: 11.7 SEC — SIGNIFICANT CHANGE UP (ref 9.95–12.87)
RBC # BLD: 4.25 M/UL — LOW (ref 4.7–6.1)
RBC # FLD: 12.8 % — SIGNIFICANT CHANGE UP (ref 11.5–14.5)
SARS-COV-2 RNA SPEC QL NAA+PROBE: SIGNIFICANT CHANGE UP
SODIUM SERPL-SCNC: 141 MMOL/L — SIGNIFICANT CHANGE UP (ref 135–146)
TROPONIN T SERPL-MCNC: <0.01 NG/ML — SIGNIFICANT CHANGE UP
WBC # BLD: 6.15 K/UL — SIGNIFICANT CHANGE UP (ref 4.8–10.8)
WBC # FLD AUTO: 6.15 K/UL — SIGNIFICANT CHANGE UP (ref 4.8–10.8)

## 2021-03-28 PROCEDURE — 99285 EMERGENCY DEPT VISIT HI MDM: CPT

## 2021-03-28 PROCEDURE — 93010 ELECTROCARDIOGRAM REPORT: CPT

## 2021-03-28 PROCEDURE — 71045 X-RAY EXAM CHEST 1 VIEW: CPT | Mod: 26

## 2021-03-28 PROCEDURE — 99223 1ST HOSP IP/OBS HIGH 75: CPT | Mod: 57

## 2021-03-28 PROCEDURE — 99223 1ST HOSP IP/OBS HIGH 75: CPT

## 2021-03-28 RX ORDER — BACLOFEN 100 %
10 POWDER (GRAM) MISCELLANEOUS THREE TIMES A DAY
Refills: 0 | Status: DISCONTINUED | OUTPATIENT
Start: 2021-03-28 | End: 2021-03-29

## 2021-03-28 RX ORDER — TIOTROPIUM BROMIDE 18 UG/1
1 CAPSULE ORAL; RESPIRATORY (INHALATION) DAILY
Refills: 0 | Status: DISCONTINUED | OUTPATIENT
Start: 2021-03-28 | End: 2021-03-29

## 2021-03-28 RX ORDER — QUETIAPINE FUMARATE 200 MG/1
75 TABLET, FILM COATED ORAL THREE TIMES A DAY
Refills: 0 | Status: DISCONTINUED | OUTPATIENT
Start: 2021-03-28 | End: 2021-03-29

## 2021-03-28 RX ORDER — SERTRALINE 25 MG/1
50 TABLET, FILM COATED ORAL DAILY
Refills: 0 | Status: DISCONTINUED | OUTPATIENT
Start: 2021-03-28 | End: 2021-03-29

## 2021-03-28 RX ORDER — ATORVASTATIN CALCIUM 80 MG/1
40 TABLET, FILM COATED ORAL AT BEDTIME
Refills: 0 | Status: DISCONTINUED | OUTPATIENT
Start: 2021-03-28 | End: 2021-03-29

## 2021-03-28 RX ORDER — TRAMADOL HYDROCHLORIDE 50 MG/1
50 TABLET ORAL EVERY 6 HOURS
Refills: 0 | Status: DISCONTINUED | OUTPATIENT
Start: 2021-03-28 | End: 2021-03-29

## 2021-03-28 RX ORDER — GABAPENTIN 400 MG/1
400 CAPSULE ORAL THREE TIMES A DAY
Refills: 0 | Status: DISCONTINUED | OUTPATIENT
Start: 2021-03-28 | End: 2021-03-29

## 2021-03-28 RX ORDER — FOLIC ACID 0.8 MG
1 TABLET ORAL DAILY
Refills: 0 | Status: DISCONTINUED | OUTPATIENT
Start: 2021-03-28 | End: 2021-03-29

## 2021-03-28 RX ORDER — PANTOPRAZOLE SODIUM 20 MG/1
40 TABLET, DELAYED RELEASE ORAL
Refills: 0 | Status: DISCONTINUED | OUTPATIENT
Start: 2021-03-28 | End: 2021-03-29

## 2021-03-28 RX ORDER — ASPIRIN/CALCIUM CARB/MAGNESIUM 324 MG
81 TABLET ORAL DAILY
Refills: 0 | Status: DISCONTINUED | OUTPATIENT
Start: 2021-03-28 | End: 2021-03-29

## 2021-03-28 RX ORDER — METOPROLOL TARTRATE 50 MG
50 TABLET ORAL DAILY
Refills: 0 | Status: DISCONTINUED | OUTPATIENT
Start: 2021-03-28 | End: 2021-03-29

## 2021-03-28 RX ORDER — LAMOTRIGINE 25 MG/1
200 TABLET, ORALLY DISINTEGRATING ORAL
Refills: 0 | Status: DISCONTINUED | OUTPATIENT
Start: 2021-03-28 | End: 2021-03-29

## 2021-03-28 RX ORDER — QUETIAPINE FUMARATE 200 MG/1
25 TABLET, FILM COATED ORAL THREE TIMES A DAY
Refills: 0 | Status: DISCONTINUED | OUTPATIENT
Start: 2021-03-28 | End: 2021-03-28

## 2021-03-28 RX ORDER — TAMSULOSIN HYDROCHLORIDE 0.4 MG/1
0.4 CAPSULE ORAL AT BEDTIME
Refills: 0 | Status: DISCONTINUED | OUTPATIENT
Start: 2021-03-28 | End: 2021-03-29

## 2021-03-28 RX ADMIN — SERTRALINE 50 MILLIGRAM(S): 25 TABLET, FILM COATED ORAL at 12:26

## 2021-03-28 RX ADMIN — Medication 50 MILLIGRAM(S): at 06:17

## 2021-03-28 RX ADMIN — Medication 10 MILLIGRAM(S): at 21:35

## 2021-03-28 RX ADMIN — Medication 10 MILLIGRAM(S): at 12:27

## 2021-03-28 RX ADMIN — Medication 81 MILLIGRAM(S): at 12:26

## 2021-03-28 RX ADMIN — GABAPENTIN 400 MILLIGRAM(S): 400 CAPSULE ORAL at 21:35

## 2021-03-28 RX ADMIN — QUETIAPINE FUMARATE 25 MILLIGRAM(S): 200 TABLET, FILM COATED ORAL at 06:17

## 2021-03-28 RX ADMIN — GABAPENTIN 400 MILLIGRAM(S): 400 CAPSULE ORAL at 12:27

## 2021-03-28 RX ADMIN — TAMSULOSIN HYDROCHLORIDE 0.4 MILLIGRAM(S): 0.4 CAPSULE ORAL at 21:36

## 2021-03-28 RX ADMIN — TRAMADOL HYDROCHLORIDE 50 MILLIGRAM(S): 50 TABLET ORAL at 14:34

## 2021-03-28 RX ADMIN — Medication 10 MILLIGRAM(S): at 06:16

## 2021-03-28 RX ADMIN — QUETIAPINE FUMARATE 25 MILLIGRAM(S): 200 TABLET, FILM COATED ORAL at 21:35

## 2021-03-28 RX ADMIN — GABAPENTIN 400 MILLIGRAM(S): 400 CAPSULE ORAL at 06:16

## 2021-03-28 RX ADMIN — Medication 1 MILLIGRAM(S): at 12:26

## 2021-03-28 RX ADMIN — LAMOTRIGINE 200 MILLIGRAM(S): 25 TABLET, ORALLY DISINTEGRATING ORAL at 06:17

## 2021-03-28 RX ADMIN — QUETIAPINE FUMARATE 25 MILLIGRAM(S): 200 TABLET, FILM COATED ORAL at 12:27

## 2021-03-28 RX ADMIN — QUETIAPINE FUMARATE 75 MILLIGRAM(S): 200 TABLET, FILM COATED ORAL at 23:57

## 2021-03-28 RX ADMIN — LAMOTRIGINE 200 MILLIGRAM(S): 25 TABLET, ORALLY DISINTEGRATING ORAL at 17:26

## 2021-03-28 RX ADMIN — ATORVASTATIN CALCIUM 40 MILLIGRAM(S): 80 TABLET, FILM COATED ORAL at 21:35

## 2021-03-28 RX ADMIN — PANTOPRAZOLE SODIUM 40 MILLIGRAM(S): 20 TABLET, DELAYED RELEASE ORAL at 06:47

## 2021-03-28 NOTE — ED PROVIDER NOTE - PHYSICAL EXAMINATION
Constitutional: Well developed, well nourished. NAD. Good general hygiene  Head: Atraumatic.  Eyes: PERRLA. EOMI without discomfort.   ENT: No nasal discharge. Mucous membranes moist.  Neck: Supple. Painless ROM.  Cardiovascular: Regular rhythm. Regular rate. Normal S1 and S2. No murmurs. 2+ pulses in all extremities.   Pulmonary: Normal respiratory rate and effort. Lungs clear to auscultation bilaterally. No wheezing, rales, or rhonchi. Bilateral, equal lung expansion.   Abdominal: Soft. Nondistended. Nontender. No rebound or guarding.   Extremities. Pelvis stable. No lower extremity edema. Symmetric calves.  Skin: AICD site c/d/i. No rashes.   Neuro: AAOx3. No focal neurological deficits.

## 2021-03-28 NOTE — H&P ADULT - ATTENDING COMMENTS
Patient seen and examined independently. Agree with resident note/ history / physical exam and plan of care with following exceptions/additions/updates. Case discussed with patient/pt decision maker, house-staff and nursing.     pt is comfortable now.   was seen by ep, dr allen as outpt , was told that he needs his aicd wires need to be replaced , planned for am     Eliquis on hold   full code

## 2021-03-28 NOTE — PATIENT PROFILE ADULT - VISION (WITH CORRECTIVE LENSES IF THE PATIENT USUALLY WEARS THEM):
Pt uses eye glasses/Partially impaired: cannot see medication labels or newsprint, but can see obstacles in path, and the surrounding layout; can count fingers at arm's length

## 2021-03-28 NOTE — CONSULT NOTE ADULT - SUBJECTIVE AND OBJECTIVE BOX
Patient is a 50y old  Male who presents with a chief complaint of device malfunction.     HPI:  50-year old man presented for assessment after his defibrillator beeped / audible alarm.  Known to have a history of paroxysmal AF s/p ablation (x2, 2017) s/p re-do RF (12/20, PVI-CTI) s/p early flutter s/p spontaneoud conversion during procedure and CTI touch-up (1/21), Atrial standstill s/p PPM (2001) --> ARVD s/p ICD placement --> ICD lead malfunction s/p ICD lead extraction s/p DC ICD lead placement (Medtronic) --> Perforation s/p RV lead revision, HIV, DL, bipolar disorder, seizure, depression, HTN.  Saw Dr. Jarquin recently for management of atrial fibrillation.   As Dr. Jarquin documents: He had an episode of AF where he needed cardioversion at [place] in 08/20. + Palpitations. He has used tikosyn before- continue to have AF episodes. He has never been on AV Node block agents due to relative hypotension.    EKG: AP-VS @ 70/min, RBBB.  TTE (02/21): EF 55-60%, nl LA/RA    At night, patient heard a beep coming from his defibrillator, no symptoms, no shock, he presented for assessment.     (28 Mar 2021 03:33)    EP consulted for evaluation of ICD.  Patient seen in Dr. Lopez office on 3/25/2021, RV threshold increased to 7.5 and will require revision.     REVIEW OF SYSTEMS    [x] A ten-point review of systems was otherwise negative except as noted.  [ ] Due to altered mental status/intubation, subjective information were not able to be obtained from the patient. History was obtained, to the extent possible, from review of the chart and collateral sources of information.      PAST MEDICAL & SURGICAL HISTORY:  Vitamin D deficiency    Constipation    Chronic respiratory failure  secondary to COPD    Folate-deficiency anemia    Iron deficiency anemia    Anemia of chronic disease    Pericardial effusion without cardiac tamponade  h/o trace pericardial effusion    BPH (benign prostatic hyperplasia)    Chronic pain    Peripheral neuropathy    Type 2 diabetes mellitus    AICD (automatic cardioverter/defibrillator) present  SJM    Ventricular tachycardia    Chronic heart failure with preserved ejection fraction    PTSD (post-traumatic stress disorder)    Supraventricular arrhythmia  prior history    Cardiomyopathy    CHF (congestive heart failure)    Atrial fibrillation  s/p ablation, on eliquis    BPH (benign prostatic hyperplasia)    HTN (hypertension)    Seizures    Migraines    Pneumonia    MS (multiple sclerosis)    CAD (coronary artery disease)    Bipolar disorder    Anginal pain    Schizo affective schizophrenia    GERD (gastroesophageal reflux disease)    Seasonal allergies    Hypercholesteremia    COPD (chronic obstructive pulmonary disease)    CVA (cerebral vascular accident)    TIA (transient ischemic attack)    Peripheral Neuropathy    Clinical Depression    Displacement of electrode lead of cardiac pacemaker    History of evacuation of hematoma  AICD pocket    H/O prior ablation treatment  for Afib    H/O hernia repair  right 1972,1991    History of hip replacement    S/P Orchiectomy  L for testicular CA    S/P Implantation of AICD        Home Medications:  apixaban 5 mg oral tablet: 1 tab(s) orally every 12 hours (22 Feb 2021 03:48)  aspirin 81 mg oral tablet, chewable: 1 tab(s) orally once a day (22 Feb 2021 03:48)  atorvastatin 40 mg oral tablet: 1 tab(s) orally once a day (at bedtime) (22 Feb 2021 03:48)  baclofen 10 mg oral tablet: 1 tab(s) orally 3 times a day (22 Feb 2021 03:48)  folic acid 1 mg oral tablet: 1 tab(s) orally once a day (22 Feb 2021 03:48)  gabapentin 400 mg oral capsule: 1 cap(s) orally 3 times a day (22 Feb 2021 03:48)  lamoTRIgine 100 mg oral tablet: 2 tab(s) orally 2 times a day (22 Feb 2021 03:48)  metoprolol succinate 50 mg oral tablet, extended release: 1 tab(s) orally once a day; MAY GIVE 4 TABS OF 12.5MG AT ONE TIME FOR DOSING (22 Feb 2021 03:48)  pantoprazole 40 mg oral delayed release tablet: 1 tab(s) orally once a day (22 Feb 2021 03:48)  QUEtiapine 25 mg oral tablet: 1 tab(s) orally 3 times a day (25 Feb 2021 14:28)  sertraline 50 mg oral tablet: 1 tab(s) orally once a day (22 Feb 2021 03:48)  tamsulosin 0.4 mg oral capsule: 1 cap(s) orally once a day (at bedtime) (22 Feb 2021 03:48)  Tecfidera 240 mg oral delayed release capsule: 1 cap(s) orally 2 times a day (22 Feb 2021 03:48)  tiotropium 18 mcg inhalation capsule: 1 cap(s) inhaled once a day (22 Feb 2021 03:48)      Allergies:  dexmethylphenidate (Unknown)  Dilantin (Rash)  dilantin, compazine, , ritalin, phenergan (Unknown)  Haldol (Unknown)  hydantoin derivatives (Other)  methylphenidate (Unknown)  phenytoin (Unknown)  prochlorperazine (Unknown)  promethazine (Dystonic RXN)  rash/hives (Anaphylaxis)  thioxanthenes (Unknown)    PREVIOUS DIAGNOSTIC TESTING:      ECHO  FINDINGS: < from: TTE Echo Complete w/o Contrast w/ Doppler (02.22.21 @ 14:28) >  Summary:   1. Left ventricular ejection fraction, by visual estimation, is 55 to 60%.   2. Normal global left ventricular systolic function.   3. Normal left ventricular internal cavity size.   4. Normal left atrial size.   5. Normal right atrial size.   6. No evidence of mitral valve regurgitation.   7. Mild tricuspid regurgitation.   8. LA volume Index is 20.4 ml/m² ml/m2.    PHYSICIAN INTERPRETATION:  Left Ventricle: The left ventricular internal cavity size is normal. Left ventricular wall thickness is normal. Global LV systolic function was normal. Left ventricular ejection fraction, by visual estimation, is 55 to 60%.      LV Wall Scoring:  All segments are normal.    Right Ventricle: Normal right ventricular size and function.  Left Atrium: Normal left atrial size. LA volume Index is 20.4 ml/m² ml/m2.  Right Atrium: Normal right atrial size.  Pericardium: There is no evidence of pericardial effusion.  Mitral Valve: Structurally normal mitral valve,with normal leaflet excursion. No evidence of mitral valve regurgitation is seen.  Tricuspid Valve: Structurally normal tricuspid valve, with normal leaflet excursion. Mild tricuspid regurgitation is visualized.  Aortic Valve: Normal trileaflet aortic valve with normal opening. No evidence of aortic valve regurgitation is seen.  Pulmonic Valve: Structurally normal pulmonic valve, with normal leaflet excursion. Trace pulmonic valve regurgitation.  Aorta: The aortic root and ascending aorta are structurally normal, with no evidence of dilitation.  Pulmonary Artery: The main pulmonary artery is normal in size.  Additional Comments: A pacer wire is visualized in the right atrium and right ventricle.    < end of copied text >    STRESS  FINDINGS: < from: NM Nuclear Stress Pharmacologic Multiple (04.21.18 @ 10:41) >  Impression:   1. NORMAL LEXISCAN / REST MYOCARDIAL PERFUSION TOMOGRAPHY, WITH NO   EVIDENCE FOR ISCHEMIA DURING LEXISCAN INFUSION.   2. NORMAL RESTING LEFT VENTRICULAR WALL MOTION AND WALL THICKENING.  3. LEFT VENTRICULAR EJECTION FRACTION OF  61 % WHICH IS WITHIN RANGE OF   NORMAL.     < end of copied text >    CATHETERIZATION  FINDINGS: PROCEDURE SUMMARY  No angiographic evidence of coronary artery disease.       RECOMMENDATIONS    -IV hydration post procedure   -Restart Eliquis in AM  -Risk factor modification  -Out pt f/u with cardiology.    FAMILY HISTORY:  Family history of colon cancer in mother    Family history of cardiomyopathy  Mother    Family history of cervical cancer (Mother)    Family history of early CAD (Mother)        SOCIAL HISTORY:  CIGARETTES: denies  ALCOHOL: denies      MEDICATIONS  (STANDING):  aspirin  chewable 81 milliGRAM(s) Oral daily  atorvastatin 40 milliGRAM(s) Oral at bedtime  baclofen 10 milliGRAM(s) Oral three times a day  folic acid 1 milliGRAM(s) Oral daily  gabapentin 400 milliGRAM(s) Oral three times a day  lamoTRIgine 200 milliGRAM(s) Oral two times a day  metoprolol succinate ER 50 milliGRAM(s) Oral daily  pantoprazole    Tablet 40 milliGRAM(s) Oral before breakfast  QUEtiapine 25 milliGRAM(s) Oral three times a day  sertraline 50 milliGRAM(s) Oral daily  tamsulosin 0.4 milliGRAM(s) Oral at bedtime  tiotropium 18 MICROgram(s) Capsule 1 Capsule(s) Inhalation daily    MEDICATIONS  (PRN):      Vital Signs Last 24 Hrs  T(C): 35.7 (28 Mar 2021 13:08), Max: 36.7 (28 Mar 2021 06:17)  T(F): 96.3 (28 Mar 2021 13:08), Max: 98.1 (28 Mar 2021 06:17)  HR: 65 (28 Mar 2021 13:08) (65 - 77)  BP: 108/72 (28 Mar 2021 13:08) (100/58 - 117/61)  BP(mean): --  RR: 18 (28 Mar 2021 13:08) (16 - 18)  SpO2: 97% (28 Mar 2021 06:55) (96% - 99%)    PHYSICAL EXAM:    GENERAL: In no apparent distress, well nourished, and hydrated.  HEART: Regular rate and rhythm; No murmurs, rubs, or gallops.  PULMONARY: Clear to auscultation and perfusion.  No rales, wheezing, or rhonchi bilaterally.  ABDOMEN: Soft, Nontender, Nondistended; Bowel sounds present  EXTREMITIES:  2+ Peripheral Pulses, No clubbing, cyanosis, or edema  NEUROLOGICAL: AO x4, YARBROUGH, speech clear      INTERPRETATION OF TELEMETRY:     ECG: < from: 12 Lead ECG (03.28.21 @ 02:36) >    Ventricular Rate 70 BPM    Atrial Rate 70 BPM    P-R Interval 238 ms    QRS Duration 148 ms    Q-T Interval 422 ms    QTC Calculation(Bazett) 455 ms    P Axis 93 degrees    R Axis 34 degrees    T Axis 90 degrees    Diagnosis Line Atrial-paced rhythm with prolonged AV conduction  Right bundle branch block  T wave abnormality, consider lateral ischemia  Abnormal ECG    < end of copied text >      I&O's Detail    27 Mar 2021 07:01  -  28 Mar 2021 07:00  --------------------------------------------------------  IN:    Oral Fluid: 120 mL  Total IN: 120 mL    OUT:    Voided (mL): 500 mL  Total OUT: 500 mL    Total NET: -380 mL      LABS:                        13.1   6.15  )-----------( 225      ( 28 Mar 2021 01:59 )             40.0     03-28    141  |  105  |  13  ----------------------------<  92  4.2   |  26  |  0.9    Ca    9.5      28 Mar 2021 01:59  Mg     2.1     03-28    TPro  7.0  /  Alb  4.6  /  TBili  0.5  /  DBili  x   /  AST  17  /  ALT  14  /  AlkPhos  128<H>  03-28    CARDIAC MARKERS ( 28 Mar 2021 01:59 )  x     / <0.01 ng/mL / x     / x     / x          PT/INR - ( 28 Mar 2021 01:59 )   PT: 11.70 sec;   INR: 1.02 ratio         PTT - ( 28 Mar 2021 01:59 )  PTT:39.9 sec    BNP      I&O's Detail    27 Mar 2021 07:01  -  28 Mar 2021 07:00  --------------------------------------------------------  IN:    Oral Fluid: 120 mL  Total IN: 120 mL    OUT:    Voided (mL): 500 mL  Total OUT: 500 mL    Total NET: -380 mL        Daily Height in cm: 167.64 (28 Mar 2021 06:51)    Daily     RADIOLOGY & ADDITIONAL STUDIES:

## 2021-03-28 NOTE — H&P ADULT - NSHPLABSRESULTS_GEN_ALL_CORE
LABS:                        13.1   6.15  )-----------( 225      ( 28 Mar 2021 01:59 )             40.0     03-28    141  |  105  |  13  ----------------------------<  92  4.2   |  26  |  0.9    Ca    9.5      28 Mar 2021 01:59  Mg     2.1     03-28    TPro  7.0  /  Alb  4.6  /  TBili  0.5  /  DBili  x   /  AST  17  /  ALT  14  /  AlkPhos  128<H>  03-28    PT/INR - ( 28 Mar 2021 01:59 )   PT: 11.70 sec;   INR: 1.02 ratio         PTT - ( 28 Mar 2021 01:59 )  PTT:39.9 sec  Aspartate Aminotransferase (AST/SGOT): 17 U/L (03-28-21 @ 01:59)  Alanine Aminotransferase (ALT/SGPT): 14 U/L (03-28-21 @ 01:59)

## 2021-03-28 NOTE — ED ADULT NURSE NOTE - CHIEF COMPLAINT QUOTE
Pt BIBA from [facility], AICD malfunctioning since Monday was scheduled for surgery next week but states it went off tonight. Pt denies chest pain, VS stable

## 2021-03-28 NOTE — ED PROVIDER NOTE - OBJECTIVE STATEMENT
48m h/o ARVC s/p pacemaker and AICD placement, CHF, MS, optic neuritis, bipolar on seroquel p/w AICD malfunction. pt felt palpitations tonight and felt a vibration in his AICD however was not shocked. Pt denies CP, sob, fever/chills, cough, LE swelling, back pain, n/v/d. 48m h/o ARVC s/p pacemaker and AICD placement, CHF, MS, optic neuritis, bipolar on seroquel p/w AICD malfunction. pt felt palpitations tonight and felt a vibration in his AICD however was not shocked. Pt denies CP, sob, fever/chills, cough, LE swelling, back pain, n/v/d. Pt saw Dr Jarquin (EP) last week and found to have a malfunctioned AICD and is scheduled for a replacement on Monday with him and Dr Pastrana.

## 2021-03-28 NOTE — CONSULT NOTE ADULT - ASSESSMENT
EP: Covering for Dr. Quiñones    50-year old man with PMH paroxysmal AF s/p ablation (x2, 2017) s/p re-do RF (12/20, PVI-CTI) s/p early flutter s/p spontaneous conversion during procedure and CTI touch-up (1/21), Atrial standstill s/p PPM (2001) --> ARVD s/p ICD placement --> ICD lead malfunction s/p ICD lead extraction s/p DC ICD lead placement ([employer]) --> Perforation s/p RV lead revision, HIV, DL, bipolar disorder, seizure, depression, HTN who is now admitted with RV lead dislogdment.    Impression:   Atrial standstill, ARVD s/p ICD ([facility]), now with Rv lead dislodgement  PAF (on Eliquis)  Hx HTN  Hx HIV  COVID neg 3/28    Plan:  - Plan for OR tomorrow with Dr. Jarquin in CT OR for lead revision  - NPO after midnight  - Holding Eliquis, NO DVT ppx  - Tele  - Will interrogate ICD  - Will need repeat COVID tomorrow evening after OR in order to return to facility   Secondary Defect Width In Cm (Required For Flaps): 0 Repair Anesthesia Method: local infiltration Suture Removal: 14 days Stage 13: Additional Anesthesia Type: 1% lidocaine with epinephrine Mart-1 - Negative Histology Text: MART-1 staining demonstrates a normal density and pattern of melanocytes along the dermal-epidermal junction. The surgical margins are negative for tumor cells. Render Postcare In The Note: Yes Orbicularis Oris Muscle Flap Text: The defect edges were debeveled with a #15 scalpel blade.  Given that the defect affected the competency of the oral sphincter an obicularis oris muscle flap was deemed most appropriate to restore this competency and normal muscle function.  Using a sterile surgical marker, an appropriate flap was drawn incorporating the defect. The area thus outlined was incised with a #15 scalpel blade. Include Hemigard In Note?: No EP: Covering for Dr. Quiñones/Dr. Jarquin    50-year old man with PMH paroxysmal AF s/p ablation (x2, 2017) s/p re-do RF (12/20, PVI-CTI) s/p early flutter s/p spontaneous conversion during procedure and CTI touch-up (1/21), Atrial standstill s/p PPM (2001) --> ARVD s/p ICD placement --> ICD lead malfunction s/p ICD lead extraction s/p DC ICD lead placement ([employer]) --> Perforation s/p RV lead revision, HIV, DL, bipolar disorder, seizure, depression, HTN who is now admitted with RV lead dislogdment.    Impression:   Atrial standstill, ARVD s/p ICD ([facility]), now with Rv lead dislodgement  PAF (on Eliquis)  Hx HTN  Hx HIV  COVID neg 3/28    Plan:  - Plan for OR tomorrow with Dr. Jarquin in CT OR for lead revision  - NPO after midnight  - Holding Eliquis starting this evening, NO DVT ppx  - Tele  - Will interrogate ICD  - Will need repeat COVID tomorrow evening after OR in order to return to facility   Plastic Surgeon Procedure Text (B): After obtaining clear surgical margins the patient was sent to plastics for surgical repair.  The patient understands they will receive post-surgical care and follow-up from the referring physician's office. Stage 2: Additional Anesthesia Type: 1% lidocaine with epinephrine and a 1:10 solution of 8.4% sodium bicarbonate Debridement Text: The wound edges were debrided prior to proceeding with the closure to facilitate wound healing. Oculoplastic Surgeon Procedure Text (C): After obtaining clear surgical margins the patient was sent to oculoplastics for surgical repair.  The patient understands they will receive post-surgical care and follow-up from the referring physician's office. Partial Purse String (Simple) Text: Given the location of the defect and the characteristics of the surrounding skin a simple purse string closure was deemed most appropriate.  Undermining was performed circumfirentially around the surgical defect.  A purse string suture was then placed and tightened. Wound tension only allowed a partial closure of the circular defect. M-Plasty Complex Repair Preamble Text (Leave Blank If You Do Not Want): Extensive wide undermining was performed. Anesthesia Type: 1% lidocaine with 1:100,000 epinephrine and a 1:10 solution of 8.4% sodium bicarbonate Detail Level: Detailed Post-Care Instructions: I reviewed in detail the post-care instructions. X Size Of Lesion In Cm (Optional): 1.3 Staging Info: By selecting yes to the question above you will include information on AJCC 8 tumor staging in your Mohs note. Information on tumor staging will be automatically added for SCCs on the head and neck. AJCC 8 includes tumor size, tumor depth, perineural involvement and bone invasion. Rhomboid Transposition Flap Text: The defect edges were debeveled with a #15 scalpel blade.  Given the location of the defect and the proximity to free margins a rhomboid transposition flap was deemed most appropriate.  Using a sterile surgical marker, an appropriate rhomboid flap was drawn incorporating the defect.    The area thus outlined was incised deep to adipose tissue with a #15 scalpel blade.  The skin margins were undermined to an appropriate distance in all directions utilizing iris scissors. Primary Defect Length In Cm (Final Defect Size - Required For Flaps/Grafts): 3 Asc Procedure Text (F): After obtaining clear surgical margins the patient was sent to an ASC for surgical repair.  The patient understands they will receive post-surgical care and follow-up from the ASC physician. Muscle Hinge Flap Text: The defect edges were debeveled with a #15 scalpel blade.  Given the size, depth and location of the defect and the proximity to free margins a muscle hinge flap was deemed most appropriate.  Using a sterile surgical marker, an appropriate hinge flap was drawn incorporating the defect. The area thus outlined was incised with a #15 scalpel blade.  The skin margins were undermined to an appropriate distance in all directions utilizing iris scissors. Number Of Hemigard Strips Per Side: 1 Helical Rim Text: The closure involved the helical rim. Otolaryngologist Procedure Text (A): After obtaining clear surgical margins the patient was sent to otolaryngology for surgical repair.  The patient understands they will receive post-surgical care and follow-up from the referring physician's office. Graft Cartilage Fenestration Text: The cartilage was fenestrated with a 2mm punch biopsy to help facilitate graft survival and healing. Chonodrocutaneous Helical Advancement Flap Text: The defect edges were debeveled with a #15 scalpel blade.  Given the location of the defect and the proximity to free margins a chondrocutaneous helical advancement flap was deemed most appropriate.  Using a sterile surgical marker, the appropriate advancement flap was drawn incorporating the defect and placing the expected incisions within the relaxed skin tension lines where possible.    The area thus outlined was incised deep to adipose tissue with a #15 scalpel blade.  The skin margins were undermined to an appropriate distance in all directions utilizing iris scissors. Special Stains Stage 13 - Results: Base On Clearance Noted Above Provider Procedure Text (C): After obtaining clear surgical margins the defect was repaired by another provider. Why Was The Change Made?: Please Select the Appropriate Response Perineural Invasion (For Histology - Be Specific If Possible): absent Banner Transposition Flap Text: The defect edges were debeveled with a #15 scalpel blade.  Given the location of the defect and the proximity to free margins a Banner transposition flap was deemed most appropriate.  Using a sterile surgical marker, an appropriate flap drawn around the defect. The area thus outlined was incised deep to adipose tissue with a #15 scalpel blade.  The skin margins were undermined to an appropriate distance in all directions utilizing iris scissors. Information: Selecting Yes will display possible errors in your note based on the variables you have selected. This validation is only offered as a suggestion for you. PLEASE NOTE THAT THE VALIDATION TEXT WILL BE REMOVED WHEN YOU FINALIZE YOUR NOTE. IF YOU WANT TO FAX A PRELIMINARY NOTE YOU WILL NEED TO TOGGLE THIS TO 'NO' IF YOU DO NOT WANT IT IN YOUR FAXED NOTE. Eye Protection Verbiage: Before proceeding with the stage, a plastic scleral shield was inserted. The globe was anesthetized with a few drops of 1% lidocaine with 1:100,000 epinephrine. Then, an appropriate sized scleral shield was chosen and coated with lacrilube ointment. The shield was gently inserted and left in place for the duration of each stage. After the stage was completed, the shield was gently removed. Area H Indication Text: Tumors in this location are included in\\nArea H (central face, eyelids, canthi, eyebrows, nose, lips, chin, ears, genitals, hands, feet, nail units, ankles, nipples and areola). Asc Procedure Text (A): After obtaining clear surgical margins the patient was sent to an ASC for surgical repair.  The patient understands they will receive post-surgical care and follow-up from the ASC physician. Otolaryngologist Procedure Text (F): After obtaining clear surgical margins the patient was sent to otolaryngology for surgical repair.  The patient understands they will receive post-surgical care and follow-up from the referring physician's office. Location Indication Override (Is Already Calculated Based On Selected Body Location): Area L Double M-Plasty Intermediate Repair Preamble Text (Leave Blank If You Do Not Want): Undermining was performed with blunt dissection. Plastic Surgeon Procedure Text (D): After obtaining clear surgical margins the patient was sent to plastics for surgical repair.  The patient understands they will receive post-surgical care and follow-up from the referring physician's office. Retention Suture Text: Retention sutures were placed to support the closure and prevent dehiscence. O-Z Flap Text: The defect edges were debeveled with a #15 scalpel blade.  Given the location of the defect, shape of the defect and the proximity to free margins an O-Z flap was deemed most appropriate.  Using a sterile surgical marker, an appropriate transposition flap was drawn incorporating the defect and placing the expected incisions within the relaxed skin tension lines where possible. The area thus outlined was incised deep to adipose tissue with a #15 scalpel blade.  The skin margins were undermined to an appropriate distance in all directions utilizing iris scissors. Postop Diagnosis: same Tumor Depth: Less than 6mm from granular layer and no invasion beyond the subcutaneous fat Area L Indication Text: Tumors in this location are included in\\nArea L (trunk and extremities). Estimated Blood Loss (Cc): minimal Oculoplastic Surgeon (A): LAURIE Ayala. Oculoplastic Surgeon Procedure Text (E): After obtaining clear surgical margins the patient was sent to oculoplastics for surgical repair.  The patient understands they will receive post-surgical care and follow-up from the referring physician's office. Mid-Level Procedure Text (B): After obtaining clear surgical margins the patient was sent to a mid-level provider for surgical repair.  The patient understands they will receive post-surgical care and follow-up from the mid-level provider. Hemigard Intro: Due to skin fragility and wound tension, it was decided to use HEMIGARD adhesive retention suture devices to permit a linear closure. The skin was cleaned and dried for a 6cm distance away from the wound. Excessive hair, if present, was removed to allow for adhesion. Mid-Level Procedure Text (D): After obtaining clear surgical margins the patient was sent to a mid-level provider for surgical repair.  The patient understands they will receive post-surgical care and follow-up from the mid-level provider. Body Location Override (Optional - Billing Will Still Be Based On Selected Body Map Location If Applicable): left back Deep Sutures: 4-0 Monocryl Nasalis-Muscle-Based Myocutaneous Island Pedicle Flap Text: Using a #15 blade, an incision was made around the donor flap to the level of the nasalis muscle. Wide lateral undermining was then performed in both the subcutaneous plane above the nasalis muscle, and in a submuscular plane just above periosteum. This allowed the formation of a free nasalis muscle axial pedicle (based on the angular artery) which was still attached to the actual cutaneous flap, increasing its mobility and vascular viability. Hemostasis was obtained with pinpoint electrocoagulation. The flap was mobilized into position and the pivotal anchor points positioned and stabilized with buried interrupted sutures. Subcutaneous and dermal tissues were closed in a multilayered fashion with sutures. Tissue redundancies were excised, and the epidermal edges were apposed without significant tension and sutured with sutures. Manual Repair Warning Statement: We plan on removing the manually selected variable below in favor of our much easier automatic structured text blocks found in the previous tab. We decided to do this to help make the flow better and give you the full power of structured data. Manual selection is never going to be ideal in our platform and I would encourage you to avoid using manual selection from this point on, especially since I will be sunsetting this feature. It is important that you do one of two things with the customized text below. First, you can save all of the text in a word file so you can have it for future reference. Second, transfer the text to the appropriate area in the Library tab. Lastly, if there is a flap or graft type which we do not have you need to let us know right away so I can add it in before the variable is hidden. No need to panic, we plan to give you roughly 6 months to make the change. Vermilion Border Text: The closure involved the vermilion border. Subsequent Stages Histo Method Verbiage: The area was prepped in the usual fashion. Using a similar technique to that described above, a thin\\nlayer of tissue was removed from all areas where tumor was visible on the previous stage.  The tissue was again\\noriented, mapped, dyed, and processed as above. After hemostasis, the specimen was oriented, mapped and\\ndivided Bilobed Transposition Flap Text: The defect edges were debeveled with a #15 scalpel blade.  Given the location of the defect and the proximity to free margins a bilobed transposition flap was deemed most appropriate.  Using a sterile surgical marker, an appropriate bilobe flap drawn around the defect.    The area thus outlined was incised deep to adipose tissue with a #15 scalpel blade.  The skin margins were undermined to an appropriate distance in all directions utilizing iris scissors. Repair Type: Intermediate Layered Repair Hemostasis: Electrocoagulation Medical Necessity Statement: Based on my medical judgement, Mohs surgery is the most appropriate treatment for this cancer compared to\\nother treatments. I discussed alternative treatments to Mohs surgery and specifically discussed the risks and\\nbenefits of curettage, excision with permanent sections, radiation therapy, and foregoing treatment. The rationale\\nfor Mohs surgery was explained to the patient and consent was obtained. The risks, benefits and alternatives to\\ntherapy were discussed in detail. Specifically, the risks of infection, scarring, bleeding, prolonged wound healing,\\nincomplete removal, allergy to anesthesia, nerve injury and recurrence were addressed. Prior to the procedure,\\nthe treatment site was clearly identified and confirmed by the patient. All components of Universal\\nProtocol/PAUSE Rule completed. All laboratory services were performed in the [facility], [address] [place] [address], [address], [place] [address].  Special stains are not necessarily approved by the [place]. Food and Drug Administration and are used to improve diagnostic accuracy. Hemigard Postcare Instructions: The HEMIGARD strips are to remain completely dry for at least 5-7 days. Mart-1 - Positive Histology Text: MART-1 staining demonstrates areas of higher density and clustering of melanocytes with Pagetoid spread upwards within the epidermis. The surgical margins are positive for tumor cells. Surgeon/Pathologist Verbiage (Will Incorporate Name Of Surgeon From Intro If Not Blank): operated in two distinct and integrated capacities as the surgeon and pathologist. Initial Size Of Lesion: 2.1 Hemigard Retention Suture: 2-0 Nylon Suturegard Intro: Intraoperative tissue expansion was performed, utilizing the SUTUREGARD device, in order to reduce wound tension. Graft Basting Suture (Optional): 5-0 Fast Absorbing Gut Non-Graft Cartilage Fenestration Text: The cartilage was fenestrated with a 2mm punch biopsy to help facilitate healing. Pain Refusal Text: I offered to prescribe pain medication but the patient refused to take this medication. Where Do You Want The Question To Include Opioid Counseling Located?: Case Summary Tab Mohs Case Number: [id number] Closure 4 Information: This tab is for additional flaps and grafts above and beyond our usual structured repairs.  Please note if you enter information here it will not currently bill and you will need to add the billing information manually. Complex Repair And Flap Additional Text (Will Appearing After The Standard Complex Repair Text): The complex repair was not sufficient to completely close the primary defect. The remaining additional defect was repaired with the flap mentioned below. Nostril Rim Text: The closure involved the nostril rim. Simple / Intermediate / Complex Repair - Final Wound Length In Cm: 4.3 Consent 1/Introductory Paragraph: Due to lack of immediate margin control and higher risk of recurrence, curettage and excision were not chosen for treatment. Closure 2 Information: This tab is for additional flaps and grafts, including complex repair and grafts and complex repair and flaps. You can also specify a different location for the additional defect, if the location is the same you do not need to select a new one. We will insert the automated text for the repair you select below just as we do for solitary flaps and grafts. Please note that at this time if you select a location with a different insurance zone you will need to override the ICD10 and CPT if appropriate. Area M Indication Text: Tumors in this location are included in\\nArea M (cheek, forehead, scalp, neck, jawline and pretibial skin). Double O-Z Plasty Text: The defect edges were debeveled with a #15 scalpel blade.  Given the location of the defect, shape of the defect and the proximity to free margins a Double O-Z plasty (double transposition flap) was deemed most appropriate.  Using a sterile surgical marker, the appropriate transposition flaps were drawn incorporating the defect and placing the expected incisions within the relaxed skin tension lines where possible. The area thus outlined was incised deep to adipose tissue with a #15 scalpel blade.  The skin margins were undermined to an appropriate distance in all directions utilizing iris scissors.  Hemostasis was achieved with electrocautery.  The flaps were then transposed into place, one clockwise and the other counterclockwise, and anchored with interrupted buried subcutaneous sutures. Burow's Graft Text: The defect edges were debeveled with a #15 scalpel blade.  Given the location of the defect, shape of the defect, the proximity to free margins and the presence of a standing cone deformity a Burow's skin graft was deemed most appropriate. The standing cone was removed and this tissue was then trimmed to the shape of the primary defect. The adipose tissue was also removed until only dermis and epidermis were left.  The skin margins of the secondary defect were undermined to an appropriate distance in all directions utilizing iris scissors.  The secondary defect was closed with interrupted buried subcutaneous sutures.  The skin edges were then re-apposed with running  sutures.  The skin graft was then placed in the primary defect and oriented appropriately. Epidermal Closure: running and interrupted Plastic Surgeon (A): Roderick Mohs Histo Method Verbiage: Each section was then chromacoded and processed in the Mohs lab using the Mohs protocol and submitted for frozen section. Partial Purse String (Intermediate) Text: Given the location of the defect and the characteristics of the surrounding skin an intermediate purse string closure was deemed most appropriate.  Undermining was performed circumfirentially around the surgical defect.  A purse string suture was then placed and tightened. Wound tension only allowed a partial closure of the circular defect. Epidermal Closure Graft Donor Site (Optional): running Brow Lift Text: A midfrontal incision was made medially to the defect to allow access to the tissues just superior to the left eyebrow. Following careful dissection inferiorly in a supraperiosteal plane to the level of the left eyebrow, several 3-0 monocryl sutures were used to resuspend the eyebrow orbicularis oculi muscular unit to the superior frontal bone periosteum. This resulted in an appropriate reapproximation of static eyebrow symmetry and correction of the left brow ptosis. Primary Defect Width In Cm (Final Defect Size - Required For Flaps/Grafts): 1.7 Zygomaticofacial Flap Text: Given the location of the defect, shape of the defect and the proximity to free margins a zygomaticofacial flap was deemed most appropriate for repair.  Using a sterile surgical marker, the appropriate flap was drawn incorporating the defect and placing the expected incisions within the relaxed skin tension lines where possible. The area thus outlined was incised deep to adipose tissue with a #15 scalpel blade with preservation of a vascular pedicle.  The skin margins were undermined to an appropriate distance in all directions utilizing iris scissors.  The flap was then placed into the defect and anchored with interrupted buried subcutaneous sutures. Mauc Instructions: By selecting yes to the question below the MAUC number will be added into the note.  This will be calculated automatically based on the diagnosis chosen, the size entered, the body zone selected (H,M,L) and the specific indications you chose. You will also have the option to override the Mohs AUC if you disagree with the automatically calculated number and this option is found in the Case Summary tab. Nasal Turnover Hinge Flap Text: The defect edges were debeveled with a #15 scalpel blade.  Given the size, depth, location of the defect and the defect being full thickness a nasal turnover hinge flap was deemed most appropriate.  Using a sterile surgical marker, an appropriate hinge flap was drawn incorporating the defect. The area thus outlined was incised with a #15 scalpel blade. The flap was designed to recreate the nasal mucosal lining and the alar rim. The skin margins were undermined to an appropriate distance in all directions utilizing iris scissors. Secondary Intention Text (Leave Blank If You Do Not Want): The defect will heal with secondary intention. Unna Boot Text: An Unna boot was placed to help immobilize the limb and facilitate more rapid healing. Donor Site Anesthesia Type: same as repair anesthesia Consent (Lip)/Introductory Paragraph: The rationale for Mohs was explained to the patient and consent was obtained. The risks, benefits and alternatives to therapy were discussed in detail. Specifically, the risks of lip deformity, changes in the oral aperture, infection, scarring, bleeding, prolonged wound healing, incomplete removal, allergy to anesthesia, nerve injury and recurrence were addressed. Prior to the procedure, the treatment site was clearly identified and confirmed by the patient. All components of Universal Protocol/PAUSE Rule completed. Closure 3 Information: This tab is for additional flaps and grafts above and beyond our usual structured repairs.  Please note if you enter information here it will not currently bill and you will need to add the billing information manually. Keystone Flap Text: The defect edges were debeveled with a #15 scalpel blade.  Given the location of the defect, shape of the defect a keystone flap was deemed most appropriate.  Using a sterile surgical marker, an appropriate keystone flap was drawn incorporating the defect, outlining the appropriate donor tissue and placing the expected incisions within the relaxed skin tension lines where possible. The area thus outlined was incised deep to adipose tissue with a #15 scalpel blade.  The skin margins were undermined to an appropriate distance in all directions around the primary defect and laterally outward around the flap utilizing iris scissors. Peng Advancement Flap Text: The defect edges were debeveled with a #15 scalpel blade.  Given the location of the defect, shape of the defect and the proximity to free margins a Peng advancement flap was deemed most appropriate.  Using a sterile surgical marker, an appropriate advancement flap was drawn incorporating the defect and placing the expected incisions within the relaxed skin tension lines where possible. The area thus outlined was incised deep to adipose tissue with a #15 scalpel blade.  The skin margins were undermined to an appropriate distance in all directions utilizing iris scissors. Additional Anesthesia Volume In Cc: 6 Retention Suture Bite Size: 3 mm Depth Of Tumor Invasion (For Histology): tumor not visualized (deep and peripheral margins are clear of tumor) Mohs Method Verbiage: An incision at a 45 degree angle following the standard Mohs\\napproach was done and the specimen was harvested as a microscopically controlled layer. Surgeon Performing Repair (Optional): Erik Hilton M.D. Dressing: pressure dressing with telfa Suturegard Body: The suture ends were repeatedly re-tightened and re-clamped to achieve the desired tissue expansion. Double O-Z Flap Text: The defect edges were debeveled with a #15 scalpel blade.  Given the location of the defect, shape of the defect and the proximity to free margins a Double O-Z flap was deemed most appropriate.  Using a sterile surgical marker, an appropriate transposition flap was drawn incorporating the defect and placing the expected incisions within the relaxed skin tension lines where possible. The area thus outlined was incised deep to adipose tissue with a #15 scalpel blade.  The skin margins were undermined to an appropriate distance in all directions utilizing iris scissors. Wound Care (No Sutures): Petrolatum Anesthesia Volume In Cc: 4 Tumor Debulked?: curette Star Wedge Flap Text: The defect edges were debeveled with a #15 scalpel blade.  Given the location of the defect, shape of the defect and the proximity to free margins a star wedge flap was deemed most appropriate.  Using a sterile surgical marker, an appropriate rotation flap was drawn incorporating the defect and placing the expected incisions within the relaxed skin tension lines where possible. The area thus outlined was incised deep to adipose tissue with a #15 scalpel blade.  The skin margins were undermined to an appropriate distance in all directions utilizing iris scissors. Consent Type: Consent 1 (Standard) No Residual Tumor Seen Histology Text: Margins were clear Surgeon: Erik Hilton M.D. Length To Time In Minutes Device Was In Place: 10 Tarsorrhaphy Text: A tarsorrhaphy was performed using Frost sutures. Complex Repair And Graft Additional Text (Will Appearing After The Standard Complex Repair Text): The complex repair was not sufficient to completely close the primary defect. The remaining additional defect was repaired with the graft mentioned below. Skin Substitute Text: The defect edges were debeveled with a #15 scalpel blade.  Given the location of the defect, shape of the defect and the proximity to free margins a skin substitute graft was deemed most appropriate.  The graft material was trimmed to fit the size of the defect. The graft was then placed in the primary defect and oriented appropriately. Wound Care: Polysporin ointment Dressing (No Sutures): dry sterile dressing Mercedes Flap Text: The defect edges were debeveled with a #15 scalpel blade.  Given the location of the defect, shape of the defect and the proximity to free margins a Mercedes flap was deemed most appropriate.  Using a sterile surgical marker, an appropriate advancement flap was drawn incorporating the defect and placing the expected incisions within the relaxed skin tension lines where possible. The area thus outlined was incised deep to adipose tissue with a #15 scalpel blade.  The skin margins were undermined to an appropriate distance in all directions utilizing iris scissors. Undermining Type: Entire Wound

## 2021-03-28 NOTE — ED ADULT NURSE NOTE - NS PRO PASSIVE SMOKE EXP
Detail Level: Detailed Add 73921 Cpt? (Important Note: In 2017 The Use Of 67620 Is Being Tracked By Cms To Determine Future Global Period Reimbursement For Global Periods): yes No

## 2021-03-28 NOTE — ED ADULT NURSE NOTE - OBJECTIVE STATEMENT
49 y/o pt came c/o AICD malfunction today, pt stated that he did not feel it firing but felt that it was buzzing. Pt denies any chest pain or palpitations, SOB, VS-WDL, paced rhythm, A&O x 3, on RA SPO2 - 96%

## 2021-03-28 NOTE — ED PROVIDER NOTE - CLINICAL SUMMARY MEDICAL DECISION MAKING FREE TEXT BOX
50M with PMH ARVC s/p PPM/AICD, MS, bipolar d/o on seroquel who p/w palpitations that started this evening, along with vibration sensation in his chest. Denies shocks emitted from device. Denies CP, SOB, LE swelling. He saw Dr. Jarquin last week who reported migration of lead(s) and is scheduled for replacement on Monday with him and Dr. Pastrana. EKG unchanged from prior, RBBB, atrial paced rhythm. Labs and imaging reviewed. CXR clear. Electrolytes wnl. pt admitted to tele for further mgmt.

## 2021-03-28 NOTE — H&P ADULT - NSHPPHYSICALEXAM_GEN_ALL_CORE
General: A/ox 3, No acute Distress  Neck: Supple, NO JVD  Cardiac: S1 S2 heard regular, No audible murmurs  Pulmonary: Good bilateral breath sounds, Breathing unlabored, No Rhonchi/Rales/Wheezing  Abdomen: Soft, Non -tender, +BS   Extremities: No Rashes, No edema  Neuro: A/o x 3, No focal deficits  Psch: normal mood , normal affect    T(C): 36.4 (03-28-21 @ 01:42), Max: 36.4 (03-28-21 @ 01:42)  HR: 68 (03-28-21 @ 02:45) (68 - 70)  BP: 108/67 (03-28-21 @ 02:45) (105/70 - 108/67)  RR: 17 (03-28-21 @ 02:45) (17 - 18)  SpO2: 97% (03-28-21 @ 02:45) (97% - 99%)

## 2021-03-28 NOTE — ED ADULT NURSE NOTE - NSIMPLEMENTINTERV_GEN_ALL_ED
Implemented All Fall with Harm Risk Interventions:  [place] to call system. Call bell, personal items and telephone within reach. Instruct patient to call for assistance. Room bathroom lighting operational. Non-slip footwear when patient is off stretcher. Physically safe environment: no spills, clutter or unnecessary equipment. Stretcher in lowest position, wheels locked, appropriate side rails in place. Provide visual cue, wrist band, yellow gown, etc. Monitor gait and stability. Monitor for mental status changes and reorient to person, place, and time. Review medications for side effects contributing to fall risk. Reinforce activity limits and safety measures with patient and family. Provide visual clues: red socks.

## 2021-03-28 NOTE — ED ADULT NURSE REASSESSMENT NOTE - NS ED NURSE REASSESS COMMENT FT1
received pt  in stretcher A&o3 in no apparent signs of distress. Pt remains stable on monitor, sating well on room air  , PIV site WNL. Pt status unchanged, refer to flowsheet and chart, pt ID band in place, pt safety maintained,, updated on plan of care. Call light provided, fall safety reinforced. Will continue to monitor

## 2021-03-28 NOTE — ED PROVIDER NOTE - NS ED ROS FT
General: No fever, chills, or weakness.  Eyes:  No visual changes, eye pain or discharge.  ENMT:  No hearing changes, pain, no sore throat or runny nose, no difficulty swallowing  Cardiac: Palpitations.  No chest pain, SOB or edema. No chest pain with exertion.  Respiratory:  No cough or respiratory distress. No hemoptysis. No history of asthma or RAD.  GI:  No nausea, vomiting, diarrhea or abdominal pain.  :  No dysuria, frequency or burning.  MS:  No myalgia, muscle weakness, joint pain or back pain.  Neuro:  No headache.  No LOC. No change in ambulation. No dizziness.  Skin:  No skin rash.   Endocrine: No history of thyroid disease or diabetes.

## 2021-03-28 NOTE — ED ADULT TRIAGE NOTE - CHIEF COMPLAINT QUOTE
Pt BIBA from [facility], AICD malfunctioning since Monday was scheduled for surgery next week but states it went off tonight. Pt denies chest pain, VS stable

## 2021-03-28 NOTE — H&P ADULT - ASSESSMENT
50-year old man presented for assessment after his defibrillator beeped / audible alarm.    Known to have a history of paroxysmal AF s/p ablation (x2, 2017) s/p re-do RF (12/20, PVI-CTI) s/p early flutter s/p spontaneoud conversion during procedure and CTI touch-up (1/21), Atrial standstill s/p PPM (2001) --> ARVD s/p ICD placement --> ICD lead malfunction s/p ICD lead extraction s/p DC ICD lead placement ([employer]) --> Perforation s/p RV lead revision, HIV, DL, bipolar disorder, seizure, depression, HTN.    # Device / Lead malfunction  - Device interrogated:    > The patient is pacemaker dependent. Atrial dependant    > Device Type: Implantable cardioverter-defibrillator    > Pacemaker/ICD : [employer].    > Model: [id number]. Serial Number: [id number]. Date of Implant: 9/5/2019.    > Mode: DDD. Rate: 70.    > Battery Status: The estimated remaining battery life is 6.1 years months.    > Atrial: lead impedance was 250 ohms. sensing amplitude was 3.3 mv. Pacing Threshold: 0.5 V @ 0.4 ms.    > Rt Ventricle:. lead impedance was 350 ohms. sensing amplitude was 9.9 mv. Pacing Threshold: 7.25 V @ 1.5 ms.    > Program Changes: Atrial output increased to 3.5V@0.5,. The counters were reset.    > Dual Chamber: Apace-Vpace 89/6.6%.    > Optivol below threshold.    > 1 episode of 1:1 SVT see above.    > ICD lead dislodgement.    - Need to reposition/re-adjust ICD lead    Plan as per Dr. Jarquin:  - As he is atrially dependent, we will attempt this in CTOR with surgery back up  - Temp cath from groin, Attempt to reposition ICD lead from pocket.   - If unsuccessful , we will attempt to remove the lead individually and re-implant. If has binds with other leads, we will extract and re-implant all leads.  - Eliquis on hold for 3 days     50-year old man presented for assessment after his defibrillator beeped / audible alarm.    Known to have a history of paroxysmal AF s/p ablation (x2, 2017) s/p re-do RF (12/20, PVI-CTI) s/p early flutter s/p spontaneoud conversion during procedure and CTI touch-up (1/21), Atrial standstill s/p PPM (2001) --> ARVD s/p ICD placement --> ICD lead malfunction s/p ICD lead extraction s/p DC ICD lead placement ([employer]) --> Perforation s/p RV lead revision, HIV, DL, bipolar disorder, seizure, depression, HTN.    # Device / Lead malfunction  - Device interrogated:    > The patient is pacemaker dependent. Atrial dependant    > Device Type: Implantable cardioverter-defibrillator    > : [employer].    > Model: [id number]. Serial Number: [id number]. Date of Implant: 9/5/2019.    > Mode: DDD. Rate: 70.    > Battery Status: The estimated remaining battery life is 6.1 years months.    > Atrial: lead impedance was 250 ohms. sensing amplitude was 3.3 mv. Pacing Threshold: 0.5 V @ 0.4 ms.    > Rt Ventricle: lead impedance was 350 ohms. sensing amplitude was 9.9 mv. Pacing Threshold: 7.25 V @ 1.5 ms.    > Program Changes: Atrial output increased to 3.5V@0.5,. The counters were reset.    > Dual Chamber: Apace-Vpace 89/6.6%.    > Optivol below threshold.    > 1 episode of 1:1 SVT see above.    > ICD lead dislodgement.    - Need to reposition/re-adjust ICD lead    Plan as per Dr. Jarquin:  - As he is atrially dependent, we will attempt this in CTOR with surgery back up  - Temp cath from groin, Attempt to reposition ICD lead from pocket.   - If unsuccessful , we will attempt to remove the lead individually and re-implant. If has binds with other leads, we will extract and re-implant all leads.  - Eliquis on hold for 3 days    # MS  - Continue Baclofen, Lamictal, and gabapentin     # Schizoaffective disorder  - Continue Seroquel, Zoloft     # COPD  - Continue tiotropium daily     # GERD  - Continue Protonix 40mg PO daily     # BPH  - Continue Flomax 0.4mg daily     # Folate deficiency   - Continue folic acid 1mg PO daily     # DVT > SCDs  # Diet > DASH

## 2021-03-29 LAB
ALBUMIN SERPL ELPH-MCNC: 4 G/DL — SIGNIFICANT CHANGE UP (ref 3.5–5.2)
ALP SERPL-CCNC: 114 U/L — SIGNIFICANT CHANGE UP (ref 30–115)
ALT FLD-CCNC: 10 U/L — SIGNIFICANT CHANGE UP (ref 0–41)
ANION GAP SERPL CALC-SCNC: 10 MMOL/L — SIGNIFICANT CHANGE UP (ref 7–14)
APTT BLD: 36.2 SEC — SIGNIFICANT CHANGE UP (ref 27–39.2)
AST SERPL-CCNC: 13 U/L — SIGNIFICANT CHANGE UP (ref 0–41)
BASOPHILS # BLD AUTO: 0.07 K/UL — SIGNIFICANT CHANGE UP (ref 0–0.2)
BASOPHILS NFR BLD AUTO: 1.4 % — HIGH (ref 0–1)
BILIRUB SERPL-MCNC: 0.2 MG/DL — SIGNIFICANT CHANGE UP (ref 0.2–1.2)
BLD GP AB SCN SERPL QL: SIGNIFICANT CHANGE UP
BUN SERPL-MCNC: 16 MG/DL — SIGNIFICANT CHANGE UP (ref 10–20)
CALCIUM SERPL-MCNC: 9.8 MG/DL — SIGNIFICANT CHANGE UP (ref 8.5–10.1)
CHLORIDE SERPL-SCNC: 103 MMOL/L — SIGNIFICANT CHANGE UP (ref 98–110)
CO2 SERPL-SCNC: 25 MMOL/L — SIGNIFICANT CHANGE UP (ref 17–32)
COVID-19 SPIKE DOMAIN AB INTERP: POSITIVE
COVID-19 SPIKE DOMAIN ANTIBODY RESULT: >250 U/ML — HIGH
CREAT SERPL-MCNC: 0.9 MG/DL — SIGNIFICANT CHANGE UP (ref 0.7–1.5)
EOSINOPHIL # BLD AUTO: 0.12 K/UL — SIGNIFICANT CHANGE UP (ref 0–0.7)
EOSINOPHIL NFR BLD AUTO: 2.3 % — SIGNIFICANT CHANGE UP (ref 0–8)
GLUCOSE SERPL-MCNC: 97 MG/DL — SIGNIFICANT CHANGE UP (ref 70–99)
HCT VFR BLD CALC: 37.5 % — LOW (ref 42–52)
HGB BLD-MCNC: 12 G/DL — LOW (ref 14–18)
IMM GRANULOCYTES NFR BLD AUTO: 0.4 % — HIGH (ref 0.1–0.3)
INR BLD: 0.97 RATIO — SIGNIFICANT CHANGE UP (ref 0.65–1.3)
LYMPHOCYTES # BLD AUTO: 2.3 K/UL — SIGNIFICANT CHANGE UP (ref 1.2–3.4)
LYMPHOCYTES # BLD AUTO: 44.5 % — SIGNIFICANT CHANGE UP (ref 20.5–51.1)
MAGNESIUM SERPL-MCNC: 1.9 MG/DL — SIGNIFICANT CHANGE UP (ref 1.8–2.4)
MCHC RBC-ENTMCNC: 30.3 PG — SIGNIFICANT CHANGE UP (ref 27–31)
MCHC RBC-ENTMCNC: 32 G/DL — SIGNIFICANT CHANGE UP (ref 32–37)
MCV RBC AUTO: 94.7 FL — HIGH (ref 80–94)
MONOCYTES # BLD AUTO: 0.45 K/UL — SIGNIFICANT CHANGE UP (ref 0.1–0.6)
MONOCYTES NFR BLD AUTO: 8.7 % — SIGNIFICANT CHANGE UP (ref 1.7–9.3)
NEUTROPHILS # BLD AUTO: 2.21 K/UL — SIGNIFICANT CHANGE UP (ref 1.4–6.5)
NEUTROPHILS NFR BLD AUTO: 42.7 % — SIGNIFICANT CHANGE UP (ref 42.2–75.2)
NRBC # BLD: 0 /100 WBCS — SIGNIFICANT CHANGE UP (ref 0–0)
PLATELET # BLD AUTO: 190 K/UL — SIGNIFICANT CHANGE UP (ref 130–400)
POTASSIUM SERPL-MCNC: 4.5 MMOL/L — SIGNIFICANT CHANGE UP (ref 3.5–5)
POTASSIUM SERPL-SCNC: 4.5 MMOL/L — SIGNIFICANT CHANGE UP (ref 3.5–5)
PROT SERPL-MCNC: 6.1 G/DL — SIGNIFICANT CHANGE UP (ref 6–8)
PROTHROM AB SERPL-ACNC: 11.1 SEC — SIGNIFICANT CHANGE UP (ref 9.95–12.87)
RBC # BLD: 3.96 M/UL — LOW (ref 4.7–6.1)
RBC # FLD: 12.9 % — SIGNIFICANT CHANGE UP (ref 11.5–14.5)
SARS-COV-2 IGG+IGM SERPL QL IA: >250 U/ML — HIGH
SARS-COV-2 IGG+IGM SERPL QL IA: POSITIVE
SODIUM SERPL-SCNC: 138 MMOL/L — SIGNIFICANT CHANGE UP (ref 135–146)
WBC # BLD: 5.17 K/UL — SIGNIFICANT CHANGE UP (ref 4.8–10.8)
WBC # FLD AUTO: 5.17 K/UL — SIGNIFICANT CHANGE UP (ref 4.8–10.8)

## 2021-03-29 PROCEDURE — 99233 SBSQ HOSP IP/OBS HIGH 50: CPT

## 2021-03-29 PROCEDURE — 33244 REMOVE ELCTRD TRANSVENOUSLY: CPT

## 2021-03-29 PROCEDURE — 33234 REMOVAL OF PACEMAKER SYSTEM: CPT

## 2021-03-29 PROCEDURE — 33249 INSJ/RPLCMT DEFIB W/LEAD(S): CPT

## 2021-03-29 RX ORDER — TRAMADOL HYDROCHLORIDE 50 MG/1
50 TABLET ORAL ONCE
Refills: 0 | Status: DISCONTINUED | OUTPATIENT
Start: 2021-03-29 | End: 2021-03-29

## 2021-03-29 RX ORDER — ALBUMIN HUMAN 25 %
500 VIAL (ML) INTRAVENOUS ONCE
Refills: 0 | Status: COMPLETED | OUTPATIENT
Start: 2021-03-29 | End: 2021-03-29

## 2021-03-29 RX ADMIN — QUETIAPINE FUMARATE 75 MILLIGRAM(S): 200 TABLET, FILM COATED ORAL at 05:12

## 2021-03-29 RX ADMIN — TIOTROPIUM BROMIDE 1 CAPSULE(S): 18 CAPSULE ORAL; RESPIRATORY (INHALATION) at 11:34

## 2021-03-29 RX ADMIN — LAMOTRIGINE 200 MILLIGRAM(S): 25 TABLET, ORALLY DISINTEGRATING ORAL at 05:11

## 2021-03-29 RX ADMIN — TRAMADOL HYDROCHLORIDE 50 MILLIGRAM(S): 50 TABLET ORAL at 13:16

## 2021-03-29 RX ADMIN — Medication 250 MILLILITER(S): at 20:00

## 2021-03-29 RX ADMIN — GABAPENTIN 400 MILLIGRAM(S): 400 CAPSULE ORAL at 05:12

## 2021-03-29 RX ADMIN — SERTRALINE 50 MILLIGRAM(S): 25 TABLET, FILM COATED ORAL at 11:35

## 2021-03-29 RX ADMIN — TRAMADOL HYDROCHLORIDE 50 MILLIGRAM(S): 50 TABLET ORAL at 22:50

## 2021-03-29 RX ADMIN — PANTOPRAZOLE SODIUM 40 MILLIGRAM(S): 20 TABLET, DELAYED RELEASE ORAL at 05:12

## 2021-03-29 RX ADMIN — Medication 81 MILLIGRAM(S): at 11:34

## 2021-03-29 RX ADMIN — Medication 1 MILLIGRAM(S): at 11:34

## 2021-03-29 RX ADMIN — Medication 50 MILLIGRAM(S): at 05:12

## 2021-03-29 RX ADMIN — TRAMADOL HYDROCHLORIDE 50 MILLIGRAM(S): 50 TABLET ORAL at 22:20

## 2021-03-29 RX ADMIN — TRAMADOL HYDROCHLORIDE 50 MILLIGRAM(S): 50 TABLET ORAL at 11:34

## 2021-03-29 RX ADMIN — Medication 10 MILLIGRAM(S): at 05:12

## 2021-03-29 RX ADMIN — TIOTROPIUM BROMIDE 1 CAPSULE(S): 18 CAPSULE ORAL; RESPIRATORY (INHALATION) at 10:58

## 2021-03-29 NOTE — PROGRESS NOTE ADULT - ASSESSMENT
50-year old man with PMH paroxysmal AF s/p ablation (x2, 2017) s/p re-do RF (12/20, PVI-CTI) s/p early flutter s/p spontaneous conversion during procedure and CTI touch-up (1/21), Atrial standstill s/p PPM (2001) --> ARVD s/p ICD placement --> ICD lead malfunction s/p ICD lead extraction s/p DC ICD lead placement ([employer]) --> Perforation s/p RV lead revision, HIV, DL, bipolar disorder, seizure, depression, HTN who is now admitted with RV lead dislogdment.    # Device / Lead malfunction  - Need to reposition/re-adjust ICD lead, plan for OR today  - Device interrogated:   - Device interrogated, ptn atrially dependent  - Dr Jarquin will perform procedure in OR groin-approach with surgical team back-up  - Eliquis on hold for 3 days   - f/u cardiology recs post-op  - Resume diet after procedure    # MS  - Continue Baclofen, Lamictal, and gabapentin     # Schizoaffective disorder  - Continue Seroquel, Zoloft     # COPD  - Continue tiotropium daily     # GERD  - Continue Protonix 40mg PO daily     # BPH  - Continue Flomax 0.4mg daily     # Folate deficiency   - Continue folic acid 1mg PO daily     # DVT > SCDs  # Diet > DASH  Dispo: - Will need repeat COVID today evening after OR in order to return to facility

## 2021-03-29 NOTE — CHART NOTE - NSCHARTNOTEFT_GEN_A_CORE
Electrophysiology Brief Post-Op Note    I have personally seen and examined the patient.  I agree with the history and physical which I have reviewed and noted any changes below. A clinical representative was present during the case for clinical support- consent taken from the patient. 03-29-21 @ 19:15    PRE-OP DIAGNOSIS: RV lead malfunction    POST-OP DIAGNOSIS: Same    PROCEDURE: Lead extraction with ICD Implant    Physician: Servando Jarquin MD  Assistant: Jose Scales MD    ESTIMATED BLOOD LOSS:   30    mL    ANESTHESIA TYPE:  [ x ] General Anesthesia  [  ] Sedation  [ x ] Local/Regional    CONDITION  [  ] Critical  [  ] Serious  [  ] Fair  [ x ] Good      SPECIMENS REMOVED (IF APPLICABLE): None    IMPLANTS (IF APPLICABLE): Pacemaker  FINDINGS: Lead extraction- removed RV-ICD, RA and partial RV-PPM lead. Part of RV pacemaker lead abandoned inside the heart. DC-ICD implanted via subclavian vein.      COMPLICATIONS: None    PLAN OF CARE    - Chest X-ray PA and interrogation in am.  - Hold Eliquis for 48 hours  - Overnight CTU monitoring  - Anticipate DC in am.      Servando Jarquin MD   Electrophysiology Attending

## 2021-03-29 NOTE — PACU DISCHARGE NOTE - COMMENTS
51 y/o M s/p laser lead extraction under GA. No anesthesia related complications.   BP: 95/56  HR: 69   T: 36.4  RR: 16   SpO2: 94%

## 2021-03-29 NOTE — PROGRESS NOTE ADULT - ASSESSMENT
51 yo M PMHx  multiple sclerosis, COPD DLD, bipolar disorder, schizoaffective disorder, HTN, chronic paroxysmal AF s/p ablation (x2, 2017), ARVD s/p ICD placement presented for correction of ICD lead malfunction.    AICD Lead malfunction  - undergoing correction today  - c/w telemetry monitor until Procedure    Chronic paroxysmal afib  - eliquis on hold for procedure    Bipolar/Schizoaffective disorder  - c/w seroquel, zoloft    Multiple sclerosis  - c/w tramadol, Baclofen, Lamictal, and gabapentin         COPD  - Continue tiotropium daily     GERD  - c/w protonix    BPH  - c/w flomax    Suspected Folate deficiency   - Continue supplementation    DVT px  Full code  From home    #Progress Note Handoff:  Pending (specify):  Lead revision  Family discussion: disussed pending lead revision  Disposition: Home___/SNF___/Other________/Unknown at this time___x_____

## 2021-03-30 LAB
ALBUMIN SERPL ELPH-MCNC: 4.1 G/DL — SIGNIFICANT CHANGE UP (ref 3.5–5.2)
ALP SERPL-CCNC: 110 U/L — SIGNIFICANT CHANGE UP (ref 30–115)
ALT FLD-CCNC: 8 U/L — SIGNIFICANT CHANGE UP (ref 0–41)
ANION GAP SERPL CALC-SCNC: 10 MMOL/L — SIGNIFICANT CHANGE UP (ref 7–14)
AST SERPL-CCNC: 14 U/L — SIGNIFICANT CHANGE UP (ref 0–41)
BASOPHILS # BLD AUTO: 0.05 K/UL — SIGNIFICANT CHANGE UP (ref 0–0.2)
BASOPHILS NFR BLD AUTO: 0.8 % — SIGNIFICANT CHANGE UP (ref 0–1)
BILIRUB SERPL-MCNC: 0.4 MG/DL — SIGNIFICANT CHANGE UP (ref 0.2–1.2)
BUN SERPL-MCNC: 13 MG/DL — SIGNIFICANT CHANGE UP (ref 10–20)
CALCIUM SERPL-MCNC: 9.3 MG/DL — SIGNIFICANT CHANGE UP (ref 8.5–10.1)
CHLORIDE SERPL-SCNC: 105 MMOL/L — SIGNIFICANT CHANGE UP (ref 98–110)
CO2 SERPL-SCNC: 25 MMOL/L — SIGNIFICANT CHANGE UP (ref 17–32)
CREAT SERPL-MCNC: 0.9 MG/DL — SIGNIFICANT CHANGE UP (ref 0.7–1.5)
EOSINOPHIL # BLD AUTO: 0.08 K/UL — SIGNIFICANT CHANGE UP (ref 0–0.7)
EOSINOPHIL NFR BLD AUTO: 1.2 % — SIGNIFICANT CHANGE UP (ref 0–8)
GLUCOSE SERPL-MCNC: 94 MG/DL — SIGNIFICANT CHANGE UP (ref 70–99)
HCT VFR BLD CALC: 34.6 % — LOW (ref 42–52)
HGB BLD-MCNC: 11.1 G/DL — LOW (ref 14–18)
IMM GRANULOCYTES NFR BLD AUTO: 0.3 % — SIGNIFICANT CHANGE UP (ref 0.1–0.3)
LYMPHOCYTES # BLD AUTO: 1.49 K/UL — SIGNIFICANT CHANGE UP (ref 1.2–3.4)
LYMPHOCYTES # BLD AUTO: 22.9 % — SIGNIFICANT CHANGE UP (ref 20.5–51.1)
MAGNESIUM SERPL-MCNC: 1.8 MG/DL — SIGNIFICANT CHANGE UP (ref 1.8–2.4)
MCHC RBC-ENTMCNC: 30.2 PG — SIGNIFICANT CHANGE UP (ref 27–31)
MCHC RBC-ENTMCNC: 32.1 G/DL — SIGNIFICANT CHANGE UP (ref 32–37)
MCV RBC AUTO: 94 FL — SIGNIFICANT CHANGE UP (ref 80–94)
MONOCYTES # BLD AUTO: 0.54 K/UL — SIGNIFICANT CHANGE UP (ref 0.1–0.6)
MONOCYTES NFR BLD AUTO: 8.3 % — SIGNIFICANT CHANGE UP (ref 1.7–9.3)
NEUTROPHILS # BLD AUTO: 4.34 K/UL — SIGNIFICANT CHANGE UP (ref 1.4–6.5)
NEUTROPHILS NFR BLD AUTO: 66.5 % — SIGNIFICANT CHANGE UP (ref 42.2–75.2)
NRBC # BLD: 0 /100 WBCS — SIGNIFICANT CHANGE UP (ref 0–0)
PLATELET # BLD AUTO: 188 K/UL — SIGNIFICANT CHANGE UP (ref 130–400)
POTASSIUM SERPL-MCNC: 4.4 MMOL/L — SIGNIFICANT CHANGE UP (ref 3.5–5)
POTASSIUM SERPL-SCNC: 4.4 MMOL/L — SIGNIFICANT CHANGE UP (ref 3.5–5)
PROT SERPL-MCNC: 6 G/DL — SIGNIFICANT CHANGE UP (ref 6–8)
RBC # BLD: 3.68 M/UL — LOW (ref 4.7–6.1)
RBC # FLD: 12.9 % — SIGNIFICANT CHANGE UP (ref 11.5–14.5)
SODIUM SERPL-SCNC: 140 MMOL/L — SIGNIFICANT CHANGE UP (ref 135–146)
WBC # BLD: 6.52 K/UL — SIGNIFICANT CHANGE UP (ref 4.8–10.8)
WBC # FLD AUTO: 6.52 K/UL — SIGNIFICANT CHANGE UP (ref 4.8–10.8)

## 2021-03-30 PROCEDURE — 93010 ELECTROCARDIOGRAM REPORT: CPT

## 2021-03-30 PROCEDURE — 71045 X-RAY EXAM CHEST 1 VIEW: CPT | Mod: 26

## 2021-03-30 PROCEDURE — 99233 SBSQ HOSP IP/OBS HIGH 50: CPT | Mod: 24

## 2021-03-30 PROCEDURE — 93283 PRGRMG EVAL IMPLANTABLE DFB: CPT | Mod: 26

## 2021-03-30 PROCEDURE — 99233 SBSQ HOSP IP/OBS HIGH 50: CPT

## 2021-03-30 RX ORDER — METOCLOPRAMIDE HCL 10 MG
10 TABLET ORAL ONCE
Refills: 0 | Status: DISCONTINUED | OUTPATIENT
Start: 2021-03-30 | End: 2021-03-30

## 2021-03-30 RX ORDER — ATORVASTATIN CALCIUM 80 MG/1
40 TABLET, FILM COATED ORAL AT BEDTIME
Refills: 0 | Status: DISCONTINUED | OUTPATIENT
Start: 2021-03-30 | End: 2021-03-31

## 2021-03-30 RX ORDER — APIXABAN 2.5 MG/1
1 TABLET, FILM COATED ORAL
Qty: 60 | Refills: 0
Start: 2021-03-30 | End: 2021-04-28

## 2021-03-30 RX ORDER — ONDANSETRON 8 MG/1
4 TABLET, FILM COATED ORAL ONCE
Refills: 0 | Status: COMPLETED | OUTPATIENT
Start: 2021-03-30 | End: 2021-03-30

## 2021-03-30 RX ORDER — TAMSULOSIN HYDROCHLORIDE 0.4 MG/1
0.4 CAPSULE ORAL AT BEDTIME
Refills: 0 | Status: DISCONTINUED | OUTPATIENT
Start: 2021-03-30 | End: 2021-03-31

## 2021-03-30 RX ORDER — TRAMADOL HYDROCHLORIDE 50 MG/1
25 TABLET ORAL EVERY 8 HOURS
Refills: 0 | Status: DISCONTINUED | OUTPATIENT
Start: 2021-03-30 | End: 2021-03-30

## 2021-03-30 RX ORDER — TIOTROPIUM BROMIDE 18 UG/1
1 CAPSULE ORAL; RESPIRATORY (INHALATION) DAILY
Refills: 0 | Status: DISCONTINUED | OUTPATIENT
Start: 2021-03-30 | End: 2021-03-31

## 2021-03-30 RX ORDER — ACETAMINOPHEN 500 MG
650 TABLET ORAL EVERY 6 HOURS
Refills: 0 | Status: DISCONTINUED | OUTPATIENT
Start: 2021-03-30 | End: 2021-03-31

## 2021-03-30 RX ORDER — LAMOTRIGINE 25 MG/1
200 TABLET, ORALLY DISINTEGRATING ORAL
Refills: 0 | Status: DISCONTINUED | OUTPATIENT
Start: 2021-03-30 | End: 2021-03-31

## 2021-03-30 RX ORDER — TRAMADOL HYDROCHLORIDE 50 MG/1
50 TABLET ORAL ONCE
Refills: 0 | Status: DISCONTINUED | OUTPATIENT
Start: 2021-03-30 | End: 2021-03-30

## 2021-03-30 RX ORDER — QUETIAPINE FUMARATE 200 MG/1
75 TABLET, FILM COATED ORAL THREE TIMES A DAY
Refills: 0 | Status: DISCONTINUED | OUTPATIENT
Start: 2021-03-30 | End: 2021-03-31

## 2021-03-30 RX ORDER — METOPROLOL TARTRATE 50 MG
50 TABLET ORAL DAILY
Refills: 0 | Status: DISCONTINUED | OUTPATIENT
Start: 2021-03-30 | End: 2021-03-31

## 2021-03-30 RX ORDER — SERTRALINE 25 MG/1
50 TABLET, FILM COATED ORAL DAILY
Refills: 0 | Status: DISCONTINUED | OUTPATIENT
Start: 2021-03-30 | End: 2021-03-31

## 2021-03-30 RX ORDER — VANCOMYCIN HCL 1 G
VIAL (EA) INTRAVENOUS
Refills: 0 | Status: DISCONTINUED | OUTPATIENT
Start: 2021-03-30 | End: 2021-03-31

## 2021-03-30 RX ORDER — TRAMADOL HYDROCHLORIDE 50 MG/1
50 TABLET ORAL EVERY 8 HOURS
Refills: 0 | Status: DISCONTINUED | OUTPATIENT
Start: 2021-03-30 | End: 2021-03-31

## 2021-03-30 RX ORDER — GABAPENTIN 400 MG/1
400 CAPSULE ORAL THREE TIMES A DAY
Refills: 0 | Status: DISCONTINUED | OUTPATIENT
Start: 2021-03-30 | End: 2021-03-31

## 2021-03-30 RX ORDER — VANCOMYCIN HCL 1 G
1000 VIAL (EA) INTRAVENOUS ONCE
Refills: 0 | Status: COMPLETED | OUTPATIENT
Start: 2021-03-30 | End: 2021-03-30

## 2021-03-30 RX ORDER — VANCOMYCIN HCL 1 G
1000 VIAL (EA) INTRAVENOUS EVERY 12 HOURS
Refills: 0 | Status: DISCONTINUED | OUTPATIENT
Start: 2021-03-31 | End: 2021-03-31

## 2021-03-30 RX ORDER — FOLIC ACID 0.8 MG
1 TABLET ORAL DAILY
Refills: 0 | Status: DISCONTINUED | OUTPATIENT
Start: 2021-03-30 | End: 2021-03-31

## 2021-03-30 RX ORDER — PANTOPRAZOLE SODIUM 20 MG/1
40 TABLET, DELAYED RELEASE ORAL
Refills: 0 | Status: DISCONTINUED | OUTPATIENT
Start: 2021-03-30 | End: 2021-03-31

## 2021-03-30 RX ORDER — BACLOFEN 100 %
10 POWDER (GRAM) MISCELLANEOUS THREE TIMES A DAY
Refills: 0 | Status: DISCONTINUED | OUTPATIENT
Start: 2021-03-30 | End: 2021-03-31

## 2021-03-30 RX ORDER — CEPHALEXIN 500 MG
1 CAPSULE ORAL
Qty: 5 | Refills: 0
Start: 2021-03-30 | End: 2021-04-03

## 2021-03-30 RX ADMIN — GABAPENTIN 400 MILLIGRAM(S): 400 CAPSULE ORAL at 15:04

## 2021-03-30 RX ADMIN — Medication 1 MILLIGRAM(S): at 12:19

## 2021-03-30 RX ADMIN — TRAMADOL HYDROCHLORIDE 50 MILLIGRAM(S): 50 TABLET ORAL at 18:36

## 2021-03-30 RX ADMIN — Medication 650 MILLIGRAM(S): at 04:48

## 2021-03-30 RX ADMIN — QUETIAPINE FUMARATE 75 MILLIGRAM(S): 200 TABLET, FILM COATED ORAL at 15:04

## 2021-03-30 RX ADMIN — Medication 50 MILLIGRAM(S): at 12:19

## 2021-03-30 RX ADMIN — ATORVASTATIN CALCIUM 40 MILLIGRAM(S): 80 TABLET, FILM COATED ORAL at 21:54

## 2021-03-30 RX ADMIN — QUETIAPINE FUMARATE 75 MILLIGRAM(S): 200 TABLET, FILM COATED ORAL at 21:54

## 2021-03-30 RX ADMIN — TAMSULOSIN HYDROCHLORIDE 0.4 MILLIGRAM(S): 0.4 CAPSULE ORAL at 21:54

## 2021-03-30 RX ADMIN — GABAPENTIN 400 MILLIGRAM(S): 400 CAPSULE ORAL at 21:54

## 2021-03-30 RX ADMIN — Medication 10 MILLIGRAM(S): at 15:04

## 2021-03-30 RX ADMIN — LAMOTRIGINE 200 MILLIGRAM(S): 25 TABLET, ORALLY DISINTEGRATING ORAL at 17:44

## 2021-03-30 RX ADMIN — ONDANSETRON 4 MILLIGRAM(S): 8 TABLET, FILM COATED ORAL at 16:12

## 2021-03-30 RX ADMIN — Medication 650 MILLIGRAM(S): at 05:17

## 2021-03-30 RX ADMIN — SERTRALINE 50 MILLIGRAM(S): 25 TABLET, FILM COATED ORAL at 15:04

## 2021-03-30 RX ADMIN — TRAMADOL HYDROCHLORIDE 25 MILLIGRAM(S): 50 TABLET ORAL at 12:19

## 2021-03-30 RX ADMIN — ONDANSETRON 4 MILLIGRAM(S): 8 TABLET, FILM COATED ORAL at 17:44

## 2021-03-30 RX ADMIN — Medication 250 MILLIGRAM(S): at 14:57

## 2021-03-30 RX ADMIN — PANTOPRAZOLE SODIUM 40 MILLIGRAM(S): 20 TABLET, DELAYED RELEASE ORAL at 12:19

## 2021-03-30 RX ADMIN — TRAMADOL HYDROCHLORIDE 50 MILLIGRAM(S): 50 TABLET ORAL at 06:41

## 2021-03-30 RX ADMIN — Medication 10 MILLIGRAM(S): at 21:54

## 2021-03-30 RX ADMIN — TRAMADOL HYDROCHLORIDE 50 MILLIGRAM(S): 50 TABLET ORAL at 06:07

## 2021-03-30 NOTE — PROGRESS NOTE ADULT - ASSESSMENT
50-year old man with PMH paroxysmal AF s/p ablation (x2, 2017) s/p re-do RF (12/20, PVI-CTI) s/p early flutter s/p spontaneous conversion during procedure and CTI touch-up (1/21), Atrial standstill s/p PPM (2001) --> ARVD s/p ICD placement --> ICD lead malfunction s/p ICD lead extraction s/p DC ICD lead placement ([employer]) --> Perforation s/p RV lead revision, HIV, DL, bipolar disorder, seizure, depression, HTN admitted for RV lead dislodgement s/p lead revision surgery 3/29    # Device / Lead malfunction  - ICD lead revised 3/20, CXR unremarkable, lead in place overlying myocardium   - Device interrogated in am, ptn atrially dependent  - Dr Jarquin will f/u o/p next week, appointment scheduled  - Eliquis on hold until tomorrow    # MS  - Continue Baclofen, Lamictal, and gabapentin     # Schizoaffective disorder  - Continue Seroquel, Zoloft     # COPD  - Continue tiotropium daily     # GERD  - Continue Protonix 40mg PO daily     # BPH  - Continue Flomax 0.4mg daily     # Folate deficiency   - Continue folic acid 1mg PO daily     # DVT > SCDs  # Diet > DASH  Dispo: - anticipated for discharge tomorrow, covid swab updated

## 2021-03-30 NOTE — PROGRESS NOTE ADULT - SUBJECTIVE AND OBJECTIVE BOX
INTERVAL HPI/OVERNIGHT EVENTS:    Patient s/p ICD leads revision   No event over night. Pt without complains    MEDICATIONS  (STANDING):    MEDICATIONS  (PRN):  acetaminophen   Tablet .. 650 milliGRAM(s) Oral every 6 hours PRN Temp greater or equal to 38C (100.4F), Moderate Pain (4 - 6)      Allergies    dexmethylphenidate (Unknown)  Dilantin (Rash)  dilantin, compazine, , ritalin, phenergan (Unknown)  Haldol (Unknown)  hydantoin derivatives (Other)  methylphenidate (Unknown)  phenytoin (Unknown)  prochlorperazine (Unknown)  promethazine (Dystonic RXN)  rash/hives (Anaphylaxis)  thioxanthenes (Unknown)    Intolerances          Vital Signs Last 24 Hrs  T(C): 36.9 (30 Mar 2021 05:20), Max: 36.9 (30 Mar 2021 05:20)  T(F): 98.5 (30 Mar 2021 05:20), Max: 98.5 (30 Mar 2021 05:20)  HR: 70 (30 Mar 2021 05:20) (69 - 70)  BP: 99/60 (30 Mar 2021 05:20) (75/44 - 108/65)  BP(mean): 67 (30 Mar 2021 03:41) (64 - 84)  RR: 18 (30 Mar 2021 05:20) (12 - 22)  SpO2: 93% (30 Mar 2021 05:20) (92% - 97%)    GENERAL: In no apparent distress, well nourished, and hydrated.  HEART: Regular rate and rhythm; No murmurs, rubs, or gallops.  	Wound healing well; No hematoma; no bleeding  PULMONARY: Clear to auscultation and perfusion.  No rales, wheezing, or rhonchi bilaterally.  ABDOMEN: Soft, Nontender, Nondistended; Bowel sounds present  EXTREMITIES:  2+ Peripheral Pulses, No clubbing, cyanosis, or edema  NEUROLOGICAL: Grossly nonfocal    12 Lead ECG:   Ventricular Rate 70 BPM    Atrial Rate 70 BPM    P-R Interval 200 ms    QRS Duration 144 ms    Q-T Interval 418 ms    QTC Calculation(Bazett) 451 ms    R Axis -9 degrees    T Axis 46 degrees    Diagnosis Line Atrial-paced rhythm  Right bundle branch block  Abnormal ECG    Confirmed by ZEFERINO BRIONES MD ([id number]) on 3/30/2021 10:25:03 AM (03-30-21 @ 08:25)      RADIOLOGY & ADDITIONAL TESTS:    < from: Xray Chest 1 View- PORTABLE-Urgent (Xray Chest 1 View- PORTABLE-Urgent .) (03.30.21 @ 08:37) >  Findings:    Support devices: There has been interval revision of left-sided pacer.    Cardiac/mediastinum/hilum: Stable.    Lung parenchyma/Pleura: Elevated right hemidiaphragm. Low lung volumes with no focal consolidation, effusion or pneumothorax. The left lung bases not visualized.    Skeleton/soft tissues: Stable.    Impression:    No focal consolidation.    < end of copied text >    
SUBJECTIVE:    Patient is a 50y old Male who presents with a chief complaint of Malfunctioning AICD (30 Mar 2021 15:54)      HPI:  50-year old man presented for assessment after his defibrillator beeped / audible alarm.  Known to have a history of paroxysmal AF s/p ablation (x2, 2017) s/p re-do RF (12/20, PVI-CTI) s/p early flutter s/p spontaneoud conversion during procedure and CTI touch-up (1/21), Atrial standstill s/p PPM (2001) --> ARVD s/p ICD placement --> ICD lead malfunction s/p ICD lead extraction s/p DC ICD lead placement (Medtronic) --> Perforation s/p RV lead revision, HIV, DL, bipolar disorder, seizure, depression, HTN.  Saw Dr. Jarquin recently for management of atrial fibrillation.   As Dr. Jarquin documents: He had an episode of AF where he needed cardioversion at [place] in 08/20. + Palpitations. He has used tikosyn before- continue to have AF episodes. He has never been on AV Node block agents due to relative hypotension.    EKG: AP-VS @ 70/min, RBBB.  TTE (02/21): EF 55-60%, nl LA/RA    At night, patient heard a beep coming from his defibrillator, no symptoms, no shock, he presented for assessment.     (28 Mar 2021 03:33)      Currently admitted to medicine with the primary diagnosis of AICD malfunction         Besides the pertinent positives and negatives described above, the ROS was within normal limits.    PAST MEDICAL & SURGICAL HISTORY  Clinical Depression    Peripheral Neuropathy    TIA (transient ischemic attack)    CVA (cerebral vascular accident)    COPD (chronic obstructive pulmonary disease)    Hypercholesteremia    Seasonal allergies    GERD (gastroesophageal reflux disease)    Schizo affective schizophrenia    Anginal pain    Bipolar disorder    CAD (coronary artery disease)    MS (multiple sclerosis)    Pneumonia    Migraines    Seizures    HTN (hypertension)    BPH (benign prostatic hyperplasia)    Atrial fibrillation  s/p ablation, on eliquis    CHF (congestive heart failure)    Cardiomyopathy    Supraventricular arrhythmia  prior history    PTSD (post-traumatic stress disorder)    Chronic heart failure with preserved ejection fraction    Ventricular tachycardia    AICD (automatic cardioverter/defibrillator) present  SJ    Type 2 diabetes mellitus    Peripheral neuropathy    Chronic pain    BPH (benign prostatic hyperplasia)    Pericardial effusion without cardiac tamponade  h/o trace pericardial effusion    Anemia of chronic disease    Iron deficiency anemia    Folate-deficiency anemia    Chronic respiratory failure  secondary to COPD    Constipation    Vitamin D deficiency    S/P Implantation of AICD    S/P Orchiectomy  L for testicular CA    History of hip replacement    H/O hernia repair  right 1972,1991    H/O prior ablation treatment  for Afib    History of evacuation of hematoma  AICD pocket    Displacement of electrode lead of cardiac pacemaker      SOCIAL HISTORY:    ALLERGIES:  dexmethylphenidate (Unknown)  Dilantin (Rash)  dilantin, compazine, , ritalin, phenergan (Unknown)  Haldol (Unknown)  hydantoin derivatives (Other)  methylphenidate (Unknown)  phenytoin (Unknown)  prochlorperazine (Unknown)  promethazine (Dystonic RXN)  rash/hives (Anaphylaxis)  thioxanthenes (Unknown)    MEDICATIONS:  STANDING MEDICATIONS  atorvastatin 40 milliGRAM(s) Oral at bedtime  baclofen 10 milliGRAM(s) Oral three times a day  folic acid 1 milliGRAM(s) Oral daily  gabapentin 400 milliGRAM(s) Oral three times a day  lamoTRIgine 200 milliGRAM(s) Oral two times a day  metoprolol succinate ER 50 milliGRAM(s) Oral daily  pantoprazole    Tablet 40 milliGRAM(s) Oral before breakfast  QUEtiapine 75 milliGRAM(s) Oral three times a day  sertraline 50 milliGRAM(s) Oral daily  tamsulosin 0.4 milliGRAM(s) Oral at bedtime  tiotropium 18 MICROgram(s) Capsule 1 Capsule(s) Inhalation daily  vancomycin  IVPB        PRN MEDICATIONS  acetaminophen   Tablet .. 650 milliGRAM(s) Oral every 6 hours PRN  traMADol 50 milliGRAM(s) Oral every 8 hours PRN    VITALS:   T(F): 98.9  HR: 69  BP: 107/58  RR: 18  SpO2: 93%    LABS:                        11.1   6.52  )-----------( 188      ( 30 Mar 2021 06:44 )             34.6     03-30    140  |  105  |  13  ----------------------------<  94  4.4   |  25  |  0.9    Ca    9.3      30 Mar 2021 06:44  Mg     1.8     03-30    TPro  6.0  /  Alb  4.1  /  TBili  0.4  /  DBili  x   /  AST  14  /  ALT  8   /  AlkPhos  110  03-30    PT/INR - ( 29 Mar 2021 05:44 )   PT: 11.10 sec;   INR: 0.97 ratio         PTT - ( 29 Mar 2021 05:44 )  PTT:36.2 sec              RADIOLOGY:    PHYSICAL EXAM:  GEN: No acute distress  LUNGS: Clear to auscultation bilaterally   HEART: Regular  ABD: Soft, non-tender, non-distended.  EXT: NC/NC/NE/2+PP/YARBROUGH/Skin Intact.   NEURO: AAOX3    Intravenous access:   NG tube:   Blum Catheter:       
CHIEF COMPLAINT:    Patient is a 50y old  Male who presents with a chief complaint of Lead revision of ICD    INTERVAL HPI/OVERNIGHT EVENTS:    Patient seen and examined at bedside. No acute overnight events occurred.    ROS: Denies SOB,chest pain. All other systems are negative.    Vital Signs:    T(F): 96.9 (03-29-21 @ 12:39), Max: 97.7 (03-28-21 @ 20:00)  HR: 69 (03-29-21 @ 12:39) (69 - 70)  BP: 108/65 (03-29-21 @ 12:39) (108/65 - 114/56)  RR: 18 (03-29-21 @ 12:39) (18 - 18)  SpO2: 96% (03-29-21 @ 04:49) (96% - 97%)  I&O's Summary    28 Mar 2021 07:01  -  29 Mar 2021 07:00  --------------------------------------------------------  IN: 0 mL / OUT: 400 mL / NET: -400 mL    29 Mar 2021 07:01  -  29 Mar 2021 13:28  --------------------------------------------------------  IN: 0 mL / OUT: 700 mL / NET: -700 mL    PHYSICAL EXAM:  GENERAL:  NAD  SKIN: No rashes or lesions  HEENT: Atraumatic. Normocephalic. Anicteric  NECK:  No JVD.   PULMONARY: Clear to ausculation bilaterally. No wheezing. No rales  CVS: Normal S1, S2. Regular rate and rhythm. No murmurs.  ABDOMEN/GI: Soft, Nontender, Nondistended; Bowel sounds are present  EXTREMITIES:  No edema B/L LE.  NEUROLOGIC:  No motor deficit.  PSYCH: Alert & oriented x 3, normal affect    Consultant(s) Notes Reviewed:  [x ] YES  [ ] NO      LABS:                        12.0   5.17  )-----------( 190      ( 29 Mar 2021 05:44 )             37.5     03-29    138  |  103  |  16  ----------------------------<  97  4.5   |  25  |  0.9    Ca    9.8      29 Mar 2021 05:44  Mg     1.9     03-29    TPro  6.1  /  Alb  4.0  /  TBili  0.2  /  DBili  x   /  AST  13  /  ALT  10  /  AlkPhos  114  03-29    PT/INR - ( 29 Mar 2021 05:44 )   PT: 11.10 sec;   INR: 0.97 ratio         PTT - ( 29 Mar 2021 05:44 )  PTT:36.2 sec    Trop <0.01, CKMB --, CK --, 03-28-21 @ 01:59        RADIOLOGY & ADDITIONAL TESTS:  Imaging or report Personally Reviewed:  [ ] YES  [ ] NO    Telemetry reviewed independently - pacing noted    Medications:  Standing  aspirin  chewable 81 milliGRAM(s) Oral daily  atorvastatin 40 milliGRAM(s) Oral at bedtime  baclofen 10 milliGRAM(s) Oral three times a day  folic acid 1 milliGRAM(s) Oral daily  gabapentin 400 milliGRAM(s) Oral three times a day  lamoTRIgine 200 milliGRAM(s) Oral two times a day  metoprolol succinate ER 50 milliGRAM(s) Oral daily  pantoprazole    Tablet 40 milliGRAM(s) Oral before breakfast  QUEtiapine 75 milliGRAM(s) Oral three times a day  sertraline 50 milliGRAM(s) Oral daily  tamsulosin 0.4 milliGRAM(s) Oral at bedtime  tiotropium 18 MICROgram(s) Capsule 1 Capsule(s) Inhalation daily    PRN Meds  traMADol 50 milliGRAM(s) Oral every 6 hours PRN      Case discussed with resident  Care discussed with pt        
Ptn seen and examined at bedside. No acute events overnight. No new complaints. Ptn aware of plan for lead revisionn surgery today and agrees with plan    PAST MEDICAL & SURGICAL HISTORY  Vitamin D deficiency    Constipation    Chronic respiratory failure  secondary to COPD    Folate-deficiency anemia    Iron deficiency anemia    Anemia of chronic disease    Pericardial effusion without cardiac tamponade  h/o trace pericardial effusion    BPH (benign prostatic hyperplasia)    Chronic pain    Peripheral neuropathy    Type 2 diabetes mellitus    AICD (automatic cardioverter/defibrillator) present  SJM    Ventricular tachycardia    Chronic heart failure with preserved ejection fraction    PTSD (post-traumatic stress disorder)    Supraventricular arrhythmia  prior history    Cardiomyopathy    CHF (congestive heart failure)    Atrial fibrillation  s/p ablation, on eliquis    BPH (benign prostatic hyperplasia)    HTN (hypertension)    Seizures    Migraines    Pneumonia    MS (multiple sclerosis)    CAD (coronary artery disease)    Bipolar disorder    Anginal pain    Schizo affective schizophrenia    GERD (gastroesophageal reflux disease)    Seasonal allergies    Hypercholesteremia    COPD (chronic obstructive pulmonary disease)    CVA (cerebral vascular accident)    TIA (transient ischemic attack)    Peripheral Neuropathy    Clinical Depression    Displacement of electrode lead of cardiac pacemaker    History of evacuation of hematoma  AICD pocket    H/O prior ablation treatment  for Afib    H/O hernia repair  right 1972,1991    History of hip replacement    S/P Orchiectomy  L for testicular CA    S/P Implantation of AICD      SOCIAL HISTORY:    ALLERGIES:  dexmethylphenidate (Unknown)  Dilantin (Rash)  dilantin, compazine, , ritalin, phenergan (Unknown)  Haldol (Unknown)  hydantoin derivatives (Other)  methylphenidate (Unknown)  phenytoin (Unknown)  prochlorperazine (Unknown)  promethazine (Dystonic RXN)  rash/hives (Anaphylaxis)  thioxanthenes (Unknown)    MEDICATIONS:  STANDING MEDICATIONS  aspirin  chewable 81 milliGRAM(s) Oral daily  atorvastatin 40 milliGRAM(s) Oral at bedtime  baclofen 10 milliGRAM(s) Oral three times a day  folic acid 1 milliGRAM(s) Oral daily  gabapentin 400 milliGRAM(s) Oral three times a day  lamoTRIgine 200 milliGRAM(s) Oral two times a day  metoprolol succinate ER 50 milliGRAM(s) Oral daily  pantoprazole    Tablet 40 milliGRAM(s) Oral before breakfast  QUEtiapine 75 milliGRAM(s) Oral three times a day  sertraline 50 milliGRAM(s) Oral daily  tamsulosin 0.4 milliGRAM(s) Oral at bedtime  tiotropium 18 MICROgram(s) Capsule 1 Capsule(s) Inhalation daily    PRN MEDICATIONS  traMADol 50 milliGRAM(s) Oral every 6 hours PRN    VITALS:   T(F): 96.9  HR: 69  BP: 108/65  RR: 18  SpO2: 96%    LABS:                        12.0   5.17  )-----------( 190      ( 29 Mar 2021 05:44 )             37.5     03-29    138  |  103  |  16  ----------------------------<  97  4.5   |  25  |  0.9    Ca    9.8      29 Mar 2021 05:44  Mg     1.9     03-29    TPro  6.1  /  Alb  4.0  /  TBili  0.2  /  DBili  x   /  AST  13  /  ALT  10  /  AlkPhos  114  03-29    PT/INR - ( 29 Mar 2021 05:44 )   PT: 11.10 sec;   INR: 0.97 ratio         PTT - ( 29 Mar 2021 05:44 )  PTT:36.2 sec          CARDIAC MARKERS ( 28 Mar 2021 01:59 )  x     / <0.01 ng/mL / x     / x     / x          RADIOLOGY:< from: 12 Lead ECG (03.28.21 @ 02:36) >  Diagnosis Line Atrial-paced rhythm with prolonged AV conduction  Right bundle branch block  T wave abnormality, consider lateral ischemia  Abnormal ECG      < end of copied text >  < from: Xray Chest 1 View- PORTABLE-Urgent (Xray Chest 1 View- PORTABLE-Urgent .) (03.28.21 @ 02:42) >  Impression:    Elevated right hemidiaphragm. No acute infiltrates.    Left sided permanent pacemaker.    No significant change from March 23, 2020.    < end of copied text >      PHYSICAL EXAM:  GEN: No acute distress  LUNGS: Pacemaker device palpated under L- Sternum. Clear to auscultation bilaterally   HEART: Regular  ABD: Soft, non-tender, non-distended.  EXT: NC/NC/NE/2+PP/YARBROUGH/Skin Intact.   NEURO: AAOX3        
CHIEF COMPLAINT:    Patient is a 50y old  Male who presents with a chief complaint of Lead revision of ICD     INTERVAL HPI/OVERNIGHT EVENTS:    Patient seen and examined at bedside. No acute overnight events occurred.    ROS: Denies chest pain. All other systems are negative.    Vital Signs:    T(F): 99.8 (03-30-21 @ 14:05), Max: 99.8 (03-30-21 @ 14:05)  HR: 70 (03-30-21 @ 14:05) (69 - 70)  BP: 107/71 (03-30-21 @ 14:05) (75/44 - 107/71)  RR: 18 (03-30-21 @ 14:05) (12 - 22)  SpO2: 93% (03-30-21 @ 05:20) (92% - 97%)  I&O's Summary    29 Mar 2021 07:01  -  30 Mar 2021 07:00  --------------------------------------------------------  IN: 620 mL / OUT: 1585 mL / NET: -965 mL    30 Mar 2021 07:01  -  30 Mar 2021 15:54  --------------------------------------------------------  IN: 0 mL / OUT: 300 mL / NET: -300 mL      Daily     Daily   CAPILLARY BLOOD GLUCOSE          PHYSICAL EXAM:  GENERAL:  NAD  SKIN: No rashes or lesions  HEENT: Atraumatic. Normocephalic. Anicteric  NECK:  No JVD.   PULMONARY: Clear to ausculation bilaterally. No wheezing. No rales  CVS: Normal S1, S2. Regular rate and rhythm. No murmurs. Left chest wall dressing clean, dry, intact  ABDOMEN/GI: Soft, Nontender, Nondistended; Bowel sounds are present  EXTREMITIES:  No edema B/L LE.  NEUROLOGIC:  No motor deficit.  PSYCH: Alert & oriented x 3, normal affect    Consultant(s) Notes Reviewed:  [x ] YES  [ ] NO  Care Discussed with Consultants/Other Providers [ x] YES  [ ] NO    LABS:                        11.1   6.52  )-----------( 188      ( 30 Mar 2021 06:44 )             34.6     03-30    140  |  105  |  13  ----------------------------<  94  4.4   |  25  |  0.9    Ca    9.3      30 Mar 2021 06:44  Mg     1.8     03-30    TPro  6.0  /  Alb  4.1  /  TBili  0.4  /  DBili  x   /  AST  14  /  ALT  8   /  AlkPhos  110  03-30    PT/INR - ( 29 Mar 2021 05:44 )   PT: 11.10 sec;   INR: 0.97 ratio         PTT - ( 29 Mar 2021 05:44 )  PTT:36.2 sec    Trop <0.01, CKMB --, CK --, 03-28-21 @ 01:59        RADIOLOGY & ADDITIONAL TESTS:  Imaging or report Personally Reviewed:  [ ] YES  [ ] NO    Telemetry reviewed independently - pacing noted    Medications:  Standing  atorvastatin 40 milliGRAM(s) Oral at bedtime  baclofen 10 milliGRAM(s) Oral three times a day  folic acid 1 milliGRAM(s) Oral daily  gabapentin 400 milliGRAM(s) Oral three times a day  lamoTRIgine 200 milliGRAM(s) Oral two times a day  metoprolol succinate ER 50 milliGRAM(s) Oral daily  pantoprazole    Tablet 40 milliGRAM(s) Oral before breakfast  QUEtiapine 75 milliGRAM(s) Oral three times a day  sertraline 50 milliGRAM(s) Oral daily  tamsulosin 0.4 milliGRAM(s) Oral at bedtime  tiotropium 18 MICROgram(s) Capsule 1 Capsule(s) Inhalation daily  vancomycin  IVPB        PRN Meds  acetaminophen   Tablet .. 650 milliGRAM(s) Oral every 6 hours PRN  traMADol 50 milliGRAM(s) Oral every 8 hours PRN      Case discussed with resident  Care discussed with pt

## 2021-03-30 NOTE — PROGRESS NOTE ADULT - ASSESSMENT
51 yo M PMHx  multiple sclerosis, COPD DLD, bipolar disorder, schizoaffective disorder, HTN, chronic paroxysmal AF s/p ablation (x2, 2017), ARVD s/p ICD placement presented for correction of ICD lead malfunction.    AICD Lead malfunction  - under went lead revision yesterday  - can d/c telemetry now  - c/w Keflex (received 1 dose vanco) for post procedure    Chronic paroxysmal afib  - eliquis was on hold for procedure - as per EP can resume tomorrow (3/31)    Bipolar/Schizoaffective disorder  - c/w seroquel, zoloft    Multiple sclerosis  - c/w tramadol, Baclofen, Lamictal, and gabapentin     COPD  - Continue tiotropium daily     GERD  - c/w protonix    BPH  - c/w flomax    Suspected Folate deficiency   - Continue supplementation    DVT px  Full code  From home    #Progress Note Handoff:  Pending (specify): Discharge to facility in AM, no acute medical issues  Family discussion: discussed discharge-pt wants to leave tomorrow, wasn't anticipated for today. Pt anticipated for dc in AM  Disposition: Home___/SNF___/Other________/Unknown at this time___x_____

## 2021-03-30 NOTE — PROGRESS NOTE ADULT - ASSESSMENT
A/P   Patient s/p DC ICD leads revision    - CXR as above  - Device interrogation done, review by attending  - Vanco 1gm q12h x2 doses STAT (patient removed OR dression)  -followed by  Keflex 500 mg PO q12 h x 5 days    - FU in the EP office for wound check with NP 4/6 @9am  [address] [place] [address], [address] [facility]  [phone]     No Heavy lifting >5 lbs. Do not raise your arm above shoulder level for 4-6 weeks.   No driving for 2weeks  No shower, bathtub, no wetting device site until follow up appointment next week.  Do NOT remove dressing until follow up appointment next week   A/P   Patient s/p DC ICD leads revision    - CXR as above  - Device interrogation done, review by attending  - Vanco 1gm q12h x2 doses STAT (patient removed OR dression)  -followed by  Keflex 500 mg PO q12 h x 5 days  restart Eliquis tomorrow PM    - FU in the EP office for wound check with NP 4/6 @9am  [address] [place] [address], [address]  [phone]     No Heavy lifting >5 lbs. Do not raise your arm above shoulder level for 4-6 weeks.   No driving for 2weeks  No shower, bathtub, no wetting device site until follow up appointment next week.  Do NOT remove dressing until follow up appointment next week

## 2021-03-30 NOTE — PROGRESS NOTE ADULT - ATTENDING COMMENTS
Patient s/p ICD extraction + re-implant    - CXR as above  - Device interrogation reviewed- acceptable parameters.  - Vanco 1gm q12h x2 doses STAT (patient removed OR dressing)  -followed by  Keflex 500 mg PO q12 h x 5 days  restart Eliquis tomorrow PM

## 2021-03-31 ENCOUNTER — TRANSCRIPTION ENCOUNTER (OUTPATIENT)
Age: 51
End: 2021-03-31

## 2021-03-31 VITALS
RESPIRATION RATE: 18 BRPM | DIASTOLIC BLOOD PRESSURE: 70 MMHG | TEMPERATURE: 98 F | HEART RATE: 72 BPM | SYSTOLIC BLOOD PRESSURE: 120 MMHG

## 2021-03-31 LAB — SARS-COV-2 RNA SPEC QL NAA+PROBE: SIGNIFICANT CHANGE UP

## 2021-03-31 PROCEDURE — 99239 HOSP IP/OBS DSCHRG MGMT >30: CPT

## 2021-03-31 RX ORDER — ONDANSETRON 8 MG/1
4 TABLET, FILM COATED ORAL ONCE
Refills: 0 | Status: COMPLETED | OUTPATIENT
Start: 2021-03-31 | End: 2021-03-31

## 2021-03-31 RX ORDER — SODIUM CHLORIDE 9 MG/ML
500 INJECTION INTRAMUSCULAR; INTRAVENOUS; SUBCUTANEOUS ONCE
Refills: 0 | Status: COMPLETED | OUTPATIENT
Start: 2021-03-31 | End: 2021-03-31

## 2021-03-31 RX ADMIN — Medication 1 MILLIGRAM(S): at 11:29

## 2021-03-31 RX ADMIN — PANTOPRAZOLE SODIUM 40 MILLIGRAM(S): 20 TABLET, DELAYED RELEASE ORAL at 06:01

## 2021-03-31 RX ADMIN — SERTRALINE 50 MILLIGRAM(S): 25 TABLET, FILM COATED ORAL at 11:29

## 2021-03-31 RX ADMIN — Medication 10 MILLIGRAM(S): at 05:04

## 2021-03-31 RX ADMIN — ONDANSETRON 4 MILLIGRAM(S): 8 TABLET, FILM COATED ORAL at 06:01

## 2021-03-31 RX ADMIN — TRAMADOL HYDROCHLORIDE 50 MILLIGRAM(S): 50 TABLET ORAL at 16:32

## 2021-03-31 RX ADMIN — Medication 10 MILLIGRAM(S): at 14:43

## 2021-03-31 RX ADMIN — GABAPENTIN 400 MILLIGRAM(S): 400 CAPSULE ORAL at 14:43

## 2021-03-31 RX ADMIN — SODIUM CHLORIDE 500 MILLILITER(S): 9 INJECTION INTRAMUSCULAR; INTRAVENOUS; SUBCUTANEOUS at 04:50

## 2021-03-31 RX ADMIN — QUETIAPINE FUMARATE 75 MILLIGRAM(S): 200 TABLET, FILM COATED ORAL at 05:04

## 2021-03-31 RX ADMIN — TRAMADOL HYDROCHLORIDE 50 MILLIGRAM(S): 50 TABLET ORAL at 05:02

## 2021-03-31 RX ADMIN — Medication 250 MILLIGRAM(S): at 06:01

## 2021-03-31 RX ADMIN — QUETIAPINE FUMARATE 75 MILLIGRAM(S): 200 TABLET, FILM COATED ORAL at 14:42

## 2021-03-31 RX ADMIN — LAMOTRIGINE 200 MILLIGRAM(S): 25 TABLET, ORALLY DISINTEGRATING ORAL at 05:04

## 2021-03-31 RX ADMIN — GABAPENTIN 400 MILLIGRAM(S): 400 CAPSULE ORAL at 05:04

## 2021-03-31 RX ADMIN — TRAMADOL HYDROCHLORIDE 50 MILLIGRAM(S): 50 TABLET ORAL at 05:32

## 2021-03-31 NOTE — DISCHARGE NOTE PROVIDER - CARE PROVIDER_API CALL
Servando Jarquin (MD)  Cardiac Electrophysiology; Cardiology; Internal Medicine  [address] [place] [address]  Phone: [phone]  Fax: [phone]  Established Patient  Follow Up Time: 1 week

## 2021-03-31 NOTE — DISCHARGE NOTE PROVIDER - NSDCCPCAREPLAN_GEN_ALL_CORE_FT
PRINCIPAL DISCHARGE DIAGNOSIS  Diagnosis: AICD malfunction  Assessment and Plan of Treatment:   This sheet gives you information about how to care for yourself after your procedure. Your health care provider may also give you more specific instructions. If you have problems or questions, contact your health care provider.  What can I expect after the procedure?  After the procedure, it is common to have:  •Mild discomfort at the incision site.  •A small amount of drainage or bleeding at the incision site. This is usually no more than a spot.  •Limits on how high you can raise the arm on the side of your body where your pacemaker is located. These limits may last for 6 weeks, or as long as told by your health care provider.  Follow these instructions at home:  Medicines   •Take over-the-counter and prescription medicines only as told by your health care provider.  •If you were prescribed an antibiotic medicine, take it as told by your health care provider. Do not stop using the antibiotic even if you start to feel better.  Incision care    •Follow instructions from your health care provider about how to take care of your incision. Make sure you:  •Wash your hands with soap and water for at least 20 seconds before and after you change your bandage (dressing). If soap and water are not available, use hand .  •Change your dressing as told by your health care provider.  •Leave stitches (sutures), skin glue, or adhesive strips in place. These skin closures may need to stay in place for 2 weeks or longer. If adhesive strip edges start to loosen and curl up, you may trim the loose edges. Do not remove adhesive strips completely unless your health care provider tells you to do that.  •Check your incision area every day for signs of infection. Check for:  •More redness, swelling, or pain.  •More fluid or blood.  •Waarmth.  •Pus or a bad smell.  Electric and magnetic fields   •Keep your mobile phone 12 inches (30 cm) away from the pacemaker. When talking on a mobile phone, use the ear on the opposite side of your pacemaker.  • Do not place a mobile phone, earbuds, or headph      SECONDARY DISCHARGE DIAGNOSES  Diagnosis: Palpitations  Assessment and Plan of Treatment:

## 2021-03-31 NOTE — DISCHARGE NOTE PROVIDER - HOSPITAL COURSE
50-year old man presented for assessment after his defibrillator beeped / audible alarm.  Known to have a history of paroxysmal AF s/p ablation (x2, 2017) s/p re-do RF (12/20, PVI-CTI) s/p early flutter s/p spontaneoud conversion during procedure and CTI touch-up (1/21), Atrial standstill s/p PPM (2001) --> ARVD s/p ICD placement --> ICD lead malfunction s/p ICD lead extraction s/p DC ICD lead placement ([employer]tr[employer]) --> Perforation s/p RV lead revision, HIV, DL, bipolar disorder, seizure, depression, HTN.  Saw Dr. Jarquin recently for management of atrial fibrillation.   As Dr. Jarquin documents: He had an episode of AF where he needed cardioversion at [facility] in 08/20. + Palpitations. He has used tikosyn before- continue to have AF episodes. He has never been on AV Node block agents due to relative hypotension. EP consulted for evaluation of ICD.  Patient seen in Dr. Lopez office on 3/25/2021, RV threshold increased to 7.5 and will require revision.  underwent Lead extraction- removal of RV-ICD, RA and partial RV-PPM lead. Part of RV pacemaker lead abandoned inside the heart. DC-ICD implanted via subclavian vein.Chest X-ray PA and interrogation in amunremarkable, revisedf lead position conduction well.  Ptn Eliquis resumed 3/31 48 hrs after procedure. Appointment made with EP team for f/u next week. Can be continually managed for other chronic medical conditions as o/p

## 2021-03-31 NOTE — DISCHARGE NOTE NURSING/CASE MANAGEMENT/SOCIAL WORK - PATIENT PORTAL LINK FT
You can access the FollowMyHealth Patient Portal offered by [facility] by registering at the following website: [email or web address]. By joining [facility]’s FollowMyHealth portal, you will also be able to view your health information using other applications (apps) compatible with our system.

## 2021-03-31 NOTE — DISCHARGE NOTE PROVIDER - NSDCMRMEDTOKEN_GEN_ALL_CORE_FT
apixaban 5 mg oral tablet: 1 tab(s) orally every 12 hours  aspirin 81 mg oral tablet, chewable: 1 tab(s) orally once a day  atorvastatin 40 mg oral tablet: 1 tab(s) orally once a day (at bedtime)  baclofen 10 mg oral tablet: 1 tab(s) orally 3 times a day  folic acid 1 mg oral tablet: 1 tab(s) orally once a day  gabapentin 400 mg oral capsule: 1 cap(s) orally 3 times a day  Keflex 500 mg oral capsule: 1 cap(s) orally once a day   lamoTRIgine 100 mg oral tablet: 2 tab(s) orally 2 times a day  metoprolol succinate 50 mg oral tablet, extended release: 1 tab(s) orally once a day; MAY GIVE 4 TABS OF 12.5MG AT ONE TIME FOR DOSING  pantoprazole 40 mg oral delayed release tablet: 1 tab(s) orally once a day  QUEtiapine 25 mg oral tablet: 1 tab(s) orally 3 times a day  sertraline 50 mg oral tablet: 1 tab(s) orally once a day  tamsulosin 0.4 mg oral capsule: 1 cap(s) orally once a day (at bedtime)  Tecfidera 240 mg oral delayed release capsule: 1 cap(s) orally 2 times a day  tiotropium 18 mcg inhalation capsule: 1 cap(s) inhaled once a day  traMADol 50 mg oral tablet: 1 tab(s) orally every 6 hours, As Needed  as needed for pain MDD:max 4 tablets a day

## 2021-04-06 ENCOUNTER — APPOINTMENT (OUTPATIENT)
Dept: CARDIOLOGY | Facility: CLINIC | Age: 51
End: 2021-04-06

## 2021-04-08 ENCOUNTER — APPOINTMENT (OUTPATIENT)
Dept: CARDIOLOGY | Facility: CLINIC | Age: 51
End: 2021-04-08
Payer: MEDICAID

## 2021-04-08 VITALS
DIASTOLIC BLOOD PRESSURE: 76 MMHG | WEIGHT: 160 LBS | HEART RATE: 70 BPM | BODY MASS INDEX: 25.71 KG/M2 | TEMPERATURE: 97.8 F | SYSTOLIC BLOOD PRESSURE: 111 MMHG | HEIGHT: 66 IN

## 2021-04-08 PROCEDURE — 99024 POSTOP FOLLOW-UP VISIT: CPT

## 2021-04-08 PROCEDURE — 93290 INTERROG DEV EVAL ICPMS IP: CPT | Mod: 26

## 2021-04-08 PROCEDURE — 93000 ELECTROCARDIOGRAM COMPLETE: CPT | Mod: 59

## 2021-04-08 PROCEDURE — 93283 PRGRMG EVAL IMPLANTABLE DFB: CPT

## 2021-04-12 ENCOUNTER — INPATIENT (INPATIENT)
Facility: HOSPITAL | Age: 51
LOS: 3 days | Discharge: NURSING, REHAB OR LONG-TERM CARE | End: 2021-04-16
Attending: INTERNAL MEDICINE | Admitting: INTERNAL MEDICINE
Payer: MEDICAID

## 2021-04-12 ENCOUNTER — NON-APPOINTMENT (OUTPATIENT)
Age: 51
End: 2021-04-12

## 2021-04-12 VITALS
OXYGEN SATURATION: 96 % | DIASTOLIC BLOOD PRESSURE: 56 MMHG | RESPIRATION RATE: 18 BRPM | HEIGHT: 66 IN | HEART RATE: 114 BPM | SYSTOLIC BLOOD PRESSURE: 102 MMHG

## 2021-04-12 DIAGNOSIS — Z96.649 PRESENCE OF UNSPECIFIED ARTIFICIAL HIP JOINT: Chronic | ICD-10-CM

## 2021-04-12 DIAGNOSIS — Z98.890 OTHER SPECIFIED POSTPROCEDURAL STATES: Chronic | ICD-10-CM

## 2021-04-12 DIAGNOSIS — T82.120A DISPLACEMENT OF CARDIAC ELECTRODE, INITIAL ENCOUNTER: Chronic | ICD-10-CM

## 2021-04-12 LAB
ALBUMIN SERPL ELPH-MCNC: 4.6 G/DL — SIGNIFICANT CHANGE UP (ref 3.5–5.2)
ALP SERPL-CCNC: 126 U/L — HIGH (ref 30–115)
ALT FLD-CCNC: 11 U/L — SIGNIFICANT CHANGE UP (ref 0–41)
ANION GAP SERPL CALC-SCNC: 12 MMOL/L — SIGNIFICANT CHANGE UP (ref 7–14)
APTT BLD: 25.3 SEC — LOW (ref 27–39.2)
AST SERPL-CCNC: 38 U/L — SIGNIFICANT CHANGE UP (ref 0–41)
BASE EXCESS BLDV CALC-SCNC: 3.4 MMOL/L — HIGH (ref -2–2)
BASOPHILS # BLD AUTO: 0.08 K/UL — SIGNIFICANT CHANGE UP (ref 0–0.2)
BASOPHILS NFR BLD AUTO: 1.2 % — HIGH (ref 0–1)
BILIRUB SERPL-MCNC: 0.3 MG/DL — SIGNIFICANT CHANGE UP (ref 0.2–1.2)
BUN SERPL-MCNC: 21 MG/DL — HIGH (ref 10–20)
CA-I SERPL-SCNC: 1.18 MMOL/L — SIGNIFICANT CHANGE UP (ref 1.12–1.3)
CALCIUM SERPL-MCNC: 9.8 MG/DL — SIGNIFICANT CHANGE UP (ref 8.5–10.1)
CHLORIDE SERPL-SCNC: 104 MMOL/L — SIGNIFICANT CHANGE UP (ref 98–110)
CO2 SERPL-SCNC: 24 MMOL/L — SIGNIFICANT CHANGE UP (ref 17–32)
CREAT SERPL-MCNC: 1 MG/DL — SIGNIFICANT CHANGE UP (ref 0.7–1.5)
EOSINOPHIL # BLD AUTO: 0.1 K/UL — SIGNIFICANT CHANGE UP (ref 0–0.7)
EOSINOPHIL NFR BLD AUTO: 1.5 % — SIGNIFICANT CHANGE UP (ref 0–8)
GAS PNL BLDV: 140 MMOL/L — SIGNIFICANT CHANGE UP (ref 136–145)
GAS PNL BLDV: SIGNIFICANT CHANGE UP
GLUCOSE SERPL-MCNC: 104 MG/DL — HIGH (ref 70–99)
HCO3 BLDV-SCNC: 28 MMOL/L — SIGNIFICANT CHANGE UP (ref 22–29)
HCT VFR BLD CALC: 42.7 % — SIGNIFICANT CHANGE UP (ref 42–52)
HCT VFR BLDA CALC: 40.5 % — SIGNIFICANT CHANGE UP (ref 34–44)
HGB BLD CALC-MCNC: 13.2 G/DL — LOW (ref 14–18)
HGB BLD-MCNC: 14 G/DL — SIGNIFICANT CHANGE UP (ref 14–18)
IMM GRANULOCYTES NFR BLD AUTO: 0.4 % — HIGH (ref 0.1–0.3)
INR BLD: 1.28 RATIO — SIGNIFICANT CHANGE UP (ref 0.65–1.3)
LACTATE BLDV-MCNC: 1.2 MMOL/L — SIGNIFICANT CHANGE UP (ref 0.5–1.6)
LYMPHOCYTES # BLD AUTO: 1.93 K/UL — SIGNIFICANT CHANGE UP (ref 1.2–3.4)
LYMPHOCYTES # BLD AUTO: 28.6 % — SIGNIFICANT CHANGE UP (ref 20.5–51.1)
MCHC RBC-ENTMCNC: 30.2 PG — SIGNIFICANT CHANGE UP (ref 27–31)
MCHC RBC-ENTMCNC: 32.8 G/DL — SIGNIFICANT CHANGE UP (ref 32–37)
MCV RBC AUTO: 92 FL — SIGNIFICANT CHANGE UP (ref 80–94)
MONOCYTES # BLD AUTO: 0.56 K/UL — SIGNIFICANT CHANGE UP (ref 0.1–0.6)
MONOCYTES NFR BLD AUTO: 8.3 % — SIGNIFICANT CHANGE UP (ref 1.7–9.3)
NEUTROPHILS # BLD AUTO: 4.04 K/UL — SIGNIFICANT CHANGE UP (ref 1.4–6.5)
NEUTROPHILS NFR BLD AUTO: 60 % — SIGNIFICANT CHANGE UP (ref 42.2–75.2)
NRBC # BLD: 0 /100 WBCS — SIGNIFICANT CHANGE UP (ref 0–0)
PCO2 BLDV: 41 MMHG — LOW (ref 42–55)
PH BLDV: 7.44 — HIGH (ref 7.26–7.43)
PLATELET # BLD AUTO: 278 K/UL — SIGNIFICANT CHANGE UP (ref 130–400)
PO2 BLDV: 46 MMHG — HIGH (ref 20–40)
POTASSIUM BLDV-SCNC: 4.6 MMOL/L — SIGNIFICANT CHANGE UP (ref 3.3–5.6)
POTASSIUM SERPL-MCNC: 6.3 MMOL/L — CRITICAL HIGH (ref 3.5–5)
POTASSIUM SERPL-SCNC: 6.3 MMOL/L — CRITICAL HIGH (ref 3.5–5)
PROT SERPL-MCNC: 7.5 G/DL — SIGNIFICANT CHANGE UP (ref 6–8)
PROTHROM AB SERPL-ACNC: 14.7 SEC — HIGH (ref 9.95–12.87)
RBC # BLD: 4.64 M/UL — LOW (ref 4.7–6.1)
RBC # FLD: 13.2 % — SIGNIFICANT CHANGE UP (ref 11.5–14.5)
SAO2 % BLDV: 85 % — SIGNIFICANT CHANGE UP
SODIUM SERPL-SCNC: 140 MMOL/L — SIGNIFICANT CHANGE UP (ref 135–146)
TROPONIN T SERPL-MCNC: <0.01 NG/ML — SIGNIFICANT CHANGE UP
WBC # BLD: 6.74 K/UL — SIGNIFICANT CHANGE UP (ref 4.8–10.8)
WBC # FLD AUTO: 6.74 K/UL — SIGNIFICANT CHANGE UP (ref 4.8–10.8)

## 2021-04-12 PROCEDURE — 71045 X-RAY EXAM CHEST 1 VIEW: CPT | Mod: 26

## 2021-04-12 PROCEDURE — 93010 ELECTROCARDIOGRAM REPORT: CPT

## 2021-04-12 PROCEDURE — 99285 EMERGENCY DEPT VISIT HI MDM: CPT

## 2021-04-12 PROCEDURE — 99222 1ST HOSP IP/OBS MODERATE 55: CPT | Mod: GC

## 2021-04-12 RX ORDER — TRAMADOL HYDROCHLORIDE 50 MG/1
50 TABLET ORAL ONCE
Refills: 0 | Status: DISCONTINUED | OUTPATIENT
Start: 2021-04-12 | End: 2021-04-12

## 2021-04-12 NOTE — ED PROVIDER NOTE - OBJECTIVE STATEMENT
50 year old male with pmhx of paroxysmal atrial fibrillation s/p several ablations on Eliquis, CHF, PPM/AICD, CAD, CVA, COPD, DM, HTN, HLD , GERD, bipolar, and schizophrenia, sent in from [facility] for afib. Pt admits to lightheadedness and shortness of breath intermittently over last 2 days. no fever or chills

## 2021-04-12 NOTE — ED PROVIDER NOTE - PHYSICAL EXAMINATION
CONST: Well appearing in NAD  EYES: PERRL, EOMI, Sclera and conjunctiva clear.   ENT: No nasal discharge. Oropharynx normal appearing, no erythema or exudates. No abscess or swelling. Uvula midline. No temporal artery or mastoid tenderness.  NECK: Non-tender, no meningeal signs. normal ROM. supple   CARD: Normal S1 S2; tachycardic irregular rhythm  RESP: Equal BS B/L, No wheezes, rhonchi or rales. No distress  GI: Soft, non-tender, non-distended. no cva tenderness. normal BS  MS: Normal ROM in all extremities. No midline spinal tenderness. pulses 2 +. no calf tenderness or swelling  SKIN: Warm, dry, no acute rashes. Good turgor  NEURO: A&Ox3, No focal deficits.

## 2021-04-12 NOTE — H&P ADULT - NSHPLABSRESULTS_GEN_ALL_CORE
`< from: TTE Echo Complete w/o Contrast w/ Doppler (02.22.21 @ 14:28) >      Summary:   1. Left ventricular ejection fraction, by visual estimation, is 55 to 60%.   2. Normal global left ventricular systolic function.   3. Normal left ventricular internal cavity size.   4. Normal left atrial size.   5. Normal right atrial size.   6. No evidence of mitral valve regurgitation.   7. Mild tricuspid regurgitation.   8. LA volume Index is 20.4 ml/m² ml/m2.    < end of copied text >    FINDINGS  Hemodynamics: Hemodynamic assessment demonstrates normal LVEDP.     Ventricles: EF calculated by contrast ventriculography was 55 %.     Coronary circulation: The coronary circulation is right dominant. There was no angiographic evidence for occlusive coronary artery disease. Left main: Angiography showed no evidence of disease. LAD: Angiography showed no evidence of disease. 1st diagonal: Angiography showed no evidence of disease. Circumflex: The vessel was large sized. Angiography showed no evidence of disease. 1st obtuse marginal: Angiography showed no evidence of disease. RCA: Angiography showed no evidence of disease. Right PDA: Angiography showed no evidence of disease. Right posterolateral segment: Angiography showed no evidence of disease.       PROCEDURE SUMMARY  No angiographic evidence of coronary artery disease.

## 2021-04-12 NOTE — ED PROVIDER NOTE - CLINICAL SUMMARY MEDICAL DECISION MAKING FREE TEXT BOX
symptomatic parox AF, on elliquis & currently rate-controlled in ED, signigicant/extensive cardiac history with multiple dysrhythmias, ppm/aicd w/complications - ekg AF 110s on arrival, later NSR on telemonitor, cxr clear, labs wnl, EP consulted, Dr. Quiñones rec admission for further mgmt symptomatic parox AF, on elliquis & currently rate-controlled in ED, significant/extensive cardiac history with multiple dysrhythmias, ppm/aicd w/complications - ekg AF 110s on arrival, later NSR on telemonitor, cxr clear, K hemolyzed, normal renal fx, remainder of labs wnl, EP consulted, Dr. Quiñones rec admission for further mgmt

## 2021-04-12 NOTE — H&P ADULT - ASSESSMENT
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 The patient reported that he experienced   intermittent lightheadedness, palpitations, chest tightness & sob x sev days. [employer] company called EP Dr. Peck today to report  intermittent  AF, his office then called NH to speak with patient  and advised NH staff to give him extra dose of metoprolol. Per pt, staff said they could not give extra doses of medication w/o physician orders, hence patient was  sent  to the  ED for further management.  Pt still complains  palpitations currently, is in AF with  on telemonitoring.  Denies cough, hemoptysis, nv, abd pain, edema.      ED AFIB RVR noted /56 114 Hemodynamically stable       IMPRESSION  HO Paroxymal Afib now  in RVR  Hemodynamicly stable   > CHADSVASC: 3  >s/p ICD extraction + re-implant with  multi reversions and lead abandonment  > Recent Echo noted   > Recent Cath  non Ischemic  > Started in Metoprolol tartrate 25mg q6     > tele monitoring, f/u TSH   > Ep follow up  for Device interrogation/cardioversion/abplation   HO CVA?   > Continue Statin  and  ASA   HO COPD Current Smoker   > CVontinue  Spiriva     HO MS  in remission with Chronic Neuropathic Pain   >Continue Gabapentin , Baclofen, tramadol      HO Bipolar, schizophrenia referred   >Continue Seroquel qt 502 please repeat tomorrow  >Continue  Zoloft     HO Seizures   > Lamotrigine Home medication     HO BPH  > Flowmax        Electrolyte Imbalances: Correct as needed  []  Hyponatremia   /   Hypernatremia  []   []  Hypokalemia   /   Hyperkalemia  []   []  Hypochlorhydria   /    Hypochlorhydria  []   []  Hypomagnesemia   /   Hypermagnesemia  []   []  Hypophosphatemia   /   Hyperphosphatemia  []       GI ppx:                                   [] Not indicated   /   [x] Pantoprazole 40mg PO Daily    DVT ppx: Eliquis   [] Not indicated / [] Heparin 5000mg SubQ / [] Lovenox 40mg SubQ / [] SCDs    Fluids:   [x] PO  |  [] IVF    Activity:  [X] Assisatnce needed   [X] Increase as Tolerated  /  [] OOB w/ assist  /  [] Bed Rest    BMI:      DISPO:  Patient to be discharged when condition(s) optimized.  [x] From Home     [ ] NH/SNF   [ ] 4A Rehab  [ ] Detox Clinic  [X] Plan Discussion with patient and/or family.  [X] Discussed Case and Plan with the Medical Attending.    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 The patient reported that he experienced   intermittent lightheadedness, palpitations, chest tightness & sob x sev days. [employer] company called EP Dr. Peck today to report  intermittent  AF, his office then called NH to speak with patient  and advised NH staff to give him extra dose of metoprolol. Per pt, staff said they could not give extra doses of medication w/o physician orders, hence patient was  sent  to the  ED for further management.  Pt still complains  palpitations currently, is in AF with  on telemonitoring.  Denies cough, hemoptysis, nv, abd pain, edema.      ED AFIB RVR noted /56 114 Hemodynamically stable       IMPRESSION  HO Paroxymal Afib now  in RVR  Hemodynamicly stable   > CHADSVASC: 3  >s/p ICD extraction + re-implant with  multi reversions and lead abandonment  > Recent Echo noted   > Recent Cath  non Ischemic  > Started on Metoprolol tartrate 25mg q6     > tele monitoring, f/u TSH   > Ep follow up  for Device interrogation/cardioversion/abplation   HO CVA?   > Continue Statin  and  ASA   HO COPD Current Smoker   > CVontinue  Spiriva     HO MS  in remission with Chronic Neuropathic Pain   >Continue Gabapentin , Baclofen, tramadol      HO Bipolar, schizophrenia referred   >Continue Seroquel qt 502 please repeat tomorrow  >Continue  Zoloft     HO Seizures   > Lamotrigine Home medication     HO BPH  > Flowmax        Electrolyte Imbalances: Correct as needed  []  Hyponatremia   /   Hypernatremia  []   []  Hypokalemia   /   Hyperkalemia  []   []  Hypochlorhydria   /    Hypochlorhydria  []   []  Hypomagnesemia   /   Hypermagnesemia  []   []  Hypophosphatemia   /   Hyperphosphatemia  []       GI ppx:                                   [] Not indicated   /   [x] Pantoprazole 40mg PO Daily    DVT ppx: Eliquis   [] Not indicated / [] Heparin 5000mg SubQ / [] Lovenox 40mg SubQ / [] SCDs    Fluids:   [x] PO  |  [] IVF    Activity:  [X] Assisatnce needed   [X] Increase as Tolerated  /  [] OOB w/ assist  /  [] Bed Rest    BMI:      DISPO:  Patient to be discharged when condition(s) optimized.  [x] From Home     [ ] NH/SNF   [ ] 4A Rehab  [ ] Detox Clinic  [X] Plan Discussion with patient and/or family.  [X] Discussed Case and Plan with the Medical Attending.    CODE STATUS  [X] FULL   /    [] DNR

## 2021-04-12 NOTE — ED PROVIDER NOTE - ATTENDING CONTRIBUTION TO CARE
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 Pt rpts intermitt lightheadedness, palpitations, chest tightness & sob x sev days. [employer] company called EP Dr. Peck today to rpt intermitt AF, office then called NH to speak with pt and advised staff to give him extra dose of metoprolol. Per pt, staff said they could not give extra doses of medication w/o physician orders, sent pt to ED for further mgmt. Pt c/o palpitations currently, is NSR on telemonitor. Denies cough, hemoptysis, nv, abd pain, edema.    PE:  thin middle-aged m nad  skin warm, dry  ncat  neck supple  rrr nl s1s2 no mrg  ctab no wrr  abd soft ntnd no palpable masses no rgr  back non-tender no cvat  ext no cce dpi  neuro aaox3 grossly nf exam

## 2021-04-12 NOTE — ED ADULT TRIAGE NOTE - MODE OF ARRIVAL
Magnus Dorsey MD  Interventional Cardiology  [place] Office : [phone] [place] [id number]  Tel:   [place] Office : [address] [facility] [place] [address]  Tel: [phone]  Cell : [phone]    Subjective : Pt lying in bed comfortable, not in distress, denies any chest pain or SOB  	  MEDICATIONS:  aspirin enteric coated 81 milliGRAM(s) Oral daily  enoxaparin Injectable 40 milliGRAM(s) SubCutaneous every 24 hours  tamsulosin 0.4 milliGRAM(s) Oral at bedtime  guaiFENesin   Syrup  (Sugar-Free) 100 milliGRAM(s) Oral every 6 hours PRN  acetaminophen   Tablet .. 650 milliGRAM(s) Oral every 6 hours  cyclobenzaprine 5 milliGRAM(s) Oral at bedtime  diphenhydrAMINE 25 milliGRAM(s) Oral every 4 hours PRN  ondansetron Injectable 4 milliGRAM(s) IV Push every 6 hours PRN  famotidine    Tablet 20 milliGRAM(s) Oral at bedtime  pancrelipase  (CREON 12,000 Lipase Units) 1 Capsule(s) Oral three times a day  pantoprazole    Tablet 40 milliGRAM(s) Oral before breakfast  atorvastatin 40 milliGRAM(s) Oral at bedtime  dextrose 40% Gel 15 Gram(s) Oral once PRN  dextrose 50% Injectable 12.5 Gram(s) IV Push once  dextrose 50% Injectable 25 Gram(s) IV Push once  dextrose 50% Injectable 25 Gram(s) IV Push once  glucagon  Injectable 1 milliGRAM(s) IntraMuscular once PRN  insulin glargine Injectable (LANTUS) 20 Unit(s) SubCutaneous at bedtime  insulin lispro (HumaLOG) corrective regimen sliding scale   SubCutaneous three times a day before meals  insulin lispro (HumaLOG) corrective regimen sliding scale   SubCutaneous at bedtime    calcium carbonate 1250 mG  + Vitamin D (OsCal 500 + D) 1 Tablet(s) Oral two times a day  dextrose 5%. 1000 milliLiter(s) IV Continuous <Continuous>  lactated ringers. 1000 milliLiter(s) IV Continuous <Continuous>      PHYSICAL EXAM:  T(C): 37.3 (07-01-19 @ 23:58), Max: 37.3 (07-01-19 @ 23:58)  HR: 89 (07-01-19 @ 23:58) (77 - 95)  BP: 145/63 (07-01-19 @ 23:58) (105/57 - 150/69)  RR: 18 (07-01-19 @ 23:58) (17 - 18)  SpO2: 95% (07-01-19 @ 23:58) (95% - 100%)  Wt(kg): --  I&O's Summary    30 Jun 2019 07:01  -  01 Jul 2019 07:00  --------------------------------------------------------  IN: 1180 mL / OUT: 1675 mL / NET: -495 mL    01 Jul 2019 07:01  -  02 Jul 2019 01:41  --------------------------------------------------------  IN: 0 mL / OUT: 400 mL / NET: -400 mL  HEENT:   Normal oral mucosa, PERRL, EOMI	  Cardiovascular: Normal S1 S2, No JVD, No murmurs, No edema  Respiratory: Lungs clear to auscultation	  Gastrointestinal:  S/P SURGERY  Extremities: No clubbing, cyanosis or edema                                      9.7    7.92  )-----------( 131      ( 01 Jul 2019 11:45 )             30.3     07-01    138  |  103  |  11  ----------------------------<  83  4.1   |  24  |  1.36<H>    Ca    8.8      01 Jul 2019 11:45  Phos  3.0     07-01  Mg     2.2     07-01    TPro  5.1<L>  /  Alb  2.1<L>  /  TBili  0.8  /  DBili  x   /  AST  21  /  ALT  25  /  AlkPhos  141<H>  06-30    proBNP:   Lipid Profile:   HgA1c:   TSH: Ambulance EMS

## 2021-04-12 NOTE — H&P ADULT - HISTORY OF PRESENT ILLNESS
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 The patient reported that he experienced   intermittent lightheadedness, palpitations, chest tightness & sob x sev days. [employer] called EP Dr. Peck today to report  intermittent  AF, his office then called NH to speak with patient  and advised NH staff to give him extra dose of metoprolol. Per pt, staff said they could not give extra doses of medication w/o physician orders, hence patient was  sent  to the  ED for further management.  Pt still complains  palpitations currently, is in AF with  on telemonitoring.  Denies cough, hemoptysis, nv, abd pain, edema.      ED AFIB RVR noted /56 114 Hemodynamically stable

## 2021-04-12 NOTE — H&P ADULT - NSHPPHYSICALEXAM_GEN_ALL_CORE
VITAL SIGNS: vital signs stable Tachycardic   CONSTITUTIONAL: Well-developed; well-nourished; in no acute distress.  SKIN: Skin exam is warm and dry, no acute rash.  HEAD: Normocephalic; atraumatic.  EYES: Pupils equal round reactive to light  ENT: No nasal discharge; airway clear. Moist mucus membranes.  NECK: Supple; non tender. No rigidity  CARD: IRRegular rate and rhythm. Normal S1, S2; no murmurs, gallops, or rubs. noJVD  RESP: Lungs clear to auscultation bilaterally but  decreased breath sounds in lower lobes. No wheezes, rales or rhonchi.  ABD: Abdomen soft; non-tender; non-distended;  no hepatosplenomegaly. No   EXT: Normal  ROM. No clubbing, cyanosis or edema.   NEURO: Alert and oriented x 3. No focal deficits.  PSYCH: Cooperative, appropriate.

## 2021-04-12 NOTE — H&P ADULT - ATTENDING COMMENTS
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 The patient reported that he experienced   intermittent lightheadedness, palpitations, chest tightness & sob x sev days. [employer] company called EP Dr. Peck today to report  intermittent  AF, his office then called NH to speak with patient  and advised NH staff to give him extra dose of metoprolol. Per pt, staff said they could not give extra doses of medication w/o physician orders, hence patient was  sent  to the  ED for further management.  Pt still complains  palpitations currently, is in AF with  on telemonitoring.  Denies cough, hemoptysis, nv, abd pain, edema.      ED AFIB RVR noted /56 114 Hemodynamically stable    (12 Apr 2021 23:50)    REVIEW OF SYSTEMS:  CONSTITUTIONAL: No weakness, fevers or chills  EYES/ENT: No visual changes;  No vertigo or throat pain   NECK: No pain or stiffness  RESPIRATORY: No cough, wheezing, hemoptysis; No shortness of breath  CARDIOVASCULAR: No chest pain or palpitations  GASTROINTESTINAL: No abdominal or epigastric pain. No nausea, vomiting, or hematemesis; No diarrhea or constipation. No melena or hematochezia.  GENITOURINARY: No dysuria, frequency or hematuria  NEUROLOGICAL: No numbness or weakness  SKIN: No itching, rashes     Physical Exam:  General: WN/WD NAD  Neurology: A&Ox3, nonfocal, follows commands  Eyes: PERRLA/ EOMI  ENT/Neck: Neck supple, trachea midline, No JVD  Respiratory: CTA B/L, No wheezing, rales, rhonchi  CV: Normal rate regular rhythm, S1S2, no murmurs, rubs or gallops  Abdominal: Soft, NT, ND +BS,   Extremities: No edema, + peripheral pulses  Skin: No Rashes, Hematoma, Ecchymosis  Incisions:   Tubes: HPI:  50M from [facility] extensive PMH  Paroxymal AF s/p several ablations on Eliquis multiple dysrhythmias including  vtavh s/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 The patient reported that he experienced   intermittent lightheadedness, palpitations, chest tightness & sob x sev days. [employer] company called EP Dr. Quiñones today to report  intermittent  AF, his office then called NH to speak with patient  and advised NH staff to give him extra dose of metoprolol. Per pt, staff said they could not give extra doses of medication w/o physician orders, hence patient was  sent  to the  ED for further management.  Pt still complains  palpitations currently, is in AF with  on telemonitoring.  Denies cough, hemoptysis, nv, abd pain, edema.      ED AFIB RVR noted /56 114 Hemodynamically stable    (12 Apr 2021 23:50)    REVIEW OF SYSTEMS:  CONSTITUTIONAL: No weakness, fevers or chills  EYES/ENT: No visual changes;  No vertigo or throat pain   NECK: No pain or stiffness  RESPIRATORY: No cough, wheezing, hemoptysis; No shortness of breath  CARDIOVASCULAR: No chest pain or palpitations  GASTROINTESTINAL: No abdominal or epigastric pain. No nausea, vomiting, or hematemesis; No diarrhea or constipation. No melena or hematochezia.  GENITOURINARY: No dysuria, frequency or hematuria  NEUROLOGICAL: No numbness or weakness  SKIN: No itching, rashes     Physical Exam:  General: WN/WD NAD  Neurology: A&Ox3, nonfocal, follows commands  Eyes: PERRLA/ EOMI  ENT/Neck: Neck supple, trachea midline, No JVD  Respiratory: CTA B/L, No wheezing, rales, rhonchi  CV: Normal rate regular rhythm, S1S2, no murmurs, rubs or gallops  Abdominal: Soft, NT, ND +BS,   Extremities: No edema, + peripheral pulses  Skin: No Rashes, Hematoma, Ecchymosis  Incisions:   Tubes:    A/p  Chronic/paroxsymal A. 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 The patient reported that he experienced   intermittent lightheadedness, palpitations, chest tightness & sob x sev days. [employer] company called EP Dr. Quiñones today to report  intermittent  AF, his office then called NH to speak with patient  and advised NH staff to give him extra dose of metoprolol. Per pt, staff said they could not give extra doses of medication w/o physician orders, hence patient was  sent  to the  ED for further management.  Pt still complains  palpitations currently, is in AF with  on telemonitoring.  Denies cough, hemoptysis, nv, abd pain, edema.      ED AFIB RVR noted /56 114 Hemodynamically stable    (12 Apr 2021 23:50)    REVIEW OF SYSTEMS:  CONSTITUTIONAL: No weakness, fevers or chills  EYES/ENT: No visual changes;  No vertigo or throat pain   NECK: No pain or stiffness  RESPIRATORY: No cough, wheezing, hemoptysis; No shortness of breath  CARDIOVASCULAR: No chest pain or palpitations  GASTROINTESTINAL: No abdominal or epigastric pain. No nausea, vomiting, or hematemesis; No diarrhea or constipation. No melena or hematochezia.  GENITOURINARY: No dysuria, frequency or hematuria  NEUROLOGICAL: No numbness or weakness  SKIN: No itching, rashes     Physical Exam:  General: WN/WD NAD  Neurology: A&Ox3, nonfocal, follows commands  Eyes: PERRLA/ EOMI  ENT/Neck: Neck supple, trachea midline, No JVD  Respiratory: CTA B/L, No wheezing, rales, rhonchi  CV: Normal rate regular rhythm, S1S2, no murmurs, rubs or gallops  Abdominal: Soft, NT, ND +BS,   Extremities: No edema, + peripheral pulses  Skin: No Rashes, Hematoma, Ecchymosis  Incisions:   Tubes:    A/p  Chronic/paroxsymal A. Fib w/ RVR   -admit to tele   -c/w Eliquis   -EP to al [employer]ICD  -agree w/ metoprolol being added   -TSH     H/o CVA   -c/w statin and ASA     COPD - still smoking   -smoking cessation   -c/w outpatient Rx    MS / Chronic pain in legs and R UE   -c/w Cj, Baclofen , Tramadol     Bipolar disorder / Schizophrenia - stable   -c/w outpatient Rx     Seizure disorder   -seizure precautions/ c/w outpatient Rx    BPH - Flomax

## 2021-04-12 NOTE — ED ADULT NURSE NOTE - NSIMPLEMENTINTERV_GEN_ALL_ED
Implemented All Fall Risk Interventions:  [place] to call system. Call bell, personal items and telephone within reach. Instruct patient to call for assistance. Room bathroom lighting operational. Non-slip footwear when patient is off stretcher. Physically safe environment: no spills, clutter or unnecessary equipment. Stretcher in lowest position, wheels locked, appropriate side rails in place. Provide visual cue, wrist band, yellow gown, etc. Monitor gait and stability. Monitor for mental status changes and reorient to person, place, and time. Review medications for side effects contributing to fall risk. Reinforce activity limits and safety measures with patient and family.

## 2021-04-12 NOTE — ED PROVIDER NOTE - NS ED ROS FT
Review of Systems:  	•	CONSTITUTIONAL - no fever, no diaphoresis, no chills  	•	SKIN - no rash  	•	HEMATOLOGIC - no bleeding, no bruising  	•	EYES - no eye pain, no blurry vision  	•	ENT - no change in hearing, no sore throat, no ear pain or tinnitus  	•	RESPIRATORY - + shortness of breath, no cough  	•	CARDIAC - + chest pain, no palpitations  	•	GI - no abd pain, no nausea, no vomiting, no diarrhea, no constipation  	•	GENITO-URINARY - no discharge, no dysuria; no hematuria, no increased urinary frequency  	•	MUSCULOSKELETAL - no joint paint, no swelling, no redness  	•	NEUROLOGIC - no weakness, no headache, no paresthesias, no LOC

## 2021-04-12 NOTE — H&P ADULT - NSHPREVIEWOFSYSTEMS_GEN_ALL_CORE
REVIEW OF SYSTEMS:  CONSTITUTIONAL: No fever, weight loss, or fatigue  EYES: No eye pain, visual disturbances, or discharge  ENMT:  No difficulty hearing, tinnitus, vertigo; No sinus or throat pain  NECK: No pain or stiffness  RESPIRATORY: No cough, wheezing, chills or hemoptysis; No shortness of breath  CARDIOVASCULAR: No chest pain, palpitations, dizziness, or leg swelling  GASTROINTESTINAL: No abdominal or epigastric pain. No nausea, vomiting, or hematemesis; No diarrhea or constipation. No melena or hematochezia.  GENITOURINARY: No dysuria, frequency, hematuria, or incontinence  NEUROLOGICAL: No headaches, memory loss, loss of strength, numbness, or tremors  SKIN: No itching, burning, rashes, or lesions   LYMPH NODES: No enlarged glands  ENDOCRINE: No heat or cold intolerance; No hair loss  MUSCULOSKELETAL: No joint pain or swelling; No muscle, back, or extremity pain  PSYCHIATRIC: No depression, anxiety, mood swings, or difficulty sleeping  HEME/LYMPH: No easy bruising, or bleeding gums  ALLERY AND IMMUNOLOGIC: No hives or eczema

## 2021-04-12 NOTE — ED PROVIDER NOTE - DOMESTIC TRAVEL HIGH RISK QUESTION
CHIEF COMPLAINT:  Javi Encarnacion is here today for First Annual Medicare Wellness Visit.    Medication verified and med list updated    Refills needed today?  No            CHOLESTEROL (mg/dL)   Date Value   09/27/2019 164     Cholesterol (mg/dL)   Date Value   02/13/2020 181     HDL (mg/dL)   Date Value   02/13/2020 40   01/08/2019 38 (L)     No components found for: CHOLHDLRATIO  TRIGLYCERIDE (mg/dL)   Date Value   01/08/2019 308 (H)     Triglycerides (mg/dL)   Date Value   02/13/2020 184 (H)     CALCULATED LDL (mg/dL)   Date Value   01/08/2019 175 (H)     LDL (mg/dL)   Date Value   02/13/2020 104      Glucose (mg/dL)   Date Value   02/13/2020 147 (H)   07/30/2019 135 (H)     PSA, Total (ng/mL)   Date Value   07/30/2019 4.26 (H)     Prostate Specific Antigen (ng/mL)   Date Value   08/12/2020 1.97         Health Maintenance Due   Topic Date Due   • Hepatitis B Vaccine (1 of 3 - Risk 3-dose series) 01/11/1971   • Shingles Vaccine (2 of 3) 09/18/2014   • Pneumococcal Vaccine 65+ (2 of 2 - PPSV23) 04/02/2020     Patient wore mask: Yes  Team member wore mask and goggles: Yes               No

## 2021-04-13 ENCOUNTER — TRANSCRIPTION ENCOUNTER (OUTPATIENT)
Age: 51
End: 2021-04-13

## 2021-04-13 LAB
ALBUMIN SERPL ELPH-MCNC: 4.2 G/DL — SIGNIFICANT CHANGE UP (ref 3.5–5.2)
ALP SERPL-CCNC: 111 U/L — SIGNIFICANT CHANGE UP (ref 30–115)
ALT FLD-CCNC: 9 U/L — SIGNIFICANT CHANGE UP (ref 0–41)
ANION GAP SERPL CALC-SCNC: 10 MMOL/L — SIGNIFICANT CHANGE UP (ref 7–14)
AST SERPL-CCNC: 14 U/L — SIGNIFICANT CHANGE UP (ref 0–41)
BASOPHILS # BLD AUTO: 0.09 K/UL — SIGNIFICANT CHANGE UP (ref 0–0.2)
BASOPHILS NFR BLD AUTO: 1.4 % — HIGH (ref 0–1)
BILIRUB SERPL-MCNC: 0.2 MG/DL — SIGNIFICANT CHANGE UP (ref 0.2–1.2)
BUN SERPL-MCNC: 24 MG/DL — HIGH (ref 10–20)
CALCIUM SERPL-MCNC: 9.6 MG/DL — SIGNIFICANT CHANGE UP (ref 8.5–10.1)
CHLORIDE SERPL-SCNC: 106 MMOL/L — SIGNIFICANT CHANGE UP (ref 98–110)
CK MB CFR SERPL CALC: <1 NG/ML — SIGNIFICANT CHANGE UP (ref 0.6–6.3)
CK SERPL-CCNC: 45 U/L — SIGNIFICANT CHANGE UP (ref 0–225)
CO2 SERPL-SCNC: 24 MMOL/L — SIGNIFICANT CHANGE UP (ref 17–32)
CREAT SERPL-MCNC: 1 MG/DL — SIGNIFICANT CHANGE UP (ref 0.7–1.5)
EOSINOPHIL # BLD AUTO: 0.12 K/UL — SIGNIFICANT CHANGE UP (ref 0–0.7)
EOSINOPHIL NFR BLD AUTO: 1.9 % — SIGNIFICANT CHANGE UP (ref 0–8)
GLUCOSE SERPL-MCNC: 109 MG/DL — HIGH (ref 70–99)
HCT VFR BLD CALC: 40.3 % — LOW (ref 42–52)
HGB BLD-MCNC: 12.7 G/DL — LOW (ref 14–18)
IMM GRANULOCYTES NFR BLD AUTO: 0.5 % — HIGH (ref 0.1–0.3)
LYMPHOCYTES # BLD AUTO: 2.2 K/UL — SIGNIFICANT CHANGE UP (ref 1.2–3.4)
LYMPHOCYTES # BLD AUTO: 34 % — SIGNIFICANT CHANGE UP (ref 20.5–51.1)
MAGNESIUM SERPL-MCNC: 2.1 MG/DL — SIGNIFICANT CHANGE UP (ref 1.8–2.4)
MCHC RBC-ENTMCNC: 29.7 PG — SIGNIFICANT CHANGE UP (ref 27–31)
MCHC RBC-ENTMCNC: 31.5 G/DL — LOW (ref 32–37)
MCV RBC AUTO: 94.4 FL — HIGH (ref 80–94)
MONOCYTES # BLD AUTO: 0.43 K/UL — SIGNIFICANT CHANGE UP (ref 0.1–0.6)
MONOCYTES NFR BLD AUTO: 6.6 % — SIGNIFICANT CHANGE UP (ref 1.7–9.3)
NEUTROPHILS # BLD AUTO: 3.6 K/UL — SIGNIFICANT CHANGE UP (ref 1.4–6.5)
NEUTROPHILS NFR BLD AUTO: 55.6 % — SIGNIFICANT CHANGE UP (ref 42.2–75.2)
NRBC # BLD: 0 /100 WBCS — SIGNIFICANT CHANGE UP (ref 0–0)
PLATELET # BLD AUTO: 247 K/UL — SIGNIFICANT CHANGE UP (ref 130–400)
POTASSIUM SERPL-MCNC: 4.2 MMOL/L — SIGNIFICANT CHANGE UP (ref 3.5–5)
POTASSIUM SERPL-SCNC: 4.2 MMOL/L — SIGNIFICANT CHANGE UP (ref 3.5–5)
PROT SERPL-MCNC: 6.5 G/DL — SIGNIFICANT CHANGE UP (ref 6–8)
RBC # BLD: 4.27 M/UL — LOW (ref 4.7–6.1)
RBC # FLD: 13.3 % — SIGNIFICANT CHANGE UP (ref 11.5–14.5)
SARS-COV-2 RNA SPEC QL NAA+PROBE: SIGNIFICANT CHANGE UP
SODIUM SERPL-SCNC: 140 MMOL/L — SIGNIFICANT CHANGE UP (ref 135–146)
TROPONIN T SERPL-MCNC: <0.01 NG/ML — SIGNIFICANT CHANGE UP
WBC # BLD: 6.47 K/UL — SIGNIFICANT CHANGE UP (ref 4.8–10.8)
WBC # FLD AUTO: 6.47 K/UL — SIGNIFICANT CHANGE UP (ref 4.8–10.8)

## 2021-04-13 PROCEDURE — 99232 SBSQ HOSP IP/OBS MODERATE 35: CPT

## 2021-04-13 RX ORDER — QUETIAPINE FUMARATE 200 MG/1
25 TABLET, FILM COATED ORAL THREE TIMES A DAY
Refills: 0 | Status: DISCONTINUED | OUTPATIENT
Start: 2021-04-13 | End: 2021-04-16

## 2021-04-13 RX ORDER — TAMSULOSIN HYDROCHLORIDE 0.4 MG/1
0.4 CAPSULE ORAL AT BEDTIME
Refills: 0 | Status: DISCONTINUED | OUTPATIENT
Start: 2021-04-13 | End: 2021-04-16

## 2021-04-13 RX ORDER — SERTRALINE 25 MG/1
1 TABLET, FILM COATED ORAL
Qty: 0 | Refills: 0 | DISCHARGE

## 2021-04-13 RX ORDER — DIMETHYL FUMARATE 240 MG/1
1 CAPSULE ORAL
Qty: 0 | Refills: 0 | DISCHARGE

## 2021-04-13 RX ORDER — CHLORHEXIDINE GLUCONATE 213 G/1000ML
1 SOLUTION TOPICAL
Refills: 0 | Status: DISCONTINUED | OUTPATIENT
Start: 2021-04-13 | End: 2021-04-16

## 2021-04-13 RX ORDER — LAMOTRIGINE 25 MG/1
200 TABLET, ORALLY DISINTEGRATING ORAL
Refills: 0 | Status: DISCONTINUED | OUTPATIENT
Start: 2021-04-13 | End: 2021-04-16

## 2021-04-13 RX ORDER — BACLOFEN 100 %
1 POWDER (GRAM) MISCELLANEOUS
Qty: 0 | Refills: 0 | DISCHARGE

## 2021-04-13 RX ORDER — GABAPENTIN 400 MG/1
400 CAPSULE ORAL THREE TIMES A DAY
Refills: 0 | Status: DISCONTINUED | OUTPATIENT
Start: 2021-04-13 | End: 2021-04-16

## 2021-04-13 RX ORDER — GABAPENTIN 400 MG/1
1 CAPSULE ORAL
Qty: 0 | Refills: 0 | DISCHARGE

## 2021-04-13 RX ORDER — FOLIC ACID 0.8 MG
1 TABLET ORAL DAILY
Refills: 0 | Status: DISCONTINUED | OUTPATIENT
Start: 2021-04-13 | End: 2021-04-16

## 2021-04-13 RX ORDER — TRAMADOL HYDROCHLORIDE 50 MG/1
50 TABLET ORAL
Refills: 0 | Status: DISCONTINUED | OUTPATIENT
Start: 2021-04-13 | End: 2021-04-16

## 2021-04-13 RX ORDER — ASPIRIN/CALCIUM CARB/MAGNESIUM 324 MG
81 TABLET ORAL DAILY
Refills: 0 | Status: DISCONTINUED | OUTPATIENT
Start: 2021-04-13 | End: 2021-04-16

## 2021-04-13 RX ORDER — METOPROLOL TARTRATE 50 MG
25 TABLET ORAL EVERY 6 HOURS
Refills: 0 | Status: DISCONTINUED | OUTPATIENT
Start: 2021-04-13 | End: 2021-04-15

## 2021-04-13 RX ORDER — SERTRALINE 25 MG/1
50 TABLET, FILM COATED ORAL DAILY
Refills: 0 | Status: DISCONTINUED | OUTPATIENT
Start: 2021-04-13 | End: 2021-04-16

## 2021-04-13 RX ORDER — ATORVASTATIN CALCIUM 80 MG/1
40 TABLET, FILM COATED ORAL AT BEDTIME
Refills: 0 | Status: DISCONTINUED | OUTPATIENT
Start: 2021-04-13 | End: 2021-04-16

## 2021-04-13 RX ORDER — BACLOFEN 100 %
10 POWDER (GRAM) MISCELLANEOUS THREE TIMES A DAY
Refills: 0 | Status: DISCONTINUED | OUTPATIENT
Start: 2021-04-13 | End: 2021-04-16

## 2021-04-13 RX ORDER — LAMOTRIGINE 25 MG/1
2 TABLET, ORALLY DISINTEGRATING ORAL
Qty: 0 | Refills: 0 | DISCHARGE

## 2021-04-13 RX ORDER — PANTOPRAZOLE SODIUM 20 MG/1
40 TABLET, DELAYED RELEASE ORAL
Refills: 0 | Status: DISCONTINUED | OUTPATIENT
Start: 2021-04-13 | End: 2021-04-16

## 2021-04-13 RX ORDER — APIXABAN 2.5 MG/1
5 TABLET, FILM COATED ORAL
Refills: 0 | Status: DISCONTINUED | OUTPATIENT
Start: 2021-04-13 | End: 2021-04-16

## 2021-04-13 RX ORDER — METOPROLOL TARTRATE 50 MG
1 TABLET ORAL
Qty: 0 | Refills: 0 | DISCHARGE
Start: 2021-04-13

## 2021-04-13 RX ORDER — TIOTROPIUM BROMIDE 18 UG/1
1 CAPSULE ORAL; RESPIRATORY (INHALATION) DAILY
Refills: 0 | Status: DISCONTINUED | OUTPATIENT
Start: 2021-04-13 | End: 2021-04-16

## 2021-04-13 RX ADMIN — PANTOPRAZOLE SODIUM 40 MILLIGRAM(S): 20 TABLET, DELAYED RELEASE ORAL at 07:01

## 2021-04-13 RX ADMIN — QUETIAPINE FUMARATE 25 MILLIGRAM(S): 200 TABLET, FILM COATED ORAL at 07:01

## 2021-04-13 RX ADMIN — APIXABAN 5 MILLIGRAM(S): 2.5 TABLET, FILM COATED ORAL at 18:06

## 2021-04-13 RX ADMIN — QUETIAPINE FUMARATE 25 MILLIGRAM(S): 200 TABLET, FILM COATED ORAL at 21:42

## 2021-04-13 RX ADMIN — Medication 10 MILLIGRAM(S): at 07:01

## 2021-04-13 RX ADMIN — Medication 25 MILLIGRAM(S): at 18:07

## 2021-04-13 RX ADMIN — QUETIAPINE FUMARATE 25 MILLIGRAM(S): 200 TABLET, FILM COATED ORAL at 13:50

## 2021-04-13 RX ADMIN — TAMSULOSIN HYDROCHLORIDE 0.4 MILLIGRAM(S): 0.4 CAPSULE ORAL at 21:43

## 2021-04-13 RX ADMIN — ATORVASTATIN CALCIUM 40 MILLIGRAM(S): 80 TABLET, FILM COATED ORAL at 21:42

## 2021-04-13 RX ADMIN — SERTRALINE 50 MILLIGRAM(S): 25 TABLET, FILM COATED ORAL at 13:50

## 2021-04-13 RX ADMIN — LAMOTRIGINE 200 MILLIGRAM(S): 25 TABLET, ORALLY DISINTEGRATING ORAL at 07:01

## 2021-04-13 RX ADMIN — Medication 25 MILLIGRAM(S): at 01:00

## 2021-04-13 RX ADMIN — Medication 81 MILLIGRAM(S): at 13:50

## 2021-04-13 RX ADMIN — GABAPENTIN 400 MILLIGRAM(S): 400 CAPSULE ORAL at 13:51

## 2021-04-13 RX ADMIN — APIXABAN 5 MILLIGRAM(S): 2.5 TABLET, FILM COATED ORAL at 07:01

## 2021-04-13 RX ADMIN — Medication 25 MILLIGRAM(S): at 07:01

## 2021-04-13 RX ADMIN — TRAMADOL HYDROCHLORIDE 50 MILLIGRAM(S): 50 TABLET ORAL at 01:00

## 2021-04-13 RX ADMIN — Medication 10 MILLIGRAM(S): at 21:43

## 2021-04-13 RX ADMIN — GABAPENTIN 400 MILLIGRAM(S): 400 CAPSULE ORAL at 21:43

## 2021-04-13 RX ADMIN — Medication 1 MILLIGRAM(S): at 13:50

## 2021-04-13 RX ADMIN — TRAMADOL HYDROCHLORIDE 50 MILLIGRAM(S): 50 TABLET ORAL at 15:12

## 2021-04-13 RX ADMIN — Medication 10 MILLIGRAM(S): at 13:50

## 2021-04-13 RX ADMIN — GABAPENTIN 400 MILLIGRAM(S): 400 CAPSULE ORAL at 07:01

## 2021-04-13 RX ADMIN — Medication 25 MILLIGRAM(S): at 13:51

## 2021-04-13 RX ADMIN — TRAMADOL HYDROCHLORIDE 50 MILLIGRAM(S): 50 TABLET ORAL at 21:42

## 2021-04-13 RX ADMIN — TRAMADOL HYDROCHLORIDE 50 MILLIGRAM(S): 50 TABLET ORAL at 00:00

## 2021-04-13 RX ADMIN — LAMOTRIGINE 200 MILLIGRAM(S): 25 TABLET, ORALLY DISINTEGRATING ORAL at 18:06

## 2021-04-13 NOTE — DISCHARGE NOTE PROVIDER - NSDCMRMEDTOKEN_GEN_ALL_CORE_FT
apixaban 5 mg oral tablet: 1 tab(s) orally every 12 hours  aspirin 81 mg oral tablet, chewable: 1 tab(s) orally once a day  atorvastatin 40 mg oral tablet: 1 tab(s) orally once a day (at bedtime)  baclofen 10 mg oral tablet: 1 tab(s) orally 3 times a day  folic acid 1 mg oral tablet: 1 tab(s) orally once a day  gabapentin 400 mg oral capsule: 1 cap(s) orally 3 times a day  lamoTRIgine 100 mg oral tablet: 2 tab(s) orally 2 times a day  metoprolol tartrate 50 mg oral tablet: 1 tab(s) orally 2 times a day  pantoprazole 40 mg oral delayed release tablet: 1 tab(s) orally once a day  QUEtiapine 25 mg oral tablet: 1 tab(s) orally 3 times a day  sertraline 50 mg oral tablet: 1 tab(s) orally once a day  tamsulosin 0.4 mg oral capsule: 1 cap(s) orally once a day (at bedtime)  Tecfidera 240 mg oral delayed release capsule: 1 cap(s) orally 2 times a day  tiotropium 18 mcg inhalation capsule: 1 cap(s) inhaled once a day  traMADol 50 mg oral tablet: 1 tab(s) orally every 6 hours, As Needed  as needed for pain MDD:max 4 tablets a day    apixaban 5 mg oral tablet: 1 tab(s) orally every 12 hours  aspirin 81 mg oral tablet, chewable: 1 tab(s) orally once a day  atorvastatin 40 mg oral tablet: 1 tab(s) orally once a day (at bedtime)  baclofen 10 mg oral tablet: 1 tab(s) orally 3 times a day  folic acid 1 mg oral tablet: 1 tab(s) orally once a day  gabapentin 400 mg oral capsule: 1 cap(s) orally 3 times a day  lamoTRIgine 100 mg oral tablet: 2 tab(s) orally 2 times a day  pantoprazole 40 mg oral delayed release tablet: 1 tab(s) orally once a day  QUEtiapine 25 mg oral tablet: 1 tab(s) orally 3 times a day  sertraline 50 mg oral tablet: 1 tab(s) orally once a day  tamsulosin 0.4 mg oral capsule: 1 cap(s) orally once a day (at bedtime)  Tecfidera 240 mg oral delayed release capsule: 1 cap(s) orally 2 times a day  tiotropium 18 mcg inhalation capsule: 1 cap(s) inhaled once a day  Toprol-XL 50 mg oral tablet, extended release: 1 tab(s) orally every 12 hours  traMADol 50 mg oral tablet: 1 tab(s) orally every 6 hours, As Needed  as needed for pain MDD:max 4 tablets a day

## 2021-04-13 NOTE — CONSULT NOTE ADULT - SUBJECTIVE AND OBJECTIVE BOX
Patient is a 50y old  Male who presents with a chief complaint of palpitations (13 Apr 2021 09:25)    HPI: Patient is a 50-year old man with known history of paroxysmal AF s/p ablation (x2, 2017) s/p re-do RF (12/20, PVI-CTI) s/p early flutter s/p spontaneoud conversion during procedure and CTI touch-up (1/21), Atrial standstill s/p PPM (2001) --> ARVD s/p ICD placement --> ICD lead malfunction s/p ICD lead extraction s/p DC ICD lead placement --> Perforation s/p RV lead revision, HIV, DL, MS, bipolar disorder, seizure, depression, HTN. Patient was told to come to ED from EP office as he was found to have AF 's on remote transmission of AICD. Nursing Home was unable to give extra dose of BB so patient needed rate control in the ED. He admits to having had palpitations intermittently for several days with dizziness, no syncope. Overnight in the ED patient was given multiple doses of Metoprolol and is now rate controlled and asymptomatic.    PAST MEDICAL & SURGICAL HISTORY:  Clinical Depression    Peripheral Neuropathy    TIA (transient ischemic attack)    CVA (cerebral vascular accident)    COPD (chronic obstructive pulmonary disease)    Hypercholesteremia    Seasonal allergies    GERD (gastroesophageal reflux disease)    Schizo affective schizophrenia    Anginal pain    Bipolar disorder    CAD (coronary artery disease)    MS (multiple sclerosis)    Pneumonia    Migraines    Seizures    HTN (hypertension)    BPH (benign prostatic hyperplasia)    Atrial fibrillation  s/p ablation, on eliquis    CHF (congestive heart failure)    Cardiomyopathy    Supraventricular arrhythmia  prior history    PTSD (post-traumatic stress disorder)    Chronic heart failure with preserved ejection fraction    Ventricular tachycardia    AICD (automatic cardioverter/defibrillator) present  SJM    Type 2 diabetes mellitus    Peripheral neuropathy    Chronic pain    BPH (benign prostatic hyperplasia)    Pericardial effusion without cardiac tamponade  h/o trace pericardial effusion    Anemia of chronic disease    Iron deficiency anemia    Folate-deficiency anemia    Chronic respiratory failure  secondary to COPD    Constipation    Vitamin D deficiency    S/P Implantation of AICD    S/P Orchiectomy  L for testicular CA    History of hip replacement    H/O hernia repair  right 1972,1991    H/O prior ablation treatment  for Afib    History of evacuation of hematoma  AICD pocket    Displacement of electrode lead of cardiac pacemaker    PREVIOUS DIAGNOSTIC TESTING:      ECHO  FINDINGS:  < from: TTE Echo Complete w/o Contrast w/ Doppler (02.22.21 @ 14:28) >  Summary:   1. Left ventricular ejection fraction, by visual estimation, is 55 to 60%.   2. Normal global left ventricular systolic function.   3. Normal left ventricular internal cavity size.   4. Normal left atrial size.   5. Normal right atrial size.   6. No evidence of mitral valve regurgitation.   7. Mild tricuspid regurgitation.   8. LA volume Index is 20.4 ml/m² ml/m2.    < end of copied text >    STRESS  FINDINGS:    CATHETERIZATION  FINDINGS:  FINDINGS  Hemodynamics: Hemodynamic assessment demonstrates normal LVEDP.     Ventricles: EF calculated by contrast ventriculography was 55 %.     Coronary circulation: The coronary circulation is right dominant. There was no angiographic evidence for occlusive coronary artery disease. Left main: Angiography showed no evidence of disease. LAD: Angiography showed no evidence of disease. 1st diagonal: Angiography showed no evidence of disease. Circumflex: The vessel was large sized. Angiography showed no evidence of disease. 1st obtuse marginal: Angiography showed no evidence of disease. RCA: Angiography showed no evidence of disease. Right PDA: Angiography showed no evidence of disease. Right posterolateral segment: Angiography showed no evidence of disease.       PROCEDURE SUMMARY  No angiographic evidence of coronary artery disease.     ELECTROPHYSIOLOGY STUDY  FINDINGS:    CAROTID ULTRASOUND:  FINDINGS    VENOUS DUPLEX SCAN:  FINDINGS:    CHEST CT PULMONARY ANGIO with IV Contrast:  FINDINGS:    MEDICATIONS  (STANDING):  apixaban 5 milliGRAM(s) Oral two times a day  aspirin  chewable 81 milliGRAM(s) Oral daily  atorvastatin 40 milliGRAM(s) Oral at bedtime  baclofen 10 milliGRAM(s) Oral three times a day  chlorhexidine 4% Liquid 1 Application(s) Topical <User Schedule>  folic acid 1 milliGRAM(s) Oral daily  gabapentin 400 milliGRAM(s) Oral three times a day  lamoTRIgine 200 milliGRAM(s) Oral two times a day  metoprolol tartrate 25 milliGRAM(s) Oral every 6 hours  pantoprazole    Tablet 40 milliGRAM(s) Oral before breakfast  QUEtiapine 25 milliGRAM(s) Oral three times a day  sertraline 50 milliGRAM(s) Oral daily  tamsulosin 0.4 milliGRAM(s) Oral at bedtime  tiotropium 18 MICROgram(s) Capsule 1 Capsule(s) Inhalation daily    MEDICATIONS  (PRN):      FAMILY HISTORY:  Family history of early CAD (Mother)    Family history of cervical cancer (Mother)    Family history of cardiomyopathy  Mother    Family history of colon cancer in mother    SOCIAL HISTORY: No smoking, ETOH or illicit drug use    Past Surgical History:  S/P Implantation of AICD  S/P Orchiectomy  L for testicular CA  History of hip replacement  H/O hernia repair  right 1972,1991  H/O prior ablation treatment  for Afib  History of evacuation of hematoma  AICD pocket  Displacement of electrode lead of cardiac pacemaker    Allergies:  dexmethylphenidate (Unknown)  Dilantin (Rash)  dilantin, compazine, , ritalin, phenergan (Unknown)  Haldol (Unknown)  hydantoin derivatives (Other)  methylphenidate (Unknown)  phenytoin (Unknown)  prochlorperazine (Unknown)  promethazine (Dystonic RXN)  rash/hives (Anaphylaxis)  thioxanthenes (Unknown)      REVIEW OF SYSTEMS:  CONSTITUTIONAL: No fever, weight loss, chills, shakes, or fatigue  RESPIRATORY: No cough, wheezing, hemoptysis; + shortness of breath  CARDIOVASCULAR: +palpitations, dizziness, No chest pain, dyspnea, syncope, paroxysmal nocturnal dyspnea, orthopnea, or arm or leg swelling  GASTROINTESTINAL: No abdominal  or epigastric pain, nausea, vomiting, hematemesis, diarrhea, constipation, melena or bright red blood.  NEUROLOGICAL: No headaches, memory loss, slurred speech, limb weakness, loss of strength, numbness, or tremors  MUSCULOSKELETAL: No joint pain or swelling, muscle, back, or extremity pain      Vital Signs Last 24 Hrs  T(C): 36.1 (12 Apr 2021 22:24), Max: 36.1 (12 Apr 2021 22:24)  T(F): 97 (12 Apr 2021 22:24), Max: 97 (12 Apr 2021 22:24)  HR: 110 (13 Apr 2021 01:02) (85 - 114)  BP: 110/54 (13 Apr 2021 01:02) (93/54 - 112/70)  BP(mean): --  RR: 16 (13 Apr 2021 01:02) (16 - 18)  SpO2: 96% (13 Apr 2021 01:02) (96% - 96%)    PHYSICAL EXAM:  GENERAL: In no apparent distress, well nourished, and hydrated.  EYES: EOMI, PERRLA, conjunctiva and sclera clear  ENMT: No tonsillar erythema, exudates, or enlargements; ist mucous membranes, Good dentition, No lesions  NECK: Supple   HEART: Irregular rate and rhythm; No murmurs, rubs, or gallops.  PULMONARY: Clear to auscultation and perfusion.  No rales, wheezing, or rhonchi bilaterally.  ABDOMEN: Soft, Nontender, Nondistended; Bowel sounds present  EXTREMITIES:  2+ Peripheral Pulses, No clubbing, cyanosis, or edema  NEUROLOGICAL: Grossly nonfocal      INTERPRETATION OF TELEMETRY: AF 70s bpm    ECG:  < from: 12 Lead ECG (04.12.21 @ 18:23) >    Ventricular Rate 113 BPM    QRS Duration 142 ms    Q-T Interval 366 ms    QTC Calculation(Bazett) 502 ms    R Axis -1 degrees    T Axis 92 degrees    Diagnosis Line Atrial fibrillation  Right bundle branch block  T wave abnormality, consider lateral ischemia  Abnormal ECG    Confirmed by BHAVYA ALCALA MD ([id number]) on 4/12/2021 9:30:07 PM    < end of copied text >      I&O's Detail      LABS:                        12.7   6.47  )-----------( 247      ( 13 Apr 2021 08:25 )             40.3     04-13    140  |  106  |  24<H>  ----------------------------<  109<H>  4.2   |  24  |  1.0    Ca    9.6      13 Apr 2021 08:25  Mg     2.1     04-13    TPro  6.5  /  Alb  4.2  /  TBili  0.2  /  DBili  x   /  AST  14  /  ALT  9   /  AlkPhos  111  04-13    CARDIAC MARKERS ( 13 Apr 2021 08:25 )  x     / <0.01 ng/mL / 45 U/L / x     / <1.0 ng/mL  CARDIAC MARKERS ( 12 Apr 2021 20:00 )  x     / <0.01 ng/mL / x     / x     / x          PT/INR - ( 12 Apr 2021 20:00 )   PT: 14.70 sec;   INR: 1.28 ratio         PTT - ( 12 Apr 2021 20:00 )  PTT:25.3 sec    BNP  I&O's Detail    Daily Height in cm: 167.64 (12 Apr 2021 18:17)    Daily     RADIOLOGY & ADDITIONAL STUDIES:

## 2021-04-13 NOTE — DISCHARGE NOTE PROVIDER - CARE PROVIDER_API CALL
Servando Jarquin (MD)  Cardiac Electrophysiology; Cardiology; Internal Medicine  [address] [place] [address]  Phone: [phone]  Fax: [phone]  Follow Up Time:

## 2021-04-13 NOTE — DISCHARGE NOTE PROVIDER - NSDCCPCAREPLAN_GEN_ALL_CORE_FT
PRINCIPAL DISCHARGE DIAGNOSIS  Diagnosis: Atrial fibrillation  Assessment and Plan of Treatment: During your course here you were found to be in atrial fibrillation with a mildly elevated heart rate.  This was causing your palpitations.  Your metoprolol dose was increased whichi better controlled your heart rate and relieved your palpitations.  You were seen by the EP doctor who recommended that we increase your metoprolol to lopressor 50 two times a day.  Please follow up with him as an outpatient.  Please also follow up with your primary care doctor in the nursing home.

## 2021-04-13 NOTE — DISCHARGE NOTE PROVIDER - HOSPITAL COURSE
50M from [facility] extensive PMH  Paroxymal AF s/p several ablations on Eliquis multiple dysrhythmias including  vtavh s/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 The patient reported that he experienced   intermittent lightheadedness, palpitations, chest tightness & sob x sev days. [employer] called EP Dr. Peck today to report  intermittent  AF, his office then called NH to speak with patient  and advised NH staff to give him extra dose of metoprolol. Per pt, staff said they could not give extra doses of medication w/o physician orders, hence patient was  sent  to the  ED for further management.  Pt still complains  palpitations currently, is in AF with  on telemonitoring.  Denies cough, hemoptysis, nv, abd pain, edema.      ED AFIB RVR noted /56 114 Hemodynamically stable       During his course here his metoprolol dose was icnreased which improved his heart rate.  He was seen by EP and was cleared to go back to the NH with a higher dose of metoprolol, his palpitations have resolved. He has been cleared for discharge by medicine.

## 2021-04-13 NOTE — CONSULT NOTE ADULT - ASSESSMENT
Assessment: 50-year old man with known history of paroxysmal AF s/p ablation (x2, 2017) s/p re-do RF (12/20, PVI-CTI) s/p early flutter s/p spontaneous conversion during procedure and CTI touch-up (1/21), Atrial standstill s/p PPM (2001) --> ARVD s/p ICD placement --> ICD lead malfunction s/p ICD lead extraction s/p DC ICD lead placement --> Perforation s/p RV lead revision, HIV, DL, MS, bipolar disorder, seizure, depression, HTN sent to ED from NH for AF RVR, now controlled    Impression:  PAF RVR, rate controlled sp Ablations  Atrial Standstill sp PPM   ARVD sp ICD Upgrade  RV Lead Malfunction sp Revision  Multiple Sclerosis  Bipolar D/O  HTN    Plan:  - Device interrogated, many episodes of AF on 4/11-4/12 with HR ~140-150s  - Continue Metoprolol 50mg daily, can increase to BID if BP tolerates  - Continue Eliquis for stroke prevention  - No further EP intervention at this time  - Patient can follow up with Dr Jarquin in the office as needed  [address]  [facility] [address] [phone]   Assessment: 50-year old man with known history of paroxysmal AF s/p ablation (x2, 2017) s/p re-do RF (12/20, PVI-CTI) s/p early flutter s/p spontaneous conversion during procedure and CTI touch-up (1/21), Atrial standstill s/p PPM (2001) --> ARVD s/p ICD placement --> ICD lead malfunction s/p ICD lead extraction s/p DC ICD lead placement --> Perforation s/p RV lead revision, HIV, DL, MS, bipolar disorder, seizure, depression, HTN sent to ED from NH for AF RVR, now controlled    Impression:  PAF RVR, rate controlled sp Ablations  Atrial Standstill sp PPM   ARVD sp ICD Upgrade  RV Lead Malfunction sp Revision  Multiple Sclerosis  Bipolar D/O  HTN    Plan:  - Device interrogated, many episodes of AF on 4/11-4/12 with HR ~140-150s  - Continue Metoprolol XL 50mg daily  - Continue Eliquis for stroke prevention  - No further EP intervention at this time  - Patient can follow up with Dr Jarquin in the office as needed  [address]  [facility] [address] [phone]

## 2021-04-14 LAB
ALBUMIN SERPL ELPH-MCNC: 4.1 G/DL — SIGNIFICANT CHANGE UP (ref 3.5–5.2)
ALP SERPL-CCNC: 118 U/L — HIGH (ref 30–115)
ALT FLD-CCNC: 10 U/L — SIGNIFICANT CHANGE UP (ref 0–41)
ANION GAP SERPL CALC-SCNC: 14 MMOL/L — SIGNIFICANT CHANGE UP (ref 7–14)
AST SERPL-CCNC: 13 U/L — SIGNIFICANT CHANGE UP (ref 0–41)
BASOPHILS # BLD AUTO: 0.09 K/UL — SIGNIFICANT CHANGE UP (ref 0–0.2)
BASOPHILS NFR BLD AUTO: 1.2 % — HIGH (ref 0–1)
BILIRUB SERPL-MCNC: 0.3 MG/DL — SIGNIFICANT CHANGE UP (ref 0.2–1.2)
BUN SERPL-MCNC: 25 MG/DL — HIGH (ref 10–20)
CALCIUM SERPL-MCNC: 9.5 MG/DL — SIGNIFICANT CHANGE UP (ref 8.5–10.1)
CHLORIDE SERPL-SCNC: 105 MMOL/L — SIGNIFICANT CHANGE UP (ref 98–110)
CO2 SERPL-SCNC: 22 MMOL/L — SIGNIFICANT CHANGE UP (ref 17–32)
COVID-19 SPIKE DOMAIN AB INTERP: POSITIVE
COVID-19 SPIKE DOMAIN ANTIBODY RESULT: >250 U/ML — HIGH
CREAT SERPL-MCNC: 1.1 MG/DL — SIGNIFICANT CHANGE UP (ref 0.7–1.5)
EOSINOPHIL # BLD AUTO: 0.13 K/UL — SIGNIFICANT CHANGE UP (ref 0–0.7)
EOSINOPHIL NFR BLD AUTO: 1.8 % — SIGNIFICANT CHANGE UP (ref 0–8)
GLUCOSE SERPL-MCNC: 86 MG/DL — SIGNIFICANT CHANGE UP (ref 70–99)
HCT VFR BLD CALC: 40 % — LOW (ref 42–52)
HGB BLD-MCNC: 12.7 G/DL — LOW (ref 14–18)
IMM GRANULOCYTES NFR BLD AUTO: 0.5 % — HIGH (ref 0.1–0.3)
LYMPHOCYTES # BLD AUTO: 2.94 K/UL — SIGNIFICANT CHANGE UP (ref 1.2–3.4)
LYMPHOCYTES # BLD AUTO: 40 % — SIGNIFICANT CHANGE UP (ref 20.5–51.1)
MAGNESIUM SERPL-MCNC: 2 MG/DL — SIGNIFICANT CHANGE UP (ref 1.8–2.4)
MCHC RBC-ENTMCNC: 30.5 PG — SIGNIFICANT CHANGE UP (ref 27–31)
MCHC RBC-ENTMCNC: 31.8 G/DL — LOW (ref 32–37)
MCV RBC AUTO: 95.9 FL — HIGH (ref 80–94)
MONOCYTES # BLD AUTO: 0.5 K/UL — SIGNIFICANT CHANGE UP (ref 0.1–0.6)
MONOCYTES NFR BLD AUTO: 6.8 % — SIGNIFICANT CHANGE UP (ref 1.7–9.3)
NEUTROPHILS # BLD AUTO: 3.65 K/UL — SIGNIFICANT CHANGE UP (ref 1.4–6.5)
NEUTROPHILS NFR BLD AUTO: 49.7 % — SIGNIFICANT CHANGE UP (ref 42.2–75.2)
NRBC # BLD: 0 /100 WBCS — SIGNIFICANT CHANGE UP (ref 0–0)
PLATELET # BLD AUTO: 227 K/UL — SIGNIFICANT CHANGE UP (ref 130–400)
POTASSIUM SERPL-MCNC: 4.5 MMOL/L — SIGNIFICANT CHANGE UP (ref 3.5–5)
POTASSIUM SERPL-SCNC: 4.5 MMOL/L — SIGNIFICANT CHANGE UP (ref 3.5–5)
PROT SERPL-MCNC: 6.3 G/DL — SIGNIFICANT CHANGE UP (ref 6–8)
RBC # BLD: 4.17 M/UL — LOW (ref 4.7–6.1)
RBC # FLD: 13.4 % — SIGNIFICANT CHANGE UP (ref 11.5–14.5)
SARS-COV-2 IGG+IGM SERPL QL IA: >250 U/ML — HIGH
SARS-COV-2 IGG+IGM SERPL QL IA: POSITIVE
SODIUM SERPL-SCNC: 141 MMOL/L — SIGNIFICANT CHANGE UP (ref 135–146)
TSH SERPL-MCNC: 1.39 UIU/ML — SIGNIFICANT CHANGE UP (ref 0.27–4.2)
WBC # BLD: 7.35 K/UL — SIGNIFICANT CHANGE UP (ref 4.8–10.8)
WBC # FLD AUTO: 7.35 K/UL — SIGNIFICANT CHANGE UP (ref 4.8–10.8)

## 2021-04-14 PROCEDURE — 99232 SBSQ HOSP IP/OBS MODERATE 35: CPT

## 2021-04-14 RX ADMIN — TRAMADOL HYDROCHLORIDE 50 MILLIGRAM(S): 50 TABLET ORAL at 18:14

## 2021-04-14 RX ADMIN — ATORVASTATIN CALCIUM 40 MILLIGRAM(S): 80 TABLET, FILM COATED ORAL at 22:26

## 2021-04-14 RX ADMIN — LAMOTRIGINE 200 MILLIGRAM(S): 25 TABLET, ORALLY DISINTEGRATING ORAL at 05:28

## 2021-04-14 RX ADMIN — Medication 25 MILLIGRAM(S): at 05:29

## 2021-04-14 RX ADMIN — Medication 81 MILLIGRAM(S): at 12:45

## 2021-04-14 RX ADMIN — Medication 1 MILLIGRAM(S): at 12:45

## 2021-04-14 RX ADMIN — Medication 10 MILLIGRAM(S): at 05:28

## 2021-04-14 RX ADMIN — Medication 10 MILLIGRAM(S): at 22:26

## 2021-04-14 RX ADMIN — QUETIAPINE FUMARATE 25 MILLIGRAM(S): 200 TABLET, FILM COATED ORAL at 05:28

## 2021-04-14 RX ADMIN — QUETIAPINE FUMARATE 25 MILLIGRAM(S): 200 TABLET, FILM COATED ORAL at 22:27

## 2021-04-14 RX ADMIN — Medication 25 MILLIGRAM(S): at 12:44

## 2021-04-14 RX ADMIN — Medication 25 MILLIGRAM(S): at 17:34

## 2021-04-14 RX ADMIN — GABAPENTIN 400 MILLIGRAM(S): 400 CAPSULE ORAL at 05:28

## 2021-04-14 RX ADMIN — SERTRALINE 50 MILLIGRAM(S): 25 TABLET, FILM COATED ORAL at 13:47

## 2021-04-14 RX ADMIN — TIOTROPIUM BROMIDE 1 CAPSULE(S): 18 CAPSULE ORAL; RESPIRATORY (INHALATION) at 08:36

## 2021-04-14 RX ADMIN — APIXABAN 5 MILLIGRAM(S): 2.5 TABLET, FILM COATED ORAL at 17:34

## 2021-04-14 RX ADMIN — TRAMADOL HYDROCHLORIDE 50 MILLIGRAM(S): 50 TABLET ORAL at 10:00

## 2021-04-14 RX ADMIN — LAMOTRIGINE 200 MILLIGRAM(S): 25 TABLET, ORALLY DISINTEGRATING ORAL at 18:12

## 2021-04-14 RX ADMIN — Medication 10 MILLIGRAM(S): at 13:47

## 2021-04-14 RX ADMIN — GABAPENTIN 400 MILLIGRAM(S): 400 CAPSULE ORAL at 22:26

## 2021-04-14 RX ADMIN — TRAMADOL HYDROCHLORIDE 50 MILLIGRAM(S): 50 TABLET ORAL at 17:31

## 2021-04-14 RX ADMIN — PANTOPRAZOLE SODIUM 40 MILLIGRAM(S): 20 TABLET, DELAYED RELEASE ORAL at 05:29

## 2021-04-14 RX ADMIN — APIXABAN 5 MILLIGRAM(S): 2.5 TABLET, FILM COATED ORAL at 05:28

## 2021-04-14 RX ADMIN — QUETIAPINE FUMARATE 25 MILLIGRAM(S): 200 TABLET, FILM COATED ORAL at 13:47

## 2021-04-14 RX ADMIN — TRAMADOL HYDROCHLORIDE 50 MILLIGRAM(S): 50 TABLET ORAL at 09:25

## 2021-04-14 RX ADMIN — GABAPENTIN 400 MILLIGRAM(S): 400 CAPSULE ORAL at 13:47

## 2021-04-14 RX ADMIN — TAMSULOSIN HYDROCHLORIDE 0.4 MILLIGRAM(S): 0.4 CAPSULE ORAL at 22:26

## 2021-04-14 NOTE — PROGRESS NOTE ADULT - ATTENDING COMMENTS
patient seen and examined earlier today.  I spoke at length to patient and also with medical resident.  patient has h/o atrial fibrillation.  has seen at least 3 EP specialists - Dr. Wise at [facility], a doctor at [facility] and the EP team here.  He had unsuccessful multiple attempts at ablation in the past (see EP note dated 4/13/2021).  currently came in with uncontrolled rate and seen by EP yesterday.  dose of metoprolol increased.  on increased dose 24 hour recall shows maximum pulse 110.  patient cleared by them for DC but patient is appealing his discharge.  Patient does not want medical therapy.  He wants some type of ablative therapy, however EP team has written this is not an option.  When I saw patient he was lying flat in bed, his pulse was 90 and irregular.  he had no chest pain and no s/s CHF.  at this point will DC patient.  patient is known to EP services at both [facility] and [facility] as well as here, [facility].  He was advised to f/u with any of those teams.  He does not want f/u with the [facility] team and stated he would f/u with either [facility] or [facility].  Howevere he is refusing DC and will appeal.  In my opinion, patient is medically stable for DC and can have outpatient discussion regarding his options for therapy for his atrial fibrillation.
patient seen and examined in ED and resident note reviewed.  patient was c/o palpitations but when I saw him he had no palpitations.  he had no chest pain. no SOB.  Vital Signs Last 24 Hrs  T(C): 37.2 (13 Apr 2021 15:59), Max: 37.2 (13 Apr 2021 15:59)  T(F): 98.9 (13 Apr 2021 15:59), Max: 98.9 (13 Apr 2021 15:59)  HR: 88 (13 Apr 2021 15:59) (85 - 114)  BP: 98/64 (13 Apr 2021 15:59) (93/54 - 112/70)  RR: 18 (13 Apr 2021 15:59) (16 - 18)  SpO2: 96% (13 Apr 2021 15:59) (96% - 96%)  conj pink, no jaundice  neck no JVD. supple  lungs clear to auscultation  heart - when I saw patient he had regular rhythm, about 92 beats/min, no ectopics, no irregularities. no murmur heard  abd. soft nontender. Bs pos.  extremities no edema. skin turgor good                      12.7   6.47  )-----------( 247      ( 13 Apr 2021 08:25 )             40.3   04-13    140  |  106  |  24<H>  ----------------------------<  109<H>  4.2   |  24  |  1.0    Ca    9.6      13 Apr 2021 08:25  Mg     2.1     04-13    TPro  6.5  /  Alb  4.2  /  TBili  0.2  /  DBili  x   /  AST  14  /  ALT  9   /  AlkPhos  111  04-13    a: atrial fibrillation with rapid ventricular response in patient with multiple itnerventions that have failed.  HO multiple sclerosis   tobacco use disorder  BPH    P:  await recommendations from EP  meanwhile increase metoprolol dose and monitor rate as well as BP   patient to quit smoking - he is in contemplative phase  continue outpatient medications  check TSH

## 2021-04-15 ENCOUNTER — TRANSCRIPTION ENCOUNTER (OUTPATIENT)
Age: 51
End: 2021-04-15

## 2021-04-15 LAB — SARS-COV-2 RNA SPEC QL NAA+PROBE: SIGNIFICANT CHANGE UP

## 2021-04-15 PROCEDURE — 99232 SBSQ HOSP IP/OBS MODERATE 35: CPT

## 2021-04-15 PROCEDURE — 93010 ELECTROCARDIOGRAM REPORT: CPT

## 2021-04-15 RX ORDER — METOPROLOL TARTRATE 50 MG
50 TABLET ORAL EVERY 12 HOURS
Refills: 0 | Status: DISCONTINUED | OUTPATIENT
Start: 2021-04-15 | End: 2021-04-16

## 2021-04-15 RX ORDER — ONDANSETRON 8 MG/1
4 TABLET, FILM COATED ORAL ONCE
Refills: 0 | Status: COMPLETED | OUTPATIENT
Start: 2021-04-15 | End: 2021-04-15

## 2021-04-15 RX ADMIN — TRAMADOL HYDROCHLORIDE 50 MILLIGRAM(S): 50 TABLET ORAL at 21:55

## 2021-04-15 RX ADMIN — TAMSULOSIN HYDROCHLORIDE 0.4 MILLIGRAM(S): 0.4 CAPSULE ORAL at 21:51

## 2021-04-15 RX ADMIN — ATORVASTATIN CALCIUM 40 MILLIGRAM(S): 80 TABLET, FILM COATED ORAL at 21:51

## 2021-04-15 RX ADMIN — SERTRALINE 50 MILLIGRAM(S): 25 TABLET, FILM COATED ORAL at 11:03

## 2021-04-15 RX ADMIN — GABAPENTIN 400 MILLIGRAM(S): 400 CAPSULE ORAL at 21:51

## 2021-04-15 RX ADMIN — QUETIAPINE FUMARATE 25 MILLIGRAM(S): 200 TABLET, FILM COATED ORAL at 13:52

## 2021-04-15 RX ADMIN — Medication 10 MILLIGRAM(S): at 05:37

## 2021-04-15 RX ADMIN — Medication 25 MILLIGRAM(S): at 11:03

## 2021-04-15 RX ADMIN — GABAPENTIN 400 MILLIGRAM(S): 400 CAPSULE ORAL at 05:37

## 2021-04-15 RX ADMIN — TRAMADOL HYDROCHLORIDE 50 MILLIGRAM(S): 50 TABLET ORAL at 22:56

## 2021-04-15 RX ADMIN — PANTOPRAZOLE SODIUM 40 MILLIGRAM(S): 20 TABLET, DELAYED RELEASE ORAL at 05:37

## 2021-04-15 RX ADMIN — LAMOTRIGINE 200 MILLIGRAM(S): 25 TABLET, ORALLY DISINTEGRATING ORAL at 05:37

## 2021-04-15 RX ADMIN — Medication 1 MILLIGRAM(S): at 11:03

## 2021-04-15 RX ADMIN — GABAPENTIN 400 MILLIGRAM(S): 400 CAPSULE ORAL at 13:52

## 2021-04-15 RX ADMIN — Medication 10 MILLIGRAM(S): at 21:51

## 2021-04-15 RX ADMIN — TRAMADOL HYDROCHLORIDE 50 MILLIGRAM(S): 50 TABLET ORAL at 11:48

## 2021-04-15 RX ADMIN — TIOTROPIUM BROMIDE 1 CAPSULE(S): 18 CAPSULE ORAL; RESPIRATORY (INHALATION) at 08:57

## 2021-04-15 RX ADMIN — Medication 81 MILLIGRAM(S): at 11:03

## 2021-04-15 RX ADMIN — TRAMADOL HYDROCHLORIDE 50 MILLIGRAM(S): 50 TABLET ORAL at 10:25

## 2021-04-15 RX ADMIN — QUETIAPINE FUMARATE 25 MILLIGRAM(S): 200 TABLET, FILM COATED ORAL at 21:51

## 2021-04-15 RX ADMIN — APIXABAN 5 MILLIGRAM(S): 2.5 TABLET, FILM COATED ORAL at 17:11

## 2021-04-15 RX ADMIN — QUETIAPINE FUMARATE 25 MILLIGRAM(S): 200 TABLET, FILM COATED ORAL at 05:37

## 2021-04-15 RX ADMIN — APIXABAN 5 MILLIGRAM(S): 2.5 TABLET, FILM COATED ORAL at 05:37

## 2021-04-15 RX ADMIN — ONDANSETRON 4 MILLIGRAM(S): 8 TABLET, FILM COATED ORAL at 21:05

## 2021-04-15 RX ADMIN — Medication 10 MILLIGRAM(S): at 13:52

## 2021-04-15 RX ADMIN — LAMOTRIGINE 200 MILLIGRAM(S): 25 TABLET, ORALLY DISINTEGRATING ORAL at 17:11

## 2021-04-15 RX ADMIN — Medication 50 MILLIGRAM(S): at 17:11

## 2021-04-15 NOTE — PROCEDURE
[ICD] : Implantable cardioverter-defibrillator [Impedance: ___ Ohms] : current cell impedance is [unfilled] Ohms [Charge Time: ___ sec] : charge time was [unfilled] seconds [Longevity: ___ months] : The estimated remaining battery life is [unfilled] months [Normal] : The battery status is normal. [Threshold Testing Performed] : Threshold testing was performed [Atrial] : Atrial [Ventricular] : Ventricular [Lead Imp:  ___ohms] : lead impedance was [unfilled] ohms [Sensing Amplitude ___mv] : sensing amplitude was [unfilled] mv [___V @] : [unfilled] V [___ ms] : [unfilled] ms [Outputs/Safety Margin] : output changed to allow for adequate safety margin [Auto Capture "On"] : auto capture was switched "On" [Auto Intrinsic Cond. Search On] : auto intrinsic conduction search switched on [Programmed for Longevity] : output reprogrammed for improved battery longevity [Counters Reset] : the counters were reset [DDDR] : DDDR [de-identified] : Fortify [id number] [de-identified] : [facility] [de-identified] : [id number] [de-identified] : 9/5/2019 [de-identified] : AP 97% (Dependent)\par  <1%\par AT/AF [place] 0%\par New A lead and V lead implanted on 3/29/2021\par Old RV Lead Fragment left behind after mechanical extraction on 3/29/2021\par Rate response turned on\par ACap Confirm turned on\par Lowered RV Pulse amp to 2.5mV from 3.5 mV\par CorVue shows signs of fluid accumulation over the past 10+ days\par Pt is remotely monitored and is transmitting [de-identified] : 72

## 2021-04-15 NOTE — ASSESSMENT
[FreeTextEntry1] : ## Paroxysmal AF s/p ablation (see above for more detailed)\par ## ARVD s/p DC ICD -- s/p recent RV lead  malfunction s/p extraction\par ## Atrial standstill s/p DC ICD\par \par - ICD interrogation shows normally functioning DC-ICD. Battery life ok. Optivol below threshold. No events\par - Continue with Eliquis and Metorolol. Previous AADs use unsuccessful/AEs- see previous notes\par - Remote Monitoring\par - Return in 6 months\par \par

## 2021-04-15 NOTE — HISTORY OF PRESENT ILLNESS
[de-identified] : I had a pleasure of seeing Mr. Miguel for follow-up consultation for atrial fibrillation and ICD care. He is usually seen by Dr. Quiñones.\par \par Mr. Miguel is a 50-year old man with history of paroxysmal AF s/p ablation (x2, 2017) s/p re-do RF (12/20, PVI-CTI) s/p early flutter s/p spontaneoud conversion during procedure and CTI touch-up (1/21),  Atrial standstill s/p PPM (2001) --> ARVD s/p ICD placement --> ICD lead malfunction s/p ICD lead extraction s/p DC ICD lead placement ([employer]) --> Perforation s/p RV lead revision, HIV, DL, bipolar disorder, seizure, depression, HTN is here for management of his atrial fibrillation. \par \par 4/8: Had lead extraction performed due to malfunctioning ICD lead. RV-ICD+ RA leads were removed in entirety, while part of the RV-pacing lead was left behind intracardiac. New dual chamber ICD was placed. Denies chest pain, shortness of breath, palpitation, dizziness or LOC except noted above.\par \par \par From prior notes: He had an episode of AF where he needed cardioversion at  [place] in 08/20. + Palpitations. He has used tikosyn before- continue to have AF episodes. He has never been on AV Node block agents due to relative hypotension.\par \par 3/25: Feels fine. Had kicked door, but not involved in fight.\par \par Denies chest pain, shortness of breath, dizziness or LOC.\par \par EKG: AP-VS @ 69/min, RBBB.\par TTE (02/21): EF 55-60%, nl LA/RA\par

## 2021-04-15 NOTE — DISCHARGE NOTE NURSING/CASE MANAGEMENT/SOCIAL WORK - PATIENT PORTAL LINK FT
You can access the FollowMyHealth Patient Portal offered by [facility] by registering at the following website: [email or web address]. By joining [email or web address]’s FollowMyHealth portal, you will also be able to view your health information using other applications (apps) compatible with our system.

## 2021-04-15 NOTE — PHYSICAL EXAM
[General Appearance - Well Developed] : well developed [Normal Appearance] : normal appearance [Well Groomed] : well groomed [General Appearance - Well Nourished] : well nourished [No Deformities] : no deformities [General Appearance - In No Acute Distress] : no acute distress [Heart Rate And Rhythm] : heart rate and rhythm were normal [Heart Sounds] : normal S1 and S2 [Murmurs] : no murmurs present [Respiration, Rhythm And Depth] : normal respiratory rhythm and effort [Exaggerated Use Of Accessory Muscles For Inspiration] : no accessory muscle use [Clean] : clean [Auscultation Breath Sounds / Voice Sounds] : lungs were clear to auscultation bilaterally [Dry] : dry [Healing Well] : healing well [Abdomen Soft] : soft [Abdomen Tenderness] : non-tender [Abdomen Mass (___ Cm)] : no abdominal mass palpated [Nail Clubbing] : no clubbing of the fingernails [Cyanosis, Localized] : no localized cyanosis [Petechial Hemorrhages (___cm)] : no petechial hemorrhages [] : no ischemic changes

## 2021-04-15 NOTE — DISCHARGE NOTE NURSING/CASE MANAGEMENT/SOCIAL WORK - NSDPFAC_GEN_ALL_CORE
[facility] - but pt is refusing appealing discharge refusing to take off telemetry  [facility]

## 2021-04-16 VITALS
HEART RATE: 89 BPM | DIASTOLIC BLOOD PRESSURE: 58 MMHG | TEMPERATURE: 98 F | RESPIRATION RATE: 17 BRPM | SYSTOLIC BLOOD PRESSURE: 84 MMHG

## 2021-04-16 PROCEDURE — 99239 HOSP IP/OBS DSCHRG MGMT >30: CPT

## 2021-04-16 RX ORDER — METOPROLOL TARTRATE 50 MG
1 TABLET ORAL
Qty: 60 | Refills: 0
Start: 2021-04-16 | End: 2021-05-15

## 2021-04-16 RX ADMIN — TIOTROPIUM BROMIDE 1 CAPSULE(S): 18 CAPSULE ORAL; RESPIRATORY (INHALATION) at 08:36

## 2021-04-16 RX ADMIN — GABAPENTIN 400 MILLIGRAM(S): 400 CAPSULE ORAL at 06:30

## 2021-04-16 RX ADMIN — APIXABAN 5 MILLIGRAM(S): 2.5 TABLET, FILM COATED ORAL at 06:30

## 2021-04-16 RX ADMIN — TRAMADOL HYDROCHLORIDE 50 MILLIGRAM(S): 50 TABLET ORAL at 13:29

## 2021-04-16 RX ADMIN — SERTRALINE 50 MILLIGRAM(S): 25 TABLET, FILM COATED ORAL at 13:29

## 2021-04-16 RX ADMIN — Medication 81 MILLIGRAM(S): at 13:29

## 2021-04-16 RX ADMIN — GABAPENTIN 400 MILLIGRAM(S): 400 CAPSULE ORAL at 13:30

## 2021-04-16 RX ADMIN — QUETIAPINE FUMARATE 25 MILLIGRAM(S): 200 TABLET, FILM COATED ORAL at 06:30

## 2021-04-16 RX ADMIN — Medication 10 MILLIGRAM(S): at 06:30

## 2021-04-16 RX ADMIN — Medication 50 MILLIGRAM(S): at 08:57

## 2021-04-16 RX ADMIN — Medication 10 MILLIGRAM(S): at 13:30

## 2021-04-16 RX ADMIN — Medication 1 MILLIGRAM(S): at 13:30

## 2021-04-16 RX ADMIN — LAMOTRIGINE 200 MILLIGRAM(S): 25 TABLET, ORALLY DISINTEGRATING ORAL at 06:30

## 2021-04-16 RX ADMIN — QUETIAPINE FUMARATE 25 MILLIGRAM(S): 200 TABLET, FILM COATED ORAL at 13:30

## 2021-04-16 RX ADMIN — PANTOPRAZOLE SODIUM 40 MILLIGRAM(S): 20 TABLET, DELAYED RELEASE ORAL at 06:29

## 2021-04-16 NOTE — PROGRESS NOTE ADULT - ASSESSMENT
50M from [facility] extensive PMH  Paroxymal AF s/p several ablations on Eliquis multiple dysrhythmias including  vtavh s/p ppm/aicd complicated by wall perforation & multiple revisions, CVA , COPD active smoker, HTN ,HLD, Seizures, Anemia, GERD, BPH , MS, bipolar, schizophrenia referred to ED for AF, rate was 114 on admission now better rate controlled.    Paroxymal Afib, RvR resolved  > CHADSVASC: 3  >s/p ICD extraction + re-implant with  multi reversions and lead abandonment  > Recent Echo noted   > Recent Cath  non Ischemic  > Started on Metoprolol tartrate 25mg q6, currently rate controlled and asymptomatic // takes toprol 50 qd as outpatient  > tele monitoring, f/u TSH   > Ep follow up  for Device interrogation/cardioversion/abplation   HO CVA?   > Continue Statin  and  ASA   HO COPD Current Smoker   > CVontinue  Spiriva     HO MS  in remission with Chronic Neuropathic Pain   >Continue Gabapentin , Baclofen, tramadol      HO Bipolar, schizophrenia referred   >Continue Seroquel qt 502 please repeat tomorrow  >Continue  Zoloft     HO Seizures   > Lamotrigine Home medication     HO BPH  > Flowmax        GI ppx:                                   [] Not indicated   /   [x] Pantoprazole 40mg PO Daily    DVT ppx: Eliquis     PLAN: f/u EP, currently rate controlled on increased metoprolol dose    Activity:  [X] Assisatnce needed   [X] Increase as Tolerated  /  [] OOB w/ assist  /  [] Bed Rest    CODE STATUS  [X] FULL   /    [] DNR  
51 yo male with PMH of HTN,  VT s/p ACID, Paroxymal AF s/p several ablations CVA , MS, COPD, Seizures, Anemia, GERD, BPH, bipolar, schizophrenia referred to ED for A fib w/RVR, is now rate controlled,    A/P:   Recurrent Paroxysmal Atrial Fibrillation with RVR:   Rate is controlled, rhythm junctional vs sinus.   CHADSVASC: 3, Continue Toprol XL 50mg BID and Eliquis 5mg BID  Echo Feb 2021 showed normal LVEF 55-60%.     History of Ventricular tachycardia s/p AICD placement.   COPD: Stable active smoker.   Continue Spiriva    Multiple Sclerosis - in remission with Chronic Neuropathic Pain   Continue Gabapentin , Baclofen and Tramadol.      Bipolar disorder, schizophrenia  Continue Seroquel and Zoloft     Seizure disorder:   Continue Lamotrigine.     BPH: on Flowmax    #Progress Note Handoff:  Pending (specify):  COVID-19 PCR   Family discussion: with patient  Disposition: adult home today.  
ASSESSMENT:  50M from [facility] extensive PMH  Paroxymal AF s/p several ablations on Eliquis multiple dysrhythmias including  vtavh s/p ppm/aicd complicated by wall perforation & multiple revisions, CVA , COPD active smoker, HTN ,HLD, Seizures, Anemia, GERD, BPH , MS, bipolar, schizophrenia referred to ED for A fib w/RVR, is now rate controlled, EP advising outpatient follow up, pt appealing discharge citing he wants a definitive solution for his A fib.    PLAN/ACTIVE PROBLEMS:  # Chronic Atrial Fibrillation - RVR on Arrival, now rate controlled   - CHADSVASC: 3  - c/w Metoprolol tartrate 25mg q6     - c/w Eliquis 5mg BID  - TSH WNL  - EP follow up as outpt per Dr. Quiñones  - echo appreciated, no evidence of CHF    # COPD not in Exacerbation  # Current Smoker   - c/w Spiriva  - Duonebs PRN    # Multiple Sclerosis - in remission with Chronic Neuropathic Pain   - c/w Gabapentin , Baclofen, tramadol      # h/o Bipolar, schizophrenia  - c/w Seroquel qt 502 please repeat tomorrow  - c/w Zoloft     # h/o Seizures   - c/w Lamotrigine Home medication     # h/o BPH  - c/w Flowmax  DISPOSITION: pending appeal
49 yo male with PMH of HTN,  VT s/p ACID, Paroxymal AF s/p several ablations CVA , MS, COPD, Seizures, Anemia, GERD, BPH, bipolar, schizophrenia referred to ED for A fib w/RVR, is now rate controlled,    A/P:   Recurrent Paroxysmal Atrial Fibrillation with RVR:   Rate is controlled, rhythm junctional vs sinus.   CHADSVASC: 3, Continue Metoprolol tartrate 25mg q6  and Eliquis 5mg BID  Echo Fbe 2021 showed normal LVEF 55-60%.     History of Ventricular tachycardia s/p AICD placement.   COPD: Stable active smoker.   Continue Spiriva    Multiple Sclerosis - in remission with Chronic Neuropathic Pain    Gabapentin , Baclofen, tramadol      Bipolar disorder, schizophrenia  Continue Seroquel and Zoloft     Seizure disorder:   Continue Lamotrigine.     BPH  - c/w Flowmax    #Progress Note Handoff:  Pending (specify):  COVID-19 PCR   Family discussion: with patient  Disposition: adult home in 24 hrs.

## 2021-04-16 NOTE — CHART NOTE - NSCHARTNOTEFT_GEN_A_CORE
patient refusing d/c although anticipated from yesterday
<<<RESIDENT DISCHARGE NOTE>>>     CUATE HORNE  MRN-[id number]    VITAL SIGNS:  T(F): 97 (04-16-21 @ 05:56), Max: 97.7 (04-15-21 @ 20:00)  HR: 86 (04-16-21 @ 08:40)  BP: 97/55 (04-16-21 @ 08:40)  SpO2: 100% (04-15-21 @ 19:45)      PHYSICAL EXAMINATION:  General: NAD  Head & Neck: NCAT  Pulmonary: LS CTAB  Cardiovascular: A fib rate controlled  Gastrointestinal/Abdomen & Pelvis: SNTTP, ND x 4  Neurologic/Motor: AxOx3      DISPOSITION:   [  ] Home,    [  ] Home with Visiting Nursing Services,   [  x  ]  SNF/ NH,    [   ] Acute Rehab (4A),   [   ] Other (Specify:_________)

## 2021-04-16 NOTE — PROGRESS NOTE ADULT - SUBJECTIVE AND OBJECTIVE BOX
CUATE HORNE  50y  Male      Patient is a 50y old  Male who presents with a chief complaint of palpitations (15 Apr 2021 10:23)      INTERVAL HPI/OVERNIGHT EVENTS:  He feels ok, he is frustrated and would like to have a permanent cure for his AFIB, he has been with multiple admissions to the hospital.   Vital Signs Last 24 Hrs  T(C): 36.4 (15 Apr 2021 13:46), Max: 36.4 (15 Apr 2021 04:42)  T(F): 97.6 (15 Apr 2021 13:46), Max: 97.6 (15 Apr 2021 13:46)  HR: 88 (15 Apr 2021 13:46) (69 - 92)  BP: 113/75 (15 Apr 2021 13:46) (95/51 - 113/75)  BP(mean): --  RR: 18 (15 Apr 2021 13:46) (18 - 18)  SpO2: 94% (15 Apr 2021 08:13) (94% - 94%)      04-14-21 @ 07:01  -  04-15-21 @ 07:00  --------------------------------------------------------  IN: 600 mL / OUT: 500 mL / NET: 100 mL    04-15-21 @ 07:01  -  04-15-21 @ 15:59  --------------------------------------------------------  IN: 860 mL / OUT: 300 mL / NET: 560 mL            Consultant(s) Notes Reviewed:  [x ] YES  [ ] NO          MEDICATIONS  (STANDING):  apixaban 5 milliGRAM(s) Oral two times a day  aspirin  chewable 81 milliGRAM(s) Oral daily  atorvastatin 40 milliGRAM(s) Oral at bedtime  baclofen 10 milliGRAM(s) Oral three times a day  chlorhexidine 4% Liquid 1 Application(s) Topical <User Schedule>  folic acid 1 milliGRAM(s) Oral daily  gabapentin 400 milliGRAM(s) Oral three times a day  lamoTRIgine 200 milliGRAM(s) Oral two times a day  metoprolol succinate ER 50 milliGRAM(s) Oral every 12 hours  pantoprazole    Tablet 40 milliGRAM(s) Oral before breakfast  QUEtiapine 25 milliGRAM(s) Oral three times a day  sertraline 50 milliGRAM(s) Oral daily  tamsulosin 0.4 milliGRAM(s) Oral at bedtime  tiotropium 18 MICROgram(s) Capsule 1 Capsule(s) Inhalation daily    MEDICATIONS  (PRN):  traMADol 50 milliGRAM(s) Oral four times a day PRN Moderate Pain (4 - 6)      LABS                          12.7   7.35  )-----------( 227      ( 14 Apr 2021 05:40 )             40.0     04-14    141  |  105  |  25<H>  ----------------------------<  86  4.5   |  22  |  1.1    Ca    9.5      14 Apr 2021 05:40  Mg     2.0     04-14    TPro  6.3  /  Alb  4.1  /  TBili  0.3  /  DBili  x   /  AST  13  /  ALT  10  /  AlkPhos  118<H>  04-14          Lactate Trend        CAPILLARY BLOOD GLUCOSE            RADIOLOGY & ADDITIONAL TESTS:    Imaging Personally Reviewed:  [ ] YES  [ ] NO    HEALTH ISSUES - PROBLEM Dx:          PHYSICAL EXAM:  GENERAL: NAD, well-developed.  HEAD:  Atraumatic, Normocephalic.  EYES: EOMI, PERRLA, conjunctiva and sclera clear.  NECK: Supple, No JVD.  CHEST/LUNG: Clear to auscultation bilaterally; No wheeze.  HEART: Regular rate and rhythm; S1 S2.   ABDOMEN: Soft, Nontender, Nondistended; Bowel sounds present.  EXTREMITIES:  2+ Peripheral Pulses, No clubbing, cyanosis, or edema.  PSYCH: AAOx3.  NEUROLOGY: non-focal.  SKIN: No rashes or lesions.
<<<RESIDENT DISCHARGE NOTE>>>     CUATE HORNE  MRN-[id number]    VITAL SIGNS:  T(F): 97 (04-14-21 @ 13:17), Max: 98.9 (04-13-21 @ 15:59)  HR: 104 (04-14-21 @ 13:17)  BP: 100/58 (04-14-21 @ 13:17)  SpO2: 94% (04-14-21 @ 05:34)      PHYSICAL EXAMINATION:  General: NAD  Head & Neck: NCAT  Pulmonary: LS CTAB  Cardiovascular: irreg, not tachy  Gastrointestinal/Abdomen & Pelvis: SNTTP, ND x 4  Neurologic/Motor: AxOx3, RUE contracted     TEST RESULTS:                        12.7   7.35  )-----------( 227      ( 14 Apr 2021 05:40 )             40.0       04-14    141  |  105  |  25<H>  ----------------------------<  86  4.5   |  22  |  1.1    Ca    9.5      14 Apr 2021 05:40  Mg     2.0     04-14    TPro  6.3  /  Alb  4.1  /  TBili  0.3  /  DBili  x   /  AST  13  /  ALT  10  /  AlkPhos  118<H>  04-14          
CUATE [facility]   [id number]  50y   Male     SUBJECTIVE:  Patient is a 50y old Male who presents with a chief complaint of palpitations (14 Apr 2021 13:39)    Today is hospital day 3d. This morning he is resting comfortably in bed and reports no new issues or overnight events.   Currently admitted to medicine with the primary diagnosis of Atrial fibrillation       PAST MEDICAL & SURGICAL HISTORY  Clinical Depression    Peripheral Neuropathy    TIA (transient ischemic attack)    CVA (cerebral vascular accident)    COPD (chronic obstructive pulmonary disease)    Hypercholesteremia    Seasonal allergies    GERD (gastroesophageal reflux disease)    Schizo affective schizophrenia    Anginal pain    Bipolar disorder    CAD (coronary artery disease)    MS (multiple sclerosis)    Pneumonia    Migraines    Seizures    HTN (hypertension)    BPH (benign prostatic hyperplasia)    Atrial fibrillation  s/p ablation, on eliquis    CHF (congestive heart failure)    Cardiomyopathy    Supraventricular arrhythmia  prior history    PTSD (post-traumatic stress disorder)    Chronic heart failure with preserved ejection fraction    Ventricular tachycardia    AICD (automatic cardioverter/defibrillator) present  SJM    Type 2 diabetes mellitus    Peripheral neuropathy    Chronic pain    BPH (benign prostatic hyperplasia)    Pericardial effusion without cardiac tamponade  h/o trace pericardial effusion    Anemia of chronic disease    Iron deficiency anemia    Folate-deficiency anemia    Chronic respiratory failure  secondary to COPD    Constipation    Vitamin D deficiency    S/P Implantation of AICD    S/P Orchiectomy  L for testicular CA    History of hip replacement    H/O hernia repair  right 1972,1991    H/O prior ablation treatment  for Afib    History of evacuation of hematoma  AICD pocket    Displacement of electrode lead of cardiac pacemaker      FAMILY HISTORY:  Family history of early CAD (Mother)    Family history of cervical cancer (Mother)    Family history of cardiomyopathy  Mother    Family history of colon cancer in mother    ALLERGIES:  dexmethylphenidate (Unknown)  Dilantin (Rash)  dilantin, compazine, , ritalin, phenergan (Unknown)  Haldol (Unknown)  hydantoin derivatives (Other)  methylphenidate (Unknown)  phenytoin (Unknown)  prochlorperazine (Unknown)  promethazine (Dystonic RXN)  rash/hives (Anaphylaxis)  thioxanthenes (Unknown)    MEDICATIONS:  STANDING MEDICATIONS  apixaban 5 milliGRAM(s) Oral two times a day  aspirin  chewable 81 milliGRAM(s) Oral daily  atorvastatin 40 milliGRAM(s) Oral at bedtime  baclofen 10 milliGRAM(s) Oral three times a day  chlorhexidine 4% Liquid 1 Application(s) Topical <User Schedule>  folic acid 1 milliGRAM(s) Oral daily  gabapentin 400 milliGRAM(s) Oral three times a day  lamoTRIgine 200 milliGRAM(s) Oral two times a day  metoprolol tartrate 25 milliGRAM(s) Oral every 6 hours  pantoprazole    Tablet 40 milliGRAM(s) Oral before breakfast  QUEtiapine 25 milliGRAM(s) Oral three times a day  sertraline 50 milliGRAM(s) Oral daily  tamsulosin 0.4 milliGRAM(s) Oral at bedtime  tiotropium 18 MICROgram(s) Capsule 1 Capsule(s) Inhalation daily    PRN MEDICATIONS  traMADol 50 milliGRAM(s) Oral four times a day PRN    VITALS:   T(F): 97.5 (04-15-21 @ 04:42)  HR: 69 (04-15-21 @ 04:42)  BP: 98/65 (04-15-21 @ 04:42)  RR: 18 (04-15-21 @ 08:13)  SpO2: 94% (04-15-21 @ 08:13)    LABS:                        12.7   7.35  )-----------( 227      ( 14 Apr 2021 05:40 )             40.0     Hemoglobin: 12.7 g/dL (04-14 @ 05:40)  Hemoglobin: 12.7 g/dL (04-13 @ 08:25)  Hemoglobin: 14.0 g/dL (04-12 @ 20:00)    04-14    141  |  105  |  25<H>  ----------------------------<  86  4.5   |  22  |  1.1    Ca    9.5      14 Apr 2021 05:40  Mg     2.0     04-14    TPro  6.3  /  Alb  4.1  /  TBili  0.3  /  DBili  x   /  AST  13  /  ALT  10  /  AlkPhos  118<H>  04-14    Potassium Trend: 4.5<--, 4.2<--, 6.3<--  SODIUM TREND:  Sodium 141 [04-14 @ 05:40]  Sodium 140 [04-13 @ 08:25]  Sodium 140 [04-12 @ 20:00]  Sodium 140 [03-30 @ 06:44]  Sodium 138 [03-29 @ 05:44]  Sodium 141 [03-28 @ 01:59]    Creatinine Trend: 1.1<--, 1.0<--, 1.0<--, 0.9<--, 0.9<--, 0.9<--      COVID  04-12-21 @ 22:52  COVID -   NotDetec  03-30-21 @ 17:54  COVID -   NotDetec  03-28-21 @ 03:00  COVID -   NotDetec  02-25-21 @ 13:30  COVID -   NotDetec  02-25-21 @ 07:03  COVID -   NotDetec  02-21-21 @ 21:12  COVID -   NotDetec  02-08-21 @ 18:07  COVID -   NotDetec  02-06-21 @ 07:50  COVID -   NotDetec  02-04-21 @ 23:35  COVID -   NotDetec  03-23-20 @ 12:30  COVID -   Detected<!!>      04-14-21 @ 07:01  -  04-15-21 @ 07:00  --------------------------------------------------------  IN: 600 mL / OUT: 500 mL / NET: 100 mL    04-15-21 @ 07:01  -  04-15-21 @ 10:26  --------------------------------------------------------  IN: 300 mL / OUT: 300 mL / NET: 0 mL      RADIOLOGY:    PHYSICAL EXAM:  General: NAD  Head & Neck: NCAT  Pulmonary: LS CTAB  Cardiovascular: irreg, not tachy  Gastrointestinal/Abdomen & Pelvis: SNTTP, ND x 4  Neurologic/Motor: AxOx3, RUE contracted       
  NILESH HORNEIP  50y  Male      Patient is a 50y old  Male who presents with a chief complaint of palpitations (15 Apr 2021 15:50)      INTERVAL HPI/OVERNIGHT EVENTS:  He feels ok, No palpitation, no telemetry events.   Vital Signs Last 24 Hrs  T(C): 36.1 (16 Apr 2021 05:56), Max: 36.5 (15 Apr 2021 20:00)  T(F): 97 (16 Apr 2021 05:56), Max: 97.7 (15 Apr 2021 20:00)  HR: 86 (16 Apr 2021 08:40) (69 - 94)  BP: 97/55 (16 Apr 2021 08:40) (86/48 - 113/75)  BP(mean): --  RR: 18 (16 Apr 2021 05:56) (18 - 18)  SpO2: 100% (15 Apr 2021 19:45) (100% - 100%)      04-15-21 @ 07:01  -  04-16-21 @ 07:00  --------------------------------------------------------  IN: 1340 mL / OUT: 3500 mL / NET: -2160 mL            Consultant(s) Notes Reviewed:  [x ] YES  [ ] NO          MEDICATIONS  (STANDING):  apixaban 5 milliGRAM(s) Oral two times a day  aspirin  chewable 81 milliGRAM(s) Oral daily  atorvastatin 40 milliGRAM(s) Oral at bedtime  baclofen 10 milliGRAM(s) Oral three times a day  chlorhexidine 4% Liquid 1 Application(s) Topical <User Schedule>  folic acid 1 milliGRAM(s) Oral daily  gabapentin 400 milliGRAM(s) Oral three times a day  lamoTRIgine 200 milliGRAM(s) Oral two times a day  metoprolol succinate ER 50 milliGRAM(s) Oral every 12 hours  pantoprazole    Tablet 40 milliGRAM(s) Oral before breakfast  QUEtiapine 25 milliGRAM(s) Oral three times a day  sertraline 50 milliGRAM(s) Oral daily  tamsulosin 0.4 milliGRAM(s) Oral at bedtime  tiotropium 18 MICROgram(s) Capsule 1 Capsule(s) Inhalation daily    MEDICATIONS  (PRN):  traMADol 50 milliGRAM(s) Oral four times a day PRN Moderate Pain (4 - 6)      LABS                  Lactate Trend        CAPILLARY BLOOD GLUCOSE            RADIOLOGY & ADDITIONAL TESTS:    Imaging Personally Reviewed:  [ ] YES  [ ] NO    HEALTH ISSUES - PROBLEM Dx:          PHYSICAL EXAM:  GENERAL: NAD, well-developed.  HEAD:  Atraumatic, Normocephalic.  EYES: EOMI, PERRLA, conjunctiva and sclera clear.  NECK: Supple, No JVD.  CHEST/LUNG: Clear to auscultation bilaterally; No wheeze.  HEART: Regular rate and rhythm; S1 S2.   ABDOMEN: Soft, Nontender, Nondistended; Bowel sounds present.  EXTREMITIES:  2+ Peripheral Pulses, No clubbing, cyanosis, or edema.  PSYCH: AAOx3.  NEUROLOGY: non-focal.  SKIN: No rashes or lesions.
SUBJECTIVE:    Patient is a 50y old Male who presents with a chief complaint of palpitations (12 Apr 2021 23:50)      HPI:  50M from [facility] extensive PMH  Paroxymal AF s/p several ablations on Eliquis multiple dysrhythmias including  vtavh s/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 The patient reported that he experienced   intermittent lightheadedness, palpitations, chest tightness & sob x sev days. [employer] called EP Dr. Peck today to report  intermittent  AF, his office then called NH to speak with patient  and advised NH staff to give him extra dose of metoprolol. Per pt, staff said they could not give extra doses of medication w/o physician orders, hence patient was  sent  to the  ED for further management.  Pt still complains  palpitations currently, is in AF with  on telemonitoring.  Denies cough, hemoptysis, nv, abd pain, edema.      ED AFIB RVR noted /56 114 Hemodynamically stable    (12 Apr 2021 23:50)      Currently admitted to medicine with the primary diagnosis of Atrial fibrillation     was having palpitations overnight however now resolved, no chest pain    Besides the pertinent positives and negatives described above, the ROS was within normal limits.    PAST MEDICAL & SURGICAL HISTORY  Clinical Depression    Peripheral Neuropathy    TIA (transient ischemic attack)    CVA (cerebral vascular accident)    COPD (chronic obstructive pulmonary disease)    Hypercholesteremia    Seasonal allergies    GERD (gastroesophageal reflux disease)    Schizo affective schizophrenia    Anginal pain    Bipolar disorder    CAD (coronary artery disease)    MS (multiple sclerosis)    Pneumonia    Migraines    Seizures    HTN (hypertension)    BPH (benign prostatic hyperplasia)    Atrial fibrillation  s/p ablation, on eliquis    CHF (congestive heart failure)    Cardiomyopathy    Supraventricular arrhythmia  prior history    PTSD (post-traumatic stress disorder)    Chronic heart failure with preserved ejection fraction    Ventricular tachycardia    AICD (automatic cardioverter/defibrillator) present  SJM    Type 2 diabetes mellitus    Peripheral neuropathy    Chronic pain    BPH (benign prostatic hyperplasia)    Pericardial effusion without cardiac tamponade  h/o trace pericardial effusion    Anemia of chronic disease    Iron deficiency anemia    Folate-deficiency anemia    Chronic respiratory failure  secondary to COPD    Constipation    Vitamin D deficiency    S/P Implantation of AICD    S/P Orchiectomy  L for testicular CA    History of hip replacement    H/O hernia repair  right 1972,1991    H/O prior ablation treatment  for Afib    History of evacuation of hematoma  AICD pocket    Displacement of electrode lead of cardiac pacemaker      SOCIAL HISTORY:    ALLERGIES:  dexmethylphenidate (Unknown)  Dilantin (Rash)  dilantin, compazine, , ritalin, phenergan (Unknown)  Haldol (Unknown)  hydantoin derivatives (Other)  methylphenidate (Unknown)  phenytoin (Unknown)  prochlorperazine (Unknown)  promethazine (Dystonic RXN)  rash/hives (Anaphylaxis)  thioxanthenes (Unknown)    MEDICATIONS:  STANDING MEDICATIONS  apixaban 5 milliGRAM(s) Oral two times a day  aspirin  chewable 81 milliGRAM(s) Oral daily  atorvastatin 40 milliGRAM(s) Oral at bedtime  baclofen 10 milliGRAM(s) Oral three times a day  chlorhexidine 4% Liquid 1 Application(s) Topical <User Schedule>  folic acid 1 milliGRAM(s) Oral daily  gabapentin 400 milliGRAM(s) Oral three times a day  lamoTRIgine 200 milliGRAM(s) Oral two times a day  metoprolol tartrate 25 milliGRAM(s) Oral every 6 hours  pantoprazole    Tablet 40 milliGRAM(s) Oral before breakfast  QUEtiapine 25 milliGRAM(s) Oral three times a day  sertraline 50 milliGRAM(s) Oral daily  tamsulosin 0.4 milliGRAM(s) Oral at bedtime  tiotropium 18 MICROgram(s) Capsule 1 Capsule(s) Inhalation daily    PRN MEDICATIONS    VITALS:   T(F): 97  HR: 110  BP: 110/54  RR: 16  SpO2: 96%    LABS:                        14.0   6.74  )-----------( 278      ( 12 Apr 2021 20:00 )             42.7     04-12    140  |  104  |  21<H>  ----------------------------<  104<H>  6.3<HH>   |  24  |  1.0    Ca    9.8      12 Apr 2021 20:00    TPro  7.5  /  Alb  4.6  /  TBili  0.3  /  DBili  x   /  AST  38  /  ALT  11  /  AlkPhos  126<H>  04-12    PT/INR - ( 12 Apr 2021 20:00 )   PT: 14.70 sec;   INR: 1.28 ratio         PTT - ( 12 Apr 2021 20:00 )  PTT:25.3 sec      Troponin T, Serum: <0.01 ng/mL (04-12-21 @ 20:00)      CARDIAC MARKERS ( 12 Apr 2021 20:00 )  x     / <0.01 ng/mL / x     / x     / x          RADIOLOGY:    PHYSICAL EXAM:  GEN: No acute distress  LUNGS: Clear to auscultation bilaterally   HEART: irregular, afib  ABD: Soft, non-tender, non-distended.  EXT: NC/NC/NE/2+PP/YARBROUGH/Skin Intact.   NEURO: AAOX3    Intravenous access: yes  NG tube: no  Blum Catheter: no

## 2021-04-21 DIAGNOSIS — K59.00 CONSTIPATION, UNSPECIFIED: ICD-10-CM

## 2021-04-21 DIAGNOSIS — K21.9 GASTRO-ESOPHAGEAL REFLUX DISEASE WITHOUT ESOPHAGITIS: ICD-10-CM

## 2021-04-21 DIAGNOSIS — I42.9 CARDIOMYOPATHY, UNSPECIFIED: ICD-10-CM

## 2021-04-21 DIAGNOSIS — I25.10 ATHEROSCLEROTIC HEART DISEASE OF NATIVE CORONARY ARTERY WITHOUT ANGINA PECTORIS: ICD-10-CM

## 2021-04-21 DIAGNOSIS — Z85.47 PERSONAL HISTORY OF MALIGNANT NEOPLASM OF TESTIS: ICD-10-CM

## 2021-04-21 DIAGNOSIS — Z96.649 PRESENCE OF UNSPECIFIED ARTIFICIAL HIP JOINT: ICD-10-CM

## 2021-04-21 DIAGNOSIS — E78.00 PURE HYPERCHOLESTEROLEMIA, UNSPECIFIED: ICD-10-CM

## 2021-04-21 DIAGNOSIS — I48.0 PAROXYSMAL ATRIAL FIBRILLATION: ICD-10-CM

## 2021-04-21 DIAGNOSIS — N40.0 BENIGN PROSTATIC HYPERPLASIA WITHOUT LOWER URINARY TRACT SYMPTOMS: ICD-10-CM

## 2021-04-21 DIAGNOSIS — Z95.810 PRESENCE OF AUTOMATIC (IMPLANTABLE) CARDIAC DEFIBRILLATOR: ICD-10-CM

## 2021-04-21 DIAGNOSIS — Z88.8 ALLERGY STATUS TO OTHER DRUGS, MEDICAMENTS AND BIOLOGICAL SUBSTANCES STATUS: ICD-10-CM

## 2021-04-21 DIAGNOSIS — G89.29 OTHER CHRONIC PAIN: ICD-10-CM

## 2021-04-21 DIAGNOSIS — G43.909 MIGRAINE, UNSPECIFIED, NOT INTRACTABLE, WITHOUT STATUS MIGRAINOSUS: ICD-10-CM

## 2021-04-21 DIAGNOSIS — J44.9 CHRONIC OBSTRUCTIVE PULMONARY DISEASE, UNSPECIFIED: ICD-10-CM

## 2021-04-21 DIAGNOSIS — I48.91 UNSPECIFIED ATRIAL FIBRILLATION: ICD-10-CM

## 2021-04-21 DIAGNOSIS — F17.210 NICOTINE DEPENDENCE, CIGARETTES, UNCOMPLICATED: ICD-10-CM

## 2021-04-21 DIAGNOSIS — F25.9 SCHIZOAFFECTIVE DISORDER, UNSPECIFIED: ICD-10-CM

## 2021-04-21 DIAGNOSIS — Z79.82 LONG TERM (CURRENT) USE OF ASPIRIN: ICD-10-CM

## 2021-04-21 DIAGNOSIS — G40.909 EPILEPSY, UNSPECIFIED, NOT INTRACTABLE, WITHOUT STATUS EPILEPTICUS: ICD-10-CM

## 2021-04-21 DIAGNOSIS — J96.10 CHRONIC RESPIRATORY FAILURE, UNSPECIFIED WHETHER WITH HYPOXIA OR HYPERCAPNIA: ICD-10-CM

## 2021-04-21 DIAGNOSIS — I11.0 HYPERTENSIVE HEART DISEASE WITH HEART FAILURE: ICD-10-CM

## 2021-04-21 DIAGNOSIS — G35 MULTIPLE SCLEROSIS: ICD-10-CM

## 2021-04-21 DIAGNOSIS — F43.10 POST-TRAUMATIC STRESS DISORDER, UNSPECIFIED: ICD-10-CM

## 2021-04-21 DIAGNOSIS — I50.32 CHRONIC DIASTOLIC (CONGESTIVE) HEART FAILURE: ICD-10-CM

## 2021-04-21 DIAGNOSIS — E11.42 TYPE 2 DIABETES MELLITUS WITH DIABETIC POLYNEUROPATHY: ICD-10-CM

## 2021-04-21 DIAGNOSIS — Z90.79 ACQUIRED ABSENCE OF OTHER GENITAL ORGAN(S): ICD-10-CM

## 2021-04-21 DIAGNOSIS — Z79.01 LONG TERM (CURRENT) USE OF ANTICOAGULANTS: ICD-10-CM

## 2021-04-21 DIAGNOSIS — Z71.6 TOBACCO ABUSE COUNSELING: ICD-10-CM

## 2021-04-21 DIAGNOSIS — F31.9 BIPOLAR DISORDER, UNSPECIFIED: ICD-10-CM

## 2021-04-21 DIAGNOSIS — D52.9 FOLATE DEFICIENCY ANEMIA, UNSPECIFIED: ICD-10-CM

## 2021-04-26 NOTE — CHART NOTE - NSCHARTNOTEFT_GEN_A_CORE
PROCEDURE: Dual-chamber Defibrillator Extraction and new dual chamber ICD implantation ([employer], Atrial Dependent)     OPERATION PERFORMED:    - Dual-chamber Defibrillator Extraction   - New Dual Chamber ICD Implantation ([employer], Atrial Dependent)    Brief Note: Removed RV-ICD, RA and partial RV-PPM lead. Complex case. Part of RV pacemaker lead abandoned inside the heart. New DC-ICD implanted via subclavian vein.     RECOMMENDATIONS:    -Please do not give any heparin products for at least 72 hours s/p device implantation.   -Reimplantation plan:    -Continue antibiotics 5 days total (IV/PO)   -Flat bedrest 4 hours    -Pain control as needed    -Monitor in the intensive care unit         : Servando Jarquin M.D., [facility], [facility]   Assistant/Co-Surgeon: Dr. Pastrana   COMPLICATIONS: None.   TIME OUT: Time out was completed with verification of the correct patient identity, procedure to be performed, procedure site and implanted equipment. A device company representative was present during the case for clinical support.      INDICATION: Lead Malfunction, Sinus node dysfunction, ARVD     PROCEDURE AND FINDINGS:  Informed consent was given prior to the procedure and confirmed.  Intravenous prophylactic antibiotics were administered prior to the procedure. The patient was brought to CT OR in a fasting state and connected to continuous blood pressure, pulse oximetry, and electrocardiographic monitoring. General anesthesia was induced, and baseline transesophageal echocardiogram showed trivial pericardial effusion. The ROCIO probe was left in place to allow for continuous monitoring and to assess for the development of complications throughout the entire procedure. The patient was prepped and draped as required should they need emergent cardiac surgery -- neck to legs bilaterally. Ultrasound was utilized to confirm femoral venous and arterial patency. Needle access was obtained under ultrasound guidance and a permanent recording obtained. Right femoral arterial and right/left femoral venous access was obtained utilizing the modified Seldinger technique under ultrasound and fluoroscopic guidance and the following sheaths were inserted: 6 Fr right arterial, 12 Fr right venous, 6 Fr left venous, 6 Fr left venous.       An Amplatzer exchange length wire was advanced through the right femoral venous access into the right internal jugular as a rescue wire for the possibility of a Bridge balloon if needed. A temporary transvenous pacer was also placed.      The skin was infiltrated with a local anesthetic over the prior scar.  The skin was incised with a #10 scalpel over the prior scar. Blunt and electrosurgical dissection were carried out to the pulse generator pocket. The pocket tissue appeared healthy without gross evidence of infection.      The lead/leads and generator were carefully isolated with blunt and electrosurgical dissection.      The leads were freed from the lead sleeves.  Stylets were placed in the leads.        ACCESS   Once adequate hemostasis was confirmed within the pocket, venous access was obtained. The axillary vein was accessed via the modified Seldinger technique under fluoroscopic guidance.  J tip 0.035-inch guidewire/wires were introduced and their course through the venous system was confirmed by their presence under fluoroscopy in the inferior vena.        LEAD EXTRACTION   Attempts were made to unscrew the active fixation helix of all leads. Gentle traction on the leads was consistent with adhesions in the central venous system. We could not advance or reposition RV ICD lead despite multiple attempts. RV ICD was removed with gentle traction. Then the locking stylets were placed to the distal aspect of each of the leads.  A 14 Fr laser was utilized for adhesions in the subclavian vein/SVC. The lead was removed in total and the patient's hemodynamics remained stable. RA was removed without any difficulty. RV lead was attached at multiple levels. RV lead insulation was noted to be broken. We were able to free up the lead upto mid-right atrium with some difficulty. However, we could not advance laser beyond that point due to lack of proper traction. We attempted pulling the lead with an mapping catheter from groin. However, the traction was not enough. Simultaneously attempts were made to provide traction from above and pacing lead snapped. The partial lead appeared to be attached to the heart without much movement, so we decided to stop further attempts to extract it.    There was no myocardium present on the lead tip although there was fibrous and calcific tissue along the body of the lead.     Then, dual Chamber defibrillator was implanted as below.  Then left extra-thoracic axillary vein was accessed once under fluoroscopy, one access from extraction access sheath was used. 2 guidewires were placed into the vein. Then the sheaths were placed over the guidewire.     Then the ventricular lead was advanced into the RV. The RV lead was fixated to the right ventricular mid septum. Appropriate sensing and thresholds were obtained. No diaphragmatic pacing occurred at 10 V and 1.5 ms.    Then the atrial lead was advanced into the RA. The RA lead was fixated to the right atrial appendage. Appropriate sensing and thresholds were obtained. No diaphragmatic pacing occurred at 10 V and 1.5 ms.    The sheaths were removed. The leads were then sutured to the pectoralis muscle using Ethibond. Lead measurements were then rechecked.    Then the prepectoral pocket was fashioned. The leads were attached to the generator. The system was placed in the pocket and connected to the leads. The generator was sutured to the pocket. The generator and leads system were visualized under fluoroscopy. Appropriate redundancy/slack in the leads were noted. The pins of the leads were beyond the set screws.       CLOSURE   The patient remained hemodynamically stable and 10 minutes after the last lead was extracted the OR team was notified to stand down.  A repeat transesophageal echocardiogram at the end of the case showed no change in pericardial effusion. The pocket was copiously irrigated with normal saline containing vancomycin and subsequently observed.  Once adequate hemostasis was confirmed within the pocket, the pocket was closed.         The pocket was closed in 2 layers. Dermabond glue was then applied over the incision followed by a sterile dressing. The rescue wire and temporary pacer were then removed under fluoroscopic guidance.  The patient tolerated the procedure well.      This was a particularly challenging procedure due to the added complexity of multiple binding sites on RV pacing lead. This added approximately 60 minutes to the procedure.     Blood Loss: 30  ml

## 2021-04-29 ENCOUNTER — APPOINTMENT (OUTPATIENT)
Dept: CARDIOLOGY | Facility: CLINIC | Age: 51
End: 2021-04-29

## 2021-07-09 RX ORDER — NITROGLYCERIN 0.4 MG/1
0.4 TABLET SUBLINGUAL
Refills: 0 | Status: DISCONTINUED | COMMUNITY
End: 2021-07-09

## 2021-07-09 RX ORDER — METOPROLOL SUCCINATE 50 MG/1
50 TABLET, EXTENDED RELEASE ORAL
Qty: 60 | Refills: 1 | Status: DISCONTINUED | COMMUNITY
End: 2021-07-09

## 2021-07-22 ENCOUNTER — APPOINTMENT (OUTPATIENT)
Dept: CARDIOLOGY | Facility: CLINIC | Age: 51
End: 2021-07-22
Payer: MEDICAID

## 2021-07-22 VITALS
TEMPERATURE: 97.7 F | SYSTOLIC BLOOD PRESSURE: 110 MMHG | WEIGHT: 163 LBS | BODY MASS INDEX: 26.2 KG/M2 | HEART RATE: 69 BPM | HEIGHT: 66 IN | DIASTOLIC BLOOD PRESSURE: 70 MMHG

## 2021-07-22 PROCEDURE — 93000 ELECTROCARDIOGRAM COMPLETE: CPT

## 2021-07-22 PROCEDURE — 99214 OFFICE O/P EST MOD 30 MIN: CPT

## 2021-07-22 PROCEDURE — 93290 INTERROG DEV EVAL ICPMS IP: CPT | Mod: 26

## 2021-07-22 NOTE — HISTORY OF PRESENT ILLNESS
[de-identified] : I had a pleasure of seeing Mr. Miguel for follow-up consultation for atrial fibrillation and ICD care. \par \par Mr. Miguel is a 50-year old man with history of paroxysmal AF s/p ablation (x2, 2017) s/p re-do RF (12/20, PVI-CTI) s/p early flutter s/p spontaneoud conversion during procedure and CTI touch-up (1/21),  Atrial standstill s/p PPM (2001) --> ARVD s/p ICD placement --> ICD lead malfunction s/p ICD lead extraction s/p DC ICD lead placement ([employer]) --> Perforation s/p RV lead revision, HIV, DL, bipolar disorder, seizure, depression, HTN is here for management of his atrial fibrillation. \par \par 7/22: Feels better. No more ICD shocks. No Af episodes. + Fatigue at times\par \par 4/8: Had lead extraction performed due to malfunctioning ICD lead. RV-ICD+ RA leads were removed in entirety, while part of the RV-pacing lead was left behind intracardiac. New dual chamber ICD was placed. Denies chest pain, shortness of breath, palpitation, dizziness or LOC except noted above.\par \par \par From prior notes: He had an episode of AF where he needed cardioversion at  [place] in 08/20. + Palpitations. He has used tikosyn before- continue to have AF episodes. He has never been on AV Node block agents due to relative hypotension.\par \par 3/25: Feels fine. Had kicked door, but not involved in fight.\par \par Denies chest pain, shortness of breath, dizziness or LOC.\par \par EKG (07/22/21): SR@69/min, AK: 238 ms, QRSd 140 ms, QTc 426 ms\par EKG: AP-VS @ 69/min, RBBB.\par TTE (02/21): EF 55-60%, nl LA/RA\par

## 2021-07-22 NOTE — PROCEDURE
[Complete Heart Block] : complete heart block [ICD] : Implantable cardioverter-defibrillator [DDDR] : DDDR [Charge Time: ___ sec] : charge time was [unfilled] seconds [Longevity: ___ months] : The estimated remaining battery life is [unfilled] months [Normal] : The battery status is normal. [Threshold Testing Performed] : Threshold testing was performed [Lead Imp:  ___ohms] : lead impedance was [unfilled] ohms [Sensing Amplitude ___mv] : sensing amplitude was [unfilled] mv [___V @] : [unfilled] V [___ ms] : [unfilled] ms [Programmed for Longevity] : output reprogrammed for improved battery longevity [Counters Reset] : the counters were reset [Apace-Vpace ___ %] : Apace-Vpace [unfilled]% [de-identified] : [facility]  [de-identified] : [id number] [de-identified] : 9/5/2019 [de-identified] : [id number] [de-identified] : 62 [de-identified] : No episodes,  rate response adjusted to increase response.

## 2021-07-22 NOTE — PHYSICAL EXAM
[General Appearance - Well Developed] : well developed [Normal Appearance] : normal appearance [Well Groomed] : well groomed [General Appearance - Well Nourished] : well nourished [No Deformities] : no deformities [General Appearance - In No Acute Distress] : no acute distress [Heart Rate And Rhythm] : heart rate and rhythm were normal [Heart Sounds] : normal S1 and S2 [Murmurs] : no murmurs present [Respiration, Rhythm And Depth] : normal respiratory rhythm and effort [Exaggerated Use Of Accessory Muscles For Inspiration] : no accessory muscle use [Auscultation Breath Sounds / Voice Sounds] : lungs were clear to auscultation bilaterally [Clean] : clean [Dry] : dry [Healing Well] : healing well [Abdomen Soft] : soft [Abdomen Tenderness] : non-tender [Abdomen Mass (___ Cm)] : no abdominal mass palpated [Nail Clubbing] : no clubbing of the fingernails [Cyanosis, Localized] : no localized cyanosis [Petechial Hemorrhages (___cm)] : no petechial hemorrhages [] : no ischemic changes

## 2021-07-22 NOTE — ASSESSMENT
[FreeTextEntry1] : ## Paroxysmal AF s/p ablation (see above for more detailed)\par ## ARVD s/p DC ICD -- s/p recent RV lead  malfunction s/p extraction\par ## Atrial standstill s/p DC ICD\par \par - ICD interrogation shows normally functioning DC-ICD. Battery life ok. Optivol below threshold. No events\par - Continue with Eliquis and Metorolol. Previous AADs use unsuccessful/AEs- see previous notes. Due to his fatigue, we will reduce metoprolol dose to 50 mg q24hr.\par - Remote Monitoring\par - Return in 6 months\par \par

## 2021-08-05 NOTE — H&P ADULT - VASCULAR
1. Continue feeding Kenneth on demand focusing on deep, asymmetrical latch as you have been.  2. Apply epsom salt soaks as directed.  3. Following feedings apply expressed breast milk and coconut oil (or coconut oil based nipple cream) to nipples.  4. If no improvement or if it worsens consider discussion with MD regarding potential infection and consider applying bacitracin following feedings.  5. Consider taking oral probiotics to assist in maintaining normal obi throughout body in case this is yeast or a bacterial infection.  6. If no improvement with above, consider Dr. Dameon Mishra's APNO following feedings.  7. Call MD with signs or symptoms of infection.  8. Follow-up with MD as needed and Lactation as needed.    Equal and normal pulses (carotid, femoral, dorsalis pedis)

## 2021-08-25 ENCOUNTER — NON-APPOINTMENT (OUTPATIENT)
Age: 51
End: 2021-08-25

## 2021-08-25 ENCOUNTER — APPOINTMENT (OUTPATIENT)
Dept: CARDIOLOGY | Facility: CLINIC | Age: 51
End: 2021-08-25
Payer: MEDICAID

## 2021-08-25 PROCEDURE — 93295 DEV INTERROG REMOTE 1/2/MLT: CPT

## 2021-08-25 PROCEDURE — 93296 REM INTERROG EVL PM/IDS: CPT

## 2021-08-31 NOTE — ED ADULT NURSE NOTE - ED STAT RN HAND OFF
Subjective   Jasmine Cerda is a 65 y.o. female who presents today for follow up     Chief Complaint:  Depression anxiety     History of Present Illness:   The pt suffers from depression for a long time, was on multiple meds    Today the pt reported feeling slightly better, her son is doing better, working and now allowed to come  to the house      depression is rated as 6-7/10, still has more days when she is depressed but it is not as intense      When depressed -  decreased E level. Poor motivations, low drives   denied AVH/SI/HI   anxiety is pretty intense and persistent, unable to relax, meds are effective    The pt will have stress test done in few weeks     Precipitating and Ameliorating Factors: covid   Alleviating factors - to spend time with her grand daughter         PAST PSYCHIATRIC HISTORY      Previous Psychiatric Diagnoses   Axis I: Anxiety/Panic Disorder     Past Hospitalizations or Residential Treatment   Locations\Providers: none      Past Outpatient Treatment   Diagnosis Treated: Anxiety/Panic Dis.  Treatment Type: Medication Management  Location: with PCP, Dr Manning      Prior Psychiatric Medications   Comments: xanax - effective      celexa- not effective   zoloft - not effective  cymbalta - not effective      Prozac - GI sxs   ambien - side effects     Consequences of Mental Disorder   Consequences: emotional distress     SOCIAL HISTORY     Number of children: 2  Number of grandkids: 1  Current Relationship is: supportive  Family of Origin is: supportive  Comments: Patient has never smoked.  Passive Smoke: N  Alcohol Use: N  Drug Use: N  HIV/High Risk: N        Current Living Situation   Lives with: spouse     Education   Level: high school graduate     Employment   Job Status: disabled     Hobbies and Leisure Activities   Activity Type: quiet activities     Smoking History   Smoking Hx: Never smoker     Exercise   Exercise sessions (per wk): 1     Illicit Drug Use   Illicit Drugs used:  no     FAMILY HISTORY OF MENTAL DISORDERS   fh Grandparents: Non-contributory  fh Mother: Non-contributory  fh Father: Non-contributory  fh Siblings: Non-contributory  fh Other: Non-contributory  The following portions of the patient's history were reviewed and updated as appropriate: allergies, current medications, past family history, past medical history, past social history, past surgical history and problem list.            Interval History  Some improvement       Side Effects  Denied       Past Medical History:  Past Medical History:   Diagnosis Date   • Anxiety    • Breast cyst    • CHF (congestive heart failure) (CMS/HCC)    • Coronary heart disease    • Depression    • Hyperlipidemia    • Hypertension        Social History:  Social History     Socioeconomic History   • Marital status:      Spouse name: Not on file   • Number of children: Not on file   • Years of education: Not on file   • Highest education level: Not on file   Tobacco Use   • Smoking status: Never Smoker   • Smokeless tobacco: Never Used   • Tobacco comment: Passive Smoke: N   Vaping Use   • Vaping Use: Never used   Substance and Sexual Activity   • Alcohol use: No   • Drug use: No   • Sexual activity: Defer       Family History:  Family History   Problem Relation Age of Onset   • Colon cancer Mother    • Diabetes Father    • Pulmonary embolism Brother    • Heart failure Brother        Past Surgical History:  Past Surgical History:   Procedure Laterality Date   • APPENDECTOMY     • BREAST CYST ASPIRATION     • CARDIAC CATHETERIZATION  2017    No Stents placed - [facility]   • CERVICAL FUSION      C6-C7   •  SECTION      x 2   • CHOLECYSTECTOMY     • HYSTERECTOMY         Problem List:  Patient Active Problem List   Diagnosis   • Chronic post-traumatic stress disorder (PTSD)   • Severe episode of recurrent major depressive disorder (CMS/HCC)   • Asthma   • Chronic low back pain   • Congestive heart failure (CMS/HCC)   • Coronary  heart disease   • Degeneration of intervertebral disc of lumbosacral region   • Headache, temporal   • Psychophysiological insomnia   • Hyperlipidemia   • Hypertension   • Vitamin D deficiency   • Other fatigue   • Preventative health care   • Hyperglycemia, unspecified    • Nausea   • Stable angina pectoris (CMS/HCC)       Allergy:   No Known Allergies     Discontinued Medications:  Medications Discontinued During This Encounter   Medication Reason   • traZODone (DESYREL) 50 MG tablet Reorder   • ALPRAZolam (XANAX) 0.5 MG tablet Reorder   • L-Methylfolate (Elfolate) 15 MG tablet Reorder   • citalopram (CeleXA) 40 MG tablet Reorder   • hydrOXYzine pamoate (VISTARIL) 25 MG capsule Reorder   • risperiDONE (risperDAL) 1 MG tablet Reorder   • ARIPiprazole (ABILIFY) 10 MG tablet Reorder       Current Medications:   Current Outpatient Medications   Medication Sig Dispense Refill   • albuterol (PROVENTIL) (2.5 MG/3ML) 0.083% nebulizer solution INHALE 1 VIAL VIA NEBULIZER DAILY AS NEEDED FOR WHEEZING 300 mL 1   • ALPRAZolam (XANAX) 0.5 MG tablet Take 1 tablet by mouth 3 (Three) Times a Day As Needed for Anxiety. 90 tablet 3   • ARIPiprazole (ABILIFY) 10 MG tablet Take 1 tablet by mouth Daily. 90 tablet 1   • aspirin 81 MG EC tablet ASPIRIN EC 81 MG TBEC     • budesonide-formoterol (Symbicort) 160-4.5 MCG/ACT inhaler Inhale 2 puffs 2 (Two) Times a Day. 30.6 g 1   • bumetanide (BUMEX) 2 MG tablet Take 1 tablet by mouth 2 (Two) Times a Day. 180 tablet 0   • carvedilol (COREG) 25 MG tablet Take 1 tablet by mouth 2 (Two) Times a Day. 180 tablet 0   • citalopram (CeleXA) 40 MG tablet Take 1 tablet by mouth Every Morning. 90 tablet 1   • cyanocobalamin 1000 MCG/ML injection INJECT 1 ML INTO THE APPROPRIATE MUSCLE AS DIRECTED BY PRESCRIBER EVERY 28 (TWENTY-EIGHT) DAYS. 3 mL 1   • gabapentin (NEURONTIN) 300 MG capsule Take 1 capsule by mouth 3 (Three) Times a Day. 270 capsule 1   • hydrOXYzine pamoate (VISTARIL) 25 MG capsule Take 1  "capsule by mouth 3 (Three) Times a Day As Needed for Anxiety. 270 capsule 1   • L-Methylfolate (Elfolate) 15 MG tablet Take 1 tablet by mouth Daily. 90 tablet 1   • lisinopril (PRINIVIL,ZESTRIL) 5 MG tablet Take 1 tablet by mouth Daily.     • metoclopramide (REGLAN) 10 MG tablet TAKE 1 TABLET BY MOUTH 3 (THREE) TIMES A DAY BEFORE MEALS. 90 tablet 2   • Multiple Vitamins-Minerals (MULTIVITAMIN ADULT) tablet Daily. With Omega XL     • nitroglycerin (NITROSTAT) 0.4 MG SL tablet NITROSTAT 0.4 MG SUBL     • ondansetron (ZOFRAN) 4 MG tablet TAKE 1 TABLET BY MOUTH EVERY 8 (EIGHT) HOURS AS NEEDED FOR NAUSEA OR VOMITING. MUST LAST 30 DAYS. 45 tablet 0   • pantoprazole (PROTONIX) 40 MG EC tablet Take 1 tablet by mouth Daily. 90 tablet 1   • potassium chloride (KLOR-CON) 20 MEQ CR tablet Take 1 tablet by mouth Daily. 180 tablet 0   • risperiDONE (risperDAL) 1 MG tablet Take 1 tablet by mouth every night at bedtime. 90 tablet 1   • rosuvastatin (CRESTOR) 40 MG tablet Take 1 tablet by mouth Every Evening. 90 tablet 0   • Syringe/Needle, Disp, 23G X 1\" 3 ML misc Use to inject B12 every 30 days intramuscularly 12 each 0   • tiotropium bromide monohydrate (Spiriva Respimat) 2.5 MCG/ACT aerosol solution inhaler Inhale 1 puff Daily. 2 each 0   • traZODone (DESYREL) 50 MG tablet 1 or 2 tabs po  tablet 1   • vitamin D (ERGOCALCIFEROL) 1.25 MG (97728 UT) capsule capsule Take 1 capsule by mouth 1 (One) Time Per Week. 15 capsule 3     No current facility-administered medications for this visit.         Review of Symptoms:    Psychiatric/Behavioral: Negative for agitation, behavioral problems, confusion, decreased concentration, dysphoric mood, hallucinations, self-injury, sleep disturbance and suicidal ideas.   The patient is  Less  depressed,  Still very  nervous/anxious and is not hyperactive.        Physical Exam:   There were no vitals taken for this visit.    Mental Status Exam:   Hygiene:   good  Cooperation:  " Cooperative  Eye Contact:  Good  Psychomotor Behavior:  Appropriate  Affect:  Appropriate and Blunted  Mood: fluctates,  Anxious    Hopelessness: Denies  Speech:  Normal  Thought Process:  Goal directed and Linear  Thought Content:  Normal  Suicidal:  None  Homicidal:  None  Hallucinations:  None  Delusion:  None  Memory:  Intact  Orientation:  Person, Place, Time and Situation  Reliability:  fair  Insight:  Good  Judgement:  Good  Impulse Control:  Good  Physical/Medical Issues:  Yes GI issues        MSE from 4/14/2021  reviewed and accepted with changes     PHQ-9 Score:  PHQ-9 Score:  11          Never smoker    I advised Jasmine of the risks of tobacco use.     Lab Results:   No visits with results within 3 Month(s) from this visit.   Latest known visit with results is:   Office Visit on 03/30/2021   Component Date Value Ref Range Status   • WBC 03/30/2021 7.14  3.40 - 10.80 10*3/mm3 Final   • RBC 03/30/2021 4.41  3.77 - 5.28 10*6/mm3 Final   • Hemoglobin 03/30/2021 12.7  12.0 - 15.9 g/dL Final   • Hematocrit 03/30/2021 39.2  34.0 - 46.6 % Final   • MCV 03/30/2021 88.9  79.0 - 97.0 fL Final   • MCH 03/30/2021 28.8  26.6 - 33.0 pg Final   • MCHC 03/30/2021 32.4  31.5 - 35.7 g/dL Final   • RDW 03/30/2021 12.9  12.3 - 15.4 % Final   • RDW-SD 03/30/2021 42.1  37.0 - 54.0 fl Final   • MPV 03/30/2021 9.8  6.0 - 12.0 fL Final   • Platelets 03/30/2021 258  140 - 450 10*3/mm3 Final   • Glucose 03/30/2021 87  65 - 99 mg/dL Final   • BUN 03/30/2021 27* 8 - 23 mg/dL Final   • Creatinine 03/30/2021 1.14* 0.57 - 1.00 mg/dL Final   • Sodium 03/30/2021 139  136 - 145 mmol/L Final   • Potassium 03/30/2021 4.5  3.5 - 5.2 mmol/L Final   • Chloride 03/30/2021 100  98 - 107 mmol/L Final   • CO2 03/30/2021 28.3  22.0 - 29.0 mmol/L Final   • Calcium 03/30/2021 9.7  8.6 - 10.5 mg/dL Final   • Total Protein 03/30/2021 6.9  6.0 - 8.5 g/dL Final   • Albumin 03/30/2021 4.60  3.50 - 5.20 g/dL Final   • ALT (SGPT) 03/30/2021 21  1 - 33 U/L Final    • AST (SGOT) 03/30/2021 17  1 - 32 U/L Final   • Alkaline Phosphatase 03/30/2021 64  39 - 117 U/L Final   • Total Bilirubin 03/30/2021 0.3  0.0 - 1.2 mg/dL Final   • eGFR Non African Amer 03/30/2021 48* >60 mL/min/1.73 Final   • Globulin 03/30/2021 2.3  gm/dL Final   • A/G Ratio 03/30/2021 2.0  g/dL Final   • BUN/Creatinine Ratio 03/30/2021 23.7  7.0 - 25.0 Final   • Anion Gap 03/30/2021 10.7  5.0 - 15.0 mmol/L Final   • Total Cholesterol 03/30/2021 155  0 - 200 mg/dL Final   • Triglycerides 03/30/2021 223* 0 - 150 mg/dL Final   • HDL Cholesterol 03/30/2021 58  40 - 60 mg/dL Final   • LDL Cholesterol  03/30/2021 61  0 - 100 mg/dL Final   • VLDL Cholesterol 03/30/2021 36  5 - 40 mg/dL Final   • LDL/HDL Ratio 03/30/2021 0.90   Final   • Vitamin B-12 03/30/2021 341  211 - 946 pg/mL Final   • 25 Hydroxy, Vitamin D 03/30/2021 51.8  30.0 - 100.0 ng/ml Final   • TSH 03/30/2021 1.860  0.270 - 4.200 uIU/mL Final   • Hemoglobin A1C 03/30/2021 5.2  3.5 - 5.6 % Final       Assessment/Plan   Problems Addressed this Visit        Mental Health    Chronic post-traumatic stress disorder (PTSD) (Chronic)    Relevant Medications    traZODone (DESYREL) 50 MG tablet    ALPRAZolam (XANAX) 0.5 MG tablet    hydrOXYzine pamoate (VISTARIL) 25 MG capsule    citalopram (CeleXA) 40 MG tablet    risperiDONE (risperDAL) 1 MG tablet    ARIPiprazole (ABILIFY) 10 MG tablet    Other Relevant Orders    [facility] Urine Drug Screen -    Severe episode of recurrent major depressive disorder (CMS/HCC) - Primary (Chronic)    Relevant Medications    traZODone (DESYREL) 50 MG tablet    ALPRAZolam (XANAX) 0.5 MG tablet    hydrOXYzine pamoate (VISTARIL) 25 MG capsule    citalopram (CeleXA) 40 MG tablet    risperiDONE (risperDAL) 1 MG tablet    ARIPiprazole (ABILIFY) 10 MG tablet       Sleep    Psychophysiological insomnia (Chronic)    Relevant Medications    traZODone (DESYREL) 50 MG tablet      Other Visit Diagnoses     Chronic obstructive pulmonary disease,  unspecified COPD type (CMS/HCC)        cont symbicort and albuterol, will give 2-month samples of spiriva.  Notify if effective and will send to pharmacy.  CAT score of 28    Relevant Medications    risperiDONE (risperDAL) 1 MG tablet    Encounter for long-term (current) use of other medications        Relevant Orders    [facility] Urine Drug Screen -      Diagnoses       Codes Comments    Severe episode of recurrent major depressive disorder, without psychotic features (CMS/HCC)    -  Primary ICD-10-CM: F33.2  ICD-9-CM: 296.33     Chronic post-traumatic stress disorder (PTSD)     ICD-10-CM: F43.12  ICD-9-CM: 309.81     Psychophysiological insomnia     ICD-10-CM: F51.04  ICD-9-CM: 307.42     Chronic obstructive pulmonary disease, unspecified COPD type (CMS/HCC)     ICD-10-CM: J44.9  ICD-9-CM: 496 cont symbicort and albuterol, will give 2-month samples of spiriva.  Notify if effective and will send to pharmacy.  CAT score of 28    Encounter for long-term (current) use of other medications     ICD-10-CM: Z79.899  ICD-9-CM: V58.69           Visit Diagnoses:    ICD-10-CM ICD-9-CM   1. Severe episode of recurrent major depressive disorder, without psychotic features (CMS/HCC)  F33.2 296.33   2. Chronic post-traumatic stress disorder (PTSD)  F43.12 309.81   3. Psychophysiological insomnia  F51.04 307.42   4. Chronic obstructive pulmonary disease, unspecified COPD type (CMS/HCC)  J44.9 496   5. Encounter for long-term (current) use of other medications  Z79.899 V58.69   correct ds - f33.2, f43.12, f51.04     TREATMENT PLAN/GOALS: Continue supportive psychotherapy efforts and medications as indicated. Treatment and medication options discussed during today's visit. Patient ackowledged and verbally consented to continue with current treatment plan and was educated on the importance of compliance with treatment and follow-up appointments.    MEDICATION ISSUES:  1. MDD - cont celexa,   abilify   10 mg, cont deplin , no changes  necessary       2. PTSD - cont celexa , Xanax - low dose 0.5 mg TID, alternating with vistaril PRN , long term benzo use discussed again    3. Insomnia -  Start trazodone     4. Long term therapeutic drug monitoring - UDS today   supportive therapy,     INSPECT reviewed as expected, last xanax refill was on 2021    PHQ scored 13 and indicated moderate depression     Discussed medication options and treatment plan of prescribed medication as well as the risks, benefits, and side effects including potential falls, possible impaired driving and metabolic adversities among others. Patient is agreeable to call the office with any worsening of symptoms or onset of side effects. Patient is agreeable to call 911 or go to the nearest ER should he/she begin having SI/HI. No medication side effects or related complaints today.     MEDS ORDERED DURING VISIT:  New Medications Ordered This Visit   Medications   • traZODone (DESYREL) 50 MG tablet     Si or 2 tabs po QHS     Dispense:  180 tablet     Refill:  1   • ALPRAZolam (XANAX) 0.5 MG tablet     Sig: Take 1 tablet by mouth 3 (Three) Times a Day As Needed for Anxiety.     Dispense:  90 tablet     Refill:  3     Please dispense on or after 2021   • hydrOXYzine pamoate (VISTARIL) 25 MG capsule     Sig: Take 1 capsule by mouth 3 (Three) Times a Day As Needed for Anxiety.     Dispense:  270 capsule     Refill:  1   • L-Methylfolate (Elfolate) 15 MG tablet     Sig: Take 1 tablet by mouth Daily.     Dispense:  90 tablet     Refill:  1   • citalopram (CeleXA) 40 MG tablet     Sig: Take 1 tablet by mouth Every Morning.     Dispense:  90 tablet     Refill:  1   • risperiDONE (risperDAL) 1 MG tablet     Sig: Take 1 tablet by mouth every night at bedtime.     Dispense:  90 tablet     Refill:  1   • ARIPiprazole (ABILIFY) 10 MG tablet     Sig: Take 1 tablet by mouth Daily.     Dispense:  90 tablet     Refill:  1       Return in about 4 months (around 2021).          This document has been electronically signed by Sivan Ken MD  August 31, 2021 15:15 EDT   Handoff

## 2021-10-21 ENCOUNTER — APPOINTMENT (OUTPATIENT)
Dept: CARDIOLOGY | Facility: CLINIC | Age: 51
End: 2021-10-21

## 2021-11-24 ENCOUNTER — APPOINTMENT (OUTPATIENT)
Dept: CARDIOLOGY | Facility: CLINIC | Age: 51
End: 2021-11-24

## 2021-12-01 ENCOUNTER — FORM ENCOUNTER (OUTPATIENT)
Age: 51
End: 2021-12-01

## 2021-12-20 NOTE — PROGRESS NOTE ADULT - ASSESSMENT
46 yo M with multiple sclerosis, CAD, HFpEF, Afib on eliquis,  seizure disorder, HTN, DLD, arrhythmogenic R ventricular dysplasia, COPD not on home O2, h/o vtach s/p AICD, remote h/o testicular cancer and h/o TIA  , recurrent chest pain, diaphoresis, palpitation, worsening SOB on exertion was referred from dr stevens office for stress test.    #recurrent chest pain, palpitation secondary to recurrent atrial tachycardia as shown in AICD interrogation in last admission  patient was referred to stress test before ablation  ER contacted dr stevens who recommended persantine stress test  Stress test done: < from: [facility] Nuclear Stress Pharmacologic Multiple (04.21.18 @ 10:41) >  NORMAL LEXISCAN / REST MYOCARDIAL PERFUSION TOMOGRAPHY, WITH NO   EVIDENCE FOR ISCHEMIA DURING LEXISCAN INFUSION.   2. NORMAL RESTING LEFT VENTRICULAR WALL MOTION AND WALL THICKENING.  3. LEFT VENTRICULAR EJECTION FRACTION OF  61 % WHICH IS WITHIN RANGE OF   NORMAl     c/w atorvastatin, aspirin, metoprolol    Has to follow with cardiology for recommendations    #atrial fibrillation: on Eliquis and metoprolol  Currently rhythm paced at 60bpm    #history of vtach: c/w Tikosyn    #HFpEF : continue with Lasix 40/ 20 every other day, no signs of overload, metoprolol      #Multiple sclerosis: continue with baclofen, Tecfidera    #seizure on Lamotrigine    #DVT prophylaxis (Already on eliquis) /GERD: on Eliquis /protonix    Disposition- pending cardiology eval, asking for pain meds- intent in AM Complex Repair And Graft Additional Text (Will Appearing After The Standard Complex Repair Text): The complex repair was not sufficient to completely close the primary defect. The remaining additional defect was repaired with the graft mentioned below.

## 2021-12-23 ENCOUNTER — APPOINTMENT (OUTPATIENT)
Dept: CARDIOLOGY | Facility: CLINIC | Age: 51
End: 2021-12-23
Payer: MEDICAID

## 2021-12-23 ENCOUNTER — NON-APPOINTMENT (OUTPATIENT)
Age: 51
End: 2021-12-23

## 2021-12-23 PROCEDURE — 93295 DEV INTERROG REMOTE 1/2/MLT: CPT

## 2021-12-23 PROCEDURE — 93296 REM INTERROG EVL PM/IDS: CPT

## 2022-01-06 NOTE — ED ADULT TRIAGE NOTE - WEIGHT IN KG
Prescription Name:   levothyroxine (SYNTHROID) 75 MCG tablet 90 tablet   DULoxetine (CYMBALTA) 30 MG capsule 90 capsule   What do they need to clarify? Refill request      81.6

## 2022-01-12 NOTE — PATIENT PROFILE ADULT - NSTOBACCONEVERSMOKERY/N_GEN_A
[Dear  ___] : Dear  [unfilled], [Consult Letter:] : I had the pleasure of evaluating your patient, [unfilled]. [Please see my note below.] : Please see my note below. [Consult Closing:] : Thank you very much for allowing me to participate in the care of this patient.  If you have any questions, please do not hesitate to contact me. [Sincerely,] : Sincerely, [FreeTextEntry3] : Leandra Barrios MD No

## 2022-01-20 ENCOUNTER — APPOINTMENT (OUTPATIENT)
Dept: CARDIOLOGY | Facility: CLINIC | Age: 52
End: 2022-01-20

## 2022-01-20 VITALS
BODY MASS INDEX: 33.11 KG/M2 | SYSTOLIC BLOOD PRESSURE: 131 MMHG | HEART RATE: 83 BPM | DIASTOLIC BLOOD PRESSURE: 91 MMHG | HEIGHT: 66 IN | WEIGHT: 206 LBS | RESPIRATION RATE: 16 BRPM | TEMPERATURE: 97.9 F

## 2022-01-20 RX ORDER — ATORVASTATIN CALCIUM 40 MG/1
40 TABLET, FILM COATED ORAL DAILY
Refills: 0 | Status: DISCONTINUED | COMMUNITY
End: 2022-01-20

## 2022-02-03 ENCOUNTER — APPOINTMENT (OUTPATIENT)
Dept: CARDIOLOGY | Facility: CLINIC | Age: 52
End: 2022-02-03
Payer: MEDICAID

## 2022-02-03 VITALS
HEART RATE: 69 BPM | SYSTOLIC BLOOD PRESSURE: 115 MMHG | DIASTOLIC BLOOD PRESSURE: 60 MMHG | TEMPERATURE: 97.7 F | WEIGHT: 170 LBS | BODY MASS INDEX: 27.32 KG/M2 | RESPIRATION RATE: 16 BRPM | HEIGHT: 66 IN

## 2022-02-03 DIAGNOSIS — Z45.02 ENCOUNTER FOR ADJUSTMENT AND MANAGEMENT OF AUTOMATIC IMPLANTABLE CARDIAC DEFIBRILLATOR: ICD-10-CM

## 2022-02-03 DIAGNOSIS — Z95.810 PRESENCE OF AUTOMATIC (IMPLANTABLE) CARDIAC DEFIBRILLATOR: ICD-10-CM

## 2022-02-03 DIAGNOSIS — I48.20 CHRONIC ATRIAL FIBRILLATION, UNSP: ICD-10-CM

## 2022-02-03 DIAGNOSIS — I42.0 DILATED CARDIOMYOPATHY: ICD-10-CM

## 2022-02-03 PROCEDURE — 93000 ELECTROCARDIOGRAM COMPLETE: CPT | Mod: 59

## 2022-02-03 PROCEDURE — 93290 INTERROG DEV EVAL ICPMS IP: CPT

## 2022-02-03 PROCEDURE — 93289 INTERROG DEVICE EVAL HEART: CPT

## 2022-02-03 PROCEDURE — 99213 OFFICE O/P EST LOW 20 MIN: CPT

## 2022-02-03 NOTE — HISTORY OF PRESENT ILLNESS
[de-identified] : Mr. Miguel is a 51-year old man with history of paroxysmal AF s/p ablation (x2, 2017) s/p re-do RF (12/20, PVI-CTI) s/p early flutter s/p spontaneoud conversion during procedure and CTI touch-up (1/21),  Atrial standstill s/p PPM (2001) --> ARVD s/p ICD placement --> ICD lead malfunction s/p ICD lead extraction s/p DC ICD lead placement ([employer]) --> Perforation s/p RV lead revision, HIV, DL, bipolar disorder, seizure, depression, HTN is here for management of his atrial fibrillation and device interrogation. \par \par 4/8: Had lead extraction performed due to malfunctioning ICD lead. RV-ICD+ RA leads were removed in entirety, while part of the RV-pacing lead was left behind intracardiac. New dual chamber ICD was placed on 3/29/2021 \par \par Denies chest pain, shortness of breath, palpitation, dizziness or LOC except noted above. Reports occasional palpitations. Denies AICD shocks or syncope. \par \par Cardiac tests: \par ECG (2/3/2022); Atrial paced at 70 bpm, RBBB ( ms) \par EKG (07/22/21): SR@69/min, MI: 238 ms, QRSd 140 ms, QTc 426 ms\par EKG: AP-VS @ 69/min, RBBB.\par TTE (02/21): EF 55-60%, nl LA/RA\par

## 2022-02-03 NOTE — ASSESSMENT
[FreeTextEntry1] : ## Paroxysmal AF s/p ablation (see above for more detailed)\par ## ARVD s/p DC ICD -- s/p recent RV lead  malfunction s/p extraction\par ## Atrial standstill s/p DC ICD\par \par - ICD interrogation shows normally functioning DC-ICD. Battery life ok. Optivol below threshold. No events. SND- pacer dependent.  \par - Continue with Eliquis and Metoprolol. meds refilled. Currently on Metoprolol  mg/daily (0.5 tab in am and 0.5 tab in pm)\par -Enrolled in Remote Monitoring services and transmitting successfully. Last remote transmission received on 12/23/2021. \par - Return in 9 months. He knows to call the office if he needs to return sooner. \par \par

## 2022-02-03 NOTE — PROCEDURE
[Complete Heart Block] : complete heart block [ICD] : Implantable cardioverter-defibrillator [DDDR] : DDDR [Charge Time: ___ sec] : charge time was [unfilled] seconds [Normal] : The battery status is normal. [Threshold Testing Performed] : Threshold testing was performed [Programmed for Longevity] : output reprogrammed for improved battery longevity [Counters Reset] : the counters were reset [Apace-Vpace ___ %] : Apace-Vpace [unfilled]% [Longevity: ___ months] : The estimated remaining battery life is [unfilled] months [Sensing Amplitude ___mv] : sensing amplitude was [unfilled] mv [___ ms] : [unfilled] ms [Lead Imp:  ___ohms] : lead impedance was [unfilled] ohms [___V @] : [unfilled] V [See Scanned Paceart Report] : See scanned paceart report [de-identified] : [facility]  [de-identified] : [id number] [de-identified] : [id number] [de-identified] : 9/5/2019 [de-identified] : 60 [de-identified] : No episodes. \par NO events \par No pulmonary congestion. AP 99%,  6.8 %

## 2022-03-02 NOTE — DISCHARGE NOTE ADULT - DISCHARGE DATE
25-Feb-2017 3 year-old boy, no PMH, immunizations UTD, BIB mother for transient increased work of breathing earlier this evening, with occasional cough, nasal congestion, fever to 100.8 F.  She denies any other symptoms.  No vomiting/diarrhea/rash/urinary problems.  She says he is teething.  No sick contacts or recent travel.

## 2022-05-05 ENCOUNTER — NON-APPOINTMENT (OUTPATIENT)
Age: 52
End: 2022-05-05

## 2022-05-05 ENCOUNTER — APPOINTMENT (OUTPATIENT)
Dept: CARDIOLOGY | Facility: CLINIC | Age: 52
End: 2022-05-05

## 2022-05-10 NOTE — PATIENT PROFILE ADULT - NSPROMUTPARTICIPCAREFT_GEN_A_NUR
Patient needs to R/s she forgot about this apt and has a treatment tomorrow she was in a car on speaker phone could not hear her good. I told her we would R/S and get back with her. Thank you    none

## 2022-05-30 NOTE — PROGRESS NOTE ADULT - I WAS PHYSICALLY PRESENT FOR THE KEY PORTIONS OF THE EVALUATION AND MANAGEMENT (E/M) SERVICE PROVIDED.  I AGREE WITH THE ABOVE HISTORY, PHYSICAL, AND PLAN WHICH I HAVE REVIEWED AND EDITED WHERE APPROPRIATE
Statement Selected
[Mild] : mild swelling of lateral foot
[NL (40)] : plantar flexion 40 degrees
Statement Selected
[NL 30)] : inversion 30 degrees
[NL (20)] : eversion 20 degrees
[5___] : inversion 5[unfilled]/5
[4___] : eversion 4[unfilled]/5
[2+] : posterior tibialis pulse: 2+
[Left] : left foot
[There are no fractures, subluxations or dislocations. No significant abnormalities are seen] : There are no fractures, subluxations or dislocations. No significant abnormalities are seen
[] : not able to perform single heel raise
[de-identified] : p

## 2022-06-03 NOTE — ED ADULT NURSE NOTE - BREATH SOUNDS, MLM
Per wife pt is supposed to take metformin for his diabetes but doesn't because he doesn't believe he has it   Pt states, "I feel fine "      Nicholas Beavers, SANDEE  06/02/22 [address]

## 2022-06-03 NOTE — ED ADULT NURSE NOTE - NSFALLRSKASSESSDT_ED_ALL_ED
Septic shock  could be infection/volume depletion/3rd spacing    Plan:     Continue Midodrine 10 mg t i d    Continue antibiotic cefdinir and Flagyl for 1 more day   Continue vancomycin capsule 125 mg twice a day through 6/9        12-Apr-2021 20:48

## 2022-06-19 ENCOUNTER — INPATIENT (INPATIENT)
Facility: HOSPITAL | Age: 52
LOS: 0 days | Discharge: HOME | End: 2022-06-20
Attending: INTERNAL MEDICINE | Admitting: INTERNAL MEDICINE
Payer: MEDICAID

## 2022-06-19 VITALS
WEIGHT: 181 LBS | RESPIRATION RATE: 18 BRPM | OXYGEN SATURATION: 96 % | HEIGHT: 66 IN | SYSTOLIC BLOOD PRESSURE: 115 MMHG | HEART RATE: 70 BPM | DIASTOLIC BLOOD PRESSURE: 61 MMHG | TEMPERATURE: 97 F

## 2022-06-19 VITALS
OXYGEN SATURATION: 100 % | RESPIRATION RATE: 17 BRPM | DIASTOLIC BLOOD PRESSURE: 52 MMHG | HEART RATE: 70 BPM | TEMPERATURE: 97 F | SYSTOLIC BLOOD PRESSURE: 96 MMHG

## 2022-06-19 DIAGNOSIS — T82.120A DISPLACEMENT OF CARDIAC ELECTRODE, INITIAL ENCOUNTER: Chronic | ICD-10-CM

## 2022-06-19 DIAGNOSIS — Z98.890 OTHER SPECIFIED POSTPROCEDURAL STATES: Chronic | ICD-10-CM

## 2022-06-19 DIAGNOSIS — Z96.649 PRESENCE OF UNSPECIFIED ARTIFICIAL HIP JOINT: Chronic | ICD-10-CM

## 2022-06-19 LAB
ALBUMIN SERPL ELPH-MCNC: 4.4 G/DL — SIGNIFICANT CHANGE UP (ref 3.5–5.2)
ALP SERPL-CCNC: 115 U/L — SIGNIFICANT CHANGE UP (ref 30–115)
ALT FLD-CCNC: 28 U/L — SIGNIFICANT CHANGE UP (ref 0–41)
ANION GAP SERPL CALC-SCNC: 10 MMOL/L — SIGNIFICANT CHANGE UP (ref 7–14)
ANION GAP SERPL CALC-SCNC: 9 MMOL/L — SIGNIFICANT CHANGE UP (ref 7–14)
AST SERPL-CCNC: 44 U/L — HIGH (ref 0–41)
BASOPHILS # BLD AUTO: 0.08 K/UL — SIGNIFICANT CHANGE UP (ref 0–0.2)
BASOPHILS NFR BLD AUTO: 1.5 % — HIGH (ref 0–1)
BILIRUB SERPL-MCNC: <0.2 MG/DL — SIGNIFICANT CHANGE UP (ref 0.2–1.2)
BUN SERPL-MCNC: 14 MG/DL — SIGNIFICANT CHANGE UP (ref 10–20)
BUN SERPL-MCNC: 14 MG/DL — SIGNIFICANT CHANGE UP (ref 10–20)
CALCIUM SERPL-MCNC: 10.2 MG/DL — HIGH (ref 8.5–10.1)
CALCIUM SERPL-MCNC: 9.9 MG/DL — SIGNIFICANT CHANGE UP (ref 8.5–10.1)
CHLORIDE SERPL-SCNC: 100 MMOL/L — SIGNIFICANT CHANGE UP (ref 98–110)
CHLORIDE SERPL-SCNC: 103 MMOL/L — SIGNIFICANT CHANGE UP (ref 98–110)
CO2 SERPL-SCNC: 28 MMOL/L — SIGNIFICANT CHANGE UP (ref 17–32)
CO2 SERPL-SCNC: 29 MMOL/L — SIGNIFICANT CHANGE UP (ref 17–32)
CREAT SERPL-MCNC: 0.9 MG/DL — SIGNIFICANT CHANGE UP (ref 0.7–1.5)
CREAT SERPL-MCNC: 0.9 MG/DL — SIGNIFICANT CHANGE UP (ref 0.7–1.5)
EGFR: 105 ML/MIN/1.73M2 — SIGNIFICANT CHANGE UP
EGFR: 105 ML/MIN/1.73M2 — SIGNIFICANT CHANGE UP
EOSINOPHIL # BLD AUTO: 0.11 K/UL — SIGNIFICANT CHANGE UP (ref 0–0.7)
EOSINOPHIL NFR BLD AUTO: 2.1 % — SIGNIFICANT CHANGE UP (ref 0–8)
GLUCOSE SERPL-MCNC: 102 MG/DL — HIGH (ref 70–99)
GLUCOSE SERPL-MCNC: 107 MG/DL — HIGH (ref 70–99)
HCT VFR BLD CALC: 36.8 % — LOW (ref 42–52)
HGB BLD-MCNC: 12.5 G/DL — LOW (ref 14–18)
IMM GRANULOCYTES NFR BLD AUTO: 0.6 % — HIGH (ref 0.1–0.3)
LYMPHOCYTES # BLD AUTO: 1.96 K/UL — SIGNIFICANT CHANGE UP (ref 1.2–3.4)
LYMPHOCYTES # BLD AUTO: 37.5 % — SIGNIFICANT CHANGE UP (ref 20.5–51.1)
MAGNESIUM SERPL-MCNC: 2.2 MG/DL — SIGNIFICANT CHANGE UP (ref 1.8–2.4)
MCHC RBC-ENTMCNC: 30.6 PG — SIGNIFICANT CHANGE UP (ref 27–31)
MCHC RBC-ENTMCNC: 34 G/DL — SIGNIFICANT CHANGE UP (ref 32–37)
MCV RBC AUTO: 90.2 FL — SIGNIFICANT CHANGE UP (ref 80–94)
MONOCYTES # BLD AUTO: 0.58 K/UL — SIGNIFICANT CHANGE UP (ref 0.1–0.6)
MONOCYTES NFR BLD AUTO: 11.1 % — HIGH (ref 1.7–9.3)
NEUTROPHILS # BLD AUTO: 2.47 K/UL — SIGNIFICANT CHANGE UP (ref 1.4–6.5)
NEUTROPHILS NFR BLD AUTO: 47.2 % — SIGNIFICANT CHANGE UP (ref 42.2–75.2)
NRBC # BLD: 0 /100 WBCS — SIGNIFICANT CHANGE UP (ref 0–0)
NT-PROBNP SERPL-SCNC: 37 PG/ML — SIGNIFICANT CHANGE UP (ref 0–300)
PLATELET # BLD AUTO: 223 K/UL — SIGNIFICANT CHANGE UP (ref 130–400)
POTASSIUM SERPL-MCNC: 4.1 MMOL/L — SIGNIFICANT CHANGE UP (ref 3.5–5)
POTASSIUM SERPL-MCNC: 5.6 MMOL/L — HIGH (ref 3.5–5)
POTASSIUM SERPL-SCNC: 4.1 MMOL/L — SIGNIFICANT CHANGE UP (ref 3.5–5)
POTASSIUM SERPL-SCNC: 5.6 MMOL/L — HIGH (ref 3.5–5)
PROT SERPL-MCNC: 6.7 G/DL — SIGNIFICANT CHANGE UP (ref 6–8)
RBC # BLD: 4.08 M/UL — LOW (ref 4.7–6.1)
RBC # FLD: 13 % — SIGNIFICANT CHANGE UP (ref 11.5–14.5)
SARS-COV-2 RNA SPEC QL NAA+PROBE: SIGNIFICANT CHANGE UP
SODIUM SERPL-SCNC: 137 MMOL/L — SIGNIFICANT CHANGE UP (ref 135–146)
SODIUM SERPL-SCNC: 142 MMOL/L — SIGNIFICANT CHANGE UP (ref 135–146)
TROPONIN T SERPL-MCNC: <0.01 NG/ML — SIGNIFICANT CHANGE UP
WBC # BLD: 5.23 K/UL — SIGNIFICANT CHANGE UP (ref 4.8–10.8)
WBC # FLD AUTO: 5.23 K/UL — SIGNIFICANT CHANGE UP (ref 4.8–10.8)

## 2022-06-19 PROCEDURE — 93283 PRGRMG EVAL IMPLANTABLE DFB: CPT | Mod: 26

## 2022-06-19 PROCEDURE — 71045 X-RAY EXAM CHEST 1 VIEW: CPT | Mod: 26

## 2022-06-19 PROCEDURE — 93010 ELECTROCARDIOGRAM REPORT: CPT

## 2022-06-19 PROCEDURE — 99285 EMERGENCY DEPT VISIT HI MDM: CPT

## 2022-06-19 PROCEDURE — 99222 1ST HOSP IP/OBS MODERATE 55: CPT

## 2022-06-19 RX ORDER — FUROSEMIDE 40 MG
40 TABLET ORAL DAILY
Refills: 0 | Status: DISCONTINUED | OUTPATIENT
Start: 2022-06-19 | End: 2022-06-20

## 2022-06-19 RX ORDER — BACLOFEN 100 %
10 POWDER (GRAM) MISCELLANEOUS THREE TIMES A DAY
Refills: 0 | Status: DISCONTINUED | OUTPATIENT
Start: 2022-06-19 | End: 2022-06-20

## 2022-06-19 RX ORDER — QUETIAPINE FUMARATE 200 MG/1
1.5 TABLET, FILM COATED ORAL
Qty: 0 | Refills: 0 | DISCHARGE

## 2022-06-19 RX ORDER — TRAMADOL HYDROCHLORIDE 50 MG/1
25 TABLET ORAL EVERY 8 HOURS
Refills: 0 | Status: DISCONTINUED | OUTPATIENT
Start: 2022-06-19 | End: 2022-06-20

## 2022-06-19 RX ORDER — TAMSULOSIN HYDROCHLORIDE 0.4 MG/1
0.4 CAPSULE ORAL AT BEDTIME
Refills: 0 | Status: DISCONTINUED | OUTPATIENT
Start: 2022-06-19 | End: 2022-06-20

## 2022-06-19 RX ORDER — SERTRALINE 25 MG/1
50 TABLET, FILM COATED ORAL DAILY
Refills: 0 | Status: DISCONTINUED | OUTPATIENT
Start: 2022-06-19 | End: 2022-06-20

## 2022-06-19 RX ORDER — APIXABAN 2.5 MG/1
5 TABLET, FILM COATED ORAL EVERY 12 HOURS
Refills: 0 | Status: DISCONTINUED | OUTPATIENT
Start: 2022-06-19 | End: 2022-06-20

## 2022-06-19 RX ORDER — LAMOTRIGINE 25 MG/1
200 TABLET, ORALLY DISINTEGRATING ORAL
Refills: 0 | Status: DISCONTINUED | OUTPATIENT
Start: 2022-06-19 | End: 2022-06-20

## 2022-06-19 RX ORDER — LIDOCAINE 4 G/100G
1 CREAM TOPICAL DAILY
Refills: 0 | Status: DISCONTINUED | OUTPATIENT
Start: 2022-06-19 | End: 2022-06-20

## 2022-06-19 RX ORDER — FUROSEMIDE 40 MG
1 TABLET ORAL
Qty: 0 | Refills: 0 | DISCHARGE

## 2022-06-19 RX ORDER — ACETAMINOPHEN 500 MG
650 TABLET ORAL EVERY 6 HOURS
Refills: 0 | Status: DISCONTINUED | OUTPATIENT
Start: 2022-06-19 | End: 2022-06-20

## 2022-06-19 RX ORDER — METOPROLOL TARTRATE 50 MG
50 TABLET ORAL EVERY 12 HOURS
Refills: 0 | Status: DISCONTINUED | OUTPATIENT
Start: 2022-06-19 | End: 2022-06-20

## 2022-06-19 RX ORDER — ASPIRIN/CALCIUM CARB/MAGNESIUM 324 MG
81 TABLET ORAL DAILY
Refills: 0 | Status: DISCONTINUED | OUTPATIENT
Start: 2022-06-19 | End: 2022-06-20

## 2022-06-19 RX ORDER — CHLORHEXIDINE GLUCONATE 213 G/1000ML
1 SOLUTION TOPICAL
Refills: 0 | Status: DISCONTINUED | OUTPATIENT
Start: 2022-06-19 | End: 2022-06-20

## 2022-06-19 RX ORDER — QUETIAPINE FUMARATE 200 MG/1
25 TABLET, FILM COATED ORAL
Refills: 0 | Status: DISCONTINUED | OUTPATIENT
Start: 2022-06-19 | End: 2022-06-20

## 2022-06-19 RX ORDER — ATORVASTATIN CALCIUM 80 MG/1
40 TABLET, FILM COATED ORAL AT BEDTIME
Refills: 0 | Status: DISCONTINUED | OUTPATIENT
Start: 2022-06-19 | End: 2022-06-20

## 2022-06-19 RX ORDER — TIOTROPIUM BROMIDE 18 UG/1
1 CAPSULE ORAL; RESPIRATORY (INHALATION) DAILY
Refills: 0 | Status: DISCONTINUED | OUTPATIENT
Start: 2022-06-19 | End: 2022-06-20

## 2022-06-19 RX ORDER — TIOTROPIUM BROMIDE 18 UG/1
1 CAPSULE ORAL; RESPIRATORY (INHALATION)
Qty: 0 | Refills: 0 | DISCHARGE

## 2022-06-19 RX ORDER — QUETIAPINE FUMARATE 200 MG/1
1 TABLET, FILM COATED ORAL
Qty: 0 | Refills: 0 | DISCHARGE

## 2022-06-19 RX ORDER — GABAPENTIN 400 MG/1
400 CAPSULE ORAL THREE TIMES A DAY
Refills: 0 | Status: DISCONTINUED | OUTPATIENT
Start: 2022-06-19 | End: 2022-06-20

## 2022-06-19 RX ORDER — QUETIAPINE FUMARATE 200 MG/1
50 TABLET, FILM COATED ORAL
Refills: 0 | Status: DISCONTINUED | OUTPATIENT
Start: 2022-06-19 | End: 2022-06-20

## 2022-06-19 RX ORDER — PANTOPRAZOLE SODIUM 20 MG/1
40 TABLET, DELAYED RELEASE ORAL
Refills: 0 | Status: DISCONTINUED | OUTPATIENT
Start: 2022-06-19 | End: 2022-06-20

## 2022-06-19 RX ORDER — IPRATROPIUM/ALBUTEROL SULFATE 18-103MCG
3 AEROSOL WITH ADAPTER (GRAM) INHALATION EVERY 6 HOURS
Refills: 0 | Status: DISCONTINUED | OUTPATIENT
Start: 2022-06-19 | End: 2022-06-20

## 2022-06-19 RX ORDER — FOLIC ACID 0.8 MG
1 TABLET ORAL DAILY
Refills: 0 | Status: DISCONTINUED | OUTPATIENT
Start: 2022-06-19 | End: 2022-06-20

## 2022-06-19 RX ADMIN — TRAMADOL HYDROCHLORIDE 25 MILLIGRAM(S): 50 TABLET ORAL at 15:23

## 2022-06-19 RX ADMIN — TIOTROPIUM BROMIDE 1 CAPSULE(S): 18 CAPSULE ORAL; RESPIRATORY (INHALATION) at 14:22

## 2022-06-19 RX ADMIN — TAMSULOSIN HYDROCHLORIDE 0.4 MILLIGRAM(S): 0.4 CAPSULE ORAL at 22:00

## 2022-06-19 RX ADMIN — APIXABAN 5 MILLIGRAM(S): 2.5 TABLET, FILM COATED ORAL at 17:25

## 2022-06-19 RX ADMIN — LIDOCAINE 1 PATCH: 4 CREAM TOPICAL at 22:01

## 2022-06-19 RX ADMIN — LAMOTRIGINE 200 MILLIGRAM(S): 25 TABLET, ORALLY DISINTEGRATING ORAL at 17:24

## 2022-06-19 RX ADMIN — TRAMADOL HYDROCHLORIDE 25 MILLIGRAM(S): 50 TABLET ORAL at 16:10

## 2022-06-19 RX ADMIN — QUETIAPINE FUMARATE 50 MILLIGRAM(S): 200 TABLET, FILM COATED ORAL at 22:00

## 2022-06-19 RX ADMIN — Medication 50 MILLIGRAM(S): at 17:25

## 2022-06-19 RX ADMIN — ATORVASTATIN CALCIUM 40 MILLIGRAM(S): 80 TABLET, FILM COATED ORAL at 22:00

## 2022-06-19 RX ADMIN — GABAPENTIN 400 MILLIGRAM(S): 400 CAPSULE ORAL at 14:21

## 2022-06-19 RX ADMIN — Medication 1 MILLIGRAM(S): at 14:22

## 2022-06-19 RX ADMIN — GABAPENTIN 400 MILLIGRAM(S): 400 CAPSULE ORAL at 22:00

## 2022-06-19 RX ADMIN — Medication 10 MILLIGRAM(S): at 22:00

## 2022-06-19 RX ADMIN — Medication 10 MILLIGRAM(S): at 14:22

## 2022-06-19 RX ADMIN — Medication 81 MILLIGRAM(S): at 14:22

## 2022-06-19 RX ADMIN — SERTRALINE 50 MILLIGRAM(S): 25 TABLET, FILM COATED ORAL at 14:22

## 2022-06-19 RX ADMIN — Medication 40 MILLIGRAM(S): at 14:22

## 2022-06-19 NOTE — H&P ADULT - HISTORY OF PRESENT ILLNESS
History of Present Illness  Mr. 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 Confirmed with EEG. Follows at [facility]. Home med Lamotrigene 200mg BID  - Relapsing Remitting MS and Hip Replacement. Rolling walker dependent. Follows at [facility]. Home meds Tecfidera 240mg BID, Baclofen 10mg TID, GP 400mg TID  - COPD not on home O2  - Arrhythmogenic RV Dysplasia and Paroxysmal Atrial Fibrillation with History of Vtach and cardiac arrest in 2016. Had a PPM placed in 2001 s/p PPM/DC ICD SJM in 2006  - HFpEF and History of Pericardial Effusion. TTE 02/22/2021 EF 55-60%, mild TR  - CAD/Dyslipidemia. No Stents. Used to follow with [facility], Currently following at [place]  - History of TIA  - History of Left Testicular Cancer S/P Orchiectomy  - History of Right sided hernia repair 1972,1991  - DM II. Hba1c 2019: 5.2  - HTN  - GERD  - BPH      He presented to the ED on 06/19 for evaluation of left chest discomfort x2 days.  History goes back to 2 days PTP when the patient started complaining of intermittent episodes of left sided chest discomfort described as "tingling" and occurring around 6 times per days for the last 2 days.  Episodes are brief and last for a few seconds.  They are associated with some palpitations and light headedness on standing up in the absence of diaphoresis or pre/syncope.  Of note, patient reports similar previous episodes and thinks the sensation is due to an issue with the PPM and not the ICD firing.      On review of systems, patient denies shortness of breath, leg swelling, any recent fever, chills, night sweats, URTI symptoms (cough, rhinorrhea, sore throat), urinary symptoms (urinary frequency, urgency, intermittence, dysuria, foul smelling urine, cloudy urine), change in bowel movements (diarrhea or constipation), abdominal pain, headache, nausea, or vomiting.   No sick contacts.   No recent travel or exposure to recent travelers.      Upon presentation to the ED, the patient was hemodynamically stable:  Vital Signs in ED   - /61mmHg  - HR 70 bpm  - RR 18 bm  - T 36.3 degrees  - SaO2 96% on RA    Location: [facility] ([facility])  Patient Name: CUATE MIGUEL  Age: 51y  Gender: Male          Investigations   Laboratory Workup  - CBC:                        12.5   5.23  )-----------( 223      ( 19 Jun 2022 04:42 )             36.8     - Chemistry:  06-19    137  |  100  |  14  ----------------------------<  102<H>  5.6<H>   |  28  |  0.9    Ca    9.9      19 Jun 2022 04:42  Mg     2.2     06-19    TPro  6.7  /  Alb  4.4  /  TBili  <0.2  /  DBili  x   /  AST  44<H>  /  ALT  28  /  AlkPhos  115  06-19    - Cardiac Markers:  CARDIAC MARKERS ( 19 Jun 2022 04:42 )  x     / <0.01 ng/mL / x     / x     / x          ECG  - RBBB, atrial pacing      - Patient was evaluated by EP team in ED  - He is s/p ICD interrogation: functioning normally with no events since 05/02  - He will be admitted for further investigations, management, and tele monitoring

## 2022-06-19 NOTE — ED ADULT NURSE NOTE - OBJECTIVE STATEMENT
pt has pacemaker/dfib and states that he can feel his pace maker pacing him. pt states he is not being shocked. pt called his cardiologist and was told to come to ed for evaluation.

## 2022-06-19 NOTE — H&P ADULT - ATTENDING COMMENTS
# uncomfortable sensation in chest   patient perceiving it as palpitation and sense of pace maker current  PPM interrogation- no events; functioning normally  consult patient's cardiac EP- Dr allen  troponin X2- neg    #  hyperkalemia  monitor BMP    # COPD  # MS  # Seizures  # Schizoaffective disorder  continue home medications    Pending: cardiac EP consult, BMP monitoring

## 2022-06-19 NOTE — ED PROVIDER NOTE - ATTENDING CONTRIBUTION TO CARE
52 yo M with PMH Arrhythmogenic RV Dysplasia and Paroxysmal Atrial Fibrillation with History of Vtach and cardiac arrest in 2016. Had a PPM placed in 2001 s/p PPM/DC ICD SJM in 2006 Presents to ED because he feels like his pacemaker is tingling his left heart.  He states that he feels like some electrical shocks.  No recent for relation.  He called his EP doctor who told him to come to the emergency department for interrogation.  No fever chills shortness of breath abdominal pain nausea vomiting.    Const: NAD  Eyes: PERRL, no conjunctival injection  HENT:  Neck supple without meningismus   CV: RRR, Warm, well-perfused extremities  RESP: CTA B/L, no tachypnea   GI: soft, non-tender, non-distended  MSK: No gross deformities appreciated, pacemaker in place, no erythema  Skin: Warm, dry. No rashes  Neuro: Alert, CNs II-XII grossly intact. Sensation and motor function of extremities grossly intact.  Psych: Appropriate mood and affect.    will call EP

## 2022-06-19 NOTE — CHART NOTE - NSCHARTNOTEFT_GEN_A_CORE
Electrophysiology    Pts device __DC ICD (SJM)____ was interrogated on 06-19-22  Device working properly  Events: no events since last check 5/2/22  Preliminary results d/w with primary team  Awaiting / Reviewed by attending    Contact EP ACP with any questions [phone]

## 2022-06-19 NOTE — ED ADULT TRIAGE NOTE - CHIEF COMPLAINT QUOTE
I've been feeling palpitations on and doff since Thursday, and I feel like my Pacemaker is firing, I can feel it like a TENS machine. I called the doctor and he said he will come tomorrow to interrogate it - patient   Patient from [place], called Deisy who called MARLEN , Patient states it is not his Defibrillator shocking him, but the pacemacker

## 2022-06-19 NOTE — ED PROVIDER NOTE - CLINICAL SUMMARY MEDICAL DECISION MAKING FREE TEXT BOX
51-year-old male presented to ED for concern for pad pacemaker malfunction.  Patient had device interrogated in the emergency department without any events.  Patient admitted for further evaluation and management.

## 2022-06-19 NOTE — ED PROVIDER NOTE - EMPLOYMENT
Disabled Dupixent Pregnancy And Lactation Text: This medication likely crosses the placenta but the risk for the fetus is uncertain. This medication is excreted in breast milk.

## 2022-06-19 NOTE — ED PROVIDER NOTE - NS ED ROS FT
Constitutional:  No fever, chills,   Eyes:  No eye pain or visual changes  ENMT: No nasal discharge, no sore throat. No neck pain or stiffness  Cardiac:  +Per HPI  Respiratory:  No cough or respiratory distress  GI:  No nausea, vomiting, diarrhea or abdominal pain  :  No dysuria, frequency, or hematuria  MS:  No back or joint pain  Neuro:  No headache. No numbness, weakness, or tingling  Skin:  No skin rash or pruritus.   Except as documented in the HPI,  all other systems are negative

## 2022-06-19 NOTE — ED ADULT NURSE NOTE - CHIEF COMPLAINT QUOTE
I've been feeling palpitations on and doff since Thursday, and I feel like my Pacemaker is firing, I can feel it like a TENS machine. I called the doctor and he said he will come tomorrow to interrogate it - patient   Patient from [place], called Deisy who called MARLEN , Patient states it is not his Defibrillator shocking him, but the pacemacker

## 2022-06-19 NOTE — ED PROVIDER NOTE - PHYSICAL EXAMINATION
VITAL SIGNS: I have reviewed nursing notes and confirm.  CONSTITUTIONAL: Well-appearing, non-toxic, in NAD  SKIN: Warm dry, normal skin turgor  HEAD: NCAT  EYES: No conjunctival injection, scleral anicteric  ENT: Moist mucous membranes, normal pharynx with no erythema or exudates  NECK: Supple; full ROM. Nontender. No cervical LAD  CARD: RRR, no murmurs, rubs or gallops  RESP: Clear to ausculation bilaterally.  No rales, rhonchi, or wheezing.  ABD: Soft, non-distended, non-tender, no rebound or guarding. No CVA tenderness  EXT: Full ROM, no bony tenderness, no pedal edema, no calf tenderness  NEURO: Normal motor, normal sensory. CN II-XII grossly intact. Cerebellar testing normal. Normal gait.  PSYCH: Cooperative, appropriate.

## 2022-06-19 NOTE — ED PROVIDER NOTE - OBJECTIVE STATEMENT
52yo male pmhx of paroxysmal atrial fibrillation s/p several ablations on Eliquis, CHF, PPM/AICD, CAD, CVA, COPD, DM, HTN, HLD , GERD, bipolar, and schizophrenia, presenting with intermittent chest discomfort x2 days described as a "tingling" sensation that he attributes to his pacemaker firing, and is similar to a sensation of his pacemaker firing in the past. This occurs for a few seconds before resolving, has occurred about 12 times in the past couple days, around 4 times last night, prompting him to visit the ED. No associated palpiation or pain radiating to the neck or arms. No recent AP/V/D or any other complaints at this time.

## 2022-06-19 NOTE — H&P ADULT - ASSESSMENT
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 Currently hemodynamically stable.      Left Chest Discomfort  Likely Related to Arrhythmogenic RV Dysplasia/Paroxysmal Atrial Fibrillation  Rule Out ACS in Setting of History of CAD- Less Likely  * History of CAD with No Stents; Used to follow with Adam Ayoub; Currently following at [place]  * History of Vtach and cardiac arrest in 2016  * Had a PPM placed in 2001 s/p PPM/DC ICD SJM in 2006  * Home meds Aspirin, Atorvastatin 40mg QD, Eliquis 5mg BID, Toprol 50mg BID  * ED HR 70bpm  * Troponin T, Serum (06.19.22 @ 04:42)    Troponin T, Serum: <0.01: Hemolyzed. Interpret with caution ng/mL  * Lipid Profile (08.30.19 @ 07:40)    Total Cholesterol/HDL Ratio Measurement: 3.6 Ratio    Cholesterol, Serum: 142 mg/dL    Triglycerides, Serum: 208 mg/dL    HDL Cholesterol, Serum: 39  * ED ECG atrial pacing, RBBB    - EP Team on board  - Status post ICD interrogation 06/19: no events since 05/02/2022  - Monitor HR  - Monitor tele  - Resume home Toprol 50mg BID  - Resume home Eliquis 5mg BID  - Resume home Aspirin 81mg QD and Atorvastatin 40mg QD. 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  * Confirmed with EEG  * Follows at [facility]  * Home med Lamotrigene 200mg BID  - Outpatient follow up with neurologist at [facility]  - Resume home Lamotrigene 200mg BID  - Check lamotrigene level  - Monitor for recurrence: last episode was 2 months ago per patient      Relapsing Remitting MS   Hip Replacement  * Rolling walker dependent  * Follows at [facility]  * Home meds Tecfidera 240mg BID, Baclofen 10mg TID, GP 400mg TID    - Outpatient follow up with neurologist at [facility]  - Resume Tecfidera 240mg BID, Baclofen 10mg TID, GP 400mg TID  - PT/OT evaluation      COPD - Not in Exacerbation  * Not on home O2  * Home med Spiriva only  * No wheezing on exam; ex smoker  - Monitor SaO2 on RA  - Resume Spiriva  - Duonebs PRN      HFpEF - Compensated  History of Pericardial Effusion  * TTE 02/22/2021 EF 55-60%, mild TR  * PE no crackles or volume overload  * Home med Lasix 40mg PO QD    - Monitor in/out  - Daily weight (standing)  - Monitor SaO2 and O2 requirements  - Resume home Lasix 40mg QD PO  - Monitor telemetry   - Trend electrolytes and keep K>4 and Mg>2  - Check TTE  - Check Iron Studies      History of TIA  Dyslipidemia  * Home meds Aspirin 81mg QD, Atorvastatin 40mg QD  * Lipid Profile (08.30.19 @ 07:40)    Total Cholesterol/HDL Ratio Measurement: 3.6 Ratio    Cholesterol, Serum: 142 mg/dL    Triglycerides, Serum: 208 mg/dL    HDL Cholesterol, Serum: 39  - Check lipid profile  - Resume Aspirin 81mg QD and Atorvastatin 40mg QD      DM II  * Hba1c 2019: 5.2  * Not on home meds  - Check Hba1c  - Monitor POCT premeals and at bedtime  - No indication for insulin for now      HTN  * Home med Toprol 50mg BID  * ED /61mmHg  - Monitor BP  - Resume Toprol 50mg BID      GERD  * Home med Protonix 40mg QD  - Resume Protonix 40mg QD      BPH  * Home med Tamsulosin 0.4mg QD  - Resume Tamsulosin 0.4mg QD      History of Left Testicular Cancer S/P Orchiectomy  History of Right sided hernia repair 1972,1991  - Outpatient follow up as indicated      Others  - DVT Prophylaxis: Lovenox 40mg Subcutaneously daily  - GI Prophylaxis: Pantoprazole 40mg PO QD  - Diet: DASH/ TLC  - Code Status: Full       Barriers to learning: NO  Discharge Planning: Patient will be discharged once stable   Plan was communicated with patient and medical team       Rosaura Montelongo MD  PGY - 2 Internal Medicine   [facility]   Assessment and Plan  Case of a 51 year old male patient with multiple comorbidities including Relapsing Remitting MS, 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 Currently hemodynamically stable.      Left Chest Discomfort  Likely Related to Arrhythmogenic RV Dysplasia/Paroxysmal Atrial Fibrillation  Rule Out ACS in Setting of History of CAD- Less Likely  * History of CAD with No Stents; Used to follow with Adam Ayoub; Currently following at [place]  * History of Vtach and cardiac arrest in 2016  * Had a PPM placed in 2001 s/p PPM/DC ICD SJM in 2006  * Home meds Aspirin, Atorvastatin 40mg QD, Eliquis 5mg BID, Toprol 50mg BID  * ED HR 70bpm  * Troponin T, Serum (06.19.22 @ 04:42)    Troponin T, Serum: <0.01: Hemolyzed. Interpret with caution ng/mL  * Lipid Profile (08.30.19 @ 07:40)    Total Cholesterol/HDL Ratio Measurement: 3.6 Ratio    Cholesterol, Serum: 142 mg/dL    Triglycerides, Serum: 208 mg/dL    HDL Cholesterol, Serum: 39  * ED ECG atrial pacing, RBBB    - EP Team on board  - Status post ICD interrogation 06/19: no events since 05/02/2022  - Monitor HR  - Monitor tele  - Resume home Toprol 50mg BID  - Resume home Eliquis 5mg BID  - Resume home Aspirin 81mg QD and Atorvastatin 40mg QD. 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  * Confirmed with EEG  * Follows at [facility]  * Home med Lamotrigene 200mg BID  - Outpatient follow up with neurologist at [facility]  - Resume home Lamotrigene 200mg BID  - Check lamotrigene level  - Monitor for recurrence: last episode was 2 months ago per patient      Relapsing Remitting MS   Hip Replacement  * Rolling walker dependent  * Follows at [facility]  * Home meds Tecfidera 240mg BID, Baclofen 10mg TID, GP 400mg TID    - Outpatient follow up with neurologist at [facility]  - Resume Tecfidera 240mg BID, Baclofen 10mg TID, GP 400mg TID  - PT/OT evaluation      COPD - Not in Exacerbation  * Not on home O2  * Home med Spiriva only  * No wheezing on exam; ex smoker  - Monitor SaO2 on RA  - Resume Spiriva  - Duonebs PRN      HFpEF - Compensated  History of Pericardial Effusion  * TTE 02/22/2021 EF 55-60%, mild TR  * PE no crackles or volume overload  * Home med Lasix 40mg PO QD    - Monitor in/out  - Daily weight (standing)  - Monitor SaO2 and O2 requirements  - Resume home Lasix 40mg QD PO  - Resume home Toprol 50mg BID  - Monitor telemetry   - Trend electrolytes and keep K>4 and Mg>2  - Check TTE  - Check Iron Studies      History of TIA  Dyslipidemia  * Home meds Aspirin 81mg QD, Atorvastatin 40mg QD  * Lipid Profile (08.30.19 @ 07:40)    Total Cholesterol/HDL Ratio Measurement: 3.6 Ratio    Cholesterol, Serum: 142 mg/dL    Triglycerides, Serum: 208 mg/dL    HDL Cholesterol, Serum: 39  - Check lipid profile  - Resume Aspirin 81mg QD and Atorvastatin 40mg QD      DM II  * Hba1c 2019: 5.2  * Not on home meds  - Check Hba1c  - Monitor POCT premeals and at bedtime  - No indication for insulin for now      HTN  * Home med Toprol 50mg BID  * ED /61mmHg  - Monitor BP  - Resume Toprol 50mg BID  - Check orthostatics in setting of history of light headedness on standing up      GERD  * Home med Protonix 40mg QD  - Resume Protonix 40mg QD      BPH  * Home med Tamsulosin 0.4mg QD  - Resume Tamsulosin 0.4mg QD  - If orthostatics come back positive, consider switching tamsulosin      History of Left Testicular Cancer S/P Orchiectomy  History of Right sided hernia repair 1972,1991  - Outpatient follow up as indicated      Others  - DVT Prophylaxis: Lovenox 40mg Subcutaneously daily  - GI Prophylaxis: Pantoprazole 40mg PO QD  - Diet: DASH/ TLC  - Code Status: Full       Barriers to learning: NO  Discharge Planning: Patient will be discharged once stable   Plan was communicated with patient and medical team       Rosaura Montelongo MD  PGY - 2 Internal Medicine   [facility]   Assessment and Plan  Case of a 51 year old male patient with multiple comorbidities including Relapsing Remitting MS, 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 Currently hemodynamically stable.      Left Chest Discomfort  Likely Related to Arrhythmogenic RV Dysplasia/Paroxysmal Atrial Fibrillation  Rule Out ACS in Setting of History of CAD- Less Likely  * History of CAD with No Stents; Used to follow with Adam Ayoub; Currently following at [place]  * History of Vtach and cardiac arrest in 2016  * Had a PPM placed in 2001 s/p PPM/DC ICD SJM in 2006  * Home meds Aspirin, Atorvastatin 40mg QD, Eliquis 5mg BID, Toprol 50mg BID  * ED HR 70bpm  * Troponin T, Serum (06.19.22 @ 04:42)    Troponin T, Serum: <0.01: Hemolyzed. Interpret with caution ng/mL  * Lipid Profile (08.30.19 @ 07:40)    Total Cholesterol/HDL Ratio Measurement: 3.6 Ratio    Cholesterol, Serum: 142 mg/dL    Triglycerides, Serum: 208 mg/dL    HDL Cholesterol, Serum: 39  * ED ECG atrial pacing, RBBB    - EP Team on board  - Status post ICD interrogation 06/19: no events since 05/02/2022  - Monitor HR  - Monitor tele  - Resume home Toprol 50mg BID  - Resume home Eliquis 5mg BID  - Resume home Aspirin 81mg QD and Atorvastatin 40mg QD. 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  * Confirmed with EEG  * Follows at [facility]  * Home med Lamotrigene 200mg BID  - Outpatient follow up with neurologist at [facility]  - Resume home Lamotrigene 200mg BID  - Check lamotrigene level  - Monitor for recurrence: last episode was 2 months ago per patient      Relapsing Remitting MS   Hip Replacement  Chronic Back Pain  * Rolling walker dependent  * Follows at [facility]  * Home meds Tecfidera 240mg BID, Baclofen 10mg TID, GP 400mg TID    - Outpatient follow up with neurologist at [facility]  - Resume Tecfidera 240mg BID, Baclofen 10mg TID, GP 400mg TID  - Resume folic acid 1mg PO QD  - Resume home lidocaine patch  - PT/OT evaluation  - Check folate, B12, and TSH levels      COPD - Not in Exacerbation  * Not on home O2  * Home med Spiriva only  * No wheezing on exam; ex smoker  - Monitor SaO2 on RA  - Resume Spiriva  - Duonebs PRN      HFpEF - Compensated  History of Pericardial Effusion  * TTE 02/22/2021 EF 55-60%, mild TR  * PE no crackles or volume overload  * Home med Lasix 40mg PO QD    - Monitor in/out  - Daily weight (standing)  - Monitor SaO2 and O2 requirements  - Resume home Lasix 40mg QD PO  - Resume home Toprol 50mg BID  - Monitor telemetry   - Trend electrolytes and keep K>4 and Mg>2  - Check TTE  - Check Iron Studies      History of TIA  Dyslipidemia  * Home meds Aspirin 81mg QD, Atorvastatin 40mg QD  * Lipid Profile (08.30.19 @ 07:40)    Total Cholesterol/HDL Ratio Measurement: 3.6 Ratio    Cholesterol, Serum: 142 mg/dL    Triglycerides, Serum: 208 mg/dL    HDL Cholesterol, Serum: 39  - Check lipid profile  - Resume Aspirin 81mg QD and Atorvastatin 40mg QD      DM II  * Hba1c 2019: 5.2  * Not on home meds  - Check Hba1c  - Monitor POCT premeals and at bedtime  - No indication for insulin for now      HTN  * Home med Toprol 50mg BID  * ED /61mmHg  - Monitor BP  - Resume Toprol 50mg BID  - Check orthostatics in setting of history of light headedness on standing up      GERD  * Home med Protonix 40mg QD  - Resume Protonix 40mg QD      BPH  * Home med Tamsulosin 0.4mg QD  - Resume Tamsulosin 0.4mg QD  - If orthostatics come back positive, consider switching tamsulosin      History of Left Testicular Cancer S/P Orchiectomy  History of Right sided hernia repair 1972,1991  - Outpatient follow up as indicated      Others  - DVT Prophylaxis: Eliquis 5mg BID as above  - GI Prophylaxis: Pantoprazole 40mg PO QD  - Diet: DASH/ TLC  - Code Status: Full       Barriers to learning: NO  Discharge Planning: Patient will be discharged once stable   Plan was communicated with patient and medical team       Rosaura Montelongo MD  PGY - 2 Internal Medicine   [facility]

## 2022-06-20 ENCOUNTER — TRANSCRIPTION ENCOUNTER (OUTPATIENT)
Age: 52
End: 2022-06-20

## 2022-06-20 PROCEDURE — 99239 HOSP IP/OBS DSCHRG MGMT >30: CPT

## 2022-06-20 RX ADMIN — Medication 10 MILLIGRAM(S): at 13:27

## 2022-06-20 RX ADMIN — Medication 81 MILLIGRAM(S): at 12:29

## 2022-06-20 RX ADMIN — LIDOCAINE 1 PATCH: 4 CREAM TOPICAL at 13:31

## 2022-06-20 RX ADMIN — GABAPENTIN 400 MILLIGRAM(S): 400 CAPSULE ORAL at 13:28

## 2022-06-20 RX ADMIN — TRAMADOL HYDROCHLORIDE 25 MILLIGRAM(S): 50 TABLET ORAL at 13:31

## 2022-06-20 RX ADMIN — TRAMADOL HYDROCHLORIDE 25 MILLIGRAM(S): 50 TABLET ORAL at 10:18

## 2022-06-20 RX ADMIN — Medication 1 MILLIGRAM(S): at 12:29

## 2022-06-20 RX ADMIN — SERTRALINE 50 MILLIGRAM(S): 25 TABLET, FILM COATED ORAL at 13:26

## 2022-06-20 NOTE — DISCHARGE NOTE PROVIDER - NSDCCPCAREPLAN_GEN_ALL_CORE_FT
PRINCIPAL DISCHARGE DIAGNOSIS  Diagnosis: Chest pain  Assessment and Plan of Treatment: resolved, non-specific

## 2022-06-20 NOTE — DISCHARGE NOTE NURSING/CASE MANAGEMENT/SOCIAL WORK - PATIENT PORTAL LINK FT
You can access the FollowMyHealth Patient Portal offered by [facility] by registering at the following website: [email or web address]. By joining [facility]’s FollowMyHealth portal, you will also be able to view your health information using other applications (apps) compatible with our system.

## 2022-06-20 NOTE — DISCHARGE NOTE PROVIDER - CARE PROVIDER_API CALL
Servando Jarquin (MD)  Cardiac Electrophysiology; Cardiology; Internal Medicine  [address] [place] [address]  Phone: [phone]  Fax: [phone]  Follow Up Time: 2 weeks

## 2022-06-20 NOTE — PHYSICAL THERAPY INITIAL EVALUATION ADULT - PERTINENT HX OF CURRENT PROBLEM, REHAB EVAL
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

## 2022-06-20 NOTE — DISCHARGE NOTE NURSING/CASE MANAGEMENT/SOCIAL WORK - NSDCPEFALRISK_GEN_ALL_CORE
For information on Fall & Injury Prevention, visit: [email or web address] OR  [email or web address] OR  https://www.cdc.gov/steadi/patient.html

## 2022-06-20 NOTE — DISCHARGE NOTE PROVIDER - HOSPITAL COURSE
51 year old male with PMH  HTN, Paroxysmal AFIB s/p ablation, VTach s.p AICD, Bipolar disorder, Seizure, EGRD and BPH came to ED for chest discomfort, he felt palpiation, he was concern if his AICD fired, no SOB, no cough or fever. In the ED vital signs were stable, EKG showed paced rhythm, labs were stable, Troponin negative, EP interrogated his device and showed no event, he was admitted for observation,  serial troponin were negative. He remained stable, patient is ok for discharge home today.     PHYSICAL EXAM:  GENERAL: NAD, well-developed.  HEAD:  Atraumatic, Normocephalic.  EYES: EOMI, PERRLA, conjunctiva and sclera clear.  NECK: Supple, No JVD.  CHEST/LUNG: Clear to auscultation bilaterally; No wheeze.  HEART: Regular rate and rhythm; S1 S2.   ABDOMEN: Soft, Nontender, Nondistended; Bowel sounds present.  EXTREMITIES:  2+ Peripheral Pulses, No clubbing, cyanosis, or edema.  PSYCH: AAOx3.  NEUROLOGY: non-focal.  SKIN: No rashes or lesions.    A/P:   Palpitation and Chest discomfort, non-specific,   ACS ruled out, no arrhythmia, pacing rhythm.   Troponin x3 negative.   AICD interrogation: no event.     Paroxysmal Atrial Fibrillation:   Stable.   Continue Eliquis and Toprol.     History of multiple Sclerosis  Continue home medication.     COPD stable, continue Spiriva  Seizure disorder: stable  Chronic HFpEF: stable, euvolemic, continue Lasix and Metoprolol.

## 2022-06-20 NOTE — DISCHARGE NOTE PROVIDER - NSDCMRMEDTOKEN_GEN_ALL_CORE_FT
apixaban 5 mg oral tablet: 1 tab(s) orally every 12 hours  aspirin 81 mg oral tablet, chewable: 1 tab(s) orally once a day  atorvastatin 40 mg oral tablet: 1 tab(s) orally once a day (at bedtime)  baclofen 10 mg oral tablet: 1 tab(s) orally 3 times a day  folic acid 1 mg oral tablet: 1 tab(s) orally once a day  gabapentin 400 mg oral capsule: 1 cap(s) orally 3 times a day  lamoTRIgine 100 mg oral tablet: 2 tab(s) orally 2 times a day  Lasix 40 mg oral tablet: 1 tab(s) orally once a day  pantoprazole 40 mg oral delayed release tablet: 1 tab(s) orally once a day  SEROquel 25 mg oral tablet: 1 tab(s) orally once a day in AM  SEROquel 50 mg oral tablet: 1.5 tab(s) orally once a day (at bedtime)  sertraline 50 mg oral tablet: 1 tab(s) orally once a day  Spiriva 18 mcg inhalation capsule: 1 cap(s) inhaled once a day  tamsulosin 0.4 mg oral capsule: 1 cap(s) orally once a day (at bedtime)  Tecfidera 240 mg oral delayed release capsule: 1 cap(s) orally 2 times a day  Toprol-XL 50 mg oral tablet, extended release: 1 tab(s) orally every 12 hours  traMADol 50 mg oral tablet: 1 tab(s) orally every 6 hours, As Needed  as needed for pain MDD:max 4 tablets a day

## 2022-06-24 DIAGNOSIS — R07.9 CHEST PAIN, UNSPECIFIED: ICD-10-CM

## 2022-06-24 DIAGNOSIS — E78.5 HYPERLIPIDEMIA, UNSPECIFIED: ICD-10-CM

## 2022-06-24 DIAGNOSIS — I50.32 CHRONIC DIASTOLIC (CONGESTIVE) HEART FAILURE: ICD-10-CM

## 2022-06-24 DIAGNOSIS — M54.9 DORSALGIA, UNSPECIFIED: ICD-10-CM

## 2022-06-24 DIAGNOSIS — I07.1 RHEUMATIC TRICUSPID INSUFFICIENCY: ICD-10-CM

## 2022-06-24 DIAGNOSIS — F25.9 SCHIZOAFFECTIVE DISORDER, UNSPECIFIED: ICD-10-CM

## 2022-06-24 DIAGNOSIS — Z85.47 PERSONAL HISTORY OF MALIGNANT NEOPLASM OF TESTIS: ICD-10-CM

## 2022-06-24 DIAGNOSIS — I25.10 ATHEROSCLEROTIC HEART DISEASE OF NATIVE CORONARY ARTERY WITHOUT ANGINA PECTORIS: ICD-10-CM

## 2022-06-24 DIAGNOSIS — N40.0 BENIGN PROSTATIC HYPERPLASIA WITHOUT LOWER URINARY TRACT SYMPTOMS: ICD-10-CM

## 2022-06-24 DIAGNOSIS — I11.0 HYPERTENSIVE HEART DISEASE WITH HEART FAILURE: ICD-10-CM

## 2022-06-24 DIAGNOSIS — J44.9 CHRONIC OBSTRUCTIVE PULMONARY DISEASE, UNSPECIFIED: ICD-10-CM

## 2022-06-24 DIAGNOSIS — Z95.810 PRESENCE OF AUTOMATIC (IMPLANTABLE) CARDIAC DEFIBRILLATOR: ICD-10-CM

## 2022-06-24 DIAGNOSIS — R00.2 PALPITATIONS: ICD-10-CM

## 2022-06-24 DIAGNOSIS — E87.5 HYPERKALEMIA: ICD-10-CM

## 2022-06-24 DIAGNOSIS — I48.91 UNSPECIFIED ATRIAL FIBRILLATION: ICD-10-CM

## 2022-06-24 DIAGNOSIS — Z66 DO NOT RESUSCITATE: ICD-10-CM

## 2022-06-24 DIAGNOSIS — Z79.01 LONG TERM (CURRENT) USE OF ANTICOAGULANTS: ICD-10-CM

## 2022-06-24 DIAGNOSIS — Z79.82 LONG TERM (CURRENT) USE OF ASPIRIN: ICD-10-CM

## 2022-06-24 DIAGNOSIS — Z86.73 PERSONAL HISTORY OF TRANSIENT ISCHEMIC ATTACK (TIA), AND CEREBRAL INFARCTION WITHOUT RESIDUAL DEFICITS: ICD-10-CM

## 2022-06-24 DIAGNOSIS — G40.909 EPILEPSY, UNSPECIFIED, NOT INTRACTABLE, WITHOUT STATUS EPILEPTICUS: ICD-10-CM

## 2022-06-24 DIAGNOSIS — F17.200 NICOTINE DEPENDENCE, UNSPECIFIED, UNCOMPLICATED: ICD-10-CM

## 2022-06-24 DIAGNOSIS — I47.2 VENTRICULAR TACHYCARDIA: ICD-10-CM

## 2022-06-24 DIAGNOSIS — G89.29 OTHER CHRONIC PAIN: ICD-10-CM

## 2022-06-24 DIAGNOSIS — K21.9 GASTRO-ESOPHAGEAL REFLUX DISEASE WITHOUT ESOPHAGITIS: ICD-10-CM

## 2022-06-24 DIAGNOSIS — G35 MULTIPLE SCLEROSIS: ICD-10-CM

## 2022-06-24 DIAGNOSIS — E11.9 TYPE 2 DIABETES MELLITUS WITHOUT COMPLICATIONS: ICD-10-CM

## 2022-07-14 ENCOUNTER — APPOINTMENT (OUTPATIENT)
Dept: CARDIOLOGY | Facility: CLINIC | Age: 52
End: 2022-07-14

## 2022-07-14 VITALS
TEMPERATURE: 97.8 F | WEIGHT: 183 LBS | SYSTOLIC BLOOD PRESSURE: 156 MMHG | HEART RATE: 69 BPM | DIASTOLIC BLOOD PRESSURE: 80 MMHG | HEIGHT: 66 IN | BODY MASS INDEX: 29.41 KG/M2

## 2022-07-14 PROCEDURE — 93000 ELECTROCARDIOGRAM COMPLETE: CPT | Mod: 59

## 2022-07-14 PROCEDURE — 99214 OFFICE O/P EST MOD 30 MIN: CPT

## 2022-07-14 PROCEDURE — 93283 PRGRMG EVAL IMPLANTABLE DFB: CPT

## 2022-07-14 PROCEDURE — 93290 INTERROG DEV EVAL ICPMS IP: CPT | Mod: 26

## 2022-07-14 RX ORDER — BACLOFEN 10 MG/1
10 TABLET ORAL
Refills: 0 | Status: ACTIVE | COMMUNITY

## 2022-07-14 RX ORDER — TIOTROPIUM BROMIDE 18 UG/1
18 CAPSULE ORAL; RESPIRATORY (INHALATION)
Refills: 0 | Status: ACTIVE | COMMUNITY

## 2022-07-14 RX ORDER — TAMSULOSIN HYDROCHLORIDE 0.4 MG/1
0.4 CAPSULE ORAL DAILY
Refills: 0 | Status: ACTIVE | COMMUNITY

## 2022-07-14 RX ORDER — TRAMADOL HYDROCHLORIDE 50 MG/1
50 TABLET, COATED ORAL
Qty: 15 | Refills: 1 | Status: ACTIVE | COMMUNITY

## 2022-07-14 RX ORDER — ACETAMINOPHEN 500 MG/1
TABLET ORAL DAILY
Refills: 0 | Status: COMPLETED | COMMUNITY
End: 2022-07-14

## 2022-07-14 RX ORDER — DIMETHYL FUMARATE 240 MG/1
240 CAPSULE ORAL
Refills: 0 | Status: ACTIVE | COMMUNITY

## 2022-07-14 RX ORDER — APIXABAN 5 MG/1
5 TABLET, FILM COATED ORAL
Qty: 180 | Refills: 3 | Status: ACTIVE | COMMUNITY

## 2022-07-14 RX ORDER — GABAPENTIN 400 MG/1
400 CAPSULE ORAL
Refills: 0 | Status: ACTIVE | COMMUNITY

## 2022-07-14 RX ORDER — LAMOTRIGINE 200 MG/1
200 TABLET ORAL DAILY
Refills: 0 | Status: ACTIVE | COMMUNITY

## 2022-07-14 RX ORDER — SERTRALINE HYDROCHLORIDE 50 MG/1
50 TABLET, FILM COATED ORAL DAILY
Refills: 0 | Status: ACTIVE | COMMUNITY

## 2022-07-14 RX ORDER — ASPIRIN 81 MG/1
81 TABLET, CHEWABLE ORAL
Refills: 0 | Status: ACTIVE | COMMUNITY

## 2022-07-14 RX ORDER — ELECTROLYTES/DEXTROSE
10 SOLUTION, ORAL ORAL AT BEDTIME
Refills: 0 | Status: ACTIVE | COMMUNITY

## 2022-07-14 RX ORDER — FOLIC ACID 1 MG/1
1 TABLET ORAL DAILY
Refills: 0 | Status: ACTIVE | COMMUNITY

## 2022-07-14 RX ORDER — QUETIAPINE 25 MG/1
25 TABLET, FILM COATED ORAL
Refills: 0 | Status: ACTIVE | COMMUNITY

## 2022-07-14 RX ORDER — LAMOTRIGINE 100 MG/1
100 TABLET ORAL TWICE DAILY
Refills: 0 | Status: COMPLETED | COMMUNITY
End: 2022-07-14

## 2022-07-14 RX ORDER — PANTOPRAZOLE 40 MG/1
40 TABLET, DELAYED RELEASE ORAL
Refills: 3 | Status: ACTIVE | COMMUNITY

## 2022-07-14 NOTE — PROCEDURE
[ICD] : Implantable cardioverter-defibrillator [DDDR] : DDDR [Charge Time: ___ sec] : charge time was [unfilled] seconds [Longevity: ___ months] : The estimated remaining battery life is [unfilled] months [Lead Imp:  ___ohms] : lead impedance was [unfilled] ohms [Sensing Amplitude ___mv] : sensing amplitude was [unfilled] mv [___V @] : [unfilled] V [___ ms] : [unfilled] ms [Programmed for Longevity] : output reprogrammed for improved battery longevity [Apace-Vpace ___ %] : Apace-Vpace [unfilled]% [de-identified] : [employer] [de-identified] : 1397-43Q [de-identified] : [id number] [de-identified] : 09/05/2019 [de-identified] : 70 [de-identified] : Lowered monitor zone to 130bpm [de-identified] : No new episodes to report.\par CorVue stable.\par Patient is transmitting to [facility].

## 2022-07-14 NOTE — PROCEDURE
[ICD] : Implantable cardioverter-defibrillator [DDDR] : DDDR [Charge Time: ___ sec] : charge time was [unfilled] seconds [Longevity: ___ months] : The estimated remaining battery life is [unfilled] months [Lead Imp:  ___ohms] : lead impedance was [unfilled] ohms [Sensing Amplitude ___mv] : sensing amplitude was [unfilled] mv [___V @] : [unfilled] V [___ ms] : [unfilled] ms [Programmed for Longevity] : output reprogrammed for improved battery longevity [Apace-Vpace ___ %] : Apace-Vpace [unfilled]% [de-identified] : [employer] [de-identified] : [id number] [de-identified] : [id number] [de-identified] : 09/05/2019 [de-identified] : 70 [de-identified] : Lowered monitor zone to 130bpm [de-identified] : No new episodes to report.\par CorVue stable.\par Patient is transmitting to [facility].

## 2022-07-21 NOTE — PROGRESS NOTE ADULT - REASON FOR ADMISSION
multiple sclerosis flare
Epidermal Closure: purse string

## 2022-07-25 ENCOUNTER — NON-APPOINTMENT (OUTPATIENT)
Age: 52
End: 2022-07-25

## 2022-08-10 NOTE — HISTORY OF PRESENT ILLNESS
[de-identified] : I had a pleasure of seeing Mr. Miguel for follow-up consultation for atrial fibrillation and ICD care. \par \par Mr. Miguel is a 50-year old man with history of paroxysmal AF s/p ablation (x2, 2017) s/p re-do RF (12/20, PVI-CTI) s/p early flutter s/p spontaneoud conversion during procedure and CTI touch-up (1/21),  Atrial standstill s/p PPM (2001) --> ARVD s/p ICD placement --> ICD lead malfunction s/p ICD lead extraction s/p DC ICD lead placement (Medtronic) --> Perforation s/p RV lead revision, HIV, DL, bipolar disorder, seizure, depression, HTN is here for management of his atrial fibrillation. \par \par 7/22: Feels better. No more ICD shocks. No Af episodes. + Fatigue at times\par \par 4/8: Had lead extraction performed due to malfunctioning ICD lead. RV-ICD+ RA leads were removed in entirety, while part of the RV-pacing lead was left behind intracardiac. New dual chamber ICD was placed. Denies chest pain, shortness of breath, palpitation, dizziness or LOC except noted above.\par \par \par From prior notes: He had an episode of AF where he needed cardioversion at  [facility] in 08/20. + Palpitations. He has used tikosyn before- continue to have AF episodes. He has never been on AV Node block agents due to relative hypotension.\par \par 3/25: Feels fine. Had kicked door, but not involved in fight.\par 07/14/22: Feels fine. Episodes of Palpitations- AF\par \par Denies chest pain, shortness of breath, dizziness or LOC.\par \par EKG (07/14/22): AP-70\par EKG (07/22/21): SR@69/min, NH: 238 ms, QRSd 140 ms, QTc 426 ms\par EKG: AP-VS @ 69/min, RBBB.\par TTE (02/21): EF 55-60%, nl LA/RA\par

## 2022-08-10 NOTE — ASSESSMENT
[FreeTextEntry1] : ## Paroxysmal AF s/p ablation (see above for more detailed)\par ## ARVD s/p DC ICD -- s/p recent RV lead  malfunction s/p extraction\par ## Atrial standstill s/p DC ICD\par \par - ICD interrogation shows normally functioning DC-ICD. Battery life ok. Optivol below threshold. No events\par - Continue with Eliquis and Metorolol. Previous AADs use unsuccessful/AEs- see previous notes. Due to his Palpitations , we will increase metoprolol.\par - Remote Monitoring\par - Return in 6 months\par \par

## 2022-08-10 NOTE — HISTORY OF PRESENT ILLNESS
[de-identified] : I had a pleasure of seeing Mr. Miguel for follow-up consultation for atrial fibrillation and ICD care. \par \par Mr. Miguel is a 50-year old man with history of paroxysmal AF s/p ablation (x2, 2017) s/p re-do RF (12/20, PVI-CTI) s/p early flutter s/p spontaneoud conversion during procedure and CTI touch-up (1/21),  Atrial standstill s/p PPM (2001) --> ARVD s/p ICD placement --> ICD lead malfunction s/p ICD lead extraction s/p DC ICD lead placement ([employer]) --> Perforation s/p RV lead revision, HIV, DL, bipolar disorder, seizure, depression, HTN is here for management of his atrial fibrillation. \par \par 7/22: Feels better. No more ICD shocks. No Af episodes. + Fatigue at times\par \par 4/8: Had lead extraction performed due to malfunctioning ICD lead. RV-ICD+ RA leads were removed in entirety, while part of the RV-pacing lead was left behind intracardiac. New dual chamber ICD was placed. Denies chest pain, shortness of breath, palpitation, dizziness or LOC except noted above.\par \par \par From prior notes: He had an episode of AF where he needed cardioversion at  [facility] in 08/20. + Palpitations. He has used tikosyn before- continue to have AF episodes. He has never been on AV Node block agents due to relative hypotension.\par \par 3/25: Feels fine. Had kicked door, but not involved in fight.\par 07/14/22: Feels fine. Episodes of Palpitations- AF\par \par Denies chest pain, shortness of breath, dizziness or LOC.\par \par EKG (07/14/22): AP-70\par EKG (07/22/21): SR@69/min, LA: 238 ms, QRSd 140 ms, QTc 426 ms\par EKG: AP-VS @ 69/min, RBBB.\par TTE (02/21): EF 55-60%, nl LA/RA\par

## 2022-09-13 NOTE — ED ADULT NURSE NOTE - PAIN RATING/NUMBER SCALE (0-10): ACTIVITY
Additional Notes: Pt is COVID vaccinated Quality 110: Preventive Care And Screening: Influenza Immunization: Influenza Immunization Administered during Influenza season Quality 130: Documentation Of Current Medications In The Medical Record: Current Medications Documented Detail Level: Detailed Quality 111:Pneumonia Vaccination Status For Older Adults: Pneumococcal Vaccination Previously Received Quality 47: Advance Care Plan: Advance Care Planning discussed and documented; advance care plan or surrogate decision maker documented in the medical record. 0

## 2022-09-20 ENCOUNTER — NON-APPOINTMENT (OUTPATIENT)
Age: 52
End: 2022-09-20

## 2022-10-13 ENCOUNTER — NON-APPOINTMENT (OUTPATIENT)
Age: 52
End: 2022-10-13

## 2022-10-13 ENCOUNTER — APPOINTMENT (OUTPATIENT)
Dept: CARDIOLOGY | Facility: CLINIC | Age: 52
End: 2022-10-13

## 2022-10-13 PROCEDURE — 93295 DEV INTERROG REMOTE 1/2/MLT: CPT

## 2022-10-13 PROCEDURE — 93296 REM INTERROG EVL PM/IDS: CPT

## 2022-11-03 ENCOUNTER — APPOINTMENT (OUTPATIENT)
Dept: CARDIOLOGY | Facility: CLINIC | Age: 52
End: 2022-11-03

## 2022-12-15 ENCOUNTER — APPOINTMENT (OUTPATIENT)
Dept: CARDIOLOGY | Facility: CLINIC | Age: 52
End: 2022-12-15

## 2023-01-03 NOTE — DISCHARGE NOTE NURSING/CASE MANAGEMENT/SOCIAL WORK - PATIENT PORTAL LINK FT
You can access the FollowMyHealth Patient Portal offered by [facility] by registering at the following website: [email or web address]. By joining [facility]’s FollowMyHealth portal, you will also be able to view your health information using other applications (apps) compatible with our system.
Yes

## 2023-01-09 RX ORDER — METOPROLOL SUCCINATE 100 MG/1
100 TABLET, EXTENDED RELEASE ORAL
Qty: 135 | Refills: 3 | Status: DISCONTINUED | COMMUNITY
Start: 2021-07-09 | End: 2023-01-09

## 2023-01-17 RX ORDER — METOPROLOL SUCCINATE 100 MG/1
100 TABLET, EXTENDED RELEASE ORAL DAILY
Qty: 30 | Refills: 6 | Status: ACTIVE | COMMUNITY
Start: 2023-01-09 | End: 1900-01-01

## 2023-02-01 ENCOUNTER — APPOINTMENT (OUTPATIENT)
Dept: ELECTROPHYSIOLOGY | Facility: CLINIC | Age: 53
End: 2023-02-01

## 2023-02-02 ENCOUNTER — APPOINTMENT (OUTPATIENT)
Dept: CARDIOLOGY | Facility: CLINIC | Age: 53
End: 2023-02-02
Payer: MEDICAID

## 2023-02-02 ENCOUNTER — NON-APPOINTMENT (OUTPATIENT)
Age: 53
End: 2023-02-02

## 2023-02-02 PROCEDURE — 93295 DEV INTERROG REMOTE 1/2/MLT: CPT

## 2023-02-02 PROCEDURE — 93296 REM INTERROG EVL PM/IDS: CPT

## 2023-02-02 NOTE — DISCHARGE NOTE NURSING/CASE MANAGEMENT/SOCIAL WORK - NSDCPEELIQUISDIET_GEN_ALL_CORE
Anesthesia Post Evaluation    Patient: Jaylin Murguia    Procedure(s) Performed: Procedure(s) (LRB):  EGD (ESOPHAGOGASTRODUODENOSCOPY) (N/A)  COLONOSCOPY (N/A)    Final Anesthesia Type: MAC      Patient location during evaluation: PACU  Patient participation: Yes- Able to Participate  Level of consciousness: awake and alert  Post-procedure vital signs: reviewed and stable  Pain management: adequate  Airway patency: patent    PONV status at discharge: No PONV  Anesthetic complications: no      Cardiovascular status: blood pressure returned to baseline  Respiratory status: unassisted and room air  Hydration status: euvolemic  Follow-up not needed.          Vitals Value Taken Time   /69 02/02/23 1254   Temp 36.7 °C (98.1 °F) 02/02/23 1254   Pulse 56 02/02/23 1254   Resp 18 02/02/23 1254   SpO2 99 % 02/02/23 1254         No case tracking events are documented in the log.      Pain/Ramya Score: No data recorded      
Eat healthy foods you enjoy. Apixaban/Eliquis DOES NOT have a special diet. Limit your alcohol intake.

## 2023-03-06 NOTE — ANESTHESIA FOLLOW-UP NOTE - TYPE OF ANESTHESIA
[address], spoke to Taisha Carrillo that advised pt Botox order is ready to be delivered 3/7/2023  Botox 200 UNITS  Qty  1  Scheduled:3/7/2023  Location:[place]   Via: [facility]/[employer]    Please advise if Botox does not arrive  Thank you  General

## 2023-03-09 NOTE — PHARMACOTHERAPY INTERVENTION NOTE - NSPHARMCOMMASP
ASP - Duration of therapy Opioid Counseling: I discussed with the patient the potential side effects of opioids including but not limited to addiction, altered mental status, and depression. I stressed avoiding alcohol, benzodiazepines, muscle relaxants and sleep aids unless specifically okayed by a physician. The patient verbalized understanding of the proper use and possible adverse effects of opioids. All of the patient's questions and concerns were addressed. They were instructed to flush the remaining pills down the toilet if they did not need them for pain.

## 2023-03-21 NOTE — ED PROVIDER NOTE - MDM PATIENT STATEMENT FOR ADDL TREATMENT
Caller: Rose Valdivia SHEREE    Relationship: Self    Best call back number: [phone]    Requested Prescriptions:   Requested Prescriptions     Pending Prescriptions Disp Refills   • oxyCODONE (ROXICODONE) 10 MG tablet 90 tablet 0     Sig: Take 1 tablet by mouth 3 (Three) Times a Day As Needed for Moderate Pain.   • clonazePAM (KlonoPIN) 0.5 MG tablet 270 tablet 1     Sig: Take 1 tablet by mouth 3 (Three) Times a Day As Needed for Anxiety.        Pharmacy where request should be sent: [facility] [id number] [facility] [address] [facility] AT [facility] - [phone]  - [phone]      Last office visit with prescribing clinician: 11/8/2022   Last telemedicine visit with prescribing clinician: 5/9/2023   Next office visit with prescribing clinician: 5/9/2023     Additional details provided by patient:     Does the patient have less than a 3 day supply:  [x] Yes  [] No    Would you like a call back once the refill request has been completed: [] Yes [x] No    If the office needs to give you a call back, can they leave a voicemail: [] Yes [] No    Anahy Kennedy Rep   03/21/23 09:48 EDT         DELETE AFTER READING TO PATIENT: “Thank you for sharing this information with me. I will send a message to the clinical team. Please allow 48 hours for the clinical staff to follow up on this request.”        
Patient with one or more new problems requiring additional work-up/treatment.

## 2023-04-03 NOTE — ED ADULT NURSE NOTE - NSSUHOSCREENINGYN_ED_ALL_ED
[facility] ambulatory encounter  URGENT CARE OFFICE VISIT    SUBJECTIVE:  Laura Edmonds is a 36 year old female        Chief Complaint   Patient presents with   • Ear Problem     Patient was seen on 3/19/23 for right ear infection and placed on Augmentin. Patient has an ENT appointment on 4/19/23, but is concerned about continued loss of hearing. Continued nasal congestion and irritated throat since last visit.       Was seen here March 19 for right otitis media.  Was put on Augmentin.  Now symptoms improved and no ear pain but currently still having ear pluggedness and decrease hearing in the right    -no fever, no chills,   -no sob, no chest pain  -no cough, no colds, no sore throat,, no ear discharge  -no abd pain, no nausea, no vomiting, no diarrhea, no constipation, no bloody stools  -no dysuria, no hematuria        ASSESSMENT AND PLAN    - Previous labs and results:   Hemoglobin A1C (%)   Date Value   07/01/2020 5.2     There is no height or weight on file to calculate BMI.  Glomerular Filtration Rate (no units)   Date Value   02/01/2023 >90       - history was taken from: patient     Fluid level behind tympanic membrane of right ear  (primary encounter diagnosis)    She has an appointment with ENT on April 19th.  She will keep that appointment.  She will continue using Flonase and antihistamines to help decongest.  Follow-up precautions if symptoms getting worse          The patient was advised to follow up with primary physician or to recheck with the urgent care clinic sooner if symptoms get worse or if new symptoms appear.    The patient indicated understanding of the diagnosis and agreed with the plan of care.    Please understand this is a provisional diagnosis that can and may change. The diagnosis that you are discharged with is based on the symptoms you described and the diagnostic information available today. If any new severe symptoms occur or existing symptoms worsen, you should seek evaluation  at the nearest Emergency Department    ED precautions discussed with patient            Review of systems:    A review of systems was performed and findings relevant to these symptoms are included in the HPI.     PAST HISTORIES:    ALLERGIES:  No Known Allergies    MEDICATIONS:  Current Outpatient Medications   Medication Sig   • levonorgestrel (Mirena, 52 MG,) (52 MG) 20 MCG/DAY intrauterine device    • ALPRAZolam (XANAX) 0.5 MG tablet Take 1 tablet orally twice a day as needed for anxiety.   • metFORMIN (GLUCOPHAGE) 500 MG tablet Take 1 tablet by mouth daily (with breakfast).   • escitalopram (LEXAPRO) 10 MG tablet Take 1.5 tablets by mouth daily.   • rosuvastatin (CRESTOR) 5 MG tablet Take 1 tablet orally once every other day.     Current Facility-Administered Medications   Medication   • levonorgestrel (MIRENA) (52 MG) 20 MCG/DAY intrauterine device 52 mg   • copper (PARAGARD) intrauterine device 1 each       Past Medical History:   Diagnosis Date   • Anxiety    • PCOS (polycystic ovarian syndrome) 2012         OBJECTIVE:  Physical Exam:    Visit Vitals  Pulse 70   Temp 98.8 °F (37.1 °C) (Oral)   Resp 18   LMP 02/18/2023 (Approximate) Comment: preg. test 1- was neg   SpO2 98%        CONSTITUTIONAL: Well-hydrated, well-nourished female who appears to be in no acute distress. Awake, alert and cooperative.  HEENT:  Bilateral TM bulging and clear right more than the left.  Bilateral ear canal within normal limits.  Bilateral negative tragal tenderness.  External ears without deformities. Hearing is grossly normal. Nose without deformities. There is moist nasal mucosa. Throat is clear with moist mucous membranes.         Ulysses Boco MD  [facility] - Urgent Care               Yes - the patient is able to be screened

## 2023-04-27 NOTE — PATIENT PROFILE ADULT - NSPROMUTPARTICIPCAREFT_GEN_A_NUR
Spine appears normal, range of motion is not limited, no muscle or joint tenderness
keep me informed

## 2023-04-29 NOTE — PHYSICAL THERAPY INITIAL EVALUATION ADULT - SPECIFY REASON(S)
, Patient declined PT eval and all OOB activities , despite max encouragement and educ of benefits and purpose of PT. Patient states he is at PLOF  before admission Yes

## 2023-05-04 ENCOUNTER — APPOINTMENT (OUTPATIENT)
Dept: CARDIOLOGY | Facility: CLINIC | Age: 53
End: 2023-05-04
Payer: MEDICAID

## 2023-05-04 ENCOUNTER — NON-APPOINTMENT (OUTPATIENT)
Age: 53
End: 2023-05-04

## 2023-05-04 PROCEDURE — 93295 DEV INTERROG REMOTE 1/2/MLT: CPT

## 2023-05-04 PROCEDURE — 93296 REM INTERROG EVL PM/IDS: CPT

## 2023-05-05 NOTE — H&P PST ADULT - MUSCULOSKELETAL
Yes - the patient is able to be screened No joint pain, swelling or deformity; no limitation of movement

## 2023-08-03 ENCOUNTER — APPOINTMENT (OUTPATIENT)
Dept: CARDIOLOGY | Facility: CLINIC | Age: 53
End: 2023-08-03
Payer: MEDICAID

## 2023-08-03 ENCOUNTER — NON-APPOINTMENT (OUTPATIENT)
Age: 53
End: 2023-08-03

## 2023-08-03 PROCEDURE — 93295 DEV INTERROG REMOTE 1/2/MLT: CPT

## 2023-08-03 PROCEDURE — 93296 REM INTERROG EVL PM/IDS: CPT

## 2023-09-21 NOTE — ED PROVIDER NOTE - NS_EDPROVIDERDISPOUSERTYPE_ED_A_ED
oriented to person, place and time , normal sensation , short and long term memory intact Attending Attestation (For Attendings USE Only)...

## 2023-11-02 ENCOUNTER — APPOINTMENT (OUTPATIENT)
Dept: CARDIOLOGY | Facility: CLINIC | Age: 53
End: 2023-11-02
Payer: MEDICAID

## 2023-11-02 ENCOUNTER — NON-APPOINTMENT (OUTPATIENT)
Age: 53
End: 2023-11-02

## 2023-11-02 PROCEDURE — 93295 DEV INTERROG REMOTE 1/2/MLT: CPT

## 2023-11-02 PROCEDURE — 93296 REM INTERROG EVL PM/IDS: CPT

## 2023-12-10 NOTE — PHYSICAL THERAPY INITIAL EVALUATION ADULT - TINETTI BALANCE TEST, REHAB EVAL
No-Patient/Caregiver offered and refused free interpretation services. 12/28 Based on this test pt is a high risk for fall

## 2024-01-01 NOTE — ED ADULT TRIAGE NOTE - NS ED NURSE DISCH DISPOSITION
Plan:  - Q4 VS until 36 hours of life  - Hypoglycemia protocols  - Car Seat test prior to d/c Admitted

## 2024-01-10 NOTE — PRE-OP CHECKLIST - SIDE RAILS UP
Subjective:       Patient ID: Nadya Miller is a 70 y.o. female.    Chief Complaint: Hematuria      HPI:  70-year-old female patient presenting to the clinic to establish care.  Patient has experienced 3 urinary tract infections in the last year.  Patient is typically treated by PCP.  She was started on Omnicef yesterday for an infection.  Symptoms of infection include lower abdominal pain and dysuria.  Patient states that urine culture is being done.    Past Medical History:   Past Medical History:   Diagnosis Date    Breast cancer     Hyperlipidemia     Hypertension     Osteopenia        Past Surgical Historical:   Past Surgical History:   Procedure Laterality Date    TUBAL LIGATION          Medications:   Medication List with Changes/Refills   Current Medications    AMLODIPINE (NORVASC) 5 MG TABLET        CARVEDILOL (COREG) 6.25 MG TABLET        CEFDINIR (OMNICEF) 300 MG CAPSULE        CHOLECALCIFEROL, VITAMIN D3, 10 MCG (400 UNIT) CHEW    cholecalciferol (vitamin D3) 10 mcg (400 unit) chewable tablet   Take 2 tablets every day by oral route.    DENOSUMAB (PROLIA) 60 MG/ML SYRG    Inject 60 mg into the skin.    FLUTICASONE PROPIONATE (FLONASE) 50 MCG/ACTUATION NASAL SPRAY    fluticasone propionate 50 mcg/actuation nasal spray,suspension    LETROZOLE (FEMARA) 2.5 MG TAB        LOSARTAN (COZAAR) 100 MG TABLET        LOSARTAN (COZAAR) 25 MG TABLET        ROSUVASTATIN (CRESTOR) 10 MG TABLET       Discontinued Medications    ATORVASTATIN (LIPITOR) 20 MG TABLET    Lipitor 20 mg tablet   Take 1 tablet every day by oral route as directed.        Past Social History:   Social History     Socioeconomic History    Marital status:    Tobacco Use    Smoking status: Never     Passive exposure: Never    Smokeless tobacco: Never   Substance and Sexual Activity    Alcohol use: Never    Drug use: Never       Allergies:   Review of patient's allergies indicates:   Allergen Reactions    Bactrim  [sulfamethoxazole-trimethoprim] Hives and Itching        Family History:   Family History   Problem Relation Age of Onset    Heart attack Sister         Review of Systems:  Review of Systems   Constitutional:  Negative for activity change, appetite change, chills, diaphoresis, fatigue, fever and unexpected weight change.   HENT:  Negative for congestion, dental problem, drooling, ear discharge, ear pain, facial swelling, hearing loss, mouth sores, nosebleeds, postnasal drip, rhinorrhea, sinus pressure, sinus pain, sneezing, sore throat, tinnitus, trouble swallowing and voice change.    Eyes:  Negative for photophobia, pain, discharge, redness, itching and visual disturbance.   Respiratory:  Negative for apnea, cough, choking, chest tightness, shortness of breath, wheezing and stridor.    Cardiovascular:  Negative for chest pain and leg swelling.   Gastrointestinal:  Negative for abdominal distention, abdominal pain, anal bleeding, blood in stool, constipation, diarrhea, nausea, rectal pain and vomiting.   Endocrine: Negative for cold intolerance, heat intolerance, polydipsia, polyphagia and polyuria.   Genitourinary:  Positive for dysuria, frequency and urgency. Negative for decreased urine volume, difficulty urinating, dyspareunia, enuresis, flank pain, genital sores, hematuria, menstrual problem, pelvic pain, vaginal bleeding, vaginal discharge and vaginal pain.   Musculoskeletal:  Negative for arthralgias, back pain, gait problem, joint swelling, myalgias, neck pain and neck stiffness.   Skin:  Negative for color change, pallor, rash and wound.   Allergic/Immunologic: Negative for environmental allergies, food allergies and immunocompromised state.   Neurological:  Negative for dizziness, tremors, seizures, syncope, facial asymmetry, speech difficulty, weakness, light-headedness, numbness and headaches.   Hematological:  Negative for adenopathy. Does not bruise/bleed easily.   Psychiatric/Behavioral:  Negative  for agitation, behavioral problems, confusion, decreased concentration, dysphoric mood, hallucinations, self-injury, sleep disturbance and suicidal ideas. The patient is not nervous/anxious and is not hyperactive.        Physical Exam:  Physical Exam  Exam conducted with a chaperone present.   Constitutional:       Appearance: Normal appearance.   HENT:      Head: Normocephalic.      Nose: Nose normal.      Mouth/Throat:      Mouth: Mucous membranes are moist.      Pharynx: Oropharynx is clear.   Eyes:      Extraocular Movements: Extraocular movements intact.      Conjunctiva/sclera: Conjunctivae normal.      Pupils: Pupils are equal, round, and reactive to light.   Cardiovascular:      Rate and Rhythm: Normal rate and regular rhythm.      Pulses: Normal pulses.      Heart sounds: Normal heart sounds.   Pulmonary:      Effort: Pulmonary effort is normal.      Breath sounds: Normal breath sounds.   Abdominal:      General: Abdomen is flat. Bowel sounds are normal.      Palpations: Abdomen is soft.      Hernia: There is no hernia in the left inguinal area or right inguinal area.   Genitourinary:     General: Normal vulva.      Pubic Area: No rash or pubic lice.       Labia:         Right: No rash, tenderness, lesion or injury.         Left: No rash, tenderness, lesion or injury.       Urethra: No prolapse, urethral pain, urethral swelling or urethral lesion.      Rectum: Normal.   Musculoskeletal:         General: Normal range of motion.      Cervical back: Normal range of motion and neck supple.   Lymphadenopathy:      Lower Body: No right inguinal adenopathy. No left inguinal adenopathy.   Skin:     General: Skin is warm and dry.      Capillary Refill: Capillary refill takes less than 2 seconds.   Neurological:      General: No focal deficit present.      Mental Status: She is alert and oriented to person, place, and time. Mental status is at baseline.   Psychiatric:         Mood and Affect: Mood normal.          Behavior: Behavior normal.         Thought Content: Thought content normal.         Judgment: Judgment normal.         Assessment/Plan:     Urinary tract infection:  Patient currently being treated with Omnicef and urine culture in process.  Instructed patient to complete urine drop-off with our office if symptoms of infection develop in the future so that we may build a history and determine if her frequency of infection is increasing.  Discussed 90 day course of Macrobid in the future if her number of infections increase.  Problem List Items Addressed This Visit    None  Visit Diagnoses       Hematuria                    done

## 2024-01-16 NOTE — PATIENT PROFILE ADULT - LIVING ENVIRONMENT
No further episodes of palpitations since recent cardioversion.  Continue metoprolol for rate and rhythm control, Eliquis for anticoagulation.  If she continues to have recurrent, then would recommend evaluation by cardiac EP.   no

## 2024-01-25 NOTE — H&P ADULT - HISTORY OF PRESENT ILLNESS
Post-Op Assessment Note    CV Status:  Stable  Pain Score: 0    Pain management: adequate       Mental Status:  Arousable   Hydration Status:  Stable   PONV Controlled:  None   Airway Patency:  Patent     Post Op Vitals Reviewed: Yes    No anethesia notable event occurred.    Staff: CRNA               BP   119/73   Temp   98   Pulse  99   Resp   16   SpO2   99      ocumented By: MICHAEL COOMBS  Documented Status: Final    History of Present Illness  The patient noted the following symptom(s):. I had a pleasure of seeing Mr. Miguel for follow-up consultation for atrial fibrillation and ICD care. He is usually seen by Dr. Quiñones.    Mr. Miguel is a 50-year old man with history of paroxysmal AF s/p ablation (x2, 2017) s/p re-do RF (12/20, PVI-CTI) s/p early flutter s/p spontaneoud conversion during procedure and CTI touch-up (1/21), Atrial standstill s/p PPM (2001) --> ARVD s/p ICD placement --> ICD lead malfunction s/p ICD lead extraction s/p DC ICD lead placement ([employer]tronic) --> Perforation s/p RV lead revision, HIV, DL, bipolar disorder, seizure, depression, HTN is here for management of his atrial fibrillation.     From prior notes: He had an episode of AF where he needed cardioversion at [place] in 08/20. + Palpitations. He has used tikosyn before- continue to have AF episodes. He has never been on AV Node block agents due to relative hypotension.    3/25: Feels fine. Had kicked door, but not involved in fight.    Denies chest pain, shortness of breath, dizziness or LOC.    EKG: AP-VS @ 70/min, RBBB.  TTE (02/21): EF 55-60%, nl LA/RA  .     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) (Z98.890)    Current Meds  · Aspirin Adult Low Strength 81 MG Oral Tablet Chewable  · Atorvastatin Calcium 40 MG Oral Tablet; TAKE 1 TABLET DAILY  · Baclofen 10 MG Oral Tablet  · Eliquis 5 MG Oral Tablet  · Folic Acid 1 MG Oral Tablet; Take 1 tablet daily  · Gabapentin 400 MG Oral Capsule  · lamoTRIgine 100 MG Oral Tablet; TAKE 2 TABLETS TWICE DAILY  · Metoprolol Succinate ER 50 MG Oral Tablet Extended Release 24 Hour; Take 1 tablet  daily  · Nitrostat 0.4 MG Sublingual Tablet Sublingual; PLACE TABLET PRN  · Pantoprazole Sodium 40 MG Oral Tablet Delayed Release; Take 1 tablet daily  · SEROquel 25 MG Oral Tablet  · Sertraline HCl - 50 MG Oral Tablet; TAKE 1 TABLET DAILY  · Spiriva HandiHaler 18 MCG Inhalation Capsule  · Tamsulosin HCl - 0.4 MG Oral Capsule; TAKE 1 CAPSULE Daily  · Tecfidera 240 MG Oral Capsule Delayed Release    Review of Systems    Constitutional, Eyes, ENT, Cardiovascular, Respiratory, Gastrointestinal, Genitourinary, Musculoskeletal, Integumentary, Neurological, Psychiatric, Endocrine and Heme/Lymph are otherwise negative.     Vitals  Vital Signs     Physical Exam  Constitutional:   	Recorded: 25Mar2021 09:31AM  Systolic	117  Diastolic	76  Height	5 ft 6 in  Weight	170 lb   BMI Calculated	27.44 kg/m2  BSA Calculated	1.87  Temperature	96.7 F  Heart Rate	70  well developed, normal appearance, well groomed, well nourished, no deformities and no acute distress.   Cardiovascular: heart rate and rhythm were normal, normal S1 and S2 and no murmurs present.   Pulmonary   50-year old man presented for assessment after his defibrillator beeped / audible alarm.    Known to have a history of paroxysmal AF s/p ablation (x2, 2017) s/p re-do RF (12/20, PVI-CTI) s/p early flutter s/p spontaneoud conversion during procedure and CTI touch-up (1/21), Atrial standstill s/p PPM (2001) --> ARVD s/p ICD placement --> ICD lead malfunction s/p ICD lead extraction s/p DC ICD lead placement ([employer]tronic) --> Perforation s/p RV lead revision, HIV, DL, bipolar disorder, seizure, depression, HTN.  Saw Dr. Jarquin recently for management of atrial fibrillation.   As Dr. Jarquin documents: He had an episode of AF where he needed cardioversion at [place] in 08/20. + Palpitations. He has used tikosyn before- continue to have AF episodes. He has never been on AV Node block agents due to relative hypotension.    EKG: AP-VS @ 70/min, RBBB.  TTE (02/21): EF 55-60%, nl LA/RA    At night, patient heard a beep coming from his defibrillator, no symptoms, no shock, he presented for assessment.     50-year old man presented for assessment after his defibrillator beeped / audible alarm.  Known to have a history of paroxysmal AF s/p ablation (x2, 2017) s/p re-do RF (12/20, PVI-CTI) s/p early flutter s/p spontaneoud conversion during procedure and CTI touch-up (1/21), Atrial standstill s/p PPM (2001) --> ARVD s/p ICD placement --> ICD lead malfunction s/p ICD lead extraction s/p DC ICD lead placement ([employer]tronic) --> Perforation s/p RV lead revision, HIV, DL, bipolar disorder, seizure, depression, HTN.  Saw Dr. Jarquin recently for management of atrial fibrillation.   As Dr. Jarquin documents: He had an episode of AF where he needed cardioversion at [place] in 08/20. + Palpitations. He has used tikosyn before- continue to have AF episodes. He has never been on AV Node block agents due to relative hypotension.    EKG: AP-VS @ 70/min, RBBB.  TTE (02/21): EF 55-60%, nl LA/RA    At night, patient heard a beep coming from his defibrillator, no symptoms, no shock, he presented for assessment.

## 2024-02-01 ENCOUNTER — APPOINTMENT (OUTPATIENT)
Dept: CARDIOLOGY | Facility: CLINIC | Age: 54
End: 2024-02-01
Payer: MEDICAID

## 2024-02-01 ENCOUNTER — NON-APPOINTMENT (OUTPATIENT)
Age: 54
End: 2024-02-01

## 2024-02-01 PROCEDURE — 93296 REM INTERROG EVL PM/IDS: CPT | Mod: NC

## 2024-02-01 PROCEDURE — 93295 DEV INTERROG REMOTE 1/2/MLT: CPT

## 2024-03-09 NOTE — PHYSICAL EXAM
136 [Normal] : Lips, mucosa, and tongue normal, teeth and gums normal [Walker] : ambulates with walker [Spurling] : negative Spurling's Test [SLR] : negative straight leg raise [] : Sensory: [C2-Bilat] : C2 [C3-Bilat] : C3 [C4-Bilat] : C4 [C5-Bilat] : C5 [C6-Bilat] : C6 [C7-Bilat] : C7 [C8-Bilat] : C8 [L1-Bilat] : L1 [L2-Bilat] : L2 [L3-Bilat] : L3 [L4-Bilat] : L4 [L5-Bilat] : L5 [de-identified] : 4/5 weakness on RUE and LLE

## 2024-04-25 NOTE — ED ADULT NURSE NOTE - NS PRO PASSIVE SMOKE EXP
Patient advised of the need for urine. Patient stated she had just urinated prior to walking in. Patient requested water and it was given. Provider notified, Plan of care on oning.      Daiana Wheeler RN  04/24/24 [id number]    
No

## 2024-05-01 ENCOUNTER — NON-APPOINTMENT (OUTPATIENT)
Age: 54
End: 2024-05-01

## 2024-05-02 ENCOUNTER — APPOINTMENT (OUTPATIENT)
Dept: CARDIOLOGY | Facility: CLINIC | Age: 54
End: 2024-05-02
Payer: MEDICAID

## 2024-05-02 PROCEDURE — 93296 REM INTERROG EVL PM/IDS: CPT | Mod: NC

## 2024-05-02 PROCEDURE — 93295 DEV INTERROG REMOTE 1/2/MLT: CPT

## 2024-05-20 NOTE — ED PROVIDER NOTE - PHYSICAL EXAMINATION
yes
CONSTITUTIONAL: Well-developed; well-nourished; in no acute distress.   SKIN: warm, dry  HEAD: Normocephalic; atraumatic.  EYES: no conj injection  ENT: No nasal discharge; airway clear.  NECK: Supple; non tender.  CARD: S1, S2 normal; no murmurs, gallops, or rubs. +tachycardic to 160s-170s  RESP: No wheezes, rales or rhonchi.  ABD: soft ntnd  EXT: Normal ROM.  No clubbing, cyanosis or edema.   NEURO: Alert, oriented, grossly unremarkable  PSYCH: Cooperative, appropriate.

## 2024-07-19 NOTE — DISCHARGE NOTE PROVIDER - NSDCHC_MEDRECSTATUS_GEN_ALL_CORE
Behavioral Health Child & Adolescent Initial Assessment    Child/Adolescent Virtual:  This visit was performed via live interactive two-way Video visit with patient's verbal consent. Clinician Location:Home Patient Location: Home.. Patient's identity was verified. The Consent for Treatment, which includes patient rights and the grievance procedure was signed by the Guardian (and patient if age 14-17) as part of the pre check in process for their virtual appointment today. The Consent was reviewed verbally with the Guardian (and patient if age 14-17) by this provider and any concerns or questions were addressed. Information including the Missed Appointment Agreement, After Hours contact information, and Fee Schedule was sent to the Guardian and patient via their secure patient portal for reference after the appointment.     PATIENT: Barrie Paez    MRN: [id number]  : 2010  AGE: 13 year old    Date: 2024      Referred by: PCP    Child's reason for seeking treatment:   [x]  Family  [x]  School   []  Marital [] Alcohol  []  Drug  [x]  Behavior    []  Trauma/Abuse []  Self-harm  []  Anger  []  Work  [x]  Problems with mood    [] Legal Issues  []  Grief/loss  []  Eating disorder  []  Health related problems   []  Major life changes in last year such as a move, a death, employment, relationship changes [] Childbirth/Adoption [] Other     Chief complaint in patient's own words: \"Her anxiety continues to be a struggle.\"    Problem(s) occur where?:    [x]  At Home                      [x]  In School                [x]  In the Community    Problem(s) began at what age?: 6    Problem(s) duration:   []  Less than 6 months   [x]  More than 6 months    Child's strength(s):  Patient View: caring, active, determined  Provider View: Barrie appears to have a big heart    Child's limitation(s):   Patient View: anxiety, control, poor impulse control  Provider View: Barrie appears to struggle managing levels of  emotions    Brief History of Presenting Problem(s)/Onset/Precipitating Events: Mother noted concerns for diagnosis. Barrie just stopped taking Vyvanse with no changes in behavior management. Mother noted belief she     Her anxiety continues to be a significant struggle. She tends to be obsessive with some of her anxieties. She is impulsive and not think about things prior to doing actions. Mother's explanations were noted to be difficult. Correction is difficult to take. She will need mom to say things a specific way.     She told her cousin it was not her birthday as he was born at 4:00pm. She struggled to stop herself and initially understand it was his birthday all day. Social interactions are difficult to recognize context. Mother noted if she has something on her mind she needs the other person to hear it. Mother noted it is not intentional but impulsive. She has a high rate of needing to be in control with limited flexibility.     She is good one on one. The dynamic of including more can be very difficult.     If it is fun and has no demand she will do well. As soon as demand comes in she is less likely to do or want to do.     Parent/Child's goals for treatment: improve ability to manage emotions and improve social skills    Child's general happiness and well being:    []  Excellent    [x]  Good    []  Fair     []  Poor     []   Very Poor     SOCIAL HISTORY:  Current School: [employer]  Grade: 6th grade  Teacher: CARTER  Special Programmin plan  Social Activities: basketball, softball, cross country    ACADEMIC FUNCTIONING:  Current: behind- 20-25%   Past: behind  Learning Needs: reading, math intervention    BEHAVIOR IN SCHOOL:  Current: talks often, high rates of questions, participates, enjoys group work  Past: impulsive    [] Bullied   []  Bullies  [] Suspensions(s) (How many?)   [] Expulsion/pre-expulsion     LEGAL PROBLEMS:   [x] None  []  Tickets received?  Describe:   [] Operating  While Intoxicated  []  Driving Under Influence /Substance Used:  []  Possession of illegal substance  []  Paraphernalia  [] Emergency long term  []  Court Stipulation  []  Protective Custody    [] On Probation/[place] Date:   [] Juvenile court contacts/Name/Telephone Number:   [] Charged as part of incident involving bodily harm      []  Member of gang  [] Bullying     [] Divorce/Custody Proceeding    WORK STATUS:  [] Employed   [] Unemployed   [x]  In School       IF EMPLOYED:   Place of Employment: NA  Position: NA  How Long: NA    PRIOR EMPLOYMENT HISTORY:   []  Yes   [x]  No  []  No problem     []  Laid off      []  Disciplinary Action     []  Job Loss(es)   []  Employee/Employer Conflicts   []  Leave of Absence  []  Absenteeism    []  Alcohol/Drug Related Problems    ARE YOU SATISFIED WITH YOUR JOB?  []  Yes   []  No      If No, describe     PARENT'S  SERVICE?   []  Yes [x]  No        Deployment/Redeployment  []  Yes  []   No    Honorable Discharge []  Yes   []  No    If no, describe:     FINANCIAL PROBLEMS:   [x]  None    []  Financial problems at home   []  Gambling  []  Illegal activities    SPIRITUALITY:  Is spirituality part of child's belief system?   [] Yes    [x] No  If yes, does spirituality help child?  []  Yes [x]  No  []  Unsure    Is spirituality part of parent's belief system?   [] Yes    [x] No  If yes, does spirituality help family?  []  Yes [x]  No  []  Unsure    Child/family Congregational preference?    []  Yes  [x]  No   If yes, describe:                       Spiritual concerns to address in therapy?   [] Yes   [x]  No    Spiritual expectations, values, or pressures causing conflict in child's life?    [] Yes   [x]  No    If yes, describe:     CULTURAL:  Any cultural issues or customs important to child?  [] Yes   [x]  No   If yes, describe:    Please describe you/your child's current living situation:  [x]  Home    []  Apartment    []  Group Home   []  Other - please explain    HOUSEHOLD  MEMBERS:    Name Age Occupation/  School Relationship to Patient Lives With   [employer] mother yes   [employer] father yes                        OTHER FAMILY MEMBERS                                              Family Psychiatric History Diagnosis/Medications AODA   Mother:       []  Yes  [x]  No  []  Yes  [x]  No   Father:        []  Yes  [x]  No  []  Yes  [x]  No   Siblings:      []  Yes  [x]  No  []  Yes  [x]  No   Extended Family  []  Yes [x]  No  []  Yes  [x]  No     Other family resources (extended family, community, Gnosticist): NA      DEVELOPMENTAL HISTORY:  Mother's health during pregnancy: Good  Parental use of drugs, alcohol or cigarettes during pregnancy:  []  Yes   [x]  No  Prenatal Care:  [x]  Yes   []  No      Care:   [x]  Yes   []  No  Complications during pregnancy or delivery: NA  Health problems noted at birth: NA  Full term:  [x]  Yes   []  No       Delayed sitting up:  []  Yes   [x]  No     Speech delay:         []  Yes   [x]  No  Delayed walking     []  Yes   [x]  No  Coordination difficulties:  []  Yes   [x]  No  Bedwetting:  []  Yes   [x]  No  Secure attachment: [x] Yes    [] No    If no, describe:  Acceptance of comfort/soothing: [x] Yes    [] No    If no, describe:   Consistent caregiver: [x] Yes    [] No    If no, describe:   History of :  [x] Yes    [] No    If yes, describe:    Quality of play skills: Good    Child's current physical health:   []  Excellent    [x]  Good    []  Fair     []  Poor     []   Very Poor      Current and Past Medical History   Please check all that apply in past or present  []  Abnormal Blood Pressure       []  Acne  []  Anemia  []  Allergies  []  Arthritis/Rheumatism  []  Asthma  []  Broken Bones  []  Cancer  []  Changes in Appetite  []  Chronic Fatigue Syndrome  []  Cirrhosis  []  Diabetes  []  Eating Disorders  []  Emphysema  []  Epilepsy/Seizure  []  Fainting Spells   []  Fibromyalgia  []  Glaucoma/Cataracts  []  Hay  Fever  []  Head Injury  []  Hearing Impaired  []  Hepatitis Type A, B, or C  []  Heart Disease  []  Heart Pacemaker  []  HIV Positive/AIDS/ARC  []  Irritable Bowel Syndrome  []  Kidney or Bladder Problems  []  Liver Disease  []  Memory Loss  []  Migraines  []  MRSA/VRE  []  Neurological Disorders  []  Pregnancy  []  Rheumatic Fever  []  Sickle Cell Disease  []  Skin Disorder  []  Sleep Disorder  []  Stroke  []  STD  []  Thyroid Problems  []  Tuberculosis  []  Tumors  []  Ulcer/Abdominal Pain  []  Visual Problems  []  Weight Changes  []  Other: ________________________________________________________    Allergies:   ALLERGIES:   Allergen Reactions    Dust Other (See Comments)     Throat hurts       CURRENT PSYCHIATRIC MEDICATIONS: Patient reports taking Intuniv and Zoloft.    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    []  Yes   [x]  No  How does child manage pain?: NA    History of problems or reactions to medications?    []  Yes   [x]  No    If yes, describe:     Tobacco use:    []  Current   []  Former  [x]  Never  Are you willing to make a Quit attempt?   []  Yes   []  No   [] Maybe    If yes, was [place] Tobacco Quit Line ([phone]) offered?  []  Yes    []  No    Use of caffeine   [x]  Yes   []  No    If yes, describe: occassional    How does parent discipline child?:  NA    PAST/PRESENT PSYCHIATRIC AND AODA TREATMENT HISTORY:  [] None  Facility Name Physician Name Date Reason IP OP AODA   Kitty Marrero  Medication management  X                        Appetite:    [x] Good [] Fair   [] Poor       Weight:  [] Increase    Amount:     Duration:    [] Decrease  Amount      Duration:      Eating Disorder Behaviors:  [] Yes  [x]  No  If Yes describe:  [] Binging  [] Purging  [] Restrictive Eating    Further Eating Disorder assessment needed?:  []  Yes  [x]  No     If yes, referred to whom?:     SUICIDE RISK ASSESSMENT:  YES NO If yes, describe   [] [x] Suicide attempt in last 24 hours?   []    [x]    Attempts in past?  How many?   Date of most recent:   Method used?:    [] [x] Suicidal thoughts?   [] [x] Plan or considering various methods? Describe:    [] [x] Access to means?  If yes, specify weapon location    [] [x] Indication of substance abuse/dependence?   [] [x] Any family members, loved ones, friends who committed suicide?   [] [x] Recent deaths, losses, anniversary dates?   [] [x] Has made preparations for death?   [] [x] Lack of support system?   [] [x] N/A Verbal contract for safety?   [x] [] Patient has no current intent or plan, but agrees to contact provider if SI arises.   [x] [] Patient given emergency 24 hour access information     VIOLENCE/HOMICIDE POTENTIAL:   YES NO If yes, describe current or past violence   [] [x] Threat made to harm or kill someone?    A specific individual?     Name:    [] [x] Access to weapon?  Where is weapon?    [] [x] History of violence/aggressive behavior to others?   [] [x] History of significant damage to property?   [] [x] Indication of substance abuse/dependence?   [] [x] Witnessed violence or significant aggression?     Does patient experience anger management problems?     []  Yes   [x]  No   If yes, describe:      How does the patient cope with anger?: NA    TRAUMA ASSESSMENT  YES NO If yes, describe current and past trauma    []  [x] Physically abused?   [] [x] Emotionally abused?   [] [x] Sexually abused?   [] [x] Verbally abused?   [] [x] Exposed to domestic violence, community violence or  violence?  Describe:    [] [x] Been physically, sexually or verbally abusive to others?   [] [x] Self-abuse (cutting/burning/head banging)   [] [x] Safety concerns for anyone in the family?     Abuse Reported?    []  Yes   []  No   [x] N/A  To whom?   Date:    Outcome:     Sexual/Gender orientation issues?:   []  Yes   [x]  No    If yes, describe:     Patient Support System:  Does the patient want family or others involved in treatment or education and learning beyond family members?    [] Yes   [x]  No  If yes, whom?      GENERAL APPEARANCE:  Patient Appears [x] Stated age     []  Young for age   [] Mature for age  Dress   [x] Unremarkable    []  Remarkable    MOTOR SKILLS:  Posture  [x] Unremarkable  [] Slumped  []  Rigid  Gait   [x] Unremarkable  [] Slow         [] Hyperactive  Mannerisms  [x] Unremarkable  [] Gestures  [] Grimaces  [] Twitches/tics      [] Tremors  Activity  [x] Unremarkable  [] Uncoordinated  [] Agitated  [] Lethargic                      [] Hyperactive      [] Withdrawn      SYMPTOMS If Yes, Specify Frequency/Duration     Yes     [] Sad/down    [] Crying    [] Loss of interest in usual activities    []  Child's activity level (inactive, average, overactive)    [] Social withdrawal    []    Aggressive Behavior    [x] Irritability At times   [x] Mood swings At times   [] Sleep problems    [] Difficulty falling asleep    [] Difficulty staying asleep    [] Early AM awakening    [] Nightmares    [] Hopelessness    [] Hallucinations/delusions    [x] Anxiety daily   [] Separation anxiety    [] Panic attacks    [] Obsessions/compulsions    [x] Concentration daily   [x] Distractibility daily   [] Hyperactivity    [x]    Impulsivity daily   [x] Attention problems daily   [] Oppositional/defiant    [] Conduct problems    [] Fire  setting    [] Animal abuse    [] Stealing    [x] Autism Spectrum Disorder Symptoms At times   [] Stereotypic Behavior    [x] Social relation issues At times   [] Sensory Issues    []    Paranoia    []  Sexualized Play    []  Chronic Lying    []  Low self esteem    [] Other        MENTAL STATUS EXAM:  Hygiene Concerns:  [x]  No   []  Yes   Describe:    Appearance:   [x]  Unremarkable  []  Other    Distress:  []  None  []  Mild   [x]  Moderate    []  Acute  Eye Contact:    [x]  Maintained  [] Avoided [] Chapin intense  [] Improving  Behavior:  [x]  Normal  []  Restless  []  Compulsive  []  Tremulous     []  Lethargic  []  Uncoordinated  Mood/Feelings: []  Normal  []  Euthymic  []  Depressed  []  Irritable      [x]  Anxious     []  Fearful [] Euphoric [] Labile [] Grief [] Paranoia [] Panic     [] Guilt/Shame [] Apathy/Indifference [] Jealousy     [] Helpless     [] Hopeless [] Other  Affect:            [x]  Normal  []  Constricted  [] Flat  []  Blunted      [x] Appropriate to Content   []Inappropriate to Content  Thought Processes: [x]  Congruent  []  Incongruent  [] Loose Associations     []  Poverty of Ideas  []  Tangential    []  Incoherence     []  Blocking/thought interruption  Thought Content: [x]  Normal  []  Delusions  []  Obsessions  []  Phobias     []  Hypochondria  []  Sexual Preoccupation  Perceptual Issues:    [x]  None  [] Hallucinations  []  Auditory  [] Visual      [] Perceptual  Orientation:  [x]  Normal/No Impairment  []  Person  []  Place  []  Time  Speech:  [x]  Clear/Articulate  [] Soft  [] Loud  []  Pressured     []  Animated     []  Rambling  []  Slurred []  Poor Articulation  Insight:  []  Good  [x]  Fair  []  Poor  []  Age Appropriate  Judgement:  []  Good  [x]  Fair  []  Poor  Recent Memory: [x]  Good  []  Fair  []  Poor  Remote Memory: [x]  Good  []  Fair  []  Poor  Concentration: []  Good  [x]  Fair  []  Poor  Attention:  []  Good  [x]  Fair  []  Poor  Level of Engagement:   [x]  Open  []   Guarded    [] Resistant    AODA Self Report Responses for Adolescents 14 and Above:    Have you ridden in a car driven by someone (including the yourself) who was high or had been using drugs?  []  Yes   []  No      Do you ever use alcohol or drugs to relax, feel better about self or to fit in?   []  Yes   []  No      Do you ever use alcohol or drugs while you are by yourself?  []  Yes   []  No      Do you ever forget things they did while using alcohol or drugs?    [] Yes  []  No      Does your family or friends ever tell you that you should cut down on drinking or drug use?  []  Yes   []  No      Have you ever gotten into trouble while they were using alcohol or drugs?  []  Yes   []  No      Resource information given?    [x]  Yes   []  No       Parent's capacity to be involved in treatment:    [x]  Good   []  Limited   []  Poor    DIAGNOSIS:   Generalized Anxiety Disorder  ADHD, Combined Type (by history)  Parent Child Relational Problem   Educational Problem    R/O Autism Spectrum Disorder    Refer for Psychotherapy?:     [x] Yes     Estimated Length of Treatment: 6-9 months  []  No    [] Assessment Only   [] Refer to Higher Level of Care   [] Refer for Medication Assessment    Patient given emergency 24 hour access information?  [x]   Yes   []  No    INITIAL PROGRESS NOTE (To include an integrated clinical summary):     D:  Barrie and her parents participated in an initial behavioral health assessment today. See above for the details of that assessment.    A:  Reviewed and discussed treatment with legal guardian. Informed consent was sent to patient via portal for patient to review, sign, and return. Verbal consent obtained in order to maintain social distancing recommendations during the COVID crisis.  Barrie and her parents were informed that medical providers have access to her mental health record. Treatment options were reviewed.    R:  Barrie appeared to be engaged in the session and is motivated to continue in  treatment.    P:  Provider recommended individual and family psychotherapy fto assist with emotion regulation, frustration tolerance, perseverance, and social skills. Sood will follow up in 2 weeks and will continue to be seen on a biweekly basis.  Next session: Provider and patient will collaboratively develop a treatment plan based on her strengths.     Juliana Hamilton, PHD         Admission Reconciliation is Completed  Discharge Reconciliation is Completed

## 2024-08-01 ENCOUNTER — NON-APPOINTMENT (OUTPATIENT)
Age: 54
End: 2024-08-01

## 2024-08-01 ENCOUNTER — APPOINTMENT (OUTPATIENT)
Dept: CARDIOLOGY | Facility: CLINIC | Age: 54
End: 2024-08-01
Payer: MEDICAID

## 2024-08-01 PROCEDURE — 93295 DEV INTERROG REMOTE 1/2/MLT: CPT

## 2024-08-01 PROCEDURE — 93296 REM INTERROG EVL PM/IDS: CPT | Mod: NC

## 2024-08-27 ENCOUNTER — TRANSCRIPTION ENCOUNTER (OUTPATIENT)
Age: 54
End: 2024-08-27

## 2024-09-09 NOTE — H&P ADULT - NSHPLABSRESULTS_GEN_ALL_CORE
(02-21 @ 19:48)                      12.1  6.55 )-----------( 231                 37.4    Neutrophils = 3.73 (57.0%)  Lymphocytes = 2.17 (33.1%)  Eosinophils = 0.08 (1.2%)  Basophils = 0.08 (1.2%)  Monocytes = 0.46 (7.0%)  Bands = --%    02-21    140  |  105  |  17  ----------------------------<  82  4.0   |  24  |  0.6<L>    Ca    9.8      21 Feb 2021 19:48    TPro  6.5  /  Alb  4.2  /  TBili  0.3  /  DBili  x   /  AST  14  /  ALT  11  /  AlkPhos  113  02-21    ( 21 Feb 2021 19:48 )   PT: 17.80 sec;   INR: 1.55 ratio;       PTT:45.3 sec  CARDIAC MARKERS ( 21 Feb 2021 19:48 )  Trop <0.01 ng/mL / CK x     / CKMB x           RVP:          Tox:
social work

## 2024-10-31 ENCOUNTER — APPOINTMENT (OUTPATIENT)
Dept: CARDIOLOGY | Facility: CLINIC | Age: 54
End: 2024-10-31
Payer: MEDICAID

## 2024-10-31 ENCOUNTER — NON-APPOINTMENT (OUTPATIENT)
Age: 54
End: 2024-10-31

## 2024-10-31 PROCEDURE — 93295 DEV INTERROG REMOTE 1/2/MLT: CPT

## 2024-10-31 PROCEDURE — 93296 REM INTERROG EVL PM/IDS: CPT | Mod: NC

## 2024-11-05 NOTE — ED ADULT NURSE NOTE - JUGULAR VENOUS DISTENTION
[facility] ([place])  Adult Nutrition  Consult Note    SUMMARY     Recommendations  1) Continue regular diet as tolerated    2) Honor pt's preferences to encourage intake of meals    3) Monitor weight & labs    4) RD to monitor and follow up    Goals: Pt will be able to tolerate >75% intake of meals by RD follow up  Nutrition Goal Status: new  Communication of RD Recs:  (POC)    Assessment and Plan    Endocrine  Moderate malnutrition  Malnutrition Type:  Context: acute illness or injury  Level: moderate    Related to (etiology):   Altered GI function    Signs and Symptoms (as evidenced by):   Abdominal pain; 5% weight loss in 2 weeks; decreased appetite and early satiety     Malnutrition Characteristic Summary:  Weight Loss (Malnutrition): greater than 2% in 1 week (5% in 2 week)  Energy Intake (Malnutrition): less than 75% for greater than 7 days      Interventions/Recommendations (treatment strategy):  Collaboration of care with other providers    Nutrition Diagnosis Status:   New         Malnutrition Assessment  Malnutrition Context: acute illness or injury  Malnutrition Level: moderate          Weight Loss (Malnutrition): greater than 2% in 1 week (5% in 2 week)  Energy Intake (Malnutrition): less than 75% for greater than 7 days                         Reason for Assessment    Reason For Assessment: consult (pt with decreased PO intake and weight loss of about 9 lbs recently)  Diagnosis:  (Abdominal pain)  General Information Comments: Patient sent from her PCP for elevated LFTs, abdominal pain x 1 month and bloating that worsens with eating or drinking. Pt with decreased appetite, increased fatigue and 9 lb weight loss over the past few weeks from  lb. Does not drink/like oral nutrition supplements. Pt NPO at time of assessment, reported hunger prior to IR procedure. RD able to assist with future meal orders. Diet adv to regular. Pt with PMH of breast cancer, diabetes, HTN, HLD. CT concerning  "for metastatic disease. KASHIF Joseph 21- RUQ. Did not assess NFPE - providers entered room to speak with patient. Pt meets criteria for moderate malnutrition in context of acute illness r/t altered GI function aeb abdominal pain; 5% weight loss in 2 weeks; decreased appetite and early satiety.  Nutrition Discharge Planning: dc on general healthful diet with emphasis on protein intake    Nutrition Related Social Determinants of Health: SDOH: Adequate food in home environment    Nutrition Risk Screen    Nutrition Risk Screen: reduced oral intake over the last month, unintentional loss of 10 lbs or more in the past 2 months    Nutrition/Diet History    Food Preferences: No Pork  Food Allergies: NKFA  Factors Affecting Nutritional Intake: abdominal pain, decreased appetite, early satiety, NPO    Anthropometrics    Temp: 98 °F (36.7 °C)  Height Method: Stated  Height: 5' 1" (154.9 cm)  Height (inches): 61 in  Weight Method: Bed Scale  Weight: 60.4 kg (133 lb 2.5 oz)  Weight (lb): 133.16 lb  Ideal Body Weight (IBW), Female: 105 lb  % Ideal Body Weight, Female (lb): 126.82 %  BMI (Calculated): 25.2  BMI Grade: 25 - 29.9 - overweight  Weight Loss: unintentional  Usual Body Weight (UBW), k.09 kg (10/2024)  % Usual Body Weight: 94.44  % Weight Change From Usual Weight: -5.76 %       Lab/Procedures/Meds    Pertinent Labs Reviewed: reviewed  CBC:  No results for input(s): "WBC", "HGB", "HCT", "PLT" in the last 24 hours.  CMP:  Recent Labs   Lab 24  002   CALCIUM 8.8   ALBUMIN 2.2*   PROT 6.7      K 3.6   CO2 19*   CL 95   BUN 23   CREATININE 0.8   ALKPHOS 720*   ALT 69*   *   BILITOT 5.7*     BMP:   Recent Labs   Lab 24   GLU 72      K 3.6   CL 95   CO2 19*   BUN 23   CREATININE 0.8   CALCIUM 8.8   MG 1.8       Pertinent Medications Reviewed: reviewed  Scheduled Meds:   hydroCHLOROthiazide  25 mg Oral Daily    NIFEdipine  60 mg Oral Daily    senna-docusate 8.6-50 mg  1 tablet Oral BID "     Continuous Infusions:  PRN Meds:.  Current Facility-Administered Medications:     acetaminophen, 650 mg, Oral, Q8H PRN    albuterol-ipratropium, 3 mL, Nebulization, Q6H PRN    melatonin, 6 mg, Oral, Nightly PRN    morphine, 2 mg, Intravenous, Q6H PRN    naloxone, 0.02 mg, Intravenous, PRN    ondansetron, 4 mg, Intravenous, Q6H PRN    prochlorperazine, 5 mg, Intravenous, Q6H PRN    sodium chloride 0.9%, 10 mL, Intravenous, Q8H PRN      Estimated/Assessed Needs    Weight Used For Calorie Calculations: 60.4 kg (133 lb 2.5 oz)  Energy Calorie Requirements (kcal): 6788-2997  Energy Need Method: Kcal/kg (25-35)  Protein Requirements: 73 g (1.2 g/kg)  Weight Used For Protein Calculations: 60.4 kg (133 lb 2.5 oz)  Fluid Requirements (mL): 1 mL/kcal  Estimated Fluid Requirement Method:  (or per MD)  RDA Method (mL): 1510  CHO Requirement: 188 g      Nutrition Prescription Ordered    Current Diet Order: Regular    Evaluation of Received Nutrient/Fluid Intake    I/O: + 50 mL since admit  Energy Calories Required: not meeting needs  Protein Required: not meeting needs  Fluid Required: not meeting needs  Comments: LBM: 11/4  Tolerance:  (NPO)  % Intake of Estimated Energy Needs: Other: NPO  % Meal Intake: NPO    Nutrition Risk    Level of Risk/Frequency of Follow-up: high       Monitor and Evaluation    Food and Nutrient Intake: food and beverage intake  Food and Nutrient Adminstration: diet order  Physical Activity and Function: nutrition-related ADLs and IADLs  Anthropometric Measurements: weight, weight change  Biochemical Data, Medical Tests and Procedures: electrolyte and renal panel, gastrointestinal profile, glucose/endocrine profile, inflammatory profile, lipid profile, other (specify)  Nutrition-Focused Physical Findings: overall appearance       Nutrition Follow-Up    RD Follow-up?: Yes    Sayda Fitzgerald RDN, [facility]     absent

## 2024-11-13 NOTE — DIETITIAN INITIAL EVALUATION ADULT. - OTHER INFO
Call patient to notify normal results P/w: chest pain.  ARVD (Arrhythmogenic right ventricular dysplasia) s/p AICD, possible AICD dysfunction- EP following. ) Anginal symptoms, without prior evidence of CAD. Multiple sclerosis: neuro following. COPD on PRN home O2.

## 2024-11-19 NOTE — PHYSICAL THERAPY INITIAL EVALUATION ADULT - GENERAL OBSERVATIONS, REHAB EVAL
Patient: Colette Whitlock  YOB: 1963  Date of Service: 11/19/2024    Chief Complaints: Right shoulder pain    Subjective:    History of Present Illness: Pt is seen in the office today with complaints of right shoulder pain she is status post rotator cuff repair she struggled in the last little bit of her motion back and I think we are at an impasse here we probably need to proceed to manipulation she is working but with restrictions she is done everything of asked her to do        Allergies:   Allergies   Allergen Reactions    Penicillin G Unknown (See Comments)     Told this as a child       Medications:   Home Medications:  Current Outpatient Medications on File Prior to Visit   Medication Sig    acetaminophen (TYLENOL) 500 MG tablet Take 1 tablet by mouth Every 6 (Six) Hours As Needed for Mild Pain.    cetirizine (zyrTEC) 10 MG tablet Take 1 tablet by mouth Daily.    Diclofenac Sodium (Voltaren Arthritis Pain) 1 % gel gel Apply 4 g topically to the appropriate area as directed 2 (Two) Times a Day.    fluticasone (FLONASE) 50 MCG/ACT nasal spray 2 sprays into the nostril(s) as directed by provider Daily.    ketotifen (ZADITOR) 0.025 % ophthalmic solution Apply 1 drop to eye(s) as directed by provider Every 12 (Twelve) Hours As Needed.    meloxicam (MOBIC) 15 MG tablet Take 1 tablet by mouth Daily.    montelukast (SINGULAIR) 10 MG tablet Take 1 tablet by mouth every night at bedtime.     No current facility-administered medications on file prior to visit.     Current Medications:  Scheduled Meds:  Continuous Infusions:No current facility-administered medications for this visit.    PRN Meds:.    I have reviewed the patient's medical history in detail and updated the computerized patient record.  Review and summarization of old records include:    Past Medical History:   Diagnosis Date    Arthritis     Frozen shoulder     Prediabetes     Right shoulder pain     Seasonal allergies     Torn rotator  9:30-10:00 Pt encountered semifowler in bed in NAD. + O2 at 3 lpm. + tele.  Pt agreeable for PT.  Pt reports back pain and BLE pain (chronic in nature 2* to MS), 4/10 at rest - reports received pain meds. SPO2 at rest on 3 lpm O2 95-96%, HR 69 bpm. cuff     RIGHT        Past Surgical History:   Procedure Laterality Date    ANKLE OPEN REDUCTION INTERNAL FIXATION Right     X2     SECTION      X1    COLONOSCOPY      SHOULDER ARTHROSCOPY W/ ROTATOR CUFF REPAIR Right 3/13/2024    Procedure: SHOULDER ARTHROSCOPY LABERAL DEBRIDEMENT, DECOMPRESSION, WITH WITH  MINI OPEN ROTATOR CUFF REPAIR;  Surgeon: Makenzie Barnett MD;  [place] [facility] [place] [facility] [place]: [place]opedics;  Laterality: Right;        Social History     Occupational History    Not on file   Tobacco Use    Smoking status: Never    Smokeless tobacco: Never   Vaping Use    Vaping status: Never Used   Substance and Sexual Activity    Alcohol use: Not Currently    Drug use: Never    Sexual activity: Yes     Partners: Male     Comment: , three children      Social History     Social History Narrative    Not on file        Family History   Problem Relation Age of Onset    No Known Problems Mother     No Known Problems Father     Breast cancer Neg Hx     Colon cancer Neg Hx     Malig Hyperthermia Neg Hx        ROS: 14 point review of systems was performed and was negative except for documented findings in HPI and today's encounter.     Allergies:   Allergies   Allergen Reactions    Penicillin G Unknown (See Comments)     Told this as a child     Constitutional:  Denies fever, shaking or chills   Eyes:  Denies change in visual acuity   HENT:  Denies nasal congestion or sore throat   Respiratory:  Denies cough or shortness of breath   Cardiovascular:  Denies chest pain or severe LE edema   GI:  Denies abdominal pain, nausea, vomiting, bloody stools or diarrhea   Musculoskeletal:  Numbness, tingling, or loss of motor function only as noted above in history of present illness.  : Denies painful urination or hematuria  Integument:  Denies rash, lesion or ulceration   Neurologic:  Denies headache or focal weakness  Endocrine:  Denies lymphadenopathy  Psych:  Denies confusion or change in mental  "status   Hem:  Denies active bleeding      Physical Exam: 61 y.o. female  Wt Readings from Last 3 Encounters:   10/07/24 124 kg (274 lb 3.2 oz)   09/05/24 119 kg (262 lb)   08/26/24 122 kg (270 lb)       There is no height or weight on file to calculate BMI.    There were no vitals filed for this visit.  Vital signs reviewed.   General Appearance:    Alert, cooperative, in no acute distress                    Ortho exam    Exam of the right shoulder active assisted flexion is to 145 passively I get her to 160 with a pretty firm endpoint opposite shoulder is to 180 extra rotation to 45 versus 90 internal rotation to her buttock versus upper lumbar spine rotator cuff strength 4+/5    Physical Exam: 61 y.o. female  General Appearance:    Alert, cooperative, in no acute distress                      Vitals:    11/19/24 1632   Temp: 97.8 °F (36.6 °C)   Weight: 123 kg (272 lb 3.2 oz)   Height: 162.6 cm (64\")   PainSc: 0-No pain        Head:    Normocephalic, without obvious abnormality, atraumatic   Eyes:            conjunctivae and sclerae normal, no pallor, corneas clear,    Ears:    Ears appear intact with no abnormalities noted   Throat:   No oral lesions, no thrush, oral mucosa moist   Neck:   No adenopathy, supple, trachea midline, no thyromegaly,    Back:     No kyphosis present, no scoliosis present, no skin lesions,      erythema or scars, no tenderness to percussion or                   palpation,   range of motion normal   Lungs:     ,respirations regular, even and                  unlabored    Heart:    Regular rhythm and normal rate               Chest Wall:    No abnormalities observed               Pulses:   Pulses palpable and equal bilaterally   Skin:   No bleeding, bruising or rash   Lymph nodes:   No palpable adenopathy   Neurologic:   Appears neurologic intact              Assessment: Status post rotator cuff repair with loss of range of motion I really think what she needs is a manipulation which I " discussed with she and her daughter in detail we discussed the inherent risk of fracture dislocation and failure of repair they understand these and agree to proceed    Plan:   Follow up as indicated.  Ice, elevate, and rest as needed.  Discussed conservative measures of pain control including ice, bracingMakenzie Barnett M.D.

## 2024-12-05 NOTE — DISCHARGE NOTE NURSING/CASE MANAGEMENT/SOCIAL WORK - NSDCPEELIQUISFU_GEN_ALL_CORE
Patient has been scheduled    Go for blood tests as directed. Your doctor will do lab tests at regular visits to monitor the effects of this medicine. Please follow up with your doctor and keep your health care provider appointments.

## 2025-01-06 NOTE — ED ADULT NURSE NOTE - HAVE YOU HAD A FIRST COVID-19 BOOSTER?
No Other (Free Text): Monitor glucose Detail Level: Zone Note Text (......Xxx Chief Complaint.): This diagnosis correlates with the Render Risk Assessment In Note?: no

## 2025-01-29 ENCOUNTER — APPOINTMENT (OUTPATIENT)
Dept: ELECTROPHYSIOLOGY | Facility: CLINIC | Age: 55
End: 2025-01-29

## 2025-01-30 ENCOUNTER — NON-APPOINTMENT (OUTPATIENT)
Age: 55
End: 2025-01-30

## 2025-01-30 ENCOUNTER — APPOINTMENT (OUTPATIENT)
Dept: CARDIOLOGY | Facility: CLINIC | Age: 55
End: 2025-01-30
Payer: MEDICAID

## 2025-01-30 PROCEDURE — 93296 REM INTERROG EVL PM/IDS: CPT | Mod: NC

## 2025-01-30 PROCEDURE — 93295 DEV INTERROG REMOTE 1/2/MLT: CPT

## 2025-02-13 NOTE — ED ADULT TRIAGE NOTE - WEIGHT IN KG
81.6 Calcipotriene Counseling:  I discussed with the patient the risks of calcipotriene including but not limited to erythema, scaling, itching, and irritation. Cosentyx Pregnancy And Lactation Text: This medication is Pregnancy Category B and is considered safe during pregnancy. It is unknown if this medication is excreted in breast milk. Oxempic Pregnancy And Lactation Text: The fetal risk of this medication is unknown and taking while pregnant is not recommended. It is unknown if this medication is present in breast milk. Spironolactone Counseling: Patient advised regarding risks of diarrhea, abdominal pain, hyperkalemia, birth defects (for female patients), liver toxicity and renal toxicity. The patient may need blood work to monitor liver and kidney function and potassium levels while on therapy. The patient verbalized understanding of the proper use and possible adverse effects of spironolactone.  All of the patient's questions and concerns were addressed. Humira Counseling:  I discussed with the patient the risks of adalimumab including but not limited to myelosuppression, immunosuppression, autoimmune hepatitis, demyelinating diseases, lymphoma, and serious infections.  The patient understands that monitoring is required including a PPD at baseline and must alert us or the primary physician if symptoms of infection or other concerning signs are noted. Olumiant Pregnancy And Lactation Text: Based on animal studies, Olumiant may cause embryo-fetal harm when administered to pregnant women.  The medication should not be used in pregnancy.  Breastfeeding is not recommended during treatment. Ivermectin Pregnancy And Lactation Text: This medication is Pregnancy Category C and it isn't known if it is safe during pregnancy. It is also excreted in breast milk. Cyclosporine Pregnancy And Lactation Text: This medication is Pregnancy Category C and it isn't know if it is safe during pregnancy. This medication is excreted in breast milk. Valtrex Pregnancy And Lactation Text: this medication is Pregnancy Category B and is considered safe during pregnancy. This medication is not directly found in breast milk but it's metabolite acyclovir is present. Tremfya Pregnancy And Lactation Text: The risk during pregnancy and breastfeeding is uncertain with this medication. Soolantra Pregnancy And Lactation Text: This medication is Pregnancy Category C. This medication is considered safe during breast feeding. Acitretin Pregnancy And Lactation Text: This medication is Pregnancy Category X and should not be given to women who are pregnant or may become pregnant in the future. This medication is excreted in breast milk. Erythromycin Counseling:  I discussed with the patient the risks of erythromycin including but not limited to GI upset, allergic reaction, drug rash, diarrhea, increase in liver enzymes, and yeast infections. Rinvoq Counseling: I discussed with the patient the risks of Rinvoq therapy including but not limited to upper respiratory tract infections, shingles, cold sores, bronchitis, nausea, cough, fever, acne, and headache. Live vaccines should be avoided.  This medication has been linked to serious infections; higher rate of mortality; malignancy and lymphoproliferative disorders; major adverse cardiovascular events; thrombosis; thrombocytopenia, anemia, and neutropenia; lipid elevations; liver enzyme elevations; and gastrointestinal perforations. Dupixent Counseling: I discussed with the patient the risks of dupilumab including but not limited to eye inflammation and irritation, cold sores, injection site reactions, allergic reactions and increased risk of parasitic infection. The patient understands that monitoring is required and they must alert us or the primary physician if symptoms of infection or other concerning signs are noted. Calcipotriene Pregnancy And Lactation Text: The use of this medication during pregnancy or lactation is not recommended as there is insufficient data. Topical Retinoid counseling:  Patient advised to apply a pea-sized amount only at bedtime and wait 30 minutes after washing their face before applying.  If too drying, patient may add a non-comedogenic moisturizer. The patient verbalized understanding of the proper use and possible adverse effects of retinoids.  All of the patient's questions and concerns were addressed. Use Enhanced Medication Counseling?: No Saxenda Counseling: I reviewed the possible side effects including: thyroid tumors, kidney disease, gallbladder disease, abdominal pain, constipation, diarrhea, nausea, vomiting and pancreatitis. Do not take this medication if you have a history or family history of multiple endocrine neoplasia syndrome type 2. Side effects reviewed, pt to contact office should one occur. Spironolactone Pregnancy And Lactation Text: This medication can cause feminization of the male fetus and should be avoided during pregnancy. The active metabolite is also found in breast milk. Methotrexate Counseling:  Patient counseled regarding adverse effects of methotrexate including but not limited to nausea, vomiting, abnormalities in liver function tests. Patients may develop mouth sores, rash, diarrhea, and abnormalities in blood counts. The patient understands that monitoring is required including LFT's and blood counts.  There is a rare possibility of scarring of the liver and lung problems that can occur when taking methotrexate. Persistent nausea, loss of appetite, pale stools, dark urine, cough, and shortness of breath should be reported immediately. Patient advised to discontinue methotrexate treatment at least three months before attempting to become pregnant.  I discussed the need for folate supplements while taking methotrexate.  These supplements can decrease side effects during methotrexate treatment. The patient verbalized understanding of the proper use and possible adverse effects of methotrexate.  All of the patient's questions and concerns were addressed. Arava Counseling:  Patient counseled regarding adverse effects of Arava including but not limited to nausea, vomiting, abnormalities in liver function tests. Patients may develop mouth sores, rash, diarrhea, and abnormalities in blood counts. The patient understands that monitoring is required including LFTs and blood counts.  There is a rare possibility of scarring of the liver and lung problems that can occur when taking methotrexate. Persistent nausea, loss of appetite, pale stools, dark urine, cough, and shortness of breath should be reported immediately. Patient advised to discontinue Arava treatment and consult with a physician prior to attempting conception. The patient will have to undergo a treatment to eliminate Arava from the body prior to conception. Xolair Counseling:  Patient informed of potential adverse effects including but not limited to fever, muscle aches, rash and allergic reactions.  The patient verbalized understanding of the proper use and possible adverse effects of Xolair.  All of the patient's questions and concerns were addressed. Bexarotene Counseling:  I discussed with the patient the risks of bexarotene including but not limited to hair loss, dry lips/skin/eyes, liver abnormalities, hyperlipidemia, pancreatitis, depression/suicidal ideation, photosensitivity, drug rash/allergic reactions, hypothyroidism, anemia, leukopenia, infection, cataracts, and teratogenicity.  Patient understands that they will need regular blood tests to check lipid profile, liver function tests, white blood cell count, thyroid function tests and pregnancy test if applicable. Cantharidin Counseling:  I discussed with the patient the risks of Cantharidin including but not limited to pain, redness, burning, itching, and blistering. Erythromycin Pregnancy And Lactation Text: This medication is Pregnancy Category B and is considered safe during pregnancy. It is also excreted in breast milk. Dupixent Pregnancy And Lactation Text: This medication likely crosses the placenta but the risk for the fetus is uncertain. This medication is excreted in breast milk. Rinvoq Pregnancy And Lactation Text: Based on animal studies, Rinvoq may cause embryo-fetal harm when administered to pregnant women.  The medication should not be used in pregnancy.  Breastfeeding is not recommended during treatment and for 6 days after the last dose. Topical Retinoid Pregnancy And Lactation Text: This medication is Pregnancy Category C. It is unknown if this medication is excreted in breast milk. Xolair Pregnancy And Lactation Text: This medication is Pregnancy Category B and is considered safe during pregnancy. This medication is excreted in breast milk. Hyrimoz Counseling:  I discussed with the patient the risks of adalimumab including but not limited to myelosuppression, immunosuppression, autoimmune hepatitis, demyelinating diseases, lymphoma, and serious infections.  The patient understands that monitoring is required including a PPD at baseline and must alert us or the primary physician if symptoms of infection or other concerning signs are noted. Bexarotene Pregnancy And Lactation Text: This medication is Pregnancy Category X and should not be given to women who are pregnant or may become pregnant. This medication should not be used if you are breast feeding. Methotrexate Pregnancy And Lactation Text: This medication is Pregnancy Category X and is known to cause fetal harm. This medication is excreted in breast milk. Arava Pregnancy And Lactation Text: This medication is Pregnancy Category X and is absolutely contraindicated during pregnancy. It is unknown if it is excreted in breast milk. Metronidazole Counseling:  I discussed with the patient the risks of metronidazole including but not limited to seizures, nausea/vomiting, a metallic taste in the mouth, nausea/vomiting and severe allergy. 5-Fu Counseling: 5-Fluorouracil Counseling:  I discussed with the patient the risks of 5-fluorouracil including but not limited to erythema, scaling, itching, weeping, crusting, and pain. Semaglutide Counseling: I reviewed the possible side effects including: thyroid tumors, kidney disease, gallbladder disease, abdominal pain, constipation, diarrhea, nausea, vomiting and pancreatitis. Do not take this medication if you have a history or family history of multiple endocrine neoplasia syndrome type 2. Side effects reviewed, pt to contact office should one occur. Xeljanz Counseling: I discussed with the patient the risks of Xeljanz therapy including increased risk of infection, liver issues, headache, diarrhea, or cold symptoms. Live vaccines should be avoided. They were instructed to call if they have any problems. Tazorac Counseling:  Patient advised that medication is irritating and drying.  Patient may need to apply sparingly and wash off after an hour before eventually leaving it on overnight.  The patient verbalized understanding of the proper use and possible adverse effects of tazorac.  All of the patient's questions and concerns were addressed. Isotretinoin Counseling: Patient should get monthly blood tests, not donate blood, not drive at night if vision affected, not share medication, and not undergo elective surgery for 6 months after tx completed. Side effects reviewed, pt to contact office should one occur. Clofazimine Counseling:  I discussed with the patient the risks of clofazimine including but not limited to skin and eye pigmentation, liver damage, nausea/vomiting, gastrointestinal bleeding and allergy. Prednisone Counseling:  I discussed with the patient the risks of prolonged use of prednisone including but not limited to weight gain, insomnia, osteoporosis, mood changes, diabetes, susceptibility to infection, glaucoma and high blood pressure.  In cases where prednisone use is prolonged, patients should be monitored with blood pressure checks, serum glucose levels and an eye exam.  Additionally, the patient may need to be placed on GI prophylaxis, PCP prophylaxis, and calcium and vitamin D supplementation and/or a bisphosphonate.  The patient verbalized understanding of the proper use and the possible adverse effects of prednisone.  All of the patient's questions and concerns were addressed. 5-Fu Pregnancy And Lactation Text: This medication is Pregnancy Category X and contraindicated in pregnancy and in women who may become pregnant. It is unknown if this medication is excreted in breast milk. Ilumya Counseling: I discussed with the patient the risks of tildrakizumab including but not limited to immunosuppression, malignancy, posterior leukoencephalopathy syndrome, and serious infections.  The patient understands that monitoring is required including a PPD at baseline and must alert us or the primary physician if symptoms of infection or other concerning signs are noted. Xelrosemaryz Pregnancy And Lactation Text: This medication is Pregnancy Category D and is not considered safe during pregnancy.  The risk during breast feeding is also uncertain. Metronidazole Pregnancy And Lactation Text: This medication is Pregnancy Category B and considered safe during pregnancy.  It is also excreted in breast milk. Tazorac Pregnancy And Lactation Text: This medication is not safe during pregnancy. It is unknown if this medication is excreted in breast milk. Oral Minoxidil Pregnancy And Lactation Text: This medication should only be used when clearly needed if you are pregnant, attempting to become pregnant or breast feeding. Cellcept Counseling:  I discussed with the patient the risks of mycophenolate mofetil including but not limited to infection/immunosuppression, GI upset, hypokalemia, hypercholesterolemia, bone marrow suppression, lymphoproliferative disorders, malignancy, GI ulceration/bleed/perforation, colitis, interstitial lung disease, kidney failure, progressive multifocal leukoencephalopathy, and birth defects.  The patient understands that monitoring is required including a baseline creatinine and regular CBC testing. In addition, patient must alert us immediately if symptoms of infection or other concerning signs are noted. Zoryve Pregnancy And Lactation Text: It is unknown if this medication can cause problems during pregnancy and breastfeeding. Ketoconazole Pregnancy And Lactation Text: This medication is Pregnancy Category C and it isn't know if it is safe during pregnancy. It is also excreted in breast milk and breast feeding isn't recommended. Olanzapine Pregnancy And Lactation Text: This medication is pregnancy category C.   There are no adequate and well controlled trials with olanzapine in pregnant females.  Olanzapine should be used during pregnancy only if the potential benefit justifies the potential risk to the fetus.   In a study in lactating healthy women, olanzapine was excreted in breast milk.  It is recommended that women taking olanzapine should not breast feed. Bimzelx Pregnancy And Lactation Text: This medication crosses the placenta and the safety is uncertain during pregnancy. It is unknown if this medication is present in breast milk. Cephalexin Pregnancy And Lactation Text: This medication is Pregnancy Category B and considered safe during pregnancy.  It is also excreted in breast milk but can be used safely for shorter doses. Benzoyl Peroxide Counseling: Patient counseled that medicine may cause skin irritation and bleach clothing.  In the event of skin irritation, the patient was advised to reduce the amount of the drug applied or use it less frequently.   The patient verbalized understanding of the proper use and possible adverse effects of benzoyl peroxide.  All of the patient's questions and concerns were addressed. Finasteride Female Counseling: Finasteride Counseling:  I discussed with the patient the risks of use of finasteride including but not limited to decreased libido and sexual dysfunction. Explained the teratogenic nature of the medication and stressed the importance of not getting pregnant during treatment. All of the patient's questions and concerns were addressed. Tetracycline Pregnancy And Lactation Text: This medication is Pregnancy Category D and not consider safe during pregnancy. It is also excreted in breast milk. Cibinqo Pregnancy And Lactation Text: It is unknown if this medication will adversely affect pregnancy or breast feeding.  You should not take this medication if you are currently pregnant or planning a pregnancy or while breastfeeding. Ebglyss Counseling: I discussed with the patient the risks of lebrikizumab including but not limited to eye inflammation and irritation, cold sores, injection site reactions, allergic reactions and increased risk of parasitic infection. The patient understands that monitoring is required and they must alert us or the primary physician if symptoms of infection or other concerning signs are noted. Zyclara Counseling:  I discussed with the patient the risks of imiquimod including but not limited to erythema, scaling, itching, weeping, crusting, and pain.  Patient understands that the inflammatory response to imiquimod is variable from person to person and was educated regarded proper titration schedule.  If flu-like symptoms develop, patient knows to discontinue the medication and contact us. Cellcept Pregnancy And Lactation Text: This medication is Pregnancy Category D and isn't considered safe during pregnancy. It is unknown if this medication is excreted in breast milk. Rhofade Pregnancy And Lactation Text: This medication has not been assigned a Pregnancy Risk Category. It is unknown if the medication is excreted in breast milk. Wegovy Counseling: I reviewed the possible side effects including: thyroid tumors, kidney disease, gallbladder disease, abdominal pain, constipation, diarrhea, nausea, vomiting and pancreatitis. Do not take this medication if you have a history or family history of multiple endocrine neoplasia syndrome type 2. Side effects reviewed, pt to contact office should one occur. Klisyri Counseling:  I discussed with the patient the risks of Klisyri including but not limited to erythema, scaling, itching, weeping, crusting, and pain. Sotyktu Counseling:  I discussed the most common side effects of Sotyktu including: common cold, sore throat, sinus infections, cold sores, canker sores, folliculitis, and acne.  I also discussed more serious side effects of Sotyktu including but not limited to: serious allergic reactions; increased risk for infections such as TB; cancers such as lymphomas; rhabdomyolysis and elevated CPK; and elevated triglycerides and liver enzymes.  Cimzia Counseling:  I discussed with the patient the risks of Cimzia including but not limited to immunosuppression, allergic reactions and infections.  The patient understands that monitoring is required including a PPD at baseline and must alert us or the primary physician if symptoms of infection or other concerning signs are noted. Terbinafine Counseling: Patient counseling regarding adverse effects of terbinafine including but not limited to headache, diarrhea, rash, upset stomach, liver function test abnormalities, itching, taste/smell disturbance, nausea, abdominal pain, and flatulence.  There is a rare possibility of liver failure that can occur when taking terbinafine.  The patient understands that a baseline LFT and kidney function test may be required. The patient verbalized understanding of the proper use and possible adverse effects of terbinafine.  All of the patient's questions and concerns were addressed. Oral Minoxidil Counseling- I discussed with the patient the risks of oral minoxidil including but not limited to shortness of breath, swelling of the feet or ankles, dizziness, lightheadedness, unwanted hair growth and allergic reaction.  The patient verbalized understanding of the proper use and possible adverse effects of oral minoxidil.  All of the patient's questions and concerns were addressed. Clindamycin Counseling: I counseled the patient regarding use of clindamycin as an antibiotic for prophylactic and/or therapeutic purposes. Clindamycin is active against numerous classes of bacteria, including skin bacteria. Side effects may include nausea, diarrhea, gastrointestinal upset, rash, hives, yeast infections, and in rare cases, colitis. Libtayo Counseling- I discussed with the patient the risks of Libtayo including but not limited to nausea, vomiting, diarrhea, and bone or muscle pain.  The patient verbalized understanding of the proper use and possible adverse effects of Libtayo.  All of the patient's questions and concerns were addressed. Benzoyl Peroxide Pregnancy And Lactation Text: This medication is Pregnancy Category C. It is unknown if benzoyl peroxide is excreted in breast milk. Solaraze Counseling:  I discussed with the patient the risks of Solaraze including but not limited to erythema, scaling, itching, weeping, crusting, and pain. Tranexamic Acid Counseling:  Patient advised of the small risk of bleeding problems with tranexamic acid. They were also instructed to call if they developed any nausea, vomiting or diarrhea. All of the patient's questions and concerns were addressed. Finasteride Pregnancy And Lactation Text: This medication is absolutely contraindicated during pregnancy. It is unknown if it is excreted in breast milk. Ebglyss Pregnancy And Lactation Text: This medication likely crosses the placenta but the risk for the fetus is uncertain. It is unknown if this medication is excreted in breast milk. Litfulo Counseling: I discussed with the patient the risks of Litfulo therapy including but not limited to upper respiratory tract infections, shingles, cold sores, and nausea. Live vaccines should be avoided.  This medication has been linked to serious infections; higher rate of mortality; malignancy and lymphoproliferative disorders; major adverse cardiovascular events; thrombosis; gastrointestinal perforations; neutropenia; lymphopenia; anemia; liver enzyme elevations; and lipid elevations. Terbinafine Pregnancy And Lactation Text: This medication is Pregnancy Category B and is considered safe during pregnancy. It is also excreted in breast milk and breast feeding isn't recommended. Cyclophosphamide Counseling:  I discussed with the patient the risks of cyclophosphamide including but not limited to hair loss, hormonal abnormalities, decreased fertility, abdominal pain, diarrhea, nausea and vomiting, bone marrow suppression and infection. The patient understands that monitoring is required while taking this medication. Taltz Counseling: I discussed with the patient the risks of ixekizumab including but not limited to immunosuppression, serious infections, worsening of inflammatory bowel disease and drug reactions.  The patient understands that monitoring is required including a PPD at baseline and must alert us or the primary physician if symptoms of infection or other concerning signs are noted. Sotyktu Pregnancy And Lactation Text: There is insufficient data to evaluate whether or not Sotyktu is safe to use during pregnancy.   It is not known if Sotyktu passes into breast milk and whether or not it is safe to use when breastfeeding.   Clindamycin Pregnancy And Lactation Text: This medication can be used in pregnancy if certain situations. Clindamycin is also present in breast milk. Libtayo Pregnancy And Lactation Text: This medication is contraindicated in pregnancy and when breast feeding. Carac Counseling:  I discussed with the patient the risks of Carac including but not limited to erythema, scaling, itching, weeping, crusting, and pain. Cimzia Pregnancy And Lactation Text: This medication crosses the placenta but can be considered safe in certain situations. Cimzia may be excreted in breast milk. Litfulo Pregnancy And Lactation Text: Based on animal studies, Lifulo may cause embryo-fetal harm when administered to pregnant women.  The medication should not be used in pregnancy.  Breastfeeding is not recommended during treatment. Birth Control Pills Counseling: Birth Control Pill Counseling: I discussed with the patient the potential side effects of OCPs including but not limited to increased risk of stroke, heart attack, thrombophlebitis, deep venous thrombosis, hepatic adenomas, breast changes, GI upset, headaches, and depression.  The patient verbalized understanding of the proper use and possible adverse effects of OCPs. All of the patient's questions and concerns were addressed. Tranexamic Acid Pregnancy And Lactation Text: It is unknown if this medication is safe during pregnancy or breast feeding. Solaraze Pregnancy And Lactation Text: This medication is Pregnancy Category B and is considered safe. There is some data to suggest avoiding during the third trimester. It is unknown if this medication is excreted in breast milk. Enbrel Counseling:  I discussed with the patient the risks of etanercept including but not limited to myelosuppression, immunosuppression, autoimmune hepatitis, demyelinating diseases, lymphoma, and infections.  The patient understands that monitoring is required including a PPD at baseline and must alert us or the primary physician if symptoms of infection or other concerning signs are noted. Zepbound Counseling: I reviewed the possible side effects including: thyroid tumors, kidney disease, gallbladder disease, abdominal pain, constipation, diarrhea, nausea, vomiting and pancreatitis. Do not take this medication if you have a history or family history of multiple endocrine neoplasia syndrome type 2. Side effects reviewed, pt to contact office should one occur. Odomzo Counseling- I discussed with the patient the risks of Odomzo including but not limited to nausea, vomiting, diarrhea, constipation, weight loss, changes in the sense of taste, decreased appetite, muscle spasms, and hair loss.  The patient verbalized understanding of the proper use and possible adverse effects of Odomzo.  All of the patient's questions and concerns were addressed. Cyclophosphamide Pregnancy And Lactation Text: This medication is Pregnancy Category D and it isn't considered safe during pregnancy. This medication is excreted in breast milk. Cosentyx Counseling:  I discussed with the patient the risks of Cosentyx including but not limited to worsening of Crohn's disease, immunosuppression, allergic reactions and infections.  The patient understands that monitoring is required including a PPD at baseline and must alert us or the primary physician if symptoms of infection or other concerning signs are noted. Doxycycline Counseling:  Patient counseled regarding possible photosensitivity and increased risk for sunburn.  Patient instructed to avoid sunlight, if possible.  When exposed to sunlight, patients should wear protective clothing, sunglasses, and sunscreen.  The patient was instructed to call the office immediately if the following severe adverse effects occur:  hearing changes, easy bruising/bleeding, severe headache, or vision changes.  The patient verbalized understanding of the proper use and possible adverse effects of doxycycline.  All of the patient's questions and concerns were addressed. Ozempic Counseling: I reviewed the possible side effects including: thyroid tumors, kidney disease, gallbladder disease, abdominal pain, constipation, diarrhea, nausea, vomiting and pancreatitis. Do not take this medication if you have a history or family history of multiple endocrine neoplasia syndrome type 2. Side effects reviewed, pt to contact office should one occur. Olumiant Counseling: I discussed with the patient the risks of Olumiant therapy including but not limited to upper respiratory tract infections, shingles, cold sores, and nausea. Live vaccines should be avoided.  This medication has been linked to serious infections; higher rate of mortality; malignancy and lymphoproliferative disorders; major adverse cardiovascular events; thrombosis; gastrointestinal perforations; neutropenia; lymphopenia; anemia; liver enzyme elevations; and lipid elevations. Tremfya Counseling: I discussed with the patient the risks of guselkumab including but not limited to immunosuppression, serious infections, and drug reactions.  The patient understands that monitoring is required including a PPD at baseline and must alert us or the primary physician if symptoms of infection or other concerning signs are noted. Soolantra Counseling: I discussed with the patients the risks of topial Soolantra. This is a medicine which decreases the number of mites and inflammation in the skin. You experience burning, stinging, eye irritation or allergic reactions.  Please call our office if you develop any problems from using this medication. Valtrex Counseling: I discussed with the patient the risks of valacyclovir including but not limited to kidney damage, nausea, vomiting and severe allergy.  The patient understands that if the infection seems to be worsening or is not improving, they are to call. Birth Control Pills Pregnancy And Lactation Text: This medication should be avoided if pregnant and for the first 30 days post-partum. Acitretin Counseling:  I discussed with the patient the risks of acitretin including but not limited to hair loss, dry lips/skin/eyes, liver damage, hyperlipidemia, depression/suicidal ideation, photosensitivity.  Serious rare side effects can include but are not limited to pancreatitis, pseudotumor cerebri, bony changes, clot formation/stroke/heart attack.  Patient understands that alcohol is contraindicated since it can result in liver toxicity and significantly prolong the elimination of the drug by many years. Cyclosporine Counseling:  I discussed with the patient the risks of cyclosporine including but not limited to hypertension, gingival hyperplasia,myelosuppression, immunosuppression, liver damage, kidney damage, neurotoxicity, lymphoma, and serious infections. The patient understands that monitoring is required including baseline blood pressure, CBC, CMP, lipid panel and uric acid, and then 1-2 times monthly CMP and blood pressure. Doxycycline Pregnancy And Lactation Text: This medication is Pregnancy Category D and not consider safe during pregnancy. It is also excreted in breast milk but is considered safe for shorter treatment courses. Nemluvio Pregnancy And Lactation Text: It is not known if Nemluvio causes fetal harm or is present in breast milk. Please proceed with caution if patients who are pregnant or breastfeeding. Minoxidil Pregnancy And Lactation Text: This medication has not been assigned a Pregnancy Risk Category but animal studies failed to show danger with the topical medication. It is unknown if the medication is excreted in breast milk. Opioid Pregnancy And Lactation Text: These medications can lead to premature delivery and should be avoided during pregnancy. These medications are also present in breast milk in small amounts. Rifampin Counseling: I discussed with the patient the risks of rifampin including but not limited to liver damage, kidney damage, red-orange body fluids, nausea/vomiting and severe allergy. Skyrizi Counseling: I discussed with the patient the risks of risankizumab-rzaa including but not limited to immunosuppression, and serious infections.  The patient understands that monitoring is required including a PPD at baseline and must alert us or the primary physician if symptoms of infection or other concerning signs are noted. Gabapentin Counseling: I discussed with the patient the risks of gabapentin including but not limited to dizziness, somnolence, fatigue and ataxia. Propranolol Counseling:  I discussed with the patient the risks of propranolol including but not limited to low heart rate, low blood pressure, low blood sugar, restlessness and increased cold sensitivity. They should call the office if they experience any of these side effects. Protopic Counseling: Patient may experience a mild burning sensation during topical application. Protopic is not approved in children less than 2 years of age. There have been case reports of hematologic and skin malignancies in patients using topical calcineurin inhibitors although causality is questionable. Topical Metronidazole Counseling: Metronidazole is a topical antibiotic medication. You may experience burning, stinging, redness, or allergic reactions.  Please call our office if you develop any problems from using this medication. Griseofulvin Pregnancy And Lactation Text: This medication is Pregnancy Category X and is known to cause serious birth defects. It is unknown if this medication is excreted in breast milk but breast feeding should be avoided. Niacinamide Pregnancy And Lactation Text: These medications are considered safe during pregnancy. Azithromycin Pregnancy And Lactation Text: This medication is considered safe during pregnancy and is also secreted in breast milk. Winlevi Pregnancy And Lactation Text: This medication is considered safe during pregnancy and breastfeeding. Rifampin Pregnancy And Lactation Text: This medication is Pregnancy Category C and it isn't know if it is safe during pregnancy. It is also excreted in breast milk and should not be used if you are breast feeding. Aklief counseling:  Patient advised to apply a pea-sized amount only at bedtime and wait 30 minutes after washing their face before applying.  If too drying, patient may add a non-comedogenic moisturizer.  The most commonly reported side effects including irritation, redness, scaling, dryness, stinging, burning, itching, and increased risk of sunburn.  The patient verbalized understanding of the proper use and possible adverse effects of retinoids.  All of the patient's questions and concerns were addressed. Dutasteride Male Counseling: Dustasteride Counseling:  I discussed with the patient the risks of use of dutasteride including but not limited to decreased libido, decreased ejaculate volume, and gynecomastia. Women who can become pregnant should not handle medication.  All of the patient's questions and concerns were addressed. Topical Metronidazole Pregnancy And Lactation Text: This medication is Pregnancy Category B and considered safe during pregnancy.  It is also considered safe to use while breastfeeding. Mirvaso Counseling: Mirvaso is a topical medication which can decrease superficial blood flow where applied. Side effects are uncommon and include stinging, redness and allergic reactions. Rituxan Counseling:  I discussed with the patient the risks of Rituxan infusions. Side effects can include infusion reactions, severe drug rashes including mucocutaneous reactions, reactivation of latent hepatitis and other infections and rarely progressive multifocal leukoencephalopathy.  All of the patient's questions and concerns were addressed. VTAMA Counseling: I discussed with the patient that VTAMA is not for use in the eyes, mouth or mouth. They should call the office if they develop any signs of allergic reactions to VTAMA. The patient verbalized understanding of the proper use and possible adverse effects of VTAMA.  All of the patient's questions and concerns were addressed. Protopic Pregnancy And Lactation Text: This medication is Pregnancy Category C. It is unknown if this medication is excreted in breast milk when applied topically. Propranolol Pregnancy And Lactation Text: This medication is Pregnancy Category C and it isn't known if it is safe during pregnancy. It is excreted in breast milk. Doxepin Counseling:  Patient advised that the medication is sedating and not to drive a car after taking this medication. Patient informed of potential adverse effects including but not limited to dry mouth, urinary retention, and blurry vision.  The patient verbalized understanding of the proper use and possible adverse effects of doxepin.  All of the patient's questions and concerns were addressed. Itraconazole Counseling:  I discussed with the patient the risks of itraconazole including but not limited to liver damage, nausea/vomiting, neuropathy, and severe allergy.  The patient understands that this medication is best absorbed when taken with acidic beverages such as non-diet cola or ginger ale.  The patient understands that monitoring is required including baseline LFTs and repeat LFTs at intervals.  The patient understands that they are to contact us or the primary physician if concerning signs are noted. Gabapentin Pregnancy And Lactation Text: This medication is Pregnancy Category C and isn't considered safe during pregnancy. It is excreted in breast milk. Hydroquinone Counseling:  Patient advised that medication may result in skin irritation, lightening (hypopigmentation), dryness, and burning.  In the event of skin irritation, the patient was advised to reduce the amount of the drug applied or use it less frequently.  Rarely, spots that are treated with hydroquinone can become darker (pseudoochronosis).  Should this occur, patient instructed to stop medication and call the office. The patient verbalized understanding of the proper use and possible adverse effects of hydroquinone.  All of the patient's questions and concerns were addressed. Adbry Counseling: I discussed with the patient the risks of tralokinumab including but not limited to eye infection and irritation, cold sores, injection site reactions, worsening of asthma, allergic reactions and increased risk of parasitic infection.  Live vaccines should be avoided while taking tralokinumab. The patient understands that monitoring is required and they must alert us or the primary physician if symptoms of infection or other concerning signs are noted. Bactrim Counseling:  I discussed with the patient the risks of sulfa antibiotics including but not limited to GI upset, allergic reaction, drug rash, diarrhea, dizziness, photosensitivity, and yeast infections.  Rarely, more serious reactions can occur including but not limited to aplastic anemia, agranulocytosis, methemoglobinemia, blood dyscrasias, liver or kidney failure, lung infiltrates or desquamative/blistering drug rashes. Nsaids Counseling: NSAID Counseling: I discussed with the patient that NSAIDs should be taken with food. Prolonged use of NSAIDs can result in the development of stomach ulcers.  Patient advised to stop taking NSAIDs if abdominal pain occurs.  The patient verbalized understanding of the proper use and possible adverse effects of NSAIDs.  All of the patient's questions and concerns were addressed. Sarecycline Counseling: Patient advised regarding possible photosensitivity and discoloration of the teeth, skin, lips, tongue and gums.  Patient instructed to avoid sunlight, if possible.  When exposed to sunlight, patients should wear protective clothing, sunglasses, and sunscreen.  The patient was instructed to call the office immediately if the following severe adverse effects occur:  hearing changes, easy bruising/bleeding, severe headache, or vision changes.  The patient verbalized understanding of the proper use and possible adverse effects of sarecycline.  All of the patient's questions and concerns were addressed. Aklief Pregnancy And Lactation Text: It is unknown if this medication is safe to use during pregnancy.  It is unknown if this medication is excreted in breast milk.  Breastfeeding women should use the topical cream on the smallest area of the skin for the shortest time needed while breastfeeding.  Do not apply to nipple and areola. Rituxan Pregnancy And Lactation Text: This medication is Pregnancy Category C and it isn't know if it is safe during pregnancy. It is unknown if this medication is excreted in breast milk but similar antibodies are known to be excreted. Qbrexza Counseling:  I discussed with the patient the risks of Qbrexza including but not limited to headache, mydriasis, blurred vision, dry eyes, nasal dryness, dry mouth, dry throat, dry skin, urinary hesitation, and constipation.  Local skin reactions including erythema, burning, stinging, and itching can also occur. Doxepin Pregnancy And Lactation Text: This medication is Pregnancy Category C and it isn't known if it is safe during pregnancy. It is also excreted in breast milk and breast feeding isn't recommended. SSKI Counseling:  I discussed with the patient the risks of SSKI including but not limited to thyroid abnormalities, metallic taste, GI upset, fever, headache, acne, arthralgias, paraesthesias, lymphadenopathy, easy bleeding, arrhythmias, and allergic reaction. Glycopyrrolate Counseling:  I discussed with the patient the risks of glycopyrrolate including but not limited to skin rash, drowsiness, dry mouth, difficulty urinating, and blurred vision. Topical Steroids Counseling: I discussed with the patient that prolonged use of topical steroids can result in the increased appearance of superficial blood vessels (telangiectasias), lightening (hypopigmentation) and thinning of the skin (atrophy).  Patient understands to avoid using high potency steroids in skin folds, the groin or the face.  The patient verbalized understanding of the proper use and possible adverse effects of topical steroids.  All of the patient's questions and concerns were addressed. Dutasteride Female Counseling: Dutasteride Counseling:  I discussed with the patient the risks of use of dutasteride including but not limited to decreased libido and sexual dysfunction. Explained the teratogenic nature of the medication and stressed the importance of not getting pregnant during treatment. All of the patient's questions and concerns were addressed. Cantharidin Pregnancy And Lactation Text: This medication has not been proven safe during pregnancy. It is unknown if this medication is excreted in breast milk. Nsaids Pregnancy And Lactation Text: These medications are considered safe up to 30 weeks gestation. It is excreted in breast milk. Itraconazole Pregnancy And Lactation Text: This medication is Pregnancy Category C and it isn't know if it is safe during pregnancy. It is also excreted in breast milk. Azathioprine Counseling:  I discussed with the patient the risks of azathioprine including but not limited to myelosuppression, immunosuppression, hepatotoxicity, lymphoma, and infections.  The patient understands that monitoring is required including baseline LFTs, Creatinine, possible TPMP genotyping and weekly CBCs for the first month and then every 2 weeks thereafter.  The patient verbalized understanding of the proper use and possible adverse effects of azathioprine.  All of the patient's questions and concerns were addressed. Spevigo Counseling: I discussed with the patient the risks of Spevigo including but not limited to fatigue, nasuea, vomiting, headache, pruritus, urinary tract infection, an infusion related reactions.  The patient understands that monitoring is required including screening for tuberculosis at baseline and yearly screening thereafter while continuing Spevigo therapy. They should contact us if symptoms of infection or other concerning signs are noted. Bactrim Pregnancy And Lactation Text: This medication is Pregnancy Category D and is known to cause fetal risk.  It is also excreted in breast milk. Siliq Counseling:  I discussed with the patient the risks of Siliq including but not limited to new or worsening depression, suicidal thoughts and behavior, immunosuppression, malignancy, posterior leukoencephalopathy syndrome, and serious infections.  The patient understands that monitoring is required including a PPD at baseline and must alert us or the primary physician if symptoms of infection or other concerning signs are noted. There is also a special program designed to monitor depression which is required with Siliq. Adbry Pregnancy And Lactation Text: It is unknown if this medication will adversely affect pregnancy or breast feeding. Azelaic Acid Counseling: Patient counseled that medicine may cause skin irritation and to avoid applying near the eyes.  In the event of skin irritation, the patient was advised to reduce the amount of the drug applied or use it less frequently.   The patient verbalized understanding of the proper use and possible adverse effects of azelaic acid.  All of the patient's questions and concerns were addressed. Glycopyrrolate Pregnancy And Lactation Text: This medication is Pregnancy Category B and is considered safe during pregnancy. It is unknown if it is excreted breast milk. Dutasteride Pregnancy And Lactation Text: This medication is absolutely contraindicated in women, especially during pregnancy and breast feeding. Feminization of male fetuses is possible if taking while pregnant. Hydroxyzine Counseling: Patient advised that the medication is sedating and not to drive a car after taking this medication.  Patient informed of potential adverse effects including but not limited to dry mouth, urinary retention, and blurry vision.  The patient verbalized understanding of the proper use and possible adverse effects of hydroxyzine.  All of the patient's questions and concerns were addressed. Topical Steroids Applications Pregnancy And Lactation Text: Most topical steroids are considered safe to use during pregnancy and lactation.  Any topical steroid applied to the breast or nipple should be washed off before breastfeeding. Sski Pregnancy And Lactation Text: This medication is Pregnancy Category D and isn't considered safe during pregnancy. It is excreted in breast milk. Opzelura Counseling:  I discussed with the patient the risks of Opzelura including but not limited to nasopharngitis, bronchitis, ear infection, eosinophila, hives, diarrhea, folliculitis, tonsillitis, and rhinorrhea.  Taken orally, this medication has been linked to serious infections; higher rate of mortality; malignancy and lymphoproliferative disorders; major adverse cardiovascular events; thrombosis; thrombocytopenia, anemia, and neutropenia; and lipid elevations. Spevigo Pregnancy And Lactation Text: The risk during pregnancy and breastfeeding is uncertain with this medication. This medication does cross the placenta. It is unknown if this medication is found in breast milk. Olanzapine Counseling- I discussed with the patient the common side effects of olanzapine including but are not limited to: lack of energy, dry mouth, increased appetite, sleepiness, tremor, constipation, dizziness, changes in behavior, or restlessness.  Explained that teenagers are more likely to experience headaches, abdominal pain, pain in the arms or legs, tiredness, and sleepiness.  Serious side effects include but are not limited: increased risk of death in elderly patients who are confused, have memory loss, or dementia-related psychosis; hyperglycemia; increased cholesterol and triglycerides; and weight gain. Qbrexza Pregnancy And Lactation Text: There is no available data on Qbrexza use in pregnant women.  There is no available data on Qbrexza use in lactation. Erivedge Counseling- I discussed with the patient the risks of Erivedge including but not limited to nausea, vomiting, diarrhea, constipation, weight loss, changes in the sense of taste, decreased appetite, muscle spasms, and hair loss.  The patient verbalized understanding of the proper use and possible adverse effects of Erivedge.  All of the patient's questions and concerns were addressed. Ketoconazole Counseling:   Patient counseled regarding improving absorption with orange juice.  Adverse effects include but are not limited to breast enlargement, headache, diarrhea, nausea, upset stomach, liver function test abnormalities, taste disturbance, and stomach pain.  There is a rare possibility of liver failure that can occur when taking ketoconazole. The patient understands that monitoring of LFTs may be required, especially at baseline. The patient verbalized understanding of the proper use and possible adverse effects of ketoconazole.  All of the patient's questions and concerns were addressed. Imiquimod Counseling:  I discussed with the patient the risks of imiquimod including but not limited to erythema, scaling, itching, weeping, crusting, and pain.  Patient understands that the inflammatory response to imiquimod is variable from person to person and was educated regarded proper titration schedule.  If flu-like symptoms develop, patient knows to discontinue the medication and contact us. Zoryve Counseling:  I discussed with the patient that Zoryve is not for use in the eyes, mouth or vagina. The most commonly reported side effects include diarrhea, headache, insomnia, application site pain, upper respiratory tract infections, and urinary tract infections.  All of the patient's questions and concerns were addressed. Cephalexin Counseling: I counseled the patient regarding use of cephalexin as an antibiotic for prophylactic and/or therapeutic purposes. Cephalexin (commonly prescribed under brand name Keflex) is a cephalosporin antibiotic which is active against numerous classes of bacteria, including most skin bacteria. Side effects may include nausea, diarrhea, gastrointestinal upset, rash, hives, yeast infections, and in rare cases, hepatitis, kidney disease, seizures, fever, confusion, neurologic symptoms, and others. Patients with severe allergies to penicillin medications are cautioned that there is about a 10% incidence of cross-reactivity with cephalosporins. When possible, patients with penicillin allergies should use alternatives to cephalosporins for antibiotic therapy. Azelaic Acid Pregnancy And Lactation Text: This medication is considered safe during pregnancy and breast feeding. Bimzelx Counseling:  I discussed with the patient the risks of Bimzelx including but not limited to depression, immunosuppression, allergic reactions and infections.  The patient understands that monitoring is required including a PPD at baseline and must alert us or the primary physician if symptoms of infection or other concerning signs are noted. Tetracycline Counseling: Patient counseled regarding possible photosensitivity and increased risk for sunburn.  Patient instructed to avoid sunlight, if possible.  When exposed to sunlight, patients should wear protective clothing, sunglasses, and sunscreen.  The patient was instructed to call the office immediately if the following severe adverse effects occur:  hearing changes, easy bruising/bleeding, severe headache, or vision changes.  The patient verbalized understanding of the proper use and possible adverse effects of tetracycline.  All of the patient's questions and concerns were addressed. Patient understands to avoid pregnancy while on therapy due to potential birth defects. Rhofade Counseling: Rhofade is a topical medication which can decrease superficial blood flow where applied. Side effects are uncommon and include stinging, redness and allergic reactions. Stelara Counseling:  I discussed with the patient the risks of ustekinumab including but not limited to immunosuppression, malignancy, posterior leukoencephalopathy syndrome, and serious infections.  The patient understands that monitoring is required including a PPD at baseline and must alert us or the primary physician if symptoms of infection or other concerning signs are noted. Cibinqo Counseling: I discussed with the patient the risks of Cibinqo therapy including but not limited to common cold, nausea, headache, cold sores, increased blood CPK levels, dizziness, UTIs, fatigue, acne, and vomitting. Live vaccines should be avoided.  This medication has been linked to serious infections; higher rate of mortality; malignancy and lymphoproliferative disorders; major adverse cardiovascular events; thrombosis; thrombocytopenia and lymphopenia; lipid elevations; and retinal detachment. Thalidomide Counseling: I discussed with the patient the risks of thalidomide including but not limited to birth defects, anxiety, weakness, chest pain, dizziness, cough and severe allergy. Hydroxyzine Pregnancy And Lactation Text: This medication is not safe during pregnancy and should not be taken. It is also excreted in breast milk and breast feeding isn't recommended. Topical Sulfur Applications Counseling: Topical Sulfur Counseling: Patient counseled that this medication may cause skin irritation or allergic reactions.  In the event of skin irritation, the patient was advised to reduce the amount of the drug applied or use it less frequently.   The patient verbalized understanding of the proper use and possible adverse effects of topical sulfur application.  All of the patient's questions and concerns were addressed. Finasteride Male Counseling: Finasteride Counseling:  I discussed with the patient the risks of use of finasteride including but not limited to decreased libido, decreased ejaculate volume, gynecomastia, and depression. Women should not handle medication.  All of the patient's questions and concerns were addressed. Isotretinoin Pregnancy And Lactation Text: This medication is Pregnancy Category X and is considered extremely dangerous during pregnancy. It is unknown if it is excreted in breast milk. Detail Level: Simple Hydroxychloroquine Counseling:  I discussed with the patient that a baseline ophthalmologic exam is needed at the start of therapy and every year thereafter while on therapy. A CBC may also be warranted for monitoring.  The side effects of this medication were discussed with the patient, including but not limited to agranulocytosis, aplastic anemia, seizures, rashes, retinopathy, and liver toxicity. Patient instructed to call the office should any adverse effect occur.  The patient verbalized understanding of the proper use and possible adverse effects of Plaquenil.  All the patient's questions and concerns were addressed. Minocycline Counseling: Patient advised regarding possible photosensitivity and discoloration of the teeth, skin, lips, tongue and gums.  Patient instructed to avoid sunlight, if possible.  When exposed to sunlight, patients should wear protective clothing, sunglasses, and sunscreen.  The patient was instructed to call the office immediately if the following severe adverse effects occur:  hearing changes, easy bruising/bleeding, severe headache, or vision changes.  The patient verbalized understanding of the proper use and possible adverse effects of minocycline.  All of the patient's questions and concerns were addressed. Drysol Counseling:  I discussed with the patient the risks of drysol/aluminum chloride including but not limited to skin rash, itching, irritation, burning. Otezla Counseling: The side effects of Otezla were discussed with the patient, including but not limited to worsening or new depression, weight loss, diarrhea, nausea, upper respiratory tract infection, and headache. Patient instructed to call the office should any adverse effect occur.  The patient verbalized understanding of the proper use and possible adverse effects of Otezla.  All the patient's questions and concerns were addressed. Simlandi Counseling:  I discussed with the patient the risks of adalimumab including but not limited to myelosuppression, immunosuppression, autoimmune hepatitis, demyelinating diseases, lymphoma, and serious infections.  The patient understands that monitoring is required including a PPD at baseline and must alert us or the primary physician if symptoms of infection or other concerning signs are noted. Colchicine Counseling:  Patient counseled regarding adverse effects including but not limited to stomach upset (nausea, vomiting, stomach pain, or diarrhea).  Patient instructed to limit alcohol consumption while taking this medication.  Colchicine may reduce blood counts especially with prolonged use.  The patient understands that monitoring of kidney function and blood counts may be required, especially at baseline. The patient verbalized understanding of the proper use and possible adverse effects of colchicine.  All of the patient's questions and concerns were addressed. Topical Clindamycin Counseling: Patient counseled that this medication may cause skin irritation or allergic reactions.  In the event of skin irritation, the patient was advised to reduce the amount of the drug applied or use it less frequently.   The patient verbalized understanding of the proper use and possible adverse effects of clindamycin.  All of the patient's questions and concerns were addressed. High Dose Vitamin A Counseling: Side effects reviewed, pt to contact office should one occur. Hydroxychloroquine Pregnancy And Lactation Text: This medication has been shown to cause fetal harm but it isn't assigned a Pregnancy Risk Category. There are small amounts excreted in breast milk. Albendazole Counseling:  I discussed with the patient the risks of albendazole including but not limited to cytopenia, kidney damage, nausea/vomiting and severe allergy.  The patient understands that this medication is being used in an off-label manner. Topical Sulfur Applications Pregnancy And Lactation Text: This medication is considered safe during pregnancy and breast feeding secondary to limited systemic absorption. Topical Clindamycin Pregnancy And Lactation Text: This medication is Pregnancy Category B and is considered safe during pregnancy. It is unknown if it is excreted in breast milk. Infliximab Counseling:  I discussed with the patient the risks of infliximab including but not limited to myelosuppression, immunosuppression, autoimmune hepatitis, demyelinating diseases, lymphoma, and serious infections.  The patient understands that monitoring is required including a PPD at baseline and must alert us or the primary physician if symptoms of infection or other concerning signs are noted. Wartpeel Counseling:  I discussed with the patient the risks of Wartpeel including but not limited to erythema, scaling, itching, weeping, crusting, and pain. Otezla Pregnancy And Lactation Text: This medication is Pregnancy Category C and it isn't known if it is safe during pregnancy. It is unknown if it is excreted in breast milk. High Dose Vitamin A Pregnancy And Lactation Text: High dose vitamin A therapy is contraindicated during pregnancy and breast feeding. Opzelura Pregnancy And Lactation Text: There is insufficient data to evaluate drug-associated risk for major birth defects, miscarriage, or other adverse maternal or fetal outcomes.  There is a pregnancy registry that monitors pregnancy outcomes in pregnant persons exposed to the medication during pregnancy.  It is unknown if this medication is excreted in breast milk.  Do not breastfeed during treatment and for about 4 weeks after the last dose. Fluconazole Counseling:  Patient counseled regarding adverse effects of fluconazole including but not limited to headache, diarrhea, nausea, upset stomach, liver function test abnormalities, taste disturbance, and stomach pain.  There is a rare possibility of liver failure that can occur when taking fluconazole.  The patient understands that monitoring of LFTs and kidney function test may be required, especially at baseline. The patient verbalized understanding of the proper use and possible adverse effects of fluconazole.  All of the patient's questions and concerns were addressed. Elidel Counseling: Patient may experience a mild burning sensation during topical application. Elidel is not approved in children less than 2 years of age. There have been case reports of hematologic and skin malignancies in patients using topical calcineurin inhibitors although causality is questionable. Low Dose Naltrexone Counseling- I discussed with the patient the potential risks and side effects of low dose naltrexone including but not limited to: more vivid dreams, headaches, nausea, vomiting, abdominal pain, fatigue, dizziness, and anxiety. Quinolones Counseling:  I discussed with the patient the risks of fluoroquinolones including but not limited to GI upset, allergic reaction, drug rash, diarrhea, dizziness, photosensitivity, yeast infections, liver function test abnormalities, tendonitis/tendon rupture. Topical Ketoconazole Counseling: Patient counseled that this medication may cause skin irritation or allergic reactions.  In the event of skin irritation, the patient was advised to reduce the amount of the drug applied or use it less frequently.   The patient verbalized understanding of the proper use and possible adverse effects of ketoconazole.  All of the patient's questions and concerns were addressed. Picato Counseling:  I discussed with the patient the risks of Picato including but not limited to erythema, scaling, itching, weeping, crusting, and pain. Dapsone Counseling: I discussed with the patient the risks of dapsone including but not limited to hemolytic anemia, agranulocytosis, rashes, methemoglobinemia, kidney failure, peripheral neuropathy, headaches, GI upset, and liver toxicity.  Patients who start dapsone require monitoring including baseline LFTs and weekly CBCs for the first month, then every month thereafter.  The patient verbalized understanding of the proper use and possible adverse effects of dapsone.  All of the patient's questions and concerns were addressed. Oxybutynin Counseling:  I discussed with the patient the risks of oxybutynin including but not limited to skin rash, drowsiness, dry mouth, difficulty urinating, and blurred vision. Klisyri Pregnancy And Lactation Text: It is unknown if this medication can harm a developing fetus or if it is excreted in breast milk. Simponi Counseling:  I discussed with the patient the risks of golimumab including but not limited to myelosuppression, immunosuppression, autoimmune hepatitis, demyelinating diseases, lymphoma, and serious infections.  The patient understands that monitoring is required including a PPD at baseline and must alert us or the primary physician if symptoms of infection or other concerning signs are noted. Low Dose Naltrexone Pregnancy And Lactation Text: Naltrexone is pregnancy category C.  There have been no adequate and well-controlled studies in pregnant women.  It should be used in pregnancy only if the potential benefit justifies the potential risk to the fetus.   Limited data indicates that naltrexone is minimally excreted into breastmilk. Nemluvio Counseling: I discussed with the patient the risks of nemolizumab including but not limited to headache, gastrointestinal complaints, nasopharyngitis, musculoskeletal complaints, injection site reactions, and allergic reactions. The patient understands that monitoring is required and they must alert us or the primary physician if any side effects are noted. Ivermectin Counseling:  Patient instructed to take medication on an empty stomach with a full glass of water.  Patient informed of potential adverse effects including but not limited to nausea, diarrhea, dizziness, itching, and swelling of the extremities or lymph nodes.  The patient verbalized understanding of the proper use and possible adverse effects of ivermectin.  All of the patient's questions and concerns were addressed. Opioid Counseling: I discussed with the patient the potential side effects of opioids including but not limited to addiction, altered mental status, and depression. I stressed avoiding alcohol, benzodiazepines, muscle relaxants and sleep aids unless specifically okayed by a physician. The patient verbalized understanding of the proper use and possible adverse effects of opioids. All of the patient's questions and concerns were addressed. They were instructed to flush the remaining pills down the toilet if they did not need them for pain. Dapsone Pregnancy And Lactation Text: This medication is Pregnancy Category C and is not considered safe during pregnancy or breast feeding. Cimetidine Counseling:  I discussed with the patient the risks of Cimetidine including but not limited to gynecomastia, headache, diarrhea, nausea, drowsiness, arrhythmias, pancreatitis, skin rashes, psychosis, bone marrow suppression and kidney toxicity. Minoxidil Counseling: Minoxidil is a topical medication which can increase blood flow where it is applied. It is uncertain how this medication increases hair growth. Side effects are uncommon and include stinging and allergic reactions. Niacinamide Counseling: I recommended taking niacin or niacinamide, also know as vitamin B3, twice daily. Recent evidence suggests that taking vitamin B3 (500 mg twice daily) can reduce the risk of actinic keratoses and non-melanoma skin cancers. Side effects of vitamin B3 include flushing and headache. Griseofulvin Counseling:  I discussed with the patient the risks of griseofulvin including but not limited to photosensitivity, cytopenia, liver damage, nausea/vomiting and severe allergy.  The patient understands that this medication is best absorbed when taken with a fatty meal (e.g., ice cream or french fries). Winlevi Counseling:  I discussed with the patient the risks of topical clascoterone including but not limited to erythema, scaling, itching, and stinging. Patient voiced their understanding. Eucrisa Counseling: Patient may experience a mild burning sensation during topical application. Eucrisa is not approved in children less than 3 months of age. Azithromycin Counseling:  I discussed with the patient the risks of azithromycin including but not limited to GI upset, allergic reaction, drug rash, diarrhea, and yeast infections.

## 2025-02-20 NOTE — CONSULT NOTE ADULT - CONSULT REASON
Rx Refill Note  Requested Prescriptions     Pending Prescriptions Disp Refills    pravastatin (PRAVACHOL) 20 MG tablet [Pharmacy Med Name: PRAVASTATIN SODIUM 20 MG TAB] 90 tablet 2     Sig: TAKE ONE TABLET BY MOUTH ONCE NIGHTLY      Last office visit with prescribing clinician: 6/12/2024   Last telemedicine visit with prescribing clinician: Visit date not found   Next office visit with prescribing clinician: 4/9/2025                         Would you like a call back once the refill request has been completed: [] Yes [] No    If the office needs to give you a call back, can they leave a voicemail: [] Yes [] No    Sol Kemp MA  02/20/25, 07:40 EST   A-Fib and ICD  interrogation

## 2025-03-21 NOTE — ED PROVIDER NOTE - PHYSICAL EXAMINATION
[Alert] : alert [No Acute Distress] : no acute distress [Normal Voice/Communication] : normal voice communication [Normal Hearing] : hearing was normal [No Respiratory Distress] : no respiratory distress [Normal Rate and Effort] : normal respiratory rate and effort [Normal Gait] : normal gait [Oriented x3] : oriented to person, place, and time [Normal Affect] : the affect was normal [Normal Insight/Judgement] : insight and judgment were intact [Normal Mood] : the mood was normal VITAL SIGNS: I have reviewed nursing notes and confirm.  CONSTITUTIONAL: Well-developed; well-nourished; in no acute distress.   SKIN: skin exam is warm and dry, no acute rash.    HEAD: Normocephalic; atraumatic.  EYES:  conjunctiva and sclera clear.  EXT: ecchymosis and TTP to left great toe, pedal pulses present, Normal ROM.  No clubbing, cyanosis or edema.   NEURO: Alert, oriented, grossly unremarkable

## 2025-04-03 ENCOUNTER — NON-APPOINTMENT (OUTPATIENT)
Age: 55
End: 2025-04-03

## 2025-05-01 ENCOUNTER — NON-APPOINTMENT (OUTPATIENT)
Age: 55
End: 2025-05-01

## 2025-05-01 ENCOUNTER — APPOINTMENT (OUTPATIENT)
Dept: CARDIOLOGY | Facility: CLINIC | Age: 55
End: 2025-05-01
Payer: MEDICAID

## 2025-05-01 PROCEDURE — 93295 DEV INTERROG REMOTE 1/2/MLT: CPT

## 2025-05-01 PROCEDURE — 93296 REM INTERROG EVL PM/IDS: CPT

## 2025-05-22 NOTE — ED PROVIDER NOTE - OBJECTIVE STATEMENT
47 yold male to Ed from [facility] PMhx Htn, Hld, CHF, cardiomyopathy, Cad, MIx2, PPM/Aicd, endocarditis, afib(hx), hx SVT c/o right hip pain x 1 week; pt with hx of ORIF right hip 4/2014(Dr. Aguirre) with revision due to frequent dislocations; pt examined at [facility] by orthopedist Dr. Aguirre and told he needs another revision; pt ambulatory but with pain using walker; 22-May-2025 10:21

## 2025-07-31 ENCOUNTER — APPOINTMENT (OUTPATIENT)
Dept: CARDIOLOGY | Facility: CLINIC | Age: 55
End: 2025-07-31
Payer: MEDICAID

## 2025-07-31 ENCOUNTER — NON-APPOINTMENT (OUTPATIENT)
Age: 55
End: 2025-07-31

## 2025-07-31 PROCEDURE — 93296 REM INTERROG EVL PM/IDS: CPT

## 2025-07-31 PROCEDURE — 93295 DEV INTERROG REMOTE 1/2/MLT: CPT
